# Patient Record
Sex: FEMALE | Race: WHITE | NOT HISPANIC OR LATINO | Employment: OTHER | URBAN - METROPOLITAN AREA
[De-identification: names, ages, dates, MRNs, and addresses within clinical notes are randomized per-mention and may not be internally consistent; named-entity substitution may affect disease eponyms.]

---

## 2017-01-25 ENCOUNTER — ALLSCRIPTS OFFICE VISIT (OUTPATIENT)
Dept: OTHER | Facility: OTHER | Age: 77
End: 2017-01-25

## 2017-01-30 DIAGNOSIS — N32.81 OVERACTIVE BLADDER: ICD-10-CM

## 2017-02-21 ENCOUNTER — ALLSCRIPTS OFFICE VISIT (OUTPATIENT)
Dept: OTHER | Facility: OTHER | Age: 77
End: 2017-02-21

## 2017-02-21 ENCOUNTER — LAB (OUTPATIENT)
Dept: LAB | Facility: CLINIC | Age: 77
End: 2017-02-21
Payer: COMMERCIAL

## 2017-02-21 DIAGNOSIS — N32.81 OVERACTIVE BLADDER: ICD-10-CM

## 2017-02-21 LAB
ANION GAP SERPL CALCULATED.3IONS-SCNC: 8 MMOL/L (ref 4–13)
APTT PPP: 30 SECONDS (ref 24–36)
BASOPHILS # BLD AUTO: 0.02 THOUSANDS/ΜL (ref 0–0.1)
BASOPHILS NFR BLD AUTO: 0 % (ref 0–1)
BUN SERPL-MCNC: 13 MG/DL (ref 5–25)
CALCIUM SERPL-MCNC: 9.3 MG/DL (ref 8.3–10.1)
CHLORIDE SERPL-SCNC: 104 MMOL/L (ref 100–108)
CO2 SERPL-SCNC: 27 MMOL/L (ref 21–32)
CREAT SERPL-MCNC: 0.76 MG/DL (ref 0.6–1.3)
EOSINOPHIL # BLD AUTO: 0.14 THOUSAND/ΜL (ref 0–0.61)
EOSINOPHIL NFR BLD AUTO: 2 % (ref 0–6)
ERYTHROCYTE [DISTWIDTH] IN BLOOD BY AUTOMATED COUNT: 13.5 % (ref 11.6–15.1)
GFR SERPL CREATININE-BSD FRML MDRD: >60 ML/MIN/1.73SQ M
GLUCOSE SERPL-MCNC: 123 MG/DL (ref 65–140)
HCT VFR BLD AUTO: 42.7 % (ref 34.8–46.1)
HGB BLD-MCNC: 14.5 G/DL (ref 11.5–15.4)
INR PPP: 0.97 (ref 0.86–1.16)
LYMPHOCYTES # BLD AUTO: 1.47 THOUSANDS/ΜL (ref 0.6–4.47)
LYMPHOCYTES NFR BLD AUTO: 25 % (ref 14–44)
MCH RBC QN AUTO: 29.5 PG (ref 26.8–34.3)
MCHC RBC AUTO-ENTMCNC: 34 G/DL (ref 31.4–37.4)
MCV RBC AUTO: 87 FL (ref 82–98)
MONOCYTES # BLD AUTO: 0.53 THOUSAND/ΜL (ref 0.17–1.22)
MONOCYTES NFR BLD AUTO: 9 % (ref 4–12)
NEUTROPHILS # BLD AUTO: 3.67 THOUSANDS/ΜL (ref 1.85–7.62)
NEUTS SEG NFR BLD AUTO: 64 % (ref 43–75)
NRBC BLD AUTO-RTO: 0 /100 WBCS
PLATELET # BLD AUTO: 214 THOUSANDS/UL (ref 149–390)
PMV BLD AUTO: 9.9 FL (ref 8.9–12.7)
POTASSIUM SERPL-SCNC: 3.9 MMOL/L (ref 3.5–5.3)
PROTHROMBIN TIME: 13 SECONDS (ref 12–14.3)
RBC # BLD AUTO: 4.92 MILLION/UL (ref 3.81–5.12)
SODIUM SERPL-SCNC: 139 MMOL/L (ref 136–145)
WBC # BLD AUTO: 5.85 THOUSAND/UL (ref 4.31–10.16)

## 2017-02-21 PROCEDURE — 36415 COLL VENOUS BLD VENIPUNCTURE: CPT

## 2017-02-21 PROCEDURE — 85025 COMPLETE CBC W/AUTO DIFF WBC: CPT

## 2017-02-21 PROCEDURE — 85730 THROMBOPLASTIN TIME PARTIAL: CPT

## 2017-02-21 PROCEDURE — 85610 PROTHROMBIN TIME: CPT

## 2017-02-21 PROCEDURE — 80048 BASIC METABOLIC PNL TOTAL CA: CPT

## 2017-03-06 ENCOUNTER — HOSPITAL ENCOUNTER (OUTPATIENT)
Facility: HOSPITAL | Age: 77
Setting detail: OUTPATIENT SURGERY
Discharge: HOME | End: 2017-03-06
Attending: UROLOGY | Admitting: UROLOGY
Payer: COMMERCIAL

## 2017-03-06 ENCOUNTER — ANESTHESIA (OUTPATIENT)
Dept: PERIOP | Facility: HOSPITAL | Age: 77
End: 2017-03-06
Payer: COMMERCIAL

## 2017-03-06 ENCOUNTER — ANESTHESIA EVENT (OUTPATIENT)
Dept: PERIOP | Facility: HOSPITAL | Age: 77
End: 2017-03-06
Payer: COMMERCIAL

## 2017-03-06 VITALS
HEIGHT: 62 IN | BODY MASS INDEX: 33.7 KG/M2 | WEIGHT: 183.13 LBS | DIASTOLIC BLOOD PRESSURE: 70 MMHG | TEMPERATURE: 96.7 F | HEART RATE: 78 BPM | OXYGEN SATURATION: 98 % | SYSTOLIC BLOOD PRESSURE: 157 MMHG | RESPIRATION RATE: 18 BRPM

## 2017-03-06 RX ORDER — ONDANSETRON 2 MG/ML
INJECTION INTRAMUSCULAR; INTRAVENOUS AS NEEDED
Status: DISCONTINUED | OUTPATIENT
Start: 2017-03-06 | End: 2017-03-06 | Stop reason: SURG

## 2017-03-06 RX ORDER — ALBUTEROL SULFATE 2.5 MG/3ML
2.5 SOLUTION RESPIRATORY (INHALATION) ONCE AS NEEDED
Status: DISCONTINUED | OUTPATIENT
Start: 2017-03-06 | End: 2017-03-06 | Stop reason: HOSPADM

## 2017-03-06 RX ORDER — ONDANSETRON 2 MG/ML
4 INJECTION INTRAMUSCULAR; INTRAVENOUS ONCE AS NEEDED
Status: DISCONTINUED | OUTPATIENT
Start: 2017-03-06 | End: 2017-03-06 | Stop reason: HOSPADM

## 2017-03-06 RX ORDER — SODIUM CHLORIDE, SODIUM LACTATE, POTASSIUM CHLORIDE, CALCIUM CHLORIDE 600; 310; 30; 20 MG/100ML; MG/100ML; MG/100ML; MG/100ML
INJECTION, SOLUTION INTRAVENOUS CONTINUOUS PRN
Status: DISCONTINUED | OUTPATIENT
Start: 2017-03-06 | End: 2017-03-06 | Stop reason: SURG

## 2017-03-06 RX ORDER — PHENAZOPYRIDINE HYDROCHLORIDE 100 MG/1
100 TABLET, FILM COATED ORAL 3 TIMES DAILY PRN
Qty: 10 TABLET | Refills: 0 | Status: SHIPPED | OUTPATIENT
Start: 2017-03-06 | End: 2017-03-09

## 2017-03-06 RX ORDER — LIDOCAINE HYDROCHLORIDE 10 MG/ML
INJECTION, SOLUTION INFILTRATION; PERINEURAL AS NEEDED
Status: DISCONTINUED | OUTPATIENT
Start: 2017-03-06 | End: 2017-03-06 | Stop reason: SURG

## 2017-03-06 RX ORDER — SODIUM CHLORIDE, SODIUM LACTATE, POTASSIUM CHLORIDE, CALCIUM CHLORIDE 600; 310; 30; 20 MG/100ML; MG/100ML; MG/100ML; MG/100ML
125 INJECTION, SOLUTION INTRAVENOUS CONTINUOUS
Status: CANCELLED | OUTPATIENT
Start: 2017-03-06

## 2017-03-06 RX ORDER — LABETALOL HYDROCHLORIDE 5 MG/ML
5 INJECTION, SOLUTION INTRAVENOUS
Status: DISCONTINUED | OUTPATIENT
Start: 2017-03-06 | End: 2017-03-06 | Stop reason: HOSPADM

## 2017-03-06 RX ORDER — MEPERIDINE HYDROCHLORIDE 25 MG/ML
12.5 INJECTION INTRAMUSCULAR; INTRAVENOUS; SUBCUTANEOUS AS NEEDED
Status: DISCONTINUED | OUTPATIENT
Start: 2017-03-06 | End: 2017-03-06 | Stop reason: HOSPADM

## 2017-03-06 RX ORDER — PROPOFOL 10 MG/ML
INJECTION, EMULSION INTRAVENOUS AS NEEDED
Status: DISCONTINUED | OUTPATIENT
Start: 2017-03-06 | End: 2017-03-06 | Stop reason: SURG

## 2017-03-06 RX ORDER — FENTANYL CITRATE/PF 50 MCG/ML
25 SYRINGE (ML) INJECTION
Status: DISCONTINUED | OUTPATIENT
Start: 2017-03-06 | End: 2017-03-06 | Stop reason: HOSPADM

## 2017-03-06 RX ADMIN — ONDANSETRON 4 MG: 2 INJECTION INTRAMUSCULAR; INTRAVENOUS at 12:19

## 2017-03-06 RX ADMIN — PROPOFOL 150 MG: 10 INJECTION, EMULSION INTRAVENOUS at 12:04

## 2017-03-06 RX ADMIN — CEFAZOLIN SODIUM 2000 MG: 2 SOLUTION INTRAVENOUS at 12:00

## 2017-03-06 RX ADMIN — LIDOCAINE HYDROCHLORIDE 50 MG: 10 INJECTION, SOLUTION INFILTRATION; PERINEURAL at 12:04

## 2017-03-06 RX ADMIN — SODIUM CHLORIDE, SODIUM LACTATE, POTASSIUM CHLORIDE, AND CALCIUM CHLORIDE: .6; .31; .03; .02 INJECTION, SOLUTION INTRAVENOUS at 12:00

## 2017-03-21 ENCOUNTER — ALLSCRIPTS OFFICE VISIT (OUTPATIENT)
Dept: OTHER | Facility: OTHER | Age: 77
End: 2017-03-21

## 2017-03-21 DIAGNOSIS — L98.9 DISORDER OF SKIN OR SUBCUTANEOUS TISSUE: ICD-10-CM

## 2017-03-24 ENCOUNTER — ALLSCRIPTS OFFICE VISIT (OUTPATIENT)
Dept: OTHER | Facility: OTHER | Age: 77
End: 2017-03-24

## 2017-04-03 ENCOUNTER — ALLSCRIPTS OFFICE VISIT (OUTPATIENT)
Dept: OTHER | Facility: OTHER | Age: 77
End: 2017-04-03

## 2017-04-26 ENCOUNTER — LAB (OUTPATIENT)
Dept: LAB | Facility: OTHER | Age: 77
End: 2017-04-26
Payer: COMMERCIAL

## 2017-04-26 ENCOUNTER — TRANSCRIBE ORDERS (OUTPATIENT)
Dept: LAB | Facility: OTHER | Age: 77
End: 2017-04-26

## 2017-04-26 DIAGNOSIS — E78.2 MIXED HYPERLIPIDEMIA: ICD-10-CM

## 2017-04-26 DIAGNOSIS — R53.83 FATIGUE, UNSPECIFIED TYPE: ICD-10-CM

## 2017-04-26 DIAGNOSIS — E78.2 MIXED HYPERLIPIDEMIA: Primary | ICD-10-CM

## 2017-04-26 LAB
ALBUMIN SERPL BCP-MCNC: 3.7 G/DL (ref 3.5–5)
ALP SERPL-CCNC: 102 U/L (ref 46–116)
ALT SERPL W P-5'-P-CCNC: 48 U/L (ref 12–78)
ANION GAP SERPL CALCULATED.3IONS-SCNC: 9 MMOL/L (ref 4–13)
AST SERPL W P-5'-P-CCNC: 23 U/L (ref 5–45)
BASOPHILS # BLD AUTO: 0.01 THOUSANDS/ΜL (ref 0–0.1)
BASOPHILS NFR BLD AUTO: 0 % (ref 0–1)
BILIRUB SERPL-MCNC: 0.57 MG/DL (ref 0.2–1)
BUN SERPL-MCNC: 17 MG/DL (ref 5–25)
CALCIUM SERPL-MCNC: 9.1 MG/DL (ref 8.3–10.1)
CHLORIDE SERPL-SCNC: 108 MMOL/L (ref 100–108)
CHOLEST SERPL-MCNC: 202 MG/DL (ref 50–200)
CO2 SERPL-SCNC: 26 MMOL/L (ref 21–32)
CREAT SERPL-MCNC: 0.73 MG/DL (ref 0.6–1.3)
EOSINOPHIL # BLD AUTO: 0.19 THOUSAND/ΜL (ref 0–0.61)
EOSINOPHIL NFR BLD AUTO: 2 % (ref 0–6)
ERYTHROCYTE [DISTWIDTH] IN BLOOD BY AUTOMATED COUNT: 13.8 % (ref 11.6–15.1)
GFR SERPL CREATININE-BSD FRML MDRD: >60 ML/MIN/1.73SQ M
GLUCOSE P FAST SERPL-MCNC: 103 MG/DL (ref 65–99)
HCT VFR BLD AUTO: 42.4 % (ref 34.8–46.1)
HDLC SERPL-MCNC: 73 MG/DL (ref 40–60)
HGB BLD-MCNC: 14 G/DL (ref 11.5–15.4)
LDLC SERPL CALC-MCNC: 116 MG/DL (ref 0–100)
LYMPHOCYTES # BLD AUTO: 1.74 THOUSANDS/ΜL (ref 0.6–4.47)
LYMPHOCYTES NFR BLD AUTO: 20 % (ref 14–44)
MCH RBC QN AUTO: 28.5 PG (ref 26.8–34.3)
MCHC RBC AUTO-ENTMCNC: 33 G/DL (ref 31.4–37.4)
MCV RBC AUTO: 86 FL (ref 82–98)
MONOCYTES # BLD AUTO: 0.47 THOUSAND/ΜL (ref 0.17–1.22)
MONOCYTES NFR BLD AUTO: 5 % (ref 4–12)
NEUTROPHILS # BLD AUTO: 6.41 THOUSANDS/ΜL (ref 1.85–7.62)
NEUTS SEG NFR BLD AUTO: 73 % (ref 43–75)
NRBC BLD AUTO-RTO: 0 /100 WBCS
PLATELET # BLD AUTO: 208 THOUSANDS/UL (ref 149–390)
PMV BLD AUTO: 9.7 FL (ref 8.9–12.7)
POTASSIUM SERPL-SCNC: 4.1 MMOL/L (ref 3.5–5.3)
PROT SERPL-MCNC: 7 G/DL (ref 6.4–8.2)
RBC # BLD AUTO: 4.91 MILLION/UL (ref 3.81–5.12)
SODIUM SERPL-SCNC: 143 MMOL/L (ref 136–145)
T4 SERPL-MCNC: 10.8 UG/DL (ref 4.7–13.3)
TRIGL SERPL-MCNC: 66 MG/DL
TSH SERPL DL<=0.05 MIU/L-ACNC: 1.85 UIU/ML (ref 0.36–3.74)
WBC # BLD AUTO: 8.84 THOUSAND/UL (ref 4.31–10.16)

## 2017-04-26 PROCEDURE — 85025 COMPLETE CBC W/AUTO DIFF WBC: CPT

## 2017-04-26 PROCEDURE — 80053 COMPREHEN METABOLIC PANEL: CPT

## 2017-04-26 PROCEDURE — 80061 LIPID PANEL: CPT

## 2017-04-26 PROCEDURE — 84436 ASSAY OF TOTAL THYROXINE: CPT

## 2017-04-26 PROCEDURE — 36415 COLL VENOUS BLD VENIPUNCTURE: CPT

## 2017-04-26 PROCEDURE — 84443 ASSAY THYROID STIM HORMONE: CPT

## 2017-06-14 ENCOUNTER — LAB REQUISITION (OUTPATIENT)
Dept: LAB | Facility: HOSPITAL | Age: 77
End: 2017-06-14
Payer: COMMERCIAL

## 2017-06-14 DIAGNOSIS — Z80.0 FAMILY HISTORY OF MALIGNANT NEOPLASM OF DIGESTIVE ORGAN: ICD-10-CM

## 2017-06-14 DIAGNOSIS — D12.2 BENIGN NEOPLASM OF ASCENDING COLON: ICD-10-CM

## 2017-06-14 DIAGNOSIS — D12.5 BENIGN NEOPLASM OF SIGMOID COLON: ICD-10-CM

## 2017-06-14 PROCEDURE — 88305 TISSUE EXAM BY PATHOLOGIST: CPT | Performed by: COLON & RECTAL SURGERY

## 2017-10-16 ENCOUNTER — TRANSCRIBE ORDERS (OUTPATIENT)
Dept: LAB | Facility: OTHER | Age: 77
End: 2017-10-16

## 2017-10-16 ENCOUNTER — LAB (OUTPATIENT)
Dept: LAB | Facility: OTHER | Age: 77
End: 2017-10-16
Payer: COMMERCIAL

## 2017-10-16 DIAGNOSIS — E03.9 MYXEDEMA HEART DISEASE: Primary | ICD-10-CM

## 2017-10-16 DIAGNOSIS — I51.9 MYXEDEMA HEART DISEASE: Primary | ICD-10-CM

## 2017-10-16 DIAGNOSIS — E78.2 MIXED HYPERLIPIDEMIA: ICD-10-CM

## 2017-10-16 LAB
CHOLEST SERPL-MCNC: 172 MG/DL (ref 50–200)
EST. AVERAGE GLUCOSE BLD GHB EST-MCNC: 128 MG/DL
GLUCOSE P FAST SERPL-MCNC: 96 MG/DL (ref 65–99)
HBA1C MFR BLD: 6.1 % (ref 4.2–6.3)
HDLC SERPL-MCNC: 73 MG/DL (ref 40–60)
LDLC SERPL CALC-MCNC: 88 MG/DL (ref 0–100)
TRIGL SERPL-MCNC: 54 MG/DL
TSH SERPL DL<=0.05 MIU/L-ACNC: 2.16 UIU/ML (ref 0.36–3.74)

## 2017-10-16 PROCEDURE — 83036 HEMOGLOBIN GLYCOSYLATED A1C: CPT

## 2017-10-16 PROCEDURE — 80061 LIPID PANEL: CPT

## 2017-10-16 PROCEDURE — 82947 ASSAY GLUCOSE BLOOD QUANT: CPT

## 2017-10-16 PROCEDURE — 36415 COLL VENOUS BLD VENIPUNCTURE: CPT

## 2017-10-16 PROCEDURE — 84443 ASSAY THYROID STIM HORMONE: CPT

## 2017-11-15 ENCOUNTER — ALLSCRIPTS OFFICE VISIT (OUTPATIENT)
Dept: OTHER | Facility: OTHER | Age: 77
End: 2017-11-15

## 2017-11-16 NOTE — PROGRESS NOTES
Assessment  1  Cherry angioma (448 1) (I78 1)   2  Screening for skin condition (V82 0) (Z13 89)    Plan     · Follow-up as previously scheduled Evaluation and Treatment  Follow-up  Status:Complete  Done: [id number]    Discussion/Summary  Discussion Summary:   Ange Tyrone angioma patient reassured that this these are normal growths we acquire with age no treatment indicated nothing else of concern noted on cursory exam follow-up as previously scheduled  Chief Complaint  Chief Complaint Free Text Note Form: Patient has what appears to her primary care physician as a black mole/lesion of right upper thigh which he indicated raised concerns and needed her to follow up with dermatology  History of Present Illness  HPI: 16-year-old female presents because of concerns of a lesion noted by her chiropractor recently patient comes in earlier than her regular appointment      Active Problems    1  Actinic keratosis (702 0) (L57 0)   2  Changing skin lesion (709 9) (L98 9)   3  Dyspnea on exertion (786 09) (R06 09)   4  Encounter for routine gynecological examination (V72 31) (Z01 419)   5  Encounter for screening mammogram for malignant neoplasm of breast (V76 12) (Z12 31)   6  Fatigue (780 79) (R53 83)   7  Hypothyroidism (244 9) (E03 9)   8  Incontinence (788 30) (R32)   9  Insomnia (780 52) (G47 00)   10  Mitral valve disease (394 9) (I05 9)   11  OAB (overactive bladder) (596 51) (N32 81)   12  Obesity (278 00) (E66 9)   13  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)   14  Preop cardiovascular exam (V72 81) (Z01 810)   15  Screening for skin condition (V82 0) (Z13 89)   16  Seborrheic keratosis (702 19) (L82 1)   17  Sleep apnea (780 57) (G47 30)   18  Squamous cell carcinoma of forehead (173 32) (C44 329)   19  Symptomatic menopausal or female climacteric states (627 2) (N95 1)    Past Medical History    1  History of Age At First Period 5 Years Old (Menarche)   2   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 (293 84)   3  H/O nonmelanoma skin cancer (V10 83) (P39 820)   4  History of benign neoplasm of skin (V13 3) (Z87 2)   5  History of chest pain (V13 89) (Z87 898)   6  History of seasonal allergies (V15 09) (Z88 9)   7  History of shortness of breath (V13 89) (Z87 898)   8  History of syncope (V15 89) (Z87 898)   9  History of Palpitations (785 1) (R00 2)   10  History of Spontaneous , complete, without mention of complication (222 50)  (W52 1)  Past Medical History Reviewed- Derm:   The past medical history was reviewed  Surgical History    1  History of Hysterectomy   2  History of Laparo Part Colect W/ Anastomosis Coloproctostomy Colostomy   3  History of Nose Surgery   4  History of Tonsillectomy   5  History of Wrist Surgery  Surgical History Reviewed [facility]- Derm:   Surgical History reviewed      Family History  Mother    1  Family history of Alcohol abuse   2  Family history of Colon cancer   3  Family history of Heart disease  Father    4  Family history of Alcohol abuse  Brother    5  Family history of Alcohol abuse   6  Family history of Colon cancer  Family History Reviewed- Derm:   Family History was reviewed      Social History     ·    · Living Independently Alone (V60 3)   · Never A Smoker   · Never Drank Alcohol   · Never Used Drugs   · Occupation: Retired  Social History Reviewed [facility]- Derm: The social history was reviewed      Current Meds   1  Echinacea CAPS; Therapy: (Recorded:2016) to Recorded   2  Omega 3 CAPS; Therapy: (Recorded:38Wif0038) to Recorded   3  Probiotic Oral Capsule; USE AS DIRECTED; Therapy: 84BCW1878 to Recorded   4  Vitamin C TABS; Therapy: (Recorded:2016) to Recorded   5  Vitamin D3 CAPS; Therapy: (Recorded:2016) to Recorded  Medication List Reviewed: The medication list was reviewed and updated today  Allergies  1  Caffeine   2  Iodine SOLN    3  Adhesive Tape   4   Latex    Physical Exam Constitutional  General appearance: Appears healthy and well developed  Lymphatic  No visible disturbance  Musculoskeletal  Digits and nails: No clubbing, cyanosis or edema  Cutaneous and nail exam normal    Neuro/Psych  Alert and oriented x 3  Displays comfort and cooperation during encounterl  Affect is normal    Finding 3 mm purplish papule noted on the right posterior thigh nothing else remarkable noted on cursory exam       Health Management  Encounter for screening mammogram for malignant neoplasm of breast   Digital Bilateral Screening Mammogram With CAD; every 1 year; Last [id number]; Next EIM:[id number]; Overdue    Future Appointments    Date/Time Provider Specialty Site   04/04/2018 09:05 AM RUDOLPH Fuentes   Dermatology [facility]       Signatures   Electronically signed by : RUDOLPH Lala ; Nov 15 2017  9:06AM EST                       (Author)

## 2018-01-10 NOTE — MISCELLANEOUS
Message   Recorded as Task   Date: 11/22/2016 01:36 PM, Created By: Marvin Patton   Task Name: Go to Result   Assigned To: GAMALIEL GYN,Team   Regarding Patient: Wai Jones, Status: In Progress   Comment:    SunnyDee - 22 Nov 2016 1:36 PM     TASK CREATED  please call Kenny Mandujano-   her dexa shows osteopenia in her spine but her hip has normal bone density  I would recommend continued calcium 1200mg daily and at least 800 units of vitamin D per day  also exercise- strength training and balance  repeat dexa in 2-3 years   Markie Vivar - 22 Nov 2016 1:44 PM     TASK IN PROGRESS   Markie Ghazala - 22 Nov 2016 1:45 PM     TASK EDITED  David Castanon - 22 Nov 2016 2:46 PM     TASK EDITED  made pt aware  Active Problems    1  Actinic keratosis (702 0) (L57 0)   2  Changing skin lesion (709 9) (L98 9)   3  Dyspnea on exertion (786 09) (R06 09)   4  Encounter for routine gynecological examination (V72 31) (Z01 419)   5  Encounter for screening mammogram for malignant neoplasm of breast (V76 12)   (Z12 31)   6  Fatigue (780 79) (R53 83)   7  Hypothyroidism (244 9) (E03 9)   8  Incontinence (788 30) (R32)   9  Insomnia (780 52) (G47 00)   10  Mitral valve disorder (424 0) (I05 9)   11  OAB (overactive bladder) (596 51) (N32 81)   12  Obesity (278 00) (E66 9)   13  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)   14  Screening for skin condition (V82 0) (Z13 89)   15  Seborrheic keratosis (702 19) (L82 1)   16  Sleep apnea (780 57) (G47 30)   17  Squamous cell carcinoma of forehead (173 32) (C44 329)   18  Symptomatic menopausal or female climacteric states (627 2) (N95 1)    Current Meds   1  Echinacea CAPS; Therapy: (Recorded:11Mar2016) to Recorded   2  Dara 500 MG Oral Capsule; TAKE AS DIRECTED; Therapy: 37ROQ8527 to Recorded   3  Levothyroxine Sodium 50 MCG Oral Tablet; TAKE 1 TABLET DAILY; Therapy: 45HPU3716 to Recorded   4  Omega 3 CAPS;    Therapy: (Recorded:60Gnl5715) to Recorded   5  Potassium & Magnesium Aspartat CAPS; Therapy: (Recorded:11Mar2016) to Recorded   6  Probiotic Oral Capsule; USE AS DIRECTED; Therapy: 22LUO7810 to Recorded   7  Vitamin B Complex TABS; Therapy: (Recorded:11Mar2016) to Recorded   8  Vitamin C TABS; Therapy: (Recorded:11Mar2016) to Recorded   9  Vitamin D3 CAPS; Therapy: (Recorded:11Mar2016) to Recorded    Allergies    1  Caffeine   2  Iodine SOLN    3  Adhesive Tape   4   Latex    Signatures   Electronically signed by : Star Parisi LPN; Nov 22 4461  9:82TL EST                       (Author)

## 2018-01-13 VITALS
HEIGHT: 62 IN | DIASTOLIC BLOOD PRESSURE: 80 MMHG | BODY MASS INDEX: 34.37 KG/M2 | SYSTOLIC BLOOD PRESSURE: 128 MMHG | HEART RATE: 72 BPM | WEIGHT: 186.8 LBS

## 2018-01-14 VITALS
HEART RATE: 74 BPM | WEIGHT: 190 LBS | HEIGHT: 62 IN | BODY MASS INDEX: 34.96 KG/M2 | OXYGEN SATURATION: 97 % | SYSTOLIC BLOOD PRESSURE: 142 MMHG | DIASTOLIC BLOOD PRESSURE: 88 MMHG

## 2018-01-14 VITALS
DIASTOLIC BLOOD PRESSURE: 80 MMHG | BODY MASS INDEX: 34.96 KG/M2 | SYSTOLIC BLOOD PRESSURE: 130 MMHG | HEART RATE: 66 BPM | WEIGHT: 190 LBS | HEIGHT: 62 IN

## 2018-02-14 ENCOUNTER — LAB (OUTPATIENT)
Dept: LAB | Facility: CLINIC | Age: 78
End: 2018-02-14
Payer: COMMERCIAL

## 2018-02-14 ENCOUNTER — TRANSCRIBE ORDERS (OUTPATIENT)
Dept: ADMINISTRATIVE | Facility: HOSPITAL | Age: 78
End: 2018-02-14

## 2018-02-14 DIAGNOSIS — E78.2 MIXED HYPERLIPIDEMIA: ICD-10-CM

## 2018-02-14 DIAGNOSIS — E78.2 MIXED HYPERLIPIDEMIA: Primary | ICD-10-CM

## 2018-02-14 LAB
ALBUMIN SERPL BCP-MCNC: 3.7 G/DL (ref 3.5–5)
ALP SERPL-CCNC: 93 U/L (ref 46–116)
ALT SERPL W P-5'-P-CCNC: 32 U/L (ref 12–78)
ANION GAP SERPL CALCULATED.3IONS-SCNC: 6 MMOL/L (ref 4–13)
AST SERPL W P-5'-P-CCNC: 16 U/L (ref 5–45)
BASOPHILS # BLD AUTO: 0.01 THOUSANDS/ΜL (ref 0–0.1)
BASOPHILS NFR BLD AUTO: 0 % (ref 0–1)
BILIRUB SERPL-MCNC: 0.55 MG/DL (ref 0.2–1)
BUN SERPL-MCNC: 18 MG/DL (ref 5–25)
CALCIUM SERPL-MCNC: 9 MG/DL (ref 8.3–10.1)
CHLORIDE SERPL-SCNC: 107 MMOL/L (ref 100–108)
CHOLEST SERPL-MCNC: 207 MG/DL (ref 50–200)
CO2 SERPL-SCNC: 28 MMOL/L (ref 21–32)
CREAT SERPL-MCNC: 0.67 MG/DL (ref 0.6–1.3)
EOSINOPHIL # BLD AUTO: 0.08 THOUSAND/ΜL (ref 0–0.61)
EOSINOPHIL NFR BLD AUTO: 1 % (ref 0–6)
ERYTHROCYTE [DISTWIDTH] IN BLOOD BY AUTOMATED COUNT: 13.8 % (ref 11.6–15.1)
EST. AVERAGE GLUCOSE BLD GHB EST-MCNC: 128 MG/DL
FOLATE SERPL-MCNC: >20 NG/ML (ref 3.1–17.5)
GFR SERPL CREATININE-BSD FRML MDRD: 84 ML/MIN/1.73SQ M
GLUCOSE P FAST SERPL-MCNC: 93 MG/DL (ref 65–99)
HBA1C MFR BLD: 6.1 % (ref 4.2–6.3)
HCT VFR BLD AUTO: 42.3 % (ref 34.8–46.1)
HDLC SERPL-MCNC: 87 MG/DL (ref 40–60)
HGB BLD-MCNC: 14.7 G/DL (ref 11.5–15.4)
IRON SERPL-MCNC: 93 UG/DL (ref 50–170)
LDLC SERPL CALC-MCNC: 109 MG/DL (ref 0–100)
LYMPHOCYTES # BLD AUTO: 1.38 THOUSANDS/ΜL (ref 0.6–4.47)
LYMPHOCYTES NFR BLD AUTO: 23 % (ref 14–44)
MCH RBC QN AUTO: 30.7 PG (ref 26.8–34.3)
MCHC RBC AUTO-ENTMCNC: 34.8 G/DL (ref 31.4–37.4)
MCV RBC AUTO: 88 FL (ref 82–98)
MONOCYTES # BLD AUTO: 0.51 THOUSAND/ΜL (ref 0.17–1.22)
MONOCYTES NFR BLD AUTO: 9 % (ref 4–12)
NEUTROPHILS # BLD AUTO: 3.92 THOUSANDS/ΜL (ref 1.85–7.62)
NEUTS SEG NFR BLD AUTO: 67 % (ref 43–75)
NRBC BLD AUTO-RTO: 0 /100 WBCS
PLATELET # BLD AUTO: 215 THOUSANDS/UL (ref 149–390)
PMV BLD AUTO: 8.9 FL (ref 8.9–12.7)
POTASSIUM SERPL-SCNC: 4.1 MMOL/L (ref 3.5–5.3)
PROT SERPL-MCNC: 7.2 G/DL (ref 6.4–8.2)
RBC # BLD AUTO: 4.79 MILLION/UL (ref 3.81–5.12)
SODIUM SERPL-SCNC: 141 MMOL/L (ref 136–145)
TRIGL SERPL-MCNC: 56 MG/DL
TSH SERPL DL<=0.05 MIU/L-ACNC: 1.62 UIU/ML (ref 0.36–3.74)
VIT B12 SERPL-MCNC: 1031 PG/ML (ref 100–900)
WBC # BLD AUTO: 5.94 THOUSAND/UL (ref 4.31–10.16)

## 2018-02-14 PROCEDURE — 80061 LIPID PANEL: CPT

## 2018-02-14 PROCEDURE — 85025 COMPLETE CBC W/AUTO DIFF WBC: CPT

## 2018-02-14 PROCEDURE — 80053 COMPREHEN METABOLIC PANEL: CPT

## 2018-02-14 PROCEDURE — 83036 HEMOGLOBIN GLYCOSYLATED A1C: CPT

## 2018-02-14 PROCEDURE — 82607 VITAMIN B-12: CPT

## 2018-02-14 PROCEDURE — 84443 ASSAY THYROID STIM HORMONE: CPT

## 2018-02-14 PROCEDURE — 82746 ASSAY OF FOLIC ACID SERUM: CPT

## 2018-02-14 PROCEDURE — 83540 ASSAY OF IRON: CPT

## 2018-02-14 PROCEDURE — 36415 COLL VENOUS BLD VENIPUNCTURE: CPT

## 2018-02-15 ENCOUNTER — TELEPHONE (OUTPATIENT)
Dept: OBGYN CLINIC | Facility: CLINIC | Age: 78
End: 2018-02-15

## 2018-02-15 NOTE — TELEPHONE ENCOUNTER
Pt called - has issues with incontinence for the past year & half - her PCP informed her to contact us for help  Please advise

## 2018-02-15 NOTE — TELEPHONE ENCOUNTER
Spoke with Pt today via phone call  Pt states she recently had a surgical procedure performed by her urologist   Pt further states she has been having complications since said procedure, her PCP suggested that she give GAMALIEL a call  Advised Pt that if she is having complications from said procedure, she should follow-up with physician that performed procedure  Pt states that she will contact her urologist's office to see what is the next step she should take  Reiterated to Pt that if she has any questions/concerns, to contact office

## 2018-02-20 RX ORDER — METHYLDOPA/HYDROCHLOROTHIAZIDE 250MG-25MG
450 TABLET ORAL
COMMUNITY
End: 2018-06-22 | Stop reason: ALTCHOICE

## 2018-02-20 RX ORDER — RIBOFLAVIN (VITAMIN B2) 100 MG
TABLET ORAL
COMMUNITY
End: 2018-06-22 | Stop reason: ALTCHOICE

## 2018-02-20 RX ORDER — BIOTIN 1 MG
TABLET ORAL
COMMUNITY
End: 2018-05-25 | Stop reason: ALTCHOICE

## 2018-02-22 ENCOUNTER — OFFICE VISIT (OUTPATIENT)
Dept: UROLOGY | Facility: CLINIC | Age: 78
End: 2018-02-22
Payer: COMMERCIAL

## 2018-02-22 VITALS
DIASTOLIC BLOOD PRESSURE: 90 MMHG | WEIGHT: 180 LBS | HEIGHT: 62 IN | BODY MASS INDEX: 33.13 KG/M2 | SYSTOLIC BLOOD PRESSURE: 150 MMHG | HEART RATE: 80 BPM

## 2018-02-22 DIAGNOSIS — N32.81 OAB (OVERACTIVE BLADDER): Primary | ICD-10-CM

## 2018-02-22 DIAGNOSIS — N39.41 URGE INCONTINENCE OF URINE: ICD-10-CM

## 2018-02-22 PROCEDURE — 51798 US URINE CAPACITY MEASURE: CPT | Performed by: PHYSICIAN ASSISTANT

## 2018-02-22 PROCEDURE — 99213 OFFICE O/P EST LOW 20 MIN: CPT | Performed by: PHYSICIAN ASSISTANT

## 2018-02-22 RX ORDER — TEMAZEPAM 30 MG/1
CAPSULE ORAL
COMMUNITY
Start: 2018-01-25 | End: 2018-04-04 | Stop reason: ALTCHOICE

## 2018-02-22 NOTE — PROGRESS NOTES
1  OAB (overactive bladder)     2  Urge incontinence of urine  Mirabegron ER 50 MG TB24           Assessment and plan:       1  Overactive bladder status post bladder Botox (3/6/17)  -- managed by Dr Mauricio Scales  - I reviewed with the patient her options of repeating Botox, adding additional anticholinergic versus beta 3 agonist in the meantime as the Botox tapers off, tibial nerve stimulation, or sacral neuromodulation    - Patient elects to restart Myrbetriq 50mg PO daily over the next few months  Side effect profile reviewed and Rx sent electronically to her pharmacy  - She will follow up in 4-6 months with [facility] for review and determine if she wishes to pursue further botox at that time  - all questions answered  Bernardo Guy PA-C      Chief Complaint     F/u OAB    History of Present Illness     Barbara Beltrán is a 77 y o  female patient of Dr Mauricio Scales with a history of overactive bladder status post bladder Botox (3/6/17) presenting for follow-up  Patient had medically refractory overactive bladder to multiple beta 3 agonists and anticholinergics  She underwent 100 units of Botox injection in 3/6/2017  Patient had remarkable symptomatic improvement  Following her Botox injection  Patient has been very happy with the results of Botox over the past few months  She does feel like the results are tapering off  She states she had 4 episodes of urinary incontinence in December, approximately 10 episodes in January, and so far 4 episodes in February  She has noticed an slight increase in her urgency is well  She has noted intermittent "urethral pain" which lasts 2-3 seconds and happens approximately once monthly  Denies any dysuria, gross hematuria, suprapubic pressure, flank pain, fever, or chills  Postvoid residual in the office today reveals 10 mL        Laboratory     Lab Results   Component Value Date    CREATININE 0 67 02/14/2018       Review of Systems     Review of Systems Constitutional: Negative  HENT: Negative  Eyes: Negative  Respiratory: Negative  Cardiovascular: Negative  Gastrointestinal: Negative  Endocrine: Negative  Genitourinary: Negative  Musculoskeletal: Negative  Allergic/Immunologic: Negative  Neurological: Negative  Hematological: Negative  Psychiatric/Behavioral: Negative  Allergies     Allergies   Allergen Reactions    Caffeine     Iodine Rash    Latex Rash    Other Rash     Adhesive tape         Physical Exam     Physical Exam   Constitutional: She is oriented to person, place, and time  She appears well-developed and well-nourished  No distress  HENT:   Head: Normocephalic and atraumatic  Eyes: Conjunctivae are normal    Neck: Normal range of motion  Pulmonary/Chest: Effort normal    Musculoskeletal: Normal range of motion  She exhibits no edema, tenderness or deformity  Neurological: She is alert and oriented to person, place, and time  Skin: Skin is warm and dry  No rash noted  She is not diaphoretic  No erythema  No pallor  Psychiatric: She has a normal mood and affect   Her behavior is normal          Vital Signs     Vitals:    02/22/18 1148   BP: 150/90   Pulse: 80   Weight: 81 6 kg (180 lb)   Height: 5' 2" (1 575 m)         Current Medications       Current Outpatient Prescriptions:     Ascorbic Acid (VITAMIN C) 100 MG tablet, Take by mouth, Disp: , Rfl:     B Complex Vitamins (B-COMPLEX/B-12 PO), Take 450 mg by mouth daily, Disp: , Rfl:     Bioflavonoid Products (BREANA C PO), Take 1 tablet by mouth daily  , Disp: , Rfl:     Cholecalciferol (VITAMIN D3) 1000 units CAPS, Take by mouth, Disp: , Rfl:     Echinacea 125 MG CAPS, Take by mouth, Disp: , Rfl:     Lactobacillus (PROBIOTIC ACIDOPHILUS PO), Take 1 tablet by mouth daily  , Disp: , Rfl:     Mirabegron ER 50 MG TB24, Take 1 tablet (50 mg total) by mouth daily, Disp: 90 tablet, Rfl: 1    Omega-3 Fatty Acids (OMEGA 3 PO), Take 1 tablet by mouth daily  , Disp: , Rfl:     Probiotic Product (PROBIOTIC-10 PO), Take by mouth, Disp: , Rfl:     Specialty Vitamins Products (ECHINACEA C COMPLETE PO), Take 1 tablet by mouth daily  , Disp: , Rfl:     temazepam (RESTORIL) 30 mg capsule, , Disp: , Rfl:     TEMAZEPAM PO, Take 15 mg by mouth daily at bedtime  , Disp: , Rfl:       Active Problems     Patient Active Problem List   Diagnosis    Incontinence    OAB (overactive bladder)         Past Medical History     Past Medical History:   Diagnosis Date    Diverticulitis     Sleep apnea          Surgical History     Past Surgical History:   Procedure Laterality Date    BOTOX INJECTION N/A 3/6/2017    Procedure: CYSTOSCOPY; BLADDER BOTOX 100 UNITS ;  Surgeon: Briseyda Talavera MD;  [facility]: [facility];  Service:     CARDIAC CATHETERIZATION      CARPAL TUNNEL RELEASE Right     COLOSTOMY      COLOSTOMY CLOSURE      FOOT SURGERY Left     neuroma    HYSTERECTOMY      NASAL SINUS SURGERY      TONSILLECTOMY      WRIST SURGERY Left          Family History     History reviewed  No pertinent family history        Social History     Social History       Radiology

## 2018-03-23 ENCOUNTER — TELEPHONE (OUTPATIENT)
Dept: UROLOGY | Facility: AMBULATORY SURGERY CENTER | Age: 78
End: 2018-03-23

## 2018-03-23 NOTE — TELEPHONE ENCOUNTER
Received a message from patient requesting to be scheduled for botox procedure  She is normally seen in [facility]  She said that you can leave her a message because she will be out today between 9:30-12:30

## 2018-03-26 ENCOUNTER — OFFICE VISIT (OUTPATIENT)
Dept: FAMILY MEDICINE CLINIC | Facility: CLINIC | Age: 78
End: 2018-03-26
Payer: COMMERCIAL

## 2018-03-26 VITALS
RESPIRATION RATE: 16 BRPM | SYSTOLIC BLOOD PRESSURE: 124 MMHG | WEIGHT: 183.4 LBS | DIASTOLIC BLOOD PRESSURE: 80 MMHG | BODY MASS INDEX: 33.75 KG/M2 | OXYGEN SATURATION: 95 % | TEMPERATURE: 97.9 F | HEART RATE: 78 BPM | HEIGHT: 62 IN

## 2018-03-26 DIAGNOSIS — G47.00 INSOMNIA, UNSPECIFIED TYPE: ICD-10-CM

## 2018-03-26 DIAGNOSIS — R25.1 TREMORS OF NERVOUS SYSTEM: Primary | ICD-10-CM

## 2018-03-26 DIAGNOSIS — R53.83 FATIGUE, UNSPECIFIED TYPE: ICD-10-CM

## 2018-03-26 PROCEDURE — 99204 OFFICE O/P NEW MOD 45 MIN: CPT | Performed by: FAMILY MEDICINE

## 2018-03-26 PROCEDURE — 3725F SCREEN DEPRESSION PERFORMED: CPT | Performed by: FAMILY MEDICINE

## 2018-03-26 PROCEDURE — 1101F PT FALLS ASSESS-DOCD LE1/YR: CPT | Performed by: FAMILY MEDICINE

## 2018-03-26 RX ORDER — FOLIC ACID 1 MG/1
TABLET ORAL DAILY
COMMUNITY
End: 2018-05-25 | Stop reason: ALTCHOICE

## 2018-03-26 RX ORDER — TRAZODONE HYDROCHLORIDE 100 MG/1
100 TABLET ORAL
Qty: 30 TABLET | Refills: 1 | Status: SHIPPED | OUTPATIENT
Start: 2018-03-26 | End: 2018-07-06 | Stop reason: ALTCHOICE

## 2018-03-26 RX ORDER — LORATADINE 10 MG/1
10 TABLET ORAL DAILY
COMMUNITY
End: 2018-05-25 | Stop reason: ALTCHOICE

## 2018-03-26 NOTE — PROGRESS NOTES
Assessment/Plan:    No problem-specific Assessment & Plan notes found for this encounter  Diagnoses and all orders for this visit:    Tremors of nervous system  -     MRI brain w wo contrast; Future  -     Ambulatory referral to Neurology; Future    Insomnia, unspecified type  After discussing risks and benefits of medication along with abuse, addiction potential will start trazodone at this time  -     traZODone (DESYREL) 100 mg tablet; Take 1 tablet (100 mg total) by mouth daily at bedtime for 30 days    Fatigue, unspecified type  Unclear etiology possibly related to insomnia  Other orders  -     folic acid (FOLVITE) 1 mg tablet; Take by mouth daily  -     loratadine (CLARITIN) 10 mg tablet; Take 10 mg by mouth daily      Follow up in 1 month    Subjective:      Patient ID: Becca Briones is a 77 y o  female  Becca Briones is a 77 y o  female who complains of insomnia  Onset was several years ago  Patient describes symptoms as frequent night time awakening, difficulty falling asleep and non-restful sleep  Patient has found moderate relief with prescription sleep aid, temazepam  Associated symptoms include: fatigue  Patient denies anxiety and depression  Symptoms have gradually worsened  Tremor  She complains of tremor  Tremor primarily involves the bilateral hand  Onset of symptoms was gradual, starting about several months ago  Symptoms are currently of moderate severity  Tremor exacerbated by none  Tremor is alleviated by sleep  Symptoms occur intermittently and last seconds  She also describes symptoms of unilateral hand tremor and difficulty with walking  She denies voice change and postural changes  Fatigue  Patient complains of fatigue  Symptoms began several years ago  The patient feels the fatigue began with: a significant change in weight  Symptoms of her fatigue have been change in appetite and fatigue with paradoxical insomnia   Patient describes the following psychological symptoms: none  Patient denies change in hair texture, cold intolerance and constipation  Symptoms have gradually worsened  Symptom severity: symptoms bothersome, but easily able to carry out all usual work/school/family activities  Previous visits for this problem: none  The following portions of the patient's history were reviewed and updated as appropriate:   She  has a past medical history of 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; Benign neoplasm of skin; Chest pain; Diverticulitis; Nonmelanoma skin cancer; Palpitations; Seasonal allergies; Sleep apnea; Spontaneous ; and Syncope  She   Patient Active Problem List    Diagnosis Date Noted    Tremors of nervous system 2018    Insomnia 2018    Fatigue 2018    Incontinence 2015    OAB (overactive bladder) 2015     She  has a past surgical history that includes Hysterectomy; Tonsillectomy; Nasal sinus surgery; Colostomy; Carpal tunnel release (Right); Colostomy closure; Foot surgery (Left); Cardiac catheterization; Wrist surgery (Left); and BOTOX INJECTION (N/A, 3/6/2017)  Her family history includes Alcohol abuse in her brother, father, and mother; Colon cancer in her brother and mother; Heart disease in her mother  She  reports that she has never smoked  She has never used smokeless tobacco  She reports that she does not drink alcohol or use drugs    Current Outpatient Prescriptions   Medication Sig Dispense Refill    Ascorbic Acid (VITAMIN C) 100 MG tablet Take by mouth      B Complex Vitamins (B-COMPLEX/B-12 PO) Take 450 mg by mouth daily      Bioflavonoid Products (BREANA C PO) Take 1 tablet by mouth daily        Cholecalciferol (VITAMIN D3) 1000 units CAPS Take by mouth      Echinacea 125 MG CAPS Take 450 mg by mouth        folic acid (FOLVITE) 1 mg tablet Take by mouth daily      Lactobacillus (PROBIOTIC ACIDOPHILUS PO) Take 1 tablet by mouth daily        loratadine (CLARITIN) 10 mg tablet Take 10 mg by mouth daily      Mirabegron ER 50 MG TB24 Take 1 tablet (50 mg total) by mouth daily 90 tablet 1    Omega-3 Fatty Acids (OMEGA 3 PO) Take 1 tablet by mouth daily        Probiotic Product (PROBIOTIC-10 PO) Take by mouth      Specialty Vitamins Products (ECHINACEA C COMPLETE PO) Take 1 tablet by mouth daily        temazepam (RESTORIL) 30 mg capsule       TEMAZEPAM PO Take 15 mg by mouth daily at bedtime        traZODone (DESYREL) 100 mg tablet Take 1 tablet (100 mg total) by mouth daily at bedtime for 30 days 30 tablet 1     No current facility-administered medications for this visit  Current Outpatient Prescriptions on File Prior to Visit   Medication Sig    Ascorbic Acid (VITAMIN C) 100 MG tablet Take by mouth    B Complex Vitamins (B-COMPLEX/B-12 PO) Take 450 mg by mouth daily    Bioflavonoid Products (BREANA C PO) Take 1 tablet by mouth daily      Cholecalciferol (VITAMIN D3) 1000 units CAPS Take by mouth    Echinacea 125 MG CAPS Take 450 mg by mouth      Lactobacillus (PROBIOTIC ACIDOPHILUS PO) Take 1 tablet by mouth daily      Mirabegron ER 50 MG TB24 Take 1 tablet (50 mg total) by mouth daily    Omega-3 Fatty Acids (OMEGA 3 PO) Take 1 tablet by mouth daily      Probiotic Product (PROBIOTIC-10 PO) Take by mouth    Specialty Vitamins Products (ECHINACEA C COMPLETE PO) Take 1 tablet by mouth daily      temazepam (RESTORIL) 30 mg capsule     TEMAZEPAM PO Take 15 mg by mouth daily at bedtime       No current facility-administered medications on file prior to visit  She is allergic to caffeine; iodine; latex; and other       Review of Systems   Constitutional: Positive for activity change and fatigue  Negative for appetite change and fever  HENT: Negative for congestion and ear discharge  Respiratory: Negative for cough and shortness of breath  Cardiovascular: Negative for chest pain and palpitations     Gastrointestinal: Negative for diarrhea and nausea  Musculoskeletal: Negative for arthralgias and back pain  Skin: Negative for color change and rash  Neurological: Positive for tremors  Negative for dizziness and headaches  Psychiatric/Behavioral: Negative for agitation and behavioral problems  Objective:      /80 (BP Location: Left arm, Patient Position: Sitting, Cuff Size: Standard)   Pulse 78   Temp 97 9 °F (36 6 °C) (Tympanic)   Resp 16   Ht 5' 2" (1 575 m)   Wt 83 2 kg (183 lb 6 4 oz)   SpO2 95%   BMI 33 54 kg/m²          Physical Exam   Constitutional: She is oriented to person, place, and time  She appears well-developed and well-nourished  No distress  HENT:   Head: Normocephalic and atraumatic  Nose: Nose normal    Mouth/Throat: Oropharynx is clear and moist    Eyes: Conjunctivae are normal  Pupils are equal, round, and reactive to light  Cardiovascular: Normal rate, regular rhythm and normal heart sounds  No murmur heard  Pulmonary/Chest: Effort normal and breath sounds normal  No respiratory distress  She has no wheezes  Abdominal: Soft  Bowel sounds are normal  She exhibits no distension  There is no tenderness  Neurological: She is alert and oriented to person, place, and time  Skin: Skin is warm and dry  No rash noted  She is not diaphoretic  No erythema  Psychiatric: She has a normal mood and affect

## 2018-03-29 ENCOUNTER — OFFICE VISIT (OUTPATIENT)
Dept: UROLOGY | Facility: CLINIC | Age: 78
End: 2018-03-29
Payer: COMMERCIAL

## 2018-03-29 VITALS
BODY MASS INDEX: 33.42 KG/M2 | SYSTOLIC BLOOD PRESSURE: 118 MMHG | WEIGHT: 181.6 LBS | DIASTOLIC BLOOD PRESSURE: 72 MMHG | HEIGHT: 62 IN

## 2018-03-29 DIAGNOSIS — N32.81 OAB (OVERACTIVE BLADDER): Primary | ICD-10-CM

## 2018-03-29 PROCEDURE — 87086 URINE CULTURE/COLONY COUNT: CPT | Performed by: UROLOGY

## 2018-03-29 PROCEDURE — 99214 OFFICE O/P EST MOD 30 MIN: CPT | Performed by: UROLOGY

## 2018-03-29 NOTE — PROGRESS NOTES
1  OAB (overactive bladder)  Diet NPO; Sips with meds    Place sequential compression device    Case request operating room: INJECTION BOTULINUM TOXIN (BOTOX), CYSTOSCOPY    ceFAZolin (ANCEF) 1,000 mg in dextrose 5 % 100 mL IVPB    Botulinum Toxin Type A SOLR 2,000 Units    Case request operating room: INJECTION BOTULINUM TOXIN (BOTOX), CYSTOSCOPY           Assessment and plan:       Hermelinda Arredondo is a very pleasant 69-year-old female with medically refractory overactive bladder  She has failed anticholinergics as well as most recently a beta 3 agonist despite titration to full strength dosing  One year ago she had excellent symptomatic response to 100 units of intravesical Botox  Discussed options at this point including repeating Botox injection  We also discussed sacral neuromodulation  Patient is planning to get an MRI in the near future for evaluation of a tremor and as such InterStim was discussed today but we discussed that this is not a good option for her as it is non MR compatible  Patient has elected for repeat bladder Botox  I discussed cystoscopy with bladder Botox injection  Risks and benefits were discussed with risks including but not limited to infection, need for ongoing catheterization, damage to bladder, hematuria, to the medication, and need for additional procedures  We discussed that in general Botox can be expected to work for up to 1 year, but sometimes less  After this discussion the patient elected to schedule cystoscopy with bladder Botox  Surgery will be scheduled at the earliest convenience  A preoperative urine culture was collected today  We will plan for 200 unit injection and surgery will be scheduled in the near future at the [facility]  Preoperative urine culture was collected today  Tanvir Keane MD      Chief Complaint     F/u OAB    History of Present Illness     Raman Boggs is a 77 y o  female with medically refractory overactive bladder  She is 1 year status post 100 units of bladder Botox installation  He did very well for the procedure for approximately 10 months which he started to notice recurrent symptoms  He was initiated on mirabegron and presents today in follow-up  He remains highly symptomatic with bothersome frequency and urgency and urge urinary incontinence  Laboratory     Lab Results   Component Value Date    CREATININE 0 67 02/14/2018       Review of Systems     Review of Systems   Constitutional: Negative  HENT: Negative  Eyes: Negative  Respiratory: Negative  Cardiovascular: Negative  Gastrointestinal: Negative  Endocrine: Negative  Genitourinary: Negative  Musculoskeletal: Negative  Allergic/Immunologic: Negative  Neurological: Negative  Hematological: Negative  Psychiatric/Behavioral: Negative  Allergies     Allergies   Allergen Reactions    Caffeine     Iodine Rash    Latex Rash    Other Rash     Adhesive tape         Physical Exam     Physical Exam   Constitutional: She is oriented to person, place, and time  She appears well-developed and well-nourished  No distress  HENT:   Head: Normocephalic and atraumatic  Eyes: Conjunctivae are normal    Neck: Normal range of motion  Pulmonary/Chest: Effort normal    Musculoskeletal: Normal range of motion  She exhibits no edema, tenderness or deformity  Neurological: She is alert and oriented to person, place, and time  Skin: Skin is warm and dry  No rash noted  She is not diaphoretic  No erythema  No pallor  Psychiatric: She has a normal mood and affect   Her behavior is normal          Vital Signs     Vitals:    03/29/18 1131   BP: 118/72   BP Location: Left arm   Patient Position: Sitting   Weight: 82 4 kg (181 lb 9 6 oz)   Height: 5' 2" (1 575 m)         Current Medications       Current Outpatient Prescriptions:     Ascorbic Acid (VITAMIN C) 100 MG tablet, Take by mouth, Disp: , Rfl:     B Complex Vitamins (B-COMPLEX/B-12 PO), Take 450 mg by mouth daily, Disp: , Rfl:     Bioflavonoid Products (BREANA C PO), Take 1 tablet by mouth daily  , Disp: , Rfl:     Cholecalciferol (VITAMIN D3) 1000 units CAPS, Take by mouth, Disp: , Rfl:     Echinacea 125 MG CAPS, Take 450 mg by mouth  , Disp: , Rfl:     folic acid (FOLVITE) 1 mg tablet, Take by mouth daily, Disp: , Rfl:     Lactobacillus (PROBIOTIC ACIDOPHILUS PO), Take 1 tablet by mouth daily  , Disp: , Rfl:     loratadine (CLARITIN) 10 mg tablet, Take 10 mg by mouth daily, Disp: , Rfl:     Mirabegron ER 50 MG TB24, Take 1 tablet (50 mg total) by mouth daily, Disp: 90 tablet, Rfl: 1    Omega-3 Fatty Acids (OMEGA 3 PO), Take 1 tablet by mouth daily  , Disp: , Rfl:     Probiotic Product (PROBIOTIC-10 PO), Take by mouth, Disp: , Rfl:     Specialty Vitamins Products (ECHINACEA C COMPLETE PO), Take 1 tablet by mouth daily  , Disp: , Rfl:     traZODone (DESYREL) 100 mg tablet, Take 1 tablet (100 mg total) by mouth daily at bedtime for 30 days, Disp: 30 tablet, Rfl: 1    temazepam (RESTORIL) 30 mg capsule, , Disp: , Rfl:     TEMAZEPAM PO, Take 15 mg by mouth daily at bedtime  , Disp: , Rfl:     Current Facility-Administered Medications:     Botulinum Toxin Type A SOLR 2,000 Units, 2,000 Units, Injection, On Call To OR, Stephanie Thornton MD      Active Problems     Patient Active Problem List   Diagnosis    Incontinence    OAB (overactive bladder)    Tremors of nervous system    Insomnia    Fatigue         Past Medical History     Past Medical History:   Diagnosis Date    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     Benign neoplasm of skin     Chest pain     Diverticulitis     Nonmelanoma skin cancer     last assessed 2017    Palpitations     Seasonal allergies     Sleep apnea     Spontaneous      without mention of complications     Syncope          Surgical History     Past Surgical History:   Procedure Laterality Date    BOTOX INJECTION N/A 3/6/2017    Procedure: CYSTOSCOPY; BLADDER BOTOX 100 UNITS ;  Surgeon: Joselito Arechiga MD;  [facility];  Service:     CARDIAC CATHETERIZATION      CARPAL TUNNEL RELEASE Right     COLOSTOMY      COLOSTOMY CLOSURE      FOOT SURGERY Left     neuroma    HYSTERECTOMY      NASAL SINUS SURGERY      TONSILLECTOMY      WRIST SURGERY Left          Family History     Family History   Problem Relation Age of Onset    Alcohol abuse Mother     Colon cancer Mother     Heart disease Mother     Alcohol abuse Father     Alcohol abuse Brother     Colon cancer Brother          Social History     Social History       Radiology

## 2018-04-03 ENCOUNTER — ANESTHESIA EVENT (OUTPATIENT)
Dept: PERIOP | Facility: AMBULARY SURGERY CENTER | Age: 78
End: 2018-04-03
Payer: COMMERCIAL

## 2018-04-03 LAB — BACTERIA UR CULT: NORMAL

## 2018-04-04 ENCOUNTER — OFFICE VISIT (OUTPATIENT)
Dept: DERMATOLOGY | Facility: CLINIC | Age: 78
End: 2018-04-04
Payer: COMMERCIAL

## 2018-04-04 ENCOUNTER — TELEPHONE (OUTPATIENT)
Dept: FAMILY MEDICINE CLINIC | Facility: CLINIC | Age: 78
End: 2018-04-04

## 2018-04-04 ENCOUNTER — OFFICE VISIT (OUTPATIENT)
Dept: LAB | Facility: HOSPITAL | Age: 78
End: 2018-04-04
Attending: UROLOGY
Payer: COMMERCIAL

## 2018-04-04 DIAGNOSIS — L82.1 SEBORRHEIC KERATOSIS: Primary | ICD-10-CM

## 2018-04-04 DIAGNOSIS — Z13.89 SCREENING FOR SKIN CONDITION: ICD-10-CM

## 2018-04-04 DIAGNOSIS — Z85.828 HISTORY OF SKIN CANCER: ICD-10-CM

## 2018-04-04 DIAGNOSIS — N32.81 OAB (OVERACTIVE BLADDER): ICD-10-CM

## 2018-04-04 LAB
ATRIAL RATE: 71 BPM
P AXIS: 52 DEGREES
PR INTERVAL: 162 MS
QRS AXIS: 19 DEGREES
QRSD INTERVAL: 80 MS
QT INTERVAL: 396 MS
QTC INTERVAL: 430 MS
T WAVE AXIS: 78 DEGREES
VENTRICULAR RATE: 71 BPM

## 2018-04-04 PROCEDURE — 93010 ELECTROCARDIOGRAM REPORT: CPT | Performed by: INTERNAL MEDICINE

## 2018-04-04 PROCEDURE — 93005 ELECTROCARDIOGRAM TRACING: CPT

## 2018-04-04 PROCEDURE — 99213 OFFICE O/P EST LOW 20 MIN: CPT | Performed by: DERMATOLOGY

## 2018-04-04 NOTE — TELEPHONE ENCOUNTER
[facility] called and stated that patient is scheduled for tomorrow MRI of Brain w/wo contrast and needs to be authorized through her insurance  SHAY:     Tax Id: [id number]  NPI: [id number]  Phone #: [phone] [id number] [facility]

## 2018-04-04 NOTE — PROGRESS NOTES
[address] [facility]  [address] [place] [address]     MRN: [id number] : 1940  Encounter: [id number]  Patient Information: Jamison Bang  Chief complaint:Yearly skin check    History of present illness:  59-year-old female with previous history of actinic keratosis and skin cancer presents for overall checkup no specific complaints noted  Past Medical History:   Diagnosis Date    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     Benign neoplasm of skin     Chest pain     Diverticulitis     Nonmelanoma skin cancer     last assessed 2017    Palpitations     Seasonal allergies     Sleep apnea     Spontaneous      without mention of complications     Syncope      Past Surgical History:   Procedure Laterality Date    BOTOX INJECTION N/A 3/6/2017    Procedure: CYSTOSCOPY; BLADDER BOTOX 100 UNITS ;  Surgeon: Toro Francisco MD;  [facility];  Service:     CARDIAC CATHETERIZATION      CARPAL TUNNEL RELEASE Right     COLOSTOMY      COLOSTOMY CLOSURE      FOOT SURGERY Left     neuroma    HYSTERECTOMY      NASAL SINUS SURGERY      TONSILLECTOMY      WRIST SURGERY Left      Social History   History   Alcohol Use No     History   Drug Use No     History   Smoking Status    Never Smoker   Smokeless Tobacco    Never Used     Family History   Problem Relation Age of Onset    Alcohol abuse Mother     Colon cancer Mother     Heart disease Mother     Alcohol abuse Father     Alcohol abuse Brother     Colon cancer Brother      Meds/Allergies   Allergies   Allergen Reactions    Caffeine     Iodine Rash    Latex Rash    Other Rash     Adhesive tape         Meds:  Prior to Admission medications    Medication Sig Start Date End Date Taking?  Authorizing Provider   Ascorbic Acid (VITAMIN C) 100 MG tablet Take by mouth   Yes Historical Provider, MD   B Complex Vitamins (B-COMPLEX/B-12 PO) Take 450 mg by mouth daily   Yes Historical Provider, MD   Bioflavonoid Products (BREANA C PO) Take 1 tablet by mouth daily     Yes Historical Provider, MD   Cholecalciferol (VITAMIN D3) 1000 units CAPS Take by mouth   Yes Historical Provider, MD   Echinacea 125 MG CAPS Take 450 mg by mouth     Yes Historical Provider, MD   folic acid (FOLVITE) 1 mg tablet Take by mouth daily   Yes Historical Provider, MD   Lactobacillus (PROBIOTIC ACIDOPHILUS PO) Take 1 tablet by mouth daily     Yes Historical Provider, MD   loratadine (CLARITIN) 10 mg tablet Take 10 mg by mouth daily   Yes Historical Provider, MD   Mirabegron ER 50 MG TB24 Take 1 tablet (50 mg total) by mouth daily 2/22/18  Yes Amirah Fernández PA-C   Omega-3 Fatty Acids (OMEGA 3 PO) Take 1 tablet by mouth daily     Yes Historical Provider, MD   Probiotic Product (PROBIOTIC-10 PO) Take by mouth 9/8/16  Yes Historical Provider, MD   traZODone (DESYREL) 100 mg tablet Take 1 tablet (100 mg total) by mouth daily at bedtime for 30 days 3/26/18 4/25/18 Yes Samantha Garner MD   temazepam (RESTORIL) 30 mg capsule  1/25/18 4/4/18 Yes Historical Provider, MD   Specialty Vitamins Products (ECHINACEA C COMPLETE PO) Take 1 tablet by mouth daily    4/4/18  Historical Provider, MD   TEMAZEPAM PO Take 15 mg by mouth daily at bedtime    4/4/18  Historical Provider, MD       Subjective:     Review of Systems:    General: negative for - chills, fatigue, fever,  weight gain or weight loss  Psychological: negative for - anxiety, behavioral disorder, concentration difficulties, decreased libido, depression, irritability, memory difficulties, mood swings, sleep disturbances or suicidal ideation  ENT: negative for - hearing difficulties , nasal congestion, nasal discharge, oral lesions, sinus pain, sneezing, sore throat  Allergy and Immunology: negative for - hives, insect bite sensitivity,  Hematological and Lymphatic: negative for - bleeding problems, blood clots,bruising, swollen lymph nodes  Endocrine: negative for - hair pattern changes, hot flashes, malaise/lethargy, mood swings, palpitations, polydipsia/polyuria, skin changes, temperature intolerance or unexpected weight change  Respiratory: negative for - cough, hemoptysis, orthopnea, shortness of breath, or wheezing  Cardiovascular: negative for - chest pain, dyspnea on exertion, edema,  Gastrointestinal: negative for - abdominal pain, nausea/vomiting  Genito-Urinary: negative for - dysuria, incontinence, irregular/heavy menses or urinary frequency/urgency  Musculoskeletal: negative for - gait disturbance, joint pain, joint stiffness, joint swelling, muscle pain, muscular weakness  Dermatological:  As in HPI  Neurological: negative for confusion, dizziness, headaches, impaired coordination/balance, memory loss, numbness/tingling, seizures, speech problems, tremors or weakness       Objective: There were no vitals taken for this visit  Physical Exam:    General Appearance:    Alert, cooperative, no distress   Head:    Normocephalic, without obvious abnormality, atraumatic           Skin:   A full skin exam was performed including scalp, head scalp, eyes, ears, nose, lips, neck, chest, axilla, abdomen, back, buttocks, bilateral upper extremities, bilateral lower extremities, hands, feet, fingers, toes, fingernails, and toenails  Normal keratotic papules with greasy stuck on appearance nothing else atypical noted on exam previous sites of skin cancer well healed without recurrence     Assessment:     1  Seborrheic keratosis     2  Screening for skin condition     3   History of skin cancer           Plan:   Seborrheic keratosis patient reassured these are normal growths we acquire with age no treatment needed  History of skin cancer in no recurrence nothing else atypical sunblock recommended follow-up in 1 year  Screening for dermatologic disorders nothing else of concern noted on complete exam follow-up in 1 year      Jaime Hendrix MD Sandra  4/4/2018,9:22 AM    Portions of the record may have been created with voice recognition software   Occasional wrong word or "sound a like" substitutions may have occurred due to the inherent limitations of voice recognition software   Read the chart carefully and recognize, using context, where substitutions have occurred

## 2018-04-05 NOTE — PRE-PROCEDURE INSTRUCTIONS
Pre-Surgery Instructions:   Medication Instructions    Ascorbic Acid (VITAMIN C) 100 MG tablet Patient was instructed by Physician and understands   B Complex Vitamins (B-COMPLEX/B-12 PO) Patient was instructed by Physician and understands   Bioflavonoid Products (BREANA C PO) Patient was instructed by Physician and understands   Cholecalciferol (VITAMIN D3) 1000 units CAPS Patient was instructed by Physician and understands   Echinacea 125 MG CAPS Patient was instructed by Physician and understands   folic acid (FOLVITE) 1 mg tablet Patient was instructed by Physician and understands   Lactobacillus (PROBIOTIC ACIDOPHILUS PO) Patient was instructed by Physician and understands   loratadine (CLARITIN) 10 mg tablet Patient was instructed by Physician and understands   Mirabegron ER 50 MG TB24 Instructed patient per Anesthesia Guidelines   Omega-3 Fatty Acids (OMEGA 3 PO) Instructed patient per Anesthesia Guidelines   traZODone (DESYREL) 100 mg tablet Patient was instructed by Physician and understands  Pre op and bathing instructions reviewed

## 2018-04-10 ENCOUNTER — TELEPHONE (OUTPATIENT)
Dept: FAMILY MEDICINE CLINIC | Facility: CLINIC | Age: 78
End: 2018-04-10

## 2018-04-11 NOTE — TELEPHONE ENCOUNTER
Her MRI does not show any acute changes such as masses or acute stroke  It shows only chronic vascular changes  I recommend a follow up with neurologist as we discussed at her appt  Does she have an appt set up?

## 2018-04-13 ENCOUNTER — HOSPITAL ENCOUNTER (OUTPATIENT)
Facility: AMBULARY SURGERY CENTER | Age: 78
Setting detail: OUTPATIENT SURGERY
Discharge: HOME | End: 2018-04-13
Attending: UROLOGY | Admitting: UROLOGY
Payer: COMMERCIAL

## 2018-04-13 ENCOUNTER — TELEPHONE (OUTPATIENT)
Dept: UROLOGY | Facility: CLINIC | Age: 78
End: 2018-04-13

## 2018-04-13 ENCOUNTER — ANESTHESIA (OUTPATIENT)
Dept: PERIOP | Facility: AMBULARY SURGERY CENTER | Age: 78
End: 2018-04-13
Payer: COMMERCIAL

## 2018-04-13 VITALS
HEIGHT: 62 IN | BODY MASS INDEX: 31.89 KG/M2 | HEART RATE: 62 BPM | WEIGHT: 173.28 LBS | RESPIRATION RATE: 16 BRPM | TEMPERATURE: 96.2 F | SYSTOLIC BLOOD PRESSURE: 155 MMHG | OXYGEN SATURATION: 94 % | DIASTOLIC BLOOD PRESSURE: 70 MMHG

## 2018-04-13 DIAGNOSIS — N32.81 OAB (OVERACTIVE BLADDER): ICD-10-CM

## 2018-04-13 DIAGNOSIS — N39.41 URGE INCONTINENCE OF URINE: Primary | ICD-10-CM

## 2018-04-13 PROCEDURE — 52287 CYSTOSCOPY CHEMODENERVATION: CPT | Performed by: UROLOGY

## 2018-04-13 RX ORDER — SODIUM CHLORIDE, SODIUM LACTATE, POTASSIUM CHLORIDE, CALCIUM CHLORIDE 600; 310; 30; 20 MG/100ML; MG/100ML; MG/100ML; MG/100ML
125 INJECTION, SOLUTION INTRAVENOUS CONTINUOUS
Status: DISCONTINUED | OUTPATIENT
Start: 2018-04-13 | End: 2018-04-13 | Stop reason: HOSPADM

## 2018-04-13 RX ORDER — MAGNESIUM HYDROXIDE 1200 MG/15ML
LIQUID ORAL AS NEEDED
Status: DISCONTINUED | OUTPATIENT
Start: 2018-04-13 | End: 2018-04-13 | Stop reason: HOSPADM

## 2018-04-13 RX ORDER — MEPERIDINE HYDROCHLORIDE 25 MG/ML
25 INJECTION INTRAMUSCULAR; INTRAVENOUS; SUBCUTANEOUS AS NEEDED
Status: DISCONTINUED | OUTPATIENT
Start: 2018-04-13 | End: 2018-04-13 | Stop reason: HOSPADM

## 2018-04-13 RX ORDER — CEPHALEXIN 500 MG/1
500 CAPSULE ORAL EVERY 6 HOURS SCHEDULED
Qty: 3 CAPSULE | Refills: 0 | Status: SHIPPED | OUTPATIENT
Start: 2018-04-13 | End: 2018-04-14

## 2018-04-13 RX ORDER — PROPOFOL 10 MG/ML
INJECTION, EMULSION INTRAVENOUS AS NEEDED
Status: DISCONTINUED | OUTPATIENT
Start: 2018-04-13 | End: 2018-04-13 | Stop reason: SURG

## 2018-04-13 RX ORDER — FENTANYL CITRATE 50 UG/ML
INJECTION, SOLUTION INTRAMUSCULAR; INTRAVENOUS AS NEEDED
Status: DISCONTINUED | OUTPATIENT
Start: 2018-04-13 | End: 2018-04-13 | Stop reason: SURG

## 2018-04-13 RX ORDER — MIDAZOLAM HYDROCHLORIDE 1 MG/ML
INJECTION INTRAMUSCULAR; INTRAVENOUS AS NEEDED
Status: DISCONTINUED | OUTPATIENT
Start: 2018-04-13 | End: 2018-04-13 | Stop reason: SURG

## 2018-04-13 RX ORDER — ONDANSETRON 2 MG/ML
4 INJECTION INTRAMUSCULAR; INTRAVENOUS ONCE AS NEEDED
Status: DISCONTINUED | OUTPATIENT
Start: 2018-04-13 | End: 2018-04-13 | Stop reason: HOSPADM

## 2018-04-13 RX ORDER — PHENAZOPYRIDINE HYDROCHLORIDE 200 MG/1
200 TABLET, FILM COATED ORAL 3 TIMES DAILY PRN
Qty: 10 TABLET | Refills: 0 | Status: SHIPPED | OUTPATIENT
Start: 2018-04-13 | End: 2018-04-16

## 2018-04-13 RX ORDER — LIDOCAINE HYDROCHLORIDE 10 MG/ML
INJECTION, SOLUTION INFILTRATION; PERINEURAL AS NEEDED
Status: DISCONTINUED | OUTPATIENT
Start: 2018-04-13 | End: 2018-04-13 | Stop reason: SURG

## 2018-04-13 RX ORDER — IBUPROFEN 200 MG
600 TABLET ORAL EVERY 6 HOURS PRN
Refills: 0
Start: 2018-04-13 | End: 2018-05-25 | Stop reason: ALTCHOICE

## 2018-04-13 RX ORDER — PROPOFOL 10 MG/ML
INJECTION, EMULSION INTRAVENOUS CONTINUOUS PRN
Status: DISCONTINUED | OUTPATIENT
Start: 2018-04-13 | End: 2018-04-13 | Stop reason: SURG

## 2018-04-13 RX ORDER — FENTANYL CITRATE/PF 50 MCG/ML
25 SYRINGE (ML) INJECTION
Status: DISCONTINUED | OUTPATIENT
Start: 2018-04-13 | End: 2018-04-13 | Stop reason: HOSPADM

## 2018-04-13 RX ORDER — SODIUM CHLORIDE, SODIUM LACTATE, POTASSIUM CHLORIDE, CALCIUM CHLORIDE 600; 310; 30; 20 MG/100ML; MG/100ML; MG/100ML; MG/100ML
100 INJECTION, SOLUTION INTRAVENOUS CONTINUOUS
Status: DISCONTINUED | OUTPATIENT
Start: 2018-04-13 | End: 2018-04-13 | Stop reason: HOSPADM

## 2018-04-13 RX ORDER — ONDANSETRON 2 MG/ML
INJECTION INTRAMUSCULAR; INTRAVENOUS AS NEEDED
Status: DISCONTINUED | OUTPATIENT
Start: 2018-04-13 | End: 2018-04-13 | Stop reason: SURG

## 2018-04-13 RX ADMIN — LIDOCAINE HYDROCHLORIDE 20 MG: 10 INJECTION, SOLUTION INFILTRATION; PERINEURAL at 11:14

## 2018-04-13 RX ADMIN — MIDAZOLAM HYDROCHLORIDE 1 MG: 1 INJECTION, SOLUTION INTRAMUSCULAR; INTRAVENOUS at 11:14

## 2018-04-13 RX ADMIN — PROPOFOL 80 MG: 10 INJECTION, EMULSION INTRAVENOUS at 11:14

## 2018-04-13 RX ADMIN — ONDANSETRON 4 MG: 2 INJECTION INTRAMUSCULAR; INTRAVENOUS at 11:22

## 2018-04-13 RX ADMIN — SODIUM CHLORIDE, SODIUM LACTATE, POTASSIUM CHLORIDE, AND CALCIUM CHLORIDE: .6; .31; .03; .02 INJECTION, SOLUTION INTRAVENOUS at 11:14

## 2018-04-13 RX ADMIN — FENTANYL CITRATE 50 MCG: 50 INJECTION, SOLUTION INTRAMUSCULAR; INTRAVENOUS at 11:14

## 2018-04-13 RX ADMIN — CEFAZOLIN SODIUM 1000 MG: 1 SOLUTION INTRAVENOUS at 11:14

## 2018-04-13 RX ADMIN — PROPOFOL 70 MCG/KG/MIN: 10 INJECTION, EMULSION INTRAVENOUS at 11:14

## 2018-04-13 NOTE — ANESTHESIA PREPROCEDURE EVALUATION
Review of Systems/Medical History          Cardiovascular  EKG reviewed,   Comment: EF-60%,  Pulmonary  Not a smoker , Sleep apnea ,        GI/Hepatic    No GERD ,             Endo/Other  No diabetes ,      GYN       Hematology   Musculoskeletal       Neurology      Comment: Claustrophobia Psychology   Anxiety,              Physical Exam    Airway    Mallampati score: I  TM Distance: >3 FB  Neck ROM: full     Dental   lower dentures and upper dentures,     Cardiovascular      Pulmonary      Other Findings        Anesthesia Plan  ASA Score- 2     Anesthesia Type- IV sedation with anesthesia with ASA Monitors  Additional Monitors:   Airway Plan:         Plan Factors-Patient not instructed to abstain from smoking on day of procedure  Patient did not smoke on day of surgery  Induction- intravenous  Postoperative Plan-     Informed Consent- Anesthetic plan and risks discussed with patient  I personally reviewed this patient with the CRNA  Discussed and agreed on the Anesthesia Plan with the CRNA  Wagner Knox

## 2018-04-13 NOTE — ANESTHESIA POSTPROCEDURE EVALUATION
Post-Op Assessment Note      CV Status:  Stable    Mental Status:  Alert and awake    Hydration Status:  Euvolemic    PONV Controlled:  Controlled    Airway Patency:  Patent    Post Op Vitals Reviewed: Yes          Staff: CRNA           BP  128/62   Temp  97 4   Pulse  64   Resp   18   SpO2   99

## 2018-04-13 NOTE — TELEPHONE ENCOUNTER
Patient is s/p cysto/botox on 4/13/18,  Per Dr Joseph Barreto patient to be scheduled for follow up in 4 weeks, MD or Pa  Please contact patient to schedule

## 2018-04-13 NOTE — DISCHARGE INSTRUCTIONS

## 2018-04-13 NOTE — OP NOTE
Operative Note     PATIENT:  Mary Giraldo (MRN [id number])    DATE OF PROCEDURE:   4/13/2018    PRE-OP DIAGNOSES:   1) medically refractory overactive bladder     POST-OP DIAGNOSES AND OPERATIVE FINDINGS:   1) medically refractory overactive bladder      PROCEDURES:  1) cystoscopy  2) injection of onabotulumtoxin A (botox), 200 units      SURGEON:   Savanah Mathis MD    ANESTHESIA TYPE:  IV sedation    ESTIMATED BLOOD LOSS:   Minimal    COMPLICATIONS:   None    ANTIBIOTICS:  Cefazolin    INTRAOPERATIVE THROMBOEMBOLISM PROPHYLAXIS:  Pneumatic compression stockings      INDICATIONS:  Mary Giraldo is a patient with medically refractory overactive bladder  Urinary symptoms have included urinary frequency and urgency despite appropriate medical therapy including both anticholinergic as well as beta 3 agonists  One year ago she had a wonderful therapeutic response to 100 unit Botox injection  Her symptoms have since recurred  After discussing the options they have elected to proceed to the operating room for cystoscopy with bladder Botox  We discussed the procedure in detail, the alternatives, the risks, and the expected postoperative course, and he provided informed consent and elected to proceed  PROCEDURE SUMMARY:    The patient was brought to the operating room and anesthesia obtained  The patient was then placed in the lithotomy position and prepped and draped using standard sterile technique  All pressure points were carefully padded  A surgical time-out occurred, antibiotics were administered, and thromboembolism prophylaxis was given  A 21 Occitan rigid cystoscope was introduced per urethra  Pan cystoscopy was performed  There were no abnormal lesions  Next the agent was diluted into a total of 20 mL of normal saline according to standard procedures  These were injected according to a standard template and a supratrigonal location   The bladder was then inspected over multiple cycles of filling and emptying and hemostasis was excellent  The bladder was emptied and the scope removed  DISPOSITION:   PACU - hemodynamically stable  PLAN:  Patient will undergo a trial of void in the recovery room  Will continue current overactive bladder medications at the current time follow-up for postoperative visit in approximate 4 weeks time

## 2018-04-13 NOTE — H&P (VIEW-ONLY)
1  OAB (overactive bladder)  Diet NPO; Sips with meds    Place sequential compression device    Case request operating room: INJECTION BOTULINUM TOXIN (BOTOX), CYSTOSCOPY    ceFAZolin (ANCEF) 1,000 mg in dextrose 5 % 100 mL IVPB    Botulinum Toxin Type A SOLR 2,000 Units    Case request operating room: INJECTION BOTULINUM TOXIN (BOTOX), CYSTOSCOPY           Assessment and plan:       Darby Duval is a very pleasant 69-year-old female with medically refractory overactive bladder  She has failed anticholinergics as well as most recently a beta 3 agonist despite titration to full strength dosing  One year ago she had excellent symptomatic response to 100 units of intravesical Botox  Discussed options at this point including repeating Botox injection  We also discussed sacral neuromodulation  Patient is planning to get an MRI in the near future for evaluation of a tremor and as such InterStim was discussed today but we discussed that this is not a good option for her as it is non MR compatible  Patient has elected for repeat bladder Botox  I discussed cystoscopy with bladder Botox injection  Risks and benefits were discussed with risks including but not limited to infection, need for ongoing catheterization, damage to bladder, hematuria, to the medication, and need for additional procedures  We discussed that in general Botox can be expected to work for up to 1 year, but sometimes less  After this discussion the patient elected to schedule cystoscopy with bladder Botox  Surgery will be scheduled at the earliest convenience  A preoperative urine culture was collected today  We will plan for 200 unit injection and surgery will be scheduled in the near future at the [facility]  Preoperative urine culture was collected today  Charlyne Lefort, MD      Chief Complaint     F/u OAB    History of Present Illness     Sherren Homans is a 77 y o  female with medically refractory overactive bladder  She is 1 year status post 100 units of bladder Botox installation  He did very well for the procedure for approximately 10 months which he started to notice recurrent symptoms  He was initiated on mirabegron and presents today in follow-up  He remains highly symptomatic with bothersome frequency and urgency and urge urinary incontinence  Laboratory     Lab Results   Component Value Date    CREATININE 0 67 02/14/2018       Review of Systems     Review of Systems   Constitutional: Negative  HENT: Negative  Eyes: Negative  Respiratory: Negative  Cardiovascular: Negative  Gastrointestinal: Negative  Endocrine: Negative  Genitourinary: Negative  Musculoskeletal: Negative  Allergic/Immunologic: Negative  Neurological: Negative  Hematological: Negative  Psychiatric/Behavioral: Negative  Allergies     Allergies   Allergen Reactions    Caffeine     Iodine Rash    Latex Rash    Other Rash     Adhesive tape         Physical Exam     Physical Exam   Constitutional: She is oriented to person, place, and time  She appears well-developed and well-nourished  No distress  HENT:   Head: Normocephalic and atraumatic  Eyes: Conjunctivae are normal    Neck: Normal range of motion  Pulmonary/Chest: Effort normal    Musculoskeletal: Normal range of motion  She exhibits no edema, tenderness or deformity  Neurological: She is alert and oriented to person, place, and time  Skin: Skin is warm and dry  No rash noted  She is not diaphoretic  No erythema  No pallor  Psychiatric: She has a normal mood and affect   Her behavior is normal          Vital Signs     Vitals:    03/29/18 1131   BP: 118/72   BP Location: Left arm   Patient Position: Sitting   Weight: 82 4 kg (181 lb 9 6 oz)   Height: 5' 2" (1 575 m)         Current Medications       Current Outpatient Prescriptions:     Ascorbic Acid (VITAMIN C) 100 MG tablet, Take by mouth, Disp: , Rfl:     B Complex Vitamins (B-COMPLEX/B-12 PO), Take 450 mg by mouth daily, Disp: , Rfl:     Bioflavonoid Products (BREANA C PO), Take 1 tablet by mouth daily  , Disp: , Rfl:     Cholecalciferol (VITAMIN D3) 1000 units CAPS, Take by mouth, Disp: , Rfl:     Echinacea 125 MG CAPS, Take 450 mg by mouth  , Disp: , Rfl:     folic acid (FOLVITE) 1 mg tablet, Take by mouth daily, Disp: , Rfl:     Lactobacillus (PROBIOTIC ACIDOPHILUS PO), Take 1 tablet by mouth daily  , Disp: , Rfl:     loratadine (CLARITIN) 10 mg tablet, Take 10 mg by mouth daily, Disp: , Rfl:     Mirabegron ER 50 MG TB24, Take 1 tablet (50 mg total) by mouth daily, Disp: 90 tablet, Rfl: 1    Omega-3 Fatty Acids (OMEGA 3 PO), Take 1 tablet by mouth daily  , Disp: , Rfl:     Probiotic Product (PROBIOTIC-10 PO), Take by mouth, Disp: , Rfl:     Specialty Vitamins Products (ECHINACEA C COMPLETE PO), Take 1 tablet by mouth daily  , Disp: , Rfl:     traZODone (DESYREL) 100 mg tablet, Take 1 tablet (100 mg total) by mouth daily at bedtime for 30 days, Disp: 30 tablet, Rfl: 1    temazepam (RESTORIL) 30 mg capsule, , Disp: , Rfl:     TEMAZEPAM PO, Take 15 mg by mouth daily at bedtime  , Disp: , Rfl:     Current Facility-Administered Medications:     Botulinum Toxin Type A SOLR 2,000 Units, 2,000 Units, Injection, On Call To OR, Charlyne Lefort, MD      Active Problems     Patient Active Problem List   Diagnosis    Incontinence    OAB (overactive bladder)    Tremors of nervous system    Insomnia    Fatigue         Past Medical History     Past Medical History:   Diagnosis Date    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     Benign neoplasm of skin     Chest pain     Diverticulitis     Nonmelanoma skin cancer     last assessed 2017    Palpitations     Seasonal allergies     Sleep apnea     Spontaneous      without mention of complications     Syncope          Surgical History     Past Surgical History:   Procedure Laterality Date    BOTOX INJECTION N/A 3/6/2017    Procedure: CYSTOSCOPY; BLADDER BOTOX 100 UNITS ;  Surgeon: Andrea Rico MD;  [facility];  Service:     CARDIAC CATHETERIZATION      CARPAL TUNNEL RELEASE Right     COLOSTOMY      COLOSTOMY CLOSURE      FOOT SURGERY Left     neuroma    HYSTERECTOMY      NASAL SINUS SURGERY      TONSILLECTOMY      WRIST SURGERY Left          Family History     Family History   Problem Relation Age of Onset    Alcohol abuse Mother     Colon cancer Mother     Heart disease Mother     Alcohol abuse Father     Alcohol abuse Brother     Colon cancer Brother          Social History     Social History       Radiology

## 2018-04-16 NOTE — TELEPHONE ENCOUNTER
Spoke to patient, she was previously scheduled for follow up on 7/26/18,  Advised we can keep this appointment for her follow up 3 months post Botox  Appt confirmed

## 2018-04-16 NOTE — TELEPHONE ENCOUNTER
Reviewed post op recommendations this AM with Dr Naty Gillette, recommend follow up in 3 months with PA

## 2018-04-23 ENCOUNTER — TELEPHONE (OUTPATIENT)
Dept: UROLOGY | Facility: CLINIC | Age: 78
End: 2018-04-23

## 2018-04-23 DIAGNOSIS — N39.46 MIXED STRESS AND URGE URINARY INCONTINENCE: Primary | ICD-10-CM

## 2018-04-23 DIAGNOSIS — R35.0 INCREASED URINARY FREQUENCY: ICD-10-CM

## 2018-04-23 NOTE — TELEPHONE ENCOUNTER
[facility], patient, managed by Dr German Jimenez,  S/p bladder botox on 4/13/18  Returned call from patient, she states a few days after her Botox she started to notice an increase in urinary leakage /accidents/ and urinary frequency  She has no fever or chills or flank pain  She states she did not experience these symptoms after her previous Botox  Advised patient to go for urine testing, urinalysis and culture, orders entered  Patient will go tomorrow for testing  Patient states she does not think she has an infection  I advised her to go for urine testing to be thorough and that I will send message to advanced practitioner to review her symptoms and call her back once reviewed with any further recommendations

## 2018-04-24 ENCOUNTER — APPOINTMENT (OUTPATIENT)
Dept: LAB | Facility: CLINIC | Age: 78
End: 2018-04-24
Payer: COMMERCIAL

## 2018-04-24 DIAGNOSIS — N39.46 MIXED STRESS AND URGE URINARY INCONTINENCE: ICD-10-CM

## 2018-04-24 DIAGNOSIS — R35.0 INCREASED URINARY FREQUENCY: ICD-10-CM

## 2018-04-24 LAB
BACTERIA UR QL AUTO: ABNORMAL /HPF
BILIRUB UR QL STRIP: NEGATIVE
CLARITY UR: CLEAR
COLOR UR: YELLOW
GLUCOSE UR STRIP-MCNC: NEGATIVE MG/DL
HGB UR QL STRIP.AUTO: NEGATIVE
HYALINE CASTS #/AREA URNS LPF: ABNORMAL /LPF
KETONES UR STRIP-MCNC: NEGATIVE MG/DL
LEUKOCYTE ESTERASE UR QL STRIP: ABNORMAL
NITRITE UR QL STRIP: NEGATIVE
NON-SQ EPI CELLS URNS QL MICRO: ABNORMAL /HPF
PH UR STRIP.AUTO: 6 [PH] (ref 4.5–8)
PROT UR STRIP-MCNC: NEGATIVE MG/DL
RBC #/AREA URNS AUTO: ABNORMAL /HPF
SP GR UR STRIP.AUTO: 1.02 (ref 1–1.03)
UROBILINOGEN UR QL STRIP.AUTO: 0.2 E.U./DL
WBC #/AREA URNS AUTO: ABNORMAL /HPF

## 2018-04-24 PROCEDURE — 87086 URINE CULTURE/COLONY COUNT: CPT

## 2018-04-24 PROCEDURE — 81001 URINALYSIS AUTO W/SCOPE: CPT

## 2018-04-26 LAB — BACTERIA UR CULT: NORMAL

## 2018-05-08 ENCOUNTER — OFFICE VISIT (OUTPATIENT)
Dept: FAMILY MEDICINE CLINIC | Facility: CLINIC | Age: 78
End: 2018-05-08
Payer: COMMERCIAL

## 2018-05-08 VITALS
OXYGEN SATURATION: 96 % | TEMPERATURE: 97.9 F | BODY MASS INDEX: 33.9 KG/M2 | HEART RATE: 81 BPM | SYSTOLIC BLOOD PRESSURE: 128 MMHG | HEIGHT: 62 IN | WEIGHT: 184.2 LBS | RESPIRATION RATE: 16 BRPM | DIASTOLIC BLOOD PRESSURE: 80 MMHG

## 2018-05-08 DIAGNOSIS — J30.2 SEASONAL ALLERGIC RHINITIS, UNSPECIFIED TRIGGER: Primary | ICD-10-CM

## 2018-05-08 PROCEDURE — 99214 OFFICE O/P EST MOD 30 MIN: CPT | Performed by: FAMILY MEDICINE

## 2018-05-08 RX ORDER — DEXTROMETHORPHAN HYDROBROMIDE AND PROMETHAZINE HYDROCHLORIDE 15; 6.25 MG/5ML; MG/5ML
5 SYRUP ORAL 4 TIMES DAILY PRN
Qty: 118 ML | Refills: 1 | Status: SHIPPED | OUTPATIENT
Start: 2018-05-08 | End: 2018-05-25 | Stop reason: ALTCHOICE

## 2018-05-08 RX ORDER — FLUTICASONE PROPIONATE 50 MCG
1 SPRAY, SUSPENSION (ML) NASAL DAILY
Qty: 16 G | Refills: 0 | Status: SHIPPED | OUTPATIENT
Start: 2018-05-08 | End: 2018-06-22 | Stop reason: ALTCHOICE

## 2018-05-08 RX ORDER — LORATADINE 10 MG/1
10 TABLET ORAL DAILY
Qty: 30 TABLET | Refills: 1 | Status: SHIPPED | OUTPATIENT
Start: 2018-05-08 | End: 2018-05-25 | Stop reason: ALTCHOICE

## 2018-05-08 NOTE — PROGRESS NOTES
Assessment/Plan:    No problem-specific Assessment & Plan notes found for this encounter  Diagnoses and all orders for this visit:    Seasonal allergic rhinitis, unspecified trigger  after discussing risks and benefits along with side effects will start allergy treatment and symptoms relief  -     loratadine (CLARITIN) 10 mg tablet; Take 1 tablet (10 mg total) by mouth daily for 30 days  -     fluticasone (FLONASE) 50 mcg/act nasal spray; 1 spray into each nostril daily  -     promethazine-dextromethorphan (PHENERGAN-DM) 6 25-15 mg/5 mL oral syrup; Take 5 mL by mouth 4 (four) times a day as needed for cough      Follow up in 1 week if symptoms continue    Subjective:      Patient ID: Nneka Bill is a 77 y o  female  Allergic Rhinitis  Nneka Bill is here for evaluation of possible allergic rhinitis  Patient's symptoms include clear rhinorrhea, cough, nasal congestion, sinus pressure and watery eyes  These symptoms are seasonal  Current triggers include exposure to no known precipitant  The patient has been suffering from these symptoms for approximately 2 weeks  The patient has tried nothing with unsatisfactory relief of symptoms  Immunotherapy has never been tried  The patient has never had nasal polyps  The patient has no history of asthma  The patient has no history of eczema  The patient does not suffer from frequent sinopulmonary infections  The patient has not had sinus surgery in the past            The following portions of the patient's history were reviewed and updated as appropriate:   She  has a past medical history of 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; Benign neoplasm of skin; Chest pain; Claustrophobia; Diverticulitis; Muscle weakness; Nonmelanoma skin cancer; Palpitations; Seasonal allergies; Sleep apnea; Spontaneous ; and Syncope    She   Patient Active Problem List    Diagnosis Date Noted    Seasonal allergic rhinitis 2018    Screening for skin condition 04/04/2018    History of skin cancer 04/04/2018    Tremors of nervous system 03/26/2018    Insomnia 03/26/2018    Fatigue 03/26/2018    Incontinence 09/11/2015    OAB (overactive bladder) 09/11/2015    Seborrheic keratosis 05/29/2014     She  has a past surgical history that includes Hysterectomy; Tonsillectomy; Nasal sinus surgery; Colostomy; Carpal tunnel release (Right); Colostomy closure; Foot surgery (Left); Cardiac catheterization; Wrist surgery (Left); BOTOX INJECTION (N/A, 3/6/2017); and pr cystourethroscopy (N/A, 4/13/2018)  Her family history includes Alcohol abuse in her brother, father, and mother; Colon cancer in her brother and mother; Heart disease in her mother  She  reports that she has never smoked  She has never used smokeless tobacco  She reports that she drinks alcohol  She reports that she does not use drugs    Current Outpatient Prescriptions   Medication Sig Dispense Refill    Ascorbic Acid (VITAMIN C) 100 MG tablet Take by mouth      B Complex Vitamins (B-COMPLEX/B-12 PO) Take 450 mg by mouth daily      Bioflavonoid Products (BREANA C PO) Take 1 tablet by mouth daily        Cholecalciferol (VITAMIN D3) 1000 units CAPS Take by mouth      Echinacea 125 MG CAPS Take 450 mg by mouth        folic acid (FOLVITE) 1 mg tablet Take by mouth daily      ibuprofen (MOTRIN) 200 mg tablet Take 3 tablets (600 mg total) by mouth every 6 (six) hours as needed for mild pain  0    Lactobacillus (PROBIOTIC ACIDOPHILUS PO) Take 1 tablet by mouth daily        Mirabegron ER 50 MG TB24 Take 1 tablet (50 mg total) by mouth daily 90 tablet 1    Omega-3 Fatty Acids (OMEGA 3 PO) Take 1 tablet by mouth daily        fluticasone (FLONASE) 50 mcg/act nasal spray 1 spray into each nostril daily 16 g 0    loratadine (CLARITIN) 10 mg tablet Take 10 mg by mouth daily      loratadine (CLARITIN) 10 mg tablet Take 1 tablet (10 mg total) by mouth daily for 30 days 30 tablet 1    promethazine-dextromethorphan (PHENERGAN-DM) 6 25-15 mg/5 mL oral syrup Take 5 mL by mouth 4 (four) times a day as needed for cough 118 mL 1    traZODone (DESYREL) 100 mg tablet Take 1 tablet (100 mg total) by mouth daily at bedtime for 30 days 30 tablet 1     No current facility-administered medications for this visit  Current Outpatient Prescriptions on File Prior to Visit   Medication Sig    Ascorbic Acid (VITAMIN C) 100 MG tablet Take by mouth    B Complex Vitamins (B-COMPLEX/B-12 PO) Take 450 mg by mouth daily    Bioflavonoid Products (BREANA C PO) Take 1 tablet by mouth daily      Cholecalciferol (VITAMIN D3) 1000 units CAPS Take by mouth    Echinacea 125 MG CAPS Take 450 mg by mouth      folic acid (FOLVITE) 1 mg tablet Take by mouth daily    ibuprofen (MOTRIN) 200 mg tablet Take 3 tablets (600 mg total) by mouth every 6 (six) hours as needed for mild pain    Lactobacillus (PROBIOTIC ACIDOPHILUS PO) Take 1 tablet by mouth daily      Mirabegron ER 50 MG TB24 Take 1 tablet (50 mg total) by mouth daily    Omega-3 Fatty Acids (OMEGA 3 PO) Take 1 tablet by mouth daily      loratadine (CLARITIN) 10 mg tablet Take 10 mg by mouth daily    traZODone (DESYREL) 100 mg tablet Take 1 tablet (100 mg total) by mouth daily at bedtime for 30 days     No current facility-administered medications on file prior to visit  She is allergic to caffeine; iodine; latex; and other       Review of Systems   Constitutional: Negative for activity change, appetite change, fatigue and fever  HENT: Positive for congestion, postnasal drip, rhinorrhea and sinus pressure  Negative for ear discharge  Eyes: Positive for discharge and itching  Respiratory: Negative for cough and shortness of breath  Cardiovascular: Negative for chest pain and palpitations  Gastrointestinal: Negative for diarrhea and nausea  Musculoskeletal: Negative for arthralgias and back pain  Skin: Negative for color change and rash  Neurological: Negative for dizziness and headaches  Psychiatric/Behavioral: Negative for agitation and behavioral problems  Objective:      /80 (BP Location: Left arm, Patient Position: Sitting, Cuff Size: Adult)   Pulse 81   Temp 97 9 °F (36 6 °C) (Tympanic)   Resp 16   Ht 5' 2" (1 575 m)   Wt 83 6 kg (184 lb 3 2 oz)   SpO2 96%   BMI 33 69 kg/m²          Physical Exam   Constitutional: She is oriented to person, place, and time  She appears well-developed and well-nourished  No distress  HENT:   Head: Normocephalic and atraumatic  Nose: Nose normal    Mouth/Throat: Oropharynx is clear and moist    Left TM effusion   Eyes: Conjunctivae are normal  Pupils are equal, round, and reactive to light  Cardiovascular: Normal rate, regular rhythm and normal heart sounds  No murmur heard  Pulmonary/Chest: Effort normal and breath sounds normal  No respiratory distress  She has no wheezes  Abdominal: Soft  Bowel sounds are normal  She exhibits no distension  There is no tenderness  Neurological: She is alert and oriented to person, place, and time  Skin: Skin is warm and dry  No rash noted  She is not diaphoretic  No erythema  Psychiatric: She has a normal mood and affect

## 2018-05-09 ENCOUNTER — TELEPHONE (OUTPATIENT)
Dept: UROLOGY | Facility: CLINIC | Age: 78
End: 2018-05-09

## 2018-05-09 NOTE — TELEPHONE ENCOUNTER
Memo patient, managed by Dr Laina Pete, s/p bladder botox on 4/13/18 , patient called on 4/23/18 to report worsening urinary leakage/accidents since her procedure  Urine testing was done per recommendation urine culture on 4/26/18 - mixed contaminants x 5  She has no fever or chills,  She states she is incontinent at night and has started to have stress incontinence with coughing,  She has worsening urgency and frequency as well  Advised will send to advanced practitioner for review and our office will call her back with recommendation

## 2018-05-09 NOTE — TELEPHONE ENCOUNTER
Patient is not symptomatic for UTI,  Scheduled for follow up tomorrow in [place] at 10:15 am with Shirley Lung, PVR at visit

## 2018-05-09 NOTE — TELEPHONE ENCOUNTER
Her UA was pretty clean a few weeks ago  If symptomatic can repeat urine culture  She should have a follow up appointment with PVR to ensure she is not retaining urine post botox

## 2018-05-10 ENCOUNTER — OFFICE VISIT (OUTPATIENT)
Dept: UROLOGY | Facility: CLINIC | Age: 78
End: 2018-05-10
Payer: COMMERCIAL

## 2018-05-10 ENCOUNTER — TELEPHONE (OUTPATIENT)
Dept: UROLOGY | Facility: CLINIC | Age: 78
End: 2018-05-10

## 2018-05-10 VITALS
HEIGHT: 62 IN | DIASTOLIC BLOOD PRESSURE: 76 MMHG | BODY MASS INDEX: 33.86 KG/M2 | WEIGHT: 184 LBS | SYSTOLIC BLOOD PRESSURE: 124 MMHG | HEART RATE: 80 BPM

## 2018-05-10 DIAGNOSIS — N32.81 OAB (OVERACTIVE BLADDER): ICD-10-CM

## 2018-05-10 DIAGNOSIS — N39.41 URGE INCONTINENCE OF URINE: Primary | ICD-10-CM

## 2018-05-10 DIAGNOSIS — R39.15 URINARY URGENCY: ICD-10-CM

## 2018-05-10 DIAGNOSIS — N39.41 URGE INCONTINENCE: Primary | ICD-10-CM

## 2018-05-10 PROCEDURE — 51798 US URINE CAPACITY MEASURE: CPT | Performed by: PHYSICIAN ASSISTANT

## 2018-05-10 PROCEDURE — 99213 OFFICE O/P EST LOW 20 MIN: CPT | Performed by: PHYSICIAN ASSISTANT

## 2018-05-10 NOTE — TELEPHONE ENCOUNTER
PTNS no auth needed she can be scheduled, spoke with Mey Almazan at [facility] ref# [id number], it is diagnosis specific these are the diagnosis that can used     [id number]  [id number]  [id number]  R35 0  R39 15    Thanks  Rosalva Wood

## 2018-05-10 NOTE — PROGRESS NOTES
1  Urge incontinence of urine     2  OAB (overactive bladder)           Assessment and plan:       1  Medically refractory overactive bladder s/p bladder botox -- managed by Dr Erica Strickland  - unfortunately her most recent botox did not benefit her, despite previously working in the past  - we discussed continuing myrbetriq at this time  - wer reviewed other tertiary options including PTNS and sacral neuromodulation  - she will be scheduled for PTNS in the near future  - all questions answered  Radha Lee PA-C      Chief Complaint     f/u OAB / bladder botox    History of Present Illness     Angela King is a 77 y o  female patient of Dr Erica Strickladn with a history of medically refractory overactive bladder s/p bladder botox presenting for follow up  Patient had medically refractory OAB  She underwent bladder botox 100 U with good results  After 10 months her symptoms worse off and underwent repeat botox 200U on 4/13/18  Patient had contact our office yesterday reporting worsening urinary leakage and accidents since her procedure  She states she is incontinent at night and has started to have stress incontinence with coughing,  She has worsening urgency and frequency as well  She denies any dysuria, gross hematuria, fevers, or chills  She states that the Botox 100U worked excellently  Unfortunately the 200 U only lasted approximately 1 5 weeks  Urine sample from the other week negative for any evidence of infection  She has restarted Myrbetriq in the meantime  PVR 12mL    Laboratory     Lab Results   Component Value Date    CREATININE 0 67 02/14/2018       Review of Systems     Review of Systems   Constitutional: Negative for activity change, appetite change, chills, diaphoresis, fatigue, fever and unexpected weight change  Respiratory: Negative for chest tightness and shortness of breath  Cardiovascular: Negative for chest pain, palpitations and leg swelling  Gastrointestinal: Negative for abdominal distention, abdominal pain, constipation, diarrhea, nausea and vomiting  Genitourinary: Negative for decreased urine volume, difficulty urinating, dysuria, enuresis, flank pain, frequency, genital sores, hematuria and urgency  Musculoskeletal: Negative for back pain, gait problem and myalgias  Skin: Negative for color change, pallor, rash and wound  Psychiatric/Behavioral: Negative for behavioral problems  The patient is not nervous/anxious  Allergies     Allergies   Allergen Reactions    Caffeine     Iodine Rash    Latex Rash    Other Rash     Adhesive tape         Physical Exam     Physical Exam   Constitutional: She is oriented to person, place, and time  She appears well-developed and well-nourished  No distress  HENT:   Head: Normocephalic and atraumatic  Eyes: Conjunctivae are normal    Neck: Normal range of motion  No tracheal deviation present  Pulmonary/Chest: Effort normal    Musculoskeletal: Normal range of motion  She exhibits no edema or deformity  Neurological: She is alert and oriented to person, place, and time  Skin: Skin is warm and dry  She is not diaphoretic  No erythema  No pallor  Psychiatric: She has a normal mood and affect   Her behavior is normal          Vital Signs     Vitals:    05/10/18 1020   BP: 124/76   BP Location: Left arm   Patient Position: Sitting   Cuff Size: Adult   Pulse: 80   Weight: 83 5 kg (184 lb)   Height: 5' 2" (1 575 m)         Current Medications       Current Outpatient Prescriptions:     Ascorbic Acid (VITAMIN C) 100 MG tablet, Take by mouth, Disp: , Rfl:     B Complex Vitamins (B-COMPLEX/B-12 PO), Take 450 mg by mouth daily, Disp: , Rfl:     Bioflavonoid Products (BREANA C PO), Take 1 tablet by mouth daily  , Disp: , Rfl:     Cholecalciferol (VITAMIN D3) 1000 units CAPS, Take by mouth, Disp: , Rfl:     Echinacea 125 MG CAPS, Take 450 mg by mouth  , Disp: , Rfl:     fluticasone (FLONASE) 50 mcg/act nasal spray, 1 spray into each nostril daily, Disp: 16 g, Rfl: 0    folic acid (FOLVITE) 1 mg tablet, Take by mouth daily, Disp: , Rfl:     ibuprofen (MOTRIN) 200 mg tablet, Take 3 tablets (600 mg total) by mouth every 6 (six) hours as needed for mild pain, Disp: , Rfl: 0    Lactobacillus (PROBIOTIC ACIDOPHILUS PO), Take 1 tablet by mouth daily  , Disp: , Rfl:     loratadine (CLARITIN) 10 mg tablet, Take 10 mg by mouth daily, Disp: , Rfl:     loratadine (CLARITIN) 10 mg tablet, Take 1 tablet (10 mg total) by mouth daily for 30 days, Disp: 30 tablet, Rfl: 1    Mirabegron ER 50 MG TB24, Take 1 tablet (50 mg total) by mouth daily, Disp: 90 tablet, Rfl: 1    Omega-3 Fatty Acids (OMEGA 3 PO), Take 1 tablet by mouth daily  , Disp: , Rfl:     promethazine-dextromethorphan (PHENERGAN-DM) 6 25-15 mg/5 mL oral syrup, Take 5 mL by mouth 4 (four) times a day as needed for cough, Disp: 118 mL, Rfl: 1    traZODone (DESYREL) 100 mg tablet, Take 1 tablet (100 mg total) by mouth daily at bedtime for 30 days, Disp: 30 tablet, Rfl: 1      Active Problems     Patient Active Problem List   Diagnosis    Incontinence    OAB (overactive bladder)    Tremors of nervous system    Insomnia    Fatigue    Seborrheic keratosis    Screening for skin condition    History of skin cancer    Seasonal allergic rhinitis         Past Medical History     Past Medical History:   Diagnosis Date    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     Benign neoplasm of skin     Chest pain     Claustrophobia     Diverticulitis     Muscle weakness     Nonmelanoma skin cancer     last assessed 2017    Palpitations     Seasonal allergies     Sleep apnea     no cpap    Spontaneous      without mention of complications     Syncope          Surgical History     Past Surgical History:   Procedure Laterality Date    BOTOX INJECTION N/A 3/6/2017    Procedure: CYSTOSCOPY; BLADDER BOTOX 100 UNITS ;  Surgeon: Gibson Quiñonez MD;  [facility];  Service:     CARDIAC CATHETERIZATION      CARPAL TUNNEL RELEASE Right     COLOSTOMY      COLOSTOMY CLOSURE      FOOT SURGERY Left     neuroma    HYSTERECTOMY      NASAL SINUS SURGERY      NV CYSTOURETHROSCOPY N/A 4/13/2018    Procedure: CYSTOSCOPY WITH BOTOX;  Surgeon: Gibson Quiñonez MD;  [facility];  Service: Urology    TONSILLECTOMY      WRIST SURGERY Left          Family History     Family History   Problem Relation Age of Onset    Alcohol abuse Mother     Colon cancer Mother     Heart disease Mother     Alcohol abuse Father     Alcohol abuse Brother     Colon cancer Brother          Social History     Social History       Radiology

## 2018-05-14 DIAGNOSIS — N39.41 URGE INCONTINENCE OF URINE: ICD-10-CM

## 2018-05-14 NOTE — TELEPHONE ENCOUNTER
Called and spoke with patient  Reviewed bladder diary requirements, patient does not have the form but took notes and will create her own chart  Reviewed PTNS and scheduled starting 5/25/18 in the [facility] office  Pt agreeable to plan

## 2018-05-14 NOTE — TELEPHONE ENCOUNTER
Memo patient, managed by Dr Kimmy Viera,  Returned call from patient, she would like prescription for Mirabegron to be sent to different pharmacy  Patient requests script to be sent to [facility] due to cheaper pricing  Advised will send message to provider to send refill

## 2018-05-25 ENCOUNTER — PROCEDURE VISIT (OUTPATIENT)
Dept: UROLOGY | Facility: CLINIC | Age: 78
End: 2018-05-25
Payer: COMMERCIAL

## 2018-05-25 VITALS
WEIGHT: 180.8 LBS | SYSTOLIC BLOOD PRESSURE: 102 MMHG | HEART RATE: 88 BPM | BODY MASS INDEX: 33.27 KG/M2 | DIASTOLIC BLOOD PRESSURE: 70 MMHG | HEIGHT: 62 IN

## 2018-05-25 DIAGNOSIS — R39.15 URINARY URGENCY: ICD-10-CM

## 2018-05-25 DIAGNOSIS — N39.41 URGE INCONTINENCE: ICD-10-CM

## 2018-05-25 PROCEDURE — 64566 NEUROELTRD STIM POST TIBIAL: CPT | Performed by: UROLOGY

## 2018-05-25 NOTE — PROGRESS NOTES
2018    Angie Montes  1940  [id number]    Diagnosis  Chief Complaint     Urinary Incontinence; Urinary Urgency         Patient presents for PTNS  of 12 managed by Dr Shane Santamaria  Patient will follow up in one week for next treatment    Urinary Incontinence Screening      Most Recent Value   Urinary Incontinence   Urinary Incontinence? Yes [sporadic mixed incontinence, improved with myrbetriq]   Incomplete emptying? No   Urinary frequency? Yes   Urinary urgency? Yes   Urinary hesitancy? No   Dysuria (painful difficult urination)? No   Nocturia (waking up to use the bathroom)? Yes [rare]   Straining (having to push to go)? Yes [feels she empties better if she pushes]   Weak stream?  No   Intermittent stream?  No   Post void dribbling? No              Procedure PTNS  Session 1 of 12    Ankle used: right  Treatment settin  Duration of treatment: 30 minutes  Lead lot #:[id number]  Expiration Date:10/28/2020  Feeling/Response:toe flex/ toe, foot and ankle sensation        Bladder Diary Summary: On myrbetriq  Caffeine cups per day: rare tea  Alcohol- number of drinks per day: none  Daytime voids:7  Nighttime voids:rare  Urgency:varies, one day urgency severe and another mild  Incontinence- episodes per day: occasional, myrbetriq has improved to only a few times per week    Patient has noticed improvement to symptoms with myrbetriq, however her goal would be to stop the medication if possible      RON Womack BSN

## 2018-06-01 ENCOUNTER — PROCEDURE VISIT (OUTPATIENT)
Dept: UROLOGY | Facility: CLINIC | Age: 78
End: 2018-06-01
Payer: COMMERCIAL

## 2018-06-01 VITALS
WEIGHT: 182.4 LBS | HEIGHT: 62 IN | DIASTOLIC BLOOD PRESSURE: 76 MMHG | SYSTOLIC BLOOD PRESSURE: 114 MMHG | BODY MASS INDEX: 33.57 KG/M2 | HEART RATE: 100 BPM

## 2018-06-01 DIAGNOSIS — R39.15 URINARY URGENCY: ICD-10-CM

## 2018-06-01 DIAGNOSIS — N39.41 URGE INCONTINENCE: ICD-10-CM

## 2018-06-01 PROCEDURE — 64566 NEUROELTRD STIM POST TIBIAL: CPT | Performed by: UROLOGY

## 2018-06-01 NOTE — PROGRESS NOTES
2018    Jasper Morrison  1940  [id number]    Diagnosis  Chief Complaint     Urinary Incontinence; Urinary Urgency         Patient presents for PTNS 2 of 12 managed by Dr Evens Delgado  Patient will follow up in one week for next treatment    Urinary Incontinence Screening      Most Recent Value   Urinary Incontinence   Urinary Incontinence? Yes [1 pad per day, no accidents]   Incomplete emptying? No   Urinary frequency? Yes [improved]   Urinary urgency? Yes [improved]   Urinary hesitancy? No   Dysuria (painful difficult urination)? No   Nocturia (waking up to use the bathroom)? No   Straining (having to push to go)? No   Weak stream?  No   Intermittent stream?  No   Post void dribbling? No            Procedure PTNS  Session 2 of 12    Ankle used: right  Treatment settin  Duration of treatment: 30 minutes  Lead lot #:[id number]  Expiration Date:10/28/2020  Feeling/Response:big toe sensation    Patient notes significant improvement to symptoms with myrbetriq but would eventually like to trial stopping the medication for long term      RON Merlos BSN

## 2018-06-08 ENCOUNTER — PROCEDURE VISIT (OUTPATIENT)
Dept: UROLOGY | Facility: CLINIC | Age: 78
End: 2018-06-08
Payer: COMMERCIAL

## 2018-06-08 VITALS
DIASTOLIC BLOOD PRESSURE: 70 MMHG | WEIGHT: 183.2 LBS | BODY MASS INDEX: 33.71 KG/M2 | HEIGHT: 62 IN | SYSTOLIC BLOOD PRESSURE: 120 MMHG | HEART RATE: 80 BPM

## 2018-06-08 DIAGNOSIS — N39.41 URGE INCONTINENCE: ICD-10-CM

## 2018-06-08 DIAGNOSIS — R39.15 URINARY URGENCY: ICD-10-CM

## 2018-06-08 PROCEDURE — 64566 NEUROELTRD STIM POST TIBIAL: CPT | Performed by: UROLOGY

## 2018-06-08 NOTE — PROGRESS NOTES
2018    Netteril Settler  1940  [id number]    Diagnosis  Chief Complaint     Urinary Incontinence; Urinary Urgency         Patient presents for PTNS 3 of 12 managed by Dr Robi Albright  Patient will follow up in one week for the next treatment  Urinary Incontinence Screening      Most Recent Value   Urinary Incontinence   Urinary Incontinence? Yes [1 episode all week, 1 pad just in case]   Incomplete emptying? No   Urinary frequency? Yes [in the morning]   Urinary urgency? Yes   Urinary hesitancy? No   Dysuria (painful difficult urination)? No   Nocturia (waking up to use the bathroom)? No   Straining (having to push to go)? Yes   Weak stream?  No   Intermittent stream?  No   Post void dribbling?   Yes            Procedure PTNS  Session 3 of 12    Ankle used: right  Treatment settin  Duration of treatment: 30 minutes  Lead lot #:[id number]  Expiration Date:10/28/2020  Feeling/Response:ankle and toe sensation, slight toe flex    Procedures      RON LaoN, BSN

## 2018-06-15 ENCOUNTER — PROCEDURE VISIT (OUTPATIENT)
Dept: UROLOGY | Facility: CLINIC | Age: 78
End: 2018-06-15
Payer: COMMERCIAL

## 2018-06-15 VITALS
WEIGHT: 185.6 LBS | HEART RATE: 86 BPM | DIASTOLIC BLOOD PRESSURE: 76 MMHG | HEIGHT: 62 IN | BODY MASS INDEX: 34.16 KG/M2 | SYSTOLIC BLOOD PRESSURE: 118 MMHG

## 2018-06-15 DIAGNOSIS — N32.81 OVERACTIVE BLADDER: Primary | ICD-10-CM

## 2018-06-15 PROCEDURE — 64566 NEUROELTRD STIM POST TIBIAL: CPT

## 2018-06-15 PROCEDURE — 64566 NEUROELTRD STIM POST TIBIAL: CPT | Performed by: UROLOGY

## 2018-06-15 NOTE — PROGRESS NOTES
6/15/2018    Lina Reaganjanine  1940  [id number]    Diagnosis  Chief Complaint     Overactive bladder         Patient presents for PTNS 5 of 12 managed by Kyle Lerma  Follow up in 1 week for PTNS 5 of 12          Procedure PTNS  Session 5of 12    Ankle used: right  Treatment settin   Duration of treatment: 30 minutes  Lead lot #: [id number]  Expiration Date: 2020-10-28  Feeling/Response: tingling at needle and foot      Owen Mariscal RN

## 2018-06-22 ENCOUNTER — PROCEDURE VISIT (OUTPATIENT)
Dept: UROLOGY | Facility: CLINIC | Age: 78
End: 2018-06-22
Payer: COMMERCIAL

## 2018-06-22 VITALS
WEIGHT: 183.8 LBS | HEIGHT: 62 IN | HEART RATE: 100 BPM | BODY MASS INDEX: 33.82 KG/M2 | DIASTOLIC BLOOD PRESSURE: 72 MMHG | SYSTOLIC BLOOD PRESSURE: 124 MMHG

## 2018-06-22 DIAGNOSIS — N39.41 URGE INCONTINENCE: ICD-10-CM

## 2018-06-22 DIAGNOSIS — R39.15 URINARY URGENCY: ICD-10-CM

## 2018-06-22 PROCEDURE — 64566 NEUROELTRD STIM POST TIBIAL: CPT | Performed by: UROLOGY

## 2018-06-22 NOTE — PROGRESS NOTES
2018    Jamison Bang  1940  [id number]    Diagnosis  Chief Complaint     Urinary Incontinence; Urinary Urgency         Patient presents for PTNS 5 of 12 managed by Dr Nya Soliz  Patient will follow up in one week for next treatment  Urinary Incontinence Screening      Most Recent Value   Urinary Incontinence   Urinary Incontinence? Yes [1 minor episode, no pads at night, just a liner during the day in case]   Incomplete emptying? No   Urinary frequency? No [better]   Urinary urgency? No [better]   Urinary hesitancy? No   Dysuria (painful difficult urination)? No   Nocturia (waking up to use the bathroom)? Yes [rare]   Straining (having to push to go)? Yes [to empty completely]   Weak stream?  No   Intermittent stream?  No   Post void dribbling?   No            Procedure PTNS  Session 5 of 12    Ankle used: right  Treatment settin  Duration of treatment: 30 minutes  Lead lot #:[id number]  Expiration Date:10/28/2020  Feeling/Response:toe flex, toe sensation and foot sensation    Patient Goals and Progress  Decreased Urgency Yes, improved  Decreased Frequency Yes, improved  Episodes of incontinence Yes, rare  Sleep Through Night Yes, rare  Related Health and Social Factors No      Carlin Quick RN  CURN, BSN

## 2018-06-29 ENCOUNTER — PROCEDURE VISIT (OUTPATIENT)
Dept: UROLOGY | Facility: CLINIC | Age: 78
End: 2018-06-29
Payer: COMMERCIAL

## 2018-06-29 VITALS
DIASTOLIC BLOOD PRESSURE: 66 MMHG | HEIGHT: 62 IN | WEIGHT: 183.2 LBS | BODY MASS INDEX: 33.71 KG/M2 | SYSTOLIC BLOOD PRESSURE: 118 MMHG | HEART RATE: 98 BPM

## 2018-06-29 DIAGNOSIS — R39.15 URINARY URGENCY: ICD-10-CM

## 2018-06-29 DIAGNOSIS — N39.41 URGE INCONTINENCE: ICD-10-CM

## 2018-06-29 PROCEDURE — 64566 NEUROELTRD STIM POST TIBIAL: CPT | Performed by: UROLOGY

## 2018-06-29 NOTE — PROGRESS NOTES
2018    Harish Cole  1940  [id number]    Diagnosis  Chief Complaint     Urinary Incontinence; Urinary Urgency         Patient presents for PTNS 6 of 12 managed by Dr Myrna Yuan  Patient will follow up in one week for next treatment  Urinary Incontinence Screening      Most Recent Value   Urinary Incontinence   Urinary Incontinence? Yes [better just a liner in case nothing at night]   Incomplete emptying? No   Urinary frequency? Yes [in the morning more than afternoon but better]   Urinary urgency? No   Urinary hesitancy? No [rare]   Dysuria (painful difficult urination)? No   Nocturia (waking up to use the bathroom)? No   Straining (having to push to go)? Yes [sometimes]   Weak stream?  No   Intermittent stream?  No   Post void dribbling?   No            Procedure PTNS  Session 6 of 12    Ankle used: right  Treatment settin  Duration of treatment: 30 minutes  Lead lot #:[id number]  Expiration Date:10/28/2020  Feeling/Response:toe sensation    Patient Goals and Progress  Decreased Urgency Yes  Decreased Frequency No  Episodes of incontinence Yes      Matthias Juárez RN  CURN, BSN

## 2018-07-05 DIAGNOSIS — J30.2 SEASONAL ALLERGIC RHINITIS, UNSPECIFIED TRIGGER: ICD-10-CM

## 2018-07-06 ENCOUNTER — PROCEDURE VISIT (OUTPATIENT)
Dept: UROLOGY | Facility: CLINIC | Age: 78
End: 2018-07-06
Payer: COMMERCIAL

## 2018-07-06 VITALS
WEIGHT: 184.6 LBS | HEART RATE: 68 BPM | BODY MASS INDEX: 33.97 KG/M2 | SYSTOLIC BLOOD PRESSURE: 124 MMHG | HEIGHT: 62 IN | DIASTOLIC BLOOD PRESSURE: 78 MMHG

## 2018-07-06 DIAGNOSIS — R39.15 URINARY URGENCY: ICD-10-CM

## 2018-07-06 DIAGNOSIS — N39.41 URGE INCONTINENCE: ICD-10-CM

## 2018-07-06 PROCEDURE — 64566 NEUROELTRD STIM POST TIBIAL: CPT | Performed by: UROLOGY

## 2018-07-06 RX ORDER — FLUTICASONE PROPIONATE 50 MCG
SPRAY, SUSPENSION (ML) NASAL
Qty: 1 BOTTLE | Refills: 1 | Status: SHIPPED | OUTPATIENT
Start: 2018-07-06 | End: 2018-07-06 | Stop reason: ALTCHOICE

## 2018-07-06 RX ORDER — LORATADINE 10 MG/1
10 TABLET ORAL DAILY
COMMUNITY
End: 2018-07-11 | Stop reason: SDUPTHER

## 2018-07-06 NOTE — PROGRESS NOTES
2018    Tim Rivera  1940  [id number]    Diagnosis  Chief Complaint     Urinary Incontinence; Urinary Urgency         Patient presents for PTNS  12 managed by Dr De León Duty  Patient will follow up in 1 week for next treatment and evaluation of effectiveness with bladder diary  She will be interested in discussing whether or not she can try stopping mybetriq as she would prefer not to take medication  She was provided with a bladder diary form in the office today and instructed to record three days  Urinary Incontinence Screening      Most Recent Value   Urinary Incontinence   Urinary Incontinence? Yes [bad week two accidents, 1 leakage episode]   Incomplete emptying? No   Urinary frequency? No   Urinary urgency? No   Urinary hesitancy? No   Dysuria (painful difficult urination)? No   Nocturia (waking up to use the bathroom)? No [rare]   Straining (having to push to go)? Yes [occ]   Weak stream?  No   Intermittent stream?  Yes   Post void dribbling? No            Procedure PTNS  Session 7 of     Ankle used: right  Treatment settin  Duration of treatment: 30 minutes  Lead lot #:[id number]  Expiration Date:10/28/2020  Feeling/Response:ankle, toe and foot sensation and toe flex    Patient Goals and Progress  Decreased Urgency Yes  Decreased Frequency Yes  Episodes of incontinence No, bad week this past week with a few accidents    She denies major change from normal routine  Sleep Through Night Yes  Related Health and Social Factors No        RON Wheat, BSN

## 2018-07-11 DIAGNOSIS — J30.9 ALLERGIC RHINITIS, UNSPECIFIED SEASONALITY, UNSPECIFIED TRIGGER: Primary | ICD-10-CM

## 2018-07-12 ENCOUNTER — PROCEDURE VISIT (OUTPATIENT)
Dept: UROLOGY | Facility: CLINIC | Age: 78
End: 2018-07-12
Payer: COMMERCIAL

## 2018-07-12 VITALS
DIASTOLIC BLOOD PRESSURE: 80 MMHG | HEART RATE: 80 BPM | SYSTOLIC BLOOD PRESSURE: 126 MMHG | BODY MASS INDEX: 33.86 KG/M2 | HEIGHT: 62 IN | WEIGHT: 184 LBS

## 2018-07-12 DIAGNOSIS — N39.41 URGE INCONTINENCE OF URINE: Primary | ICD-10-CM

## 2018-07-12 DIAGNOSIS — N32.81 OAB (OVERACTIVE BLADDER): ICD-10-CM

## 2018-07-12 LAB — POST-VOID RESIDUAL VOLUME, ML POC: 23 ML

## 2018-07-12 PROCEDURE — 51798 US URINE CAPACITY MEASURE: CPT | Performed by: PHYSICIAN ASSISTANT

## 2018-07-12 PROCEDURE — 99213 OFFICE O/P EST LOW 20 MIN: CPT | Performed by: PHYSICIAN ASSISTANT

## 2018-07-12 RX ORDER — LORATADINE 10 MG/1
10 TABLET ORAL DAILY
Qty: 90 TABLET | Refills: 1 | Status: ON HOLD | OUTPATIENT
Start: 2018-07-12 | End: 2019-04-23 | Stop reason: ALTCHOICE

## 2018-07-12 NOTE — PROGRESS NOTES
2018    Deric Banegas  1940  [id number]    Diagnosis  Chief Complaint     Urge incontinence of urine; OAB (overactive bladder)         Patient presents for PTNS number 8 of 12 managed by Dr Kimmy Viera  Plan  Follow up in one week for next PTNS            Procedure PTNS  Session 8 of 12    Ankle used: right  Treatment settin  Duration of treatment: 30 minutes  Lead lot #: [id number]  Expiration Date: 10/28/2020  Feeling/Response: sensation ankle, toe and foot            Elton Ansari RN

## 2018-07-12 NOTE — PROGRESS NOTES
1  Urge incontinence of urine  Percutaneous Implantation of Neurostimulator    Percutaneous Implantation of Neurostimulator   2  OAB (overactive bladder)  POCT Measure PVR         Assessment and plan:     1   overactive bladder s/p bladder botox (3/2017, 4/2018) and tibial nerve stimulation - managed by Dr Mary Villaseñor  - patient is doing well on tibial nerve stimulation and Myrbetriq daily  Patient will complete her 12 weeks of his PT NS  She will then transition to once monthly treatments  She will follow up with myself in 6 months with a PVR for symptom reassessment  She is aware to contact us sooner with any concerns  All questions answered  Loanne Mcburney, PA-C      Chief Complaint     F/u OAB and PTNS    History of Present Illness     Dickson Ramirez is a 77 y o  female patient of Dr Mary Villaseñor with a history of medically refractory overactive bladder s/p bladder botox (3/2017, 4/2018) and tibial nerve stimulation presenting for follow up  Patient has a longstanding history of medically refractory overactive bladder  Patient initially on underwent the bladder Botox 100 units back in March 2017  She had excellent response  She underwent repeat bladder Botox in March 2018 with 200 units and unfortunately result only lasted 1 and half weeks  Patient has since been started on Myrbetriq 50 mg and is completing a course of tibial nerve stimulation  She does feel that the Myrbetriq in tibial nerve stimulation are significantly helping her overactive bladder  She does wish to taper off the medication at some point in the future however  Patient's nocturia has decreased down to 1 time nightly  She has rare stress incontinence, and has occasional urge incontinence  Denies any dysuria, gross hematuria, suprapubic pressure, flank pain, fevers, or chills  Postvoid residual in the office today reveals 23 mL      Laboratory     Lab Results   Component Value Date    CREATININE 0 67 02/14/2018 No results found for: PSA    @RESULTRCNT(1H])@      Review of Systems     Review of Systems   Constitutional: Negative for activity change, appetite change, chills, diaphoresis, fatigue, fever and unexpected weight change  Respiratory: Negative for chest tightness and shortness of breath  Cardiovascular: Negative for chest pain, palpitations and leg swelling  Gastrointestinal: Negative for abdominal distention, abdominal pain, constipation, diarrhea, nausea and vomiting  Genitourinary: Negative for decreased urine volume, difficulty urinating, dysuria, enuresis, flank pain, frequency, genital sores, hematuria and urgency  Musculoskeletal: Negative for back pain, gait problem and myalgias  Skin: Negative for color change, pallor, rash and wound  Psychiatric/Behavioral: Negative for behavioral problems  The patient is not nervous/anxious  Allergies     Allergies   Allergen Reactions    Caffeine     Iodine Rash    Latex Rash    Other Rash     Adhesive tape         Physical Exam     Physical Exam   Constitutional: She is oriented to person, place, and time  She appears well-developed and well-nourished  No distress  HENT:   Head: Normocephalic and atraumatic  Eyes: Conjunctivae are normal    Neck: Normal range of motion  No tracheal deviation present  Pulmonary/Chest: Effort normal    Musculoskeletal: Normal range of motion  She exhibits no edema or deformity  Neurological: She is alert and oriented to person, place, and time  Skin: Skin is warm and dry  She is not diaphoretic  No erythema  No pallor  Psychiatric: She has a normal mood and affect   Her behavior is normal          Vital Signs     Vitals:    07/12/18 1133   BP: 126/80   BP Location: Left arm   Patient Position: Sitting   Cuff Size: Adult   Pulse: 80   Weight: 83 5 kg (184 lb)   Height: 5' 2" (1 575 m)         Current Medications       Current Outpatient Prescriptions:     loratadine (CLARITIN) 10 mg tablet, Take 1 tablet (10 mg total) by mouth daily As needed, Disp: 90 tablet, Rfl: 1    Mirabegron ER 50 MG TB24, Take 1 tablet (50 mg total) by mouth daily, Disp: 30 tablet, Rfl: 6      Active Problems     Patient Active Problem List   Diagnosis    Incontinence    OAB (overactive bladder)    Tremors of nervous system    Insomnia    Fatigue    Seborrheic keratosis    Screening for skin condition    History of skin cancer    Seasonal allergic rhinitis         Past Medical History     Past Medical History:   Diagnosis Date    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     Benign neoplasm of skin     Chest pain     Claustrophobia     Diverticulitis     Muscle weakness     Nonmelanoma skin cancer     last assessed 2017    Palpitations     Seasonal allergies     Sleep apnea     no cpap    Spontaneous      without mention of complications     Syncope        Surgical History     Past Surgical History:   Procedure Laterality Date    BOTOX INJECTION N/A 3/6/2017    Procedure: CYSTOSCOPY; BLADDER BOTOX 100 UNITS ;  Surgeon: Dangelo Ugarte MD;  Location: [facility];  Service:     CARDIAC CATHETERIZATION      CARPAL TUNNEL RELEASE Right     COLOSTOMY      COLOSTOMY CLOSURE      FOOT SURGERY Left     neuroma    HYSTERECTOMY      NASAL SINUS SURGERY      OK CYSTOURETHROSCOPY N/A 2018    Procedure: CYSTOSCOPY WITH BOTOX;  Surgeon: Dangelo Ugarte MD;  Location: [facility];  Service: Urology    TONSILLECTOMY      WRIST SURGERY Left        Family History     Family History   Problem Relation Age of Onset    Alcohol abuse Mother     Colon cancer Mother     Heart disease Mother     Alcohol abuse Father     Alcohol abuse Brother     Colon cancer Brother        Social History     Social History     Radiology

## 2018-07-17 ENCOUNTER — OFFICE VISIT (OUTPATIENT)
Dept: FAMILY MEDICINE CLINIC | Facility: CLINIC | Age: 78
End: 2018-07-17
Payer: COMMERCIAL

## 2018-07-17 VITALS
HEIGHT: 62 IN | OXYGEN SATURATION: 97 % | HEART RATE: 87 BPM | BODY MASS INDEX: 33.72 KG/M2 | TEMPERATURE: 97.4 F | SYSTOLIC BLOOD PRESSURE: 124 MMHG | DIASTOLIC BLOOD PRESSURE: 78 MMHG | RESPIRATION RATE: 16 BRPM | WEIGHT: 183.25 LBS
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
## 2018-07-17 DIAGNOSIS — G47.9 SLEEP DISTURBANCE: Primary | ICD-10-CM

## 2018-07-17 DIAGNOSIS — F51.01 PRIMARY INSOMNIA: ICD-10-CM

## 2018-07-17 DIAGNOSIS — S81.802A LEG WOUND, LEFT, INITIAL ENCOUNTER: ICD-10-CM
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
## 2018-07-17 PROCEDURE — 99214 OFFICE O/P EST MOD 30 MIN: CPT | Performed by: NURSE PRACTITIONER

## 2018-07-17 RX ORDER — CEPHALEXIN 500 MG/1
500 CAPSULE ORAL EVERY 8 HOURS SCHEDULED
Qty: 15 CAPSULE | Refills: 0 | Status: SHIPPED | OUTPATIENT
Start: 2018-07-17 | End: 2018-07-22

## 2018-07-17 RX ORDER — FLUTICASONE PROPIONATE 50 MCG
1 SPRAY, SUSPENSION (ML) NASAL DAILY
COMMUNITY
End: 2019-01-04 | Stop reason: ALTCHOICE

## 2018-07-17 NOTE — PROGRESS NOTES
Assessment/Plan:    No problem-specific Assessment & Plan notes found for this encounter  Diagnoses and all orders for this visit:    Sleep disturbance  -     Ambulatory referral to Sleep Medicine; Future    Fall as cause of accidental injury in home as place of occurrence, initial encounter    Leg wound, left, initial encounter  -     cephalexin (KEFLEX) 500 mg capsule; Take 1 capsule (500 mg total) by mouth every 8 (eight) hours for 5 days    Primary insomnia  -     Ambulatory referral to Sleep Medicine; Future    Other orders  -     fluticasone (FLONASE) 50 mcg/act nasal spray; 1 spray into each nostril daily          Subjective:      Patient ID: Damion Reza is a 77 y o  female  Pt is here today to discuss a few things  Three days ago, she fell while trying to hold back her sons dog  She fell onto a stoned driveway  She cleaned wounds on her leg and left arm with peroxide  She then applied triple abx  x1 application  She also has a bug bite in the right calf for 3 weeks   No itching  + redness  Sleep disorder- she has had trouble sleeping for many years  She has tried "all kinds of sleeping pills" They only help for a short time, or she becomes too groggy  Currently she is not taking any medications  Years ago, she was dx with BRANDON, but she is claustrophobic and cannot use the CPAP machine  She has trouble with BOTH falling asleep and staying asleep  She does not get up to urinate overnight  Pt has a list of failed meds for sleep including Trazadone, temazepam, xanax, elavil, ambien, gabapentin, clonazepam, restoril, melatonin        The following portions of the patient's history were reviewed and updated as appropriate:   She  has a past medical history of 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; Benign neoplasm of skin; Chest pain; Claustrophobia;  Diverticulitis; Muscle weakness; Nonmelanoma skin cancer; Palpitations; Seasonal allergies; Sleep apnea; Spontaneous ; and Syncope  She   Patient Active Problem List    Diagnosis Date Noted    Sleep disturbance 07/17/2018    Fall as cause of accidental injury at home as place of occurrence 07/17/2018    Leg wound, left, initial encounter 07/17/2018    Seasonal allergic rhinitis 05/08/2018    Screening for skin condition 04/04/2018    History of skin cancer 04/04/2018    Tremors of nervous system 03/26/2018    Primary insomnia 03/26/2018    Fatigue 03/26/2018    Incontinence 09/11/2015    OAB (overactive bladder) 09/11/2015    Seborrheic keratosis 05/29/2014     She  has a past surgical history that includes Hysterectomy; Tonsillectomy; Nasal sinus surgery; Colostomy; Carpal tunnel release (Right); Colostomy closure; Foot surgery (Left); Cardiac catheterization; Wrist surgery (Left); BOTOX INJECTION (N/A, 3/6/2017); and pr cystourethroscopy (N/A, 4/13/2018)  Her family history includes Alcohol abuse in her brother, father, and mother; Colon cancer in her brother and mother; Heart disease in her mother  She  reports that she has never smoked  She has never used smokeless tobacco  She reports that she drinks alcohol  She reports that she does not use drugs  Current Outpatient Prescriptions   Medication Sig Dispense Refill    fluticasone (FLONASE) 50 mcg/act nasal spray 1 spray into each nostril daily      cephalexin (KEFLEX) 500 mg capsule Take 1 capsule (500 mg total) by mouth every 8 (eight) hours for 5 days 15 capsule 0    loratadine (CLARITIN) 10 mg tablet Take 1 tablet (10 mg total) by mouth daily As needed 90 tablet 1    Mirabegron ER 50 MG TB24 Take 1 tablet (50 mg total) by mouth daily 30 tablet 6     No current facility-administered medications for this visit        Current Outpatient Prescriptions on File Prior to Visit   Medication Sig    loratadine (CLARITIN) 10 mg tablet Take 1 tablet (10 mg total) by mouth daily As needed    Mirabegron ER 50 MG TB24 Take 1 tablet (50 mg total) by mouth daily     No current facility-administered medications on file prior to visit  She is allergic to caffeine; iodine; latex; and other       Review of Systems   Constitutional: Negative  Negative for fever  Respiratory: Negative  Cardiovascular: Negative  Genitourinary: Positive for frequency  Chronic incontinence   Skin: Positive for wound  Allergic/Immunologic: Negative for immunocompromised state  Neurological: Negative for dizziness, syncope and weakness  Hematological: Negative for adenopathy  Psychiatric/Behavioral: Positive for sleep disturbance  All other systems reviewed and are negative  Objective:      /78 (BP Location: Left arm, Patient Position: Sitting)   Pulse 87   Temp (!) 97 4 °F (36 3 °C)   Resp 16   Ht 5' 2" (1 575 m)   Wt 83 1 kg (183 lb 4 oz)   SpO2 97%   BMI 33 52 kg/m²          Physical Exam   Constitutional: She is oriented to person, place, and time  She appears well-developed and well-nourished  No distress  HENT:   Head: Normocephalic and atraumatic  Mouth/Throat: Oropharynx is clear and moist  No oropharyngeal exudate  Eyes: Conjunctivae and EOM are normal  Pupils are equal, round, and reactive to light  Neck: Normal range of motion  Neck supple  Cardiovascular: Normal rate, regular rhythm and normal heart sounds  Exam reveals no gallop and no friction rub  No murmur heard  Pulmonary/Chest: Effort normal and breath sounds normal  No respiratory distress  She has no wheezes  She has no rales  Abdominal: Soft  Musculoskeletal: Normal range of motion  Lymphadenopathy:     She has no cervical adenopathy  Neurological: She is alert and oriented to person, place, and time  Skin: Skin is warm and dry  No rash noted  She is not diaphoretic  There is erythema  Left lower leg area of abrasions and scabbing  Tender to touch, erythmatous  Mutiple scabs covering a surface area of 5inches by 3 inches  Skin tear to left forearm--healing  Right calf healing insect bite  Right lower leg varicosity rupture   Psychiatric: She has a normal mood and affect  Her behavior is normal  Judgment and thought content normal    Nursing note and vitals reviewed

## 2018-07-20 ENCOUNTER — PROCEDURE VISIT (OUTPATIENT)
Dept: UROLOGY | Facility: CLINIC | Age: 78
End: 2018-07-20
Payer: COMMERCIAL

## 2018-07-20 VITALS
DIASTOLIC BLOOD PRESSURE: 60 MMHG | HEIGHT: 62 IN | BODY MASS INDEX: 34.37 KG/M2 | SYSTOLIC BLOOD PRESSURE: 100 MMHG | HEART RATE: 80 BPM | WEIGHT: 186.8 LBS

## 2018-07-20 DIAGNOSIS — N39.41 URGE INCONTINENCE: ICD-10-CM

## 2018-07-20 DIAGNOSIS — R39.15 URINARY URGENCY: ICD-10-CM

## 2018-07-20 PROCEDURE — 64566 NEUROELTRD STIM POST TIBIAL: CPT | Performed by: UROLOGY

## 2018-07-20 NOTE — PROGRESS NOTES
7/20/2018    Amandadebra Montes  1940  [id number]    Diagnosis  Chief Complaint     Urinary Incontinence; Urinary Urgency         Patient presents for PTNS 9 of 12 managed by Dr Shane Santamaria  Patient will follow up in one week for next treatment  Urinary Incontinence Screening      Most Recent Value   Urinary Incontinence   Urinary Incontinence? Yes [1 bad day last week, in general 1 pad per day]   Incomplete emptying? No   Urinary frequency? No   Urinary urgency? No   Urinary hesitancy? No   Dysuria (painful difficult urination)? No   Nocturia (waking up to use the bathroom)? No [rare]   Straining (having to push to go)? No [better]   Weak stream?  No   Intermittent stream?  No   Post void dribbling?   No            Procedure PTNS  Session 9 of 12    Ankle used: right  Treatment setting:10  Duration of treatment: 30 minutes  Lead lot #:[id number]  Expiration Date:10/28/2020  Feeling/Response:toe flex and toe sensation    Patient Goals and Progress  Decreased Urgency Yes  Decreased Frequency Yes  Episodes of incontinence Yes, with the exception of an occasional bad day  Sleep Through Night Yes  Related Health and Social Factors No    RON Womack, BSN

## 2018-07-27 ENCOUNTER — PROCEDURE VISIT (OUTPATIENT)
Dept: UROLOGY | Facility: CLINIC | Age: 78
End: 2018-07-27
Payer: COMMERCIAL

## 2018-07-27 VITALS
WEIGHT: 183.8 LBS | BODY MASS INDEX: 33.82 KG/M2 | HEART RATE: 98 BPM | DIASTOLIC BLOOD PRESSURE: 70 MMHG | SYSTOLIC BLOOD PRESSURE: 110 MMHG | HEIGHT: 62 IN

## 2018-07-27 DIAGNOSIS — N39.41 URGE INCONTINENCE: ICD-10-CM

## 2018-07-27 DIAGNOSIS — R39.15 URINARY URGENCY: ICD-10-CM

## 2018-07-27 PROCEDURE — 64566 NEUROELTRD STIM POST TIBIAL: CPT | Performed by: UROLOGY

## 2018-07-27 NOTE — PROGRESS NOTES
7/27/2018    Joselito Gaytan  1940  [id number]    Diagnosis  Chief Complaint     Urinary Urgency; Urinary Incontinence         Patient presents for PTNS 10 of 12 managed by Dr Freddy Ahmadi  Patient will follow up in one week for next treatment  Urinary Incontinence Screening      Most Recent Value   Urinary Incontinence   Urinary Incontinence? No [1 liner per day, pretty good]   Incomplete emptying? No   Urinary frequency? No   Urinary urgency? No   Urinary hesitancy? Yes [occ]   Dysuria (painful difficult urination)? No   Nocturia (waking up to use the bathroom)? No   Straining (having to push to go)? Yes [sometimes]   Weak stream?  No   Intermittent stream?  No   Post void dribbling? No            Procedure PTNS  Session 10 of 12    Ankle used: right  Treatment setting:10  Duration of treatment: 30 minutes  Lead lot #:[id number]  Expiration Date:10/28/2020  Feeling/Response:toe flex and foot and toe sensation    Patient Goals and Progress  Decreased Urgency Yes  Decreased Frequency Yes  Episodes of incontinence Yes  Sleep Through Night Yes  Related Health and Social Factors No    Patient reports a better week last week       RON Swann BSN

## 2018-07-31 ENCOUNTER — APPOINTMENT (OUTPATIENT)
Dept: LAB | Facility: CLINIC | Age: 78
End: 2018-07-31
Payer: COMMERCIAL

## 2018-07-31 ENCOUNTER — OFFICE VISIT (OUTPATIENT)
Dept: FAMILY MEDICINE CLINIC | Facility: CLINIC | Age: 78
End: 2018-07-31
Payer: COMMERCIAL

## 2018-07-31 VITALS
RESPIRATION RATE: 16 BRPM | HEIGHT: 62 IN | HEART RATE: 74 BPM | WEIGHT: 184.2 LBS | OXYGEN SATURATION: 94 % | TEMPERATURE: 97.9 F | DIASTOLIC BLOOD PRESSURE: 76 MMHG | SYSTOLIC BLOOD PRESSURE: 126 MMHG | BODY MASS INDEX: 33.9 KG/M2

## 2018-07-31 DIAGNOSIS — R25.1 OCCASIONAL TREMORS: Primary | ICD-10-CM

## 2018-07-31 DIAGNOSIS — R25.1 OCCASIONAL TREMORS: ICD-10-CM

## 2018-07-31 DIAGNOSIS — R73.01 IMPAIRED FASTING GLUCOSE: ICD-10-CM

## 2018-07-31 DIAGNOSIS — G47.00 INSOMNIA, UNSPECIFIED TYPE: ICD-10-CM

## 2018-07-31 LAB
ALBUMIN SERPL BCP-MCNC: 3.6 G/DL (ref 3.5–5)
ALP SERPL-CCNC: 85 U/L (ref 46–116)
ALT SERPL W P-5'-P-CCNC: 25 U/L (ref 12–78)
ANION GAP SERPL CALCULATED.3IONS-SCNC: 6 MMOL/L (ref 4–13)
AST SERPL W P-5'-P-CCNC: 21 U/L (ref 5–45)
BASOPHILS # BLD AUTO: 0.01 THOUSANDS/ΜL (ref 0–0.1)
BASOPHILS NFR BLD AUTO: 0 % (ref 0–1)
BILIRUB SERPL-MCNC: 0.43 MG/DL (ref 0.2–1)
BUN SERPL-MCNC: 14 MG/DL (ref 5–25)
CALCIUM SERPL-MCNC: 8.5 MG/DL (ref 8.3–10.1)
CHLORIDE SERPL-SCNC: 108 MMOL/L (ref 100–108)
CO2 SERPL-SCNC: 27 MMOL/L (ref 21–32)
CREAT SERPL-MCNC: 0.68 MG/DL (ref 0.6–1.3)
EOSINOPHIL # BLD AUTO: 0.12 THOUSAND/ΜL (ref 0–0.61)
EOSINOPHIL NFR BLD AUTO: 3 % (ref 0–6)
ERYTHROCYTE [DISTWIDTH] IN BLOOD BY AUTOMATED COUNT: 13.4 % (ref 11.6–15.1)
EST. AVERAGE GLUCOSE BLD GHB EST-MCNC: 123 MG/DL
GFR SERPL CREATININE-BSD FRML MDRD: 83 ML/MIN/1.73SQ M
GLUCOSE P FAST SERPL-MCNC: 95 MG/DL (ref 65–99)
HBA1C MFR BLD: 5.9 % (ref 4.2–6.3)
HCT VFR BLD AUTO: 41.5 % (ref 34.8–46.1)
HGB BLD-MCNC: 13.6 G/DL (ref 11.5–15.4)
IMM GRANULOCYTES # BLD AUTO: 0.02 THOUSAND/UL (ref 0–0.2)
IMM GRANULOCYTES NFR BLD AUTO: 0 % (ref 0–2)
LYMPHOCYTES # BLD AUTO: 1.13 THOUSANDS/ΜL (ref 0.6–4.47)
LYMPHOCYTES NFR BLD AUTO: 25 % (ref 14–44)
MCH RBC QN AUTO: 29.1 PG (ref 26.8–34.3)
MCHC RBC AUTO-ENTMCNC: 32.8 G/DL (ref 31.4–37.4)
MCV RBC AUTO: 89 FL (ref 82–98)
MONOCYTES # BLD AUTO: 0.43 THOUSAND/ΜL (ref 0.17–1.22)
MONOCYTES NFR BLD AUTO: 10 % (ref 4–12)
NEUTROPHILS # BLD AUTO: 2.82 THOUSANDS/ΜL (ref 1.85–7.62)
NEUTS SEG NFR BLD AUTO: 62 % (ref 43–75)
NRBC BLD AUTO-RTO: 0 /100 WBCS
PLATELET # BLD AUTO: 226 THOUSANDS/UL (ref 149–390)
PMV BLD AUTO: 9.3 FL (ref 8.9–12.7)
POTASSIUM SERPL-SCNC: 4 MMOL/L (ref 3.5–5.3)
PROT SERPL-MCNC: 6.9 G/DL (ref 6.4–8.2)
RBC # BLD AUTO: 4.67 MILLION/UL (ref 3.81–5.12)
SODIUM SERPL-SCNC: 141 MMOL/L (ref 136–145)
TSH SERPL DL<=0.05 MIU/L-ACNC: 1.96 UIU/ML
WBC # BLD AUTO: 4.53 THOUSAND/UL (ref 4.31–10.16)

## 2018-07-31 PROCEDURE — 80053 COMPREHEN METABOLIC PANEL: CPT

## 2018-07-31 PROCEDURE — 85025 COMPLETE CBC W/AUTO DIFF WBC: CPT

## 2018-07-31 PROCEDURE — 36415 COLL VENOUS BLD VENIPUNCTURE: CPT

## 2018-07-31 PROCEDURE — 84443 ASSAY THYROID STIM HORMONE: CPT

## 2018-07-31 PROCEDURE — 99214 OFFICE O/P EST MOD 30 MIN: CPT | Performed by: FAMILY MEDICINE

## 2018-07-31 PROCEDURE — 83036 HEMOGLOBIN GLYCOSYLATED A1C: CPT

## 2018-07-31 RX ORDER — LORAZEPAM 0.5 MG/1
0.5 TABLET ORAL
Qty: 40 TABLET | Refills: 0 | Status: SHIPPED | OUTPATIENT
Start: 2018-07-31 | End: 2018-08-29 | Stop reason: ALTCHOICE

## 2018-07-31 NOTE — PROGRESS NOTES
Assessment/Plan:    No problem-specific Assessment & Plan notes found for this encounter  Diagnoses and all orders for this visit:    Occasional tremors  Unclear etiology recommend blood work and EMG although she would like to wait before doing an EMG  Had an MRI of the head done this year normal   -     TSH baseline; Future  -     CBC and differential; Future    Impaired fasting glucose  She was advised to follow a low carb diet  -     Comprehensive metabolic panel; Future  -     Hemoglobin A1C; Future    Insomnia, unspecified type  after discussing risks and benefits along with side effects will start lorazepam at this time   -     LORazepam (ATIVAN) 0 5 mg tablet; Take 1 tablet (0 5 mg total) by mouth daily at bedtime    Other orders  -     VB - Glaucoma Screen      Follow up in 1 month    Subjective:      Patient ID: Juan C Powell is a 77 y o  female  Patient is here due to tremors on both hands and also on her torso  She says that this is associated with insomnia  She is unable to fall asleep given that she is always thinking and cannot relax at night  She wakes in the middle of the night with tremors of her torso  She denies any leg tremors however  Also she was fund to have pre-diabetes in 2018 per HgbA1c, 6 1  She denies any symptoms such as polyuria or polydipsia at this time  The following portions of the patient's history were reviewed and updated as appropriate:   She  has a past medical history of 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; Benign neoplasm of skin; Chest pain; Claustrophobia; Diverticulitis; Muscle weakness; Nonmelanoma skin cancer; Palpitations; Seasonal allergies; Sleep apnea; Spontaneous ; and Syncope    She   Patient Active Problem List    Diagnosis Date Noted    Occasional tremors 2018    Impaired fasting glucose 2018    Insomnia 2018    Sleep disturbance 2018    Fall as cause of accidental injury at home as place of occurrence 07/17/2018    Leg wound, left, initial encounter 07/17/2018    Seasonal allergic rhinitis 05/08/2018    Screening for skin condition 04/04/2018    History of skin cancer 04/04/2018    Tremors of nervous system 03/26/2018    Primary insomnia 03/26/2018    Fatigue 03/26/2018    Incontinence 09/11/2015    OAB (overactive bladder) 09/11/2015    Seborrheic keratosis 05/29/2014     She  has a past surgical history that includes Hysterectomy; Tonsillectomy; Nasal sinus surgery; Colostomy; Carpal tunnel release (Right); Colostomy closure; Foot surgery (Left); Cardiac catheterization; Wrist surgery (Left); BOTOX INJECTION (N/A, 3/6/2017); and pr cystourethroscopy (N/A, 4/13/2018)  Her family history includes Alcohol abuse in her brother, father, and mother; Colon cancer in her brother and mother; Heart disease in her mother  She  reports that she has never smoked  She has never used smokeless tobacco  She reports that she drinks alcohol  She reports that she does not use drugs  Current Outpatient Prescriptions   Medication Sig Dispense Refill    fluticasone (FLONASE) 50 mcg/act nasal spray 1 spray into each nostril daily      loratadine (CLARITIN) 10 mg tablet Take 1 tablet (10 mg total) by mouth daily As needed 90 tablet 1    Mirabegron ER 50 MG TB24 Take 1 tablet (50 mg total) by mouth daily 30 tablet 6    LORazepam (ATIVAN) 0 5 mg tablet Take 1 tablet (0 5 mg total) by mouth daily at bedtime 40 tablet 0     No current facility-administered medications for this visit  Current Outpatient Prescriptions on File Prior to Visit   Medication Sig    fluticasone (FLONASE) 50 mcg/act nasal spray 1 spray into each nostril daily    loratadine (CLARITIN) 10 mg tablet Take 1 tablet (10 mg total) by mouth daily As needed    Mirabegron ER 50 MG TB24 Take 1 tablet (50 mg total) by mouth daily     No current facility-administered medications on file prior to visit        She is allergic to caffeine; iodine; latex; and other       Review of Systems   Constitutional: Negative for activity change, appetite change, fatigue and fever  HENT: Negative for congestion and ear discharge  Respiratory: Negative for cough and shortness of breath  Cardiovascular: Negative for chest pain and palpitations  Gastrointestinal: Negative for diarrhea and nausea  Musculoskeletal: Negative for arthralgias and back pain  Skin: Negative for color change and rash  Neurological: Positive for tremors  Negative for dizziness and headaches  Psychiatric/Behavioral: Positive for sleep disturbance  Negative for agitation and behavioral problems  Objective:      /76 (BP Location: Left arm, Patient Position: Sitting, Cuff Size: Adult)   Pulse 74   Temp 97 9 °F (36 6 °C) (Tympanic)   Resp 16   Ht 5' 2" (1 575 m)   Wt 83 6 kg (184 lb 3 2 oz)   SpO2 94%   BMI 33 69 kg/m²          Physical Exam   Constitutional: She is oriented to person, place, and time  She appears well-developed and well-nourished  No distress  HENT:   Head: Normocephalic and atraumatic  Nose: Nose normal    Mouth/Throat: Oropharynx is clear and moist    Eyes: Conjunctivae are normal  Pupils are equal, round, and reactive to light  Cardiovascular: Normal rate, regular rhythm and normal heart sounds  No murmur heard  Pulmonary/Chest: Effort normal and breath sounds normal  No respiratory distress  She has no wheezes  Abdominal: Soft  Bowel sounds are normal  She exhibits no distension  There is no tenderness  Neurological: She is alert and oriented to person, place, and time  Skin: Skin is warm and dry  No rash noted  She is not diaphoretic  No erythema  Psychiatric: She has a normal mood and affect

## 2018-08-03 ENCOUNTER — PROCEDURE VISIT (OUTPATIENT)
Dept: UROLOGY | Facility: CLINIC | Age: 78
End: 2018-08-03
Payer: COMMERCIAL

## 2018-08-03 VITALS
WEIGHT: 184.4 LBS | HEART RATE: 88 BPM | DIASTOLIC BLOOD PRESSURE: 78 MMHG | BODY MASS INDEX: 33.93 KG/M2 | SYSTOLIC BLOOD PRESSURE: 129 MMHG | HEIGHT: 62 IN

## 2018-08-03 DIAGNOSIS — N39.41 URGE INCONTINENCE: ICD-10-CM

## 2018-08-03 DIAGNOSIS — N32.81 OAB (OVERACTIVE BLADDER): ICD-10-CM

## 2018-08-03 DIAGNOSIS — R39.15 URINARY URGENCY: ICD-10-CM

## 2018-08-03 DIAGNOSIS — N39.41 URGE INCONTINENCE OF URINE: ICD-10-CM

## 2018-08-03 PROCEDURE — 64566 NEUROELTRD STIM POST TIBIAL: CPT | Performed by: UROLOGY

## 2018-08-03 NOTE — PROGRESS NOTES
8/3/2018    Harish Cole  1940  [id number]    Diagnosis  Chief Complaint     Urinary Urgency; Urinary Incontinence         Patient presents for PTNS 11 of 12 managed by Dr Myrna Yuan  Patient will follow up next week for her last weekly treatment  She will then follow up monthly and will complete bladder diary prior to first monthly session  Urinary Incontinence Screening      Most Recent Value   Urinary Incontinence   Urinary Incontinence? Yes [rare episode the past week, 1 liner per day]   Incomplete emptying? No   Urinary frequency? No   Urinary urgency? No   Urinary hesitancy? Yes [occ]   Dysuria (painful difficult urination)? No   Nocturia (waking up to use the bathroom)? No   Straining (having to push to go)? Yes [occ]   Weak stream?  No   Intermittent stream?  No   Post void dribbling? No            Procedure PTNS  Session 11     Ankle used: right  Treatment settin  Duration of treatment: 30 minutes  Lead lot #:[id number]  Expiration Date:10/28/2020  Feeling/Response:toe flex and sensation from ankle to toe    Patient Goals and Progress  Decreased Urgency Yes  Decreased Frequency Yes  Episodes of incontinence Yes  Sleep Through Night Yes  Related Health and Social Factors No    Patient overall please with the results thus far  She feels like her symptoms are better controlled with ptns and mybetriq then just with the myrbetriq alone    She notes increased frequency and small amt leaking one day but feel this was related to dietary increase in fluid intake above her normal     Matthias Juárez RN  [facility], BSN

## 2018-08-08 ENCOUNTER — TELEPHONE (OUTPATIENT)
Dept: FAMILY MEDICINE CLINIC | Facility: CLINIC | Age: 78
End: 2018-08-08

## 2018-08-09 ENCOUNTER — PROCEDURE VISIT (OUTPATIENT)
Dept: UROLOGY | Facility: CLINIC | Age: 78
End: 2018-08-09

## 2018-08-09 VITALS
WEIGHT: 184.6 LBS | BODY MASS INDEX: 33.97 KG/M2 | HEIGHT: 62 IN | SYSTOLIC BLOOD PRESSURE: 132 MMHG | HEART RATE: 78 BPM | DIASTOLIC BLOOD PRESSURE: 80 MMHG

## 2018-08-09 DIAGNOSIS — R39.15 URINARY URGENCY: Primary | ICD-10-CM

## 2018-08-09 NOTE — PROGRESS NOTES
2018    Jasper Cue  1940  [id number]    Diagnosis  Chief Complaint     Urinary Urgency        Vitals:    18 0957   BP: 132/80   Pulse: 78      Patient presents for PTNS  managed by DR Fausto Richmond  Plan  Follow up next week for PTNS   Urinary Incontinence Screening      Most Recent Value   Urinary Incontinence   Urinary Incontinence? Yes [leakage x 2, one pantiliner per day, overall symptoms have improved]   Incomplete emptying? No   Urinary frequency? No   Urinary urgency? No   Urinary hesitancy? No   Dysuria (painful difficult urination)? No   Nocturia (waking up to use the bathroom)? Yes [rare , normally no nocturia]   Straining (having to push to go)? Yes [occasional]   Weak stream?  No            Procedure PTNS     Ankle used: right  Treatment settin  Duration of treatment: {0  Lead lot #:[id number]  Expiration Date:10/28/20  Feeling/Response:  Patient with discomfort noted with insertion of PTNS needle / attempt times 2 after location of insertion site  Patient preferred to reschedule her PTNS for next week versus waiting to try again        Jayme Osuna RN

## 2018-08-10 ENCOUNTER — TELEPHONE (OUTPATIENT)
Dept: UROLOGY | Facility: MEDICAL CENTER | Age: 78
End: 2018-08-10

## 2018-08-17 ENCOUNTER — PROCEDURE VISIT (OUTPATIENT)
Dept: UROLOGY | Facility: CLINIC | Age: 78
End: 2018-08-17
Payer: COMMERCIAL

## 2018-08-17 VITALS
WEIGHT: 184 LBS | DIASTOLIC BLOOD PRESSURE: 78 MMHG | HEART RATE: 88 BPM | SYSTOLIC BLOOD PRESSURE: 138 MMHG | HEIGHT: 62 IN | BODY MASS INDEX: 33.86 KG/M2

## 2018-08-17 DIAGNOSIS — R39.15 URINARY URGENCY: ICD-10-CM

## 2018-08-17 DIAGNOSIS — N39.41 URGE INCONTINENCE: ICD-10-CM

## 2018-08-17 PROCEDURE — 64566 NEUROELTRD STIM POST TIBIAL: CPT | Performed by: UROLOGY

## 2018-08-17 NOTE — PROGRESS NOTES
2018    Earleen Cockayne  1940  [id number]    Diagnosis  Chief Complaint     Urinary Urgency; Urinary Incontinence         Patient presents for PTNS 12 of 12 managed by Dr Tory Farah  Patient will follow up in one month for first maintenance treatment with bladder diary prior to visit  Urinary Incontinence Screening      Most Recent Value   Urinary Incontinence   Urinary Incontinence? Yes [1 episode stress incontinence, and three mornings urge incont, 1 pad per day]   Incomplete emptying? No   Urinary frequency? No   Urinary urgency? No [still a little better]   Urinary hesitancy? No   Dysuria (painful difficult urination)? No   Nocturia (waking up to use the bathroom)? No [rare]   Straining (having to push to go)? Yes   Weak stream?  No   Intermittent stream?  Yes [at times]   Post void dribbling? No            Procedure PTNS  Session 12 of 12    Ankle used: right  Treatment settin  Duration of treatment: 30 minutes  Lead lot #:[id number]  Expiration Date:10/28/2020  Feeling/Response:toe flex, toe, ankle and foot sensation    Patient Goals and Progress  Decreased Urgency Yes  Decreased Frequency Yes  Episodes of incontinence Yes  Sleep Through Night Yes  Related Health and Social Factors No    Overall patient reports satisfaction with treatment       Yady Peterson, RN  [facility]

## 2018-08-28 ENCOUNTER — OFFICE VISIT (OUTPATIENT)
Dept: SLEEP CENTER | Facility: CLINIC | Age: 78
End: 2018-08-28
Payer: COMMERCIAL

## 2018-08-28 VITALS
WEIGHT: 185 LBS | HEIGHT: 62 IN | BODY MASS INDEX: 34.04 KG/M2 | SYSTOLIC BLOOD PRESSURE: 118 MMHG | DIASTOLIC BLOOD PRESSURE: 76 MMHG | HEART RATE: 92 BPM

## 2018-08-28 DIAGNOSIS — G47.33 OSA (OBSTRUCTIVE SLEEP APNEA): Primary | ICD-10-CM

## 2018-08-28 DIAGNOSIS — R53.83 OTHER FATIGUE: ICD-10-CM

## 2018-08-28 DIAGNOSIS — F40.240 CLAUSTROPHOBIA: ICD-10-CM

## 2018-08-28 DIAGNOSIS — R45.86 MOOD DISTURBANCE: ICD-10-CM

## 2018-08-28 DIAGNOSIS — J31.0 CHRONIC RHINITIS: ICD-10-CM

## 2018-08-28 DIAGNOSIS — E66.9 OBESITY (BMI 30-39.9): ICD-10-CM

## 2018-08-28 DIAGNOSIS — F51.01 PRIMARY INSOMNIA: ICD-10-CM

## 2018-08-28 DIAGNOSIS — G47.9 SLEEP DISTURBANCE: ICD-10-CM

## 2018-08-28 PROCEDURE — 99204 OFFICE O/P NEW MOD 45 MIN: CPT | Performed by: INTERNAL MEDICINE

## 2018-08-28 NOTE — PROGRESS NOTES
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; Benign neoplasm of skin; Chest pain; Claustrophobia; Diverticulitis; Muscle weakness; Nonmelanoma skin cancer; Palpitations; Seasonal allergies; Sleep apnea; Spontaneous ; and Syncope  She has a current medication list which includes the following prescription(s): fluticasone, loratadine, lorazepam, and mirabegron er  HPI:  She has difficulty both initiating and maintaining sleep  She has been snoring for many years  She sleeps alone but notes she awakens herself with snoring at times  She is not aware of breathing difficulties during sleep or modifying factors  Restless Leg Syndrome: reports no suggestive symptoms    Parasomnia activity: no features reported   Sleep Routine: Typical Bedtime:  10:00 p m  Gets OOB:   7 - 8 a m         [TIB:   Greater than 9 hrs]   Sleep latency: <  30 minutes most nights but 2 to 3 times a week up to 3 hr  She attributes this to racing thoughts and is unable to shut my mind off, but denied clock watching  Symptoms persist in spite of lorazepam 1 mg as a sleep aid and she feels dose needs to be increased  Sleep Interruptions: 3-4 x/night and is usually able to fall back asleep   [Patient's Estimated TST:5-6 hrs]  Awakens: spontaneously feeling never rested She admitted Excessive daytime drowsiness:  She yawns excessively and tries to nap but feels she is unable to [place] Sleepiness Scale rated at Total score: 1 /24  Habits: reports that she has never smoked  She has never used smokeless tobacco , reports that she does not drink alcohol , [ reports that she does not use drugs  ],Caffeine use: none, Exercise routine: none  Family History:   Son has obstructive sleep apnea  ROS: reviewed & as attached  [Significant for weight gain of around 5 lb in the past 1 year  She had sinusitis and is currently on Claritin and Flonase  She denies specific psychosocial stresses reports some symptoms of depression  She is claustrophobic  She has urinary incontinence ]      EXAM:  key  [x] system is Normal  [] see notes  VITALS     []  /76   Pulse 92   Ht 5' 2" (1 575 m)   Wt 83 9 kg (185 lb)   BMI 33 84 kg/m²       GENERAL[x]  [Well] groomed female, well appearing, [at] her stated age, in [no apparent] distress  PSYCH     [x]  Alert and cooperative  Speech is clear & coherant  [Mental state appears normal ] Judgement & Insight [good]   EXTREM/  SKIN         [x]  [No] pedal edema  Skin is warm and dry  Color and hydration are good  HEAD       [x]     Craniofacial anatomy: normal  EOMI  TMJ & Sinuses are normal    Neck         []  Neck Circumference: 37 cm, appears thick and there's extra fatty tissue; [no] abnormal masses [or] Lymhadenopathy  Thyroid is [normal]  Trachea is [central]  Nasal        []  Airway is patent Septum is [central], Mucous membranes appear [normal]  Turbinates are [normal ] There is [no] rhinorrhea  Oral          []    Airway       crowded and there's AP narrowing  Modified Mallampati class IV (only hard palate visible)  Palate:  redundant soft palate and s/p [id number] is [no] tonsillar hypertrophy  Teeth normal  and dentures - upper & lower  CVS         [x]  Heart sounds S1 and 2 are normal   There ectopic beats, [No] murmur[s]  RESP       [x]  Effort is [normal]  Air entry [good] [bilaterally]  [No]wheezes  [No]rales      ABD         [x] obese, soft & non-tender  CNS         [x]  Grossly intact  Normal gait  Rombergs [Negative]  MSK         [x]  Muscle bulk, tone and power  [normal]        IMPRESSION:   1  BRANDON (obstructive sleep apnea)  Diagnostic Sleep Study   2  Sleep disturbance  Ambulatory referral to Sleep Medicine   3  Primary insomnia  Ambulatory referral to Sleep Medicine   4  Other fatigue     5  Mood disturbance (HCC)     6  Claustrophobia     7  Chronic rhinitis     8  Obesity (BMI 30-39  9)          PLAN:   1  Comprehensive counseling was provided on pathophysiology, diagnostic strategies & treatment options; effects on symptoms and comorbidities; risks of inadequate therapy; costs and insurance aspects  2  I advised on weight reduction, avoiding sleeping supine, using alcohol or sedating medications close to bed time and on safe driving practices  3  Cognitive behavioral therapy was initiated with advise on Sleep Hygiene and behavioral techniques to manage Insomnia  Specifically limiting her time in bed to 7 hr, avoiding daytime naps, starting an exercise routine and on relaxation techniques  I also advised avoiding use of Ativan days a sleep aid  4  Nocturnal polysomnography is indicated and a diagnostic study will be scheduled  5  Patient is unwilling to try Positive airway pressure therapy because of claustrophobia but is not a candidate for mandibular advancement device or surgery  If she needs CPAP, she will need formal desensitization  6  She may also warrant an SSRI  7  Follow-up will be scheduled after the studies to review results, further details of treatment options  Thank you for allowing me to participate in the care of this patient  I will keep you apprised of developments       Sincerely,     Authenticated electronically by Olivier Wilburn MD   on 99/37/27   Board Certified Specialist

## 2018-08-28 NOTE — PATIENT INSTRUCTIONS
What you can do to improve your sleep: (Sleep Hygiene) Basic rules for a good night's sleep    Create a regular sleep schedule  This will help you form a sleep routine  Keep a record of your sleep patterns, and any sleeping problems you have  Bring the record to follow-up visits with healthcare providers  Avoid prolonged use of light-emitting screens before bedtime or watching TV in bed  Avoid forcing sleep  Do not take naps  Naps could make it hard for you to fall asleep at bedtime  Deal with your worries before bedtime  Keep your bedroom cool, quiet, and dark  Turn on white noise, such as a fan, to help you relax  Do not use your bed for any activity that will keep you awake  Do not read, exercise, eat, or watch TV in your bedroom  Get up if you do not fall asleep within 20 minutes  Move to another room and do something relaxing until you become sleepy  Limit caffeine, alcohol, nicotine and food to earlier in the day  Only drink caffeine in the morning  Do not drink alcohol within 6 hours of bedtime  Do not eat a heavy meal right before you go to bed  Avoid smoking, especially in the evening  Exercise regularly  Daily exercise will help you sleep better  Do not exercise within 4 hours of bedtime  Stimulus control therapy rules  1  Go to bed only when sleepy  2  Do not watch television, read, eat, or worry while in bed  Use bed only for sleep and sex  3  Get out of bed if unable to fall asleep within 20 minutes and go to another room  Return to bed only when sleepy  Repeat this step as many times as necessary throughout the night  4  Set an alarm clock to wake up at a fixed time each morning, including weekends  5  Do not take a nap during the day  Data from: [address] [place] [address], [address] [employer] Nonpharmacologic treatments of insomnia  J Clin Psychiatry 4154; 53:37  Go to [employer]ASM website for more information: Sleepeducation  org     Recommended Reading:  Book by authors Rodney Sood No More sleepless nights    What is BRANDON? Obstructive sleep apnea is a common and serious sleep disorder that causes you to stop breathing during sleep  The airway repeatedly becomes blocked, limiting the amount of air that reaches your lungs  When this happens, you may snore loudly or making choking noises as you try to breathe  Your brain and body becomes oxygen deprived and you may wake up  This may happen a few times a night, or in more severe cases, several hundred times a night  Sleep apnea can make you wake up in the morning feeling tired or unrefreshed even though you have had a full night of sleep  During the day, you may feel fatigued, have difficulty concentrating or you may even unintentionally fall asleep  This is because your body is waking up numerous times throughout the night, even though you might not be conscious of each awakening  The lack of oxygen your body receives can have negative long-term consequences for your health  This includes:  High blood pressure  Heart disease  Irregular heart rhythms  Stroke  Pre-diabetes and diabetes  Depression    Testing  An objective evaluation of your sleep may be needed before your board certified sleep physician can make a diagnosis  Options include:   In-lab overnight sleep study  This type of sleep study requires you to stay overnight at a sleep center, in a bed that may resemble a hotel room  You will sleep with sensors hooked up to various parts of your body  These sensors record your brain waves, heartbeat, breathing and movement  An overnight sleep study also provides your doctor with the most complete information about your sleep   Learn more about an overnight sleep study      Home sleep apnea test  Some patients with high risk factors for obstructive sleep apnea and no other medical disorders may be candidates for a home sleep apnea test  The testing equipment differs in that it is less complicated than what is used in an overnight sleep study  As such, does not give all the data an in-lab will and if negative, may not mean you do not have the problem  Treatment for sleep apnea  includes using a continuous positive airway pressure (CPAP) machine to keep your airway open during sleep  A mask is placed over your nose and mouth, or just your nose  The mask is hooked to the CPAP machine that blows a gentle stream of air into the mask when you breathe  This helps keep your airway open so you can breathe more regularly  Extra oxygen may be given to you through the machine  You may be given a mouth device  It looks like a mouth guard or dental retainer and stops your tongue and mouth tissues from blocking your throat while you sleep  Surgery may be needed to remove extra tissues that block your mouth, throat, or nose  Manage sleep apnea:   Do not smoke  Nicotine and other chemicals in cigarettes and cigars can cause lung damage  Ask your healthcare provider for information if you currently smoke and need help to quit  E-cigarettes or smokeless tobacco still contain nicotine  Talk to your healthcare provider before you use these products  Do not drink alcohol or take sedative medicine before you go to sleep  Alcohol and sedatives can relax the muscles and tissues around your throat  This can block the airflow to your lungs  Maintain a healthy weight  Excess tissue around your throat may restrict your breathing  Ask your healthcare provider for information if you need to lose weight  Sleep on your side or use pillows designed to prevent sleep apnea  This prevents your tongue or other tissues from blocking your throat  You can also raise the head of your bed  Driving Safety  Refrain from driving when drowsy  Follow up with your healthcare provider as directed:  Write down your questions so you remember to ask them during your visits  Go to AASM website for more information: Sleepeducation  org     What is BRANDON?    Obstructive sleep apnea is a common and serious sleep disorder that causes you to stop breathing during sleep  The airway repeatedly becomes blocked, limiting the amount of air that reaches your lungs  When this happens, you may snore loudly or making choking noises as you try to breathe  Your brain and body becomes oxygen deprived and you may wake up  This may happen a few times a night, or in more severe cases, several hundred times a night  Sleep apnea can make you wake up in the morning feeling tired or unrefreshed even though you have had a full night of sleep  During the day, you may feel fatigued, have difficulty concentrating or you may even unintentionally fall asleep  This is because your body is waking up numerous times throughout the night, even though you might not be conscious of each awakening  The lack of oxygen your body receives can have negative long-term consequences for your health  This includes:  High blood pressure  Heart disease  Irregular heart rhythms  Stroke  Pre-diabetes and diabetes  Depression    Testing  An objective evaluation of your sleep may be needed before your board certified sleep physician can make a diagnosis  Options include:   In-lab overnight sleep study  This type of sleep study requires you to stay overnight at a sleep center, in a bed that may resemble a hotel room  You will sleep with sensors hooked up to various parts of your body  These sensors record your brain waves, heartbeat, breathing and movement  An overnight sleep study also provides your doctor with the most complete information about your sleep  Learn more about an overnight sleep study      Home sleep apnea test  Some patients with high risk factors for obstructive sleep apnea and no other medical disorders may be candidates for a home sleep apnea test  The testing equipment differs in that it is less complicated than what is used in an overnight sleep study   As such, does not give all the data an in-lab will and if negative, may not mean you do not have the problem  Treatment for sleep apnea  includes using a continuous positive airway pressure (CPAP) machine to keep your airway open during sleep  A mask is placed over your nose and mouth, or just your nose  The mask is hooked to the CPAP machine that blows a gentle stream of air into the mask when you breathe  This helps keep your airway open so you can breathe more regularly  Extra oxygen may be given to you through the machine  You may be given a mouth device  It looks like a mouth guard or dental retainer and stops your tongue and mouth tissues from blocking your throat while you sleep  Surgery may be needed to remove extra tissues that block your mouth, throat, or nose  Manage sleep apnea:   Do not smoke  Nicotine and other chemicals in cigarettes and cigars can cause lung damage  Ask your healthcare provider for information if you currently smoke and need help to quit  E-cigarettes or smokeless tobacco still contain nicotine  Talk to your healthcare provider before you use these products  Do not drink alcohol or take sedative medicine before you go to sleep  Alcohol and sedatives can relax the muscles and tissues around your throat  This can block the airflow to your lungs  Maintain a healthy weight  Excess tissue around your throat may restrict your breathing  Ask your healthcare provider for information if you need to lose weight  Sleep on your side or use pillows designed to prevent sleep apnea  This prevents your tongue or other tissues from blocking your throat  You can also raise the head of your bed  Driving Safety  Refrain from driving when drowsy  Follow up with your healthcare provider as directed:  Write down your questions so you remember to ask them during your visits  Go to AASM website for more information: Sleepeducation  org

## 2018-08-28 NOTE — PROGRESS NOTES
Review of Systems      Genitourinary hot flashes at night and post menopausal (no peroids)   Cardiology none   Gastrointestinal none   Neurology muscle weakness and balance problems   Constitutional claustrophobia, fatigue, excessive sweating at night and weight change   Integumentary rash or dry skin   Psychiatry anxiety   Musculoskeletal back pain   Pulmonary shortness of breath with activity, frequent cough and snoring   ENT none   Endocrine excessive thirst   Hematological none

## 2018-08-29 ENCOUNTER — OFFICE VISIT (OUTPATIENT)
Dept: FAMILY MEDICINE CLINIC | Facility: CLINIC | Age: 78
End: 2018-08-29
Payer: COMMERCIAL

## 2018-08-29 VITALS
DIASTOLIC BLOOD PRESSURE: 80 MMHG | TEMPERATURE: 97.1 F | HEART RATE: 104 BPM | WEIGHT: 184.6 LBS | OXYGEN SATURATION: 98 % | SYSTOLIC BLOOD PRESSURE: 126 MMHG | HEIGHT: 62 IN | BODY MASS INDEX: 33.97 KG/M2 | RESPIRATION RATE: 16 BRPM

## 2018-08-29 DIAGNOSIS — G47.00 INSOMNIA, UNSPECIFIED TYPE: Primary | ICD-10-CM

## 2018-08-29 DIAGNOSIS — R73.01 IMPAIRED FASTING GLUCOSE: ICD-10-CM

## 2018-08-29 PROCEDURE — 99214 OFFICE O/P EST MOD 30 MIN: CPT | Performed by: FAMILY MEDICINE

## 2018-08-29 PROCEDURE — 3008F BODY MASS INDEX DOCD: CPT | Performed by: FAMILY MEDICINE

## 2018-08-29 RX ORDER — ZOLPIDEM TARTRATE 6.25 MG/1
6.25 TABLET, FILM COATED, EXTENDED RELEASE ORAL
Qty: 30 TABLET | Refills: 0 | Status: SHIPPED | OUTPATIENT
Start: 2018-08-29 | End: 2018-08-29 | Stop reason: SDUPTHER

## 2018-08-29 RX ORDER — ZOLPIDEM TARTRATE 6.25 MG/1
6.25 TABLET, FILM COATED, EXTENDED RELEASE ORAL
Qty: 30 TABLET | Refills: 0 | Status: SHIPPED | OUTPATIENT
Start: 2018-08-29 | End: 2018-10-12 | Stop reason: ALTCHOICE

## 2018-08-29 NOTE — PROGRESS NOTES
Assessment/Plan:    No problem-specific Assessment & Plan notes found for this encounter  Diagnoses and all orders for this visit:    Insomnia, unspecified type  after discussing risks and benefits of medication including side effects of medications such as confusion and falls  She would like to try it as she really needs to sleep  She was advised that this medication is not indicated for patients above 65 years however she would like to try it   -     zolpidem (AMBIEN CR) 6 25 MG CR tablet; Take 1 tablet (6 25 mg total) by mouth daily at bedtime as needed for sleep    Impaired fasting glucose  I discussed lifestyle modifications with her and gave her a diabetes diet handout  Other orders  -     Probiotic Product (PROBIOTIC-10) CAPS; Take by mouth daily  -     PREBIOTIC PRODUCT PO; Take by mouth daily      Follow up in 1 month    Subjective:      Patient ID: Birdie Motley is a 77 y o  female  Patient is here to follow up for insomnia, she says that lorazepam 1 mg has not been working  She says that she has trouble going to sleep and maintaining sleep  She has tried several medications in the past including Ambien  She would like to try something else  She goes to bed sleep for about 4 hours then is unable to fall asleep again  Also here to follow up on blood work, she denies any symptoms such as polyuria or polydipsia at this time  The following portions of the patient's history were reviewed and updated as appropriate:   She  has a past medical history of 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; Benign neoplasm of skin; Chest pain; Claustrophobia; Diverticulitis; Muscle weakness; Nonmelanoma skin cancer; Palpitations; Seasonal allergies; Sleep apnea; Spontaneous ; and Syncope    She   Patient Active Problem List    Diagnosis Date Noted    BRANDON (obstructive sleep apnea) 2018    Mood disturbance ([address] ) 2018    Chronic rhinitis 2018    Obesity (BMI 30-39 9) 08/28/2018    Claustrophobia 08/28/2018    Occasional tremors 07/31/2018    Impaired fasting glucose 07/31/2018    Insomnia 07/31/2018    Sleep disturbance 07/17/2018    Fall as cause of accidental injury at home as place of occurrence 07/17/2018    Leg wound, left, initial encounter 07/17/2018    Seasonal allergic rhinitis 05/08/2018    Screening for skin condition 04/04/2018    History of skin cancer 04/04/2018    Tremors of nervous system 03/26/2018    Primary insomnia 03/26/2018    Fatigue 03/26/2018    Incontinence 09/11/2015    OAB (overactive bladder) 09/11/2015    Seborrheic keratosis 05/29/2014     She  has a past surgical history that includes Hysterectomy; Tonsillectomy; Nasal sinus surgery; Colostomy; Carpal tunnel release (Right); Colostomy closure; Foot surgery (Left); Cardiac catheterization; Wrist surgery (Left); BOTOX INJECTION (N/A, 3/6/2017); and pr cystourethroscopy (N/A, 4/13/2018)  Her family history includes Alcohol abuse in her brother, father, and mother; Colon cancer in her brother and mother; Heart disease in her mother  She  reports that she has never smoked  She has never used smokeless tobacco  She reports that she does not drink alcohol or use drugs    Current Outpatient Prescriptions   Medication Sig Dispense Refill    fluticasone (FLONASE) 50 mcg/act nasal spray 1 spray into each nostril daily      loratadine (CLARITIN) 10 mg tablet Take 1 tablet (10 mg total) by mouth daily As needed 90 tablet 1    LORazepam (ATIVAN) 0 5 mg tablet Take 1 tablet (0 5 mg total) by mouth daily at bedtime 40 tablet 0    Mirabegron ER 50 MG TB24 Take 1 tablet (50 mg total) by mouth daily 30 tablet 6    PREBIOTIC PRODUCT PO Take by mouth daily      Probiotic Product (PROBIOTIC-10) CAPS Take by mouth daily      zolpidem (AMBIEN CR) 6 25 MG CR tablet Take 1 tablet (6 25 mg total) by mouth daily at bedtime as needed for sleep 30 tablet 0     No current facility-administered medications for this visit  Current Outpatient Prescriptions on File Prior to Visit   Medication Sig    fluticasone (FLONASE) 50 mcg/act nasal spray 1 spray into each nostril daily    loratadine (CLARITIN) 10 mg tablet Take 1 tablet (10 mg total) by mouth daily As needed    LORazepam (ATIVAN) 0 5 mg tablet Take 1 tablet (0 5 mg total) by mouth daily at bedtime    Mirabegron ER 50 MG TB24 Take 1 tablet (50 mg total) by mouth daily     No current facility-administered medications on file prior to visit  She is allergic to caffeine; iodine; latex; and other       Review of Systems   Constitutional: Negative for activity change, appetite change, fatigue and fever  HENT: Negative for congestion and ear discharge  Respiratory: Negative for cough and shortness of breath  Cardiovascular: Negative for chest pain and palpitations  Gastrointestinal: Negative for diarrhea and nausea  Musculoskeletal: Negative for arthralgias and back pain  Skin: Negative for color change and rash  Neurological: Negative for dizziness and headaches  Psychiatric/Behavioral: Positive for sleep disturbance  Negative for behavioral problems  Objective:      /80 (BP Location: Left arm, Patient Position: Sitting, Cuff Size: Adult)   Pulse 104   Temp (!) 97 1 °F (36 2 °C) (Tympanic)   Resp 16   Ht 5' 2" (1 575 m)   Wt 83 7 kg (184 lb 9 6 oz)   SpO2 98%   BMI 33 76 kg/m²          Physical Exam   Constitutional: She is oriented to person, place, and time  She appears well-developed and well-nourished  No distress  HENT:   Head: Normocephalic and atraumatic  Nose: Nose normal    Mouth/Throat: Oropharynx is clear and moist    Eyes: Conjunctivae are normal  Pupils are equal, round, and reactive to light  Cardiovascular: Normal rate, regular rhythm and normal heart sounds  No murmur heard  Pulmonary/Chest: Effort normal and breath sounds normal  No respiratory distress   She has no wheezes  Abdominal: Soft  Bowel sounds are normal  She exhibits no distension  There is no tenderness  Neurological: She is alert and oriented to person, place, and time  Skin: Skin is warm and dry  No rash noted  She is not diaphoretic  No erythema  Psychiatric: She has a normal mood and affect

## 2018-09-13 ENCOUNTER — PROCEDURE VISIT (OUTPATIENT)
Dept: UROLOGY | Facility: CLINIC | Age: 78
End: 2018-09-13
Payer: COMMERCIAL

## 2018-09-13 VITALS
WEIGHT: 186.8 LBS | BODY MASS INDEX: 34.37 KG/M2 | SYSTOLIC BLOOD PRESSURE: 126 MMHG | DIASTOLIC BLOOD PRESSURE: 78 MMHG | HEIGHT: 62 IN | HEART RATE: 78 BPM

## 2018-09-13 DIAGNOSIS — R39.15 URINARY URGENCY: ICD-10-CM

## 2018-09-13 DIAGNOSIS — N39.41 URGE INCONTINENCE: ICD-10-CM

## 2018-09-13 PROCEDURE — 99213 OFFICE O/P EST LOW 20 MIN: CPT | Performed by: PHYSICIAN ASSISTANT

## 2018-09-13 NOTE — PROGRESS NOTES
2018    Eris Barriga  1940  [id number]    Diagnosis  Chief Complaint     Urinary Incontinence         Patient presents for PTNS  managed by Dr Edward      Urinary Incontinence Screening      Most Recent Value   Urinary Incontinence   Urinary Incontinence? Yes   Incomplete emptying? No   Urinary frequency? No   Urinary urgency? Yes   Urinary hesitancy? No   Dysuria (painful difficult urination)? No   Nocturia (waking up to use the bathroom)? No   Straining (having to push to go)? No   Weak stream?  No   Intermittent stream?  No   Post void dribbling?   No            Procedure PTNS  Session 1     Ankle used: right  Treatment settin  Duration of treatment: 30 minutes  Lead lot #: [id number]  Expiration Date:2020/10/28  Feeling/Response: toe flex, foot sensation    Carol Stauffer RN

## 2018-09-13 NOTE — PROGRESS NOTES
1  Urge incontinence  Posterior Tibial Nerve Stimulation   2  Urinary urgency  Posterior Tibial Nerve Stimulation         Assessment and plan:       1  overactive bladder s/p bladder botox (3/2017, 4/2018) and tibial nerve stimulation - managed by Dr Gabriel Yoon  - continue Myrbetriq 50mg daily  - reviewed proper hydration, management of bowels, and avoidance of bladder irritants  - continue with monthly maintenance PTNS treatments (today 1/24)  - follow-up provider in 1 year for PVR in symptom reassessment  - patient is aware should her symptoms worsen, we can try Botox again in the future  All questions answered  Dora Gan PA-C      Chief Complaint     F/u LUTS and PTNS    History of Present Illness     Yovani Blackburn is a 77 y o   female patient of Dr Gabriel Yoon with a history of medically refractory overactive bladder s/p bladder botox (3/2017, 4/2018) and tibial nerve stimulation presenting for follow up  Patient has a longstanding history of medically refractory overactive bladder  Patient initially on underwent the bladder Botox 100 units back in March 2017  She had excellent response  She underwent repeat bladder Botox in March 2018 with 200 units and unfortunately result only lasted 1 5  weeks  Patient has since been started on Myrbetriq 50 mg and recent completed 12/12 PTNS  Patient has been having good control of her urinary urgency and frequency with combination PTNS and Myrbetriq 50 mg daily  Patient states she did have 1 accident over the past month  She denies any exacerbating foods  She denies any dysuria, gross hematuria, suprapubic pressure, flank pain, fevers, or chills  Laboratory     Lab Results   Component Value Date    CREATININE 0 68 07/31/2018         Review of Systems     Review of Systems   Constitutional: Negative for activity change, appetite change, chills, diaphoresis, fatigue, fever and unexpected weight change     Respiratory: Negative for chest tightness and shortness of breath  Cardiovascular: Negative for chest pain, palpitations and leg swelling  Gastrointestinal: Negative for abdominal distention, abdominal pain, constipation, diarrhea, nausea and vomiting  Genitourinary: Positive for frequency and urgency  Negative for decreased urine volume, difficulty urinating, dysuria, enuresis, flank pain, genital sores and hematuria  Musculoskeletal: Negative for back pain, gait problem and myalgias  Skin: Negative for color change, pallor, rash and wound  Psychiatric/Behavioral: Negative for behavioral problems  The patient is not nervous/anxious  Allergies     Allergies   Allergen Reactions    Caffeine     Iodine Rash    Latex Rash    Other Rash     Adhesive tape         Physical Exam     Physical Exam   Constitutional: She is oriented to person, place, and time  She appears well-developed and well-nourished  No distress  HENT:   Head: Normocephalic and atraumatic  Eyes: Conjunctivae are normal    Neck: Normal range of motion  No tracheal deviation present  Pulmonary/Chest: Effort normal    Musculoskeletal: Normal range of motion  She exhibits no edema or deformity  Neurological: She is alert and oriented to person, place, and time  Skin: Skin is warm and dry  She is not diaphoretic  No erythema  No pallor  Psychiatric: She has a normal mood and affect   Her behavior is normal          Vital Signs     Vitals:    09/13/18 1455   BP: 126/78   BP Location: Left arm   Patient Position: Sitting   Cuff Size: Standard   Pulse: 78   Weight: 84 7 kg (186 lb 12 8 oz)   Height: 5' 2" (1 575 m)         Current Medications       Current Outpatient Prescriptions:     fluticasone (FLONASE) 50 mcg/act nasal spray, 1 spray into each nostril daily, Disp: , Rfl:     loratadine (CLARITIN) 10 mg tablet, Take 1 tablet (10 mg total) by mouth daily As needed, Disp: 90 tablet, Rfl: 1    Mirabegron ER 50 MG TB24, Take 1 tablet (50 mg total) by mouth daily, Disp: 30 tablet, Rfl: 6    PREBIOTIC PRODUCT PO, Take by mouth daily, Disp: , Rfl:     Probiotic Product (PROBIOTIC-10) CAPS, Take by mouth daily, Disp: , Rfl:     zolpidem (AMBIEN CR) 6 25 MG CR tablet, Take 1 tablet (6 25 mg total) by mouth daily at bedtime as needed for sleep, Disp: 30 tablet, Rfl: 0      Active Problems     Patient Active Problem List   Diagnosis    Incontinence    OAB (overactive bladder)    Tremors of nervous system    Primary insomnia    Fatigue    Seborrheic keratosis    Screening for skin condition    History of skin cancer    Seasonal allergic rhinitis    Sleep disturbance    Fall as cause of accidental injury at home as place of occurrence    Leg wound, left, initial encounter    Occasional tremors    Impaired fasting glucose    Insomnia    BRANDON (obstructive sleep apnea)    Mood disturbance (HCC)    Chronic rhinitis    Obesity (BMI 30-39  9)    Claustrophobia         Past Medical History     Past Medical History:   Diagnosis Date    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     Benign neoplasm of skin     Chest pain     Claustrophobia     Diverticulitis     Muscle weakness     Nonmelanoma skin cancer     last assessed 2017    Palpitations     Seasonal allergies     Sleep apnea     no cpap    Spontaneous      without mention of complications     Syncope          Surgical History     Past Surgical History:   Procedure Laterality Date    BOTOX INJECTION N/A 3/6/2017    Procedure: CYSTOSCOPY; BLADDER BOTOX 100 UNITS ;  Surgeon: Kenroy Baca MD;  Location: [facility];  Service:    [address] [place] [address] Right     COLOSTOMY      COLOSTOMY CLOSURE      FOOT SURGERY Left     neuroma    HYSTERECTOMY      NASAL SINUS SURGERY      ND CYSTOURETHROSCOPY N/A 2018    Procedure: CYSTOSCOPY WITH BOTOX;  Surgeon: Kenroy Baca MD;  Location: [facility];  Service: Urology    TONSILLECTOMY      WRIST SURGERY Left          Family History     Family History   Problem Relation Age of Onset    Alcohol abuse Mother     Colon cancer Mother     Heart disease Mother     Alcohol abuse Father     Alcohol abuse Brother     Colon cancer Brother          Social History     Social History       Radiology

## 2018-09-20 ENCOUNTER — TELEPHONE (OUTPATIENT)
Dept: UROLOGY | Facility: CLINIC | Age: 78
End: 2018-09-20

## 2018-09-20 DIAGNOSIS — N32.81 OAB (OVERACTIVE BLADDER): Primary | ICD-10-CM

## 2018-09-20 RX ORDER — OXYBUTYNIN CHLORIDE 5 MG/1
5 TABLET ORAL 3 TIMES DAILY
Qty: 30 TABLET | Refills: 2 | Status: SHIPPED | OUTPATIENT
Start: 2018-09-20 | End: 2018-10-12 | Stop reason: SINTOL

## 2018-09-20 NOTE — TELEPHONE ENCOUNTER
Patient managed by Kitty Newton at the [place] [address] office  Patient called and states that she is having accidents  Patient reports that she has a trip coming up  Patient is taking Mirabegron ER 50mg  Patient wants to know if we can up her dose to see if it will help with having accidents on her upcoming trip

## 2018-09-20 NOTE — TELEPHONE ENCOUNTER
50mg is the maximum dose  We can trial an anticholinergic in addition to this if she wishes  Should be made aware of side effects: dry eye, dry mouth, constipation  She would also need a 6 weeks FU visit with PVR

## 2018-09-20 NOTE — TELEPHONE ENCOUNTER
Called and spoke to patient  Explained to patient that 50mg is the maximum dose of mirabegron  Offered patient anticholinergic and explained the side effects of dry eye, dry mouth, and constipation  Patient states that she would like to try anticholinergic  Confirmed that patient uses [address]  Can you please send medication to pharmacy    No available appointments with AP or MD in 6 weeks  Can you please help find an appointment within this time frame

## 2018-09-27 ENCOUNTER — TELEPHONE (OUTPATIENT)
Dept: UROLOGY | Facility: CLINIC | Age: 78
End: 2018-09-27

## 2018-09-27 NOTE — TELEPHONE ENCOUNTER
Patient managed by Dany Barrett at the [facility] office  Patient called and left message stating that she has been taking the oxybutynin and does not think it is working because she has three accidents in 1 week  Called and spoke to patient  Patient is aware that it does take a few weeks for medication to start working  Patient verbalized understanding and denies any other questions

## 2018-10-12 ENCOUNTER — PROCEDURE VISIT (OUTPATIENT)
Dept: UROLOGY | Facility: CLINIC | Age: 78
End: 2018-10-12
Payer: COMMERCIAL

## 2018-10-12 VITALS
SYSTOLIC BLOOD PRESSURE: 148 MMHG | HEART RATE: 88 BPM | HEIGHT: 62 IN | BODY MASS INDEX: 34.41 KG/M2 | WEIGHT: 187 LBS | DIASTOLIC BLOOD PRESSURE: 80 MMHG

## 2018-10-12 DIAGNOSIS — N39.41 URGE INCONTINENCE: ICD-10-CM

## 2018-10-12 DIAGNOSIS — R39.15 URINARY URGENCY: ICD-10-CM

## 2018-10-12 PROCEDURE — 64566 NEUROELTRD STIM POST TIBIAL: CPT | Performed by: UROLOGY

## 2018-10-12 RX ORDER — MULTIVIT WITH MINERALS/LUTEIN
1000 TABLET ORAL DAILY
COMMUNITY

## 2018-10-12 NOTE — PROGRESS NOTES
10/12/2018    Kaley Owusu  1940  [id number]    Diagnosis  Chief Complaint     Urinary Urgency; Urinary Incontinence         Patient presents for PTNS 2 of 24 managed by Dr Amanda Lea  Patient will follow up in one month for next PTNS treatment  Since she stopped the oxybutynin she does not need a PVR appt with the provider  Additionally we discussed kegel exercises today at length  Urinary Incontinence Screening      Most Recent Value   Urinary Incontinence   Urinary Incontinence? Yes [2 pads per day, mixed stress and urge, worse the past month]   Incomplete emptying? No   Urinary frequency? Yes [only occ]   Urinary urgency? Yes   Urinary hesitancy? Yes [sometimes]   Dysuria (painful difficult urination)? No   Nocturia (waking up to use the bathroom)? No   Straining (having to push to go)? Yes   Weak stream?  Yes   Intermittent stream?  Yes   Post void dribbling? No            Procedure PTNS  Session 2 of 24    Ankle used: right  Treatment settin  Duration of treatment: 30 minutes  Lead lot #:[id number]  Expiration Date:10/28/2020  Feeling/Response:toe flex, sole of the foot sensation and big toe sensation    Patient does report a worse month  She seemed to be having significantly more leaking episodes, mostly related to stairs/straining etc  Reviewed this incontinence would be classified as stress incontinence  She does still have some urge incontinence as well  She tried the oxybutynin for a few weeks and when she was travelling but has sinced stopped the medication due to adverse side effects of dry mouth and constipation  Reviewed Kegel exercises as a way to help with the stress incontinence component of her symptoms  We reviewed these at length today and she will try them this month and report back next month        RON Johnston BSN

## 2018-10-16 DIAGNOSIS — N32.81 OAB (OVERACTIVE BLADDER): ICD-10-CM

## 2018-10-16 RX ORDER — OXYBUTYNIN CHLORIDE 5 MG/1
5 TABLET ORAL 3 TIMES DAILY
Qty: 30 TABLET | OUTPATIENT
Start: 2018-10-16

## 2018-11-05 ENCOUNTER — TELEPHONE (OUTPATIENT)
Dept: UROLOGY | Facility: CLINIC | Age: 78
End: 2018-11-05

## 2018-11-05 NOTE — TELEPHONE ENCOUNTER
Patient returned call  She had wanted to cancel her appt 11/7 with the PA and keep appt 11/9 for PTNS  appt 11/9 rescheduled and reviewed with patient when I saw her in October I had already cancelled her appt 11/7/18 as it was not needed  She verbalized understanding

## 2018-11-05 NOTE — TELEPHONE ENCOUNTER
Patient wants to reschedule her PTNS visit for 11/9/2018 but after her visit Friday  Please call back to schedule

## 2018-11-09 ENCOUNTER — PROCEDURE VISIT (OUTPATIENT)
Dept: UROLOGY | Facility: CLINIC | Age: 78
End: 2018-11-09
Payer: COMMERCIAL

## 2018-11-09 VITALS
WEIGHT: 182 LBS | BODY MASS INDEX: 33.49 KG/M2 | HEIGHT: 62 IN | SYSTOLIC BLOOD PRESSURE: 132 MMHG | HEART RATE: 88 BPM | DIASTOLIC BLOOD PRESSURE: 84 MMHG

## 2018-11-09 DIAGNOSIS — R39.15 URINARY URGENCY: ICD-10-CM

## 2018-11-09 DIAGNOSIS — R35.0 FREQUENCY OF URINATION: ICD-10-CM

## 2018-11-09 DIAGNOSIS — N39.41 URGE INCONTINENCE: Primary | ICD-10-CM

## 2018-11-09 PROCEDURE — 64566 NEUROELTRD STIM POST TIBIAL: CPT | Performed by: UROLOGY

## 2018-11-09 RX ORDER — SACCHAROMYCES BOULARDII 250 MG
250 CAPSULE ORAL 2 TIMES DAILY
COMMUNITY
End: 2019-01-04 | Stop reason: ALTCHOICE

## 2018-11-09 NOTE — PROGRESS NOTES
2018    Debbie Burger  1940  [id number]    Diagnosis  Chief Complaint     Urinary Urgency; Urinary Incontinence         Patient presents for PTNS 3 of 24 managed by Dr Angela Díaz  Patient will follow up as scheduled in one month for next treatment  Urinary Incontinence Screening      Most Recent Value   Urinary Incontinence   Urinary Incontinence? Yes [1 episode of urge, stress improved, 1 liner a day just in case]   Incomplete emptying? No   Urinary frequency? No   Urinary urgency? No [better]   Urinary hesitancy? No   Dysuria (painful difficult urination)? No   Nocturia (waking up to use the bathroom)? No [rare]   Straining (having to push to go)? Yes [occ]   Weak stream?  No   Intermittent stream?  No   Post void dribbling? No            Procedure PTNS  Session 3 of 24    Ankle used: right  Treatment settin  Duration of treatment: 30 minutes  Lead lot #:[id number]  Expiration Date:2021  Feeling/Response: Foot and toe sensation    Patient Goals and Progress  Decreased Urgency Yes  Decreased Frequency Yes  Episodes of incontinence Yes  Sleep Through Night Yes  Related Health and Social Factors No    Patient notes much better symptom control this past month and is pleased with treatment goals thus far  She continues on myrbetriq 50 mg as well      Thea Ku, RN  ESTEPHANIE MayN

## 2018-12-05 ENCOUNTER — HOSPITAL ENCOUNTER (OUTPATIENT)
Dept: SLEEP CENTER | Facility: CLINIC | Age: 78
Discharge: HOME | End: 2018-12-05
Payer: COMMERCIAL

## 2018-12-05 DIAGNOSIS — G47.33 OSA (OBSTRUCTIVE SLEEP APNEA): ICD-10-CM

## 2018-12-05 PROCEDURE — 95810 POLYSOM 6/> YRS 4/> PARAM: CPT

## 2018-12-06 NOTE — PROGRESS NOTES
Sleep Study Documentation    Pre-Sleep Study       Sleep testing procedure explained to patient:YES    Patient napped prior to study:NO    Caffeine:Dayshift worker after 12PM   Caffeine use:NO    Alcohol:Dayshift workers after 5PM: Alcohol use:NO    Typical day for patient:YES       Study Documentation  Diagnostic   Snore:None  Supplemental O2: no    O2 flow rate (L/min) range n/a  O2 flow rate (L/min) final n/a  Minimum SaO2 89%  Baseline SaO2 95%        Mode of Therapy: n/a    EKG abnormalities: no     EEG abnormalities: no    Study Terminated:no    Patient classification: retired       Post-Sleep Study    Medication used at bedtime or during sleep study:NO    Patient reports time it took to fall asleep:30 to 60 minutes    Patient reports waking up during study:3 or more times  Patient reports returning to sleep in 10 to 30 minutes  Patient reports sleeping 4 to 6 hours with dreaming  Patient reports sleep during study:typical    Patient rated sleepiness: Somewhat sleepy or tired    PAP treatment:no

## 2018-12-07 ENCOUNTER — PROCEDURE VISIT (OUTPATIENT)
Dept: UROLOGY | Facility: CLINIC | Age: 78
End: 2018-12-07
Payer: COMMERCIAL

## 2018-12-07 VITALS
WEIGHT: 186 LBS | SYSTOLIC BLOOD PRESSURE: 118 MMHG | BODY MASS INDEX: 34.23 KG/M2 | HEIGHT: 62 IN | HEART RATE: 100 BPM | DIASTOLIC BLOOD PRESSURE: 60 MMHG

## 2018-12-07 DIAGNOSIS — N39.41 URGE INCONTINENCE: ICD-10-CM

## 2018-12-07 DIAGNOSIS — R35.0 FREQUENCY OF URINATION: ICD-10-CM

## 2018-12-07 DIAGNOSIS — R39.15 URINARY URGENCY: ICD-10-CM

## 2018-12-07 PROCEDURE — 64566 NEUROELTRD STIM POST TIBIAL: CPT | Performed by: UROLOGY

## 2018-12-07 RX ORDER — MELATONIN
5000 DAILY
COMMUNITY

## 2018-12-07 NOTE — PROGRESS NOTES
2018    Garrison Factor  1940  [id number]    Diagnosis  Chief Complaint     Urinary Urgency; Urinary Incontinence         Patient presents for PTNS 4 of 24 managed by Dr Samantha Henry  Patient will follow up in one month for next treatment  Urinary Incontinence Screening      Most Recent Value   Urinary Incontinence   Urinary Incontinence? Yes [1 5 accidents all month]   Incomplete emptying? No   Urinary frequency? No   Urinary urgency? No [rare]   Urinary hesitancy? No   Dysuria (painful difficult urination)? No   Nocturia (waking up to use the bathroom)? No [rare]   Straining (having to push to go)? No   Weak stream?  No   Intermittent stream?  No [rare]   Post void dribbling? No            Procedure PTNS  Session 4 of 24    Ankle used: right  Treatment settin  Duration of treatment: 30 minutes  Lead lot #:[id number]  Expiration Date:2021  Feeling/Response:toe flex, toe and foot sensation    Patient Goals and Progress  Decreased Urgency Yes  Decreased Frequency Yes  Episodes of incontinence Yes  Sleep Through Night Yes  Related Health and Social Factors No    Overall patient very pleased with results this far  Reports only rare issues with urinary symptoms at this time        ORN Garzon BSN

## 2018-12-11 ENCOUNTER — TELEPHONE (OUTPATIENT)
Dept: SLEEP CENTER | Facility: CLINIC | Age: 78
End: 2018-12-11

## 2018-12-12 ENCOUNTER — OFFICE VISIT (OUTPATIENT)
Dept: SLEEP CENTER | Facility: CLINIC | Age: 78
End: 2018-12-12
Payer: COMMERCIAL

## 2018-12-12 VITALS
BODY MASS INDEX: 34.41 KG/M2 | HEIGHT: 62 IN | DIASTOLIC BLOOD PRESSURE: 72 MMHG | WEIGHT: 187 LBS | HEART RATE: 72 BPM | SYSTOLIC BLOOD PRESSURE: 118 MMHG

## 2018-12-12 DIAGNOSIS — R53.83 OTHER FATIGUE: ICD-10-CM

## 2018-12-12 DIAGNOSIS — E66.9 OBESITY (BMI 30-39.9): ICD-10-CM

## 2018-12-12 DIAGNOSIS — F41.9 ANXIETY: ICD-10-CM

## 2018-12-12 DIAGNOSIS — G47.30 SLEEP-RELATED BREATHING DISORDER: Primary | ICD-10-CM

## 2018-12-12 DIAGNOSIS — J31.0 CHRONIC RHINITIS: ICD-10-CM

## 2018-12-12 DIAGNOSIS — G47.9 SLEEP DISTURBANCE: ICD-10-CM

## 2018-12-12 PROCEDURE — 99214 OFFICE O/P EST MOD 30 MIN: CPT | Performed by: INTERNAL MEDICINE

## 2018-12-12 RX ORDER — ESCITALOPRAM OXALATE 5 MG/1
5 TABLET ORAL DAILY
Qty: 30 TABLET | Refills: 1 | Status: SHIPPED | OUTPATIENT
Start: 2018-12-12 | End: 2019-01-18 | Stop reason: ALTCHOICE

## 2018-12-12 NOTE — PROGRESS NOTES
Follow-up Note - [address]  77 y o  female  TOMEKA:  [id number]    I saw Chrissy Escobar in sleep clinic today for her sleep complaints & medical conditions  Patient undertook a diagnostic sleep study is here to review results and treatment options  The study demonstrated mild sleep disordered breathing: AHI 3 2 /hour slightly higher while supine at 10 per hour  Minimum oxygen saturation 89 %  No significant snoring was noted  But there were some respiratory effort-related arousals  Sleep efficiency was 76 6%  Sleep latency was 29 5 minutes    PFSH, Problem List, Medications & Allergies were reviewed in EMR  Interval changes: none reported  She  has a past medical history of 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; Benign neoplasm of skin; Chest pain; Claustrophobia; Diverticulitis; Muscle weakness; Nonmelanoma skin cancer; Palpitations; Seasonal allergies; Sleep apnea; Spontaneous ; and Syncope  She has a current medication list which includes the following prescription(s): vitamin c, cholecalciferol, fluticasone, loratadine, mirabegron er, multiple vitamins-minerals, and saccharomyces boulardii  ROS: reviewed see attached  Significant for she feels mood is stable on current medication  Presently she is not experiencing any nasal symptoms  She sees ENT for episodic dysphagia  HPI:  She continues to report sleep difficulties  She has anxiety and racing thoughts but denied depression or specific stressors  However, she volunteered working as a seamstress and I create my aunt stress and get stressed out about things I have to do  She feels she would be able to accomplish a lot more if she were less stressed and tired  Sleep Routine:  No interval changes:  She continues to have difficulty maintaining sleep  She is usually able to fall asleep in around 15 minutes and around once a month may take up to 3 hours    However, she reports interruptions of sleep up to 5 times a night  She awakens spontaneously and is not refreshed  She estimates she is only getting around 5 hours sleep out of approximately 9 hours spent in bed She has excessive drowsiness but denied napping and rated herself at Total score: 1 /24 on the [place] sleepiness scale  EXAM: /72   Pulse 72   Ht 5' 2" (1 575 m)   Wt 84 8 kg (187 lb)   BMI 34 20 kg/m²     Patient is well groomed; well appearing  Skin/Extrem: warm & dry; col & hydration normal; no edema  Psych: cooperativeand in no distress  Mental state appears anxious  Affect is constricted  CNS: Alert, orientated, clear & coherent speech  H&N: EOMI; NC/AT:no facial pressure marks, no rashes  Neck Circumference: 37 cm  ENMT Mucus membranes appear normal Nasal airway:patent  Oral airway:  crowded  Resp:effort is normal CVS: RRR ABD:truncal obesity MSK:Gait normal     IMPRESSION: Primary Sleep/Secondary(to Medical or Psych conditions) & comorbidities   1  Sleep-related breathing disorder     2  Sleep disturbance     3  Anxiety     4  Other fatigue     5  Chronic rhinitis     6  Obesity (BMI 30-39  9)       PLAN:    1  I reviewed results of the Sleep studies with the patient  2  With respect to above conditions, I counseled on pathophysiology, diagnosis, treatment options, risks and benefits; inter-relationship and effects on symptoms and comorbidities; risks of no treatment; costs and insurance aspects  3  For mild sleep disordered breathing I advised regular nasal saline rinse followed by topical nasal steroid if necessary  4  Cognitive behavioral therapy was initiated, Sleep Hygiene and behavioral techniques to manage Insomnia were discussed  Specifically, limiting time in bed to 7 hours or less, starting an exercise routine and on relaxation techniques  5  She wanted to start a medication for her anxiety and I gave her a prescription for Lexapro 5 mg daily   6   Follow-up to be scheduled in 2 months to monitor progress and to adjust therapy  Thank you for allowing me to participate in the care of this patient  I will keep you apprised of developments      Sincerely,    Authenticated electronically by Wai Ortega MD on 42/05/09   Board Certified Specialist

## 2018-12-12 NOTE — PROGRESS NOTES
Review of Systems      Genitourinary hot flashes at night and post menopausal (no peroids)   Cardiology Frequent chest pain or angina,    Gastrointestinal none   Neurology need to move extremities, muscle weakness, numbness/tingling of an extremity and balance problems   Constitutional claustrophobia, fatigue, excessive sweating at night and weight change   Integumentary none   Psychiatry anxiety   Musculoskeletal back pain   Pulmonary shortness of breath with activity and chest tightness   ENT none   Endocrine none   Hematological none

## 2018-12-14 ENCOUNTER — OFFICE VISIT (OUTPATIENT)
Dept: CARDIOLOGY CLINIC | Facility: CLINIC | Age: 78
End: 2018-12-14
Payer: COMMERCIAL

## 2018-12-14 VITALS
DIASTOLIC BLOOD PRESSURE: 84 MMHG | HEART RATE: 105 BPM | BODY MASS INDEX: 34.78 KG/M2 | HEIGHT: 62 IN | SYSTOLIC BLOOD PRESSURE: 130 MMHG | OXYGEN SATURATION: 97 % | WEIGHT: 189 LBS

## 2018-12-14 DIAGNOSIS — I05.9 MITRAL VALVE DISORDER: Primary | ICD-10-CM

## 2018-12-14 DIAGNOSIS — R07.89 CHEST DISCOMFORT: ICD-10-CM

## 2018-12-14 DIAGNOSIS — R06.02 SHORTNESS OF BREATH: ICD-10-CM

## 2018-12-14 PROCEDURE — 99214 OFFICE O/P EST MOD 30 MIN: CPT | Performed by: INTERNAL MEDICINE

## 2018-12-14 NOTE — PROGRESS NOTES
[facility]  [address]  [facility] [address]  Cardiology Follow Up    Andrés Savage  1940  [id number]      1  Mitral valve disorder     2  Chest discomfort  POCT ECG    NM myocardial perfusion spect (rx stress and/or rest)   3  Shortness of breath  Echo complete with contrast if indicated    NM myocardial perfusion spect (rx stress and/or rest)       Chief Complaint   Patient presents with    Follow-up     Had chest pain 2 weeks ago, feels fine now       Interval History:  Patient presents with symptoms of chest pain she had 2 weeks ago  Patient had the retrosternal chest discomfort which lasted for few minutes  Patient has had intermittent episodes of chest pain  But this episode was more severe than before  Patient also has history of shortness of breath  Patient does not have any history of coronary artery disease or MI in the past   She states that she has been compliant with her diet and medications  Her last cardiac workup was many years ago  Patient Active Problem List   Diagnosis    Incontinence    OAB (overactive bladder)    Tremors of nervous system    Primary insomnia    Fatigue    Seborrheic keratosis    Screening for skin condition    History of skin cancer    Seasonal allergic rhinitis    Sleep disturbance    Fall as cause of accidental injury at home as place of occurrence    Leg wound, left, initial encounter    Occasional tremors    Impaired fasting glucose    Insomnia    BRANDON (obstructive sleep apnea)    Mood disturbance    Chronic rhinitis    Obesity (BMI 30-39  9)    Claustrophobia    Anxiety    Mitral valve disorder    Chest discomfort     Past Medical History:   Diagnosis Date    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     Benign neoplasm of skin     Chest pain     Claustrophobia     Diverticulitis     Muscle weakness     Nonmelanoma skin cancer     last assessed 21mar2017    Palpitations     Seasonal allergies     Sleep apnea     no cpap    Spontaneous      without mention of complications     Syncope      Social History     Social History    Marital status:      Spouse name: N/A    Number of children: N/A    Years of education: N/A     Occupational History    RETIRED       Social History Main Topics    Smoking status: Never Smoker    Smokeless tobacco: Never Used    Alcohol use No      Comment: rarely    Drug use: No    Sexual activity: Not on file     Other Topics Concern    Not on file     Social History Narrative    LIVING INDEPENDENTLY ALONE           Family History   Problem Relation Age of Onset    Alcohol abuse Mother     Colon cancer Mother     Heart disease Mother     Alcohol abuse Father     Alcohol abuse Brother     Colon cancer Brother      Past Surgical History:   Procedure Laterality Date    BOTOX INJECTION N/A 3/6/2017    Procedure: CYSTOSCOPY; BLADDER BOTOX 100 UNITS ;  Surgeon: Kareen Purcell MD;  [facility];  Service:     CARDIAC CATHETERIZATION      CARPAL TUNNEL RELEASE Right     COLOSTOMY      COLOSTOMY CLOSURE      FOOT SURGERY Left     neuroma    HYSTERECTOMY      NASAL SINUS SURGERY      MO CYSTOURETHROSCOPY N/A 2018    Procedure: CYSTOSCOPY WITH BOTOX;  Surgeon: Kareen Purcell MD;  Location: [facility];  Service: Urology    TONSILLECTOMY      WRIST SURGERY Left        Current Outpatient Prescriptions:     Ascorbic Acid (VITAMIN C) 1000 MG tablet, Take 1,000 mg by mouth daily, Disp: , Rfl:     cholecalciferol (VITAMIN D3) 1,000 units tablet, Take 1,000 Units by mouth daily, Disp: , Rfl:     escitalopram (LEXAPRO) 5 mg tablet, Take 1 tablet (5 mg total) by mouth daily, Disp: 30 tablet, Rfl: 1    fluticasone (FLONASE) 50 mcg/act nasal spray, 1 spray into each nostril daily, Disp: , Rfl:     loratadine (CLARITIN) 10 mg tablet, Take 1 tablet (10 mg total) by mouth daily As needed, Disp: 90 tablet, Rfl: 1    Mirabegron ER 50 MG TB24, Take 1 tablet (50 mg total) by mouth daily, Disp: 30 tablet, Rfl: 6    Multiple Vitamins-Minerals (ECHINACEA ACZ PO), Take by mouth, Disp: , Rfl:     saccharomyces boulardii (FLORASTOR) 250 mg capsule, Take 250 mg by mouth 2 (two) times a day, Disp: , Rfl:   Allergies   Allergen Reactions    Caffeine     Iodine Rash    Latex Rash    Other Rash     Adhesive tape         Labs:  No visits with results within 2 Month(s) from this visit  Latest known visit with results is:   Appointment on 07/31/2018   Component Date Value    Sodium 07/31/2018 141     Potassium 07/31/2018 4 0     Chloride 07/31/2018 108     CO2 07/31/2018 27     ANION GAP 07/31/2018 6     BUN 07/31/2018 14     Creatinine 07/31/2018 0 68     Glucose, Fasting 07/31/2018 95     Calcium 07/31/2018 8 5     AST 07/31/2018 21     ALT 07/31/2018 25     Alkaline Phosphatase 07/31/2018 85     Total Protein 07/31/2018 6 9     Albumin 07/31/2018 3 6     Total Bilirubin 07/31/2018 0 43     eGFR 07/31/2018 83     Hemoglobin A1C 07/31/2018 5 9     EAG 07/31/2018 123     TSH Baseline 07/31/2018 1 960     WBC 07/31/2018 4 53     RBC 07/31/2018 4 67     Hemoglobin 07/31/2018 13 6     Hematocrit 07/31/2018 41 5     MCV 07/31/2018 89     MCH 07/31/2018 29 1     MCHC 07/31/2018 32 8     RDW 07/31/2018 13 4     MPV 07/31/2018 9 3     Platelets 39/33/7860 226     nRBC 07/31/2018 0     Neutrophils Relative 07/31/2018 62     Immat GRANS % 07/31/2018 0     Lymphocytes Relative 07/31/2018 25     Monocytes Relative 07/31/2018 10     Eosinophils Relative 07/31/2018 3     Basophils Relative 07/31/2018 0     Neutrophils Absolute 07/31/2018 2 82     Immature Grans Absolute 07/31/2018 0 02     Lymphocytes Absolute 07/31/2018 1 13     Monocytes Absolute 07/31/2018 0 43     Eosinophils Absolute 07/31/2018 0 12     Basophils Absolute 07/31/2018 0 01      Imaging: No results found      Review of Systems:  Review of Systems   REVIEW OF SYSTEMS:  Constitutional:  Denies fever or chills   Eyes:  Denies change in visual acuity   HENT:  Denies nasal congestion or sore throat   Respiratory:   shortness of breath   Cardiovascular:  Chest discomfort  GI:  Denies abdominal pain, nausea, vomiting, bloody stools or diarrhea   :  Denies dysuria, frequency, difficulty in micturition and nocturia  Musculoskeletal:  Denies back pain or joint pain   Neurologic:  Denies headache, focal weakness or sensory changes   Endocrine:  Denies polyuria or polydipsia   Lymphatic:  Denies swollen glands   Psychiatric:  Denies depression or anxiety     Physical Exam:    /84   Pulse 105   Ht 5' 2" (1 575 m)   Wt 85 7 kg (189 lb)   SpO2 97%   BMI 34 57 kg/m²     Physical Exam   PHYSICAL EXAM:  General:  Patient is not in acute distress   Head: Normocephalic, Atraumatic  HEENT:  Both pupils normal-size atraumatic, normocephalic, nonicteric  Neck:  JVP not raised  Trachea central  No carotid bruit  Respiratory:  normal breath sounds no crackles  no rhonchi  Cardiovascular:  Regular rate and rhythm no S3 no murmurs  GI:  Abdomen soft nontender  No organomegaly  Lymphatic:  No cervical or inguinal lymphadenopathy  Neurologic:  Patient is awake alert, oriented   Grossly nonfocal    EKG shows sinus rhythm with nonspecific ST-T abnormalities  Discussion/Summary:  Patient with intermittent episodes of chest pain as well as shortness of breath of unclear etiology  Possible etiologies were discussed at length with patient  Results of EKG reviewed  Patient will be scheduled for an echocardiogram to evaluate ejection fraction valves and look for evidence of pulmonary hypertension  Patient will also be scheduled for a pharmacological nuclear stress test to assess for ischemia  Patient is unable to exercise on the treadmill because of significant shortness of breath with minimal exertion as well as arthritis    Symptoms to watch out from cardiac standpoint which would indicate the need for further cardiac evaluation including cardiac catheterization discussed  Importance of dietary and risk factor modification reinforced  Follow-up in 4 to 6 weeks or earlier as needed  Patient is agreeable with the plan of care

## 2018-12-15 PROCEDURE — 93000 ELECTROCARDIOGRAM COMPLETE: CPT | Performed by: INTERNAL MEDICINE

## 2018-12-17 DIAGNOSIS — N39.41 URGE INCONTINENCE OF URINE: ICD-10-CM

## 2018-12-17 NOTE — TELEPHONE ENCOUNTER
Patient states she is in the donut whole and has reached her max for the year and wants to know if there is an alternative to Myrbetriq 50mg   [phone]

## 2018-12-18 RX ORDER — OXYBUTYNIN CHLORIDE 10 MG/1
10 TABLET, EXTENDED RELEASE ORAL
Qty: 30 TABLET | Refills: 0 | Status: SHIPPED | OUTPATIENT
Start: 2018-12-18 | End: 2019-01-04 | Stop reason: ALTCHOICE

## 2018-12-18 NOTE — TELEPHONE ENCOUNTER
Called and spoke to patient  Patient reports that she will try oxybutynin 10mg again until she can take Myrbetriq again  Patient would like oxybutynin to be sent to [facility] in [place]

## 2018-12-18 NOTE — TELEPHONE ENCOUNTER
Patient's other option of switching back to oxybutynin 10mg until insurance will resume coverage of Myrbetriq

## 2018-12-18 NOTE — TELEPHONE ENCOUNTER
Patient calling again about medication  Please see message below about patient reaching her max for year for Myrbetriq and requesting an alternative to be sent to pharmacy

## 2019-01-02 DIAGNOSIS — N39.41 URGE INCONTINENCE OF URINE: ICD-10-CM

## 2019-01-04 ENCOUNTER — PROCEDURE VISIT (OUTPATIENT)
Dept: UROLOGY | Facility: CLINIC | Age: 79
End: 2019-01-04
Payer: COMMERCIAL

## 2019-01-04 VITALS
HEART RATE: 80 BPM | DIASTOLIC BLOOD PRESSURE: 70 MMHG | WEIGHT: 184 LBS | HEIGHT: 62 IN | SYSTOLIC BLOOD PRESSURE: 117 MMHG | BODY MASS INDEX: 33.86 KG/M2

## 2019-01-04 DIAGNOSIS — R39.15 URINARY URGENCY: ICD-10-CM

## 2019-01-04 DIAGNOSIS — R35.0 FREQUENCY OF URINATION: ICD-10-CM

## 2019-01-04 DIAGNOSIS — N39.41 URGE INCONTINENCE: ICD-10-CM

## 2019-01-04 PROCEDURE — 64566 NEUROELTRD STIM POST TIBIAL: CPT | Performed by: UROLOGY

## 2019-01-04 RX ORDER — GLUCOSAMINE/METHYLSULFONYLMETH 500-400 MG
CAPSULE ORAL
COMMUNITY
End: 2019-04-01 | Stop reason: ALTCHOICE

## 2019-01-04 NOTE — PROGRESS NOTES
2019    Debbie Burger  1940  [id number]    Diagnosis  Chief Complaint     Urinary Urgency; Urinary Incontinence         Patient presents for PTNS 5 of 24 managed by Dr Angela Díaz  Patient will follow up in one month for next treatment  Urinary Incontinence Screening      Most Recent Value   Urinary Incontinence   Urinary Incontinence? Yes [6 accidents this past month, first thing in the morning]   Incomplete emptying? No   Urinary frequency? No   Urinary urgency? No   Urinary hesitancy? Yes [occ]   Dysuria (painful difficult urination)? No   Nocturia (waking up to use the bathroom)? No [rare]   Straining (having to push to go)? Yes [occ to empty]   Weak stream?  No   Intermittent stream?  Yes [occ]   Post void dribbling? No            Procedure PTNS  Session 5 of 24    Ankle used: right  Treatment settin  Duration of treatment: 30 minutes  Lead lot #:[id number]  Expiration Date:2021  Feeling/Response:Toe sensation, foot sensation, needle site    Patient Goals and Progress  Decreased Urgency Yes  Decreased Frequency Yes  Episodes of incontinence No  Sleep Through Night Yes  Related Health and Social Factors Yes    Patient had worse symptoms this past month  She ran into the "donut hole" and could not afford the last month of myrbetriq for last year so she was given oxybutynin as an alternative, she noted worsening incontinence to approximately 6 episodes last month when she was previously down to one  Since the new year she has since restarted myrbetriq and is hopeful symptoms will improve      RON Becerra BSN

## 2019-01-10 ENCOUNTER — HOSPITAL ENCOUNTER (OUTPATIENT)
Dept: NON INVASIVE DIAGNOSTICS | Facility: CLINIC | Age: 79
Discharge: HOME | End: 2019-01-10
Payer: COMMERCIAL

## 2019-01-10 ENCOUNTER — HOSPITAL ENCOUNTER (OUTPATIENT)
Dept: NON INVASIVE DIAGNOSTICS | Facility: CLINIC | Age: 79
End: 2019-01-10
Payer: COMMERCIAL

## 2019-01-10 DIAGNOSIS — R06.02 SHORTNESS OF BREATH: ICD-10-CM

## 2019-01-10 DIAGNOSIS — R07.89 CHEST DISCOMFORT: ICD-10-CM

## 2019-01-10 PROCEDURE — 78452 HT MUSCLE IMAGE SPECT MULT: CPT

## 2019-01-10 PROCEDURE — 93306 TTE W/DOPPLER COMPLETE: CPT

## 2019-01-10 PROCEDURE — 93017 CV STRESS TEST TRACING ONLY: CPT

## 2019-01-10 PROCEDURE — A9502 TC99M TETROFOSMIN: HCPCS

## 2019-01-10 PROCEDURE — 93016 CV STRESS TEST SUPVJ ONLY: CPT | Performed by: INTERNAL MEDICINE

## 2019-01-10 PROCEDURE — 93018 CV STRESS TEST I&R ONLY: CPT | Performed by: INTERNAL MEDICINE

## 2019-01-10 PROCEDURE — 78452 HT MUSCLE IMAGE SPECT MULT: CPT | Performed by: INTERNAL MEDICINE

## 2019-01-10 RX ADMIN — REGADENOSON 0.4 MG: 0.08 INJECTION, SOLUTION INTRAVENOUS at 13:09

## 2019-01-11 LAB
ARRHY DURING EX: NORMAL
CHEST PAIN STATEMENT: NORMAL
MAX DIASTOLIC BP: 98 MMHG
MAX HEART RATE: 95 BPM
MAX PREDICTED HEART RATE: 141 BPM
MAX. SYSTOLIC BP: 168 MMHG
PROTOCOL NAME: NORMAL
REASON FOR TERMINATION: NORMAL
TARGET HR FORMULA: NORMAL
TEST INDICATION: NORMAL
TIME IN EXERCISE PHASE: NORMAL

## 2019-01-11 PROCEDURE — 93306 TTE W/DOPPLER COMPLETE: CPT | Performed by: INTERNAL MEDICINE

## 2019-01-18 ENCOUNTER — OFFICE VISIT (OUTPATIENT)
Dept: CARDIOLOGY CLINIC | Facility: CLINIC | Age: 79
End: 2019-01-18
Payer: COMMERCIAL

## 2019-01-18 VITALS
HEIGHT: 62 IN | HEART RATE: 76 BPM | SYSTOLIC BLOOD PRESSURE: 122 MMHG | WEIGHT: 183.6 LBS | DIASTOLIC BLOOD PRESSURE: 84 MMHG | BODY MASS INDEX: 33.79 KG/M2 | OXYGEN SATURATION: 96 %

## 2019-01-18 DIAGNOSIS — I05.9 MITRAL VALVE DISORDER: ICD-10-CM

## 2019-01-18 DIAGNOSIS — R06.02 SHORTNESS OF BREATH: Primary | ICD-10-CM

## 2019-01-18 DIAGNOSIS — R07.89 CHEST DISCOMFORT: ICD-10-CM

## 2019-01-18 PROCEDURE — 99213 OFFICE O/P EST LOW 20 MIN: CPT | Performed by: INTERNAL MEDICINE

## 2019-02-01 ENCOUNTER — TELEPHONE (OUTPATIENT)
Dept: UROLOGY | Facility: MEDICAL CENTER | Age: 79
End: 2019-02-01

## 2019-02-04 ENCOUNTER — OFFICE VISIT (OUTPATIENT)
Dept: SLEEP CENTER | Facility: CLINIC | Age: 79
End: 2019-02-04
Payer: COMMERCIAL

## 2019-02-04 VITALS
HEART RATE: 72 BPM | DIASTOLIC BLOOD PRESSURE: 80 MMHG | HEIGHT: 62 IN | SYSTOLIC BLOOD PRESSURE: 124 MMHG | WEIGHT: 184 LBS | BODY MASS INDEX: 33.86 KG/M2

## 2019-02-04 DIAGNOSIS — F40.240 CLAUSTROPHOBIA: ICD-10-CM

## 2019-02-04 DIAGNOSIS — R25.1 TREMORS OF NERVOUS SYSTEM: ICD-10-CM

## 2019-02-04 DIAGNOSIS — F41.9 ANXIETY: ICD-10-CM

## 2019-02-04 DIAGNOSIS — G47.10 HYPERSOMNIA: ICD-10-CM

## 2019-02-04 DIAGNOSIS — R53.83 OTHER FATIGUE: ICD-10-CM

## 2019-02-04 DIAGNOSIS — G47.30 SLEEP-RELATED BREATHING DISORDER: Primary | ICD-10-CM

## 2019-02-04 DIAGNOSIS — E66.9 OBESITY (BMI 30-39.9): ICD-10-CM

## 2019-02-04 DIAGNOSIS — J31.0 CHRONIC RHINITIS: ICD-10-CM

## 2019-02-04 DIAGNOSIS — G47.9 SLEEP DISTURBANCE: ICD-10-CM

## 2019-02-04 PROCEDURE — 99214 OFFICE O/P EST MOD 30 MIN: CPT | Performed by: INTERNAL MEDICINE

## 2019-02-04 RX ORDER — ESCITALOPRAM OXALATE 10 MG/1
10 TABLET ORAL DAILY
Qty: 90 TABLET | Refills: 0 | Status: SHIPPED | OUTPATIENT
Start: 2019-02-04 | End: 2019-04-01 | Stop reason: ALTCHOICE

## 2019-02-04 NOTE — PROGRESS NOTES
Review of Systems      Genitourinary hot flashes at night and post menopausal (no peroids)   Cardiology none   Gastrointestinal none   Neurology muscle weakness and balance problems   Constitutional fatigue, excessive sweating at night and weight change   Integumentary none   Psychiatry none   Musculoskeletal none   Pulmonary shortness of breath with activity   ENT none   Endocrine none   Hematological none

## 2019-02-04 NOTE — PROGRESS NOTES
Follow-Up Note - [address]  [de-identified] y o  female  :1940  IEU:[id number]    CC: I saw this patient for follow-up in clinic today for mild sleep disordered breathing, Coexisting Sleep and Medical Problems  PFSH, Problem List, Medications & Allergies were reviewed in EMR  Interval changes: none reported  She  has a past medical history of 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; Benign neoplasm of skin; Chest pain; Claustrophobia; Diverticulitis; Muscle weakness; Nonmelanoma skin cancer; Palpitations; Seasonal allergies; Sleep apnea; Spontaneous ; and Syncope  She has a current medication list which includes the following prescription(s): vitamin c, cholecalciferol, echinacea, loratadine, mirabegron er, and escitalopram     ROS: reviewed (see attached)  Nasal symptoms are controlled with Claritin  She continues to report tremors  She still awakens feeling nervous  HPI:  She feels she is sleeping longer and anxiety has improved since initiating Lexapro  She typically sleeps alone and is not aware of snoring or breathing difficulties during sleep  However recently grandson reported some sleep talking  Sleep Routine: She reports getting 7 to 7-1/2 hours sleep  ; she has no difficulty initiating or maintaining sleep   She awakens spontaneously feeling never rested  She has excessive drowsiness and yawns excessively but is not dozing off  She rated herself at Total score:  on the [place] sleepiness scale  Habits: reports that she has never smoked  She has never used smokeless tobacco ,  reports that she does not drink alcohol ,  reports that she does not use drugs  , Caffeine use: none  , Exercise routine: sometimes   EXAM: /80   Pulse 72   Ht 5' 2" (1 575 m)   Wt 83 5 kg (184 lb)   BMI 33 65 kg/m²     Patient is well groomed; well appearing  Skin/Extrem: warm & dry; col & hydration normal; no edema  Psych: cooperativeand in no distress  She is somewhat anxious  Mental state otherwise appears normal   CNS: Alert, orientated, clear & coherent speech  H&N: EOMI; NC/AT:no facial pressure marks, no rashes  Neck Circumference: 37 cm  ENMT Mucus membranes appear normal Nasal airway:patent  Oral airway:  crowded  Resp:effort is normal CVS: RRR ABD:truncal obesity MSK:Gait normal     IMPRESSION: Primary Sleep/Secondary(to Medical or Psych conditions) & comorbidities   1  Sleep-related breathing disorder     2  Sleep disturbance     3  Anxiety  escitalopram (LEXAPRO) 10 mg tablet   4  Hypersomnia     5  Other fatigue     6  Chronic rhinitis     7  Obesity (BMI 30-39 9)     8  Tremors of nervous system     9  Claustrophobia         PLAN:  1  Cognitive behavioral therapy, Sleep Hygiene and behavioral techniques to manage Insomnia were discussed  Specifically, starting an exercise routine and on relaxation techniques  2  I advised adequate treatment of her allergies and weight reduction to address her mild sleep disordered breathing  3  Because of ongoing anxiety, I advised increasing Lexapro to 10 mg daily  4  She may need to see a neurologist for her tremors  5   I will see her for follow-up in 2 months to monitor progress  Thank you for allowing me to participate in the care of this patient  I will keep you apprised of developments      Sincerely,    Authenticated electronically by Prema Jacinto MD on 83/82/17   Board Certified Specialist

## 2019-02-05 ENCOUNTER — TRANSCRIBE ORDERS (OUTPATIENT)
Dept: ADMINISTRATIVE | Facility: HOSPITAL | Age: 79
End: 2019-02-05

## 2019-02-05 DIAGNOSIS — R13.10 PROBLEMS WITH SWALLOWING AND MASTICATION: Primary | ICD-10-CM

## 2019-02-06 ENCOUNTER — OFFICE VISIT (OUTPATIENT)
Dept: FAMILY MEDICINE CLINIC | Facility: CLINIC | Age: 79
End: 2019-02-06
Payer: COMMERCIAL

## 2019-02-06 ENCOUNTER — APPOINTMENT (OUTPATIENT)
Dept: LAB | Facility: CLINIC | Age: 79
End: 2019-02-06
Payer: COMMERCIAL

## 2019-02-06 VITALS
BODY MASS INDEX: 34.12 KG/M2 | HEART RATE: 90 BPM | WEIGHT: 185.4 LBS | HEIGHT: 62 IN | OXYGEN SATURATION: 96 % | RESPIRATION RATE: 16 BRPM | SYSTOLIC BLOOD PRESSURE: 130 MMHG | TEMPERATURE: 97.7 F | DIASTOLIC BLOOD PRESSURE: 76 MMHG

## 2019-02-06 DIAGNOSIS — F41.9 ANXIETY: ICD-10-CM

## 2019-02-06 DIAGNOSIS — E28.39 MENOPAUSE OVARIAN FAILURE: ICD-10-CM

## 2019-02-06 DIAGNOSIS — E55.9 VITAMIN D DEFICIENCY: ICD-10-CM

## 2019-02-06 DIAGNOSIS — R53.83 FATIGUE, UNSPECIFIED TYPE: Primary | ICD-10-CM

## 2019-02-06 DIAGNOSIS — R53.83 FATIGUE, UNSPECIFIED TYPE: ICD-10-CM

## 2019-02-06 DIAGNOSIS — R73.01 IMPAIRED FASTING GLUCOSE: ICD-10-CM

## 2019-02-06 DIAGNOSIS — G47.9 SLEEP DISTURBANCE: ICD-10-CM

## 2019-02-06 DIAGNOSIS — Z13.220 NEED FOR LIPID SCREENING: ICD-10-CM

## 2019-02-06 DIAGNOSIS — R25.1 TREMORS OF NERVOUS SYSTEM: ICD-10-CM

## 2019-02-06 DIAGNOSIS — Z00.00 MEDICARE ANNUAL WELLNESS VISIT, SUBSEQUENT: ICD-10-CM

## 2019-02-06 LAB
25(OH)D3 SERPL-MCNC: 25.9 NG/ML (ref 30–100)
ALBUMIN SERPL BCP-MCNC: 3.8 G/DL (ref 3.5–5)
ALP SERPL-CCNC: 92 U/L (ref 46–116)
ALT SERPL W P-5'-P-CCNC: 26 U/L (ref 12–78)
ANION GAP SERPL CALCULATED.3IONS-SCNC: 7 MMOL/L (ref 4–13)
AST SERPL W P-5'-P-CCNC: 13 U/L (ref 5–45)
BASOPHILS # BLD AUTO: 0.03 THOUSANDS/ΜL (ref 0–0.1)
BASOPHILS NFR BLD AUTO: 1 % (ref 0–1)
BILIRUB SERPL-MCNC: 0.48 MG/DL (ref 0.2–1)
BUN SERPL-MCNC: 15 MG/DL (ref 5–25)
CALCIUM SERPL-MCNC: 8.8 MG/DL (ref 8.3–10.1)
CHLORIDE SERPL-SCNC: 105 MMOL/L (ref 100–108)
CHOLEST SERPL-MCNC: 198 MG/DL (ref 50–200)
CO2 SERPL-SCNC: 27 MMOL/L (ref 21–32)
CREAT SERPL-MCNC: 0.7 MG/DL (ref 0.6–1.3)
EOSINOPHIL # BLD AUTO: 0.06 THOUSAND/ΜL (ref 0–0.61)
EOSINOPHIL NFR BLD AUTO: 1 % (ref 0–6)
ERYTHROCYTE [DISTWIDTH] IN BLOOD BY AUTOMATED COUNT: 13.4 % (ref 11.6–15.1)
EST. AVERAGE GLUCOSE BLD GHB EST-MCNC: 126 MG/DL
FOLATE SERPL-MCNC: 12.2 NG/ML (ref 3.1–17.5)
GFR SERPL CREATININE-BSD FRML MDRD: 82 ML/MIN/1.73SQ M
GLUCOSE P FAST SERPL-MCNC: 100 MG/DL (ref 65–99)
HBA1C MFR BLD: 6 % (ref 4.2–6.3)
HCT VFR BLD AUTO: 42.1 % (ref 34.8–46.1)
HDLC SERPL-MCNC: 76 MG/DL (ref 40–60)
HGB BLD-MCNC: 13.6 G/DL (ref 11.5–15.4)
IMM GRANULOCYTES # BLD AUTO: 0.03 THOUSAND/UL (ref 0–0.2)
IMM GRANULOCYTES NFR BLD AUTO: 1 % (ref 0–2)
IRON SERPL-MCNC: 94 UG/DL (ref 50–170)
LDLC SERPL CALC-MCNC: 110 MG/DL (ref 0–100)
LYMPHOCYTES # BLD AUTO: 1.03 THOUSANDS/ΜL (ref 0.6–4.47)
LYMPHOCYTES NFR BLD AUTO: 18 % (ref 14–44)
MCH RBC QN AUTO: 29.5 PG (ref 26.8–34.3)
MCHC RBC AUTO-ENTMCNC: 32.3 G/DL (ref 31.4–37.4)
MCV RBC AUTO: 91 FL (ref 82–98)
MONOCYTES # BLD AUTO: 0.49 THOUSAND/ΜL (ref 0.17–1.22)
MONOCYTES NFR BLD AUTO: 9 % (ref 4–12)
NEUTROPHILS # BLD AUTO: 4.15 THOUSANDS/ΜL (ref 1.85–7.62)
NEUTS SEG NFR BLD AUTO: 70 % (ref 43–75)
NONHDLC SERPL-MCNC: 122 MG/DL
NRBC BLD AUTO-RTO: 0 /100 WBCS
PLATELET # BLD AUTO: 257 THOUSANDS/UL (ref 149–390)
PMV BLD AUTO: 9.1 FL (ref 8.9–12.7)
POTASSIUM SERPL-SCNC: 4 MMOL/L (ref 3.5–5.3)
PROT SERPL-MCNC: 6.9 G/DL (ref 6.4–8.2)
RBC # BLD AUTO: 4.61 MILLION/UL (ref 3.81–5.12)
SODIUM SERPL-SCNC: 139 MMOL/L (ref 136–145)
T3FREE SERPL-MCNC: 2.1 PG/ML (ref 2.3–4.2)
T4 FREE SERPL-MCNC: 1.12 NG/DL (ref 0.76–1.46)
TRIGL SERPL-MCNC: 58 MG/DL
TSH SERPL DL<=0.05 MIU/L-ACNC: 1.78 UIU/ML (ref 0.36–3.74)
VIT B12 SERPL-MCNC: 336 PG/ML (ref 100–900)
WBC # BLD AUTO: 5.79 THOUSAND/UL (ref 4.31–10.16)

## 2019-02-06 PROCEDURE — 80061 LIPID PANEL: CPT

## 2019-02-06 PROCEDURE — 84481 FREE ASSAY (FT-3): CPT

## 2019-02-06 PROCEDURE — 84439 ASSAY OF FREE THYROXINE: CPT

## 2019-02-06 PROCEDURE — 84443 ASSAY THYROID STIM HORMONE: CPT

## 2019-02-06 PROCEDURE — 36415 COLL VENOUS BLD VENIPUNCTURE: CPT

## 2019-02-06 PROCEDURE — 85025 COMPLETE CBC W/AUTO DIFF WBC: CPT

## 2019-02-06 PROCEDURE — 82306 VITAMIN D 25 HYDROXY: CPT

## 2019-02-06 PROCEDURE — G0439 PPPS, SUBSEQ VISIT: HCPCS | Performed by: FAMILY MEDICINE

## 2019-02-06 PROCEDURE — 1170F FXNL STATUS ASSESSED: CPT | Performed by: FAMILY MEDICINE

## 2019-02-06 PROCEDURE — 80053 COMPREHEN METABOLIC PANEL: CPT

## 2019-02-06 PROCEDURE — 99214 OFFICE O/P EST MOD 30 MIN: CPT | Performed by: FAMILY MEDICINE

## 2019-02-06 PROCEDURE — 82607 VITAMIN B-12: CPT

## 2019-02-06 PROCEDURE — 82746 ASSAY OF FOLIC ACID SERUM: CPT

## 2019-02-06 PROCEDURE — 1125F AMNT PAIN NOTED PAIN PRSNT: CPT | Performed by: FAMILY MEDICINE

## 2019-02-06 PROCEDURE — 83036 HEMOGLOBIN GLYCOSYLATED A1C: CPT

## 2019-02-06 PROCEDURE — 83540 ASSAY OF IRON: CPT

## 2019-02-06 RX ORDER — ESCITALOPRAM OXALATE 5 MG/1
5 TABLET ORAL DAILY
Qty: 30 TABLET | OUTPATIENT
Start: 2019-02-06

## 2019-02-06 NOTE — PROGRESS NOTES
Assessment and Plan:    Problem List Items Addressed This Visit     None        Health Maintenance Due   Topic Date Due    Medicare Annual Wellness Visit (AWV)  1940    INFLUENZA VACCINE  2018         HPI:  Mable Child is a [de-identified] y o  female here for her Subsequent Wellness Visit  Patient Active Problem List   Diagnosis    Incontinence    OAB (overactive bladder)    Tremors of nervous system    Primary insomnia    Fatigue    Seborrheic keratosis    Screening for skin condition    History of skin cancer    Seasonal allergic rhinitis    Sleep disturbance    Fall as cause of accidental injury at home as place of occurrence    Leg wound, left, initial encounter    Occasional tremors    Impaired fasting glucose    Insomnia    Sleep-related breathing disorder    Mood disturbance    Chronic rhinitis    Obesity (BMI 30-39  9)    Claustrophobia    Anxiety    Mitral valve disorder    Chest discomfort     Past Medical History:   Diagnosis Date    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     Benign neoplasm of skin     Chest pain     Claustrophobia     Diverticulitis     Muscle weakness     Nonmelanoma skin cancer     last assessed 2017    Palpitations     Seasonal allergies     Sleep apnea     no cpap    Spontaneous      without mention of complications     Syncope      Past Surgical History:   Procedure Laterality Date    BOTOX INJECTION N/A 3/6/2017    Procedure: CYSTOSCOPY; BLADDER BOTOX 100 UNITS ;  Surgeon: Alejandro Warner MD;  [facility];  Service:    [address] [place] [address] Right     COLOSTOMY      COLOSTOMY CLOSURE      FOOT SURGERY Left     neuroma    HYSTERECTOMY      NASAL SINUS SURGERY      [place] CYSTOURETHROSCOPY N/A 2018    Procedure: CYSTOSCOPY WITH BOTOX;  Surgeon: Alejandro Warner MD;  [facility];  Service: Urology    TONSILLECTOMY      RUST SURGERY Left      Family History   Problem Relation Age of Onset    Alcohol abuse Mother     Colon cancer Mother     Heart disease Mother     Alcohol abuse Father     Alcohol abuse Brother     Colon cancer Brother      History   Smoking Status    Never Smoker   Smokeless Tobacco    Never Used     History   Alcohol Use No     Comment: rarely      History   Drug Use No       Current Outpatient Prescriptions   Medication Sig Dispense Refill    Ascorbic Acid (VITAMIN C) 1000 MG tablet Take 1,000 mg by mouth daily      cholecalciferol (VITAMIN D3) 1,000 units tablet Take 1,000 Units by mouth daily      Echinacea 450 MG CAPS Take by mouth      escitalopram (LEXAPRO) 10 mg tablet Take 1 tablet (10 mg total) by mouth daily for 90 days 90 tablet 0    loratadine (CLARITIN) 10 mg tablet Take 1 tablet (10 mg total) by mouth daily As needed 90 tablet 1    Mirabegron ER 50 MG TB24 Take 1 tablet (50 mg total) by mouth daily 30 tablet 11     No current facility-administered medications for this visit  Allergies   Allergen Reactions    Caffeine     Iodine Rash    Latex Rash    Other Rash     Adhesive tape         There is no immunization history on file for this patient  Patient Care Team:  Rohit Mane MD as PCP - General (Family Medicine)  DO Dora Narvaez MD Dairl Dresser, MD    Medicare Screening Tests and Risk Assessments:  Torin is here for her Welcome to Medicare visit  Health Risk Assessment:  Patient rates overall health as good  Patient feels that their physical health rating is Same  Eyesight was rated as Same  Hearing was rated as Same  Patient feels that their emotional and mental health rating is Same  Pain experienced by patient in the last 7 days has been Some  Patient's pain rating has been 4/10  Patient states that she has experienced no weight loss or gain in last 6 months  Emotional/Mental Health:  Patient has been feeling nervous/anxious      PHQ-9 Depression Screening:    Frequency of the following problems over the past two weeks:      1  Little interest or pleasure in doing things: 0 - not at all      2  Feeling down, depressed, or hopeless: 0 - not at all  PHQ-2 Score: 0          Broken Bones/Falls: Fall Risk Assessment:    In the past year, patient has experienced: No history of falling in past year          Bladder/Bowel:  Patient has leaked urine accidently in the last six months  Patient reports no loss of bowel control  Immunizations:  Patient has not had a flu vaccination within the last year  Patient has not received a pneumonia shot  Patient has not received a shingles shot  Patient has not received tetanus/diphtheria shot  Home Safety:  Patient has trouble with stairs inside or outside of their home  Patient currently reports that there are no safety hazards present in home, working smoke alarms, no working carbon monoxide detectors  Preventative Screenings:   No breast cancer screening performed, colon cancer screen completed, no cholesterol screen completed, glaucoma eye exam completed,     Nutrition:  Current diet: Regular with servings of the following:  (Additional Comments: Doesn't eat fries foods)    Medications:  Patient is currently taking over-the-counter supplements  List of OTC medications includes: vitamin D Vitamin c   Patient is able to manage medications  Lifestyle Choices:  Patient reports no tobacco use  Patient has not smoked or used tobacco in the past   Patient reports no alcohol use  Patient drives a vehicle  Patient wears seat belt  Current level of exercise of physical activity described by patient as: light  strething          Activities of Daily Living:  Can get out of bed by his or her self, able to dress self, able to make own meals, able to do own shopping, able to bathe self, can do own laundry/housekeeping, can manage own money, pay bills and track expenses    Previous Hospitalizations:  Hospitalization or ED visit in past 12 months  Number of hospitalizations within the last year: 1-2  Additional Comments: March last year    Advanced Directives:  Patient has decided on a power of   Patient has spoken to designated power of   Patient has completed advanced directive  Preventative Screening/Counseling:      Cardiovascular:     Due for Labs/Analytes/Optional EKG: Lipid Panel and Cholesterol          Diabetes:      Due for labs: Blood Glucose          Colorectal Cancer:      General: Screening Not Indicated          Breast Cancer:      General: Screening Not Indicated          Cervical Cancer:      General: Screening Not Indicated          Osteoporosis:      Due for studies: DXA Appendicular          AAA:      General: Screening Not Indicated          Glaucoma:      General: Screening Current          HIV:      General: Screening Not Indicated          Hepatitis C:      General: Screening Not Indicated        Advanced Directives:   Patient has living will for healthcare, No 5 wishes given       Immunizations:      Influenza: Patient Declines      Pneumococcal: Patient Declines      Shingrix: Patient Declines      Zostavax: Patient Declines      TD: Patient Declines      TDAP: Patient Declines            Assessment and Plan:    Problem List Items Addressed This Visit     Tremors of nervous system    Relevant Orders    Ambulatory referral to Neurology    MRI brain w wo contrast    Fatigue - Primary    Relevant Orders    CBC and differential    Vitamin B12    TSH, 3rd generation with Free T4 reflex    T3, free    T4, free    Folate    Iron    Impaired fasting glucose    Relevant Orders    Comprehensive metabolic panel    Hemoglobin A1C    Need for lipid screening    Relevant Orders    Lipid panel    Menopause ovarian failure    Relevant Orders    DXA bone density spine hip and pelvis    Vitamin D deficiency    Relevant Orders    Vitamin D 25 hydroxy      Other Visit Diagnoses     Medicare annual wellness visit, subsequent            Health Maintenance Due   Topic Date Due    Medicare Annual Wellness Visit (AWV)  1940    INFLUENZA VACCINE  2018         HPI:  Andrés Savage is a [de-identified] y o  female here for her Subsequent Wellness Visit  Patient Active Problem List   Diagnosis    Incontinence    OAB (overactive bladder)    Tremors of nervous system    Primary insomnia    Fatigue    Seborrheic keratosis    Screening for skin condition    History of skin cancer    Seasonal allergic rhinitis    Sleep disturbance    Fall as cause of accidental injury at home as place of occurrence    Leg wound, left, initial encounter    Occasional tremors    Impaired fasting glucose    Insomnia    Sleep-related breathing disorder    Mood disturbance    Chronic rhinitis    Obesity (BMI 30-39  9)    Claustrophobia    Anxiety    Mitral valve disorder    Chest discomfort    Need for lipid screening    Menopause ovarian failure    Vitamin D deficiency     Past Medical History:   Diagnosis Date    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     Benign neoplasm of skin     Chest pain     Claustrophobia     Diverticulitis     Muscle weakness     Nonmelanoma skin cancer     last assessed 2017    Palpitations     Seasonal allergies     Sleep apnea     no cpap    Spontaneous      without mention of complications     Syncope      Past Surgical History:   Procedure Laterality Date    BOTOX INJECTION N/A 3/6/2017    Procedure: CYSTOSCOPY; BLADDER BOTOX 100 UNITS ;  Surgeon: Misty Richardson MD;  [facility];  Service:     CARDIAC CATHETERIZATION      CARPAL TUNNEL RELEASE Right     COLOSTOMY      COLOSTOMY CLOSURE      FOOT SURGERY Left     neuroma    HYSTERECTOMY      NASAL SINUS SURGERY      CT CYSTOURETHROSCOPY N/A 2018    Procedure: CYSTOSCOPY WITH BOTOX;  Surgeon: Misty Richardson MD; [facility];  Service: Urology    TONSILLECTOMY      WRIST SURGERY Left      Family History   Problem Relation Age of Onset    Alcohol abuse Mother     Colon cancer Mother     Heart disease Mother     Alcohol abuse Father     Alcohol abuse Brother     Colon cancer Brother      History   Smoking Status    Never Smoker   Smokeless Tobacco    Never Used     History   Alcohol Use No     Comment: rarely      History   Drug Use No       Current Outpatient Prescriptions   Medication Sig Dispense Refill    Ascorbic Acid (VITAMIN C) 1000 MG tablet Take 1,000 mg by mouth daily      cholecalciferol (VITAMIN D3) 1,000 units tablet Take 1,000 Units by mouth daily      Echinacea 450 MG CAPS Take by mouth      escitalopram (LEXAPRO) 10 mg tablet Take 1 tablet (10 mg total) by mouth daily for 90 days 90 tablet 0    loratadine (CLARITIN) 10 mg tablet Take 1 tablet (10 mg total) by mouth daily As needed 90 tablet 1    Mirabegron ER 50 MG TB24 Take 1 tablet (50 mg total) by mouth daily 30 tablet 11     No current facility-administered medications for this visit  Allergies   Allergen Reactions    Caffeine     Iodine Rash    Latex Rash    Other Rash     Adhesive tape         There is no immunization history on file for this patient      Patient Care Team:  Cipriano Davis MD as PCP - General (Family Medicine)  Gorge George MD Kerrin Leeks, MD    Medicare Screening Tests and Risk Assessments:  Medicare Annual Wellness Visit

## 2019-02-06 NOTE — PROGRESS NOTES
Assessment/Plan:    No problem-specific Assessment & Plan notes found for this encounter  Diagnoses and all orders for this visit:    Fatigue, unspecified type  -     CBC and differential; Future  -     Vitamin B12; Future  -     TSH, 3rd generation with Free T4 reflex; Future  -     T3, free; Future  -     T4, free; Future  -     Folate; Future  -     Iron; Future    Impaired fasting glucose  -     Comprehensive metabolic panel; Future  -     Hemoglobin A1C; Future    Need for lipid screening  -     Lipid panel; Future    Tremors of nervous system  -     Ambulatory referral to Neurology; Future  -     MRI brain w wo contrast; Future    Menopause ovarian failure  -     DXA bone density spine hip and pelvis; Future    Vitamin D deficiency  -     Vitamin D 25 hydroxy; Future    Medicare annual wellness visit, subsequent  See Medicare wellness visit  Follow up in 3-6 months or as needed        Subjective:      Patient ID: Kaley Owusu is a [de-identified] y o  female  Patient is here to discuss what to complaints at this time  She said that she has been feeling fatigued for the past several months close to 1 year  She said that her symptoms have been increasing in severity per patient  She said that she does go to sleep around 10:00 p m  And wakes up in the morning still tired  She had a sleep study done recently which was normal   She had blood work done about a year ago which showed normal TSH well as a normal CBC  She is also here because she has been having a resting tremor which occurs at rest   She said that the both of her hands shake very violebntly at times and she has to hold both hands from shaking  She had MRI head done April 2018 which showed chronic vessel changes otherwise normal   She denies any other complaints at this time            The following portions of the patient's history were reviewed and updated as appropriate:   She  has a past medical history of 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; Benign neoplasm of skin; Chest pain; Claustrophobia; Diverticulitis; Muscle weakness; Nonmelanoma skin cancer; Palpitations; Seasonal allergies; Sleep apnea; Spontaneous ; and Syncope  She   Patient Active Problem List    Diagnosis Date Noted    Need for lipid screening 2019    Menopause ovarian failure 2019    Vitamin D deficiency 2019    Mitral valve disorder 2018    Chest discomfort 2018    Anxiety 2018    Sleep-related breathing disorder 2018    Mood disturbance 2018    Chronic rhinitis 2018    Obesity (BMI 30-39 9) 2018    Claustrophobia 2018    Occasional tremors 2018    Impaired fasting glucose 2018    Insomnia 2018    Sleep disturbance 2018    Fall as cause of accidental injury at home as place of occurrence 2018    Leg wound, left, initial encounter 2018    Seasonal allergic rhinitis 2018    Screening for skin condition 2018    History of skin cancer 2018    Tremors of nervous system 2018    Primary insomnia 2018    Fatigue 2018    Incontinence 2015    OAB (overactive bladder) 2015    Seborrheic keratosis 2014     She  has a past surgical history that includes Hysterectomy; Tonsillectomy; Nasal sinus surgery; Colostomy; Carpal tunnel release (Right); Colostomy closure; Foot surgery (Left); Cardiac catheterization; Wrist surgery (Left); BOTOX INJECTION (N/A, 3/6/2017); and pr cystourethroscopy (N/A, 2018)  Her family history includes Alcohol abuse in her brother, father, and mother; Colon cancer in her brother and mother; Heart disease in her mother  She  reports that she has never smoked  She has never used smokeless tobacco  She reports that she does not drink alcohol or use drugs    Current Outpatient Prescriptions   Medication Sig Dispense Refill    Ascorbic Acid (VITAMIN C) 1000 MG tablet Take 1,000 mg by mouth daily      cholecalciferol (VITAMIN D3) 1,000 units tablet Take 1,000 Units by mouth daily      Echinacea 450 MG CAPS Take by mouth      escitalopram (LEXAPRO) 10 mg tablet Take 1 tablet (10 mg total) by mouth daily for 90 days 90 tablet 0    loratadine (CLARITIN) 10 mg tablet Take 1 tablet (10 mg total) by mouth daily As needed 90 tablet 1    Mirabegron ER 50 MG TB24 Take 1 tablet (50 mg total) by mouth daily 30 tablet 11     No current facility-administered medications for this visit  Current Outpatient Prescriptions on File Prior to Visit   Medication Sig    Ascorbic Acid (VITAMIN C) 1000 MG tablet Take 1,000 mg by mouth daily    cholecalciferol (VITAMIN D3) 1,000 units tablet Take 1,000 Units by mouth daily    Echinacea 450 MG CAPS Take by mouth    escitalopram (LEXAPRO) 10 mg tablet Take 1 tablet (10 mg total) by mouth daily for 90 days    loratadine (CLARITIN) 10 mg tablet Take 1 tablet (10 mg total) by mouth daily As needed    Mirabegron ER 50 MG TB24 Take 1 tablet (50 mg total) by mouth daily     No current facility-administered medications on file prior to visit  She is allergic to caffeine; iodine; latex; and other       Review of Systems   Constitutional: Positive for fatigue  Negative for activity change, appetite change and fever  HENT: Negative for congestion and ear discharge  Respiratory: Negative for cough and shortness of breath  Cardiovascular: Negative for chest pain and palpitations  Gastrointestinal: Negative for diarrhea and nausea  Musculoskeletal: Negative for arthralgias and back pain  Skin: Negative for color change and rash  Neurological: Positive for tremors  Negative for dizziness and headaches  Psychiatric/Behavioral: Negative for agitation and behavioral problems           Objective:      /76 (BP Location: Left arm, Patient Position: Sitting, Cuff Size: Adult)   Pulse 90   Temp 97 7 °F (36 5 °C) (Tympanic) Resp 16   Ht 5' 2" (1 575 m)   Wt 84 1 kg (185 lb 6 4 oz)   SpO2 96%   BMI 33 91 kg/m²          Physical Exam   Constitutional: She is oriented to person, place, and time  She appears well-developed and well-nourished  No distress  HENT:   Head: Normocephalic and atraumatic  Nose: Nose normal    Mouth/Throat: Oropharynx is clear and moist    Eyes: Pupils are equal, round, and reactive to light  Conjunctivae are normal    Cardiovascular: Normal rate, regular rhythm and normal heart sounds  No murmur heard  Pulmonary/Chest: Effort normal and breath sounds normal  No respiratory distress  She has no wheezes  Abdominal: Soft  Bowel sounds are normal  She exhibits no distension  There is no tenderness  Neurological: She is alert and oriented to person, place, and time  A resting tremor noted bilaterally  Mini cog test normal   Able to draw a clock on the wall with appropriate time  Able to recall 2/3 objects on word recall  Skin: Skin is warm and dry  No rash noted  She is not diaphoretic  No erythema  Psychiatric: She has a normal mood and affect

## 2019-02-07 ENCOUNTER — DOCUMENTATION (OUTPATIENT)
Dept: FAMILY MEDICINE CLINIC | Facility: CLINIC | Age: 79
End: 2019-02-07

## 2019-02-07 DIAGNOSIS — R79.89 ABNORMAL THYROID BLOOD TEST: Primary | ICD-10-CM

## 2019-02-07 NOTE — PROGRESS NOTES
Tacos Hahn @ [phone] for prior approval for [facility] [place] [facility] - case ref # [id number]  Approved - [id number] good until 2/7/19-4/8/19  Faxed form back to [facility]

## 2019-02-08 ENCOUNTER — PROCEDURE VISIT (OUTPATIENT)
Dept: UROLOGY | Facility: CLINIC | Age: 79
End: 2019-02-08
Payer: COMMERCIAL

## 2019-02-08 VITALS
DIASTOLIC BLOOD PRESSURE: 80 MMHG | HEART RATE: 80 BPM | BODY MASS INDEX: 33.82 KG/M2 | WEIGHT: 183.8 LBS | SYSTOLIC BLOOD PRESSURE: 110 MMHG | HEIGHT: 62 IN

## 2019-02-08 DIAGNOSIS — N39.41 URGE INCONTINENCE: ICD-10-CM

## 2019-02-08 DIAGNOSIS — R39.15 URINARY URGENCY: ICD-10-CM

## 2019-02-08 DIAGNOSIS — R35.0 FREQUENCY OF URINATION: ICD-10-CM

## 2019-02-08 PROCEDURE — 64566 NEUROELTRD STIM POST TIBIAL: CPT

## 2019-02-08 NOTE — PROGRESS NOTES
2019    Jone Stubbs  1940  [id number]    Diagnosis  Chief Complaint     Urinary Urgency; Urinary Incontinence         Patient presents for PTNS  managed by Dr Dinora Moya  Patient will follow up as scheduled in one month for next treatment  This appt will be with a provider with a bladder diary to evaluate continued effectiveness  Urinary Incontinence Screening      Most Recent Value   Urinary Incontinence   Urinary Incontinence? Yes [only 3 accidents total all month]   Incomplete emptying? No   Urinary frequency? No [rare]   Urinary urgency? No   Urinary hesitancy? No   Dysuria (painful difficult urination)? No   Nocturia (waking up to use the bathroom)? No [rare]   Straining (having to push to go)? No   Weak stream?  No   Intermittent stream?  No   Post void dribbling? No            Procedure PTNS  Session 6     Ankle used: right  Treatment settin  Duration of treatment: 30 minutes  Lead lot #:[id number]  Expiration Date:2021  Feeling/Response:medial side of foot the whole way to big toe sensation    Overall patient reports symptoms improvement with PTNS, especially now that she is back on lisandra Holloway, RN  Aldo eBe, ESTEPHANIEN

## 2019-02-13 ENCOUNTER — HOSPITAL ENCOUNTER (OUTPATIENT)
Dept: MAMMOGRAPHY | Facility: CLINIC | Age: 79
Discharge: HOME | End: 2019-02-13
Payer: COMMERCIAL

## 2019-02-13 DIAGNOSIS — E28.39 MENOPAUSE OVARIAN FAILURE: ICD-10-CM

## 2019-02-13 PROCEDURE — 77080 DXA BONE DENSITY AXIAL: CPT

## 2019-02-19 ENCOUNTER — HOSPITAL ENCOUNTER (OUTPATIENT)
Dept: RADIOLOGY | Facility: HOSPITAL | Age: 79
Discharge: HOME | End: 2019-02-19
Attending: OTOLARYNGOLOGY
Payer: COMMERCIAL

## 2019-02-19 DIAGNOSIS — R13.10 PROBLEMS WITH SWALLOWING AND MASTICATION: ICD-10-CM

## 2019-02-19 PROCEDURE — 74230 X-RAY XM SWLNG FUNCJ C+: CPT

## 2019-02-19 PROCEDURE — 92611 MOTION FLUOROSCOPY/SWALLOW: CPT

## 2019-02-19 PROCEDURE — G8998 SWALLOW D/C STATUS: HCPCS

## 2019-02-19 PROCEDURE — G8996 SWALLOW CURRENT STATUS: HCPCS

## 2019-02-19 PROCEDURE — G8997 SWALLOW GOAL STATUS: HCPCS

## 2019-02-19 NOTE — PROCEDURES
Video Swallow Study      Patient Name: Andrés Savage  LBFRT'L Date: 2019        Past Medical History  Past Medical History:   Diagnosis Date    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     Benign neoplasm of skin     Chest pain     Claustrophobia     Diverticulitis     Muscle weakness     Nonmelanoma skin cancer     last assessed 2017    Palpitations     Seasonal allergies     Sleep apnea     no cpap    Spontaneous      without mention of complications     Syncope         Past Surgical History  Past Surgical History:   Procedure Laterality Date    BOTOX INJECTION N/A 3/6/2017    Procedure: CYSTOSCOPY; BLADDER BOTOX 100 UNITS ;  Surgeon: Misty Richardson MD;  [facility];  Service:     CARDIAC CATHETERIZATION      CARPAL TUNNEL RELEASE Right     COLOSTOMY      COLOSTOMY CLOSURE      FOOT SURGERY Left     neuroma    HYSTERECTOMY      NASAL SINUS SURGERY      OR CYSTOURETHROSCOPY N/A 2018    Procedure: CYSTOSCOPY WITH BOTOX;  Surgeon: Misty Richardson MD;  [facility];  Service: Urology    TONSILLECTOMY      WRIST SURGERY Left          General Information:    [de-identified] yo Female referred to [facility]  for a VBS by Dr Asad Rodrigez for dysphagia w/  c/o coughing/choking randomly with and without intake  Pt reports being seen by two ENT's due to these symptoms  Pt stating her "airway closes off randomly for unknown reasons"  SLP who completed this VBS was questioning possible PVFMD disorder, but no documentation was found in epic related to these complaints and no concerns for difficulty breathing were observed during the VBS with pt generally w/o complaints  Cognition:  WFL for task presented     Speech/Swallow Mech: Oral motor movements appeared  WNL; Dentition was adequate;  Respiratory Status: RA; Current diet: regular/thin  Prior VBS N/A    Pt was seen in radiology for a Video Barium Swallow Study, seated in the upright position and viewed laterally with the following consistencies:       Results are as follows:     **Images are available for review on PACS          Oral Stage:   Within normal limits  Mastication of solids was complete  Oral control and posterior transfer were functional  Tongue base retraction complete  Pharyngeal Stage:   Timely swallow initiation with adequate hyolaryngeal excursion and epiglottic inversion  Laryngeal vestibule closure complete  No penetration, aspiration, or pharyngeal residue  Esophageal Stage:   Prominent cricopharyngeal muscle with decreased opening resulting in small amounts of food/liquid boluses becoming stasis above the muscle concerning for formation of small zenker's diverticulum  Majority of bolus is able to pass without difficulty  Assessment Summary:   Pt's oropharyngeal swallow appears within normal limits  No penetration or aspiration was seen during the study  Prominent cricopharyngeal muscle seen with small amount of food/liquids becoming stasis above CP muscle concerning for formation of small zenker's diverticulum       Recommendations:   -Regular solids thin liquids  -Slow rate of intake/small frequent meals   -Follow up with ENT

## 2019-02-28 ENCOUNTER — OFFICE VISIT (OUTPATIENT)
Dept: ENDOCRINOLOGY | Facility: CLINIC | Age: 79
End: 2019-02-28
Payer: COMMERCIAL

## 2019-02-28 VITALS
SYSTOLIC BLOOD PRESSURE: 122 MMHG | HEIGHT: 62 IN | DIASTOLIC BLOOD PRESSURE: 78 MMHG | WEIGHT: 182.6 LBS | HEART RATE: 64 BPM | BODY MASS INDEX: 33.6 KG/M2

## 2019-02-28 DIAGNOSIS — R79.89 ABNORMAL THYROID BLOOD TEST: ICD-10-CM

## 2019-02-28 DIAGNOSIS — E55.9 VITAMIN D DEFICIENCY: ICD-10-CM

## 2019-02-28 DIAGNOSIS — E01.0 THYROMEGALY: Primary | ICD-10-CM

## 2019-02-28 DIAGNOSIS — R73.03 PRE-DIABETES: ICD-10-CM

## 2019-02-28 PROCEDURE — 99204 OFFICE O/P NEW MOD 45 MIN: CPT | Performed by: INTERNAL MEDICINE

## 2019-02-28 RX ORDER — IBUPROFEN 200 MG
1 CAPSULE ORAL DAILY
COMMUNITY

## 2019-02-28 NOTE — LETTER
March 1, 2019     Stefan Shepard, [address]  [facility]  [place] [address]    Patient: Mikal Wilkes   YOB: 1940   Date of Visit: 2/28/2019       Dear Dr Radha Tatum:    Thank you for referring Glenroy Chino to me for evaluation  Below are my notes for this consultation  If you have questions, please do not hesitate to call me  I look forward to following your patient along with you  Sincerely,        Jefferson Gutierrez MD        CC: No Recipients  Jefferson Gutierrez MD  3/1/2019  1:50 PM  Sign at close encounter    New Patient Progress Note      Referring Provider  Stefan Shepard Md  [address], [place] [address]     History of Present Illness:     [de-identified] yr old woman with PMH of pre-diabetes, vitamin d deficiency is here for evaluation of low T3,   She also complains of feeling tired and fatigue  Currently she is taking Lexapro 10 mg daily for anxiety  Her TSH was within normal range, see below, free T4 was also in normal range  She denies family history of thyroid problems    Denies H/o thyroid problems in the past   Denies taking Herbal medications   Complains of difficulty swallowing off and on, she had a swallowing study done recently and it was normal     No History of external radiation to head/neck/chest   No family history of thyroid caner  No recent Iodine loading in form of medication, erbs or kelp supplements or radiological diagnostic studies      She has history of prediabetes, currently following low-carbohydrate diet    For vitamin-D deficiency she is currently taking 5000 International Units daily    Component      Latest Ref Rng & Units 2/6/2019   Cholesterol      50 - 200 mg/dL 198   Triglycerides      <=150 mg/dL 58   HDL      40 - 60 mg/dL 76 (H)   LDL Direct      0 - 100 mg/dL 110 (H)   Non-HDL Cholesterol      mg/dl 122   Hemoglobin A1C      4 2 - 6 3 % 6 0   EAG      mg/dl 126   Vitamin B-12      100 - 900 pg/mL 336   T3, Free      2 30 - 4 20 pg/mL 2 10 (L)   Free T4      0 76 - 1 46 ng/dL 1 12   Folate      3 1 - 17 5 ng/mL 12 2   Iron      50 - 170 ug/dL 94   Vit D, 25-Hydroxy      30 0 - 100 0 ng/mL 25 9 (L)         Component      Latest Ref Rng & Units 2/6/2019   Sodium      136 - 145 mmol/L 139   Potassium      3 5 - 5 3 mmol/L 4 0   Chloride      100 - 108 mmol/L 105   CO2      21 - 32 mmol/L 27   Anion Gap      4 - 13 mmol/L 7   BUN      5 - 25 mg/dL 15   Creatinine      0 60 - 1 30 mg/dL 0 70   GLUCOSE FASTING      65 - 99 mg/dL 100 (H)   Calcium      8 3 - 10 1 mg/dL 8 8   AST      5 - 45 U/L 13   ALT      12 - 78 U/L 26   Alkaline Phosphatase      46 - 116 U/L 92   Total Protein      6 4 - 8 2 g/dL 6 9   Albumin      3 5 - 5 0 g/dL 3 8   TOTAL BILIRUBIN      0 20 - 1 00 mg/dL 0 48   eGFR      ml/min/1 73sq m 82   Cholesterol      50 - 200 mg/dL 198   Triglycerides      <=150 mg/dL 58   HDL      40 - 60 mg/dL 76 (H)   LDL Direct      0 - 100 mg/dL 110 (H)   Non-HDL Cholesterol      mg/dl 122   Hemoglobin A1C      4 2 - 6 3 % 6 0   EAG      mg/dl 126   Vitamin B-12      100 - 900 pg/mL 336   T3, Free      2 30 - 4 20 pg/mL 2 10 (L)   Free T4      0 76 - 1 46 ng/dL 1 12   Folate      3 1 - 17 5 ng/mL 12 2   Iron      50 - 170 ug/dL 94   Vit D, 25-Hydroxy      30 0 - 100 0 ng/mL 25 9 (L)   TSH 3RD GENERATON      0 358 - 3 740 uIU/mL 1 780     Patient Active Problem List   Diagnosis    Incontinence    OAB (overactive bladder)    Tremors of nervous system    Primary insomnia    Fatigue    Seborrheic keratosis    Screening for skin condition    History of skin cancer    Seasonal allergic rhinitis    Sleep disturbance    Fall as cause of accidental injury at home as place of occurrence    Leg wound, left, initial encounter    Occasional tremors    Impaired fasting glucose    Insomnia    Sleep-related breathing disorder    Mood disturbance    Chronic rhinitis    Obesity (BMI 30-39  9)    Claustrophobia    Anxiety    Mitral valve disorder  Chest discomfort    Need for lipid screening    Menopause ovarian failure    Vitamin D deficiency     Past Medical History:   Diagnosis Date    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     Benign neoplasm of skin     Chest pain     Claustrophobia     Diverticulitis     Muscle weakness     Nonmelanoma skin cancer     last assessed 2017    Palpitations     Seasonal allergies     Sleep apnea     no cpap    Spontaneous      without mention of complications     Syncope       Past Surgical History:   Procedure Laterality Date    BOTOX INJECTION N/A 3/6/2017    Procedure: CYSTOSCOPY; BLADDER BOTOX 100 UNITS ;  Surgeon: Abilio Danielle MD;  [facility];  Service:     CARDIAC CATHETERIZATION      CARPAL TUNNEL RELEASE Right     COLOSTOMY      COLOSTOMY CLOSURE      FOOT SURGERY Left     neuroma    HYSTERECTOMY      NASAL SINUS SURGERY      [place] CYSTOURETHROSCOPY N/A 2018    Procedure: CYSTOSCOPY WITH BOTOX;  Surgeon: Abilio Danielle MD;  [facility];  Service: Urology    TONSILLECTOMY      WRIST SURGERY Left       Family History   Problem Relation Age of Onset    Alcohol abuse Mother     Colon cancer Mother     Heart disease Mother     Alcohol abuse Father     Alcohol abuse Brother     Colon cancer Brother      Social History     Tobacco Use    Smoking status: Never Smoker    Smokeless tobacco: Never Used   Substance Use Topics    Alcohol use: No     Comment: rarely     Allergies   Allergen Reactions    Caffeine     Iodine Rash    Latex Rash    Other Rash     Adhesive tape       Current Outpatient Medications on File Prior to Visit   Medication Sig Dispense Refill    Ascorbic Acid (VITAMIN C) 1000 MG tablet Take 1,000 mg by mouth daily      calcium carbonate (OS-JOHN) 1250 (500 Ca) MG tablet Take 1 tablet by mouth daily      cholecalciferol (VITAMIN D3) 1,000 units tablet Take 5,000 Units by mouth daily  Echinacea 450 MG CAPS Take by mouth      escitalopram (LEXAPRO) 10 mg tablet Take 1 tablet (10 mg total) by mouth daily for 90 days 90 tablet 0    loratadine (CLARITIN) 10 mg tablet Take 1 tablet (10 mg total) by mouth daily As needed 90 tablet 1    Mirabegron ER 50 MG TB24 Take 1 tablet (50 mg total) by mouth daily 30 tablet 11     No current facility-administered medications on file prior to visit  Review of Systems   Constitutional: Negative for activity change, diaphoresis, fatigue, fever and unexpected weight change  HENT: Positive for trouble swallowing  Eyes: Negative for visual disturbance  Respiratory: Negative for cough, chest tightness and shortness of breath  Cardiovascular: Negative for chest pain, palpitations and leg swelling  Gastrointestinal: Negative for abdominal pain, blood in stool, constipation, diarrhea, nausea and vomiting  Endocrine: Negative for cold intolerance, heat intolerance, polydipsia, polyphagia and polyuria  Genitourinary: Negative for dysuria, enuresis, frequency and urgency  Musculoskeletal: Negative for arthralgias and myalgias  Skin: Negative for pallor, rash and wound  Allergic/Immunologic: Negative  Neurological: Negative for dizziness, tremors, weakness and numbness  Hematological: Negative  Psychiatric/Behavioral: Negative  Physical Exam:  Body mass index is 33 4 kg/m²  /78   Pulse 64   Ht 5' 2" (1 575 m)   Wt 82 8 kg (182 lb 9 6 oz)   BMI 33 40 kg/m²       Wt Readings from Last 3 Encounters:   02/28/19 82 8 kg (182 lb 9 6 oz)   02/08/19 83 4 kg (183 lb 12 8 oz)   02/06/19 84 1 kg (185 lb 6 4 oz)       Physical Exam   Constitutional: She is oriented to person, place, and time  She appears well-developed and well-nourished  No distress  HENT:   Head: Normocephalic and atraumatic  Eyes: Conjunctivae and EOM are normal    Neck: Normal range of motion  Neck supple  No thyromegaly present     Cardiovascular: Normal rate, regular rhythm and normal heart sounds  No murmur heard  Pulmonary/Chest: Effort normal  No respiratory distress  She has no wheezes  Abdominal: Soft  Bowel sounds are normal  She exhibits no distension  There is no tenderness  Musculoskeletal: Normal range of motion  She exhibits no edema, tenderness or deformity  Neurological: She is alert and oriented to person, place, and time  She has normal reflexes  She displays normal reflexes  No cranial nerve deficit  She exhibits normal muscle tone  Skin: Skin is warm and dry  No rash noted  She is not diaphoretic  No erythema  Psychiatric: She has a normal mood and affect  Her behavior is normal    Vitals reviewed  Diabetic Foot Exam    Labs:   No components found for: HA1C  No components found for: GLU    Lab Results   Component Value Date    CREATININE 0 70 02/06/2019    CREATININE 0 68 07/31/2018    CREATININE 0 67 02/14/2018    BUN 15 02/06/2019     (H) 10/03/2015    K 4 0 02/06/2019     02/06/2019    CO2 27 02/06/2019     eGFR   Date Value Ref Range Status   02/06/2019 82 ml/min/1 73sq m Final     No components found for: [facility]    Lab Results   Component Value Date    CHOL 202 10/03/2015    HDL 76 (H) 02/06/2019    TRIG 58 02/06/2019       Lab Results   Component Value Date    ALT 26 02/06/2019    AST 13 02/06/2019    ALKPHOS 92 02/06/2019    BILITOT 0 48 10/03/2015       Lab Results   Component Value Date    FREET4 1 12 02/06/2019       Impression:  1  Thyromegaly    2  Abnormal thyroid blood test    3  Vitamin D deficiency    4   Pre-diabetes         Plan:     Diagnoses and all orders for this visit:    Thyromegaly  She is clinically and biochemically euthyroid, except she has symptoms of fatigue, which could be multifactorial, I do not believe that she has any thyroid abnormality and we can start her on any medication based on recent blood work results  Free T3 has short half life and it could be low, based on when it is drawn, which usually rely on TSH and free T4  Will order thyroid antibodies to rule out Hashimoto thyroiditis  -     US thyroid; Future  -     Thyroid Antibodies Panel; Future    Abnormal thyroid blood test  Her TSH and free T4 is within normal limits, no need to start any thyroid medication  If her thyroid antibody panel comes back positive she will need thyroid blood work follow-up every 6 months     Vitamin-D deficiency  Dose of vitamin-D was recently increased to 5000 International Units daily, vitamin-D deficiency also could cause fatigue  Her DEXA scan showed osteopenia, currently on vitamin-D and calcium supplementation followed by PCP    Pre diabetes  Discussed to avoid free sugar and high carbohydrate containing food, educated about increasing activity  Discuss she should follow up with PCP for fatigue  -     Ambulatory referral to Endocrinology  -     US thyroid; Future  -     Thyroid Antibodies Panel; Future          Discussed with the patient and all questioned fully answered  She will call me if any problems arise      Counseled patient on diagnostic results, prognosis, risk and benefit of treatment options, instruction for management, importance of treatment compliance, Risk  factor reduction and impressions      Coni Kirk MD

## 2019-02-28 NOTE — PROGRESS NOTES
New Patient Progress Note      Referring Provider  Roger Esparza Md  [address]  [address] [facility], [place] [address]     History of Present Illness:     [de-identified] yr old woman with PMH of pre-diabetes, vitamin d deficiency is here for evaluation of low T3,   She also complains of feeling tired and fatigue  Currently she is taking Lexapro 10 mg daily for anxiety  Her TSH was within normal range, see below, free T4 was also in normal range  She denies family history of thyroid problems    Denies H/o thyroid problems in the past   Denies taking Herbal medications   Complains of difficulty swallowing off and on, she had a swallowing study done recently and it was normal     No History of external radiation to head/neck/chest   No family history of thyroid caner  No recent Iodine loading in form of medication, erbs or kelp supplements or radiological diagnostic studies      She has history of prediabetes, currently following low-carbohydrate diet    For vitamin-D deficiency she is currently taking 5000 International Units daily    Component      Latest Ref Rng & Units 2/6/2019   Cholesterol      50 - 200 mg/dL 198   Triglycerides      <=150 mg/dL 58   HDL      40 - 60 mg/dL 76 (H)   LDL Direct      0 - 100 mg/dL 110 (H)   Non-HDL Cholesterol      mg/dl 122   Hemoglobin A1C      4 2 - 6 3 % 6 0   EAG      mg/dl 126   Vitamin B-12      100 - 900 pg/mL 336   T3, Free      2 30 - 4 20 pg/mL 2 10 (L)   Free T4      0 76 - 1 46 ng/dL 1 12   Folate      3 1 - 17 5 ng/mL 12 2   Iron      50 - 170 ug/dL 94   Vit D, 25-Hydroxy      30 0 - 100 0 ng/mL 25 9 (L)         Component      Latest Ref Rng & Units 2/6/2019   Sodium      136 - 145 mmol/L 139   Potassium      3 5 - 5 3 mmol/L 4 0   Chloride      100 - 108 mmol/L 105   CO2      21 - 32 mmol/L 27   Anion Gap      4 - 13 mmol/L 7   BUN      5 - 25 mg/dL 15   Creatinine      0 60 - 1 30 mg/dL 0 70   GLUCOSE FASTING      65 - 99 mg/dL 100 (H)   Calcium      8 3 - 10 1 mg/dL 8 8   AST 5 - 45 U/L 13   ALT      12 - 78 U/L 26   Alkaline Phosphatase      46 - 116 U/L 92   Total Protein      6 4 - 8 2 g/dL 6 9   Albumin      3 5 - 5 0 g/dL 3 8   TOTAL BILIRUBIN      0 20 - 1 00 mg/dL 0 48   eGFR      ml/min/1 73sq m 82   Cholesterol      50 - 200 mg/dL 198   Triglycerides      <=150 mg/dL 58   HDL      40 - 60 mg/dL 76 (H)   LDL Direct      0 - 100 mg/dL 110 (H)   Non-HDL Cholesterol      mg/dl 122   Hemoglobin A1C      4 2 - 6 3 % 6 0   EAG      mg/dl 126   Vitamin B-12      100 - 900 pg/mL 336   T3, Free      2 30 - 4 20 pg/mL 2 10 (L)   Free T4      0 76 - 1 46 ng/dL 1 12   Folate      3 1 - 17 5 ng/mL 12 2   Iron      50 - 170 ug/dL 94   Vit D, 25-Hydroxy      30 0 - 100 0 ng/mL 25 9 (L)   TSH 3RD GENERATON      0 358 - 3 740 uIU/mL 1 780     Patient Active Problem List   Diagnosis    Incontinence    OAB (overactive bladder)    Tremors of nervous system    Primary insomnia    Fatigue    Seborrheic keratosis    Screening for skin condition    History of skin cancer    Seasonal allergic rhinitis    Sleep disturbance    Fall as cause of accidental injury at home as place of occurrence    Leg wound, left, initial encounter    Occasional tremors    Impaired fasting glucose    Insomnia    Sleep-related breathing disorder    Mood disturbance    Chronic rhinitis    Obesity (BMI 30-39  9)    Claustrophobia    Anxiety    Mitral valve disorder    Chest discomfort    Need for lipid screening    Menopause ovarian failure    Vitamin D deficiency     Past Medical History:   Diagnosis Date    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     Benign neoplasm of skin     Chest pain     Claustrophobia     Diverticulitis     Muscle weakness     Nonmelanoma skin cancer     last assessed 2017    Palpitations     Seasonal allergies     Sleep apnea     no cpap    Spontaneous      without mention of complications     Syncope       Past Surgical History:   Procedure Laterality Date    BOTOX INJECTION N/A 3/6/2017    Procedure: CYSTOSCOPY; BLADDER BOTOX 100 UNITS ;  Surgeon: Alejandro Warner MD;  [facility];  Service:     CARDIAC CATHETERIZATION      CARPAL TUNNEL RELEASE Right     COLOSTOMY      COLOSTOMY CLOSURE      FOOT SURGERY Left     neuroma    HYSTERECTOMY      NASAL SINUS SURGERY      [place] CYSTOURETHROSCOPY N/A 4/13/2018    Procedure: CYSTOSCOPY WITH BOTOX;  Surgeon: Alejandro Warner MD;  [facility];  Service: Urology    TONSILLECTOMY      WRIST SURGERY Left       Family History   Problem Relation Age of Onset    Alcohol abuse Mother     Colon cancer Mother     Heart disease Mother     Alcohol abuse Father     Alcohol abuse Brother     Colon cancer Brother      Social History     Tobacco Use    Smoking status: Never Smoker    Smokeless tobacco: Never Used   Substance Use Topics    Alcohol use: No     Comment: rarely     Allergies   Allergen Reactions    Caffeine     Iodine Rash    Latex Rash    Other Rash     Adhesive tape       Current Outpatient Medications on File Prior to Visit   Medication Sig Dispense Refill    Ascorbic Acid (VITAMIN C) 1000 MG tablet Take 1,000 mg by mouth daily      calcium carbonate (OS-JONH) 1250 (500 Ca) MG tablet Take 1 tablet by mouth daily      cholecalciferol (VITAMIN D3) 1,000 units tablet Take 5,000 Units by mouth daily       Echinacea 450 MG CAPS Take by mouth      escitalopram (LEXAPRO) 10 mg tablet Take 1 tablet (10 mg total) by mouth daily for 90 days 90 tablet 0    loratadine (CLARITIN) 10 mg tablet Take 1 tablet (10 mg total) by mouth daily As needed 90 tablet 1    Mirabegron ER 50 MG TB24 Take 1 tablet (50 mg total) by mouth daily 30 tablet 11     No current facility-administered medications on file prior to visit  Review of Systems   Constitutional: Negative for activity change, diaphoresis, fatigue, fever and unexpected weight change     HENT: Positive for trouble swallowing  Eyes: Negative for visual disturbance  Respiratory: Negative for cough, chest tightness and shortness of breath  Cardiovascular: Negative for chest pain, palpitations and leg swelling  Gastrointestinal: Negative for abdominal pain, blood in stool, constipation, diarrhea, nausea and vomiting  Endocrine: Negative for cold intolerance, heat intolerance, polydipsia, polyphagia and polyuria  Genitourinary: Negative for dysuria, enuresis, frequency and urgency  Musculoskeletal: Negative for arthralgias and myalgias  Skin: Negative for pallor, rash and wound  Allergic/Immunologic: Negative  Neurological: Negative for dizziness, tremors, weakness and numbness  Hematological: Negative  Psychiatric/Behavioral: Negative  Physical Exam:  Body mass index is 33 4 kg/m²  /78   Pulse 64   Ht 5' 2" (1 575 m)   Wt 82 8 kg (182 lb 9 6 oz)   BMI 33 40 kg/m²      Wt Readings from Last 3 Encounters:   02/28/19 82 8 kg (182 lb 9 6 oz)   02/08/19 83 4 kg (183 lb 12 8 oz)   02/06/19 84 1 kg (185 lb 6 4 oz)       Physical Exam   Constitutional: She is oriented to person, place, and time  She appears well-developed and well-nourished  No distress  HENT:   Head: Normocephalic and atraumatic  Eyes: Conjunctivae and EOM are normal    Neck: Normal range of motion  Neck supple  No thyromegaly present  Cardiovascular: Normal rate, regular rhythm and normal heart sounds  No murmur heard  Pulmonary/Chest: Effort normal  No respiratory distress  She has no wheezes  Abdominal: Soft  Bowel sounds are normal  She exhibits no distension  There is no tenderness  Musculoskeletal: Normal range of motion  She exhibits no edema, tenderness or deformity  Neurological: She is alert and oriented to person, place, and time  She has normal reflexes  She displays normal reflexes  No cranial nerve deficit  She exhibits normal muscle tone  Skin: Skin is warm and dry   No rash noted  She is not diaphoretic  No erythema  Psychiatric: She has a normal mood and affect  Her behavior is normal    Vitals reviewed  Diabetic Foot Exam    Labs:   No components found for: HA1C  No components found for: GLU    Lab Results   Component Value Date    CREATININE 0 70 02/06/2019    CREATININE 0 68 07/31/2018    CREATININE 0 67 02/14/2018    BUN 15 02/06/2019     (H) 10/03/2015    K 4 0 02/06/2019     02/06/2019    CO2 27 02/06/2019     eGFR   Date Value Ref Range Status   02/06/2019 82 ml/min/1 73sq m Final     No components found for: [facility]    Lab Results   Component Value Date    CHOL 202 10/03/2015    HDL 76 (H) 02/06/2019    TRIG 58 02/06/2019       Lab Results   Component Value Date    ALT 26 02/06/2019    AST 13 02/06/2019    ALKPHOS 92 02/06/2019    BILITOT 0 48 10/03/2015       Lab Results   Component Value Date    FREET4 1 12 02/06/2019       Impression:  1  Thyromegaly    2  Abnormal thyroid blood test    3  Vitamin D deficiency    4  Pre-diabetes         Plan:     Diagnoses and all orders for this visit:    Thyromegaly  She is clinically and biochemically euthyroid, except she has symptoms of fatigue, which could be multifactorial, I do not believe that she has any thyroid abnormality and we can start her on any medication based on recent blood work results  Free T3 has short half life and it could be low, based on when it is drawn, which usually rely on TSH and free T4  Will order thyroid antibodies to rule out Hashimoto thyroiditis  -     US thyroid; Future  -     Thyroid Antibodies Panel;  Future    Abnormal thyroid blood test  Her TSH and free T4 is within normal limits, no need to start any thyroid medication  If her thyroid antibody panel comes back positive she will need thyroid blood work follow-up every 6 months     Vitamin-D deficiency  Dose of vitamin-D was recently increased to 5000 International Units daily, vitamin-D deficiency also could cause fatigue  Her DEXA scan showed osteopenia, currently on vitamin-D and calcium supplementation followed by PCP    Pre diabetes  Discussed to avoid free sugar and high carbohydrate containing food, educated about increasing activity  Discuss she should follow up with PCP for fatigue  -     Ambulatory referral to Endocrinology  -     US thyroid; Future  -     Thyroid Antibodies Panel; Future          Discussed with the patient and all questioned fully answered  She will call me if any problems arise      Counseled patient on diagnostic results, prognosis, risk and benefit of treatment options, instruction for management, importance of treatment compliance, Risk  factor reduction and impressions      Ariana Guzman MD

## 2019-03-05 ENCOUNTER — LAB (OUTPATIENT)
Dept: LAB | Facility: CLINIC | Age: 79
End: 2019-03-05
Payer: COMMERCIAL

## 2019-03-05 DIAGNOSIS — R79.89 ABNORMAL THYROID BLOOD TEST: ICD-10-CM

## 2019-03-05 DIAGNOSIS — E01.0 THYROMEGALY: ICD-10-CM

## 2019-03-05 PROCEDURE — 86376 MICROSOMAL ANTIBODY EACH: CPT

## 2019-03-05 PROCEDURE — 86800 THYROGLOBULIN ANTIBODY: CPT

## 2019-03-05 PROCEDURE — 36415 COLL VENOUS BLD VENIPUNCTURE: CPT

## 2019-03-06 ENCOUNTER — TELEPHONE (OUTPATIENT)
Dept: ENDOCRINOLOGY | Facility: CLINIC | Age: 79
End: 2019-03-06

## 2019-03-06 LAB
THYROGLOB AB SERPL-ACNC: <1 IU/ML (ref 0–0.9)
THYROPEROXIDASE AB SERPL-ACNC: 9 IU/ML (ref 0–34)

## 2019-03-06 NOTE — TELEPHONE ENCOUNTER
----- Message from Mateusz Mitchell MD sent at 3/6/2019  8:47 AM EST -----  Please call the patient regarding her thyroid antibody results, they are negative    Her thyroid function test is within normal range, she should follow up with her PCP

## 2019-03-07 ENCOUNTER — PROCEDURE VISIT (OUTPATIENT)
Dept: UROLOGY | Facility: CLINIC | Age: 79
End: 2019-03-07
Payer: COMMERCIAL

## 2019-03-07 VITALS
HEIGHT: 62 IN | SYSTOLIC BLOOD PRESSURE: 132 MMHG | WEIGHT: 184 LBS | DIASTOLIC BLOOD PRESSURE: 80 MMHG | BODY MASS INDEX: 33.86 KG/M2 | HEART RATE: 84 BPM

## 2019-03-07 DIAGNOSIS — R35.0 FREQUENCY OF URINATION: ICD-10-CM

## 2019-03-07 DIAGNOSIS — N39.41 URGE INCONTINENCE: ICD-10-CM

## 2019-03-07 DIAGNOSIS — R39.15 URINARY URGENCY: ICD-10-CM

## 2019-03-07 PROCEDURE — 99213 OFFICE O/P EST LOW 20 MIN: CPT | Performed by: PHYSICIAN ASSISTANT

## 2019-03-07 NOTE — PROGRESS NOTES
1  Urge incontinence  Posterior Tibial Nerve Stimulation   2  Frequency of urination  Posterior Tibial Nerve Stimulation   3  Urinary urgency  Posterior Tibial Nerve Stimulation     Assessment and plan:       1  overactive bladder s/p bladder botox (3/2017, 4/2018) and tibial nerve stimulation - managed by Dr Cait Johnson  - patient is doing well at this time on combination Myrbetriq 50 mg daily and monthly maintenance PTNS therapy  She will continue with her monthly maintenance treatments  - patient is aware to contact us should she notice any symptoms change in the interim  All questions answered  Ronak Smith PA-C      Chief Complaint    f/u OAB    History of Present Illness     Jcarlos Enriquez is a [de-identified] y o  female patient of Dr Cait Johnson with a history of medically refractory overactive bladder s/p bladder botox (3/2017, 4/2018) and tibial nerve stimulation presenting for follow up      Patient has a longstanding history of medically refractory overactive bladder  Patient initially on underwent the bladder Botox 100 units back in March 2017   She had excellent response   She underwent repeat bladder Botox in March 2018 with 200 units and unfortunately result only lasted 1 5  weeks   Patient has since been started on Myrbetriq 50 mg and recent completed 12/12 PTNS  Currently on 7/24 maintenance  Patient had a brief disruption and Myrbetriq use due to insurance reasons  She was transitioned to oxybutynin however had worsening of urinary symptoms  Has since transition back to Myrbetriq as of the new year  Patient feels like things are well controlled at this time  Does have occasional as urgency, however has minimal incontinence for the past month  She feels like her symptoms are l controlled on the PTNS and Myrbetriq combination therapy      Laboratory     Lab Results   Component Value Date    CREATININE 0 70 02/06/2019       Review of Systems     Review of Systems   Constitutional: Negative for activity change, appetite change, chills, diaphoresis, fatigue, fever and unexpected weight change  Respiratory: Negative for chest tightness and shortness of breath  Cardiovascular: Negative for chest pain, palpitations and leg swelling  Gastrointestinal: Negative for abdominal distention, abdominal pain, constipation, diarrhea, nausea and vomiting  Genitourinary: Positive for frequency  Negative for decreased urine volume, difficulty urinating, dysuria, enuresis, flank pain, genital sores, hematuria and urgency  Musculoskeletal: Negative for back pain, gait problem and myalgias  Skin: Negative for color change, pallor, rash and wound  Psychiatric/Behavioral: Negative for behavioral problems  The patient is not nervous/anxious  Allergies     Allergies   Allergen Reactions    Caffeine     Iodine Rash    Latex Rash    Other Rash     Adhesive tape         Physical Exam     Physical Exam   Constitutional: She is oriented to person, place, and time  She appears well-developed and well-nourished  No distress  HENT:   Head: Normocephalic and atraumatic  Eyes: Conjunctivae are normal    Neck: Normal range of motion  No tracheal deviation present  Pulmonary/Chest: Effort normal    Musculoskeletal: Normal range of motion  She exhibits no edema or deformity  Neurological: She is alert and oriented to person, place, and time  Skin: Skin is warm and dry  She is not diaphoretic  No erythema  No pallor  Psychiatric: She has a normal mood and affect   Her behavior is normal          Vital Signs     Vitals:    03/07/19 1016   BP: 132/80   BP Location: Left arm   Patient Position: Sitting   Cuff Size: Standard   Pulse: 84   Weight: 83 5 kg (184 lb)   Height: 5' 2" (1 575 m)         Current Medications       Current Outpatient Medications:     Ascorbic Acid (VITAMIN C) 1000 MG tablet, Take 1,000 mg by mouth daily, Disp: , Rfl:     calcium carbonate (OS-JOHN) 1250 (500 Ca) MG tablet, Take 1 tablet by mouth daily, Disp: , Rfl:     cholecalciferol (VITAMIN D3) 1,000 units tablet, Take 5,000 Units by mouth daily , Disp: , Rfl:     Echinacea 450 MG CAPS, Take by mouth, Disp: , Rfl:     escitalopram (LEXAPRO) 10 mg tablet, Take 1 tablet (10 mg total) by mouth daily for 90 days, Disp: 90 tablet, Rfl: 0    loratadine (CLARITIN) 10 mg tablet, Take 1 tablet (10 mg total) by mouth daily As needed, Disp: 90 tablet, Rfl: 1    Mirabegron ER 50 MG TB24, Take 1 tablet (50 mg total) by mouth daily, Disp: 30 tablet, Rfl: 11      Active Problems     Patient Active Problem List   Diagnosis    Incontinence    OAB (overactive bladder)    Tremors of nervous system    Primary insomnia    Fatigue    Seborrheic keratosis    Screening for skin condition    History of skin cancer    Seasonal allergic rhinitis    Sleep disturbance    Fall as cause of accidental injury at home as place of occurrence    Leg wound, left, initial encounter    Occasional tremors    Impaired fasting glucose    Insomnia    Sleep-related breathing disorder    Mood disturbance    Chronic rhinitis    Obesity (BMI 30-39  9)    Claustrophobia    Anxiety    Mitral valve disorder    Chest discomfort    Need for lipid screening    Menopause ovarian failure    Vitamin D deficiency         Past Medical History     Past Medical History:   Diagnosis Date    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     Benign neoplasm of skin     Chest pain     Claustrophobia     Diverticulitis     Muscle weakness     Nonmelanoma skin cancer     last assessed 2017    Palpitations     Seasonal allergies     Sleep apnea     no cpap    Spontaneous      without mention of complications     Syncope          Surgical History     Past Surgical History:   Procedure Laterality Date    BOTOX INJECTION N/A 3/6/2017    Procedure: CYSTOSCOPY; BLADDER BOTOX 100 UNITS ;  Surgeon: Daniel Bernard MD;  [facility];  Service:     CARDIAC CATHETERIZATION      CARPAL TUNNEL RELEASE Right     COLOSTOMY      COLOSTOMY CLOSURE      FOOT SURGERY Left     neuroma    HYSTERECTOMY      NASAL SINUS SURGERY      [place] CYSTOURETHROSCOPY N/A 4/13/2018    Procedure: CYSTOSCOPY WITH BOTOX;  Surgeon: Curt Abdalla MD;  [facility];  Service: Urology    TONSILLECTOMY      WRIST SURGERY Left          Family History     Family History   Problem Relation Age of Onset    Alcohol abuse Mother     Colon cancer Mother     Heart disease Mother     Alcohol abuse Father     Alcohol abuse Brother     Colon cancer Brother          Social History     Social History       Radiology

## 2019-03-07 NOTE — PROGRESS NOTES
3/7/2019    Marga Lin  1940  [id number]    Diagnosis  Chief Complaint     Urinary Incontinence; Urinary Urgency        Patient presents for PTNS  managed by [facility]  Per Yarely Ponce PA-C note    Urinary Incontinence Screening      Most Recent Value   Urinary Incontinence   Urinary Incontinence? No   Incomplete emptying? No   Urinary frequency? Yes [in the AM]   Urinary urgency? Yes   Urinary hesitancy? No   Dysuria (painful difficult urination)? No   Nocturia (waking up to use the bathroom)? No   Straining (having to push to go)? Yes   Weak stream?  No   Intermittent stream?  No   Post void dribbling? No   Vaginal pressure? No   Vaginal dryness?   No            Procedure PTNS  Session 7 of 24    Ankle used: right  Treatment settin  Duration of treatment: 30 minutes  Lead lot #:[id number]   Expiration Date: 2021  Feeling/Response: foot and big toe sensation        George Ramos RN

## 2019-03-08 ENCOUNTER — HOSPITAL ENCOUNTER (OUTPATIENT)
Dept: ULTRASOUND IMAGING | Facility: HOSPITAL | Age: 79
Discharge: HOME | End: 2019-03-08
Attending: INTERNAL MEDICINE
Payer: COMMERCIAL

## 2019-03-08 DIAGNOSIS — R79.89 ABNORMAL THYROID BLOOD TEST: ICD-10-CM

## 2019-03-08 DIAGNOSIS — E01.0 THYROMEGALY: ICD-10-CM

## 2019-03-08 PROCEDURE — 76536 US EXAM OF HEAD AND NECK: CPT

## 2019-03-12 ENCOUNTER — TELEPHONE (OUTPATIENT)
Dept: ENDOCRINOLOGY | Facility: CLINIC | Age: 79
End: 2019-03-12

## 2019-03-12 NOTE — TELEPHONE ENCOUNTER
----- Message from Cornelius Wang MD sent at 3/12/2019  1:35 PM EDT -----  Please call the patient regarding her thyroid ultrasound result, ultrasound showed small thyroid nodules, which do not meet criteria for biopsy, she has follow-up appointment with endo, she should keep her appointment

## 2019-03-14 ENCOUNTER — OFFICE VISIT (OUTPATIENT)
Dept: ENDOCRINOLOGY | Facility: CLINIC | Age: 79
End: 2019-03-14
Payer: COMMERCIAL

## 2019-03-14 VITALS
BODY MASS INDEX: 34.04 KG/M2 | DIASTOLIC BLOOD PRESSURE: 84 MMHG | WEIGHT: 185 LBS | HEIGHT: 62 IN | HEART RATE: 86 BPM | SYSTOLIC BLOOD PRESSURE: 130 MMHG

## 2019-03-14 DIAGNOSIS — R79.89 ABNORMAL THYROID BLOOD TEST: ICD-10-CM

## 2019-03-14 DIAGNOSIS — R13.10 DYSPHAGIA, UNSPECIFIED TYPE: Primary | ICD-10-CM

## 2019-03-14 DIAGNOSIS — E04.2 MULTIPLE THYROID NODULES: ICD-10-CM

## 2019-03-14 DIAGNOSIS — E01.0 THYROMEGALY: ICD-10-CM

## 2019-03-14 PROCEDURE — 99214 OFFICE O/P EST MOD 30 MIN: CPT | Performed by: PHYSICIAN ASSISTANT

## 2019-03-14 NOTE — ASSESSMENT & PLAN NOTE
Swallowing Study abnormal-- Refer to GI For evaluation  Thyroid nodules small and do not meet criteria for FNA or Repeat Ultrasound

## 2019-03-14 NOTE — ASSESSMENT & PLAN NOTE
Nodules do not meet criteria for FNA or repeat ultrasound  If she continues with dysphagia after GI evaluation she will plan to schedule follow up appt and will repeat thyroid ultrasound if needed

## 2019-03-14 NOTE — PROGRESS NOTES
Established Patient Progress Note       Chief Complaint   Patient presents with    Abnormal Endocrine Labs    Thyroid Problem        History of Present Illness:     Jumana Rajput is a [de-identified] y o  female with a history of Abnormal thyroid lab tests and thyroid nodules  She was recent seen by Dr Mariah Maurer 2/28/2019- see her note for complete evaluation  She is here to get another opinion regarding thyroid nodules and abnormal thyroid function test     She recently had thyroid ultrasound which  Showed 2 small nodules  She reports that she has had  difficulty swallowing and that throat feels like it closes/spasms and unable to swallow  This can cause her to cough or choke  IT does not give her any difficulty with breathing  She denies FH of thyroid cancer but her daughter did have a Goiter removed  She had a recent swallowing study performed 2/19/2019 which did show prominent cricopharyngeal muscle with decreased opening resulting in small amounts of food/liquid boluses becoming stasis above muscle concerning for formation of small zenker's diverticulum  Also following with ENT- Dr Mercado Areas  Concerned about her symptoms may be related to hypothyroidism  TSH and Free T4 were normal  Thyroid antibodies were normal   T3 was slightly low  She reports fatigue, weakness, thin hair, brittle nails, cold intolerance, and inability to lose weight  Follows with PCP for Vitamin D Deficiency and Prediabetes   Follows with sleep specialist for BRANDON      Patient Active Problem List   Diagnosis    Incontinence    OAB (overactive bladder)    Tremors of nervous system    Primary insomnia    Fatigue    Seborrheic keratosis    Screening for skin condition    History of skin cancer    Seasonal allergic rhinitis    Sleep disturbance    Fall as cause of accidental injury at home as place of occurrence    Leg wound, left, initial encounter    Occasional tremors    Impaired fasting glucose    Insomnia    Sleep-related breathing disorder    Mood disturbance    Chronic rhinitis    Obesity (BMI 30-39  9)    Claustrophobia    Anxiety    Mitral valve disorder    Chest discomfort    Need for lipid screening    Menopause ovarian failure    Vitamin D deficiency    Dysphagia    Multiple thyroid nodules    Abnormal thyroid blood test      Past Medical History:   Diagnosis Date    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     Benign neoplasm of skin     Chest pain     Claustrophobia     Diverticulitis     Muscle weakness     Nonmelanoma skin cancer     last assessed 2017    Palpitations     Seasonal allergies     Sleep apnea     no cpap    Spontaneous      without mention of complications     Syncope       Past Surgical History:   Procedure Laterality Date    BOTOX INJECTION N/A 3/6/2017    Procedure: CYSTOSCOPY; BLADDER BOTOX 100 UNITS ;  Surgeon: Chucho Bush MD;  [facility];  Service:     CARDIAC CATHETERIZATION      CARPAL TUNNEL RELEASE Right     COLOSTOMY      COLOSTOMY CLOSURE      FOOT SURGERY Left     neuroma    HYSTERECTOMY      NASAL SINUS SURGERY      TX CYSTOURETHROSCOPY N/A 2018    Procedure: CYSTOSCOPY WITH BOTOX;  Surgeon: Chucho Bush MD;  [facility]: [facility];  Service: Urology    TONSILLECTOMY      WRIST SURGERY Left       Family History   Problem Relation Age of Onset    Alcohol abuse Mother     Colon cancer Mother     Heart disease Mother     Alcohol abuse Father     Alcohol abuse Brother     Colon cancer Brother      Social History     Tobacco Use    Smoking status: Never Smoker    Smokeless tobacco: Never Used   Substance Use Topics    Alcohol use: No     Comment: rarely     Allergies   Allergen Reactions    Caffeine     Iodine Rash    Latex Rash    Other Rash     Adhesive tape         Current Outpatient Medications:     Ascorbic Acid (VITAMIN C) 1000 MG tablet, Take 1,000 mg by mouth daily, Disp: , Rfl:     calcium carbonate (OS-JOHN) 1250 (500 Ca) MG tablet, Take 1 tablet by mouth daily, Disp: , Rfl:     cholecalciferol (VITAMIN D3) 1,000 units tablet, Take 5,000 Units by mouth daily , Disp: , Rfl:     Echinacea 450 MG CAPS, Take by mouth, Disp: , Rfl:     escitalopram (LEXAPRO) 10 mg tablet, Take 1 tablet (10 mg total) by mouth daily for 90 days, Disp: 90 tablet, Rfl: 0    loratadine (CLARITIN) 10 mg tablet, Take 1 tablet (10 mg total) by mouth daily As needed, Disp: 90 tablet, Rfl: 1    Mirabegron ER 50 MG TB24, Take 1 tablet (50 mg total) by mouth daily, Disp: 30 tablet, Rfl: 11    Review of Systems   Constitutional: Negative for activity change, appetite change, chills, diaphoresis, fatigue, fever and unexpected weight change  HENT: Negative for trouble swallowing and voice change  Eyes: Negative for visual disturbance  Respiratory: Negative for shortness of breath  Cardiovascular: Negative for chest pain and palpitations  Gastrointestinal: Negative for abdominal pain, constipation and diarrhea  Endocrine: Negative for cold intolerance, heat intolerance, polydipsia, polyphagia and polyuria  Genitourinary: Negative for frequency and menstrual problem  Musculoskeletal: Negative for arthralgias and myalgias  Skin: Negative for rash  Allergic/Immunologic: Negative for food allergies  Neurological: Negative for dizziness and tremors  Hematological: Negative for adenopathy  Psychiatric/Behavioral: Negative for sleep disturbance  All other systems reviewed and are negative  Physical Exam:  Body mass index is 33 84 kg/m²  /84   Pulse 86   Ht 5' 2" (1 575 m)   Wt 83 9 kg (185 lb)   BMI 33 84 kg/m²    Wt Readings from Last 3 Encounters:   03/14/19 83 9 kg (185 lb)   03/07/19 83 5 kg (184 lb)   02/28/19 82 8 kg (182 lb 9 6 oz)       Physical Exam   Constitutional: She is oriented to person, place, and time  She appears well-developed and well-nourished   No distress  HENT:   Head: Normocephalic and atraumatic  Eyes: Pupils are equal, round, and reactive to light  Conjunctivae are normal    Neck: Normal range of motion  Neck supple  No thyromegaly present  Cardiovascular: Normal rate, regular rhythm and normal heart sounds  Pulmonary/Chest: Effort normal and breath sounds normal  No respiratory distress  She has no wheezes  She has no rales  Abdominal: Soft  Bowel sounds are normal  She exhibits no distension  There is no tenderness  Musculoskeletal: Normal range of motion  She exhibits no edema  Neurological: She is alert and oriented to person, place, and time  Skin: Skin is warm and dry  Psychiatric: She has a normal mood and affect  Vitals reviewed  Labs:   Component      Latest Ref Rng & Units 4/26/2017 10/16/2017 2/14/2018   TSH 3RD GENERATON      0 358 - 3 740 uIU/mL 1 850 2 160 1 620   T4 TOTAL      4 7 - 13 3 ug/dL 10 8     TSH Baseline      uIU/mL      T3, Free      2 30 - 4 20 pg/mL      Free T4      0 76 - 1 46 ng/dL      THYROID MICROSOMAL ANTIBODY      0 - 34 IU/mL      THYROGLOBULIN AB      0 0 - 0 9 IU/mL        Component      Latest Ref Rng & Units 7/31/2018 2/6/2019 3/5/2019   TSH 3RD GENERATON      0 358 - 3 740 uIU/mL  1 780    T4 TOTAL      4 7 - 13 3 ug/dL      TSH Baseline      uIU/mL 1 960     T3, Free      2 30 - 4 20 pg/mL  2 10 (L)    Free T4      0 76 - 1 46 ng/dL  1 12    THYROID MICROSOMAL ANTIBODY      0 - 34 IU/mL   9   THYROGLOBULIN AB      0 0 - 0 9 IU/mL   <1 0           Impression & Plan:    Problem List Items Addressed This Visit        Digestive    Dysphagia - Primary     Swallowing Study abnormal-- Refer to GI For evaluation  Thyroid nodules small and do not meet criteria for FNA or Repeat Ultrasound  Relevant Orders    Ambulatory referral to Gastroenterology       Endocrine    Multiple thyroid nodules     Nodules do not meet criteria for FNA or repeat ultrasound    If she continues with dysphagia after GI evaluation she will plan to schedule follow up appt and will repeat thyroid ultrasound if needed  Other    Abnormal thyroid blood test     TSH/FreeT4/Thyroid Antibodies are normal   Would not diagnose or treat for hypothyroidism based on low Free T3 level  Will repeat testing in 6 months  Follow up with family physician for continued care and schedule endocrine follow up if needed  Relevant Orders    T4, free Lab Collect    T3 Lab Collect    TSH, 3rd generation Lab Collect      Other Visit Diagnoses     Thyromegaly        Relevant Orders    T4, free Lab Collect    T3 Lab Collect    TSH, 3rd generation Lab Collect          Orders Placed This Encounter   Procedures    T4, free Lab Collect     Standing Status:   Future     Standing Expiration Date:   3/14/2020    T3 Lab Collect     Standing Status:   Future     Standing Expiration Date:   3/14/2020    TSH, 3rd generation Lab Collect     This is a patient instruction: This test is non-fasting  Please drink two glasses of water morning of bloodwork  Standing Status:   Future     Standing Expiration Date:   3/14/2020    Ambulatory referral to Gastroenterology     Standing Status:   Future     Standing Expiration Date:   9/14/2019     Referral Priority:   Routine     Referral Type:   Consult - AMB     Referral Reason:   Specialty Services Required     Referred to Provider:   Danelle Bay DO     Requested Specialty:   Gastroenterology     Number of Visits Requested:   1     Expiration Date:   3/14/2020       There are no Patient Instructions on file for this visit  Discussed with the patient and all questioned fully answered  She will call me if any problems arise        Follow-up appointment if needed      Counseled patient on diagnostic results, prognosis, risk and benefit of treatment options, instruction for management, importance of treatment compliance, Risk  factor reduction and impressions      Aneta Closs, PA-C

## 2019-03-14 NOTE — ASSESSMENT & PLAN NOTE
TSH/FreeT4/Thyroid Antibodies are normal   Would not diagnose or treat for hypothyroidism based on low Free T3 level  Will repeat testing in 6 months  Follow up with family physician for continued care and schedule endocrine follow up if needed

## 2019-04-01 ENCOUNTER — CONSULT (OUTPATIENT)
Dept: NEUROLOGY | Facility: CLINIC | Age: 79
End: 2019-04-01
Payer: COMMERCIAL

## 2019-04-01 VITALS
WEIGHT: 180 LBS | DIASTOLIC BLOOD PRESSURE: 82 MMHG | HEIGHT: 62 IN | HEART RATE: 77 BPM | SYSTOLIC BLOOD PRESSURE: 128 MMHG | BODY MASS INDEX: 33.13 KG/M2

## 2019-04-01 DIAGNOSIS — G20 PARKINSON'S DISEASE (HCC): Primary | ICD-10-CM

## 2019-04-01 PROBLEM — G20.A1 PARKINSON'S DISEASE: Status: ACTIVE | Noted: 2018-03-26

## 2019-04-01 PROCEDURE — 99204 OFFICE O/P NEW MOD 45 MIN: CPT | Performed by: PSYCHIATRY & NEUROLOGY

## 2019-04-02 ENCOUNTER — OFFICE VISIT (OUTPATIENT)
Dept: GASTROENTEROLOGY | Facility: CLINIC | Age: 79
End: 2019-04-02
Payer: COMMERCIAL

## 2019-04-02 VITALS
BODY MASS INDEX: 33.51 KG/M2 | WEIGHT: 183.2 LBS | SYSTOLIC BLOOD PRESSURE: 118 MMHG | HEART RATE: 111 BPM | DIASTOLIC BLOOD PRESSURE: 80 MMHG

## 2019-04-02 DIAGNOSIS — R13.10 DYSPHAGIA, UNSPECIFIED TYPE: ICD-10-CM

## 2019-04-02 PROBLEM — K21.9 GASTRO-ESOPHAGEAL REFLUX DISEASE WITHOUT ESOPHAGITIS: Status: ACTIVE | Noted: 2019-04-02

## 2019-04-02 PROCEDURE — 99204 OFFICE O/P NEW MOD 45 MIN: CPT | Performed by: PHYSICIAN ASSISTANT

## 2019-04-11 ENCOUNTER — HOSPITAL ENCOUNTER (OUTPATIENT)
Dept: GASTROENTEROLOGY | Facility: HOSPITAL | Age: 79
Discharge: HOME | End: 2019-04-11
Payer: COMMERCIAL

## 2019-04-11 VITALS
OXYGEN SATURATION: 96 % | HEART RATE: 68 BPM | DIASTOLIC BLOOD PRESSURE: 84 MMHG | TEMPERATURE: 98 F | RESPIRATION RATE: 16 BRPM | SYSTOLIC BLOOD PRESSURE: 183 MMHG

## 2019-04-11 DIAGNOSIS — R13.10 DYSPHAGIA, UNSPECIFIED TYPE: ICD-10-CM

## 2019-04-11 PROCEDURE — 91020 GASTRIC MOTILITY STUDIES: CPT

## 2019-04-12 ENCOUNTER — PROCEDURE VISIT (OUTPATIENT)
Dept: UROLOGY | Facility: CLINIC | Age: 79
End: 2019-04-12
Payer: COMMERCIAL

## 2019-04-12 ENCOUNTER — OFFICE VISIT (OUTPATIENT)
Dept: DERMATOLOGY | Facility: CLINIC | Age: 79
End: 2019-04-12
Payer: COMMERCIAL

## 2019-04-12 VITALS
HEIGHT: 62 IN | WEIGHT: 182 LBS | BODY MASS INDEX: 33.49 KG/M2 | HEART RATE: 70 BPM | SYSTOLIC BLOOD PRESSURE: 130 MMHG | DIASTOLIC BLOOD PRESSURE: 80 MMHG

## 2019-04-12 DIAGNOSIS — R39.15 URINARY URGENCY: ICD-10-CM

## 2019-04-12 DIAGNOSIS — Z13.89 SCREENING FOR SKIN CONDITION: ICD-10-CM

## 2019-04-12 DIAGNOSIS — L82.1 SEBORRHEIC KERATOSIS: ICD-10-CM

## 2019-04-12 DIAGNOSIS — R35.0 FREQUENCY OF URINATION: ICD-10-CM

## 2019-04-12 DIAGNOSIS — N39.41 URGE INCONTINENCE: ICD-10-CM

## 2019-04-12 DIAGNOSIS — Z85.828 HISTORY OF SKIN CANCER: ICD-10-CM

## 2019-04-12 DIAGNOSIS — L72.9 FOLLICULAR CYST OF SKIN: Primary | ICD-10-CM

## 2019-04-12 PROCEDURE — 99213 OFFICE O/P EST LOW 20 MIN: CPT | Performed by: DERMATOLOGY

## 2019-04-12 PROCEDURE — 64566 NEUROELTRD STIM POST TIBIAL: CPT

## 2019-04-12 RX ORDER — METHYLDOPA/HYDROCHLOROTHIAZIDE 250MG-25MG
TABLET ORAL
COMMUNITY

## 2019-04-12 RX ORDER — FEXOFENADINE HCL 180 MG/1
180 TABLET ORAL AS NEEDED
COMMUNITY

## 2019-04-16 ENCOUNTER — OFFICE VISIT (OUTPATIENT)
Dept: DERMATOLOGY | Facility: CLINIC | Age: 79
End: 2019-04-16
Payer: COMMERCIAL

## 2019-04-16 DIAGNOSIS — L72.9 FOLLICULAR CYST OF SKIN: Primary | ICD-10-CM

## 2019-04-16 PROCEDURE — 88304 TISSUE EXAM BY PATHOLOGIST: CPT | Performed by: PATHOLOGY

## 2019-04-16 PROCEDURE — 11421 EXC H-F-NK-SP B9+MARG 0.6-1: CPT | Performed by: DERMATOLOGY

## 2019-04-22 ENCOUNTER — TELEPHONE (OUTPATIENT)
Dept: DERMATOLOGY | Facility: CLINIC | Age: 79
End: 2019-04-22

## 2019-04-22 ENCOUNTER — ANESTHESIA EVENT (OUTPATIENT)
Dept: PERIOP | Facility: HOSPITAL | Age: 79
End: 2019-04-22
Payer: COMMERCIAL

## 2019-04-23 ENCOUNTER — ANESTHESIA (OUTPATIENT)
Dept: PERIOP | Facility: HOSPITAL | Age: 79
End: 2019-04-23
Payer: COMMERCIAL

## 2019-04-23 ENCOUNTER — TELEPHONE (OUTPATIENT)
Dept: GASTROENTEROLOGY | Facility: CLINIC | Age: 79
End: 2019-04-23

## 2019-04-23 ENCOUNTER — HOSPITAL ENCOUNTER (OUTPATIENT)
Facility: HOSPITAL | Age: 79
Setting detail: OUTPATIENT SURGERY
Discharge: HOME | End: 2019-04-23
Attending: INTERNAL MEDICINE | Admitting: INTERNAL MEDICINE
Payer: COMMERCIAL

## 2019-04-23 VITALS
DIASTOLIC BLOOD PRESSURE: 63 MMHG | TEMPERATURE: 97.2 F | HEIGHT: 62 IN | SYSTOLIC BLOOD PRESSURE: 157 MMHG | RESPIRATION RATE: 16 BRPM | HEART RATE: 91 BPM | WEIGHT: 183.42 LBS | BODY MASS INDEX: 33.75 KG/M2 | OXYGEN SATURATION: 99 %

## 2019-04-23 DIAGNOSIS — K21.9 GASTRO-ESOPHAGEAL REFLUX DISEASE WITHOUT ESOPHAGITIS: ICD-10-CM

## 2019-04-23 PROCEDURE — 43248 EGD GUIDE WIRE INSERTION: CPT | Performed by: INTERNAL MEDICINE

## 2019-04-23 PROCEDURE — 43239 EGD BIOPSY SINGLE/MULTIPLE: CPT | Performed by: INTERNAL MEDICINE

## 2019-04-23 PROCEDURE — 88305 TISSUE EXAM BY PATHOLOGIST: CPT | Performed by: PATHOLOGY

## 2019-04-23 PROCEDURE — NC001 PR NO CHARGE: Performed by: INTERNAL MEDICINE

## 2019-04-23 RX ORDER — SODIUM CHLORIDE, SODIUM LACTATE, POTASSIUM CHLORIDE, CALCIUM CHLORIDE 600; 310; 30; 20 MG/100ML; MG/100ML; MG/100ML; MG/100ML
INJECTION, SOLUTION INTRAVENOUS CONTINUOUS PRN
Status: DISCONTINUED | OUTPATIENT
Start: 2019-04-23 | End: 2019-04-23 | Stop reason: SURG

## 2019-04-23 RX ORDER — SODIUM CHLORIDE, SODIUM LACTATE, POTASSIUM CHLORIDE, CALCIUM CHLORIDE 600; 310; 30; 20 MG/100ML; MG/100ML; MG/100ML; MG/100ML
125 INJECTION, SOLUTION INTRAVENOUS CONTINUOUS
Status: DISCONTINUED | OUTPATIENT
Start: 2019-04-23 | End: 2019-04-23 | Stop reason: HOSPADM

## 2019-04-23 RX ORDER — PROPOFOL 10 MG/ML
INJECTION, EMULSION INTRAVENOUS AS NEEDED
Status: DISCONTINUED | OUTPATIENT
Start: 2019-04-23 | End: 2019-04-23 | Stop reason: SURG

## 2019-04-23 RX ADMIN — SODIUM CHLORIDE, SODIUM LACTATE, POTASSIUM CHLORIDE, AND CALCIUM CHLORIDE 125 ML/HR: .6; .31; .03; .02 INJECTION, SOLUTION INTRAVENOUS at 12:18

## 2019-04-23 RX ADMIN — PROPOFOL 100 MG: 10 INJECTION, EMULSION INTRAVENOUS at 12:52

## 2019-04-23 RX ADMIN — PROPOFOL 100 MG: 10 INJECTION, EMULSION INTRAVENOUS at 12:49

## 2019-04-23 RX ADMIN — SODIUM CHLORIDE, SODIUM LACTATE, POTASSIUM CHLORIDE, AND CALCIUM CHLORIDE: .6; .31; .03; .02 INJECTION, SOLUTION INTRAVENOUS at 12:44

## 2019-04-26 ENCOUNTER — CLINICAL SUPPORT (OUTPATIENT)
Dept: DERMATOLOGY | Facility: CLINIC | Age: 79
End: 2019-04-26

## 2019-04-26 DIAGNOSIS — L72.9 FOLLICULAR CYST OF SKIN: Primary | ICD-10-CM

## 2019-04-26 PROCEDURE — 99024 POSTOP FOLLOW-UP VISIT: CPT | Performed by: DERMATOLOGY

## 2019-04-30 ENCOUNTER — OFFICE VISIT (OUTPATIENT)
Dept: GASTROENTEROLOGY | Facility: CLINIC | Age: 79
End: 2019-04-30
Payer: COMMERCIAL

## 2019-04-30 VITALS
HEART RATE: 107 BPM | WEIGHT: 186.2 LBS | BODY MASS INDEX: 34.27 KG/M2 | HEIGHT: 62 IN | SYSTOLIC BLOOD PRESSURE: 138 MMHG | DIASTOLIC BLOOD PRESSURE: 82 MMHG

## 2019-04-30 DIAGNOSIS — R13.10 DYSPHAGIA, UNSPECIFIED TYPE: Primary | ICD-10-CM

## 2019-04-30 DIAGNOSIS — F41.9 ANXIETY: ICD-10-CM

## 2019-04-30 DIAGNOSIS — K22.9 ESOPHAGEAL ABNORMALITY: ICD-10-CM

## 2019-04-30 PROCEDURE — 99213 OFFICE O/P EST LOW 20 MIN: CPT | Performed by: PHYSICIAN ASSISTANT

## 2019-04-30 RX ORDER — RANITIDINE 150 MG/1
150 CAPSULE ORAL EVERY EVENING
Qty: 30 CAPSULE | Refills: 2 | Status: SHIPPED | OUTPATIENT
Start: 2019-04-30 | End: 2019-07-27 | Stop reason: SDUPTHER

## 2019-04-30 RX ORDER — PANTOPRAZOLE SODIUM 40 MG/1
40 TABLET, DELAYED RELEASE ORAL 2 TIMES DAILY
Qty: 60 TABLET | Refills: 3 | Status: SHIPPED | OUTPATIENT
Start: 2019-04-30 | End: 2019-07-17 | Stop reason: SDUPTHER

## 2019-05-01 RX ORDER — ESCITALOPRAM OXALATE 10 MG/1
10 TABLET ORAL DAILY
Qty: 90 TABLET | Refills: 0 | Status: SHIPPED | OUTPATIENT
Start: 2019-05-01 | End: 2019-07-12 | Stop reason: ALTCHOICE

## 2019-05-10 ENCOUNTER — PROCEDURE VISIT (OUTPATIENT)
Dept: UROLOGY | Facility: CLINIC | Age: 79
End: 2019-05-10
Payer: COMMERCIAL

## 2019-05-10 VITALS
WEIGHT: 186 LBS | HEART RATE: 100 BPM | SYSTOLIC BLOOD PRESSURE: 130 MMHG | DIASTOLIC BLOOD PRESSURE: 88 MMHG | BODY MASS INDEX: 34.23 KG/M2 | HEIGHT: 62 IN

## 2019-05-10 DIAGNOSIS — R13.10 DYSPHAGIA, UNSPECIFIED TYPE: Primary | ICD-10-CM

## 2019-05-10 DIAGNOSIS — R39.15 URINARY URGENCY: ICD-10-CM

## 2019-05-10 DIAGNOSIS — R35.0 FREQUENCY OF URINATION: ICD-10-CM

## 2019-05-10 DIAGNOSIS — N39.41 URGE INCONTINENCE: ICD-10-CM

## 2019-05-10 PROCEDURE — 64566 NEUROELTRD STIM POST TIBIAL: CPT

## 2019-05-10 RX ORDER — DICYCLOMINE HYDROCHLORIDE 10 MG/1
10 CAPSULE ORAL
Qty: 20 CAPSULE | Refills: 2 | Status: SHIPPED | OUTPATIENT
Start: 2019-05-10 | End: 2019-07-12 | Stop reason: ALTCHOICE

## 2019-06-13 ENCOUNTER — OFFICE VISIT (OUTPATIENT)
Dept: FAMILY MEDICINE CLINIC | Facility: CLINIC | Age: 79
End: 2019-06-13
Payer: COMMERCIAL

## 2019-06-13 ENCOUNTER — LAB (OUTPATIENT)
Dept: LAB | Facility: CLINIC | Age: 79
End: 2019-06-13
Payer: COMMERCIAL

## 2019-06-13 VITALS
HEART RATE: 104 BPM | TEMPERATURE: 98.2 F | HEIGHT: 62 IN | OXYGEN SATURATION: 98 % | DIASTOLIC BLOOD PRESSURE: 72 MMHG | BODY MASS INDEX: 34.16 KG/M2 | WEIGHT: 185.6 LBS | SYSTOLIC BLOOD PRESSURE: 130 MMHG

## 2019-06-13 DIAGNOSIS — M85.80 OSTEOPENIA, UNSPECIFIED LOCATION: ICD-10-CM

## 2019-06-13 DIAGNOSIS — R73.01 IMPAIRED FASTING GLUCOSE: ICD-10-CM

## 2019-06-13 DIAGNOSIS — K59.00 CONSTIPATION, UNSPECIFIED CONSTIPATION TYPE: Primary | ICD-10-CM

## 2019-06-13 LAB
ALBUMIN SERPL BCP-MCNC: 4.1 G/DL (ref 3.5–5)
ALP SERPL-CCNC: 92 U/L (ref 46–116)
ALT SERPL W P-5'-P-CCNC: 33 U/L (ref 12–78)
ANION GAP SERPL CALCULATED.3IONS-SCNC: 6 MMOL/L (ref 4–13)
AST SERPL W P-5'-P-CCNC: 24 U/L (ref 5–45)
BILIRUB SERPL-MCNC: 0.54 MG/DL (ref 0.2–1)
BUN SERPL-MCNC: 15 MG/DL (ref 5–25)
CALCIUM SERPL-MCNC: 9.5 MG/DL (ref 8.3–10.1)
CHLORIDE SERPL-SCNC: 107 MMOL/L (ref 100–108)
CO2 SERPL-SCNC: 29 MMOL/L (ref 21–32)
CREAT SERPL-MCNC: 0.76 MG/DL (ref 0.6–1.3)
EST. AVERAGE GLUCOSE BLD GHB EST-MCNC: 117 MG/DL
GFR SERPL CREATININE-BSD FRML MDRD: 74 ML/MIN/1.73SQ M
GLUCOSE P FAST SERPL-MCNC: 105 MG/DL (ref 65–99)
HBA1C MFR BLD: 5.7 % (ref 4.2–6.3)
POTASSIUM SERPL-SCNC: 4.1 MMOL/L (ref 3.5–5.3)
PROT SERPL-MCNC: 6.9 G/DL (ref 6.4–8.2)
SODIUM SERPL-SCNC: 142 MMOL/L (ref 136–145)

## 2019-06-13 PROCEDURE — 83036 HEMOGLOBIN GLYCOSYLATED A1C: CPT

## 2019-06-13 PROCEDURE — 80053 COMPREHEN METABOLIC PANEL: CPT

## 2019-06-13 PROCEDURE — 1170F FXNL STATUS ASSESSED: CPT | Performed by: FAMILY MEDICINE

## 2019-06-13 PROCEDURE — 36415 COLL VENOUS BLD VENIPUNCTURE: CPT

## 2019-06-13 PROCEDURE — 1125F AMNT PAIN NOTED PAIN PRSNT: CPT | Performed by: FAMILY MEDICINE

## 2019-06-13 PROCEDURE — 99214 OFFICE O/P EST MOD 30 MIN: CPT | Performed by: FAMILY MEDICINE

## 2019-06-13 PROCEDURE — 1160F RVW MEDS BY RX/DR IN RCRD: CPT | Performed by: FAMILY MEDICINE

## 2019-06-13 RX ORDER — POLYETHYLENE GLYCOL 3350 17 G/17G
17 POWDER, FOR SOLUTION ORAL DAILY
Qty: 14 EACH | Refills: 2 | Status: SHIPPED | OUTPATIENT
Start: 2019-06-13 | End: 2021-05-12 | Stop reason: ALTCHOICE

## 2019-06-14 ENCOUNTER — PROCEDURE VISIT (OUTPATIENT)
Dept: UROLOGY | Facility: CLINIC | Age: 79
End: 2019-06-14
Payer: COMMERCIAL

## 2019-06-14 VITALS
DIASTOLIC BLOOD PRESSURE: 66 MMHG | BODY MASS INDEX: 34.08 KG/M2 | WEIGHT: 185.2 LBS | HEIGHT: 62 IN | SYSTOLIC BLOOD PRESSURE: 104 MMHG | HEART RATE: 100 BPM

## 2019-06-14 DIAGNOSIS — R39.15 URINARY URGENCY: ICD-10-CM

## 2019-06-14 DIAGNOSIS — R35.0 FREQUENCY OF URINATION: ICD-10-CM

## 2019-06-14 DIAGNOSIS — N39.41 URGE INCONTINENCE: ICD-10-CM

## 2019-06-14 PROCEDURE — 64566 NEUROELTRD STIM POST TIBIAL: CPT

## 2019-06-25 ENCOUNTER — OFFICE VISIT (OUTPATIENT)
Dept: GASTROENTEROLOGY | Facility: CLINIC | Age: 79
End: 2019-06-25
Payer: COMMERCIAL

## 2019-06-25 VITALS
WEIGHT: 187.2 LBS | BODY MASS INDEX: 34.45 KG/M2 | SYSTOLIC BLOOD PRESSURE: 146 MMHG | HEIGHT: 62 IN | DIASTOLIC BLOOD PRESSURE: 92 MMHG | HEART RATE: 102 BPM

## 2019-06-25 DIAGNOSIS — K21.9 GASTRO-ESOPHAGEAL REFLUX DISEASE WITHOUT ESOPHAGITIS: ICD-10-CM

## 2019-06-25 DIAGNOSIS — R13.10 DYSPHAGIA, UNSPECIFIED TYPE: Primary | ICD-10-CM

## 2019-06-25 PROCEDURE — 99213 OFFICE O/P EST LOW 20 MIN: CPT | Performed by: PHYSICIAN ASSISTANT

## 2019-06-25 RX ORDER — RANITIDINE 150 MG/1
TABLET ORAL
Refills: 2 | COMMUNITY
Start: 2019-06-19 | End: 2019-07-12 | Stop reason: SDUPTHER

## 2019-06-27 ENCOUNTER — TELEPHONE (OUTPATIENT)
Dept: GASTROENTEROLOGY | Facility: CLINIC | Age: 79
End: 2019-06-27

## 2019-07-01 DIAGNOSIS — R35.0 FREQUENCY OF URINATION: Primary | ICD-10-CM

## 2019-07-01 RX ORDER — SOLIFENACIN SUCCINATE 5 MG/1
5 TABLET, FILM COATED ORAL DAILY
Qty: 30 TABLET | Refills: 3 | Status: SHIPPED | OUTPATIENT
Start: 2019-07-01 | End: 2019-08-19 | Stop reason: ALTCHOICE

## 2019-07-11 ENCOUNTER — TELEPHONE (OUTPATIENT)
Dept: UROLOGY | Facility: CLINIC | Age: 79
End: 2019-07-11

## 2019-07-11 NOTE — TELEPHONE ENCOUNTER
Per vm left w call center  Please call pt to confirm 7/12 appt time  Her conf call stated an earlier time than what she thought appt was for  Would like a call to confirm

## 2019-07-12 ENCOUNTER — TELEPHONE (OUTPATIENT)
Dept: GASTROENTEROLOGY | Facility: CLINIC | Age: 79
End: 2019-07-12

## 2019-07-12 ENCOUNTER — PROCEDURE VISIT (OUTPATIENT)
Dept: UROLOGY | Facility: CLINIC | Age: 79
End: 2019-07-12
Payer: COMMERCIAL

## 2019-07-12 VITALS
DIASTOLIC BLOOD PRESSURE: 70 MMHG | HEIGHT: 62 IN | BODY MASS INDEX: 34.08 KG/M2 | HEART RATE: 80 BPM | SYSTOLIC BLOOD PRESSURE: 104 MMHG | WEIGHT: 185.2 LBS

## 2019-07-12 DIAGNOSIS — N39.41 URGE INCONTINENCE: ICD-10-CM

## 2019-07-12 DIAGNOSIS — R39.15 URINARY URGENCY: ICD-10-CM

## 2019-07-12 DIAGNOSIS — R35.0 FREQUENCY OF URINATION: ICD-10-CM

## 2019-07-12 PROCEDURE — 64566 NEUROELTRD STIM POST TIBIAL: CPT

## 2019-07-12 RX ORDER — MV-MIN/FOLIC/VIT K/LYCOP/COQ10 200-100MCG
CAPSULE ORAL
COMMUNITY

## 2019-07-12 RX ORDER — BIOTIN 2500 MCG
CAPSULE ORAL
COMMUNITY

## 2019-07-12 RX ORDER — MAGNESIUM GLUCONATE 30 MG(550)
TABLET ORAL
COMMUNITY

## 2019-07-12 NOTE — TELEPHONE ENCOUNTER
Pt  Of norbert        She is have constipation and spasms of the stomach, she will be home by 1000am , she stopped in the office to see if she can speak to someone

## 2019-07-12 NOTE — PROGRESS NOTES
2019    Hong Medina  1940  [id number]    Diagnosis  Chief Complaint     Urinary Urgency; Urinary Incontinence         Patient presents for PTNS  managed by Dr Rhonda Maldonado  Patient will follow up in one month with a bladder diary to continue to evaluate effectiveness of treatment  She will also be trying vesicare shortly secondary to cost as it is now generic  The Myrbetriq works well but is costly  Urinary Incontinence Screening      Most Recent Value   Urinary Incontinence   Urinary Incontinence? Yes [2 pads per day at the most, not every day, 2-3 major accidents the past month]   Incomplete emptying? No   Urinary frequency? Yes [occ]   Urinary urgency? Yes   Urinary hesitancy? No   Dysuria (painful difficult urination)? No   Nocturia (waking up to use the bathroom)? No   Straining (having to push to go)? Yes   Weak stream?  Yes [occ]   Intermittent stream?  Yes   Post void dribbling? No        Vitals:    19 0830   BP: 104/70   BP Location: Left arm   Patient Position: Sitting   Cuff Size: Standard   Pulse: 80   Weight: 84 kg (185 lb 3 2 oz)   Height: 5' 2" (1 575 m)         Procedure PTNS        Session 11     Ankle used: right  Treatment settin  Duration of treatment: 30 minutes  Lead lot #:[id number]  Expiration Date:2021  Feeling/Response:foot sensation and big toe  Overall patient had a much better month after last treatment       RON Mishra BSN

## 2019-07-12 NOTE — TELEPHONE ENCOUNTER
KAVYA: Spoke with patient  H/O dysphagia, GERD, esophageal dysmotility    Patient c/o hard  Pebble like BM daily, lower abdominal cramping, and baring down  She is taking metamucil once daily  She is eating and drinking well  Advised patient to increase her metamucil to 2-3 times daily  She will try this and let us know how she is doing next week

## 2019-07-17 DIAGNOSIS — R13.10 DYSPHAGIA, UNSPECIFIED TYPE: ICD-10-CM

## 2019-07-17 RX ORDER — PANTOPRAZOLE SODIUM 40 MG/1
40 TABLET, DELAYED RELEASE ORAL 2 TIMES DAILY
Qty: 60 TABLET | Refills: 3 | Status: SHIPPED | OUTPATIENT
Start: 2019-07-17 | End: 2020-02-25 | Stop reason: SDUPTHER

## 2019-07-22 PROBLEM — K59.04 CHRONIC IDIOPATHIC CONSTIPATION: Status: ACTIVE | Noted: 2019-07-22

## 2019-07-22 PROBLEM — Z86.0101 HISTORY OF ADENOMATOUS POLYP OF COLON: Status: ACTIVE | Noted: 2019-07-22

## 2019-07-22 PROBLEM — R10.30 LOWER ABDOMINAL PAIN: Status: ACTIVE | Noted: 2019-07-22

## 2019-07-22 PROBLEM — Z86.010 HISTORY OF ADENOMATOUS POLYP OF COLON: Status: ACTIVE | Noted: 2019-07-22

## 2019-07-27 DIAGNOSIS — R13.10 DYSPHAGIA, UNSPECIFIED TYPE: ICD-10-CM

## 2019-07-29 RX ORDER — RANITIDINE 150 MG/1
TABLET ORAL
Qty: 30 TABLET | Refills: 3 | Status: SHIPPED | OUTPATIENT
Start: 2019-07-29 | End: 2020-02-21 | Stop reason: ALTCHOICE

## 2019-07-31 ENCOUNTER — LAB REQUISITION (OUTPATIENT)
Dept: LAB | Facility: HOSPITAL | Age: 79
End: 2019-07-31
Payer: COMMERCIAL

## 2019-07-31 DIAGNOSIS — Z80.0 FAMILY HISTORY OF MALIGNANT NEOPLASM OF DIGESTIVE ORGAN: ICD-10-CM

## 2019-07-31 DIAGNOSIS — D12.3 BENIGN NEOPLASM OF TRANSVERSE COLON: ICD-10-CM

## 2019-07-31 DIAGNOSIS — R19.4 CHANGE IN BOWEL HABITS: ICD-10-CM

## 2019-07-31 DIAGNOSIS — D12.2 BENIGN NEOPLASM OF ASCENDING COLON: ICD-10-CM

## 2019-07-31 PROCEDURE — 88305 TISSUE EXAM BY PATHOLOGIST: CPT | Performed by: PATHOLOGY

## 2019-08-05 ENCOUNTER — OFFICE VISIT (OUTPATIENT)
Dept: NEUROLOGY | Facility: CLINIC | Age: 79
End: 2019-08-05
Payer: COMMERCIAL

## 2019-08-05 VITALS
BODY MASS INDEX: 34.23 KG/M2 | HEIGHT: 62 IN | DIASTOLIC BLOOD PRESSURE: 80 MMHG | SYSTOLIC BLOOD PRESSURE: 118 MMHG | HEART RATE: 107 BPM | WEIGHT: 186 LBS

## 2019-08-05 DIAGNOSIS — G20 PARKINSON'S DISEASE (HCC): Primary | ICD-10-CM

## 2019-08-05 PROCEDURE — 99213 OFFICE O/P EST LOW 20 MIN: CPT | Performed by: PSYCHIATRY & NEUROLOGY

## 2019-08-05 NOTE — ASSESSMENT & PLAN NOTE
Doing well with relatively stable symptoms  Her tremor seems better since starting B vitamin supplementation  Feels like to continue holding off on initiating and the Parkinson's specific treatments  Encouraged her to remain as physically active possible  She will continue following up with podiatry regarding foot and GI regarding her GERD and swallowing troubles  Some depression though this does not appear to be limiting in any way  We will continue to monitor      Follow up in 4 months

## 2019-08-05 NOTE — PROGRESS NOTES
Assessment/Plan:    Parkinson's disease ([facility] [address] )  Doing well with relatively stable symptoms  Her tremor seems better since starting B vitamin supplementation  Feels like to continue holding off on initiating and the Parkinson's specific treatments  Encouraged her to remain as physically active possible  She will continue following up with podiatry regarding foot and GI regarding her GERD and swallowing troubles  Some depression though this does not appear to be limiting in any way  We will continue to monitor  Follow up in 4 months       Diagnoses and all orders for this visit:    [facility]        Subjective:     Patient ID: Dickson Ramirez is a [de-identified] y o  female  Here alone     I had the pleasure of seeing your patient, Dickson Ramirez in the [facility] at the [facility]  Lara Larson is an 71-year-old right-handed woman who presents in follow up for Parkinsons disease, symptom onset with left hand rest tremor and mild balance dysfunction in 2018  On initial examination the patient had reduction in spontaneous movements, hypomimia, focal bradykinesia on the left and rigidity L>R  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     Interval history:  She started on B complex and her tremor seems to have improved some  Getting shots for "neuromas in the feet"  Regarding motor symptoms:   Tremor: left sided  Slowness: about the same   Stiffness: not an issue   Changes in gait: no change        Falls: none since last here  Trouble with swallowing: Saw GI and was Dx with GERD, had a dilation  Regarding non-motor symptoms:   Lightheadedness: no   Mood: "I get down some times" doesn't get in the way  Sleep: same  Terrible  Driving issues: no issues   Physical activity: stretches every morning  POA: daughter, filed         The following portions of the patient's history were reviewed and updated as appropriate: allergies, current medications, past family history, past medical history, past social history, past surgical history and problem list       Objective:  /80 (BP Location: Left arm, Patient Position: Standing, Cuff Size: Standard)   Pulse (!) 107   Ht 5' 2" (1 575 m)   Wt 84 4 kg (186 lb)   BMI 34 02 kg/m²     Physical Exam    Neurological Exam    UPDRS motor:                              Time since last dose:       Speech  0     Facial Expression  2     Rigidity - Neck  0     Rigidity - Upper Extremity (Right)  2     Rigidity - Upper Extremity (Left)   2     Rigidity - Lower Extremity (Right)  0     Rigidity - Lower Extremity (Left)   0     Finger Taps (Right)   2     Finger Taps (Left)   2     Hand Movement (Right)  1     Hand Movement (Left)   1     Pronation/Supination (Right)  1     Pronation/Supination (Left)   1     Toe Tapping (Right) 1     Toe Tapping (Left) 1 softer    Leg Agility (Right)  2     Leg Agility (Left)   2     Arising from Chair   1     Gait   1     Freezing of Gait 0     Postural Stability        Posture 1     Global spontaneity of movement 2     Postural Tremor (Right) 0     Postural Tremor (Left) 1     Kinetic Tremor (Right)  1     Kinetic Tremor (Left)  1     Rest tremor amplitude RUE 0     Rest tremor amplitude LUE 1     Rest tremor amplitude RLE 0     Reset tremor amplitude LLE 0     Lip/Jaw Tremor  0     Consistency of tremor 1     Motor Exam Total:            Review of Systems   Constitutional: Positive for fatigue and unexpected weight change  Negative for appetite change and fever  HENT: Negative  Negative for hearing loss, tinnitus, trouble swallowing and voice change  Eyes: Negative  Negative for photophobia and pain  Respiratory: Negative  Negative for shortness of breath  Cardiovascular: Negative  Negative for palpitations  Gastrointestinal: Negative  Negative for nausea and vomiting  Endocrine: Negative  Negative for cold intolerance and heat intolerance  Genitourinary: Negative  Negative for dysuria, frequency and urgency  Musculoskeletal: Positive for gait problem (pain while walking)  Negative for myalgias and neck pain  Skin: Negative  Negative for rash  Neurological: Positive for tremors  Negative for dizziness, seizures, syncope, facial asymmetry, speech difficulty, weakness, light-headedness, numbness and headaches  Hematological: Negative  Does not bruise/bleed easily  Psychiatric/Behavioral: Positive for sleep disturbance (patient states poor sleep)  Negative for confusion and hallucinations  The above ROS was reviewed and updated       Nova Cortez MD  Medical Director   [facility]  Movement and Memory Specialist       Current Outpatient Medications on File Prior to Visit   Medication Sig Dispense Refill    Ascorbic Acid (VITAMIN C) 1000 MG tablet Take 1,000 mg by mouth daily      B Complex Vitamins (B COMPLEX 100 PO) Take by mouth      Biotin 2500 MCG CAPS Take by mouth      calcium carbonate (OS-JOHN) 1250 (500 Ca) MG tablet Take 1 tablet by mouth daily      cholecalciferol (VITAMIN D3) 1,000 units tablet Take 5,000 Units by mouth daily       fexofenadine (ALLEGRA) 180 MG tablet Take 180 mg by mouth as needed       multivitamin (THERAGRAN) TABS Take 1 tablet by mouth daily      Omega-3 350 MG CPDR Take by mouth      pantoprazole (PROTONIX) 40 mg tablet Take 1 tablet (40 mg total) by mouth 2 (two) times a day 60 tablet 3    Potassium Gluconate 595 (99 K) MG TABS Take by mouth      Probiotic Product (PROBIOTIC-10) CAPS Take by mouth      ranitidine (ZANTAC) 150 mg tablet TAKE 1 TABLET (150 MG TOTAL) BY MOUTH EVERY EVENING (Patient taking differently: as needed ) 30 tablet 3    solifenacin (VESICARE) 5 mg tablet Take 1 tablet (5 mg total) by mouth daily 30 tablet 3    Echinacea 125 MG CAPS Take by mouth      Mirabegron ER 50 MG TB24 Take 1 tablet (50 mg total) by mouth daily (Patient not taking: Reported on 8/5/2019) 30 tablet 11    polyethylene glycol (MIRALAX) 17 g packet Take 17 g by mouth daily (Patient not taking: Reported on 6/25/2019) 14 each 2    psyllium (METAMUCIL) 58 6 % powder Take 1 packet by mouth 3 (three) times a day       No current facility-administered medications on file prior to visit

## 2019-08-14 NOTE — PROGRESS NOTES
There are no diagnoses linked to this encounter  Assessment and plan:       1  Overactive bladder receiving PTNS 12 of 24 today - managed by Dr Porfirio Angel  - Shari Connell to have improvement  She does have good and bad days  She did keep a bladder diary but unfortunate left at home  She does report that the day she kept the bladder diary were worse days for her than typical   - She continues to take VESIcare daily  She was previously on Myrbetriq but the VESIcare is much more for double and she would like to continue with this medication in its place   - She will continue her PTNS treatments as scheduled  - She will follow-up with a provider in 6 months for symptom reassessment  Elina Durbin PA-C      Chief Complaint     Chief Complaint   Patient presents with    over active bladder         History of Present Illness     Hong Medina is a [de-identified] y o  female patient of Dr Porfirio Angel with a history of medically refractory overactive bladder status post bladder Botox currently undergoing PTNS therapy presenting for follow-up  Today she is undergoing 12 of 24  Patient had medically refractory overactive bladder for which he underwent bladder Botox 100 units with good results  After 10 months her symptoms were off and repeat Botox with 200 units was performed on 04/03/2018  She did not have symptom improvement with the 2nd dose of Botox  Throughout her treatments with PTNS patient has been on multiple medications for overactive bladder including oxybutynin, Myrbetriq, and most recently VESIcare  She would like to continue on VESIcare  She does report some occasional urgency within continence in the morning but does well throughout the day  She is otherwise feeling healthy and is happy to continue with these treatments that she does feel that there successful for her          Laboratory     Lab Results   Component Value Date    CREATININE 0 76 06/13/2019       No results found for: PSA    No results found for this or any previous visit (from the past 1 hour(s))  Review of Systems     Review of Systems   Constitutional: Negative for chills and fever  HENT: Negative  Eyes: Negative  Respiratory: Negative for shortness of breath  Cardiovascular: Negative for chest pain  Gastrointestinal: Negative for constipation, diarrhea, nausea and vomiting  Genitourinary: Positive for urgency (occasional)  Negative for difficulty urinating, dysuria, enuresis, flank pain, frequency and hematuria  Allergies     Allergies   Allergen Reactions    Caffeine GI Intolerance    Iodine Rash    Latex Rash    Other Rash     Adhesive tape         Physical Exam     Physical Exam   Constitutional: She is oriented to person, place, and time  She appears well-developed and well-nourished  No distress  HENT:   Head: Normocephalic and atraumatic  Right Ear: External ear normal    Left Ear: External ear normal    Nose: Nose normal    Eyes: Right eye exhibits no discharge  Left eye exhibits no discharge  No scleral icterus  Cardiovascular: Normal rate  Pulmonary/Chest: Effort normal    Neurological: She is alert and oriented to person, place, and time  Skin: Skin is warm and dry  She is not diaphoretic  Psychiatric: She has a normal mood and affect  Her behavior is normal  Judgment and thought content normal    Nursing note and vitals reviewed          Vital Signs     Vitals:    08/16/19 0853   BP: 138/80   BP Location: Left arm   Patient Position: Sitting   Cuff Size: Standard   Pulse: 84   Weight: 84 4 kg (186 lb)   Height: 5' 2" (1 575 m)         Current Medications       Current Outpatient Medications:     Ascorbic Acid (VITAMIN C) 1000 MG tablet, Take 1,000 mg by mouth daily, Disp: , Rfl:     B Complex Vitamins (B COMPLEX 100 PO), Take by mouth, Disp: , Rfl:     Biotin 2500 MCG CAPS, Take by mouth, Disp: , Rfl:     calcium carbonate (OS-JOHN) 1250 (500 Ca) MG tablet, Take 1 tablet by mouth daily, Disp: , Rfl:     cholecalciferol (VITAMIN D3) 1,000 units tablet, Take 5,000 Units by mouth daily , Disp: , Rfl:     Echinacea 125 MG CAPS, Take by mouth, Disp: , Rfl:     fexofenadine (ALLEGRA) 180 MG tablet, Take 180 mg by mouth as needed , Disp: , Rfl:     Mirabegron ER 50 MG TB24, Take 1 tablet (50 mg total) by mouth daily (Patient not taking: Reported on 8/5/2019), Disp: 30 tablet, Rfl: 11    multivitamin (THERAGRAN) TABS, Take 1 tablet by mouth daily, Disp: , Rfl:     Omega-3 350 MG CPDR, Take by mouth, Disp: , Rfl:     pantoprazole (PROTONIX) 40 mg tablet, Take 1 tablet (40 mg total) by mouth 2 (two) times a day, Disp: 60 tablet, Rfl: 3    polyethylene glycol (MIRALAX) 17 g packet, Take 17 g by mouth daily (Patient not taking: Reported on 6/25/2019), Disp: 14 each, Rfl: 2    Potassium Gluconate 595 (99 K) MG TABS, Take by mouth, Disp: , Rfl:     Probiotic Product (PROBIOTIC-10) CAPS, Take by mouth, Disp: , Rfl:     psyllium (METAMUCIL) 58 6 % powder, Take 1 packet by mouth 3 (three) times a day, Disp: , Rfl:     ranitidine (ZANTAC) 150 mg tablet, TAKE 1 TABLET (150 MG TOTAL) BY MOUTH EVERY EVENING (Patient taking differently: as needed ), Disp: 30 tablet, Rfl: 3    solifenacin (VESICARE) 5 mg tablet, Take 1 tablet (5 mg total) by mouth daily, Disp: 30 tablet, Rfl: 3      Active Problems     Patient Active Problem List   Diagnosis    Incontinence    OAB (overactive bladder)    Parkinson's disease ([facility] [address] )    Primary insomnia    Fatigue    Seborrheic keratosis    Screening for skin condition    History of skin cancer    Seasonal allergic rhinitis    Sleep disturbance    Fall as cause of accidental injury at home as place of occurrence    Leg wound, left, initial encounter    Impaired fasting glucose    Insomnia    Sleep-related breathing disorder    Mood disturbance    Chronic rhinitis    Obesity (BMI 30-39  9)    Claustrophobia    Anxiety    Mitral valve disorder    Chest discomfort    Need for lipid screening    Menopause ovarian failure    Vitamin D deficiency    Dysphagia    Multiple thyroid nodules    Abnormal thyroid blood test    Gastro-esophageal reflux disease without esophagitis    Constipation    Osteopenia    Lower abdominal pain    Chronic idiopathic constipation    History of adenomatous polyp of colon         Past Medical History     Past Medical History:   Diagnosis Date    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     Benign neoplasm of skin     Chest pain     Claustrophobia     Diverticulitis     GERD (gastroesophageal reflux disease)     Muscle weakness     Nonmelanoma skin cancer     last assessed 2017    Palpitations     Seasonal allergies     Sleep apnea     no cpap    Spontaneous      without mention of complications     Syncope          Surgical History     Past Surgical History:   Procedure Laterality Date    BOTOX INJECTION N/A 3/6/2017    Procedure: CYSTOSCOPY; BLADDER BOTOX 100 UNITS ;  Surgeon: Melly Espinal MD;  [facility];  Service:     BOWEL RESECTION      related ot diverticulitis    CARDIAC CATHETERIZATION      CARPAL TUNNEL RELEASE Right     COLOSTOMY      COLOSTOMY CLOSURE      FOOT SURGERY Left     neuroma    HYSTERECTOMY      NASAL SINUS SURGERY      MI CYSTOURETHROSCOPY N/A 2018    Procedure: CYSTOSCOPY WITH BOTOX;  Surgeon: Melly Espinal MD;  [facility];  Service: Urology    MI ESOPHAGOGASTRODUODENOSCOPY TRANSORAL DIAGNOSTIC N/A 2019    Procedure: ESOPHAGOGASTRODUODENOSCOPY (EGD); Surgeon: Sendy Keys DO;  Location: [facility];   Service: Gastroenterology    TONSILLECTOMY           Family History     Family History   Problem Relation Age of Onset    Alcohol abuse Mother     Colon cancer Mother     Heart disease Mother     Alcohol abuse Father     Alcohol abuse Brother     Colon cancer Brother     Tremor Neg Hx     Parkinsonism Neg Hx          Social History     Social History       Radiology

## 2019-08-16 ENCOUNTER — PROCEDURE VISIT (OUTPATIENT)
Dept: UROLOGY | Facility: CLINIC | Age: 79
End: 2019-08-16
Payer: COMMERCIAL

## 2019-08-16 VITALS
BODY MASS INDEX: 34.23 KG/M2 | WEIGHT: 186 LBS | DIASTOLIC BLOOD PRESSURE: 80 MMHG | SYSTOLIC BLOOD PRESSURE: 138 MMHG | HEIGHT: 62 IN | HEART RATE: 84 BPM

## 2019-08-16 DIAGNOSIS — N39.41 URGE INCONTINENCE: ICD-10-CM

## 2019-08-16 DIAGNOSIS — R35.0 FREQUENCY OF URINATION: ICD-10-CM

## 2019-08-16 DIAGNOSIS — R39.15 URINARY URGENCY: ICD-10-CM

## 2019-08-16 DIAGNOSIS — N32.81 OAB (OVERACTIVE BLADDER): Primary | ICD-10-CM

## 2019-08-16 PROCEDURE — 64566 NEUROELTRD STIM POST TIBIAL: CPT | Performed by: PHYSICIAN ASSISTANT

## 2019-08-16 PROCEDURE — 99213 OFFICE O/P EST LOW 20 MIN: CPT | Performed by: PHYSICIAN ASSISTANT

## 2019-08-16 NOTE — PROGRESS NOTES
2019    Earleen Cockayne  1940  [id number]    Diagnosis  Chief Complaint     over active bladder; Urinary Incontinence; Urinary Urgency         Patient presents for PTNS  managed by Dr Tory Farah  Per advanced practitioner    Urinary Incontinence Screening      Most Recent Value   Urinary Incontinence   Urinary Incontinence? No   Incomplete emptying? No   Urinary frequency? No   Urinary urgency? Yes [has improved]   Urinary hesitancy? No   Dysuria (painful difficult urination)? No   Nocturia (waking up to use the bathroom)? No   Straining (having to push to go)? Yes [sometimes]   Weak stream?  No   Intermittent stream?  No   Post void dribbling? No   Vaginal pressure? No   Vaginal dryness? No        Vitals:    19 0853   BP: 138/80   BP Location: Left arm   Patient Position: Sitting   Cuff Size: Standard   Pulse: 84   Weight: 84 4 kg (186 lb)   Height: 5' 2" (1 575 m)         Procedure PTNS    Session  of     Ankle used: right  Treatment settin  Duration of treatment: 30 minutes  Lead lot #:[id number]  Expiration Date:2021  Feeling/Response:through foot to big toe    Patient forgot her bladder diary but reports she is still happy with overall symptom improvement  She feels the vesicare is working fine  Incontinence almost completely resolved        RON Carrasco BSN

## 2019-08-19 ENCOUNTER — OFFICE VISIT (OUTPATIENT)
Dept: FAMILY MEDICINE CLINIC | Facility: CLINIC | Age: 79
End: 2019-08-19
Payer: COMMERCIAL

## 2019-08-19 ENCOUNTER — LAB (OUTPATIENT)
Dept: LAB | Facility: CLINIC | Age: 79
End: 2019-08-19
Payer: COMMERCIAL

## 2019-08-19 VITALS
OXYGEN SATURATION: 97 % | SYSTOLIC BLOOD PRESSURE: 132 MMHG | HEART RATE: 64 BPM | HEIGHT: 62 IN | DIASTOLIC BLOOD PRESSURE: 70 MMHG | WEIGHT: 185.2 LBS | TEMPERATURE: 97.3 F | BODY MASS INDEX: 34.08 KG/M2

## 2019-08-19 DIAGNOSIS — F51.01 PRIMARY INSOMNIA: ICD-10-CM

## 2019-08-19 DIAGNOSIS — K21.9 GASTRO-ESOPHAGEAL REFLUX DISEASE WITHOUT ESOPHAGITIS: ICD-10-CM

## 2019-08-19 DIAGNOSIS — R53.83 OTHER FATIGUE: ICD-10-CM

## 2019-08-19 DIAGNOSIS — R53.83 OTHER FATIGUE: Primary | ICD-10-CM

## 2019-08-19 DIAGNOSIS — E55.9 VITAMIN D DEFICIENCY: ICD-10-CM

## 2019-08-19 DIAGNOSIS — G47.30 SLEEP-RELATED BREATHING DISORDER: ICD-10-CM

## 2019-08-19 DIAGNOSIS — G47.9 SLEEP DISTURBANCE: ICD-10-CM

## 2019-08-19 PROBLEM — S81.802A LEG WOUND, LEFT, INITIAL ENCOUNTER: Status: RESOLVED | Noted: 2018-07-17 | Resolved: 2019-08-19

## 2019-08-19 PROBLEM — R10.30 LOWER ABDOMINAL PAIN: Status: RESOLVED | Noted: 2019-07-22 | Resolved: 2019-08-19

## 2019-08-19 PROBLEM — R07.89 CHEST DISCOMFORT: Status: RESOLVED | Noted: 2018-12-14 | Resolved: 2019-08-19
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
## 2019-08-19 PROBLEM — K59.00 CONSTIPATION: Status: RESOLVED | Noted: 2019-06-13 | Resolved: 2019-08-19

## 2019-08-19 PROBLEM — G47.00 INSOMNIA: Status: RESOLVED | Noted: 2018-07-31 | Resolved: 2019-08-19

## 2019-08-19 LAB
25(OH)D3 SERPL-MCNC: 59.8 NG/ML (ref 30–100)
BASOPHILS # BLD AUTO: 0.02 THOUSANDS/ΜL (ref 0–0.1)
BASOPHILS NFR BLD AUTO: 0 % (ref 0–1)
EOSINOPHIL # BLD AUTO: 0.1 THOUSAND/ΜL (ref 0–0.61)
EOSINOPHIL NFR BLD AUTO: 2 % (ref 0–6)
ERYTHROCYTE [DISTWIDTH] IN BLOOD BY AUTOMATED COUNT: 13.6 % (ref 11.6–15.1)
HCT VFR BLD AUTO: 44.5 % (ref 34.8–46.1)
HGB BLD-MCNC: 14.4 G/DL (ref 11.5–15.4)
IMM GRANULOCYTES # BLD AUTO: 0.03 THOUSAND/UL (ref 0–0.2)
IMM GRANULOCYTES NFR BLD AUTO: 1 % (ref 0–2)
IRON SERPL-MCNC: 90 UG/DL (ref 50–170)
LYMPHOCYTES # BLD AUTO: 1.5 THOUSANDS/ΜL (ref 0.6–4.47)
LYMPHOCYTES NFR BLD AUTO: 26 % (ref 14–44)
MCH RBC QN AUTO: 29 PG (ref 26.8–34.3)
MCHC RBC AUTO-ENTMCNC: 32.4 G/DL (ref 31.4–37.4)
MCV RBC AUTO: 90 FL (ref 82–98)
MONOCYTES # BLD AUTO: 0.56 THOUSAND/ΜL (ref 0.17–1.22)
MONOCYTES NFR BLD AUTO: 10 % (ref 4–12)
NEUTROPHILS # BLD AUTO: 3.64 THOUSANDS/ΜL (ref 1.85–7.62)
NEUTS SEG NFR BLD AUTO: 61 % (ref 43–75)
NRBC BLD AUTO-RTO: 0 /100 WBCS
PLATELET # BLD AUTO: 245 THOUSANDS/UL (ref 149–390)
PMV BLD AUTO: 9.8 FL (ref 8.9–12.7)
RBC # BLD AUTO: 4.96 MILLION/UL (ref 3.81–5.12)
TSH SERPL DL<=0.05 MIU/L-ACNC: 1.87 UIU/ML (ref 0.36–3.74)
WBC # BLD AUTO: 5.85 THOUSAND/UL (ref 4.31–10.16)

## 2019-08-19 PROCEDURE — 84443 ASSAY THYROID STIM HORMONE: CPT

## 2019-08-19 PROCEDURE — 83540 ASSAY OF IRON: CPT

## 2019-08-19 PROCEDURE — 85025 COMPLETE CBC W/AUTO DIFF WBC: CPT

## 2019-08-19 PROCEDURE — 99214 OFFICE O/P EST MOD 30 MIN: CPT | Performed by: FAMILY MEDICINE

## 2019-08-19 PROCEDURE — 82306 VITAMIN D 25 HYDROXY: CPT

## 2019-08-19 PROCEDURE — 36415 COLL VENOUS BLD VENIPUNCTURE: CPT

## 2019-08-19 RX ORDER — TRAZODONE HYDROCHLORIDE 50 MG/1
50 TABLET ORAL
Qty: 30 TABLET | Refills: 5 | Status: SHIPPED | OUTPATIENT
Start: 2019-08-19 | End: 2019-08-26 | Stop reason: SDUPTHER

## 2019-08-19 NOTE — ASSESSMENT & PLAN NOTE
Patient with chronic insomnia  I think Trazodone would be a good option for her, as she has depression and anxiety as well  Discussed common side effects  F/U 4 wks, sooner if needed

## 2019-08-19 NOTE — PATIENT INSTRUCTIONS
Insomnia   AMBULATORY CARE:   Insomnia  is a condition that makes it hard to fall or stay asleep  Lack of sleep can lead to attention or memory problems during the day  You may also be pena, depressed, clumsy, or have headaches  Contact your healthcare provider if:   · Your symptoms do not get better, or they get worse  · You begin to use drugs or alcohol to fall asleep  · You have questions or concerns about your condition or care  Medicines:   · Medicines  may help you sleep more regularly or help you feel less anxious  · Take your medicine as directed  Contact your healthcare provider if you think your medicine is not helping or if you have side effects  Tell him or her if you are allergic to any medicine  Keep a list of the medicines, vitamins, and herbs you take  Include the amounts, and when and why you take them  Bring the list or the pill bottles to follow-up visits  Carry your medicine list with you in case of an emergency  What you can do to improve your sleep:   · Create a sleep schedule  This will help you form a sleep routine  Keep a record of your sleep patterns, and any sleeping problems you have  Bring the record to follow-up visits with healthcare providers  · Do not take naps  Naps could make it hard for you to fall asleep at bedtime  · Keep your bedroom cool, quiet, and dark  Turn on white noise, such as a fan, to help you relax  Do not use your bed for any activity that will keep you awake  Do not read, exercise, eat, or watch TV in your bedroom  · Get up if you do not fall asleep within 20 minutes  Move to another room and do something relaxing until you become sleepy  · Limit caffeine, alcohol, and food to earlier in the day  Only drink caffeine in the morning  Do not drink alcohol within 6 hours of bedtime  Do not eat a heavy meal right before you go to bed  · Exercise regularly  Daily exercise may help you sleep better   Do not exercise within 4 hours of bedtime  Follow up with your healthcare provider as directed: Your healthcare provider may refer you for cognitive behavioral therapy  A behavioral therapist may help you find ways to relax, decrease stress, and improve sleep  Write down your questions so you remember to ask them during your visits  © 2017 [employer] Information is for End User's use only and may not be sold, redistributed or otherwise used for commercial purposes  All illustrations and images included in CareNotes® are the copyrighted property of [employer] original products  or [employer] Hall[employer]  The above information is an  only  It is not intended as medical advice for individual conditions or treatments  Talk to your doctor, nurse or pharmacist before following any medical regimen to see if it is safe and effective for you

## 2019-08-19 NOTE — ASSESSMENT & PLAN NOTE
Check CBC, iron, Vit D, Thyroid  Fatigue most likely secondary to chronic insomnia  Will try Trazodone

## 2019-08-19 NOTE — PROGRESS NOTES
Assessment/Plan:         Problem List Items Addressed This Visit        Digestive    Gastro-esophageal reflux disease without esophagitis     On PPI therapy  Managed by GI            Other    Primary insomnia     Patient with chronic insomnia  I think Trazodone would be a good option for her, as she has depression and anxiety as well  Discussed common side effects  F/U 4 wks, sooner if needed  Relevant Medications    traZODone (DESYREL) 50 mg tablet    Fatigue - Primary     Check CBC, iron, Vit D, Thyroid  Fatigue most likely secondary to chronic insomnia  Will try Trazodone  Relevant Orders    CBC and differential    Iron    TSH, 3rd generation    Sleep disturbance    Relevant Medications    traZODone (DESYREL) 50 mg tablet    Sleep-related breathing disorder    Vitamin D deficiency     Check Vitamin D level         Relevant Orders    Vitamin D 25 hydroxy          Follow up 1 month to recheck  Will need repeat GAD7, PHQ 9    Subjective:      Patient ID: Damion Reza is a [de-identified] y o  female  She says she has fatigue, which is very severe  She says yesterday "I hit the bottom"  She did not even feel like going to [employer]  She napped during the afternoon  She does not feel refreshed after a nap  At night, she says her sleep is "terrible" - goes to bed at 10 pm and does not fall asleep until midnight or 1 am, has short intervals of sleep, "cat naps" throughout the night  This has had this problem for years  She says she has been to a sleep specialist, she tried sleeping pills but felt groggy the next day  She says they attributed her sleep issues to other things like GERD, neuromas in her feet, and tremors  Patient reports good sleep hygiene - quiet dark room  She does admit to depression and not feeling like herself  She reports loss of appetite x 1 month  No nausea, vomiting  She has chronic constipation  No loss of weight     PHQ 9 score is 17  PETE 7 score is 10  She says she previously took Wellbutrin for about 6 months which did help  I reviewed her records and in  she saw a sleep specialist Dr Brneda Gaines and was tried on Clonazepam and the notes state that she previously tried Ambien but the meds made her too groggy  She had seen another sleep specialist Dr Sindi Johnson in  - I was able to review a sleep study in her chart from December which showed mild disordered sleep breathing  She was advised to lose weight and consider mandibular device  I do not see a consult from Dr Sindi Johnson after the study was done so it is unclear what he recommended  She wonders if she is anemic  She states she had anemia in the past  She denies any s/sx of blood loss  Does not take any iron  Last CBC and iron level reviewed were normal   Lab Results       Component                Value               Date                       WBC                      5 79                2019                 HGB                      13 6                2019                 HCT                      42 1                2019                 MCV                      91                  2019                 PLT                      257                 2019                GERD- she is on Pantoprazole 40 mg every other day, she says she could not tolerate it every day due to constipation  She had low Vit D - she is on 5000 IU per day  Last level reviewed in Feb was low 25 9  The following portions of the patient's history were reviewed and updated as appropriate:   She  has a past medical history of 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, Benign neoplasm of skin, Chest pain, Claustrophobia, Diverticulitis, GERD (gastroesophageal reflux disease), Muscle weakness, Nonmelanoma skin cancer, Palpitations, Seasonal allergies, Sleep apnea, Spontaneous , and Syncope    She   Patient Active Problem List    Diagnosis Date Noted    Chronic idiopathic constipation 07/22/2019    History of adenomatous polyp of colon 07/22/2019    Osteopenia 06/13/2019    Gastro-esophageal reflux disease without esophagitis 04/02/2019    Dysphagia 03/14/2019    Multiple thyroid nodules 03/14/2019    Abnormal thyroid blood test 03/14/2019    Need for lipid screening 02/06/2019    Menopause ovarian failure 02/06/2019    Vitamin D deficiency 02/06/2019    Mitral valve disorder 12/14/2018    Anxiety 12/12/2018    Sleep-related breathing disorder 08/28/2018    Mood disturbance 08/28/2018    Chronic rhinitis 08/28/2018    Obesity (BMI 30-39 9) 08/28/2018    Claustrophobia 08/28/2018    Impaired fasting glucose 07/31/2018    Sleep disturbance 07/17/2018    Seasonal allergic rhinitis 05/08/2018    Screening for skin condition 04/04/2018    History of skin cancer 04/04/2018    Parkinson's disease ([facility] [address] ) 03/26/2018    Primary insomnia 03/26/2018    Fatigue 03/26/2018    Incontinence 09/11/2015    OAB (overactive bladder) 09/11/2015    Seborrheic keratosis 05/29/2014     She  has a past surgical history that includes Hysterectomy; Tonsillectomy; Nasal sinus surgery; Colostomy; Carpal tunnel release (Right); Colostomy closure; Foot surgery (Left); Cardiac catheterization; BOTOX INJECTION (N/A, 3/6/2017); pr cystourethroscopy (N/A, 4/13/2018); Bowel resection; and pr esophagogastroduodenoscopy transoral diagnostic (N/A, 4/23/2019)  Her family history includes Alcohol abuse in her brother, father, and mother; Colon cancer in her brother and mother; Heart disease in her mother  She  reports that she has never smoked  She has never used smokeless tobacco  She reports that she does not drink alcohol or use drugs    Current Outpatient Medications   Medication Sig Dispense Refill    Echinacea 125 MG CAPS Take by mouth      fexofenadine (ALLEGRA) 180 MG tablet Take 180 mg by mouth as needed       multivitamin (THERAGRAN) TABS Take 1 tablet by mouth daily      Omega-3 350 MG CPDR Take by mouth      pantoprazole (PROTONIX) 40 mg tablet Take 1 tablet (40 mg total) by mouth 2 (two) times a day 60 tablet 3    Potassium Gluconate 595 (99 K) MG TABS Take by mouth      Probiotic Product (PROBIOTIC-10) CAPS Take by mouth      psyllium (METAMUCIL) 58 6 % powder Take 1 packet by mouth 3 (three) times a day      ranitidine (ZANTAC) 150 mg tablet TAKE 1 TABLET (150 MG TOTAL) BY MOUTH EVERY EVENING (Patient taking differently: as needed ) 30 tablet 3    Ascorbic Acid (VITAMIN C) 1000 MG tablet Take 1,000 mg by mouth daily      B Complex Vitamins (B COMPLEX 100 PO) Take by mouth      Biotin 2500 MCG CAPS Take by mouth      calcium carbonate (OS-JOHN) 1250 (500 Ca) MG tablet Take 1 tablet by mouth daily      cholecalciferol (VITAMIN D3) 1,000 units tablet Take 5,000 Units by mouth daily       polyethylene glycol (MIRALAX) 17 g packet Take 17 g by mouth daily (Patient not taking: Reported on 6/25/2019) 14 each 2    traZODone (DESYREL) 50 mg tablet Take 1 tablet (50 mg total) by mouth daily at bedtime 30 tablet 5     No current facility-administered medications for this visit        Current Outpatient Medications on File Prior to Visit   Medication Sig    Echinacea 125 MG CAPS Take by mouth    fexofenadine (ALLEGRA) 180 MG tablet Take 180 mg by mouth as needed     multivitamin (THERAGRAN) TABS Take 1 tablet by mouth daily    Omega-3 350 MG CPDR Take by mouth    pantoprazole (PROTONIX) 40 mg tablet Take 1 tablet (40 mg total) by mouth 2 (two) times a day    Potassium Gluconate 595 (99 K) MG TABS Take by mouth    Probiotic Product (PROBIOTIC-10) CAPS Take by mouth    psyllium (METAMUCIL) 58 6 % powder Take 1 packet by mouth 3 (three) times a day    ranitidine (ZANTAC) 150 mg tablet TAKE 1 TABLET (150 MG TOTAL) BY MOUTH EVERY EVENING (Patient taking differently: as needed )    [DISCONTINUED] solifenacin (VESICARE) 5 mg tablet Take 1 tablet (5 mg total) by mouth daily    Ascorbic Acid (VITAMIN C) 1000 MG tablet Take 1,000 mg by mouth daily    B Complex Vitamins (B COMPLEX 100 PO) Take by mouth    Biotin 2500 MCG CAPS Take by mouth    calcium carbonate (OS-JOHN) 1250 (500 Ca) MG tablet Take 1 tablet by mouth daily    cholecalciferol (VITAMIN D3) 1,000 units tablet Take 5,000 Units by mouth daily     polyethylene glycol (MIRALAX) 17 g packet Take 17 g by mouth daily (Patient not taking: Reported on 6/25/2019)    [DISCONTINUED] Mirabegron ER 50 MG TB24 Take 1 tablet (50 mg total) by mouth daily (Patient not taking: Reported on 8/5/2019)     No current facility-administered medications on file prior to visit  She is allergic to caffeine; iodine; latex; and other       Review of Systems   Constitutional: Negative for activity change  Respiratory: Negative for chest tightness and shortness of breath  Cardiovascular: Negative for chest pain and leg swelling  Neurological: Negative for headaches  Psychiatric/Behavioral: Positive for dysphoric mood and sleep disturbance  Negative for suicidal ideas  The patient is nervous/anxious  Objective:      /70   Pulse 64   Temp (!) 97 3 °F (36 3 °C)   Ht 5' 2" (1 575 m)   Wt 84 kg (185 lb 3 2 oz)   SpO2 97%   BMI 33 87 kg/m²          Physical Exam   Constitutional: She is oriented to person, place, and time  She appears well-developed and well-nourished  No distress  HENT:   Head: Normocephalic and atraumatic  Eyes: Pupils are equal, round, and reactive to light  Conjunctivae are normal    Neck: Neck supple  Cardiovascular: Normal rate, regular rhythm and normal heart sounds  Exam reveals no gallop and no friction rub  No murmur heard  Pulmonary/Chest: Effort normal and breath sounds normal  No respiratory distress  She has no wheezes  She has no rales  She exhibits no tenderness  Musculoskeletal: Normal range of motion  She exhibits no edema  Neurological: She is alert and oriented to person, place, and time   She displays tremor  Skin: Skin is warm and dry  No rash noted  She is not diaphoretic  Psychiatric: Her behavior is normal  Judgment and thought content normal  Her mood appears anxious  Nursing note and vitals reviewed

## 2019-08-26 ENCOUNTER — OFFICE VISIT (OUTPATIENT)
Dept: FAMILY MEDICINE CLINIC | Facility: CLINIC | Age: 79
End: 2019-08-26
Payer: COMMERCIAL

## 2019-08-26 VITALS
RESPIRATION RATE: 16 BRPM | OXYGEN SATURATION: 98 % | TEMPERATURE: 96.3 F | WEIGHT: 187 LBS | HEIGHT: 62 IN | BODY MASS INDEX: 34.41 KG/M2 | HEART RATE: 99 BPM | DIASTOLIC BLOOD PRESSURE: 68 MMHG | SYSTOLIC BLOOD PRESSURE: 112 MMHG

## 2019-08-26 DIAGNOSIS — F51.01 PRIMARY INSOMNIA: Primary | ICD-10-CM

## 2019-08-26 DIAGNOSIS — G47.9 SLEEP DISTURBANCE: ICD-10-CM

## 2019-08-26 DIAGNOSIS — F41.9 ANXIETY: ICD-10-CM

## 2019-08-26 DIAGNOSIS — F51.01 PRIMARY INSOMNIA: ICD-10-CM

## 2019-08-26 PROCEDURE — 99213 OFFICE O/P EST LOW 20 MIN: CPT | Performed by: FAMILY MEDICINE

## 2019-08-26 PROCEDURE — 1036F TOBACCO NON-USER: CPT | Performed by: FAMILY MEDICINE

## 2019-08-26 PROCEDURE — 3725F SCREEN DEPRESSION PERFORMED: CPT | Performed by: FAMILY MEDICINE

## 2019-08-26 PROCEDURE — 1160F RVW MEDS BY RX/DR IN RCRD: CPT | Performed by: FAMILY MEDICINE

## 2019-08-26 RX ORDER — TRAZODONE HYDROCHLORIDE 50 MG/1
75 TABLET ORAL
Qty: 30 TABLET | Refills: 5
Start: 2019-08-26 | End: 2019-08-27 | Stop reason: SDUPTHER

## 2019-08-26 RX ORDER — TRAZODONE HYDROCHLORIDE 50 MG/1
50 TABLET ORAL
Qty: 30 TABLET | Refills: 5 | Status: SHIPPED | OUTPATIENT
Start: 2019-08-26 | End: 2019-08-26 | Stop reason: SDUPTHER

## 2019-08-26 NOTE — ASSESSMENT & PLAN NOTE
Trazadone helps with the sleep and also showed a decrease in her PHQ9  Still feeling tired   Due to better sleep and mood, will increase trazdone to 75mg

## 2019-08-26 NOTE — PROGRESS NOTES
Cam Silverio 1940 female MRN: [id number]      ASSESSMENT/PLAN  Problem List Items Addressed This Visit        Other    Primary insomnia - Primary     Trazadone helps with the sleep and also showed a decrease in her PHQ9  Still feeling tired  Due to better sleep and mood, will increase trazdone to 75mg         Relevant Medications    traZODone (DESYREL) 50 mg tablet    Sleep disturbance    Relevant Medications    traZODone (DESYREL) 50 mg tablet    Anxiety     GAD7 improved  Score is now 5                   Future Appointments   Date Time Provider Michelle Luque   9/13/2019  9:00 AM UROLOGY DIAGNOSTIC NURSE [place] [facility]   9/19/2019  8:40 AM Conrad Delarosa MD [facility]   10/11/2019  9:00 AM UROLOGY DIAGNOSTIC NURSE [place] [facility]   3/2/2020 10:00 AM Jonetta Gaucher, MD [facility]   4/28/2020  9:15 AM Zuleima James MD [facility]          SUBJECTIVE  CC: Follow-up (1 wk f/u -Patient states trazadone does help her sleep but she does not wake up feeling well rested )      HPI:  Cam Silverio is a [de-identified] y o  female who presents for follow up after starting Trazodone  She feels like the trazadone is helping slightly, but she still has trouble with falling asleep and staying asleep  She feels she wakes up tired and not well rested  The trazadone is helping her with sleep, but not in terms of feeling refreshed when she wakes up  She also has chronic pain, but this is not keeping her from falling asleep or waking her  She often ruminates at night  She is often concerned about appointments the next day  She is often worried she will be late to things and her mind races  She stresses about work  Her PHQ9 score is 11  Review of Systems   Constitutional: Positive for fatigue  Negative for activity change, appetite change, chills, fever and unexpected weight change     HENT: Negative for congestion, ear discharge, ear pain, postnasal drip, sinus pressure and sore throat  Eyes: Negative for discharge and visual disturbance  Respiratory: Negative for cough, shortness of breath and wheezing  Cardiovascular: Negative for chest pain, palpitations and leg swelling  Gastrointestinal: Negative for abdominal pain, constipation, diarrhea, nausea and vomiting  Endocrine: Negative for cold intolerance, heat intolerance, polydipsia and polyuria  Genitourinary: Negative for difficulty urinating and frequency  Musculoskeletal: Positive for arthralgias and back pain  Negative for joint swelling and myalgias  Skin: Negative for rash  Neurological: Negative for dizziness, weakness, light-headedness, numbness and headaches  Hematological: Negative for adenopathy  Psychiatric/Behavioral: Negative for behavioral problems, confusion, dysphoric mood, sleep disturbance and suicidal ideas  The patient is not nervous/anxious          Historical Information   The patient history was reviewed and updated as follows:    Past Medical History:   Diagnosis Date    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     Benign neoplasm of skin     Chest pain     Claustrophobia     Diverticulitis     GERD (gastroesophageal reflux disease)     Muscle weakness     Nonmelanoma skin cancer     last assessed 2017    Palpitations     Seasonal allergies     Sleep apnea     no cpap    Spontaneous      without mention of complications     Syncope      Past Surgical History:   Procedure Laterality Date    BOTOX INJECTION N/A 3/6/2017    Procedure: CYSTOSCOPY; BLADDER BOTOX 100 UNITS ;  Surgeon: Andrea Rico MD;  [facility];  Service:     BOWEL RESECTION      related ot diverticulitis    CARDIAC CATHETERIZATION      CARPAL TUNNEL RELEASE Right     COLOSTOMY      COLOSTOMY CLOSURE      FOOT SURGERY Left     neuroma    HYSTERECTOMY      NASAL SINUS SURGERY      RI CYSTOURETHROSCOPY N/A 2018 Procedure: CYSTOSCOPY WITH BOTOX;  Surgeon: Melly Espinal MD;  [facility];  Service: Urology    MT ESOPHAGOGASTRODUODENOSCOPY TRANSORAL DIAGNOSTIC N/A 4/23/2019    Procedure: ESOPHAGOGASTRODUODENOSCOPY (EGD); Surgeon: Sendy Keys DO;  [facility];   Service: Gastroenterology    TONSILLECTOMY       Family History   Problem Relation Age of Onset    Alcohol abuse Mother     Colon cancer Mother     Heart disease Mother     Alcohol abuse Father     Alcohol abuse Brother     Colon cancer Brother     Tremor Neg Hx     Parkinsonism Neg Hx       Social History   Social History     Substance and Sexual Activity   Alcohol Use No     Social History     Substance and Sexual Activity   Drug Use No     Social History     Tobacco Use   Smoking Status Never Smoker   Smokeless Tobacco Never Used       Medications:     Current Outpatient Medications:     Ascorbic Acid (VITAMIN C) 1000 MG tablet, Take 1,000 mg by mouth daily, Disp: , Rfl:     B Complex Vitamins (B COMPLEX 100 PO), Take by mouth, Disp: , Rfl:     Biotin 2500 MCG CAPS, Take by mouth, Disp: , Rfl:     calcium carbonate (OS-JOHN) 1250 (500 Ca) MG tablet, Take 1 tablet by mouth daily, Disp: , Rfl:     cholecalciferol (VITAMIN D3) 1,000 units tablet, Take 5,000 Units by mouth daily , Disp: , Rfl:     Echinacea 125 MG CAPS, Take by mouth, Disp: , Rfl:     fexofenadine (ALLEGRA) 180 MG tablet, Take 180 mg by mouth as needed , Disp: , Rfl:     multivitamin (THERAGRAN) TABS, Take 1 tablet by mouth daily, Disp: , Rfl:     Omega-3 350 MG CPDR, Take by mouth, Disp: , Rfl:     pantoprazole (PROTONIX) 40 mg tablet, Take 1 tablet (40 mg total) by mouth 2 (two) times a day, Disp: 60 tablet, Rfl: 3    polyethylene glycol (MIRALAX) 17 g packet, Take 17 g by mouth daily, Disp: 14 each, Rfl: 2    Potassium Gluconate 595 (99 K) MG TABS, Take by mouth, Disp: , Rfl:     Probiotic Product (PROBIOTIC-10) CAPS, Take by mouth, Disp: , Rfl:    psyllium (METAMUCIL) 58 6 % powder, Take 1 packet by mouth 3 (three) times a day, Disp: , Rfl:     ranitidine (ZANTAC) 150 mg tablet, TAKE 1 TABLET (150 MG TOTAL) BY MOUTH EVERY EVENING (Patient taking differently: as needed ), Disp: 30 tablet, Rfl: 3    traZODone (DESYREL) 50 mg tablet, Take 1 5 tablets (75 mg total) by mouth daily at bedtime, Disp: 30 tablet, Rfl: 5  Allergies   Allergen Reactions    Caffeine GI Intolerance    Iodine Rash    Latex Rash    Other Rash     Adhesive tape         OBJECTIVE    Vitals:   Vitals:    08/26/19 0957   BP: 112/68   Pulse: 99   Resp: 16   Temp: (!) 96 3 °F (35 7 °C)   SpO2: 98%   Weight: 84 8 kg (187 lb)   Height: 5' 2" (1 575 m)           Physical Exam   Constitutional: She is oriented to person, place, and time  She appears well-developed and well-nourished  No distress  HENT:   Head: Normocephalic and atraumatic  Eyes: Pupils are equal, round, and reactive to light  Conjunctivae are normal    Neck: Neck supple  Cardiovascular: Normal rate, regular rhythm and normal heart sounds  Exam reveals no gallop and no friction rub  No murmur heard  Pulmonary/Chest: Effort normal and breath sounds normal  No respiratory distress  She has no wheezes  She has no rales  She exhibits no tenderness  Musculoskeletal: Normal range of motion  She exhibits no edema  Neurological: She is alert and oriented to person, place, and time  Skin: Skin is warm and dry  No rash noted  She is not diaphoretic  Psychiatric: She has a normal mood and affect  Her behavior is normal  Judgment and thought content normal    Nursing note and vitals reviewed                   Everett Cespedes MD  [facility]   8/26/2019  10:20 AM

## 2019-08-27 DIAGNOSIS — F51.01 PRIMARY INSOMNIA: ICD-10-CM

## 2019-08-27 DIAGNOSIS — G47.9 SLEEP DISTURBANCE: ICD-10-CM

## 2019-08-27 RX ORDER — TRAZODONE HYDROCHLORIDE 50 MG/1
75 TABLET ORAL
Qty: 30 TABLET | Refills: 5 | Status: SHIPPED | OUTPATIENT
Start: 2019-08-27 | End: 2019-09-03 | Stop reason: SDUPTHER

## 2019-09-03 DIAGNOSIS — F51.01 PRIMARY INSOMNIA: ICD-10-CM

## 2019-09-03 DIAGNOSIS — G47.9 SLEEP DISTURBANCE: ICD-10-CM

## 2019-09-03 RX ORDER — TRAZODONE HYDROCHLORIDE 50 MG/1
75 TABLET ORAL
Qty: 90 TABLET | Refills: 5 | Status: SHIPPED | OUTPATIENT
Start: 2019-09-03 | End: 2020-02-21 | Stop reason: ALTCHOICE

## 2019-09-13 ENCOUNTER — PROCEDURE VISIT (OUTPATIENT)
Dept: UROLOGY | Facility: CLINIC | Age: 79
End: 2019-09-13
Payer: COMMERCIAL

## 2019-09-13 VITALS
BODY MASS INDEX: 33.97 KG/M2 | HEIGHT: 62 IN | SYSTOLIC BLOOD PRESSURE: 100 MMHG | DIASTOLIC BLOOD PRESSURE: 62 MMHG | WEIGHT: 184.6 LBS | HEART RATE: 100 BPM

## 2019-09-13 DIAGNOSIS — R35.0 FREQUENCY OF URINATION: ICD-10-CM

## 2019-09-13 DIAGNOSIS — N39.41 URGE INCONTINENCE: ICD-10-CM

## 2019-09-13 DIAGNOSIS — R39.15 URINARY URGENCY: ICD-10-CM

## 2019-09-13 PROCEDURE — 64566 NEUROELTRD STIM POST TIBIAL: CPT | Performed by: UROLOGY

## 2019-09-13 RX ORDER — SOLIFENACIN SUCCINATE 5 MG/1
5 TABLET, FILM COATED ORAL DAILY
Refills: 3 | COMMUNITY
Start: 2019-09-09 | End: 2019-11-13 | Stop reason: SDUPTHER

## 2019-09-13 NOTE — PROGRESS NOTES
2019    Juan C Powell  1940  [id number]    Diagnosis  Chief Complaint     Urinary Incontinence; Urinary Frequency         Patient presents for PTNS  of 24 managed by Dr Kezia Harrell  Patient will follow up in one month for next treatment  Urinary Incontinence Screening      Most Recent Value   Urinary Incontinence   Urinary Incontinence? Yes [mostly morning upon waking, worse this month]   Incomplete emptying? No   Urinary frequency? Yes [worse this month]   Urinary urgency? Yes   Urinary hesitancy? No   Dysuria (painful difficult urination)? No   Nocturia (waking up to use the bathroom)? No [rare]   Straining (having to push to go)? No   Weak stream?  No   Intermittent stream?  No   Post void dribbling? No        Vitals:    19 0908   BP: 100/62   BP Location: Left arm   Patient Position: Sitting   Cuff Size: Standard   Pulse: 100   Weight: 83 7 kg (184 lb 9 6 oz)   Height: 5' 2" (1 575 m)         Procedure PTNS      Session 13 of 24    Ankle used: right  Treatment settin (level 2 much too strong to tolerate)  Duration of treatment: 30 minutes  Lead lot #:[id number]  Expiration Date:2021  Feeling/Response:whole foot into big toe sensation noted  Overall patient had worse symptoms this month  She reports increased morning urge incontinence and two days where it seemed like every time she moved she leaked  However near the end of the month symptoms have improved  She had recently started the vesicare and her body may have been adjusting to new medication         RON Shirley BSN

## 2019-10-01 ENCOUNTER — OFFICE VISIT (OUTPATIENT)
Dept: GASTROENTEROLOGY | Facility: CLINIC | Age: 79
End: 2019-10-01
Payer: COMMERCIAL

## 2019-10-01 VITALS
BODY MASS INDEX: 34.04 KG/M2 | SYSTOLIC BLOOD PRESSURE: 112 MMHG | HEIGHT: 62 IN | HEART RATE: 86 BPM | WEIGHT: 185 LBS | DIASTOLIC BLOOD PRESSURE: 82 MMHG

## 2019-10-01 DIAGNOSIS — K59.00 CONSTIPATION, UNSPECIFIED CONSTIPATION TYPE: Primary | ICD-10-CM

## 2019-10-01 DIAGNOSIS — K21.9 GASTROESOPHAGEAL REFLUX DISEASE WITHOUT ESOPHAGITIS: ICD-10-CM

## 2019-10-01 PROCEDURE — 99214 OFFICE O/P EST MOD 30 MIN: CPT | Performed by: PHYSICIAN ASSISTANT

## 2019-10-01 NOTE — LETTER
October 1, 2019     Sawyer Parada, [email or web address]  [address] [facility]  [address]    Patient: Subha Awan   YOB: 1940   Date of Visit: 10/1/2019       Dear Dr Griggs Proud:    Thank you for referring Jose Rafael Spann to me for evaluation  Below are my notes for this consultation  If you have questions, please do not hesitate to call me  I look forward to following your patient along with you  Sincerely,        Rubi Benavidez PA-C        CC: No Recipients  Rubi Benavidez PA-C  10/1/2019 10:07 AM  Sign at close encounter  [facility] - Outpatient Follow-up Note  Subha Awan [de-identified] y o  female MRN: [id number]  Encounter: [id number]          ASSESSMENT AND PLAN:      1  Constipation, unspecified constipation type  2  Gastroesophageal reflux disease without esophagitis  3  Esophageal dysmotility  Will start Linzess 72 micro g daily  Will continue fiber and probiotics  I am concerned the patient is off her PPI therapy as well as Zantac  If she has a return of dysphagia or worsening reflux disease we will need to consider restarting this regimen   ______________________________________________________________________    SUBJECTIVE:    68-year-old female who is here with constipation and esophageal dysmotility  Patient reports that over the last several months she has been suffering with worsening constipation  She reports she saw her colorectal surgeon underwent a colonoscopy on July 31st which was a normal examination  He recommended that she increase her fiber in her diet  Patient is still struggling to have a bowel movement  She reports that she is having a bowel movement every other day to up to every 4 days  She denies any rectal bleeding or melena  She reports that her pantoprazole as well as Zantac was stopped due to the side effect of constipation  She reports abdominal fullness and bloating  She is currently taking probiotics    She is not on anything prescription for her constipation  REVIEW OF SYSTEMS IS OTHERWISE NEGATIVE  Historical Information   Past Medical History:   Diagnosis Date    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     Benign neoplasm of skin     Chest pain     Claustrophobia     Diverticulitis     GERD (gastroesophageal reflux disease)     Muscle weakness     Nonmelanoma skin cancer     last assessed 2017    Palpitations     Seasonal allergies     Sleep apnea     no cpap    Spontaneous      without mention of complications     Syncope      Past Surgical History:   Procedure Laterality Date    BOTOX INJECTION N/A 3/6/2017    Procedure: CYSTOSCOPY; BLADDER BOTOX 100 UNITS ;  Surgeon: Pepito Lama MD;  [facility];  Service:     BOWEL RESECTION      related ot diverticulitis    CARDIAC CATHETERIZATION      CARPAL TUNNEL RELEASE Right     COLOSTOMY      COLOSTOMY CLOSURE      FOOT SURGERY Left     neuroma    HYSTERECTOMY      NASAL SINUS SURGERY      HI CYSTOURETHROSCOPY N/A 2018    Procedure: CYSTOSCOPY WITH BOTOX;  Surgeon: Pepito Lama MD;  [facility];  Service: Urology    HI ESOPHAGOGASTRODUODENOSCOPY TRANSORAL DIAGNOSTIC N/A 2019    Procedure: ESOPHAGOGASTRODUODENOSCOPY (EGD); Surgeon: Judge Octavia DO;  [facility];   Service: Gastroenterology    TONSILLECTOMY       Social History   Social History     Substance and Sexual Activity   Alcohol Use No     Social History     Substance and Sexual Activity   Drug Use No     Social History     Tobacco Use   Smoking Status Never Smoker   Smokeless Tobacco Never Used     Family History   Problem Relation Age of Onset    Alcohol abuse Mother     Colon cancer Mother     Heart disease Mother     Alcohol abuse Father     Alcohol abuse Brother     Colon cancer Brother     Tremor Neg Hx     Parkinsonism Neg Hx        Meds/Allergies       Current Outpatient Medications:   Ascorbic Acid (VITAMIN C) 1000 MG tablet    B Complex Vitamins (B COMPLEX 100 PO)    Biotin 2500 MCG CAPS    Echinacea 125 MG CAPS    fexofenadine (ALLEGRA) 180 MG tablet    multivitamin (THERAGRAN) TABS    Omega-3 350 MG CPDR    calcium carbonate (OS-JOHN) 1250 (500 Ca) MG tablet    cholecalciferol (VITAMIN D3) 1,000 units tablet    pantoprazole (PROTONIX) 40 mg tablet    polyethylene glycol (MIRALAX) 17 g packet    Potassium Gluconate 595 (99 K) MG TABS    Probiotic Product (PROBIOTIC-10) CAPS    psyllium (METAMUCIL) 58 6 % powder    ranitidine (ZANTAC) 150 mg tablet    solifenacin (VESICARE) 5 mg tablet    traZODone (DESYREL) 50 mg tablet    Allergies   Allergen Reactions    Caffeine GI Intolerance    Iodine Rash    Latex Rash    Other Rash     Adhesive tape             Objective     Blood pressure 112/82, pulse 86, height 5' 2" (1 575 m), weight 83 9 kg (185 lb), not currently breastfeeding  Body mass index is 33 84 kg/m²  PHYSICAL EXAM:      General Appearance:   Alert, cooperative, no distress   HEENT:   Normocephalic, atraumatic, anicteric      Neck:  Supple, symmetrical, trachea midline   Lungs:   Clear to auscultation bilaterally; no rales, rhonchi or wheezing; respirations unlabored    Heart[de-identified]   Regular rate and rhythm; no murmur, rub, or gallop  Abdomen:   Soft, non-tender, non-distended; normal bowel sounds; no masses, no organomegaly    Genitalia:   Deferred    Rectal:   Deferred    Extremities:  No cyanosis, clubbing or edema    Pulses:  2+ and symmetric    Skin:  No jaundice, rashes, or lesions    Lymph nodes:  No palpable cervical lymphadenopathy        Lab Results:   No visits with results within 1 Day(s) from this visit     Latest known visit with results is:   Lab on 08/19/2019   Component Date Value    WBC 08/19/2019 5 85     RBC 08/19/2019 4 96     Hemoglobin 08/19/2019 14 4     Hematocrit 08/19/2019 44 5     MCV 08/19/2019 90     MCH 08/19/2019 29 0     MCHC 08/19/2019 32 4     RDW 08/19/2019 13 6     MPV 08/19/2019 9 8     Platelets 89/28/1718 245     nRBC 08/19/2019 0     Neutrophils Relative 08/19/2019 61     Immat GRANS % 08/19/2019 1     Lymphocytes Relative 08/19/2019 26     Monocytes Relative 08/19/2019 10     Eosinophils Relative 08/19/2019 2     Basophils Relative 08/19/2019 0     Neutrophils Absolute 08/19/2019 3 64     Immature Grans Absolute 08/19/2019 0 03     Lymphocytes Absolute 08/19/2019 1 50     Monocytes Absolute 08/19/2019 0 56     Eosinophils Absolute 08/19/2019 0 10     Basophils Absolute 08/19/2019 0 02     Vit D, 25-Hydroxy 08/19/2019 59 8     Iron 08/19/2019 90     TSH 3RD GENERATON 08/19/2019 1 870          Radiology Results:   No results found

## 2019-10-01 NOTE — PROGRESS NOTES
[facility]  Raul Holland [de-identified] y o  female MRN: [id number]  Encounter: [id number]          ASSESSMENT AND PLAN:      1  Constipation, unspecified constipation type  2  Gastroesophageal reflux disease without esophagitis  3  Esophageal dysmotility  Will start Linzess 72 micro g daily  Will continue fiber and probiotics  I am concerned the patient is off her PPI therapy as well as Zantac  If she has a return of dysphagia or worsening reflux disease we will need to consider restarting this regimen   ______________________________________________________________________    SUBJECTIVE:    79-year-old female who is here with constipation and esophageal dysmotility  Patient reports that over the last several months she has been suffering with worsening constipation  She reports she saw her colorectal surgeon underwent a colonoscopy on  which was a normal examination  He recommended that she increase her fiber in her diet  Patient is still struggling to have a bowel movement  She reports that she is having a bowel movement every other day to up to every 4 days  She denies any rectal bleeding or melena  She reports that her pantoprazole as well as Zantac was stopped due to the side effect of constipation  She reports abdominal fullness and bloating  She is currently taking probiotics  She is not on anything prescription for her constipation  REVIEW OF SYSTEMS IS OTHERWISE NEGATIVE        Historical Information   Past Medical History:   Diagnosis Date    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     Benign neoplasm of skin     Chest pain     Claustrophobia     Diverticulitis     GERD (gastroesophageal reflux disease)     Muscle weakness     Nonmelanoma skin cancer     last assessed 2017    Palpitations     Seasonal allergies     Sleep apnea     no cpap    Spontaneous      without mention of complications     Syncope      Past Surgical History:   Procedure Laterality Date    BOTOX INJECTION N/A 3/6/2017    Procedure: CYSTOSCOPY; BLADDER BOTOX 100 UNITS ;  Surgeon: Brenda Vernon MD;  [facility];  Service:     BOWEL RESECTION      related ot diverticulitis    CARDIAC CATHETERIZATION      CARPAL TUNNEL RELEASE Right     COLOSTOMY      COLOSTOMY CLOSURE      FOOT SURGERY Left     neuroma    HYSTERECTOMY      NASAL SINUS SURGERY      CA CYSTOURETHROSCOPY N/A 4/13/2018    Procedure: CYSTOSCOPY WITH BOTOX;  Surgeon: Brenda Vernon MD;  [facility];  Service: Urology    CA ESOPHAGOGASTRODUODENOSCOPY TRANSORAL DIAGNOSTIC N/A 4/23/2019    Procedure: ESOPHAGOGASTRODUODENOSCOPY (EGD); Surgeon: Rom Whitten DO;  [facility];   Service: Gastroenterology    TONSILLECTOMY       Social History   Social History     Substance and Sexual Activity   Alcohol Use No     Social History     Substance and Sexual Activity   Drug Use No     Social History     Tobacco Use   Smoking Status Never Smoker   Smokeless Tobacco Never Used     Family History   Problem Relation Age of Onset    Alcohol abuse Mother     Colon cancer Mother     Heart disease Mother     Alcohol abuse Father     Alcohol abuse Brother     Colon cancer Brother     Tremor Neg Hx     Parkinsonism Neg Hx        Meds/Allergies       Current Outpatient Medications:     Ascorbic Acid (VITAMIN C) 1000 MG tablet    B Complex Vitamins (B COMPLEX 100 PO)    Biotin 2500 MCG CAPS    Echinacea 125 MG CAPS    fexofenadine (ALLEGRA) 180 MG tablet    multivitamin (THERAGRAN) TABS    Omega-3 350 MG CPDR    calcium carbonate (OS-JOHN) 1250 (500 Ca) MG tablet    cholecalciferol (VITAMIN D3) 1,000 units tablet    pantoprazole (PROTONIX) 40 mg tablet    polyethylene glycol (MIRALAX) 17 g packet    Potassium Gluconate 595 (99 K) MG TABS    Probiotic Product (PROBIOTIC-10) CAPS    psyllium (METAMUCIL) 58 6 % powder    ranitidine (ZANTAC) 150 mg tablet    solifenacin (VESICARE) 5 mg tablet    traZODone (DESYREL) 50 mg tablet    Allergies   Allergen Reactions    Caffeine GI Intolerance    Iodine Rash    Latex Rash    Other Rash     Adhesive tape             Objective     Blood pressure 112/82, pulse 86, height 5' 2" (1 575 m), weight 83 9 kg (185 lb), not currently breastfeeding  Body mass index is 33 84 kg/m²  PHYSICAL EXAM:      General Appearance:   Alert, cooperative, no distress   HEENT:   Normocephalic, atraumatic, anicteric      Neck:  Supple, symmetrical, trachea midline   Lungs:   Clear to auscultation bilaterally; no rales, rhonchi or wheezing; respirations unlabored    Heart[de-identified]   Regular rate and rhythm; no murmur, rub, or gallop  Abdomen:   Soft, non-tender, non-distended; normal bowel sounds; no masses, no organomegaly    Genitalia:   Deferred    Rectal:   Deferred    Extremities:  No cyanosis, clubbing or edema    Pulses:  2+ and symmetric    Skin:  No jaundice, rashes, or lesions    Lymph nodes:  No palpable cervical lymphadenopathy        Lab Results:   No visits with results within 1 Day(s) from this visit     Latest known visit with results is:   Lab on 08/19/2019   Component Date Value    WBC 08/19/2019 5 85     RBC 08/19/2019 4 96     Hemoglobin 08/19/2019 14 4     Hematocrit 08/19/2019 44 5     MCV 08/19/2019 90     MCH 08/19/2019 29 0     MCHC 08/19/2019 32 4     RDW 08/19/2019 13 6     MPV 08/19/2019 9 8     Platelets 06/87/2045 245     nRBC 08/19/2019 0     Neutrophils Relative 08/19/2019 61     Immat GRANS % 08/19/2019 1     Lymphocytes Relative 08/19/2019 26     Monocytes Relative 08/19/2019 10     Eosinophils Relative 08/19/2019 2     Basophils Relative 08/19/2019 0     Neutrophils Absolute 08/19/2019 3 64     Immature Grans Absolute 08/19/2019 0 03     Lymphocytes Absolute 08/19/2019 1 50     Monocytes Absolute 08/19/2019 0 56     Eosinophils Absolute 08/19/2019 0 10     Basophils Absolute 08/19/2019 0 02     Vit D, 25-Hydroxy 08/19/2019 59 8     Iron 08/19/2019 90     TSH 3RD GENERATON 08/19/2019 1 870          Radiology Results:   No results found

## 2019-10-01 NOTE — PATIENT INSTRUCTIONS
Constipation   WHAT YOU NEED TO KNOW:   Constipation is when you have hard, dry bowel movements, or you go longer than usual between bowel movements  DISCHARGE INSTRUCTIONS:   Return to the emergency department if:   · You have blood in your bowel movements  · You have a fever and abdominal pain with the constipation  Contact your healthcare provider if:   · Your constipation gets worse  · You start to vomit  · You have questions or concerns about your condition or care  Medicines:   · Medicine or a fiber supplement  may help make your bowel movement softer  A laxative may help relax and loosen your intestines to help you have a bowel movement  You may also be given medicine to increase fluid in your intestines  The fluid may help move bowel movements through your intestines  · Take your medicine as directed  Contact your healthcare provider if you think your medicine is not helping or if you have side effects  Tell him of her if you are allergic to any medicine  Keep a list of the medicines, vitamins, and herbs you take  Include the amounts, and when and why you take them  Bring the list or the pill bottles to follow-up visits  Carry your medicine list with you in case of an emergency  Manage your constipation:   · Drink liquids as directed  You may need to drink extra liquids to help soften and move your bowels  Ask how much liquid to drink each day and which liquids are best for you  · Eat high-fiber foods  This may help decrease constipation by adding bulk to your bowel movements  High-fiber foods include fruit, vegetables, whole-grain breads and cereals, and beans  Your healthcare provider or dietitian can help you create a high-fiber meal plan  · Exercise regularly  Regular physical activity can help stimulate your intestines  Ask which exercises are best for you  · Schedule a time each day to have a bowel movement    This may help train your body to have regular bowel movements  Bend forward while you are on the toilet to help move the bowel movement out  Sit on the toilet for at least 10 minutes, even if you do not have a bowel movement  Follow up with your healthcare provider as directed:  Write down your questions so you remember to ask them during your visits  © 2017 [employer] Information is for End User's use only and may not be sold, redistributed or otherwise used for commercial purposes  All illustrations and images included in CareNotes® are the copyrighted property of [employer]  or Bennett Hallman  The above information is an  only  It is not intended as medical advice for individual conditions or treatments  Talk to your doctor, nurse or pharmacist before following any medical regimen to see if it is safe and effective for you

## 2019-10-10 DIAGNOSIS — R35.0 FREQUENCY OF URINATION: ICD-10-CM

## 2019-10-10 RX ORDER — SOLIFENACIN SUCCINATE 5 MG/1
TABLET, FILM COATED ORAL
Qty: 30 TABLET | Refills: 3 | OUTPATIENT
Start: 2019-10-10

## 2019-10-11 ENCOUNTER — PROCEDURE VISIT (OUTPATIENT)
Dept: UROLOGY | Facility: CLINIC | Age: 79
End: 2019-10-11
Payer: COMMERCIAL

## 2019-10-11 VITALS
DIASTOLIC BLOOD PRESSURE: 76 MMHG | HEIGHT: 62 IN | HEART RATE: 72 BPM | SYSTOLIC BLOOD PRESSURE: 136 MMHG | WEIGHT: 185 LBS | BODY MASS INDEX: 34.04 KG/M2

## 2019-10-11 DIAGNOSIS — N39.41 URGE INCONTINENCE: ICD-10-CM

## 2019-10-11 DIAGNOSIS — R39.15 URINARY URGENCY: ICD-10-CM

## 2019-10-11 DIAGNOSIS — R35.0 FREQUENCY OF URINATION: ICD-10-CM

## 2019-10-11 PROCEDURE — 64566 NEUROELTRD STIM POST TIBIAL: CPT

## 2019-10-24 RX ORDER — TEMAZEPAM 30 MG/1
1 CAPSULE ORAL DAILY
COMMUNITY
Start: 2018-02-13 | End: 2020-02-21 | Stop reason: ALTCHOICE

## 2019-10-24 RX ORDER — ALPRAZOLAM 0.5 MG/1
TABLET ORAL EVERY 12 HOURS
COMMUNITY
Start: 2018-02-13 | End: 2020-02-21 | Stop reason: ALTCHOICE

## 2019-10-29 ENCOUNTER — OFFICE VISIT (OUTPATIENT)
Dept: GASTROENTEROLOGY | Facility: CLINIC | Age: 79
End: 2019-10-29
Payer: COMMERCIAL

## 2019-10-29 VITALS
SYSTOLIC BLOOD PRESSURE: 122 MMHG | HEIGHT: 62 IN | BODY MASS INDEX: 34.12 KG/M2 | HEART RATE: 104 BPM | WEIGHT: 185.4 LBS | DIASTOLIC BLOOD PRESSURE: 72 MMHG

## 2019-10-29 DIAGNOSIS — K59.00 CONSTIPATION, UNSPECIFIED CONSTIPATION TYPE: Primary | ICD-10-CM

## 2019-10-29 DIAGNOSIS — K21.9 GASTROESOPHAGEAL REFLUX DISEASE WITHOUT ESOPHAGITIS: ICD-10-CM

## 2019-10-29 PROCEDURE — 99214 OFFICE O/P EST MOD 30 MIN: CPT | Performed by: PHYSICIAN ASSISTANT

## 2019-10-29 NOTE — PROGRESS NOTES
[facility] - Outpatient Follow-up Note  Fred Arthur [de-identified] y o  female MRN: [id number]  Encounter: [id number]          ASSESSMENT AND PLAN:      1  Constipation, unspecified constipation type  2  Gastroesophageal reflux disease without esophagitis  3  Esophageal dysmotility  High-fiber diet given and reviewed  Patient will start fiber supplementation  Will increase Linzess to 145 mcg  Follow-up in 6 weeks  ______________________________________________________________________    SUBJECTIVE:    [de-identified] female who is here for follow-up of constipation as well as esophageal dysmotility  Patient is currently taking taking Linzess 72 micro g  And she reports she is still strict to have a bowel movement  She is reporting a bowel movement every other day  She denies any melena or rectal bleeding  She denies any abdominal pain or severe bloating  She denies any nausea or vomiting  She actually reports that her acid reflux has been under control despite the fact that she is no longer on Zantac or PPI therapy  She reports 2 episodes of mild dysphagia  REVIEW OF SYSTEMS IS OTHERWISE NEGATIVE        Historical Information   Past Medical History:   Diagnosis Date    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     Benign neoplasm of skin     Chest pain     Claustrophobia     Diverticulitis     GERD (gastroesophageal reflux disease)     Muscle weakness     Nonmelanoma skin cancer     last assessed 2017    Palpitations     Seasonal allergies     Sleep apnea     no cpap    Spontaneous      without mention of complications     Syncope      Past Surgical History:   Procedure Laterality Date    BOTOX INJECTION N/A 3/6/2017    Procedure: CYSTOSCOPY; BLADDER BOTOX 100 UNITS ;  Surgeon: Brianna Wilson MD;  [facility];  Service:     BOWEL RESECTION      related ot diverticulitis    CARDIAC CATHETERIZATION      [facility] TUNNEL RELEASE Right     COLOSTOMY      COLOSTOMY CLOSURE      FOOT SURGERY Left     neuroma    HYSTERECTOMY      NASAL SINUS SURGERY      [place] CYSTOURETHROSCOPY N/A 4/13/2018    Procedure: CYSTOSCOPY WITH BOTOX;  Surgeon: Isela Arce MD;  [facility];  Service: Urology    [place] ESOPHAGOGASTRODUODENOSCOPY TRANSORAL DIAGNOSTIC N/A 4/23/2019    Procedure: ESOPHAGOGASTRODUODENOSCOPY (EGD); Surgeon: Babatunde Burton DO;  [facility];   Service: Gastroenterology    TONSILLECTOMY       Social History   Social History     Substance and Sexual Activity   Alcohol Use No     Social History     Substance and Sexual Activity   Drug Use No     Social History     Tobacco Use   Smoking Status Never Smoker   Smokeless Tobacco Never Used     Family History   Problem Relation Age of Onset    Alcohol abuse Mother     Colon cancer Mother     Heart disease Mother     Alcohol abuse Father     Alcohol abuse Brother     Colon cancer Brother     Tremor Neg Hx     Parkinsonism Neg Hx        Meds/Allergies       Current Outpatient Medications:     ALPRAZolam (XANAX) 0 5 mg tablet    Ascorbic Acid (VITAMIN C) 1000 MG tablet    B Complex Vitamins (B COMPLEX 100 PO)    Biotin 2500 MCG CAPS    calcium carbonate (OS-JOHN) 1250 (500 Ca) MG tablet    cholecalciferol (VITAMIN D3) 1,000 units tablet    Echinacea 125 MG CAPS    fexofenadine (ALLEGRA) 180 MG tablet    multivitamin (THERAGRAN) TABS    Omega-3 350 MG CPDR    pantoprazole (PROTONIX) 40 mg tablet    polyethylene glycol (MIRALAX) 17 g packet    Potassium Gluconate 595 (99 K) MG TABS    Probiotic Product (PROBIOTIC-10) CAPS    psyllium (METAMUCIL) 58 6 % powder    ranitidine (ZANTAC) 150 mg tablet    solifenacin (VESICARE) 5 mg tablet    temazepam (RESTORIL) 30 mg capsule    traZODone (DESYREL) 50 mg tablet    Allergies   Allergen Reactions    Caffeine GI Intolerance    Iodine Rash    Latex Rash    Other Rash     Adhesive tape Objective     Blood pressure 122/72, pulse 104, height 5' 2" (1 575 m), weight 84 1 kg (185 lb 6 4 oz), not currently breastfeeding  Body mass index is 33 91 kg/m²  PHYSICAL EXAM:      General Appearance:   Alert, cooperative, no distress   HEENT:   Normocephalic, atraumatic, anicteric      Neck:  Supple, symmetrical, trachea midline   Lungs:   Clear to auscultation bilaterally; no rales, rhonchi or wheezing; respirations unlabored    Heart[de-identified]   Regular rate and rhythm; no murmur, rub, or gallop  Abdomen:   Soft, non-tender, non-distended; normal bowel sounds; no masses, no organomegaly    Genitalia:   Deferred    Rectal:   Deferred    Extremities:  No cyanosis, clubbing or edema    Pulses:  2+ and symmetric    Skin:  No jaundice, rashes, or lesions    Lymph nodes:  No palpable cervical lymphadenopathy        Lab Results:   No visits with results within 1 Day(s) from this visit  Latest known visit with results is:   Lab on 08/19/2019   Component Date Value    WBC 08/19/2019 5 85     RBC 08/19/2019 4 96     Hemoglobin 08/19/2019 14 4     Hematocrit 08/19/2019 44 5     MCV 08/19/2019 90     MCH 08/19/2019 29 0     MCHC 08/19/2019 32 4     RDW 08/19/2019 13 6     MPV 08/19/2019 9 8     Platelets 07/50/8494 245     nRBC 08/19/2019 0     Neutrophils Relative 08/19/2019 61     Immat GRANS % 08/19/2019 1     Lymphocytes Relative 08/19/2019 26     Monocytes Relative 08/19/2019 10     Eosinophils Relative 08/19/2019 2     Basophils Relative 08/19/2019 0     Neutrophils Absolute 08/19/2019 3 64     Immature Grans Absolute 08/19/2019 0 03     Lymphocytes Absolute 08/19/2019 1 50     Monocytes Absolute 08/19/2019 0 56     Eosinophils Absolute 08/19/2019 0 10     Basophils Absolute 08/19/2019 0 02     Vit D, 25-Hydroxy 08/19/2019 59 8     Iron 08/19/2019 90     TSH 3RD GENERATON 08/19/2019 1 870          Radiology Results:   No results found

## 2019-10-29 NOTE — LETTER
October 29, 2019     Fely Villasenor, [address]  [address] [facility]  [address]    Patient: Bradley Singh   YOB: 1940   Date of Visit: 10/29/2019       Dear Dr David Cervantes:    Thank you for referring Zoya Conner to me for evaluation  Below are my notes for this consultation  If you have questions, please do not hesitate to call me  I look forward to following your patient along with you  Sincerely,        Blanca Chun PA-C        CC: No Recipients  Blanca Chun [place]  10/29/2019  9:16 AM  Incomplete  [facility] - Outpatient Follow-up Note  Bradley Singh [de-identified] y o  female MRN: [id number]  Encounter: [id number]          ASSESSMENT AND PLAN:      1  Constipation, unspecified constipation type  2  Gastroesophageal reflux disease without esophagitis  3  Esophageal dysmotility  High-fiber diet given and reviewed  Patient will start fiber supplementation  Will increase Linzess to 145 mcg  Follow-up in 6 weeks  ______________________________________________________________________    SUBJECTIVE:    79-year-old female who is here for follow-up of constipation as well as esophageal dysmotility  Patient is currently taking taking Linzess 72 micro g  And she reports she is still strict to have a bowel movement  She is reporting a bowel movement every other day  She denies any melena or rectal bleeding  She denies any abdominal pain or severe bloating  She denies any nausea or vomiting  She actually reports that her acid reflux has been under control despite the fact that she is no longer on Zantac or PPI therapy  She reports 2 episodes of mild dysphagia  REVIEW OF SYSTEMS IS OTHERWISE NEGATIVE        Historical Information   Past Medical History:   Diagnosis Date    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     Benign neoplasm of skin     Chest pain     Claustrophobia     Diverticulitis     GERD (gastroesophageal reflux disease)     Muscle weakness     Nonmelanoma skin cancer     last assessed 2017    Palpitations     Seasonal allergies     Sleep apnea     no cpap    Spontaneous      without mention of complications     Syncope      Past Surgical History:   Procedure Laterality Date    BOTOX INJECTION N/A 3/6/2017    Procedure: CYSTOSCOPY; BLADDER BOTOX 100 UNITS ;  Surgeon: Brianna Wilson MD;  [facility];  Service:     BOWEL RESECTION      related ot diverticulitis    CARDIAC CATHETERIZATION      CARPAL TUNNEL RELEASE Right     COLOSTOMY      COLOSTOMY CLOSURE      FOOT SURGERY Left     neuroma    HYSTERECTOMY      NASAL SINUS SURGERY      [place] CYSTOURETHROSCOPY N/A 2018    Procedure: CYSTOSCOPY WITH BOTOX;  Surgeon: Brianna Wilson MD;  [facility];  Service: Urology    [place] ESOPHAGOGASTRODUODENOSCOPY TRANSORAL DIAGNOSTIC N/A 2019    Procedure: ESOPHAGOGASTRODUODENOSCOPY (EGD); Surgeon: Mendez Whitt DO;  [facility];   Service: Gastroenterology    TONSILLECTOMY       Social History   Social History     Substance and Sexual Activity   Alcohol Use No     Social History     Substance and Sexual Activity   Drug Use No     Social History     Tobacco Use   Smoking Status Never Smoker   Smokeless Tobacco Never Used     Family History   Problem Relation Age of Onset    Alcohol abuse Mother     Colon cancer Mother     Heart disease Mother     Alcohol abuse Father     Alcohol abuse Brother     Colon cancer Brother     Tremor Neg Hx     Parkinsonism Neg Hx        Meds/Allergies       Current Outpatient Medications:     ALPRAZolam (XANAX) 0 5 mg tablet    Ascorbic Acid (VITAMIN C) 1000 MG tablet    B Complex Vitamins (B COMPLEX 100 PO)    Biotin 2500 MCG CAPS    calcium carbonate (OS-JOHN) 1250 (500 Ca) MG tablet    cholecalciferol (VITAMIN D3) 1,000 units tablet    Echinacea 125 MG CAPS    fexofenadine (ALLEGRA) 180 MG tablet   multivitamin (THERAGRAN) TABS    Omega-3 350 MG CPDR    pantoprazole (PROTONIX) 40 mg tablet    polyethylene glycol (MIRALAX) 17 g packet    Potassium Gluconate 595 (99 K) MG TABS    Probiotic Product (PROBIOTIC-10) CAPS    psyllium (METAMUCIL) 58 6 % powder    ranitidine (ZANTAC) 150 mg tablet    solifenacin (VESICARE) 5 mg tablet    temazepam (RESTORIL) 30 mg capsule    traZODone (DESYREL) 50 mg tablet    Allergies   Allergen Reactions    Caffeine GI Intolerance    Iodine Rash    Latex Rash    Other Rash     Adhesive tape             Objective     Blood pressure 122/72, pulse 104, height 5' 2" (1 575 m), weight 84 1 kg (185 lb 6 4 oz), not currently breastfeeding  Body mass index is 33 91 kg/m²  PHYSICAL EXAM:      General Appearance:   Alert, cooperative, no distress   HEENT:   Normocephalic, atraumatic, anicteric      Neck:  Supple, symmetrical, trachea midline   Lungs:   Clear to auscultation bilaterally; no rales, rhonchi or wheezing; respirations unlabored    Heart[de-identified]   Regular rate and rhythm; no murmur, rub, or gallop  Abdomen:   Soft, non-tender, non-distended; normal bowel sounds; no masses, no organomegaly    Genitalia:   Deferred    Rectal:   Deferred    Extremities:  No cyanosis, clubbing or edema    Pulses:  2+ and symmetric    Skin:  No jaundice, rashes, or lesions    Lymph nodes:  No palpable cervical lymphadenopathy        Lab Results:   No visits with results within 1 Day(s) from this visit     Latest known visit with results is:   Lab on 08/19/2019   Component Date Value    WBC 08/19/2019 5 85     RBC 08/19/2019 4 96     Hemoglobin 08/19/2019 14 4     Hematocrit 08/19/2019 44 5     MCV 08/19/2019 90     MCH 08/19/2019 29 0     MCHC 08/19/2019 32 4     RDW 08/19/2019 13 6     MPV 08/19/2019 9 8     Platelets 66/39/9268 245     nRBC 08/19/2019 0     Neutrophils Relative 08/19/2019 61     Immat GRANS % 08/19/2019 1     Lymphocytes Relative 08/19/2019 26     Monocytes Relative 08/19/2019 10     Eosinophils Relative 08/19/2019 2     Basophils Relative 08/19/2019 0     Neutrophils Absolute 08/19/2019 3 64     Immature Grans Absolute 08/19/2019 0 03     Lymphocytes Absolute 08/19/2019 1 50     Monocytes Absolute 08/19/2019 0 56     Eosinophils Absolute 08/19/2019 0 10     Basophils Absolute 08/19/2019 0 02     Vit D, 25-Hydroxy 08/19/2019 59 8     Iron 08/19/2019 90     TSH 3RD GENERATON 08/19/2019 1 870          Radiology Results:   No results found

## 2019-10-29 NOTE — PATIENT INSTRUCTIONS
Constipation   WHAT YOU NEED TO KNOW:   Constipation is when you have hard, dry bowel movements, or you go longer than usual between bowel movements  DISCHARGE INSTRUCTIONS:   Return to the emergency department if:   · You have blood in your bowel movements  · You have a fever and abdominal pain with the constipation  Contact your healthcare provider if:   · Your constipation gets worse  · You start to vomit  · You have questions or concerns about your condition or care  Medicines:   · Medicine or a fiber supplement  may help make your bowel movement softer  A laxative may help relax and loosen your intestines to help you have a bowel movement  You may also be given medicine to increase fluid in your intestines  The fluid may help move bowel movements through your intestines  · Take your medicine as directed  Contact your healthcare provider if you think your medicine is not helping or if you have side effects  Tell him of her if you are allergic to any medicine  Keep a list of the medicines, vitamins, and herbs you take  Include the amounts, and when and why you take them  Bring the list or the pill bottles to follow-up visits  Carry your medicine list with you in case of an emergency  Manage your constipation:   · Drink liquids as directed  You may need to drink extra liquids to help soften and move your bowels  Ask how much liquid to drink each day and which liquids are best for you  · Eat high-fiber foods  This may help decrease constipation by adding bulk to your bowel movements  High-fiber foods include fruit, vegetables, whole-grain breads and cereals, and beans  Your healthcare provider or dietitian can help you create a high-fiber meal plan  · Exercise regularly  Regular physical activity can help stimulate your intestines  Ask which exercises are best for you  · Schedule a time each day to have a bowel movement    This may help train your body to have regular bowel movements  Bend forward while you are on the toilet to help move the bowel movement out  Sit on the toilet for at least 10 minutes, even if you do not have a bowel movement  Follow up with your healthcare provider as directed:  Write down your questions so you remember to ask them during your visits  © 2017 [employer] Information is for End User's use only and may not be sold, redistributed or otherwise used for commercial purposes  All illustrations and images included in CareNotes® are the copyrighted property of [employer]  or [employer] Hallman  The above information is an  only  It is not intended as medical advice for individual conditions or treatments  Talk to your doctor, nurse or pharmacist before following any medical regimen to see if it is safe and effective for you

## 2019-11-08 ENCOUNTER — PROCEDURE VISIT (OUTPATIENT)
Dept: UROLOGY | Facility: CLINIC | Age: 79
End: 2019-11-08
Payer: COMMERCIAL

## 2019-11-08 VITALS
SYSTOLIC BLOOD PRESSURE: 114 MMHG | HEIGHT: 62 IN | WEIGHT: 183 LBS | BODY MASS INDEX: 33.68 KG/M2 | DIASTOLIC BLOOD PRESSURE: 72 MMHG

## 2019-11-08 DIAGNOSIS — N39.41 URGE INCONTINENCE OF URINE: ICD-10-CM

## 2019-11-08 PROCEDURE — 64566 NEUROELTRD STIM POST TIBIAL: CPT

## 2019-11-08 NOTE — PROGRESS NOTES
2019    Raul Holland  1940  [id number]    Diagnosis  Chief Complaint     urge incontinence; overactive bladder         Patient presents for PTNS 15 of 24 managed by Dr Clyde Berry  Patient will follow up as scheduled in one month for next treatment    Urinary Incontinence Screening      Most Recent Value   Urinary Incontinence   Urinary Incontinence? Yes [a few times/ stress incontinence]   Incomplete emptying? No   Urinary frequency? No   Urinary urgency? No   Urinary hesitancy? Yes [sometimes]   Dysuria (painful difficult urination)? No   Nocturia (waking up to use the bathroom)? No [1 times]   Straining (having to push to go)? Yes [sometimes]   Weak stream?  No [fair]   Intermittent stream?  Yes [sometimes]   Post void dribbling?   No        Vitals:    19 0842   BP: 114/72   BP Location: Right arm   Patient Position: Sitting   Cuff Size: Standard   Weight: 83 kg (183 lb)   Height: 5' 2" (1 575 m)         Procedure PTNS      Session 15     Ankle used: left  Treatment settin  Duration of treatment: 30 minutes  Lead lot #:[id number]  Expiration Date:  Feeling/Response:whole foot and big toe sensation        Gal Mack RN Modified bridge plan as below (procedure date change from 9/12/19 to 9/11/19).       Procedure Name and Date: Intralaminar Lumbar L4/L5 Epidural Steroid Injection on 9/11/19   Surgeon: Dr. Brent Owusu MD  PCP: Dr. Jose Alfredo Guardado MD  Reason for Warfarin: A-Fib CHADS 5  INR Range: 2.0-3.0  Last INR: 2.6 on 8/9/19  Instructions: Pt currently taking 4 mg of warfarin every day      Platelets= 280 (8/9/19)  Serum Creatinine= 0.87 (8/9/19)  Creatinine Clearance= 43.2mg/min.  Height= 60.5 inches  Weight= 62.9kg  Lovenox Dose= 60mg BID       9/5/19   Last dose of Warfarin       9/6/19             No Warfarin       9/7/19   No Warfarin      9/8/19   No Warfarin      Begin Lovenox 60mg sub-q at 8PM      9/9/19   Lovenox 60mg sub-q at 8AM      No Warfarin     Lovenox 60mg sub-q at 8PM      9/10/19  Lovenox 60mg sub-q at 8AM ONLY     Recheck INR, Creatinine, & Platelets at LAB     No Warfarin      9/11/19  Procedure with Dr. Owusu     No Warfarin      No Lovenox      9/12/19  Restart Warfarin 8mg in the evening if OK with MD     No Lovenox today            9/13/19  Restart Lovenox 60mg sub-q at 8AM if OK with MD      Warfarin 6mg      Lovenox 60mg sub-q at 8PM      9/14/19  Lovenox 60mg sub-q at 8AM      Warfarin 6mg       Lovenox 60mg sub-q at 8PM      9/15/19  Lovenox 60mg sub-q at 8AM      Warfarin 4mg     Lovenox 60mg sub-q at 8PM      9/16/19      Lovenox 60mg sub-q at 8AM     Recheck INR at LAB only     Warfarin TBD            NOTE: [facility] will call you with INR result & instruct you on warfarin &    Lovenox directions

## 2019-11-09 DIAGNOSIS — R35.0 FREQUENCY OF URINATION: ICD-10-CM

## 2019-11-11 ENCOUNTER — TELEPHONE (OUTPATIENT)
Dept: UROLOGY | Facility: MEDICAL CENTER | Age: 79
End: 2019-11-11

## 2019-11-11 RX ORDER — SOLIFENACIN SUCCINATE 5 MG/1
TABLET, FILM COATED ORAL
Qty: 30 TABLET | Refills: 0 | OUTPATIENT
Start: 2019-11-11

## 2019-11-11 NOTE — TELEPHONE ENCOUNTER
Patient left message on the medication refill line to get Vesicare refilled  On 11/9/2019 it shoes that Rachel Archer denied the refill  Please call patient and let her know why this happened

## 2019-11-11 NOTE — TELEPHONE ENCOUNTER
Patient is managed by Fede Zuñiga at the [facility] office  Patient is seen for urge incontinence and overactive bladder  Patient is calling in for a refill of vesicare  Message below reports that this declined  Please advise if patient should still be taking this and send in a refill if she is  Thank you

## 2019-11-13 DIAGNOSIS — R35.0 FREQUENCY OF URINATION: Primary | ICD-10-CM

## 2019-11-13 RX ORDER — SOLIFENACIN SUCCINATE 5 MG/1
5 TABLET, FILM COATED ORAL DAILY
Qty: 90 TABLET | Refills: 3 | Status: SHIPPED | OUTPATIENT
Start: 2019-11-13 | End: 2020-06-19 | Stop reason: SDUPTHER

## 2019-11-13 NOTE — TELEPHONE ENCOUNTER
Called and spoke to patient  Made her aware that the VESIcare refill was sent in to her pharmacy  Patient verbalized understanding and denies any other questions or concerns

## 2019-11-29 ENCOUNTER — TELEPHONE (OUTPATIENT)
Dept: UROLOGY | Facility: MEDICAL CENTER | Age: 79
End: 2019-11-29

## 2019-12-13 ENCOUNTER — TELEPHONE (OUTPATIENT)
Dept: UROLOGY | Facility: CLINIC | Age: 79
End: 2019-12-13

## 2019-12-13 NOTE — TELEPHONE ENCOUNTER
Pt came to [facility] appt today  Advised RN out sick and apologized  Saunders Hashimoto left message for her and tried to call her son  Pt ok - did not want to drive to [facility] today for appt - had other things planned  Could not make appt on Monday either  Would like Cesia to call Monday 12/16/19 to reschedule PTNS appt

## 2019-12-16 NOTE — TELEPHONE ENCOUNTER
Called and spoke to patient  Patient was scheduled for PTNS on Thursday at 9:00 at the [facility] office

## 2019-12-19 ENCOUNTER — PROCEDURE VISIT (OUTPATIENT)
Dept: UROLOGY | Facility: CLINIC | Age: 79
End: 2019-12-19
Payer: COMMERCIAL

## 2019-12-19 VITALS
BODY MASS INDEX: 33.49 KG/M2 | HEART RATE: 74 BPM | DIASTOLIC BLOOD PRESSURE: 72 MMHG | SYSTOLIC BLOOD PRESSURE: 112 MMHG | WEIGHT: 182 LBS | HEIGHT: 62 IN

## 2019-12-19 DIAGNOSIS — N39.41 URGE INCONTINENCE OF URINE: ICD-10-CM

## 2019-12-19 DIAGNOSIS — R39.15 URINARY URGENCY: ICD-10-CM

## 2019-12-19 DIAGNOSIS — R35.0 FREQUENCY OF URINATION: ICD-10-CM

## 2019-12-19 DIAGNOSIS — N39.41 URGE INCONTINENCE: ICD-10-CM

## 2019-12-19 PROCEDURE — 64566 NEUROELTRD STIM POST TIBIAL: CPT | Performed by: UROLOGY

## 2019-12-19 NOTE — PROGRESS NOTES
2019    Paris Berg  1940  [id number]    Diagnosis  Chief Complaint     urge incontinence of urine; overactive bladder         Patient presents for PTNS  managed by Dr Christine Purvis  Patient will follow up as scheduled in one month for next treatment    Urinary Incontinence Screening      Most Recent Value   Urinary Incontinence   Urinary Incontinence? Yes [stress]   Incomplete emptying? No   Urinary frequency? Yes [sometimes]   Urinary urgency? Yes [in the AM]   Urinary hesitancy? No   Dysuria (painful difficult urination)? No   Nocturia (waking up to use the bathroom)? No [0-1 times]   Straining (having to push to go)? Yes [sometimes]   Weak stream?  No   Intermittent stream?  Yes [sometimes]   Post void dribbling?   No        Vitals:    19 0907   BP: 112/72   BP Location: Right arm   Patient Position: Sitting   Cuff Size: Standard   Pulse: 74   Weight: 82 6 kg (182 lb)   Height: 5' 2" (1 575 m)         Procedure PTNS      Session 16     Ankle used: left  Treatment settin  Duration of treatment: 30 minutes  Lead lot #:[id number]  Expiration Date:  Feeling/Response:whole foot and big toe sensation          Mekhi Williamson RN

## 2020-01-24 ENCOUNTER — PROCEDURE VISIT (OUTPATIENT)
Dept: UROLOGY | Facility: CLINIC | Age: 80
End: 2020-01-24
Payer: COMMERCIAL

## 2020-01-24 VITALS
SYSTOLIC BLOOD PRESSURE: 108 MMHG | HEART RATE: 74 BPM | DIASTOLIC BLOOD PRESSURE: 76 MMHG | HEIGHT: 62 IN | BODY MASS INDEX: 33.29 KG/M2

## 2020-01-24 DIAGNOSIS — R35.0 FREQUENCY OF URINATION: ICD-10-CM

## 2020-01-24 DIAGNOSIS — R39.15 URINARY URGENCY: ICD-10-CM

## 2020-01-24 DIAGNOSIS — N39.41 URGE INCONTINENCE: ICD-10-CM

## 2020-01-24 DIAGNOSIS — N32.81 OAB (OVERACTIVE BLADDER): ICD-10-CM

## 2020-01-24 PROCEDURE — 64566 NEUROELTRD STIM POST TIBIAL: CPT | Performed by: UROLOGY

## 2020-01-24 NOTE — PROGRESS NOTES
1/24/2020    Eileen Betty  1940  [id number]    Diagnosis  Chief Complaint     urge incontinence of urine; overactive bladder         Patient presents for PTNS 17 of 24 managed by Dr Teresa Jacob  Follow up as scheduled for PTNS 18 of 24    Urinary Incontinence Screening      Most Recent Value   Urinary Incontinence   Urinary Incontinence? Yes [2 pads a day once]   Incomplete emptying? No   Urinary frequency? Yes [last night]   Urinary urgency? No   Urinary hesitancy? No   Dysuria (painful difficult urination)? No   Nocturia (waking up to use the bathroom)? No   Straining (having to push to go)? Yes [sometimes at end of stream]   Weak stream?  No [normal-strong]   Intermittent stream?  No   Post void dribbling? No   Vaginal pressure? No   Vaginal dryness?   No        Vitals:    01/24/20 0824   BP: 108/76   BP Location: Right arm   Patient Position: Sitting   Cuff Size: Standard   Pulse: 74   Height: 5' 2" (1 575 m)         Procedure PTNS    Session 17 of 24    Ankle used: left  Treatment setting:3  Duration of treatment: 30 minutes  Lead lot #:[id number]  Expiration Date:2021/09/30  Feeling/Response:whole food and big toe sensation        Nieves Scales RN

## 2020-02-21 ENCOUNTER — PROCEDURE VISIT (OUTPATIENT)
Dept: UROLOGY | Facility: CLINIC | Age: 80
End: 2020-02-21
Payer: COMMERCIAL

## 2020-02-21 VITALS
HEIGHT: 62 IN | HEART RATE: 88 BPM | DIASTOLIC BLOOD PRESSURE: 70 MMHG | WEIGHT: 178.2 LBS | BODY MASS INDEX: 32.79 KG/M2 | SYSTOLIC BLOOD PRESSURE: 110 MMHG

## 2020-02-21 DIAGNOSIS — R35.0 FREQUENCY OF URINATION: ICD-10-CM

## 2020-02-21 DIAGNOSIS — R39.15 URINARY URGENCY: ICD-10-CM

## 2020-02-21 DIAGNOSIS — N39.41 URGE INCONTINENCE: ICD-10-CM

## 2020-02-21 PROCEDURE — 64566 NEUROELTRD STIM POST TIBIAL: CPT

## 2020-02-21 NOTE — PROGRESS NOTES
2020    Fred Arthur  1940  [id number]    Diagnosis  Chief Complaint     Urinary Urgency; Urinary Incontinence         Patient presents for PTNS  managed by Dr Soledad Monahan  Patient will FU in one month with bladder diary to check symptom progress and for next treatment  Vitals:    20 0959   BP: 110/70   BP Location: Left arm   Patient Position: Sitting   Cuff Size: Standard   Pulse: 88   Weight: 80 8 kg (178 lb 3 2 oz)   Height: 5' 2" (1 575 m)         Procedure PTNS        Session     Ankle used: right  Treatment settin  Duration of treatment: 30 minutes  Lead lot #:[id number]  Expiration Date:2021  Feeling/Response:foot to big toe sensation    Overall symptoms appear to be well managed  She does still note some urge incontinence at times          Carmelita Cueto, RN  ESTEPHANIE HenriquezN

## 2020-02-25 ENCOUNTER — OFFICE VISIT (OUTPATIENT)
Dept: GASTROENTEROLOGY | Facility: CLINIC | Age: 80
End: 2020-02-25
Payer: COMMERCIAL

## 2020-02-25 VITALS
WEIGHT: 180.4 LBS | DIASTOLIC BLOOD PRESSURE: 78 MMHG | HEIGHT: 62 IN | HEART RATE: 111 BPM | BODY MASS INDEX: 33.2 KG/M2 | SYSTOLIC BLOOD PRESSURE: 130 MMHG

## 2020-02-25 DIAGNOSIS — R13.10 DYSPHAGIA, UNSPECIFIED TYPE: ICD-10-CM

## 2020-02-25 DIAGNOSIS — K21.9 GASTROESOPHAGEAL REFLUX DISEASE WITHOUT ESOPHAGITIS: Primary | ICD-10-CM

## 2020-02-25 PROCEDURE — 1036F TOBACCO NON-USER: CPT | Performed by: PHYSICIAN ASSISTANT

## 2020-02-25 PROCEDURE — 3008F BODY MASS INDEX DOCD: CPT | Performed by: PHYSICIAN ASSISTANT

## 2020-02-25 PROCEDURE — 1160F RVW MEDS BY RX/DR IN RCRD: CPT | Performed by: PHYSICIAN ASSISTANT

## 2020-02-25 PROCEDURE — 99213 OFFICE O/P EST LOW 20 MIN: CPT | Performed by: PHYSICIAN ASSISTANT

## 2020-02-25 RX ORDER — PANTOPRAZOLE SODIUM 40 MG/1
40 TABLET, DELAYED RELEASE ORAL DAILY
Qty: 30 TABLET | Refills: 3 | Status: SHIPPED | OUTPATIENT
Start: 2020-02-25 | End: 2020-05-11

## 2020-02-25 NOTE — LETTER
February 25, 2020     Luan Darnell, [address]  [address] [facility]  [address]    Patient: Eneida Worley   YOB: 1940   Date of Visit: 2/25/2020       Dear Dr Mckenzie Orf:    Thank you for referring Jeannette Barba to me for evaluation  Below are my notes for this consultation  If you have questions, please do not hesitate to call me  I look forward to following your patient along with you  Sincerely,        Mayra Badillo PA-C        CC: No Recipients  Nancy Lemus  2/25/2020  9:51 AM  Sign at close encounter  [facility] - Outpatient Follow-up Note  Eneida Worley 79 y o  female MRN: [id number]  Encounter: [id number]          ASSESSMENT AND PLAN:      1  Dysphagia, unspecified type  2  Gastroesophageal reflux disease without esophagitis  Will restart pantoprazole 40 mg daily  No plans for any further gastrointestinal procedures at this time  Will have patient follow up in 8 weeks  As for patient's constipation she is reporting 2 normal stools a day   ______________________________________________________________________    SUBJECTIVE:   43-year-old female who is here for follow-up of acid reflux disease  Patient reports that she has been off her pantoprazole now for the past several months and was doing great up until several weeks ago  She reports that she has had a flare-up of her acid reflux disease  She reports regurgitation and bringing up mucus  She denies any significant alarm symptoms  She reports that her swallowing has not been an issue as of yet  She also reports that her constipation is significantly improved as well  Her weight is stable within 3 lb  She denies any melena or rectal bleeding    She denies any dietary triggers  She denies any NSAID use  REVIEW OF SYSTEMS IS OTHERWISE NEGATIVE        Historical Information   Past Medical History:   Diagnosis Date    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     Benign neoplasm of skin     Chest pain     Claustrophobia     Diverticulitis     GERD (gastroesophageal reflux disease)     Muscle weakness     Nonmelanoma skin cancer     last assessed 2017    Palpitations     Seasonal allergies     Sleep apnea     no cpap    Spontaneous      without mention of complications     Syncope      Past Surgical History:   Procedure Laterality Date    BOTOX INJECTION N/A 3/6/2017    Procedure: CYSTOSCOPY; BLADDER BOTOX 100 UNITS ;  Surgeon: Ruperto Andujar MD;  [facility];  Service:     BOWEL RESECTION      related ot diverticulitis    CARDIAC CATHETERIZATION      CARPAL TUNNEL RELEASE Right     COLOSTOMY      COLOSTOMY CLOSURE      FOOT SURGERY Left     neuroma    HYSTERECTOMY      NASAL SINUS SURGERY      [place] CYSTOURETHROSCOPY N/A 2018    Procedure: CYSTOSCOPY WITH BOTOX;  Surgeon: Ruperto Andujar MD;  [facility];  Service: Urology    [place] ESOPHAGOGASTRODUODENOSCOPY TRANSORAL DIAGNOSTIC N/A 2019    Procedure: ESOPHAGOGASTRODUODENOSCOPY (EGD); Surgeon: Leeann Julio DO;  [facility];   Service: Gastroenterology    TONSILLECTOMY       Social History   Social History     Substance and Sexual Activity   Alcohol Use No     Social History     Substance and Sexual Activity   Drug Use No     Social History     Tobacco Use   Smoking Status Never Smoker   Smokeless Tobacco Never Used     Family History   Problem Relation Age of Onset    Alcohol abuse Mother     Colon cancer Mother     Heart disease Mother     Alcohol abuse Father     Alcohol abuse Brother     Colon cancer Brother     Tremor Neg Hx     Parkinsonism Neg Hx        Meds/Allergies       Current Outpatient Medications:     solifenacin (VESICARE) 5 mg tablet    Ascorbic Acid (VITAMIN C) 1000 MG tablet    B Complex Vitamins (B COMPLEX 100 PO)    Biotin 2500 MCG CAPS    calcium carbonate (OS-JOHN) 1250 (500 Ca) MG tablet   cholecalciferol (VITAMIN D3) 1,000 units tablet    Echinacea 125 MG CAPS    fexofenadine (ALLEGRA) 180 MG tablet    multivitamin (THERAGRAN) TABS    Omega-3 350 MG CPDR    pantoprazole (PROTONIX) 40 mg tablet    polyethylene glycol (MIRALAX) 17 g packet    Potassium Gluconate 595 (99 K) MG TABS    Probiotic Product (PROBIOTIC-10) CAPS    psyllium (METAMUCIL) 58 6 % powder    Allergies   Allergen Reactions    Caffeine GI Intolerance    Iodine Rash    Latex Rash    Other Rash     Adhesive tape             Objective     Blood pressure 130/78, pulse (!) 111, height 5' 2" (1 575 m), weight 81 8 kg (180 lb 6 4 oz), not currently breastfeeding  Body mass index is 33 kg/m²  PHYSICAL EXAM:      General Appearance:   Alert, cooperative, no distress   HEENT:   Normocephalic, atraumatic, anicteric      Neck:  Supple, symmetrical, trachea midline   Lungs:   Clear to auscultation bilaterally; no rales, rhonchi or wheezing; respirations unlabored    Heart[de-identified]   Regular rate and rhythm; no murmur, rub, or gallop  Abdomen:   Soft, non-tender, non-distended; normal bowel sounds; no masses, no organomegaly    Genitalia:   Deferred    Rectal:   Deferred    Extremities:  No cyanosis, clubbing or edema    Pulses:  2+ and symmetric    Skin:  No jaundice, rashes, or lesions    Lymph nodes:  No palpable cervical lymphadenopathy        Lab Results:   No visits with results within 1 Day(s) from this visit     Latest known visit with results is:   Lab on 08/19/2019   Component Date Value    WBC 08/19/2019 5 85     RBC 08/19/2019 4 96     Hemoglobin 08/19/2019 14 4     Hematocrit 08/19/2019 44 5     MCV 08/19/2019 90     MCH 08/19/2019 29 0     MCHC 08/19/2019 32 4     RDW 08/19/2019 13 6     MPV 08/19/2019 9 8     Platelets 14/14/5998 245     nRBC 08/19/2019 0     Neutrophils Relative 08/19/2019 61     Immat GRANS % 08/19/2019 1     Lymphocytes Relative 08/19/2019 26     Monocytes Relative 08/19/2019 10     Eosinophils Relative 08/19/2019 2     Basophils Relative 08/19/2019 0     Neutrophils Absolute 08/19/2019 3 64     Immature Grans Absolute 08/19/2019 0 03     Lymphocytes Absolute 08/19/2019 1 50     Monocytes Absolute 08/19/2019 0 56     Eosinophils Absolute 08/19/2019 0 10     Basophils Absolute 08/19/2019 0 02     Vit D, 25-Hydroxy 08/19/2019 59 8     Iron 08/19/2019 90     TSH 3RD GENERATON 08/19/2019 1 870          Radiology Results:   No results found

## 2020-02-25 NOTE — PATIENT INSTRUCTIONS
Constipation   WHAT YOU NEED TO KNOW:   Constipation is when you have hard, dry bowel movements, or you go longer than usual between bowel movements  DISCHARGE INSTRUCTIONS:   Return to the emergency department if:   · You have blood in your bowel movements  · You have a fever and abdominal pain with the constipation  Contact your healthcare provider if:   · Your constipation gets worse  · You start to vomit  · You have questions or concerns about your condition or care  Medicines:   · Medicine or a fiber supplement  may help make your bowel movement softer  A laxative may help relax and loosen your intestines to help you have a bowel movement  You may also be given medicine to increase fluid in your intestines  The fluid may help move bowel movements through your intestines  · Take your medicine as directed  Contact your healthcare provider if you think your medicine is not helping or if you have side effects  Tell him of her if you are allergic to any medicine  Keep a list of the medicines, vitamins, and herbs you take  Include the amounts, and when and why you take them  Bring the list or the pill bottles to follow-up visits  Carry your medicine list with you in case of an emergency  Manage your constipation:   · Drink liquids as directed  You may need to drink extra liquids to help soften and move your bowels  Ask how much liquid to drink each day and which liquids are best for you  · Eat high-fiber foods  This may help decrease constipation by adding bulk to your bowel movements  High-fiber foods include fruit, vegetables, whole-grain breads and cereals, and beans  Your healthcare provider or dietitian can help you create a high-fiber meal plan  · Exercise regularly  Regular physical activity can help stimulate your intestines  Ask which exercises are best for you  · Schedule a time each day to have a bowel movement    This may help train your body to have regular bowel movements  Bend forward while you are on the toilet to help move the bowel movement out  Sit on the toilet for at least 10 minutes, even if you do not have a bowel movement  Follow up with your healthcare provider as directed:  Write down your questions so you remember to ask them during your visits  © 2017 [employer] Information is for End User's use only and may not be sold, redistributed or otherwise used for commercial purposes  All illustrations and images included in CareNotes® are the copyrighted property of [employer]  or [employer]  The above information is an  only  It is not intended as medical advice for individual conditions or treatments  Talk to your doctor, nurse or pharmacist before following any medical regimen to see if it is safe and effective for you

## 2020-02-25 NOTE — PROGRESS NOTES
[facility] - Outpatient Follow-up Note  Vladislav Singh 79 y o  female MRN: [id number]  Encounter: [id number]          ASSESSMENT AND PLAN:      1  Dysphagia, unspecified type  2  Gastroesophageal reflux disease without esophagitis  Will restart pantoprazole 40 mg daily  No plans for any further gastrointestinal procedures at this time  Will have patient follow up in 8 weeks  As for patient's constipation she is reporting 2 normal stools a day   ______________________________________________________________________    SUBJECTIVE:   58-year-old female who is here for follow-up of acid reflux disease  Patient reports that she has been off her pantoprazole now for the past several months and was doing great up until several weeks ago  She reports that she has had a flare-up of her acid reflux disease  She reports regurgitation and bringing up mucus  She denies any significant alarm symptoms  She reports that her swallowing has not been an issue as of yet  She also reports that her constipation is significantly improved as well  Her weight is stable within 3 lb  She denies any melena or rectal bleeding    She denies any dietary triggers  She denies any NSAID use  REVIEW OF SYSTEMS IS OTHERWISE NEGATIVE        Historical Information   Past Medical History:   Diagnosis Date    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     Benign neoplasm of skin     Chest pain     Claustrophobia     Diverticulitis     GERD (gastroesophageal reflux disease)     Muscle weakness     Nonmelanoma skin cancer     last assessed 2017    Palpitations     Seasonal allergies     Sleep apnea     no cpap    Spontaneous      without mention of complications     Syncope      Past Surgical History:   Procedure Laterality Date    BOTOX INJECTION N/A 3/6/2017    Procedure: CYSTOSCOPY; BLADDER BOTOX 100 UNITS ;  Surgeon: Merline Braswell MD;  [facility];  Service:     BOWEL RESECTION      related ot diverticulitis    CARDIAC CATHETERIZATION      CARPAL TUNNEL RELEASE Right     COLOSTOMY      COLOSTOMY CLOSURE      FOOT SURGERY Left     neuroma    HYSTERECTOMY      NASAL SINUS SURGERY      [place] CYSTOURETHROSCOPY N/A 4/13/2018    Procedure: CYSTOSCOPY WITH BOTOX;  Surgeon: Bonifacio Martinez MD;  [facility];  Service: Urology    [place] ESOPHAGOGASTRODUODENOSCOPY TRANSORAL DIAGNOSTIC N/A 4/23/2019    Procedure: ESOPHAGOGASTRODUODENOSCOPY (EGD); Surgeon: Brenda Garcia DO;  [facility];   Service: Gastroenterology    TONSILLECTOMY       Social History   Social History     Substance and Sexual Activity   Alcohol Use No     Social History     Substance and Sexual Activity   Drug Use No     Social History     Tobacco Use   Smoking Status Never Smoker   Smokeless Tobacco Never Used     Family History   Problem Relation Age of Onset    Alcohol abuse Mother     Colon cancer Mother     Heart disease Mother     Alcohol abuse Father     Alcohol abuse Brother     Colon cancer Brother     Tremor Neg Hx     Parkinsonism Neg Hx        Meds/Allergies       Current Outpatient Medications:     solifenacin (VESICARE) 5 mg tablet    Ascorbic Acid (VITAMIN C) 1000 MG tablet    B Complex Vitamins (B COMPLEX 100 PO)    Biotin 2500 MCG CAPS    calcium carbonate (OS-JOHN) 1250 (500 Ca) MG tablet    cholecalciferol (VITAMIN D3) 1,000 units tablet    Echinacea 125 MG CAPS    fexofenadine (ALLEGRA) 180 MG tablet    multivitamin (THERAGRAN) TABS    Omega-3 350 MG CPDR    pantoprazole (PROTONIX) 40 mg tablet    polyethylene glycol (MIRALAX) 17 g packet    Potassium Gluconate 595 (99 K) MG TABS    Probiotic Product (PROBIOTIC-10) CAPS    psyllium (METAMUCIL) 58 6 % powder    Allergies   Allergen Reactions    Caffeine GI Intolerance    Iodine Rash    Latex Rash    Other Rash     Adhesive tape             Objective     Blood pressure 130/78, pulse (!) 111, height 5' 2" (1 575 m), weight 81 8 kg (180 lb 6 4 oz), not currently breastfeeding  Body mass index is 33 kg/m²  PHYSICAL EXAM:      General Appearance:   Alert, cooperative, no distress   HEENT:   Normocephalic, atraumatic, anicteric      Neck:  Supple, symmetrical, trachea midline   Lungs:   Clear to auscultation bilaterally; no rales, rhonchi or wheezing; respirations unlabored    Heart[de-identified]   Regular rate and rhythm; no murmur, rub, or gallop  Abdomen:   Soft, non-tender, non-distended; normal bowel sounds; no masses, no organomegaly    Genitalia:   Deferred    Rectal:   Deferred    Extremities:  No cyanosis, clubbing or edema    Pulses:  2+ and symmetric    Skin:  No jaundice, rashes, or lesions    Lymph nodes:  No palpable cervical lymphadenopathy        Lab Results:   No visits with results within 1 Day(s) from this visit  Latest known visit with results is:   Lab on 08/19/2019   Component Date Value    WBC 08/19/2019 5 85     RBC 08/19/2019 4 96     Hemoglobin 08/19/2019 14 4     Hematocrit 08/19/2019 44 5     MCV 08/19/2019 90     MCH 08/19/2019 29 0     MCHC 08/19/2019 32 4     RDW 08/19/2019 13 6     MPV 08/19/2019 9 8     Platelets 01/88/8359 245     nRBC 08/19/2019 0     Neutrophils Relative 08/19/2019 61     Immat GRANS % 08/19/2019 1     Lymphocytes Relative 08/19/2019 26     Monocytes Relative 08/19/2019 10     Eosinophils Relative 08/19/2019 2     Basophils Relative 08/19/2019 0     Neutrophils Absolute 08/19/2019 3 64     Immature Grans Absolute 08/19/2019 0 03     Lymphocytes Absolute 08/19/2019 1 50     Monocytes Absolute 08/19/2019 0 56     Eosinophils Absolute 08/19/2019 0 10     Basophils Absolute 08/19/2019 0 02     Vit D, 25-Hydroxy 08/19/2019 59 8     Iron 08/19/2019 90     TSH 3RD GENERATON 08/19/2019 1 870          Radiology Results:   No results found

## 2020-03-02 ENCOUNTER — OFFICE VISIT (OUTPATIENT)
Dept: NEUROLOGY | Facility: CLINIC | Age: 80
End: 2020-03-02
Payer: COMMERCIAL

## 2020-03-02 VITALS
HEIGHT: 62 IN | SYSTOLIC BLOOD PRESSURE: 130 MMHG | HEART RATE: 91 BPM | BODY MASS INDEX: 33.49 KG/M2 | WEIGHT: 182 LBS | DIASTOLIC BLOOD PRESSURE: 82 MMHG

## 2020-03-02 DIAGNOSIS — G20 PARKINSON'S DISEASE (HCC): Primary | ICD-10-CM

## 2020-03-02 PROCEDURE — 1036F TOBACCO NON-USER: CPT | Performed by: PSYCHIATRY & NEUROLOGY

## 2020-03-02 PROCEDURE — 3008F BODY MASS INDEX DOCD: CPT | Performed by: PSYCHIATRY & NEUROLOGY

## 2020-03-02 PROCEDURE — 99213 OFFICE O/P EST LOW 20 MIN: CPT | Performed by: PSYCHIATRY & NEUROLOGY

## 2020-03-02 PROCEDURE — 1160F RVW MEDS BY RX/DR IN RCRD: CPT | Performed by: PSYCHIATRY & NEUROLOGY

## 2020-03-02 NOTE — PROGRESS NOTES
Assessment/Plan:    Parkinson's disease [facility])  26-year-old woman presents in follow-up for Parkinson's disease  Her exam is not changed in an appreciable way over the past 6 months  Overall the patient feels better and that her tremors less bothersome than it was in past   Unclear how to explain this  Will continue to trend clinically over time  Follow-up in 6 months  Diagnoses and all orders for this visit:    Parkinson's disease ([facility] [address] )        Subjective:     Patient ID: Fred Arthur is a 79 y o  female  I had the pleasure of seeing your patient, Fred Arthur in the Movement Disorders Clinic at the [facility]  Macarthur Blizzard is an 26-year-old right-handed woman who presents in follow up for Parkinsons disease, symptom onset with left hand rest tremor and mild balance dysfunction in 2018  On initial examination the patient had reduction in spontaneous movements, hypomimia, focal bradykinesia on the left and rigidity L>R  She has never been on medications for Parkinson's disease      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  When she here last she was feeling a bit better and no changes where made  Interval history:  Tremors are "less severe" and "dont last as long"   Just complaining of some "sciatica"  Some gait trouble with her leg pains  Uses an electric scooter in the grocery store  Has been using a walker when the pain was worse, now doesn't have to  Regarding motor symptoms:   Changes in gait:        Falls: no        Freezing: no   Trouble with swallowing: GERD - follows with GI     Regarding non-motor symptoms:   Lightheadedness: no   Mood: frustrated with limitations from leg pain  Hoping to get more active again now  Sleep: improved  Memory trouble: no issues   Hallucinations: no     Driving issues: no   Physical activity: not much right now   POA: Daughter, filed         The following portions of the patient's history were reviewed and updated as appropriate: allergies, current medications, past family history, past medical history, past social history, past surgical history and problem list       Objective:  /82 (BP Location: Left arm, Patient Position: Standing, Cuff Size: Standard)   Pulse 91   Ht 5' 2" (1 575 m)   Wt 82 6 kg (182 lb)   BMI 33 29 kg/m²     Physical Exam    Neurological Exam    GENERAL MEDICAL EXAMINATION:  General appearance: alert, in no apparent distress  Appropriately dressed and groomed  Conversing and interacting appropriately  Eyes: Sclera are non-injected  Ears, nose, Mouth, Throat: Mucous membranes are moist    Resp: Breathing comfortably on RA   Musculoskeletal: No evidence of deformities  No contractures  No Edema  Skin: No visible rashes  Warm and well perfused  Psych: normal and appropriate affect     Mental Status:  Alert and oriented to person place and time  Able to relate history without difficulty  Attentive to conversation  Language is fluent and appropriate with normal prosody  There were no paraphasic errors  Speech was not dysarthric  Able to follow both midline and appendicular commands       General Neurology examination:   UPDRS motor:                              Time since last dose:       Speech  0     Facial Expression  2     Rigidity - Neck  0     Rigidity - Upper Extremity (Right)  2     Rigidity - Upper Extremity (Left)   2 L>R    Rigidity - Lower Extremity (Right)  0     Rigidity - Lower Extremity (Left)   1     Finger Taps (Right)   1     Finger Taps (Left)   2     Hand Movement (Right)  1     Hand Movement (Left)   1     Pronation/Supination (Right)  1     Pronation/Supination (Left)   1     Toe Tapping (Right) 1     Toe Tapping (Left) 2     Leg Agility (Right)  1     Leg Agility (Left)   1     Arising from Chair   1     Gait   1     Freezing of Gait      Postural Stability   0     Posture 1     Global spontaneity of movement 2     Postural Tremor (Right) 1     Postural Tremor (Left) 1 Kinetic Tremor (Right)  1     Kinetic Tremor (Left)  1     Rest tremor amplitude RUE 0     Rest tremor amplitude LUE 1     Rest tremor amplitude RLE 0     Reset tremor amplitude LLE 0     Lip/Jaw Tremor  0     Consistency of tremor 1     Motor Exam Total:          Very reduced arm swing bl  No tremor emerges  Turns in 3 steps  Review of Systems   Constitutional: Positive for fatigue  Negative for appetite change and fever  HENT: Negative  Negative for hearing loss, tinnitus, trouble swallowing and voice change  Eyes: Negative  Negative for photophobia and pain  Respiratory: Negative  Negative for shortness of breath  Cardiovascular: Negative  Negative for palpitations  Gastrointestinal: Negative  Negative for nausea and vomiting  Endocrine: Negative  Negative for cold intolerance and heat intolerance  Genitourinary: Negative  Negative for dysuria, frequency and urgency  Musculoskeletal: Positive for gait problem  Negative for myalgias and neck pain  Skin: Negative  Negative for rash  Neurological: Positive for tremors  Negative for dizziness, seizures, syncope, facial asymmetry, speech difficulty, weakness, light-headedness, numbness and headaches  Hematological: Negative  Does not bruise/bleed easily  Psychiatric/Behavioral: Negative  Negative for confusion, hallucinations and sleep disturbance  The above ROS was reviewed and updated       Zhane Sung MD  Medical Director   [facility]  Movement and Memory Specialist       Current Outpatient Medications on File Prior to Visit   Medication Sig Dispense Refill    B Complex Vitamins (B COMPLEX 100 PO) Take by mouth      calcium carbonate (OS-JOHN) 1250 (500 Ca) MG tablet Take 1 tablet by mouth daily      cholecalciferol (VITAMIN D3) 1,000 units tablet Take 5,000 Units by mouth daily       fexofenadine (ALLEGRA) 180 MG tablet Take 180 mg by mouth as needed       multivitamin (THERAGRAN) TABS Take 1 tablet by mouth daily      Omega-3 350 MG CPDR Take by mouth      pantoprazole (PROTONIX) 40 mg tablet Take 1 tablet (40 mg total) by mouth daily 30 tablet 3    Probiotic Product (PROBIOTIC-10) CAPS Take by mouth      solifenacin (VESICARE) 5 mg tablet Take 1 tablet (5 mg total) by mouth daily 90 tablet 3    Ascorbic Acid (VITAMIN C) 1000 MG tablet Take 1,000 mg by mouth daily      Biotin 2500 MCG CAPS Take by mouth      Echinacea 125 MG CAPS Take by mouth      polyethylene glycol (MIRALAX) 17 g packet Take 17 g by mouth daily (Patient not taking: Reported on 2/25/2020) 14 each 2    Potassium Gluconate 595 (99 K) MG TABS Take by mouth      psyllium (METAMUCIL) 58 6 % powder Take 1 packet by mouth 3 (three) times a day       No current facility-administered medications on file prior to visit

## 2020-03-02 NOTE — ASSESSMENT & PLAN NOTE
79-year-old woman presents in follow-up for Parkinson's disease  Her exam is not changed in an appreciable way over the past 6 months  Overall the patient feels better and that her tremors less bothersome than it was in past   Unclear how to explain this  Will continue to trend clinically over time  Follow-up in 6 months

## 2020-05-08 DIAGNOSIS — R13.10 DYSPHAGIA, UNSPECIFIED TYPE: ICD-10-CM

## 2020-05-11 RX ORDER — PANTOPRAZOLE SODIUM 40 MG/1
40 TABLET, DELAYED RELEASE ORAL DAILY
Qty: 30 TABLET | Refills: 3 | Status: SHIPPED | OUTPATIENT
Start: 2020-05-11 | End: 2021-01-04 | Stop reason: SDUPTHER

## 2020-05-29 ENCOUNTER — TELEPHONE (OUTPATIENT)
Dept: UROLOGY | Facility: AMBULATORY SURGERY CENTER | Age: 80
End: 2020-05-29

## 2020-06-19 ENCOUNTER — OFFICE VISIT (OUTPATIENT)
Dept: UROLOGY | Facility: CLINIC | Age: 80
End: 2020-06-19
Payer: COMMERCIAL

## 2020-06-19 VITALS
HEIGHT: 62 IN | WEIGHT: 178 LBS | TEMPERATURE: 98.3 F | SYSTOLIC BLOOD PRESSURE: 118 MMHG | BODY MASS INDEX: 32.76 KG/M2 | DIASTOLIC BLOOD PRESSURE: 78 MMHG | HEART RATE: 84 BPM

## 2020-06-19 DIAGNOSIS — R35.0 FREQUENCY OF URINATION: ICD-10-CM

## 2020-06-19 PROCEDURE — 3008F BODY MASS INDEX DOCD: CPT | Performed by: PHYSICIAN ASSISTANT

## 2020-06-19 PROCEDURE — 99213 OFFICE O/P EST LOW 20 MIN: CPT | Performed by: PHYSICIAN ASSISTANT

## 2020-06-19 PROCEDURE — 1036F TOBACCO NON-USER: CPT | Performed by: PHYSICIAN ASSISTANT

## 2020-06-19 PROCEDURE — 1160F RVW MEDS BY RX/DR IN RCRD: CPT | Performed by: PHYSICIAN ASSISTANT

## 2020-06-19 RX ORDER — SOLIFENACIN SUCCINATE 5 MG/1
5 TABLET, FILM COATED ORAL DAILY
Qty: 90 TABLET | Refills: 3 | Status: SHIPPED | OUTPATIENT
Start: 2020-06-19 | End: 2020-08-25 | Stop reason: ALTCHOICE

## 2020-07-22 ENCOUNTER — OFFICE VISIT (OUTPATIENT)
Dept: FAMILY MEDICINE CLINIC | Facility: CLINIC | Age: 80
End: 2020-07-22
Payer: COMMERCIAL

## 2020-07-22 ENCOUNTER — LAB (OUTPATIENT)
Dept: LAB | Facility: CLINIC | Age: 80
End: 2020-07-22
Payer: COMMERCIAL

## 2020-07-22 VITALS
SYSTOLIC BLOOD PRESSURE: 124 MMHG | WEIGHT: 179 LBS | TEMPERATURE: 97.1 F | BODY MASS INDEX: 32.94 KG/M2 | HEART RATE: 80 BPM | DIASTOLIC BLOOD PRESSURE: 76 MMHG | HEIGHT: 62 IN | OXYGEN SATURATION: 97 %

## 2020-07-22 DIAGNOSIS — R53.83 FATIGUE, UNSPECIFIED TYPE: Primary | ICD-10-CM

## 2020-07-22 DIAGNOSIS — E55.9 VITAMIN D DEFICIENCY: ICD-10-CM

## 2020-07-22 DIAGNOSIS — R53.83 FATIGUE, UNSPECIFIED TYPE: ICD-10-CM

## 2020-07-22 DIAGNOSIS — Z00.00 MEDICARE ANNUAL WELLNESS VISIT, SUBSEQUENT: ICD-10-CM

## 2020-07-22 DIAGNOSIS — Z13.220 NEED FOR LIPID SCREENING: ICD-10-CM

## 2020-07-22 DIAGNOSIS — G47.9 SLEEP DISTURBANCE: ICD-10-CM

## 2020-07-22 DIAGNOSIS — R25.1 TREMORS OF NERVOUS SYSTEM: ICD-10-CM

## 2020-07-22 LAB
25(OH)D3 SERPL-MCNC: 39.7 NG/ML (ref 30–100)
ALBUMIN SERPL BCP-MCNC: 3.9 G/DL (ref 3.5–5)
ALP SERPL-CCNC: 88 U/L (ref 46–116)
ALT SERPL W P-5'-P-CCNC: 42 U/L (ref 12–78)
ANION GAP SERPL CALCULATED.3IONS-SCNC: 6 MMOL/L (ref 4–13)
AST SERPL W P-5'-P-CCNC: 28 U/L (ref 5–45)
BASOPHILS # BLD AUTO: 0.02 THOUSANDS/ΜL (ref 0–0.1)
BASOPHILS NFR BLD AUTO: 0 % (ref 0–1)
BILIRUB SERPL-MCNC: 0.66 MG/DL (ref 0.2–1)
BUN SERPL-MCNC: 13 MG/DL (ref 5–25)
CALCIUM SERPL-MCNC: 9.9 MG/DL (ref 8.3–10.1)
CHLORIDE SERPL-SCNC: 108 MMOL/L (ref 100–108)
CHOLEST SERPL-MCNC: 211 MG/DL (ref 50–200)
CO2 SERPL-SCNC: 26 MMOL/L (ref 21–32)
CREAT SERPL-MCNC: 0.83 MG/DL (ref 0.6–1.3)
EOSINOPHIL # BLD AUTO: 0.07 THOUSAND/ΜL (ref 0–0.61)
EOSINOPHIL NFR BLD AUTO: 1 % (ref 0–6)
ERYTHROCYTE [DISTWIDTH] IN BLOOD BY AUTOMATED COUNT: 14 % (ref 11.6–15.1)
FOLATE SERPL-MCNC: >20 NG/ML (ref 3.1–17.5)
GFR SERPL CREATININE-BSD FRML MDRD: 66 ML/MIN/1.73SQ M
GLUCOSE SERPL-MCNC: 103 MG/DL (ref 65–140)
HCT VFR BLD AUTO: 43.8 % (ref 34.8–46.1)
HDLC SERPL-MCNC: 82 MG/DL
HGB BLD-MCNC: 14.5 G/DL (ref 11.5–15.4)
IMM GRANULOCYTES # BLD AUTO: 0.02 THOUSAND/UL (ref 0–0.2)
IMM GRANULOCYTES NFR BLD AUTO: 0 % (ref 0–2)
IRON SERPL-MCNC: 93 UG/DL (ref 50–170)
LDLC SERPL CALC-MCNC: 119 MG/DL (ref 0–100)
LYMPHOCYTES # BLD AUTO: 1.57 THOUSANDS/ΜL (ref 0.6–4.47)
LYMPHOCYTES NFR BLD AUTO: 23 % (ref 14–44)
MCH RBC QN AUTO: 29.6 PG (ref 26.8–34.3)
MCHC RBC AUTO-ENTMCNC: 33.1 G/DL (ref 31.4–37.4)
MCV RBC AUTO: 89 FL (ref 82–98)
MONOCYTES # BLD AUTO: 0.57 THOUSAND/ΜL (ref 0.17–1.22)
MONOCYTES NFR BLD AUTO: 8 % (ref 4–12)
NEUTROPHILS # BLD AUTO: 4.67 THOUSANDS/ΜL (ref 1.85–7.62)
NEUTS SEG NFR BLD AUTO: 68 % (ref 43–75)
NONHDLC SERPL-MCNC: 129 MG/DL
NRBC BLD AUTO-RTO: 0 /100 WBCS
PLATELET # BLD AUTO: 231 THOUSANDS/UL (ref 149–390)
PMV BLD AUTO: 9.6 FL (ref 8.9–12.7)
POTASSIUM SERPL-SCNC: 4 MMOL/L (ref 3.5–5.3)
PROT SERPL-MCNC: 7.2 G/DL (ref 6.4–8.2)
RBC # BLD AUTO: 4.9 MILLION/UL (ref 3.81–5.12)
SODIUM SERPL-SCNC: 140 MMOL/L (ref 136–145)
TRIGL SERPL-MCNC: 52 MG/DL
TSH SERPL DL<=0.05 MIU/L-ACNC: 1.61 UIU/ML (ref 0.36–3.74)
VIT B12 SERPL-MCNC: 1038 PG/ML (ref 100–900)
WBC # BLD AUTO: 6.92 THOUSAND/UL (ref 4.31–10.16)

## 2020-07-22 PROCEDURE — 3008F BODY MASS INDEX DOCD: CPT | Performed by: FAMILY MEDICINE

## 2020-07-22 PROCEDURE — 82746 ASSAY OF FOLIC ACID SERUM: CPT

## 2020-07-22 PROCEDURE — 85025 COMPLETE CBC W/AUTO DIFF WBC: CPT

## 2020-07-22 PROCEDURE — 36415 COLL VENOUS BLD VENIPUNCTURE: CPT

## 2020-07-22 PROCEDURE — 80061 LIPID PANEL: CPT

## 2020-07-22 PROCEDURE — 84443 ASSAY THYROID STIM HORMONE: CPT

## 2020-07-22 PROCEDURE — 83540 ASSAY OF IRON: CPT

## 2020-07-22 PROCEDURE — 82607 VITAMIN B-12: CPT

## 2020-07-22 PROCEDURE — 1170F FXNL STATUS ASSESSED: CPT | Performed by: FAMILY MEDICINE

## 2020-07-22 PROCEDURE — G0439 PPPS, SUBSEQ VISIT: HCPCS | Performed by: FAMILY MEDICINE

## 2020-07-22 PROCEDURE — 1036F TOBACCO NON-USER: CPT | Performed by: FAMILY MEDICINE

## 2020-07-22 PROCEDURE — 1125F AMNT PAIN NOTED PAIN PRSNT: CPT | Performed by: FAMILY MEDICINE

## 2020-07-22 PROCEDURE — 80053 COMPREHEN METABOLIC PANEL: CPT

## 2020-07-22 PROCEDURE — 1160F RVW MEDS BY RX/DR IN RCRD: CPT | Performed by: FAMILY MEDICINE

## 2020-07-22 PROCEDURE — 82306 VITAMIN D 25 HYDROXY: CPT

## 2020-07-22 PROCEDURE — 99214 OFFICE O/P EST MOD 30 MIN: CPT | Performed by: FAMILY MEDICINE

## 2020-07-22 RX ORDER — TRAZODONE HYDROCHLORIDE 50 MG/1
50 TABLET ORAL
Qty: 30 TABLET | Refills: 1 | Status: SHIPPED | OUTPATIENT
Start: 2020-07-22 | End: 2020-08-17

## 2020-07-22 RX ORDER — MAGNESIUM 200 MG
1 TABLET ORAL DAILY
Qty: 30 TABLET | Refills: 3
Start: 2020-07-22

## 2020-07-22 NOTE — PROGRESS NOTES
BMI Counseling: Body mass index is 32 74 kg/m²  The BMI is above normal  Nutrition recommendations include reducing portion sizes, 3-5 servings of fruits/vegetables daily and consuming healthier snacks  Assessment and Plan:     Problem List Items Addressed This Visit     None           Preventive health issues were discussed with patient, and age appropriate screening tests were ordered as noted in patient's After Visit Summary  Personalized health advice and appropriate referrals for health education or preventive services given if needed, as noted in patient's After Visit Summary  History of Present Illness:     Patient presents for Medicare Annual Wellness visit    Patient Care Team:  Ruddy Murphy MD as PCP - General (Family Medicine)  Roxy Skiff, MD as PCP - Endocrinology (Endocrinology)  DO Dwayne Malloy MD Donnis Gone, MD Jackqulyn Bourbon, PA-C as Physician Assistant (Physician Assistant)     Problem List:     Patient Active Problem List   Diagnosis    Incontinence    OAB (overactive bladder)    Parkinson's disease ([address] )    Primary insomnia    Fatigue    Seborrheic keratosis    Screening for skin condition    History of skin cancer    Seasonal allergic rhinitis    Sleep disturbance    Impaired fasting glucose    Sleep-related breathing disorder    Mood disturbance    Chronic rhinitis    Obesity (BMI 30-39  9)    Claustrophobia    Anxiety    Mitral valve disorder    Need for lipid screening    Menopause ovarian failure    Vitamin D deficiency    Dysphagia    Multiple thyroid nodules    Abnormal thyroid blood test    Gastro-esophageal reflux disease without esophagitis    Osteopenia    Chronic idiopathic constipation    History of adenomatous polyp of colon      Past Medical and Surgical History:     Past Medical History:   Diagnosis Date    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     Benign neoplasm of skin     Chest pain     Claustrophobia     Diverticulitis     GERD (gastroesophageal reflux disease)     Muscle weakness     Nonmelanoma skin cancer     last assessed 2017    Palpitations     Seasonal allergies     Sleep apnea     no cpap    Spontaneous      without mention of complications     Syncope      Past Surgical History:   Procedure Laterality Date    BOTOX INJECTION N/A 3/6/2017    Procedure: CYSTOSCOPY; BLADDER BOTOX 100 UNITS ;  Surgeon: Abilio Danielle MD;  [facility];  Service:     BOWEL RESECTION      related ot diverticulitis    CARDIAC CATHETERIZATION      CARPAL TUNNEL RELEASE Right     COLOSTOMY      COLOSTOMY CLOSURE      FOOT SURGERY Left     neuroma    HYSTERECTOMY      NASAL SINUS SURGERY      KS CYSTOURETHROSCOPY N/A 2018    Procedure: CYSTOSCOPY WITH BOTOX;  Surgeon: Abilio Danielle MD;  [facility];  Service: Urology    [place] ESOPHAGOGASTRODUODENOSCOPY TRANSORAL DIAGNOSTIC N/A 2019    Procedure: ESOPHAGOGASTRODUODENOSCOPY (EGD); Surgeon: Marcos Quezada DO;  Location: [facility]; Service: Gastroenterology    TONSILLECTOMY        Family History:     Family History   Problem Relation Age of Onset    Alcohol abuse Mother     Colon cancer Mother     Heart disease Mother     Alcohol abuse Father     Alcohol abuse Brother     Colon cancer Brother     Tremor Neg Hx     Parkinsonism Neg Hx       Social History:     E-Cigarette/Vaping    E-Cigarette Use Never User      E-Cigarette/Vaping Substances    Nicotine No     THC No     CBD No     Flavoring No     Other No     Unknown No      Social History     Socioeconomic History    Marital status:       Spouse name: Not on file    Number of children: Not on file    Years of education: Not on file    Highest education level: Not on file   Occupational History    Occupation: RETIRED    Social Needs    Financial resource strain: Not on file    Food insecurity: Worry: Not on file     Inability: Not on file    Transportation needs:     Medical: Not on file     Non-medical: Not on file   Tobacco Use    Smoking status: Never Smoker    Smokeless tobacco: Never Used   Substance and Sexual Activity    Alcohol use: No    Drug use: No    Sexual activity: Not Currently   Lifestyle    Physical activity:     Days per week: Not on file     Minutes per session: Not on file    Stress: Not on file   Relationships    Social connections:     Talks on phone: Not on file     Gets together: Not on file     Attends [employer] service: Not on file     Active member of club or organization: Not on file     Attends meetings of clubs or organizations: Not on file     Relationship status: Not on file    Intimate partner violence:     Fear of current or ex partner: Not on file     Emotionally abused: Not on file     Physically abused: Not on file     Forced sexual activity: Not on file   Other Topics Concern    Not on file   Social History Narrative    LIVING INDEPENDENTLY ALONE       Medications and Allergies:     Current Outpatient Medications   Medication Sig Dispense Refill    Ascorbic Acid (VITAMIN C) 1000 MG tablet Take 1,000 mg by mouth daily      B Complex Vitamins (B COMPLEX 100 PO) Take by mouth      Biotin 2500 MCG CAPS Take by mouth      calcium carbonate (OS-JOHN) 1250 (500 Ca) MG tablet Take 1 tablet by mouth daily      cholecalciferol (VITAMIN D3) 1,000 units tablet Take 5,000 Units by mouth daily       Echinacea 125 MG CAPS Take by mouth      fexofenadine (ALLEGRA) 180 MG tablet Take 180 mg by mouth as needed       multivitamin (THERAGRAN) TABS Take 1 tablet by mouth daily      Omega-3 350 MG CPDR Take by mouth      pantoprazole (PROTONIX) 40 mg tablet TAKE 1 TABLET (40 MG TOTAL) BY MOUTH DAILY 30 tablet 3    polyethylene glycol (MIRALAX) 17 g packet Take 17 g by mouth daily 14 each 2    Potassium Gluconate 595 (99 K) MG TABS Take by mouth      Probiotic Product (PROBIOTIC-10) CAPS Take by mouth      psyllium (METAMUCIL) 58 6 % powder Take 1 packet by mouth 3 (three) times a day      solifenacin (VESICARE) 5 mg tablet Take 1 tablet (5 mg total) by mouth daily 90 tablet 3     No current facility-administered medications for this visit  Allergies   Allergen Reactions    Caffeine GI Intolerance    Iodine Rash    Latex Rash    Other Rash     Adhesive tape        Immunizations: There is no immunization history on file for this patient  Health Maintenance:         Topic Date Due    CRC Screening: Colonoscopy  07/31/2022     There are no preventive care reminders to display for this patient  Medicare Health Risk Assessment:     Temp (!) 97 1 °F (36 2 °C)   Wt 81 2 kg (179 lb)   BMI 32 74 kg/m²      Nithin Nichols is here for her Subsequent Wellness visit  Health Risk Assessment:   Patient rates overall health as good  Patient feels that their physical health rating is slightly worse  Eyesight was rated as same  Hearing was rated as same  Patient feels that their emotional and mental health rating is same  Pain experienced in the last 7 days has been none  Patient states that she has experienced no weight loss or gain in last 6 months  Depression Screening:   PHQ-2 Score: 2      Fall Risk Screening: In the past year, patient has experienced: no history of falling in past year      Urinary Incontinence Screening:   Patient has leaked urine accidently in the last six months  Home Safety:  Patient does not have trouble with stairs inside or outside of their home  Patient has working smoke alarms and has working carbon monoxide detector  Home safety hazards include: none  Nutrition:   Current diet is Regular  Medications:   Patient is not currently taking any over-the-counter supplements  Patient is able to manage medications       Activities of Daily Living (ADLs)/Instrumental Activities of Daily Living (IADLs):   Walk and transfer into and out of bed and chair?: Yes  Dress and groom yourself?: Yes    Bathe or shower yourself?: Yes    Feed yourself?  Yes  Do your laundry/housekeeping?: Yes  Manage your money, pay your bills and track your expenses?: Yes  Make your own meals?: Yes    Do your own shopping?: Yes    Previous Hospitalizations:   Any hospitalizations or ED visits within the last 12 months?: No      Advance Care Planning:     Five wishes given: No      PREVENTIVE SCREENINGS      Cardiovascular Screening:      Due for: Lipid Panel      Diabetes Screening:       Due for: Blood Glucose      Colorectal Cancer Screening:     General: Screening Current      Breast Cancer Screening:     General: Screening Not Indicated      Cervical Cancer Screening:    General: Screening Not Indicated      Osteoporosis Screening:    General: Screening Current      Abdominal Aortic Aneurysm (AAA) Screening:        General: Screening Not Indicated      Lung Cancer Screening:     General: Screening Not Indicated      Hepatitis C Screening:    General: Screening Not Indicated    Hep C Screening Accepted: No       Deliah Dancer, MD

## 2020-07-22 NOTE — PATIENT INSTRUCTIONS
Medicare Preventive Visit Patient Instructions  Thank you for completing your Welcome to Medicare Visit or Medicare Annual Wellness Visit today  Your next wellness visit will be due in one year (7/22/2021)  The screening/preventive services that you may require over the next 5-10 years are detailed below  Some tests may not apply to you based off risk factors and/or age  Screening tests ordered at today's visit but not completed yet may show as past due  Also, please note that scanned in results may not display below  Preventive Screenings:  Service Recommendations Previous Testing/Comments   Colorectal Cancer Screening  * Colonoscopy    * Fecal Occult Blood Test (FOBT)/Fecal Immunochemical Test (FIT)  * Fecal DNA/Cologuard Test  * Flexible Sigmoidoscopy Age: 54-65 years old   Colonoscopy: every 10 years (may be performed more frequently if at higher risk)  OR  FOBT/FIT: every 1 year  OR  Cologuard: every 3 years  OR  Sigmoidoscopy: every 5 years  Screening may be recommended earlier than age 48 if at higher risk for colorectal cancer  Also, an individualized decision between you and your healthcare provider will decide whether screening between the ages of 74-80 would be appropriate  Colonoscopy: 07/31/2019  FOBT/FIT: Not on file  Cologuard: Not on file  Sigmoidoscopy: Not on file    Screening Current     Breast Cancer Screening Age: 36 years old  Frequency: every 1-2 years  Not required if history of left and right mastectomy Mammogram: 11/21/2016       Cervical Cancer Screening Between the ages of 21-29, pap smear recommended once every 3 years  Between the ages of 33-67, can perform pap smear with HPV co-testing every 5 years     Recommendations may differ for women with a history of total hysterectomy, cervical cancer, or abnormal pap smears in past  Pap Smear: Not on file    Screening Not Indicated   Hepatitis C Screening Once for adults born between 1945 and 1965  More frequently in patients at high risk for Hepatitis C Hep C Antibody: Not on file       Diabetes Screening 1-2 times per year if you're at risk for diabetes or have pre-diabetes Fasting glucose: 105 mg/dL   A1C: 5 7 %       Cholesterol Screening Once every 5 years if you don't have a lipid disorder  May order more often based on risk factors  Lipid panel: 02/06/2019    Screening Current     Other Preventive Screenings Covered by Medicare:  1  Abdominal Aortic Aneurysm (AAA) Screening: covered once if your at risk  You're considered to be at risk if you have a family history of AAA  2  Lung Cancer Screening: covers low dose CT scan once per year if you meet all of the following conditions: (1) Age 50-69; (2) No signs or symptoms of lung cancer; (3) Current smoker or have quit smoking within the last 15 years; (4) You have a tobacco smoking history of at least 30 pack years (packs per day multiplied by number of years you smoked); (5) You get a written order from a healthcare provider  3  Glaucoma Screening: covered annually if you're considered high risk: (1) You have diabetes OR (2) Family history of glaucoma OR (3)  aged 48 and older OR (3)  American aged 72 and older  3  Osteoporosis Screening: covered every 2 years if you meet one of the following conditions: (1) You're estrogen deficient and at risk for osteoporosis based off medical history and other findings; (2) Have a vertebral abnormality; (3) On glucocorticoid therapy for more than 3 months; (4) Have primary hyperparathyroidism; (5) On osteoporosis medications and need to assess response to drug therapy  · Last bone density test (DXA Scan): 02/13/2019   5  HIV Screening: covered annually if you're between the age of 15-65  Also covered annually if you are younger than 13 and older than 72 with risk factors for HIV infection  For pregnant patients, it is covered up to 3 times per pregnancy      Immunizations:  Immunization Recommendations   Influenza Vaccine Annual influenza vaccination during flu season is recommended for all persons aged >= 6 months who do not have contraindications   Pneumococcal Vaccine (Prevnar and Pneumovax)  * Prevnar = PCV13  * Pneumovax = PPSV23   Adults 25-60 years old: 1-3 doses may be recommended based on certain risk factors  Adults 72 years old: Prevnar (PCV13) vaccine recommended followed by Pneumovax (PPSV23) vaccine  If already received PPSV23 since turning 65, then PCV13 recommended at least one year after PPSV23 dose  Hepatitis B Vaccine 3 dose series if at intermediate or high risk (ex: diabetes, end stage renal disease, liver disease)   Tetanus (Td) Vaccine - COST NOT COVERED BY MEDICARE PART B Following completion of primary series, a booster dose should be given every 10 years to maintain immunity against tetanus  Td may also be given as tetanus wound prophylaxis  Tdap Vaccine - COST NOT COVERED BY MEDICARE PART B Recommended at least once for all adults  For pregnant patients, recommended with each pregnancy  Shingles Vaccine (Shingrix) - COST NOT COVERED BY MEDICARE PART B  2 shot series recommended in those aged 48 and above     Health Maintenance Due:      Topic Date Due    CRC Screening: Colonoscopy  07/31/2022     Immunizations Due:  There are no preventive care reminders to display for this patient  Advance Directives   What are advance directives? Advance directives are legal documents that state your wishes and plans for medical care  These plans are made ahead of time in case you lose your ability to make decisions for yourself  Advance directives can apply to any medical decision, such as the treatments you want, and if you want to donate organs  What are the types of advance directives? There are many types of advance directives, and each state has rules about how to use them  You may choose a combination of any of the following:  · Living will: This is a written record of the treatment you want   You can also choose which treatments you do not want, which to limit, and which to stop at a certain time  This includes surgery, medicine, IV fluid, and tube feedings  · Durable power of  for healthcare [facility]): This is a written record that states who you want to make healthcare choices for you when you are unable to make them for yourself  This person, called a proxy, is usually a family member or a friend  You may choose more than 1 proxy  · Do not resuscitate (DNR) order:  A DNR order is used in case your heart stops beating or you stop breathing  It is a request not to have certain forms of treatment, such as CPR  A DNR order may be included in other types of advance directives  · Medical directive: This covers the care that you want if you are in a coma, near death, or unable to make decisions for yourself  You can list the treatments you want for each condition  Treatment may include pain medicine, surgery, blood transfusions, dialysis, IV or tube feedings, and a ventilator (breathing machine)  · Values history: This document has questions about your views, beliefs, and how you feel and think about life  This information can help others choose the care that you would choose  Why are advance directives important? An advance directive helps you control your care  Although spoken wishes may be used, it is better to have your wishes written down  Spoken wishes can be misunderstood, or not followed  Treatments may be given even if you do not want them  An advance directive may make it easier for your family to make difficult choices about your care  Urinary Incontinence   Urinary incontinence (UI)  is when you lose control of your bladder  UI develops because your bladder cannot store or empty urine properly  The 3 most common types of UI are stress incontinence, urge incontinence, or both  Medicines:   · May be given to help strengthen your bladder control   Report any side effects of medication to your healthcare provider  Do pelvic muscle exercises often:  Your pelvic muscles help you stop urinating  Squeeze these muscles tight for 5 seconds, then relax for 5 seconds  Gradually work up to squeezing for 10 seconds  Do 3 sets of 15 repetitions a day, or as directed  This will help strengthen your pelvic muscles and improve bladder control  Train your bladder:  Go to the bathroom at set times, such as every 2 hours, even if you do not feel the urge to go  You can also try to hold your urine when you feel the urge to go  For example, hold your urine for 5 minutes when you feel the urge to go  As that becomes easier, hold your urine for 10 minutes  Self-care:   · Keep a UI record  Write down how often you leak urine and how much you leak  Make a note of what you were doing when you leaked urine  · Drink liquids as directed  You may need to limit the amount of liquid you drink to help control your urine leakage  Do not drink any liquid right before you go to bed  Limit or do not have drinks that contain caffeine or alcohol  · Prevent constipation  Eat a variety of high-fiber foods  Good examples are high-fiber cereals, beans, vegetables, and whole-grain breads  Walking is the best way to trigger your intestines to have a bowel movement  · Exercise regularly and maintain a healthy weight  Weight loss and exercise will decrease pressure on your bladder and help you control your leakage  · Use a catheter as directed  to help empty your bladder  A catheter is a tiny, plastic tube that is put into your bladder to drain your urine  · Go to behavior therapy as directed  Behavior therapy may be used to help you learn to control your urge to urinate  Weight Management   Why it is important to manage your weight:  Being overweight increases your risk of health conditions such as heart disease, high blood pressure, type 2 diabetes, and certain types of cancer   It can also increase your risk for osteoarthritis, sleep apnea, and other respiratory problems  Aim for a slow, steady weight loss  Even a small amount of weight loss can lower your risk of health problems  How to lose weight safely:  A safe and healthy way to lose weight is to eat fewer calories and get regular exercise  You can lose up about 1 pound a week by decreasing the number of calories you eat by 500 calories each day  Healthy meal plan for weight management:  A healthy meal plan includes a variety of foods, contains fewer calories, and helps you stay healthy  A healthy meal plan includes the following:  · Eat whole-grain foods more often  A healthy meal plan should contain fiber  Fiber is the part of grains, fruits, and vegetables that is not broken down by your body  Whole-grain foods are healthy and provide extra fiber in your diet  Some examples of whole-grain foods are whole-wheat breads and pastas, oatmeal, brown rice, and bulgur  · Eat a variety of vegetables every day  Include dark, leafy greens such as spinach, kale, yoanna greens, and mustard greens  Eat yellow and orange vegetables such as carrots, sweet potatoes, and winter squash  · Eat a variety of fruits every day  Choose fresh or canned fruit (canned in its own juice or light syrup) instead of juice  Fruit juice has very little or no fiber  · Eat low-fat dairy foods  Drink fat-free (skim) milk or 1% milk  Eat fat-free yogurt and low-fat cottage cheese  Try low-fat cheeses such as mozzarella and other reduced-fat cheeses  · Choose meat and other protein foods that are low in fat  Choose beans or other legumes such as split peas or lentils  Choose fish, skinless poultry (chicken or turkey), or lean cuts of red meat (beef or pork)  Before you cook meat or poultry, cut off any visible fat  · Use less fat and oil  Try baking foods instead of frying them  Add less fat, such as margarine, sour cream, regular salad dressing and mayonnaise to foods   Eat fewer high-fat foods  Some examples of high-fat foods include french fries, doughnuts, ice cream, and cakes  · Eat fewer sweets  Limit foods and drinks that are high in sugar  This includes candy, cookies, regular soda, and sweetened drinks  Exercise:  Exercise at least 30 minutes per day on most days of the week  Some examples of exercise include walking, biking, dancing, and swimming  You can also fit in more physical activity by taking the stairs instead of the elevator or parking farther away from stores  Ask your healthcare provider about the best exercise plan for you  © Copyright [employer] 2018 Information is for End User's use only and may not be sold, redistributed or otherwise used for commercial purposes   All illustrations and images included in CareNotes® are the copyrighted property of [employer]  or [address] [place] [address]

## 2020-07-22 NOTE — PROGRESS NOTES
Assessment/Plan:    No problem-specific Assessment & Plan notes found for this encounter  Diagnoses and all orders for this visit:    Fatigue, unspecified type  -     Comprehensive metabolic panel; Future  -     CBC and differential; Future  -     TSH, 3rd generation with Free T4 reflex; Future  -     Vitamin B12; Future  -     Folate; Future  -     Iron; Future    Tremors of nervous system  -     Magnesium 200 MG TABS; Take 1 tablet (200 mg total) by mouth daily    Sleep disturbance  After discussing risks and benefits of medication along with side effects will start the following:  -     traZODone (DESYREL) 50 mg tablet; Take 1 tablet (50 mg total) by mouth daily at bedtime    Medicare annual wellness visit, subsequent  See Medicare wellness  Need for lipid screening  -     Lipid panel; Future    Vitamin D deficiency  -     Vitamin D 25 hydroxy; Future      Follow up in 1 month    Subjective:      Patient ID: Donny Ott is a 79 y o  female  Patient is here because she has been feeling more tired recently  She says that she gets up and feels like taking a nap short after  She pedro shave a hisotry of parkinson disease  Also she says that when she wakes up she pedro snot feel refreshed  She was tested for sleep apnea  and was found not to have it  The following portions of the patient's history were reviewed and updated as appropriate:   She  has a past medical history of 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, Benign neoplasm of skin, Chest pain, Claustrophobia, Diverticulitis, GERD (gastroesophageal reflux disease), Muscle weakness, Nonmelanoma skin cancer, Palpitations, Seasonal allergies, Sleep apnea, Spontaneous , and Syncope    She   Patient Active Problem List    Diagnosis Date Noted    Tremors of nervous system 2020    Medicare annual wellness visit, subsequent 2020    Chronic idiopathic constipation 2019    History of adenomatous polyp of colon 07/22/2019    Osteopenia 06/13/2019    Gastro-esophageal reflux disease without esophagitis 04/02/2019    Dysphagia 03/14/2019    Multiple thyroid nodules 03/14/2019    Abnormal thyroid blood test 03/14/2019    Need for lipid screening 02/06/2019    Menopause ovarian failure 02/06/2019    Vitamin D deficiency 02/06/2019    Mitral valve disorder 12/14/2018    Anxiety 12/12/2018    Sleep-related breathing disorder 08/28/2018    Mood disturbance 08/28/2018    Chronic rhinitis 08/28/2018    Obesity (BMI 30-39 9) 08/28/2018    Claustrophobia 08/28/2018    Impaired fasting glucose 07/31/2018    Sleep disturbance 07/17/2018    Seasonal allergic rhinitis 05/08/2018    Screening for skin condition 04/04/2018    History of skin cancer 04/04/2018    [facility]'s disease ([facility] [address] ) 03/26/2018    Primary insomnia 03/26/2018    Fatigue 03/26/2018    Incontinence 09/11/2015    OAB (overactive bladder) 09/11/2015    Seborrheic keratosis 05/29/2014     She  has a past surgical history that includes Hysterectomy; Tonsillectomy; Nasal sinus surgery; Colostomy; Carpal tunnel release (Right); Colostomy closure; Foot surgery (Left); Cardiac catheterization; BOTOX INJECTION (N/A, 3/6/2017); pr cystourethroscopy (N/A, 4/13/2018); Bowel resection; and pr esophagogastroduodenoscopy transoral diagnostic (N/A, 4/23/2019)  Her family history includes Alcohol abuse in her brother, father, and mother; Colon cancer in her brother and mother; Heart disease in her mother  She  reports that she has never smoked  She has never used smokeless tobacco  She reports that she does not drink alcohol or use drugs    Current Outpatient Medications   Medication Sig Dispense Refill    Ascorbic Acid (VITAMIN C) 1000 MG tablet Take 1,000 mg by mouth daily      B Complex Vitamins (B COMPLEX 100 PO) Take by mouth      Biotin 2500 MCG CAPS Take by mouth      calcium carbonate (OS-JOHN) 1250 (500 Ca) MG tablet Take 1 tablet by mouth daily      cholecalciferol (VITAMIN D3) 1,000 units tablet Take 5,000 Units by mouth daily       Echinacea 125 MG CAPS Take by mouth      fexofenadine (ALLEGRA) 180 MG tablet Take 180 mg by mouth as needed       Magnesium 200 MG TABS Take 1 tablet (200 mg total) by mouth daily 30 tablet 3    multivitamin (THERAGRAN) TABS Take 1 tablet by mouth daily      Omega-3 350 MG CPDR Take by mouth      pantoprazole (PROTONIX) 40 mg tablet TAKE 1 TABLET (40 MG TOTAL) BY MOUTH DAILY 30 tablet 3    polyethylene glycol (MIRALAX) 17 g packet Take 17 g by mouth daily 14 each 2    Potassium Gluconate 595 (99 K) MG TABS Take by mouth      Probiotic Product (PROBIOTIC-10) CAPS Take by mouth      psyllium (METAMUCIL) 58 6 % powder Take 1 packet by mouth 3 (three) times a day      solifenacin (VESICARE) 5 mg tablet Take 1 tablet (5 mg total) by mouth daily 90 tablet 3    traZODone (DESYREL) 50 mg tablet Take 1 tablet (50 mg total) by mouth daily at bedtime 30 tablet 1     No current facility-administered medications for this visit        Current Outpatient Medications on File Prior to Visit   Medication Sig    Ascorbic Acid (VITAMIN C) 1000 MG tablet Take 1,000 mg by mouth daily    B Complex Vitamins (B COMPLEX 100 PO) Take by mouth    Biotin 2500 MCG CAPS Take by mouth    calcium carbonate (OS-JOHN) 1250 (500 Ca) MG tablet Take 1 tablet by mouth daily    cholecalciferol (VITAMIN D3) 1,000 units tablet Take 5,000 Units by mouth daily     Echinacea 125 MG CAPS Take by mouth    fexofenadine (ALLEGRA) 180 MG tablet Take 180 mg by mouth as needed     multivitamin (THERAGRAN) TABS Take 1 tablet by mouth daily    Omega-3 350 MG CPDR Take by mouth    pantoprazole (PROTONIX) 40 mg tablet TAKE 1 TABLET (40 MG TOTAL) BY MOUTH DAILY    polyethylene glycol (MIRALAX) 17 g packet Take 17 g by mouth daily    Potassium Gluconate 595 (99 K) MG TABS Take by mouth    Probiotic Product (PROBIOTIC-10) CAPS Take by mouth    psyllium (METAMUCIL) 58 6 % powder Take 1 packet by mouth 3 (three) times a day    solifenacin (VESICARE) 5 mg tablet Take 1 tablet (5 mg total) by mouth daily     No current facility-administered medications on file prior to visit  She is allergic to caffeine; iodine; latex; and other       Review of Systems   Constitutional: Positive for fatigue  Negative for activity change, appetite change and fever  HENT: Negative for congestion and ear discharge  Respiratory: Negative for cough and shortness of breath  Cardiovascular: Negative for chest pain and palpitations  Gastrointestinal: Negative for diarrhea and nausea  Musculoskeletal: Negative for arthralgias and back pain  Skin: Negative for color change and rash  Neurological: Negative for dizziness and headaches  Psychiatric/Behavioral: Positive for sleep disturbance  Negative for agitation and behavioral problems  Objective:      /76   Pulse 80   Temp (!) 97 1 °F (36 2 °C)   Ht 5' 2" (1 575 m)   Wt 81 2 kg (179 lb)   SpO2 97%   BMI 32 74 kg/m²          Physical Exam   Constitutional: She is oriented to person, place, and time  She appears well-developed and well-nourished  No distress  HENT:   Head: Normocephalic and atraumatic  Nose: Nose normal    Mouth/Throat: Oropharynx is clear and moist    Eyes: Pupils are equal, round, and reactive to light  Conjunctivae are normal    Cardiovascular: Normal rate, regular rhythm and normal heart sounds  No murmur heard  Pulmonary/Chest: Effort normal and breath sounds normal  No respiratory distress  She has no wheezes  Abdominal: Soft  Bowel sounds are normal  She exhibits no distension  There is no tenderness  Neurological: She is alert and oriented to person, place, and time  Coordination abnormal    Resting tremor   Skin: Skin is warm and dry  No rash noted  She is not diaphoretic  No erythema  Psychiatric: She has a normal mood and affect

## 2020-07-28 PROBLEM — R10.32 LLQ PAIN: Status: ACTIVE | Noted: 2019-07-22

## 2020-08-05 DIAGNOSIS — R35.0 FREQUENCY OF URINATION: Primary | ICD-10-CM

## 2020-08-11 ENCOUNTER — TELEPHONE (OUTPATIENT)
Dept: INTERVENTIONAL RADIOLOGY/VASCULAR | Facility: HOSPITAL | Age: 80
End: 2020-08-11

## 2020-08-15 DIAGNOSIS — G47.9 SLEEP DISTURBANCE: ICD-10-CM

## 2020-08-17 RX ORDER — TRAZODONE HYDROCHLORIDE 50 MG/1
50 TABLET ORAL
Qty: 30 TABLET | Refills: 1 | Status: SHIPPED | OUTPATIENT
Start: 2020-08-17 | End: 2020-08-24

## 2020-08-20 NOTE — PROGRESS NOTES
Assessment and plan:       1  Urinary incontinence  - I discussed with the patient that her occasional incontinence in the morning when trying to get to the restroom is likely stress incontinence rather than urge urinary incontinence and would not likely be improved with a higher dose of Myrbetriq  - I did offer pelvic floor physical therapy which he is potentially interested in  She is just getting ready to start physical therapy for orthopedic/neurologic reasons and would like to hold off at this time  - We did discuss the pelvic floor physical therapy would likely be once weekly, 1 hour appointments  - We will rediscuss this at her follow-up in 6 months  Should she want to continue with this therapy prior, she will let our office know and we would be happy to place the referral     2  Overactive bladder  - currently doing well on Myrbetriq 25 mg  She is currently taking this at night and does not have any episodes of nocturia  She reports that even prior to this medication she did not have any nocturia  - due to her continued frequent voiding during the day, I have recommended switching to take the Myrbetriq in the mornings to better control her symptoms throughout the day  Lugene Dance, PA-C      Chief Complaint     No chief complaint on file  History of Present Illness     Andrés Savage is a 79 y o  female patient managed by Dr Rhiannon Guzman for history of urge incontinence and overactive bladder  She was treated with percutaneous tibial nerve stimulation and completed 18 of her 24 months treatments prior to the onset of the COVID-19 pandemic, when treatments were suspended  When called to re-initiate treatment, she reported that she was doing very well and had no interested in resuming these treatments  She continue to wear a panty liner out of precaution but was doing very well      At that time patient's symptoms were well controlled on VESIcare and she was given a renewed prescription for this  She contacted our office on 08/05/2020 with reports of 3 episodes of incontinence, once daily, in a row and reported this was previously only once monthly  At that time she was transitioned to [facility]  She has since been transition to Myrbetriq 25 mg  She has previously taken 50 mg  She reports that she continues to wear a panty liner throughout the day and is urinating approximately every hour and a half  She takes Myrbetriq at night  She has no episodes of nocturia and that has never really been a problem  She has been having 3-4 episodes a month of incontinence when waking in the morning trying to get to the restroom  She is emptying well today with a PVR of 42 mL  Laboratory     Lab Results   Component Value Date    CREATININE 0 83 07/22/2020       No results found for: PSA    No results found for this or any previous visit (from the past 1 hour(s))  Review of Systems     Review of Systems   Constitutional: Negative for chills and fever  HENT: Negative  Eyes: Negative  Respiratory: Negative for cough and shortness of breath  Cardiovascular: Negative for chest pain  Gastrointestinal: Negative for constipation, diarrhea, nausea and vomiting  Endocrine: Negative  Genitourinary: Negative for difficulty urinating, dyspareunia, dysuria, enuresis, flank pain, frequency, hematuria and urgency  Musculoskeletal: Negative  Skin: Negative  Allergies     Allergies   Allergen Reactions    Caffeine GI Intolerance    Iodine Rash    Latex Rash    Other Rash     Adhesive tape         Physical Exam     Physical Exam  Vitals signs and nursing note reviewed  Constitutional:       General: She is not in acute distress  Appearance: Normal appearance  She is well-developed  She is not ill-appearing, toxic-appearing or diaphoretic  HENT:      Head: Normocephalic and atraumatic        Right Ear: External ear normal       Left Ear: External ear normal    Eyes:      General: No scleral icterus  Right eye: No discharge  Left eye: No discharge  Cardiovascular:      Rate and Rhythm: Normal rate  Pulmonary:      Effort: Pulmonary effort is normal    Musculoskeletal:      Comments: Ambulates independently   Skin:     General: Skin is warm and dry  Neurological:      Mental Status: She is alert and oriented to person, place, and time  Psychiatric:         Mood and Affect: Mood normal          Behavior: Behavior normal          Thought Content: Thought content normal          Judgment: Judgment normal            Vital Signs     There were no vitals filed for this visit        Current Medications       Current Outpatient Medications:     Ascorbic Acid (VITAMIN C) 1000 MG tablet, Take 1,000 mg by mouth daily, Disp: , Rfl:     B Complex Vitamins (B COMPLEX 100 PO), Take by mouth, Disp: , Rfl:     Biotin 2500 MCG CAPS, Take by mouth, Disp: , Rfl:     calcium carbonate (OS-JOHN) 1250 (500 Ca) MG tablet, Take 1 tablet by mouth daily, Disp: , Rfl:     cholecalciferol (VITAMIN D3) 1,000 units tablet, Take 5,000 Units by mouth daily , Disp: , Rfl:     Echinacea 125 MG CAPS, Take by mouth, Disp: , Rfl:     fexofenadine (ALLEGRA) 180 MG tablet, Take 180 mg by mouth as needed , Disp: , Rfl:     Magnesium 200 MG TABS, Take 1 tablet (200 mg total) by mouth daily, Disp: 30 tablet, Rfl: 3    Mirabegron ER 25 MG TB24, Take 25 mg by mouth daily at bedtime, Disp: 30 tablet, Rfl: 6    multivitamin (THERAGRAN) TABS, Take 1 tablet by mouth daily, Disp: , Rfl:     Omega-3 350 MG CPDR, Take by mouth, Disp: , Rfl:     pantoprazole (PROTONIX) 40 mg tablet, TAKE 1 TABLET (40 MG TOTAL) BY MOUTH DAILY, Disp: 30 tablet, Rfl: 3    polyethylene glycol (MIRALAX) 17 g packet, Take 17 g by mouth daily, Disp: 14 each, Rfl: 2    Potassium Gluconate 595 (99 K) MG TABS, Take by mouth, Disp: , Rfl:     Probiotic Product (PROBIOTIC-10) CAPS, Take by mouth, Disp: , Rfl:     psyllium (METAMUCIL) 58 6 % powder, Take 1 packet by mouth 3 (three) times a day, Disp: , Rfl:     solifenacin (VESICARE) 5 mg tablet, Take 1 tablet (5 mg total) by mouth daily, Disp: 90 tablet, Rfl: 3    traZODone (DESYREL) 50 mg tablet, TAKE 1 TABLET (50 MG TOTAL) BY MOUTH DAILY AT BEDTIME, Disp: 30 tablet, Rfl: 1      Active Problems     Patient Active Problem List   Diagnosis    Incontinence    OAB (overactive bladder)    Parkinson's disease ([facility] [address] )    Primary insomnia    Fatigue    Seborrheic keratosis    Screening for skin condition    History of skin cancer    Seasonal allergic rhinitis    Sleep disturbance    Impaired fasting glucose    Sleep-related breathing disorder    Mood disturbance    Chronic rhinitis    Obesity (BMI 30-39  9)    Claustrophobia    Anxiety    Mitral valve disorder    Need for lipid screening    Menopause ovarian failure    Vitamin D deficiency    Dysphagia    Multiple thyroid nodules    Abnormal thyroid blood test    Gastro-esophageal reflux disease without esophagitis    Osteopenia    LLQ pain    Chronic idiopathic constipation    History of adenomatous polyp of colon    Tremors of nervous system    Medicare annual wellness visit, subsequent         Past Medical History     Past Medical History:   Diagnosis Date    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     Benign neoplasm of skin     Chest pain     Claustrophobia     Diverticulitis     GERD (gastroesophageal reflux disease)     Muscle weakness     Nonmelanoma skin cancer     last assessed 2017    Palpitations     Seasonal allergies     Sleep apnea     no cpap    Spontaneous      without mention of complications     Syncope          Surgical History     Past Surgical History:   Procedure Laterality Date    BOTOX INJECTION N/A 3/6/2017    Procedure: CYSTOSCOPY; BLADDER BOTOX 100 UNITS ;  Surgeon: Hanane Paredes MD;  Location: [facility] OR;  Service:     BOWEL RESECTION      related ot diverticulitis    CARDIAC CATHETERIZATION      CARPAL TUNNEL RELEASE Right     COLOSTOMY      COLOSTOMY CLOSURE      FOOT SURGERY Left     neuroma    HYSTERECTOMY      NASAL SINUS SURGERY      RI CYSTOURETHROSCOPY N/A 4/13/2018    Procedure: CYSTOSCOPY WITH BOTOX;  Surgeon: Rah Lynch MD;  Location: [facility];  Service: Urology    RI ESOPHAGOGASTRODUODENOSCOPY TRANSORAL DIAGNOSTIC N/A 4/23/2019    Procedure: ESOPHAGOGASTRODUODENOSCOPY (EGD); Surgeon: Rufus Valencia DO;  Location: [facility];   Service: Gastroenterology    TONSILLECTOMY           Family History     Family History   Problem Relation Age of Onset    Alcohol abuse Mother     Heart disease Mother     Alcohol abuse Father     Alcohol abuse Brother     Cancer Brother     Tremor Neg Hx     Parkinsonism Neg Hx     Colon cancer Neg Hx          Social History     Social History       Radiology

## 2020-08-24 ENCOUNTER — OFFICE VISIT (OUTPATIENT)
Dept: FAMILY MEDICINE CLINIC | Facility: CLINIC | Age: 80
End: 2020-08-24
Payer: COMMERCIAL

## 2020-08-24 ENCOUNTER — HOSPITAL ENCOUNTER (OUTPATIENT)
Dept: CT IMAGING | Facility: HOSPITAL | Age: 80
Discharge: HOME | End: 2020-08-24
Payer: COMMERCIAL

## 2020-08-24 VITALS
HEIGHT: 62 IN | DIASTOLIC BLOOD PRESSURE: 74 MMHG | HEART RATE: 108 BPM | TEMPERATURE: 96.1 F | WEIGHT: 179 LBS | OXYGEN SATURATION: 97 % | SYSTOLIC BLOOD PRESSURE: 116 MMHG | BODY MASS INDEX: 32.94 KG/M2

## 2020-08-24 DIAGNOSIS — M70.61 TROCHANTERIC BURSITIS, RIGHT HIP: ICD-10-CM

## 2020-08-24 DIAGNOSIS — R53.83 FATIGUE, UNSPECIFIED TYPE: ICD-10-CM

## 2020-08-24 DIAGNOSIS — F51.01 PRIMARY INSOMNIA: ICD-10-CM

## 2020-08-24 DIAGNOSIS — M54.50 ACUTE RIGHT-SIDED LOW BACK PAIN WITHOUT SCIATICA: Primary | ICD-10-CM

## 2020-08-24 DIAGNOSIS — R10.32 LLQ PAIN: ICD-10-CM

## 2020-08-24 PROCEDURE — 99214 OFFICE O/P EST MOD 30 MIN: CPT | Performed by: PHYSICIAN ASSISTANT

## 2020-08-24 PROCEDURE — 1036F TOBACCO NON-USER: CPT | Performed by: PHYSICIAN ASSISTANT

## 2020-08-24 PROCEDURE — 74176 CT ABD & PELVIS W/O CONTRAST: CPT

## 2020-08-24 PROCEDURE — G1004 CDSM NDSC: HCPCS

## 2020-08-24 PROCEDURE — 1160F RVW MEDS BY RX/DR IN RCRD: CPT | Performed by: PHYSICIAN ASSISTANT

## 2020-08-24 PROCEDURE — 3008F BODY MASS INDEX DOCD: CPT | Performed by: PHYSICIAN ASSISTANT

## 2020-08-24 RX ORDER — METHOCARBAMOL 500 MG/1
500 TABLET, FILM COATED ORAL 3 TIMES DAILY PRN
Qty: 60 TABLET | Refills: 0 | Status: SHIPPED | OUTPATIENT
Start: 2020-08-24 | End: 2020-11-23

## 2020-08-24 NOTE — PROGRESS NOTES
Assessment/Plan:       Problem List Items Addressed This Visit        Musculoskeletal and Integument    Trochanteric bursitis, right hip    Relevant Orders    Ambulatory referral to Physical Therapy       Other    Primary insomnia    Fatigue    Acute right-sided low back pain without sciatica - Primary    Relevant Medications    methocarbamol (ROBAXIN) 500 mg tablet    Other Relevant Orders    Ambulatory referral to Physical Therapy        Discussed tylenol/aleve for back pain and likely trochanteric bursitis given sx  Discussed start taking robaxin at night, can be sedating, which can help with sleep  PT ordered  Follow up in 2 months with PCP  Subjective:      Patient ID: Catalina Mariscal is a 79 y o  female  Pt presents for one month follow up of fatigue  Labs related to fatigue were normal  Sleep study she had was normal  She was started on trazodone 50mg and she shares it did not work  She also shares she is having some R sided low back pain and hip pain  Hip is very stiff and tender especially upon first few steps  She also shares she has some pain when laying on that area at night  When she wakes it is harder to fall back asleep  She shares the pain can be sharp  She denies numbness, weakness, tingling in the legs  No sciatic sx  She also shares she has been feeling down during the pandemic, she shares the lack of social interaction can be depressing  She misses playing cards with her friends and being in social groups  She denies chest pain, lightheadedness, dizziness, SOB, syncope, N/V         The following portions of the patient's history were reviewed and updated as appropriate:   She  has a past medical history of 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, Benign neoplasm of skin, Chest pain, Claustrophobia, Diverticulitis, GERD (gastroesophageal reflux disease), Muscle weakness, Nonmelanoma skin cancer, Palpitations, Seasonal allergies, Sleep apnea, Spontaneous , and Syncope  She   Patient Active Problem List    Diagnosis Date Noted    Acute right-sided low back pain without sciatica 2020    Trochanteric bursitis, right hip 2020    Tremors of nervous system 2020    Medicare annual wellness visit, subsequent 2020    LLQ pain 2019    Chronic idiopathic constipation 2019    History of adenomatous polyp of colon 2019    Osteopenia 2019    Gastro-esophageal reflux disease without esophagitis 2019    Dysphagia 2019    Multiple thyroid nodules 2019    Abnormal thyroid blood test 2019    Need for lipid screening 2019    Menopause ovarian failure 2019    Vitamin D deficiency 2019    Mitral valve disorder 2018    Anxiety 2018    Sleep-related breathing disorder 2018    Mood disturbance 2018    Chronic rhinitis 2018    Obesity (BMI 30-39 9) 2018    Claustrophobia 2018    Impaired fasting glucose 2018    Sleep disturbance 2018    Seasonal allergic rhinitis 2018    Screening for skin condition 2018    History of skin cancer 2018    Parkinson's disease ([facility] [address] ) 2018    Primary insomnia 2018    Fatigue 2018    Incontinence 2015    OAB (overactive bladder) 2015    Seborrheic keratosis 2014     She  has a past surgical history that includes Hysterectomy; Tonsillectomy; Nasal sinus surgery; Colostomy; Carpal tunnel release (Right); Colostomy closure; Foot surgery (Left); Cardiac catheterization; BOTOX INJECTION (N/A, 3/6/2017); pr cystourethroscopy (N/A, 2018); Bowel resection; and pr esophagogastroduodenoscopy transoral diagnostic (N/A, 2019)  Her family history includes Alcohol abuse in her brother, father, and mother; Cancer in her brother; Heart disease in her mother  She  reports that she has never smoked   She has never used smokeless tobacco  She reports that she does not drink alcohol or use drugs  Current Outpatient Medications   Medication Sig Dispense Refill    Ascorbic Acid (VITAMIN C) 1000 MG tablet Take 1,000 mg by mouth daily      B Complex Vitamins (B COMPLEX 100 PO) Take by mouth      Biotin 2500 MCG CAPS Take by mouth      calcium carbonate (OS-JOHN) 1250 (500 Ca) MG tablet Take 1 tablet by mouth daily      cholecalciferol (VITAMIN D3) 1,000 units tablet Take 5,000 Units by mouth daily       Echinacea 125 MG CAPS Take by mouth      fexofenadine (ALLEGRA) 180 MG tablet Take 180 mg by mouth as needed       Magnesium 200 MG TABS Take 1 tablet (200 mg total) by mouth daily 30 tablet 3    methocarbamol (ROBAXIN) 500 mg tablet Take 1 tablet (500 mg total) by mouth 3 (three) times a day as needed for muscle spasms 60 tablet 0    Mirabegron ER 25 MG TB24 Take 25 mg by mouth daily at bedtime 30 tablet 6    multivitamin (THERAGRAN) TABS Take 1 tablet by mouth daily      Omega-3 350 MG CPDR Take by mouth      pantoprazole (PROTONIX) 40 mg tablet TAKE 1 TABLET (40 MG TOTAL) BY MOUTH DAILY (Patient not taking: Reported on 8/24/2020) 30 tablet 3    polyethylene glycol (MIRALAX) 17 g packet Take 17 g by mouth daily 14 each 2    Potassium Gluconate 595 (99 K) MG TABS Take by mouth      Probiotic Product (PROBIOTIC-10) CAPS Take by mouth      psyllium (METAMUCIL) 58 6 % powder Take 1 packet by mouth 3 (three) times a day      solifenacin (VESICARE) 5 mg tablet Take 1 tablet (5 mg total) by mouth daily 90 tablet 3     No current facility-administered medications for this visit        Current Outpatient Medications on File Prior to Visit   Medication Sig    Ascorbic Acid (VITAMIN C) 1000 MG tablet Take 1,000 mg by mouth daily    B Complex Vitamins (B COMPLEX 100 PO) Take by mouth    Biotin 2500 MCG CAPS Take by mouth    calcium carbonate (OS-JOHN) 1250 (500 Ca) MG tablet Take 1 tablet by mouth daily    cholecalciferol (VITAMIN D3) 1,000 units tablet Take 5,000 Units by mouth daily     Echinacea 125 MG CAPS Take by mouth    fexofenadine (ALLEGRA) 180 MG tablet Take 180 mg by mouth as needed     Magnesium 200 MG TABS Take 1 tablet (200 mg total) by mouth daily    Mirabegron ER 25 MG TB24 Take 25 mg by mouth daily at bedtime    multivitamin (THERAGRAN) TABS Take 1 tablet by mouth daily    Omega-3 350 MG CPDR Take by mouth    pantoprazole (PROTONIX) 40 mg tablet TAKE 1 TABLET (40 MG TOTAL) BY MOUTH DAILY (Patient not taking: Reported on 8/24/2020)    polyethylene glycol (MIRALAX) 17 g packet Take 17 g by mouth daily    Potassium Gluconate 595 (99 K) MG TABS Take by mouth    Probiotic Product (PROBIOTIC-10) CAPS Take by mouth    psyllium (METAMUCIL) 58 6 % powder Take 1 packet by mouth 3 (three) times a day    solifenacin (VESICARE) 5 mg tablet Take 1 tablet (5 mg total) by mouth daily    [DISCONTINUED] traZODone (DESYREL) 50 mg tablet TAKE 1 TABLET (50 MG TOTAL) BY MOUTH DAILY AT BEDTIME     No current facility-administered medications on file prior to visit  She is allergic to caffeine; iodine; latex; and other       Review of Systems   Constitutional: Negative for chills, fatigue and fever  HENT: Negative for congestion, ear pain, hearing loss, nosebleeds, postnasal drip, rhinorrhea, sinus pressure, sinus pain, sneezing and sore throat  Eyes: Negative for pain, discharge, itching and visual disturbance  Respiratory: Negative for cough, chest tightness, shortness of breath and wheezing  Cardiovascular: Negative for chest pain, palpitations and leg swelling  Gastrointestinal: Negative for abdominal pain, blood in stool, constipation, diarrhea, nausea and vomiting  Genitourinary: Negative for frequency and urgency  Musculoskeletal: Positive for arthralgias and back pain  Skin: Negative  Neurological: Negative for dizziness, light-headedness and numbness     Psychiatric/Behavioral: Positive for dysphoric mood and sleep disturbance  Negative for agitation and confusion  The patient is not nervous/anxious  Objective:      /74   Pulse (!) 108   Temp (!) 96 1 °F (35 6 °C)   Ht 5' 2" (1 575 m)   Wt 81 2 kg (179 lb)   SpO2 97%   BMI 32 74 kg/m²          Physical Exam  Vitals signs and nursing note reviewed  Constitutional:       General: She is not in acute distress  Appearance: Normal appearance  HENT:      Head: Normocephalic and atraumatic  Nose: Nose normal       Mouth/Throat:      Mouth: Mucous membranes are moist       Pharynx: Oropharynx is clear  No oropharyngeal exudate or posterior oropharyngeal erythema  Eyes:      Pupils: Pupils are equal, round, and reactive to light  Neck:      Musculoskeletal: Normal range of motion and neck supple  Cardiovascular:      Rate and Rhythm: Normal rate and regular rhythm  Pulses: Normal pulses  Heart sounds: Normal heart sounds  No murmur  Pulmonary:      Effort: Pulmonary effort is normal  No respiratory distress  Breath sounds: Normal breath sounds  No wheezing, rhonchi or rales  Abdominal:      General: Bowel sounds are normal       Palpations: Abdomen is soft  Musculoskeletal: Normal range of motion  Right hip: She exhibits tenderness  She exhibits normal range of motion and normal strength  Lumbar back: She exhibits tenderness and pain  She exhibits normal range of motion  Legs:    Skin:     General: Skin is warm and dry  Neurological:      Mental Status: She is alert and oriented to person, place, and time     Psychiatric:         Mood and Affect: Mood and affect normal

## 2020-08-25 ENCOUNTER — OFFICE VISIT (OUTPATIENT)
Dept: UROLOGY | Facility: CLINIC | Age: 80
End: 2020-08-25
Payer: COMMERCIAL

## 2020-08-25 VITALS
DIASTOLIC BLOOD PRESSURE: 86 MMHG | SYSTOLIC BLOOD PRESSURE: 132 MMHG | BODY MASS INDEX: 32.85 KG/M2 | HEART RATE: 94 BPM | WEIGHT: 179.6 LBS | TEMPERATURE: 97.5 F

## 2020-08-25 DIAGNOSIS — N39.41 URGE INCONTINENCE: Primary | ICD-10-CM

## 2020-08-25 LAB — POST-VOID RESIDUAL VOLUME, ML POC: 41 ML

## 2020-08-25 PROCEDURE — 51798 US URINE CAPACITY MEASURE: CPT | Performed by: PHYSICIAN ASSISTANT

## 2020-08-25 PROCEDURE — 1036F TOBACCO NON-USER: CPT | Performed by: PHYSICIAN ASSISTANT

## 2020-08-25 PROCEDURE — 99213 OFFICE O/P EST LOW 20 MIN: CPT | Performed by: PHYSICIAN ASSISTANT

## 2020-08-25 PROCEDURE — 1160F RVW MEDS BY RX/DR IN RCRD: CPT | Performed by: PHYSICIAN ASSISTANT

## 2020-09-01 ENCOUNTER — EVALUATION (OUTPATIENT)
Dept: PHYSICAL THERAPY | Facility: CLINIC | Age: 80
End: 2020-09-01
Payer: COMMERCIAL

## 2020-09-01 DIAGNOSIS — M70.61 TROCHANTERIC BURSITIS, RIGHT HIP: ICD-10-CM

## 2020-09-01 DIAGNOSIS — M54.50 ACUTE RIGHT-SIDED LOW BACK PAIN WITHOUT SCIATICA: Primary | ICD-10-CM

## 2020-09-01 PROCEDURE — 97161 PT EVAL LOW COMPLEX 20 MIN: CPT | Performed by: PHYSICAL THERAPIST

## 2020-09-01 PROCEDURE — 97110 THERAPEUTIC EXERCISES: CPT | Performed by: PHYSICAL THERAPIST

## 2020-09-01 PROCEDURE — 97140 MANUAL THERAPY 1/> REGIONS: CPT | Performed by: PHYSICAL THERAPIST

## 2020-09-01 NOTE — PROGRESS NOTES
PT Evaluation     Today's date: 2020  Patient name: Jone Stubbs  : 1940  MRN: [id number]  Referring provider: Zayra Wu PA-C  Dx:   Encounter Diagnosis     ICD-10-CM    1  Acute right-sided low back pain without sciatica  M54 5    2  Trochanteric bursitis, right hip  M70 61                   Assessment  Assessment details: Jone Stubbs is a 79 y o  female referred with primary diagnosis of Acute right-sided low back pain without sciatica  (primary encounter diagnosis)  Trochanteric bursitis, right hip   Patient presents with the following functional limitations: Pain with sit to stand transfers and prolonged walking  No pain with lumbar testing or hip testing today  Symptoms most consistent with piriformis syndrome right and right greater trochanteric bursisits  Treatment to include: Manual therapy techniques, extremity/core strengthening, neuromuscular control exercises, instruction in a comprehensive HEP, and modalities as needed  They will benefit from skilled PT services to address the above functional deficits and to decrease pain to promote a return to their premorbid level of function  Functional limitations: Pain with sit to stand transfers and prolonged walking  Understanding of Dx/Px/POC: good   Prognosis: good    Goals  STG (4 weeks)  1  Patient will report pain as a 2-3/10 at worst with sit to stand transfers  2  Patient will stand and walk without increased pain  LTG (8 weeks)  1  Patient will report pain as a 0-1/10 with normal activities  2  Patient will be independent and compliant with a HEP in order to maintain gains made with skilled PT services      Plan  Plan details: 2x/wk decreasing to 1x/wk due to copay  Patient would benefit from: skilled physical therapy  Planned modality interventions: cryotherapy  Planned therapy interventions: manual therapy, patient education, stretching, strengthening, therapeutic exercise, therapeutic activities and home exercise program  Frequency: 2x week  Duration in weeks: 8  Plan of Care beginning date: 2020  Plan of Care expiration date: 10/27/2020  Treatment plan discussed with: patient        Subjective Evaluation    History of Present Illness  Mechanism of injury: Patient states she has been dealing with chronic right buttock pain since December  She has been going to [facility] and receiving accupuncture since 2019  She gets relief for no more than 2-3 days  Symptoms are worst with sit to stand transfers and lessen after taking a few steps  She is referred now to outpatient PT services  Quality of life: fair    Pain  Current pain ratin  At best pain ratin  At worst pain ratin  Location: Right lower back and to a lesser extent into right GT  Quality: tight and dull ache  Relieving factors: ice  Aggravating factors: walking and standing  Progression: improved    Social Support  Steps to enter house: yes (bilateral HR )  5  Lives in: [place]  Lives with: alone    Employment status: not working    Diagnostic Tests  No diagnostic tests performed    FCE comments: (-) cough/sneeze, bowel/bladder changes, saddle anaesthesia  Does cooking and laundry  Has someone clean normally  Still performs all normal ADL's  Treatments  Previous treatment: medication and chiropractic (accupuncture)  Current treatment: chiropractic  Current treatment comments: accupuncture  Patient Goals  Patient goals for therapy: decreased pain and independence with ADLs/IADLs          Objective     Concurrent Complaints  Positive for history of cancer  Negative for night pain, disturbed sleep, bladder dysfunction, bowel dysfunction, saddle (S4) numbness and history of trauma    Static Posture     Lumbar Spine   Increased lordosis       Postural Observations  Seated posture: fair  Standing posture: fair  Correction of posture: has no consistent effect        Palpation   Left   No palpable tenderness to the lumbar paraspinals and quadratus lumborum  Tenderness of the erector spinae  Right   No palpable tenderness to the lumbar paraspinals and quadratus lumborum  Tenderness of the erector spinae and piriformis  Tenderness     Lumbar Spine  No tenderness in the spinous process, facet joint, pedicle and right transverse process  Left Hip   No tenderness in the ASIS, PSIS and sacroiliac joint  Right Hip   No tenderness in the ASIS, PSIS and sacroiliac joint  Neurological Testing     Sensation     Lumbar   Left   Intact: light touch    Right   Intact: light touch    Reflexes   Left   Patellar (L4): normal (2+)  Achilles (S1): normal (2+)    Right   Patellar (L4): normal (2+)  Achilles (S1): normal (2+)    Active Range of Motion     Lumbar   Normal active range of motion  Left Hip   Normal active range of motion    Right Hip   Normal active range of motion    Joint Play   Joints within functional limits: L1, L2, L3, L4 and L5   Mechanical Assessment    Cervical      Thoracic      Lumbar    Standing flexion: repeated movements   Pain location:no change  Standing extension: repeated movements  Pain location: no change    Strength/Myotome Testing     Left Hip   Planes of Motion   Flexion: 4  Extension: 4  Abduction: 4    Right Hip   Planes of Motion   Flexion: 4  Extension: 4  Abduction: 4    Tests     Lumbar   Negative SIJ compression, sacroiliac distraction, sacral thrust  and sacral spring   Left   Negative passive SLR, quadrant and slump test      Right   Negative passive SLR, quadrant and slump test      Left Hip   Negative NORA, FADIR, long sit, SI compression and SI distraction  Right Hip   Positive piriformis  Negative NORA, FADIR and long sit                Precautions: syncope, CA, Anxiety Disorder, claustrophobia, chest pain, palpitations      Manuals 9/1            Massage roller right piriformis JF            Right piriformis pin and stretch JF            Massage roller right ITB NV Neuro Re-Ed                                                                                                        Ther Ex             Nustep             Standing hip 3 ways             Standing right ITB stretch             Supine ITB stretch with strap             Supine piriformis stretch 3x30"            Bridges                                       Ther Activity             Step-ups F/L             Squats             Gait Training                                       Modalities             CP prn right piriformis/GT

## 2020-09-02 ENCOUNTER — OFFICE VISIT (OUTPATIENT)
Dept: GASTROENTEROLOGY | Facility: MEDICAL CENTER | Age: 80
End: 2020-09-02
Payer: COMMERCIAL

## 2020-09-02 VITALS
DIASTOLIC BLOOD PRESSURE: 86 MMHG | TEMPERATURE: 98.2 F | SYSTOLIC BLOOD PRESSURE: 140 MMHG | WEIGHT: 179 LBS | BODY MASS INDEX: 32.74 KG/M2 | HEART RATE: 86 BPM

## 2020-09-02 DIAGNOSIS — K21.9 GASTRO-ESOPHAGEAL REFLUX DISEASE WITHOUT ESOPHAGITIS: Primary | ICD-10-CM

## 2020-09-02 DIAGNOSIS — K86.2 PANCREATIC CYST: ICD-10-CM

## 2020-09-02 DIAGNOSIS — K59.04 CHRONIC IDIOPATHIC CONSTIPATION: ICD-10-CM

## 2020-09-02 PROCEDURE — 99214 OFFICE O/P EST MOD 30 MIN: CPT | Performed by: INTERNAL MEDICINE

## 2020-09-02 NOTE — PROGRESS NOTES
Outpatient Consultation  Eligio Lopes  [address] [place] [address]  Daron MOLINA  Ph : [phone]  Fax : [phone]  Mobile : [phone]  Email : [email or web address]  Also available on [address] Karlie Godfrey 79 y o  female MRN: [id number]    PCP: Alec Oseguera MD  Referring: Alec Oseguera MD  [address] [facility]    Jumana Rajput was seen in consultation  My recommendations are included  Please do not hesitate to contact me with any questions you may have  ASSESSMENT AND PLAN:      No problem-specific Assessment & Plan notes found for this encounter  Diagnoses and all orders for this visit:    Gastro-esophageal reflux disease without esophagitis    Pancreatic cyst  -     Endoscopic ultrasonography, GI (Upper); Future    Chronic idiopathic constipation    Other orders  -     Diet NPO; Sips with meds; Standing  -     Void on call to OR; Standing  -     Insert peripheral IV; Standing    1  Incidental finding of a pancreatic tail cystic lesion measuring 4 8cm  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 as patient has no other concerns for pancreatic pseudocyst  This could also be a MCN or solid pseudopapillary tumor  She can not have contrast due to allergy  We will plan to do an EUS for further evaluation  I discussed the pathophysiology of pancreatic cystic lesion and the precancerous nature of some  If diagnosed as a precancerous cyst she does fit criteria for surgical resection if needed  After the EUS I will refer her to a pancreatobiliary surgeon and will discuss her case at our multidisciplinary meeting  2  Constipation : follow up with Dr Ora Frank  3  GERD : follow up with Dr Ora Frank  Controlled on protonix  ______________________________________________________________________    HPI:  78 y/o lady who presents with a concern for pancreatic cyst (sees Dr Ora Frank)   She was having pain in the left side and radiated across the abdomen  This was in the past month  No diarrhea/ bleeding  She does have constipation - off/ on  She has to strain  She goes once a day or twice  She has pellets  She had a colonoscopy done in the past year and was unremarkable  She has had a history of GERD in the past and no medications at this point  No pancreatitis and no family h/o pancreatitis or cancer  No trauma  No change in medications  Some gas and bloating  Imaging was done which showed (CT without contrast) : There is a lobulated cystic lesion of the pancreatic tail measuring 4 4 x 4 4 x 4 8 cm  (Series 2 images 15 through 23 ) Recommended characterization with abdomen MRI and MRCP with and without IV contrast    PRIOR ENDOSCOPIC EVALUATION :     Endoscopy : yes    Colonoscopy : yes      REVIEW OF SYSTEMS:    CONSTITUTIONAL: Denies any fever, chills, rigors, and weight loss  HEENT: No earache or tinnitus  Denies hearing loss or visual disturbances  CARDIOVASCULAR: No chest pain or palpitations  RESPIRATORY: Denies any cough, hemoptysis, shortness of breath or dyspnea on exertion  GASTROINTESTINAL: As noted in the History of Present Illness  GENITOURINARY: No problems with urination  Denies any hematuria or dysuria  NEUROLOGIC: No dizziness or vertigo, denies headaches  MUSCULOSKELETAL: Denies any muscle or joint pain  SKIN: Denies skin rashes or itching  ENDOCRINE: Denies excessive thirst  Denies intolerance to heat or cold  PSYCHOSOCIAL: Denies depression or anxiety  Denies any recent memory loss         Historical Information   Past Medical History:   Diagnosis Date    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     Benign neoplasm of skin     Chest pain     Claustrophobia     Diverticulitis     GERD (gastroesophageal reflux disease)     Muscle weakness     Nonmelanoma skin cancer     last assessed 21mar2017    Palpitations     Seasonal allergies     Sleep apnea     no cpap    Spontaneous      without mention of complications     Syncope      Past Surgical History:   Procedure Laterality Date    BOTOX INJECTION N/A 3/6/2017    Procedure: CYSTOSCOPY; BLADDER BOTOX 100 UNITS ;  Surgeon: Misty Richardson MD;  [facility];  Service:     BOWEL RESECTION      related ot diverticulitis    CARDIAC CATHETERIZATION      CARPAL TUNNEL RELEASE Right     COLONOSCOPY  2019    COLOSTOMY      COLOSTOMY CLOSURE      FOOT SURGERY Left     neuroma    HYSTERECTOMY      NASAL SINUS SURGERY      MT CYSTOURETHROSCOPY N/A 2018    Procedure: CYSTOSCOPY WITH BOTOX;  Surgeon: Misty Richardson MD;  [facility];  Service: Urology    MT ESOPHAGOGASTRODUODENOSCOPY TRANSORAL DIAGNOSTIC N/A 2019    Procedure: ESOPHAGOGASTRODUODENOSCOPY (EGD); Surgeon: Mahsa Maharaj DO;  Location: [facility];   Service: Gastroenterology    TONSILLECTOMY      UPPER GASTROINTESTINAL ENDOSCOPY  2019     Social History   Social History     Substance and Sexual Activity   Alcohol Use No     Social History     Substance and Sexual Activity   Drug Use No     Social History     Tobacco Use   Smoking Status Never Smoker   Smokeless Tobacco Never Used     Family History   Problem Relation Age of Onset    Alcohol abuse Mother     Heart disease Mother     Alcohol abuse Father     Alcohol abuse Brother     Cancer Brother     Tremor Neg Hx     Parkinsonism Neg Hx     Colon cancer Neg Hx        Meds/Allergies       Current Outpatient Medications:     Ascorbic Acid (VITAMIN C) 1000 MG tablet    B Complex Vitamins (B COMPLEX 100 PO)    Biotin 2500 MCG CAPS    calcium carbonate (OS-JOHN) 1250 (500 Ca) MG tablet    cholecalciferol (VITAMIN D3) 1,000 units tablet    Echinacea 125 MG CAPS    fexofenadine (ALLEGRA) 180 MG tablet    Magnesium 200 MG TABS    Mirabegron ER 25 MG TB24    multivitamin (THERAGRAN) TABS    Omega-3 350 MG CPDR   polyethylene glycol (MIRALAX) 17 g packet    Potassium Gluconate 595 (99 K) MG TABS    Probiotic Product (PROBIOTIC-10) CAPS    psyllium (METAMUCIL) 58 6 % powder    methocarbamol (ROBAXIN) 500 mg tablet    pantoprazole (PROTONIX) 40 mg tablet    Allergies   Allergen Reactions    Caffeine GI Intolerance    Iodine Rash    Latex Rash    Other Rash     Adhesive tape             Objective     Blood pressure 140/86, pulse 86, temperature 98 2 °F (36 8 °C), temperature source Tympanic, weight 81 2 kg (179 lb), not currently breastfeeding  Body mass index is 32 74 kg/m²  PHYSICAL EXAM:      Physical Exam  Constitutional:       Appearance: Normal appearance  She is well-developed  HENT:      Head: Normocephalic and atraumatic  Eyes:      General: No scleral icterus  Conjunctiva/sclera: Conjunctivae normal       Pupils: Pupils are equal, round, and reactive to light  Neck:      Musculoskeletal: Normal range of motion  Cardiovascular:      Rate and Rhythm: Normal rate and regular rhythm  Heart sounds: Normal heart sounds  Pulmonary:      Effort: Pulmonary effort is normal  No respiratory distress  Breath sounds: Normal breath sounds  Abdominal:      General: Bowel sounds are normal  There is no distension  Palpations: Abdomen is soft  There is no mass  Tenderness: There is no abdominal tenderness  Hernia: No hernia is present  Musculoskeletal: Normal range of motion  Lymphadenopathy:      Cervical: No cervical adenopathy  Skin:     General: Skin is warm  Neurological:      Mental Status: She is alert and oriented to person, place, and time  Psychiatric:         Behavior: Behavior normal          Thought Content:  Thought content normal              Lab Results:     Lab Results   Component Value Date    WBC 6 92 07/22/2020    HGB 14 5 07/22/2020    HCT 43 8 07/22/2020    MCV 89 07/22/2020     07/22/2020       Lab Results   Component Value Date     (H) 10/03/2015    K 4 0 07/22/2020     07/22/2020    CO2 26 07/22/2020    ANIONGAP 10 10/03/2015    BUN 13 07/22/2020    CREATININE 0 83 07/22/2020    GLUCOSE 108 10/03/2015    GLUF 105 (H) 06/13/2019    CALCIUM 9 9 07/22/2020    AST 28 07/22/2020    ALT 42 07/22/2020    ALKPHOS 88 07/22/2020    PROT 6 9 10/03/2015    BILITOT 0 48 10/03/2015    EGFR 66 07/22/2020       Lab Results   Component Value Date    INR 0 97 02/21/2017    PROTIME 13 0 02/21/2017         Radiology Results:   Ct Abdomen Pelvis Wo Contrast    Result Date: 8/27/2020  Narrative: CT ABDOMEN AND PELVIS WITHOUT IV CONTRAST INDICATION:   R10 32: Left lower quadrant pain  Dr Manuelito Russell note from 7/28/2020 was reviewed  Patient is currently being evaluated for left lower quadrant abdominal pain  Patient has history of colon resection for diverticular disease more than 30 years ago, colonoscopy one year ago with normal anastomosis  COMPARISON:  None  TECHNIQUE:  CT examination of the abdomen and pelvis was performed without intravenous contrast   Axial, sagittal, and coronal 2D reformatted images were created from the source data and submitted for interpretation  Radiation dose length product (DLP) for this visit:  507 mGy-cm   This examination, like all CT scans performed in the [facility], was performed utilizing techniques to minimize radiation dose exposure, including the use of iterative reconstruction and automated exposure control  Enteric contrast was administered  FINDINGS: ABDOMEN LOWER CHEST:  No clinically significant abnormality identified in the visualized lower chest  LIVER/BILIARY TREE:  Moderate fatty liver change  GALLBLADDER:  No calcified gallstones  No pericholecystic inflammatory change  SPLEEN:  Unremarkable  PANCREAS:  There is a lobulated cystic lesion of the pancreatic tail measuring 4 4 x 4 4 x 4 8 cm  It probably contains internal septations    (Series 2 images 15 through 23 ) ADRENAL GLANDS: Unremarkable  KIDNEYS/URETERS:  Unremarkable  No hydronephrosis  STOMACH AND BOWEL:  There is sigmoid: bowel anastomotic sutures seen on series 2 images 55 through 59  The anastomosis is intact and widely patent  Rest of the bowel loops are unremarkable  APPENDIX:  No findings to suggest appendicitis  ABDOMINOPELVIC CAVITY:  No ascites  No pneumoperitoneum  No lymphadenopathy  VESSELS:  Unremarkable for patient's age  PELVIS REPRODUCTIVE ORGANS:  Unremarkable for patient's age  URINARY BLADDER:  Unremarkable  ABDOMINAL WALL/INGUINAL REGIONS:  Unremarkable  OSSEOUS STRUCTURES:  No acute fracture or destructive osseous lesion  Impression: There is a lobulated cystic lesion of the pancreatic tail measuring 4 4 x 4 4 x 4 8 cm  (Series 2 images 15 through 23 ) Recommended characterization with abdomen MRI and MRCP with and without IV contrast  No bowel pathology identified   Workstation performed: [id number]

## 2020-09-04 ENCOUNTER — OFFICE VISIT (OUTPATIENT)
Dept: PHYSICAL THERAPY | Facility: CLINIC | Age: 80
End: 2020-09-04
Payer: COMMERCIAL

## 2020-09-04 ENCOUNTER — TELEPHONE (OUTPATIENT)
Dept: GASTROENTEROLOGY | Facility: CLINIC | Age: 80
End: 2020-09-04

## 2020-09-04 DIAGNOSIS — M70.61 TROCHANTERIC BURSITIS, RIGHT HIP: Primary | ICD-10-CM

## 2020-09-04 PROCEDURE — 97110 THERAPEUTIC EXERCISES: CPT | Performed by: PHYSICAL THERAPIST

## 2020-09-04 PROCEDURE — 97530 THERAPEUTIC ACTIVITIES: CPT | Performed by: PHYSICAL THERAPIST

## 2020-09-04 NOTE — PROGRESS NOTES
Daily Note     Today's date: 2020  Patient name: Marga Lin  : 1940  MRN: [id number]  Referring provider: Lesvia Potts PA-C  Dx:   Encounter Diagnosis     ICD-10-CM    1  Trochanteric bursitis, right hip  M70 61                   Subjective: Patient reports feeling much better since her evaluation  Has very little discomfort in her leg  Objective: See treatment diary below      Assessment: Tolerated treatment well  Patient would benefit from continued PT  Pain feeling better  Fatigued after session  Provided WHEP  Plan: Continue per plan of care        Precautions: syncope, CA, Anxiety Disorder, claustrophobia, chest pain, palpitations      Manuals            Massage roller right piriformis JF JF           Right piriformis pin and stretch JF JF           Massage roller right ITB NV                         Neuro Re-Ed                                                                                                        Ther Ex             Nustep             Standing hip 3 ways  2x10 ea           Standing right ITB stretch  3x30"           Supine ITB stretch with strap             Supine right piriformis stretch 3x30" 3x30"           Bridges  3" 2x10                                     Ther Activity             Step-ups F/L  F today x20           Squats  2x10           Gait Training                                       Modalities             CP prn right piriformis/GT

## 2020-09-04 NOTE — TELEPHONE ENCOUNTER
Onelia Kim - patient called wanted to know if  Dr Linda White does surgery  Patient has a cyst on her  Pancreas   Please call Jade Carcamo at [phone] ty

## 2020-09-08 ENCOUNTER — TELEPHONE (OUTPATIENT)
Dept: GASTROENTEROLOGY | Facility: CLINIC | Age: 80
End: 2020-09-08

## 2020-09-08 ENCOUNTER — OFFICE VISIT (OUTPATIENT)
Dept: PHYSICAL THERAPY | Facility: CLINIC | Age: 80
End: 2020-09-08
Payer: COMMERCIAL

## 2020-09-08 DIAGNOSIS — M54.50 ACUTE RIGHT-SIDED LOW BACK PAIN WITHOUT SCIATICA: Primary | ICD-10-CM

## 2020-09-08 DIAGNOSIS — M70.61 TROCHANTERIC BURSITIS, RIGHT HIP: ICD-10-CM

## 2020-09-08 PROCEDURE — 97140 MANUAL THERAPY 1/> REGIONS: CPT

## 2020-09-08 PROCEDURE — 97530 THERAPEUTIC ACTIVITIES: CPT

## 2020-09-08 PROCEDURE — 97110 THERAPEUTIC EXERCISES: CPT

## 2020-09-08 NOTE — TELEPHONE ENCOUNTER
EUS scheduled on 10/13/20 with Dr Burks at [facility] gave Gio Bynum verbal instructions/mailed      OV scheduled on 12/11/20 with Dr Burks at [facility]

## 2020-09-08 NOTE — PROGRESS NOTES
Daily Note     Today's date: 2020  Patient name: Gagandeep Hernández  : 1940  MRN: [id number]  Referring provider: Johana Acosta PA-C  Dx:   Encounter Diagnosis     ICD-10-CM    1  Acute right-sided low back pain without sciatica  M54 5    2  Trochanteric bursitis, right hip  M70 61        Start Time: 1030  Stop Time: 1118  Total time in clinic (min): 48 minutes    Subjective: pt reported a little pain today but not much  Pt reported no soreness after last treatment session but did have some soreness on Saturday from walking a lot at home  Objective: See treatment diary below      Assessment:  Continued with treatment session  Minimal progressions noted  No p! With progressions, educated on DOMS  Tolerated treatment fair  Patient demonstrated fatigue post treatment, exhibited good technique with therapeutic exercises and would benefit from continued PT Pt does present with some soreness in IT band on the R        Plan: Continue per plan of care  progress as able  Precautions: syncope, CA, Anxiety Disorder, claustrophobia, chest pain, palpitations      Manuals           Massage roller right piriformis JF JF SC          Right piriformis pin and stretch JF JF SC          Massage roller right ITB NV  SC                       Neuro Re-Ed                                                                                                        Ther Ex             Nustep   10min L4           Standing hip 3 ways  2x10 ea 2x 10 ea             Standing right ITB stretch  3x30"           Supine ITB stretch with strap             Supine right piriformis stretch 3x30" 3x30" 4x 30"           Bridges  3" 2x10 3" 2x 10                                     Ther Activity             Step-ups F/L  F today x20 6" 2x 10 F and L           Squats  2x10 2x 10           Gait Training                                       Modalities             CP prn right piriformis/GT

## 2020-09-08 NOTE — TELEPHONE ENCOUNTER
----- Message from Aidee Mathews sent at 9/2/2020 10:15 AM EDT -----  Regarding: EUS  Per check out sheet patient is to have an EUS with Dr Rodolfo Perez  Please call Cait Thompson at [phone] to schedule  I tried to schedule the 3 month FU with Dr Rodolfo Perez but the patient insisted on waiting until the procedure is scheduled  Thank you!

## 2020-09-09 NOTE — TELEPHONE ENCOUNTER
Called advised pt as per Payton's recommendation    She informed that she is following up with Dr Gisell Gomez

## 2020-09-10 ENCOUNTER — OFFICE VISIT (OUTPATIENT)
Dept: PHYSICAL THERAPY | Facility: CLINIC | Age: 80
End: 2020-09-10
Payer: COMMERCIAL

## 2020-09-10 DIAGNOSIS — M54.50 ACUTE RIGHT-SIDED LOW BACK PAIN WITHOUT SCIATICA: Primary | ICD-10-CM

## 2020-09-10 DIAGNOSIS — M70.61 TROCHANTERIC BURSITIS, RIGHT HIP: ICD-10-CM

## 2020-09-10 PROCEDURE — 97140 MANUAL THERAPY 1/> REGIONS: CPT | Performed by: PHYSICAL THERAPIST

## 2020-09-10 PROCEDURE — 97110 THERAPEUTIC EXERCISES: CPT | Performed by: PHYSICAL THERAPIST

## 2020-09-10 NOTE — PROGRESS NOTES
Daily Note     Today's date: 9/10/2020  Patient name: Luisana Rater  : 1940  MRN: [id number]  Referring provider: Haider Gonzalez PA-C  Dx:   Encounter Diagnosis     ICD-10-CM    1  Acute right-sided low back pain without sciatica  M54 5    2  Trochanteric bursitis, right hip  M70 61                   Subjective: Patient states she has very minimal soreness when first OOB in the AM   Uses a CP for a little and is pain-free the rest of the day  Reports daily compliance with HEP  Objective: See treatment diary below      Assessment: Tolerated treatment well  Patient would benefit from continued PT  Good technique with TE today  Provided updated HEP  Plan: Continue per plan of care  Precautions: syncope, CA, Anxiety Disorder, claustrophobia, chest pain, palpitations      Manuals 9/1 9/4 9/8 9/10         Massage roller right piriformis JF JF SC JF         Right piriformis pin and stretch JF JF SC JF         Massage roller right ITB NV  SC JF                      Neuro Re-Ed                                                                                                        Ther Ex             Nustep   10min L4  L4x10'         Standing hip 3 ways  2x10 ea 2x 10 ea    2x15 ea         Standing right ITB stretch  3x30"           Supine ITB stretch with strap             Supine right piriformis stretch 3x30" 3x30" 4x 30"  4x 30"          Bridges  3" 2x10 3" 2x 10  3"x30                                   Ther Activity             Step-ups F/L  F today x20 6" 2x 10 F and L  6" 2x 10 F and L          Squats  2x10 2x 10  2x 10          Gait Training                                       Modalities             CP prn right piriformis/GT

## 2020-09-17 ENCOUNTER — OFFICE VISIT (OUTPATIENT)
Dept: PHYSICAL THERAPY | Facility: CLINIC | Age: 80
End: 2020-09-17
Payer: COMMERCIAL

## 2020-09-17 DIAGNOSIS — M70.61 TROCHANTERIC BURSITIS, RIGHT HIP: ICD-10-CM

## 2020-09-17 DIAGNOSIS — M54.50 ACUTE RIGHT-SIDED LOW BACK PAIN WITHOUT SCIATICA: Primary | ICD-10-CM

## 2020-09-17 PROCEDURE — 97110 THERAPEUTIC EXERCISES: CPT | Performed by: PHYSICAL THERAPIST

## 2020-09-17 PROCEDURE — 97140 MANUAL THERAPY 1/> REGIONS: CPT | Performed by: PHYSICAL THERAPIST

## 2020-09-17 NOTE — PROGRESS NOTES
Daily Note     Today's date: 2020  Patient name: Cecily Morris  : 1940  MRN: [id number]  Referring provider: Leticia Alfaro PA-C  Dx:   Encounter Diagnosis     ICD-10-CM    1  Acute right-sided low back pain without sciatica  M54 5    2  Trochanteric bursitis, right hip  M70 61                   Subjective: Feeling very good and not having any pain today  Objective: See treatment diary below      Assessment: Tolerated treatment well  Patient would benefit from continued PTGood technique with all TE today  No pain  Patient feels she is ready to DC next sessoin  Plan: Progress note during next visit  Potential discharge next visit  Precautions: syncope, CA, Anxiety Disorder, claustrophobia, chest pain, palpitations      Manuals 9/1 9/4 9/8 9/10 9/17        Massage roller right piriformis JF JF SC JF JF        Right piriformis pin and stretch JF JF SC JF JF        Massage roller right ITB NV  SC JF JF                     Neuro Re-Ed                                                                                                        Ther Ex             Nustep   10min L4  L4x10' L4x10'        Standing hip 3 ways  2x10 ea 2x 10 ea    2x15 ea         Standing right ITB stretch  3x30"           Supine ITB stretch with strap             Supine right piriformis stretch 3x30" 3x30" 4x 30"  4x 30"  4x 30"         Bridges  3" 2x10 3" 2x 10  3"x30 3"x30        Supine t-band hip ER     Red 2x10                     Ther Activity             Step-ups F/L  F today x20 6" 2x 10 F and L  6" 2x 10 F and L          Squats  2x10 2x 10  2x 10          Gait Training                                       Modalities             CP prn right piriformis/GT

## 2020-09-22 ENCOUNTER — OFFICE VISIT (OUTPATIENT)
Dept: PHYSICAL THERAPY | Facility: CLINIC | Age: 80
End: 2020-09-22
Payer: COMMERCIAL

## 2020-09-22 DIAGNOSIS — M54.50 ACUTE RIGHT-SIDED LOW BACK PAIN WITHOUT SCIATICA: Primary | ICD-10-CM

## 2020-09-22 DIAGNOSIS — M70.61 TROCHANTERIC BURSITIS, RIGHT HIP: ICD-10-CM

## 2020-09-22 PROCEDURE — 97140 MANUAL THERAPY 1/> REGIONS: CPT | Performed by: PHYSICAL THERAPIST

## 2020-09-22 PROCEDURE — 97110 THERAPEUTIC EXERCISES: CPT | Performed by: PHYSICAL THERAPIST

## 2020-09-22 NOTE — PROGRESS NOTES
PT Discharge    Today's date: 2020  Patient name: Jumana Rajput  : 1940  MRN: [id number]  Referring provider: Adrianne Reid PA-C  Dx:   Encounter Diagnosis     ICD-10-CM    1  Acute right-sided low back pain without sciatica  M54 5    2  Trochanteric bursitis, right hip  M70 61        Start Time: 6198  Stop Time: 1533  Total time in clinic (min): 38 minutes    Assessment  Assessment details: Patient reports feeling 90% improved since starting PT services  She no longer has pain and has resumed all of her previous activities without limitations  She is also able to ambulate community distances without pain  She can negotiate stairs reciprocally and denies sleep disturbances related to pain  She has normal ROM and strength and is independent and compliant with a HEP  She has met all goals and requires no further skilled PT services at this time  Functional limitations: Pain with sit to stand transfers and prolonged walking  Understanding of Dx/Px/POC: good   Prognosis: good    Goals  STG (4 weeks)  1  Patient will report pain as a 2-3/10 at worst with sit to stand transfers - met  2  Patient will stand and walk without increased pain- met  LTG (8 weeks)  1  Patient will report pain as a 0-1/10 with normal activities - met  2  Patient will be independent and compliant with a HEP in order to maintain gains made with skilled PT services  - met    Plan  Planned therapy interventions: patient education  Plan of Care beginning date: 2020  Plan of Care expiration date: 10/27/2020  Treatment plan discussed with: patient        Subjective Evaluation    History of Present Illness  Mechanism of injury: Patient feels 90% improved since starting PT services  No longer has pain  Has resumed all of her normal activities  Is independent and compliant with her HEP and feels she is ready for DC to a HEP at this time    Has also noticed improved strength in her right UE    Quality of life: fair    Pain  Current pain ratin  At best pain ratin  At worst pain ratin  Relieving factors: ice  Progression: improved    Social Support  Steps to enter house: yes (bilateral HR )  5  Lives in: [place] house  Lives with: alone    Employment status: not working    Diagnostic Tests  No diagnostic tests performed    FCE comments: Able to negotiate stairs reciprocally  No functional limitations  Does cooking and laundry  Has someone clean normally  Still performs all normal ADL's  Treatments  Previous treatment: medication and chiropractic (accupuncture)  Current treatment: chiropractic  Current treatment comments: accupuncture  Patient Goals  Patient goals for therapy: decreased pain and independence with ADLs/IADLs          Objective     Concurrent Complaints  Positive for history of cancer  Negative for night pain, disturbed sleep, bladder dysfunction, bowel dysfunction, saddle (S4) numbness and history of trauma    Postural Observations  Seated posture: fair  Standing posture: fair  Correction of posture: has no consistent effect        Palpation   Left   No palpable tenderness to the lumbar paraspinals and quadratus lumborum  Right   No palpable tenderness to the lumbar paraspinals and quadratus lumborum  Tenderness of the piriformis  Tenderness     Lumbar Spine  No tenderness in the spinous process, facet joint, pedicle and right transverse process  Left Hip   No tenderness in the ASIS, PSIS and sacroiliac joint  Right Hip   No tenderness in the ASIS, PSIS and sacroiliac joint       Neurological Testing     Sensation     Lumbar   Left   Intact: light touch    Right   Intact: light touch    Reflexes   Left   Patellar (L4): normal (2+)  Achilles (S1): normal (2+)    Right   Patellar (L4): normal (2+)  Achilles (S1): normal (2+)    Active Range of Motion     Lumbar   Normal active range of motion  Left Hip   Normal active range of motion    Right Hip   Normal active range of motion    Joint Play   Joints within functional limits: L1, L2, L3, L4 and L5     Strength/Myotome Testing     Left Hip   Planes of Motion   Flexion: 4+  Extension: 4+  Abduction: 4+    Right Hip   Planes of Motion   Flexion: 4+  Extension: 4+  Abduction: 4+    Tests     Lumbar   Negative SIJ compression, sacroiliac distraction, sacral thrust  and sacral spring   Left   Negative passive SLR, quadrant and slump test      Right   Negative passive SLR, quadrant and slump test      Left Hip   Negative NORA, FADIR, long sit, SI compression and SI distraction  Right Hip   Positive piriformis  Negative NORA, FADIR and long sit  Flowsheet Rows      Most Recent Value   PT/OT G-Codes   Current Score  67   Projected Score  60        /     Precautions: syncope, CA, Anxiety Disorder, claustrophobia, chest pain, palpitations      Manuals 9/1 9/4 9/8 9/10 9/17 9/22       Massage roller right piriformis JF JF SC JF JF JF       Right piriformis pin and stretch JF JF SC JF JF        Massage roller right ITB NV  SC JF JF                     Neuro Re-Ed                                                                                                        Ther Ex             Nustep   10min L4   L4x10' L4x10'       Standing hip 3 ways  2x10 ea 2x 10 ea    2x15 ea         Standing right ITB stretch  3x30"           Supine ITB stretch with strap             Supine right piriformis stretch 3x30" 3x30" 4x 30"  4x 30"  4x 30"  Review       Bridges  3" 2x10 3" 2x 10  3"x30 3"x30 Review       Supine t-band hip ER     Red 2x10 Review                    Ther Activity             Step-ups F/L  F today x20 6" 2x 10 F and L  6" 2x 10 F and L          Squats  2x10 2x 10  2x 10          Gait Training                                       Modalities             CP prn right piriformis/GT

## 2020-09-28 ENCOUNTER — TELEPHONE (OUTPATIENT)
Dept: UROLOGY | Facility: MEDICAL CENTER | Age: 80
End: 2020-09-28

## 2020-09-28 DIAGNOSIS — N39.41 URGE INCONTINENCE: Primary | ICD-10-CM

## 2020-09-28 NOTE — TELEPHONE ENCOUNTER
Called and spoke to patient, who stated the 25 mg of Myrbetriq was not managing her symptoms   She states she is having "twice the number of accidents," and is requesting to go back to the 50 mg of Myrbetriq

## 2020-09-28 NOTE — TELEPHONE ENCOUNTER
Pt had seen both Ramiro Pickett [address] [place] [address] TIFFANIE Moreira, pt is calling in regards to Myrbetriq she states she was given 50 mg by Louise Gregg which worked well and most recent Kimberly Devine prescribed 25 mg which is not helping please contact pt

## 2020-09-28 NOTE — TELEPHONE ENCOUNTER
Called and spoke to patient, and informed her a prescription for 50 mg f Myrbetriq has been sent to her pharmacy, and that she can take her 25 mg of Myrbetriq BID till it runs out  Patient verbalized understanding, and denies further questions

## 2020-10-06 ENCOUNTER — OFFICE VISIT (OUTPATIENT)
Dept: FAMILY MEDICINE CLINIC | Facility: CLINIC | Age: 80
End: 2020-10-06
Payer: COMMERCIAL

## 2020-10-06 VITALS
OXYGEN SATURATION: 97 % | WEIGHT: 182.2 LBS | HEART RATE: 104 BPM | SYSTOLIC BLOOD PRESSURE: 130 MMHG | BODY MASS INDEX: 33.53 KG/M2 | HEIGHT: 62 IN | DIASTOLIC BLOOD PRESSURE: 84 MMHG | TEMPERATURE: 97.5 F

## 2020-10-06 DIAGNOSIS — G20 PARKINSON'S DISEASE (HCC): ICD-10-CM

## 2020-10-06 DIAGNOSIS — K86.2 PANCREATIC CYST: Primary | ICD-10-CM

## 2020-10-06 PROBLEM — R79.89 ABNORMAL THYROID BLOOD TEST: Status: RESOLVED | Noted: 2019-03-14 | Resolved: 2020-10-06

## 2020-10-06 PROBLEM — J31.0 CHRONIC RHINITIS: Status: RESOLVED | Noted: 2018-08-28 | Resolved: 2020-10-06

## 2020-10-06 PROBLEM — R53.83 FATIGUE: Status: RESOLVED | Noted: 2018-03-26 | Resolved: 2020-10-06

## 2020-10-06 PROBLEM — E28.39 MENOPAUSE OVARIAN FAILURE: Status: RESOLVED | Noted: 2019-02-06 | Resolved: 2020-10-06

## 2020-10-06 PROBLEM — M54.50 ACUTE RIGHT-SIDED LOW BACK PAIN WITHOUT SCIATICA: Status: RESOLVED | Noted: 2020-08-24 | Resolved: 2020-10-06

## 2020-10-06 PROBLEM — F41.9 ANXIETY: Status: RESOLVED | Noted: 2018-12-12 | Resolved: 2020-10-06

## 2020-10-06 PROBLEM — R25.1 TREMORS OF NERVOUS SYSTEM: Status: RESOLVED | Noted: 2020-07-22 | Resolved: 2020-10-06

## 2020-10-06 PROCEDURE — 1036F TOBACCO NON-USER: CPT | Performed by: FAMILY MEDICINE

## 2020-10-06 PROCEDURE — 1160F RVW MEDS BY RX/DR IN RCRD: CPT | Performed by: FAMILY MEDICINE

## 2020-10-06 PROCEDURE — 99213 OFFICE O/P EST LOW 20 MIN: CPT | Performed by: FAMILY MEDICINE

## 2020-10-12 ENCOUNTER — ANESTHESIA EVENT (OUTPATIENT)
Dept: GASTROENTEROLOGY | Facility: HOSPITAL | Age: 80
End: 2020-10-12

## 2020-10-12 RX ORDER — SODIUM CHLORIDE 9 MG/ML
125 INJECTION, SOLUTION INTRAVENOUS CONTINUOUS
Status: CANCELLED | OUTPATIENT
Start: 2020-10-12

## 2020-10-13 ENCOUNTER — ANESTHESIA (OUTPATIENT)
Dept: GASTROENTEROLOGY | Facility: HOSPITAL | Age: 80
End: 2020-10-13

## 2020-10-13 ENCOUNTER — HOSPITAL ENCOUNTER (OUTPATIENT)
Dept: GASTROENTEROLOGY | Facility: HOSPITAL | Age: 80
Setting detail: OUTPATIENT SURGERY
Discharge: HOME | End: 2020-10-13
Attending: INTERNAL MEDICINE | Admitting: INTERNAL MEDICINE
Payer: COMMERCIAL

## 2020-10-13 VITALS — HEART RATE: 64 BPM

## 2020-10-13 VITALS
WEIGHT: 178 LBS | TEMPERATURE: 96.8 F | DIASTOLIC BLOOD PRESSURE: 58 MMHG | OXYGEN SATURATION: 93 % | HEART RATE: 76 BPM | RESPIRATION RATE: 18 BRPM | HEIGHT: 62 IN | BODY MASS INDEX: 32.76 KG/M2 | SYSTOLIC BLOOD PRESSURE: 107 MMHG

## 2020-10-13 DIAGNOSIS — K86.2 PANCREATIC CYST: ICD-10-CM

## 2020-10-13 PROCEDURE — 88112 CYTOPATH CELL ENHANCE TECH: CPT | Performed by: PATHOLOGY

## 2020-10-13 PROCEDURE — 88342 IMHCHEM/IMCYTCHM 1ST ANTB: CPT | Performed by: PATHOLOGY

## 2020-10-13 PROCEDURE — 88341 IMHCHEM/IMCYTCHM EA ADD ANTB: CPT | Performed by: PATHOLOGY

## 2020-10-13 PROCEDURE — 88305 TISSUE EXAM BY PATHOLOGIST: CPT | Performed by: PATHOLOGY

## 2020-10-13 PROCEDURE — 43239 EGD BIOPSY SINGLE/MULTIPLE: CPT | Performed by: INTERNAL MEDICINE

## 2020-10-13 PROCEDURE — 43238 EGD US FINE NEEDLE BX/ASPIR: CPT | Performed by: INTERNAL MEDICINE

## 2020-10-13 RX ORDER — LIDOCAINE HYDROCHLORIDE 10 MG/ML
INJECTION, SOLUTION EPIDURAL; INFILTRATION; INTRACAUDAL; PERINEURAL AS NEEDED
Status: DISCONTINUED | OUTPATIENT
Start: 2020-10-13 | End: 2020-10-13

## 2020-10-13 RX ORDER — PROPOFOL 10 MG/ML
INJECTION, EMULSION INTRAVENOUS CONTINUOUS PRN
Status: DISCONTINUED | OUTPATIENT
Start: 2020-10-13 | End: 2020-10-13

## 2020-10-13 RX ORDER — PROPOFOL 10 MG/ML
INJECTION, EMULSION INTRAVENOUS AS NEEDED
Status: DISCONTINUED | OUTPATIENT
Start: 2020-10-13 | End: 2020-10-13

## 2020-10-13 RX ORDER — SODIUM CHLORIDE 9 MG/ML
INJECTION, SOLUTION INTRAVENOUS CONTINUOUS PRN
Status: DISCONTINUED | OUTPATIENT
Start: 2020-10-13 | End: 2020-10-13

## 2020-10-13 RX ORDER — CIPROFLOXACIN 2 MG/ML
INJECTION, SOLUTION INTRAVENOUS CONTINUOUS PRN
Status: DISCONTINUED | OUTPATIENT
Start: 2020-10-13 | End: 2020-10-13

## 2020-10-13 RX ORDER — ONDANSETRON 2 MG/ML
INJECTION INTRAMUSCULAR; INTRAVENOUS AS NEEDED
Status: DISCONTINUED | OUTPATIENT
Start: 2020-10-13 | End: 2020-10-13

## 2020-10-13 RX ORDER — FENTANYL CITRATE 50 UG/ML
INJECTION, SOLUTION INTRAMUSCULAR; INTRAVENOUS AS NEEDED
Status: DISCONTINUED | OUTPATIENT
Start: 2020-10-13 | End: 2020-10-13

## 2020-10-13 RX ORDER — CIPROFLOXACIN 500 MG/1
500 TABLET, FILM COATED ORAL EVERY 12 HOURS SCHEDULED
Qty: 6 TABLET | Refills: 0 | Status: SHIPPED | OUTPATIENT
Start: 2020-10-13 | End: 2020-10-16

## 2020-10-13 RX ADMIN — FENTANYL CITRATE 25 MCG: 50 INJECTION, SOLUTION INTRAMUSCULAR; INTRAVENOUS at 09:48

## 2020-10-13 RX ADMIN — PROPOFOL 100 MCG/KG/MIN: 10 INJECTION, EMULSION INTRAVENOUS at 09:11

## 2020-10-13 RX ADMIN — CIPROFLOXACIN: 2 INJECTION, SOLUTION INTRAVENOUS at 09:15

## 2020-10-13 RX ADMIN — PROPOFOL 40 MG: 10 INJECTION, EMULSION INTRAVENOUS at 09:45

## 2020-10-13 RX ADMIN — FENTANYL CITRATE 25 MCG: 50 INJECTION, SOLUTION INTRAMUSCULAR; INTRAVENOUS at 09:45

## 2020-10-13 RX ADMIN — PROPOFOL 70 MG: 10 INJECTION, EMULSION INTRAVENOUS at 09:11

## 2020-10-13 RX ADMIN — FENTANYL CITRATE 25 MCG: 50 INJECTION, SOLUTION INTRAMUSCULAR; INTRAVENOUS at 09:18

## 2020-10-13 RX ADMIN — SODIUM CHLORIDE: 0.9 INJECTION, SOLUTION INTRAVENOUS at 09:03

## 2020-10-13 RX ADMIN — FENTANYL CITRATE 25 MCG: 50 INJECTION, SOLUTION INTRAMUSCULAR; INTRAVENOUS at 09:11

## 2020-10-13 RX ADMIN — LIDOCAINE HYDROCHLORIDE 50 MG: 10 INJECTION, SOLUTION EPIDURAL; INFILTRATION; INTRACAUDAL; PERINEURAL at 09:11

## 2020-10-13 RX ADMIN — ONDANSETRON 4 MG: 2 INJECTION INTRAMUSCULAR; INTRAVENOUS at 09:19

## 2020-10-21 ENCOUNTER — TELEPHONE (OUTPATIENT)
Dept: GASTROENTEROLOGY | Facility: AMBULARY SURGERY CENTER | Age: 80
End: 2020-10-21

## 2020-10-24 DIAGNOSIS — K86.2 PANCREATIC CYST: Primary | ICD-10-CM

## 2020-10-24 DIAGNOSIS — K29.00 OTHER ACUTE GASTRITIS WITHOUT HEMORRHAGE: ICD-10-CM

## 2020-10-26 ENCOUNTER — TELEPHONE (OUTPATIENT)
Dept: GASTROENTEROLOGY | Facility: MEDICAL CENTER | Age: 80
End: 2020-10-26

## 2020-11-03 ENCOUNTER — LAB (OUTPATIENT)
Dept: LAB | Facility: CLINIC | Age: 80
End: 2020-11-03
Payer: COMMERCIAL

## 2020-11-03 DIAGNOSIS — K29.00 OTHER ACUTE GASTRITIS WITHOUT HEMORRHAGE: ICD-10-CM

## 2020-11-03 PROCEDURE — 87338 HPYLORI STOOL AG IA: CPT

## 2020-11-05 LAB — H PYLORI AG STL QL IA: POSITIVE

## 2020-11-06 ENCOUNTER — TELEPHONE (OUTPATIENT)
Dept: GASTROENTEROLOGY | Facility: AMBULARY SURGERY CENTER | Age: 80
End: 2020-11-06

## 2020-11-12 ENCOUNTER — TELEPHONE (OUTPATIENT)
Dept: GASTROENTEROLOGY | Facility: AMBULARY SURGERY CENTER | Age: 80
End: 2020-11-12

## 2020-11-12 DIAGNOSIS — K29.70 HELICOBACTER POSITIVE GASTRITIS: Primary | ICD-10-CM

## 2020-11-12 DIAGNOSIS — A04.8 H. PYLORI INFECTION: Primary | ICD-10-CM

## 2020-11-12 DIAGNOSIS — B96.81 HELICOBACTER POSITIVE GASTRITIS: Primary | ICD-10-CM

## 2020-11-12 DIAGNOSIS — R13.10 DYSPHAGIA, UNSPECIFIED TYPE: ICD-10-CM

## 2020-11-12 DIAGNOSIS — K86.2 PANCREATIC CYST: ICD-10-CM

## 2020-11-12 RX ORDER — PANTOPRAZOLE SODIUM 40 MG/1
40 TABLET, DELAYED RELEASE ORAL 2 TIMES DAILY
Qty: 28 TABLET | Refills: 0 | Status: SHIPPED | OUTPATIENT
Start: 2020-11-12 | End: 2020-11-29 | Stop reason: HOSPADM

## 2020-11-12 RX ORDER — AMOXICILLIN 500 MG/1
1000 TABLET, FILM COATED ORAL 2 TIMES DAILY
Qty: 56 TABLET | Refills: 0 | Status: SHIPPED | OUTPATIENT
Start: 2020-11-12 | End: 2020-11-29 | Stop reason: HOSPADM

## 2020-11-12 RX ORDER — CLARITHROMYCIN 500 MG/1
500 TABLET, COATED ORAL EVERY 12 HOURS SCHEDULED
Qty: 28 TABLET | Refills: 0 | Status: SHIPPED | OUTPATIENT
Start: 2020-11-12 | End: 2020-11-23

## 2020-11-18 ENCOUNTER — TELEPHONE (OUTPATIENT)
Dept: NEUROLOGY | Facility: CLINIC | Age: 80
End: 2020-11-18

## 2020-11-19 ENCOUNTER — OFFICE VISIT (OUTPATIENT)
Dept: FAMILY MEDICINE CLINIC | Facility: CLINIC | Age: 80
End: 2020-11-19
Payer: COMMERCIAL

## 2020-11-19 ENCOUNTER — VBI (OUTPATIENT)
Dept: ADMINISTRATIVE | Facility: OTHER | Age: 80
End: 2020-11-19

## 2020-11-19 VITALS
HEART RATE: 74 BPM | BODY MASS INDEX: 33.57 KG/M2 | SYSTOLIC BLOOD PRESSURE: 136 MMHG | OXYGEN SATURATION: 94 % | TEMPERATURE: 97.2 F | HEIGHT: 62 IN | WEIGHT: 182.4 LBS | DIASTOLIC BLOOD PRESSURE: 78 MMHG | RESPIRATION RATE: 18 BRPM

## 2020-11-19 DIAGNOSIS — M54.50 ACUTE MIDLINE LOW BACK PAIN WITHOUT SCIATICA: ICD-10-CM

## 2020-11-19 PROCEDURE — 1036F TOBACCO NON-USER: CPT | Performed by: FAMILY MEDICINE

## 2020-11-19 PROCEDURE — 1160F RVW MEDS BY RX/DR IN RCRD: CPT | Performed by: FAMILY MEDICINE

## 2020-11-19 PROCEDURE — 99213 OFFICE O/P EST LOW 20 MIN: CPT | Performed by: FAMILY MEDICINE

## 2020-11-19 RX ORDER — IBUPROFEN 600 MG/1
600 TABLET ORAL EVERY 8 HOURS PRN
Qty: 30 TABLET | Refills: 2 | Status: SHIPPED | OUTPATIENT
Start: 2020-11-19 | End: 2021-05-12 | Stop reason: ALTCHOICE

## 2020-11-20 ENCOUNTER — TELEPHONE (OUTPATIENT)
Dept: FAMILY MEDICINE CLINIC | Facility: CLINIC | Age: 80
End: 2020-11-20

## 2020-11-20 DIAGNOSIS — M54.50 ACUTE MIDLINE LOW BACK PAIN WITHOUT SCIATICA: Primary | ICD-10-CM

## 2020-11-20 RX ORDER — TRAMADOL HYDROCHLORIDE 50 MG/1
50 TABLET ORAL EVERY 8 HOURS PRN
Qty: 30 TABLET | Refills: 0 | Status: SHIPPED | OUTPATIENT
Start: 2020-11-20 | End: 2020-12-08 | Stop reason: ALTCHOICE

## 2020-11-23 ENCOUNTER — TELEPHONE (OUTPATIENT)
Dept: FAMILY MEDICINE CLINIC | Facility: CLINIC | Age: 80
End: 2020-11-23

## 2020-11-23 DIAGNOSIS — R11.0 NAUSEA: Primary | ICD-10-CM

## 2020-11-23 PROBLEM — D18.01 CHERRY ANGIOMA: Status: ACTIVE | Noted: 2017-11-15

## 2020-11-23 PROBLEM — Z13.220 NEED FOR LIPID SCREENING: Status: RESOLVED | Noted: 2019-02-06 | Resolved: 2020-11-23

## 2020-11-23 PROBLEM — M54.50 ACUTE MIDLINE LOW BACK PAIN WITHOUT SCIATICA: Status: RESOLVED | Noted: 2020-11-19 | Resolved: 2020-11-23

## 2020-11-23 PROBLEM — Z13.89 SCREENING FOR SKIN CONDITION: Status: RESOLVED | Noted: 2018-04-04 | Resolved: 2020-11-23

## 2020-11-23 PROBLEM — G47.9 SLEEP DISTURBANCE: Status: RESOLVED | Noted: 2018-07-17 | Resolved: 2020-11-23

## 2020-11-23 PROBLEM — Z00.00 MEDICARE ANNUAL WELLNESS VISIT, SUBSEQUENT: Status: RESOLVED | Noted: 2020-07-22 | Resolved: 2020-11-23

## 2020-11-23 PROBLEM — R10.32 LLQ PAIN: Status: RESOLVED | Noted: 2019-07-22 | Resolved: 2020-11-23

## 2020-11-23 PROBLEM — G47.30 SLEEP-RELATED BREATHING DISORDER: Status: RESOLVED | Noted: 2018-08-28 | Resolved: 2020-11-23

## 2020-11-23 RX ORDER — MECLIZINE HYDROCHLORIDE 25 MG/1
25 TABLET ORAL EVERY 8 HOURS PRN
Qty: 30 TABLET | Refills: 1 | Status: SHIPPED | OUTPATIENT
Start: 2020-11-23 | End: 2021-05-12 | Stop reason: ALTCHOICE

## 2020-11-23 RX ORDER — ONDANSETRON 4 MG/1
4 TABLET, FILM COATED ORAL EVERY 8 HOURS PRN
Qty: 20 TABLET | Refills: 2 | Status: SHIPPED | OUTPATIENT
Start: 2020-11-23 | End: 2021-05-12 | Stop reason: ALTCHOICE

## 2020-11-24 ENCOUNTER — APPOINTMENT (EMERGENCY)
Dept: CT IMAGING | Facility: HOSPITAL | Age: 80
DRG: 057 | End: 2020-11-24
Payer: COMMERCIAL

## 2020-11-24 ENCOUNTER — HOSPITAL ENCOUNTER (INPATIENT)
Facility: HOSPITAL | Age: 80
LOS: 5 days | Discharge: HOME HEALTH | DRG: 057 | End: 2020-11-29
Attending: EMERGENCY MEDICINE | Admitting: INTERNAL MEDICINE
Payer: COMMERCIAL

## 2020-11-24 DIAGNOSIS — M54.9 BACK PAIN: ICD-10-CM

## 2020-11-24 DIAGNOSIS — S32.010A CLOSED COMPRESSION FRACTURE OF BODY OF L1 VERTEBRA (HCC): ICD-10-CM

## 2020-11-24 DIAGNOSIS — G20 PARKINSON'S DISEASE (HCC): ICD-10-CM

## 2020-11-24 DIAGNOSIS — I67.9 CEREBROVASCULAR DISEASE: ICD-10-CM

## 2020-11-24 DIAGNOSIS — R25.1 TREMOR: ICD-10-CM

## 2020-11-24 DIAGNOSIS — R42 DIZZINESS: Primary | ICD-10-CM

## 2020-11-24 PROBLEM — A04.8 H. PYLORI INFECTION: Status: ACTIVE | Noted: 2020-11-24

## 2020-11-24 PROBLEM — R51.9 HEADACHE: Status: ACTIVE | Noted: 2020-11-24

## 2020-11-24 LAB
ALBUMIN SERPL BCP-MCNC: 4.2 G/DL (ref 3.5–5)
ALP SERPL-CCNC: 109 U/L (ref 46–116)
ALT SERPL W P-5'-P-CCNC: 41 U/L (ref 12–78)
ANION GAP SERPL CALCULATED.3IONS-SCNC: 11 MMOL/L (ref 4–13)
AST SERPL W P-5'-P-CCNC: 27 U/L (ref 5–45)
ATRIAL RATE: 86 BPM
BACTERIA UR QL AUTO: NORMAL /HPF
BASOPHILS # BLD AUTO: 0.02 THOUSANDS/ΜL (ref 0–0.1)
BASOPHILS NFR BLD AUTO: 0 % (ref 0–1)
BILIRUB DIRECT SERPL-MCNC: 0.14 MG/DL (ref 0–0.2)
BILIRUB SERPL-MCNC: 0.6 MG/DL (ref 0.2–1)
BILIRUB UR QL STRIP: NEGATIVE
BUN SERPL-MCNC: 18 MG/DL (ref 5–25)
CALCIUM SERPL-MCNC: 9.1 MG/DL (ref 8.3–10.1)
CHLORIDE SERPL-SCNC: 104 MMOL/L (ref 100–108)
CLARITY UR: CLEAR
CO2 SERPL-SCNC: 27 MMOL/L (ref 21–32)
COLOR UR: YELLOW
CREAT SERPL-MCNC: 0.92 MG/DL (ref 0.6–1.3)
CRP SERPL QL: 32.6 MG/L
EOSINOPHIL # BLD AUTO: 0.08 THOUSAND/ΜL (ref 0–0.61)
EOSINOPHIL NFR BLD AUTO: 1 % (ref 0–6)
ERYTHROCYTE [DISTWIDTH] IN BLOOD BY AUTOMATED COUNT: 13 % (ref 11.6–15.1)
ERYTHROCYTE [SEDIMENTATION RATE] IN BLOOD: 19 MM/HOUR (ref 0–29)
FLUAV RNA RESP QL NAA+PROBE: NEGATIVE
FLUBV RNA RESP QL NAA+PROBE: NEGATIVE
GFR SERPL CREATININE-BSD FRML MDRD: 59 ML/MIN/1.73SQ M
GLUCOSE SERPL-MCNC: 200 MG/DL (ref 65–140)
GLUCOSE UR STRIP-MCNC: NEGATIVE MG/DL
HCT VFR BLD AUTO: 42.7 % (ref 34.8–46.1)
HGB BLD-MCNC: 14 G/DL (ref 11.5–15.4)
HGB UR QL STRIP.AUTO: NEGATIVE
IMM GRANULOCYTES # BLD AUTO: 0.03 THOUSAND/UL (ref 0–0.2)
IMM GRANULOCYTES NFR BLD AUTO: 0 % (ref 0–2)
INR PPP: 1.04 (ref 0.84–1.19)
KETONES UR STRIP-MCNC: ABNORMAL MG/DL
LEUKOCYTE ESTERASE UR QL STRIP: ABNORMAL
LYMPHOCYTES # BLD AUTO: 0.79 THOUSANDS/ΜL (ref 0.6–4.47)
LYMPHOCYTES NFR BLD AUTO: 10 % (ref 14–44)
MCH RBC QN AUTO: 29.2 PG (ref 26.8–34.3)
MCHC RBC AUTO-ENTMCNC: 32.8 G/DL (ref 31.4–37.4)
MCV RBC AUTO: 89 FL (ref 82–98)
MONOCYTES # BLD AUTO: 0.5 THOUSAND/ΜL (ref 0.17–1.22)
MONOCYTES NFR BLD AUTO: 6 % (ref 4–12)
NEUTROPHILS # BLD AUTO: 6.54 THOUSANDS/ΜL (ref 1.85–7.62)
NEUTS SEG NFR BLD AUTO: 83 % (ref 43–75)
NITRITE UR QL STRIP: POSITIVE
NON-SQ EPI CELLS URNS QL MICRO: NORMAL /HPF
NRBC BLD AUTO-RTO: 0 /100 WBCS
P AXIS: 39 DEGREES
PH UR STRIP.AUTO: 7 [PH]
PLATELET # BLD AUTO: 217 THOUSANDS/UL (ref 149–390)
PMV BLD AUTO: 8.9 FL (ref 8.9–12.7)
POTASSIUM SERPL-SCNC: 3.4 MMOL/L (ref 3.5–5.3)
PR INTERVAL: 168 MS
PROT SERPL-MCNC: 7.5 G/DL (ref 6.4–8.2)
PROT UR STRIP-MCNC: NEGATIVE MG/DL
PROTHROMBIN TIME: 13.8 SECONDS (ref 11.6–14.5)
QRS AXIS: 13 DEGREES
QRSD INTERVAL: 82 MS
QT INTERVAL: 374 MS
QTC INTERVAL: 447 MS
RBC # BLD AUTO: 4.8 MILLION/UL (ref 3.81–5.12)
RBC #/AREA URNS AUTO: NORMAL /HPF
RSV RNA RESP QL NAA+PROBE: NEGATIVE
SARS-COV-2 RNA RESP QL NAA+PROBE: NEGATIVE
SODIUM SERPL-SCNC: 142 MMOL/L (ref 136–145)
SP GR UR STRIP.AUTO: 1.02 (ref 1–1.03)
T WAVE AXIS: 55 DEGREES
TROPONIN I SERPL-MCNC: <0.02 NG/ML
UROBILINOGEN UR QL STRIP.AUTO: 0.2 E.U./DL
VENTRICULAR RATE: 86 BPM
WBC # BLD AUTO: 7.96 THOUSAND/UL (ref 4.31–10.16)
WBC #/AREA URNS AUTO: NORMAL /HPF

## 2020-11-24 PROCEDURE — 0241U HB NFCT DS VIR RESP RNA 4 TRGT: CPT | Performed by: EMERGENCY MEDICINE

## 2020-11-24 PROCEDURE — 84484 ASSAY OF TROPONIN QUANT: CPT | Performed by: EMERGENCY MEDICINE

## 2020-11-24 PROCEDURE — 36415 COLL VENOUS BLD VENIPUNCTURE: CPT | Performed by: EMERGENCY MEDICINE

## 2020-11-24 PROCEDURE — 99223 1ST HOSP IP/OBS HIGH 75: CPT | Performed by: INTERNAL MEDICINE

## 2020-11-24 PROCEDURE — 93005 ELECTROCARDIOGRAM TRACING: CPT

## 2020-11-24 PROCEDURE — 85025 COMPLETE CBC W/AUTO DIFF WBC: CPT | Performed by: EMERGENCY MEDICINE

## 2020-11-24 PROCEDURE — 72131 CT LUMBAR SPINE W/O DYE: CPT

## 2020-11-24 PROCEDURE — 86140 C-REACTIVE PROTEIN: CPT | Performed by: EMERGENCY MEDICINE

## 2020-11-24 PROCEDURE — 80048 BASIC METABOLIC PNL TOTAL CA: CPT | Performed by: EMERGENCY MEDICINE

## 2020-11-24 PROCEDURE — 85610 PROTHROMBIN TIME: CPT | Performed by: EMERGENCY MEDICINE

## 2020-11-24 PROCEDURE — 81001 URINALYSIS AUTO W/SCOPE: CPT | Performed by: INTERNAL MEDICINE

## 2020-11-24 PROCEDURE — 99285 EMERGENCY DEPT VISIT HI MDM: CPT

## 2020-11-24 PROCEDURE — 85652 RBC SED RATE AUTOMATED: CPT | Performed by: EMERGENCY MEDICINE

## 2020-11-24 PROCEDURE — 80076 HEPATIC FUNCTION PANEL: CPT | Performed by: EMERGENCY MEDICINE

## 2020-11-24 PROCEDURE — 99285 EMERGENCY DEPT VISIT HI MDM: CPT | Performed by: EMERGENCY MEDICINE

## 2020-11-24 PROCEDURE — 70450 CT HEAD/BRAIN W/O DYE: CPT

## 2020-11-24 PROCEDURE — G1004 CDSM NDSC: HCPCS

## 2020-11-24 PROCEDURE — 93010 ELECTROCARDIOGRAM REPORT: CPT | Performed by: INTERNAL MEDICINE

## 2020-11-24 RX ORDER — LORAZEPAM 2 MG/ML
1 INJECTION INTRAMUSCULAR ONCE
Status: DISCONTINUED | OUTPATIENT
Start: 2020-11-24 | End: 2020-11-25

## 2020-11-24 RX ORDER — POLYETHYLENE GLYCOL 3350 17 G/17G
17 POWDER, FOR SOLUTION ORAL DAILY
Status: DISCONTINUED | OUTPATIENT
Start: 2020-11-25 | End: 2020-11-29 | Stop reason: HOSPADM

## 2020-11-24 RX ORDER — ONDANSETRON 4 MG/1
4 TABLET, ORALLY DISINTEGRATING ORAL EVERY 8 HOURS PRN
Status: DISCONTINUED | OUTPATIENT
Start: 2020-11-24 | End: 2020-11-29 | Stop reason: HOSPADM

## 2020-11-24 RX ORDER — ASPIRIN 81 MG/1
81 TABLET, CHEWABLE ORAL DAILY
Status: DISCONTINUED | OUTPATIENT
Start: 2020-11-25 | End: 2020-11-29 | Stop reason: HOSPADM

## 2020-11-24 RX ORDER — ACETAMINOPHEN 325 MG/1
975 TABLET ORAL EVERY 8 HOURS SCHEDULED
Status: DISCONTINUED | OUTPATIENT
Start: 2020-11-24 | End: 2020-11-29 | Stop reason: HOSPADM

## 2020-11-24 RX ORDER — ATORVASTATIN CALCIUM 40 MG/1
40 TABLET, FILM COATED ORAL EVERY EVENING
Status: DISCONTINUED | OUTPATIENT
Start: 2020-11-24 | End: 2020-11-29 | Stop reason: HOSPADM

## 2020-11-24 RX ORDER — MELATONIN
5000 DAILY
Status: DISCONTINUED | OUTPATIENT
Start: 2020-11-25 | End: 2020-11-29 | Stop reason: HOSPADM

## 2020-11-24 RX ORDER — MECLIZINE HYDROCHLORIDE 25 MG/1
25 TABLET ORAL EVERY 8 HOURS PRN
Status: DISCONTINUED | OUTPATIENT
Start: 2020-11-24 | End: 2020-11-26

## 2020-11-24 RX ORDER — MAGNESIUM SULFATE HEPTAHYDRATE 40 MG/ML
2 INJECTION, SOLUTION INTRAVENOUS ONCE
Status: COMPLETED | OUTPATIENT
Start: 2020-11-24 | End: 2020-11-25

## 2020-11-24 RX ORDER — CLARITHROMYCIN 500 MG/1
500 TABLET, COATED ORAL EVERY 12 HOURS SCHEDULED
COMMUNITY
End: 2020-11-29 | Stop reason: HOSPADM

## 2020-11-24 RX ORDER — PANTOPRAZOLE SODIUM 40 MG/1
40 TABLET, DELAYED RELEASE ORAL 2 TIMES DAILY
Status: DISCONTINUED | OUTPATIENT
Start: 2020-11-24 | End: 2020-11-29 | Stop reason: HOSPADM

## 2020-11-24 RX ORDER — AMOXICILLIN 250 MG/1
1000 CAPSULE ORAL 2 TIMES DAILY
Status: COMPLETED | OUTPATIENT
Start: 2020-11-24 | End: 2020-11-26

## 2020-11-24 RX ORDER — B-COMPLEX WITH VITAMIN C
2 TABLET ORAL
Status: DISCONTINUED | OUTPATIENT
Start: 2020-11-25 | End: 2020-11-29 | Stop reason: HOSPADM

## 2020-11-24 RX ORDER — OXYCODONE HYDROCHLORIDE 5 MG/1
2.5 TABLET ORAL EVERY 6 HOURS PRN
Status: DISCONTINUED | OUTPATIENT
Start: 2020-11-24 | End: 2020-11-29 | Stop reason: HOSPADM

## 2020-11-24 RX ORDER — ASCORBIC ACID 500 MG
1000 TABLET ORAL DAILY
Status: DISCONTINUED | OUTPATIENT
Start: 2020-11-25 | End: 2020-11-29 | Stop reason: HOSPADM

## 2020-11-24 RX ORDER — CLARITHROMYCIN 250 MG/1
500 TABLET, FILM COATED ORAL EVERY 12 HOURS SCHEDULED
Status: DISCONTINUED | OUTPATIENT
Start: 2020-11-24 | End: 2020-11-27

## 2020-11-24 RX ORDER — OXYCODONE HYDROCHLORIDE 5 MG/1
5 TABLET ORAL EVERY 6 HOURS PRN
Status: DISCONTINUED | OUTPATIENT
Start: 2020-11-24 | End: 2020-11-29 | Stop reason: HOSPADM

## 2020-11-24 RX ADMIN — ATORVASTATIN CALCIUM 40 MG: 40 TABLET, FILM COATED ORAL at 18:55

## 2020-11-24 RX ADMIN — CLARITHROMYCIN 500 MG: 250 TABLET, FILM COATED ORAL at 20:25

## 2020-11-24 RX ADMIN — ACETAMINOPHEN 975 MG: 325 TABLET, FILM COATED ORAL at 21:32

## 2020-11-24 RX ADMIN — PANTOPRAZOLE SODIUM 40 MG: 40 TABLET, DELAYED RELEASE ORAL at 18:55

## 2020-11-24 RX ADMIN — MAGNESIUM SULFATE HEPTAHYDRATE 2 G: 40 INJECTION, SOLUTION INTRAVENOUS at 18:55

## 2020-11-24 RX ADMIN — AMOXICILLIN 1000 MG: 250 CAPSULE ORAL at 18:55

## 2020-11-25 ENCOUNTER — APPOINTMENT (INPATIENT)
Dept: MRI IMAGING | Facility: HOSPITAL | Age: 80
DRG: 057 | End: 2020-11-25
Payer: COMMERCIAL

## 2020-11-25 ENCOUNTER — APPOINTMENT (INPATIENT)
Dept: NON INVASIVE DIAGNOSTICS | Facility: HOSPITAL | Age: 80
DRG: 057 | End: 2020-11-25
Payer: COMMERCIAL

## 2020-11-25 PROBLEM — S32.010A COMPRESSION FRACTURE OF L1 LUMBAR VERTEBRA, CLOSED, INITIAL ENCOUNTER (HCC): Status: ACTIVE | Noted: 2020-11-25

## 2020-11-25 PROBLEM — I67.9 CEREBROVASCULAR DISEASE: Status: ACTIVE | Noted: 2020-11-25

## 2020-11-25 LAB
CHOLEST SERPL-MCNC: 172 MG/DL (ref 50–200)
EST. AVERAGE GLUCOSE BLD GHB EST-MCNC: 117 MG/DL
HBA1C MFR BLD: 5.7 %
HDLC SERPL-MCNC: 85 MG/DL
LDLC SERPL CALC-MCNC: 79 MG/DL (ref 0–100)
TRIGL SERPL-MCNC: 42 MG/DL

## 2020-11-25 PROCEDURE — 99223 1ST HOSP IP/OBS HIGH 75: CPT | Performed by: PHYSICAL MEDICINE & REHABILITATION

## 2020-11-25 PROCEDURE — 92610 EVALUATE SWALLOWING FUNCTION: CPT

## 2020-11-25 PROCEDURE — 83036 HEMOGLOBIN GLYCOSYLATED A1C: CPT | Performed by: INTERNAL MEDICINE

## 2020-11-25 PROCEDURE — 99223 1ST HOSP IP/OBS HIGH 75: CPT | Performed by: PSYCHIATRY & NEUROLOGY

## 2020-11-25 PROCEDURE — 93306 TTE W/DOPPLER COMPLETE: CPT | Performed by: INTERNAL MEDICINE

## 2020-11-25 PROCEDURE — 99232 SBSQ HOSP IP/OBS MODERATE 35: CPT | Performed by: INTERNAL MEDICINE

## 2020-11-25 PROCEDURE — 99222 1ST HOSP IP/OBS MODERATE 55: CPT | Performed by: PHYSICIAN ASSISTANT

## 2020-11-25 PROCEDURE — G1004 CDSM NDSC: HCPCS

## 2020-11-25 PROCEDURE — 70551 MRI BRAIN STEM W/O DYE: CPT

## 2020-11-25 PROCEDURE — 2W35X3Z IMMOBILIZATION OF BACK USING BRACE: ICD-10-PCS | Performed by: PHYSICIAN ASSISTANT

## 2020-11-25 PROCEDURE — 80061 LIPID PANEL: CPT | Performed by: INTERNAL MEDICINE

## 2020-11-25 PROCEDURE — 93306 TTE W/DOPPLER COMPLETE: CPT

## 2020-11-25 RX ORDER — LORAZEPAM 2 MG/ML
1 INJECTION INTRAMUSCULAR ONCE
Status: COMPLETED | OUTPATIENT
Start: 2020-11-25 | End: 2020-11-25

## 2020-11-25 RX ADMIN — CARBIDOPA AND LEVODOPA 0.5 TABLET: 25; 100 TABLET ORAL at 16:20

## 2020-11-25 RX ADMIN — CLARITHROMYCIN 500 MG: 250 TABLET, FILM COATED ORAL at 20:15

## 2020-11-25 RX ADMIN — LORAZEPAM 1 MG: 2 INJECTION INTRAMUSCULAR; INTRAVENOUS at 08:28

## 2020-11-25 RX ADMIN — OXYCODONE HYDROCHLORIDE AND ACETAMINOPHEN 1000 MG: 500 TABLET ORAL at 08:11

## 2020-11-25 RX ADMIN — AMOXICILLIN 1000 MG: 250 CAPSULE ORAL at 17:19

## 2020-11-25 RX ADMIN — AMOXICILLIN 1000 MG: 250 CAPSULE ORAL at 08:11

## 2020-11-25 RX ADMIN — ATORVASTATIN CALCIUM 40 MG: 40 TABLET, FILM COATED ORAL at 17:19

## 2020-11-25 RX ADMIN — PANTOPRAZOLE SODIUM 40 MG: 40 TABLET, DELAYED RELEASE ORAL at 08:11

## 2020-11-25 RX ADMIN — OYSTER SHELL CALCIUM WITH VITAMIN D 2 TABLET: 500; 200 TABLET, FILM COATED ORAL at 08:11

## 2020-11-25 RX ADMIN — PANTOPRAZOLE SODIUM 40 MG: 40 TABLET, DELAYED RELEASE ORAL at 17:19

## 2020-11-25 RX ADMIN — ASPIRIN 81 MG CHEWABLE TABLET 81 MG: 81 TABLET CHEWABLE at 08:10

## 2020-11-25 RX ADMIN — Medication 5000 UNITS: at 08:11

## 2020-11-25 RX ADMIN — ENOXAPARIN SODIUM 40 MG: 40 INJECTION SUBCUTANEOUS at 08:10

## 2020-11-25 RX ADMIN — CLARITHROMYCIN 500 MG: 250 TABLET, FILM COATED ORAL at 08:12

## 2020-11-25 RX ADMIN — MAGNESIUM GLUCONATE 500 MG ORAL TABLET 400 MG: 500 TABLET ORAL at 08:11

## 2020-11-25 RX ADMIN — ACETAMINOPHEN 975 MG: 325 TABLET, FILM COATED ORAL at 05:41

## 2020-11-25 RX ADMIN — ACETAMINOPHEN 975 MG: 325 TABLET, FILM COATED ORAL at 14:04

## 2020-11-25 RX ADMIN — CARBIDOPA AND LEVODOPA 0.5 TABLET: 25; 100 TABLET ORAL at 12:23

## 2020-11-26 LAB
ANION GAP SERPL CALCULATED.3IONS-SCNC: 8 MMOL/L (ref 4–13)
BASOPHILS # BLD AUTO: 0.03 THOUSANDS/ΜL (ref 0–0.1)
BASOPHILS NFR BLD AUTO: 1 % (ref 0–1)
BUN SERPL-MCNC: 16 MG/DL (ref 5–25)
CALCIUM SERPL-MCNC: 8.9 MG/DL (ref 8.3–10.1)
CHLORIDE SERPL-SCNC: 108 MMOL/L (ref 100–108)
CO2 SERPL-SCNC: 28 MMOL/L (ref 21–32)
CREAT SERPL-MCNC: 0.8 MG/DL (ref 0.6–1.3)
EOSINOPHIL # BLD AUTO: 0.28 THOUSAND/ΜL (ref 0–0.61)
EOSINOPHIL NFR BLD AUTO: 5 % (ref 0–6)
ERYTHROCYTE [DISTWIDTH] IN BLOOD BY AUTOMATED COUNT: 12.8 % (ref 11.6–15.1)
GFR SERPL CREATININE-BSD FRML MDRD: 69 ML/MIN/1.73SQ M
GLUCOSE SERPL-MCNC: 100 MG/DL (ref 65–140)
HCT VFR BLD AUTO: 43.2 % (ref 34.8–46.1)
HGB BLD-MCNC: 14.1 G/DL (ref 11.5–15.4)
IMM GRANULOCYTES # BLD AUTO: 0.02 THOUSAND/UL (ref 0–0.2)
IMM GRANULOCYTES NFR BLD AUTO: 0 % (ref 0–2)
LYMPHOCYTES # BLD AUTO: 1.48 THOUSANDS/ΜL (ref 0.6–4.47)
LYMPHOCYTES NFR BLD AUTO: 29 % (ref 14–44)
MCH RBC QN AUTO: 29.6 PG (ref 26.8–34.3)
MCHC RBC AUTO-ENTMCNC: 32.6 G/DL (ref 31.4–37.4)
MCV RBC AUTO: 91 FL (ref 82–98)
MONOCYTES # BLD AUTO: 0.58 THOUSAND/ΜL (ref 0.17–1.22)
MONOCYTES NFR BLD AUTO: 11 % (ref 4–12)
NEUTROPHILS # BLD AUTO: 2.8 THOUSANDS/ΜL (ref 1.85–7.62)
NEUTS SEG NFR BLD AUTO: 54 % (ref 43–75)
NRBC BLD AUTO-RTO: 0 /100 WBCS
PLATELET # BLD AUTO: 208 THOUSANDS/UL (ref 149–390)
PMV BLD AUTO: 8.8 FL (ref 8.9–12.7)
POTASSIUM SERPL-SCNC: 3.9 MMOL/L (ref 3.5–5.3)
RBC # BLD AUTO: 4.77 MILLION/UL (ref 3.81–5.12)
SODIUM SERPL-SCNC: 144 MMOL/L (ref 136–145)
WBC # BLD AUTO: 5.19 THOUSAND/UL (ref 4.31–10.16)

## 2020-11-26 PROCEDURE — 80048 BASIC METABOLIC PNL TOTAL CA: CPT | Performed by: INTERNAL MEDICINE

## 2020-11-26 PROCEDURE — 97167 OT EVAL HIGH COMPLEX 60 MIN: CPT

## 2020-11-26 PROCEDURE — 97760 ORTHOTIC MGMT&TRAING 1ST ENC: CPT

## 2020-11-26 PROCEDURE — 97163 PT EVAL HIGH COMPLEX 45 MIN: CPT

## 2020-11-26 PROCEDURE — 85025 COMPLETE CBC W/AUTO DIFF WBC: CPT | Performed by: INTERNAL MEDICINE

## 2020-11-26 PROCEDURE — 99232 SBSQ HOSP IP/OBS MODERATE 35: CPT | Performed by: INTERNAL MEDICINE

## 2020-11-26 RX ORDER — MECLIZINE HYDROCHLORIDE 25 MG/1
25 TABLET ORAL 2 TIMES DAILY
Status: DISCONTINUED | OUTPATIENT
Start: 2020-11-26 | End: 2020-11-27

## 2020-11-26 RX ADMIN — ASPIRIN 81 MG CHEWABLE TABLET 81 MG: 81 TABLET CHEWABLE at 08:44

## 2020-11-26 RX ADMIN — CARBIDOPA AND LEVODOPA 1 TABLET: 25; 100 TABLET ORAL at 15:17

## 2020-11-26 RX ADMIN — PANTOPRAZOLE SODIUM 40 MG: 40 TABLET, DELAYED RELEASE ORAL at 18:38

## 2020-11-26 RX ADMIN — Medication 5000 UNITS: at 08:43

## 2020-11-26 RX ADMIN — OYSTER SHELL CALCIUM WITH VITAMIN D 2 TABLET: 500; 200 TABLET, FILM COATED ORAL at 08:44

## 2020-11-26 RX ADMIN — CARBIDOPA AND LEVODOPA 1 TABLET: 25; 100 TABLET ORAL at 11:38

## 2020-11-26 RX ADMIN — CLARITHROMYCIN 500 MG: 250 TABLET, FILM COATED ORAL at 21:36

## 2020-11-26 RX ADMIN — ACETAMINOPHEN 975 MG: 325 TABLET, FILM COATED ORAL at 21:36

## 2020-11-26 RX ADMIN — CARBIDOPA AND LEVODOPA 0.5 TABLET: 25; 100 TABLET ORAL at 07:14

## 2020-11-26 RX ADMIN — AMOXICILLIN 1000 MG: 250 CAPSULE ORAL at 08:44

## 2020-11-26 RX ADMIN — ENOXAPARIN SODIUM 40 MG: 40 INJECTION SUBCUTANEOUS at 08:44

## 2020-11-26 RX ADMIN — PANTOPRAZOLE SODIUM 40 MG: 40 TABLET, DELAYED RELEASE ORAL at 08:44

## 2020-11-26 RX ADMIN — ACETAMINOPHEN 975 MG: 325 TABLET, FILM COATED ORAL at 15:18

## 2020-11-26 RX ADMIN — OXYCODONE HYDROCHLORIDE 5 MG: 5 TABLET ORAL at 18:38

## 2020-11-26 RX ADMIN — OXYCODONE HYDROCHLORIDE 2.5 MG: 5 TABLET ORAL at 05:13

## 2020-11-26 RX ADMIN — CLARITHROMYCIN 500 MG: 250 TABLET, FILM COATED ORAL at 08:50

## 2020-11-26 RX ADMIN — OXYCODONE HYDROCHLORIDE AND ACETAMINOPHEN 1000 MG: 500 TABLET ORAL at 08:44

## 2020-11-26 RX ADMIN — MAGNESIUM GLUCONATE 500 MG ORAL TABLET 400 MG: 500 TABLET ORAL at 08:44

## 2020-11-26 RX ADMIN — ACETAMINOPHEN 975 MG: 325 TABLET, FILM COATED ORAL at 05:13

## 2020-11-26 RX ADMIN — MECLIZINE HYDROCHLORIDE 25 MG: 25 TABLET ORAL at 21:36

## 2020-11-26 RX ADMIN — ATORVASTATIN CALCIUM 40 MG: 40 TABLET, FILM COATED ORAL at 18:39

## 2020-11-27 PROBLEM — A04.8 H. PYLORI INFECTION: Status: RESOLVED | Noted: 2020-11-24 | Resolved: 2020-11-27

## 2020-11-27 LAB
ANION GAP SERPL CALCULATED.3IONS-SCNC: 6 MMOL/L (ref 4–13)
BASOPHILS # BLD AUTO: 0.02 THOUSANDS/ΜL (ref 0–0.1)
BASOPHILS NFR BLD AUTO: 0 % (ref 0–1)
BUN SERPL-MCNC: 21 MG/DL (ref 5–25)
CALCIUM SERPL-MCNC: 9.1 MG/DL (ref 8.3–10.1)
CHLORIDE SERPL-SCNC: 107 MMOL/L (ref 100–108)
CO2 SERPL-SCNC: 29 MMOL/L (ref 21–32)
CREAT SERPL-MCNC: 0.85 MG/DL (ref 0.6–1.3)
EOSINOPHIL # BLD AUTO: 0.32 THOUSAND/ΜL (ref 0–0.61)
EOSINOPHIL NFR BLD AUTO: 6 % (ref 0–6)
ERYTHROCYTE [DISTWIDTH] IN BLOOD BY AUTOMATED COUNT: 12.7 % (ref 11.6–15.1)
GFR SERPL CREATININE-BSD FRML MDRD: 64 ML/MIN/1.73SQ M
GLUCOSE SERPL-MCNC: 102 MG/DL (ref 65–140)
HCT VFR BLD AUTO: 41.8 % (ref 34.8–46.1)
HGB BLD-MCNC: 13.7 G/DL (ref 11.5–15.4)
IMM GRANULOCYTES # BLD AUTO: 0.03 THOUSAND/UL (ref 0–0.2)
IMM GRANULOCYTES NFR BLD AUTO: 1 % (ref 0–2)
LYMPHOCYTES # BLD AUTO: 1.88 THOUSANDS/ΜL (ref 0.6–4.47)
LYMPHOCYTES NFR BLD AUTO: 34 % (ref 14–44)
MAGNESIUM SERPL-MCNC: 2.1 MG/DL (ref 1.6–2.6)
MCH RBC QN AUTO: 29 PG (ref 26.8–34.3)
MCHC RBC AUTO-ENTMCNC: 32.8 G/DL (ref 31.4–37.4)
MCV RBC AUTO: 89 FL (ref 82–98)
MONOCYTES # BLD AUTO: 0.57 THOUSAND/ΜL (ref 0.17–1.22)
MONOCYTES NFR BLD AUTO: 10 % (ref 4–12)
NEUTROPHILS # BLD AUTO: 2.74 THOUSANDS/ΜL (ref 1.85–7.62)
NEUTS SEG NFR BLD AUTO: 49 % (ref 43–75)
NRBC BLD AUTO-RTO: 0 /100 WBCS
PHOSPHATE SERPL-MCNC: 4.1 MG/DL (ref 2.3–4.1)
PLATELET # BLD AUTO: 202 THOUSANDS/UL (ref 149–390)
PMV BLD AUTO: 8.8 FL (ref 8.9–12.7)
POTASSIUM SERPL-SCNC: 4.2 MMOL/L (ref 3.5–5.3)
RBC # BLD AUTO: 4.72 MILLION/UL (ref 3.81–5.12)
SODIUM SERPL-SCNC: 142 MMOL/L (ref 136–145)
TSH SERPL DL<=0.05 MIU/L-ACNC: 2.44 UIU/ML (ref 0.36–3.74)
WBC # BLD AUTO: 5.56 THOUSAND/UL (ref 4.31–10.16)

## 2020-11-27 PROCEDURE — 83735 ASSAY OF MAGNESIUM: CPT | Performed by: INTERNAL MEDICINE

## 2020-11-27 PROCEDURE — 97116 GAIT TRAINING THERAPY: CPT

## 2020-11-27 PROCEDURE — 99232 SBSQ HOSP IP/OBS MODERATE 35: CPT | Performed by: PSYCHIATRY & NEUROLOGY

## 2020-11-27 PROCEDURE — 97110 THERAPEUTIC EXERCISES: CPT

## 2020-11-27 PROCEDURE — 97763 ORTHC/PROSTC MGMT SBSQ ENC: CPT

## 2020-11-27 PROCEDURE — 84443 ASSAY THYROID STIM HORMONE: CPT | Performed by: INTERNAL MEDICINE

## 2020-11-27 PROCEDURE — 85025 COMPLETE CBC W/AUTO DIFF WBC: CPT | Performed by: INTERNAL MEDICINE

## 2020-11-27 PROCEDURE — 99232 SBSQ HOSP IP/OBS MODERATE 35: CPT | Performed by: NURSE PRACTITIONER

## 2020-11-27 PROCEDURE — 80048 BASIC METABOLIC PNL TOTAL CA: CPT | Performed by: INTERNAL MEDICINE

## 2020-11-27 PROCEDURE — 84100 ASSAY OF PHOSPHORUS: CPT | Performed by: INTERNAL MEDICINE

## 2020-11-27 PROCEDURE — 97530 THERAPEUTIC ACTIVITIES: CPT

## 2020-11-27 RX ORDER — MECLIZINE HYDROCHLORIDE 25 MG/1
25 TABLET ORAL 3 TIMES DAILY
Status: DISCONTINUED | OUTPATIENT
Start: 2020-11-27 | End: 2020-11-29 | Stop reason: HOSPADM

## 2020-11-27 RX ADMIN — PANTOPRAZOLE SODIUM 40 MG: 40 TABLET, DELAYED RELEASE ORAL at 17:09

## 2020-11-27 RX ADMIN — MAGNESIUM GLUCONATE 500 MG ORAL TABLET 400 MG: 500 TABLET ORAL at 09:38

## 2020-11-27 RX ADMIN — ATORVASTATIN CALCIUM 40 MG: 40 TABLET, FILM COATED ORAL at 17:09

## 2020-11-27 RX ADMIN — PANTOPRAZOLE SODIUM 40 MG: 40 TABLET, DELAYED RELEASE ORAL at 09:39

## 2020-11-27 RX ADMIN — MECLIZINE HYDROCHLORIDE 25 MG: 25 TABLET ORAL at 09:38

## 2020-11-27 RX ADMIN — ACETAMINOPHEN 975 MG: 325 TABLET, FILM COATED ORAL at 05:53

## 2020-11-27 RX ADMIN — ACETAMINOPHEN 975 MG: 325 TABLET, FILM COATED ORAL at 20:18

## 2020-11-27 RX ADMIN — OYSTER SHELL CALCIUM WITH VITAMIN D 2 TABLET: 500; 200 TABLET, FILM COATED ORAL at 09:38

## 2020-11-27 RX ADMIN — MECLIZINE HYDROCHLORIDE 25 MG: 25 TABLET ORAL at 16:17

## 2020-11-27 RX ADMIN — ACETAMINOPHEN 975 MG: 325 TABLET, FILM COATED ORAL at 16:17

## 2020-11-27 RX ADMIN — CARBIDOPA AND LEVODOPA 1 TABLET: 25; 100 TABLET ORAL at 16:17

## 2020-11-27 RX ADMIN — CARBIDOPA AND LEVODOPA 1 TABLET: 25; 100 TABLET ORAL at 05:53

## 2020-11-27 RX ADMIN — OXYCODONE HYDROCHLORIDE AND ACETAMINOPHEN 1000 MG: 500 TABLET ORAL at 09:39

## 2020-11-27 RX ADMIN — CARBIDOPA AND LEVODOPA 1 TABLET: 25; 100 TABLET ORAL at 12:50

## 2020-11-27 RX ADMIN — ASPIRIN 81 MG CHEWABLE TABLET 81 MG: 81 TABLET CHEWABLE at 09:39

## 2020-11-27 RX ADMIN — ENOXAPARIN SODIUM 40 MG: 40 INJECTION SUBCUTANEOUS at 09:40

## 2020-11-27 RX ADMIN — CLARITHROMYCIN 500 MG: 250 TABLET, FILM COATED ORAL at 09:40

## 2020-11-27 RX ADMIN — Medication 5000 UNITS: at 09:39

## 2020-11-27 RX ADMIN — MECLIZINE HYDROCHLORIDE 25 MG: 25 TABLET ORAL at 20:18

## 2020-11-27 RX ADMIN — OXYCODONE HYDROCHLORIDE 5 MG: 5 TABLET ORAL at 09:39

## 2020-11-28 LAB
ANION GAP SERPL CALCULATED.3IONS-SCNC: 11 MMOL/L (ref 4–13)
BUN SERPL-MCNC: 18 MG/DL (ref 5–25)
CALCIUM SERPL-MCNC: 9.3 MG/DL (ref 8.3–10.1)
CHLORIDE SERPL-SCNC: 106 MMOL/L (ref 100–108)
CO2 SERPL-SCNC: 24 MMOL/L (ref 21–32)
CREAT SERPL-MCNC: 0.89 MG/DL (ref 0.6–1.3)
ERYTHROCYTE [DISTWIDTH] IN BLOOD BY AUTOMATED COUNT: 12.8 % (ref 11.6–15.1)
GFR SERPL CREATININE-BSD FRML MDRD: 61 ML/MIN/1.73SQ M
GLUCOSE SERPL-MCNC: 95 MG/DL (ref 65–140)
HCT VFR BLD AUTO: 46.4 % (ref 34.8–46.1)
HGB BLD-MCNC: 14.4 G/DL (ref 11.5–15.4)
MCH RBC QN AUTO: 29.1 PG (ref 26.8–34.3)
MCHC RBC AUTO-ENTMCNC: 31 G/DL (ref 31.4–37.4)
MCV RBC AUTO: 94 FL (ref 82–98)
PLATELET # BLD AUTO: 193 THOUSANDS/UL (ref 149–390)
PMV BLD AUTO: 8.8 FL (ref 8.9–12.7)
POTASSIUM SERPL-SCNC: 4.3 MMOL/L (ref 3.5–5.3)
RBC # BLD AUTO: 4.94 MILLION/UL (ref 3.81–5.12)
SODIUM SERPL-SCNC: 141 MMOL/L (ref 136–145)
WBC # BLD AUTO: 5.59 THOUSAND/UL (ref 4.31–10.16)

## 2020-11-28 PROCEDURE — 85027 COMPLETE CBC AUTOMATED: CPT | Performed by: NURSE PRACTITIONER

## 2020-11-28 PROCEDURE — 99232 SBSQ HOSP IP/OBS MODERATE 35: CPT | Performed by: NURSE PRACTITIONER

## 2020-11-28 PROCEDURE — 80048 BASIC METABOLIC PNL TOTAL CA: CPT | Performed by: NURSE PRACTITIONER

## 2020-11-28 RX ADMIN — MECLIZINE HYDROCHLORIDE 25 MG: 25 TABLET ORAL at 17:06

## 2020-11-28 RX ADMIN — MAGNESIUM GLUCONATE 500 MG ORAL TABLET 400 MG: 500 TABLET ORAL at 09:34

## 2020-11-28 RX ADMIN — PANTOPRAZOLE SODIUM 40 MG: 40 TABLET, DELAYED RELEASE ORAL at 09:34

## 2020-11-28 RX ADMIN — MECLIZINE HYDROCHLORIDE 25 MG: 25 TABLET ORAL at 22:50

## 2020-11-28 RX ADMIN — PANTOPRAZOLE SODIUM 40 MG: 40 TABLET, DELAYED RELEASE ORAL at 17:05

## 2020-11-28 RX ADMIN — OXYCODONE HYDROCHLORIDE 5 MG: 5 TABLET ORAL at 22:54

## 2020-11-28 RX ADMIN — ATORVASTATIN CALCIUM 40 MG: 40 TABLET, FILM COATED ORAL at 17:05

## 2020-11-28 RX ADMIN — ENOXAPARIN SODIUM 40 MG: 40 INJECTION SUBCUTANEOUS at 09:34

## 2020-11-28 RX ADMIN — CARBIDOPA AND LEVODOPA 1 TABLET: 25; 100 TABLET ORAL at 09:34

## 2020-11-28 RX ADMIN — Medication 5000 UNITS: at 09:34

## 2020-11-28 RX ADMIN — MECLIZINE HYDROCHLORIDE 25 MG: 25 TABLET ORAL at 09:34

## 2020-11-28 RX ADMIN — OXYCODONE HYDROCHLORIDE AND ACETAMINOPHEN 1000 MG: 500 TABLET ORAL at 09:34

## 2020-11-28 RX ADMIN — ASPIRIN 81 MG CHEWABLE TABLET 81 MG: 81 TABLET CHEWABLE at 09:34

## 2020-11-28 RX ADMIN — CARBIDOPA AND LEVODOPA 1 TABLET: 25; 100 TABLET ORAL at 12:03

## 2020-11-28 RX ADMIN — ACETAMINOPHEN 975 MG: 325 TABLET, FILM COATED ORAL at 06:00

## 2020-11-28 RX ADMIN — ACETAMINOPHEN 975 MG: 325 TABLET, FILM COATED ORAL at 14:33

## 2020-11-28 RX ADMIN — OYSTER SHELL CALCIUM WITH VITAMIN D 2 TABLET: 500; 200 TABLET, FILM COATED ORAL at 09:34

## 2020-11-28 RX ADMIN — CARBIDOPA AND LEVODOPA 1 TABLET: 25; 100 TABLET ORAL at 17:05

## 2020-11-29 VITALS
HEIGHT: 62 IN | WEIGHT: 188.93 LBS | SYSTOLIC BLOOD PRESSURE: 117 MMHG | DIASTOLIC BLOOD PRESSURE: 59 MMHG | RESPIRATION RATE: 18 BRPM | TEMPERATURE: 97.5 F | HEART RATE: 67 BPM | OXYGEN SATURATION: 94 % | BODY MASS INDEX: 34.77 KG/M2

## 2020-11-29 PROCEDURE — 99239 HOSP IP/OBS DSCHRG MGMT >30: CPT | Performed by: NURSE PRACTITIONER

## 2020-11-29 RX ORDER — OXYCODONE HYDROCHLORIDE 5 MG/1
5 TABLET ORAL EVERY 6 HOURS PRN
Qty: 15 TABLET | Refills: 0
Start: 2020-11-29 | End: 2020-12-08 | Stop reason: ALTCHOICE

## 2020-11-29 RX ORDER — ASPIRIN 81 MG/1
81 TABLET, CHEWABLE ORAL DAILY
Qty: 30 TABLET | Refills: 0 | Status: SHIPPED | OUTPATIENT
Start: 2020-11-30

## 2020-11-29 RX ORDER — OXYCODONE HYDROCHLORIDE 5 MG/1
5 TABLET ORAL EVERY 6 HOURS PRN
Qty: 15 TABLET | Refills: 0 | Status: SHIPPED | OUTPATIENT
Start: 2020-11-29 | End: 2020-11-29

## 2020-11-29 RX ORDER — ATORVASTATIN CALCIUM 40 MG/1
40 TABLET, FILM COATED ORAL EVERY EVENING
Qty: 30 TABLET | Refills: 0 | Status: SHIPPED | OUTPATIENT
Start: 2020-11-29 | End: 2021-05-12 | Stop reason: ALTCHOICE

## 2020-11-29 RX ADMIN — PANTOPRAZOLE SODIUM 40 MG: 40 TABLET, DELAYED RELEASE ORAL at 08:35

## 2020-11-29 RX ADMIN — Medication 5000 UNITS: at 08:35

## 2020-11-29 RX ADMIN — ASPIRIN 81 MG CHEWABLE TABLET 81 MG: 81 TABLET CHEWABLE at 08:34

## 2020-11-29 RX ADMIN — CARBIDOPA AND LEVODOPA 1 TABLET: 25; 100 TABLET ORAL at 11:32

## 2020-11-29 RX ADMIN — OXYCODONE HYDROCHLORIDE AND ACETAMINOPHEN 1000 MG: 500 TABLET ORAL at 08:34

## 2020-11-29 RX ADMIN — MECLIZINE HYDROCHLORIDE 25 MG: 25 TABLET ORAL at 08:34

## 2020-11-29 RX ADMIN — OXYCODONE HYDROCHLORIDE 2.5 MG: 5 TABLET ORAL at 10:44

## 2020-11-29 RX ADMIN — ENOXAPARIN SODIUM 40 MG: 40 INJECTION SUBCUTANEOUS at 08:35

## 2020-11-29 RX ADMIN — CARBIDOPA AND LEVODOPA 1 TABLET: 25; 100 TABLET ORAL at 06:14

## 2020-11-29 RX ADMIN — OYSTER SHELL CALCIUM WITH VITAMIN D 2 TABLET: 500; 200 TABLET, FILM COATED ORAL at 08:34

## 2020-11-29 RX ADMIN — MAGNESIUM GLUCONATE 500 MG ORAL TABLET 400 MG: 500 TABLET ORAL at 08:35

## 2020-11-30 ENCOUNTER — TRANSITIONAL CARE MANAGEMENT (OUTPATIENT)
Dept: FAMILY MEDICINE CLINIC | Facility: CLINIC | Age: 80
End: 2020-11-30

## 2020-12-01 ENCOUNTER — TELEPHONE (OUTPATIENT)
Dept: NEUROLOGY | Facility: CLINIC | Age: 80
End: 2020-12-01

## 2020-12-03 ENCOUNTER — DOCUMENTATION (OUTPATIENT)
Dept: HEMATOLOGY ONCOLOGY | Facility: CLINIC | Age: 80
End: 2020-12-03

## 2020-12-06 DIAGNOSIS — R13.10 DYSPHAGIA, UNSPECIFIED TYPE: ICD-10-CM

## 2020-12-08 ENCOUNTER — OFFICE VISIT (OUTPATIENT)
Dept: FAMILY MEDICINE CLINIC | Facility: CLINIC | Age: 80
End: 2020-12-08
Payer: COMMERCIAL

## 2020-12-08 VITALS
HEART RATE: 100 BPM | BODY MASS INDEX: 32.87 KG/M2 | DIASTOLIC BLOOD PRESSURE: 78 MMHG | SYSTOLIC BLOOD PRESSURE: 124 MMHG | HEIGHT: 62 IN | OXYGEN SATURATION: 95 % | WEIGHT: 178.6 LBS | TEMPERATURE: 97.8 F

## 2020-12-08 DIAGNOSIS — S32.010A CLOSED COMPRESSION FRACTURE OF BODY OF L1 VERTEBRA (HCC): ICD-10-CM

## 2020-12-08 DIAGNOSIS — G20 PARKINSON'S DISEASE (HCC): ICD-10-CM

## 2020-12-08 DIAGNOSIS — R42 DIZZINESS: Primary | ICD-10-CM

## 2020-12-08 DIAGNOSIS — K86.2 PANCREATIC CYST: ICD-10-CM

## 2020-12-08 PROCEDURE — 1111F DSCHRG MED/CURRENT MED MERGE: CPT | Performed by: FAMILY MEDICINE

## 2020-12-08 PROCEDURE — 99496 TRANSJ CARE MGMT HIGH F2F 7D: CPT | Performed by: FAMILY MEDICINE

## 2020-12-08 RX ORDER — NAPROXEN SODIUM 220 MG
220 TABLET ORAL AS NEEDED
COMMUNITY

## 2020-12-08 RX ORDER — PANTOPRAZOLE SODIUM 40 MG/1
TABLET, DELAYED RELEASE ORAL
Qty: 28 TABLET | OUTPATIENT
Start: 2020-12-08

## 2020-12-18 DIAGNOSIS — R13.10 DYSPHAGIA, UNSPECIFIED TYPE: ICD-10-CM

## 2020-12-18 RX ORDER — PANTOPRAZOLE SODIUM 40 MG/1
TABLET, DELAYED RELEASE ORAL
Qty: 28 TABLET | OUTPATIENT
Start: 2020-12-18

## 2020-12-20 DIAGNOSIS — R13.10 DYSPHAGIA, UNSPECIFIED TYPE: ICD-10-CM

## 2020-12-21 RX ORDER — PANTOPRAZOLE SODIUM 40 MG/1
TABLET, DELAYED RELEASE ORAL
Qty: 28 TABLET | OUTPATIENT
Start: 2020-12-21

## 2020-12-24 DIAGNOSIS — R25.1 TREMOR: ICD-10-CM

## 2020-12-24 DIAGNOSIS — G20 PARKINSON'S DISEASE (HCC): ICD-10-CM

## 2021-01-04 DIAGNOSIS — R13.10 DYSPHAGIA, UNSPECIFIED TYPE: ICD-10-CM

## 2021-01-04 RX ORDER — PANTOPRAZOLE SODIUM 40 MG/1
40 TABLET, DELAYED RELEASE ORAL DAILY
Qty: 30 TABLET | Refills: 3 | Status: SHIPPED | OUTPATIENT
Start: 2021-01-04 | End: 2021-02-24 | Stop reason: SDUPTHER

## 2021-01-04 NOTE — TELEPHONE ENCOUNTER
[place] - Patient called refill - pantoprazole 40 mg tablet  1 tablet daily   Please call [facility]

## 2021-01-18 ENCOUNTER — OFFICE VISIT (OUTPATIENT)
Dept: GASTROENTEROLOGY | Facility: CLINIC | Age: 81
End: 2021-01-18
Payer: COMMERCIAL

## 2021-01-18 VITALS
SYSTOLIC BLOOD PRESSURE: 108 MMHG | HEART RATE: 68 BPM | DIASTOLIC BLOOD PRESSURE: 58 MMHG | HEIGHT: 62 IN | WEIGHT: 180.2 LBS | BODY MASS INDEX: 33.16 KG/M2

## 2021-01-18 DIAGNOSIS — K86.2 PANCREATIC CYST: Primary | ICD-10-CM

## 2021-01-18 DIAGNOSIS — A04.8 H. PYLORI INFECTION: ICD-10-CM

## 2021-01-18 DIAGNOSIS — K21.9 GASTROESOPHAGEAL REFLUX DISEASE WITHOUT ESOPHAGITIS: ICD-10-CM

## 2021-01-18 PROCEDURE — 99213 OFFICE O/P EST LOW 20 MIN: CPT | Performed by: PHYSICIAN ASSISTANT

## 2021-01-18 RX ORDER — DIAZEPAM 2 MG/1
2 TABLET ORAL ONCE
Qty: 2 TABLET | Refills: 0 | Status: SHIPPED | OUTPATIENT
Start: 2021-01-18 | End: 2021-01-27 | Stop reason: ALTCHOICE

## 2021-01-18 NOTE — LETTER
January 18, 2021     Fely Villasenor, [address]  [facility]  [address]    Patient: Bradley Singh   YOB: 1940   Date of Visit: 1/18/2021       Dear Dr David Cervantes:    Thank you for referring Zoya Conner to me for evaluation  Below are my notes for this consultation  If you have questions, please do not hesitate to call me  I look forward to following your patient along with you  Sincerely,        Blanca Chun PA-C        CC: No Recipients  Nancy Schaefer  1/18/2021  2:07 PM  Sign when Signing Visit  [facility] - Outpatient Follow-up Note  Bradley Singh 79 y o  female MRN: [id number]  Encounter: [id number]          ASSESSMENT AND PLAN:      1  Pancreatic cyst  -Patient's EUS for October 13, 2020 showed a 4 4 cm pancreatic tail cyst the fluid was aspirated and appeared very thick suggestive of an IPMN or a mucinous cystic neoplasm  There was also another smaller cyst seen in the neck of the pancreas  There was mild gastritis  Patient's pathology was benign   -Patient will be going for repeat imaging with an MRI/MRCP  Order printed for patient today  2  H  pylori infection  3  Gastroesophageal reflux disease without esophagitis  -Will continue pantoprazole daily   -Will add Gas-X as needed  ______________________________________________________________________    SUBJECTIVE:    79-year-old female who is here for GI follow-up  Over the last couple months patient has been through multitude of different things including a hospital stay where she was found to have a fracture in her back  Prior to her hospital stay she did undergo endoscopic ultrasound down at [facility] due to an abnormal CT scan suggesting a pancreatic cyst   Patient reports that she was diagnosed with Helicobacter pylori during the EUS and treated successfully  Patient reports that she was told at that time as well that her cyst was benign    Patient's endoscopic ultrasound as well as pathology was reviewed with patient  Patient reports that she does not wish to have any unnecessary surgery  The present time patient actually reports she is doing quite well  She reports she is currently living with her daughter and son-in-law due to the fact that she needed physical therapy as well as occupational therapy after her last hospital stay  She reports she is doing very well with this  As for her stomach she reports that her acid reflux is under control  She denies any severe abdominal pain nausea or vomiting  She denies any issues with her stools at the present time  REVIEW OF SYSTEMS IS OTHERWISE NEGATIVE        Historical Information   Past Medical History:   Diagnosis Date    Actinic keratosis 3/11/2016    Acute midline low back pain without sciatica 2020    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     Benign neoplasm of skin     Chest pain     Claustrophobia     Diverticulitis     GERD (gastroesophageal reflux disease)     H  pylori infection 2020    Muscle weakness     Nonmelanoma skin cancer     last assessed 2017    Palpitations     Seasonal allergies     Seborrheic keratosis 2014    Sleep apnea     no cpap    Spontaneous      without mention of complications     Squamous cell carcinoma of forehead 2014    Syncope      Past Surgical History:   Procedure Laterality Date    BOTOX INJECTION N/A 3/6/2017    Procedure: CYSTOSCOPY; BLADDER BOTOX 100 UNITS ;  Surgeon: Tanja Canchola MD;  [facility];  Service:     BOWEL RESECTION      related ot diverticulitis    CARDIAC CATHETERIZATION      CARPAL TUNNEL RELEASE Right     COLONOSCOPY  2019    COLOSTOMY      COLOSTOMY CLOSURE      FOOT SURGERY Left     neuroma    HYSTERECTOMY      NASAL SINUS SURGERY      NV CYSTOURETHROSCOPY N/A 2018    Procedure: CYSTOSCOPY WITH BOTOX;  Surgeon: Tanja Canchola MD; [facility];  Service: Urology    HI ESOPHAGOGASTRODUODENOSCOPY TRANSORAL DIAGNOSTIC N/A 4/23/2019    Procedure: ESOPHAGOGASTRODUODENOSCOPY (EGD); Surgeon: Leeann Julio DO;  Location: [facility];   Service: Gastroenterology    TONSILLECTOMY      UPPER GASTROINTESTINAL ENDOSCOPY  04/23/2019     Social History   Social History     Substance and Sexual Activity   Alcohol Use Yes    Frequency: Monthly or less    Drinks per session: 1 or 2    Binge frequency: Never    Comment: patient states rare use on holidays      Social History     Substance and Sexual Activity   Drug Use No     Social History     Tobacco Use   Smoking Status Never Smoker   Smokeless Tobacco Never Used     Family History   Problem Relation Age of Onset    Alcohol abuse Mother     Heart disease Mother     Alcohol abuse Father     Alcohol abuse Brother     Cancer Brother     Tremor Neg Hx     Parkinsonism Neg Hx     Colon cancer Neg Hx        Meds/Allergies       Current Outpatient Medications:     Ascorbic Acid (VITAMIN C) 1000 MG tablet    aspirin 81 mg chewable tablet    atorvastatin (LIPITOR) 40 mg tablet    B Complex Vitamins (B COMPLEX 100 PO)    Biotin 2500 MCG CAPS    calcium carbonate (OS-JOHN) 1250 (500 Ca) MG tablet    carbidopa-levodopa (SINEMET)  mg per tablet    cholecalciferol (VITAMIN D3) 1,000 units tablet    Echinacea 125 MG CAPS    fexofenadine (ALLEGRA) 180 MG tablet    ibuprofen (MOTRIN) 600 mg tablet    Magnesium 200 MG TABS    meclizine (ANTIVERT) 25 mg tablet    Mirabegron ER 50 MG TB24    multivitamin (THERAGRAN) TABS    naproxen sodium (Aleve) 220 MG tablet    Omega-3 350 MG CPDR    ondansetron (ZOFRAN) 4 mg tablet    pantoprazole (PROTONIX) 40 mg tablet    polyethylene glycol (MIRALAX) 17 g packet    Potassium Gluconate 595 (99 K) MG TABS    Probiotic Product (PROBIOTIC-10) CAPS    psyllium (METAMUCIL) 58 6 % powder    diazepam (VALIUM) 2 mg tablet    Allergies Allergen Reactions    Caffeine GI Intolerance    Iodine Rash    Latex Rash    Other Rash     Adhesive tape             Objective     Blood pressure 108/58, pulse 68, height 5' 2" (1 575 m), weight 81 7 kg (180 lb 3 2 oz), not currently breastfeeding  Body mass index is 32 96 kg/m²  PHYSICAL EXAM:      General Appearance:   Alert, cooperative, no distress   HEENT:   Normocephalic, atraumatic, anicteric      Neck:  Supple, symmetrical, trachea midline   Lungs:   Clear to auscultation bilaterally; no rales, rhonchi or wheezing; respirations unlabored    Heart[de-identified]   Regular rate and rhythm; no murmur, rub, or gallop  Abdomen:   Soft, non-tender, non-distended; normal bowel sounds; no masses, no organomegaly    Genitalia:   Deferred    Rectal:   Deferred    Extremities:  No cyanosis, clubbing or edema    Pulses:  2+ and symmetric    Skin:  No jaundice, rashes, or lesions    Lymph nodes:  No palpable cervical lymphadenopathy        Lab Results:   No visits with results within 1 Day(s) from this visit     Latest known visit with results is:   Admission on 11/24/2020, Discharged on 11/29/2020   Component Date Value    WBC 11/24/2020 7 96     RBC 11/24/2020 4 80     Hemoglobin 11/24/2020 14 0     Hematocrit 11/24/2020 42 7     MCV 11/24/2020 89     MCH 11/24/2020 29 2     MCHC 11/24/2020 32 8     RDW 11/24/2020 13 0     MPV 11/24/2020 8 9     Platelets 21/01/7206 217     nRBC 11/24/2020 0     Neutrophils Relative 11/24/2020 83*    Immat GRANS % 11/24/2020 0     Lymphocytes Relative 11/24/2020 10*    Monocytes Relative 11/24/2020 6     Eosinophils Relative 11/24/2020 1     Basophils Relative 11/24/2020 0     Neutrophils Absolute 11/24/2020 6 54     Immature Grans Absolute 11/24/2020 0 03     Lymphocytes Absolute 11/24/2020 0 79     Monocytes Absolute 11/24/2020 0 50     Eosinophils Absolute 11/24/2020 0 08     Basophils Absolute 11/24/2020 0 02     Sodium 11/24/2020 142     Potassium 11/24/2020 3 4*    Chloride 11/24/2020 104     CO2 11/24/2020 27     ANION GAP 11/24/2020 11     BUN 11/24/2020 18     Creatinine 11/24/2020 0 92     Glucose 11/24/2020 200*    Calcium 11/24/2020 9 1     eGFR 11/24/2020 59     Total Bilirubin 11/24/2020 0 60     Bilirubin, Direct 11/24/2020 0 14     Alkaline Phosphatase 11/24/2020 109     AST 11/24/2020 27     ALT 11/24/2020 41     Total Protein 11/24/2020 7 5     Albumin 11/24/2020 4 2     Protime 11/24/2020 13 8     INR 11/24/2020 1 04     Troponin I 11/24/2020 <0 02     Sed Rate 11/24/2020 19     CRP 11/24/2020 32 6*    Ventricular Rate 11/24/2020 86     Atrial Rate 11/24/2020 86     VT Interval 11/24/2020 168     QRSD Interval 11/24/2020 82     QT Interval 11/24/2020 374     QTC Interval 11/24/2020 447     P Axis 11/24/2020 39     QRS Axis 11/24/2020 13     T Wave Axis 11/24/2020 55     SARS-CoV-2 11/24/2020 Negative     INFLUENZA A PCR 11/24/2020 Negative     INFLUENZA B PCR 11/24/2020 Negative     RSV PCR 11/24/2020 Negative     Color, UA 11/24/2020 Yellow     Clarity, UA 11/24/2020 Clear     Specific Gravity, UA 11/24/2020 1 020     pH, UA 11/24/2020 7 0     Leukocytes, UA 11/24/2020 Trace*    Nitrite, UA 11/24/2020 Positive*    Protein, UA 11/24/2020 Negative     Glucose, UA 11/24/2020 Negative     Ketones, UA 11/24/2020 15 (1+)*    Urobilinogen, UA 11/24/2020 0 2     Bilirubin, UA 11/24/2020 Negative     Blood, UA 11/24/2020 Negative     RBC, UA 11/24/2020 None Seen     WBC, UA 11/24/2020 1-2     Epithelial Cells 11/24/2020 Occasional     Bacteria, UA 11/24/2020 Occasional     Cholesterol 11/25/2020 172     Triglycerides 11/25/2020 42     HDL, Direct 11/25/2020 85     LDL Calculated 11/25/2020 79     Hemoglobin A1C 11/25/2020 5 7*    EAG 11/25/2020 117     WBC 11/26/2020 5 19     RBC 11/26/2020 4 77     Hemoglobin 11/26/2020 14 1     Hematocrit 11/26/2020 43 2     MCV 11/26/2020 91     MCH 11/26/2020 29 6     MCHC 11/26/2020 32 6     RDW 11/26/2020 12 8     MPV 11/26/2020 8 8*    Platelets 46/29/8500 208     nRBC 11/26/2020 0     Neutrophils Relative 11/26/2020 54     Immat GRANS % 11/26/2020 0     Lymphocytes Relative 11/26/2020 29     Monocytes Relative 11/26/2020 11     Eosinophils Relative 11/26/2020 5     Basophils Relative 11/26/2020 1     Neutrophils Absolute 11/26/2020 2 80     Immature Grans Absolute 11/26/2020 0 02     Lymphocytes Absolute 11/26/2020 1 48     Monocytes Absolute 11/26/2020 0 58     Eosinophils Absolute 11/26/2020 0 28     Basophils Absolute 11/26/2020 0 03     Sodium 11/26/2020 144     Potassium 11/26/2020 3 9     Chloride 11/26/2020 108     CO2 11/26/2020 28     ANION GAP 11/26/2020 8     BUN 11/26/2020 16     Creatinine 11/26/2020 0 80     Glucose 11/26/2020 100     Calcium 11/26/2020 8 9     eGFR 11/26/2020 69     WBC 11/27/2020 5 56     RBC 11/27/2020 4 72     Hemoglobin 11/27/2020 13 7     Hematocrit 11/27/2020 41 8     MCV 11/27/2020 89     MCH 11/27/2020 29 0     MCHC 11/27/2020 32 8     RDW 11/27/2020 12 7     MPV 11/27/2020 8 8*    Platelets 87/11/3263 202     nRBC 11/27/2020 0     Neutrophils Relative 11/27/2020 49     Immat GRANS % 11/27/2020 1     Lymphocytes Relative 11/27/2020 34     Monocytes Relative 11/27/2020 10     Eosinophils Relative 11/27/2020 6     Basophils Relative 11/27/2020 0     Neutrophils Absolute 11/27/2020 2 74     Immature Grans Absolute 11/27/2020 0 03     Lymphocytes Absolute 11/27/2020 1 88     Monocytes Absolute 11/27/2020 0 57     Eosinophils Absolute 11/27/2020 0 32     Basophils Absolute 11/27/2020 0 02     Sodium 11/27/2020 142     Potassium 11/27/2020 4 2     Chloride 11/27/2020 107     CO2 11/27/2020 29     ANION GAP 11/27/2020 6     BUN 11/27/2020 21     Creatinine 11/27/2020 0 85     Glucose 11/27/2020 102     Calcium 11/27/2020 9 1     eGFR 11/27/2020 64     Magnesium 11/27/2020 2 1     TSH 3RD GENERATON 11/27/2020 2 444     Phosphorus 11/27/2020 4 1     Sodium 11/28/2020 141     Potassium 11/28/2020 4 3     Chloride 11/28/2020 106     CO2 11/28/2020 24     ANION GAP 11/28/2020 11     BUN 11/28/2020 18     Creatinine 11/28/2020 0 89     Glucose 11/28/2020 95     Calcium 11/28/2020 9 3     eGFR 11/28/2020 61     WBC 11/28/2020 5 59     RBC 11/28/2020 4 94     Hemoglobin 11/28/2020 14 4     Hematocrit 11/28/2020 46 4*    MCV 11/28/2020 94     MCH 11/28/2020 29 1     MCHC 11/28/2020 31 0*    RDW 11/28/2020 12 8     Platelets 41/68/4371 193     MPV 11/28/2020 8 8*         Radiology Results:   No results found

## 2021-01-18 NOTE — PATIENT INSTRUCTIONS
Gastroesophageal Reflux Disease   WHAT YOU NEED TO KNOW:   Gastroesophageal reflux disease (GERD) is reflux that occurs more than twice a week for a few weeks  Reflux means acid and food in the stomach back up into the esophagus  It usually causes heartburn and other symptoms  GERD can cause other health problems over time if it is not treated  DISCHARGE INSTRUCTIONS:   Call your local emergency number (911 in the [address] [facility]) if:   · You have severe chest pain and sudden trouble breathing  Return to the emergency department if:   · You have trouble breathing after you vomit  · You have trouble swallowing, or pain with swallowing  · Your bowel movements are black, bloody, or tarry-looking  · Your vomit looks like coffee grounds or has blood in it  Call your doctor or gastroenterologist if:   · You feel full and cannot burp or vomit  · You vomit large amounts, or you vomit often  · You are losing weight without trying  · Your symptoms get worse or do not improve with treatment  · You have questions or concerns about your condition or care  Medicines:   · Medicines  are used to decrease stomach acid  Medicine may also be used to help your lower esophageal sphincter and stomach contract (tighten) more  · Take your medicine as directed  Contact your healthcare provider if you think your medicine is not helping or if you have side effects  Tell him of her if you are allergic to any medicine  Keep a list of the medicines, vitamins, and herbs you take  Include the amounts, and when and why you take them  Bring the list or the pill bottles to follow-up visits  Carry your medicine list with you in case of an emergency  Manage GERD:   · Do not have foods or drinks that may increase heartburn  These include chocolate, peppermint, fried or fatty foods, drinks that contain caffeine, or carbonated drinks (soda)  Other foods include spicy foods, onions, tomatoes, and tomato-based foods   Do not have foods or drinks that can irritate your esophagus, such as citrus fruits, juices, and alcohol  · Do not eat large meals  When you eat a lot of food at one time, your stomach needs more acid to digest it  Eat 6 small meals each day instead of 3 large ones, and eat slowly  Do not eat meals 2 to 3 hours before bedtime  · Elevate the head of your bed  Place 6-inch blocks under the head of your bed frame  You may also use more than one pillow under your head and shoulders while you sleep  · Maintain a healthy weight  If you are overweight, weight loss may help relieve symptoms of GERD  · Do not smoke  Smoking weakens the lower esophageal sphincter and increases the risk of GERD  Ask your healthcare provider for information if you currently smoke and need help to quit  E-cigarettes or smokeless tobacco still contain nicotine  Talk to your healthcare provider before you use these products  · Do not wear clothing that is tight around your waist   Tight clothing can put pressure on your stomach and cause or worsen GERD symptoms  Follow up with your doctor or gastroenterologist as directed:  Write down your questions so you remember to ask them during your visits  © Copyright [address] [place] [address] Drive Information is for End User's use only and may not be sold, redistributed or otherwise used for commercial purposes  All illustrations and images included in CareNotes® are the copyrighted property of A D [employer] M [employer]  or [facility] Jeanmarie Ortiz   The above information is an  only  It is not intended as medical advice for individual conditions or treatments  Talk to your doctor, nurse or pharmacist before following any medical regimen to see if it is safe and effective for you

## 2021-01-18 NOTE — PROGRESS NOTES
[facility] - Outpatient Follow-up Note  Emerson Marie 79 y o  female MRN: [id number]  Encounter: [id number]          ASSESSMENT AND PLAN:      1  Pancreatic cyst  -Patient's EUS for October 13, 2020 showed a 4 4 cm pancreatic tail cyst the fluid was aspirated and appeared very thick suggestive of an IPMN or a mucinous cystic neoplasm  There was also another smaller cyst seen in the neck of the pancreas  There was mild gastritis  Patient's pathology was benign   -Patient will be going for repeat imaging with an MRI/MRCP  Order printed for patient today  2  H  pylori infection  3  Gastroesophageal reflux disease without esophagitis  -Will continue pantoprazole daily   -Will add Gas-X as needed  ______________________________________________________________________    SUBJECTIVE:    21-year-old female who is here for GI follow-up  Over the last couple months patient has been through multitude of different things including a hospital stay where she was found to have a fracture in her back  Prior to her hospital stay she did undergo endoscopic ultrasound down at [facility] due to an abnormal CT scan suggesting a pancreatic cyst   Patient reports that she was diagnosed with Helicobacter pylori during the EUS and treated successfully  Patient reports that she was told at that time as well that her cyst was benign  Patient's endoscopic ultrasound as well as pathology was reviewed with patient  Patient reports that she does not wish to have any unnecessary surgery  The present time patient actually reports she is doing quite well  She reports she is currently living with her daughter and son-in-law due to the fact that she needed physical therapy as well as occupational therapy after her last hospital stay  She reports she is doing very well with this  As for her stomach she reports that her acid reflux is under control    She denies any severe abdominal pain nausea or vomiting  She denies any issues with her stools at the present time  REVIEW OF SYSTEMS IS OTHERWISE NEGATIVE  Historical Information   Past Medical History:   Diagnosis Date    Actinic keratosis 3/11/2016    Acute midline low back pain without sciatica 2020    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     Benign neoplasm of skin     Chest pain     Claustrophobia     Diverticulitis     GERD (gastroesophageal reflux disease)     H  pylori infection 2020    Muscle weakness     Nonmelanoma skin cancer     last assessed 2017    Palpitations     Seasonal allergies     Seborrheic keratosis 2014    Sleep apnea     no cpap    Spontaneous      without mention of complications     Squamous cell carcinoma of forehead 2014    Syncope      Past Surgical History:   Procedure Laterality Date    BOTOX INJECTION N/A 3/6/2017    Procedure: CYSTOSCOPY; BLADDER BOTOX 100 UNITS ;  Surgeon: Angel Lamar MD;  [facility];  Service:     BOWEL RESECTION      related ot diverticulitis    CARDIAC CATHETERIZATION      CARPAL TUNNEL RELEASE Right     COLONOSCOPY  2019    COLOSTOMY      COLOSTOMY CLOSURE      FOOT SURGERY Left     neuroma    HYSTERECTOMY      NASAL SINUS SURGERY      CO CYSTOURETHROSCOPY N/A 2018    Procedure: CYSTOSCOPY WITH BOTOX;  Surgeon: Angel Lamar MD;  [facility];  Service: Urology    CO ESOPHAGOGASTRODUODENOSCOPY TRANSORAL DIAGNOSTIC N/A 2019    Procedure: ESOPHAGOGASTRODUODENOSCOPY (EGD); Surgeon: Jenelle Barnhart DO;  [facility];   Service: Gastroenterology    TONSILLECTOMY      UPPER GASTROINTESTINAL ENDOSCOPY  2019     Social History   Social History     Substance and Sexual Activity   Alcohol Use Yes    Frequency: Monthly or less    Drinks per session: 1 or 2    Binge frequency: Never    Comment: patient states rare use on holidays      Social History     Substance and Sexual Activity   Drug Use No     Social History     Tobacco Use   Smoking Status Never Smoker   Smokeless Tobacco Never Used     Family History   Problem Relation Age of Onset    Alcohol abuse Mother     Heart disease Mother     Alcohol abuse Father     Alcohol abuse Brother     Cancer Brother     Tremor Neg Hx     Parkinsonism Neg Hx     Colon cancer Neg Hx        Meds/Allergies       Current Outpatient Medications:     Ascorbic Acid (VITAMIN C) 1000 MG tablet    aspirin 81 mg chewable tablet    atorvastatin (LIPITOR) 40 mg tablet    B Complex Vitamins (B COMPLEX 100 PO)    Biotin 2500 MCG CAPS    calcium carbonate (OS-JOHN) 1250 (500 Ca) MG tablet    carbidopa-levodopa (SINEMET)  mg per tablet    cholecalciferol (VITAMIN D3) 1,000 units tablet    Echinacea 125 MG CAPS    fexofenadine (ALLEGRA) 180 MG tablet    ibuprofen (MOTRIN) 600 mg tablet    Magnesium 200 MG TABS    meclizine (ANTIVERT) 25 mg tablet    Mirabegron ER 50 MG TB24    multivitamin (THERAGRAN) TABS    naproxen sodium (Aleve) 220 MG tablet    Omega-3 350 MG CPDR    ondansetron (ZOFRAN) 4 mg tablet    pantoprazole (PROTONIX) 40 mg tablet    polyethylene glycol (MIRALAX) 17 g packet    Potassium Gluconate 595 (99 K) MG TABS    Probiotic Product (PROBIOTIC-10) CAPS    psyllium (METAMUCIL) 58 6 % powder    diazepam (VALIUM) 2 mg tablet    Allergies   Allergen Reactions    Caffeine GI Intolerance    Iodine Rash    Latex Rash    Other Rash     Adhesive tape             Objective     Blood pressure 108/58, pulse 68, height 5' 2" (1 575 m), weight 81 7 kg (180 lb 3 2 oz), not currently breastfeeding  Body mass index is 32 96 kg/m²        PHYSICAL EXAM:      General Appearance:   Alert, cooperative, no distress   HEENT:   Normocephalic, atraumatic, anicteric      Neck:  Supple, symmetrical, trachea midline   Lungs:   Clear to auscultation bilaterally; no rales, rhonchi or wheezing; respirations unlabored    Heart[de-identified]   Regular rate and rhythm; no murmur, rub, or gallop  Abdomen:   Soft, non-tender, non-distended; normal bowel sounds; no masses, no organomegaly    Genitalia:   Deferred    Rectal:   Deferred    Extremities:  No cyanosis, clubbing or edema    Pulses:  2+ and symmetric    Skin:  No jaundice, rashes, or lesions    Lymph nodes:  No palpable cervical lymphadenopathy        Lab Results:   No visits with results within 1 Day(s) from this visit     Latest known visit with results is:   Admission on 11/24/2020, Discharged on 11/29/2020   Component Date Value    WBC 11/24/2020 7 96     RBC 11/24/2020 4 80     Hemoglobin 11/24/2020 14 0     Hematocrit 11/24/2020 42 7     MCV 11/24/2020 89     MCH 11/24/2020 29 2     MCHC 11/24/2020 32 8     RDW 11/24/2020 13 0     MPV 11/24/2020 8 9     Platelets 04/98/7015 217     nRBC 11/24/2020 0     Neutrophils Relative 11/24/2020 83*    Immat GRANS % 11/24/2020 0     Lymphocytes Relative 11/24/2020 10*    Monocytes Relative 11/24/2020 6     Eosinophils Relative 11/24/2020 1     Basophils Relative 11/24/2020 0     Neutrophils Absolute 11/24/2020 6 54     Immature Grans Absolute 11/24/2020 0 03     Lymphocytes Absolute 11/24/2020 0 79     Monocytes Absolute 11/24/2020 0 50     Eosinophils Absolute 11/24/2020 0 08     Basophils Absolute 11/24/2020 0 02     Sodium 11/24/2020 142     Potassium 11/24/2020 3 4*    Chloride 11/24/2020 104     CO2 11/24/2020 27     ANION GAP 11/24/2020 11     BUN 11/24/2020 18     Creatinine 11/24/2020 0 92     Glucose 11/24/2020 200*    Calcium 11/24/2020 9 1     eGFR 11/24/2020 59     Total Bilirubin 11/24/2020 0 60     Bilirubin, Direct 11/24/2020 0 14     Alkaline Phosphatase 11/24/2020 109     AST 11/24/2020 27     ALT 11/24/2020 41     Total Protein 11/24/2020 7 5     Albumin 11/24/2020 4 2     Protime 11/24/2020 13 8     INR 11/24/2020 1 04     Troponin I 11/24/2020 <0 02     Sed Rate 11/24/2020 19     CRP 11/24/2020 32 6*    Ventricular Rate 11/24/2020 86     Atrial Rate 11/24/2020 86     GA Interval 11/24/2020 168     QRSD Interval 11/24/2020 82     QT Interval 11/24/2020 374     QTC Interval 11/24/2020 447     P Axis 11/24/2020 39     QRS Axis 11/24/2020 13     T Wave Axis 11/24/2020 55     SARS-CoV-2 11/24/2020 Negative     INFLUENZA A PCR 11/24/2020 Negative     INFLUENZA B PCR 11/24/2020 Negative     RSV PCR 11/24/2020 Negative     Color, UA 11/24/2020 Yellow     Clarity, UA 11/24/2020 Clear     Specific Gravity, UA 11/24/2020 1 020     pH, UA 11/24/2020 7 0     Leukocytes, UA 11/24/2020 Trace*    Nitrite, UA 11/24/2020 Positive*    Protein, UA 11/24/2020 Negative     Glucose, UA 11/24/2020 Negative     Ketones, UA 11/24/2020 15 (1+)*    Urobilinogen, UA 11/24/2020 0 2     Bilirubin, UA 11/24/2020 Negative     Blood, UA 11/24/2020 Negative     RBC, UA 11/24/2020 None Seen     WBC, UA 11/24/2020 1-2     Epithelial Cells 11/24/2020 Occasional     Bacteria, UA 11/24/2020 Occasional     Cholesterol 11/25/2020 172     Triglycerides 11/25/2020 42     HDL, Direct 11/25/2020 85     LDL Calculated 11/25/2020 79     Hemoglobin A1C 11/25/2020 5 7*    EAG 11/25/2020 117     WBC 11/26/2020 5 19     RBC 11/26/2020 4 77     Hemoglobin 11/26/2020 14 1     Hematocrit 11/26/2020 43 2     MCV 11/26/2020 91     MCH 11/26/2020 29 6     MCHC 11/26/2020 32 6     RDW 11/26/2020 12 8     MPV 11/26/2020 8 8*    Platelets 25/52/2002 208     nRBC 11/26/2020 0     Neutrophils Relative 11/26/2020 54     Immat GRANS % 11/26/2020 0     Lymphocytes Relative 11/26/2020 29     Monocytes Relative 11/26/2020 11     Eosinophils Relative 11/26/2020 5     Basophils Relative 11/26/2020 1     Neutrophils Absolute 11/26/2020 2 80     Immature Grans Absolute 11/26/2020 0 02     Lymphocytes Absolute 11/26/2020 1 48     Monocytes Absolute 11/26/2020 0 58     Eosinophils Absolute 11/26/2020 0 28     Basophils Absolute 11/26/2020 0 03     Sodium 11/26/2020 144     Potassium 11/26/2020 3 9     Chloride 11/26/2020 108     CO2 11/26/2020 28     ANION GAP 11/26/2020 8     BUN 11/26/2020 16     Creatinine 11/26/2020 0 80     Glucose 11/26/2020 100     Calcium 11/26/2020 8 9     eGFR 11/26/2020 69     WBC 11/27/2020 5 56     RBC 11/27/2020 4 72     Hemoglobin 11/27/2020 13 7     Hematocrit 11/27/2020 41 8     MCV 11/27/2020 89     MCH 11/27/2020 29 0     MCHC 11/27/2020 32 8     RDW 11/27/2020 12 7     MPV 11/27/2020 8 8*    Platelets 31/85/0237 202     nRBC 11/27/2020 0     Neutrophils Relative 11/27/2020 49     Immat GRANS % 11/27/2020 1     Lymphocytes Relative 11/27/2020 34     Monocytes Relative 11/27/2020 10     Eosinophils Relative 11/27/2020 6     Basophils Relative 11/27/2020 0     Neutrophils Absolute 11/27/2020 2 74     Immature Grans Absolute 11/27/2020 0 03     Lymphocytes Absolute 11/27/2020 1 88     Monocytes Absolute 11/27/2020 0 57     Eosinophils Absolute 11/27/2020 0 32     Basophils Absolute 11/27/2020 0 02     Sodium 11/27/2020 142     Potassium 11/27/2020 4 2     Chloride 11/27/2020 107     CO2 11/27/2020 29     ANION GAP 11/27/2020 6     BUN 11/27/2020 21     Creatinine 11/27/2020 0 85     Glucose 11/27/2020 102     Calcium 11/27/2020 9 1     eGFR 11/27/2020 64     Magnesium 11/27/2020 2 1     TSH 3RD GENERATON 11/27/2020 2 444     Phosphorus 11/27/2020 4 1     Sodium 11/28/2020 141     Potassium 11/28/2020 4 3     Chloride 11/28/2020 106     CO2 11/28/2020 24     ANION GAP 11/28/2020 11     BUN 11/28/2020 18     Creatinine 11/28/2020 0 89     Glucose 11/28/2020 95     Calcium 11/28/2020 9 3     eGFR 11/28/2020 61     WBC 11/28/2020 5 59     RBC 11/28/2020 4 94     Hemoglobin 11/28/2020 14 4     Hematocrit 11/28/2020 46 4*    MCV 11/28/2020 94     MCH 11/28/2020 29 1     MCHC 11/28/2020 31 0*    RDW 11/28/2020 12 8     Platelets 24/27/9249 193     MPV 11/28/2020 8 8*         Radiology Results:   No results found

## 2021-01-20 ENCOUNTER — OFFICE VISIT (OUTPATIENT)
Dept: DERMATOLOGY | Facility: CLINIC | Age: 81
End: 2021-01-20
Payer: COMMERCIAL

## 2021-01-20 VITALS — TEMPERATURE: 97.6 F

## 2021-01-20 DIAGNOSIS — L98.9 UNKNOWN SKIN LESION: Primary | ICD-10-CM

## 2021-01-20 DIAGNOSIS — Z13.89 SCREENING FOR SKIN CONDITION: ICD-10-CM

## 2021-01-20 DIAGNOSIS — Z85.828 HISTORY OF SKIN CANCER: ICD-10-CM

## 2021-01-20 DIAGNOSIS — L82.1 SEBORRHEIC KERATOSIS: ICD-10-CM

## 2021-01-20 PROCEDURE — 88305 TISSUE EXAM BY PATHOLOGIST: CPT | Performed by: STUDENT IN AN ORGANIZED HEALTH CARE EDUCATION/TRAINING PROGRAM

## 2021-01-20 PROCEDURE — 99213 OFFICE O/P EST LOW 20 MIN: CPT | Performed by: DERMATOLOGY

## 2021-01-20 PROCEDURE — 11102 TANGNTL BX SKIN SINGLE LES: CPT | Performed by: DERMATOLOGY

## 2021-01-20 NOTE — PROGRESS NOTES
[facility]  [address] [place] [id number]  [phone]  [phone]     MRN: [id number] : 1940  Encounter: [id number]  Patient Information: Ava Mckenzie  Chief complaint: yearly skin check spot on cheek    History of present illness:  79-year-old female with previous history of skin cancer who had not seen for couple years secondary to [place] presents secondary to a irritated area on the left cheek patient notes that the area has been irritated to some degree but really seem to expand over the last week  Patient notably was in the hospital secondary to her fracture vertebra also was diagnosed with Parkinson's started on Sinemet    On no other concerns noted  Past Medical History:   Diagnosis Date    Actinic keratosis 3/11/2016    Acute midline low back pain without sciatica 2020    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     Benign neoplasm of skin     Chest pain     Claustrophobia     Diverticulitis     GERD (gastroesophageal reflux disease)     H  pylori infection 2020    Muscle weakness     Nonmelanoma skin cancer     last assessed 2017    Palpitations     Seasonal allergies     Seborrheic keratosis 2014    Sleep apnea     no cpap    Spontaneous      without mention of complications     Squamous cell carcinoma of forehead 2014    Syncope      Past Surgical History:   Procedure Laterality Date    BOTOX INJECTION N/A 3/6/2017    Procedure: CYSTOSCOPY; BLADDER BOTOX 100 UNITS ;  Surgeon: Cliff Jhaveri MD;  [facility];  Service:     BOWEL RESECTION      related ot diverticulitis    CARDIAC CATHETERIZATION      CARPAL TUNNEL RELEASE Right     COLONOSCOPY  2019    COLOSTOMY      COLOSTOMY CLOSURE      FOOT SURGERY Left     neuroma    HYSTERECTOMY      NASAL SINUS SURGERY      TX CYSTOURETHROSCOPY N/A 2018    Procedure: Chelo Mccormick WITH BOTOX;  Surgeon: Thor Live MD;  [facility];  Service: Urology    AL ESOPHAGOGASTRODUODENOSCOPY TRANSORAL DIAGNOSTIC N/A 4/23/2019    Procedure: ESOPHAGOGASTRODUODENOSCOPY (EGD); Surgeon: Michele Moerland DO;  [facility]; Service: Gastroenterology    TONSILLECTOMY      UPPER GASTROINTESTINAL ENDOSCOPY  04/23/2019     Social History   Social History     Substance and Sexual Activity   Alcohol Use Yes    Frequency: Monthly or less    Drinks per session: 1 or 2    Binge frequency: Never    Comment: patient states rare use on holidays      Social History     Substance and Sexual Activity   Drug Use No     Social History     Tobacco Use   Smoking Status Never Smoker   Smokeless Tobacco Never Used     Family History   Problem Relation Age of Onset    Alcohol abuse Mother     Heart disease Mother     Alcohol abuse Father     Alcohol abuse Brother     Cancer Brother     Tremor Neg Hx     Parkinsonism Neg Hx     Colon cancer Neg Hx      Meds/Allergies   Allergies   Allergen Reactions    Caffeine GI Intolerance    Iodine Rash    Latex Rash    Other Rash     Adhesive tape         Meds:  Prior to Admission medications    Medication Sig Start Date End Date Taking?  Authorizing Provider   Ascorbic Acid (VITAMIN C) 1000 MG tablet Take 1,000 mg by mouth daily   Yes Historical Provider, MD   aspirin 81 mg chewable tablet Chew 1 tablet (81 mg total) daily 11/30/20  Yes RISHI Joaquin   B Complex Vitamins (B COMPLEX 100 PO) Take by mouth   Yes Historical Provider, MD   Biotin 2500 MCG CAPS Take by mouth   Yes Historical Provider, MD   calcium carbonate (OS-JOHN) 1250 (500 Ca) MG tablet Take 1 tablet by mouth daily   Yes Historical Provider, MD   carbidopa-levodopa (SINEMET)  mg per tablet TAKE 1 TABLET BY MOUTH 3 (THREE) TIMES A DAY BEFORE MEALS 12/24/20  Yes Tramaine Gerard MD   cholecalciferol (VITAMIN D3) 1,000 units tablet Take 5,000 Units by mouth daily    Yes Historical Provider, MD   Echinacea 125 MG CAPS Take by mouth   Yes Historical Provider, MD   Magnesium 200 MG TABS Take 1 tablet (200 mg total) by mouth daily 7/22/20  Yes Maite Alvarez MD   Mirabegron ER 50 MG TB24 Take 1 tablet (50 mg total) by mouth daily 9/28/20  Yes Corby Deleon PA-C   multivitamin (THERAGRAN) TABS Take 1 tablet by mouth daily   Yes Historical Provider, MD   Omega-3 350 MG CPDR Take by mouth   Yes Historical Provider, MD   pantoprazole (PROTONIX) 40 mg tablet Take 1 tablet (40 mg total) by mouth daily 1/4/21  Yes Claudia Ham PA-C   Potassium Gluconate 595 (99 K) MG TABS Take by mouth   Yes Historical Provider, MD   Probiotic Product (PROBIOTIC-10) CAPS Take by mouth   Yes Historical Provider, MD   atorvastatin (LIPITOR) 40 mg tablet Take 1 tablet (40 mg total) by mouth every evening  Patient not taking: Reported on 1/20/2021 11/29/20   RISHI Gallego   diazepam (VALIUM) 2 mg tablet Take 1 tablet (2 mg total) by mouth once for 1 dose 1/18/21 1/18/21  Claudia Ham PA-C   fexofenadine (ALLEGRA) 180 MG tablet Take 180 mg by mouth as needed     Historical Provider, MD   ibuprofen (MOTRIN) 600 mg tablet Take 1 tablet (600 mg total) by mouth every 8 (eight) hours as needed for moderate pain  Patient not taking: Reported on 1/20/2021 11/19/20   Maite Alvarez MD   meclizine (ANTIVERT) 25 mg tablet Take 1 tablet (25 mg total) by mouth every 8 (eight) hours as needed for dizziness or nausea  Patient not taking: Reported on 1/20/2021 11/23/20   Maite Alvarez MD   naproxen sodium (Aleve) 220 MG tablet Take 220 mg by mouth 2 (two) times a day with meals    Historical Provider, MD   ondansetron (ZOFRAN) 4 mg tablet Take 1 tablet (4 mg total) by mouth every 8 (eight) hours as needed for nausea or vomiting  Patient not taking: Reported on 1/20/2021 11/23/20   Maite Alvarez MD   polyethylene glycol (MIRALAX) 17 g packet Take 17 g by mouth daily  Patient not taking: Reported on 1/20/2021 6/13/19   Evie Mckenzie MD   psyllium (METAMUCIL) 58 6 % powder Take 1 packet by mouth 3 (three) times a day    Historical Provider, MD       Subjective:     Review of Systems:    General: negative for - chills, fatigue, fever,  weight gain or weight loss  Psychological: negative for - anxiety, behavioral disorder, concentration difficulties, decreased libido, depression, irritability, memory difficulties, mood swings, sleep disturbances or suicidal ideation  ENT: negative for - hearing difficulties , nasal congestion, nasal discharge, oral lesions, sinus pain, sneezing, sore throat  Allergy and Immunology: negative for - hives, insect bite sensitivity,  Hematological and Lymphatic: negative for - bleeding problems, blood clots,bruising, swollen lymph nodes  Endocrine: negative for - hair pattern changes, hot flashes, malaise/lethargy, mood swings, palpitations, polydipsia/polyuria, skin changes, temperature intolerance or unexpected weight change  Respiratory: negative for - cough, hemoptysis, orthopnea, shortness of breath, or wheezing  Cardiovascular: negative for - chest pain, dyspnea on exertion, edema,  Gastrointestinal: negative for - abdominal pain, nausea/vomiting  Genito-Urinary: negative for - dysuria, incontinence, irregular/heavy menses or urinary frequency/urgency  Musculoskeletal: negative for - gait disturbance, joint pain, joint stiffness, joint swelling, muscle pain, muscular weakness  Dermatological:  As in HPI  Neurological: negative for confusion, dizziness, headaches, impaired coordination/balance, memory loss, numbness/tingling, seizures, speech problems, tremors or weakness       Objective:   Temp 97 6 °F (36 4 °C)     Physical Exam:    General Appearance:    Alert, cooperative, no distress   Head:    Normocephalic, without obvious abnormality, atraumatic           Skin:   A full skin exam was performed including scalp, head scalp, eyes, ears, nose, lips, neck, chest, axilla, abdomen, back, buttocks, bilateral upper extremities, bilateral lower extremities, hands, feet, fingers, toes, fingernails, and toenails scaling erythematous patches noted on left cheek as well as on the eyebrow area as well largest 1 measures about 35 mm in size with also keratotic center  Normal keratotic papules greasy stuck on appearance previous sites skin cancer well healed without recurrence nothing else remarkable noted exam      Shave Biopsy Procedure Note    Pre-operative Diagnosis:  Question irritated keratosis question of a fixed drug reaction or some type of contact reaction    Plan:  1  Instructed to keep the wound dry and covered for 24 and clean thereafter  2  Warning signs of infection were reviewed  3  Recommended that the patient use OTC acetaminophen as needed for pain  4  Return  Pending results of biopsy(ies)    Locations:  Left cheek    Indications:  Changing area    Anesthesia: Lidocaine 1% with epinephrine without added sodium bicarbonate    Procedure Details     Patient informed of the risks (including bleeding and infection) and benefits of the   procedure and Verbal informed consent obtained  The lesion and surrounding area were given a sterile prep using alcohol and draped in the usual sterile fashion  A Blue blade razor was used to obtain a specimen  Hemostasis achieved with aluminum chloride  Petrolatum and a sterile dressing applied  The specimen was sent for pathologic examination  The patient tolerated the procedure(s) well  Complications:  none  Assessment:     1  Unknown skin lesion  hydrocortisone 2 5 % ointment   2  Seborrheic keratosis     3  Screening for skin condition     4   History of skin cancer           Plan:   Skin lesion difficult for me to assess if there is an underlying neoplasm that is present there and there is a secondary process that is occurring which is created this erythema question of a fixed drug reaction was concerning also possibility of a contact reaction at present will see what the biopsy shows and go from there will tentatively treat the area with desonide cream   Seborrheic keratosis patient reassured these are normal growths we acquire with age no treatment needed  History of skin cancer in no recurrence nothing else atypical sunblock recommended follow-up in 1 year  Screening for dermatologic disorders nothing else of concern noted on complete exam follow-up in 1 year  needed  Ángela Rogers MD  1/20/2021,3:40 PM    Portions of the record may have been created with voice recognition software   Occasional wrong word or "sound a like" substitutions may have occurred due to the inherent limitations of voice recognition software   Read the chart carefully and recognize, using context, where substitutions have occurred

## 2021-01-20 NOTE — PATIENT INSTRUCTIONS
Skin lesion difficult for me to assess if there is an underlying neoplasm that is present there and there is a secondary process that is occurring which is created this erythema question of a fixed drug reaction was concerning also possibility of a contact reaction at present will see what the biopsy shows and go from there will tentatively treat the area with desonide cream   Seborrheic keratosis patient reassured these are normal growths we acquire with age no treatment needed  History of skin cancer in no recurrence nothing else atypical sunblock recommended follow-up in 1 year  Screening for dermatologic disorders nothing else of concern noted on complete exam follow-up in 1 year  needed

## 2021-01-27 ENCOUNTER — OFFICE VISIT (OUTPATIENT)
Dept: NEUROLOGY | Facility: CLINIC | Age: 81
End: 2021-01-27
Payer: COMMERCIAL

## 2021-01-27 VITALS
SYSTOLIC BLOOD PRESSURE: 112 MMHG | HEART RATE: 77 BPM | HEIGHT: 62 IN | BODY MASS INDEX: 32.76 KG/M2 | DIASTOLIC BLOOD PRESSURE: 76 MMHG | WEIGHT: 178 LBS

## 2021-01-27 DIAGNOSIS — G47.52 REM SLEEP BEHAVIOR DISORDER: Primary | ICD-10-CM

## 2021-01-27 DIAGNOSIS — R25.1 TREMOR: ICD-10-CM

## 2021-01-27 DIAGNOSIS — G20 PARKINSON'S DISEASE (HCC): ICD-10-CM

## 2021-01-27 PROCEDURE — 1160F RVW MEDS BY RX/DR IN RCRD: CPT | Performed by: PSYCHIATRY & NEUROLOGY

## 2021-01-27 PROCEDURE — 1036F TOBACCO NON-USER: CPT | Performed by: PSYCHIATRY & NEUROLOGY

## 2021-01-27 PROCEDURE — 99215 OFFICE O/P EST HI 40 MIN: CPT | Performed by: PSYCHIATRY & NEUROLOGY

## 2021-01-27 RX ORDER — CLONAZEPAM 0.5 MG/1
0.25 TABLET ORAL
Qty: 45 EACH | Refills: 3 | Status: SHIPPED | OUTPATIENT
Start: 2021-01-27 | End: 2021-05-12 | Stop reason: ALTCHOICE

## 2021-01-27 NOTE — PROGRESS NOTES
Progress Note - Neurology   Raul Holland 79 y o  female MRN: [id number]  Unit/Bed#:  Encounter: [id number]      Subjective:   Patient is a 26-year-old right-handed very pleasant lady who comes in accompanied with her daughter for hospital follow-up during which time was admitted with dizziness, gait dysfunction, felt to be peripheral vertigo, had a workup done in the form of an MRI of the brain which was unremarkable with age-related atrophy and chronic ischemic changes, CT scan of the head showed no evidence of any acute abnormalities, echocardiogram was unremarkable, patient was discharged on meclizine to follow up with outpatient Neurology  During this hospitalization she was also started on Sinemet 25/100, 1 tablets 3 times a day at was evaluated by our team almost a year ago for resting tremor felt to be secondary to Parkinson's disease, medications but not considered at that time since her symptoms were mild and since then the patient has had worsening tremors now affecting both hands, at rest, with changes in her handwriting, worsening gait difficulty and in the year also suffered 1 fall, mostly as a result of postural imbalance and since she has been started on Sinemet patient has been feeling much better  Currently also receiving occupational therapy and physical therapy at home  Unfortunately she also had a lumbar compression fracture as a result of a fall recently at L1 and has moderate spinal stenosis at L4-L5  Patient denies any back pain at this time  Overall she has been feeling better the dizziness has resolved, her gait is improved, and also has noted improvement with her tremor  Patient also has a history of vivid dreams, acting out in her sleep mostly verbal outbursts which have been occurring for several years recently also witnessed by her daughter who she now lives with  Patient denies any new neurological symptoms at this time  ROS:   Review of Systems   Constitutional: Negative  Negative for appetite change and fever  HENT: Negative  Negative for hearing loss, tinnitus, trouble swallowing and voice change  Eyes: Negative  Negative for photophobia and pain  Respiratory: Negative  Negative for shortness of breath  Cardiovascular: Negative  Negative for palpitations  Gastrointestinal: Negative  Negative for nausea and vomiting  Endocrine: Negative  Negative for cold intolerance  Genitourinary: Negative  Negative for dysuria, frequency and urgency  Musculoskeletal: Negative  Negative for myalgias and neck pain  Skin: Negative  Negative for rash  Allergic/Immunologic: Negative  Neurological: Positive for tremors, weakness and headaches  Negative for dizziness, seizures, syncope, facial asymmetry, speech difficulty, light-headedness and numbness  Hematological: Negative  Does not bruise/bleed easily  Psychiatric/Behavioral: Positive for sleep disturbance  Negative for confusion and hallucinations  MA review of systems was reviewed by myself  Vitals:   Vitals:    01/27/21 1054 01/27/21 1105 01/27/21 1108   BP: 132/84 128/78 112/76   BP Location: Right arm Left arm Left arm   Patient Position: Supine Sitting Standing   Cuff Size: Large Large Large   Pulse: 77     Weight: 80 7 kg (178 lb)     Height: 5' 2" (1 575 m)     ,Body mass index is 32 56 kg/m²        MEDS:      Current Outpatient Medications:     Ascorbic Acid (VITAMIN C) 1000 MG tablet, Take 1,000 mg by mouth daily, Disp: , Rfl:     aspirin 81 mg chewable tablet, Chew 1 tablet (81 mg total) daily, Disp: 30 tablet, Rfl: 0    B Complex Vitamins (B COMPLEX 100 PO), Take by mouth, Disp: , Rfl:     Biotin 2500 MCG CAPS, Take by mouth, Disp: , Rfl:     calcium carbonate (OS-JOHN) 1250 (500 Ca) MG tablet, Take 1 tablet by mouth daily, Disp: , Rfl:     carbidopa-levodopa (SINEMET)  mg per tablet, TAKE 1 TABLET BY MOUTH 3 (THREE) TIMES A DAY BEFORE MEALS, Disp: 90 tablet, Rfl: 0   cholecalciferol (VITAMIN D3) 1,000 units tablet, Take 5,000 Units by mouth daily , Disp: , Rfl:     Echinacea 125 MG CAPS, Take by mouth, Disp: , Rfl:     hydrocortisone 2 5 % ointment, Apply topically 2 (two) times a day, Disp: 30 g, Rfl: 0    Magnesium 200 MG TABS, Take 1 tablet (200 mg total) by mouth daily, Disp: 30 tablet, Rfl: 3    Mirabegron ER 50 MG TB24, Take 1 tablet (50 mg total) by mouth daily, Disp: 30 tablet, Rfl: 3    multivitamin (THERAGRAN) TABS, Take 1 tablet by mouth daily, Disp: , Rfl:     Omega-3 350 MG CPDR, Take by mouth, Disp: , Rfl:     pantoprazole (PROTONIX) 40 mg tablet, Take 1 tablet (40 mg total) by mouth daily, Disp: 30 tablet, Rfl: 3    Potassium Gluconate 595 (99 K) MG TABS, Take by mouth, Disp: , Rfl:     Probiotic Product (PROBIOTIC-10) CAPS, Take by mouth, Disp: , Rfl:     atorvastatin (LIPITOR) 40 mg tablet, Take 1 tablet (40 mg total) by mouth every evening (Patient not taking: Reported on 1/20/2021), Disp: 30 tablet, Rfl: 0    diazepam (VALIUM) 2 mg tablet, Take 1 tablet (2 mg total) by mouth once for 1 dose, Disp: 2 tablet, Rfl: 0    fexofenadine (ALLEGRA) 180 MG tablet, Take 180 mg by mouth as needed , Disp: , Rfl:     ibuprofen (MOTRIN) 600 mg tablet, Take 1 tablet (600 mg total) by mouth every 8 (eight) hours as needed for moderate pain (Patient not taking: Reported on 1/20/2021), Disp: 30 tablet, Rfl: 2    meclizine (ANTIVERT) 25 mg tablet, Take 1 tablet (25 mg total) by mouth every 8 (eight) hours as needed for dizziness or nausea (Patient not taking: Reported on 1/20/2021), Disp: 30 tablet, Rfl: 1    naproxen sodium (Aleve) 220 MG tablet, Take 220 mg by mouth 2 (two) times a day with meals, Disp: , Rfl:     ondansetron (ZOFRAN) 4 mg tablet, Take 1 tablet (4 mg total) by mouth every 8 (eight) hours as needed for nausea or vomiting (Patient not taking: Reported on 1/20/2021), Disp: 20 tablet, Rfl: 2    polyethylene glycol (MIRALAX) 17 g packet, Take 17 g by mouth daily (Patient not taking: Reported on 1/20/2021), Disp: 14 each, Rfl: 2    psyllium (METAMUCIL) 58 6 % powder, Take 1 packet by mouth 3 (three) times a day, Disp: , Rfl:   :    Physical Exam:  General appearance: alert, appears stated age and cooperative  Head: Normocephalic, without obvious abnormality, atraumatic    On neurological examination patient is alert awake oriented, patient's blood pressure measurements sitting and standing did not reveal evidence of orthostasis, noted to be 128/78 sitting, 112/76 standing  speech is fluent, cranial nerves 2-12 intact with mild bradykinesia noted of facial muscles, and on motor and sensory exam she has no evidence of any focal drift, weakness in the upper lower extremities, no cogwheeling rigidity was noted, she does have a resting tremor noted intermittently in the right hand, no sensory loss was noted to pinprick and light touch, does not extinguish double simultaneous stimuli, no evidence of any dysmetria was noted and her gait was normal based with poor arm swing, patient to turn in 2 steps, and stand with minimal assistance  No bruits were appreciable in the neck  Lab Results: I have personally reviewed pertinent reports  Imaging Studies: I have personally reviewed pertinent reports  Assessment:  1  Parkinson's disease  2  Gait dysfunction  3  L1 compression fracture with lumbar spinal stenosis  4  REM sleep disorder  Plan: At this time the patient and her daughter had several questions regarding the illness, the progression of the illness, treatment options, and they were both educated about all the different aspects, symptoms and signs, as well as the course of the illness, and today I have been recommended to continue with carbidopa/levodopa 25/100 3 times a day, and will be started on clonazepam 0 25 mg at bedtime to help with the REM sleep disorder  Patient has apparently tried melatonin in the past with no symptom relief    Home exercise program is encouraged currently receiving physical therapy, and patient denies any significant non motor symptoms at this time  She will return back to see me in 3-4 months  Counseling / Coordination of Care  Total time spent today 60 minutes  Greater than 50% of total time was spent with the patient and / or family counseling and / or coordination of care  1/27/2021,11:11 AM    Dictation voice to text software has been used in the creation of this document  Please consider this in light of any contextual or grammatical errors

## 2021-01-28 DIAGNOSIS — N39.41 URGE INCONTINENCE: ICD-10-CM

## 2021-01-28 DIAGNOSIS — R25.1 TREMOR: ICD-10-CM

## 2021-01-28 DIAGNOSIS — G20 PARKINSON'S DISEASE (HCC): ICD-10-CM

## 2021-01-28 DIAGNOSIS — D04.39 SQUAMOUS CELL CARCINOMA IN SITU OF SKIN OF LEFT CHEEK: Primary | ICD-10-CM

## 2021-01-28 RX ORDER — FLUOROURACIL 50 MG/G
CREAM TOPICAL 2 TIMES DAILY
Qty: 40 G | Refills: 0 | Status: SHIPPED | OUTPATIENT
Start: 2021-01-28 | End: 2021-05-12 | Stop reason: ALTCHOICE

## 2021-01-28 NOTE — TELEPHONE ENCOUNTER
pt called requesting a bridge supply of carb/levo be sent to her local pharm until she receives mail order  She is asking for 15 day supply  Script entered    Please sign off

## 2021-01-28 NOTE — TELEPHONE ENCOUNTER
Message on med refill VM from pt requesting refill of Myrbetriq  Was last seen 8/25/21 by Merlin Graham PA-C in the [facility] location, and medication was to be continued at that time  Pt's next follow up is 2/25/21 with Dr Oh Vidal  She is requesting a short supply to her local retail pharmacy, and longer supply to [address]    Refill queued and routed to AP covering for approval

## 2021-01-28 NOTE — PROGRESS NOTES
Biopsy consistent with squamous cell carcinoma in situ will go ahead and place patient on Efudex cream to the area twice daily and recheck in 3 weeks

## 2021-01-30 DIAGNOSIS — Z23 ENCOUNTER FOR IMMUNIZATION: ICD-10-CM

## 2021-02-05 NOTE — TELEPHONE ENCOUNTER
Patient called and I read off the notation below and she said no that is not right and she  said that she would need her 90 days supply of Myrabetriq to go to the mail order of [facility]  She only has a week left of her medicine  Please call her back at [phone]

## 2021-02-05 NOTE — TELEPHONE ENCOUNTER
Script for the requested medication (90 day supply to [facility]) was queued and forwarded to the Advanced Practitioner covering the [facility] location for approval

## 2021-02-10 ENCOUNTER — IMMUNIZATIONS (OUTPATIENT)
Dept: FAMILY MEDICINE CLINIC | Facility: HOSPITAL | Age: 81
End: 2021-02-10

## 2021-02-10 DIAGNOSIS — Z23 ENCOUNTER FOR IMMUNIZATION: Primary | ICD-10-CM

## 2021-02-10 PROCEDURE — 0011A SARS-COV-2 / COVID-19 MRNA VACCINE (MODERNA) 100 MCG: CPT

## 2021-02-10 PROCEDURE — 91301 SARS-COV-2 / COVID-19 MRNA VACCINE (MODERNA) 100 MCG: CPT

## 2021-02-18 ENCOUNTER — TELEMEDICINE (OUTPATIENT)
Dept: DERMATOLOGY | Facility: CLINIC | Age: 81
End: 2021-02-18
Payer: COMMERCIAL

## 2021-02-18 DIAGNOSIS — D04.39 SQUAMOUS CELL CARCINOMA IN SITU OF SKIN OF LEFT CHEEK: Primary | ICD-10-CM

## 2021-02-18 PROCEDURE — 99213 OFFICE O/P EST LOW 20 MIN: CPT | Performed by: DERMATOLOGY

## 2021-02-18 NOTE — PATIENT INSTRUCTIONS
Patient advised that this is exactly what we expect will have her continue with Efudex treatment for 1 more week  After that patient could apply petrolatum to the area     We will plan follow-up here in 5 weeks

## 2021-02-18 NOTE — PROGRESS NOTES
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 D[facility]ology   Showing today's visits and meeting all other requirements     Future Appointments  No visits were found meeting these conditions  Showing future appointments within next 150 days and meeting all other requirements        The patient was identified by name and date of birth  Lemuel Willoughby was informed that this is a telemedicine visit and that the visit is being conducted through [address] [place] [address] and patient was informed that this is not a secure, HIPAA-compliant platform  She agrees to proceed     My office door was closed  No one else was in the room  She acknowledged consent and understanding of privacy and security of the video platform  The patient has agreed to participate and understands they can discontinue the visit at any time      Patient is aware this is a billable service  Subjective  Juan C Pickard is a 79 y o  female  With recently biopsied diagnosed squamous cell carcinoma in situ left cheek lesion was quite large we elected to go ahead and see if we can treat this with Efudex patient has been using this for 3 weeks no problems noted   HPI     Past Medical History:   Diagnosis Date    Actinic keratosis 3/11/2016    Acute midline low back pain without sciatica 2020    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     Benign neoplasm of skin     Chest pain     Claustrophobia     Diverticulitis     Fracture     L1-L2    GERD (gastroesophageal reflux disease)     H  pylori infection 2020    Muscle weakness     Nonmelanoma skin cancer     last assessed 2017    Palpitations     Seasonal allergies     Seborrheic keratosis 2014    Sleep apnea     no cpap    Spontaneous      without mention of complications     Squamous cell carcinoma of forehead 2014    Syncope        Past Surgical History:   Procedure Laterality Date    BOTOX INJECTION N/A 3/6/2017    Procedure: CYSTOSCOPY; BLADDER BOTOX 100 UNITS ;  Surgeon: Chuck Martins MD;  Location: [facility];  Service:     BOWEL RESECTION      related ot diverticulitis    CARDIAC CATHETERIZATION      CARPAL TUNNEL RELEASE Right     COLONOSCOPY  2019    COLOSTOMY      COLOSTOMY CLOSURE      FOOT SURGERY Left     neuroma    HYSTERECTOMY      NASAL SINUS SURGERY      WI CYSTOURETHROSCOPY N/A 2018    Procedure: CYSTOSCOPY WITH BOTOX;  Surgeon: Chuck Martins MD;  [facility];  Service: Urology    WI ESOPHAGOGASTRODUODENOSCOPY TRANSORAL DIAGNOSTIC N/A 2019    Procedure: ESOPHAGOGASTRODUODENOSCOPY (EGD); Surgeon: Alva Hernandez DO;  Location: [facility];   Service: Gastroenterology    TONSILLECTOMY      UPPER GASTROINTESTINAL ENDOSCOPY  2019       Current Outpatient Medications   Medication Sig Dispense Refill    Ascorbic Acid (VITAMIN C) 1000 MG tablet Take 1,000 mg by mouth daily      aspirin 81 mg chewable tablet Chew 1 tablet (81 mg total) daily 30 tablet 0    B Complex Vitamins (B COMPLEX 100 PO) Take by mouth      Biotin 2500 MCG CAPS Take by mouth      calcium carbonate (OS-JOHN) 1250 (500 Ca) MG tablet Take 1 tablet by mouth daily      carbidopa-levodopa (SINEMET)  mg per tablet Take 1 tablet by mouth 3 (three) times a day before meals 45 tablet 0    cholecalciferol (VITAMIN D3) 1,000 units tablet Take 5,000 Units by mouth daily       clonazePAM (KlonoPIN) 0 5 mg tablet Take 0 5 tablets (0 25 mg total) by mouth daily at bedtime 45 each 3    Echinacea 125 MG CAPS Take by mouth      fluorouracil (EFUDEX) 5 % cream Apply topically 2 (two) times a day 40 g 0    Magnesium 200 MG TABS Take 1 tablet (200 mg total) by mouth daily 30 tablet 3    Mirabegron ER 50 MG TB24 Take 1 tablet (50 mg total) by mouth daily 14 tablet 0    multivitamin (THERAGRAN) TABS Take 1 tablet by mouth daily      Omega-3 350 MG CPDR Take by mouth      pantoprazole (PROTONIX) 40 mg tablet Take 1 tablet (40 mg total) by mouth daily 30 tablet 3    Potassium Gluconate 595 (99 K) MG TABS Take by mouth      Probiotic Product (PROBIOTIC-10) CAPS Take by mouth      atorvastatin (LIPITOR) 40 mg tablet Take 1 tablet (40 mg total) by mouth every evening (Patient not taking: Reported on 1/20/2021) 30 tablet 0    fexofenadine (ALLEGRA) 180 MG tablet Take 180 mg by mouth as needed       hydrocortisone 2 5 % ointment Apply topically 2 (two) times a day (Patient not taking: Reported on 2/18/2021) 30 g 0    ibuprofen (MOTRIN) 600 mg tablet Take 1 tablet (600 mg total) by mouth every 8 (eight) hours as needed for moderate pain (Patient not taking: Reported on 1/20/2021) 30 tablet 2    meclizine (ANTIVERT) 25 mg tablet Take 1 tablet (25 mg total) by mouth every 8 (eight) hours as needed for dizziness or nausea (Patient not taking: Reported on 1/20/2021) 30 tablet 1    Mirabegron ER 50 MG TB24 Take 1 tablet (50 mg total) by mouth daily (Patient not taking: Reported on 2/18/2021) 90 tablet 0    naproxen sodium (Aleve) 220 MG tablet Take 220 mg by mouth 2 (two) times a day with meals      ondansetron (ZOFRAN) 4 mg tablet Take 1 tablet (4 mg total) by mouth every 8 (eight) hours as needed for nausea or vomiting (Patient not taking: Reported on 1/20/2021) 20 tablet 2    polyethylene glycol (MIRALAX) 17 g packet Take 17 g by mouth daily (Patient not taking: Reported on 1/20/2021) 14 each 2    psyllium (METAMUCIL) 58 6 % powder Take 1 packet by mouth 3 (three) times a day       No current facility-administered medications for this visit  Allergies   Allergen Reactions    Caffeine GI Intolerance    Iodine Rash    Latex Rash    Other Rash     Adhesive tape         Review of Systems    Video Exam    There were no vitals filed for this visit  Physical Exam  Erythema crusting inflammation noted on the left cheek with the large squamous cell carcinoma in situ was biopsied    I spent 10 minutes directly with the patient during this visit      VIRTUAL VISIT DISCLAIMER    Ria Rhoades acknowledges that she has consented to an online visit or consultation  She understands that the online visit is based solely on information provided by her, and that, in the absence of a face-to-face physical evaluation by the physician, the diagnosis she receives is both limited and provisional in terms of accuracy and completeness  This is not intended to replace a full medical face-to-face evaluation by the physician  Ria Rhoades understands and accepts these terms

## 2021-02-24 DIAGNOSIS — R13.10 DYSPHAGIA, UNSPECIFIED TYPE: ICD-10-CM

## 2021-02-24 RX ORDER — PANTOPRAZOLE SODIUM 40 MG/1
40 TABLET, DELAYED RELEASE ORAL DAILY
Qty: 90 TABLET | Refills: 3 | Status: SHIPPED | OUTPATIENT
Start: 2021-02-24 | End: 2022-01-25

## 2021-03-10 ENCOUNTER — IMMUNIZATIONS (OUTPATIENT)
Dept: FAMILY MEDICINE CLINIC | Facility: HOSPITAL | Age: 81
End: 2021-03-10

## 2021-03-10 ENCOUNTER — OFFICE VISIT (OUTPATIENT)
Dept: FAMILY MEDICINE CLINIC | Facility: CLINIC | Age: 81
End: 2021-03-10
Payer: COMMERCIAL

## 2021-03-10 VITALS
HEART RATE: 88 BPM | TEMPERATURE: 97.8 F | DIASTOLIC BLOOD PRESSURE: 76 MMHG | OXYGEN SATURATION: 97 % | HEIGHT: 62 IN | SYSTOLIC BLOOD PRESSURE: 128 MMHG | BODY MASS INDEX: 33.13 KG/M2 | WEIGHT: 180 LBS

## 2021-03-10 DIAGNOSIS — H61.21 EXCESSIVE EAR WAX, RIGHT: Primary | ICD-10-CM

## 2021-03-10 DIAGNOSIS — Z23 ENCOUNTER FOR IMMUNIZATION: Primary | ICD-10-CM

## 2021-03-10 DIAGNOSIS — E28.39 MENOPAUSE OVARIAN FAILURE: ICD-10-CM

## 2021-03-10 PROBLEM — R42 DIZZINESS: Status: RESOLVED | Noted: 2020-11-24 | Resolved: 2021-03-10

## 2021-03-10 PROBLEM — M54.9 BACK PAIN: Status: RESOLVED | Noted: 2020-11-24 | Resolved: 2021-03-10

## 2021-03-10 PROCEDURE — 99213 OFFICE O/P EST LOW 20 MIN: CPT | Performed by: FAMILY MEDICINE

## 2021-03-10 PROCEDURE — 69210 REMOVE IMPACTED EAR WAX UNI: CPT | Performed by: FAMILY MEDICINE

## 2021-03-10 PROCEDURE — 91301 SARS-COV-2 / COVID-19 MRNA VACCINE (MODERNA) 100 MCG: CPT

## 2021-03-10 PROCEDURE — 0012A SARS-COV-2 / COVID-19 MRNA VACCINE (MODERNA) 100 MCG: CPT

## 2021-03-10 NOTE — PROGRESS NOTES
Assessment/Plan:    No problem-specific Assessment & Plan notes found for this encounter  Diagnoses and all orders for this visit:    Excessive ear wax, right  -     carbamide peroxide (DEBROX) 6 5 % otic solution; Administer 5 drops to the right ear 2 (two) times a day    Menopause ovarian failure  -     DXA bone density spine hip and pelvis; Future      Follow up as needed    Subjective:      Patient ID: Ally Kaplan is a 79 y o  female  Patient is here because she has been having rigjt ear discomfort  Thinks there is wax there  The following portions of the patient's history were reviewed and updated as appropriate: She  has a past medical history of Actinic keratosis (3/11/2016), Acute midline low back pain without sciatica (2020), 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, Benign neoplasm of skin, Chest pain, Claustrophobia, Diverticulitis, Fracture, GERD (gastroesophageal reflux disease), H  pylori infection (2020), Muscle weakness, Nonmelanoma skin cancer, Palpitations, Seasonal allergies, Seborrheic keratosis (2014), Sleep apnea, Spontaneous , Squamous cell carcinoma of forehead (2014), and Syncope    She   Patient Active Problem List    Diagnosis Date Noted    Excessive ear wax, right 03/10/2021    Closed compression fracture of body of L1 vertebra (HCC) 2020    Cerebrovascular disease 2020    Headache 2020    Pancreatic cyst 2020    Trochanteric bursitis, right hip 2020    Tremor 2020    Chronic idiopathic constipation 2019    History of adenomatous polyp of colon 2019    Osteopenia 2019    Gastro-esophageal reflux disease without esophagitis 2019    Dysphagia 2019    Multiple thyroid nodules 2019    Menopause ovarian failure 2019    Vitamin D deficiency 2019    Mitral valve disorder 2018    Mood disturbance 2018    Obesity (BMI 30-39 9) 08/28/2018    Claustrophobia 08/28/2018    Impaired fasting glucose 07/31/2018    Seasonal allergic rhinitis 05/08/2018    History of skin cancer 04/04/2018    Parkinson's disease ([facility] [address] ) 03/26/2018    Primary insomnia 03/26/2018    Cherry angioma 11/15/2017    Postablative hypothyroidism 09/08/2016    Dyspnea on exertion 10/02/2015    OAB (overactive bladder) 09/11/2015    Sleep apnea 04/15/2014     She  has a past surgical history that includes Hysterectomy; Tonsillectomy; Nasal sinus surgery; Colostomy; Carpal tunnel release (Right); Colostomy closure; Foot surgery (Left); Cardiac catheterization; BOTOX INJECTION (N/A, 3/6/2017); pr cystourethroscopy (N/A, 4/13/2018); Bowel resection; pr esophagogastroduodenoscopy transoral diagnostic (N/A, 4/23/2019); Colonoscopy (07/31/2019); and Upper gastrointestinal endoscopy (04/23/2019)  Her family history includes Alcohol abuse in her brother, father, and mother; Cancer in her brother; Heart disease in her mother  She  reports that she has never smoked  She has never used smokeless tobacco  She reports current alcohol use  She reports that she does not use drugs    Current Outpatient Medications   Medication Sig Dispense Refill    Ascorbic Acid (VITAMIN C) 1000 MG tablet Take 1,000 mg by mouth daily      aspirin 81 mg chewable tablet Chew 1 tablet (81 mg total) daily 30 tablet 0    B Complex Vitamins (B COMPLEX 100 PO) Take by mouth      Biotin 2500 MCG CAPS Take by mouth      calcium carbonate (OS-JOHN) 1250 (500 Ca) MG tablet Take 1 tablet by mouth daily      carbidopa-levodopa (SINEMET)  mg per tablet Take 1 tablet by mouth 3 (three) times a day before meals 45 tablet 0    cholecalciferol (VITAMIN D3) 1,000 units tablet Take 5,000 Units by mouth daily       clonazePAM (KlonoPIN) 0 5 mg tablet Take 0 5 tablets (0 25 mg total) by mouth daily at bedtime 45 each 3    Echinacea 125 MG CAPS Take by mouth      Magnesium 200 MG TABS Take 1 tablet (200 mg total) by mouth daily 30 tablet 3    multivitamin (THERAGRAN) TABS Take 1 tablet by mouth daily      Omega-3 350 MG CPDR Take by mouth      pantoprazole (PROTONIX) 40 mg tablet Take 1 tablet (40 mg total) by mouth daily 90 tablet 3    Potassium Gluconate 595 (99 K) MG TABS Take by mouth      Probiotic Product (PROBIOTIC-10) CAPS Take by mouth      atorvastatin (LIPITOR) 40 mg tablet Take 1 tablet (40 mg total) by mouth every evening (Patient not taking: Reported on 1/20/2021) 30 tablet 0    carbamide peroxide (DEBROX) 6 5 % otic solution Administer 5 drops to the right ear 2 (two) times a day 15 mL 0    fexofenadine (ALLEGRA) 180 MG tablet Take 180 mg by mouth as needed       fluorouracil (EFUDEX) 5 % cream Apply topically 2 (two) times a day (Patient not taking: Reported on 3/10/2021) 40 g 0    hydrocortisone 2 5 % ointment Apply topically 2 (two) times a day (Patient not taking: Reported on 2/18/2021) 30 g 0    ibuprofen (MOTRIN) 600 mg tablet Take 1 tablet (600 mg total) by mouth every 8 (eight) hours as needed for moderate pain (Patient not taking: Reported on 1/20/2021) 30 tablet 2    meclizine (ANTIVERT) 25 mg tablet Take 1 tablet (25 mg total) by mouth every 8 (eight) hours as needed for dizziness or nausea (Patient not taking: Reported on 1/20/2021) 30 tablet 1    Mirabegron ER 50 MG TB24 Take 1 tablet (50 mg total) by mouth daily 14 tablet 0    Mirabegron ER 50 MG TB24 Take 1 tablet (50 mg total) by mouth daily (Patient not taking: Reported on 2/18/2021) 90 tablet 0    naproxen sodium (Aleve) 220 MG tablet Take 220 mg by mouth 2 (two) times a day with meals      ondansetron (ZOFRAN) 4 mg tablet Take 1 tablet (4 mg total) by mouth every 8 (eight) hours as needed for nausea or vomiting (Patient not taking: Reported on 1/20/2021) 20 tablet 2    polyethylene glycol (MIRALAX) 17 g packet Take 17 g by mouth daily (Patient not taking: Reported on 1/20/2021) 14 each 2    psyllium (METAMUCIL) 58 6 % powder Take 1 packet by mouth 3 (three) times a day       No current facility-administered medications for this visit        Current Outpatient Medications on File Prior to Visit   Medication Sig    Ascorbic Acid (VITAMIN C) 1000 MG tablet Take 1,000 mg by mouth daily    aspirin 81 mg chewable tablet Chew 1 tablet (81 mg total) daily    B Complex Vitamins (B COMPLEX 100 PO) Take by mouth    Biotin 2500 MCG CAPS Take by mouth    calcium carbonate (OS-JOHN) 1250 (500 Ca) MG tablet Take 1 tablet by mouth daily    carbidopa-levodopa (SINEMET)  mg per tablet Take 1 tablet by mouth 3 (three) times a day before meals    cholecalciferol (VITAMIN D3) 1,000 units tablet Take 5,000 Units by mouth daily     clonazePAM (KlonoPIN) 0 5 mg tablet Take 0 5 tablets (0 25 mg total) by mouth daily at bedtime    Echinacea 125 MG CAPS Take by mouth    Magnesium 200 MG TABS Take 1 tablet (200 mg total) by mouth daily    multivitamin (THERAGRAN) TABS Take 1 tablet by mouth daily    Omega-3 350 MG CPDR Take by mouth    pantoprazole (PROTONIX) 40 mg tablet Take 1 tablet (40 mg total) by mouth daily    Potassium Gluconate 595 (99 K) MG TABS Take by mouth    Probiotic Product (PROBIOTIC-10) CAPS Take by mouth    atorvastatin (LIPITOR) 40 mg tablet Take 1 tablet (40 mg total) by mouth every evening (Patient not taking: Reported on 1/20/2021)    fexofenadine (ALLEGRA) 180 MG tablet Take 180 mg by mouth as needed     fluorouracil (EFUDEX) 5 % cream Apply topically 2 (two) times a day (Patient not taking: Reported on 3/10/2021)    hydrocortisone 2 5 % ointment Apply topically 2 (two) times a day (Patient not taking: Reported on 2/18/2021)    ibuprofen (MOTRIN) 600 mg tablet Take 1 tablet (600 mg total) by mouth every 8 (eight) hours as needed for moderate pain (Patient not taking: Reported on 1/20/2021)    meclizine (ANTIVERT) 25 mg tablet Take 1 tablet (25 mg total) by mouth every 8 (eight) hours as needed for dizziness or nausea (Patient not taking: Reported on 1/20/2021)    Mirabegron ER 50 MG TB24 Take 1 tablet (50 mg total) by mouth daily    Mirabegron ER 50 MG TB24 Take 1 tablet (50 mg total) by mouth daily (Patient not taking: Reported on 2/18/2021)    naproxen sodium (Aleve) 220 MG tablet Take 220 mg by mouth 2 (two) times a day with meals    ondansetron (ZOFRAN) 4 mg tablet Take 1 tablet (4 mg total) by mouth every 8 (eight) hours as needed for nausea or vomiting (Patient not taking: Reported on 1/20/2021)    polyethylene glycol (MIRALAX) 17 g packet Take 17 g by mouth daily (Patient not taking: Reported on 1/20/2021)    psyllium (METAMUCIL) 58 6 % powder Take 1 packet by mouth 3 (three) times a day     No current facility-administered medications on file prior to visit  She is allergic to caffeine; iodine; latex; and other       Review of Systems   Constitutional: Negative for activity change, appetite change, fatigue and fever  HENT: Positive for ear pain  Negative for congestion and ear discharge  Respiratory: Negative for cough and shortness of breath  Cardiovascular: Negative for chest pain and palpitations  Gastrointestinal: Negative for diarrhea and nausea  Musculoskeletal: Negative for arthralgias and back pain  Skin: Negative for color change and rash  Neurological: Negative for dizziness and headaches  Psychiatric/Behavioral: Negative for agitation and behavioral problems  Objective:      /76   Pulse 88   Temp 97 8 °F (36 6 °C)   Ht 5' 2" (1 575 m)   Wt 81 6 kg (180 lb)   SpO2 97%   BMI 32 92 kg/m²          Physical Exam  Constitutional:       General: She is not in acute distress  Appearance: She is well-developed  She is not diaphoretic  HENT:      Head: Normocephalic and atraumatic  Right Ear: There is impacted cerumen        Nose: Nose normal    Eyes:      Conjunctiva/sclera: Conjunctivae normal       Pupils: Pupils are equal, round, and reactive to light  Cardiovascular:      Rate and Rhythm: Normal rate and regular rhythm  Heart sounds: Normal heart sounds  No murmur  Pulmonary:      Effort: Pulmonary effort is normal  No respiratory distress  Breath sounds: Normal breath sounds  No wheezing  Abdominal:      General: Bowel sounds are normal  There is no distension  Palpations: Abdomen is soft  Tenderness: There is no abdominal tenderness  Skin:     General: Skin is warm and dry  Findings: No erythema or rash  Neurological:      Mental Status: She is alert and oriented to person, place, and time  Ear cerumen removal    Date/Time: 3/10/2021 12:02 PM  Performed by: Shin Hernandez MD  Authorized by: Shin Hernandez MD   Universal Protocol:  Consent: Verbal consent obtained  Written consent obtained  Risks and benefits: risks, benefits and alternatives were discussed  Consent given by: patient  Timeout called at: 3/10/2021 12:02 PM   Patient understanding: patient states understanding of the procedure being performed  Patient identity confirmed: verbally with patient      Patient location:  Bedside  Procedure details:     Local anesthetic:  None    Location:  R ear    Procedure type: irrigation with instrumentation      Instrumentation: curette      Approach:  External  Sedation:     Sedation type: Anxiolysis  Post-procedure details:     Complication:  None    Hearing quality:  Normal    Patient tolerance of procedure:   Tolerated well, no immediate complications

## 2021-03-13 ENCOUNTER — OFFICE VISIT (OUTPATIENT)
Dept: URGENT CARE | Facility: CLINIC | Age: 81
End: 2021-03-13
Payer: COMMERCIAL

## 2021-03-13 VITALS
OXYGEN SATURATION: 96 % | SYSTOLIC BLOOD PRESSURE: 141 MMHG | BODY MASS INDEX: 32.56 KG/M2 | DIASTOLIC BLOOD PRESSURE: 70 MMHG | TEMPERATURE: 98 F | HEART RATE: 100 BPM | RESPIRATION RATE: 18 BRPM | WEIGHT: 178 LBS

## 2021-03-13 DIAGNOSIS — H60.501 ACUTE OTITIS EXTERNA OF RIGHT EAR, UNSPECIFIED TYPE: Primary | ICD-10-CM

## 2021-03-13 PROCEDURE — 99213 OFFICE O/P EST LOW 20 MIN: CPT | Performed by: PHYSICIAN ASSISTANT

## 2021-03-13 RX ORDER — CIPROFLOXACIN AND DEXAMETHASONE 3; 1 MG/ML; MG/ML
4 SUSPENSION/ DROPS AURICULAR (OTIC) 2 TIMES DAILY
Qty: 7.5 ML | Refills: 0 | Status: SHIPPED | OUTPATIENT
Start: 2021-03-13 | End: 2021-03-13

## 2021-03-13 NOTE — PATIENT INSTRUCTIONS
Start cortisporin  Nothing in ears  Use the ear drops until symptoms resolve, then for an additional 24 hours  Do not use drops longer than 1 week  Follow up with PCP or return to clinic if symptoms do not improve  Otitis Externa   WHAT YOU NEED TO KNOW:   Otitis externa, or swimmer's ear, is an infection in the outer ear canal  This canal goes from the outside of the ear to the eardrum  DISCHARGE INSTRUCTIONS:   Return to the emergency department if:   · You have severe ear pain  · You are suddenly unable to hear at all  · You have new swelling in your face, behind your ears, or in your neck  · You suddenly cannot move part of your face  · Your face suddenly feels numb  Contact your healthcare provider if:   · You have a fever  · Your signs and symptoms do not get better after 2 days of treatment  · Your signs and symptoms go away for a time, but then come back  · You have questions or concerns about your condition or care  Medicines:   · NSAIDs , such as ibuprofen, help decrease swelling, pain, and fever  This medicine is available with or without a doctor's order  NSAIDs can cause stomach bleeding or kidney problems in certain people  If you take blood thinner medicine, always ask if NSAIDs are safe for you  Always read the medicine label and follow directions  Do not give these medicines to children under 10months of age without direction from your child's healthcare provider  · Acetaminophen  decreases pain and fever  It is available without a doctor's order  Ask how much to take and how often to take it  Follow directions  Acetaminophen can cause liver damage if not taken correctly  · Ear drops  that contain an antibiotic may be given  The antibiotic helps treat a bacterial infection  You may also be given steroid medicine  The steroid helps decrease redness, swelling, and pain  · Take your medicine as directed    Contact your healthcare provider if you think your medicine is not helping or if you have side effects  Tell him or her if you are allergic to any medicine  Keep a list of the medicines, vitamins, and herbs you take  Include the amounts, and when and why you take them  Bring the list or the pill bottles to follow-up visits  Carry your medicine list with you in case of an emergency  Follow up with your healthcare provider as directed:  Write down your questions so you remember to ask them during your visits  How to use eardrops:   · Lie down on your side with your infected ear facing up  · Carefully drip the correct number of eardrops into your ear  Have another person help you if possible  · Gently move the outside part of your ear back and forth to help the medicine reach your ear canal      · Stay lying down in the same position (with your ear facing up) for 3 to 5 minutes  Prevent otitis externa:   · Do not put cotton swabs or foreign objects in your ears  · Wrap a clean moist washcloth around your finger, and use it to clean your outer ear and remove extra ear wax  · Use ear plugs when you swim  Dry your outer ears completely after you swim or bathe  © Copyright [address] [place] [address] Drive Information is for End User's use only and may not be sold, redistributed or otherwise used for commercial purposes  All illustrations and images included in CareNotes® are the copyrighted property of [employer]  or Agatha Crespo  The above information is an  only  It is not intended as medical advice for individual conditions or treatments  Talk to your doctor, nurse or pharmacist before following any medical regimen to see if it is safe and effective for you

## 2021-03-13 NOTE — PROGRESS NOTES
[facility]        NAME: Thi Rothman is a 79 y o  female  : 1940    MRN: [id number]  DATE: 2021  TIME: 8:55 AM    Assessment and Plan   Acute otitis externa of right ear, unspecified type [H60 501]  1  Acute otitis externa of right ear, unspecified type  neomycin-polymyxin-hydrocortisone (CORTISPORIN) otic solution    DISCONTINUED: ciprofloxacin-dexamethasone (CIPRODEX) otic suspension         Patient Instructions     Patient Instructions     Start cortisporin  Nothing in ears  Use the ear drops until symptoms resolve, then for an additional 24 hours  Do not use drops longer than 1 week  Follow up with PCP or return to clinic if symptoms do not improve  Otitis Externa   WHAT YOU NEED TO KNOW:   Otitis externa, or swimmer's ear, is an infection in the outer ear canal  This canal goes from the outside of the ear to the eardrum  DISCHARGE INSTRUCTIONS:   Return to the emergency department if:   · You have severe ear pain  · You are suddenly unable to hear at all  · You have new swelling in your face, behind your ears, or in your neck  · You suddenly cannot move part of your face  · Your face suddenly feels numb  Contact your healthcare provider if:   · You have a fever  · Your signs and symptoms do not get better after 2 days of treatment  · Your signs and symptoms go away for a time, but then come back  · You have questions or concerns about your condition or care  Medicines:   · NSAIDs , such as ibuprofen, help decrease swelling, pain, and fever  This medicine is available with or without a doctor's order  NSAIDs can cause stomach bleeding or kidney problems in certain people  If you take blood thinner medicine, always ask if NSAIDs are safe for you  Always read the medicine label and follow directions  Do not give these medicines to children under 10months of age without direction from your child's healthcare provider       · Acetaminophen decreases pain and fever  It is available without a doctor's order  Ask how much to take and how often to take it  Follow directions  Acetaminophen can cause liver damage if not taken correctly  · Ear drops  that contain an antibiotic may be given  The antibiotic helps treat a bacterial infection  You may also be given steroid medicine  The steroid helps decrease redness, swelling, and pain  · Take your medicine as directed  Contact your healthcare provider if you think your medicine is not helping or if you have side effects  Tell him or her if you are allergic to any medicine  Keep a list of the medicines, vitamins, and herbs you take  Include the amounts, and when and why you take them  Bring the list or the pill bottles to follow-up visits  Carry your medicine list with you in case of an emergency  Follow up with your healthcare provider as directed:  Write down your questions so you remember to ask them during your visits  How to use eardrops:   · Lie down on your side with your infected ear facing up  · Carefully drip the correct number of eardrops into your ear  Have another person help you if possible  · Gently move the outside part of your ear back and forth to help the medicine reach your ear canal      · Stay lying down in the same position (with your ear facing up) for 3 to 5 minutes  Prevent otitis externa:   · Do not put cotton swabs or foreign objects in your ears  · Wrap a clean moist washcloth around your finger, and use it to clean your outer ear and remove extra ear wax  · Use ear plugs when you swim  Dry your outer ears completely after you swim or bathe  © Copyright [address] Information is for End User's use only and may not be sold, redistributed or otherwise used for commercial purposes   All illustrations and images included in CareNotes® are the copyrighted property of [employer]  or [facility] Jeanmarie Ortiz   The above information is an  only  It is not intended as medical advice for individual conditions or treatments  Talk to your doctor, nurse or pharmacist before following any medical regimen to see if it is safe and effective for you  **Portions of the record may have been created with voice recognition software  Occasional wrong word or "sound a like" substitutions may have occurred due to the inherent limitations of voice recognition software  Read the chart carefully and recognize, using context, where substitutions have occurred  **     Chief Complaint     Chief Complaint   Patient presents with   Volodymyr Akbar     pt states she has a right ear blockage for 6 mos  pain increasingly worse this past week  History of Present Illness      79-year-old female presents to clinic with complaints of right ear pain x2 days  Patient was seen at her primary care's office  3 days ago and had ear wax flushed out of her right ear  She was prescribed Debrox to use at home for the wax that was and able to be removed with the flush  She states she has been using the medication but she continues to have worsening ear pain  She denies any fever, discharge, dizziness, ringing in her years  Review of Systems     Review of Systems   Constitutional: Negative for chills, fatigue and fever  HENT: Positive for ear pain  Negative for congestion, ear discharge, facial swelling, hearing loss, sinus pressure and tinnitus  Respiratory: Negative for shortness of breath  Cardiovascular: Negative for chest pain  Gastrointestinal: Negative for abdominal pain  Neurological: Negative for dizziness and headaches           Current Medications     Current Outpatient Medications:     Ascorbic Acid (VITAMIN C) 1000 MG tablet, Take 1,000 mg by mouth daily, Disp: , Rfl:     aspirin 81 mg chewable tablet, Chew 1 tablet (81 mg total) daily, Disp: 30 tablet, Rfl: 0    atorvastatin (LIPITOR) 40 mg tablet, Take 1 tablet (40 mg total) by mouth every evening, Disp: 30 tablet, Rfl: 0    B Complex Vitamins (B COMPLEX 100 PO), Take by mouth, Disp: , Rfl:     Biotin 2500 MCG CAPS, Take by mouth, Disp: , Rfl:     calcium carbonate (OS-JOHN) 1250 (500 Ca) MG tablet, Take 1 tablet by mouth daily, Disp: , Rfl:     carbamide peroxide (DEBROX) 6 5 % otic solution, Administer 5 drops to the right ear 2 (two) times a day, Disp: 15 mL, Rfl: 0    carbidopa-levodopa (SINEMET)  mg per tablet, Take 1 tablet by mouth 3 (three) times a day before meals, Disp: 45 tablet, Rfl: 0    cholecalciferol (VITAMIN D3) 1,000 units tablet, Take 5,000 Units by mouth daily , Disp: , Rfl:     clonazePAM (KlonoPIN) 0 5 mg tablet, Take 0 5 tablets (0 25 mg total) by mouth daily at bedtime, Disp: 45 each, Rfl: 3    Echinacea 125 MG CAPS, Take by mouth, Disp: , Rfl:     fexofenadine (ALLEGRA) 180 MG tablet, Take 180 mg by mouth as needed , Disp: , Rfl:     fluorouracil (EFUDEX) 5 % cream, Apply topically 2 (two) times a day, Disp: 40 g, Rfl: 0    hydrocortisone 2 5 % ointment, Apply topically 2 (two) times a day, Disp: 30 g, Rfl: 0    ibuprofen (MOTRIN) 600 mg tablet, Take 1 tablet (600 mg total) by mouth every 8 (eight) hours as needed for moderate pain, Disp: 30 tablet, Rfl: 2    Magnesium 200 MG TABS, Take 1 tablet (200 mg total) by mouth daily, Disp: 30 tablet, Rfl: 3    meclizine (ANTIVERT) 25 mg tablet, Take 1 tablet (25 mg total) by mouth every 8 (eight) hours as needed for dizziness or nausea, Disp: 30 tablet, Rfl: 1    Mirabegron ER 50 MG TB24, Take 1 tablet (50 mg total) by mouth daily, Disp: 14 tablet, Rfl: 0    Mirabegron ER 50 MG TB24, Take 1 tablet (50 mg total) by mouth daily, Disp: 90 tablet, Rfl: 0    multivitamin (THERAGRAN) TABS, Take 1 tablet by mouth daily, Disp: , Rfl:     naproxen sodium (Aleve) 220 MG tablet, Take 220 mg by mouth 2 (two) times a day with meals, Disp: , Rfl:     Omega-3 350 MG CPDR, Take by mouth, Disp: , Rfl:     ondansetron (ZOFRAN) 4 mg tablet, Take 1 tablet (4 mg total) by mouth every 8 (eight) hours as needed for nausea or vomiting, Disp: 20 tablet, Rfl: 2    pantoprazole (PROTONIX) 40 mg tablet, Take 1 tablet (40 mg total) by mouth daily, Disp: 90 tablet, Rfl: 3    polyethylene glycol (MIRALAX) 17 g packet, Take 17 g by mouth daily, Disp: 14 each, Rfl: 2    Potassium Gluconate 595 (99 K) MG TABS, Take by mouth, Disp: , Rfl:     Probiotic Product (PROBIOTIC-10) CAPS, Take by mouth, Disp: , Rfl:     psyllium (METAMUCIL) 58 6 % powder, Take 1 packet by mouth 3 (three) times a day, Disp: , Rfl:     neomycin-polymyxin-hydrocortisone (CORTISPORIN) otic solution, Administer 4 drops to the right ear every 6 (six) hours, Disp: 10 mL, Rfl: 0    Current Allergies     Allergies as of 2021 - Reviewed 2021   Allergen Reaction Noted    Caffeine GI Intolerance 10/02/2015    Iodine Rash 2013    Latex Rash 2013    Other Rash 2017            The following portions of the patient's history were reviewed and updated as appropriate: allergies, current medications, past family history, past medical history, past social history, past surgical history and problem list      Past Medical History:   Diagnosis Date    Actinic keratosis 3/11/2016    Acute midline low back pain without sciatica 2020    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     Benign neoplasm of skin     Chest pain     Claustrophobia     Diverticulitis     Fracture     L1-L2    GERD (gastroesophageal reflux disease)     H  pylori infection 2020    Muscle weakness     Nonmelanoma skin cancer     last assessed 2017    Palpitations     Seasonal allergies     Seborrheic keratosis 2014    Sleep apnea     no cpap    Spontaneous      without mention of complications     Squamous cell carcinoma of forehead 2014    Syncope        Past Surgical History:   Procedure Laterality Date    BOTOX INJECTION N/A 3/6/2017    Procedure: CYSTOSCOPY; BLADDER BOTOX 100 UNITS ;  Surgeon: Angel Lamar MD;  [facility];  Service:     BOWEL RESECTION      related ot diverticulitis    CARDIAC CATHETERIZATION      CARPAL TUNNEL RELEASE Right     COLONOSCOPY  07/31/2019    COLOSTOMY      COLOSTOMY CLOSURE      FOOT SURGERY Left     neuroma    HYSTERECTOMY      NASAL SINUS SURGERY      GA CYSTOURETHROSCOPY N/A 4/13/2018    Procedure: CYSTOSCOPY WITH BOTOX;  Surgeon: Angel Lamar MD;  [facility];  Service: Urology    GA ESOPHAGOGASTRODUODENOSCOPY TRANSORAL DIAGNOSTIC N/A 4/23/2019    Procedure: ESOPHAGOGASTRODUODENOSCOPY (EGD); Surgeon: Jenelle Barnhart DO;  Location: [facility]; Service: Gastroenterology    TONSILLECTOMY      UPPER GASTROINTESTINAL ENDOSCOPY  04/23/2019       Family History   Problem Relation Age of Onset    Alcohol abuse Mother     Heart disease Mother     Alcohol abuse Father     Alcohol abuse Brother     Cancer Brother     Tremor Neg Hx     Parkinsonism Neg Hx     Colon cancer Neg Hx          Medications have been verified  Objective     /70   Pulse 100   Temp 98 °F (36 7 °C)   Resp 18   Wt 80 7 kg (178 lb)   SpO2 96%   BMI 32 56 kg/m²        Physical Exam     Physical Exam  Vitals signs and nursing note reviewed  Constitutional:       General: She is not in acute distress  Appearance: Normal appearance  She is not diaphoretic  HENT:      Head: Normocephalic and atraumatic  Right Ear: No decreased hearing noted  Swelling and tenderness present  No drainage  There is impacted cerumen (1/2 TM)  Tympanic membrane is not perforated, erythematous or bulging  Ears:      Comments: Abrasion of mid posterior R ear canal  No pre or postauricular adenopathy  No mastoid erythema or tenderness  Cardiovascular:      Rate and Rhythm: Normal rate     Pulmonary:      Effort: Pulmonary effort is normal    Skin:     General: Skin is warm and dry  Findings: No rash  Neurological:      Mental Status: She is alert and oriented to person, place, and time

## 2021-03-17 ENCOUNTER — HOSPITAL ENCOUNTER (OUTPATIENT)
Dept: MRI IMAGING | Facility: HOSPITAL | Age: 81
Discharge: HOME | End: 2021-03-17
Attending: INTERNAL MEDICINE
Payer: COMMERCIAL

## 2021-03-17 DIAGNOSIS — K86.2 PANCREATIC CYST: ICD-10-CM

## 2021-03-17 PROCEDURE — G1004 CDSM NDSC: HCPCS

## 2021-03-17 PROCEDURE — 74183 MRI ABD W/O CNTR FLWD CNTR: CPT

## 2021-03-23 ENCOUNTER — TELEPHONE (OUTPATIENT)
Dept: GASTROENTEROLOGY | Facility: MEDICAL CENTER | Age: 81
End: 2021-03-23

## 2021-03-23 NOTE — TELEPHONE ENCOUNTER
----- Message from Kd Lobo MD sent at 3/23/2021  1:57 PM EDT -----  Please call patient :  The pancreatic cyst is again seen  This measures approximately 5 4 cm and previously measured 4 8 cm  There is a slight increase in the size however this could also be related to the difference and imaging between a MRI and CT scan  Continue close follow-up with repeat imaging in 6 months  Follow up in the office

## 2021-03-25 ENCOUNTER — OFFICE VISIT (OUTPATIENT)
Dept: DERMATOLOGY | Facility: CLINIC | Age: 81
End: 2021-03-25
Payer: COMMERCIAL

## 2021-03-25 VITALS — TEMPERATURE: 95.4 F

## 2021-03-25 DIAGNOSIS — D04.39 SQUAMOUS CELL CARCINOMA IN SITU OF SKIN OF LEFT CHEEK: Primary | ICD-10-CM

## 2021-03-25 PROCEDURE — 99213 OFFICE O/P EST LOW 20 MIN: CPT | Performed by: DERMATOLOGY

## 2021-03-25 PROCEDURE — 1160F RVW MEDS BY RX/DR IN RCRD: CPT | Performed by: DERMATOLOGY

## 2021-03-25 PROCEDURE — 1036F TOBACCO NON-USER: CPT | Performed by: DERMATOLOGY

## 2021-03-25 NOTE — PATIENT INSTRUCTIONS
Hopefully the Efudex has eradicating this lesion will watch closely for any recurrence will see her back in 6 months

## 2021-03-30 ENCOUNTER — RA CDI HCC (OUTPATIENT)
Dept: OTHER | Facility: HOSPITAL | Age: 81
End: 2021-03-30

## 2021-03-30 NOTE — PROGRESS NOTES
[facility]  coding oppertunities          Chart reviewed, no opportunity found: CHART REVIEWED, NO OPPORTUNITY FOUND                    HCCs found used in 2021

## 2021-04-06 NOTE — TELEPHONE ENCOUNTER
Patient with [facility] appointment today at 8:30 in the [facility] office  Nurse out of the office emergently, unable to complete appointment as scheduled  Attempted to contact patient to reschedule, no answer  Left message regarding need to cancel appointment today  When patient returns call or if she presents to office, can offer diagnostic nurse schedule today in [address] [place] [address] or reschedule to nurse schedule on Monday  Wife

## 2021-04-08 ENCOUNTER — HOSPITAL ENCOUNTER (OUTPATIENT)
Dept: MAMMOGRAPHY | Facility: CLINIC | Age: 81
Discharge: HOME | End: 2021-04-08
Payer: COMMERCIAL

## 2021-04-08 DIAGNOSIS — E28.39 MENOPAUSE OVARIAN FAILURE: ICD-10-CM

## 2021-04-08 PROCEDURE — 77080 DXA BONE DENSITY AXIAL: CPT

## 2021-04-12 ENCOUNTER — TELEPHONE (OUTPATIENT)
Dept: GASTROENTEROLOGY | Facility: CLINIC | Age: 81
End: 2021-04-12

## 2021-04-12 NOTE — TELEPHONE ENCOUNTER
[facility] patient - Please call patient ([phone]) to discuss results of MRI of the pancreas as there are no available appts until 1st week in May and patient does not want to wait that long to discuss    Thx

## 2021-04-13 ENCOUNTER — OFFICE VISIT (OUTPATIENT)
Dept: FAMILY MEDICINE CLINIC | Facility: CLINIC | Age: 81
End: 2021-04-13
Payer: COMMERCIAL

## 2021-04-13 VITALS
OXYGEN SATURATION: 94 % | WEIGHT: 183.6 LBS | HEIGHT: 62 IN | HEART RATE: 78 BPM | SYSTOLIC BLOOD PRESSURE: 132 MMHG | TEMPERATURE: 97.8 F | BODY MASS INDEX: 33.79 KG/M2 | DIASTOLIC BLOOD PRESSURE: 74 MMHG

## 2021-04-13 DIAGNOSIS — M81.0 AGE-RELATED OSTEOPOROSIS WITHOUT CURRENT PATHOLOGICAL FRACTURE: Primary | ICD-10-CM

## 2021-04-13 PROCEDURE — 99213 OFFICE O/P EST LOW 20 MIN: CPT | Performed by: FAMILY MEDICINE

## 2021-04-13 NOTE — PROGRESS NOTES
Assessment/Plan:    No problem-specific Assessment & Plan notes found for this encounter  Diagnoses and all orders for this visit:    Age-related osteoporosis without current pathological fracture  We discussed risks and benefits of Fosamax and Prolia  She would like to research these medications and see if they are right for her  Follow up in 6 months or as needed        Subjective:      Patient ID: Cate Ramirez is a 79 y o  female  Patient was found to have osteoporosis per recent DEXA scan  She denies any recent fractures or falls  She is taking Vitamin D and Calcium daily  The following portions of the patient's history were reviewed and updated as appropriate:   She  has a past medical history of Actinic keratosis (3/11/2016), Acute midline low back pain without sciatica (2020), 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, Benign neoplasm of skin, Chest pain, Claustrophobia, Diverticulitis, Fracture, GERD (gastroesophageal reflux disease), H  pylori infection (2020), Muscle weakness, Nonmelanoma skin cancer, Palpitations, Seasonal allergies, Seborrheic keratosis (2014), Sleep apnea, Spontaneous , Squamous cell carcinoma of forehead (2014), and Syncope    She   Patient Active Problem List    Diagnosis Date Noted    Age-related osteoporosis without current pathological fracture 2021    Excessive ear wax, right 03/10/2021    Closed compression fracture of body of L1 vertebra ([address] ) 2020    Cerebrovascular disease 2020    Headache 2020    Pancreatic cyst 2020    Trochanteric bursitis, right hip 2020    Tremor 2020    Chronic idiopathic constipation 2019    History of adenomatous polyp of colon 2019    Osteopenia 2019    Gastro-esophageal reflux disease without esophagitis 2019    Dysphagia 2019    Multiple thyroid nodules 2019    Menopause ovarian failure 02/06/2019    Vitamin D deficiency 02/06/2019    Mitral valve disorder 12/14/2018    Mood disturbance 08/28/2018    Obesity (BMI 30-39 9) 08/28/2018    Claustrophobia 08/28/2018    Impaired fasting glucose 07/31/2018    Seasonal allergic rhinitis 05/08/2018    History of skin cancer 04/04/2018    Parkinson's disease ([facility] [address] ) 03/26/2018    Primary insomnia 03/26/2018    Cherry angioma 11/15/2017    Postablative hypothyroidism 09/08/2016    Dyspnea on exertion 10/02/2015    OAB (overactive bladder) 09/11/2015    Sleep apnea 04/15/2014     She  has a past surgical history that includes Hysterectomy; Colostomy; Carpal tunnel release (Right); Colostomy closure; Foot surgery (Left); Cardiac catheterization; BOTOX INJECTION (N/A, 3/6/2017); pr cystourethroscopy (N/A, 4/13/2018); Bowel resection; pr esophagogastroduodenoscopy transoral diagnostic (N/A, 4/23/2019); Colonoscopy (07/31/2019); Upper gastrointestinal endoscopy (04/23/2019); Tonsillectomy (age 47); and Nasal sinus surgery (age 35)  Her family history includes Alcohol abuse in her brother, father, and mother; Cancer in her brother; Heart disease in her mother  She  reports that she has never smoked  She has never used smokeless tobacco  She reports current alcohol use  She reports that she does not use drugs    Current Outpatient Medications   Medication Sig Dispense Refill    Ascorbic Acid (VITAMIN C) 1000 MG tablet Take 1,000 mg by mouth daily      aspirin 81 mg chewable tablet Chew 1 tablet (81 mg total) daily 30 tablet 0    B Complex Vitamins (B COMPLEX 100 PO) Take by mouth      Biotin 2500 MCG CAPS Take by mouth      calcium carbonate (OS-JOHN) 1250 (500 Ca) MG tablet Take 1 tablet by mouth daily      carbidopa-levodopa (SINEMET)  mg per tablet Take 1 tablet by mouth 3 (three) times a day before meals 45 tablet 0    cholecalciferol (VITAMIN D3) 1,000 units tablet Take 5,000 Units by mouth daily       clonazePAM (KlonoPIN) 0 5 mg tablet Take 0 5 tablets (0 25 mg total) by mouth daily at bedtime 45 each 3    Echinacea 125 MG CAPS Take by mouth      fexofenadine (ALLEGRA) 180 MG tablet Take 180 mg by mouth as needed (prn)       Magnesium 200 MG TABS Take 1 tablet (200 mg total) by mouth daily 30 tablet 3    Mirabegron ER 50 MG TB24 Take 1 tablet (50 mg total) by mouth daily 14 tablet 0    multivitamin (THERAGRAN) TABS Take 1 tablet by mouth daily      naproxen sodium (Aleve) 220 MG tablet Take 220 mg by mouth 2 (two) times a day with meals      Omega-3 350 MG CPDR Take by mouth      pantoprazole (PROTONIX) 40 mg tablet Take 1 tablet (40 mg total) by mouth daily 90 tablet 3    Potassium Gluconate 595 (99 K) MG TABS Take by mouth      Probiotic Product (PROBIOTIC-10) CAPS Take by mouth      atorvastatin (LIPITOR) 40 mg tablet Take 1 tablet (40 mg total) by mouth every evening (Patient not taking: Reported on 4/13/2021) 30 tablet 0    carbamide peroxide (DEBROX) 6 5 % otic solution Administer 5 drops to the right ear 2 (two) times a day (Patient not taking: Reported on 4/13/2021) 15 mL 0    fluorouracil (EFUDEX) 5 % cream Apply topically 2 (two) times a day (Patient not taking: Reported on 3/19/2021) 40 g 0    hydrocortisone 2 5 % ointment Apply topically 2 (two) times a day (Patient not taking: Reported on 3/19/2021) 30 g 0    ibuprofen (MOTRIN) 600 mg tablet Take 1 tablet (600 mg total) by mouth every 8 (eight) hours as needed for moderate pain (Patient not taking: Reported on 3/19/2021) 30 tablet 2    meclizine (ANTIVERT) 25 mg tablet Take 1 tablet (25 mg total) by mouth every 8 (eight) hours as needed for dizziness or nausea (Patient not taking: Reported on 3/19/2021) 30 tablet 1    Mirabegron ER 50 MG TB24 Take 1 tablet (50 mg total) by mouth daily (Patient not taking: Reported on 3/19/2021) 90 tablet 0    neomycin-polymyxin-hydrocortisone (CORTISPORIN) otic solution Administer 4 drops to the right ear every 6 (six) hours (Patient not taking: Reported on 3/19/2021) 10 mL 0    ondansetron (ZOFRAN) 4 mg tablet Take 1 tablet (4 mg total) by mouth every 8 (eight) hours as needed for nausea or vomiting (Patient not taking: Reported on 3/19/2021) 20 tablet 2    polyethylene glycol (MIRALAX) 17 g packet Take 17 g by mouth daily (Patient not taking: Reported on 3/19/2021) 14 each 2    psyllium (METAMUCIL) 58 6 % powder Take 1 packet by mouth 3 (three) times a day       No current facility-administered medications for this visit        Current Outpatient Medications on File Prior to Visit   Medication Sig    Ascorbic Acid (VITAMIN C) 1000 MG tablet Take 1,000 mg by mouth daily    aspirin 81 mg chewable tablet Chew 1 tablet (81 mg total) daily    B Complex Vitamins (B COMPLEX 100 PO) Take by mouth    Biotin 2500 MCG CAPS Take by mouth    calcium carbonate (OS-JOHN) 1250 (500 Ca) MG tablet Take 1 tablet by mouth daily    carbidopa-levodopa (SINEMET)  mg per tablet Take 1 tablet by mouth 3 (three) times a day before meals    cholecalciferol (VITAMIN D3) 1,000 units tablet Take 5,000 Units by mouth daily     clonazePAM (KlonoPIN) 0 5 mg tablet Take 0 5 tablets (0 25 mg total) by mouth daily at bedtime    Echinacea 125 MG CAPS Take by mouth    fexofenadine (ALLEGRA) 180 MG tablet Take 180 mg by mouth as needed (prn)     Magnesium 200 MG TABS Take 1 tablet (200 mg total) by mouth daily    Mirabegron ER 50 MG TB24 Take 1 tablet (50 mg total) by mouth daily    multivitamin (THERAGRAN) TABS Take 1 tablet by mouth daily    naproxen sodium (Aleve) 220 MG tablet Take 220 mg by mouth 2 (two) times a day with meals    Omega-3 350 MG CPDR Take by mouth    pantoprazole (PROTONIX) 40 mg tablet Take 1 tablet (40 mg total) by mouth daily    Potassium Gluconate 595 (99 K) MG TABS Take by mouth    Probiotic Product (PROBIOTIC-10) CAPS Take by mouth    atorvastatin (LIPITOR) 40 mg tablet Take 1 tablet (40 mg total) by mouth every evening (Patient not taking: Reported on 4/13/2021)    carbamide peroxide (DEBROX) 6 5 % otic solution Administer 5 drops to the right ear 2 (two) times a day (Patient not taking: Reported on 4/13/2021)    fluorouracil (EFUDEX) 5 % cream Apply topically 2 (two) times a day (Patient not taking: Reported on 3/19/2021)    hydrocortisone 2 5 % ointment Apply topically 2 (two) times a day (Patient not taking: Reported on 3/19/2021)    ibuprofen (MOTRIN) 600 mg tablet Take 1 tablet (600 mg total) by mouth every 8 (eight) hours as needed for moderate pain (Patient not taking: Reported on 3/19/2021)    meclizine (ANTIVERT) 25 mg tablet Take 1 tablet (25 mg total) by mouth every 8 (eight) hours as needed for dizziness or nausea (Patient not taking: Reported on 3/19/2021)    Mirabegron ER 50 MG TB24 Take 1 tablet (50 mg total) by mouth daily (Patient not taking: Reported on 3/19/2021)    neomycin-polymyxin-hydrocortisone (CORTISPORIN) otic solution Administer 4 drops to the right ear every 6 (six) hours (Patient not taking: Reported on 3/19/2021)    ondansetron (ZOFRAN) 4 mg tablet Take 1 tablet (4 mg total) by mouth every 8 (eight) hours as needed for nausea or vomiting (Patient not taking: Reported on 3/19/2021)    polyethylene glycol (MIRALAX) 17 g packet Take 17 g by mouth daily (Patient not taking: Reported on 3/19/2021)    psyllium (METAMUCIL) 58 6 % powder Take 1 packet by mouth 3 (three) times a day     No current facility-administered medications on file prior to visit  She is allergic to caffeine - food allergy; iodine - food allergy; latex; and other       Review of Systems   Constitutional: Negative for activity change, appetite change, fatigue and fever  HENT: Negative for congestion and ear discharge  Respiratory: Negative for cough and shortness of breath  Cardiovascular: Negative for chest pain and palpitations  Gastrointestinal: Negative for diarrhea and nausea     Musculoskeletal: Negative for arthralgias and back pain  Skin: Negative for color change and rash  Neurological: Negative for dizziness and headaches  Psychiatric/Behavioral: Negative for agitation and behavioral problems  Objective:      /74   Pulse 78   Temp 97 8 °F (36 6 °C) (Temporal)   Ht 5' 2" (1 575 m)   Wt 83 3 kg (183 lb 9 6 oz)   SpO2 94%   BMI 33 58 kg/m²          Physical Exam  Constitutional:       General: She is not in acute distress  Appearance: She is well-developed  She is not diaphoretic  HENT:      Head: Normocephalic and atraumatic  Nose: Nose normal    Eyes:      Conjunctiva/sclera: Conjunctivae normal       Pupils: Pupils are equal, round, and reactive to light  Cardiovascular:      Rate and Rhythm: Normal rate and regular rhythm  Heart sounds: Normal heart sounds  No murmur  Pulmonary:      Effort: Pulmonary effort is normal  No respiratory distress  Breath sounds: Normal breath sounds  No wheezing  Abdominal:      General: Bowel sounds are normal  There is no distension  Palpations: Abdomen is soft  Tenderness: There is no abdominal tenderness  Skin:     General: Skin is warm and dry  Findings: No erythema or rash  Neurological:      Mental Status: She is alert and oriented to person, place, and time

## 2021-04-16 ENCOUNTER — OFFICE VISIT (OUTPATIENT)
Dept: UROLOGY | Facility: CLINIC | Age: 81
End: 2021-04-16
Payer: COMMERCIAL

## 2021-04-16 VITALS
DIASTOLIC BLOOD PRESSURE: 70 MMHG | HEIGHT: 62 IN | BODY MASS INDEX: 33.68 KG/M2 | WEIGHT: 183 LBS | SYSTOLIC BLOOD PRESSURE: 138 MMHG

## 2021-04-16 DIAGNOSIS — N39.41 URGE INCONTINENCE: Primary | ICD-10-CM

## 2021-04-16 DIAGNOSIS — N32.81 OAB (OVERACTIVE BLADDER): ICD-10-CM

## 2021-04-16 LAB — POST-VOID RESIDUAL VOLUME, ML POC: 18 ML

## 2021-04-16 PROCEDURE — 1160F RVW MEDS BY RX/DR IN RCRD: CPT | Performed by: PHYSICIAN ASSISTANT

## 2021-04-16 PROCEDURE — 99213 OFFICE O/P EST LOW 20 MIN: CPT | Performed by: PHYSICIAN ASSISTANT

## 2021-04-16 PROCEDURE — 51798 US URINE CAPACITY MEASURE: CPT | Performed by: PHYSICIAN ASSISTANT

## 2021-04-16 PROCEDURE — 1036F TOBACCO NON-USER: CPT | Performed by: PHYSICIAN ASSISTANT

## 2021-04-16 NOTE — ASSESSMENT & PLAN NOTE
-Patient has underwent  Several different modalities including tibial nerve stimulation, VESIcare, Myrbetriq, and Botox  In the past   Patient reports initially all of these modalities helped her  And have now ceased to help her      -  Patient currently reports an increase in urinary symptoms specifically urge and frequency as well as urge incontinence  Patient does have mixed incontinence  - discussed pelvic floor therapy for her stress incontinence  Patient would like to decline at this time due to several additional things going on in her life  -  Discussed with patient that since she has failed multiple modalities to consider InterStim  Discussed that this is not something that she will be on indefinitely there is a trial that can be performed 1st   Discussed that there are only a few surgeons in our practice that to interstim  I will discuss with MD who performs this treatment and for patient information and scheduled appointment based off of discussion     -  Patient is to continue Myrbetriq at this time as she has already refilled a prescription for for 90 days  Patient will complete this and we will follow-up in approximately 1 month  Our office will call her to schedule this once I have discussed proper follow-up      -  Patient is currently adequately emptying her bladder today with a PVR of 18

## 2021-04-16 NOTE — PROGRESS NOTES
Assessment/Plan:    OAB (overactive bladder)  -Patient has underwent  Several different modalities including tibial nerve stimulation, VESIcare, Myrbetriq, and Botox  In the past   Patient reports initially all of these modalities helped her  And have now ceased to help her      -  Patient currently reports an increase in urinary symptoms specifically urge and frequency as well as urge incontinence  Patient does have mixed incontinence  - discussed pelvic floor therapy for her stress incontinence  Patient would like to decline at this time due to several additional things going on in her life  -  Discussed with patient that since she has failed multiple modalities to consider InterStim  Discussed that this is not something that she will be on indefinitely there is a trial that can be performed 1st   Discussed that there are only a few surgeons in our practice that to interstim  I will discuss with MD who performs this treatment and for patient information and scheduled appointment based off of discussion     -  Patient is to continue Myrbetriq at this time as she has already refilled a prescription for for 90 days  Patient will complete this and we will follow-up in approximately 1 month  Our office will call her to schedule this once I have discussed proper follow-up  -  Patient is currently adequately emptying her bladder today with a PVR of 18         Problem List Items Addressed This Visit        Genitourinary    OAB (overactive bladder)     -Patient has underwent  Several different modalities including tibial nerve stimulation, VESIcare, Myrbetriq, and Botox  In the past   Patient reports initially all of these modalities helped her  And have now ceased to help her      -  Patient currently reports an increase in urinary symptoms specifically urge and frequency as well as urge incontinence  Patient does have mixed incontinence  - discussed pelvic floor therapy for her stress incontinence  Patient would like to decline at this time due to several additional things going on in her life  -  Discussed with patient that since she has failed multiple modalities to consider InterStim  Discussed that this is not something that she will be on indefinitely there is a trial that can be performed 1st   Discussed that there are only a few surgeons in our practice that to interstim  I will discuss with MD who performs this treatment and for patient information and scheduled appointment based off of discussion     -  Patient is to continue Myrbetriq at this time as she has already refilled a prescription for for 90 days  Patient will complete this and we will follow-up in approximately 1 month  Our office will call her to schedule this once I have discussed proper follow-up  -  Patient is currently adequately emptying her bladder today with a PVR of 18           Other Visit Diagnoses     Urge incontinence    -  Primary    Relevant Orders    POCT Measure PVR (Completed)            Subjective:      Patient ID: Luz Henson is a 79 y o  female  HPI   Patient is an 43-year-old female with a past urologic history of urinary incontinence and overactive bladder syndrome  Patient has undergone several overactive bladder modalities including tibial nerve stimulation, VESIcare,  And Myrbetriq and Botox 3 years ago Without relief  Patient reporting that her urinary symptoms have worsened on Myrbetriq as though she feels that she has more urgency to go  She reports that her incontinence has worsened  As well  She reports that she feels the urge to urinate when getting up in the morning and as she gets up out of bed to go to the bathroom she immediately wets herself  Patient feels as though his this affects her quality of life as she now has to plan were she goes based off of if there is bathroom available and if she can get to 1  She is wearing pads  For incontinence as well    Patient has tried multiple modalities without any success  She would like to continue with her Myrbetriq at this time and finish the prescription  Patient reports that she has a lot more urgency associated with her incontinence compared to coughing or sneezing  She reports that coughing and sneezing is where it is typically when she gets the urge to urinate or when she suddenly moves that she leaks  She denies any nausea, vomiting, fevers, chills, flank pain, abdominal pain,or  dysuria  The following portions of the patient's history were reviewed and updated as appropriate:   She  has a past medical history of Actinic keratosis (3/11/2016), Acute midline low back pain without sciatica (2020), 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, Benign neoplasm of skin, Chest pain, Claustrophobia, Diverticulitis, Fracture, GERD (gastroesophageal reflux disease), H  pylori infection (2020), Muscle weakness, Nonmelanoma skin cancer, Palpitations, Seasonal allergies, Seborrheic keratosis (2014), Sleep apnea, Spontaneous , Squamous cell carcinoma of forehead (2014), and Syncope    She   Patient Active Problem List    Diagnosis Date Noted    Age-related osteoporosis without current pathological fracture 2021    Excessive ear wax, right 03/10/2021    Closed compression fracture of body of L1 vertebra ([facility]) 2020    Cerebrovascular disease 2020    Headache 2020    Pancreatic cyst 2020    Trochanteric bursitis, right hip 2020    Tremor 2020    Chronic idiopathic constipation 2019    History of adenomatous polyp of colon 2019    Osteopenia 2019    Gastro-esophageal reflux disease without esophagitis 2019    Dysphagia 2019    Multiple thyroid nodules 2019    Menopause ovarian failure 2019    Vitamin D deficiency 2019    Mitral valve disorder 2018    Mood disturbance 2018    Obesity (BMI 30-39 9) 08/28/2018    Claustrophobia 08/28/2018    Impaired fasting glucose 07/31/2018    Seasonal allergic rhinitis 05/08/2018    History of skin cancer 04/04/2018    Parkinson's disease ([facility] [address] ) 03/26/2018    Primary insomnia 03/26/2018    Cherry angioma 11/15/2017    Postablative hypothyroidism 09/08/2016    Dyspnea on exertion 10/02/2015    OAB (overactive bladder) 09/11/2015    Sleep apnea 04/15/2014     She  has a past surgical history that includes Hysterectomy; Colostomy; Carpal tunnel release (Right); Colostomy closure; Foot surgery (Left); Cardiac catheterization; BOTOX INJECTION (N/A, 3/6/2017); pr cystourethroscopy (N/A, 4/13/2018); Bowel resection; pr esophagogastroduodenoscopy transoral diagnostic (N/A, 4/23/2019); Colonoscopy (07/31/2019); Upper gastrointestinal endoscopy (04/23/2019); Tonsillectomy (age 47); and Nasal sinus surgery (age 35)  Her family history includes Alcohol abuse in her brother, father, and mother; Cancer in her brother; Heart disease in her mother  She  reports that she has never smoked  She has never used smokeless tobacco  She reports current alcohol use  She reports that she does not use drugs    Current Outpatient Medications   Medication Sig Dispense Refill    Ascorbic Acid (VITAMIN C) 1000 MG tablet Take 1,000 mg by mouth daily      aspirin 81 mg chewable tablet Chew 1 tablet (81 mg total) daily 30 tablet 0    B Complex Vitamins (B COMPLEX 100 PO) Take by mouth      Biotin 2500 MCG CAPS Take by mouth      calcium carbonate (OS-JOHN) 1250 (500 Ca) MG tablet Take 1 tablet by mouth daily      carbamide peroxide (DEBROX) 6 5 % otic solution Administer 5 drops to the right ear 2 (two) times a day 15 mL 0    carbidopa-levodopa (SINEMET)  mg per tablet Take 1 tablet by mouth 3 (three) times a day before meals 45 tablet 0    cholecalciferol (VITAMIN D3) 1,000 units tablet Take 5,000 Units by mouth daily       clonazePAM (KlonoPIN) 0 5 mg tablet Take 0 5 tablets (0 25 mg total) by mouth daily at bedtime 45 each 3    Echinacea 125 MG CAPS Take by mouth      fexofenadine (ALLEGRA) 180 MG tablet Take 180 mg by mouth as needed (prn)       fluorouracil (EFUDEX) 5 % cream Apply topically 2 (two) times a day 40 g 0    hydrocortisone 2 5 % ointment Apply topically 2 (two) times a day 30 g 0    ibuprofen (MOTRIN) 600 mg tablet Take 1 tablet (600 mg total) by mouth every 8 (eight) hours as needed for moderate pain 30 tablet 2    Magnesium 200 MG TABS Take 1 tablet (200 mg total) by mouth daily 30 tablet 3    meclizine (ANTIVERT) 25 mg tablet Take 1 tablet (25 mg total) by mouth every 8 (eight) hours as needed for dizziness or nausea 30 tablet 1    Mirabegron ER 50 MG TB24 Take 1 tablet (50 mg total) by mouth daily 14 tablet 0    multivitamin (THERAGRAN) TABS Take 1 tablet by mouth daily      naproxen sodium (Aleve) 220 MG tablet Take 220 mg by mouth 2 (two) times a day with meals      neomycin-polymyxin-hydrocortisone (CORTISPORIN) otic solution Administer 4 drops to the right ear every 6 (six) hours 10 mL 0    Omega-3 350 MG CPDR Take by mouth      ondansetron (ZOFRAN) 4 mg tablet Take 1 tablet (4 mg total) by mouth every 8 (eight) hours as needed for nausea or vomiting 20 tablet 2    pantoprazole (PROTONIX) 40 mg tablet Take 1 tablet (40 mg total) by mouth daily 90 tablet 3    polyethylene glycol (MIRALAX) 17 g packet Take 17 g by mouth daily 14 each 2    Potassium Gluconate 595 (99 K) MG TABS Take by mouth      Probiotic Product (PROBIOTIC-10) CAPS Take by mouth      psyllium (METAMUCIL) 58 6 % powder Take 1 packet by mouth 3 (three) times a day      atorvastatin (LIPITOR) 40 mg tablet Take 1 tablet (40 mg total) by mouth every evening (Patient not taking: Reported on 4/16/2021) 30 tablet 0    Mirabegron ER 50 MG TB24 Take 1 tablet (50 mg total) by mouth daily (Patient not taking: Reported on 4/16/2021) 90 tablet 0     No current facility-administered medications for this visit  She is allergic to caffeine - food allergy; iodine - food allergy; latex; and other       Review of Systems   Constitutional: Negative  Negative for chills and fever  HENT: Negative  Eyes: Negative  Respiratory: Negative  Cardiovascular: Negative  Gastrointestinal: Negative for abdominal distention, abdominal pain, nausea and vomiting  Endocrine: Negative  Genitourinary: Positive for urgency  Negative for difficulty urinating, dysuria, flank pain, frequency and hematuria  Urinary incontinence/ mixed incontinence/stress incontinence/urge incontinence   Musculoskeletal: Negative  Skin: Negative  Allergic/Immunologic: Negative  Neurological: Negative  Hematological: Negative  Psychiatric/Behavioral: Negative  Objective:      /70 (BP Location: Left arm, Patient Position: Sitting, Cuff Size: Standard)   Ht 5' 2" (1 575 m)   Wt 83 kg (183 lb)   BMI 33 47 kg/m²          Physical Exam  Constitutional:       General: She is not in acute distress  Appearance: Normal appearance  She is normal weight  She is not ill-appearing or toxic-appearing  HENT:      Head: Normocephalic and atraumatic  Right Ear: External ear normal       Left Ear: External ear normal       Nose: Nose normal    Eyes:      General: No scleral icterus  Conjunctiva/sclera: Conjunctivae normal    Neck:      Musculoskeletal: Normal range of motion  Cardiovascular:      Rate and Rhythm: Normal rate  Pulses: Normal pulses  Pulmonary:      Effort: Pulmonary effort is normal  No respiratory distress  Abdominal:      General: Bowel sounds are normal  There is no distension  Tenderness: There is no abdominal tenderness  There is no right CVA tenderness or left CVA tenderness  Musculoskeletal: Normal range of motion  Neurological:      General: No focal deficit present  Mental Status: She is alert and oriented to person, place, and time  Psychiatric:         Mood and Affect: Mood normal          Behavior: Behavior normal          Thought Content:  Thought content normal          Judgment: Judgment normal          Rene Robbins PA-C

## 2021-05-06 ENCOUNTER — TELEPHONE (OUTPATIENT)
Dept: OTHER | Facility: HOSPITAL | Age: 81
End: 2021-05-06

## 2021-05-12 ENCOUNTER — OFFICE VISIT (OUTPATIENT)
Dept: NEUROLOGY | Facility: CLINIC | Age: 81
End: 2021-05-12
Payer: COMMERCIAL

## 2021-05-12 VITALS
RESPIRATION RATE: 16 BRPM | DIASTOLIC BLOOD PRESSURE: 78 MMHG | HEART RATE: 72 BPM | WEIGHT: 181.8 LBS | BODY MASS INDEX: 33.45 KG/M2 | SYSTOLIC BLOOD PRESSURE: 126 MMHG | HEIGHT: 62 IN

## 2021-05-12 DIAGNOSIS — G20 PARKINSON'S DISEASE (HCC): ICD-10-CM

## 2021-05-12 DIAGNOSIS — R25.1 TREMOR: ICD-10-CM

## 2021-05-12 PROCEDURE — 99214 OFFICE O/P EST MOD 30 MIN: CPT | Performed by: PSYCHIATRY & NEUROLOGY

## 2021-05-12 NOTE — PROGRESS NOTES
Progress Note - Neurology   Marga Lin 79 y o  female MRN: [id number]  Unit/Bed#:  Encounter: [id number]      Subjective:     Patient is here for a follow-up visit for resting tremor felt to be secondary to Parkinson's disease, as seen notable improvement with the help of Sinemet 25/100, 1 tablets 3 times a day  She has been sleeping well and has discontinued clonazepam at this time and was felt to have possible underlying REM sleep disorder  She denies any gait dysfunction or  Motor or sensory symptoms in the upper or lower extremities  Patient denies any new neurological symptoms  ROS:   Review of Systems   Constitutional: Negative  Negative for appetite change and fever  HENT: Negative  Negative for hearing loss, tinnitus, trouble swallowing and voice change  Eyes: Negative  Negative for photophobia and pain  Respiratory: Negative  Negative for shortness of breath  Cardiovascular: Negative  Negative for palpitations  Gastrointestinal: Negative  Negative for nausea and vomiting  Endocrine: Negative  Negative for cold intolerance  Genitourinary: Negative  Negative for dysuria, frequency and urgency  Musculoskeletal: Negative  Negative for myalgias and neck pain  Skin: Negative  Negative for rash  Neurological: Positive for tremors and numbness  Negative for dizziness, seizures, syncope, facial asymmetry, speech difficulty, weakness, light-headedness and headaches  Hematological: Negative  Does not bruise/bleed easily  Psychiatric/Behavioral: Positive for sleep disturbance  Negative for confusion and hallucinations  MA review of systems was reviewed by myself  Vitals:   Vitals:    05/12/21 1403   BP: 126/78   BP Location: Left arm   Patient Position: Sitting   Cuff Size: Standard   Pulse: 72   Resp: 16   Weight: 82 5 kg (181 lb 12 8 oz)   Height: 5' 2" (1 575 m)   ,Body mass index is 33 25 kg/m²      MEDS:      Current Outpatient Medications:     Ascorbic Acid (VITAMIN C) 1000 MG tablet, Take 1,000 mg by mouth daily, Disp: , Rfl:     aspirin 81 mg chewable tablet, Chew 1 tablet (81 mg total) daily, Disp: 30 tablet, Rfl: 0    B Complex Vitamins (B COMPLEX 100 PO), Take by mouth, Disp: , Rfl:     Biotin 2500 MCG CAPS, Take by mouth, Disp: , Rfl:     calcium carbonate (OS-JOHN) 1250 (500 Ca) MG tablet, Take 1 tablet by mouth daily, Disp: , Rfl:     carbidopa-levodopa (SINEMET)  mg per tablet, Take 1 tablet by mouth 3 (three) times a day before meals, Disp: 45 tablet, Rfl: 0    cholecalciferol (VITAMIN D3) 1,000 units tablet, Take 5,000 Units by mouth daily , Disp: , Rfl:     Echinacea 125 MG CAPS, Take by mouth, Disp: , Rfl:     fexofenadine (ALLEGRA) 180 MG tablet, Take 180 mg by mouth as needed (prn) , Disp: , Rfl:     Magnesium 200 MG TABS, Take 1 tablet (200 mg total) by mouth daily, Disp: 30 tablet, Rfl: 3    Mirabegron ER 50 MG TB24, Take 1 tablet (50 mg total) by mouth daily, Disp: 14 tablet, Rfl: 0    multivitamin (THERAGRAN) TABS, Take 1 tablet by mouth daily, Disp: , Rfl:     naproxen sodium (Aleve) 220 MG tablet, Take 220 mg by mouth as needed , Disp: , Rfl:     Omega-3 350 MG CPDR, Take by mouth, Disp: , Rfl:     pantoprazole (PROTONIX) 40 mg tablet, Take 1 tablet (40 mg total) by mouth daily, Disp: 90 tablet, Rfl: 3    Potassium Gluconate 595 (99 K) MG TABS, Take by mouth, Disp: , Rfl:     Probiotic Product (PROBIOTIC-10) CAPS, Take by mouth, Disp: , Rfl:   :    Physical Exam:  General appearance: alert, appears stated age and cooperative  Head: Normocephalic, without obvious abnormality, atraumatic      On examination patient is alert awake oriented, speech is fluent, cranial nerves 2-12 intact, and on motor and sensory exam there is no evidence of any focal weakness or sensory loss in the upper or lower extremities, no evidence of any dysmetria was noted no resting tremor was noted on today's exam and she has no evidence of any cogwheeling rigidity  Her gait is normal based  Lab Results: I have personally reviewed pertinent reports  Imaging Studies: I have personally reviewed pertinent reports  Assessment:  1  Parkinson's disease  Plan:    Patient is advised to continue Sinemet at the present dosage, she is on a home exercise program which she also feels has helped and will continue the same  Patient will return back to see me in 6 months       5/12/2021,2:20 PM    Dictation voice to text software has been used in the creation of this document  Please consider this in light of any contextual or grammatical errors

## 2021-05-28 ENCOUNTER — OFFICE VISIT (OUTPATIENT)
Dept: UROLOGY | Facility: CLINIC | Age: 81
End: 2021-05-28
Payer: COMMERCIAL

## 2021-05-28 VITALS
BODY MASS INDEX: 34.08 KG/M2 | DIASTOLIC BLOOD PRESSURE: 78 MMHG | SYSTOLIC BLOOD PRESSURE: 104 MMHG | HEART RATE: 114 BPM | WEIGHT: 185.2 LBS | HEIGHT: 62 IN

## 2021-05-28 DIAGNOSIS — N39.41 URGE INCONTINENCE: Primary | ICD-10-CM

## 2021-05-28 DIAGNOSIS — R35.0 FREQUENCY OF URINATION: ICD-10-CM

## 2021-05-28 LAB — POST-VOID RESIDUAL VOLUME, ML POC: 0 ML

## 2021-05-28 PROCEDURE — 99213 OFFICE O/P EST LOW 20 MIN: CPT | Performed by: PHYSICIAN ASSISTANT

## 2021-05-28 PROCEDURE — 1160F RVW MEDS BY RX/DR IN RCRD: CPT | Performed by: PHYSICIAN ASSISTANT

## 2021-05-28 PROCEDURE — 51798 US URINE CAPACITY MEASURE: CPT | Performed by: PHYSICIAN ASSISTANT

## 2021-05-28 PROCEDURE — 1036F TOBACCO NON-USER: CPT | Performed by: PHYSICIAN ASSISTANT

## 2021-05-28 NOTE — PROGRESS NOTES
Assessment/Plan:    Urge incontinence    Patient is an 79-year-old female with a history of urinary incontinence and overactive bladder  Has undergone several overactive bladder modalities including tibial nerve stimulation, VESIcare and Myrbetriq and Botox 3 years ago without any relief  -  PVR today 0    -  She reports mild relief with Myrbetriq and since being on this medication longer, patient seems to have some benefit from this  Medication     -   Discussed with Dr Tez Mak a referral for this patient for InterStim  He did not believe that patient would be a good candidate  Discussed this with patient  Discussed that we can continue with Myrbetriq at this time as she seems to be having some benefit from this  Also discussed referral to pelvic floor therapy for additional strengthening of pelvic muscles  Also discussed repeating a cystoscopy as patient had a cystoscopy 3 years ago,  For additional doses of Botox  She did report that the Botox helped her initially after the 1st dose but the 2nd dose  Was not helping with her Urge  -  Patient is currently agreeable to current plan  I refilled her the Myrbetriq provided a referral to pelvic floor therapy and have set patient up for cystoscopy for re-evaluation prior to additional Botox  -  She will follow-up with MD for cystoscopy  Problem List Items Addressed This Visit        Other    Urge incontinence - Primary       Patient is an 79-year-old female with a history of urinary incontinence and overactive bladder  Has undergone several overactive bladder modalities including tibial nerve stimulation, VESIcare and Myrbetriq and Botox 3 years ago without any relief  -  PVR today 0    -  She reports mild relief with Myrbetriq and since being on this medication longer, patient seems to have some benefit from this  Medication     -   Discussed with Dr Tez Mak a referral for this patient for InterStim    He did not believe that patient would be a good candidate  Discussed this with patient  Discussed that we can continue with Myrbetriq at this time as she seems to be having some benefit from this  Also discussed referral to pelvic floor therapy for additional strengthening of pelvic muscles  Also discussed repeating a cystoscopy as patient had a cystoscopy 3 years ago,  For additional doses of Botox  She did report that the Botox helped her initially after the 1st dose but the 2nd dose  Was not helping with her Urge  -  Patient is currently agreeable to current plan  I refilled her the Myrbetriq provided a referral to pelvic floor therapy and have set patient up for cystoscopy for re-evaluation prior to additional Botox  -  She will follow-up with MD for cystoscopy  Relevant Medications    Mirabegron ER 50 MG TB24    Other Relevant Orders    POCT Measure PVR (Completed)    Ambulatory referral to Physical Therapy      Other Visit Diagnoses     Frequency of urination        Relevant Orders    POCT Measure PVR (Completed)            Subjective:      Patient ID: Uriel Coker is a 79 y o  female  HPI   Patient is an 25-year-old female with a past urologic history of urinary incontinence and overactive bladder syndrome  Patient has undergone several overactive bladder modalities including tibial nerve stimulation, VESIcare,  And Myrbetriq and Botox 3 years ago Without relief  Patient is presenting for a one-month follow-up and symptom review  Patient is currently reporting that she feels as though the medication is helping her at this point in time  She reports that she does have good days and she has bad days  Within the span of months she only had 1 accident  She denies leaking in between  She does wear a liner for security purposes but not because she leaks  She also reports that she has been doing exercises which has been helping as well  She denies any stress incontinence and primarily reports the urge to urinate    She reports that typically begins whenever she changes position and goes from sitting to standing and she suddenly gets the urgency urinate  The following portions of the patient's history were reviewed and updated as appropriate:   She  has a past medical history of Actinic keratosis (3/11/2016), Acute midline low back pain without sciatica (2020), 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, Benign neoplasm of skin, Chest pain, Claustrophobia, Diverticulitis, Fracture, GERD (gastroesophageal reflux disease), H  pylori infection (2020), Muscle weakness, Nonmelanoma skin cancer, Palpitations, Seasonal allergies, Seborrheic keratosis (2014), Sleep apnea, Spontaneous , Squamous cell carcinoma of forehead (2014), and Syncope    She   Patient Active Problem List    Diagnosis Date Noted    Urge incontinence 2021    Age-related osteoporosis without current pathological fracture 2021    Excessive ear wax, right 03/10/2021    Closed compression fracture of body of L1 vertebra (HCC) 2020    Cerebrovascular disease 2020    Headache 2020    Pancreatic cyst 2020    Trochanteric bursitis, right hip 2020    Tremor 2020    Chronic idiopathic constipation 2019    History of adenomatous polyp of colon 2019    Osteopenia 2019    Gastro-esophageal reflux disease without esophagitis 2019    Dysphagia 2019    Multiple thyroid nodules 2019    Menopause ovarian failure 2019    Vitamin D deficiency 2019    Mitral valve disorder 2018    Mood disturbance 2018    Obesity (BMI 30-39 9) 2018    Claustrophobia 2018    Impaired fasting glucose 2018    Seasonal allergic rhinitis 2018    History of skin cancer 2018    Parkinson's disease ([facility] [address] ) 2018    Primary insomnia 2018    Cherry angioma 11/15/2017    Postablative hypothyroidism 09/08/2016    Dyspnea on exertion 10/02/2015    OAB (overactive bladder) 09/11/2015    Sleep apnea 04/15/2014     She  has a past surgical history that includes Hysterectomy; Colostomy; Carpal tunnel release (Right); Colostomy closure; Foot surgery (Left); Cardiac catheterization; BOTOX INJECTION (N/A, 3/6/2017); pr cystourethroscopy (N/A, 4/13/2018); Bowel resection; pr esophagogastroduodenoscopy transoral diagnostic (N/A, 4/23/2019); Colonoscopy (07/31/2019); Upper gastrointestinal endoscopy (04/23/2019); Tonsillectomy (age 47); and Nasal sinus surgery (age 35)  Her family history includes Alcohol abuse in her brother, father, and mother; Cancer in her brother; Heart disease in her mother  She  reports that she has never smoked  She has never used smokeless tobacco  She reports current alcohol use  She reports that she does not use drugs    Current Outpatient Medications   Medication Sig Dispense Refill    Ascorbic Acid (VITAMIN C) 1000 MG tablet Take 1,000 mg by mouth daily      aspirin 81 mg chewable tablet Chew 1 tablet (81 mg total) daily 30 tablet 0    B Complex Vitamins (B COMPLEX 100 PO) Take by mouth      Biotin 2500 MCG CAPS Take by mouth      calcium carbonate (OS-JOHN) 1250 (500 Ca) MG tablet Take 1 tablet by mouth daily      carbidopa-levodopa (SINEMET)  mg per tablet Take 1 tablet by mouth 3 (three) times a day before meals 270 tablet 2    cholecalciferol (VITAMIN D3) 1,000 units tablet Take 5,000 Units by mouth daily       Coenzyme Q10 (CO Q 10 PO) Take by mouth 600 mg BID      Cyanocobalamin (VITAMIN B 12 PO) Take by mouth daily      Echinacea 125 MG CAPS Take by mouth      fexofenadine (ALLEGRA) 180 MG tablet Take 180 mg by mouth as needed (prn)       Magnesium 200 MG TABS Take 1 tablet (200 mg total) by mouth daily 30 tablet 3    Mirabegron ER 50 MG TB24 Take 1 tablet (50 mg total) by mouth daily 90 tablet 3    multivitamin (THERAGRAN) TABS Take 1 tablet by mouth daily      naproxen sodium (Aleve) 220 MG tablet Take 220 mg by mouth as needed       Omega-3 350 MG CPDR Take by mouth      pantoprazole (PROTONIX) 40 mg tablet Take 1 tablet (40 mg total) by mouth daily 90 tablet 3    Potassium Gluconate 595 (99 K) MG TABS Take by mouth      Probiotic Product (PROBIOTIC-10) CAPS Take by mouth      carbidopa-levodopa (SINEMET)  mg per tablet Take 1 tablet by mouth 3 (three) times a day before meals (Patient not taking: Reported on 5/28/2021) 45 tablet 0     No current facility-administered medications for this visit  She is allergic to caffeine - food allergy; iodine - food allergy; latex; and other       Review of Systems   Constitutional: Negative  Negative for chills and fever  HENT: Negative  Eyes: Negative  Respiratory: Negative  Cardiovascular: Negative  Gastrointestinal: Negative  Negative for abdominal distention, abdominal pain, nausea and vomiting  Endocrine: Negative  Genitourinary: Positive for frequency and urgency  Negative for difficulty urinating, dysuria and flank pain  Urge incontinence   Musculoskeletal: Negative  Skin: Negative  Allergic/Immunologic: Negative  Neurological: Negative  Hematological: Negative  Psychiatric/Behavioral: Negative  Objective:      /78   Pulse (!) 114   Ht 5' 2" (1 575 m)   Wt 84 kg (185 lb 3 2 oz)   BMI 33 87 kg/m²          Physical Exam  Constitutional:       General: She is not in acute distress  Appearance: Normal appearance  She is obese  She is not ill-appearing, toxic-appearing or diaphoretic  HENT:      Head: Normocephalic and atraumatic  Right Ear: External ear normal       Left Ear: External ear normal       Nose: Nose normal       Mouth/Throat:      Pharynx: Oropharynx is clear  Eyes:      General: No scleral icterus  Conjunctiva/sclera: Conjunctivae normal    Neck:      Musculoskeletal: Normal range of motion  Cardiovascular:      Rate and Rhythm: Normal rate and regular rhythm  Pulses: Normal pulses  Heart sounds: Normal heart sounds  No murmur  No friction rub  No gallop  Pulmonary:      Effort: Pulmonary effort is normal  No respiratory distress  Breath sounds: No wheezing, rhonchi or rales  Abdominal:      General: Bowel sounds are normal  There is no distension  Tenderness: There is no abdominal tenderness  There is no right CVA tenderness or left CVA tenderness  Musculoskeletal: Normal range of motion  Skin:     General: Skin is warm and dry  Neurological:      General: No focal deficit present  Mental Status: She is alert and oriented to person, place, and time  Psychiatric:         Mood and Affect: Mood normal          Behavior: Behavior normal          Thought Content:  Thought content normal          Judgment: Judgment normal            Belkys Rodríguez PA-C

## 2021-05-28 NOTE — ASSESSMENT & PLAN NOTE
Patient is an 41-year-old female with a history of urinary incontinence and overactive bladder  Has undergone several overactive bladder modalities including tibial nerve stimulation, VESIcare and Myrbetriq and Botox 3 years ago without any relief  -  PVR today 0    -  She reports mild relief with Myrbetriq and since being on this medication longer, patient seems to have some benefit from this  Medication     -   Discussed with Dr Salome Black a referral for this patient for InterStim  He did not believe that patient would be a good candidate  Discussed this with patient  Discussed that we can continue with Myrbetriq at this time as she seems to be having some benefit from this  Also discussed referral to pelvic floor therapy for additional strengthening of pelvic muscles  Also discussed repeating a cystoscopy as patient had a cystoscopy 3 years ago,  For additional doses of Botox  She did report that the Botox helped her initially after the 1st dose but the 2nd dose  Was not helping with her Urge  -  Patient is currently agreeable to current plan  I refilled her the Myrbetriq provided a referral to pelvic floor therapy and have set patient up for cystoscopy for re-evaluation prior to additional Botox  -  She will follow-up with MD for cystoscopy

## 2021-05-31 PROCEDURE — 52000 CYSTOURETHROSCOPY: CPT | Performed by: STUDENT IN AN ORGANIZED HEALTH CARE EDUCATION/TRAINING PROGRAM

## 2021-06-01 ENCOUNTER — PROCEDURE VISIT (OUTPATIENT)
Dept: UROLOGY | Facility: CLINIC | Age: 81
End: 2021-06-01
Payer: COMMERCIAL

## 2021-06-01 VITALS
DIASTOLIC BLOOD PRESSURE: 88 MMHG | HEART RATE: 94 BPM | SYSTOLIC BLOOD PRESSURE: 142 MMHG | WEIGHT: 181 LBS | BODY MASS INDEX: 33.31 KG/M2 | HEIGHT: 62 IN

## 2021-06-01 DIAGNOSIS — N39.41 URGE INCONTINENCE OF URINE: Primary | ICD-10-CM

## 2021-06-01 NOTE — PROGRESS NOTES
Cystoscopy     Date/Time 5/31/2021 9:39 PM     Performed by  Hermelindo Sauer MD     Authorized by Hermelindo Sauer MD      Universal Protocol:  Consent: Verbal consent not obtained  Written consent obtained  Risks and benefits: risks, benefits and alternatives were discussed  Consent given by: patient  Time out: Immediately prior to procedure a "time out" was called to verify the correct patient, procedure, equipment, support staff and site/side marked as required  Patient understanding: patient states understanding of the procedure being performed  Patient consent: the patient's understanding of the procedure matches consent given  Procedure consent: procedure consent matches procedure scheduled  Relevant documents: relevant documents present and verified  Test results: test results available and properly labeled  Site marked: the operative site was marked  Patient identity confirmed: verbally with patient        Procedure Details:  Procedure type: cystoscopy    Patient tolerance: Patient tolerated the procedure well with no immediate complications    Additional Procedure Details: Office Cystoscopy Procedure Note    Indication:    Refractory OAB    Informed consent   The risks, benefits, complications, treatment options, and expected outcomes were discussed with the patient  The patient concurred with the proposed plan and provided informed consent  Anesthesia  Lidocaine jelly 2%    Procedure  In the presence of a female nurse, the patient was placed in the supine frog-legged position, was prepped and draped in the usual manner using sterile technique, and 2% lidocaine jelly instilled into the urethra  Prior to this pelvic examination showed   A normal labia majora and minora, a  normal vaginal introitus,  atrophic quality vaginal mucosa, and showed  no pelvic floor descensus and  no urethral hypermobility  Upon stress maneuvers there was  no stress incontinence    A 17 F flexible cystoscope was then inserted into the urethra and the urethra and bladder carefully examined  The following findings were noted:    Findings:  Urethra:  Normal  Bladder:  Normal  Ureteral orifices:  Normal  Other findings:  None     Specimens: None                 Complications:    None; patient tolerated the procedure well           Disposition: To home after 30 minute observation  Condition: Stable    Plan: Her cystoscopy today was reassuring and that there were no bladder lesions, stones, masses  She has been refractory to multiple treatments for overactive bladder including PT and as and medications  She has had some success with bladder Botox 100 units initially  She did undergo bladder Botox 200 units in the past as well  Will plan to get her scheduled for a bladder Botox 200 units

## 2021-06-09 ENCOUNTER — EVALUATION (OUTPATIENT)
Dept: PHYSICAL THERAPY | Age: 81
End: 2021-06-09
Payer: COMMERCIAL

## 2021-06-09 DIAGNOSIS — N39.41 URGE INCONTINENCE: Primary | ICD-10-CM

## 2021-06-09 PROCEDURE — 97162 PT EVAL MOD COMPLEX 30 MIN: CPT | Performed by: PHYSICAL THERAPIST

## 2021-06-09 PROCEDURE — 97110 THERAPEUTIC EXERCISES: CPT | Performed by: PHYSICAL THERAPIST

## 2021-06-22 NOTE — PROGRESS NOTES
Daily Note     Today's date: 2021  Patient name: Jcarlos Enriquez  : 1940  MRN: [id number]  Referring provider: Perla Cantu PA-C  Dx:   Encounter Diagnosis     ICD-10-CM    1  Urge incontinence  N39 41                   Subjective: Patient notes symptoms about the same  Notes exercises are more difficult in sitting  Notes doing the exercises consistently  Denies pain this date  Objective: See treatment diary below      Assessment: Tolerated treatment well  Patient demonstrated fatigue post treatment and would benefit from continued PT  Sitting Kegel to start today  Reviewed breathing technique and importance of full relaxation between activations  Increased HEP as outlined with handouts provided  Patient demonstrated good understanding  Verbal cues for breathing technique  Plan: Continue per plan of care        Precautions: LBP, L1 fracture history      Manuals                                                                 Neuro Re-Ed                                                                                                        THER  EX             LE stretch  10'           Endurance Kegel 5"/10"  10x  HEP 10x ea  Sitting/supine           Quick  10x  HEP 10x ea sitting/ supine           Ball with Kegel  3"  2x10  HEP           TB ER with Kegel  Blue  20x           Long leg IR/ER  20x                                                  Ther Activity                                       Gait Training                                       Modalities

## 2021-06-23 ENCOUNTER — OFFICE VISIT (OUTPATIENT)
Dept: PHYSICAL THERAPY | Age: 81
End: 2021-06-23
Payer: COMMERCIAL

## 2021-06-23 DIAGNOSIS — N39.41 URGE INCONTINENCE: Primary | ICD-10-CM

## 2021-06-23 PROCEDURE — 97110 THERAPEUTIC EXERCISES: CPT | Performed by: PHYSICAL THERAPIST

## 2021-06-30 DIAGNOSIS — R25.1 TREMOR: ICD-10-CM

## 2021-06-30 DIAGNOSIS — G20 PARKINSON'S DISEASE (HCC): ICD-10-CM

## 2021-07-06 NOTE — PROGRESS NOTES
Daily Note     Today's date: 2021  Patient name: Deric Banegas  : 1940  MRN: [id number]  Referring provider: Rosalee Dakins, PA-C  Dx:   Encounter Diagnosis     ICD-10-CM    1  Urge incontinence  N39 41        Start Time: 1001          Subjective: Patient notes leaking more over this past week unable to get to the bathroom on time  Notes not leaking everyday  Reports daily compliance with HEP  Notes most difficulty performing Kegel in sitting  Objective: See treatment diary below      Assessment: Tolerated treatment well  Patient exhibited good technique with therapeutic exercises Review of breath sequencing with exercises  UI increase this past week  Plan: Continue per plan of care        Precautions: LBP, L1 fracture history      Manuals                                                                Neuro Re-Ed                                                                                                        THER  EX             LE stretch  10' 10'          Endurance Kegel 5"/10"  10x  HEP 10x ea  Sitting/supine 10x  Sit  supine          Quick  10x  HEP 10x ea sitting/ supine 20x          Ball with Kegel  3"  2x10  HEP 2x10          TB ER with Kegel  Blue  20x BTB  20x            Long leg IR/ER  20x 20x          seg bridge   20x  HEP          clam   10x          Standing plie with Kegel   10x          Ther Activity                                       Gait Training                                       Modalities

## 2021-07-07 ENCOUNTER — OFFICE VISIT (OUTPATIENT)
Dept: PHYSICAL THERAPY | Age: 81
End: 2021-07-07
Payer: COMMERCIAL

## 2021-07-07 DIAGNOSIS — N39.41 URGE INCONTINENCE: Primary | ICD-10-CM

## 2021-07-07 PROCEDURE — 97110 THERAPEUTIC EXERCISES: CPT | Performed by: PHYSICAL THERAPIST

## 2021-07-19 NOTE — PROGRESS NOTES
Daily Note     Today's date: 2021  Patient name: Yovani Blackburn  : 1940  MRN: [id number]  Referring provider: Tino Mejia PA-C  Dx:   Encounter Diagnosis     ICD-10-CM    1  Urge incontinence  N39 41        Start Time: 830  Stop Time: 923  Total time in clinic (min): 53 minutes    Subjective:  Patient notes continued leakage especially with getting out of the bed in the morning especially worse over the past two weeks  Notes doing exercises  Notes some days she does well and other days not so  Objective: See treatment diary below      Assessment: Tolerated treatment well  Patient independent in HEP  Concerns regarding copayment each visit  Patient will continue with ongoing HEP  Patient to see MD for Botox treatment in two weeks  Patient will continue with HEP  Hold PT until after treatment with probable discharge to HEP      Plan: Patient to see MD for Botox treatment in two weeks  Hold PT with probable discharge unless otherwise indicated       Precautions: LBP, L1 fracture history      Manuals                                                               Neuro Re-Ed                                                                                                        THER  EX             LE stretch  10' 10' 10'         Endurance Kegel 5"/10"  10x  HEP 10x ea  Sitting/supine 10x  Sit  supine 10x  each         Quick  10x  HEP 10x ea sitting/ supine 20x 20x  supin  10sit         Ball with Kegel  3"  2x10  HEP 2x10 2x10         TB ER with Kegel  Blue  20x BTB  20x   20x         Long leg IR/ER  20x 20x 20x  HEP         seg bridge   20x  HEP 20x         clam   10x 10x         Standing plie with Kegel   10x 10x         Ther Activity             Urge suppression    10                      Gait Training                                       Modalities

## 2021-07-21 ENCOUNTER — OFFICE VISIT (OUTPATIENT)
Dept: PHYSICAL THERAPY | Age: 81
End: 2021-07-21
Payer: COMMERCIAL

## 2021-07-21 DIAGNOSIS — N39.41 URGE INCONTINENCE: Primary | ICD-10-CM

## 2021-07-21 PROCEDURE — 97110 THERAPEUTIC EXERCISES: CPT | Performed by: PHYSICAL THERAPIST

## 2021-08-02 ENCOUNTER — TELEPHONE (OUTPATIENT)
Dept: UROLOGY | Facility: CLINIC | Age: 81
End: 2021-08-02

## 2021-08-02 DIAGNOSIS — G20 PARKINSON'S DISEASE (HCC): ICD-10-CM

## 2021-08-02 DIAGNOSIS — R25.1 TREMOR: ICD-10-CM

## 2021-08-10 ENCOUNTER — OFFICE VISIT (OUTPATIENT)
Dept: FAMILY MEDICINE CLINIC | Facility: CLINIC | Age: 81
End: 2021-08-10
Payer: COMMERCIAL

## 2021-08-10 VITALS
TEMPERATURE: 97.4 F | HEIGHT: 62 IN | OXYGEN SATURATION: 98 % | HEART RATE: 80 BPM | DIASTOLIC BLOOD PRESSURE: 72 MMHG | SYSTOLIC BLOOD PRESSURE: 124 MMHG | WEIGHT: 182 LBS | BODY MASS INDEX: 33.49 KG/M2

## 2021-08-10 DIAGNOSIS — Z00.00 MEDICARE ANNUAL WELLNESS VISIT, SUBSEQUENT: ICD-10-CM

## 2021-08-10 DIAGNOSIS — G47.39 OTHER SLEEP APNEA: ICD-10-CM

## 2021-08-10 DIAGNOSIS — R53.83 FATIGUE, UNSPECIFIED TYPE: Primary | ICD-10-CM

## 2021-08-10 DIAGNOSIS — E89.0 POSTABLATIVE HYPOTHYROIDISM: ICD-10-CM

## 2021-08-10 DIAGNOSIS — R73.01 IMPAIRED FASTING GLUCOSE: ICD-10-CM

## 2021-08-10 PROCEDURE — G0439 PPPS, SUBSEQ VISIT: HCPCS | Performed by: FAMILY MEDICINE

## 2021-08-10 PROCEDURE — 3288F FALL RISK ASSESSMENT DOCD: CPT | Performed by: FAMILY MEDICINE

## 2021-08-10 PROCEDURE — 99213 OFFICE O/P EST LOW 20 MIN: CPT | Performed by: FAMILY MEDICINE

## 2021-08-10 NOTE — PROGRESS NOTES
Assessment and Plan:     Problem List Items Addressed This Visit     None           Preventive health issues were discussed with patient, and age appropriate screening tests were ordered as noted in patient's After Visit Summary  Personalized health advice and appropriate referrals for health education or preventive services given if needed, as noted in patient's After Visit Summary  History of Present Illness:     Patient presents for Welcome to Medicare visit  Patient Care Team:  Aubree Luna MD as PCP - General (Family Medicine)  Meliza Geronimo MD as PCP - Endocrinology (Endocrinology)  DO Tricia Lazaro MD Valentin Antonio, MD Imelda Kempf, PA-C as Physician Assistant (Physician Assistant)     Review of Systems:     Review of Systems   Problem List:     Patient Active Problem List   Diagnosis    OAB (overactive bladder)    Parkinson's disease ([address] )    Primary insomnia    History of skin cancer    Seasonal allergic rhinitis    Impaired fasting glucose    Mood disturbance    Obesity (BMI 30-39  9)    Claustrophobia    Mitral valve disorder    Menopause ovarian failure    Vitamin D deficiency    Dysphagia    Multiple thyroid nodules    Gastro-esophageal reflux disease without esophagitis    Osteopenia    Chronic idiopathic constipation    History of adenomatous polyp of colon    Tremor    Trochanteric bursitis, right hip    Pancreatic cyst    Cherry angioma    Dyspnea on exertion    Postablative hypothyroidism    Sleep apnea    Headache    Closed compression fracture of body of L1 vertebra (HCC)    Cerebrovascular disease    Excessive ear wax, right    Age-related osteoporosis without current pathological fracture    Urge incontinence      Past Medical and Surgical History:     Past Medical History:   Diagnosis Date    Actinic keratosis 3/11/2016    Acute midline low back pain without sciatica 11/19/2020    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     Benign neoplasm of skin     Chest pain     Claustrophobia     Diverticulitis     Fracture     L1-L2    GERD (gastroesophageal reflux disease)     H  pylori infection 2020    Muscle weakness     Nonmelanoma skin cancer     last assessed 2017    Palpitations     Seasonal allergies     Seborrheic keratosis 2014    Sleep apnea     no cpap    Spontaneous      without mention of complications     Squamous cell carcinoma of forehead 2014    Syncope      Past Surgical History:   Procedure Laterality Date    BOTOX INJECTION N/A 3/6/2017    Procedure: CYSTOSCOPY; BLADDER BOTOX 100 UNITS ;  Surgeon: Mary Mistry MD;  [facility];  Service:     BOWEL RESECTION      related ot diverticulitis    CARDIAC CATHETERIZATION      CARPAL TUNNEL RELEASE Right     COLONOSCOPY  2019    COLOSTOMY      COLOSTOMY CLOSURE      CYSTOSCOPY  2021    FOOT SURGERY Left     neuroma    HYSTERECTOMY      NASAL SINUS SURGERY  age 35    [place]    UT CYSTOURETHROSCOPY N/A 2018    Procedure: CYSTOSCOPY WITH BOTOX;  Surgeon: Mary Mistry MD;  [facility];  Service: Urology    UT ESOPHAGOGASTRODUODENOSCOPY TRANSORAL DIAGNOSTIC N/A 2019    Procedure: ESOPHAGOGASTRODUODENOSCOPY (EGD); Surgeon: Angeli Sharp DO;  [facility]; Service: Gastroenterology    TONSILLECTOMY  age 47   [employer] UPPER GASTROINTESTINAL ENDOSCOPY  2019      Family History:     Family History   Problem Relation Age of Onset    Alcohol abuse Mother     Heart disease Mother     Alcohol abuse Father     Alcohol abuse Brother     Cancer Brother     Tremor Neg Hx     Parkinsonism Neg Hx     Colon cancer Neg Hx       Social History:     Social History     Socioeconomic History    Marital status:       Spouse name: Not on file    Number of children: Not on file    Years of education: Not on file    Highest education level: Not on file   Occupational History    Occupation: RETIRED    Tobacco Use    Smoking status: Never Smoker    Smokeless tobacco: Never Used   Vaping Use    Vaping Use: Never used   Substance and Sexual Activity    Alcohol use: Yes     Comment: patient states rare use on holidays     Drug use: No    Sexual activity: Not Currently   Other Topics Concern    Not on file   Social History Narrative    LIVING INDEPENDENTLY ALONE         No caffeine use     Social Determinants of Health     Financial Resource Strain:     Difficulty of Paying Living Expenses:    Food Insecurity:     Worried About Running Out of Food in the Last Year:     Ran Out of Food in the Last Year:    Transportation Needs:     Lack of Transportation (Medical):      Lack of Transportation (Non-Medical):    Physical Activity:     Days of Exercise per Week:     Minutes of Exercise per Session:    Stress:     Feeling of Stress :    Social Connections:     Frequency of Communication with Friends and Family:     Frequency of Social Gatherings with Friends and Family:     Attends [employer] Services:     Active Member of Clubs or Organizations:     Attends Club or Organization Meetings:     Marital Status:    Intimate Partner Violence:     Fear of Current or Ex-Partner:     Emotionally Abused:     Physically Abused:     Sexually Abused:       Medications and Allergies:     Current Outpatient Medications   Medication Sig Dispense Refill    Ascorbic Acid (VITAMIN C) 1000 MG tablet Take 1,000 mg by mouth daily      aspirin 81 mg chewable tablet Chew 1 tablet (81 mg total) daily 30 tablet 0    B Complex Vitamins (B COMPLEX 100 PO) Take by mouth      Biotin 2500 MCG CAPS Take by mouth      calcium carbonate (OS-JOHN) 1250 (500 Ca) MG tablet Take 1 tablet by mouth daily      carbidopa-levodopa (SINEMET)  mg per tablet Take 1 tablet by mouth 3 (three) times a day before meals (Patient not taking: Reported on 6/1/2021) 270 tablet 2    carbidopa-levodopa (SINEMET)  mg per tablet TAKE 1 TABLET BY MOUTH 3 (THREE) TIMES A DAY BEFORE MEALS 45 tablet 0    cholecalciferol (VITAMIN D3) 1,000 units tablet Take 5,000 Units by mouth daily       Coenzyme Q10 (CO Q 10 PO) Take by mouth 600 mg BID      Cyanocobalamin (VITAMIN B 12 PO) Take by mouth daily      Echinacea 125 MG CAPS Take by mouth      fexofenadine (ALLEGRA) 180 MG tablet Take 180 mg by mouth as needed (prn)       Magnesium 200 MG TABS Take 1 tablet (200 mg total) by mouth daily 30 tablet 3    Mirabegron ER 50 MG TB24 Take 1 tablet (50 mg total) by mouth daily 90 tablet 3    multivitamin (THERAGRAN) TABS Take 1 tablet by mouth daily      naproxen sodium (Aleve) 220 MG tablet Take 220 mg by mouth as needed       Omega-3 350 MG CPDR Take by mouth      pantoprazole (PROTONIX) 40 mg tablet Take 1 tablet (40 mg total) by mouth daily 90 tablet 3    Potassium Gluconate 595 (99 K) MG TABS Take by mouth      Probiotic Product (PROBIOTIC-10) CAPS Take by mouth       No current facility-administered medications for this visit  Allergies   Allergen Reactions    Caffeine - Food Allergy GI Intolerance    Iodine - Food Allergy Rash    Latex Rash    Other Rash     Adhesive tape        Immunizations:     Immunization History   Administered Date(s) Administered    SARS-CoV-2 / COVID-19 mRNA IM (Naif Robbins) 02/10/2021, 03/10/2021      Health Maintenance:         Topic Date Due    Colorectal Cancer Screening  07/31/2022         Topic Date Due    DTaP,Tdap,and Td Vaccines (1 - Tdap) Never done    Influenza Vaccine (1) 09/01/2021      Medicare Screening Tests and Risk Assessments:     Milagros Solomon is here for her Subsequent Wellness visit  Health Risk Assessment:   Patient rates overall health as good  Patient feels that their physical health rating is same  Patient is very satisfied with their life  Eyesight was rated as same  Hearing was rated as same   Patient feels that their emotional and mental health rating is same  Patients states they are never, rarely angry  Patient states they are always unusually tired/fatigued  Pain experienced in the last 7 days has been some  Patient's pain rating has been 7/10  Patient states that she has experienced no weight loss or gain in last 6 months  Depression Screening:   PHQ-2 Score: 0      Fall Risk Screening: In the past year, patient has experienced: no history of falling in past year      Urinary Incontinence Screening:   Patient has leaked urine accidently in the last six months  Home Safety:  Patient does not have trouble with stairs inside or outside of their home  Patient has working smoke alarms and has working carbon monoxide detector  Home safety hazards include: none  Nutrition:   Current diet is Regular  Medications:   Patient is currently taking over-the-counter supplements  OTC medications include: see medication list  Patient is able to manage medications  Activities of Daily Living (ADLs)/Instrumental Activities of Daily Living (IADLs):   Walk and transfer into and out of bed and chair?: Yes  Dress and groom yourself?: Yes    Bathe or shower yourself?: Yes    Feed yourself?  Yes  Do your laundry/housekeeping?: Yes  Manage your money, pay your bills and track your expenses?: Yes  Make your own meals?: Yes    Do your own shopping?: Yes    Previous Hospitalizations:   Any hospitalizations or ED visits within the last 12 months?: Yes    How many hospitalizations have you had in the last year?: 1-2    Advance Care Planning:     Five wishes given: No      PREVENTIVE SCREENINGS      Cardiovascular Screening:    General: Screening Current      Diabetes Screening:     General: Screening Current and Risks and Benefits Discussed      Colorectal Cancer Screening:     General: Screening Current      Breast Cancer Screening:     General: Screening Not Indicated      Cervical Cancer Screening:    General: Screening Not Indicated      Osteoporosis Screening:    General: History Osteoporosis and Screening Current      Abdominal Aortic Aneurysm (AAA) Screening:        General: Screening Not Indicated      Lung Cancer Screening:     General: Screening Not Indicated      Hepatitis C Screening:    General: Screening Not Indicated    Hep C Screening Accepted: No     No exam data present     Physical Exam:     There were no vitals taken for this visit      Physical Exam     Russ Lopez MD

## 2021-08-10 NOTE — PATIENT INSTRUCTIONS
Medicare Preventive Visit Patient Instructions  Thank you for completing your Welcome to Medicare Visit or Medicare Annual Wellness Visit today  Your next wellness visit will be due in one year (8/11/2022)  The screening/preventive services that you may require over the next 5-10 years are detailed below  Some tests may not apply to you based off risk factors and/or age  Screening tests ordered at today's visit but not completed yet may show as past due  Also, please note that scanned in results may not display below  Preventive Screenings:  Service Recommendations Previous Testing/Comments   Colorectal Cancer Screening  * Colonoscopy    * Fecal Occult Blood Test (FOBT)/Fecal Immunochemical Test (FIT)  * Fecal DNA/Cologuard Test  * Flexible Sigmoidoscopy Age: 54-65 years old   Colonoscopy: every 10 years (may be performed more frequently if at higher risk)  OR  FOBT/FIT: every 1 year  OR  Cologuard: every 3 years  OR  Sigmoidoscopy: every 5 years  Screening may be recommended earlier than age 48 if at higher risk for colorectal cancer  Also, an individualized decision between you and your healthcare provider will decide whether screening between the ages of 74-80 would be appropriate  Colonoscopy: 07/31/2019  FOBT/FIT: Not on file  Cologuard: Not on file  Sigmoidoscopy: Not on file    Screening Current     Breast Cancer Screening Age: 36 years old  Frequency: every 1-2 years  Not required if history of left and right mastectomy Mammogram: 11/21/2016        Cervical Cancer Screening Between the ages of 21-29, pap smear recommended once every 3 years  Between the ages of 33-67, can perform pap smear with HPV co-testing every 5 years     Recommendations may differ for women with a history of total hysterectomy, cervical cancer, or abnormal pap smears in past  Pap Smear: Not on file    Screening Not Indicated   Hepatitis C Screening Once for adults born between 1945 and 1965  More frequently in patients at high risk for Hepatitis C Hep C Antibody: Not on file        Diabetes Screening 1-2 times per year if you're at risk for diabetes or have pre-diabetes Fasting glucose: 105 mg/dL   A1C: 5 7 %    Screening Current   Cholesterol Screening Once every 5 years if you don't have a lipid disorder  May order more often based on risk factors  Lipid panel: 11/25/2020    Screening Current     Other Preventive Screenings Covered by Medicare:  1  Abdominal Aortic Aneurysm (AAA) Screening: covered once if your at risk  You're considered to be at risk if you have a family history of AAA  2  Lung Cancer Screening: covers low dose CT scan once per year if you meet all of the following conditions: (1) Age 50-69; (2) No signs or symptoms of lung cancer; (3) Current smoker or have quit smoking within the last 15 years; (4) You have a tobacco smoking history of at least 30 pack years (packs per day multiplied by number of years you smoked); (5) You get a written order from a healthcare provider  3  Glaucoma Screening: covered annually if you're considered high risk: (1) You have diabetes OR (2) Family history of glaucoma OR (3)  aged 48 and older OR (3)  American aged 72 and older  3  Osteoporosis Screening: covered every 2 years if you meet one of the following conditions: (1) You're estrogen deficient and at risk for osteoporosis based off medical history and other findings; (2) Have a vertebral abnormality; (3) On glucocorticoid therapy for more than 3 months; (4) Have primary hyperparathyroidism; (5) On osteoporosis medications and need to assess response to drug therapy  · Last bone density test (DXA Scan): 04/08/2021   5  HIV Screening: covered annually if you're between the age of 15-65  Also covered annually if you are younger than 13 and older than 72 with risk factors for HIV infection  For pregnant patients, it is covered up to 3 times per pregnancy      Immunizations:  Immunization Recommendations Influenza Vaccine Annual influenza vaccination during flu season is recommended for all persons aged >= 6 months who do not have contraindications   Pneumococcal Vaccine (Prevnar and Pneumovax)  * Prevnar = PCV13  * Pneumovax = PPSV23   Adults 25-60 years old: 1-3 doses may be recommended based on certain risk factors  Adults 72 years old: Prevnar (PCV13) vaccine recommended followed by Pneumovax (PPSV23) vaccine  If already received PPSV23 since turning 65, then PCV13 recommended at least one year after PPSV23 dose  Hepatitis B Vaccine 3 dose series if at intermediate or high risk (ex: diabetes, end stage renal disease, liver disease)   Tetanus (Td) Vaccine - COST NOT COVERED BY MEDICARE PART B Following completion of primary series, a booster dose should be given every 10 years to maintain immunity against tetanus  Td may also be given as tetanus wound prophylaxis  Tdap Vaccine - COST NOT COVERED BY MEDICARE PART B Recommended at least once for all adults  For pregnant patients, recommended with each pregnancy  Shingles Vaccine (Shingrix) - COST NOT COVERED BY MEDICARE PART B  2 shot series recommended in those aged 48 and above     Health Maintenance Due:      Topic Date Due    Colorectal Cancer Screening  07/31/2022     Immunizations Due:      Topic Date Due    DTaP,Tdap,and Td Vaccines (1 - Tdap) Never done    Influenza Vaccine (1) 09/01/2021     Advance Directives   What are advance directives? Advance directives are legal documents that state your wishes and plans for medical care  These plans are made ahead of time in case you lose your ability to make decisions for yourself  Advance directives can apply to any medical decision, such as the treatments you want, and if you want to donate organs  What are the types of advance directives? There are many types of advance directives, and each state has rules about how to use them   You may choose a combination of any of the following:  · Living will:  This is a written record of the treatment you want  You can also choose which treatments you do not want, which to limit, and which to stop at a certain time  This includes surgery, medicine, IV fluid, and tube feedings  · Durable power of  for healthcare [facility]): This is a written record that states who you want to make healthcare choices for you when you are unable to make them for yourself  This person, called a proxy, is usually a family member or a friend  You may choose more than 1 proxy  · Do not resuscitate (DNR) order:  A DNR order is used in case your heart stops beating or you stop breathing  It is a request not to have certain forms of treatment, such as CPR  A DNR order may be included in other types of advance directives  · Medical directive: This covers the care that you want if you are in a coma, near death, or unable to make decisions for yourself  You can list the treatments you want for each condition  Treatment may include pain medicine, surgery, blood transfusions, dialysis, IV or tube feedings, and a ventilator (breathing machine)  · Values history: This document has questions about your views, beliefs, and how you feel and think about life  This information can help others choose the care that you would choose  Why are advance directives important? An advance directive helps you control your care  Although spoken wishes may be used, it is better to have your wishes written down  Spoken wishes can be misunderstood, or not followed  Treatments may be given even if you do not want them  An advance directive may make it easier for your family to make difficult choices about your care  Urinary Incontinence   Urinary incontinence (UI)  is when you lose control of your bladder  UI develops because your bladder cannot store or empty urine properly  The 3 most common types of UI are stress incontinence, urge incontinence, or both    Medicines:   · May be given to help strengthen your bladder control  Report any side effects of medication to your healthcare provider  Do pelvic muscle exercises often:  Your pelvic muscles help you stop urinating  Squeeze these muscles tight for 5 seconds, then relax for 5 seconds  Gradually work up to squeezing for 10 seconds  Do 3 sets of 15 repetitions a day, or as directed  This will help strengthen your pelvic muscles and improve bladder control  Train your bladder:  Go to the bathroom at set times, such as every 2 hours, even if you do not feel the urge to go  You can also try to hold your urine when you feel the urge to go  For example, hold your urine for 5 minutes when you feel the urge to go  As that becomes easier, hold your urine for 10 minutes  Self-care:   · Keep a UI record  Write down how often you leak urine and how much you leak  Make a note of what you were doing when you leaked urine  · Drink liquids as directed  You may need to limit the amount of liquid you drink to help control your urine leakage  Do not drink any liquid right before you go to bed  Limit or do not have drinks that contain caffeine or alcohol  · Prevent constipation  Eat a variety of high-fiber foods  Good examples are high-fiber cereals, beans, vegetables, and whole-grain breads  Walking is the best way to trigger your intestines to have a bowel movement  · Exercise regularly and maintain a healthy weight  Weight loss and exercise will decrease pressure on your bladder and help you control your leakage  · Use a catheter as directed  to help empty your bladder  A catheter is a tiny, plastic tube that is put into your bladder to drain your urine  · Go to behavior therapy as directed  Behavior therapy may be used to help you learn to control your urge to urinate      Weight Management   Why it is important to manage your weight:  Being overweight increases your risk of health conditions such as heart disease, high blood pressure, type 2 diabetes, and certain types of cancer  It can also increase your risk for osteoarthritis, sleep apnea, and other respiratory problems  Aim for a slow, steady weight loss  Even a small amount of weight loss can lower your risk of health problems  How to lose weight safely:  A safe and healthy way to lose weight is to eat fewer calories and get regular exercise  You can lose up about 1 pound a week by decreasing the number of calories you eat by 500 calories each day  Healthy meal plan for weight management:  A healthy meal plan includes a variety of foods, contains fewer calories, and helps you stay healthy  A healthy meal plan includes the following:  · Eat whole-grain foods more often  A healthy meal plan should contain fiber  Fiber is the part of grains, fruits, and vegetables that is not broken down by your body  Whole-grain foods are healthy and provide extra fiber in your diet  Some examples of whole-grain foods are whole-wheat breads and pastas, oatmeal, brown rice, and bulgur  · Eat a variety of vegetables every day  Include dark, leafy greens such as spinach, kale, yoanna greens, and mustard greens  Eat yellow and orange vegetables such as carrots, sweet potatoes, and winter squash  · Eat a variety of fruits every day  Choose fresh or canned fruit (canned in its own juice or light syrup) instead of juice  Fruit juice has very little or no fiber  · Eat low-fat dairy foods  Drink fat-free (skim) milk or 1% milk  Eat fat-free yogurt and low-fat cottage cheese  Try low-fat cheeses such as mozzarella and other reduced-fat cheeses  · Choose meat and other protein foods that are low in fat  Choose beans or other legumes such as split peas or lentils  Choose fish, skinless poultry (chicken or turkey), or lean cuts of red meat (beef or pork)  Before you cook meat or poultry, cut off any visible fat  · Use less fat and oil  Try baking foods instead of frying them   Add less fat, such as margarine, sour cream, regular salad dressing and mayonnaise to foods  Eat fewer high-fat foods  Some examples of high-fat foods include french fries, doughnuts, ice cream, and cakes  · Eat fewer sweets  Limit foods and drinks that are high in sugar  This includes candy, cookies, regular soda, and sweetened drinks  Exercise:  Exercise at least 30 minutes per day on most days of the week  Some examples of exercise include walking, biking, dancing, and swimming  You can also fit in more physical activity by taking the stairs instead of the elevator or parking farther away from stores  Ask your healthcare provider about the best exercise plan for you  © Copyright [employer] 2018 Information is for End User's use only and may not be sold, redistributed or otherwise used for commercial purposes   All illustrations and images included in CareNotes® are the copyrighted property of [employer]  or [address] [place] [address]

## 2021-08-10 NOTE — PROGRESS NOTES
Assessment/Plan:    No problem-specific Assessment & Plan notes found for this encounter  Diagnoses and all orders for this visit:    Fatigue, unspecified type  -     Comprehensive metabolic panel; Future  -     CBC and differential; Future  -     Vitamin B12; Future    Other sleep apnea    Postablative hypothyroidism  -     TSH, 3rd generation with Free T4 reflex; Future    Medicare annual wellness visit, subsequent    Impaired fasting glucose  -     Hemoglobin A1C; Future      Follow up in 1 year or as needed    Subjective:      Patient ID: Antoine Quintero is a 79 y o  female  Patient has also been feeling very tried recently  She says that she wakes up with no energy  She has a history of sleep apnea however is not able to tlerate sleep apnea machine  She does take frequent naps during the day  Not depressed  The following portions of the patient's history were reviewed and updated as appropriate:   She  has a past medical history of Actinic keratosis (3/11/2016), Acute midline low back pain without sciatica (2020), 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, Benign neoplasm of skin, Chest pain, Claustrophobia, Diverticulitis, Fracture, GERD (gastroesophageal reflux disease), H  pylori infection (2020), Muscle weakness, Nonmelanoma skin cancer, Palpitations, Seasonal allergies, Seborrheic keratosis (2014), Sleep apnea, Spontaneous , Squamous cell carcinoma of forehead (2014), and Syncope    She   Patient Active Problem List    Diagnosis Date Noted    Urge incontinence 2021    Age-related osteoporosis without current pathological fracture 2021    Excessive ear wax, right 03/10/2021    Closed compression fracture of body of L1 vertebra ([address] ) 2020    Cerebrovascular disease 2020    Headache 2020    Pancreatic cyst 2020    Trochanteric bursitis, right hip 2020    Tremor 2020    Medicare annual wellness visit, subsequent 07/22/2020    Chronic idiopathic constipation 07/22/2019    History of adenomatous polyp of colon 07/22/2019    Osteopenia 06/13/2019    Gastro-esophageal reflux disease without esophagitis 04/02/2019    Dysphagia 03/14/2019    Multiple thyroid nodules 03/14/2019    Menopause ovarian failure 02/06/2019    Vitamin D deficiency 02/06/2019    Mitral valve disorder 12/14/2018    Mood disturbance 08/28/2018    Obesity (BMI 30-39 9) 08/28/2018    Claustrophobia 08/28/2018    Impaired fasting glucose 07/31/2018    Seasonal allergic rhinitis 05/08/2018    History of skin cancer 04/04/2018    Parkinson's disease ([facility] [address] ) 03/26/2018    Primary insomnia 03/26/2018    Fatigue 03/26/2018    Cherry angioma 11/15/2017    Postablative hypothyroidism 09/08/2016    Dyspnea on exertion 10/02/2015    OAB (overactive bladder) 09/11/2015    Sleep apnea 04/15/2014     She  has a past surgical history that includes Hysterectomy; Colostomy; Carpal tunnel release (Right); Colostomy closure; Foot surgery (Left); Cardiac catheterization; BOTOX INJECTION (N/A, 3/6/2017); pr cystourethroscopy (N/A, 4/13/2018); Bowel resection; pr esophagogastroduodenoscopy transoral diagnostic (N/A, 4/23/2019); Colonoscopy (07/31/2019); Upper gastrointestinal endoscopy (04/23/2019); Tonsillectomy (age 47); Nasal sinus surgery (age 35); and Cystoscopy (06/01/2021)  Her family history includes Alcohol abuse in her brother, father, and mother; Cancer in her brother; Heart disease in her mother  She  reports that she has never smoked  She has never used smokeless tobacco  She reports current alcohol use  She reports that she does not use drugs    Current Outpatient Medications   Medication Sig Dispense Refill    Ascorbic Acid (VITAMIN C) 1000 MG tablet Take 1,000 mg by mouth daily      aspirin 81 mg chewable tablet Chew 1 tablet (81 mg total) daily 30 tablet 0    B Complex Vitamins (B COMPLEX 100 PO) Take by mouth  Biotin 2500 MCG CAPS Take by mouth      calcium carbonate (OS-JOHN) 1250 (500 Ca) MG tablet Take 1 tablet by mouth daily      carbidopa-levodopa (SINEMET)  mg per tablet Take 1 tablet by mouth 3 (three) times a day before meals 270 tablet 2    cholecalciferol (VITAMIN D3) 1,000 units tablet Take 5,000 Units by mouth daily       Coenzyme Q10 (CO Q 10 PO) Take by mouth 600 mg BID      Cyanocobalamin (VITAMIN B 12 PO) Take by mouth daily      Echinacea 125 MG CAPS Take by mouth      fexofenadine (ALLEGRA) 180 MG tablet Take 180 mg by mouth as needed (prn)       Magnesium 200 MG TABS Take 1 tablet (200 mg total) by mouth daily 30 tablet 3    Mirabegron ER 50 MG TB24 Take 1 tablet (50 mg total) by mouth daily 90 tablet 3    multivitamin (THERAGRAN) TABS Take 1 tablet by mouth daily      naproxen sodium (Aleve) 220 MG tablet Take 220 mg by mouth as needed       Omega-3 350 MG CPDR Take by mouth      pantoprazole (PROTONIX) 40 mg tablet Take 1 tablet (40 mg total) by mouth daily 90 tablet 3    Potassium Gluconate 595 (99 K) MG TABS Take by mouth      Probiotic Product (PROBIOTIC-10) CAPS Take by mouth      carbidopa-levodopa (SINEMET)  mg per tablet TAKE 1 TABLET BY MOUTH 3 (THREE) TIMES A DAY BEFORE MEALS (Patient not taking: Reported on 8/10/2021) 45 tablet 0     No current facility-administered medications for this visit       Current Outpatient Medications on File Prior to Visit   Medication Sig    Ascorbic Acid (VITAMIN C) 1000 MG tablet Take 1,000 mg by mouth daily    aspirin 81 mg chewable tablet Chew 1 tablet (81 mg total) daily    B Complex Vitamins (B COMPLEX 100 PO) Take by mouth    Biotin 2500 MCG CAPS Take by mouth    calcium carbonate (OS-JOHN) 1250 (500 Ca) MG tablet Take 1 tablet by mouth daily    carbidopa-levodopa (SINEMET)  mg per tablet Take 1 tablet by mouth 3 (three) times a day before meals    cholecalciferol (VITAMIN D3) 1,000 units tablet Take 5,000 Units by mouth daily     Coenzyme Q10 (CO Q 10 PO) Take by mouth 600 mg BID    Cyanocobalamin (VITAMIN B 12 PO) Take by mouth daily    Echinacea 125 MG CAPS Take by mouth    fexofenadine (ALLEGRA) 180 MG tablet Take 180 mg by mouth as needed (prn)     Magnesium 200 MG TABS Take 1 tablet (200 mg total) by mouth daily    Mirabegron ER 50 MG TB24 Take 1 tablet (50 mg total) by mouth daily    multivitamin (THERAGRAN) TABS Take 1 tablet by mouth daily    naproxen sodium (Aleve) 220 MG tablet Take 220 mg by mouth as needed     Omega-3 350 MG CPDR Take by mouth    pantoprazole (PROTONIX) 40 mg tablet Take 1 tablet (40 mg total) by mouth daily    Potassium Gluconate 595 (99 K) MG TABS Take by mouth    Probiotic Product (PROBIOTIC-10) CAPS Take by mouth    carbidopa-levodopa (SINEMET)  mg per tablet TAKE 1 TABLET BY MOUTH 3 (THREE) TIMES A DAY BEFORE MEALS (Patient not taking: Reported on 8/10/2021)     No current facility-administered medications on file prior to visit  She is allergic to caffeine - food allergy, iodine - food allergy, latex, and other       Review of Systems   Constitutional: Positive for fatigue  Negative for activity change, appetite change and fever  HENT: Negative for congestion and ear discharge  Respiratory: Negative for cough and shortness of breath  Cardiovascular: Negative for chest pain and palpitations  Gastrointestinal: Negative for diarrhea and nausea  Musculoskeletal: Negative for arthralgias and back pain  Skin: Negative for color change and rash  Neurological: Negative for dizziness and headaches  Psychiatric/Behavioral: Negative for agitation and behavioral problems  Objective:      /72   Pulse 80   Temp (!) 97 4 °F (36 3 °C)   Ht 5' 2" (1 575 m)   Wt 82 6 kg (182 lb)   SpO2 98%   BMI 33 29 kg/m²          Physical Exam  Constitutional:       General: She is not in acute distress  Appearance: She is well-developed   She is not diaphoretic  HENT:      Head: Normocephalic and atraumatic  Nose: Nose normal    Eyes:      Conjunctiva/sclera: Conjunctivae normal       Pupils: Pupils are equal, round, and reactive to light  Cardiovascular:      Rate and Rhythm: Normal rate and regular rhythm  Heart sounds: Normal heart sounds  No murmur heard  Pulmonary:      Effort: Pulmonary effort is normal  No respiratory distress  Breath sounds: Normal breath sounds  No wheezing  Abdominal:      General: Bowel sounds are normal  There is no distension  Palpations: Abdomen is soft  Tenderness: There is no abdominal tenderness  Skin:     General: Skin is warm and dry  Findings: No erythema or rash  Neurological:      Mental Status: She is alert and oriented to person, place, and time

## 2021-08-11 ENCOUNTER — APPOINTMENT (OUTPATIENT)
Dept: LAB | Facility: CLINIC | Age: 81
End: 2021-08-11
Payer: COMMERCIAL

## 2021-08-11 DIAGNOSIS — R73.01 IMPAIRED FASTING GLUCOSE: ICD-10-CM

## 2021-08-11 DIAGNOSIS — E89.0 POSTABLATIVE HYPOTHYROIDISM: ICD-10-CM

## 2021-08-11 DIAGNOSIS — R53.83 FATIGUE, UNSPECIFIED TYPE: ICD-10-CM

## 2021-08-11 LAB
ALBUMIN SERPL BCP-MCNC: 4.1 G/DL (ref 3.5–5)
ALP SERPL-CCNC: 95 U/L (ref 46–116)
ALT SERPL W P-5'-P-CCNC: 18 U/L (ref 12–78)
ANION GAP SERPL CALCULATED.3IONS-SCNC: 4 MMOL/L (ref 4–13)
AST SERPL W P-5'-P-CCNC: 26 U/L (ref 5–45)
BASOPHILS # BLD AUTO: 0.02 THOUSANDS/ΜL (ref 0–0.1)
BASOPHILS NFR BLD AUTO: 0 % (ref 0–1)
BILIRUB SERPL-MCNC: 0.66 MG/DL (ref 0.2–1)
BUN SERPL-MCNC: 22 MG/DL (ref 5–25)
CALCIUM SERPL-MCNC: 9.2 MG/DL (ref 8.3–10.1)
CHLORIDE SERPL-SCNC: 108 MMOL/L (ref 100–108)
CO2 SERPL-SCNC: 26 MMOL/L (ref 21–32)
CREAT SERPL-MCNC: 0.75 MG/DL (ref 0.6–1.3)
EOSINOPHIL # BLD AUTO: 0.25 THOUSAND/ΜL (ref 0–0.61)
EOSINOPHIL NFR BLD AUTO: 4 % (ref 0–6)
ERYTHROCYTE [DISTWIDTH] IN BLOOD BY AUTOMATED COUNT: 13.3 % (ref 11.6–15.1)
EST. AVERAGE GLUCOSE BLD GHB EST-MCNC: 137 MG/DL
GFR SERPL CREATININE-BSD FRML MDRD: 74 ML/MIN/1.73SQ M
GLUCOSE P FAST SERPL-MCNC: 122 MG/DL (ref 65–99)
HBA1C MFR BLD: 6.4 %
HCT VFR BLD AUTO: 42.8 % (ref 34.8–46.1)
HGB BLD-MCNC: 14.3 G/DL (ref 11.5–15.4)
IMM GRANULOCYTES # BLD AUTO: 0.03 THOUSAND/UL (ref 0–0.2)
IMM GRANULOCYTES NFR BLD AUTO: 1 % (ref 0–2)
LYMPHOCYTES # BLD AUTO: 1.51 THOUSANDS/ΜL (ref 0.6–4.47)
LYMPHOCYTES NFR BLD AUTO: 25 % (ref 14–44)
MCH RBC QN AUTO: 30.1 PG (ref 26.8–34.3)
MCHC RBC AUTO-ENTMCNC: 33.4 G/DL (ref 31.4–37.4)
MCV RBC AUTO: 90 FL (ref 82–98)
MONOCYTES # BLD AUTO: 0.47 THOUSAND/ΜL (ref 0.17–1.22)
MONOCYTES NFR BLD AUTO: 8 % (ref 4–12)
NEUTROPHILS # BLD AUTO: 3.73 THOUSANDS/ΜL (ref 1.85–7.62)
NEUTS SEG NFR BLD AUTO: 62 % (ref 43–75)
NRBC BLD AUTO-RTO: 0 /100 WBCS
PLATELET # BLD AUTO: 245 THOUSANDS/UL (ref 149–390)
PMV BLD AUTO: 9.4 FL (ref 8.9–12.7)
POTASSIUM SERPL-SCNC: 3.9 MMOL/L (ref 3.5–5.3)
PROT SERPL-MCNC: 7.1 G/DL (ref 6.4–8.2)
RBC # BLD AUTO: 4.75 MILLION/UL (ref 3.81–5.12)
SODIUM SERPL-SCNC: 138 MMOL/L (ref 136–145)
TSH SERPL DL<=0.05 MIU/L-ACNC: 1.35 UIU/ML (ref 0.36–3.74)
VIT B12 SERPL-MCNC: 2876 PG/ML (ref 100–900)
WBC # BLD AUTO: 6.01 THOUSAND/UL (ref 4.31–10.16)

## 2021-08-11 PROCEDURE — 84443 ASSAY THYROID STIM HORMONE: CPT

## 2021-08-11 PROCEDURE — 80053 COMPREHEN METABOLIC PANEL: CPT

## 2021-08-11 PROCEDURE — 82607 VITAMIN B-12: CPT

## 2021-08-11 PROCEDURE — 85025 COMPLETE CBC W/AUTO DIFF WBC: CPT

## 2021-08-11 PROCEDURE — 36415 COLL VENOUS BLD VENIPUNCTURE: CPT

## 2021-08-11 PROCEDURE — 83036 HEMOGLOBIN GLYCOSYLATED A1C: CPT

## 2021-10-13 DIAGNOSIS — G20 PARKINSON'S DISEASE (HCC): ICD-10-CM

## 2021-10-13 DIAGNOSIS — R25.1 TREMOR: ICD-10-CM

## 2021-10-21 ENCOUNTER — PROCEDURE VISIT (OUTPATIENT)
Dept: UROLOGY | Facility: CLINIC | Age: 81
End: 2021-10-21
Payer: COMMERCIAL

## 2021-10-21 VITALS
HEIGHT: 62 IN | WEIGHT: 182 LBS | BODY MASS INDEX: 33.49 KG/M2 | DIASTOLIC BLOOD PRESSURE: 86 MMHG | SYSTOLIC BLOOD PRESSURE: 130 MMHG

## 2021-10-21 DIAGNOSIS — N32.81 OAB (OVERACTIVE BLADDER): Primary | ICD-10-CM

## 2021-10-21 LAB — POST-VOID RESIDUAL VOLUME, ML POC: 61 ML

## 2021-10-21 PROCEDURE — 52287 CYSTOSCOPY CHEMODENERVATION: CPT | Performed by: UROLOGY

## 2021-10-21 PROCEDURE — 51798 US URINE CAPACITY MEASURE: CPT | Performed by: UROLOGY

## 2021-10-26 ENCOUNTER — OFFICE VISIT (OUTPATIENT)
Dept: DERMATOLOGY | Facility: CLINIC | Age: 81
End: 2021-10-26
Payer: COMMERCIAL

## 2021-10-26 DIAGNOSIS — Z13.89 SCREENING FOR SKIN CONDITION: ICD-10-CM

## 2021-10-26 DIAGNOSIS — L82.1 SEBORRHEIC KERATOSIS: Primary | ICD-10-CM

## 2021-10-26 DIAGNOSIS — Z85.828 HISTORY OF SKIN CANCER: ICD-10-CM

## 2021-10-26 PROCEDURE — 1036F TOBACCO NON-USER: CPT | Performed by: DERMATOLOGY

## 2021-10-26 PROCEDURE — 1160F RVW MEDS BY RX/DR IN RCRD: CPT | Performed by: DERMATOLOGY

## 2021-10-26 PROCEDURE — 99213 OFFICE O/P EST LOW 20 MIN: CPT | Performed by: DERMATOLOGY

## 2021-10-28 NOTE — PROGRESS NOTES
10/11/2019    Verneice Livers  1940  [id number]    Diagnosis  Chief Complaint     over active bladder         Patient presents for PTNS  managed by Dr Laura Hussein  Patient will follow up as scheduled in one month for next treatment    Urinary Incontinence Screening      Most Recent Value   Urinary Incontinence   Urinary Incontinence? No   Incomplete emptying? No   Urinary frequency? No   Urinary urgency? Yes   Urinary hesitancy? No   Dysuria (painful difficult urination)? No   Nocturia (waking up to use the bathroom)? No [0-1 time]   Straining (having to push to go)? Yes   Weak stream?  No [fair]   Intermittent stream?  Yes [sometimes]   Post void dribbling? No   Vaginal pressure? No   Vaginal dryness?   No        Vitals:    10/11/19 0906   BP: 136/76   BP Location: Left arm   Patient Position: Sitting   Cuff Size: Standard   Pulse: 72   Weight: 83 9 kg (185 lb)   Height: 5' 2" (1 575 m)         Procedure PTNS      Session 14 of     Ankle used: left  Treatment settin  Duration of treatment: 30 minutes  Lead lot #:[id number]  Expiration Date:  Feeling/Response:whole foot and big toe sensation            Bibi Spann RN Spoke with dad; chart reviewed. Pt is much better--no fever or rash anymore. Had neg Covid test but dad does not have a copy and I am unable to view it in chart. He went to an [facility] in [place]. Advised dad I will do a note stating he can go back and that he will provide a copy of the negative test result to the school . Advised dad in the future to get something in writing with Covid tests. He is requesting we fax the note to the school [phone].

## 2021-11-23 ENCOUNTER — OFFICE VISIT (OUTPATIENT)
Dept: NEUROLOGY | Facility: CLINIC | Age: 81
End: 2021-11-23
Payer: COMMERCIAL

## 2021-11-23 VITALS
DIASTOLIC BLOOD PRESSURE: 70 MMHG | WEIGHT: 174 LBS | TEMPERATURE: 96.2 F | HEART RATE: 81 BPM | HEIGHT: 62 IN | BODY MASS INDEX: 32.02 KG/M2 | SYSTOLIC BLOOD PRESSURE: 120 MMHG

## 2021-11-23 DIAGNOSIS — G20 PARKINSON'S DISEASE (HCC): ICD-10-CM

## 2021-11-23 DIAGNOSIS — R25.1 TREMOR: ICD-10-CM

## 2021-11-23 PROCEDURE — 1036F TOBACCO NON-USER: CPT | Performed by: PSYCHIATRY & NEUROLOGY

## 2021-11-23 PROCEDURE — 1160F RVW MEDS BY RX/DR IN RCRD: CPT | Performed by: PSYCHIATRY & NEUROLOGY

## 2021-11-23 PROCEDURE — 99213 OFFICE O/P EST LOW 20 MIN: CPT | Performed by: PSYCHIATRY & NEUROLOGY

## 2022-01-25 DIAGNOSIS — R13.10 DYSPHAGIA, UNSPECIFIED TYPE: ICD-10-CM

## 2022-01-25 RX ORDER — PANTOPRAZOLE SODIUM 40 MG/1
TABLET, DELAYED RELEASE ORAL
Qty: 90 TABLET | Refills: 3 | Status: SHIPPED | OUTPATIENT
Start: 2022-01-25

## 2022-02-21 DIAGNOSIS — G20 PARKINSON'S DISEASE (HCC): ICD-10-CM

## 2022-02-21 DIAGNOSIS — R25.1 TREMOR: ICD-10-CM

## 2022-02-21 NOTE — TELEPHONE ENCOUNTER
Pt made aware of all of the below  She verbalized understanding  She will [facility] to schedule delivery

## 2022-02-21 NOTE — TELEPHONE ENCOUNTER
Pt called requesting script for sinemet  Advised that [facility], 90 day supply w/ 3 refills  Pt states that she just talked [facility] and she was told that there are not refills avail left  Pt is requesting a CB once med is sent  [facility]ed [facility], spoke w/ Marvin Ratliff and states that sinemet script was cancelled out bec pt did not return their call to schedule delivery  Ref# [id number]  Requesting new script     Rx entered   Pls review and sign off                [phone] ok to leave detailed message

## 2022-03-01 ENCOUNTER — TELEPHONE (OUTPATIENT)
Dept: NEUROLOGY | Facility: CLINIC | Age: 82
End: 2022-03-01

## 2022-03-01 NOTE — TELEPHONE ENCOUNTER
Pt calling to report that she is experiencing s/e of medication Sinemet 25/100  Pt states she sometimes feels lightheaded, increased dizzinees, sleep problems, sleepy during the day, wakes up with bad dry mouth, and constipation  Pt says she's been on medication almost 1 year  Hasn't stopped taking medication, didn't know what to do  Would like some guidance  Best  [phone], ok detailed to leave detailed message but say she will be available  Dr James Sharpe - Please advise

## 2022-03-01 NOTE — TELEPHONE ENCOUNTER
Called patient, advised to discontinue Sinemet at this time and once her side effects resolved she will call us back next week may give her a trial of pramipexole

## 2022-03-07 ENCOUNTER — OFFICE VISIT (OUTPATIENT)
Dept: FAMILY MEDICINE CLINIC | Facility: CLINIC | Age: 82
End: 2022-03-07
Payer: COMMERCIAL

## 2022-03-07 ENCOUNTER — TELEPHONE (OUTPATIENT)
Dept: CARDIOLOGY CLINIC | Facility: CLINIC | Age: 82
End: 2022-03-07

## 2022-03-07 VITALS
OXYGEN SATURATION: 97 % | SYSTOLIC BLOOD PRESSURE: 134 MMHG | TEMPERATURE: 97.4 F | DIASTOLIC BLOOD PRESSURE: 102 MMHG | HEIGHT: 62 IN | BODY MASS INDEX: 32.39 KG/M2 | WEIGHT: 176 LBS | HEART RATE: 81 BPM

## 2022-03-07 DIAGNOSIS — I95.9 HYPOTENSION, UNSPECIFIED HYPOTENSION TYPE: Primary | ICD-10-CM

## 2022-03-07 DIAGNOSIS — R42 LIGHTHEADEDNESS: ICD-10-CM

## 2022-03-07 DIAGNOSIS — Z13.220 SCREENING FOR LIPID DISORDERS: ICD-10-CM

## 2022-03-07 DIAGNOSIS — G20 PARKINSON'S DISEASE (HCC): ICD-10-CM

## 2022-03-07 DIAGNOSIS — E89.0 POSTABLATIVE HYPOTHYROIDISM: ICD-10-CM

## 2022-03-07 PROCEDURE — 99214 OFFICE O/P EST MOD 30 MIN: CPT | Performed by: PHYSICIAN ASSISTANT

## 2022-03-07 NOTE — TELEPHONE ENCOUNTER
Pt calling to update Dr Kirit Holcomb  Pt stopped Sinemet on 3/1  Still having dizziness, lightheadedness and is still sleepy during the day  Dry mouth is mostly resolved and not bad at the moment  Best  [phone], ok to leave detailed message  Dr Kirit Holcomb - Please advise

## 2022-03-07 NOTE — TELEPHONE ENCOUNTER
Pt called & wanted to make a routine f/u with Dr. RICHMOND. Pt was last seen by Dr. RICHMOND on 1/18/2019 more than 3 years ago. Pt would like to re-establish with Dr. RICHMOND.. Please Advise

## 2022-03-08 ENCOUNTER — APPOINTMENT (OUTPATIENT)
Dept: LAB | Facility: CLINIC | Age: 82
End: 2022-03-08
Payer: COMMERCIAL

## 2022-03-08 DIAGNOSIS — E89.0 POSTABLATIVE HYPOTHYROIDISM: ICD-10-CM

## 2022-03-08 DIAGNOSIS — Z13.220 SCREENING FOR LIPID DISORDERS: ICD-10-CM

## 2022-03-08 DIAGNOSIS — R42 LIGHTHEADEDNESS: ICD-10-CM

## 2022-03-08 LAB
ALBUMIN SERPL BCP-MCNC: 4 G/DL (ref 3.5–5)
ALP SERPL-CCNC: 103 U/L (ref 46–116)
ALT SERPL W P-5'-P-CCNC: 36 U/L (ref 12–78)
ANION GAP SERPL CALCULATED.3IONS-SCNC: 4 MMOL/L (ref 4–13)
AST SERPL W P-5'-P-CCNC: 19 U/L (ref 5–45)
BASOPHILS # BLD AUTO: 0.02 THOUSANDS/ΜL (ref 0–0.1)
BASOPHILS NFR BLD AUTO: 0 % (ref 0–1)
BILIRUB SERPL-MCNC: 0.62 MG/DL (ref 0.2–1)
BUN SERPL-MCNC: 16 MG/DL (ref 5–25)
CALCIUM SERPL-MCNC: 9.2 MG/DL (ref 8.3–10.1)
CHLORIDE SERPL-SCNC: 106 MMOL/L (ref 100–108)
CHOLEST SERPL-MCNC: 202 MG/DL
CO2 SERPL-SCNC: 29 MMOL/L (ref 21–32)
CREAT SERPL-MCNC: 0.77 MG/DL (ref 0.6–1.3)
EOSINOPHIL # BLD AUTO: 0.19 THOUSAND/ΜL (ref 0–0.61)
EOSINOPHIL NFR BLD AUTO: 4 % (ref 0–6)
ERYTHROCYTE [DISTWIDTH] IN BLOOD BY AUTOMATED COUNT: 13.2 % (ref 11.6–15.1)
GFR SERPL CREATININE-BSD FRML MDRD: 71 ML/MIN/1.73SQ M
GLUCOSE P FAST SERPL-MCNC: 107 MG/DL (ref 65–99)
HCT VFR BLD AUTO: 41.3 % (ref 34.8–46.1)
HDLC SERPL-MCNC: 88 MG/DL
HGB BLD-MCNC: 14 G/DL (ref 11.5–15.4)
IMM GRANULOCYTES # BLD AUTO: 0.02 THOUSAND/UL (ref 0–0.2)
IMM GRANULOCYTES NFR BLD AUTO: 0 % (ref 0–2)
LDLC SERPL CALC-MCNC: 101 MG/DL (ref 0–100)
LYMPHOCYTES # BLD AUTO: 1.66 THOUSANDS/ΜL (ref 0.6–4.47)
LYMPHOCYTES NFR BLD AUTO: 35 % (ref 14–44)
MCH RBC QN AUTO: 30 PG (ref 26.8–34.3)
MCHC RBC AUTO-ENTMCNC: 33.9 G/DL (ref 31.4–37.4)
MCV RBC AUTO: 89 FL (ref 82–98)
MONOCYTES # BLD AUTO: 0.42 THOUSAND/ΜL (ref 0.17–1.22)
MONOCYTES NFR BLD AUTO: 9 % (ref 4–12)
NEUTROPHILS # BLD AUTO: 2.48 THOUSANDS/ΜL (ref 1.85–7.62)
NEUTS SEG NFR BLD AUTO: 52 % (ref 43–75)
NRBC BLD AUTO-RTO: 0 /100 WBCS
PLATELET # BLD AUTO: 220 THOUSANDS/UL (ref 149–390)
PMV BLD AUTO: 9.7 FL (ref 8.9–12.7)
POTASSIUM SERPL-SCNC: 4.2 MMOL/L (ref 3.5–5.3)
PROT SERPL-MCNC: 7.2 G/DL (ref 6.4–8.2)
RBC # BLD AUTO: 4.66 MILLION/UL (ref 3.81–5.12)
SODIUM SERPL-SCNC: 139 MMOL/L (ref 136–145)
TRIGL SERPL-MCNC: 63 MG/DL
TSH SERPL DL<=0.05 MIU/L-ACNC: 2.19 UIU/ML (ref 0.36–3.74)
WBC # BLD AUTO: 4.79 THOUSAND/UL (ref 4.31–10.16)

## 2022-03-08 PROCEDURE — 85025 COMPLETE CBC W/AUTO DIFF WBC: CPT

## 2022-03-08 PROCEDURE — 36415 COLL VENOUS BLD VENIPUNCTURE: CPT

## 2022-03-08 PROCEDURE — 80061 LIPID PANEL: CPT

## 2022-03-08 PROCEDURE — 80053 COMPREHEN METABOLIC PANEL: CPT

## 2022-03-08 PROCEDURE — 84443 ASSAY THYROID STIM HORMONE: CPT

## 2022-03-08 NOTE — TELEPHONE ENCOUNTER
Pt called w/ update and states that PCP thinks her symptoms below are not related to sinemet at all  PCP referred her to cardiology  Pt is asking if you would like her to restart sinemet?               [phone] ok to leave detailed message

## 2022-03-08 NOTE — TELEPHONE ENCOUNTER
SPOKE TO PT & SHE SAID THAT SHE WANTS TO BE SEEN SOONER B/C SHE IS HAVING DIZZY SPELLS  PT IS GOING TO [place] ON 3/16/22 TO SEE DR RICHMOND

## 2022-03-08 NOTE — TELEPHONE ENCOUNTER
Called patient, advised to hold off till cardiology evaluation since Sinemet can also cause postural hypotension and dizziness at times

## 2022-03-16 ENCOUNTER — CONSULT (OUTPATIENT)
Dept: CARDIOLOGY CLINIC | Facility: CLINIC | Age: 82
End: 2022-03-16
Payer: COMMERCIAL

## 2022-03-16 VITALS
HEIGHT: 62 IN | OXYGEN SATURATION: 97 % | DIASTOLIC BLOOD PRESSURE: 80 MMHG | BODY MASS INDEX: 32.2 KG/M2 | HEART RATE: 76 BPM | RESPIRATION RATE: 16 BRPM | WEIGHT: 175 LBS | SYSTOLIC BLOOD PRESSURE: 140 MMHG

## 2022-03-16 DIAGNOSIS — R42 LIGHTHEADEDNESS: ICD-10-CM

## 2022-03-16 DIAGNOSIS — I95.9 HYPOTENSION, UNSPECIFIED HYPOTENSION TYPE: ICD-10-CM

## 2022-03-16 DIAGNOSIS — R09.89 BRUIT OF RIGHT CAROTID ARTERY: Primary | ICD-10-CM

## 2022-03-16 PROCEDURE — 99203 OFFICE O/P NEW LOW 30 MIN: CPT | Performed by: INTERNAL MEDICINE

## 2022-03-16 PROCEDURE — 1160F RVW MEDS BY RX/DR IN RCRD: CPT | Performed by: INTERNAL MEDICINE

## 2022-03-16 PROCEDURE — 1036F TOBACCO NON-USER: CPT | Performed by: INTERNAL MEDICINE

## 2022-03-16 NOTE — PROGRESS NOTES
Cardiology Consultation     Marga Lin  [id number]  1940  [address] [place] [address]    Patient presents for cardiology consultation and evaluation  Patient was seen by me many years ago  Patient has diagnosis of parkinsonism and is on treatment through neurology  Patient has been on Sinemet which is presently on hold  Patient has dizziness and lightheadedness  Patient does not have any history of coronary artery disease or MI in the past   Patient has an element of white coat hypertension  Patient's blood pressures at home are low in fact  She denies any history of palpitations  No history of orthopnea PND  1  Hypotension, unspecified hypotension type  Ambulatory Referral to Cardiology   2  Lightheadedness  Ambulatory Referral to Cardiology     Patient Active Problem List   Diagnosis    OAB (overactive bladder)    Parkinson's disease ([address] )    Primary insomnia    Fatigue    History of skin cancer    Seasonal allergic rhinitis    Impaired fasting glucose    Mood disturbance    Obesity (BMI 30-39  9)    Claustrophobia    Mitral valve disorder    Menopause ovarian failure    Vitamin D deficiency    Dysphagia    Multiple thyroid nodules    Gastro-esophageal reflux disease without esophagitis    Osteopenia    Chronic idiopathic constipation    History of adenomatous polyp of colon    Tremor    Medicare annual wellness visit, subsequent    Trochanteric bursitis, right hip    Pancreatic cyst    Cherry angioma    Dyspnea on exertion    Postablative hypothyroidism    Sleep apnea    Headache    Closed compression fracture of body of L1 vertebra (HCC)    Cerebrovascular disease    Excessive ear wax, right    Age-related osteoporosis without current pathological fracture    Urge incontinence     Past Medical History:   Diagnosis Date    Actinic keratosis 3/11/2016    Acute midline low back pain without sciatica 2020    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     Benign neoplasm of skin     Chest pain     Claustrophobia     Diverticulitis     Fracture     L1-L2    GERD (gastroesophageal reflux disease)     H  pylori infection 2020    Muscle weakness     Nonmelanoma skin cancer     last assessed 2017    Palpitations     Seasonal allergies     Seborrheic keratosis 2014    Sleep apnea     no cpap    Spontaneous      without mention of complications     Squamous cell carcinoma of forehead 2014    Syncope      Social History     Socioeconomic History    Marital status:       Spouse name: Not on file    Number of children: Not on file    Years of education: Not on file    Highest education level: Not on file   Occupational History    Occupation: RETIRED    Tobacco Use    Smoking status: Never Smoker    Smokeless tobacco: Never Used   Vaping Use    Vaping Use: Never used   Substance and Sexual Activity    Alcohol use: Yes     Comment: patient states rare use on holidays     Drug use: No    Sexual activity: Not Currently   Other Topics Concern    Not on file   Social History Narrative    LIVING INDEPENDENTLY ALONE         No caffeine use     Social Determinants of Health     Financial Resource Strain: Not on file   Food Insecurity: Not on file   Transportation Needs: Not on file   Physical Activity: Not on file   Stress: Not on file   Social Connections: Not on file   Intimate Partner Violence: Not on file   Housing Stability: Not on file      Family History   Problem Relation Age of Onset    Alcohol abuse Mother     Heart disease Mother     Alcohol abuse Father     Alcohol abuse Brother     Cancer Brother     Tremor Neg Hx     Parkinsonism Neg Hx     Colon cancer Neg Hx      Past Surgical History:   Procedure Laterality Date    BOTOX INJECTION N/A 3/6/2017 Procedure: CYSTOSCOPY; BLADDER BOTOX 100 UNITS ;  Surgeon: Dayanna Navas MD;  [facility];  Service:     BOWEL RESECTION      related ot diverticulitis    CARDIAC CATHETERIZATION      CARPAL TUNNEL RELEASE Right     COLONOSCOPY  07/31/2019    COLOSTOMY      COLOSTOMY CLOSURE      CYSTOSCOPY  06/01/2021    FOOT SURGERY Left     neuroma    HYSTERECTOMY      NASAL SINUS SURGERY  age 35    [place]    CO CYSTOURETHROSCOPY N/A 4/13/2018    Procedure: CYSTOSCOPY WITH BOTOX;  Surgeon: Dayanna Navas MD;  [facility];  Service: Urology    CO ESOPHAGOGASTRODUODENOSCOPY TRANSORAL DIAGNOSTIC N/A 4/23/2019    Procedure: ESOPHAGOGASTRODUODENOSCOPY (EGD); Surgeon: Marjan Barrett DO;  Location: [facility];   Service: Gastroenterology    TONSILLECTOMY  age 47   [facility] UPPER GASTROINTESTINAL ENDOSCOPY  04/23/2019       Current Outpatient Medications:     Ascorbic Acid (VITAMIN C) 1000 MG tablet, Take 1,000 mg by mouth daily, Disp: , Rfl:     aspirin 81 mg chewable tablet, Chew 1 tablet (81 mg total) daily, Disp: 30 tablet, Rfl: 0    B Complex Vitamins (B COMPLEX 100 PO), Take by mouth, Disp: , Rfl:     Biotin 2500 MCG CAPS, Take by mouth, Disp: , Rfl:     calcium carbonate (OS-JOHN) 1250 (500 Ca) MG tablet, Take 1 tablet by mouth daily, Disp: , Rfl:     cholecalciferol (VITAMIN D3) 1,000 units tablet, Take 5,000 Units by mouth daily , Disp: , Rfl:     Coenzyme Q10 (CO Q 10 PO), Take by mouth 600 mg BID, Disp: , Rfl:     Cyanocobalamin (VITAMIN B 12 PO), Take by mouth daily, Disp: , Rfl:     Echinacea 125 MG CAPS, Take by mouth, Disp: , Rfl:     fexofenadine (ALLEGRA) 180 MG tablet, Take 180 mg by mouth as needed (prn) , Disp: , Rfl:     Mirabegron ER 50 MG TB24, Take 1 tablet (50 mg total) by mouth daily, Disp: 90 tablet, Rfl: 3    multivitamin (THERAGRAN) TABS, Take 1 tablet by mouth daily, Disp: , Rfl:     naproxen sodium (Aleve) 220 MG tablet, Take 220 mg by mouth as needed , Disp: , Rfl:     Omega-3 350 MG CPDR, Take by mouth, Disp: , Rfl:     pantoprazole (PROTONIX) 40 mg tablet, TAKE 1 TABLET DAILY, Disp: 90 tablet, Rfl: 3    Potassium Gluconate 595 (99 K) MG TABS, Take by mouth, Disp: , Rfl:     Probiotic Product (PROBIOTIC-10) CAPS, Take by mouth, Disp: , Rfl:     carbidopa-levodopa (SINEMET)  mg per tablet, Take 1 tablet by mouth 3 (three) times a day before meals (Patient not taking: Reported on 3/16/2022 ), Disp: 270 tablet, Rfl: 6    Magnesium 200 MG TABS, Take 1 tablet (200 mg total) by mouth daily (Patient not taking: Reported on 3/16/2022 ), Disp: 30 tablet, Rfl: 3  Allergies   Allergen Reactions    Caffeine - Food Allergy GI Intolerance    Iodine - Food Allergy Rash    Latex Rash    Other Rash     Adhesive tape       Vitals:    03/16/22 1330   BP: 140/80   BP Location: Left arm   Patient Position: Sitting   Cuff Size: Standard   Pulse: 76   Resp: 16   SpO2: 97%   Weight: 79 4 kg (175 lb)   Height: 5' 2" (1 575 m)       Labs:  Appointment on 03/08/2022   Component Date Value    Sodium 03/08/2022 139     Potassium 03/08/2022 4 2     Chloride 03/08/2022 106     CO2 03/08/2022 29     ANION GAP 03/08/2022 4     BUN 03/08/2022 16     Creatinine 03/08/2022 0 77     Glucose, Fasting 03/08/2022 107*    Calcium 03/08/2022 9 2     AST 03/08/2022 19     ALT 03/08/2022 36     Alkaline Phosphatase 03/08/2022 103     Total Protein 03/08/2022 7 2     Albumin 03/08/2022 4 0     Total Bilirubin 03/08/2022 0 62     eGFR 03/08/2022 71     Cholesterol 03/08/2022 202*    Triglycerides 03/08/2022 63     HDL, Direct 03/08/2022 88     LDL Calculated 03/08/2022 101*    WBC 03/08/2022 4 79     RBC 03/08/2022 4 66     Hemoglobin 03/08/2022 14 0     Hematocrit 03/08/2022 41 3     MCV 03/08/2022 89     MCH 03/08/2022 30 0     MCHC 03/08/2022 33 9     RDW 03/08/2022 13 2     MPV 03/08/2022 9 7     Platelets 61/93/9922 220     nRBC 03/08/2022 0     Neutrophils Relative 03/08/2022 52     Immat GRANS % 03/08/2022 0     Lymphocytes Relative 03/08/2022 35     Monocytes Relative 03/08/2022 9     Eosinophils Relative 03/08/2022 4     Basophils Relative 03/08/2022 0     Neutrophils Absolute 03/08/2022 2 48     Immature Grans Absolute 03/08/2022 0 02     Lymphocytes Absolute 03/08/2022 1 66     Monocytes Absolute 03/08/2022 0 42     Eosinophils Absolute 03/08/2022 0 19     Basophils Absolute 03/08/2022 0 02     TSH 3RD GENERATON 03/08/2022 2 190      Imaging: No results found  Review of Systems:  Review of Systems   REVIEW OF SYSTEMS:  Constitutional:  Denies fever or chills   Eyes:  Denies change in visual acuity   HENT:  Denies nasal congestion or sore throat   Respiratory:  Denies cough or shortness of breath   Cardiovascular:  Denies chest pain or edema   GI:  Denies abdominal pain, nausea, vomiting, bloody stools or diarrhea   :  Denies dysuria, frequency, difficulty in micturition and nocturia  Musculoskeletal:  Denies back pain or joint pain   Neurologic:  Parkinsonism  Endocrine:  Denies polyuria or polydipsia   Lymphatic:  Denies swollen glands   Psychiatric:  Denies depression or anxiety     Physical Exam:  Physical Exam   PHYSICAL EXAM:  General:  Patient is not in acute distress   Head: Normocephalic, Atraumatic  HEENT:  Both pupils normal-size atraumatic, normocephalic, nonicteric  Neck:  JVP not raised  Trachea central  Right  carotid bruit  Respiratory:  normal breath sounds no crackles  no rhonchi  Cardiovascular:  Regular rate and rhythm no S3 no murmurs  GI:  Abdomen soft nontender  No organomegaly  Lymphatic:  No cervical or inguinal lymphadenopathy  Neurologic:  Patient is awake alert, oriented   Grossly nonfocal  Tremors consistent with parkinsonism      Discussion/Summary:    Patient with symptoms of dizziness and lightheadedness in a patient with history of parkinsonism    There could be an element of autonomic neuropathy related to parkinsonism  Patient's blood pressures today are slightly on the higher side even though her blood pressures at home have been normal to being on lower side  Patient will be scheduled for carotid ultrasound to rule out carotid artery stenosis  Echocardiogram will be done to evaluate ejection fraction valves and rule out any evidence of structural heart disease  Patient will also be scheduled for 48 hour Holter monitor to assess for any significant arrhythmias  Follow-up in 6 to 8 weeks or earlier as needed  Importance of adequate hydration reinforced  Patient encouraged to continue to check her blood pressures at home  Follow-up with primary care physician and Neurology

## 2022-03-17 ENCOUNTER — TELEPHONE (OUTPATIENT)
Dept: NEUROLOGY | Facility: CLINIC | Age: 82
End: 2022-03-17

## 2022-03-17 NOTE — TELEPHONE ENCOUNTER
Pt called and states that she was taken off sinemet and to see cardiologist regarding her side effects  she states that she saw cardiology and they recommended that she start a different med in place of carb/levo  see encounter from 3/1 for more info    [phone] to leave detailed message    If send in new med, she [facility]  Please advise

## 2022-03-21 NOTE — TELEPHONE ENCOUNTER
Called patient, patient continues to experience symptoms of lightheadedness and dizziness in spite of discontinuing Sinemet for several days now and hence unlikely to be secondary to Sinemet  Her tremors have also worsened since discontinuing Sinemet, patient advised to restart Sinemet since it is unlikely that the Sinemet was responsible for her symptoms  Patient is in agreement

## 2022-04-12 ENCOUNTER — HOSPITAL ENCOUNTER (OUTPATIENT)
Dept: NON INVASIVE DIAGNOSTICS | Facility: CLINIC | Age: 82
Discharge: HOME | End: 2022-04-12
Payer: COMMERCIAL

## 2022-04-12 VITALS
SYSTOLIC BLOOD PRESSURE: 120 MMHG | WEIGHT: 175 LBS | BODY MASS INDEX: 32.2 KG/M2 | DIASTOLIC BLOOD PRESSURE: 70 MMHG | HEIGHT: 62 IN | HEART RATE: 83 BPM

## 2022-04-12 DIAGNOSIS — I95.9 HYPOTENSION, UNSPECIFIED HYPOTENSION TYPE: ICD-10-CM

## 2022-04-12 DIAGNOSIS — R42 LIGHTHEADEDNESS: ICD-10-CM

## 2022-04-12 DIAGNOSIS — R09.89 BRUIT OF RIGHT CAROTID ARTERY: ICD-10-CM

## 2022-04-12 LAB
AORTIC ROOT: 3.3 CM
APICAL FOUR CHAMBER EJECTION FRACTION: 70 %
AV LVOT PEAK GRADIENT: 8 MMHG
AV PEAK GRADIENT: 12 MMHG
E WAVE DECELERATION TIME: 260 MS
E/A RATIO: 0.79
FRACTIONAL SHORTENING: 28 (ref 28–44)
INTERVENTRICULAR SEPTUM IN DIASTOLE (PARASTERNAL SHORT AXIS VIEW): 1.2 CM
INTERVENTRICULAR SEPTUM: 1.2 CM (ref 0.52–0.98)
LAAS-AP2: 24.9 CM2
LAAS-AP4: 22.9 CM2
LEFT ATRIUM AREA SYSTOLE SINGLE PLANE A4C: 22.8 CM2
LEFT ATRIUM SIZE: 4.3 CM
LEFT ATRIUM VOLUME INDEX (MOD BIPLANE): 23.8
LEFT INTERNAL DIMENSION IN SYSTOLE: 3.3 CM (ref 2.67–4.04)
LEFT VENTRICULAR INTERNAL DIMENSION IN DIASTOLE: 4.6 CM (ref 4.37–6.51)
LEFT VENTRICULAR POSTERIOR WALL IN END DIASTOLE: 1.2 CM (ref 0.51–0.96)
LEFT VENTRICULAR STROKE VOLUME: 56 ML
LVSV (TEICH): 56 ML
MITRAL REGURGITATION PEAK VELOCITY: 3.64 M/S
MITRAL VALVE REGURGITANT PEAK GRADIENT: 53 MMHG
MV E'TISSUE VEL-SEP: 10 CM/S
MV PEAK A VEL: 1.02 M/S
MV PEAK E VEL: 81 CM/S
MV STENOSIS PRESSURE HALF TIME: 75 MS
MV VALVE AREA P 1/2 METHOD: 2.9
RIGHT ATRIUM AREA SYSTOLE A4C: 9.4 CM2
RIGHT VENTRICLE ID DIMENSION: 2.6 CM
SL CV LEFT ATRIUM LENGTH A2C: 6.6 CM
SL CV LV EF: 60
SL CV PED ECHO LEFT VENTRICLE DIASTOLIC VOLUME (MOD BIPLANE) 2D: 100 ML
SL CV PED ECHO LEFT VENTRICLE SYSTOLIC VOLUME (MOD BIPLANE) 2D: 43 ML
TR MAX PG: 26 MMHG
TR PEAK VELOCITY: 2.6 M/S
TRICUSPID VALVE PEAK REGURGITATION VELOCITY: 2.56 M/S
Z-SCORE OF INTERVENTRICULAR SEPTUM IN END DIASTOLE: 3.87
Z-SCORE OF LEFT VENTRICULAR DIMENSION IN END DIASTOLE: -1.48
Z-SCORE OF LEFT VENTRICULAR DIMENSION IN END SYSTOLE: 0.04
Z-SCORE OF LEFT VENTRICULAR POSTERIOR WALL IN END DIASTOLE: 3.98

## 2022-04-12 PROCEDURE — 93226 XTRNL ECG REC<48 HR SCAN A/R: CPT

## 2022-04-12 PROCEDURE — 93306 TTE W/DOPPLER COMPLETE: CPT

## 2022-04-12 PROCEDURE — 93306 TTE W/DOPPLER COMPLETE: CPT | Performed by: INTERNAL MEDICINE

## 2022-04-12 PROCEDURE — 93880 EXTRACRANIAL BILAT STUDY: CPT

## 2022-04-12 PROCEDURE — 93225 XTRNL ECG REC<48 HRS REC: CPT

## 2022-04-14 PROCEDURE — 93880 EXTRACRANIAL BILAT STUDY: CPT | Performed by: INTERNAL MEDICINE

## 2022-04-14 PROCEDURE — 93227 XTRNL ECG REC<48 HR R&I: CPT | Performed by: INTERNAL MEDICINE

## 2022-04-15 ENCOUNTER — OFFICE VISIT (OUTPATIENT)
Dept: GASTROENTEROLOGY | Facility: CLINIC | Age: 82
End: 2022-04-15
Payer: COMMERCIAL

## 2022-04-15 VITALS
OXYGEN SATURATION: 95 % | BODY MASS INDEX: 32.57 KG/M2 | SYSTOLIC BLOOD PRESSURE: 170 MMHG | DIASTOLIC BLOOD PRESSURE: 88 MMHG | WEIGHT: 177 LBS | HEIGHT: 62 IN | HEART RATE: 90 BPM

## 2022-04-15 DIAGNOSIS — R14.1 ABDOMINAL GAS PAIN: ICD-10-CM

## 2022-04-15 DIAGNOSIS — K86.2 PANCREATIC CYST: Primary | ICD-10-CM

## 2022-04-15 PROCEDURE — 99213 OFFICE O/P EST LOW 20 MIN: CPT | Performed by: PHYSICIAN ASSISTANT

## 2022-04-15 NOTE — LETTER
April 15, 2022     Alison Mistry, [address]  [address] [place] [address]  [address]    Patient: Kyra Espitia   YOB: 1940   Date of Visit: 4/15/2022       Dear Dr Thalia Torrez:    Thank you for referring Glenn Miranda to me for evaluation  Below are my notes for this consultation  If you have questions, please do not hesitate to call me  I look forward to following your patient along with you  Sincerely,        Alexander Flood PA-C        CC: No Recipients  Nancy Barr  4/15/2022  1:52 PM  Sign when Signing Visit  [facility] - Outpatient Follow-up Note  Kyra Espitia 79 y o  female MRN: [id number]  Encounter: [id number]          ASSESSMENT AND PLAN:      1  Pancreatic cyst  -Will repeat MRI MRCP to evaluate pancreatic cyst     2  Abdominal gas pain  -Will start patient on Colace daily   -Will start Gas-X 3 times a day as needed   -Will restart Culturelle   -Will continue probiotic daily  Follow-up after MRI is complete   ______________________________________________________________________    SUBJECTIVE:   23-year-old female with a past medical history significant for pancreatic cyst, GERD, Parkinson's disease, and history of skin cancer  Patient reports that she was supposed to have a follow-up MRI of her pancreatic cyst several months ago  She reports she never received a call from the office to set this up  She reports that overall she is doing fairly well except for the report of abdominal gas pain  She reports this is triggered by stress in her nerves  She is also reporting hard to evacuate stools  She is currently on a probiotic daily  She reports she was taking Gas-X which she has not taken this in quite some time  She denies any alarm symptoms  Her diet is good  Patient continues to work  REVIEW OF SYSTEMS IS OTHERWISE NEGATIVE        Historical Information   Past Medical History:   Diagnosis Date    Actinic keratosis 3/11/2016    Acute midline low back pain without sciatica 2020    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     Benign neoplasm of skin     Chest pain     Claustrophobia     Diverticulitis     Fracture     L1-L2    GERD (gastroesophageal reflux disease)     H  pylori infection 2020    Muscle weakness     Nonmelanoma skin cancer     last assessed 2017    Palpitations     Seasonal allergies     Seborrheic keratosis 2014    Sleep apnea     no cpap    Spontaneous      without mention of complications     Squamous cell carcinoma of forehead 2014    Syncope      Past Surgical History:   Procedure Laterality Date    BOTOX INJECTION N/A 3/6/2017    Procedure: CYSTOSCOPY; BLADDER BOTOX 100 UNITS ;  Surgeon: Jania Balderas MD;  [facility];  Service:     BOWEL RESECTION      related ot diverticulitis    CARDIAC CATHETERIZATION      CARPAL TUNNEL RELEASE Right     COLONOSCOPY  2019    COLOSTOMY      COLOSTOMY CLOSURE      CYSTOSCOPY  2021    FOOT SURGERY Left     neuroma    HYSTERECTOMY      NAS[place] S[place] SURGERY  age 35    [place]    ND CYSTOURETHROSCOPY N/A 2018    Procedure: CYSTOSCOPY WITH BOTOX;  Surgeon: Jania Balderas MD;  [facility];  Service: Urology    ND ESOPHAGOGASTRODUODENOSCOPY TRANSORAL DIAGNOSTIC N/A 2019    Procedure: ESOPHAGOGASTRODUODENOSCOPY (EGD); Surgeon: Diann Adams DO;  Location[facility];   Service: Gastroenterology    TONSILLECTOMY  age 47   [facility] ENDOSCOPY  2019     Social History   Social History     Substance and Sexual Activity   Alcohol Use Yes    Comment: patient states rare use on holidays      Social History     Substance and Sexual Activity   Drug Use No     Social History     Tobacco Use   Smoking Status Never Smoker   Smokeless Tobacco Never Used     Family History   Problem Relation Age of Onset    Alcohol abuse Mother     Heart disease Mother     Alcohol abuse Father     Alcohol abuse Brother     Cancer Brother     Tremor Neg Hx     Parkinsonism Neg Hx     Colon cancer Neg Hx        Meds/Allergies       Current Outpatient Medications:     Ascorbic Acid (VITAMIN C) 1000 MG tablet    aspirin 81 mg chewable tablet    B Complex Vitamins (B COMPLEX 100 PO)    Biotin 2500 MCG CAPS    calcium carbonate (OS-JOHN) 1250 (500 Ca) MG tablet    carbidopa-levodopa (SINEMET)  mg per tablet    cholecalciferol (VITAMIN D3) 1,000 units tablet    Coenzyme Q10 (CO Q 10 PO)    Cyanocobalamin (VITAMIN B 12 PO)    Echinacea 125 MG CAPS    fexofenadine (ALLEGRA) 180 MG tablet    Magnesium 200 MG TABS    Mirabegron ER 50 MG TB24    multivitamin (THERAGRAN) TABS    naproxen sodium (Aleve) 220 MG tablet    Omega-3 350 MG CPDR    pantoprazole (PROTONIX) 40 mg tablet    Potassium Gluconate 595 (99 K) MG TABS    Probiotic Product (PROBIOTIC-10) CAPS    Allergies   Allergen Reactions    Caffeine - Food Allergy GI Intolerance    Iodine - Food Allergy Rash    Latex Rash    Other Rash     Adhesive tape             Objective     Blood pressure 170/88, pulse 90, height 5' 2" (1 575 m), weight 80 3 kg (177 lb), SpO2 95 %, not currently breastfeeding  Body mass index is 32 37 kg/m²  PHYSICAL EXAM:      General Appearance:   Alert, cooperative, no distress   HEENT:   Normocephalic, atraumatic, anicteric      Neck:  Supple, symmetrical, trachea midline   Lungs:   Clear to auscultation bilaterally; no rales, rhonchi or wheezing; respirations unlabored    Heart[de-identified]   Regular rate and rhythm; no murmur, rub, or gallop     Abdomen:   Soft, non-tender, non-distended; normal bowel sounds; no masses, no organomegaly    Genitalia:   Deferred    Rectal:   Deferred    Extremities:  No cyanosis, clubbing or edema    Pulses:  2+ and symmetric    Skin:  No jaundice, rashes, or lesions    Lymph nodes:  No palpable cervical lymphadenopathy        Lab Results:   No visits with results within 1 Day(s) from this visit  Latest known visit with results is:   Hospital Outpatient Visit on 04/12/2022   Component Date Value    A4C EF 04/12/2022 70     LVIDd 04/12/2022 4 60     LVIDS 04/12/2022 3 30     IVSd 04/12/2022 1 20     LVPWd 04/12/2022 1 20     FS 04/12/2022 28     MV E' Tissue Velocity Se* 04/12/2022 10     E wave deceleration time 04/12/2022 260     MV Peak E New 04/12/2022 81     MV Peak A New 04/12/2022 1 02     AV LVOT peak gradient 04/12/2022 8     RVID d 04/12/2022 2 6     LA size 04/12/2022 4 3     LA length (A2C) 04/12/2022 6 60     IRIS A4C 04/12/2022 22 8     RAA A4C 04/12/2022 9 4     AV peak gradient 04/12/2022 12     MV stenosis pressure 1/2* 04/12/2022 75     MV valve area p 1/2 meth* 04/12/2022 2 90     MR PG 04/12/2022 53     TR Peak New 04/12/2022 2 6     Triscuspid Valve Regurgi* 04/12/2022 26 0     Ao root 04/12/2022 3 30     Mitral regurgitation pea* 04/12/2022 3 64     Tricuspid valve peak reg* 04/12/2022 2 56     Left ventricular stroke * 04/12/2022 56 00     IVS 04/12/2022 1 2     LEFT VENTRICLE SYSTOLIC * 33/04/4367 43     LV DIASTOLIC VOLUME (MOD* 49/31/7979 100     Left Atrium Area-systoli* 04/12/2022 22 9     Left Atrium Area-systoli* 04/12/2022 24 9     LVSV, 2D 04/12/2022 56     ZLVPWD 04/12/2022 3 98     ZLVIDS 04/12/2022 0 04     ZLVIDD 04/12/2022 -1 48     ZIVSD 04/12/2022 3 87     LA Volume Index (BP) 04/12/2022 23 8     E/A ratio 04/12/2022 0 79     LV EF 04/12/2022 60          Radiology Results:   VAS carotid complete study    Result Date: 4/14/2022  Narrative:  [facility] REPORT CLINICAL: Indications:  Patient presents with a cervical bruit and multiple cardiovascular risk factors  Patient is asymptomatic from a cerebral vascular standpoint  Operative History: Cardiac Catherization Clinical Right Pressure:  120/70 mm Hg, Left Pressure:  120/70 mm Hg    FINDINGS:  Right Impression  PSV  EDV (cm/s)  Direction of Flow  Ratio  Dist  ICA                 47          16                      0 52  Mid  ICA                  56          18                      0 62  Prox  ICA    1 - 49%      63          17                      0 70  Dist CCA                  78          20                            Mid CCA                   90          24                      1 20  Prox CCA                  75          17                            Ext Carotid               67          11                      0 75  Prox Vert                 54          14  Antegrade                 Subclavian                94          13                             Left         Impression  PSV  EDV (cm/s)  Direction of Flow  Ratio  Dist  ICA                 46          13                      0 53  Mid  ICA                  49          19                      0 56  Prox  ICA    1 - 49%      51          16                      0 59  Dist CCA                  61          17                            Mid CCA                   86          16                      1 35  Prox CCA                  64          12                            Ext Carotid               75          12                      0 87  Prox Vert                 35          10  Antegrade                 Subclavian                87           0                               CONCLUSION: Impression RIGHT: There is <50% stenosis noted in the internal carotid artery  Plaque is heterogenous and irregular  Vertebral artery flow is antegrade  There is no significant subclavian artery disease  LEFT: There is <50% stenosis noted in the internal carotid artery  Plaque is heterogenous and irregular  Vertebral artery flow is antegrade  There is no significant subclavian artery disease    Internal carotid artery stenosis determination by consensus criteria from: Jadyn Westbrook , et al  Carotid Artery Stenosis: Gray-Scale and Doppler US Diagnosis - Society of Radiologists in Ultrasound Consensus Conference, Radiology 2003; [id number]  SIGNATURE: Electronically Signed by: Stefan Montes MD [facility] Jenny Held on 2022-04-14 04:40:55 PM    Echo complete w/ contrast if indicated    Result Date: 4/12/2022  Narrative: [facility]  Left Ventricle: Left ventricular cavity size is normal  The left ventricular ejection fraction is 60%  Systolic function is normal  Diastolic function is mildly abnormal, consistent with grade I (abnormal) relaxation  There is mild concentric hypertrophy    Right Ventricle: Right ventricular cavity size is normal  Systolic function is normal    Left Atrium: The atrium is mildly dilated  Holter monitor    Result Date: 4/14/2022  Narrative: INDICATIONS: Lightheadedness     Impression: This Holter monitoring and analysis was done for a period of 47 hours and 59 minutes  The rhythm was sinus  Minimum heart rate was 55 bpm  Average heart rate was 83 bpm  Maximum heart rate was 122 bpm  Ventricular ectopic activity consisted of 2 single PVCs  Supraventricular ectopic activity consisted of 20723 beats (19 4%), of which 15458 were in 3262 runs, 5038 were in atrial couplets, 1944 were late beats, 28031 were single PACs, 121 were in atrial bigeminy, 289 were in atrial trigeminy  The longest supraventricular run consisted of 83 beats with a maximum heart rate of 120 beats per minute  The fastest supraventricular run consisted of 3 beats with a maximum heart rate of 171 beats per minute  No irregular rhythm episodes were detected  Patient's rhythm included 7 minutes 12 seconds of bradycardia  The slowest single episode of bradycardia lasted 7 seconds with a minimum heart rate of 55 bpm The longest R-R interval 1 6 seconds  Patient's rhythm included 4 hours 11 minutes 42 seconds of tachycardia   The fastest single episode of tachycardia lasted 2 minutes 32 seconds with a maximum heart rate of 122 bpm  Patient's 2 complaints of feeling "strained" had underlying sinus rhythm (99 bpm) and possible atrial tachycardia (108 bpm)  His complaint of "dizziness" had underlying atrial tachycardia (103-112 bpm)  His complaint of being "frightened" had underlying sinus rhythm (92 bpm)  CONCLUSIONS: Sinus rhythm with significant supraventricular ectopic activity as detailed above  Patient's 2 complaints of feeling "strained" had underlying sinus rhythm (99 bpm) and possible atrial tachycardia (108 bpm)  His complaint of "dizziness" had underlying atrial tachycardia (103-112 bpm)  His complaint of being "frightened" had underlying sinus rhythm (92 bpm)

## 2022-04-15 NOTE — PATIENT INSTRUCTIONS
Abdominal Pain   WHAT YOU NEED TO KNOW:   Abdominal pain can be dull, achy, or sharp  You may have pain in one area of your abdomen, or in your entire abdomen  Your pain may be caused by a condition such as constipation, food sensitivity or poisoning, infection, or a blockage  Abdominal pain can also be from a hernia, appendicitis, or an ulcer  Liver, gallbladder, or kidney conditions can also cause abdominal pain  The cause of your abdominal pain may not be known  DISCHARGE INSTRUCTIONS:   Call your local emergency number (911 in the [address] [facility]) if:   · You have new chest pain or shortness of breath  Return to the emergency department if:   · You have pulsing pain in your upper abdomen or lower back that suddenly becomes constant  · Your pain is in the right lower abdominal area and worsens with movement  · You have a fever over 100 4°F (38°C) or shaking chills  · You are vomiting and cannot keep food or liquids down  · Your pain does not improve or gets worse over the next 8 to 12 hours  · You see blood in your vomit or bowel movements, or they look black and tarry  · Your skin or the whites of your eyes turn yellow  · You are a woman and have a large amount of vaginal bleeding that is not your monthly period  Call your doctor if:   · You have pain in your lower back  · You are a man and have pain in your testicles  · You have pain when you urinate  · You have questions or concerns about your condition or care  Medicines:   · Prescription pain medicine  may be given  Ask your healthcare provider how to take this medicine safely  Some prescription pain medicines contain acetaminophen  Do not take other medicines that contain acetaminophen without talking to your healthcare provider  Too much acetaminophen may cause liver damage  Prescription pain medicine may cause constipation  Ask your healthcare provider how to prevent or treat constipation       · Medicines  may be given to calm your stomach or prevent vomiting  · Take your medicine as directed  Contact your healthcare provider if you think your medicine is not helping or if you have side effects  Tell him of her if you are allergic to any medicine  Keep a list of the medicines, vitamins, and herbs you take  Include the amounts, and when and why you take them  Bring the list or the pill bottles to follow-up visits  Carry your medicine list with you in case of an emergency  Manage your symptoms:   · Apply heat  on your abdomen for 20 to 30 minutes every 2 hours for as many days as directed  Heat helps decrease pain and muscle spasms  · Make changes to the food you eat, if needed  Do not eat foods that cause abdominal pain or other symptoms  Eat small meals more often  The following changes may also help:    ? Eat more high-fiber foods if you are constipated  High-fiber foods include fruits, vegetables, whole-grain foods, and legumes  ? Do not eat foods that cause gas if you have bloating  Examples include broccoli, cabbage, and cauliflower  Do not drink soda or carbonated drinks  These may also cause gas  ? Do not eat foods or drinks that contain sorbitol or fructose if you have diarrhea and bloating  Some examples are fruit juices, candy, jelly, and sugar-free gum  ? Do not eat high-fat foods  Examples include fried foods, cheeseburgers, hot dogs, and desserts  ? Limit or do not have caffeine  Caffeine may make symptoms such as heartburn or nausea worse  ? Drink more liquids to prevent dehydration from diarrhea or vomiting  Ask your healthcare provider how much liquid to drink each day and which liquids are best for you  · Keep a diary of your abdominal pain  A diary may help your healthcare provider learn what is causing your abdominal pain  Include when the pain happens, how long it lasts, and what the pain feels like  Write down any other symptoms you have with abdominal pain   Also write down what you eat, and what symptoms you have after you eat  · Manage your stress  Stress may cause abdominal pain  Your healthcare provider may recommend relaxation techniques and deep breathing exercises to help decrease your stress  Your healthcare provider may recommend you talk to someone about your stress or anxiety, such as a counselor or a trusted friend  Get plenty of sleep and exercise regularly  · Limit or do not drink alcohol  Alcohol can make your abdominal pain worse  Ask your healthcare provider if it is safe for you to drink alcohol  Also ask how much is safe for you to drink  · Do not smoke  Nicotine and other chemicals in cigarettes can damage your esophagus and stomach  Ask your healthcare provider for information if you currently smoke and need help to quit  E-cigarettes or smokeless tobacco still contain nicotine  Talk to your healthcare provider before you use these products  Follow up with your doctor within 24 hours or as directed:  Write down your questions so you remember to ask them during your visits  © [employer] 2022 Information is for End User's use only and may not be sold, redistributed or otherwise used for commercial purposes  All illustrations and images included in CareNotes® are the copyrighted property of [employer]  or [facility] Jeanmarie Ortiz   The above information is an  only  It is not intended as medical advice for individual conditions or treatments  Talk to your doctor, nurse or pharmacist before following any medical regimen to see if it is safe and effective for you

## 2022-04-15 NOTE — PROGRESS NOTES
[facility] - Outpatient Follow-up Note  Luz Maria Liz 79 y o  female MRN: [id number]  Encounter: [id number]          ASSESSMENT AND PLAN:      1  Pancreatic cyst  -Will repeat MRI MRCP to evaluate pancreatic cyst     2  Abdominal gas pain  -Will start patient on Colace daily   -Will start Gas-X 3 times a day as needed   -Will restart Culturelle   -Will continue probiotic daily  Follow-up after MRI is complete   ______________________________________________________________________    SUBJECTIVE:   48-year-old female with a past medical history significant for pancreatic cyst, GERD, Parkinson's disease, and history of skin cancer  Patient reports that she was supposed to have a follow-up MRI of her pancreatic cyst several months ago  She reports she never received a call from the office to set this up  She reports that overall she is doing fairly well except for the report of abdominal gas pain  She reports this is triggered by stress in her nerves  She is also reporting hard to evacuate stools  She is currently on a probiotic daily  She reports she was taking Gas-X which she has not taken this in quite some time  She denies any alarm symptoms  Her diet is good  Patient continues to work  REVIEW OF SYSTEMS IS OTHERWISE NEGATIVE        Historical Information   Past Medical History:   Diagnosis Date    Actinic keratosis 3/11/2016    Acute midline low back pain without sciatica 2020    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     Benign neoplasm of skin     Chest pain     Claustrophobia     Diverticulitis     Fracture     L1-L2    GERD (gastroesophageal reflux disease)     H  pylori infection 2020    Muscle weakness     Nonmelanoma skin cancer     last assessed 2017    Palpitations     Seasonal allergies     Seborrheic keratosis 2014    Sleep apnea     no cpap    Spontaneous      without mention of complications     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    Syncope      Past Surgical History:   Procedure Laterality Date    BOTOX INJECTION N/A 3/6/2017    Procedure: CYSTOSCOPY; BLADDER BOTOX 100 UNITS ;  Surgeon: Thor Live MD;  [facility];  Service:     BOWEL RESECTION      related ot diverticulitis    CARDIAC CATHETERIZATION      CARPAL TUNNEL RELEASE Right     COLONOSCOPY  07/31/2019    COLOSTOMY      COLOSTOMY CLOSURE      CYSTOSCOPY  06/01/2021    FOOT SURGERY Left     neuroma    HYSTERECTOMY      NASAL SINUS SURGERY  age 35    [facility]    [place] CYSTOURETHROSCOPY N/A 4/13/2018    Procedure: CYSTOSCOPY WITH BOTOX;  Surgeon: Thor Live MD;  [facility];  Service: Urology    [place] ESOPHAGOGASTRODUODENOSCOPY TRANSORAL DIAGNOSTIC N/A 4/23/2019    Procedure: ESOPHAGOGASTRODUODENOSCOPY (EGD); Surgeon: Michele Moreland DO;  Location: [facility];   Service: Gastroenterology    TONSILLECTOMY  age 47   [facility] ENDOSCOPY  04/23/2019     Social History   Social History     Substance and Sexual Activity   Alcohol Use Yes    Comment: patient states rare use on holidays      Social History     Substance and Sexual Activity   Drug Use No     Social History     Tobacco Use   Smoking Status Never Smoker   Smokeless Tobacco Never Used     Family History   Problem Relation Age of Onset    Alcohol abuse Mother     Heart disease Mother     Alcohol abuse Father     Alcohol abuse Brother     Cancer Brother     Tremor Neg Hx     Parkinsonism Neg Hx     Colon cancer Neg Hx        Meds/Allergies       Current Outpatient Medications:     Ascorbic Acid (VITAMIN C) 1000 MG tablet    aspirin 81 mg chewable tablet    B Complex Vitamins (B COMPLEX 100 PO)    Biotin 2500 MCG CAPS    calcium carbonate (OS-JOHN) 1250 (500 Ca) MG tablet    carbidopa-levodopa (SINEMET)  mg per tablet    cholecalciferol (VITAMIN D3) 1,000 units tablet    Coenzyme Q10 (CO Q 10 PO)    Cyanocobalamin (VITAMIN B 12 PO)    Echinacea 125 MG CAPS    fexofenadine (ALLEGRA) 180 MG tablet    Magnesium 200 MG TABS    Mirabegron ER 50 MG TB24    multivitamin (THERAGRAN) TABS    naproxen sodium (Aleve) 220 MG tablet    Omega-3 350 MG CPDR    pantoprazole (PROTONIX) 40 mg tablet    Potassium Gluconate 595 (99 K) MG TABS    Probiotic Product (PROBIOTIC-10) CAPS    Allergies   Allergen Reactions    Caffeine - Food Allergy GI Intolerance    Iodine - Food Allergy Rash    Latex Rash    Other Rash     Adhesive tape             Objective     Blood pressure 170/88, pulse 90, height 5' 2" (1 575 m), weight 80 3 kg (177 lb), SpO2 95 %, not currently breastfeeding  Body mass index is 32 37 kg/m²  PHYSICAL EXAM:      General Appearance:   Alert, cooperative, no distress   HEENT:   Normocephalic, atraumatic, anicteric      Neck:  Supple, symmetrical, trachea midline   Lungs:   Clear to auscultation bilaterally; no rales, rhonchi or wheezing; respirations unlabored    Heart[de-identified]   Regular rate and rhythm; no murmur, rub, or gallop  Abdomen:   Soft, non-tender, non-distended; normal bowel sounds; no masses, no organomegaly    Genitalia:   Deferred    Rectal:   Deferred    Extremities:  No cyanosis, clubbing or edema    Pulses:  2+ and symmetric    Skin:  No jaundice, rashes, or lesions    Lymph nodes:  No palpable cervical lymphadenopathy        Lab Results:   No visits with results within 1 Day(s) from this visit     Latest known visit with results is:   Hospital Outpatient Visit on 04/12/2022   Component Date Value    A4C EF 04/12/2022 70     LVIDd 04/12/2022 4 60     LVIDS 04/12/2022 3 30     IVSd 04/12/2022 1 20     LVPWd 04/12/2022 1 20     FS 04/12/2022 28     MV E' Tissue Velocity Se* 04/12/2022 10     E wave deceleration time 04/12/2022 260     MV Peak E New 04/12/2022 81     MV Peak A New 04/12/2022 1 02     AV LVOT peak gradient 04/12/2022 8     RVID d 04/12/2022 2 6     LA size 04/12/2022 4 3     LA length (A2C) 04/12/2022 6 60     IRIS A4C 04/12/2022 22 8     RAA A4C 04/12/2022 9 4     AV peak gradient 04/12/2022 12     MV stenosis pressure 1/2* 04/12/2022 75     MV valve area p 1/2 meth* 04/12/2022 2 90     MR PG 04/12/2022 53     TR Peak New 04/12/2022 2 6     Triscuspid Valve Regurgi* 04/12/2022 26 0     Ao root 04/12/2022 3 30     Mitral regurgitation pea* 04/12/2022 3 64     Tricuspid valve peak reg* 04/12/2022 2 56     Left ventricular stroke * 04/12/2022 56 00     IVS 04/12/2022 1 2     LEFT VENTRICLE SYSTOLIC * 74/80/2353 43     LV DIASTOLIC VOLUME (MOD* 43/00/6004 100     Left Atrium Area-systoli* 04/12/2022 22 9     Left Atrium Area-systoli* 04/12/2022 24 9     LVSV, 2D 04/12/2022 56     ZLVPWD 04/12/2022 3 98     ZLVIDS 04/12/2022 0 04     ZLVIDD 04/12/2022 -1 48     ZIVSD 04/12/2022 3 87     LA Volume Index (BP) 04/12/2022 23 8     E/A ratio 04/12/2022 0 79     LV EF 04/12/2022 60          Radiology Results:   VAS carotid complete study    Result Date: 4/14/2022  Narrative:  [facility] REPORT CLINICAL: Indications:  Patient presents with a cervical bruit and multiple cardiovascular risk factors  Patient is asymptomatic from a cerebral vascular standpoint  Operative History: Cardiac Catherization Clinical Right Pressure:  120/70 mm Hg, Left Pressure:  120/70 mm Hg  FINDINGS:  Right        Impression  PSV  EDV (cm/s)  Direction of Flow  Ratio  Dist  ICA                 47          16                      0 52  Mid  ICA                  56          18                      0 62  Prox   ICA    1 - 49%      63          17                      0 70  Dist CCA                  78          20                            Mid CCA                   90          24                      1 20  Prox CCA                  75          17                            Ext Carotid               67          11                      0 75  Prox Vert                 54 14  Antegrade                 Subclavian                94          13                             Left         Impression  PSV  EDV (cm/s)  Direction of Flow  Ratio  Dist  ICA                 46          13                      0 53  Mid  ICA                  49          19                      0 56  Prox  ICA    1 - 49%      51          16                      0 59  Dist CCA                  61          17                            Mid CCA                   86          16                      1 35  Prox CCA                  64          12                            Ext Carotid               75          12                      0 87  Prox Vert                 35          10  Antegrade                 Subclavian                87           0                               CONCLUSION: Impression RIGHT: There is <50% stenosis noted in the internal carotid artery  Plaque is heterogenous and irregular  Vertebral artery flow is antegrade  There is no significant subclavian artery disease  LEFT: There is <50% stenosis noted in the internal carotid artery  Plaque is heterogenous and irregular  Vertebral artery flow is antegrade  There is no significant subclavian artery disease  Internal carotid artery stenosis determination by consensus criteria from: Gerhard Boxer , et al  Carotid Artery Stenosis: Gray-Scale and Doppler US Diagnosis - Society of Radiologists in [address] [place] [address], Radiology 2003; [phone]  SIGNATURE: Electronically Signed by: MD BRUNO Archer on 2022-04-14 04:40:55 PM    Echo complete w/ contrast if indicated    Result Date: 4/12/2022  Narrative: [place]  Left Ventricle: Left ventricular cavity size is normal  The left ventricular ejection fraction is 60%  Systolic function is normal  Diastolic function is mildly abnormal, consistent with grade I (abnormal) relaxation  There is mild concentric hypertrophy     Right Ventricle: Right ventricular cavity size is normal  Systolic function is normal    Left Atrium: The atrium is mildly dilated  Holter monitor    Result Date: 4/14/2022  Narrative: INDICATIONS: Lightheadedness     Impression: This Holter monitoring and analysis was done for a period of 47 hours and 59 minutes  The rhythm was sinus  Minimum heart rate was 55 bpm  Average heart rate was 83 bpm  Maximum heart rate was 122 bpm  Ventricular ectopic activity consisted of 2 single PVCs  Supraventricular ectopic activity consisted of 07767 beats (19 4%), of which 24863 were in 3262 runs, 5038 were in atrial couplets, 1944 were late beats, 89970 were single PACs, 121 were in atrial bigeminy, 289 were in atrial trigeminy  The longest supraventricular run consisted of 83 beats with a maximum heart rate of 120 beats per minute  The fastest supraventricular run consisted of 3 beats with a maximum heart rate of 171 beats per minute  No irregular rhythm episodes were detected  Patient's rhythm included 7 minutes 12 seconds of bradycardia  The slowest single episode of bradycardia lasted 7 seconds with a minimum heart rate of 55 bpm The longest R-R interval 1 6 seconds  Patient's rhythm included 4 hours 11 minutes 42 seconds of tachycardia  The fastest single episode of tachycardia lasted 2 minutes 32 seconds with a maximum heart rate of 122 bpm  Patient's 2 complaints of feeling "strained" had underlying sinus rhythm (99 bpm) and possible atrial tachycardia (108 bpm)  His complaint of "dizziness" had underlying atrial tachycardia (103-112 bpm)  His complaint of being "frightened" had underlying sinus rhythm (92 bpm)  CONCLUSIONS: Sinus rhythm with significant supraventricular ectopic activity as detailed above  Patient's 2 complaints of feeling "strained" had underlying sinus rhythm (99 bpm) and possible atrial tachycardia (108 bpm)  His complaint of "dizziness" had underlying atrial tachycardia (103-112 bpm)    His complaint of being "frightened" had underlying sinus rhythm (92 bpm)

## 2022-04-20 ENCOUNTER — TELEPHONE (OUTPATIENT)
Dept: CARDIOLOGY CLINIC | Facility: CLINIC | Age: 82
End: 2022-04-20

## 2022-04-20 NOTE — PROGRESS NOTES
4/21/2022      Chief Complaint   Patient presents with    Follow-up         Assessment and Plan    79 y o  female -- Dr Corrine Carroll    1  Overactive bladder  - s/p failed PTNS, discussed with patient that Interstim would likely not be beneficial if PTNS was not  - UA today negative for nitrites, leukocytes, blood  - PVR today 0 mL  - Using Myrbetriq 50 mg daily  - Follow up in 6 months for reassessment or sooner should she wish to undergo repeat Botox  - Call with any questions or concerns in the meantime  - All questions answered; patient understands and agrees with plan        History of Present Illness  Jo Calderón is a 79 y o  female patient of Dr Corrine Carroll with history of OAB here for follow up  Patient underwent bladder Botox 10/21/21  Doing well overall  Denies difficulty urinating or UTI  States she does not believe she needs repeat Botox as of yet  Patient did have failed PTNS, no benefit whatsoever  Patient inquiring about interstim, she has a friend who recently underwent this procedure  Denies dysuria, gross hematuria, flank pain, suprapubic pain, fevers, chills  Continues to have urinary frequency and urgency which she is not overall bothered by  Using Myrbetriq 50 mg daily  Review of Systems   Constitutional: Negative for activity change, appetite change, chills and fever  HENT: Negative for congestion and trouble swallowing  Respiratory: Negative for cough and shortness of breath  Cardiovascular: Negative for chest pain, palpitations and leg swelling  Gastrointestinal: Negative for abdominal pain, constipation, diarrhea, nausea and vomiting  Genitourinary: Positive for frequency and urgency  Negative for difficulty urinating, dysuria, flank pain and hematuria  Musculoskeletal: Negative for back pain and gait problem  Skin: Negative for wound  Allergic/Immunologic: Negative for immunocompromised state  Neurological: Negative for dizziness and syncope     Hematological: Does not bruise/bleed easily  Psychiatric/Behavioral: Negative for confusion  All other systems reviewed and are negative  Vitals  Vitals:    22 0952   BP: 136/80   Pulse: 83   SpO2: 98%   Weight: 79 8 kg (176 lb)   Height: 5' 2" (1 575 m)       Physical Exam  Constitutional:       General: She is not in acute distress  Appearance: Normal appearance  She is not ill-appearing, toxic-appearing or diaphoretic  HENT:      Head: Normocephalic  Nose: No congestion  Eyes:      General: No scleral icterus  Right eye: No discharge  Left eye: No discharge  Conjunctiva/sclera: Conjunctivae normal       Pupils: Pupils are equal, round, and reactive to light  Pulmonary:      Effort: Pulmonary effort is normal    Musculoskeletal:      Cervical back: Normal range of motion  Skin:     General: Skin is warm and dry  Coloration: Skin is not jaundiced or pale  Findings: No bruising, erythema, lesion or rash  Neurological:      General: No focal deficit present  Mental Status: She is alert and oriented to person, place, and time  Mental status is at baseline  Gait: Gait normal    Psychiatric:         Mood and Affect: Mood normal          Behavior: Behavior normal          Thought Content:  Thought content normal          Judgment: Judgment normal            Past History  Past Medical History:   Diagnosis Date    Actinic keratosis 3/11/2016    Acute midline low back pain without sciatica 2020    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     Benign neoplasm of skin     Chest pain     Claustrophobia     Diverticulitis     Fracture     L1-L2    GERD (gastroesophageal reflux disease)     H  pylori infection 2020    Muscle weakness     Nonmelanoma skin cancer     last assessed 2017    Palpitations     Seasonal allergies     Seborrheic keratosis 2014    Sleep apnea     no cpap    Spontaneous  without mention of complications     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    Syncope      Social History     Socioeconomic History    Marital status:       Spouse name: None    Number of children: None    Years of education: None    Highest education level: None   Occupational History    Occupation: RETIRED    Tobacco Use    Smoking status: Never Smoker    Smokeless tobacco: Never Used   Vaping Use    Vaping Use: Never used   Substance and Sexual Activity    Alcohol use: Yes     Comment: patient states rare use on holidays     Drug use: No    Sexual activity: Not Currently   Other Topics Concern    None   Social History Narrative    LIVING INDEPENDENTLY ALONE         No caffeine use     Social Determinants of Health     Financial Resource Strain: Not on file   Food Insecurity: Not on file   Transportation Needs: Not on file   Physical Activity: Not on file   Stress: Not on file   Social Connections: Not on file   Intimate Partner Violence: Not on file   Housing Stability: Not on file     Social History     Tobacco Use   Smoking Status Never Smoker   Smokeless Tobacco Never Used     Family History   Problem Relation Age of Onset    Alcohol abuse Mother     Heart disease Mother     Alcohol abuse Father     Alcohol abuse Brother     Cancer Brother     Tremor Neg Hx     Parkinsonism Neg Hx     Colon cancer Neg Hx        The following portions of the patient's history were reviewed and updated as appropriate: allergies, current medications, past medical history, past social history, past surgical history and problem list     Results  Recent Results (from the past 1 hour(s))   POCT Measure PVR    Collection Time: 04/21/22 10:02 AM   Result Value Ref Range    POST-VOID RESIDUAL VOLUME, ML POC 0 mL   POCT urine dip    Collection Time: 04/21/22 10:05 AM   Result Value Ref Range    LEUKOCYTE ESTERASE,UA -     NITRITE,UA -     SL AMB POCT UROBILINOGEN 0 2     POCT URINE PROTEIN -      PH,UA 6  [address] 1 010     [address], UA -      COLOR,UA yellow     CLARITY,UA clear    ]  No results found for: PSA  Lab Results   Component Value Date    GLUCOSE 108 10/03/2015    CALCIUM 9 2 03/08/2022     (H) 10/03/2015    K 4 2 03/08/2022    CO2 29 03/08/2022     03/08/2022    BUN 16 03/08/2022    CREATININE 0 77 03/08/2022     Lab Results   Component Value Date    WBC 4 79 03/08/2022    HGB 14 0 03/08/2022    HCT 41 3 03/08/2022    MCV 89 03/08/2022     03/08/2022       Vasyl Jean Baptiste PA-C

## 2022-04-21 ENCOUNTER — OFFICE VISIT (OUTPATIENT)
Dept: UROLOGY | Facility: CLINIC | Age: 82
End: 2022-04-21
Payer: COMMERCIAL

## 2022-04-21 VITALS
SYSTOLIC BLOOD PRESSURE: 136 MMHG | DIASTOLIC BLOOD PRESSURE: 80 MMHG | OXYGEN SATURATION: 98 % | WEIGHT: 176 LBS | HEIGHT: 62 IN | HEART RATE: 83 BPM | BODY MASS INDEX: 32.39 KG/M2

## 2022-04-21 DIAGNOSIS — N39.41 URGE INCONTINENCE: ICD-10-CM

## 2022-04-21 DIAGNOSIS — R35.0 FREQUENCY OF URINATION: ICD-10-CM

## 2022-04-21 DIAGNOSIS — N39.41 URGE INCONTINENCE OF URINE: Primary | ICD-10-CM

## 2022-04-21 LAB
POST-VOID RESIDUAL VOLUME, ML POC: 0 ML
SL AMB  POCT GLUCOSE, UA: NORMAL
SL AMB LEUKOCYTE ESTERASE,UA: NORMAL
SL AMB POCT BILIRUBIN,UA: NORMAL
SL AMB POCT BLOOD,UA: NORMAL
SL AMB POCT CLARITY,UA: CLEAR
SL AMB POCT COLOR,UA: YELLOW
SL AMB POCT KETONES,UA: NORMAL
SL AMB POCT NITRITE,UA: NORMAL
SL AMB POCT PH,UA: 6.5
SL AMB POCT SPECIFIC GRAVITY,UA: 1.01
SL AMB POCT URINE PROTEIN: NORMAL
SL AMB POCT UROBILINOGEN: 0.2

## 2022-04-21 PROCEDURE — 51798 US URINE CAPACITY MEASURE: CPT | Performed by: PHYSICIAN ASSISTANT

## 2022-04-21 PROCEDURE — 99213 OFFICE O/P EST LOW 20 MIN: CPT | Performed by: PHYSICIAN ASSISTANT

## 2022-04-21 PROCEDURE — 1036F TOBACCO NON-USER: CPT | Performed by: PHYSICIAN ASSISTANT

## 2022-04-21 PROCEDURE — 1160F RVW MEDS BY RX/DR IN RCRD: CPT | Performed by: PHYSICIAN ASSISTANT

## 2022-04-21 PROCEDURE — 81002 URINALYSIS NONAUTO W/O SCOPE: CPT | Performed by: PHYSICIAN ASSISTANT

## 2022-04-22 RX ORDER — MIRABEGRON 50 MG/1
TABLET, FILM COATED, EXTENDED RELEASE ORAL
Qty: 90 TABLET | Refills: 3 | Status: SHIPPED | OUTPATIENT
Start: 2022-04-22

## 2022-04-22 NOTE — TELEPHONE ENCOUNTER
Appears Laniecristino Kal spoke to pt yestrday and she needs to have an appt with dr carli barrett schedule in next few weeks

## 2022-04-25 ENCOUNTER — TELEPHONE (OUTPATIENT)
Dept: GASTROENTEROLOGY | Facility: CLINIC | Age: 82
End: 2022-04-25

## 2022-04-25 NOTE — TELEPHONE ENCOUNTER
[facility] Patient is scheduled for MRI on 05/18/22  Called to remind Yuko Parrish for  Prescription  Patient is claustrophobic  Please call into [facility]

## 2022-05-09 ENCOUNTER — OFFICE VISIT (OUTPATIENT)
Dept: DERMATOLOGY | Facility: CLINIC | Age: 82
End: 2022-05-09
Payer: COMMERCIAL

## 2022-05-09 VITALS — HEIGHT: 62 IN | WEIGHT: 176 LBS | BODY MASS INDEX: 32.39 KG/M2

## 2022-05-09 DIAGNOSIS — Z13.89 SCREENING FOR SKIN CONDITION: ICD-10-CM

## 2022-05-09 DIAGNOSIS — Z85.828 HISTORY OF SKIN CANCER: ICD-10-CM

## 2022-05-09 DIAGNOSIS — L82.1 SEBORRHEIC KERATOSIS: Primary | ICD-10-CM

## 2022-05-09 PROCEDURE — 99213 OFFICE O/P EST LOW 20 MIN: CPT | Performed by: DERMATOLOGY

## 2022-05-09 NOTE — PROGRESS NOTES
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 presents for overall checkup no specific complaints noted  Past Medical History:   Diagnosis Date    Actinic keratosis 3/11/2016    Acute midline low back pain without sciatica 2020    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     Benign neoplasm of skin     Chest pain     Claustrophobia     Diverticulitis     Fracture     L1-L2    GERD (gastroesophageal reflux disease)     H  pylori infection 2020    Muscle weakness     Nonmelanoma skin cancer     last assessed 2017    Palpitations     Seasonal allergies     Seborrheic keratosis 2014    Sleep apnea     no cpap    Spontaneous      without mention of complications     Squamous cell carcinoma of forehead 2014    Syncope      Past Surgical History:   Procedure Laterality Date    BOTOX INJECTION N/A 3/6/2017    Procedure: CYSTOSCOPY; BLADDER BOTOX 100 UNITS ;  Surgeon: Paul Betancourt MD;  [facility];  Service:     BOWEL RESECTION      related ot diverticulitis    CARDIAC CATHETERIZATION      CARPAL TUNNEL RELEASE Right     COLONOSCOPY  2019    COLOSTOMY      COLOSTOMY CLOSURE      CYSTOSCOPY  2021    FOOT SURGERY Left     neuroma    HYSTERECTOMY      [place] S[place]GERY  age 35    [place]    HI CYSTOURETHROSCOPY N/A 2018    Procedure: CYSTOSCOPY WITH BOTOX;  Surgeon: Paul Betancourt MD;  [facility];  Service: Urology    HI ESOPHAGOGASTRODUODENOSCOPY TRANSORAL DIAGNOSTIC N/A 2019    Procedure: ESOPHAGOGASTRODUODENOSCOPY (EGD);   Surgeon: Bernadette Quintero DO Jayla;  Location: [facility]; Service: Gastroenterology    TONSILLECTOMY  age 47   [facility] ENDOSCOPY  04/23/2019     Social History   Social History     Substance and Sexual Activity   Alcohol Use Yes    Comment: patient states rare use on holidays      Social History     Substance and Sexual Activity   Drug Use No     Social History     Tobacco Use   Smoking Status Never Smoker   Smokeless Tobacco Never Used     Family History   Problem Relation Age of Onset    Alcohol abuse Mother     Heart disease Mother     Alcohol abuse Father     Alcohol abuse Brother     Cancer Brother     Tremor Neg Hx     Parkinsonism Neg Hx     Colon cancer Neg Hx      Meds/Allergies   Allergies   Allergen Reactions    Caffeine - Food Allergy GI Intolerance    Iodine - Food Allergy Rash    Latex Rash    Other Rash     Adhesive tape         Meds:  Prior to Admission medications    Medication Sig Start Date End Date Taking?  Authorizing Provider   ALPRAZolam Venda Distel) 0 5 mg tablet Take 1 tablet (0 5 mg total) by mouth 1 (one) time for 1 dose 4/27/22 5/9/22 Yes Payton Chowdary PA-C   Ascorbic Acid (VITAMIN C) 1000 MG tablet Take 1,000 mg by mouth daily   Yes Historical Provider, MD   aspirin 81 mg chewable tablet Chew 1 tablet (81 mg total) daily 11/30/20  Yes RISHI Joaquin   B Complex Vitamins (B COMPLEX 100 PO) Take by mouth   Yes Historical Provider, MD   Biotin 2500 MCG CAPS Take by mouth   Yes Historical Provider, MD   calcium carbonate (OS-JOHN) 1250 (500 Ca) MG tablet Take 1 tablet by mouth daily   Yes Historical Provider, MD   carbidopa-levodopa (SINEMET)  mg per tablet Take 1 tablet by mouth 3 (three) times a day before meals 2/21/22  Yes Gerardo Murillo MD   cholecalciferol (VITAMIN D3) 1,000 units tablet Take 5,000 Units by mouth daily    Yes Historical Provider, MD   Coenzyme Q10 (CO Q 10 PO) Take by mouth 600 mg BID   Yes Historical Provider, MD   Cyanocobalamin (VITAMIN B 12 PO) Take by mouth daily   Yes Historical Provider, MD   Echinacea 125 MG CAPS Take by mouth   Yes Historical Provider, MD   fexofenadine (ALLEGRA) 180 MG tablet Take 180 mg by mouth as needed (prn)    Yes Historical Provider, MD   Magnesium 200 MG TABS Take 1 tablet (200 mg total) by mouth daily 20  Yes Flora Almeida MD   multivitamin [facility]) TABS Take 1 tablet by mouth daily   Yes Historical Provider, MD   Myrbetriq 50 MG TB24 TAKE 1 TABLET DAILY 22  Yes Veena Loya PA-C   naproxen sodium (Aleve) 220 MG tablet Take 220 mg by mouth as needed    Yes Historical Provider, MD   Omega-3 350 MG CPDR Take by mouth   Yes Historical Provider, MD   pantoprazole (PROTONIX) 40 mg tablet TAKE 1 TABLET DAILY 22  Yes Lugene TIFFANIE Marinelli   Potassium Gluconate 595 (99 K) MG TABS Take by mouth   Yes Historical Provider, MD   Probiotic Product (PROBIOTIC-10) CAPS Take by mouth   Yes Historical Provider, MD       Subjective:     Review of Systems:    General: negative for - chills, fatigue, fever,  weight gain or weight loss  Psychological: negative for - anxiety, behavioral disorder, concentration difficulties, decreased libido, depression, irritability, memory difficulties, mood swings, sleep disturbances or suicidal ideation  ENT: negative for - hearing difficulties , nasal congestion, nasal discharge, oral lesions, sinus pain, sneezing, sore throat  Allergy and Immunology: negative for - hives, insect bite sensitivity,  Hematological and Lymphatic: negative for - bleeding problems, blood clots,bruising, swollen lymph nodes  Endocrine: negative for - hair pattern changes, hot flashes, malaise/lethargy, mood swings, palpitations, polydipsia/polyuria, skin changes, temperature intolerance or unexpected weight change  Respiratory: negative for - cough, hemoptysis, orthopnea, shortness of breath, or wheezing  Cardiovascular: negative for - chest pain, dyspnea on exertion, edema,  Gastrointestinal: negative for - abdominal pain, nausea/vomiting  Genito-Urinary: negative for - dysuria, incontinence, irregular/heavy menses or urinary frequency/urgency  Musculoskeletal: negative for - gait disturbance, joint pain, joint stiffness, joint swelling, muscle pain, muscular weakness  Dermatological:  As in HPI  Neurological: negative for confusion, dizziness, headaches, impaired coordination/balance, memory loss, numbness/tingling, seizures, speech problems, tremors or weakness       Objective:   Ht 5' 2" (1 575 m)   Wt 79 8 kg (176 lb)   BMI 32 19 kg/m²     Physical Exam:    General Appearance:    Alert, cooperative, no distress   Head:    Normocephalic, without obvious abnormality, atraumatic           Skin:   A full skin exam was performed including scalp, head scalp, eyes, ears, nose, lips, neck, chest, axilla, abdomen, back, buttocks, bilateral upper extremities, bilateral lower extremities, hands, feet, fingers, toes, fingernails, and toenails previous site of skin cancer well healed without recurrence normal keratotic papules with greasy stuck on appearance nothing else remarkable noted on complete exam     Assessment:     1  Seborrheic keratosis     2  Screening for skin condition     3  History of skin cancer           Plan:   Seborrheic keratosis patient reassured these are normal growths we acquire with age no treatment needed  History of skin cancer in no recurrence nothing else atypical sunblock recommended follow-up in 6 months  Screening for dermatologic disorders nothing else of concern noted on complete exam follow-up in 6 months    Lola Espinosa MD  5/9/2022,11:55 AM    Portions of the record may have been created with voice recognition software   Occasional wrong word or "sound a like" substitutions may have occurred due to the inherent limitations of voice recognition software   Read the chart carefully and recognize, using context, where substitutions have occurred

## 2022-05-18 ENCOUNTER — HOSPITAL ENCOUNTER (OUTPATIENT)
Dept: MRI IMAGING | Facility: HOSPITAL | Age: 82
Discharge: HOME | End: 2022-05-18
Payer: COMMERCIAL

## 2022-05-18 DIAGNOSIS — K86.2 PANCREATIC CYST: ICD-10-CM

## 2022-05-18 PROCEDURE — 74183 MRI ABD W/O CNTR FLWD CNTR: CPT

## 2022-05-18 PROCEDURE — A9585 GADOBUTROL INJECTION: HCPCS | Performed by: PHYSICIAN ASSISTANT

## 2022-05-18 PROCEDURE — G1004 CDSM NDSC: HCPCS

## 2022-05-18 RX ADMIN — GADOBUTROL 7 ML: 604.72 INJECTION INTRAVENOUS at 16:07

## 2022-05-23 ENCOUNTER — TELEPHONE (OUTPATIENT)
Dept: NEUROLOGY | Facility: CLINIC | Age: 82
End: 2022-05-23

## 2022-05-23 ENCOUNTER — OFFICE VISIT (OUTPATIENT)
Dept: NEUROLOGY | Facility: CLINIC | Age: 82
End: 2022-05-23
Payer: COMMERCIAL

## 2022-05-23 VITALS
DIASTOLIC BLOOD PRESSURE: 70 MMHG | HEART RATE: 77 BPM | HEIGHT: 62 IN | BODY MASS INDEX: 32.39 KG/M2 | WEIGHT: 176 LBS | OXYGEN SATURATION: 97 % | TEMPERATURE: 97.4 F | SYSTOLIC BLOOD PRESSURE: 128 MMHG

## 2022-05-23 DIAGNOSIS — G20 PARKINSON'S DISEASE (HCC): Primary | ICD-10-CM

## 2022-05-23 PROCEDURE — 99213 OFFICE O/P EST LOW 20 MIN: CPT

## 2022-05-23 NOTE — PATIENT INSTRUCTIONS
- Continue Sinemet 25/100 1 tab three times a day  - Stay as physically active as possible; continue with home exercise program  - Fall precautions  - Continue with your multivitamin  - Follow up in 6 months

## 2022-05-23 NOTE — PROGRESS NOTES
Patient ID: Juany Rodríguez is a 79 y o  female  Assessment/Plan:    Parkinson's disease ([facility] )  Juany Rodríguez is a 79year old female known to the practice for Parkinson's disease well controlled on Sinemet 25/100 1 tab tid  Plan discussed with the patient today:  - Continue Sinemet 25/100 1 tab three times a day  - Stay as physically active as possible; continue with home exercise program  - Fall precautions  - Continue with your multivitamin  - Follow up in 6 months        Diagnoses and all orders for this visit:    Parkinson's disease ([facility] )           Subjective:    HPI Juany Rodríguez is a 79year old female known to the practice for Parkinson's disease  She was last seen 11/23/21 by Dr Merry Salamanca and is maintained on Sinemet 25/100 1 tab 3 times daily  In March 2022 she was having some difficulty with lightheadedness and dizziness, so her sinemet was discontinued while she underwent cardiac work up which was unrevealing  Ultimately, it was not felt these symptoms were related to her sinemet and as such her medication was restarted  She returns today and states she continues to do well  She has had no other episodes of dizziness or lightheadedness  She continues with a home exercise program  She has great tremor control with Sinemet 25/100 1 tid and denies any gait imbalance or postural instability  She has chronic constipation which is managed well with metamucil  She states she has learned to change positions slowly, and as such has no postural dizziness  She reports she is sleeping better, now almost 5 hours straight  She is not getting up as frequently  She notices some occasionally shuffling of gait when she first gets up in the morning at times, but otherwise denies any other episodes of shuffling  She denies any on/off periods, falls, difficulty speaking or swallowing or dyskinesias  She denies any memory concerns, lives alone and is independent of all ADLs and drives without issue         The following portions of the patient's history were reviewed and updated as appropriate: allergies, current medications, past family history, past medical history, past social history and past surgical history  Objective:    Blood pressure 128/70, pulse 77, temperature (!) 97 4 °F (36 3 °C), temperature source Temporal, height 5' 2" (1 575 m), weight 79 8 kg (176 lb), SpO2 97 %, not currently breastfeeding  Neurological Exam    On neurological examination patient is alert, awake, oriented and in no distress  Speech is fluent without dysarthria or aphasia  Cranial nerves 2-12 were symmetrically intact bilaterally, with the exception of mild bradykinesia of the facial muscles noted  No evidence of any focal weakness or sensory loss in the upper or lower extremities  Motor testing reveals 5/5 strength of the bilateral upper and lower extremities  There was no pronator drift  No resting tremor seen today  Finger- to-nose reveals slight tremor L>R but no ataxia and intact proprioceptive function, no dysmetria was noted  Rapid alternating movement normal  No cogwheeling rigidity noted  Sensation was intact to vibration, light touch, and temperature in bilateral upper and lower extremities  Deep tendon reflexes were 1+ and symmetric in the bilateral upper and lower extremities  She is able to rise easily without assistance from a seated position  Casual gait is normal including stance and stride with mildly diminished arm swing  Romberg is absent  ROS:    Review of Systems   Constitutional: Negative  Negative for appetite change and fever  HENT: Negative  Negative for hearing loss, tinnitus, trouble swallowing and voice change  Eyes: Negative  Negative for photophobia and pain  Respiratory: Negative  Negative for shortness of breath  Cardiovascular: Negative  Negative for palpitations  Gastrointestinal: Negative  Negative for nausea and vomiting  Endocrine: Negative    Negative for cold intolerance  Genitourinary: Positive for dysuria, frequency and urgency  Musculoskeletal: Negative  Negative for myalgias and neck pain  Skin: Negative  Negative for rash  Neurological: Positive for tremors  Negative for dizziness, seizures, syncope, facial asymmetry, speech difficulty, weakness, light-headedness, numbness and headaches  Patient stated that she has tremors in both hand and more in the right hand  Hematological: Negative  Does not bruise/bleed easily  Psychiatric/Behavioral: Negative  Negative for confusion, hallucinations and sleep disturbance  Reviewed ROS as entered by medical assistant

## 2022-05-23 NOTE — ASSESSMENT & PLAN NOTE
Maren Obrien is a 79year old female known to the practice for Parkinson's disease well controlled on Sinemet 25/100 1 tab tid      Plan discussed with the patient today:  - Continue Sinemet 25/100 1 tab three times a day  - Stay as physically active as possible; continue with home exercise program  - Fall precautions  - Continue with your multivitamin  - Follow up in 6 months

## 2022-05-25 DIAGNOSIS — D49.0 IPMN (INTRADUCTAL PAPILLARY MUCINOUS NEOPLASM): Primary | ICD-10-CM

## 2022-05-26 ENCOUNTER — OFFICE VISIT (OUTPATIENT)
Dept: CARDIOLOGY CLINIC | Facility: CLINIC | Age: 82
End: 2022-05-26
Payer: COMMERCIAL

## 2022-05-26 VITALS
DIASTOLIC BLOOD PRESSURE: 84 MMHG | HEIGHT: 62 IN | OXYGEN SATURATION: 100 % | BODY MASS INDEX: 32.83 KG/M2 | WEIGHT: 178.4 LBS | SYSTOLIC BLOOD PRESSURE: 130 MMHG | HEART RATE: 102 BPM

## 2022-05-26 DIAGNOSIS — R42 LIGHTHEADEDNESS: Primary | ICD-10-CM

## 2022-05-26 PROCEDURE — 1160F RVW MEDS BY RX/DR IN RCRD: CPT | Performed by: INTERNAL MEDICINE

## 2022-05-26 PROCEDURE — 99213 OFFICE O/P EST LOW 20 MIN: CPT | Performed by: INTERNAL MEDICINE

## 2022-05-26 PROCEDURE — 1036F TOBACCO NON-USER: CPT | Performed by: INTERNAL MEDICINE

## 2022-05-26 NOTE — PROGRESS NOTES
[facility] Vivek Vergara  [id number] [place] [id number]  Cardiology Follow Up    Per Vernon  1940  [id number]      1  Lightheadedness         Chief Complaint   Patient presents with    Follow-up     Review testing       Interval History:  Patient presents for follow-up visit  Patient is being treated for parkinsonism with the Sinemet and followed by Neurology  Patient states that her dizziness is improved  She has been trying to keep herself hydrated  She also has a pancreatic cyst for which she needs evaluation by Surgical Oncology  She denies any history of exertional chest pain or shortness of breath  Patient Active Problem List   Diagnosis    OAB (overactive bladder)    Parkinson's disease ([facility] [address] )    Primary insomnia    Fatigue    History of skin cancer    Seasonal allergic rhinitis    Impaired fasting glucose    Mood disturbance    Obesity (BMI 30-39  9)    Claustrophobia    Mitral valve disorder    Menopause ovarian failure    Vitamin D deficiency    Dysphagia    Multiple thyroid nodules    Gastro-esophageal reflux disease without esophagitis    Osteopenia    Chronic idiopathic constipation    History of adenomatous polyp of colon    Tremor    Medicare annual wellness visit, subsequent    Trochanteric bursitis, right hip    Pancreatic cyst    Cherry angioma    Dyspnea on exertion    Postablative hypothyroidism    Sleep apnea    Headache    Closed compression fracture of body of L1 vertebra (HCC)    Cerebrovascular disease    Excessive ear wax, right    Age-related osteoporosis without current pathological fracture    Urge incontinence     Past Medical History:   Diagnosis Date    Actinic keratosis 3/11/2016    Acute midline low back pain without sciatica 11/19/2020    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     Benign neoplasm of skin     Chest pain     Claustrophobia     Diverticulitis     Fracture     L1-L2  GERD (gastroesophageal reflux disease)     H  pylori infection 2020    Muscle weakness     Nonmelanoma skin cancer     last assessed 2017    Palpitations     Seasonal allergies     Seborrheic keratosis 2014    Sleep apnea     no cpap    Spontaneous      without mention of complications     Squamous cell carcinoma of forehead 2014    Syncope      Social History     Socioeconomic History    Marital status:       Spouse name: Not on file    Number of children: Not on file    Years of education: Not on file    Highest education level: Not on file   Occupational History    Occupation: RETIRED    Tobacco Use    Smoking status: Never Smoker    Smokeless tobacco: Never Used   Vaping Use    Vaping Use: Never used   Substance and Sexual Activity    Alcohol use: Yes     Comment: patient states rare use on holidays     Drug use: No    Sexual activity: Not Currently   Other Topics Concern    Not on file   Social History Narrative    LIVING INDEPENDENTLY ALONE         No caffeine use     Social Determinants of Health     Financial Resource Strain: Not on file   Food Insecurity: Not on file   Transportation Needs: Not on file   Physical Activity: Not on file   Stress: Not on file   Social Connections: Not on file   Intimate Partner Violence: Not on file   Housing Stability: Not on file      Family History   Problem Relation Age of Onset    Alcohol abuse Mother     Heart disease Mother     Alcohol abuse Father     Alcohol abuse Brother     Cancer Brother     Tremor Neg Hx     Parkinsonism Neg Hx     Colon cancer Neg Hx      Past Surgical History:   Procedure Laterality Date    BOTOX INJECTION N/A 3/6/2017    Procedure: CYSTOSCOPY; BLADDER BOTOX 100 UNITS ;  Surgeon: Bonifacio Martinez MD;  [facility];  Service:     BOWEL RESECTION      related ot diverticulitis    CARDIAC CATHETERIZATION      CARPAL TUNNEL RELEASE Right     COLONOSCOPY  2019  COLOSTOMY      COLOSTOMY CLOSURE      CYSTOSCOPY  06/01/2021    FOOT SURGERY Left     neuroma    HYSTERECTOMY      NASAL SIN[place] SURGERY  age 35    [place]    AL CYSTOURETHROSCOPY N/A 4/13/2018    Procedure: CYSTOSCOPY WITH BOTOX;  Surgeon: Ruperto Andujar MD;  [facility];  Service: Urology    AL ESOPHAGOGASTRODUODENOSCOPY TRANSORAL DIAGNOSTIC N/A 4/23/2019    Procedure: ESOPHAGOGASTRODUODENOSCOPY (EGD); Surgeon: Leeann Julio DO;  Location: [facility];   Service: Gastroenterology    TONSILLECTOMY  age 47    UPPER GASTROINTESTINAL ENDOSCOPY  04/23/2019       Current Outpatient Medications:     ALPRAZolam (XANAX) 0 5 mg tablet, Take 1 tablet (0 5 mg total) by mouth 1 (one) time for 1 dose, Disp: 2 tablet, Rfl: 0    Ascorbic Acid (VITAMIN C) 1000 MG tablet, Take 1,000 mg by mouth daily, Disp: , Rfl:     aspirin 81 mg chewable tablet, Chew 1 tablet (81 mg total) daily, Disp: 30 tablet, Rfl: 0    B Complex Vitamins (B COMPLEX 100 PO), Take by mouth, Disp: , Rfl:     Biotin 2500 MCG CAPS, Take by mouth, Disp: , Rfl:     calcium carbonate (OS-JOHN) 1250 (500 Ca) MG tablet, Take 1 tablet by mouth daily, Disp: , Rfl:     carbidopa-levodopa (SINEMET)  mg per tablet, Take 1 tablet by mouth 3 (three) times a day before meals, Disp: 270 tablet, Rfl: 6    cholecalciferol (VITAMIN D3) 1,000 units tablet, Take 5,000 Units by mouth daily , Disp: , Rfl:     Coenzyme Q10 (CO Q 10 PO), Take by mouth 600 mg BID, Disp: , Rfl:     Cyanocobalamin (VITAMIN B 12 PO), Take by mouth daily, Disp: , Rfl:     Echinacea 125 MG CAPS, Take by mouth, Disp: , Rfl:     fexofenadine (ALLEGRA) 180 MG tablet, Take 180 mg by mouth as needed (prn) , Disp: , Rfl:     Magnesium 200 MG TABS, Take 1 tablet (200 mg total) by mouth daily, Disp: 30 tablet, Rfl: 3    multivitamin (THERAGRAN) TABS, Take 1 tablet by mouth daily, Disp: , Rfl:     Myrbetriq 50 MG TB24, TAKE 1 TABLET DAILY, Disp: 90 tablet, Rfl: 3   naproxen sodium (ALEVE) 220 MG tablet, Take 220 mg by mouth as needed, Disp: , Rfl:     Omega-3 350 MG CPDR, Take by mouth, Disp: , Rfl:     pantoprazole (PROTONIX) 40 mg tablet, TAKE 1 TABLET DAILY, Disp: 90 tablet, Rfl: 3    Potassium Gluconate 595 (99 K) MG TABS, Take by mouth, Disp: , Rfl:     Probiotic Product (PROBIOTIC-10) CAPS, Take by mouth, Disp: , Rfl:   Allergies   Allergen Reactions    Caffeine - Food Allergy GI Intolerance    Iodine - Food Allergy Rash    Latex Rash    Other Rash     Adhesive tape         Labs:  Office Visit on 04/21/2022   Component Date Value    LEUKOCYTE ESTERASE,UA 04/21/2022 -     NITRITE,UA 04/21/2022 -     SL AMB POCT UROBILINOGEN 04/21/2022 0 2     POCT URINE PROTEIN 04/21/2022 -      PH,UA 04/21/2022 6 5     BLOOD,UA 04/21/2022 -     SPECIFIC GRAVITY,UA 04/21/2022 1 010     KETONES,UA 04/21/2022 -     BILIRUBIN,UA 04/21/2022 -     GLUCOSE, UA 04/21/2022 -      COLOR,UA 04/21/2022 yellow     CLARITY,UA 04/21/2022 clear     POST-VOID RESIDUAL VOLUM* 04/21/2022 0    Hospital Outpatient Visit on 04/12/2022   Component Date Value    A4C EF 04/12/2022 70     LVIDd 04/12/2022 4 60     LVIDS 04/12/2022 3 30     IVSd 04/12/2022 1 20     LVPWd 04/12/2022 1 20     FS 04/12/2022 28     MV E' Tissue Velocity Se* 04/12/2022 10     E wave deceleration time 04/12/2022 260     MV Peak E New 04/12/2022 81     MV Peak A New 04/12/2022 1 02     AV LVOT peak gradient 04/12/2022 8     RVID d 04/12/2022 2 6     LA size 04/12/2022 4 3     LA length (A2C) 04/12/2022 6 60     IRIS A4C 04/12/2022 22 8     RAA A4C 04/12/2022 9 4     AV peak gradient 04/12/2022 12     MV stenosis pressure 1/2* 04/12/2022 75     MV valve area p 1/2 meth* 04/12/2022 2 90     MR PG 04/12/2022 53     TR Peak New 04/12/2022 2 6     Triscuspid Valve Regurgi* 04/12/2022 26 0     Ao root 04/12/2022 3 30     Mitral regurgitation pea* 04/12/2022 3 64     Tricuspid valve peak reg* 04/12/2022 2 56     Left ventricular stroke * 04/12/2022 56 00     IVS 04/12/2022 1 2     LEFT VENTRICLE SYSTOLIC * 68/88/2398 43     LV DIASTOLIC VOLUME (MOD* 21/93/8071 100     Left Atrium Area-systoli* 04/12/2022 22 9     Left Atrium Area-systoli* 04/12/2022 24 9     LVSV, 2D 04/12/2022 56     ZLVPWD 04/12/2022 3 98     ZLVIDS 04/12/2022 0 04     ZLVIDD 04/12/2022 -1 48     ZIVSD 04/12/2022 3 87     LA Volume Index (BP) 04/12/2022 23 8     E/A ratio 04/12/2022 0 79     LV EF 04/12/2022 60      Imaging: MRI abdomen w wo contrast and mrcp    Result Date: 5/23/2022  Narrative: MRI OF THE ABDOMEN WITH AND WITHOUT CONTRAST WITH MRCP INDICATION: 79 years / Female  K86 2: Cyst of pancreas  COMPARISON: 3/17/2021  CT abdomen pelvis 8/24/2020  TECHNIQUE:  The following pulse sequences were obtained:  Coronal and axial T2 with TE of 90 and 180 respectively, axial T2 with fat saturation, axial FIESTA fat-sat, axial T1-weighted in-and-out-of phase, axial DWI/ADC, pre-contrast axial T1 with fat saturation, post-contrast dynamic axial T1 with fat saturation at 20, 70, and 180 seconds, followed by coronal and 7 minute delayed axial T1 with fat saturation  3D MRCP images were obtained with radial thick slabs and projections  3D rendering was performed from the acquisition scanner  IV Contrast:  7 mL of Gadobutrol injection (SINGLE-DOSE) FINDINGS: LOWER CHEST:   Unremarkable  LIVER: There is moderate hepatic steatosis  No suspicious masses  The hepatic veins and portal veins are patent  BILE DUCTS:  No intrahepatic or extrahepatic bile duct dilation  Common bile duct is normal in caliber for the patient's age  No choledocholithiasis, biliary stricture or suspicious mass  GALLBLADDER:  Small gallstones are noted  T1 hyperintense biliary sludge is also noted within the gallbladder lumen    No pericholecystic inflammatory changes PANCREAS:  Pancreatic tail cyst measures 5 0 x 4 4 cm (4:13) minimally increased in size from prior exam of March 2021  There are several adjacent smaller cysts including an ovoid cyst measuring 1 9 x 0 8 cm (4:16) which is increased in size from prior exam (previously 1 2 x 0 5 cm)  Additional cysts are noted within the pancreatic body/tail  The largest measures 1 9 x 1 7 x 2 0 cm (4:16), slightly increased in size from prior MRI (previously 1 9 x 1 6 x 1 8 cm when using similar measuring technique)  No pancreatic ductal dilatation  No nodular or masslike enhancement  Pancreas divisum is noted  ADRENAL GLANDS:  Unremarkable  SPLEEN:  Unremarkable  KIDNEYS/PROXIMAL URETERS:  No hydroureteronephrosis  Simple appearing right renal cyst is stable  Tiny hemorrhagic/proteinaceous cyst is also stable within the upper pole of the left kidney  There is a fat-containing lesion along the lower pole the right kidney measuring 2 0 x 1 5 cm (14:30), this is retrospectively stable dating back to August 2020 and most compatible with a renal angiomyolipoma  BOWEL:   No dilated loops of bowel  PERITONEUM/RETROPERITONEUM:  No ascites  LYMPH NODES:  No abdominal lymphadenopathy  VASCULAR STRUCTURES:  No aneurysm  ABDOMINAL WALL:  Unremarkable  OSSEOUS STRUCTURES:  No suspicious osseous lesion  Impression: 1  Redemonstration of multiple pancreatic cysts  The dominant cyst within the pancreatic tail which previously underwent FNA is slightly enlarged from prior MRI of March 2021 now measuring 5 0 x 4 4 cm (previously 4 7 x 4 1 cm utilizing similar measuring technique)  Two additional cysts within the pancreatic tail are also also slightly enlarged from prior exam   No pancreatic ductal dilatation or other new worrisome features  Continued surveillance with MRI abdomen with/without contrast is recommended  2   Hepatic steatosis  3   Cholelithiasis  4   Fat-containing lesion within the right kidney measuring 2 0 cm    This is retrospectively stable dating back to 2020 and is most compatible with a renal angiomyolipoma  Workstation performed: [id number]       Review of Systems:  Review of Systems   REVIEW OF SYSTEMS:  Constitutional:  Denies fever or chills   Eyes:  Denies change in visual acuity   HENT:  Denies nasal congestion or sore throat   Respiratory:  Denies cough or shortness of breath   Cardiovascular:  Denies chest pain or edema   GI:  Needs evaluation for pancreatic cyst  :  Denies dysuria, frequency, difficulty in micturition and nocturia  Musculoskeletal:  Denies back pain or joint pain   Neurologic:  Denies headache, focal weakness or sensory changes   Endocrine:  Denies polyuria or polydipsia   Lymphatic:  Denies swollen glands   Psychiatric:  Denies depression or anxiety     Physical Exam:    /84 (BP Location: Left arm, Patient Position: Sitting, Cuff Size: Standard)   Pulse 102   Ht 5' 2" (1 575 m)   Wt 80 9 kg (178 lb 6 4 oz)   SpO2 100%   BMI 32 63 kg/m²     Physical Exam   PHYSICAL EXAM:  General:  Patient is not in acute distress   Head: Normocephalic, Atraumatic  HEENT:  Both pupils normal-size atraumatic, normocephalic, nonicteric  Neck:  JVP not raised  Trachea central  No carotid bruit  Respiratory:  normal breath sounds no crackles  no rhonchi  Cardiovascular:  Regular rate and rhythm no S3 no murmurs  GI:  Abdomen soft nontender  No organomegaly  Lymphatic:  No cervical or inguinal lymphadenopathy  Neurologic:  Patient is awake alert, oriented   Grossly nonfocal  Extremities no edema    Holter monitor results reviewed  Patient had PACs  Her symptoms did not correlate with any significant arrhythmias to note  Echocardiogram showed normal ejection fraction with no significant valvular abnormalities  Discussion/Summary:  Patient with multiple medical problems who seems to be doing reasonably well from cardiac standpoint  Previous studies reviewed with patient  Medications reviewed and possible side effects discussed   concepts of cardiovascular disease , signs and symptoms of heart disease  Dietary and risk factor modification reinforced  All questions answered  Safety measures reviewed  Patient advised to report any problems prompting medical attention  Importance of adequate hydration reinforced  Patient's blood pressure log at home reveal a few low blood pressure readings  Patient could have an element of autonomic dysfunction secondary to parkinsonism  Follow-up with neurology as well as surgical oncology and primary care physician  Followup in 6 months or earlier as needed  Symptoms to watch out from cardiac standpoint which would indicate the need for further cardiac evaluation also discussed

## 2022-05-27 ENCOUNTER — TELEPHONE (OUTPATIENT)
Dept: HEMATOLOGY ONCOLOGY | Facility: CLINIC | Age: 82
End: 2022-05-27

## 2022-05-27 NOTE — TELEPHONE ENCOUNTER
New Patient Intake Form   Patient Details:    Lemuel Willoughby  1940    Appointment Information   Who is calling to schedule? Patient   If not self, what is the caller's name? Please put name of [facility] nurse as well  self   DID YOU CONFIRM INSURANCE WITH PATIENT? yes   Referring provider Fely Sevilla    What is the diagnosis? 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 Yes    Is there a biopsy ordered or pending? Please specify dates   no     Is patient aware of diagnosis? yes   Have you had any imaging or labs done? If yes, where? (If imaging done outside of [facility], please remind patient to bring a disk ) Yes MRI 5/18/22 [facility]      If imaging done at outside facility, did you instruct patient to obtain discs and bring to visit? na   Have you been seen by another Oncologist/Hematologist?  If so, who and where? no   Are the records in [facility] or Care Everywhere? yes   Does the patient have records at another facility/hospital? na   If yes, Name of facility, city and state where facility is located  Did you instruct patient to have records faxed to [employer] and provide [facility] number? na   Preferred [place]   Is the patient willing to be seen by another provider?   (This is for breast patients only) na       Miscellaneous Information: 7/21/22 @ 10:45am

## 2022-05-31 ENCOUNTER — TELEPHONE (OUTPATIENT)
Dept: HEMATOLOGY ONCOLOGY | Facility: CLINIC | Age: 82
End: 2022-05-31

## 2022-05-31 NOTE — TELEPHONE ENCOUNTER
Reschedule Appointment     Who is calling in Patient    Doctor Appointment Scheduled with Rajeev Carrasco date and time 7-21-22 @ 10:45am   New date and time 6-3-2022 @ 2:30pm    Location [facility]    Patient verbalized understanding

## 2022-06-03 ENCOUNTER — CONSULT (OUTPATIENT)
Dept: SURGICAL ONCOLOGY | Facility: CLINIC | Age: 82
End: 2022-06-03
Payer: COMMERCIAL

## 2022-06-03 VITALS
HEART RATE: 86 BPM | RESPIRATION RATE: 16 BRPM | SYSTOLIC BLOOD PRESSURE: 136 MMHG | BODY MASS INDEX: 32.2 KG/M2 | WEIGHT: 175 LBS | HEIGHT: 62 IN | DIASTOLIC BLOOD PRESSURE: 80 MMHG | TEMPERATURE: 97.3 F | OXYGEN SATURATION: 95 %

## 2022-06-03 DIAGNOSIS — D49.0 IPMN (INTRADUCTAL PAPILLARY MUCINOUS NEOPLASM): ICD-10-CM

## 2022-06-03 DIAGNOSIS — K86.2 PANCREATIC CYST: Primary | ICD-10-CM

## 2022-06-03 PROCEDURE — 99205 OFFICE O/P NEW HI 60 MIN: CPT | Performed by: SURGERY

## 2022-06-03 NOTE — PROGRESS NOTES
Surgical Oncology Consult       [address] [facility] CANCER [facility] [place] [address] [address]  2 [facility] A Ruth Ann David  1940  [id number]  [address] [facility] CANCER [facility] [place]  [address]  [place] [address] [address]  [phone]    Diagnoses and all orders for this visit:    Pancreatic cyst    IPMN (intraductal papillary mucinous neoplasm)  -     Ambulatory Referral to Surgical Oncology  -     MRI abdomen w wo contrast and mrcp; Future  -     BUN; Future  -     Creatinine, serum; Future        Chief Complaint   Patient presents with   [facility] Patient Visit       Return in about 6 months (around 12/3/2022) for Office Visit, Imaging - See orders, with Heather  Oncology History    No history exists  History of Present Illness:  40-year-old female was found to have an incidental pancreas cyst on a CT in August of 2020  At that time, it measured 4 4 x 4 4 x 4 8 cm  She had an EUS in October of 2020  This revealed a 4 4 x 3 9 cm cystic lesion in the tail of the pancreas  There was another 1 3 x 1 1 cm cyst in the neck of the pancreas  Cytology on the pancreatic tail cyst had mucin and was negative  CEA was 656 ng/mL  Amylase was 31 U/L  There was acellular mucin present  There was a KRAS mutation and was statistically at higher risk by Pancragen  She had a follow-up MRI on March 17, 2021  By MRI, this measured up to 5 4 cm with several other smaller cystic foci  There was no main duct dilatation  Repeat MRI on May 18, 2022 revealed the pancreas cyst measures 5 x 4 4 cm from 4 7 x 4 1 cm  This is slightly increased in size  There was a 1 9 x 1 7 x 2 cm cyst in the tail the pancreas as well  This had slightly increased as well  There were no worrisome or suspicious features  Personally reviewed the films  She comes in now to discuss further therapy    No personal history of pancreatitis  No family history of pancreas cancer  No significant upper abdominal pain, nausea or vomiting  She does have some occasional early satiety  Review of Systems  Complete ROS Surg Onc:   Constitutional: The patient denies new or recent history of general fatigue, no recent weight loss, no change in appetite  Eyes: No complaints of visual problems, no scleral icterus  ENT: no complaints of ear pain, no hoarseness, no difficulty swallowing,  no tinnitus and no new masses in head, oral cavity, or neck  Cardiovascular: No complaints of chest pain, no palpitations, no ankle edema  Respiratory: No complaints of shortness of breath, no cough  Gastrointestinal: No complaints of jaundice, no bloody stools, no pale stools  Genitourinary: No complaints of dysuria, no hematuria, no nocturia, no frequent urination, no urethral discharge  Musculoskeletal: No complaints of weakness, paralysis, joint stiffness or arthralgias  Integumentary: No complaints of rash, no new lesions  Neurological: No complaints of convulsions, no seizures, no dizziness  Hematologic/Lymphatic: No complaints of easy bruising  Endocrine:  No hot or cold intolerance  No polydipsia, polyphagia, or polyuria  Allergy/immunology:  No environmental allergies  No food allergies  Not immunocompromised  Skin:  No pallor or rash  No wound  Patient Active Problem List   Diagnosis    OAB (overactive bladder)    Parkinson's disease ([facility] [address] )    Primary insomnia    Fatigue    History of skin cancer    Seasonal allergic rhinitis    Impaired fasting glucose    Mood disturbance    Obesity (BMI 30-39  9)    Claustrophobia    Mitral valve disorder    Menopause ovarian failure    Vitamin D deficiency    Dysphagia    Multiple thyroid nodules    Gastro-esophageal reflux disease without esophagitis    Osteopenia    Chronic idiopathic constipation    History of adenomatous polyp of colon    Tremor    Medicare annual wellness visit, subsequent    Trochanteric bursitis, right hip    Pancreatic cyst    Cherry angioma    Dyspnea on exertion    Postablative hypothyroidism    Sleep apnea    Headache    Closed compression fracture of body of L1 vertebra (HCC)    Cerebrovascular disease    Excessive ear wax, right    Age-related osteoporosis without current pathological fracture    Urge incontinence     Past Medical History:   Diagnosis Date    Actinic keratosis 3/11/2016    Acute midline low back pain without sciatica 2020    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     Benign neoplasm of skin     Chest pain     Claustrophobia     Diverticulitis     Fracture     L1-L2    GERD (gastroesophageal reflux disease)     H  pylori infection 2020    Muscle weakness     Nonmelanoma skin cancer     last assessed 2017    Palpitations     Seasonal allergies     Seborrheic keratosis 2014    Sleep apnea     no cpap    Spontaneous      without mention of complications     Squamous cell carcinoma of forehead 2014    Syncope      Past Surgical History:   Procedure Laterality Date    BOTOX INJECTION N/A 3/6/2017    Procedure: CYSTOSCOPY; BLADDER BOTOX 100 UNITS ;  Surgeon: Thor Live MD;  [facility];  Service:     BOWEL RESECTION      related ot diverticulitis    CARDIAC CATHETERIZATION      CARPAL TUNNEL RELEASE Right     COLONOSCOPY  2019    COLOSTOMY      COLOSTOMY CLOSURE      CYSTOSCOPY  2021    FOOT SURGERY Left     neuroma    HYSTERECTOMY      [place]GERY  age 35    [place]    NV CYSTOURETHROSCOPY N/A 2018    Procedure: CYSTOSCOPY WITH BOTOX;  Surgeon: Thor Live MD;  [facility];  Service: Urology    NV ESOPHAGOGASTRODUODENOSCOPY TRANSORAL DIAGNOSTIC N/A 2019    Procedure: ESOPHAGOGASTRODUODENOSCOPY (EGD); Surgeon: Michele Moreland DO;  Location: [facility];   Service: Gastroenterology    TONSILLECTOMY  age 47    UPPER GASTROINTESTINAL ENDOSCOPY  04/23/2019     Family History   Problem Relation Age of Onset    Alcohol abuse Mother     Heart disease Mother     Alcohol abuse Father     Alcohol abuse Brother     Cancer Brother     Tremor Neg Hx     Parkinsonism Neg Hx     Colon cancer Neg Hx      Social History     Socioeconomic History    Marital status:       Spouse name: Not on file    Number of children: Not on file    Years of education: Not on file    Highest education level: Not on file   Occupational History    Occupation: RETIRED    Tobacco Use    Smoking status: Never Smoker    Smokeless tobacco: Never Used   Vaping Use    Vaping Use: Never used   Substance and Sexual Activity    Alcohol use: Yes     Comment: patient states rare use on holidays     Drug use: No    Sexual activity: Not Currently   Other Topics Concern    Not on file   Social History Narrative    LIVING INDEPENDENTLY ALONE         No caffeine use     Social Determinants of Health     Financial Resource Strain: Not on file   Food Insecurity: Not on file   Transportation Needs: Not on file   Physical Activity: Not on file   Stress: Not on file   Social Connections: Not on file   Intimate Partner Violence: Not on file   Housing Stability: Not on file       Current Outpatient Medications:     Ascorbic Acid (VITAMIN C) 1000 MG tablet, Take 1,000 mg by mouth daily, Disp: , Rfl:     aspirin 81 mg chewable tablet, Chew 1 tablet (81 mg total) daily, Disp: 30 tablet, Rfl: 0    B Complex Vitamins (B COMPLEX 100 PO), Take by mouth, Disp: , Rfl:     Biotin 2500 MCG CAPS, Take by mouth, Disp: , Rfl:     calcium carbonate (OS-JOHN) 1250 (500 Ca) MG tablet, Take 1 tablet by mouth daily, Disp: , Rfl:     carbidopa-levodopa (SINEMET)  mg per tablet, Take 1 tablet by mouth 3 (three) times a day before meals, Disp: 270 tablet, Rfl: 6    cholecalciferol (VITAMIN D3) 1,000 units tablet, Take 5,000 Units by mouth daily , Disp: , Rfl:     Coenzyme Q10 (CO Q 10 PO), Take by mouth 600 mg BID, Disp: , Rfl:     Cyanocobalamin (VITAMIN B 12 PO), Take by mouth daily, Disp: , Rfl:     Echinacea 125 MG CAPS, Take by mouth, Disp: , Rfl:     fexofenadine (ALLEGRA) 180 MG tablet, Take 180 mg by mouth as needed (prn) , Disp: , Rfl:     Magnesium 200 MG TABS, Take 1 tablet (200 mg total) by mouth daily, Disp: 30 tablet, Rfl: 3    multivitamin (THERAGRAN) TABS, Take 1 tablet by mouth daily, Disp: , Rfl:     Myrbetriq 50 MG TB24, TAKE 1 TABLET DAILY, Disp: 90 tablet, Rfl: 3    naproxen sodium (ALEVE) 220 MG tablet, Take 220 mg by mouth as needed, Disp: , Rfl:     Omega-3 350 MG CPDR, Take by mouth, Disp: , Rfl:     pantoprazole (PROTONIX) 40 mg tablet, TAKE 1 TABLET DAILY, Disp: 90 tablet, Rfl: 3    Potassium Gluconate 595 (99 K) MG TABS, Take by mouth, Disp: , Rfl:     Probiotic Product (PROBIOTIC-10) CAPS, Take by mouth, Disp: , Rfl:     ALPRAZolam (XANAX) 0 5 mg tablet, Take 1 tablet (0 5 mg total) by mouth 1 (one) time for 1 dose, Disp: 2 tablet, Rfl: 0  Allergies   Allergen Reactions    Caffeine - Food Allergy GI Intolerance    Iodine - Food Allergy Rash    Latex Rash    Other Rash     Adhesive tape       Vitals:    06/03/22 1437   BP: 136/80   Pulse: 86   Resp: 16   Temp: (!) 97 3 °F (36 3 °C)   SpO2: 95%       Physical Exam   Constitutional: General appearance: The Patient is well-developed and well-nourished who appears the stated age in no acute distress  Patient is pleasant and talkative  HEENT:  Normocephalic  Sclerae are anicteric  Mucous membranes are moist  Neck is supple without adenopathy  No JVD  Chest: The lungs are clear to auscultation  Cardiac: Heart is regular rate  Abdomen: Abdomen is soft, non-tender, non-distended and without masses  Extremities: There is no clubbing or cyanosis  There is no edema  Symmetric    Neuro: Grossly nonfocal  Gait is normal  Lymphatic: No evidence of cervical adenopathy bilaterally  No evidence of axillary adenopathy bilaterally  No evidence of inguinal adenopathy bilaterally  Skin: Warm, anicteric  Psych:  Patient is pleasant and talkative  Breasts:      Pathology:  [unfilled]    Labs:      Imaging  MRI abdomen w wo contrast and mrcp    Result Date: 5/23/2022  Narrative: MRI OF THE ABDOMEN WITH AND WITHOUT CONTRAST WITH MRCP INDICATION: 79 years / Female  K86 2: Cyst of pancreas  COMPARISON: 3/17/2021  CT abdomen pelvis 8/24/2020  TECHNIQUE:  The following pulse sequences were obtained:  Coronal and axial T2 with TE of 90 and 180 respectively, axial T2 with fat saturation, axial FIESTA fat-sat, axial T1-weighted in-and-out-of phase, axial DWI/ADC, pre-contrast axial T1 with fat saturation, post-contrast dynamic axial T1 with fat saturation at 20, 70, and 180 seconds, followed by coronal and 7 minute delayed axial T1 with fat saturation  3D MRCP images were obtained with radial thick slabs and projections  3D rendering was performed from the acquisition scanner  IV Contrast:  7 mL of Gadobutrol injection (SINGLE-DOSE) FINDINGS: LOWER CHEST:   Unremarkable  LIVER: There is moderate hepatic steatosis  No suspicious masses  The hepatic veins and portal veins are patent  BILE DUCTS:  No intrahepatic or extrahepatic bile duct dilation  Common bile duct is normal in caliber for the patient's age  No choledocholithiasis, biliary stricture or suspicious mass  GALLBLADDER:  Small gallstones are noted  T1 hyperintense biliary sludge is also noted within the gallbladder lumen  No pericholecystic inflammatory changes PANCREAS:  Pancreatic tail cyst measures 5 0 x 4 4 cm (4:13) minimally increased in size from prior exam of March 2021  There are several adjacent smaller cysts including an ovoid cyst measuring 1 9 x 0 8 cm (4:16) which is increased in size from prior exam (previously 1 2 x 0 5 cm)    Additional cysts are noted within the pancreatic body/tail  The largest measures 1 9 x 1 7 x 2 0 cm (4:16), slightly increased in size from prior MRI (previously 1 9 x 1 6 x 1 8 cm when using similar measuring technique)  No pancreatic ductal dilatation  No nodular or masslike enhancement  Pancreas divisum is noted  ADRENAL GLANDS:  Unremarkable  SPLEEN:  Unremarkable  KIDNEYS/PROXIMAL URETERS:  No hydroureteronephrosis  Simple appearing right renal cyst is stable  Tiny hemorrhagic/proteinaceous cyst is also stable within the upper pole of the left kidney  There is a fat-containing lesion along the lower pole the right kidney measuring 2 0 x 1 5 cm (14:30), this is retrospectively stable dating back to August 2020 and most compatible with a renal angiomyolipoma  BOWEL:   No dilated loops of bowel  PERITONEUM/RETROPERITONEUM:  No ascites  LYMPH NODES:  No abdominal lymphadenopathy  VASCULAR STRUCTURES:  No aneurysm  ABDOMINAL WALL:  Unremarkable  OSSEOUS STRUCTURES:  No suspicious osseous lesion  Impression: 1  Redemonstration of multiple pancreatic cysts  The dominant cyst within the pancreatic tail which previously underwent FNA is slightly enlarged from prior MRI of March 2021 now measuring 5 0 x 4 4 cm (previously 4 7 x 4 1 cm utilizing similar measuring technique)  Two additional cysts within the pancreatic tail are also also slightly enlarged from prior exam   No pancreatic ductal dilatation or other new worrisome features  Continued surveillance with MRI abdomen with/without contrast is recommended  2   Hepatic steatosis  3   Cholelithiasis  4   Fat-containing lesion within the right kidney measuring 2 0 cm  This is retrospectively stable dating back to 2020 and is most compatible with a renal angiomyolipoma  Workstation performed: [id number]     I reviewed the above laboratory and imaging data  Discussion/Summary:  50-year-old female with a 5 cm pancreas cyst   This has slightly enlarged over the last year  There were no worrisome or suspicious features  By EUS this was a mucinous cystic neoplasm  I suspect based on her MRI with MRCP that these are side branch IPMN  I discussed with side branch IPMN, the risk of malignancy in her lifetime is 10-20%  With main duct IPMN this risk is 50-70%  There is also a less than 10% risk of malignancy elsewhere in the pancreas  Although this has slightly increased in size, the patient is 79years old and there are no worrisome or suspicious features  Despite the statistically higher risk by Pancreagen, I would recommend observation since there were no worrisome or suspicious features  Rather than repeating the MRI in 1 year, I would plan on repeating this in 6 months  If there would continue to be significant growth, I would consider robotic distal pancreatectomy  We will plan on repeating the MRI with MRCP in 6 months and I will see her again at that time for another clinical exam   She is agreeable to this  All her questions were answered

## 2022-06-13 ENCOUNTER — TELEPHONE (OUTPATIENT)
Dept: HEMATOLOGY ONCOLOGY | Facility: CLINIC | Age: 82
End: 2022-06-13

## 2022-06-13 NOTE — TELEPHONE ENCOUNTER
Scheduling Appointment     Who Is Calling to Schedule Patient    Doctor Dr Marquis Rincon   Location [facility]   Date and Time 07/26   Reason for scheduling appointment 2nd Opinion (Patient does not want to see Junious Frees)   Patient verbalized understanding    yes

## 2022-06-13 NOTE — TELEPHONE ENCOUNTER
APPOINTMENT REQUEST REVIEW   Patient Details    NAME        Reason For Appointment    Who is Calling to schedule? Patient    Who is the referring doctor? Which doctor would they like to schedule with? Anyone but Dr Nancy Smith    Which location are you requesting? Any    Reason they are requesting appointment? Second Opinion    I will send this information to  ______s team to have them review  Someone from his/her team will return a call to you within 24 hours to discuss next steps with you and if you are in need of an appointment, they will schedule this for you at that time  Did you relay this information to the patient?  Yes, patient can be reached back at [phone]

## 2022-06-21 ENCOUNTER — OFFICE VISIT (OUTPATIENT)
Dept: FAMILY MEDICINE CLINIC | Facility: CLINIC | Age: 82
End: 2022-06-21
Payer: COMMERCIAL

## 2022-06-21 VITALS
BODY MASS INDEX: 32.57 KG/M2 | SYSTOLIC BLOOD PRESSURE: 142 MMHG | HEIGHT: 62 IN | WEIGHT: 177 LBS | OXYGEN SATURATION: 96 % | TEMPERATURE: 97.8 F | DIASTOLIC BLOOD PRESSURE: 84 MMHG | HEART RATE: 72 BPM

## 2022-06-21 DIAGNOSIS — R10.32 LEFT LOWER QUADRANT PAIN: Primary | ICD-10-CM

## 2022-06-21 DIAGNOSIS — K59.00 CONSTIPATION, UNSPECIFIED CONSTIPATION TYPE: ICD-10-CM

## 2022-06-21 PROCEDURE — 1036F TOBACCO NON-USER: CPT | Performed by: FAMILY MEDICINE

## 2022-06-21 PROCEDURE — 1160F RVW MEDS BY RX/DR IN RCRD: CPT | Performed by: FAMILY MEDICINE

## 2022-06-21 PROCEDURE — 99214 OFFICE O/P EST MOD 30 MIN: CPT | Performed by: FAMILY MEDICINE

## 2022-06-21 RX ORDER — POLYETHYLENE GLYCOL 3350 17 G/17G
17 POWDER, FOR SOLUTION ORAL DAILY
Qty: 20 EACH | Refills: 2 | Status: SHIPPED | OUTPATIENT
Start: 2022-06-21 | End: 2022-07-21

## 2022-06-21 NOTE — PROGRESS NOTES
Assessment/Plan:    No problem-specific Assessment & Plan notes found for this encounter  Diagnoses and all orders for this visit:    Left lower quadrant pain  -     XR abdomen 1 view kub; Future  patient advised if she develops worsening pain, nausea/vomiting or unable to pass gas to go to the ER immediately    Constipation, unspecified constipation type  -     XR abdomen 1 view kub; Future  -     polyethylene glycol (MIRALAX) 17 g packet; Take 17 g by mouth daily      Follow up in 1-2 weeks or as needed    Subjective:      Patient ID: Bam Phillips is a 79 y o  female  Patient is here due to left lower quadrant pain that has been going on for the past several days associated with constipation  Had a BM yesterday able to pass gas  Had bowel resection secondary to diverticulitis many years ago  Taking metamucil which is not helping  The following portions of the patient's history were reviewed and updated as appropriate:   She  has a past medical history of Actinic keratosis (3/11/2016), Acute midline low back pain without sciatica (2020), 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, Benign neoplasm of skin, Chest pain, Claustrophobia, Diverticulitis, Fracture, GERD (gastroesophageal reflux disease), H  pylori infection (2020), Muscle weakness, Nonmelanoma skin cancer, Palpitations, Seasonal allergies, Seborrheic keratosis (2014), Sleep apnea, Spontaneous , Squamous cell carcinoma of forehead (2014), and Syncope    She   Patient Active Problem List    Diagnosis Date Noted    Left lower quadrant pain 2022    Constipation 2022    Urge incontinence 2021    Age-related osteoporosis without current pathological fracture 2021    Excessive ear wax, right 03/10/2021    Closed compression fracture of body of L1 vertebra ([facility] [address] ) 2020    Cerebrovascular disease 2020    Headache 2020    Pancreatic cyst 09/02/2020    Trochanteric bursitis, right hip 08/24/2020    Tremor 07/22/2020    Medicare annual wellness visit, subsequent 07/22/2020    Chronic idiopathic constipation 07/22/2019    History of adenomatous polyp of colon 07/22/2019    Osteopenia 06/13/2019    Gastro-esophageal reflux disease without esophagitis 04/02/2019    Dysphagia 03/14/2019    Multiple thyroid nodules 03/14/2019    Menopause ovarian failure 02/06/2019    Vitamin D deficiency 02/06/2019    Mitral valve disorder 12/14/2018    Mood disturbance 08/28/2018    Obesity (BMI 30-39 9) 08/28/2018    Claustrophobia 08/28/2018    Impaired fasting glucose 07/31/2018    Seasonal allergic rhinitis 05/08/2018    History of skin cancer 04/04/2018    Parkinson's disease ([facility] [address] ) 03/26/2018    Primary insomnia 03/26/2018    Fatigue 03/26/2018    Cherry angioma 11/15/2017    Postablative hypothyroidism 09/08/2016    Dyspnea on exertion 10/02/2015    OAB (overactive bladder) 09/11/2015    Sleep apnea 04/15/2014     She  has a past surgical history that includes Hysterectomy; Colostomy; Carpal tunnel release (Right); Colostomy closure; Foot surgery (Left); Cardiac catheterization; BOTOX INJECTION (N/A, 3/6/2017); pr cystourethroscopy (N/A, 4/13/2018); Bowel resection; pr esophagogastroduodenoscopy transoral diagnostic (N/A, 4/23/2019); Colonoscopy (07/31/2019); Upper gastrointestinal endoscopy (04/23/2019); Tonsillectomy (age 47); Nasal sinus surgery (age 35); and Cystoscopy (06/01/2021)  Her family history includes Alcohol abuse in her brother, father, and mother; Cancer in her brother; Heart disease in her mother  She  reports that she has never smoked  She has never used smokeless tobacco  She reports current alcohol use  She reports that she does not use drugs    Current Outpatient Medications   Medication Sig Dispense Refill    aspirin 81 mg chewable tablet Chew 1 tablet (81 mg total) daily 30 tablet 0    calcium carbonate (OS-JOHN) 1250 (500 Ca) MG tablet Take 1 tablet by mouth daily      carbidopa-levodopa (SINEMET)  mg per tablet Take 1 tablet by mouth 3 (three) times a day before meals 270 tablet 6    cholecalciferol (VITAMIN D3) 1,000 units tablet Take 5,000 Units by mouth daily       Coenzyme Q10 (CO Q 10 PO) Take by mouth 600 mg BID      Cyanocobalamin (VITAMIN B 12 PO) Take by mouth daily      Magnesium 200 MG TABS Take 1 tablet (200 mg total) by mouth daily 30 tablet 3    multivitamin (THERAGRAN) TABS Take 1 tablet by mouth daily      Myrbetriq 50 MG TB24 TAKE 1 TABLET DAILY 90 tablet 3    Omega-3 350 MG CPDR Take by mouth      pantoprazole (PROTONIX) 40 mg tablet TAKE 1 TABLET DAILY 90 tablet 3    polyethylene glycol (MIRALAX) 17 g packet Take 17 g by mouth daily 20 each 2    Potassium Gluconate 595 (99 K) MG TABS Take by mouth      Probiotic Product (PROBIOTIC-10) CAPS Take by mouth      ALPRAZolam (XANAX) 0 5 mg tablet Take 1 tablet (0 5 mg total) by mouth 1 (one) time for 1 dose 2 tablet 0    Ascorbic Acid (VITAMIN C) 1000 MG tablet Take 1,000 mg by mouth daily (Patient not taking: Reported on 6/21/2022)      B Complex Vitamins (B COMPLEX 100 PO) Take by mouth (Patient not taking: Reported on 6/21/2022)      Biotin 2500 MCG CAPS Take by mouth (Patient not taking: Reported on 6/21/2022)      Echinacea 125 MG CAPS Take by mouth (Patient not taking: Reported on 6/21/2022)      fexofenadine (ALLEGRA) 180 MG tablet Take 180 mg by mouth as needed (prn)  (Patient not taking: Reported on 6/21/2022)      naproxen sodium (ALEVE) 220 MG tablet Take 220 mg by mouth as needed (Patient not taking: Reported on 6/21/2022)       No current facility-administered medications for this visit       Current Outpatient Medications on File Prior to Visit   Medication Sig    aspirin 81 mg chewable tablet Chew 1 tablet (81 mg total) daily    calcium carbonate (OS-JOHN) 1250 (500 Ca) MG tablet Take 1 tablet by mouth daily    carbidopa-levodopa (SINEMET)  mg per tablet Take 1 tablet by mouth 3 (three) times a day before meals    cholecalciferol (VITAMIN D3) 1,000 units tablet Take 5,000 Units by mouth daily     Coenzyme Q10 (CO Q 10 PO) Take by mouth 600 mg BID    Cyanocobalamin (VITAMIN B 12 PO) Take by mouth daily    Magnesium 200 MG TABS Take 1 tablet (200 mg total) by mouth daily    multivitamin (THERAGRAN) TABS Take 1 tablet by mouth daily    Myrbetriq 50 MG TB24 TAKE 1 TABLET DAILY    Omega-3 350 MG CPDR Take by mouth    pantoprazole (PROTONIX) 40 mg tablet TAKE 1 TABLET DAILY    Potassium Gluconate 595 (99 K) MG TABS Take by mouth    Probiotic Product (PROBIOTIC-10) CAPS Take by mouth    ALPRAZolam (XANAX) 0 5 mg tablet Take 1 tablet (0 5 mg total) by mouth 1 (one) time for 1 dose    Ascorbic Acid (VITAMIN C) 1000 MG tablet Take 1,000 mg by mouth daily (Patient not taking: Reported on 6/21/2022)    B Complex Vitamins (B COMPLEX 100 PO) Take by mouth (Patient not taking: Reported on 6/21/2022)    Biotin 2500 MCG CAPS Take by mouth (Patient not taking: Reported on 6/21/2022)    Echinacea 125 MG CAPS Take by mouth (Patient not taking: Reported on 6/21/2022)    fexofenadine (ALLEGRA) 180 MG tablet Take 180 mg by mouth as needed (prn)  (Patient not taking: Reported on 6/21/2022)    naproxen sodium (ALEVE) 220 MG tablet Take 220 mg by mouth as needed (Patient not taking: Reported on 6/21/2022)     No current facility-administered medications on file prior to visit  She is allergic to caffeine - food allergy, iodine - food allergy, latex, and other       Review of Systems   Constitutional: Negative for activity change, appetite change, fatigue and fever  HENT: Negative for congestion and ear discharge  Respiratory: Negative for cough and shortness of breath  Cardiovascular: Negative for chest pain and palpitations  Gastrointestinal: Positive for abdominal pain and constipation   Negative for diarrhea and nausea  Musculoskeletal: Negative for arthralgias and back pain  Skin: Negative for color change and rash  Neurological: Negative for dizziness and headaches  Psychiatric/Behavioral: Negative for agitation and behavioral problems  Objective:      /84 (BP Location: Left arm, Patient Position: Sitting)   Pulse 72   Temp 97 8 °F (36 6 °C) (Temporal)   Ht 5' 2" (1 575 m)   Wt 80 3 kg (177 lb)   SpO2 96%   BMI 32 37 kg/m²          Physical Exam  Constitutional:       General: She is not in acute distress  Appearance: She is well-developed  She is not diaphoretic  HENT:      Head: Normocephalic and atraumatic  Nose: Nose normal    Eyes:      Conjunctiva/sclera: Conjunctivae normal       Pupils: Pupils are equal, round, and reactive to light  Cardiovascular:      Rate and Rhythm: Normal rate and regular rhythm  Heart sounds: Normal heart sounds  No murmur heard  Pulmonary:      Effort: Pulmonary effort is normal  No respiratory distress  Breath sounds: Normal breath sounds  No wheezing  Abdominal:      General: Bowel sounds are normal  There is no distension  Palpations: Abdomen is soft  Tenderness: There is abdominal tenderness  Comments: LLQ pain   Skin:     General: Skin is warm and dry  Findings: No erythema or rash  Neurological:      Mental Status: She is alert and oriented to person, place, and time

## 2022-06-22 ENCOUNTER — APPOINTMENT (OUTPATIENT)
Dept: RADIOLOGY | Facility: CLINIC | Age: 82
End: 2022-06-22
Payer: COMMERCIAL

## 2022-06-22 DIAGNOSIS — R10.32 LEFT LOWER QUADRANT PAIN: ICD-10-CM

## 2022-06-22 DIAGNOSIS — K59.00 CONSTIPATION, UNSPECIFIED CONSTIPATION TYPE: ICD-10-CM

## 2022-06-22 PROCEDURE — 74018 RADEX ABDOMEN 1 VIEW: CPT

## 2022-06-27 DIAGNOSIS — K59.00 CONSTIPATION, UNSPECIFIED CONSTIPATION TYPE: Primary | ICD-10-CM

## 2022-06-27 RX ORDER — DOCUSATE SODIUM 100 MG/1
100 CAPSULE, LIQUID FILLED ORAL 2 TIMES DAILY
Qty: 60 CAPSULE | Refills: 1 | Status: SHIPPED | OUTPATIENT
Start: 2022-06-27 | End: 2022-07-25

## 2022-07-23 DIAGNOSIS — K59.00 CONSTIPATION, UNSPECIFIED CONSTIPATION TYPE: ICD-10-CM

## 2022-07-25 RX ORDER — DOCUSATE SODIUM 100 MG/1
CAPSULE, LIQUID FILLED ORAL
Qty: 60 CAPSULE | Refills: 1 | Status: SHIPPED | OUTPATIENT
Start: 2022-07-25

## 2022-07-26 ENCOUNTER — TELEPHONE (OUTPATIENT)
Dept: HEMATOLOGY ONCOLOGY | Facility: CLINIC | Age: 82
End: 2022-07-26

## 2022-07-26 ENCOUNTER — OFFICE VISIT (OUTPATIENT)
Dept: SURGICAL ONCOLOGY | Facility: CLINIC | Age: 82
End: 2022-07-26
Payer: COMMERCIAL

## 2022-07-26 VITALS
HEIGHT: 62 IN | BODY MASS INDEX: 32.57 KG/M2 | OXYGEN SATURATION: 97 % | DIASTOLIC BLOOD PRESSURE: 84 MMHG | RESPIRATION RATE: 16 BRPM | WEIGHT: 177 LBS | HEART RATE: 76 BPM | SYSTOLIC BLOOD PRESSURE: 124 MMHG | TEMPERATURE: 97.8 F

## 2022-07-26 DIAGNOSIS — K86.2 PANCREATIC CYST: Primary | ICD-10-CM

## 2022-07-26 PROCEDURE — 99215 OFFICE O/P EST HI 40 MIN: CPT | Performed by: STUDENT IN AN ORGANIZED HEALTH CARE EDUCATION/TRAINING PROGRAM

## 2022-07-26 PROCEDURE — 1160F RVW MEDS BY RX/DR IN RCRD: CPT | Performed by: STUDENT IN AN ORGANIZED HEALTH CARE EDUCATION/TRAINING PROGRAM

## 2022-07-26 NOTE — TELEPHONE ENCOUNTER
Called patient to schedule f/u appt  W/ Dr Lan Lemons after the MRI  Patient did not want an appointment at this time and would call back if she decides to see Dr Lan Lemons

## 2022-07-26 NOTE — PROGRESS NOTES
Surgical Oncology Consultation    [address] [place]  [facility] [address] [place] [address]    Patient:  Makeda Alba  1940  [id number]    Primary Care provider:  Ruddy Murphy MD  [address] [address]    Referring provider:  No referring provider defined for this encounter  Diagnoses and all orders for this visit:    Pancreatic cyst        Chief Complaint   Patient presents with    Consult       No follow-ups on file  Oncology History    No history exists  History of Present Illness  : This is an 65-year-old female who comes regarding 2nd opinion in relation to a pancreatic cysts  She had an incidentally detected pancreas cyst on a CT in August of 2020  At that time, it measured 4 4 x 4 4 x 4 8 cm  This was followed by an EUS in October of 2020  This revealed a 4 4 x 3 9 cm cystic lesion in the tail of the pancreas with no concerning US features  There was another 1 3 x 1 1 cm cyst in the neck of the pancreas  Cytology on the pancreatic tail cyst had mucin and was negative for atypia with scan cells  CEA was 656 ng/mL  Amylase was 31 U/L  There was acellular mucin present  There was a KRAS mutation and was statistically at higher risk by Pancragen  She had a follow-up MRI on March 17, 2021  By MRI, this measured up to 5 4 cm with several other smaller cystic foci  There was no main duct dilatation  Repeat MRI on May 18, 2022 revealed the pancreas cyst measures 5 x 4 4 cm from 4 7 x 4 1 cm  This is slightly increased in size  There was a 1 9 x 1 7 x 2 cm cyst in the tail the pancreas as well  the patient denies any history of pancreas, gallbladder, or liver pathology  She denies any history of pancreatitis  She denies any history of heavy alcohol use  She denies any current abdominal pain, nausea vomiting, constipation diarrhea    She denies any weight loss, change in appetite, other systemic symptoms  She was presented at tumor Board initially, and recently saw my partner Dr Dolores Lam, and all agreed that repeat imaging in December was reasonable given the minimal increase in size, and lack of concerning features, as well as her age and comorbidities  She comes today for 2nd opinion  Review of Systems  Complete ROS Surg Onc:   Constitutional: The patient denies new or recent history of general fatigue, no recent weight loss, no change in appetite  Eyes: No complaints of visual problems, no scleral icterus  ENT: No complaints of ear pain, no hoarseness, no difficulty swallowing,  no tinnitus and no new masses in head, oral cavity, or neck  Cardiovascular: No complaints of chest pain, no palpitations, no ankle edema  Respiratory: No complaints of shortness of breath, no cough  Gastrointestinal: No complaints of jaundice, no bloody stools, no pale stools  Genitourinary: No complaints of dysuria, no hematuria, no nocturia, no frequent urination, no urethral discharge  Musculoskeletal: No complaints of weakness, paralysis, joint stiffness or arthralgias  Integumentary: No complaints of rash, no new lesions  Neurological: No complaints of convulsions, no seizures, no dizziness  Hematologic/Lymphatic: No complaints of easy bruising  Endocrine:  No hot or cold intolerance  No polydipsia, polyphagia, or polyuria  Allergy/immunology:  No environmental allergies  No food allergies  Not immunocompromised  Patient Active Problem List   Diagnosis    OAB (overactive bladder)    Parkinson's disease ([facility] [address] )    Primary insomnia    Fatigue    History of skin cancer    Seasonal allergic rhinitis    Impaired fasting glucose    Mood disturbance    Obesity (BMI 30-39  9)    Claustrophobia    Mitral valve disorder    Menopause ovarian failure    Vitamin D deficiency    Dysphagia    Multiple thyroid nodules    Gastro-esophageal reflux disease without esophagitis    Osteopenia    Chronic idiopathic constipation    History of adenomatous polyp of colon    Tremor    Medicare annual wellness visit, subsequent    Trochanteric bursitis, right hip    Pancreatic cyst    Cherry angioma    Dyspnea on exertion    Postablative hypothyroidism    Sleep apnea    Headache    Closed compression fracture of body of L1 vertebra (HCC)    Cerebrovascular disease    Excessive ear wax, right    Age-related osteoporosis without current pathological fracture    Urge incontinence    Left lower quadrant pain    Constipation     Past Medical History:   Diagnosis Date    Actinic keratosis 3/11/2016    Acute midline low back pain without sciatica 2020    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     Benign neoplasm of skin     Chest pain     Claustrophobia     Diverticulitis     Fracture     L1-L2    GERD (gastroesophageal reflux disease)     H  pylori infection 2020    Muscle weakness     Nonmelanoma skin cancer     last assessed 2017    Palpitations     Seasonal allergies     Seborrheic keratosis 2014    Sleep apnea     no cpap    Spontaneous      without mention of complications     Squamous cell carcinoma of forehead 2014    Syncope      Past Surgical History:   Procedure Laterality Date    BOTOX INJECTION N/A 3/6/2017    Procedure: CYSTOSCOPY; BLADDER BOTOX 100 UNITS ;  Surgeon: Curt Abdalla MD;  [facility];  Service:     BOWEL RESECTION      related ot diverticulitis    CARDIAC CATHETERIZATION      CARPAL TUNNEL RELEASE Right     COLONOSCOPY  2019    COLOSTOMY      COLOSTOMY CLOSURE      CYSTOSCOPY  2021    FOOT SURGERY Left     neuroma    HYSTERECTOMY      NASAL SINUS SURGERY  age 35    [facility]    OK CYSTOURETHROSCOPY N/A 2018    Procedure: CYSTOSCOPY WITH BOTOX;  Surgeon: Curt Abdalla MD;  [facility];  Service: Urology    OK ESOPHAGOGASTRODUODENOSCOPY TRANSORAL DIAGNOSTIC N/A 4/23/2019    Procedure: ESOPHAGOGASTRODUODENOSCOPY (EGD); Surgeon: Miranda Shaw DO;  Location: [facility]; Service: Gastroenterology    TONSILLECTOMY  age 47    UPPER GASTROINTESTINAL ENDOSCOPY  04/23/2019     Family History   Problem Relation Age of Onset    Alcohol abuse Mother     Heart disease Mother     Alcohol abuse Father     Alcohol abuse Brother     Cancer Brother     Tremor Neg Hx     Parkinsonism Neg Hx     Colon cancer Neg Hx      Social History     Socioeconomic History    Marital status:       Spouse name: Not on file    Number of children: Not on file    Years of education: Not on file    Highest education level: Not on file   Occupational History    Occupation: RETIRED    Tobacco Use    Smoking status: Never Smoker    Smokeless tobacco: Never Used   Vaping Use    Vaping Use: Never used   Substance and Sexual Activity    Alcohol use: Yes     Comment: patient states rare use on holidays     Drug use: No    Sexual activity: Not Currently   Other Topics Concern    Not on file   Social History Narrative    LIVING INDEPENDENTLY ALONE         No caffeine use     Social Determinants of Health     Financial Resource Strain: Not on file   Food Insecurity: Not on file   Transportation Needs: Not on file   Physical Activity: Not on file   Stress: Not on file   Social Connections: Not on file   Intimate Partner Violence: Not on file   Housing Stability: Not on file       Current Outpatient Medications:     Ascorbic Acid (VITAMIN C) 1000 MG tablet, Take 1,000 mg by mouth daily, Disp: , Rfl:     aspirin 81 mg chewable tablet, Chew 1 tablet (81 mg total) daily, Disp: 30 tablet, Rfl: 0    B Complex Vitamins (B COMPLEX 100 PO), Take by mouth, Disp: , Rfl:     Biotin 2500 MCG CAPS, Take by mouth, Disp: , Rfl:     calcium carbonate (OS-JOHN) 1250 (500 Ca) MG tablet, Take 1 tablet by mouth daily, Disp: , Rfl:     carbidopa-levodopa (SINEMET)  mg per tablet, Take 1 tablet by mouth 3 (three) times a day before meals, Disp: 270 tablet, Rfl: 6    cholecalciferol (VITAMIN D3) 1,000 units tablet, Take 5,000 Units by mouth daily , Disp: , Rfl:     Coenzyme Q10 (CO Q 10 PO), Take by mouth 600 mg BID, Disp: , Rfl:     Cyanocobalamin (VITAMIN B 12 PO), Take by mouth daily, Disp: , Rfl:     docusate sodium (COLACE) 100 mg capsule, TAKE 1 CAPSULE (100 MG TOTAL) BY MOUTH 2 TIMES A DAY, Disp: 60 capsule, Rfl: 1    Echinacea 125 MG CAPS, Take by mouth, Disp: , Rfl:     fexofenadine (ALLEGRA) 180 MG tablet, Take 180 mg by mouth as needed (prn), Disp: , Rfl:     Magnesium 200 MG TABS, Take 1 tablet (200 mg total) by mouth daily, Disp: 30 tablet, Rfl: 3    multivitamin (THERAGRAN) TABS, Take 1 tablet by mouth daily, Disp: , Rfl:     Myrbetriq 50 MG TB24, TAKE 1 TABLET DAILY, Disp: 90 tablet, Rfl: 3    naproxen sodium (ALEVE) 220 MG tablet, Take 220 mg by mouth as needed, Disp: , Rfl:     Omega-3 350 MG CPDR, Take by mouth, Disp: , Rfl:     pantoprazole (PROTONIX) 40 mg tablet, TAKE 1 TABLET DAILY, Disp: 90 tablet, Rfl: 3    Potassium Gluconate 595 (99 K) MG TABS, Take by mouth, Disp: , Rfl:     Probiotic Product (PROBIOTIC-10) CAPS, Take by mouth, Disp: , Rfl:     ALPRAZolam (XANAX) 0 5 mg tablet, Take 1 tablet (0 5 mg total) by mouth 1 (one) time for 1 dose, Disp: 2 tablet, Rfl: 0    polyethylene glycol (MIRALAX) 17 g packet, Take 17 g by mouth daily, Disp: 20 each, Rfl: 2  Allergies   Allergen Reactions    Caffeine - Food Allergy GI Intolerance    Iodine - Food Allergy Rash    Latex Rash    Other Rash     Adhesive tape         Vitals:    07/26/22 0958   BP: 124/84   Pulse: 76   Resp: 16   Temp: 97 8 °F (36 6 °C)   SpO2: 97%       Physical Exam   General: Appears well, appears stated age  Skin: Warm, anicteric  HEENT: Normocephalic, atraumatic; sclera aniceteric, mucous membranes moist; cervical nodes without adenopathy  Cardiopulmonary: RRR, Easy WOB, no BLE edema  Abd: Flat and soft, nontender, no masses appreciated, no hepatosplenomegaly  MSK: Symmetric, no cyanosis, no overt weakness  Lymphatic: No cervical, axillary or inguinal lymphadenopathy  Neuro: Affect appropriate, no gross motor abnormalities      Labs: Reviewed in EPIC    Imaging  No results found  I independently reviewed and interpreted the above laboratory and imaging data, including Dr Shiv Mobley consultation, Dr Nicolette Carballo consultation  Endoscopic ultrasound, cytology, fluid results  Discussion/Summary: This is an 48-year-old female with a 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

## 2022-09-15 DIAGNOSIS — N39.41 URGE INCONTINENCE: ICD-10-CM

## 2022-09-15 RX ORDER — MIRABEGRON 50 MG/1
1 TABLET, FILM COATED, EXTENDED RELEASE ORAL DAILY
Qty: 90 TABLET | Refills: 0 | Status: SHIPPED | OUTPATIENT
Start: 2022-09-15 | End: 2022-10-25 | Stop reason: SDUPTHER

## 2022-09-15 NOTE — TELEPHONE ENCOUNTER
Medication Refill Request     Name Myrbetriq  Dose/Frequency 50mg  Quantity 90 day   Verified pharmacy   [x]  Verified ordering Provider   [x]  Does patient have enough for the next 3 days?  Yes [x] No []

## 2022-10-12 PROBLEM — Z00.00 MEDICARE ANNUAL WELLNESS VISIT, SUBSEQUENT: Status: RESOLVED | Noted: 2020-07-22 | Resolved: 2022-10-12

## 2022-10-12 PROBLEM — H61.21 EXCESSIVE EAR WAX, RIGHT: Status: RESOLVED | Noted: 2021-03-10 | Resolved: 2022-10-12

## 2022-10-25 ENCOUNTER — TELEPHONE (OUTPATIENT)
Dept: NEUROLOGY | Facility: CLINIC | Age: 82
End: 2022-10-25

## 2022-10-25 ENCOUNTER — OFFICE VISIT (OUTPATIENT)
Dept: UROLOGY | Facility: CLINIC | Age: 82
End: 2022-10-25
Payer: COMMERCIAL

## 2022-10-25 VITALS
HEART RATE: 88 BPM | HEIGHT: 62 IN | WEIGHT: 170 LBS | BODY MASS INDEX: 31.28 KG/M2 | SYSTOLIC BLOOD PRESSURE: 116 MMHG | OXYGEN SATURATION: 99 % | DIASTOLIC BLOOD PRESSURE: 68 MMHG

## 2022-10-25 DIAGNOSIS — N39.41 URGE INCONTINENCE: ICD-10-CM

## 2022-10-25 DIAGNOSIS — N32.81 OAB (OVERACTIVE BLADDER): Primary | ICD-10-CM

## 2022-10-25 DIAGNOSIS — R35.0 FREQUENCY OF URINATION: ICD-10-CM

## 2022-10-25 LAB — POST-VOID RESIDUAL VOLUME, ML POC: 28 ML

## 2022-10-25 PROCEDURE — 99213 OFFICE O/P EST LOW 20 MIN: CPT | Performed by: PHYSICIAN ASSISTANT

## 2022-10-25 PROCEDURE — 51798 US URINE CAPACITY MEASURE: CPT | Performed by: PHYSICIAN ASSISTANT

## 2022-10-25 RX ORDER — MIRABEGRON 50 MG/1
1 TABLET, FILM COATED, EXTENDED RELEASE ORAL DAILY
Qty: 90 TABLET | Refills: 3 | Status: SHIPPED | OUTPATIENT
Start: 2022-10-25

## 2022-10-25 NOTE — PROGRESS NOTES
10/25/2022      Chief Complaint   Patient presents with   • Follow-up         Assessment and Plan    79 y o  female -- Dr Rusty Ochoa    1  Urge urinary incontinence  2  Overactive bladder  - Continues to use Myrbetriq 50 mg daily, refills sent  - PVR today demonstrating adequate bladder emptying  - Denies infections  - Follow up in 1 year for symptom reassessment or sooner should she wish to undergo repeat Botox  - Call with any questions or concerns in the meantime  - All questions answered; patient understands and agrees with plan        History of Present Illness  Mozella Gosselin is a 79 y o  female patient of Dr Rusty Ochoa with history of urge urinary incontinence and overactive bladder here for follow up  Patient doing well at this time  Underwent bladder botox 10/21/21 and has been doing well since  Continues to take Myrbetriq 50 mg daily with benefit, denies side effects  Previously failed PTNS  Denies new or worsening symptoms today  Review of Systems   Constitutional: Negative for activity change, appetite change, chills and fever  HENT: Negative for congestion and trouble swallowing  Respiratory: Negative for cough and shortness of breath  Cardiovascular: Negative for chest pain, palpitations and leg swelling  Gastrointestinal: Negative for abdominal pain, constipation, diarrhea, nausea and vomiting  Genitourinary: Negative for difficulty urinating, dysuria, flank pain, frequency, hematuria and urgency  Musculoskeletal: Negative for back pain and gait problem  Skin: Negative for wound  Allergic/Immunologic: Negative for immunocompromised state  Neurological: Negative for dizziness and syncope  Hematological: Does not bruise/bleed easily  Psychiatric/Behavioral: Negative for confusion  All other systems reviewed and are negative        Vitals  Vitals:    10/25/22 1433   BP: 116/68   Pulse: 88   SpO2: 99%   Weight: 77 1 kg (170 lb)   Height: 5' 2" (1 575 m)       Physical Exam  Constitutional:       General: She is not in acute distress  Appearance: Normal appearance  She is not ill-appearing, toxic-appearing or diaphoretic  HENT:      Head: Normocephalic  Nose: No congestion  Eyes:      General: No scleral icterus  Right eye: No discharge  Left eye: No discharge  Conjunctiva/sclera: Conjunctivae normal       Pupils: Pupils are equal, round, and reactive to light  Pulmonary:      Effort: Pulmonary effort is normal    Musculoskeletal:      Cervical back: Normal range of motion  Skin:     General: Skin is warm and dry  Coloration: Skin is not jaundiced or pale  Findings: No bruising, erythema, lesion or rash  Neurological:      General: No focal deficit present  Mental Status: She is alert and oriented to person, place, and time  Mental status is at baseline  Gait: Gait normal    Psychiatric:         Mood and Affect: Mood normal          Behavior: Behavior normal          Thought Content: Thought content normal          Judgment: Judgment normal            Past History  Past Medical History:   Diagnosis Date   • Actinic keratosis 3/11/2016   • Acute midline low back pain without sciatica 2020   • 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    • Benign neoplasm of skin    • Chest pain    • Claustrophobia    • Diverticulitis    • Fracture     L1-L2   • GERD (gastroesophageal reflux disease)    • H  pylori infection 2020   • Muscle weakness    • Nonmelanoma skin cancer     last assessed 2017   • Palpitations    • Seasonal allergies    • Seborrheic keratosis 2014   • Sleep apnea     no cpap   • Spontaneous      without mention of complications    • Squamous cell carcinoma of forehead 2014   • Syncope      Social History     Socioeconomic History   • Marital status:       Spouse name: None   • Number of children: None   • Years of education: None   • Highest education level: None   Occupational History   • Occupation: RETIRED    Tobacco Use   • Smoking status: Never Smoker   • Smokeless tobacco: Never Used   Vaping Use   • Vaping Use: Never used   Substance and Sexual Activity   • Alcohol use: Yes     Comment: patient states rare use on holidays    • Drug use: No   • Sexual activity: Not Currently   Other Topics Concern   • None   Social History Narrative    LIVING INDEPENDENTLY ALONE         No caffeine use     Social Determinants of Health     Financial Resource Strain: Not on file   Food Insecurity: Not on file   Transportation Needs: Not on file   Physical Activity: Not on file   Stress: Not on file   Social Connections: Not on file   Intimate Partner Violence: Not on file   Housing Stability: Not on file     Social History     Tobacco Use   Smoking Status Never Smoker   Smokeless Tobacco Never Used     Family History   Problem Relation Age of Onset   • Alcohol abuse Mother    • Heart disease Mother    • Alcohol abuse Father    • Alcohol abuse Brother    • Cancer Brother    • Tremor Neg Hx    • Parkinsonism Neg Hx    • Colon cancer Neg Hx        The following portions of the patient's history were reviewed and updated as appropriate: allergies, current medications, past medical history, past social history, past surgical history and problem list     Results  Recent Results (from the past 1 hour(s))   POCT Measure PVR    Collection Time: 10/25/22  2:36 PM   Result Value Ref Range    POST-VOID RESIDUAL VOLUME, ML POC 28 mL   ]  No results found for: PSA  Lab Results   Component Value Date    GLUCOSE 108 10/03/2015    CALCIUM 9 2 03/08/2022     (H) 10/03/2015    K 4 2 03/08/2022    CO2 29 03/08/2022     03/08/2022    BUN 16 03/08/2022    CREATININE 0 77 03/08/2022     Lab Results   Component Value Date    WBC 4 79 03/08/2022    HGB 14 0 03/08/2022    HCT 41 3 03/08/2022    MCV 89 03/08/2022     03/08/2022       Magda Ricci

## 2022-10-25 NOTE — TELEPHONE ENCOUNTER
Patient called to confirm her next  appointment with us I advised her that it is with Amaya and that she practices out of [facility] now or I can switch her appointment to virtual  She said no virtual she would like to be seen in [place] only  I offered her 3-8-23 at 230 with Dr Edward Brown and added her to the wait list and she accepted

## 2022-11-28 ENCOUNTER — OFFICE VISIT (OUTPATIENT)
Dept: DERMATOLOGY | Facility: CLINIC | Age: 82
End: 2022-11-28

## 2022-11-28 ENCOUNTER — TELEPHONE (OUTPATIENT)
Dept: HEMATOLOGY ONCOLOGY | Facility: CLINIC | Age: 82
End: 2022-11-28

## 2022-11-28 VITALS — BODY MASS INDEX: 31.28 KG/M2 | HEIGHT: 62 IN | WEIGHT: 170 LBS

## 2022-11-28 DIAGNOSIS — Z13.89 SCREENING FOR SKIN CONDITION: ICD-10-CM

## 2022-11-28 DIAGNOSIS — L82.1 SEBORRHEIC KERATOSIS: Primary | ICD-10-CM

## 2022-11-28 DIAGNOSIS — Z85.828 HISTORY OF SKIN CANCER: ICD-10-CM

## 2022-11-28 DIAGNOSIS — F40.240 CLAUSTROPHOBIA: Primary | ICD-10-CM

## 2022-11-28 DIAGNOSIS — K86.2 PANCREATIC CYST: ICD-10-CM

## 2022-11-28 RX ORDER — LORAZEPAM 1 MG/1
1 TABLET ORAL SEE ADMIN INSTRUCTIONS
Qty: 2 TABLET | Refills: 0 | Status: SHIPPED | OUTPATIENT
Start: 2022-11-28

## 2022-11-28 NOTE — PROGRESS NOTES
[facility] DERMATOLOGY  [address] [place] [id number]  [phone]  [phone]     MRN: [id number] : 1940  Encounter: [id number]  Patient Information: Ashu Ash  Chief complaint:  Six-month check    History of present illness:  42-year-old female presents for overall skin check previous history of nonmelanoma skin cancer no specific concerns except some growths on her skin  Past Medical History:   Diagnosis Date   • Actinic keratosis 3/11/2016   • Acute midline low back pain without sciatica 2020   • 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    • Benign neoplasm of skin    • Chest pain    • Claustrophobia    • Diverticulitis    • Fracture     L1-L2   • GERD (gastroesophageal reflux disease)    • H  pylori infection 2020   • Muscle weakness    • Nonmelanoma skin cancer     last assessed 2017   • Palpitations    • Seasonal allergies    • Seborrheic keratosis 2014   • Sleep apnea     no cpap   • Spontaneous      without mention of complications    • Squamous cell carcinoma of forehead 2014   • Syncope      Past Surgical History:   Procedure Laterality Date   • BOTOX INJECTION N/A 3/6/2017    Procedure: CYSTOSCOPY; BLADDER BOTOX 100 UNITS ;  Surgeon: Twila Rachel MD;  Location[facility];  Service:    • BOWEL RESECTION      related ot diverticulitis   • CARDIAC CATHETERIZATION     • CARPAL TUNNEL RELEASE Right    • COLONOSCOPY  2019   • COLOSTOMY     • COLOSTOMY CLOSURE     • CYSTOSCOPY  2021   • FOOT SURGERY Left     neuroma   • HYSTERECTOMY     • NASAL SINUS SURGERY  age 35    [place]   • FL CYSTOURETHROSCOPY N/A 2018    Procedure: CYSTOSCOPY WITH BOTOX;  Surgeon: Twila Rachel MD;  Location: [facility];  Service: Urology   • FL ESOPHAGOGASTRODUODENOSCOPY TRANSORAL DIAGNOSTIC N/A 2019    Procedure: ESOPHAGOGASTRODUODENOSCOPY (EGD);   Surgeon: Gisella Main DO;  Location: [facility]; Service: Gastroenterology   • TONSILLECTOMY  age 47   • UPPER GASTROINTESTINAL ENDOSCOPY  04/23/2019     Social History   Social History     Substance and Sexual Activity   Alcohol Use Yes    Comment: patient states rare use on holidays      Social History     Substance and Sexual Activity   Drug Use No     Social History     Tobacco Use   Smoking Status Never   Smokeless Tobacco Never     Family History   Problem Relation Age of Onset   • Alcohol abuse Mother    • Heart disease Mother    • Alcohol abuse Father    • Alcohol abuse Brother    • Cancer Brother    • Tremor Neg Hx    • Parkinsonism Neg Hx    • Colon cancer Neg Hx      Meds/Allergies   Allergies   Allergen Reactions   • Caffeine - Food Allergy GI Intolerance   • Iodine - Food Allergy Rash   • Latex Rash   • Other Rash     Adhesive tape         Meds:  Prior to Admission medications    Medication Sig Start Date End Date Taking?  Authorizing Provider   ALPRAZolam Birtha Love) 0 5 mg tablet Take 1 tablet (0 5 mg total) by mouth 1 (one) time for 1 dose 4/27/22 11/28/22 Yes Alline Lemmings, PA-C   Ascorbic Acid (VITAMIN C) 1000 MG tablet Take 1,000 mg by mouth daily   Yes Historical Provider, MD   B Complex Vitamins (B COMPLEX 100 PO) Take by mouth   Yes Historical Provider, MD   calcium carbonate (OS-JOHN) 1250 (500 Ca) MG tablet Take 1 tablet by mouth daily   Yes Historical Provider, MD   carbidopa-levodopa (SINEMET)  mg per tablet Take 1 tablet by mouth 3 (three) times a day before meals 2/21/22  Yes Dalia Amanda MD   cholecalciferol (VITAMIN D3) 1,000 units tablet Take 5,000 Units by mouth daily    Yes Historical Provider, MD   Coenzyme Q10 (CO Q 10 PO) Take by mouth 600 mg BID   Yes Historical Provider, MD   Cyanocobalamin (VITAMIN B 12 PO) Take by mouth daily   Yes Historical Provider, MD   Echinacea 125 MG CAPS Take by mouth   Yes Historical Provider, MD   fexofenadine (ALLEGRA) 180 MG tablet Take 180 mg by mouth as needed (prn)   Yes Historical Provider, MD   LORazepam (ATIVAN) 1 mg tablet Take 1 tablet (1 mg total) by mouth see administration instructions Take 1 tablet 30 minutes prior to MRI, take 2nd tablet right before MRI if needed   11/28/22  Yes Denver Romance, MD   Magnesium 200 MG TABS Take 1 tablet (200 mg total) by mouth daily 7/22/20  Yes Abdiel Martin MD   Mirabegron ER (Myrbetriq) 50 MG TB24 Take 1 tablet (50 mg total) by mouth daily 10/25/22  Yes Isela Sotomayor PA-C   multivitamin [facility]) TABS Take 1 tablet by mouth daily   Yes Historical Provider, MD   naproxen sodium (ALEVE) 220 MG tablet Take 220 mg by mouth as needed   Yes Historical Provider, MD   Omega-3 350 MG CPDR Take by mouth   Yes Historical Provider, MD   pantoprazole (PROTONIX) 40 mg tablet TAKE 1 TABLET DAILY 1/25/22  Yes Surekha Crowley PA-C   Potassium Gluconate 595 (99 K) MG TABS Take by mouth   Yes Historical Provider, MD   Probiotic Product (PROBIOTIC-10) CAPS Take by mouth   Yes Historical Provider, MD   aspirin 81 mg chewable tablet Chew 1 tablet (81 mg total) daily  Patient not taking: Reported on 10/25/2022 11/30/20   RISHI Hicks   Biotin 2500 MCG CAPS Take by mouth  Patient not taking: Reported on 10/25/2022    Historical Provider, MD   docusate sodium (COLACE) 100 mg capsule TAKE 1 CAPSULE (100 MG TOTAL) BY MOUTH 2 TIMES A DAY  Patient not taking: Reported on 10/25/2022 7/25/22   Abdiel Martin MD   polyethylene glycol (MIRALAX) 17 g packet Take 17 g by mouth daily  Patient not taking: Reported on 10/25/2022 6/21/22 7/21/22  Abdiel Martin MD       Subjective:     Review of Systems:    General: negative for - chills, fatigue, fever,  weight gain or weight loss  Psychological: negative for - anxiety, behavioral disorder, concentration difficulties, decreased libido, depression, irritability, memory difficulties, mood swings, sleep disturbances or suicidal ideation  ENT: negative for - hearing difficulties , nasal congestion, nasal discharge, oral lesions, sinus pain, sneezing, sore throat  Allergy and Immunology: negative for - hives, insect bite sensitivity,  Hematological and Lymphatic: negative for - bleeding problems, blood clots,bruising, swollen lymph nodes  Endocrine: negative for - hair pattern changes, hot flashes, malaise/lethargy, mood swings, palpitations, polydipsia/polyuria, skin changes, temperature intolerance or unexpected weight change  Respiratory: negative for - cough, hemoptysis, orthopnea, shortness of breath, or wheezing  Cardiovascular: negative for - chest pain, dyspnea on exertion, edema,  Gastrointestinal: negative for - abdominal pain, nausea/vomiting  Genito-Urinary: negative for - dysuria, incontinence, irregular/heavy menses or urinary frequency/urgency  Musculoskeletal: negative for - gait disturbance, joint pain, joint stiffness, joint swelling, muscle pain, muscular weakness  Dermatological:  As in HPI  Neurological: negative for confusion, dizziness, headaches, impaired coordination/balance, memory loss, numbness/tingling, seizures, speech problems, tremors or weakness       Objective:   Ht 5' 2" (1 575 m)   Wt 77 1 kg (170 lb)   BMI 31 09 kg/m²     Physical Exam:    General Appearance:    Alert, cooperative, no distress   Head:    Normocephalic, without obvious abnormality, atraumatic           Skin:   A full skin exam was performed including scalp, head scalp, eyes, ears, nose, lips, neck, chest, axilla, abdomen, back, buttocks, bilateral upper extremities, bilateral lower extremities, hands, feet, fingers, toes, fingernails, and toenails normal keratotic papules greasy stuck appearance nothing else remarkable noted on complete exam     Assessment:     1  Seborrheic keratosis        2  History of skin cancer        3   Screening for skin condition              Plan:   Seborrheic keratosis patient reassured these are normal growths we acquire with age no treatment needed  History of skin cancer in no recurrence nothing else atypical sunblock recommended follow-up in 6 months  Screening for dermatologic disorders nothing else of concern noted on complete exam follow-up in 6 months    Saad Vick MD  11/28/2022,12:20 PM    Portions of the record may have been created with voice recognition software   Occasional wrong word or "sound a like" substitutions may have occurred due to the inherent limitations of voice recognition software   Read the chart carefully and recognize, using context, where substitutions have occurred

## 2022-11-28 NOTE — TELEPHONE ENCOUNTER
CALL TRANSFER   Reason for patient call? Patient requesting anti-anxiety medication to take prior to her MRI scheduled 12/05/22  She is claustrophobic and needs medication prior to scan in order to go into machine    Patient's primary physician? Dr Lew Bolivar    RN call was transferred to and time it was transferred? Juan Hammond @ 9:07AM    Informed patient that the message will be forwarded to the team and someone will get back to them as soon as possible    Did you relay this information to the patient?  Yes

## 2022-11-28 NOTE — PROGRESS NOTES
[facility] Note     Patient Name: Subha Awan  Encounter Date: November 28, 2022     Have you been cared for by a [facility] in the last 3 years and, if so, which description applies to you? Yes  I have been here within the last 3 years, and my medical history has NOT changed since that time  I am FEMALE not of child-bearing potential     REVIEW OF SYSTEMS:  Have you recently had or currently have any of the following? · No changes in my recent health  PAST MEDICAL HISTORY:  Have you personally ever had or currently have any of the following? If "YES," then please provide more detail  · No changes in my medical history  FAMILY HISTORY:  Any "first degree relatives" (parent, brother, sister, or child) with the following? • No changes in my family's known health  PATIENT EXPERIENCE:    • Do you want the Dermatologist to perform a COMPLETE skin exam today including a clinical examination under the "bra and underwear" areas? Yes  • If necessary, do we have your permission to call and leave a detailed message on your Preferred Phone number that includes your specific medical information?   Yes      Allergies   Allergen Reactions   • Caffeine - Food Allergy GI Intolerance   • Iodine - Food Allergy Rash   • Latex Rash   • Other Rash     Adhesive tape        Current Outpatient Medications:   •  ALPRAZolam (XANAX) 0 5 mg tablet, Take 1 tablet (0 5 mg total) by mouth 1 (one) time for 1 dose, Disp: 2 tablet, Rfl: 0  •  Ascorbic Acid (VITAMIN C) 1000 MG tablet, Take 1,000 mg by mouth daily, Disp: , Rfl:   •  aspirin 81 mg chewable tablet, Chew 1 tablet (81 mg total) daily (Patient not taking: Reported on 10/25/2022), Disp: 30 tablet, Rfl: 0  •  B Complex Vitamins (B COMPLEX 100 PO), Take by mouth, Disp: , Rfl:   •  Biotin 2500 MCG CAPS, Take by mouth (Patient not taking: Reported on 10/25/2022), Disp: , Rfl:   •  calcium carbonate (OS-JOHN) 1250 (500 Ca) MG tablet, Take 1 tablet by mouth daily, Disp: , Rfl:   •  carbidopa-levodopa (SINEMET)  mg per tablet, Take 1 tablet by mouth 3 (three) times a day before meals, Disp: 270 tablet, Rfl: 6  •  cholecalciferol (VITAMIN D3) 1,000 units tablet, Take 5,000 Units by mouth daily , Disp: , Rfl:   •  Coenzyme Q10 (CO Q 10 PO), Take by mouth 600 mg BID, Disp: , Rfl:   •  Cyanocobalamin (VITAMIN B 12 PO), Take by mouth daily, Disp: , Rfl:   •  docusate sodium (COLACE) 100 mg capsule, TAKE 1 CAPSULE (100 MG TOTAL) BY MOUTH 2 TIMES A DAY (Patient not taking: Reported on 10/25/2022), Disp: 60 capsule, Rfl: 1  •  Echinacea 125 MG CAPS, Take by mouth, Disp: , Rfl:   •  fexofenadine (ALLEGRA) 180 MG tablet, Take 180 mg by mouth as needed (prn), Disp: , Rfl:   •  LORazepam (ATIVAN) 1 mg tablet, Take 1 tablet (1 mg total) by mouth see administration instructions Take 1 tablet 30 minutes prior to MRI, take 2nd tablet right before MRI if needed  , Disp: 2 tablet, Rfl: 0  •  Magnesium 200 MG TABS, Take 1 tablet (200 mg total) by mouth daily, Disp: 30 tablet, Rfl: 3  •  Mirabegron ER (Myrbetriq) 50 MG TB24, Take 1 tablet (50 mg total) by mouth daily, Disp: 90 tablet, Rfl: 3  •  multivitamin (THERAGRAN) TABS, Take 1 tablet by mouth daily, Disp: , Rfl:   •  naproxen sodium (ALEVE) 220 MG tablet, Take 220 mg by mouth as needed, Disp: , Rfl:   •  Omega-3 350 MG CPDR, Take by mouth, Disp: , Rfl:   •  pantoprazole (PROTONIX) 40 mg tablet, TAKE 1 TABLET DAILY, Disp: 90 tablet, Rfl: 3  •  polyethylene glycol (MIRALAX) 17 g packet, Take 17 g by mouth daily (Patient not taking: Reported on 10/25/2022), Disp: 20 each, Rfl: 2  •  Potassium Gluconate 595 (99 K) MG TABS, Take by mouth, Disp: , Rfl:   •  Probiotic Product (PROBIOTIC-10) CAPS, Take by mouth, Disp: , Rfl:           • Whom besides the patient is providing clinical information about today's encounter?   o NO ADDITIONAL HISTORIAN (patient alone provided history)    Physical Exam and Assessment/Plan by Diagnosis:

## 2022-11-29 ENCOUNTER — APPOINTMENT (OUTPATIENT)
Dept: LAB | Facility: CLINIC | Age: 82
End: 2022-11-29

## 2022-11-29 DIAGNOSIS — D49.0 IPMN (INTRADUCTAL PAPILLARY MUCINOUS NEOPLASM): ICD-10-CM

## 2022-11-29 LAB
BUN SERPL-MCNC: 14 MG/DL (ref 5–25)
CREAT SERPL-MCNC: 0.84 MG/DL (ref 0.6–1.3)
GFR SERPL CREATININE-BSD FRML MDRD: 64 ML/MIN/1.73SQ M

## 2022-12-05 ENCOUNTER — HOSPITAL ENCOUNTER (OUTPATIENT)
Dept: MRI IMAGING | Facility: HOSPITAL | Age: 82
Discharge: HOME | End: 2022-12-05
Attending: SURGERY

## 2022-12-05 DIAGNOSIS — D49.0 IPMN (INTRADUCTAL PAPILLARY MUCINOUS NEOPLASM): ICD-10-CM

## 2022-12-05 RX ADMIN — GADOBUTROL 7 ML: 604.72 INJECTION INTRAVENOUS at 13:17

## 2022-12-12 ENCOUNTER — OFFICE VISIT (OUTPATIENT)
Dept: SURGICAL ONCOLOGY | Facility: CLINIC | Age: 82
End: 2022-12-12

## 2022-12-12 VITALS
WEIGHT: 173 LBS | SYSTOLIC BLOOD PRESSURE: 102 MMHG | RESPIRATION RATE: 16 BRPM | HEIGHT: 62 IN | DIASTOLIC BLOOD PRESSURE: 72 MMHG | BODY MASS INDEX: 31.83 KG/M2 | HEART RATE: 94 BPM | OXYGEN SATURATION: 97 %

## 2022-12-12 DIAGNOSIS — K86.2 PANCREATIC CYST: Primary | ICD-10-CM

## 2022-12-12 NOTE — PROGRESS NOTES
Surgical Oncology Follow Up       [facility]  [facility] [place]  [address] [facility] [address]  [address] [place] [id number] [phone]    Jone Stubbs  1940  [id number]  [facility] ONC [facility] [address] [facility]  [address] [facility]  [place] [address]  [place] [address] [place] [id number] [phone]    Diagnoses and all orders for this visit:    Pancreatic cyst      Staging: Pancreatic tail cyst  Treatment history: EUS October 2020  Cyst fluid  ng/mL, Amylase 31 U/L  Statistically higher risk on Pancreagen testing  Current treatment: Observation      History of Present Illness: The patient returns to the office today in follow-up for a pancreatic tail cyst   This was initially found on CT in August 2020, measuring 4 8cm in greatest dimension, and measuring greater than 5cm on all subsequent MRIs  Additional smaller cysts are present in the tail as well  There is no associated ductal dilatation, and there are no additional worrisome features present  The patient reports that she feels well, and denies any weight loss, abdominal pain, nausea, diarrhea or bloating  She does not have any family history of pancreatic cancer  MRI with MRCP was performed on December 5, 2022, and I have discussed these results with the patient and her son today  Review of Systems   Constitutional: Negative  Negative for activity change, appetite change, fatigue and unexpected weight change  Respiratory: Negative  Negative for cough and shortness of breath  Cardiovascular: Negative  Negative for chest pain  Gastrointestinal: Negative  Negative for abdominal distention, abdominal pain, diarrhea, nausea and vomiting  Musculoskeletal: Negative  Skin: Negative  Negative for color change  Neurological: Negative  Hematological: Negative  Negative for adenopathy  Psychiatric/Behavioral: Negative  Patient Active Problem List   Diagnosis   • OAB (overactive bladder)   • Parkinson's disease ([facility] [address] )   • Primary insomnia   • Fatigue   • History of skin cancer   • Seasonal allergic rhinitis   • Impaired fasting glucose   • Mood disturbance   • Obesity (BMI 30-39  9)   • Claustrophobia   • Mitral valve disorder   • Menopause ovarian failure   • Vitamin D deficiency   • Dysphagia   • Multiple thyroid nodules   • Gastro-esophageal reflux disease without esophagitis   • Osteopenia   • Chronic idiopathic constipation   • History of adenomatous polyp of colon   • Tremor   • Trochanteric bursitis, right hip   • Pancreatic cyst   • Cherry angioma   • Dyspnea on exertion   • Postablative hypothyroidism   • Sleep apnea   • Headache   • Closed compression fracture of body of L1 vertebra (HCC)   • Cerebrovascular disease   • Age-related osteoporosis without current pathological fracture   • Urge incontinence   • Left lower quadrant pain   • Constipation     Past Medical History:   Diagnosis Date   • Actinic keratosis 3/11/2016   • Acute midline low back pain without sciatica 2020   • 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    • Benign neoplasm of skin    • Chest pain    • Claustrophobia    • Diverticulitis    • Fracture     L1-L2   • GERD (gastroesophageal reflux disease)    • H  pylori infection 2020   • Muscle weakness    • Nonmelanoma skin cancer     last assessed 2017   • Palpitations    • Seasonal allergies    • Seborrheic keratosis 2014   • Sleep apnea     no cpap   • Spontaneous      without mention of complications    • Squamous cell carcinoma of forehead 2014   • Syncope      Past Surgical History:   Procedure Laterality Date   • BOTOX INJECTION N/A 3/6/2017    Procedure: CYSTOSCOPY; BLADDER BOTOX 100 UNITS ;  Surgeon: Annika Garcia MD;  Location: [facility];  Service:    • BOWEL RESECTION      related ot diverticulitis   • CARDIAC CATHETERIZATION     • CARPAL TUNNEL RELEASE Right    • COLONOSCOPY  07/31/2019   • COLOSTOMY     • COLOSTOMY CLOSURE     • CYSTOSCOPY  06/01/2021   • FOOT SURGERY Left     neuroma   • HYSTERECTOMY     • NASAL SINUS SURGERY  age 35    [place]   • [place] CYSTOURETHROSCOPY N/A 4/13/2018    Procedure: CYSTOSCOPY WITH BOTOX;  Surgeon: Morteza Carpenter MD;  Location: [facility];  Service: Urology   • FL ESOPHAGOGASTRODUODENOSCOPY TRANSORAL DIAGNOSTIC N/A 4/23/2019    Procedure: ESOPHAGOGASTRODUODENOSCOPY (EGD); Surgeon: Tiffanie Awad DO;  Location: [facility]; Service: Gastroenterology   • TONSILLECTOMY  age 47   • UPPER GASTROINTESTINAL ENDOSCOPY  04/23/2019     Family History   Problem Relation Age of Onset   • Alcohol abuse Mother    • Heart disease Mother    • Alcohol abuse Father    • Alcohol abuse Brother    • Cancer Brother    • Tremor Neg Hx    • Parkinsonism Neg Hx    • Colon cancer Neg Hx      Social History     Socioeconomic History   • Marital status:       Spouse name: Not on file   • Number of children: Not on file   • Years of education: Not on file   • Highest education level: Not on file   Occupational History   • Occupation: RETIRED    Tobacco Use   • Smoking status: Never   • Smokeless tobacco: Never   Vaping Use   • Vaping Use: Never used   Substance and Sexual Activity   • Alcohol use: Yes     Comment: patient states rare use on holidays    • Drug use: No   • Sexual activity: Not Currently   Other Topics Concern   • Not on file   Social History Narrative    LIVING INDEPENDENTLY ALONE         No caffeine use     Social Determinants of Health     Financial Resource Strain: Not on file   Food Insecurity: Not on file   Transportation Needs: Not on file   Physical Activity: Not on file   Stress: Not on file   Social Connections: Not on file   Intimate Partner Violence: Not on file   Housing Stability: Not on file       Current Outpatient Medications:   •  Ascorbic Acid (VITAMIN C) 1000 MG tablet, Take 1,000 mg by mouth daily, Disp: , Rfl:   •  B Complex Vitamins (B COMPLEX 100 PO), Take by mouth, Disp: , Rfl:   •  Biotin 2500 MCG CAPS, Take by mouth, Disp: , Rfl:   •  carbidopa-levodopa (SINEMET)  mg per tablet, Take 1 tablet by mouth 3 (three) times a day before meals, Disp: 270 tablet, Rfl: 6  •  cholecalciferol (VITAMIN D3) 1,000 units tablet, Take 5,000 Units by mouth daily , Disp: , Rfl:   •  Coenzyme Q10 (CO Q 10 PO), Take by mouth 600 mg BID, Disp: , Rfl:   •  Cyanocobalamin (VITAMIN B 12 PO), Take by mouth daily, Disp: , Rfl:   •  Echinacea 125 MG CAPS, Take by mouth, Disp: , Rfl:   •  fexofenadine (ALLEGRA) 180 MG tablet, Take 180 mg by mouth as needed (prn), Disp: , Rfl:   •  LORazepam (ATIVAN) 1 mg tablet, Take 1 tablet (1 mg total) by mouth see administration instructions Take 1 tablet 30 minutes prior to MRI, take 2nd tablet right before MRI if needed  , Disp: 2 tablet, Rfl: 0  •  Magnesium 200 MG TABS, Take 1 tablet (200 mg total) by mouth daily, Disp: 30 tablet, Rfl: 3  •  Mirabegron ER (Myrbetriq) 50 MG TB24, Take 1 tablet (50 mg total) by mouth daily, Disp: 90 tablet, Rfl: 3  •  multivitamin (THERAGRAN) TABS, Take 1 tablet by mouth daily, Disp: , Rfl:   •  naproxen sodium (ALEVE) 220 MG tablet, Take 220 mg by mouth as needed, Disp: , Rfl:   •  Omega-3 350 MG CPDR, Take by mouth, Disp: , Rfl:   •  pantoprazole (PROTONIX) 40 mg tablet, TAKE 1 TABLET DAILY, Disp: 90 tablet, Rfl: 3  •  Potassium Gluconate 595 (99 K) MG TABS, Take by mouth, Disp: , Rfl:   •  Probiotic Product (PROBIOTIC-10) CAPS, Take by mouth, Disp: , Rfl:   •  ALPRAZolam (XANAX) 0 5 mg tablet, Take 1 tablet (0 5 mg total) by mouth 1 (one) time for 1 dose, Disp: 2 tablet, Rfl: 0  •  aspirin 81 mg chewable tablet, Chew 1 tablet (81 mg total) daily (Patient not taking: Reported on 10/25/2022), Disp: 30 tablet, Rfl: 0  •  calcium carbonate (OS-JOHN) 1250 (500 Ca) MG tablet, Take 1 tablet by mouth daily (Patient not taking: Reported on 12/12/2022), Disp: , Rfl:   •  docusate sodium (COLACE) 100 mg capsule, TAKE 1 CAPSULE (100 MG TOTAL) BY MOUTH 2 TIMES A DAY (Patient not taking: Reported on 10/25/2022), Disp: 60 capsule, Rfl: 1  •  polyethylene glycol (MIRALAX) 17 g packet, Take 17 g by mouth daily (Patient not taking: Reported on 10/25/2022), Disp: 20 each, Rfl: 2  Allergies   Allergen Reactions   • Caffeine - Food Allergy GI Intolerance   • Iodine - Food Allergy Rash   • Latex Rash   • Other Rash     Adhesive tape       Vitals:    12/12/22 1050   BP: 102/72   Pulse: 94   Resp: 16   SpO2: 97%       Physical Exam  Vitals reviewed  Constitutional:       General: She is not in acute distress  Appearance: Normal appearance  She is normal weight  She is not ill-appearing or toxic-appearing  HENT:      Head: Normocephalic and atraumatic  Eyes:      General: No scleral icterus  Extraocular Movements: Extraocular movements intact  Conjunctiva/sclera: Conjunctivae normal       Pupils: Pupils are equal, round, and reactive to light  Cardiovascular:      Rate and Rhythm: Normal rate  Pulmonary:      Effort: Pulmonary effort is normal    Musculoskeletal:         General: Normal range of motion  Cervical back: Normal range of motion and neck supple  Skin:     General: Skin is warm and dry  Coloration: Skin is not jaundiced  Neurological:      General: No focal deficit present  Mental Status: She is alert and oriented to person, place, and time  Psychiatric:         Mood and Affect: Mood normal          Behavior: Behavior normal          Thought Content: Thought content normal          Judgment: Judgment normal            Imaging  MRI abdomen w wo contrast and mrcp    Result Date: 12/9/2022  Narrative: MRI OF THE ABDOMEN WITH AND WITHOUT CONTRAST WITH MRCP INDICATION: 79 years / Female  D49 0: Neoplasm of unspecified behavior of digestive system    Endoscopic ultrasound in October 2020 with results negative for atypia  COMPARISON: Most recent abdominal MRI performed May 18, 2022  Prior abdominopelvic CT performed August 24, 2020  TECHNIQUE:  Multiplanar/multisequence MRI of the abdomen with 3D MRCP was performed before and after administration of contrast  IV Contrast:  7 mL of Gadobutrol injection (SINGLE-DOSE) FINDINGS: LOWER CHEST:   Unremarkable  LIVER: Again noted is moderate hepatic steatosis  No suspicious masses  The hepatic veins and portal veins are patent  BILE DUCTS:  No intrahepatic or extrahepatic bile duct dilation  Common bile duct is normal in caliber for the patient's age  No choledocholithiasis, biliary stricture or suspicious mass  GALLBLADDER:  T1 hyperintense biliary sludge with few gallstones are identified  No gallbladder wall thickening or pericholecystic inflammatory edema  PANCREAS:  Pancreatic cysts are reidentified  Largest cyst in the pancreatic tail currently measures 5 3 x 4 4 cm on image 45 of series 13 compared with 5 0 x 4 2 cm when measured using similar technique on May 1822 and 4 6 x 3 7 cm when using similar technique on noncontrast CT of August 24, 2020  As on the previous examination this demonstrates no associated solid soft tissue enhancement but there are multiple small satellite cysts which are quite similar in size from previous examinations  Additional smaller cysts are not significantly changed from May 1822 but are slightly increased when compared to more remote prior examinations as far back as August 2020  A cyst in the proximal pancreatic tail measuring 1 8 x 1 6 cm on image 51 of series 13 measured 1 8 x 1 7 cm on May 18, 2022 but only 1 1 x 1 0 cm on August 24, 2020  Among the largest multiloculated cysts in the uncinate process of the pancreas measures 8 mm on image 53 of series 9, unchanged from May 18, 2022 again demonstrating association to side branch pancreatic duct No solid pancreatic mass  No pancreatic ductal enlargement    No peripancreatic inflammatory edema  Pancreas divisum morphology reidentified  ADRENAL GLANDS:  Unremarkable  SPLEEN:  Unremarkable  KIDNEYS/PROXIMAL URETERS:  No hydroureteronephrosis  Simple appearing right renal cyst is stable  Tiny hemorrhagic/proteinaceous cyst is also stable within the upper pole of the left kidney  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 of the interpolar right kidney, unchanged  BOWEL:   No dilated loops of bowel  PERITONEUM/RETROPERITONEUM:  No ascites  LYMPH NODES:  No abdominal lymphadenopathy  VASCULAR STRUCTURES:  No aneurysm  ABDOMINAL WALL:  Unremarkable  OSSEOUS STRUCTURES:  No suspicious osseous lesion  Impression: Continued very slow interval growth of multiple pancreatic cysts with 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  The largest cystic lesion in the pancreatic tail now measures up to 5 3 cm  No main pancreatic ductal margin or solid enhancing pancreatic tumor mass identified  No solid enhancement or main pancreatic ductal enlargement  Pancreas divisum  Workstation performed: [id number]     I reviewed the above imaging data  Discussion/Summary: This is an 78 y/o female who presents today for continued pancreatic cyst surveillance  Recent imaging shows continued growth of the dominant pancreatic tail cyst, as well as several adjacent cysts  While she is currently asymptomatic, the patient has concerns about the potential for pancreatic cancer in the future  I explained that the risk of malignancy is 10-20% in her lifetime with side branch IPMN, and 50-70% for main branch IPMN, and that there is also up to 10% chance of cancer elsewhere in the pancreas  Given the continued growth and statistically higher risk of cancer by Pancreagen testing, I have suggested that she consider surgical intervention    I explained that Dr Azeb Alatorre would likely offer robotic distal pancreatectomy, and have discussed possible risks associated with surgery and expected recovery  I have also explained that since prior bloodwork has shown her to be pre-diabetic, there is a high likelihood she will become diabetic as a result of surgery  I have also offered her the option of repeat MRI in 6 months if she is not ready with surgery at this time  She would like to take some time to discuss this with her family  I will have her return to the office in 1 month to meet with Dr Gisella Ferreira for a formal surgery discussion  She is agreeable to the plan, all questions have been answered today

## 2022-12-16 DIAGNOSIS — R25.1 TREMOR: ICD-10-CM

## 2022-12-16 DIAGNOSIS — G20 PARKINSON'S DISEASE (HCC): ICD-10-CM

## 2022-12-16 NOTE — TELEPHONE ENCOUNTER
Received VM transcription:    Yes, my name is Jessica Dias  I need to get a refill on my prescription carbidopa-levodopa 25/100  I have a 2 week supply at this point  Thank you  Bye   --------------------------------------------------------  Called [facility] and ordered refill for pt  Standard free shipping 3-5 days $20 94, will be charged to Vickey elliott on file  Will be sent to pt's home address  Enrolled in automatic refills program    Confirmation number: [id number]    Spoke with pt and informed her of same  Pt was appreciative  Nothing further at this time

## 2022-12-20 ENCOUNTER — TELEPHONE (OUTPATIENT)
Dept: HEMATOLOGY ONCOLOGY | Facility: CLINIC | Age: 82
End: 2022-12-20

## 2022-12-20 NOTE — TELEPHONE ENCOUNTER
CALL RETURN FORM   Reason for patient call? Patient is calling to schedule surgery with Dr Jayy De La O    Patient's primary oncologist? Dr Jayy De La O   Name of person the patient was calling for? Dr Jayy De La O   Any additional information to add, if applicable? [phone]   Informed patient that the message will be forwarded to the team and someone will get back to them as soon as possible    Did you relay this information to the patient?  yes

## 2022-12-20 NOTE — TELEPHONE ENCOUNTER
Patient already had an appointment scheduled for 1/10 with Dr Miranda Connelly  Called patient and informed her we can discuss surgery at that appointment

## 2023-01-10 ENCOUNTER — OFFICE VISIT (OUTPATIENT)
Dept: SURGICAL ONCOLOGY | Facility: CLINIC | Age: 83
End: 2023-01-10

## 2023-01-10 VITALS
DIASTOLIC BLOOD PRESSURE: 76 MMHG | WEIGHT: 171 LBS | SYSTOLIC BLOOD PRESSURE: 122 MMHG | HEIGHT: 62 IN | OXYGEN SATURATION: 96 % | RESPIRATION RATE: 18 BRPM | HEART RATE: 100 BPM | BODY MASS INDEX: 31.47 KG/M2 | TEMPERATURE: 96.3 F

## 2023-01-10 DIAGNOSIS — K86.2 PANCREATIC CYST: Primary | ICD-10-CM

## 2023-01-10 RX ORDER — UBIDECARENONE 75 MG
50 CAPSULE ORAL DAILY
COMMUNITY

## 2023-01-10 NOTE — PROGRESS NOTES
Surgical Oncology Follow Up       [address] [place] [address]  [facility] [place]  [address]  [facility] [address]    Makeda Landryneris  1940  [id number]  [address] [place] [address],[address] [place] [address]  [facility] [place]  [address] [facility] [address]    Diagnoses and all orders for this visit:    Pancreatic cyst  -     MRI abdomen w wo contrast and mrcp; Future  -     BUN; Future  -     Creatinine, serum; Future    Other orders  -     cyanocobalamin (VITAMIN B-12) 100 mcg tablet; Take 50 mcg by mouth daily        Chief Complaint   Patient presents with   • Follow-up       Return in about 5 months (around 6/15/2023) for Office Visit, Imaging - See orders  Oncology History    No history exists  Staging: Pancreatic tail cyst  Treatment history: EUS October 2020  Cyst fluid  ng/mL, Amylase 31 U/L  Statistically higher risk on Pancreagen testing  Current treatment: Observation    History of Present Illness: Patient returns in follow-up  MRI from December 5, 2022 reveals that her large pancreatic cyst has slightly increased in size  This now measures 5 3 x 4 4 cm  This has grown by less than a centimeter in 2 years  There were no worrisome or suspicious features  There is also a subcentimeter cyst in the uncinate process  I personally reviewed the films  She comes in now to discuss further therapy she is very concerned about the growth of the cyst   She does have some occasional early satiety  Her weight is essentially stable  She also has some left lower quadrant abdominal pain that she feels is related to scar tissue from her previous colostomy  Review of Systems  Complete ROS Surg Onc:   Complete ROS Surg Onc:   Constitutional: The patient denies new or recent history of general fatigue, no recent weight loss, no change in appetite  Eyes: No complaints of visual problems, no scleral icterus     ENT: no complaints of ear pain, no hoarseness, no difficulty swallowing,  no tinnitus and no new masses in head, oral cavity, or neck  Cardiovascular: No complaints of chest pain, no palpitations, no ankle edema  Respiratory: No complaints of shortness of breath, no cough  Gastrointestinal: No complaints of jaundice, no bloody stools, no pale stools  Genitourinary: No complaints of dysuria, no hematuria, no nocturia, no frequent urination, no urethral discharge  Musculoskeletal: No complaints of weakness, paralysis, joint stiffness or arthralgias  Integumentary: No complaints of rash, no new lesions  Neurological: No complaints of convulsions, no seizures, no dizziness  Hematologic/Lymphatic: No complaints of easy bruising  Endocrine:  No hot or cold intolerance  No polydipsia, polyphagia, or polyuria  Allergy/immunology:  No environmental allergies  No food allergies  Not immunocompromised  Skin:  No pallor or rash  No wound  Patient Active Problem List   Diagnosis   • OAB (overactive bladder)   • Parkinson's disease ([facility] [address] )   • Primary insomnia   • Fatigue   • History of skin cancer   • Seasonal allergic rhinitis   • Impaired fasting glucose   • Mood disturbance   • Obesity (BMI 30-39  9)   • Claustrophobia   • Mitral valve disorder   • Menopause ovarian failure   • Vitamin D deficiency   • Dysphagia   • Multiple thyroid nodules   • Gastro-esophageal reflux disease without esophagitis   • Osteopenia   • Chronic idiopathic constipation   • History of adenomatous polyp of colon   • Tremor   • Trochanteric bursitis, right hip   • Pancreatic cyst   • Cherry angioma   • Dyspnea on exertion   • Postablative hypothyroidism   • Sleep apnea   • Headache   • Closed compression fracture of body of L1 vertebra (HCC)   • Cerebrovascular disease   • Age-related osteoporosis without current pathological fracture   • Urge incontinence   • Left lower quadrant pain   • Constipation     Past Medical History:   Diagnosis Date • Actinic keratosis 3/11/2016   • Acute midline low back pain without sciatica 2020   • 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    • Benign neoplasm of skin    • Chest pain    • Claustrophobia    • Diverticulitis    • Fracture     L1-L2   • GERD (gastroesophageal reflux disease)    • H  pylori infection 2020   • Muscle weakness    • Nonmelanoma skin cancer     last assessed 2017   • Palpitations    • Seasonal allergies    • Seborrheic keratosis 2014   • Sleep apnea     no cpap   • Spontaneous      without mention of complications    • Squamous cell carcinoma of forehead 2014   • Syncope      Past Surgical History:   Procedure Laterality Date   • BOTOX INJECTION N/A 3/6/2017    Procedure: CYSTOSCOPY; BLADDER BOTOX 100 UNITS ;  Surgeon: Ovidio Viera MD;  [facility];  Service:    • BOWEL RESECTION      related ot diverticulitis   • CARDIAC CATHETERIZATION     • CARPAL TUNNEL RELEASE Right    • COLONOSCOPY  2019   • COLOSTOMY     • COLOSTOMY CLOSURE     • CYSTOSCOPY  2021   • FOOT SURGERY Left     neuroma   • HYSTERECTOMY     • NASAL SINUS SURGERY  age 35    [place]   • NE CYSTOURETHROSCOPY N/A 2018    Procedure: CYSTOSCOPY WITH BOTOX;  Surgeon: Ovidio Viera MD;  [facility];  Service: Urology   • NE ESOPHAGOGASTRODUODENOSCOPY TRANSORAL DIAGNOSTIC N/A 2019    Procedure: ESOPHAGOGASTRODUODENOSCOPY (EGD); Surgeon: Diana Harrell DO;  [facility]; Service: Gastroenterology   • TONSILLECTOMY  age 47   • UPPER GASTROINTESTINAL ENDOSCOPY  2019     Family History   Problem Relation Age of Onset   • Alcohol abuse Mother    • Heart disease Mother    • Alcohol abuse Father    • Alcohol abuse Brother    • Cancer Brother    • Tremor Neg Hx    • Parkinsonism Neg Hx    • Colon cancer Neg Hx      Social History     Socioeconomic History   • Marital status:       Spouse name: Not on file   • Number of children: Not on file   • Years of education: Not on file   • Highest education level: Not on file   Occupational History   • Occupation: RETIRED    Tobacco Use   • Smoking status: Never   • Smokeless tobacco: Never   Vaping Use   • Vaping Use: Never used   Substance and Sexual Activity   • Alcohol use: Yes     Comment: patient states rare use on holidays    • Drug use: No   • Sexual activity: Not Currently   Other Topics Concern   • Not on file   Social History Narrative    LIVING INDEPENDENTLY ALONE         No caffeine use     Social Determinants of Health     Financial Resource Strain: Not on file   Food Insecurity: Not on file   Transportation Needs: Not on file   Physical Activity: Not on file   Stress: Not on file   Social Connections: Not on file   Intimate Partner Violence: Not on file   Housing Stability: Not on file       Current Outpatient Medications:   •  ALPRAZolam (XANAX) 0 5 mg tablet, Take 1 tablet (0 5 mg total) by mouth 1 (one) time for 1 dose, Disp: 2 tablet, Rfl: 0  •  Ascorbic Acid (VITAMIN C) 1000 MG tablet, Take 1,000 mg by mouth daily, Disp: , Rfl:   •  B Complex Vitamins (B COMPLEX 100 PO), Take by mouth, Disp: , Rfl:   •  calcium carbonate (OS-JOHN) 1250 (500 Ca) MG tablet, Take 1 tablet by mouth daily, Disp: , Rfl:   •  carbidopa-levodopa (SINEMET)  mg per tablet, Take 1 tablet by mouth 3 (three) times a day before meals, Disp: 270 tablet, Rfl: 6  •  cholecalciferol (VITAMIN D3) 1,000 units tablet, Take 5,000 Units by mouth daily , Disp: , Rfl:   •  Coenzyme Q10 (CO Q 10 PO), Take by mouth 600 mg BID, Disp: , Rfl:   •  Cyanocobalamin (VITAMIN B 12 PO), Take by mouth daily, Disp: , Rfl:   •  cyanocobalamin (VITAMIN B-12) 100 mcg tablet, Take 50 mcg by mouth daily, Disp: , Rfl:   •  Echinacea 125 MG CAPS, Take by mouth, Disp: , Rfl:   •  LORazepam (ATIVAN) 1 mg tablet, Take 1 tablet (1 mg total) by mouth see administration instructions Take 1 tablet 30 minutes prior to MRI, take 2nd tablet right before MRI if needed  , Disp: 2 tablet, Rfl: 0  •  Magnesium 200 MG TABS, Take 1 tablet (200 mg total) by mouth daily, Disp: 30 tablet, Rfl: 3  •  Mirabegron ER (Myrbetriq) 50 MG TB24, Take 1 tablet (50 mg total) by mouth daily, Disp: 90 tablet, Rfl: 3  •  multivitamin (THERAGRAN) TABS, Take 1 tablet by mouth daily, Disp: , Rfl:   •  Omega-3 350 MG CPDR, Take by mouth, Disp: , Rfl:   •  pantoprazole (PROTONIX) 40 mg tablet, TAKE 1 TABLET DAILY, Disp: 90 tablet, Rfl: 3  •  Potassium Gluconate 595 (99 K) MG TABS, Take by mouth, Disp: , Rfl:   •  Probiotic Product (PROBIOTIC-10) CAPS, Take by mouth, Disp: , Rfl:   •  aspirin 81 mg chewable tablet, Chew 1 tablet (81 mg total) daily (Patient not taking: Reported on 1/10/2023), Disp: 30 tablet, Rfl: 0  •  Biotin 2500 MCG CAPS, Take by mouth (Patient not taking: Reported on 1/10/2023), Disp: , Rfl:   •  docusate sodium (COLACE) 100 mg capsule, TAKE 1 CAPSULE (100 MG TOTAL) BY MOUTH 2 TIMES A DAY (Patient not taking: Reported on 1/10/2023), Disp: 60 capsule, Rfl: 1  •  fexofenadine (ALLEGRA) 180 MG tablet, Take 180 mg by mouth as needed (prn) (Patient not taking: Reported on 1/10/2023), Disp: , Rfl:   •  naproxen sodium (ALEVE) 220 MG tablet, Take 220 mg by mouth as needed (Patient not taking: Reported on 1/10/2023), Disp: , Rfl:   •  polyethylene glycol (MIRALAX) 17 g packet, Take 17 g by mouth daily (Patient not taking: Reported on 10/25/2022), Disp: 20 each, Rfl: 2  Allergies   Allergen Reactions   • Caffeine - Food Allergy GI Intolerance   • Iodine - Food Allergy Rash   • Latex Rash   • Other Rash     Adhesive tape       Vitals:    01/10/23 1417   BP: 122/76   Pulse: 100   Resp: 18   Temp: (!) 96 3 °F (35 7 °C)   SpO2: 96%       Physical Exam  Constitutional: General appearance: The Patient is well-developed and well-nourished who appears the stated age in no acute distress  Patient is pleasant and talkative  HEENT:  Normocephalic    Sclerae are anicteric  Mucous membranes are moist  Neck is supple without adenopathy  No JVD  Chest: The lungs are clear to auscultation  Cardiac: Heart is regular rate  Abdomen: Abdomen is soft, non-tender, non-distended and without masses  Extremities: There is no clubbing or cyanosis  There is no edema  Symmetric  Neuro: Grossly nonfocal  Gait is normal      Lymphatic: No evidence of cervical adenopathy bilaterally  Skin: Warm, anicteric  Psych:  Patient is pleasant and talkative  Breasts:        Pathology:  [unfilled]    Labs:      Imaging  No results found  I reviewed the above laboratory and imaging data  Discussion/Summary: 79-year-old female with a slowly enlarging pancreas cyst   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  Despite the slight increase in the cyst, there are no worrisome or suspicious features seen on her most recent MRI  My recommendation, once again was that she continue with observation  Her pathology did show that this was at a higher statistical risk to become malignant based on K-kyala and her cyst fluid  While this is not definitive, we did discuss that surgical resection would need to be balanced with her age  Surgery may need to be performed in an open fashion given her previous midline incision and colostomy  The risks of distal pancreatectomy and possible splenectomy were explained and included bleeding, infection, recurrence, need for further surgery, wound complications, adjacent organ injury and pancreatic fistula  She continues to be somewhat nervous about this, but is agreeable to a 6-month follow-up  I will plan on repeating the MRI in June  I will see her back at that time for another clinical exam   She is agreeable to this plan  All her questions were answered

## 2023-01-26 ENCOUNTER — OFFICE VISIT (OUTPATIENT)
Dept: CARDIOLOGY CLINIC | Facility: CLINIC | Age: 83
End: 2023-01-26

## 2023-01-26 VITALS
BODY MASS INDEX: 31.47 KG/M2 | DIASTOLIC BLOOD PRESSURE: 72 MMHG | OXYGEN SATURATION: 98 % | HEART RATE: 88 BPM | WEIGHT: 171 LBS | SYSTOLIC BLOOD PRESSURE: 114 MMHG | HEIGHT: 62 IN | RESPIRATION RATE: 16 BRPM

## 2023-01-26 DIAGNOSIS — K86.2 PANCREATIC CYST: ICD-10-CM

## 2023-01-26 DIAGNOSIS — R42 LIGHTHEADEDNESS: Primary | ICD-10-CM

## 2023-01-26 DIAGNOSIS — G20 PARKINSON'S DISEASE (HCC): ICD-10-CM

## 2023-01-26 NOTE — PROGRESS NOTES
[address] [facility]   Office Visit - Follow Up    Vish Holt 79 y o  female MRN: [id number]    01/26/23          Assessment/Plan:  1  Lightheadedness  • Significantly improved  • Echo showed EF 60% without significant valvular abnormalities  • Holter monitor revealed significant supraventricular ectopic activity, however patient denies palpitations  Given asymptomatic ectopy with history of soft blood pressures will avoid rate control at this time  Patient is agreeable with plan  • Continue adequate hydration    2  Pancreatic cyst  • Continue follow-up with Surgical Oncology    3  Parkinson's disease   • Continue follow-up with Neurology        HPI: Vish Holt is a 79y o  year old female with history of lightheadedness, pancreatic cyst, and parkinson's disease who presents for routine follow-up  Patient states she is doing well overall from a cardiac standpoint  Lightheadedness has significantly improved and is now only occurs approximately once per month and for shorter duration of time  She has been maintaining adequate hydration  Patient follows closely with neurology for management of Parkinson's disease  Patient does also have pancreatic cyst for which she follows with surgical oncology  Reports strict compliance to all medications  Denies cardiac symptoms such as chest pain, shortness of breath, palpitations, LE edema, or syncope  Review of Systems:  Review of Systems   Constitutional: Negative for chills, diaphoresis and fever  HENT: Negative for ear pain and sore throat  Eyes: Negative for pain and visual disturbance  Respiratory: Negative for cough, chest tightness and shortness of breath  Cardiovascular: Negative for chest pain, palpitations and leg swelling  Gastrointestinal: Negative for abdominal pain and vomiting  Genitourinary: Negative for dysuria and hematuria  Musculoskeletal: Negative for arthralgias and back pain     Skin: Negative for color change and rash    Neurological: Positive for light-headedness (Rare)  Negative for seizures and syncope  All other systems reviewed and are negative  PHYSICAL EXAM:  Vitals:   Vitals:    01/26/23 0842   BP: 114/72   BP Location: Left arm   Patient Position: Sitting   Cuff Size: Standard   Pulse: 88   Resp: 16   SpO2: 98%   Weight: 77 6 kg (171 lb)   Height: 5' 2" (1 575 m)        Physical Exam:  Physical Exam  Constitutional:       General: She is not in acute distress  Appearance: She is not diaphoretic  HENT:      Head: Normocephalic and atraumatic  Nose: Nose normal    Eyes:      Conjunctiva/sclera: Conjunctivae normal    Cardiovascular:      Rate and Rhythm: Normal rate and regular rhythm  Pulses: Normal pulses  Heart sounds: Normal heart sounds  No murmur heard  No friction rub  No gallop  Pulmonary:      Effort: Pulmonary effort is normal  No respiratory distress  Breath sounds: Normal breath sounds  No wheezing, rhonchi or rales  Chest:      Chest wall: No tenderness  Abdominal:      Tenderness: There is no guarding  Musculoskeletal:      Right lower leg: No edema  Left lower leg: No edema  Skin:     General: Skin is warm and dry  Capillary Refill: Capillary refill takes less than 2 seconds  Neurological:      Mental Status: She is alert and oriented to person, place, and time     Psychiatric:         Mood and Affect: Mood normal          Behavior: Behavior normal          Judgment: Judgment normal          Follow up: 6 months or sooner as needed    Allergies   Allergen Reactions   • Caffeine - Food Allergy GI Intolerance   • Iodine - Food Allergy Rash   • Latex Rash   • Other Rash     Adhesive tape           Current Outpatient Medications:   •  Ascorbic Acid (VITAMIN C) 1000 MG tablet, Take 1,000 mg by mouth daily, Disp: , Rfl:   •  B Complex Vitamins (B COMPLEX 100 PO), Take by mouth, Disp: , Rfl:   •  calcium carbonate (OS-JOHN) 1250 (500 Ca) MG tablet, Take 1 tablet by mouth daily, Disp: , Rfl:   •  carbidopa-levodopa (SINEMET)  mg per tablet, Take 1 tablet by mouth 3 (three) times a day before meals, Disp: 270 tablet, Rfl: 6  •  cholecalciferol (VITAMIN D3) 1,000 units tablet, Take 5,000 Units by mouth daily , Disp: , Rfl:   •  Coenzyme Q10 (CO Q 10 PO), Take by mouth 600 mg BID, Disp: , Rfl:   •  Cyanocobalamin (VITAMIN B 12 PO), Take by mouth daily, Disp: , Rfl:   •  Echinacea 125 MG CAPS, Take by mouth, Disp: , Rfl:   •  Magnesium 200 MG TABS, Take 1 tablet (200 mg total) by mouth daily, Disp: 30 tablet, Rfl: 3  •  Mirabegron ER (Myrbetriq) 50 MG TB24, Take 1 tablet (50 mg total) by mouth daily, Disp: 90 tablet, Rfl: 3  •  multivitamin (THERAGRAN) TABS, Take 1 tablet by mouth daily, Disp: , Rfl:   •  Omega-3 350 MG CPDR, Take by mouth, Disp: , Rfl:   •  pantoprazole (PROTONIX) 40 mg tablet, TAKE 1 TABLET DAILY, Disp: 90 tablet, Rfl: 3  •  Potassium Gluconate 595 (99 K) MG TABS, Take by mouth, Disp: , Rfl:   •  Probiotic Product (PROBIOTIC-10) CAPS, Take by mouth, Disp: , Rfl:   •  cyanocobalamin (VITAMIN B-12) 100 mcg tablet, Take 50 mcg by mouth daily, Disp: , Rfl:     Past Medical History:   Diagnosis Date   • Actinic keratosis 3/11/2016   • Acute midline low back pain without sciatica 2020   • 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    • Benign neoplasm of skin    • Chest pain    • Claustrophobia    • Diverticulitis    • Fracture     L1-L2   • GERD (gastroesophageal reflux disease)    • H  pylori infection 2020   • Muscle weakness    • Nonmelanoma skin cancer     last assessed 2017   • Palpitations    • Seasonal allergies    • Seborrheic keratosis 2014   • Sleep apnea     no cpap   • Spontaneous      without mention of complications    • Squamous cell carcinoma of forehead 2014   • Syncope        Family History   Problem Relation Age of Onset   • Alcohol abuse Mother    • Heart disease Mother    • Alcohol abuse Father    • Alcohol abuse Brother    • Cancer Brother    • Tremor Neg Hx    • Parkinsonism Neg Hx    • Colon cancer Neg Hx        Past Medical History:   Diagnosis Date   • Actinic keratosis 3/11/2016   • Acute midline low back pain without sciatica 2020   • 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    • Benign neoplasm of skin    • Chest pain    • Claustrophobia    • Diverticulitis    • Fracture     L1-L2   • GERD (gastroesophageal reflux disease)    • H  pylori infection 2020   • Muscle weakness    • Nonmelanoma skin cancer     last assessed 2017   • Palpitations    • Seasonal allergies    • Seborrheic keratosis 2014   • Sleep apnea     no cpap   • Spontaneous      without mention of complications    • Squamous cell carcinoma of forehead 2014   • Syncope        Past Surgical History:   Procedure Laterality Date   • BOTOX INJECTION N/A 3/6/2017    Procedure: CYSTOSCOPY; BLADDER BOTOX 100 UNITS ;  Surgeon: Lani Augustin MD;  [facility];  Service:    • BOWEL RESECTION      related ot diverticulitis   • CARDIAC CATHETERIZATION     • CARPAL TUNNEL RELEASE Right    • COLONOSCOPY  2019   • COLOSTOMY     • COLOSTOMY CLOSURE     • CYSTOSCOPY  2021   • FOOT SURGERY Left     neuroma   • HYSTERECTOMY     • NASAL SINUS SURGERY  age 35    [place]   • MA CYSTOURETHROSCOPY N/A 2018    Procedure: CYSTOSCOPY WITH BOTOX;  Surgeon: Lani Augustin MD;  [facility];  Service: Urology   • MA ESOPHAGOGASTRODUODENOSCOPY TRANSORAL DIAGNOSTIC N/A 2019    Procedure: ESOPHAGOGASTRODUODENOSCOPY (EGD); Surgeon: Jake Marcial DO;  [facility]; Service: Gastroenterology   • TONSILLECTOMY  age 47   • UPPER GASTROINTESTINAL ENDOSCOPY  2019       Social History     Socioeconomic History   • Marital status:       Spouse name: Not on file   • Number of children: Not on file   • Years of education: Not on file   • Highest education level: Not on file   Occupational History   • Occupation: RETIRED    Tobacco Use   • Smoking status: Never   • Smokeless tobacco: Never   Vaping Use   • Vaping Use: Never used   Substance and Sexual Activity   • Alcohol use: Yes     Comment: patient states rare use on holidays    • Drug use: No   • Sexual activity: Not Currently   Other Topics Concern   • Not on file   Social History Narrative    LIVING INDEPENDENTLY ALONE         No caffeine use     Social Determinants of Health     Financial Resource Strain: Not on file   Food Insecurity: Not on file   Transportation Needs: Not on file   Physical Activity: Not on file   Stress: Not on file   Social Connections: Not on file   Intimate Partner Violence: Not on file   Housing Stability: Not on file             LABORATORY RESULTS:    Lab Results   Component Value Date    WBC 4 79 03/08/2022    HGB 14 0 03/08/2022    HCT 41 3 03/08/2022    MCV 89 03/08/2022     03/08/2022     Lab Results   Component Value Date    GLUCOSE 108 10/03/2015    CALCIUM 9 2 03/08/2022     (H) 10/03/2015    K 4 2 03/08/2022    CO2 29 03/08/2022     03/08/2022    BUN 14 11/29/2022    CREATININE 0 84 11/29/2022     Lab Results   Component Value Date    HGBA1C 6 4 (H) 08/11/2021       Lipid Profile:   Lab Results   Component Value Date    CHOL 202 10/03/2015     Lab Results   Component Value Date    HDL 88 03/08/2022    HDL 85 11/25/2020    HDL 82 07/22/2020     Lab Results   Component Value Date    LDLCALC 101 (H) 03/08/2022    LDLCALC 79 11/25/2020    LDLCALC 119 (H) 07/22/2020     Lab Results   Component Value Date    TRIG 63 03/08/2022    TRIG 42 11/25/2020    TRIG 52 07/22/2020       The ASCVD Risk score (Kat YE, et al , 2019) failed to calculate for the following reasons: The 2019 ASCVD risk score is only valid for ages 36 to 78    1  Lightheadedness        2  Pancreatic cyst        3   Parkinson's disease [facility])            Imaging: I have personally reviewed pertinent reports          EKG reviewed personally:  11/24/2020 - Normal sinus rhythm with sinus arrhythmia; nonspecific ST and T wave abnormality      Devon Saldana PA-C  1/26/2023,10:42 AM

## 2023-03-08 ENCOUNTER — OFFICE VISIT (OUTPATIENT)
Dept: NEUROLOGY | Facility: CLINIC | Age: 83
End: 2023-03-08

## 2023-03-08 VITALS
WEIGHT: 173.2 LBS | OXYGEN SATURATION: 96 % | HEART RATE: 83 BPM | BODY MASS INDEX: 31.87 KG/M2 | SYSTOLIC BLOOD PRESSURE: 110 MMHG | DIASTOLIC BLOOD PRESSURE: 64 MMHG | HEIGHT: 62 IN

## 2023-03-08 DIAGNOSIS — G20 PARKINSON'S DISEASE (HCC): Primary | ICD-10-CM

## 2023-03-08 DIAGNOSIS — R25.1 TREMOR: ICD-10-CM

## 2023-03-08 NOTE — PROGRESS NOTES
Axel Mcgregor is a 79 y o  female  Chief Complaint   Patient presents with   • Parkinson's Disease       Assessment:  1  Parkinson's disease ([address] )    2  Tremor        Plan:  Dr Santi Graves and Amaya's prior notes and imaging study results were reviewed  Continue with Sinemet 25/100 mg 1 tablet tid,  Check routine blood work  Roverto scan of the brain  Stay physically active as possible continue with home exercise program   Continue with multivitamin  Continue to follow-up with Dr Wanda Goldsmith regarding her pancreatic lesion  Patient was encouraged to follow-up with family physician for a routine medical checkup and regarding her fatigability  She will call in follow-up after the MRI scan and the blood work to discuss the results  Sleep hygiene was discussed with the patient  Fall and safety precautions, to go to the hospital if has any worsening symptoms and call me otherwise to see me back in 6 months and follow-up with her other physicians          Subjective:    HPI   60-year-old female with history of Parkinson's disease she was seeing Dr Erica Atkinson and then subsequently saw Dayna Webber and has been maintained on Sinemet 25/100 mg 1 tablet 3 times daily, she was diagnosed with this in 2019 she has been doing relatively well since then, she says her tremor is well controlled, she is not having any dizziness or any other side effects on Sinemet her sleep is slightly disturbed no hallucinations memory seems to be stable she lives by herself she is able to drive manage her finances no falls no early morning stiffness no freezing episodes no difficulty in swallowing she does have some history of reflux disease, she was recently found to have a pancreatic lesion and is in follow-up with Dr Wanda Goldsmith surgical oncology and has a repeat MRI scan of the abdomen scheduled for June, she denies having any headaches her appetite has been good her weight has been stable no seizures she is able to do her ADLs without any issues, she has been complaining of some fatigability, no other complaints      Vitals:    03/08/23 1425   BP: 110/64   BP Location: Left arm   Patient Position: Sitting   Cuff Size: Large   Pulse: 83   SpO2: 96%   Height: 5' 2" (1 575 m)       Current Medications    Current Outpatient Medications:   •  Ascorbic Acid (VITAMIN C) 1000 MG tablet, Take 1,000 mg by mouth daily, Disp: , Rfl:   •  B Complex Vitamins (B COMPLEX 100 PO), Take by mouth, Disp: , Rfl:   •  calcium carbonate (OS-JOHN) 1250 (500 Ca) MG tablet, Take 1 tablet by mouth daily, Disp: , Rfl:   •  carbidopa-levodopa (SINEMET)  mg per tablet, Take 1 tablet by mouth 3 (three) times a day before meals, Disp: 270 tablet, Rfl: 6  •  cholecalciferol (VITAMIN D3) 1,000 units tablet, Take 5,000 Units by mouth daily , Disp: , Rfl:   •  Coenzyme Q10 (CO Q 10 PO), Take by mouth 600 mg BID, Disp: , Rfl:   •  Cyanocobalamin (VITAMIN B 12 PO), Take by mouth daily, Disp: , Rfl:   •  cyanocobalamin (VITAMIN B-12) 100 mcg tablet, Take 50 mcg by mouth daily, Disp: , Rfl:   •  Echinacea 125 MG CAPS, Take by mouth, Disp: , Rfl:   •  Magnesium 200 MG TABS, Take 1 tablet (200 mg total) by mouth daily, Disp: 30 tablet, Rfl: 3  •  Mirabegron ER (Myrbetriq) 50 MG TB24, Take 1 tablet (50 mg total) by mouth daily, Disp: 90 tablet, Rfl: 3  •  multivitamin (THERAGRAN) TABS, Take 1 tablet by mouth daily, Disp: , Rfl:   •  Omega-3 350 MG CPDR, Take by mouth, Disp: , Rfl:   •  pantoprazole (PROTONIX) 40 mg tablet, TAKE 1 TABLET DAILY, Disp: 90 tablet, Rfl: 3  •  Potassium Gluconate 595 (99 K) MG TABS, Take by mouth, Disp: , Rfl:   •  Probiotic Product (PROBIOTIC-10) CAPS, Take by mouth, Disp: , Rfl:       Allergies  Caffeine - food allergy, Iodine - food allergy, Latex, and Other    Past Medical History  Past Medical History:   Diagnosis Date   • Actinic keratosis 3/11/2016   • Acute midline low back pain without sciatica 11/19/2020   • 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    • Benign neoplasm of skin    • Chest pain    • Claustrophobia    • Diverticulitis    • Fracture     L1-L2   • GERD (gastroesophageal reflux disease)    • H  pylori infection 2020   • Muscle weakness    • Nonmelanoma skin cancer     last assessed 2017   • Palpitations    • Seasonal allergies    • Seborrheic keratosis 2014   • Sleep apnea     no cpap   • Spontaneous      without mention of complications    • Squamous cell carcinoma of forehead 2014   • Syncope          Past Surgical History:  Past Surgical History:   Procedure Laterality Date   • BOTOX INJECTION N/A 3/6/2017    Procedure: CYSTOSCOPY; BLADDER BOTOX 100 UNITS ;  Surgeon: Kamila York MD;  Location: [facility];  Service:    • BOWEL RESECTION      related ot diverticulitis   • CARDIAC CATHETERIZATION     • CARPAL TUNNEL RELEASE Right    • COLONOSCOPY  2019   • COLOSTOMY     • COLOSTOMY CLOSURE     • CYSTOSCOPY  2021   • FOOT SURGERY Left     neuroma   • HYSTERECTOMY     • NASAL SINUS SURGERY  age 35    [place]   • DC CYSTOURETHROSCOPY N/A 2018    Procedure: CYSTOSCOPY WITH BOTOX;  Surgeon: Kamila York MD;  Location: [facility];  Service: Urology   • DC ESOPHAGOGASTRODUODENOSCOPY TRANSORAL DIAGNOSTIC N/A 2019    Procedure: ESOPHAGOGASTRODUODENOSCOPY (EGD); Surgeon: Alyse Rose DO;  Location: [facility]; Service: Gastroenterology   • TONSILLECTOMY  age 47   • UPPER GASTROINTESTINAL ENDOSCOPY  2019         Family History:  Family History   Problem Relation Age of Onset   • Alcohol abuse Mother    • Heart disease Mother    • Alcohol abuse Father    • Alcohol abuse Brother    • Cancer Brother    • Tremor Neg Hx    • Parkinsonism Neg Hx    • Colon cancer Neg Hx        Social History:   reports that she has never smoked  She has never used smokeless tobacco  She reports current alcohol use  She reports that she does not use drugs      I have reviewed the past medical history, surgical history, social and family history, current medications, allergies vitals, review of systems, and updated this information as appropriate today  Objective:    Physical Exam    Neurological Exam     GENERAL:  Cooperative in no acute distress  Well-developed and well-nourished     HEAD and NECK   Head is atraumatic normocephalic with no lesions or masses  Neck is supple with full range of motion     CARDIOVASCULAR  Carotid Arteries-no carotid bruits  NEUROLOGIC:  Mental Status-the patient is awake alert and oriented without aphasia or apraxia  Cranial Nerves: Visual fields are full to confrontation  Visual acuity is 20/20 with hand-held chart extraocular movements are full without nystagmus  Pupils are 2-1/2 mm and reactive  Face is symmetrical to light touch  Movements of facial expression move symmetrically  Hearing is normal to finger rub bilaterally  Soft palate lifts symmetrically  Shoulder shrug is symmetrical  Tongue is midline without atrophy  Motor: No drift is noted on arm extension  Strength is full in the upper and lower extremities with normal bulk and tone  Sensory: Intact to temperature and vibratory sensation in the upper and lower extremities bilaterally  Cortical function is intact  Coordination: Finger to nose testing is performed accurately  Romberg is negative  Gait reveals a normal base with slightly stooped posture and decreased arm swing tandem walk was not attempted  Reflexes: 1+ and symmetrical   He is able to get up from a chair without any support    ROS:  Review of Systems   Constitutional: Negative  Negative for appetite change and fever  HENT: Negative  Negative for hearing loss, tinnitus, trouble swallowing and voice change  Eyes: Negative  Negative for photophobia, pain and visual disturbance  Respiratory: Negative  Negative for shortness of breath  Cardiovascular: Negative  Negative for palpitations  Gastrointestinal: Negative    Negative for nausea and vomiting  Endocrine: Negative  Negative for cold intolerance  Genitourinary: Negative  Negative for dysuria, frequency and urgency  Musculoskeletal: Negative  Negative for gait problem, myalgias and neck pain  Skin: Negative  Negative for rash  Allergic/Immunologic: Negative  Neurological: Negative  Negative for dizziness, tremors, seizures, syncope, facial asymmetry, speech difficulty, weakness, light-headedness, numbness and headaches  Hematological: Negative  Does not bruise/bleed easily  Psychiatric/Behavioral: Negative  Negative for confusion, hallucinations and sleep disturbance

## 2023-03-09 ENCOUNTER — APPOINTMENT (OUTPATIENT)
Dept: LAB | Facility: CLINIC | Age: 83
End: 2023-03-09

## 2023-03-09 DIAGNOSIS — G20 PARKINSON'S DISEASE (HCC): Primary | ICD-10-CM

## 2023-03-09 DIAGNOSIS — G20 PARKINSON'S DISEASE (HCC): ICD-10-CM

## 2023-03-09 LAB
ALBUMIN SERPL BCP-MCNC: 4.1 G/DL (ref 3.5–5)
ALP SERPL-CCNC: 87 U/L (ref 46–116)
ALT SERPL W P-5'-P-CCNC: 33 U/L (ref 12–78)
ANION GAP SERPL CALCULATED.3IONS-SCNC: 6 MMOL/L (ref 4–13)
AST SERPL W P-5'-P-CCNC: 21 U/L (ref 5–45)
BASOPHILS # BLD AUTO: 0.03 THOUSANDS/ÂΜL (ref 0–0.1)
BASOPHILS NFR BLD AUTO: 1 % (ref 0–1)
BILIRUB SERPL-MCNC: 0.62 MG/DL (ref 0.2–1)
BUN SERPL-MCNC: 14 MG/DL (ref 5–25)
CALCIUM SERPL-MCNC: 9.6 MG/DL (ref 8.3–10.1)
CHLORIDE SERPL-SCNC: 107 MMOL/L (ref 96–108)
CO2 SERPL-SCNC: 27 MMOL/L (ref 21–32)
CREAT SERPL-MCNC: 0.77 MG/DL (ref 0.6–1.3)
EOSINOPHIL # BLD AUTO: 0.14 THOUSAND/ÂΜL (ref 0–0.61)
EOSINOPHIL NFR BLD AUTO: 2 % (ref 0–6)
ERYTHROCYTE [DISTWIDTH] IN BLOOD BY AUTOMATED COUNT: 13 % (ref 11.6–15.1)
GFR SERPL CREATININE-BSD FRML MDRD: 71 ML/MIN/1.73SQ M
GLUCOSE P FAST SERPL-MCNC: 115 MG/DL (ref 65–99)
HCT VFR BLD AUTO: 43.1 % (ref 34.8–46.1)
HGB BLD-MCNC: 14.2 G/DL (ref 11.5–15.4)
IMM GRANULOCYTES # BLD AUTO: 0.03 THOUSAND/UL (ref 0–0.2)
IMM GRANULOCYTES NFR BLD AUTO: 1 % (ref 0–2)
LYMPHOCYTES # BLD AUTO: 1.97 THOUSANDS/ÂΜL (ref 0.6–4.47)
LYMPHOCYTES NFR BLD AUTO: 31 % (ref 14–44)
MCH RBC QN AUTO: 29.6 PG (ref 26.8–34.3)
MCHC RBC AUTO-ENTMCNC: 32.9 G/DL (ref 31.4–37.4)
MCV RBC AUTO: 90 FL (ref 82–98)
MONOCYTES # BLD AUTO: 0.5 THOUSAND/ÂΜL (ref 0.17–1.22)
MONOCYTES NFR BLD AUTO: 8 % (ref 4–12)
NEUTROPHILS # BLD AUTO: 3.7 THOUSANDS/ÂΜL (ref 1.85–7.62)
NEUTS SEG NFR BLD AUTO: 57 % (ref 43–75)
NRBC BLD AUTO-RTO: 0 /100 WBCS
PLATELET # BLD AUTO: 268 THOUSANDS/UL (ref 149–390)
PMV BLD AUTO: 9.6 FL (ref 8.9–12.7)
POTASSIUM SERPL-SCNC: 3.9 MMOL/L (ref 3.5–5.3)
PROT SERPL-MCNC: 7 G/DL (ref 6.4–8.4)
RBC # BLD AUTO: 4.8 MILLION/UL (ref 3.81–5.12)
SODIUM SERPL-SCNC: 140 MMOL/L (ref 135–147)
TSH SERPL DL<=0.05 MIU/L-ACNC: 2.55 UIU/ML (ref 0.45–4.5)
WBC # BLD AUTO: 6.37 THOUSAND/UL (ref 4.31–10.16)

## 2023-03-09 RX ORDER — LORAZEPAM 1 MG/1
TABLET ORAL
Qty: 2 TABLET | Refills: 0 | Status: SHIPPED | OUTPATIENT
Start: 2023-03-09 | End: 2023-03-10 | Stop reason: SDUPTHER

## 2023-03-09 NOTE — TELEPHONE ENCOUNTER
Yes, my name is Veronika Loud  My birth date is [birth year]  I saw Dr Marques Antunez yesterday he ordered a  MRI brain  I get claustrophobic and I forgot to tell him know that  I  Usually get a prescription for xanax to take before I go for an MRI and I'll need a prescription for that  Thank you  You have my phone number by  Ronnell [phone]    Called pt to let her know that I receive her message  Pt states that she normally gets 2 tabs of xanax  This med has helped her in the past  Brain MRI appt scheduled   4/2/23  Requesting xanax script be sent to [facility]     Cb  [phone], ok to leave detailed message

## 2023-03-10 RX ORDER — LORAZEPAM 1 MG/1
TABLET ORAL
Qty: 2 TABLET | Refills: 0 | Status: SHIPPED | OUTPATIENT
Start: 2023-03-10

## 2023-03-20 ENCOUNTER — TELEPHONE (OUTPATIENT)
Dept: NEUROLOGY | Facility: CLINIC | Age: 83
End: 2023-03-20

## 2023-03-20 NOTE — TELEPHONE ENCOUNTER
Angus helms:    Good morning  My name is Charito Abernathy  My YOB: 1940 and I'm calling about blood work  And you have my phone number [phone]  Wait to hear from you  Thank you  Have a blessed day  Bye    Dr Grace Nicole,    bw is now resulted dated 3/9; please review and provide recommendation, thank you  The patient is a 38y Female complaining of headache.

## 2023-03-21 NOTE — TELEPHONE ENCOUNTER
22 hours ago (12:42 PM)     MD Susan Quick, RON 23 hours ago (12:06 PM)     Please advise the patient blood work was unremarkable except for sugars that were high to follow-up with family physician

## 2023-03-22 NOTE — LETTER
Bria 3, 2022     [address]  [facility]    Patient: Ashu Ash   YOB: 1940   Date of Visit: 6/3/2022       Dear Dr Snow Mcrae: Thank you for referring Elda Stanley to me for evaluation  Below are my notes for this consultation  If you have questions, please do not hesitate to call me  I look forward to following your patient along with you  Sincerely,        Maribel Randall MD        CC: MD Ayde Watts MD Treva Skene, MD  6/3/2022  3:10 PM  Sign when Signing Visit               Surgical Oncology Consult       [address] [facility] [address] [id number]  [address] [place] [address]  1940  [id number]  [address] [place] [address] [facility] [address] [id number] [phone]    Diagnoses and all orders for this visit:    Pancreatic cyst    IPMN (intraductal papillary mucinous neoplasm)  -     Ambulatory Referral to Surgical Oncology  -     MRI abdomen w wo contrast and mrcp; Future  -     BUN; Future  -     Creatinine, serum; Future        Chief Complaint   Patient presents with   [facility] Patient Visit       Return in about 6 months (around 12/3/2022) for Office Visit, Imaging - See orders, with Heather  Oncology History    No history exists  History of Present Illness:  58-year-old female was found to have an incidental pancreas cyst on a CT in August of 2020  At that time, it measured 4 4 x 4 4 x 4 8 cm  She had an EUS in October of 2020  This revealed a 4 4 x 3 9 cm cystic lesion in the tail of the pancreas  There was another 1 3 x 1 1 cm cyst in the neck of the pancreas  Cytology on the pancreatic tail cyst had mucin and was negative  CEA was 656 ng/mL  Amylase was 31 U/L  There was acellular mucin present    There was a KRAS mutation and was statistically at higher risk by Pancragen  She had a follow-up MRI on March 17, 2021  By MRI, this measured up to 5 4 cm with several other smaller cystic foci  There was no main duct dilatation  Repeat MRI on May 18, 2022 revealed the pancreas cyst measures 5 x 4 4 cm from 4 7 x 4 1 cm  This is slightly increased in size  There was a 1 9 x 1 7 x 2 cm cyst in the tail the pancreas as well  This had slightly increased as well  There were no worrisome or suspicious features  Personally reviewed the films  She comes in now to discuss further therapy  No personal history of pancreatitis  No family history of pancreas cancer  No significant upper abdominal pain, nausea or vomiting  She does have some occasional early satiety  Review of Systems  Complete ROS Surg Onc:   Constitutional: The patient denies new or recent history of general fatigue, no recent weight loss, no change in appetite  Eyes: No complaints of visual problems, no scleral icterus  ENT: no complaints of ear pain, no hoarseness, no difficulty swallowing,  no tinnitus and no new masses in head, oral cavity, or neck  Cardiovascular: No complaints of chest pain, no palpitations, no ankle edema  Respiratory: No complaints of shortness of breath, no cough  Gastrointestinal: No complaints of jaundice, no bloody stools, no pale stools  Genitourinary: No complaints of dysuria, no hematuria, no nocturia, no frequent urination, no urethral discharge  Musculoskeletal: No complaints of weakness, paralysis, joint stiffness or arthralgias  Integumentary: No complaints of rash, no new lesions  Neurological: No complaints of convulsions, no seizures, no dizziness  Hematologic/Lymphatic: No complaints of easy bruising  Endocrine:  No hot or cold intolerance  No polydipsia, polyphagia, or polyuria  Allergy/immunology:  No environmental allergies  No food allergies  Not immunocompromised  Skin:  No pallor or rash  No wound            Patient Active Problem List   Diagnosis    OAB (overactive bladder)    Parkinson's disease ([facility] [address] )    Primary insomnia    Fatigue    History of skin cancer    Seasonal allergic rhinitis    Impaired fasting glucose    Mood disturbance    Obesity (BMI 30-39  9)    Claustrophobia    Mitral valve disorder    Menopause ovarian failure    Vitamin D deficiency    Dysphagia    Multiple thyroid nodules    Gastro-esophageal reflux disease without esophagitis    Osteopenia    Chronic idiopathic constipation    History of adenomatous polyp of colon    Tremor    Medicare annual wellness visit, subsequent    Trochanteric bursitis, right hip    Pancreatic cyst    Cherry angioma    Dyspnea on exertion    Postablative hypothyroidism    Sleep apnea    Headache    Closed compression fracture of body of L1 vertebra (HCC)    Cerebrovascular disease    Excessive ear wax, right    Age-related osteoporosis without current pathological fracture    Urge incontinence     Past Medical History:   Diagnosis Date    Actinic keratosis 3/11/2016    Acute midline low back pain without sciatica 2020    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     Benign neoplasm of skin     Chest pain     Claustrophobia     Diverticulitis     Fracture     L1-L2    GERD (gastroesophageal reflux disease)     H  pylori infection 2020    Muscle weakness     Nonmelanoma skin cancer     last assessed 2017    Palpitations     Seasonal allergies     Seborrheic keratosis 2014    Sleep apnea     no cpap    Spontaneous      without mention of complications     Squamous cell carcinoma of forehead 2014    Syncope      Past Surgical History:   Procedure Laterality Date    BOTOX INJECTION N/A 3/6/2017    Procedure: CYSTOSCOPY; BLADDER BOTOX 100 UNITS ;  Surgeon: Merline Braswell MD;  [facility];  Service:     BOWEL RESECTION      related ot diverticulitis    CARDIAC CATHETERIZATION      CARPAL TUNNEL RELEASE Right     COLONOSCOPY  07/31/2019    COLOSTOMY      COLOSTOMY CLOSURE      CYSTOSCOPY  06/01/2021    FOOT SURGERY Left     neuroma    HYSTERECTOMY      NASAL S[facility]GERY  age 35    [facility]    MI CYSTOURETHROSCOPY N/A 4/13/2018    Procedure: CYSTOSCOPY WITH BOTOX;  Surgeon: Tanja Canchola MD;  [facility];  Service: Urology    MI ESOPHAGOGASTRODUODENOSCOPY TRANSORAL DIAGNOSTIC N/A 4/23/2019    Procedure: ESOPHAGOGASTRODUODENOSCOPY (EGD); Surgeon: Thelma Shields DO;  [facility]; Service: Gastroenterology    TONSILLECTOMY  age 47    UPPER GASTROINTESTINAL ENDOSCOPY  04/23/2019     Family History   Problem Relation Age of Onset    Alcohol abuse Mother     Heart disease Mother     Alcohol abuse Father     Alcohol abuse Brother     Cancer Brother     Tremor Neg Hx     Parkinsonism Neg Hx     Colon cancer Neg Hx      Social History     Socioeconomic History    Marital status:       Spouse name: Not on file    Number of children: Not on file    Years of education: Not on file    Highest education level: Not on file   Occupational History    Occupation: RETIRED    Tobacco Use    Smoking status: Never Smoker    Smokeless tobacco: Never Used   Vaping Use    Vaping Use: Never used   Substance and Sexual Activity    Alcohol use: Yes     Comment: patient states rare use on holidays     Drug use: No    Sexual activity: Not Currently   Other Topics Concern    Not on file   Social History Narrative    LIVING INDEPENDENTLY ALONE         No caffeine use     Social Determinants of Health     Financial Resource Strain: Not on file   Food Insecurity: Not on file   Transportation Needs: Not on file   Physical Activity: Not on file   Stress: Not on file   Social Connections: Not on file   Intimate Partner Violence: Not on file   Housing Stability: Not on file       Current Outpatient Medications:     Ascorbic Acid (VITAMIN C) 1000 MG tablet, Take 1,000 mg by mouth daily, Disp: , Rfl:     aspirin 81 mg chewable tablet, Chew 1 tablet (81 mg total) daily, Disp: 30 tablet, Rfl: 0    B Complex Vitamins (B COMPLEX 100 PO), Take by mouth, Disp: , Rfl:     Biotin 2500 MCG CAPS, Take by mouth, Disp: , Rfl:     calcium carbonate (OS-JOHN) 1250 (500 Ca) MG tablet, Take 1 tablet by mouth daily, Disp: , Rfl:     carbidopa-levodopa (SINEMET)  mg per tablet, Take 1 tablet by mouth 3 (three) times a day before meals, Disp: 270 tablet, Rfl: 6    cholecalciferol (VITAMIN D3) 1,000 units tablet, Take 5,000 Units by mouth daily , Disp: , Rfl:     Coenzyme Q10 (CO Q 10 PO), Take by mouth 600 mg BID, Disp: , Rfl:     Cyanocobalamin (VITAMIN B 12 PO), Take by mouth daily, Disp: , Rfl:     Echinacea 125 MG CAPS, Take by mouth, Disp: , Rfl:     fexofenadine (ALLEGRA) 180 MG tablet, Take 180 mg by mouth as needed (prn) , Disp: , Rfl:     Magnesium 200 MG TABS, Take 1 tablet (200 mg total) by mouth daily, Disp: 30 tablet, Rfl: 3    multivitamin (THERAGRAN) TABS, Take 1 tablet by mouth daily, Disp: , Rfl:     Myrbetriq 50 MG TB24, TAKE 1 TABLET DAILY, Disp: 90 tablet, Rfl: 3    naproxen sodium (ALEVE) 220 MG tablet, Take 220 mg by mouth as needed, Disp: , Rfl:     Omega-3 350 MG CPDR, Take by mouth, Disp: , Rfl:     pantoprazole (PROTONIX) 40 mg tablet, TAKE 1 TABLET DAILY, Disp: 90 tablet, Rfl: 3    Potassium Gluconate 595 (99 K) MG TABS, Take by mouth, Disp: , Rfl:     Probiotic Product (PROBIOTIC-10) CAPS, Take by mouth, Disp: , Rfl:     ALPRAZolam (XANAX) 0 5 mg tablet, Take 1 tablet (0 5 mg total) by mouth 1 (one) time for 1 dose, Disp: 2 tablet, Rfl: 0  Allergies   Allergen Reactions    Caffeine - Food Allergy GI Intolerance    Iodine - Food Allergy Rash    Latex Rash    Other Rash     Adhesive tape       Vitals:    06/03/22 1437   BP: 136/80   Pulse: 86   Resp: 16   Temp: (!) 97 3 °F (36 3 °C)   SpO2: 95%       Physical Exam Constitutional: General appearance: The Patient is well-developed and well-nourished who appears the stated age in no acute distress  Patient is pleasant and talkative  HEENT:  Normocephalic  Sclerae are anicteric  Mucous membranes are moist  Neck is supple without adenopathy  No JVD  Chest: The lungs are clear to auscultation  Cardiac: Heart is regular rate  Abdomen: Abdomen is soft, non-tender, non-distended and without masses  Extremities: There is no clubbing or cyanosis  There is no edema  Symmetric  Neuro: Grossly nonfocal  Gait is normal      Lymphatic: No evidence of cervical adenopathy bilaterally  No evidence of axillary adenopathy bilaterally  No evidence of inguinal adenopathy bilaterally  Skin: Warm, anicteric  Psych:  Patient is pleasant and talkative  Breasts:      Pathology:  [unfilled]    Labs:      Imaging  MRI abdomen w wo contrast and mrcp    Result Date: 5/23/2022  Narrative: MRI OF THE ABDOMEN WITH AND WITHOUT CONTRAST WITH MRCP INDICATION: 79 years / Female  K86 2: Cyst of pancreas  COMPARISON: 3/17/2021  CT abdomen pelvis 8/24/2020  TECHNIQUE:  The following pulse sequences were obtained:  Coronal and axial T2 with TE of 90 and 180 respectively, axial T2 with fat saturation, axial FIESTA fat-sat, axial T1-weighted in-and-out-of phase, axial DWI/ADC, pre-contrast axial T1 with fat saturation, post-contrast dynamic axial T1 with fat saturation at 20, 70, and 180 seconds, followed by coronal and 7 minute delayed axial T1 with fat saturation  3D MRCP images were obtained with radial thick slabs and projections  3D rendering was performed from the acquisition scanner  IV Contrast:  7 mL of Gadobutrol injection (SINGLE-DOSE) FINDINGS: LOWER CHEST:   Unremarkable  LIVER: There is moderate hepatic steatosis  No suspicious masses  The hepatic veins and portal veins are patent  BILE DUCTS:  No intrahepatic or extrahepatic bile duct dilation   Common bile duct is normal in caliber for the patient's age  No choledocholithiasis, biliary stricture or suspicious mass  GALLBLADDER:  Small gallstones are noted  T1 hyperintense biliary sludge is also noted within the gallbladder lumen  No pericholecystic inflammatory changes PANCREAS:  Pancreatic tail cyst measures 5 0 x 4 4 cm (4:13) minimally increased in size from prior exam of March 2021  There are several adjacent smaller cysts including an ovoid cyst measuring 1 9 x 0 8 cm (4:16) which is increased in size from prior exam (previously 1 2 x 0 5 cm)  Additional cysts are noted within the pancreatic body/tail  The largest measures 1 9 x 1 7 x 2 0 cm (4:16), slightly increased in size from prior MRI (previously 1 9 x 1 6 x 1 8 cm when using similar measuring technique)  No pancreatic ductal dilatation  No nodular or masslike enhancement  Pancreas divisum is noted  ADRENAL GLANDS:  Unremarkable  SPLEEN:  Unremarkable  KIDNEYS/PROXIMAL URETERS:  No hydroureteronephrosis  Simple appearing right renal cyst is stable  Tiny hemorrhagic/proteinaceous cyst is also stable within the upper pole of the left kidney  There is a fat-containing lesion along the lower pole the right kidney measuring 2 0 x 1 5 cm (14:30), this is retrospectively stable dating back to August 2020 and most compatible with a renal angiomyolipoma  BOWEL:   No dilated loops of bowel  PERITONEUM/RETROPERITONEUM:  No ascites  LYMPH NODES:  No abdominal lymphadenopathy  VASCULAR STRUCTURES:  No aneurysm  ABDOMINAL WALL:  Unremarkable  OSSEOUS STRUCTURES:  No suspicious osseous lesion  Impression: 1  Redemonstration of multiple pancreatic cysts  The dominant cyst within the pancreatic tail which previously underwent FNA is slightly enlarged from prior MRI of March 2021 now measuring 5 0 x 4 4 cm (previously 4 7 x 4 1 cm utilizing similar measuring technique)    Two additional cysts within the pancreatic tail are also also slightly enlarged from prior exam   No pancreatic ductal dilatation or other new worrisome features  Continued surveillance with MRI abdomen with/without contrast is recommended  2   Hepatic steatosis  3   Cholelithiasis  4   Fat-containing lesion within the right kidney measuring 2 0 cm  This is retrospectively stable dating back to 2020 and is most compatible with a renal angiomyolipoma  Workstation performed: [id number]     I reviewed the above laboratory and imaging data  Discussion/Summary:  58-year-old female with a 5 cm pancreas cyst   This has slightly enlarged over the last year  There were no worrisome or suspicious features  By EUS this was a mucinous cystic neoplasm  I suspect based on her MRI with MRCP that these are side branch IPMN  I discussed with side branch IPMN, the risk of malignancy in her lifetime is 10-20%  With main duct IPMN this risk is 50-70%  There is also a less than 10% risk of malignancy elsewhere in the pancreas  Although this has slightly increased in size, the patient is 79years old and there are no worrisome or suspicious features  Despite the statistically higher risk by Pancreagen, I would recommend observation since there were no worrisome or suspicious features  Rather than repeating the MRI in 1 year, I would plan on repeating this in 6 months  If there would continue to be significant growth, I would consider robotic distal pancreatectomy  We will plan on repeating the MRI with MRCP in 6 months and I will see her again at that time for another clinical exam   She is agreeable to this  All her questions were answered  Xolair Counseling:  Patient informed of potential adverse effects including but not limited to fever, muscle aches, rash and allergic reactions.  The patient verbalized understanding of the proper use and possible adverse effects of Xolair.  All of the patient's questions and concerns were addressed.

## 2023-04-02 ENCOUNTER — HOSPITAL ENCOUNTER (OUTPATIENT)
Dept: MRI IMAGING | Facility: HOSPITAL | Age: 83
Discharge: HOME | End: 2023-04-02
Attending: PSYCHIATRY & NEUROLOGY

## 2023-04-02 DIAGNOSIS — G20 PARKINSON'S DISEASE (HCC): ICD-10-CM

## 2023-04-06 ENCOUNTER — TELEPHONE (OUTPATIENT)
Dept: NEUROLOGY | Facility: CLINIC | Age: 83
End: 2023-04-06

## 2023-04-06 NOTE — TELEPHONE ENCOUNTER
Patient left voicemail with follow up questions regarding MRI  Call returned to patient  All questions answered  Patient has appt with ENT on Tuesday

## 2023-04-06 NOTE — TELEPHONE ENCOUNTER
----- Message from Deana Nuñez MD sent at 4/5/2023 12:52 PM EDT -----  Please have the patient follow-up with family physician regarding mastoid cell effusion, mayneed to see ENT

## 2023-04-19 PROBLEM — R53.83 FATIGUE: Status: RESOLVED | Noted: 2018-03-26 | Resolved: 2023-04-19

## 2023-04-19 PROBLEM — R51.9 HEADACHE: Status: RESOLVED | Noted: 2020-11-24 | Resolved: 2023-04-19

## 2023-04-19 PROBLEM — R10.32 LEFT LOWER QUADRANT PAIN: Status: RESOLVED | Noted: 2022-06-21 | Resolved: 2023-04-19

## 2023-04-19 PROBLEM — R42 LIGHTHEADEDNESS: Status: RESOLVED | Noted: 2020-11-24 | Resolved: 2023-04-19

## 2023-04-19 PROBLEM — K59.00 CONSTIPATION: Status: RESOLVED | Noted: 2022-06-21 | Resolved: 2023-04-19

## 2023-04-24 DIAGNOSIS — R13.10 DYSPHAGIA, UNSPECIFIED TYPE: ICD-10-CM

## 2023-04-24 RX ORDER — PANTOPRAZOLE SODIUM 40 MG/1
40 TABLET, DELAYED RELEASE ORAL DAILY
Qty: 30 TABLET | Refills: 0 | Status: SHIPPED | OUTPATIENT
Start: 2023-04-24

## 2023-04-24 NOTE — TELEPHONE ENCOUNTER
Patients GI provider:  KENDRA Fuller    Number to return call: [phone]     Reason for call: Pt called in regards to RX pantoprazole  She would like a new script sent out  Infrm Pt that she has not had OV for a year and must schedule apt before refill can be sent  Pt is scheduled for 5/17/2023      Scheduled procedure/appointment date if applicable: 9/17/4383

## 2023-05-09 DIAGNOSIS — R13.10 DYSPHAGIA, UNSPECIFIED TYPE: ICD-10-CM

## 2023-05-09 RX ORDER — PANTOPRAZOLE SODIUM 40 MG/1
TABLET, DELAYED RELEASE ORAL
Qty: 30 TABLET | Refills: 11 | Status: SHIPPED | OUTPATIENT
Start: 2023-05-09 | End: 2023-05-17 | Stop reason: SDUPTHER

## 2023-05-15 DIAGNOSIS — G20 PARKINSON'S DISEASE (HCC): ICD-10-CM

## 2023-05-15 DIAGNOSIS — F40.240 CLAUSTROPHOBIA: Primary | ICD-10-CM

## 2023-05-16 RX ORDER — LORAZEPAM 1 MG/1
TABLET ORAL
Qty: 2 TABLET | Refills: 0 | Status: SHIPPED | OUTPATIENT
Start: 2023-05-16 | End: 2023-05-16

## 2023-05-16 RX ORDER — LORAZEPAM 1 MG/1
TABLET ORAL
Qty: 2 TABLET | Refills: 0 | Status: SHIPPED | OUTPATIENT
Start: 2023-05-16 | End: 2023-05-25

## 2023-05-17 ENCOUNTER — OFFICE VISIT (OUTPATIENT)
Dept: GASTROENTEROLOGY | Facility: CLINIC | Age: 83
End: 2023-05-17

## 2023-05-17 VITALS
SYSTOLIC BLOOD PRESSURE: 108 MMHG | HEART RATE: 84 BPM | DIASTOLIC BLOOD PRESSURE: 74 MMHG | WEIGHT: 169 LBS | BODY MASS INDEX: 31.1 KG/M2 | OXYGEN SATURATION: 96 % | HEIGHT: 62 IN

## 2023-05-17 DIAGNOSIS — K21.9 GASTRO-ESOPHAGEAL REFLUX DISEASE WITHOUT ESOPHAGITIS: Primary | ICD-10-CM

## 2023-05-17 DIAGNOSIS — R13.10 DYSPHAGIA, UNSPECIFIED TYPE: ICD-10-CM

## 2023-05-17 RX ORDER — PANTOPRAZOLE SODIUM 40 MG/1
40 TABLET, DELAYED RELEASE ORAL DAILY
Qty: 90 TABLET | Refills: 3 | Status: SHIPPED | OUTPATIENT
Start: 2023-05-17

## 2023-05-17 NOTE — PROGRESS NOTES
[facility] - Outpatient Follow-up Note  Cristy Pinedo 79 y o  female MRN: [id number]  Encounter: [id number]          ASSESSMENT AND PLAN:      1  Dysphagia, unspecified type  2  Gastro-esophageal reflux disease without esophagitis  -Patient's pantoprazole 40 mg daily has been refilled   -Patient will follow-up with MRI/MRCP on June 5 and she already has an appointment scheduled with Dr Barrie Byrne on June 16   -No plans for any repeat EGD at this time   -Continue GERD dietary modifications  ______________________________________________________________________    SUBJECTIVE:    60-year-old female with a past medical history significant for gastroesophageal reflux disease, pancreatic cyst, and seasonal allergies who presents to the GI clinic today for follow-up  Patient reports that she is here for medication refill  Patient does suffer with acid reflux disease and is maintained on pantoprazole 40 mg daily  She reports that this controls her symptoms quite well  She denies any breakthrough heartburn, dysphagia, nausea, vomiting  She denies any abdominal pain  Patient reports that her biggest concern right now is that she is following up on her pancreatic cysts with an MRI/MRCP on June 5  She reports that she is just anxious and nervous about the results  Patient's MRI/MRCP from 12/5/2022 showed continued very slow interval growth of multiple pancreatic cysts with 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  The largest cystic lesion in the pancreatic tail now measures up to 5 3 cm  No main   pancreatic ductal margin or solid enhancing pancreatic tumor mass identified      No solid enhancement or main pancreatic ductal enlargement      Pancreas divisum  REVIEW OF SYSTEMS IS OTHERWISE NEGATIVE        Historical Information   Past Medical History:   Diagnosis Date   • Actinic keratosis 03/11/2016   • Acute midline low back pain without sciatica 2020   • 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    • Benign neoplasm of skin    • Chest pain    • Claustrophobia    • Diverticulitis    • Fracture     L1-L2   • GERD (gastroesophageal reflux disease)    • H  pylori infection 2020   • Muscle weakness    • Nonmelanoma skin cancer     last assessed 2017   • Palpitations    • Pancreatic cyst    • Seasonal allergies    • Seborrheic keratosis 2014   • Sleep apnea     no cpap   • Spontaneous      without mention of complications    • Squamous cell carcinoma of forehead 2014   • Syncope      Past Surgical History:   Procedure Laterality Date   • BOTOX INJECTION N/A 3/6/2017    Procedure: CYSTOSCOPY; BLADDER BOTOX 100 UNITS ;  Surgeon: Diann Murillo MD;  [facility];  Service:    • BOWEL RESECTION      related ot diverticulitis   • CARDIAC CATHETERIZATION     • CARPAL TUNNEL RELEASE Right    • COLONOSCOPY  2019   • COLOSTOMY     • COLOSTOMY CLOSURE     • CYSTOSCOPY  2021   • FOOT SURGERY Left     neuroma   • HYSTERECTOMY     • NASAL SINUS SURGERY  age 35    [place]   • VA CYSTOURETHROSCOPY N/A 2018    Procedure: CYSTOSCOPY WITH BOTOX;  Surgeon: Diann Murillo MD;  [facility];  Service: Urology   • VA ESOPHAGOGASTRODUODENOSCOPY TRANSORAL DIAGNOSTIC N/A 2019    Procedure: ESOPHAGOGASTRODUODENOSCOPY (EGD); Surgeon: Ed Feldman DO;  [facility];   Service: Gastroenterology   • TONSILLECTOMY  age 47   • UPPER GASTROINTESTINAL ENDOSCOPY  2019     Social History   Social History     Substance and Sexual Activity   Alcohol Use Yes    Comment: patient states rare use on holidays      Social History     Substance and Sexual Activity   Drug Use No     Social History     Tobacco Use   Smoking Status Never   Smokeless Tobacco Never     Family History   Problem Relation Age of Onset   • Alcohol abuse Mother    • Heart disease Mother    • Alcohol "abuse Father    • Alcohol abuse Brother    • Cancer Brother    • Tremor Neg Hx    • Parkinsonism Neg Hx    • Colon cancer Neg Hx        Meds/Allergies       Current Outpatient Medications:   •  Ascorbic Acid (VITAMIN C) 1000 MG tablet  •  B Complex Vitamins (B COMPLEX 100 PO)  •  calcium carbonate (OS-JOHN) 1250 (500 Ca) MG tablet  •  carbidopa-levodopa (SINEMET)  mg per tablet  •  cholecalciferol (VITAMIN D3) 1,000 units tablet  •  Coenzyme Q10 (CO Q 10 PO)  •  Cyanocobalamin (VITAMIN B 12 PO)  •  Echinacea 125 MG CAPS  •  LORazepam (ATIVAN) 1 mg tablet  •  Magnesium 200 MG TABS  •  Mirabegron ER (Myrbetriq) 50 MG TB24  •  multivitamin (THERAGRAN) TABS  •  Omega-3 350 MG CPDR  •  pantoprazole (PROTONIX) 40 mg tablet  •  Potassium Gluconate 595 (99 K) MG TABS  •  Probiotic Product (PROBIOTIC-10) CAPS    Allergies   Allergen Reactions   • Caffeine - Food Allergy GI Intolerance   • Iodine - Food Allergy Rash   • Latex Rash   • Other Rash     Adhesive tape             Objective     Blood pressure 108/74, pulse 84, height 5' 2\" (1 575 m), weight 76 7 kg (169 lb), SpO2 96 %, not currently breastfeeding  Body mass index is 30 91 kg/m²  PHYSICAL EXAM:      General Appearance:   Alert, cooperative, no distress   HEENT:   Normocephalic, atraumatic, anicteric      Neck:  Supple, symmetrical, trachea midline   Lungs:   Clear to auscultation bilaterally; no rales, rhonchi or wheezing; respirations unlabored    Heart[de-identified]   Regular rate and rhythm; no murmur, rub, or gallop  Abdomen:   Soft, non-tender, non-distended; normal bowel sounds; no masses, no organomegaly    Genitalia:   Deferred    Rectal:   Deferred    Extremities:  No cyanosis, clubbing or edema    Pulses:  2+ and symmetric    Skin:  No jaundice, rashes, or lesions    Lymph nodes:  No palpable cervical lymphadenopathy        Lab Results:   No visits with results within 1 Day(s) from this visit     Latest known visit with results is:   Appointment on " 03/09/2023   Component Date Value   • WBC 03/09/2023 6 37    • RBC 03/09/2023 4 80    • Hemoglobin 03/09/2023 14 2    • Hematocrit 03/09/2023 43 1    • MCV 03/09/2023 90    • MCH 03/09/2023 29 6    • MCHC 03/09/2023 32 9    • RDW 03/09/2023 13 0    • MPV 03/09/2023 9 6    • Platelets 36/85/3854 268    • nRBC 03/09/2023 0    • Neutrophils Relative 03/09/2023 57    • Immat GRANS % 03/09/2023 1    • Lymphocytes Relative 03/09/2023 31    • Monocytes Relative 03/09/2023 8    • Eosinophils Relative 03/09/2023 2    • Basophils Relative 03/09/2023 1    • Neutrophils Absolute 03/09/2023 3 70    • Immature Grans Absolute 03/09/2023 0 03    • Lymphocytes Absolute 03/09/2023 1 97    • Monocytes Absolute 03/09/2023 0 50    • Eosinophils Absolute 03/09/2023 0 14    • Basophils Absolute 03/09/2023 0 03    • Sodium 03/09/2023 140    • Potassium 03/09/2023 3 9    • Chloride 03/09/2023 107    • CO2 03/09/2023 27    • ANION GAP 03/09/2023 6    • BUN 03/09/2023 14    • Creatinine 03/09/2023 0 77    • Glucose, Fasting 03/09/2023 115 (H)    • Calcium 03/09/2023 9 6    • AST 03/09/2023 21    • ALT 03/09/2023 33    • Alkaline Phosphatase 03/09/2023 87    • Total Protein 03/09/2023 7 0    • Albumin 03/09/2023 4 1    • Total Bilirubin 03/09/2023 0 62    • eGFR 03/09/2023 71    • TSH 3RD GENERATON 03/09/2023 2 550          Radiology Results:   No results found

## 2023-05-17 NOTE — LETTER
May 17, 2023     Antonia Ocasio, [address]  [facility]  [address]    Patient: Nadya Glaser   YOB: 1940   Date of Visit: 5/17/2023       Dear Dr Dee Dee Harding:    Thank you for referring Jose Martin Bony to me for evaluation  Below are my notes for this consultation  If you have questions, please do not hesitate to call me  I look forward to following your patient along with you  Sincerely,        Bijan Shankar PA-C        CC: No Recipients  Bijan Shankar PA-C  5/17/2023  9:36 AM  Sign when Signing Visit  [facility] - Outpatient Follow-up Note  Nadya Glaser 79 y o  female MRN: [id number]  Encounter: [id number]          ASSESSMENT AND PLAN:      1  Dysphagia, unspecified type  2  Gastro-esophageal reflux disease without esophagitis  -Patient's pantoprazole 40 mg daily has been refilled   -Patient will follow-up with MRI/MRCP on June 5 and she already has an appointment scheduled with Dr Yancy Stokes on June 16   -No plans for any repeat EGD at this time   -Continue GERD dietary modifications  ______________________________________________________________________    SUBJECTIVE:    79-year-old female with a past medical history significant for gastroesophageal reflux disease, pancreatic cyst, and seasonal allergies who presents to the GI clinic today for follow-up  Patient reports that she is here for medication refill  Patient does suffer with acid reflux disease and is maintained on pantoprazole 40 mg daily  She reports that this controls her symptoms quite well  She denies any breakthrough heartburn, dysphagia, nausea, vomiting  She denies any abdominal pain  Patient reports that her biggest concern right now is that she is following up on her pancreatic cysts with an MRI/MRCP on June 5  She reports that she is just anxious and nervous about the results      Patient's MRI/MRCP from 12/5/2022 showed continued very slow interval growth of multiple pancreatic cysts with 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  The largest cystic lesion in the pancreatic tail now measures up to 5 3 cm  No main   pancreatic ductal margin or solid enhancing pancreatic tumor mass identified      No solid enhancement or main pancreatic ductal enlargement      Pancreas divisum  REVIEW OF SYSTEMS IS OTHERWISE NEGATIVE        Historical Information   Past Medical History:   Diagnosis Date   • Actinic keratosis 2016   • Acute midline low back pain without sciatica 2020   • 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    • Benign neoplasm of skin    • Chest pain    • Claustrophobia    • Diverticulitis    • Fracture     L1-L2   • GERD (gastroesophageal reflux disease)    • H  pylori infection 2020   • Muscle weakness    • Nonmelanoma skin cancer     last assessed 2017   • Palpitations    • Pancreatic cyst    • Seasonal allergies    • Seborrheic keratosis 2014   • Sleep apnea     no cpap   • Spontaneous      without mention of complications    • Squamous cell carcinoma of forehead 2014   • Syncope      Past Surgical History:   Procedure Laterality Date   • BOTOX INJECTION N/A 3/6/2017    Procedure: CYSTOSCOPY; BLADDER BOTOX 100 UNITS ;  Surgeon: Brenda Montano MD;  [facility];  Service:    • BOWEL RESECTION      related ot diverticulitis   • CARDIAC CATHETERIZATION     • CARPAL TUNNEL RELEASE Right    • COLONOSCOPY  2019   • COLOSTOMY     • COLOSTOMY CLOSURE     • CYSTOSCOPY  2021   • FOOT SURGERY Left     neuroma   • HYSTERECTOMY     • NASAL SINUS SURGERY  age 35    Dora Bevels   • FL CYSTOURETHROSCOPY N/A 2018    Procedure: CYSTOSCOPY WITH BOTOX;  Surgeon: Brenda Montano MD;  [facility];  Service: Urology   • FL ESOPHAGOGASTRODUODENOSCOPY TRANSORAL DIAGNOSTIC N/A 2019    Procedure: ESOPHAGOGASTRODUODENOSCOPY (EGD); "Surgeon: Brynn Clancy DO;  Location: [facility]; Service: Gastroenterology   • TONSILLECTOMY  age 47   • UPPER GASTROINTESTINAL ENDOSCOPY  04/23/2019     Social History   Social History     Substance and Sexual Activity   Alcohol Use Yes    Comment: patient states rare use on holidays      Social History     Substance and Sexual Activity   Drug Use No     Social History     Tobacco Use   Smoking Status Never   Smokeless Tobacco Never     Family History   Problem Relation Age of Onset   • Alcohol abuse Mother    • Heart disease Mother    • Alcohol abuse Father    • Alcohol abuse Brother    • Cancer Brother    • Tremor Neg Hx    • Parkinsonism Neg Hx    • Colon cancer Neg Hx        Meds/Allergies        Current Outpatient Medications:   •  Ascorbic Acid (VITAMIN C) 1000 MG tablet  •  B Complex Vitamins (B COMPLEX 100 PO)  •  calcium carbonate (OS-JOHN) 1250 (500 Ca) MG tablet  •  carbidopa-levodopa (SINEMET)  mg per tablet  •  cholecalciferol (VITAMIN D3) 1,000 units tablet  •  Coenzyme Q10 (CO Q 10 PO)  •  Cyanocobalamin (VITAMIN B 12 PO)  •  Echinacea 125 MG CAPS  •  LORazepam (ATIVAN) 1 mg tablet  •  Magnesium 200 MG TABS  •  Mirabegron ER (Myrbetriq) 50 MG TB24  •  multivitamin (THERAGRAN) TABS  •  Omega-3 350 MG CPDR  •  pantoprazole (PROTONIX) 40 mg tablet  •  Potassium Gluconate 595 (99 K) MG TABS  •  Probiotic Product (PROBIOTIC-10) CAPS    Allergies   Allergen Reactions   • Caffeine - Food Allergy GI Intolerance   • Iodine - Food Allergy Rash   • Latex Rash   • Other Rash     Adhesive tape             Objective      Blood pressure 108/74, pulse 84, height 5' 2\" (1 575 m), weight 76 7 kg (169 lb), SpO2 96 %, not currently breastfeeding  Body mass index is 30 91 kg/m²        PHYSICAL EXAM:      General Appearance:   Alert, cooperative, no distress   HEENT:   Normocephalic, atraumatic, anicteric      Neck:  Supple, symmetrical, trachea midline   Lungs:   Clear to auscultation bilaterally; no rales, rhonchi " or wheezing; respirations unlabored    Heart[de-identified]   Regular rate and rhythm; no murmur, rub, or gallop  Abdomen:   Soft, non-tender, non-distended; normal bowel sounds; no masses, no organomegaly    Genitalia:   Deferred    Rectal:   Deferred    Extremities:  No cyanosis, clubbing or edema    Pulses:  2+ and symmetric    Skin:  No jaundice, rashes, or lesions    Lymph nodes:  No palpable cervical lymphadenopathy        Lab Results:   No visits with results within 1 Day(s) from this visit  Latest known visit with results is:   Appointment on 03/09/2023   Component Date Value   • WBC 03/09/2023 6 37    • RBC 03/09/2023 4 80    • Hemoglobin 03/09/2023 14 2    • Hematocrit 03/09/2023 43 1    • MCV 03/09/2023 90    • MCH 03/09/2023 29 6    • MCHC 03/09/2023 32 9    • RDW 03/09/2023 13 0    • MPV 03/09/2023 9 6    • Platelets 89/94/1493 268    • nRBC 03/09/2023 0    • Neutrophils Relative 03/09/2023 57    • Immat GRANS % 03/09/2023 1    • Lymphocytes Relative 03/09/2023 31    • Monocytes Relative 03/09/2023 8    • Eosinophils Relative 03/09/2023 2    • Basophils Relative 03/09/2023 1    • Neutrophils Absolute 03/09/2023 3 70    • Immature Grans Absolute 03/09/2023 0 03    • Lymphocytes Absolute 03/09/2023 1 97    • Monocytes Absolute 03/09/2023 0 50    • Eosinophils Absolute 03/09/2023 0 14    • Basophils Absolute 03/09/2023 0 03    • Sodium 03/09/2023 140    • Potassium 03/09/2023 3 9    • Chloride 03/09/2023 107    • CO2 03/09/2023 27    • ANION GAP 03/09/2023 6    • BUN 03/09/2023 14    • Creatinine 03/09/2023 0 77    • Glucose, Fasting 03/09/2023 115 (H)    • Calcium 03/09/2023 9 6    • AST 03/09/2023 21    • ALT 03/09/2023 33    • Alkaline Phosphatase 03/09/2023 87    • Total Protein 03/09/2023 7 0    • Albumin 03/09/2023 4 1    • Total Bilirubin 03/09/2023 0 62    • eGFR 03/09/2023 71    • TSH 3RD GENERATON 03/09/2023 2 550          Radiology Results:   No results found

## 2023-05-25 DIAGNOSIS — F40.240 CLAUSTROPHOBIA: ICD-10-CM

## 2023-05-25 RX ORDER — LORAZEPAM 1 MG/1
TABLET ORAL
Qty: 2 TABLET | Refills: 0 | Status: SHIPPED | OUTPATIENT
Start: 2023-05-25

## 2023-05-30 ENCOUNTER — APPOINTMENT (OUTPATIENT)
Dept: LAB | Facility: CLINIC | Age: 83
End: 2023-05-30

## 2023-05-30 DIAGNOSIS — K86.2 PANCREATIC CYST: ICD-10-CM

## 2023-05-30 LAB
BUN SERPL-MCNC: 14 MG/DL (ref 5–25)
CREAT SERPL-MCNC: 0.68 MG/DL (ref 0.6–1.3)
GFR SERPL CREATININE-BSD FRML MDRD: 80 ML/MIN/1.73SQ M

## 2023-06-05 ENCOUNTER — HOSPITAL ENCOUNTER (OUTPATIENT)
Dept: MRI IMAGING | Facility: HOSPITAL | Age: 83
Discharge: HOME | End: 2023-06-05
Attending: SURGERY
Payer: COMMERCIAL

## 2023-06-05 DIAGNOSIS — K86.2 PANCREATIC CYST: ICD-10-CM

## 2023-06-05 PROCEDURE — A9585 GADOBUTROL INJECTION: HCPCS | Performed by: SURGERY

## 2023-06-05 PROCEDURE — G1004 CDSM NDSC: HCPCS

## 2023-06-05 PROCEDURE — 74183 MRI ABD W/O CNTR FLWD CNTR: CPT

## 2023-06-05 RX ADMIN — GADOBUTROL 7 ML: 604.72 INJECTION INTRAVENOUS at 10:32

## 2023-06-08 ENCOUNTER — OFFICE VISIT (OUTPATIENT)
Age: 83
End: 2023-06-08
Payer: COMMERCIAL

## 2023-06-08 ENCOUNTER — TELEPHONE (OUTPATIENT)
Dept: HEMATOLOGY ONCOLOGY | Facility: CLINIC | Age: 83
End: 2023-06-08

## 2023-06-08 VITALS — BODY MASS INDEX: 30.62 KG/M2 | WEIGHT: 166.4 LBS | HEIGHT: 62 IN

## 2023-06-08 DIAGNOSIS — Z13.89 SCREENING FOR SKIN CONDITION: Primary | ICD-10-CM

## 2023-06-08 DIAGNOSIS — L82.1 SEBORRHEIC KERATOSIS: ICD-10-CM

## 2023-06-08 DIAGNOSIS — D18.01 CHERRY ANGIOMA: ICD-10-CM

## 2023-06-08 DIAGNOSIS — Z85.828 HISTORY OF SKIN CANCER: ICD-10-CM

## 2023-06-08 PROCEDURE — 99213 OFFICE O/P EST LOW 20 MIN: CPT | Performed by: DERMATOLOGY

## 2023-06-08 NOTE — PATIENT INSTRUCTIONS
SQUAMOUS CELL CARCINOMA    What is cutaneous squamous cell carcinoma? Cutaneous squamous cell carcinoma (SCC) is a common type of keratinocyte or non-melanoma skin cancer  It is derived from cells within the epidermis that make keratin -- the horny protein that makes up skin, hair and nails  Cutaneous SCC is an invasive disease, referring to cancer cells that have grown beyond the epidermis  SCC can sometimes metastasise and may prove fatal   Intraepidermal carcinoma (cutaneous SCC in situ) and mucosal SCC are considered elsewhere  Who gets cutaneous squamous cell carcinoma? Risk factors for cutaneous SCC include:  Age and sex: SCCs are particularly prevalent in elderly males  However, they also affect females and younger adults  Previous SCC or another form of skin cancer (basal cell carcinoma, melanoma) are a strong predictor for further skin cancers  Actinic keratoses   Outdoor occupation or recreation   Smoking   Fair skin, blue eyes and blond or red hair   Previous cutaneous injury, thermal burn, disease (eg cutaneous lupus, epidermolysis bullosa, leg ulcer)   Inherited syndromes: SCC is a particular problem for families with xeroderma pigmentosum and albinism   Other risk factors include ionising radiation, exposure to arsenic, and immune suppression due to disease (eg chronic lymphocytic leukaemia) or medicines  Organ transplant recipients have a massively increased risk of developing SCC  What causes cutaneous squamous cell carcinoma? More than 90% of cases of SCC are associated with numerous DNA mutations in multiple somatic genes  Mutations in the p53 tumour suppressor gene are caused by exposure to ultraviolet radiation (UV), especially UVB (known as signature 7)  Other signature mutations relate to cigarette smoking, ageing and immune suppression (eg, to drugs such as azathioprine)   Mutations in signalling pathways affect the epidermal growth factor receptor, ASAF, Fyn, and [id number] signalling  Beta-genus human papillomaviruses (wart virus) are thought to play a role in SCC arising in immune-suppressed populations  ?-HPV and HPV subtypes 5, 8, 17, 20, 24, and 38 have also been associated with an increased risk of cutaneous SCC in immunocompetent individuals  What are the clinical features of cutaneous squamous cell carcinoma? Cutaneous SCCs present as enlarging scaly or crusted lumps  They usually arise within pre-existing actinic keratosis or intraepidermal carcinoma  They grow over weeks to months   They may ulcerate   They are often tender or painful   Located on sun-exposed sites, particularly the face, lips, ears, hands, forearms and lower legs   Size varies from a few millimetres to several centimetres in diameter  Types of cutaneous squamous cell carcinoma  Distinct clinical types of invasive cutaneous SCC include:  Cutaneous horn -- the horn is due to excessive production of keratin   Keratoacanthoma (KA) -- a rapidly growing keratinising nodule that may resolve without treatment   Carcinoma cuniculatum (‘verrucous carcinoma’), a slow-growing, warty tumour on the sole of the foot  Multiple eruptive SCC/KA-like lesions arising in syndromes, such as multiple self-healing squamous epitheliomas of Gorman-Smith and Grzybowski syndrome  The pathologist may classify a tumour as well differentiated, moderately well differentiated, poorly differentiated or anaplastic cutaneous SCC  There are other variants  Classification of squamous cell carcinoma by risk  Cutaneous SCC is classified as low-risk or high-risk, depending on the chance of tumour recurrence and metastasis   Characteristics of high-risk SCC include:  High-risk cutaneous squamous cell carcinoma has the following characteristics:  Diameter greater than or equal to 2 cm   Location on the ear, vermilion of the lip, central face, hands, feet, genitalia   Arising in elderly or immune suppressed patient   Histological thickness greater than 2 mm, poorly differentiated histology, or with the invasion of the subcutaneous tissue, nerves and blood vessels  Metastatic SCC is found in regional lymph nodes (80%), lungs, liver, brain, bones and skin  Staging cutaneous squamous cell carcinoma  In 2011, the [address] [place] [address] Ave on Cancer (CC) published a new staging systemic for cutaneous SCC for the 7th Edition of the AJCC manual  This evaluates the dimensions of the original primary tumour (T) and its metastases to lymph nodes (N)  Tumour staging for cutaneous SCC  TX: Th Primary tumour cannot be assessed  T0: No evidence of a primary tumour  Tis: Carcinoma in situ  T1: Tumour ? 2cm without high-risk features  T2: Tumour ? 2cm; or; Tumour ? 2 cm with high-risk features  T3: Tumour with the invasion of maxilla, mandible, orbit or temporal bone  T4: Tumour with the invasion of axial or appendicular skeleton or perineural invasion of skull base    Nicole staging for cutaneous SCC  NX: Regional lymph nodes cannot be assessed  N0: No regional lymph node metastasis  N1: Metastasis in one local lymph node ? 3cm  N2: Metastasis in one local lymph node ? 3cm; or; Metastasis in >1 local lymph node ? 6cm  N3: Metastasis in lymph node ? 6cm    How is squamous cell carcinoma diagnosed? Diagnosis of cutaneous SCC is based on clinical features  The diagnosis and histological subtype are confirmed pathologically by diagnostic biopsy or following excision  See squamous cell carcinoma - pathology  Patients with high-risk SCC may also undergo staging investigations to determine whether it has spread to lymph nodes or elsewhere  These may include:  Imaging using ultrasound scan, X-rays, CT scans, MRI scans   Lymph node or other tissue biopsies    What is the treatment for cutaneous squamous cell carcinoma? Cutaneous SCC is nearly always treated surgically  Most cases are excised with a 3-10 mm margin of normal tissue around a visible tumour   A flap or skin graft may be needed to repair the defect  Other methods of removal include:  Shave, curettage, and electrocautery for low-risk tumours on trunk and limbs   Aggressive cryotherapy for very small, thin, low-risk tumours   Mohs micrographic surgery for large facial lesions with indistinct margins or recurrent tumours   Radiotherapy for an inoperable tumour, patients unsuitable for surgery, or as adjuvant    What is the treatment for advanced or metastatic squamous cell carcinoma? Locally advanced primary, recurrent or metastatic SCC requires multidisciplinary consultation  Often a combination of treatments is used  Surgery   Radiotherapy   Cemiplimab   Experimental targeted therapy using epidermal growth factor receptor inhibitors    How can cutaneous squamous cell carcinoma be prevented? There is a great deal of evidence to show that very careful sun protection at any time of life reduces the number of SCCs  This is particularly important in ageing, sun-damaged, fair skin; in patients that are immune suppressed; and in those who already have actinic keratoses or previous SCC  Stay indoors or under the shade in the middle of the day   Wear covering clothing   Apply high protection factor SPF50+ broad-spectrum sunscreens generously to exposed skin if outdoors   Avoid indoor tanning (sun beds, solaria)    Oral nicotinamide (vitamin B3) in a dose of 500 mg twice daily may reduce the number and severity of SCCs in people at high risk  Patients with multiple squamous cell carcinomas may be prescribed an oral retinoid (acitretin or isotretinoin)  These reduce the number of tumours but have some nuisance side effects  What is the outlook for cutaneous squamous cell carcinoma? Most SCCs are cured by treatment  A cure is most likely if treatment is undertaken when the lesion is small   The risk of recurrence or disease-associated death is greater for tumours that are > 20 mm in diameter and/or > 2 mm in thickness at the time of surgical excision  About 50% of people at high risk of SCC develop a second one within 5 years of the first  They are also at increased risk of other skin cancers, especially melanoma  Regular self-skin examinations and long-term annual skin checks by an experienced health professional are recommended

## 2023-06-08 NOTE — TELEPHONE ENCOUNTER
Call Transfer   Who are you speaking with? [address] [facility] radiology, [facility]    If it is not the patient, are they listed on an active communication consent form? N/A   Who is the patients HemOnc/SurgOnc provider? Dr Gray Signs   What is the reason for this call? Significant findings of the patients MRI of the abdomen  Person/Department that the call was transferred to? Time that call was transferred? Jeanie Johnson @2:16PM    Your call will be transferred now  If you receive a voicemail, please leave a detailed message and a member of the team will return your call as soon as possible  Did you relay this information to the caller?   Yes

## 2023-06-08 NOTE — PROGRESS NOTES
"[facility]     Patient Name: Dinora Savage  Encounter Date: June 8, 2023     Have you been cared for by a [facility] Dermatologist in the last 3 years and, if so, which description applies to you? Yes  I have been here within the last 3 years, and my medical history has NOT changed since that time  I am FEMALE/of child-bearing potential     REVIEW OF SYSTEMS:  Have you recently had or currently have any of the following? · No changes in my recent health  PAST MEDICAL HISTORY:  Have you personally ever had or currently have any of the following? If \"YES,\" then please provide more detail  · No changes in my medical history  FAMILY HISTORY:  Any \"first degree relatives\" (parent, brother, sister, or child) with the following? • No changes in my family's known health  PATIENT EXPERIENCE:    • Do you want the Dermatologist to perform a COMPLETE skin exam today including a clinical examination under the \"bra and underwear\" areas? Yes  • If necessary, do we have your permission to call and leave a detailed message on your Preferred Phone number that includes your specific medical information?   Yes      Allergies   Allergen Reactions   • Caffeine - Food Allergy GI Intolerance   • Iodine - Food Allergy Rash   • Latex Rash   • Other Rash     Adhesive tape        Current Outpatient Medications:   •  Ascorbic Acid (VITAMIN C) 1000 MG tablet, Take 1,000 mg by mouth daily, Disp: , Rfl:   •  B Complex Vitamins (B COMPLEX 100 PO), Take by mouth, Disp: , Rfl:   •  calcium carbonate (OS-JOHN) 1250 (500 Ca) MG tablet, Take 1 tablet by mouth daily, Disp: , Rfl:   •  carbidopa-levodopa (SINEMET)  mg per tablet, Take 1 tablet by mouth 3 (three) times a day before meals, Disp: 270 tablet, Rfl: 3  •  cholecalciferol (VITAMIN D3) 1,000 units tablet, Take 5,000 Units by mouth daily , Disp: , Rfl:   •  Coenzyme Q10 (CO Q 10 PO), Take by mouth 600 mg BID, Disp: , Rfl:   •  Cyanocobalamin (VITAMIN B " "12 PO), Take by mouth daily, Disp: , Rfl:   •  Echinacea 125 MG CAPS, Take by mouth, Disp: , Rfl:   •  LORazepam (ATIVAN) 1 mg tablet, TAKE 1 TABLET 30 MINUTES PRIOR TO MRI, TAKE 2ND TABLET RIGHT BEFORE MRI IF NEEDED , Disp: 2 tablet, Rfl: 0  •  Magnesium 200 MG TABS, Take 1 tablet (200 mg total) by mouth daily, Disp: 30 tablet, Rfl: 3  •  Mirabegron ER (Myrbetriq) 50 MG TB24, Take 1 tablet (50 mg total) by mouth daily, Disp: 90 tablet, Rfl: 3  •  multivitamin (THERAGRAN) TABS, Take 1 tablet by mouth daily, Disp: , Rfl:   •  Omega-3 350 MG CPDR, Take by mouth, Disp: , Rfl:   •  pantoprazole (PROTONIX) 40 mg tablet, Take 1 tablet (40 mg total) by mouth daily, Disp: 90 tablet, Rfl: 3  •  Potassium Gluconate 595 (99 K) MG TABS, Take by mouth, Disp: , Rfl:   •  Probiotic Product (PROBIOTIC-10) CAPS, Take by mouth, Disp: , Rfl:           • Whom besides the patient is providing clinical information about today's encounter?   o NO ADDITIONAL HISTORIAN (patient alone provided history)    Physical Exam and Assessment/Plan by Diagnosis:      HISTORY OF SQUAMOUS CELL CARCINOMA     Physical Exam:  • Anatomic Location Affected:  Left Cheek  • Morphological Description of Scar:  Well healed  • Suspected Recurrence: no  • Regional adenopathy: no    Additional History of Present Condition:  Treated with Efudex (2021)    Assessment and Plan:  Based on a thorough discussion of this condition and the management approach to it (including a comprehensive discussion of the known risks, side effects and potential benefits of treatment), the patient (family) agrees to implement the following specific plan:  • Monitor for change    SEBORRHEIC KERATOSIS; NON-INFLAMED    Physical Exam:  • Anatomic Location Affected:  scattered across trunk, extremities  • Morphological Description:  Flat and raised, waxy, smooth to warty textured, yellow to brownish-grey to dark brown to blackish, discrete, \"stuck-on\" appearing papules      Additional History " "of Present Condition:  Patient reports new bumps on the skin  Denies itch, burn, pain, bleeding or ulceration  Present constantly; nothing seems to make it worse or better  No prior treatment  Assessment and Plan:  Based on a thorough discussion of this condition and the management approach to it (including a comprehensive discussion of the known risks, side effects and potential benefits of treatment), the patient (family) agrees to implement the following specific plan:  • Reassured benign      ANGIOMA (\"CHERRY ANGIOMA\")    Physical Exam:  • Anatomic Location: scattered across sun exposed areas of the trunk and extremities   • Morphologic Description: Firm red to reddish-blue discrete papules  • Pertinent Positives:  • Pertinent Negatives: Additional History of Present Condition:  Present on exam      Assessment and Plan:  • Reassured benign        Scribe Attestation    I,:  Siria Tanner am acting as a scribe while in the presence of the attending physician :       I,:  Fabiola Sumner MD personally performed the services described in this documentation    as scribed in my presence  :           "

## 2023-06-13 ENCOUNTER — OFFICE VISIT (OUTPATIENT)
Dept: SURGICAL ONCOLOGY | Facility: CLINIC | Age: 83
End: 2023-06-13
Payer: COMMERCIAL

## 2023-06-13 VITALS
BODY MASS INDEX: 30.36 KG/M2 | SYSTOLIC BLOOD PRESSURE: 126 MMHG | HEART RATE: 84 BPM | WEIGHT: 165 LBS | DIASTOLIC BLOOD PRESSURE: 72 MMHG | HEIGHT: 62 IN | OXYGEN SATURATION: 98 % | TEMPERATURE: 97.3 F | RESPIRATION RATE: 22 BRPM

## 2023-06-13 DIAGNOSIS — K86.2 PANCREATIC CYST: Primary | ICD-10-CM

## 2023-06-13 PROCEDURE — 99215 OFFICE O/P EST HI 40 MIN: CPT | Performed by: SURGERY

## 2023-06-13 NOTE — LETTER
June 13, 2023     Saeid Ansari, [address]  [address] [facility]  [address]    Patient: Megan Decker   YOB: 1940   Date of Visit: 6/13/2023       Dear Dr Uri Craig:    Thank you for referring Amrit Bonilla to me for evaluation  Below are my notes for this consultation  If you have questions, please do not hesitate to call me  I look forward to following your patient along with you  Sincerely,        Ninfa Drew MD        CC: MD Ninfa Riggs MD  6/13/2023  9:21 AM  Sign when Signing Visit               Surgical Oncology Follow Up       [address] [facility]  [address] [facility] [place] [address] Alem Odor  1940  [id number]  [address]  [facility] [place] [address] [address] [facility] [id number]    Diagnoses and all orders for this visit:    Pancreatic cyst  -     MRI abdomen w wo contrast and mrcp; Future  -     BUN; Future  -     Creatinine, serum; Future        Chief Complaint   Patient presents with   • Follow-up       Return in about 6 months (around 12/13/2023) for Office Visit, Imaging - See orders  Oncology History    No history exists  Staging: Pancreatic tail cyst  Treatment history: EUS October 2020  Cyst fluid  ng/mL, Amylase 31 U/L  Statistically higher risk on Pancreagen testing  Current treatment: Observation    History of Present Illness: Patient returns in follow-up of her pancreas cyst   She is doing well  No abdominal pain, nausea or vomiting  No change in appetite  No early satiety  MRI from June 5, 2023 shows that the largest lesion in the tail of the pancreas measures 5 5 cm  This was 4 5 cm in August 2020  There are several other smaller cystic lesions throughout the pancreas  No other worrisome or suspicious features  The pancreatic duct is dilated to 3 mm  I personally reviewed the films    She has had a previous colectomy for diverticulitis with an upper midline incision  Review of Systems  Complete ROS Surg Onc:   Complete ROS Surg Onc:   Constitutional: The patient denies new or recent history of general fatigue, no recent weight loss, no change in appetite  Eyes: No complaints of visual problems, no scleral icterus  ENT: no complaints of ear pain, no hoarseness, no difficulty swallowing,  no tinnitus and no new masses in head, oral cavity, or neck  Cardiovascular: No complaints of chest pain, no palpitations, no ankle edema  Respiratory: No complaints of shortness of breath, no cough  Gastrointestinal: No complaints of jaundice, no bloody stools, no pale stools  Genitourinary: No complaints of dysuria, no hematuria, no nocturia, no frequent urination, no urethral discharge  Musculoskeletal: No complaints of weakness, paralysis, joint stiffness or arthralgias  Integumentary: No complaints of rash, no new lesions  Neurological: No complaints of convulsions, no seizures, no dizziness  Hematologic/Lymphatic: No complaints of easy bruising  Endocrine:  No hot or cold intolerance  No polydipsia, polyphagia, or polyuria  Allergy/immunology:  No environmental allergies  No food allergies  Not immunocompromised  Skin:  No pallor or rash  No wound  Patient Active Problem List   Diagnosis   • OAB (overactive bladder)   • Parkinson's disease ([facility] [address] )   • Primary insomnia   • History of skin cancer   • Seasonal allergic rhinitis   • Impaired fasting glucose   • Mood disturbance   • Obesity (BMI 30-39  9)   • Claustrophobia   • Mitral valve disorder   • Menopause ovarian failure   • Vitamin D deficiency   • Dysphagia   • Multiple thyroid nodules   • Gastro-esophageal reflux disease without esophagitis   • Osteopenia   • Chronic idiopathic constipation   • History of adenomatous polyp of colon   • Tremor   • Medicare annual wellness visit, subsequent   • Trochanteric bursitis, right hip   • Pancreatic cyst   • Cherry angioma   • Dyspnea on exertion   • Postablative hypothyroidism   • Sleep apnea   • Closed compression fracture of body of L1 vertebra (HCC)   • Cerebrovascular disease   • Age-related osteoporosis without current pathological fracture   • Urge incontinence     Past Medical History:   Diagnosis Date   • Actinic keratosis 2016   • Acute midline low back pain without sciatica 2020   • 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    • Benign neoplasm of skin    • Chest pain    • Claustrophobia    • Diverticulitis    • Fracture     L1-L2   • GERD (gastroesophageal reflux disease)    • H  pylori infection 2020   • Muscle weakness    • Nonmelanoma skin cancer     last assessed 2017   • Palpitations    • Pancreatic cyst    • Seasonal allergies    • Seborrheic keratosis 2014   • Sleep apnea     no cpap   • Spontaneous      without mention of complications    • Squamous cell carcinoma of forehead 2014   • Syncope      Past Surgical History:   Procedure Laterality Date   • BOTOX INJECTION N/A 3/6/2017    Procedure: CYSTOSCOPY; BLADDER BOTOX 100 UNITS ;  Surgeon: Caitlyn Wilkes MD;  Location: [facility];  Service:    • BOWEL RESECTION      related ot diverticulitis   • CARDIAC CATHETERIZATION     • CARPAL TUNNEL RELEASE Right    • COLONOSCOPY  2019   • COLOSTOMY     • COLOSTOMY CLOSURE     • CYSTOSCOPY  2021   • FOOT SURGERY Left     neuroma   • HYSTERECTOMY     • NASAL SINUS SURGERY  age 35    [facility]   • AR CYSTOURETHROSCOPY N/A 2018    Procedure: CYSTOSCOPY WITH BOTOX;  Surgeon: Caitlyn Wilkes MD;  Location: [facility];  Service: Urology   • AR ESOPHAGOGASTRODUODENOSCOPY TRANSORAL DIAGNOSTIC N/A 2019    Procedure: ESOPHAGOGASTRODUODENOSCOPY (EGD); Surgeon: Beulah Hill DO;  Location: [facility];   Service: Gastroenterology   • TONSILLECTOMY  age 47   • UPPER GASTROINTESTINAL ENDOSCOPY  2019 Family History   Problem Relation Age of Onset   • Alcohol abuse Mother    • Heart disease Mother    • Alcohol abuse Father    • Alcohol abuse Brother    • Cancer Brother    • Tremor Neg Hx    • Parkinsonism Neg Hx    • Colon cancer Neg Hx      Social History     Socioeconomic History   • Marital status:      Spouse name: Not on file   • Number of children: Not on file   • Years of education: Not on file   • Highest education level: Not on file   Occupational History   • Occupation: RETIRED    Tobacco Use   • Smoking status: Never   • Smokeless tobacco: Never   Vaping Use   • Vaping Use: Never used   Substance and Sexual Activity   • Alcohol use: Yes     Comment: patient states rare use on holidays    • Drug use: No   • Sexual activity: Not Currently   Other Topics Concern   • Not on file   Social History Narrative    LIVING INDEPENDENTLY ALONE         No caffeine use     Social Determinants of Health     Financial Resource Strain: Low Risk  (4/19/2023)    Overall Financial Resource Strain (CARDIA)    • Difficulty of Paying Living Expenses: Not very hard   Food Insecurity: Not on file   Transportation Needs: No Transportation Needs (4/19/2023)    PRAPARE - Transportation    • Lack of Transportation (Medical): No    • Lack of Transportation (Non-Medical):  No   Physical Activity: Not on file   Stress: Not on file   Social Connections: Not on file   Intimate Partner Violence: Not on file   Housing Stability: Not on file       Current Outpatient Medications:   •  Ascorbic Acid (VITAMIN C) 1000 MG tablet, Take 1,000 mg by mouth daily, Disp: , Rfl:   •  B Complex Vitamins (B COMPLEX 100 PO), Take by mouth, Disp: , Rfl:   •  calcium carbonate (OS-JOHN) 1250 (500 Ca) MG tablet, Take 1 tablet by mouth daily, Disp: , Rfl:   •  carbidopa-levodopa (SINEMET)  mg per tablet, Take 1 tablet by mouth 3 (three) times a day before meals, Disp: 270 tablet, Rfl: 3  •  cholecalciferol (VITAMIN D3) 1,000 units tablet, Take 5,000 Units by mouth daily , Disp: , Rfl:   •  Coenzyme Q10 (CO Q 10 PO), Take by mouth 600 mg BID, Disp: , Rfl:   •  Cyanocobalamin (VITAMIN B 12 PO), Take by mouth daily, Disp: , Rfl:   •  Echinacea 125 MG CAPS, Take by mouth, Disp: , Rfl:   •  LORazepam (ATIVAN) 1 mg tablet, TAKE 1 TABLET 30 MINUTES PRIOR TO MRI, TAKE 2ND TABLET RIGHT BEFORE MRI IF NEEDED , Disp: 2 tablet, Rfl: 0  •  Magnesium 200 MG TABS, Take 1 tablet (200 mg total) by mouth daily, Disp: 30 tablet, Rfl: 3  •  Mirabegron ER (Myrbetriq) 50 MG TB24, Take 1 tablet (50 mg total) by mouth daily, Disp: 90 tablet, Rfl: 3  •  multivitamin (THERAGRAN) TABS, Take 1 tablet by mouth daily, Disp: , Rfl:   •  Omega-3 350 MG CPDR, Take by mouth, Disp: , Rfl:   •  pantoprazole (PROTONIX) 40 mg tablet, Take 1 tablet (40 mg total) by mouth daily, Disp: 90 tablet, Rfl: 3  •  Potassium Gluconate 595 (99 K) MG TABS, Take by mouth, Disp: , Rfl:   •  Probiotic Product (PROBIOTIC-10) CAPS, Take by mouth, Disp: , Rfl:   Allergies   Allergen Reactions   • Caffeine - Food Allergy GI Intolerance   • Iodine - Food Allergy Rash   • Latex Rash   • Other Rash     Adhesive tape       Vitals:    06/13/23 0843   BP: 126/72   Pulse: 84   Resp: 22   Temp: (!) 97 3 °F (36 3 °C)   SpO2: 98%       Physical Exam  Constitutional: General appearance: The Patient is well-developed and well-nourished who appears the stated age in no acute distress  Patient is pleasant and talkative  HEENT:  Normocephalic  Sclerae are anicteric  Mucous membranes are moist  Neck is supple without adenopathy  No JVD  Chest: The lungs are clear to auscultation  Cardiac: Heart is regular rate  Abdomen: Abdomen is soft, non-tender, non-distended and without masses  Extremities: There is no clubbing or cyanosis  There is no edema  Symmetric  Neuro: Grossly nonfocal  Gait is normal      Lymphatic: No evidence of cervical adenopathy bilaterally     No evidence of axillary adenopathy bilaterally  No evidence of inguinal adenopathy bilaterally  Skin: Warm, anicteric  Psych:  Patient is pleasant and talkative  Breasts:        Pathology:  [unfilled]    Labs:      Imaging  MRI abdomen w wo contrast and mrcp    Result Date: 6/8/2023  Narrative: MRI OF THE ABDOMEN WITH AND WITHOUT CONTRAST WITH MRCP INDICATION: 79 years / Female  K86 2: Cyst of pancreas  COMPARISON: MRI abdomen 12/5/2022, CT abdomen/pelvis 8/24/2020 TECHNIQUE:  Multiplanar/multisequence MRI of the abdomen with 3D MRCP was performed before and after administration of contrast  IV Contrast:  7 mL of Gadobutrol injection (SINGLE-DOSE) FINDINGS: LOWER CHEST:   Focal linear enhancing consolidation in the lingula (series 17 image 14), which can represent atelectasis or pneumonitis  LIVER: Normal in size and configuration  No suspicious mass  The hepatic veins and portal veins are patent  BILE DUCTS:  No intrahepatic or extrahepatic bile duct dilation  Common bile duct is normal in caliber  No choledocholithiasis, biliary stricture or suspicious mass  GALLBLADDER: Multiple small calculi in the gallbladder  PANCREAS: Pancreas divisum  Stable mild dilation of the main pancreatic duct measuring up to 0 3 cm in the pancreatic body  Again seen are multiple cystic lesions throughout the pancreas with the largest lesion measuring up to 5 5 cm in the pancreatic tail (series 8 image 17), previously measuring 5 3 cm on 12/5/2022, 5 0 cm on 5/18/2022 and 4 5 cm on 8/24/2020  Again seen are multiple adjacent smaller simple and septated cystic lesions  Partial fatty replacement of the pancreatic parenchyma  ADRENAL GLANDS:  Normal  SPLEEN:  Normal  KIDNEYS/PROXIMAL URETERS:  No hydroureteronephrosis  No suspicious renal mass  Right renal upper pole simple cyst  Stable size of the exophytic right renal interpole fat-containing lesion, measuring 1 7 cm and compatible with an angiomyolipoma   A small septated proteinaceous left renal upper pole cyst measuring 0 7 cm (series 13 image 51)  BOWEL:   No dilated loops of bowel  PERITONEUM/RETROPERITONEUM:  No ascites  LYMPH NODES:  No abdominal lymphadenopathy  VASCULAR STRUCTURES:  No aneurysm  ABDOMINAL WALL:  Unremarkable  OSSEOUS STRUCTURES:  No suspicious osseous lesion  Impression: Again seen are innumerable pancreatic cysts, the largest cyst in the pancreatic tail measuring up to 5 5 cm, slowly growing in size since 8/24/2020 when it measured 4 5 cm  There are no internal worrisome features  Findings are favored to represent multiple IPMN  Stable mild main pancreatic ductal dilation measuring up to 3 mm  No suspicious focal pancreatic lesion  Continued surveillance is recommended as clinically warranted  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  Lingular atelectasis versus focal consolidation  Clinical correlation is recommended  The study was marked in EPIC for significant notification  Workstation performed: [id number]     I reviewed the above laboratory and imaging data  Discussion/Summary: 21-year-old female with an enlarging pancreas cyst   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  There continued to be no worrisome or suspicious features  Her main duct is not significantly dilated  This has grown more than 2 mm/year over the last 2 years  Her previous pathology showed that this was at statistically we higher risk to become malignant  We discussed the procedure would likely include distal pancreatectomy and possible splenectomy  This may not be able to be performed minimally invasively given her previous midline incision and colostomy  I discussed based on her age, continued observation would also be reasonable, but the growth rate of the cyst is concerning  I will have her case presented and working group  I have tentatively scheduled a follow-up MRI in 6 months  I will call her with the results of the working group meeting  She is agreeable to this    All her questions were answered

## 2023-06-13 NOTE — LETTER
June 13, 2023     [place], [address]  [address] [facility]  [address]    Patient: Saloni Reaves   YOB: 1940   Date of Visit: 6/13/2023       Dear Dr Nupur Garza:    Thank you for referring Viviana Mcnair to me for evaluation  Below are my notes for this consultation  If you have questions, please do not hesitate to call me  I look forward to following your patient along with you  Sincerely,        Kaiden Hicks MD        CC: MD Kaiden Dejesus MD  6/13/2023  9:20 AM  Incomplete               Surgical Oncology Follow Up       [address] [facility],[address] [facility]  [facility] [address] [facility]  1940  [id number]  [address] [facility],[address] [place] [address]  [facility] [place]  [address] [facility] [address]    Diagnoses and all orders for this visit:    Pancreatic cyst  -     MRI abdomen w wo contrast and mrcp; Future  -     BUN; Future  -     Creatinine, serum; Future        Chief Complaint   Patient presents with   • Follow-up       Return in about 6 months (around 12/13/2023) for Office Visit, Imaging - See orders  Oncology History    No history exists  Staging: Pancreatic tail cyst  Treatment history: EUS October 2020  Cyst fluid  ng/mL, Amylase 31 U/L  Statistically higher risk on Pancreagen testing  Current treatment: Observation    History of Present Illness: Patient returns in follow-up of her pancreas cyst   She is doing well  No abdominal pain, nausea or vomiting  No change in appetite  No early satiety  MRI from June 5, 2023 shows that the largest lesion in the tail of the pancreas measures 5 5 cm  This was 4 5 cm in August 2020  There are several other smaller cystic lesions throughout the pancreas  No other worrisome or suspicious features  The pancreatic duct is dilated to 3 mm  I personally reviewed the films    She has had a previous colectomy for diverticulitis with an upper midline incision  Review of Systems  Complete ROS Surg Onc:   Complete ROS Surg Onc:   Constitutional: The patient denies new or recent history of general fatigue, no recent weight loss, no change in appetite  Eyes: No complaints of visual problems, no scleral icterus  ENT: no complaints of ear pain, no hoarseness, no difficulty swallowing,  no tinnitus and no new masses in head, oral cavity, or neck  Cardiovascular: No complaints of chest pain, no palpitations, no ankle edema  Respiratory: No complaints of shortness of breath, no cough  Gastrointestinal: No complaints of jaundice, no bloody stools, no pale stools  Genitourinary: No complaints of dysuria, no hematuria, no nocturia, no frequent urination, no urethral discharge  Musculoskeletal: No complaints of weakness, paralysis, joint stiffness or arthralgias  Integumentary: No complaints of rash, no new lesions  Neurological: No complaints of convulsions, no seizures, no dizziness  Hematologic/Lymphatic: No complaints of easy bruising  Endocrine:  No hot or cold intolerance  No polydipsia, polyphagia, or polyuria  Allergy/immunology:  No environmental allergies  No food allergies  Not immunocompromised  Skin:  No pallor or rash  No wound  Patient Active Problem List   Diagnosis   • OAB (overactive bladder)   • Parkinson's disease ([facility] [address] )   • Primary insomnia   • History of skin cancer   • Seasonal allergic rhinitis   • Impaired fasting glucose   • Mood disturbance   • Obesity (BMI 30-39  9)   • Claustrophobia   • Mitral valve disorder   • Menopause ovarian failure   • Vitamin D deficiency   • Dysphagia   • Multiple thyroid nodules   • Gastro-esophageal reflux disease without esophagitis   • Osteopenia   • Chronic idiopathic constipation   • History of adenomatous polyp of colon   • Tremor   • Medicare annual wellness visit, subsequent   • Trochanteric bursitis, right hip   • Pancreatic cyst   • Cherry angioma   • Dyspnea on exertion   • Postablative hypothyroidism   • Sleep apnea   • Closed compression fracture of body of L1 vertebra (HCC)   • Cerebrovascular disease   • Age-related osteoporosis without current pathological fracture   • Urge incontinence     Past Medical History:   Diagnosis Date   • Actinic keratosis 2016   • Acute midline low back pain without sciatica 2020   • 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    • Benign neoplasm of skin    • Chest pain    • Claustrophobia    • Diverticulitis    • Fracture     L1-L2   • GERD (gastroesophageal reflux disease)    • H  pylori infection 2020   • Muscle weakness    • Nonmelanoma skin cancer     last assessed 2017   • Palpitations    • Pancreatic cyst    • Seasonal allergies    • Seborrheic keratosis 2014   • Sleep apnea     no cpap   • Spontaneous      without mention of complications    • Squamous cell carcinoma of forehead 2014   • Syncope      Past Surgical History:   Procedure Laterality Date   • BOTOX INJECTION N/A 3/6/2017    Procedure: CYSTOSCOPY; BLADDER BOTOX 100 UNITS ;  Surgeon: Cindy Murphy MD;  [facility];  Service:    • BOWEL RESECTION      related ot diverticulitis   • CARDIAC CATHETERIZATION     • CARPAL TUNNEL RELEASE Right    • COLONOSCOPY  2019   • COLOSTOMY     • COLOSTOMY CLOSURE     • CYSTOSCOPY  2021   • FOOT SURGERY Left     neuroma   • HYSTERECTOMY     • NASAL SINUS SURGERY  age 35    [place]   • CA CYSTOURETHROSCOPY N/A 2018    Procedure: CYSTOSCOPY WITH BOTOX;  Surgeon: Cindy Murphy MD;  Location: [facility];  Service: Urology   • CA ESOPHAGOGASTRODUODENOSCOPY TRANSORAL DIAGNOSTIC N/A 2019    Procedure: ESOPHAGOGASTRODUODENOSCOPY (EGD); Surgeon: Pushpa Brady DO;  [facility];   Service: Gastroenterology   • TONSILLECTOMY  age 47   • UPPER GASTROINTESTINAL ENDOSCOPY  2019     Family History   Problem Relation Age of Onset   • Alcohol abuse Mother    • Heart disease Mother    • Alcohol abuse Father    • Alcohol abuse Brother    • Cancer Brother    • Tremor Neg Hx    • Parkinsonism Neg Hx    • Colon cancer Neg Hx      Social History     Socioeconomic History   • Marital status:      Spouse name: Not on file   • Number of children: Not on file   • Years of education: Not on file   • Highest education level: Not on file   Occupational History   • Occupation: RETIRED    Tobacco Use   • Smoking status: Never   • Smokeless tobacco: Never   Vaping Use   • Vaping Use: Never used   Substance and Sexual Activity   • Alcohol use: Yes     Comment: patient states rare use on holidays    • Drug use: No   • Sexual activity: Not Currently   Other Topics Concern   • Not on file   Social History Narrative    LIVING INDEPENDENTLY ALONE         No caffeine use     Social Determinants of Health     Financial Resource Strain: Low Risk  (4/19/2023)    Overall Financial Resource Strain (CARDIA)    • Difficulty of Paying Living Expenses: Not very hard   Food Insecurity: Not on file   Transportation Needs: No Transportation Needs (4/19/2023)    PRAPARE - Transportation    • Lack of Transportation (Medical): No    • Lack of Transportation (Non-Medical):  No   Physical Activity: Not on file   Stress: Not on file   Social Connections: Not on file   Intimate Partner Violence: Not on file   Housing Stability: Not on file       Current Outpatient Medications:   •  Ascorbic Acid (VITAMIN C) 1000 MG tablet, Take 1,000 mg by mouth daily, Disp: , Rfl:   •  B Complex Vitamins (B COMPLEX 100 PO), Take by mouth, Disp: , Rfl:   •  calcium carbonate (OS-JOHN) 1250 (500 Ca) MG tablet, Take 1 tablet by mouth daily, Disp: , Rfl:   •  carbidopa-levodopa (SINEMET)  mg per tablet, Take 1 tablet by mouth 3 (three) times a day before meals, Disp: 270 tablet, Rfl: 3  •  cholecalciferol (VITAMIN D3) 1,000 units tablet, Take 5,000 Units by mouth daily , Disp: , Rfl:   •  Coenzyme Q10 (CO Q 10 PO), Take by mouth 600 mg BID, Disp: , Rfl:   •  Cyanocobalamin (VITAMIN B 12 PO), Take by mouth daily, Disp: , Rfl:   •  Echinacea 125 MG CAPS, Take by mouth, Disp: , Rfl:   •  LORazepam (ATIVAN) 1 mg tablet, TAKE 1 TABLET 30 MINUTES PRIOR TO MRI, TAKE 2ND TABLET RIGHT BEFORE MRI IF NEEDED , Disp: 2 tablet, Rfl: 0  •  Magnesium 200 MG TABS, Take 1 tablet (200 mg total) by mouth daily, Disp: 30 tablet, Rfl: 3  •  Mirabegron ER (Myrbetriq) 50 MG TB24, Take 1 tablet (50 mg total) by mouth daily, Disp: 90 tablet, Rfl: 3  •  multivitamin (THERAGRAN) TABS, Take 1 tablet by mouth daily, Disp: , Rfl:   •  Omega-3 350 MG CPDR, Take by mouth, Disp: , Rfl:   •  pantoprazole (PROTONIX) 40 mg tablet, Take 1 tablet (40 mg total) by mouth daily, Disp: 90 tablet, Rfl: 3  •  Potassium Gluconate 595 (99 K) MG TABS, Take by mouth, Disp: , Rfl:   •  Probiotic Product (PROBIOTIC-10) CAPS, Take by mouth, Disp: , Rfl:   Allergies   Allergen Reactions   • Caffeine - Food Allergy GI Intolerance   • Iodine - Food Allergy Rash   • Latex Rash   • Other Rash     Adhesive tape       Vitals:    06/13/23 0843   BP: 126/72   Pulse: 84   Resp: 22   Temp: (!) 97 3 °F (36 3 °C)   SpO2: 98%       Physical Exam  Constitutional: General appearance: The Patient is well-developed and well-nourished who appears the stated age in no acute distress  Patient is pleasant and talkative  HEENT:  Normocephalic  Sclerae are anicteric  Mucous membranes are moist  Neck is supple without adenopathy  No JVD  Chest: The lungs are clear to auscultation  Cardiac: Heart is regular rate  Abdomen: Abdomen is soft, non-tender, non-distended and without masses  Extremities: There is no clubbing or cyanosis  There is no edema  Symmetric  Neuro: Grossly nonfocal  Gait is normal      Lymphatic: No evidence of cervical adenopathy bilaterally  No evidence of axillary adenopathy bilaterally   No evidence of inguinal adenopathy bilaterally  Skin: Warm, anicteric  Psych:  Patient is pleasant and talkative  Breasts:        Pathology:  [unfilled]    Labs:      Imaging  MRI abdomen w wo contrast and mrcp    Result Date: 6/8/2023  Narrative: MRI OF THE ABDOMEN WITH AND WITHOUT CONTRAST WITH MRCP INDICATION: 79 years / Female  K86 2: Cyst of pancreas  COMPARISON: MRI abdomen 12/5/2022, CT abdomen/pelvis 8/24/2020 TECHNIQUE:  Multiplanar/multisequence MRI of the abdomen with 3D MRCP was performed before and after administration of contrast  IV Contrast:  7 mL of Gadobutrol injection (SINGLE-DOSE) FINDINGS: LOWER CHEST:   Focal linear enhancing consolidation in the lingula (series 17 image 14), which can represent atelectasis or pneumonitis  LIVER: Normal in size and configuration  No suspicious mass  The hepatic veins and portal veins are patent  BILE DUCTS:  No intrahepatic or extrahepatic bile duct dilation  Common bile duct is normal in caliber  No choledocholithiasis, biliary stricture or suspicious mass  GALLBLADDER: Multiple small calculi in the gallbladder  PANCREAS: Pancreas divisum  Stable mild dilation of the main pancreatic duct measuring up to 0 3 cm in the pancreatic body  Again seen are multiple cystic lesions throughout the pancreas with the largest lesion measuring up to 5 5 cm in the pancreatic tail (series 8 image 17), previously measuring 5 3 cm on 12/5/2022, 5 0 cm on 5/18/2022 and 4 5 cm on 8/24/2020  Again seen are multiple adjacent smaller simple and septated cystic lesions  Partial fatty replacement of the pancreatic parenchyma  ADRENAL GLANDS:  Normal  SPLEEN:  Normal  KIDNEYS/PROXIMAL URETERS:  No hydroureteronephrosis  No suspicious renal mass  Right renal upper pole simple cyst  Stable size of the exophytic right renal interpole fat-containing lesion, measuring 1 7 cm and compatible with an angiomyolipoma   A small septated proteinaceous left renal upper pole cyst measuring 0 7 cm (series 13 image 51)  BOWEL:   No dilated loops of bowel  PERITONEUM/RETROPERITONEUM:  No ascites  LYMPH NODES:  No abdominal lymphadenopathy  VASCULAR STRUCTURES:  No aneurysm  ABDOMINAL WALL:  Unremarkable  OSSEOUS STRUCTURES:  No suspicious osseous lesion  Impression: Again seen are innumerable pancreatic cysts, the largest cyst in the pancreatic tail measuring up to 5 5 cm, slowly growing in size since 8/24/2020 when it measured 4 5 cm  There are no internal worrisome features  Findings are favored to represent multiple IPMN  Stable mild main pancreatic ductal dilation measuring up to 3 mm  No suspicious focal pancreatic lesion  Continued surveillance is recommended as clinically warranted  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  Lingular atelectasis versus focal consolidation  Clinical correlation is recommended  The study was marked in EPIC for significant notification  Workstation performed: [id number]     I reviewed the above laboratory and imaging data  Discussion/Summary: 79-year-old female with an enlarging pancreas cyst   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  There continued to be no worrisome or suspicious features  Her main duct is not significantly dilated  This has grown more than 2 mm/year over the last 2 years  Her previous pathology showed that this was at statistically we higher risk to become malignant  We discussed the procedure would likely include distal pancreatectomy and possible splenectomy  This may not be able to be performed minimally invasively given her previous midline incision and colostomy  I discussed based on her age, continued observation would also be reasonable, but the growth rate of the cyst is concerning  I will have her case presented and working group  I have tentatively scheduled a follow-up MRI in 6 months  I will call her with the results of the working group meeting  She is agreeable to this    All her questions were answered  Sona Martinez MD  6/13/2023  8:50 AM  Sign when Signing Visit               Surgical Oncology Follow Up       [address] [facility]  [address] [facility] [address]  1940  [id number]  [address]  [facility] [place]  [address] [facility] [place] [address]    There are no diagnoses linked to this encounter  No chief complaint on file  No follow-ups on file  Oncology History    No history exists  Staging: Pancreatic tail cyst  Treatment history: EUS October 2020  Cyst fluid  ng/mL, Amylase 31 U/L  Statistically higher risk on Pancreagen testing  Current treatment: Observation    History of Present Illness: ***MRI from June 5, 2023 shows that the largest lesion in the tail of the pancreas measures 5 5 cm  This was 4 5 cm in August 2020  There are several other smaller cystic lesions throughout the pancreas  No other worrisome or suspicious features  The pancreatic duct is dilated to 3 mm  I personally reviewed the films  Review of Systems  Complete ROS Surg Onc:   Complete ROS Surg Onc:   Constitutional: The patient denies new or recent history of general fatigue, no recent weight loss, no change in appetite  Eyes: No complaints of visual problems, no scleral icterus  ENT: no complaints of ear pain, no hoarseness, no difficulty swallowing,  no tinnitus and no new masses in head, oral cavity, or neck  Cardiovascular: No complaints of chest pain, no palpitations, no ankle edema  Respiratory: No complaints of shortness of breath, no cough  Gastrointestinal: No complaints of jaundice, no bloody stools, no pale stools  Genitourinary: No complaints of dysuria, no hematuria, no nocturia, no frequent urination, no urethral discharge  Musculoskeletal: No complaints of weakness, paralysis, joint stiffness or arthralgias    Integumentary: No complaints of rash, no new lesions  Neurological: No complaints of convulsions, no seizures, no dizziness  Hematologic/Lymphatic: No complaints of easy bruising  Endocrine:  No hot or cold intolerance  No polydipsia, polyphagia, or polyuria  Allergy/immunology:  No environmental allergies  No food allergies  Not immunocompromised  Skin:  No pallor or rash  No wound  Patient Active Problem List   Diagnosis   • OAB (overactive bladder)   • Parkinson's disease ([facility] [address] )   • Primary insomnia   • History of skin cancer   • Seasonal allergic rhinitis   • Impaired fasting glucose   • Mood disturbance   • Obesity (BMI 30-39  9)   • Claustrophobia   • Mitral valve disorder   • Menopause ovarian failure   • Vitamin D deficiency   • Dysphagia   • Multiple thyroid nodules   • Gastro-esophageal reflux disease without esophagitis   • Osteopenia   • Chronic idiopathic constipation   • History of adenomatous polyp of colon   • Tremor   • Medicare annual wellness visit, subsequent   • Trochanteric bursitis, right hip   • Pancreatic cyst   • Cherry angioma   • Dyspnea on exertion   • Postablative hypothyroidism   • Sleep apnea   • Closed compression fracture of body of L1 vertebra (HCC)   • Cerebrovascular disease   • Age-related osteoporosis without current pathological fracture   • Urge incontinence     Past Medical History:   Diagnosis Date   • Actinic keratosis 2016   • Acute midline low back pain without sciatica 2020   • 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    • Benign neoplasm of skin    • Chest pain    • Claustrophobia    • Diverticulitis    • Fracture     L1-L2   • GERD (gastroesophageal reflux disease)    • H  pylori infection 2020   • Muscle weakness    • Nonmelanoma skin cancer     last assessed 2017   • Palpitations    • Pancreatic cyst    • Seasonal allergies    • Seborrheic keratosis 2014   • Sleep apnea     no cpap   • Spontaneous  without mention of complications    • Squamous cell carcinoma of forehead 07/09/2014   • Syncope      Past Surgical History:   Procedure Laterality Date   • BOTOX INJECTION N/A 3/6/2017    Procedure: CYSTOSCOPY; BLADDER BOTOX 100 UNITS ;  Surgeon: Tanya Garcia MD;  [facility];  Service:    • BOWEL RESECTION      related ot diverticulitis   • CARDIAC CATHETERIZATION     • CARPAL TUNNEL RELEASE Right    • COLONOSCOPY  07/31/2019   • COLOSTOMY     • COLOSTOMY CLOSURE     • CYSTOSCOPY  06/01/2021   • FOOT SURGERY Left     neuroma   • HYSTERECTOMY     • NASAL SINUS SURGERY  age 35    [place]   • OR CYSTOURETHROSCOPY N/A 4/13/2018    Procedure: CYSTOSCOPY WITH BOTOX;  Surgeon: Tanya Garcia MD;  [facility];  Service: Urology   • OR ESOPHAGOGASTRODUODENOSCOPY TRANSORAL DIAGNOSTIC N/A 4/23/2019    Procedure: ESOPHAGOGASTRODUODENOSCOPY (EGD); Surgeon: Tanesha Johnson DO;  [facility]; Service: Gastroenterology   • TONSILLECTOMY  age 47   • UPPER GASTROINTESTINAL ENDOSCOPY  04/23/2019     Family History   Problem Relation Age of Onset   • Alcohol abuse Mother    • Heart disease Mother    • Alcohol abuse Father    • Alcohol abuse Brother    • Cancer Brother    • Tremor Neg Hx    • Parkinsonism Neg Hx    • Colon cancer Neg Hx      Social History     Socioeconomic History   • Marital status:       Spouse name: Not on file   • Number of children: Not on file   • Years of education: Not on file   • Highest education level: Not on file   Occupational History   • Occupation: RETIRED    Tobacco Use   • Smoking status: Never   • Smokeless tobacco: Never   Vaping Use   • Vaping Use: Never used   Substance and Sexual Activity   • Alcohol use: Yes     Comment: patient states rare use on holidays    • Drug use: No   • Sexual activity: Not Currently   Other Topics Concern   • Not on file   Social History Narrative    LIVING INDEPENDENTLY ALONE         No caffeine use     Social Determinants of Health     Financial Resource Strain: Low Risk  (4/19/2023)    Overall Financial Resource Strain (CARDIA)    • Difficulty of Paying Living Expenses: Not very hard   Food Insecurity: Not on file   Transportation Needs: No Transportation Needs (4/19/2023)    PRAPARE - Transportation    • Lack of Transportation (Medical): No    • Lack of Transportation (Non-Medical): No   Physical Activity: Not on file   Stress: Not on file   Social Connections: Not on file   Intimate Partner Violence: Not on file   Housing Stability: Not on file       Current Outpatient Medications:   •  Ascorbic Acid (VITAMIN C) 1000 MG tablet, Take 1,000 mg by mouth daily, Disp: , Rfl:   •  B Complex Vitamins (B COMPLEX 100 PO), Take by mouth, Disp: , Rfl:   •  calcium carbonate (OS-JOHN) 1250 (500 Ca) MG tablet, Take 1 tablet by mouth daily, Disp: , Rfl:   •  carbidopa-levodopa (SINEMET)  mg per tablet, Take 1 tablet by mouth 3 (three) times a day before meals, Disp: 270 tablet, Rfl: 3  •  cholecalciferol (VITAMIN D3) 1,000 units tablet, Take 5,000 Units by mouth daily , Disp: , Rfl:   •  Coenzyme Q10 (CO Q 10 PO), Take by mouth 600 mg BID, Disp: , Rfl:   •  Cyanocobalamin (VITAMIN B 12 PO), Take by mouth daily, Disp: , Rfl:   •  Echinacea 125 MG CAPS, Take by mouth, Disp: , Rfl:   •  LORazepam (ATIVAN) 1 mg tablet, TAKE 1 TABLET 30 MINUTES PRIOR TO MRI, TAKE 2ND TABLET RIGHT BEFORE MRI IF NEEDED   (Patient not taking: Reported on 6/8/2023), Disp: 2 tablet, Rfl: 0  •  Magnesium 200 MG TABS, Take 1 tablet (200 mg total) by mouth daily, Disp: 30 tablet, Rfl: 3  •  Mirabegron ER (Myrbetriq) 50 MG TB24, Take 1 tablet (50 mg total) by mouth daily, Disp: 90 tablet, Rfl: 3  •  multivitamin (THERAGRAN) TABS, Take 1 tablet by mouth daily, Disp: , Rfl:   •  Omega-3 350 MG CPDR, Take by mouth, Disp: , Rfl:   •  pantoprazole (PROTONIX) 40 mg tablet, Take 1 tablet (40 mg total) by mouth daily, Disp: 90 tablet, Rfl: 3  •  Potassium Gluconate 595 (99 K) MG TABS, Take by mouth, Disp: , Rfl:   •  Probiotic Product (PROBIOTIC-10) CAPS, Take by mouth, Disp: , Rfl:   Allergies   Allergen Reactions   • Caffeine - Food Allergy GI Intolerance   • Iodine - Food Allergy Rash   • Latex Rash   • Other Rash     Adhesive tape       There were no vitals filed for this visit  Physical Exam  Constitutional: General appearance: The Patient is well-developed and well-nourished who appears the stated age in no acute distress  Patient is pleasant and talkative  HEENT:  Normocephalic  Sclerae are anicteric  Mucous membranes are moist  Neck is supple without adenopathy  No JVD  Chest: The lungs are clear to auscultation  Cardiac: Heart is regular rate  Abdomen: Abdomen is soft, non-tender, non-distended and without masses  Extremities: There is no clubbing or cyanosis  There is no edema  Symmetric  Neuro: Grossly nonfocal  Gait is normal      Lymphatic: No evidence of cervical adenopathy bilaterally  No evidence of axillary adenopathy bilaterally  No evidence of inguinal adenopathy bilaterally  Skin: Warm, anicteric  Psych:  Patient is pleasant and talkative  Breasts:        Pathology:  [unfilled]    Labs:      Imaging  MRI abdomen w wo contrast and mrcp    Result Date: 6/8/2023  Narrative: MRI OF THE ABDOMEN WITH AND WITHOUT CONTRAST WITH MRCP INDICATION: 79 years / Female  K86 2: Cyst of pancreas  COMPARISON: MRI abdomen 12/5/2022, CT abdomen/pelvis 8/24/2020 TECHNIQUE:  Multiplanar/multisequence MRI of the abdomen with 3D MRCP was performed before and after administration of contrast  IV Contrast:  7 mL of Gadobutrol injection (SINGLE-DOSE) FINDINGS: LOWER CHEST:   Focal linear enhancing consolidation in the lingula (series 17 image 14), which can represent atelectasis or pneumonitis  LIVER: Normal in size and configuration  No suspicious mass  The hepatic veins and portal veins are patent   BILE DUCTS:  No intrahepatic or extrahepatic bile duct dilation  Common bile duct is normal in caliber  No choledocholithiasis, biliary stricture or suspicious mass  GALLBLADDER: Multiple small calculi in the gallbladder  PANCREAS: Pancreas divisum  Stable mild dilation of the main pancreatic duct measuring up to 0 3 cm in the pancreatic body  Again seen are multiple cystic lesions throughout the pancreas with the largest lesion measuring up to 5 5 cm in the pancreatic tail (series 8 image 17), previously measuring 5 3 cm on 12/5/2022, 5 0 cm on 5/18/2022 and 4 5 cm on 8/24/2020  Again seen are multiple adjacent smaller simple and septated cystic lesions  Partial fatty replacement of the pancreatic parenchyma  ADRENAL GLANDS:  Normal  SPLEEN:  Normal  KIDNEYS/PROXIMAL URETERS:  No hydroureteronephrosis  No suspicious renal mass  Right renal upper pole simple cyst  Stable size of the exophytic right renal interpole fat-containing lesion, measuring 1 7 cm and compatible with an angiomyolipoma  A small septated proteinaceous left renal upper pole cyst measuring 0 7 cm (series 13 image 51)  BOWEL:   No dilated loops of bowel  PERITONEUM/RETROPERITONEUM:  No ascites  LYMPH NODES:  No abdominal lymphadenopathy  VASCULAR STRUCTURES:  No aneurysm  ABDOMINAL WALL:  Unremarkable  OSSEOUS STRUCTURES:  No suspicious osseous lesion  Impression: Again seen are innumerable pancreatic cysts, the largest cyst in the pancreatic tail measuring up to 5 5 cm, slowly growing in size since 8/24/2020 when it measured 4 5 cm  There are no internal worrisome features  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

## 2023-06-18 PROBLEM — Z00.00 MEDICARE ANNUAL WELLNESS VISIT, SUBSEQUENT: Status: RESOLVED | Noted: 2020-07-22 | Resolved: 2023-06-18

## 2023-06-19 ENCOUNTER — DOCUMENTATION (OUTPATIENT)
Dept: HEMATOLOGY ONCOLOGY | Facility: CLINIC | Age: 83
End: 2023-06-19

## 2023-06-19 NOTE — PROGRESS NOTES
In-basket message received from Dr Sendy Chavez to add patient to next available [facility] on 7/6/2023  Chart reviewed and prep completed

## 2023-06-20 ENCOUNTER — TELEPHONE (OUTPATIENT)
Dept: HEMATOLOGY ONCOLOGY | Facility: CLINIC | Age: 83
End: 2023-06-20

## 2023-06-20 NOTE — TELEPHONE ENCOUNTER
Call Transfer   Who are you speaking with? Patient   If it is not the patient, are they listed on an active communication consent form? N/A   Who is the patients HemOnc/SurgOnc provider? Dr Bernadene Canavan   What is the reason for this call? Patient calling in stating that she has decided to go forward with surgery  Person/Department that the call was transferred to? Time that call was transferred? Tao Ayoub @ 9:20AM   Your call will be transferred now  If you receive a voicemail, please leave a detailed message and a member of the team will return your call as soon as possible  Did you relay this information to the caller?   Yes

## 2023-07-03 ENCOUNTER — DOCUMENTATION (OUTPATIENT)
Dept: HEMATOLOGY ONCOLOGY | Facility: CLINIC | Age: 83
End: 2023-07-03

## 2023-07-03 NOTE — PROGRESS NOTES
Chart reviewed in preparation of Upper GI Tumor Conference presentation by Dr. Addy Gaines.   Patient established care with surgical oncology for a pancreatic cyst.

## 2023-07-06 ENCOUNTER — OFFICE VISIT (OUTPATIENT)
Dept: SURGICAL ONCOLOGY | Facility: CLINIC | Age: 83
End: 2023-07-06
Payer: COMMERCIAL

## 2023-07-06 ENCOUNTER — DOCUMENTATION (OUTPATIENT)
Dept: HEMATOLOGY ONCOLOGY | Facility: CLINIC | Age: 83
End: 2023-07-06

## 2023-07-06 VITALS
RESPIRATION RATE: 16 BRPM | TEMPERATURE: 97.8 F | SYSTOLIC BLOOD PRESSURE: 128 MMHG | OXYGEN SATURATION: 98 % | DIASTOLIC BLOOD PRESSURE: 80 MMHG | BODY MASS INDEX: 30.18 KG/M2 | HEART RATE: 82 BPM | HEIGHT: 62 IN

## 2023-07-06 DIAGNOSIS — K86.2 PANCREATIC CYST: Primary | ICD-10-CM

## 2023-07-06 PROCEDURE — 1160F RVW MEDS BY RX/DR IN RCRD: CPT | Performed by: SURGERY

## 2023-07-06 PROCEDURE — 1159F MED LIST DOCD IN RCRD: CPT | Performed by: SURGERY

## 2023-07-06 PROCEDURE — 99215 OFFICE O/P EST HI 40 MIN: CPT | Performed by: SURGERY

## 2023-07-06 NOTE — PROGRESS NOTES
RECTAL/GI MULTIDISCIPLINARY CASE REVIEW    DATE: 7/6/2023      PRESENTING DOCTOR: Dr. Alesha Alfred      DIAGNOSIS: Pancreatic Cyst      Jyoti Mchugh was presented at the Rectal/GI Multidisciplinary Conference today. PHYSICIAN RECOMMENDED PLAN:    - The recommended plan is to proceed with distal pancreatectomy and possible splenectomy. Team agreed to plan. The final treatment plan will be left to the discretion of the patient and the treating physician. DISCLAIMERS:  TO THE TREATING PHYSICIAN:  This conference is a meeting of clinicians from various specialty areas who evaluate and discuss patients for whom a multidisciplinary treatment approach is being considered. Please note that the above opinion was a consensus of the conference attendees and is intended only to assist in quality care of the discussed patient. The responsibility for follow up on the input given during the conference, along with any final decisions regarding plan of care, is that of the patient and the patient's provider. Accordingly, appointments have only been recommended based on this information and have NOT been scheduled unless otherwise noted. TO THE PATIENT:  This summary is a brief record of major aspects of your cancer treatment. You may choose to share a copy with any of your doctors or nurses. However, this is not a detailed or comprehensive record of your care.       NCCN guidelines were readily available for review at this discussion

## 2023-07-06 NOTE — PROGRESS NOTES
Surgical Oncology Follow Up       [facility] [address] [facility] [address]  [facility]  [address]  [facility] [address]    Linsey Harrington  1940  [phone]  [facility] [place]  [facility] [place]  [facility] [address]  [address]  [phone]    Diagnoses and all orders for this visit:    Pancreatic cyst  -     Case request operating room: DISTAL PANCREATECTOMY, POSSIBLE SPLENECTOMY; Standing  -     Type and screen; Future  -     Prepare Leukoreduced RBC: 2 Units; Future  -     Comprehensive metabolic panel; Future  -     CBC and differential; Future  -     APTT; Future  -     Protime-INR; Future  -     HEMOGLOBIN A1C W/ EAG ESTIMATION; Future  -     EKG 12 lead; Future  -     XR chest pa & lateral; Future  -     Ambulatory referral to surgical optimization; Future  -     Case request operating room: DISTAL PANCREATECTOMY, POSSIBLE SPLENECTOMY    Other orders  -     Incentive spirometry; Standing  -     Insert and maintain IV line; Standing  -     Void On-Call to O.R.; Standing  -     Place sequential compression device; Standing  -     ceFAZolin (ANCEF) 1,000 mg in dextrose 5 % 100 mL IVPB        Chief Complaint   Patient presents with   • Follow-up       No follow-ups on file. Oncology History    No history exists. Staging: Pancreatic tail cyst  Treatment history: EUS October 2020  Cyst fluid  ng/mL, Amylase 31 U/L  Statistically higher risk on Pancreagen testing  Current treatment: Observation    History of Present Illness: Returns in follow-up. She is nervous about following the cyst.  We also discussed her case at upper GI working group and the recommendation was to consider surgery. She is feeling well except for some intermittent abdominal discomfort that comes and goes.     Review of Systems  Complete ROS Surg Onc:   Complete ROS Surg Onc:   Constitutional: The patient denies new or recent history of general fatigue, no recent weight loss, no change in appetite. Eyes: No complaints of visual problems, no scleral icterus. ENT: no complaints of ear pain, no hoarseness, no difficulty swallowing,  no tinnitus and no new masses in head, oral cavity, or neck. Cardiovascular: No complaints of chest pain, no palpitations, no ankle edema. Respiratory: No complaints of shortness of breath, no cough. Gastrointestinal: No complaints of jaundice, no bloody stools, no pale stools. Genitourinary: No complaints of dysuria, no hematuria, no nocturia, no frequent urination, no urethral discharge. Musculoskeletal: No complaints of weakness, paralysis, joint stiffness or arthralgias. Integumentary: No complaints of rash, no new lesions. Neurological: No complaints of convulsions, no seizures, no dizziness. Hematologic/Lymphatic: No complaints of easy bruising. Endocrine:  No hot or cold intolerance. No polydipsia, polyphagia, or polyuria. Allergy/immunology:  No environmental allergies. No food allergies. Not immunocompromised. Skin:  No pallor or rash. No wound. Patient Active Problem List   Diagnosis   • OAB (overactive bladder)   • Parkinson's disease ([address])   • Primary insomnia   • History of skin cancer   • Seasonal allergic rhinitis   • Impaired fasting glucose   • Mood disturbance   • Obesity (BMI 30-39. 9)   • Claustrophobia   • Mitral valve disorder   • Menopause ovarian failure   • Vitamin D deficiency   • Dysphagia   • Multiple thyroid nodules   • Gastro-esophageal reflux disease without esophagitis   • Osteopenia   • Chronic idiopathic constipation   • History of adenomatous polyp of colon   • Tremor   • Trochanteric bursitis, right hip   • Pancreatic cyst   • Cherry angioma   • Dyspnea on exertion   • Postablative hypothyroidism   • Sleep apnea   • Closed compression fracture of body of L1 vertebra (HCC)   • Cerebrovascular disease   • Age-related osteoporosis without current pathological fracture   • Urge incontinence     Past Medical History:   Diagnosis Date   • Actinic keratosis 2016   • Acute midline low back pain without sciatica 2020   • 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    • Benign neoplasm of skin    • Chest pain    • Claustrophobia    • Diverticulitis    • Fracture     L1-L2   • GERD (gastroesophageal reflux disease)    • H. pylori infection 2020   • Muscle weakness    • Nonmelanoma skin cancer     last assessed 2017   • Palpitations    • Pancreatic cyst    • Seasonal allergies    • Seborrheic keratosis 2014   • Sleep apnea     no cpap   • Spontaneous      without mention of complications    • Squamous cell carcinoma of forehead 2014   • Syncope      Past Surgical History:   Procedure Laterality Date   • BOTOX INJECTION N/A 3/6/2017    Procedure: CYSTOSCOPY; BLADDER BOTOX 100 UNITS ;  Surgeon: Newton Duke MD;  Location: [facility];  Service:    • BOWEL RESECTION      related ot diverticulitis   • CARDIAC CATHETERIZATION     • CARPAL TUNNEL RELEASE Right    • COLONOSCOPY  2019   • COLOSTOMY     • COLOSTOMY CLOSURE     • CYSTOSCOPY  2021   • FOOT SURGERY Left     neuroma   • HYSTERECTOMY     • NASAL SINUS SURGERY  age 35    [place]   • DE CYSTOURETHROSCOPY N/A 2018    Procedure: CYSTOSCOPY WITH BOTOX;  Surgeon: Newton Duke MD;  Location: [facility];  Service: Urology   • DE ESOPHAGOGASTRODUODENOSCOPY TRANSORAL DIAGNOSTIC N/A 2019    Procedure: ESOPHAGOGASTRODUODENOSCOPY (EGD); Surgeon: Rachel Estrada DO;  Location: [facility];   Service: Gastroenterology   • TONSILLECTOMY  age 47   • UPPER GASTROINTESTINAL ENDOSCOPY  2019     Family History   Problem Relation Age of Onset   • Alcohol abuse Mother    • Heart disease Mother    • Alcohol abuse Father    • Alcohol abuse Brother    • Cancer Brother • Tremor Neg Hx    • Parkinsonism Neg Hx    • Colon cancer Neg Hx      Social History     Socioeconomic History   • Marital status:      Spouse name: Not on file   • Number of children: Not on file   • Years of education: Not on file   • Highest education level: Not on file   Occupational History   • Occupation: RETIRED    Tobacco Use   • Smoking status: Never   • Smokeless tobacco: Never   Vaping Use   • Vaping Use: Never used   Substance and Sexual Activity   • Alcohol use: Yes     Comment: patient states rare use on holidays    • Drug use: No   • Sexual activity: Not Currently   Other Topics Concern   • Not on file   Social History Narrative    LIVING INDEPENDENTLY ALONE         No caffeine use     Social Determinants of Health     Financial Resource Strain: Low Risk  (4/19/2023)    Overall Financial Resource Strain (CARDIA)    • Difficulty of Paying Living Expenses: Not very hard   Food Insecurity: Not on file   Transportation Needs: No Transportation Needs (4/19/2023)    PRAPARE - Transportation    • Lack of Transportation (Medical): No    • Lack of Transportation (Non-Medical):  No   Physical Activity: Not on file   Stress: Not on file   Social Connections: Not on file   Intimate Partner Violence: Not on file   Housing Stability: Not on file       Current Outpatient Medications:   •  Ascorbic Acid (VITAMIN C) 1000 MG tablet, Take 1,000 mg by mouth daily, Disp: , Rfl:   •  B Complex Vitamins (B COMPLEX 100 PO), Take by mouth, Disp: , Rfl:   •  calcium carbonate (OS-JOHN) 1250 (500 Ca) MG tablet, Take 1 tablet by mouth daily, Disp: , Rfl:   •  carbidopa-levodopa (SINEMET)  mg per tablet, Take 1 tablet by mouth 3 (three) times a day before meals, Disp: 270 tablet, Rfl: 3  •  cholecalciferol (VITAMIN D3) 1,000 units tablet, Take 5,000 Units by mouth daily , Disp: , Rfl:   •  Coenzyme Q10 (CO Q 10 PO), Take by mouth 600 mg BID, Disp: , Rfl:   •  Cyanocobalamin (VITAMIN B 12 PO), Take by mouth daily, Disp: , Rfl:   •  Echinacea 125 MG CAPS, Take by mouth, Disp: , Rfl:   •  LORazepam (ATIVAN) 1 mg tablet, TAKE 1 TABLET 30 MINUTES PRIOR TO MRI, TAKE 2ND TABLET RIGHT BEFORE MRI IF NEEDED., Disp: 2 tablet, Rfl: 0  •  Magnesium 200 MG TABS, Take 1 tablet (200 mg total) by mouth daily, Disp: 30 tablet, Rfl: 3  •  Mirabegron ER (Myrbetriq) 50 MG TB24, Take 1 tablet (50 mg total) by mouth daily, Disp: 90 tablet, Rfl: 3  •  multivitamin (THERAGRAN) TABS, Take 1 tablet by mouth daily, Disp: , Rfl:   •  Omega-3 350 MG CPDR, Take by mouth, Disp: , Rfl:   •  pantoprazole (PROTONIX) 40 mg tablet, Take 1 tablet (40 mg total) by mouth daily, Disp: 90 tablet, Rfl: 3  •  Potassium Gluconate 595 (99 K) MG TABS, Take by mouth, Disp: , Rfl:   •  Probiotic Product (PROBIOTIC-10) CAPS, Take by mouth, Disp: , Rfl:   Allergies   Allergen Reactions   • Caffeine - Food Allergy GI Intolerance   • Iodine - Food Allergy Rash   • Latex Rash   • Other Rash     Adhesive tape       Vitals:    07/06/23 1218   BP: 128/80   Pulse: 82   Resp: 16   Temp: 97.8 °F (36.6 °C)   SpO2: 98%       Physical Exam  Constitutional: General appearance: The Patient is well-developed and well-nourished who appears the stated age in no acute distress. Patient is pleasant and talkative. HEENT:  Normocephalic. Sclerae are anicteric. Mucous membranes are moist. Neck is supple without adenopathy. No JVD. Chest: The lungs are clear to auscultation. Cardiac: Heart is regular rate. Abdomen: Abdomen is soft, non-tender, non-distended and without masses. Extremities: There is no clubbing or cyanosis. There is no edema. Symmetric. Neuro: Grossly nonfocal. Gait is normal.     Lymphatic: No evidence of cervical adenopathy bilaterally. Skin: Warm, anicteric. Psych:  Patient is pleasant and talkative. Breasts:        Pathology:  [unfilled]    Labs:      Imaging  No results found.   I reviewed the above laboratory and imaging data.    Discussion/Summary: 79-year-old female with enlarging pancreatic cyst.  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 The main duct appears normal.  This is growing greater than 2 mm/year. Her case was discussed at upper GI working group today and the recommendation was to consider surgery. The lesion was also at statistically higher risk to become malignant. I would recommend performing this in an open fashion given her age, as well as the fact that she has had an upper midline incision from her previous surgery for diverticulitis and colostomy takedown. I explained the risks of distal pancreatectomy and possible splenectomy to include bleeding, infection, recurrence, need for further surgery, wound complications, adjacent organ injury, pancreatic fistula, sepsis, MI, DVT, stroke, pulmonary embolism, and death. Informed consent was obtained. We will schedule this at our earliest mutual convenience. We will have her evaluated at the surgical optimization clinic. She is agreeable to this plan. All her questions were answered.

## 2023-07-06 NOTE — LETTER
July 6, 2023     [address]    Patient: Neo Gonzalez   YOB: 1940   Date of Visit: 7/6/2023       Dear Dr. Rachel Granados:    Thank you for referring Nate Feliciano to me for evaluation. Below are my notes for this consultation. If you have questions, please do not hesitate to call me. I look forward to following your patient along with you. Sincerely,        Gar Kocher, MD        CC: Wyatt Crawford, MD Gar Kocher, MD  7/6/2023 12:40 PM  Sign when Signing Visit               Surgical Oncology Follow Up       [facility] [address] [place] [address] SURGICAL ONCOLOGY [address] [address]  [address] [facility] [address]  [address] [place] [address]  1940  [id number]  [facility] SURGICAL ONCOLOGY [address] [address]  2001 [facility]  [phone]    Diagnoses and all orders for this visit:    Pancreatic cyst  -     Case request operating room: DISTAL PANCREATECTOMY, POSSIBLE SPLENECTOMY; Standing  -     Type and screen; Future  -     Prepare Leukoreduced RBC: 2 Units; Future  -     Comprehensive metabolic panel; Future  -     CBC and differential; Future  -     APTT; Future  -     Protime-INR; Future  -     HEMOGLOBIN A1C W/ EAG ESTIMATION; Future  -     EKG 12 lead; Future  -     XR chest pa & lateral; Future  -     Ambulatory referral to surgical optimization; Future  -     Case request operating room: DISTAL PANCREATECTOMY, POSSIBLE SPLENECTOMY    Other orders  -     Incentive spirometry; Standing  -     Insert and maintain IV line; Standing  -     Void On-Call to O.R.; Standing  -     Place sequential compression device; Standing  -     ceFAZolin (ANCEF) 1,000 mg in dextrose 5 % 100 mL IVPB        Chief Complaint   Patient presents with   • Follow-up       No follow-ups on file. Oncology History    No history exists. Staging: Pancreatic tail cyst  Treatment history: EUS October 2020  Cyst fluid  ng/mL, Amylase 31 U/L  Statistically higher risk on Pancreagen testing  Current treatment: Observation    History of Present Illness: Returns in follow-up. She is nervous about following the cyst.  We also discussed her case at upper GI working group and the recommendation was to consider surgery. She is feeling well except for some intermittent abdominal discomfort that comes and goes. Review of Systems  Complete ROS Surg Onc:   Complete ROS Surg Onc:   Constitutional: The patient denies new or recent history of general fatigue, no recent weight loss, no change in appetite. Eyes: No complaints of visual problems, no scleral icterus. ENT: no complaints of ear pain, no hoarseness, no difficulty swallowing,  no tinnitus and no new masses in head, oral cavity, or neck. Cardiovascular: No complaints of chest pain, no palpitations, no ankle edema. Respiratory: No complaints of shortness of breath, no cough. Gastrointestinal: No complaints of jaundice, no bloody stools, no pale stools. Genitourinary: No complaints of dysuria, no hematuria, no nocturia, no frequent urination, no urethral discharge. Musculoskeletal: No complaints of weakness, paralysis, joint stiffness or arthralgias. Integumentary: No complaints of rash, no new lesions. Neurological: No complaints of convulsions, no seizures, no dizziness. Hematologic/Lymphatic: No complaints of easy bruising. Endocrine:  No hot or cold intolerance. No polydipsia, polyphagia, or polyuria. Allergy/immunology:  No environmental allergies. No food allergies. Not immunocompromised. Skin:  No pallor or rash. No wound.         Patient Active Problem List   Diagnosis   • OAB (overactive bladder)   • Parkinson's disease ([address])   • Primary insomnia   • History of skin cancer   • Seasonal allergic rhinitis   • Impaired fasting glucose   • Mood disturbance   • Obesity (BMI 30-39. 9)   • Claustrophobia   • Mitral valve disorder   • Menopause ovarian failure   • Vitamin D deficiency   • Dysphagia   • Multiple thyroid nodules   • Gastro-esophageal reflux disease without esophagitis   • Osteopenia   • Chronic idiopathic constipation   • History of adenomatous polyp of colon   • Tremor   • Trochanteric bursitis, right hip   • Pancreatic cyst   • Cherry angioma   • Dyspnea on exertion   • Postablative hypothyroidism   • Sleep apnea   • Closed compression fracture of body of L1 vertebra (HCC)   • Cerebrovascular disease   • Age-related osteoporosis without current pathological fracture   • Urge incontinence     Past Medical History:   Diagnosis Date   • Actinic keratosis 2016   • Acute midline low back pain without sciatica 2020   • 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    • Benign neoplasm of skin    • Chest pain    • Claustrophobia    • Diverticulitis    • Fracture     L1-L2   • GERD (gastroesophageal reflux disease)    • H. pylori infection 2020   • Muscle weakness    • Nonmelanoma skin cancer     last assessed 2017   • Palpitations    • Pancreatic cyst    • Seasonal allergies    • Seborrheic keratosis 2014   • Sleep apnea     no cpap   • Spontaneous      without mention of complications    • Squamous cell carcinoma of forehead 2014   • Syncope      Past Surgical History:   Procedure Laterality Date   • BOTOX INJECTION N/A 3/6/2017    Procedure: CYSTOSCOPY; BLADDER BOTOX 100 UNITS ;  Surgeon: Nuha Camargo MD;  [facility];  Service:    • BOWEL RESECTION      related ot diverticulitis   • CARDIAC CATHETERIZATION     • CARPAL TUNNEL RELEASE Right    • COLONOSCOPY  2019   • COLOSTOMY     • COLOSTOMY CLOSURE     • CYSTOSCOPY  2021   • FOOT SURGERY Left     neuroma   • HYSTERECTOMY     • NASAL SINUS SURGERY  age 35    [place]   • [place] CYSTOURETHROSCOPY N/A 2018    Procedure: CYSTOSCOPY WITH BOTOX;  Surgeon: Ildefonso Mcrae MD;  [facility];  Service: Urology   • [place] ESOPHAGOGASTRODUODENOSCOPY TRANSORAL DIAGNOSTIC N/A 4/23/2019    Procedure: ESOPHAGOGASTRODUODENOSCOPY (EGD); Surgeon: Charli Thomas DO;  [facility]; Service: Gastroenterology   • TONSILLECTOMY  age 47   • UPPER GASTROINTESTINAL ENDOSCOPY  04/23/2019     Family History   Problem Relation Age of Onset   • Alcohol abuse Mother    • Heart disease Mother    • Alcohol abuse Father    • Alcohol abuse Brother    • Cancer Brother    • Tremor Neg Hx    • Parkinsonism Neg Hx    • Colon cancer Neg Hx      Social History     Socioeconomic History   • Marital status:      Spouse name: Not on file   • Number of children: Not on file   • Years of education: Not on file   • Highest education level: Not on file   Occupational History   • Occupation: RETIRED    Tobacco Use   • Smoking status: Never   • Smokeless tobacco: Never   Vaping Use   • Vaping Use: Never used   Substance and Sexual Activity   • Alcohol use: Yes     Comment: patient states rare use on holidays    • Drug use: No   • Sexual activity: Not Currently   Other Topics Concern   • Not on file   Social History Narrative    LIVING INDEPENDENTLY ALONE         No caffeine use     Social Determinants of Health     Financial Resource Strain: Low Risk  (4/19/2023)    Overall Financial Resource Strain (CARDIA)    • Difficulty of Paying Living Expenses: Not very hard   Food Insecurity: Not on file   Transportation Needs: No Transportation Needs (4/19/2023)    PRAPARE - Transportation    • Lack of Transportation (Medical): No    • Lack of Transportation (Non-Medical):  No   Physical Activity: Not on file   Stress: Not on file   Social Connections: Not on file   Intimate Partner Violence: Not on file   Housing Stability: Not on file       Current Outpatient Medications:   •  Ascorbic Acid (VITAMIN C) 1000 MG tablet, Take 1,000 mg by mouth daily, Disp: , Rfl:   •  B Complex Vitamins (B COMPLEX 100 PO), Take by mouth, Disp: , Rfl:   •  calcium carbonate (OS-JOHN) 1250 (500 Ca) MG tablet, Take 1 tablet by mouth daily, Disp: , Rfl:   •  carbidopa-levodopa (SINEMET)  mg per tablet, Take 1 tablet by mouth 3 (three) times a day before meals, Disp: 270 tablet, Rfl: 3  •  cholecalciferol (VITAMIN D3) 1,000 units tablet, Take 5,000 Units by mouth daily , Disp: , Rfl:   •  Coenzyme Q10 (CO Q 10 PO), Take by mouth 600 mg BID, Disp: , Rfl:   •  Cyanocobalamin (VITAMIN B 12 PO), Take by mouth daily, Disp: , Rfl:   •  Echinacea 125 MG CAPS, Take by mouth, Disp: , Rfl:   •  LORazepam (ATIVAN) 1 mg tablet, TAKE 1 TABLET 30 MINUTES PRIOR TO MRI, TAKE 2ND TABLET RIGHT BEFORE MRI IF NEEDED., Disp: 2 tablet, Rfl: 0  •  Magnesium 200 MG TABS, Take 1 tablet (200 mg total) by mouth daily, Disp: 30 tablet, Rfl: 3  •  Mirabegron ER (Myrbetriq) 50 MG TB24, Take 1 tablet (50 mg total) by mouth daily, Disp: 90 tablet, Rfl: 3  •  multivitamin (THERAGRAN) TABS, Take 1 tablet by mouth daily, Disp: , Rfl:   •  Omega-3 350 MG CPDR, Take by mouth, Disp: , Rfl:   •  pantoprazole (PROTONIX) 40 mg tablet, Take 1 tablet (40 mg total) by mouth daily, Disp: 90 tablet, Rfl: 3  •  Potassium Gluconate 595 (99 K) MG TABS, Take by mouth, Disp: , Rfl:   •  Probiotic Product (PROBIOTIC-10) CAPS, Take by mouth, Disp: , Rfl:   Allergies   Allergen Reactions   • Caffeine - Food Allergy GI Intolerance   • Iodine - Food Allergy Rash   • Latex Rash   • Other Rash     Adhesive tape       Vitals:    07/06/23 1218   BP: 128/80   Pulse: 82   Resp: 16   Temp: 97.8 °F (36.6 °C)   SpO2: 98%       Physical Exam  Constitutional: General appearance: The Patient is well-developed and well-nourished who appears the stated age in no acute distress. Patient is pleasant and talkative. HEENT:  Normocephalic. Sclerae are anicteric. Mucous membranes are moist. Neck is supple without adenopathy. No JVD.      Chest: The lungs are clear to auscultation. Cardiac: Heart is regular rate. Abdomen: Abdomen is soft, non-tender, non-distended and without masses. Extremities: There is no clubbing or cyanosis. There is no edema. Symmetric. Neuro: Grossly nonfocal. Gait is normal.     Lymphatic: No evidence of cervical adenopathy bilaterally. Skin: Warm, anicteric. Psych:  Patient is pleasant and talkative. Breasts:        Pathology:  [unfilled]    Labs:      Imaging  No results found. I reviewed the above laboratory and imaging data. Discussion/Summary: 78-year-old female with enlarging pancreatic cyst.  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 The main duct appears normal.  This is growing greater than 2 mm/year. Her case was discussed at upper GI working group today and the recommendation was to consider surgery. The lesion was also at statistically higher risk to become malignant. I would recommend performing this in an open fashion given her age, as well as the fact that she has had an upper midline incision from her previous surgery for diverticulitis and colostomy takedown. I explained the risks of distal pancreatectomy and possible splenectomy to include bleeding, infection, recurrence, need for further surgery, wound complications, adjacent organ injury, pancreatic fistula, sepsis, MI, DVT, stroke, pulmonary embolism, and death. Informed consent was obtained. We will schedule this at our earliest mutual convenience. We will have her evaluated at the surgical optimization clinic. She is agreeable to this plan. All her questions were answered.

## 2023-07-06 NOTE — H&P (VIEW-ONLY)
Surgical Oncology Follow Up       [facility] [address] [facility] [address]  [facility]  [address]  [facility] [address]    Zack Marquez  1940  [id number]  [facility] ONC [facility] [place]  [facility] 203  [address]  [phone]    Diagnoses and all orders for this visit:    Pancreatic cyst  -     Case request operating room: DISTAL PANCREATECTOMY, POSSIBLE SPLENECTOMY; Standing  -     Type and screen; Future  -     Prepare Leukoreduced RBC: 2 Units; Future  -     Comprehensive metabolic panel; Future  -     CBC and differential; Future  -     APTT; Future  -     Protime-INR; Future  -     HEMOGLOBIN A1C W/ EAG ESTIMATION; Future  -     EKG 12 lead; Future  -     XR chest pa & lateral; Future  -     Ambulatory referral to surgical optimization; Future  -     Case request operating room: DISTAL PANCREATECTOMY, POSSIBLE SPLENECTOMY    Other orders  -     Incentive spirometry; Standing  -     Insert and maintain IV line; Standing  -     Void On-Call to O.R.; Standing  -     Place sequential compression device; Standing  -     ceFAZolin (ANCEF) 1,000 mg in dextrose 5 % 100 mL IVPB        Chief Complaint   Patient presents with   • Follow-up       No follow-ups on file. Oncology History    No history exists. Staging: Pancreatic tail cyst  Treatment history: EUS October 2020  Cyst fluid  ng/mL, Amylase 31 U/L  Statistically higher risk on Pancreagen testing  Current treatment: Observation    History of Present Illness: Returns in follow-up. She is nervous about following the cyst.  We also discussed her case at upper GI working group and the recommendation was to consider surgery. She is feeling well except for some intermittent abdominal discomfort that comes and goes.     Review of Systems  Complete ROS Surg Onc:   Complete ROS Surg Onc:   Constitutional: The patient denies new or recent history of general fatigue, no recent weight loss, no change in appetite. Eyes: No complaints of visual problems, no scleral icterus. ENT: no complaints of ear pain, no hoarseness, no difficulty swallowing,  no tinnitus and no new masses in head, oral cavity, or neck. Cardiovascular: No complaints of chest pain, no palpitations, no ankle edema. Respiratory: No complaints of shortness of breath, no cough. Gastrointestinal: No complaints of jaundice, no bloody stools, no pale stools. Genitourinary: No complaints of dysuria, no hematuria, no nocturia, no frequent urination, no urethral discharge. Musculoskeletal: No complaints of weakness, paralysis, joint stiffness or arthralgias. Integumentary: No complaints of rash, no new lesions. Neurological: No complaints of convulsions, no seizures, no dizziness. Hematologic/Lymphatic: No complaints of easy bruising. Endocrine:  No hot or cold intolerance. No polydipsia, polyphagia, or polyuria. Allergy/immunology:  No environmental allergies. No food allergies. Not immunocompromised. Skin:  No pallor or rash. No wound. Patient Active Problem List   Diagnosis   • OAB (overactive bladder)   • Parkinson's disease ([address])   • Primary insomnia   • History of skin cancer   • Seasonal allergic rhinitis   • Impaired fasting glucose   • Mood disturbance   • Obesity (BMI 30-39. 9)   • Claustrophobia   • Mitral valve disorder   • Menopause ovarian failure   • Vitamin D deficiency   • Dysphagia   • Multiple thyroid nodules   • Gastro-esophageal reflux disease without esophagitis   • Osteopenia   • Chronic idiopathic constipation   • History of adenomatous polyp of colon   • Tremor   • Trochanteric bursitis, right hip   • Pancreatic cyst   • Cherry angioma   • Dyspnea on exertion   • Postablative hypothyroidism   • Sleep apnea   • Closed compression fracture of body of L1 vertebra (HCC)   • Cerebrovascular disease   • Age-related osteoporosis without current pathological fracture   • Urge incontinence     Past Medical History:   Diagnosis Date   • Actinic keratosis 2016   • Acute midline low back pain without sciatica 2020   • 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    • Benign neoplasm of skin    • Chest pain    • Claustrophobia    • Diverticulitis    • Fracture     L1-L2   • GERD (gastroesophageal reflux disease)    • H. pylori infection 2020   • Muscle weakness    • Nonmelanoma skin cancer     last assessed 2017   • Palpitations    • Pancreatic cyst    • Seasonal allergies    • Seborrheic keratosis 2014   • Sleep apnea     no cpap   • Spontaneous      without mention of complications    • Squamous cell carcinoma of forehead 2014   • Syncope      Past Surgical History:   Procedure Laterality Date   • BOTOX INJECTION N/A 3/6/2017    Procedure: CYSTOSCOPY; BLADDER BOTOX 100 UNITS ;  Surgeon: Gabriele Babin MD;  [facility];  Service:    • BOWEL RESECTION      related ot diverticulitis   • CARDIAC CATHETERIZATION     • CARPAL TUNNEL RELEASE Right    • COLONOSCOPY  2019   • COLOSTOMY     • COLOSTOMY CLOSURE     • CYSTOSCOPY  2021   • FOOT SURGERY Left     neuroma   • HYSTERECTOMY     • NASAL SINUS SURGERY  age 35    [place]   • [place] CYSTOURETHROSCOPY N/A 2018    Procedure: CYSTOSCOPY WITH BOTOX;  Surgeon: Gabriele Babin MD;  [facility];  Service: Urology   • [place] ESOPHAGOGASTRODUODENOSCOPY TRANSORAL DIAGNOSTIC N/A 2019    Procedure: ESOPHAGOGASTRODUODENOSCOPY (EGD); Surgeon: Kelli Velazco DO;  [facility];   Service: Gastroenterology   • TONSILLECTOMY  age 47   • UPPER GASTROINTESTINAL ENDOSCOPY  2019     Family History   Problem Relation Age of Onset   • Alcohol abuse Mother    • Heart disease Mother    • Alcohol abuse Father    • Alcohol abuse Brother    • Cancer Brother • Tremor Neg Hx    • Parkinsonism Neg Hx    • Colon cancer Neg Hx      Social History     Socioeconomic History   • Marital status:      Spouse name: Not on file   • Number of children: Not on file   • Years of education: Not on file   • Highest education level: Not on file   Occupational History   • Occupation: RETIRED    Tobacco Use   • Smoking status: Never   • Smokeless tobacco: Never   Vaping Use   • Vaping Use: Never used   Substance and Sexual Activity   • Alcohol use: Yes     Comment: patient states rare use on holidays    • Drug use: No   • Sexual activity: Not Currently   Other Topics Concern   • Not on file   Social History Narrative    LIVING INDEPENDENTLY ALONE         No caffeine use     Social Determinants of Health     Financial Resource Strain: Low Risk  (4/19/2023)    Overall Financial Resource Strain (CARDIA)    • Difficulty of Paying Living Expenses: Not very hard   Food Insecurity: Not on file   Transportation Needs: No Transportation Needs (4/19/2023)    PRAPARE - Transportation    • Lack of Transportation (Medical): No    • Lack of Transportation (Non-Medical):  No   Physical Activity: Not on file   Stress: Not on file   Social Connections: Not on file   Intimate Partner Violence: Not on file   Housing Stability: Not on file       Current Outpatient Medications:   •  Ascorbic Acid (VITAMIN C) 1000 MG tablet, Take 1,000 mg by mouth daily, Disp: , Rfl:   •  B Complex Vitamins (B COMPLEX 100 PO), Take by mouth, Disp: , Rfl:   •  calcium carbonate (OS-JOHN) 1250 (500 Ca) MG tablet, Take 1 tablet by mouth daily, Disp: , Rfl:   •  carbidopa-levodopa (SINEMET)  mg per tablet, Take 1 tablet by mouth 3 (three) times a day before meals, Disp: 270 tablet, Rfl: 3  •  cholecalciferol (VITAMIN D3) 1,000 units tablet, Take 5,000 Units by mouth daily , Disp: , Rfl:   •  Coenzyme Q10 (CO Q 10 PO), Take by mouth 600 mg BID, Disp: , Rfl:   •  Cyanocobalamin (VITAMIN B 12 PO), Take by mouth daily, Disp: , Rfl:   •  Echinacea 125 MG CAPS, Take by mouth, Disp: , Rfl:   •  LORazepam (ATIVAN) 1 mg tablet, TAKE 1 TABLET 30 MINUTES PRIOR TO MRI, TAKE 2ND TABLET RIGHT BEFORE MRI IF NEEDED., Disp: 2 tablet, Rfl: 0  •  Magnesium 200 MG TABS, Take 1 tablet (200 mg total) by mouth daily, Disp: 30 tablet, Rfl: 3  •  Mirabegron ER (Myrbetriq) 50 MG TB24, Take 1 tablet (50 mg total) by mouth daily, Disp: 90 tablet, Rfl: 3  •  multivitamin (THERAGRAN) TABS, Take 1 tablet by mouth daily, Disp: , Rfl:   •  Omega-3 350 MG CPDR, Take by mouth, Disp: , Rfl:   •  pantoprazole (PROTONIX) 40 mg tablet, Take 1 tablet (40 mg total) by mouth daily, Disp: 90 tablet, Rfl: 3  •  Potassium Gluconate 595 (99 K) MG TABS, Take by mouth, Disp: , Rfl:   •  Probiotic Product (PROBIOTIC-10) CAPS, Take by mouth, Disp: , Rfl:   Allergies   Allergen Reactions   • Caffeine - Food Allergy GI Intolerance   • Iodine - Food Allergy Rash   • Latex Rash   • Other Rash     Adhesive tape       Vitals:    07/06/23 1218   BP: 128/80   Pulse: 82   Resp: 16   Temp: 97.8 °F (36.6 °C)   SpO2: 98%       Physical Exam  Constitutional: General appearance: The Patient is well-developed and well-nourished who appears the stated age in no acute distress. Patient is pleasant and talkative. HEENT:  Normocephalic. Sclerae are anicteric. Mucous membranes are moist. Neck is supple without adenopathy. No JVD. Chest: The lungs are clear to auscultation. Cardiac: Heart is regular rate. Abdomen: Abdomen is soft, non-tender, non-distended and without masses. Extremities: There is no clubbing or cyanosis. There is no edema. Symmetric. Neuro: Grossly nonfocal. Gait is normal.     Lymphatic: No evidence of cervical adenopathy bilaterally. Skin: Warm, anicteric. Psych:  Patient is pleasant and talkative. Breasts:        Pathology:  [unfilled]    Labs:      Imaging  No results found.   I reviewed the above laboratory and imaging data.    Discussion/Summary: 30-year-old female with enlarging pancreatic cyst.  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 The main duct appears normal.  This is growing greater than 2 mm/year. Her case was discussed at upper GI working group today and the recommendation was to consider surgery. The lesion was also at statistically higher risk to become malignant. I would recommend performing this in an open fashion given her age, as well as the fact that she has had an upper midline incision from her previous surgery for diverticulitis and colostomy takedown. I explained the risks of distal pancreatectomy and possible splenectomy to include bleeding, infection, recurrence, need for further surgery, wound complications, adjacent organ injury, pancreatic fistula, sepsis, MI, DVT, stroke, pulmonary embolism, and death. Informed consent was obtained. We will schedule this at our earliest mutual convenience. We will have her evaluated at the surgical optimization clinic. She is agreeable to this plan. All her questions were answered.

## 2023-07-17 ENCOUNTER — APPOINTMENT (OUTPATIENT)
Dept: LAB | Facility: HOSPITAL | Age: 83
End: 2023-07-17
Payer: COMMERCIAL

## 2023-07-17 ENCOUNTER — OFFICE VISIT (OUTPATIENT)
Dept: LAB | Facility: HOSPITAL | Age: 83
End: 2023-07-17
Payer: COMMERCIAL

## 2023-07-17 ENCOUNTER — HOSPITAL ENCOUNTER (OUTPATIENT)
Dept: RADIOLOGY | Facility: HOSPITAL | Age: 83
Discharge: HOME | End: 2023-07-17
Payer: COMMERCIAL

## 2023-07-17 ENCOUNTER — LAB REQUISITION (OUTPATIENT)
Dept: LAB | Facility: HOSPITAL | Age: 83
End: 2023-07-17
Payer: COMMERCIAL

## 2023-07-17 DIAGNOSIS — Z01.818 ENCOUNTER FOR OTHER PREPROCEDURAL EXAMINATION: ICD-10-CM

## 2023-07-17 DIAGNOSIS — K86.2 PANCREATIC CYST: ICD-10-CM

## 2023-07-17 DIAGNOSIS — Z01.818 PRE-OP EVALUATION: ICD-10-CM

## 2023-07-17 PROBLEM — Z01.89 ENCOUNTER FOR GERIATRIC ASSESSMENT: Status: ACTIVE | Noted: 2023-07-17

## 2023-07-17 LAB
ABO GROUP BLD: NORMAL
ALBUMIN SERPL BCP-MCNC: 4.6 G/DL (ref 3.5–5)
ALP SERPL-CCNC: 77 U/L (ref 34–104)
ALT SERPL W P-5'-P-CCNC: 3 U/L (ref 7–52)
ANION GAP SERPL CALCULATED.3IONS-SCNC: 6 MMOL/L
APTT PPP: 30 SECONDS (ref 23–37)
AST SERPL W P-5'-P-CCNC: 18 U/L (ref 13–39)
ATRIAL RATE: 84 BPM
BASOPHILS # BLD AUTO: 0.02 THOUSANDS/ÂΜL (ref 0–0.1)
BASOPHILS NFR BLD AUTO: 0 % (ref 0–1)
BILIRUB SERPL-MCNC: 0.62 MG/DL (ref 0.2–1)
BLD GP AB SCN SERPL QL: NEGATIVE
BUN SERPL-MCNC: 17 MG/DL (ref 5–25)
CALCIUM SERPL-MCNC: 9.7 MG/DL (ref 8.4–10.2)
CHLORIDE SERPL-SCNC: 105 MMOL/L (ref 96–108)
CO2 SERPL-SCNC: 29 MMOL/L (ref 21–32)
CREAT SERPL-MCNC: 0.83 MG/DL (ref 0.6–1.3)
EOSINOPHIL # BLD AUTO: 0.06 THOUSAND/ÂΜL (ref 0–0.61)
EOSINOPHIL NFR BLD AUTO: 1 % (ref 0–6)
ERYTHROCYTE [DISTWIDTH] IN BLOOD BY AUTOMATED COUNT: 13.1 % (ref 11.6–15.1)
GFR SERPL CREATININE-BSD FRML MDRD: 64 ML/MIN/1.73SQ M
GLUCOSE P FAST SERPL-MCNC: 106 MG/DL (ref 65–99)
HCT VFR BLD AUTO: 42.4 % (ref 34.8–46.1)
HGB BLD-MCNC: 14 G/DL (ref 11.5–15.4)
IMM GRANULOCYTES # BLD AUTO: 0.02 THOUSAND/UL (ref 0–0.2)
IMM GRANULOCYTES NFR BLD AUTO: 0 % (ref 0–2)
INR PPP: 1 (ref 0.84–1.19)
LYMPHOCYTES # BLD AUTO: 2.04 THOUSANDS/ÂΜL (ref 0.6–4.47)
LYMPHOCYTES NFR BLD AUTO: 34 % (ref 14–44)
MCH RBC QN AUTO: 29.4 PG (ref 26.8–34.3)
MCHC RBC AUTO-ENTMCNC: 33 G/DL (ref 31.4–37.4)
MCV RBC AUTO: 89 FL (ref 82–98)
MONOCYTES # BLD AUTO: 0.42 THOUSAND/ÂΜL (ref 0.17–1.22)
MONOCYTES NFR BLD AUTO: 7 % (ref 4–12)
NEUTROPHILS # BLD AUTO: 3.5 THOUSANDS/ÂΜL (ref 1.85–7.62)
NEUTS SEG NFR BLD AUTO: 58 % (ref 43–75)
NRBC BLD AUTO-RTO: 0 /100 WBCS
P AXIS: 58 DEGREES
PLATELET # BLD AUTO: 228 THOUSANDS/UL (ref 149–390)
PMV BLD AUTO: 9.2 FL (ref 8.9–12.7)
POTASSIUM SERPL-SCNC: 4 MMOL/L (ref 3.5–5.3)
PR INTERVAL: 124 MS
PROT SERPL-MCNC: 7.3 G/DL (ref 6.4–8.4)
PROTHROMBIN TIME: 13 SECONDS (ref 11.6–14.5)
QRS AXIS: 7 DEGREES
QRSD INTERVAL: 86 MS
QT INTERVAL: 408 MS
QTC INTERVAL: 482 MS
RBC # BLD AUTO: 4.76 MILLION/UL (ref 3.81–5.12)
RH BLD: POSITIVE
SODIUM SERPL-SCNC: 140 MMOL/L (ref 135–147)
SPECIMEN EXPIRATION DATE: NORMAL
T WAVE AXIS: 57 DEGREES
VENTRICULAR RATE: 84 BPM
WBC # BLD AUTO: 6.06 THOUSAND/UL (ref 4.31–10.16)

## 2023-07-17 PROCEDURE — 83036 HEMOGLOBIN GLYCOSYLATED A1C: CPT

## 2023-07-17 PROCEDURE — 86901 BLOOD TYPING SEROLOGIC RH(D): CPT | Performed by: SURGERY

## 2023-07-17 PROCEDURE — 86900 BLOOD TYPING SEROLOGIC ABO: CPT | Performed by: SURGERY

## 2023-07-17 PROCEDURE — 85730 THROMBOPLASTIN TIME PARTIAL: CPT

## 2023-07-17 PROCEDURE — 93005 ELECTROCARDIOGRAM TRACING: CPT

## 2023-07-17 PROCEDURE — 80053 COMPREHEN METABOLIC PANEL: CPT

## 2023-07-17 PROCEDURE — 85610 PROTHROMBIN TIME: CPT

## 2023-07-17 PROCEDURE — 93010 ELECTROCARDIOGRAM REPORT: CPT | Performed by: INTERNAL MEDICINE

## 2023-07-17 PROCEDURE — 85025 COMPLETE CBC W/AUTO DIFF WBC: CPT

## 2023-07-17 PROCEDURE — 36415 COLL VENOUS BLD VENIPUNCTURE: CPT

## 2023-07-17 PROCEDURE — 71046 X-RAY EXAM CHEST 2 VIEWS: CPT

## 2023-07-17 PROCEDURE — 86850 RBC ANTIBODY SCREEN: CPT | Performed by: SURGERY

## 2023-07-18 LAB
EST. AVERAGE GLUCOSE BLD GHB EST-MCNC: 120 MG/DL
HBA1C MFR BLD: 5.8 %

## 2023-07-25 ENCOUNTER — ANESTHESIA EVENT (OUTPATIENT)
Dept: PERIOP | Facility: HOSPITAL | Age: 83
DRG: 407 | End: 2023-07-25
Payer: COMMERCIAL

## 2023-07-25 ENCOUNTER — TELEPHONE (OUTPATIENT)
Dept: CARDIOLOGY CLINIC | Facility: CLINIC | Age: 83
End: 2023-07-25

## 2023-07-25 DIAGNOSIS — R06.02 SHORTNESS OF BREATH: Primary | ICD-10-CM

## 2023-07-25 NOTE — PRE-PROCEDURE INSTRUCTIONS
Pre-Surgery Instructions:   Medication Instructions   • B Complex Vitamins (B COMPLEX 100 PO) Stop taking 7 days prior to surgery. • calcium carbonate (OS-JOHN) 1250 (500 Ca) MG tablet Stop taking 7 days prior to surgery. • carbidopa-levodopa (SINEMET)  mg per tablet Take day of surgery. • cholecalciferol (VITAMIN D3) 1,000 units tablet Stop taking 7 days prior to surgery. • Coenzyme Q10 (CO Q 10 PO) Stop taking 7 days prior to surgery. • Cyanocobalamin (VITAMIN B 12 PO) Stop taking 7 days prior to surgery. • Magnesium 200 MG TABS Stop taking 7 days prior to surgery. • Mirabegron ER (Myrbetriq) 50 MG TB24 Hold day of surgery. • multivitamin (THERAGRAN) TABS Stop taking 7 days prior to surgery. • Omega-3 350 MG CPDR Stop taking 7 days prior to surgery. • pantoprazole (PROTONIX) 40 mg tablet Take day of surgery. • Potassium Gluconate 595 (99 K) MG TABS Stop taking 7 days prior to surgery. • Probiotic Product (PROBIOTIC-10) CAPS Hold day of surgery. Medication instructions for day surgery reviewed. Please use only a sip of water to take your instructed medications. Avoid all over the counter vitamins, supplements and NSAIDS for one week prior to surgery per anesthesia guidelines. Tylenol is ok to take as needed. You will receive a call one business day prior to surgery with an arrival time and hospital directions. If your surgery is scheduled on a Monday, the hospital will be calling you on the Friday prior to your surgery. If you have not heard from anyone by 8pm, please call the hospital supervisor through the hospital  at [phone]. Do not eat or drink anything after midnight the night before your surgery, including candy, mints, lifesavers, or chewing gum. Do not drink alcohol 24hrs before your surgery. Try not to smoke at least 24hrs before your surgery.        Follow the pre surgery showering instructions as listed in the [facility] Surgical Experience Booklet” or otherwise provided by your surgeon's office. Do not shave the surgical area 24 hours before surgery. Do not apply any lotions, creams, including makeup, cologne, deodorant, or perfumes after showering on the day of your surgery. No contact lenses, eye make-up, or artificial eyelashes. Remove nail polish, including gel polish, and any artificial, gel, or acrylic nails if possible. Remove all jewelry including rings and body piercing jewelry. Wear causal clothing that is easy to take on and off. Consider your type of surgery. Keep any valuables, jewelry, piercings at home. Please bring any specially ordered equipment (sling, braces) if indicated. Arrange for a responsible person to drive you to and from the hospital on the day of your surgery. Visitor Guidelines discussed. Call the surgeon's office with any new illnesses, exposures, or additional questions prior to surgery. Please reference your [facility] Surgical Experience Booklet” for additional information to prepare for your upcoming surgery. Reviewed Carb Drink Instructions per Surgeon/Anes. Guidelines

## 2023-07-25 NOTE — TELEPHONE ENCOUNTER
----- Message from Maria Elena Barnard RN sent at 7/25/2023  4:01 PM EDT -----  Good afternoon,  Anesthesia just informed us TODAY that they want the patient to have cardiac clearance prior to her surgery on 7/31 because she had some changes in her EKG. She is established with Dr Shailesh Duran and last seen 1/26/23. Will he require to see her in the office or will he sign off by looking at her chart?

## 2023-07-25 NOTE — TELEPHONE ENCOUNTER
This patient will require pharmacological nuclear stress test based on the review of the chart. Order for the same in the system. Please schedule    If this can be done this week by Thursday, depending on the results we can do the preoperative risk assessment letter. Otherwise, surgery may need to be postponed until we can see her.

## 2023-07-26 NOTE — TELEPHONE ENCOUNTER
Testing will call patient to schedule patient for 7/28    Spoke to patient, let her know she would need the stress test in order to continue with surgery clearance    Patient confirmed and understood

## 2023-07-27 ENCOUNTER — HOSPITAL ENCOUNTER (OUTPATIENT)
Dept: NON INVASIVE DIAGNOSTICS | Facility: CLINIC | Age: 83
Discharge: HOME | End: 2023-07-27
Payer: COMMERCIAL

## 2023-07-27 VITALS
HEIGHT: 61 IN | BODY MASS INDEX: 31.15 KG/M2 | WEIGHT: 165 LBS | DIASTOLIC BLOOD PRESSURE: 86 MMHG | HEART RATE: 67 BPM | SYSTOLIC BLOOD PRESSURE: 164 MMHG | OXYGEN SATURATION: 94 %

## 2023-07-27 DIAGNOSIS — R06.02 SHORTNESS OF BREATH: ICD-10-CM

## 2023-07-27 LAB
CHEST PAIN STATEMENT: NORMAL
MAX DIASTOLIC BP: 86 MMHG
MAX HEART RATE: 113 BPM
MAX PREDICTED HEART RATE: 136 BPM
MAX. SYSTOLIC BP: 164 MMHG
NUC STRESS EJECTION FRACTION: 86 %
PROTOCOL NAME: NORMAL
RATE PRESSURE PRODUCT: NORMAL
REASON FOR TERMINATION: NORMAL
SL CV REST NUCLEAR ISOTOPE DOSE: 10.7 MCI
SL CV STRESS NUCLEAR ISOTOPE DOSE: 32.4 MCI
SL CV STRESS RECOVERY BP: NORMAL MMHG
SL CV STRESS RECOVERY HR: 103 BPM
SL CV STRESS RECOVERY O2 SAT: 100 %
STRESS ANGINA INDEX: 0
STRESS BASELINE BP: NORMAL MMHG
STRESS BASELINE HR: 67 BPM
STRESS O2 SAT REST: 94 %
STRESS PEAK HR: 113 BPM
STRESS POST O2 SAT PEAK: 96 %
STRESS POST PEAK BP: 134 MMHG
TARGET HR FORMULA: NORMAL
TEST INDICATION: NORMAL
TIME IN EXERCISE PHASE: NORMAL

## 2023-07-27 PROCEDURE — A9502 TC99M TETROFOSMIN: HCPCS

## 2023-07-27 PROCEDURE — 93016 CV STRESS TEST SUPVJ ONLY: CPT | Performed by: INTERNAL MEDICINE

## 2023-07-27 PROCEDURE — G1004 CDSM NDSC: HCPCS

## 2023-07-27 PROCEDURE — 78452 HT MUSCLE IMAGE SPECT MULT: CPT | Performed by: INTERNAL MEDICINE

## 2023-07-27 PROCEDURE — 93017 CV STRESS TEST TRACING ONLY: CPT

## 2023-07-27 PROCEDURE — 78452 HT MUSCLE IMAGE SPECT MULT: CPT

## 2023-07-27 PROCEDURE — 93018 CV STRESS TEST I&R ONLY: CPT | Performed by: INTERNAL MEDICINE

## 2023-07-27 RX ORDER — REGADENOSON 0.08 MG/ML
0.4 INJECTION, SOLUTION INTRAVENOUS ONCE
Status: COMPLETED | OUTPATIENT
Start: 2023-07-27 | End: 2023-07-27

## 2023-07-27 RX ADMIN — REGADENOSON 0.4 MG: 0.08 INJECTION, SOLUTION INTRAVENOUS at 08:47

## 2023-07-28 ENCOUNTER — TELEPHONE (OUTPATIENT)
Dept: CARDIOLOGY CLINIC | Facility: CLINIC | Age: 83
End: 2023-07-28

## 2023-07-28 NOTE — TELEPHONE ENCOUNTER
Good morning     Just wanted to make you aware Dr Mary Lou Lin letter for patient is in the chart for surgery.

## 2023-07-28 NOTE — TELEPHONE ENCOUNTER
----- Message from Lewis Couch MD sent at 7/27/2023  3:18 PM EDT -----  Please call the patient. Stress test shows no evidence of ischemia with normal ejection fraction. Patient has no specific contraindication from cardiac standpoint to proceed with the planned surgery as moderate cardiac risk. Letter in the chart dated today sent to Mario Crowley, the surgeon. Thank you.

## 2023-07-31 ENCOUNTER — ANESTHESIA (OUTPATIENT)
Dept: PERIOP | Facility: HOSPITAL | Age: 83
DRG: 407 | End: 2023-07-31
Payer: COMMERCIAL

## 2023-07-31 ENCOUNTER — HOSPITAL ENCOUNTER (INPATIENT)
Facility: HOSPITAL | Age: 83
LOS: 4 days | Discharge: HOME HEALTH | DRG: 407 | End: 2023-08-04
Attending: SURGERY | Admitting: SURGERY
Payer: COMMERCIAL

## 2023-07-31 DIAGNOSIS — K86.2 PANCREATIC CYST: ICD-10-CM

## 2023-07-31 DIAGNOSIS — Z01.89 ENCOUNTER FOR GERIATRIC ASSESSMENT: ICD-10-CM

## 2023-07-31 DIAGNOSIS — Z90.81 S/P SPLENECTOMY: ICD-10-CM

## 2023-07-31 DIAGNOSIS — Z90.81 S/P SPLENECTOMY: Primary | ICD-10-CM

## 2023-07-31 LAB
ABO GROUP BLD: NORMAL
BASE EXCESS BLDA CALC-SCNC: -1 MMOL/L (ref -2–3)
CA-I BLD-SCNC: 1.17 MMOL/L (ref 1.12–1.32)
GLUCOSE SERPL-MCNC: 139 MG/DL (ref 65–140)
HCO3 BLDA-SCNC: 23.5 MMOL/L (ref 22–28)
HCT VFR BLD CALC: 35 % (ref 34.8–46.1)
HGB BLDA-MCNC: 11.9 G/DL (ref 11.5–15.4)
PCO2 BLD: 25 MMOL/L (ref 21–32)
PCO2 BLD: 36.6 MM HG (ref 36–44)
PH BLD: 7.41 [PH] (ref 7.35–7.45)
PO2 BLD: 258 MM HG (ref 75–129)
POTASSIUM BLD-SCNC: 3.4 MMOL/L (ref 3.5–5.3)
RH BLD: POSITIVE
SAO2 % BLD FROM PO2: 100 % (ref 60–85)
SODIUM BLD-SCNC: 141 MMOL/L (ref 136–145)
SPECIMEN SOURCE: ABNORMAL

## 2023-07-31 PROCEDURE — 82803 BLOOD GASES ANY COMBINATION: CPT

## 2023-07-31 PROCEDURE — 82947 ASSAY GLUCOSE BLOOD QUANT: CPT

## 2023-07-31 PROCEDURE — 86920 COMPATIBILITY TEST SPIN: CPT

## 2023-07-31 PROCEDURE — 07TP0ZZ RESECTION OF SPLEEN, OPEN APPROACH: ICD-10-PCS | Performed by: SURGERY

## 2023-07-31 PROCEDURE — 48140 PARTIAL REMOVAL OF PANCREAS: CPT | Performed by: SURGERY

## 2023-07-31 PROCEDURE — 88342 IMHCHEM/IMCYTCHM 1ST ANTB: CPT | Performed by: PATHOLOGY

## 2023-07-31 PROCEDURE — 82330 ASSAY OF CALCIUM: CPT

## 2023-07-31 PROCEDURE — 84132 ASSAY OF SERUM POTASSIUM: CPT

## 2023-07-31 PROCEDURE — 85014 HEMATOCRIT: CPT

## 2023-07-31 PROCEDURE — C1781 MESH (IMPLANTABLE): HCPCS | Performed by: SURGERY

## 2023-07-31 PROCEDURE — 07BD0ZZ EXCISION OF AORTIC LYMPHATIC, OPEN APPROACH: ICD-10-PCS | Performed by: SURGERY

## 2023-07-31 PROCEDURE — 88341 IMHCHEM/IMCYTCHM EA ADD ANTB: CPT | Performed by: PATHOLOGY

## 2023-07-31 PROCEDURE — 88309 TISSUE EXAM BY PATHOLOGIST: CPT | Performed by: PATHOLOGY

## 2023-07-31 PROCEDURE — 0FBG0ZZ EXCISION OF PANCREAS, OPEN APPROACH: ICD-10-PCS | Performed by: SURGERY

## 2023-07-31 PROCEDURE — 84295 ASSAY OF SERUM SODIUM: CPT

## 2023-07-31 DEVICE — SEAMGUARD STPL REINF ECHELON FLEX ENDOPATH 45: Type: IMPLANTABLE DEVICE | Site: ABDOMEN | Status: FUNCTIONAL

## 2023-07-31 RX ORDER — FENTANYL CITRATE 50 UG/ML
INJECTION, SOLUTION INTRAMUSCULAR; INTRAVENOUS AS NEEDED
Status: DISCONTINUED | OUTPATIENT
Start: 2023-07-31 | End: 2023-07-31

## 2023-07-31 RX ORDER — GLYCOPYRROLATE 0.2 MG/ML
INJECTION INTRAMUSCULAR; INTRAVENOUS AS NEEDED
Status: DISCONTINUED | OUTPATIENT
Start: 2023-07-31 | End: 2023-07-31

## 2023-07-31 RX ORDER — HYDROMORPHONE HCL/PF 1 MG/ML
SYRINGE (ML) INJECTION AS NEEDED
Status: DISCONTINUED | OUTPATIENT
Start: 2023-07-31 | End: 2023-07-31

## 2023-07-31 RX ORDER — ONDANSETRON 2 MG/ML
4 INJECTION INTRAMUSCULAR; INTRAVENOUS EVERY 6 HOURS PRN
Status: DISCONTINUED | OUTPATIENT
Start: 2023-07-31 | End: 2023-08-04 | Stop reason: HOSPADM

## 2023-07-31 RX ORDER — ALBUTEROL SULFATE 2.5 MG/3ML
2.5 SOLUTION RESPIRATORY (INHALATION) ONCE AS NEEDED
Status: DISCONTINUED | OUTPATIENT
Start: 2023-07-31 | End: 2023-07-31 | Stop reason: HOSPADM

## 2023-07-31 RX ORDER — ACETAMINOPHEN 325 MG/1
975 TABLET ORAL ONCE
Status: COMPLETED | OUTPATIENT
Start: 2023-07-31 | End: 2023-07-31

## 2023-07-31 RX ORDER — HYDRALAZINE HYDROCHLORIDE 20 MG/ML
INJECTION INTRAMUSCULAR; INTRAVENOUS AS NEEDED
Status: DISCONTINUED | OUTPATIENT
Start: 2023-07-31 | End: 2023-07-31

## 2023-07-31 RX ORDER — PANTOPRAZOLE SODIUM 40 MG/1
40 TABLET, DELAYED RELEASE ORAL
Status: DISCONTINUED | OUTPATIENT
Start: 2023-08-01 | End: 2023-08-04 | Stop reason: HOSPADM

## 2023-07-31 RX ORDER — SODIUM CHLORIDE, SODIUM LACTATE, POTASSIUM CHLORIDE, CALCIUM CHLORIDE 600; 310; 30; 20 MG/100ML; MG/100ML; MG/100ML; MG/100ML
INJECTION, SOLUTION INTRAVENOUS CONTINUOUS PRN
Status: DISCONTINUED | OUTPATIENT
Start: 2023-07-31 | End: 2023-07-31

## 2023-07-31 RX ORDER — ROPIVACAINE HYDROCHLORIDE 2 MG/ML
INJECTION, SOLUTION EPIDURAL; INFILTRATION; PERINEURAL AS NEEDED
Status: DISCONTINUED | OUTPATIENT
Start: 2023-07-31 | End: 2023-07-31

## 2023-07-31 RX ORDER — ROPIVACAINE HYDROCHLORIDE 2 MG/ML
INJECTION, SOLUTION EPIDURAL; INFILTRATION; PERINEURAL CONTINUOUS PRN
Status: DISCONTINUED | OUTPATIENT
Start: 2023-07-31 | End: 2023-07-31

## 2023-07-31 RX ORDER — FENTANYL CITRATE 50 UG/ML
INJECTION, SOLUTION INTRAMUSCULAR; INTRAVENOUS
Status: COMPLETED | OUTPATIENT
Start: 2023-07-31 | End: 2023-07-31

## 2023-07-31 RX ORDER — MAGNESIUM HYDROXIDE 1200 MG/15ML
LIQUID ORAL AS NEEDED
Status: DISCONTINUED | OUTPATIENT
Start: 2023-07-31 | End: 2023-07-31 | Stop reason: HOSPADM

## 2023-07-31 RX ORDER — SODIUM CHLORIDE, SODIUM LACTATE, POTASSIUM CHLORIDE, CALCIUM CHLORIDE 600; 310; 30; 20 MG/100ML; MG/100ML; MG/100ML; MG/100ML
75 INJECTION, SOLUTION INTRAVENOUS CONTINUOUS
Status: DISCONTINUED | OUTPATIENT
Start: 2023-07-31 | End: 2023-08-01

## 2023-07-31 RX ORDER — SODIUM CHLORIDE 9 MG/ML
INJECTION, SOLUTION INTRAVENOUS CONTINUOUS PRN
Status: DISCONTINUED | OUTPATIENT
Start: 2023-07-31 | End: 2023-07-31

## 2023-07-31 RX ORDER — ACETAMINOPHEN 325 MG/1
650 TABLET ORAL EVERY 6 HOURS SCHEDULED
Status: DISCONTINUED | OUTPATIENT
Start: 2023-07-31 | End: 2023-08-03

## 2023-07-31 RX ORDER — LIDOCAINE HYDROCHLORIDE 20 MG/ML
INJECTION, SOLUTION EPIDURAL; INFILTRATION; INTRACAUDAL; PERINEURAL
Status: COMPLETED | OUTPATIENT
Start: 2023-07-31 | End: 2023-07-31

## 2023-07-31 RX ORDER — AMOXICILLIN 250 MG
2 CAPSULE ORAL 2 TIMES DAILY
Status: DISCONTINUED | OUTPATIENT
Start: 2023-08-01 | End: 2023-08-04 | Stop reason: HOSPADM

## 2023-07-31 RX ORDER — ROCURONIUM BROMIDE 10 MG/ML
INJECTION, SOLUTION INTRAVENOUS AS NEEDED
Status: DISCONTINUED | OUTPATIENT
Start: 2023-07-31 | End: 2023-07-31

## 2023-07-31 RX ORDER — ONDANSETRON 2 MG/ML
INJECTION INTRAMUSCULAR; INTRAVENOUS AS NEEDED
Status: DISCONTINUED | OUTPATIENT
Start: 2023-07-31 | End: 2023-07-31

## 2023-07-31 RX ORDER — HEPARIN SODIUM 5000 [USP'U]/ML
5000 INJECTION, SOLUTION INTRAVENOUS; SUBCUTANEOUS EVERY 8 HOURS SCHEDULED
Status: DISCONTINUED | OUTPATIENT
Start: 2023-07-31 | End: 2023-08-04

## 2023-07-31 RX ORDER — HYDROMORPHONE HCL IN WATER/PF 6 MG/30 ML
0.2 PATIENT CONTROLLED ANALGESIA SYRINGE INTRAVENOUS
Status: DISCONTINUED | OUTPATIENT
Start: 2023-07-31 | End: 2023-07-31 | Stop reason: HOSPADM

## 2023-07-31 RX ORDER — FENTANYL CITRATE/PF 50 MCG/ML
25 SYRINGE (ML) INJECTION
Status: DISCONTINUED | OUTPATIENT
Start: 2023-07-31 | End: 2023-07-31 | Stop reason: HOSPADM

## 2023-07-31 RX ORDER — SODIUM CHLORIDE, SODIUM LACTATE, POTASSIUM CHLORIDE, CALCIUM CHLORIDE 600; 310; 30; 20 MG/100ML; MG/100ML; MG/100ML; MG/100ML
100 INJECTION, SOLUTION INTRAVENOUS CONTINUOUS
Status: DISCONTINUED | OUTPATIENT
Start: 2023-07-31 | End: 2023-07-31

## 2023-07-31 RX ORDER — ONDANSETRON 2 MG/ML
4 INJECTION INTRAMUSCULAR; INTRAVENOUS ONCE AS NEEDED
Status: DISCONTINUED | OUTPATIENT
Start: 2023-07-31 | End: 2023-07-31 | Stop reason: HOSPADM

## 2023-07-31 RX ORDER — ALBUMIN, HUMAN INJ 5% 5 %
12.5 SOLUTION INTRAVENOUS ONCE AS NEEDED
Status: DISCONTINUED | OUTPATIENT
Start: 2023-07-31 | End: 2023-07-31 | Stop reason: HOSPADM

## 2023-07-31 RX ORDER — CEFAZOLIN SODIUM 1 G/50ML
1000 SOLUTION INTRAVENOUS ONCE
Status: COMPLETED | OUTPATIENT
Start: 2023-07-31 | End: 2023-07-31

## 2023-07-31 RX ORDER — ALBUMIN, HUMAN INJ 5% 5 %
SOLUTION INTRAVENOUS CONTINUOUS PRN
Status: DISCONTINUED | OUTPATIENT
Start: 2023-07-31 | End: 2023-07-31

## 2023-07-31 RX ORDER — DEXAMETHASONE SODIUM PHOSPHATE 10 MG/ML
INJECTION, SOLUTION INTRAMUSCULAR; INTRAVENOUS AS NEEDED
Status: DISCONTINUED | OUTPATIENT
Start: 2023-07-31 | End: 2023-07-31

## 2023-07-31 RX ORDER — EPHEDRINE SULFATE 50 MG/ML
INJECTION INTRAVENOUS AS NEEDED
Status: DISCONTINUED | OUTPATIENT
Start: 2023-07-31 | End: 2023-07-31

## 2023-07-31 RX ORDER — ALBUMIN, HUMAN INJ 5% 5 %
12.5 SOLUTION INTRAVENOUS ONCE
Status: COMPLETED | OUTPATIENT
Start: 2023-07-31 | End: 2023-07-31

## 2023-07-31 RX ORDER — SODIUM CHLORIDE, SODIUM LACTATE, POTASSIUM CHLORIDE, CALCIUM CHLORIDE 600; 310; 30; 20 MG/100ML; MG/100ML; MG/100ML; MG/100ML
125 INJECTION, SOLUTION INTRAVENOUS CONTINUOUS
Status: DISCONTINUED | OUTPATIENT
Start: 2023-07-31 | End: 2023-07-31

## 2023-07-31 RX ORDER — PROPOFOL 10 MG/ML
INJECTION, EMULSION INTRAVENOUS AS NEEDED
Status: DISCONTINUED | OUTPATIENT
Start: 2023-07-31 | End: 2023-07-31

## 2023-07-31 RX ADMIN — SODIUM CHLORIDE, SODIUM LACTATE, POTASSIUM CHLORIDE, AND CALCIUM CHLORIDE: .6; .31; .03; .02 INJECTION, SOLUTION INTRAVENOUS at 10:57

## 2023-07-31 RX ADMIN — EPHEDRINE SULFATE 5 MG: 50 INJECTION INTRAVENOUS at 12:05

## 2023-07-31 RX ADMIN — SODIUM CHLORIDE: 0.9 INJECTION, SOLUTION INTRAVENOUS at 11:15

## 2023-07-31 RX ADMIN — FENTANYL CITRATE: 50 INJECTION INTRAMUSCULAR; INTRAVENOUS at 15:20

## 2023-07-31 RX ADMIN — PROPOFOL 140 MG: 10 INJECTION, EMULSION INTRAVENOUS at 11:03

## 2023-07-31 RX ADMIN — ROPIVACAINE HYDROCHLORIDE 2 MG: 2 INJECTION, SOLUTION EPIDURAL; INFILTRATION; PERINEURAL at 13:41

## 2023-07-31 RX ADMIN — SUGAMMADEX 50 MG: 100 INJECTION, SOLUTION INTRAVENOUS at 14:12

## 2023-07-31 RX ADMIN — ROPIVACAINE HYDROCHLORIDE 8 MG: 2 INJECTION, SOLUTION EPIDURAL; INFILTRATION; PERINEURAL at 13:58

## 2023-07-31 RX ADMIN — ACETAMINOPHEN 650 MG: 325 TABLET, FILM COATED ORAL at 18:45

## 2023-07-31 RX ADMIN — FENTANYL CITRATE 50 MCG: 50 INJECTION, SOLUTION INTRAMUSCULAR; INTRAVENOUS at 11:23

## 2023-07-31 RX ADMIN — FENTANYL CITRATE 25 MCG: 50 INJECTION, SOLUTION INTRAMUSCULAR; INTRAVENOUS at 11:03

## 2023-07-31 RX ADMIN — HYDROMORPHONE HYDROCHLORIDE 0.25 MG: 1 INJECTION, SOLUTION INTRAMUSCULAR; INTRAVENOUS; SUBCUTANEOUS at 12:48

## 2023-07-31 RX ADMIN — ALBUMIN (HUMAN) 12.5 G: 12.5 INJECTION, SOLUTION INTRAVENOUS at 14:30

## 2023-07-31 RX ADMIN — ROPIVACAINE HYDROCHLORIDE 2 MG: 2 INJECTION, SOLUTION EPIDURAL; INFILTRATION; PERINEURAL at 13:52

## 2023-07-31 RX ADMIN — FENTANYL CITRATE 25 MCG: 50 INJECTION, SOLUTION INTRAMUSCULAR; INTRAVENOUS at 10:26

## 2023-07-31 RX ADMIN — ROCURONIUM BROMIDE 20 MG: 10 INJECTION, SOLUTION INTRAVENOUS at 12:08

## 2023-07-31 RX ADMIN — HEPARIN SODIUM 5000 UNITS: 5000 INJECTION INTRAVENOUS; SUBCUTANEOUS at 21:54

## 2023-07-31 RX ADMIN — ROCURONIUM BROMIDE 50 MG: 10 INJECTION, SOLUTION INTRAVENOUS at 11:03

## 2023-07-31 RX ADMIN — HYDRALAZINE HYDROCHLORIDE 5 MG: 20 INJECTION, SOLUTION INTRAMUSCULAR; INTRAVENOUS at 11:50

## 2023-07-31 RX ADMIN — ROPIVACAINE HYDROCHLORIDE 6 ML/HR: 2 INJECTION, SOLUTION EPIDURAL; INFILTRATION at 11:44

## 2023-07-31 RX ADMIN — ONDANSETRON 4 MG: 2 INJECTION INTRAMUSCULAR; INTRAVENOUS at 13:43

## 2023-07-31 RX ADMIN — CEFAZOLIN SODIUM 1000 MG: 1 SOLUTION INTRAVENOUS at 11:30

## 2023-07-31 RX ADMIN — LIDOCAINE HYDROCHLORIDE 100 MG: 20 INJECTION, SOLUTION EPIDURAL; INFILTRATION; INTRACAUDAL; PERINEURAL at 11:03

## 2023-07-31 RX ADMIN — GLYCOPYRROLATE 0.1 MCG: 0.2 INJECTION, SOLUTION INTRAMUSCULAR; INTRAVENOUS at 12:01

## 2023-07-31 RX ADMIN — ROCURONIUM BROMIDE 30 MG: 10 INJECTION, SOLUTION INTRAVENOUS at 13:19

## 2023-07-31 RX ADMIN — SUGAMMADEX 50 MG: 100 INJECTION, SOLUTION INTRAVENOUS at 14:11

## 2023-07-31 RX ADMIN — PHENYLEPHRINE HYDROCHLORIDE 2 DROP: 1 SPRAY NASAL at 11:10

## 2023-07-31 RX ADMIN — CARBIDOPA AND LEVODOPA 1 TABLET: 25; 100 TABLET ORAL at 18:45

## 2023-07-31 RX ADMIN — ACETAMINOPHEN 975 MG: 325 TABLET, FILM COATED ORAL at 09:57

## 2023-07-31 RX ADMIN — ROPIVACAINE HYDROCHLORIDE 2 MG: 2 INJECTION, SOLUTION EPIDURAL; INFILTRATION; PERINEURAL at 13:32

## 2023-07-31 RX ADMIN — SODIUM CHLORIDE, SODIUM LACTATE, POTASSIUM CHLORIDE, AND CALCIUM CHLORIDE 125 ML/HR: .6; .31; .03; .02 INJECTION, SOLUTION INTRAVENOUS at 09:56

## 2023-07-31 RX ADMIN — SODIUM CHLORIDE, SODIUM LACTATE, POTASSIUM CHLORIDE, AND CALCIUM CHLORIDE: .6; .31; .03; .02 INJECTION, SOLUTION INTRAVENOUS at 13:50

## 2023-07-31 RX ADMIN — GLYCOPYRROLATE 0.1 MCG: 0.2 INJECTION, SOLUTION INTRAMUSCULAR; INTRAVENOUS at 12:05

## 2023-07-31 RX ADMIN — SUGAMMADEX 50 MG: 100 INJECTION, SOLUTION INTRAVENOUS at 14:10

## 2023-07-31 RX ADMIN — PROPOFOL 30 MG: 10 INJECTION, EMULSION INTRAVENOUS at 11:45

## 2023-07-31 RX ADMIN — ALBUMIN (HUMAN): 12.5 INJECTION, SOLUTION INTRAVENOUS at 12:53

## 2023-07-31 RX ADMIN — SUGAMMADEX 50 MG: 100 INJECTION, SOLUTION INTRAVENOUS at 14:13

## 2023-07-31 RX ADMIN — LIDOCAINE HYDROCHLORIDE 3 ML: 20 INJECTION, SOLUTION EPIDURAL; INFILTRATION; INTRACAUDAL; PERINEURAL at 10:26

## 2023-07-31 RX ADMIN — DEXAMETHASONE SODIUM PHOSPHATE 10 MG: 10 INJECTION, SOLUTION INTRAMUSCULAR; INTRAVENOUS at 11:03

## 2023-07-31 RX ADMIN — ALBUMIN (HUMAN): 12.5 INJECTION, SOLUTION INTRAVENOUS at 11:29

## 2023-07-31 RX ADMIN — ALBUMIN (HUMAN) 12.5 G: 12.5 INJECTION, SOLUTION INTRAVENOUS at 14:53

## 2023-07-31 NOTE — ANESTHESIA PROCEDURE NOTES
Arterial Line Insertion    Performed by: Nj Hill CRNA  Authorized by: Hayden Corbett MD  Consent: Verbal consent obtained. Written consent obtained. Risks and benefits: risks, benefits and alternatives were discussed  Consent given by: patient  Patient understanding: patient states understanding of the procedure being performed  Patient consent: the patient's understanding of the procedure matches consent given  Procedure consent: procedure consent matches procedure scheduled  Relevant documents: relevant documents present and verified  Patient identity confirmed: arm band and hospital-assigned identification number  Time out: Immediately prior to procedure a "time out" was called to verify the correct patient, procedure, equipment, support staff and site/side marked as required. Preparation: Patient was prepped and draped in the usual sterile fashion. Indications: hemodynamic monitoring  Orientation:  Right  Location: radial artery  Sedation:  Patient sedated: GETA.     Procedure Details:  Delio's test normal: yes  Needle gauge: 18  Seldinger technique: Seldinger technique used  Number of attempts: 3    Post-procedure:  Post-procedure: dressing applied  Waveform: good waveform  Post-procedure CNS: unchanged  Patient tolerance: patient tolerated the procedure well with no immediate complications  Comments: Placed via micropuncture with US guidance

## 2023-07-31 NOTE — ANESTHESIA POSTPROCEDURE EVALUATION
Post-Op Assessment Note    CV Status:  Stable  Pain Score: 0    Pain management: adequate     Mental Status:  Awake and somnolent   Hydration Status:  Stable   PONV Controlled:  None   Airway Patency:  Patent      Post Op Vitals Reviewed: Yes      Staff: CRNA   Comments: Patient in PACU, airway patent, no c/o pain or nausea. BP low, receiving albumin for hypovolemia. Will reasess hemoynamics before initiating epidual infusion. Post-op block assessment: no complications      No notable events documented.     BP   91/45 (61)   Temp 97.7 °F (36.5 °C) (07/31/23 1425)    Pulse   76   Resp   14   SpO2   97% on [facility]

## 2023-07-31 NOTE — ANESTHESIA PREPROCEDURE EVALUATION
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  She is doing well. No abdominal pain, nausea or vomiting. No change in appetite. No early satiety. MRI from June 5, 2023 shows that the largest lesion in the tail of the pancreas measures 5.5 cm. This was 4.5 cm in August 2020. There are several other smaller cystic lesions throughout the pancreas. No other worrisome or suspicious features. The pancreatic duct is dilated to 3 mm. I personally reviewed the films. She has had a previous colectomy for diverticulitis with an upper midline incision.         Relevant Problems   CARDIO   (+) Dyspnea on exertion      ENDO   (+) Postablative hypothyroidism      GI/HEPATIC   (+) Dysphagia   (+) Gastro-esophageal reflux disease without esophagitis   (+) Pancreatic cyst      NEURO/PSYCH   (+) Claustrophobia      PULMONARY   (+) Dyspnea on exertion   (+) Sleep apnea      • OAB (overactive bladder)   • Parkinson's disease (HCC)   • Primary insomnia   • History of skin cancer   • Seasonal allergic rhinitis   • Impaired fasting glucose   • Mood disturbance   • Obesity (BMI 30-39. 9)   • Claustrophobia   • Mitral valve disorder   • Menopause ovarian failure   • Vitamin D deficiency   • Dysphagia   • Multiple thyroid nodules   • Gastro-esophageal reflux disease without esophagitis   • Osteopenia   • Chronic idiopathic constipation   • History of adenomatous polyp of colon   • Tremor   • Trochanteric bursitis, right hip   • Pancreatic cyst   • Cherry angioma   • Dyspnea on exertion   • Postablative hypothyroidism   • Sleep apnea   • Closed compression fracture of body of L1 vertebra (HCC)   • Cerebrovascular disease   • Age-related osteoporosis without current pathological fracture   • Urge incontinence      Surgical History     Current as of 07/31/23 0800  HYSTERECTOMY COLOSTOMY   CARPAL TUNNEL RELEASE COLOSTOMY CLOSURE   FOOT SURGERY CARDIAC CATHETERIZATION   BOTOX INJECTION TN CYSTOURETHROSCOPY   BOWEL RESECTION TN ESOPHAGOGASTRODUODENOSCOPY TRANSORAL DIAGNOSTIC   COLONOSCOPY UPPER GASTROINTESTINAL ENDOSCOPY   TONSILLECTOMY NASAL SINUS SURGERY   CYSTOSCOPY      Substance History     Current as of 07/31/23 0800  Smoking Status: Never   Smokeless Tobacco Status: Never   Alcohol use: Yes, unspecified volume   Drug use: No       Physical Exam    Airway    Mallampati score: III  TM Distance: >3 FB  Neck ROM: full     Dental       Cardiovascular  Cardiovascular exam normal    Pulmonary  Pulmonary exam normal     Other Findings      Stress test Interpretation Summary 7/2023       •  Stress Function: Left ventricular function post-stress is normal. Stress ejection fraction is 86 %. •  Stress ECG: Arrhythmias during stress: frequent PACs, short run of SVT. The ECG was not diagnostic due to pharmacological (vasodilator) stress. •  Perfusion: There are no perfusion defects. •  Stress Combined Conclusion: The ECG and SPECT imaging portions of the stress study are concordant with no evidence of stress induced myocardial ischemia.       Anesthesia Plan  ASA Score- 3     Anesthesia Type- general with ASA Monitors. Additional Monitors: arterial line. Airway Plan: ETT. Comment: Epidural insertion pre surgery was discussed with patient . Plan Factors-    Chart reviewed. EKG reviewed. Imaging results reviewed. Existing labs reviewed. Patient summary reviewed. Patient is a current smoker. Induction- intravenous. Postoperative Plan- . Planned trial extubation    Informed Consent- Anesthetic plan and risks discussed with patient. I personally reviewed this patient with the CRNA. Discussed and agreed on the Anesthesia Plan with the CRNA. Kandace Velarde

## 2023-07-31 NOTE — QUICK NOTE
Post-op check s/p distal panc/splenectomy. No events post-op. At bedside pt reported 3/10 pain well-controlled w/ her current analgesic regimen. No N/V, dyspnea, or chest pain. Not yet OOB nor begun using IS. Tolerated small sips w/ meds. Vitals WNL, on 2L via NC. Abdomen soft, non-distended, and appropriately TTP around upper midline incision healing well w/ mild surrounding ecchymosis closed, skin glue intact. LUQ ARNIE w/ ~30cc of darker serosanguineous drainage in the bulb. Yanez w/ ~300cc of light frazier urine in collection.      Plan:  Instructed on IS use (acheving ~1000cc currently), wean O2 as able  NPO (+sips w/ meds) / IVFs  Speech eval tomorrow AM (dysphagia listed as PMH on EMR) then progress diet as tolerated  D/C yanez tomorrow AM  PTA medications restarted  Geriatrics consulted, will follow-up recs  Epidural for 48-72hrs per APS + tylenol  PT/OT evals tomorrow  ARNIE drain to bulb suction  Repeat labs in AM      Marissa Pugh MD  PGY5, Surgery  07/31/23

## 2023-07-31 NOTE — PERIOPERATIVE NURSING NOTE
3 hives noticed on pt's L cheek. Pt denies itchiness/dyspnea. Dr. India Oakes at bedside. No new orders.

## 2023-07-31 NOTE — ANESTHESIA PROCEDURE NOTES
Epidural Block    Patient location during procedure: holding area  Start time: 7/31/2023 10:25 AM  Reason for block: at surgeon's request and post-op pain management  Staffing  Performed by: Tim Parks MD  Authorized by: Tim Parks MD    Preanesthetic Checklist  Completed: patient identified, IV checked, site marked, risks and benefits discussed, surgical consent, monitors and equipment checked, pre-op evaluation and timeout performed  Epidural  Patient position: sitting  Prep: ChloraPrep  Patient monitoring: heart rate, cardiac monitor, continuous pulse ox and frequent blood pressure checks  Approach: midline  Location: thoracic  Injection technique: MICHEAL saline  Needle  Needle type: Tuohy   Needle gauge: 18 G  Catheter type: end hole  Catheter size: 20 G  Catheter at skin depth: 10 cm  Catheter securement method: clear occlusive dressing  Test dose: negativelidocaine (XYLOCAINE) 2 % - Infiltration   3 mL - 7/31/2023 10:26:00 AM  fentanyl 50 mcg/mL - Intravenous   25 mcg - 7/31/2023 10:26:00 AM  Assessment  Number of attempts: 1negative aspiration for CSF, negative aspiration for heme and no paresthesia on injection  patient tolerated the procedure well with no immediate complications

## 2023-07-31 NOTE — CONSULTS
Consultation - [address] 79 y.o. female MRN: [id number]  Unit/Bed#: [facility] [place] Encounter: [id number]      Assessment/Plan     History of Pancreatic cyst: POD #1 for distal pancreatectomy and splenectomy for pancreatic cyst.     - Continue appropriate post-surgical pain management with current regimen. - Serial abdominal examinations. - Continue to monitor for systemic signs of sepsis, acute abdomen and other potential post-surgical complications. - Offer appropriate vaccination series for coverage of encapsulated organisms, in the setting pancreatectomy (I.e., pneumococcal, meningococcal vaccination). - Dietary advancements per surgical team (patient to undergo SLP evaluation, due to reported history of dysphagia). GERD: continue protonix. Parkinson's Disease: Stable. Continue carbidopa-levodopa. Overactive Bladder: Continue mirabegron. Cognitive and Depression Screenings: patient is AO x 3 at the time of the encounter. Does not report any depressive symptoms with PHQ-9 reported as zero. Will defer from further cognitive or mood dysfunction at this time. Continue to monitor for cognitive changes, given patient's post-operative status, advance age, pain regimen and history of Parkinson's Disease for which she is receiving carbidopa-levodopa. Visual impairment: patient is with spectacles. Dentition: has dentures. No complications with mastication reported. Functional Status: patient appears to have excellent baseline functional status; denies any ambulatory dysfunction or reliance on devices for mobility. No gross sensory deficits aside from some visual loss for which she has spectacles. Patient is independent of ADLs/iADLs. Has her own business working as a seamstress. She does live alone. Denies any recent falls.            History of Present Illness   Physician Requesting Consult: Alia Faustin MD  Reason for Consult / Principal Problem: Geriatric Assessment in post-surgical patient. Hx and PE limited by: None. Additional history obtained from: Chart review and patient evaluation      HPI: Kathryn Saenz is a 79y.o. year old female who presents status post distal pancreatectomy and splenectomy for pancreatic cyst.  No mike-surgical complications incurred. Patient has undergoing surveillance by gastroenterology for enlarging pancreatic cyst, with features suggestive of sidebranch IPMN with no worrisome or suspicious characteristics. At baseline, patient is independent, lives alone. She does not require any assistive devices for ambulation. Has dentures at baseline, and no auditory deficits. Patient states that she is independent of ADLs. Still works, owning a business as a seamstress. On today's assessment, patient reports no acute issues; states that abdominal pain is minimal, and well controlled. Denies any nausea or vomiting. No systemic signs of infection or sepsis. Inpatient consult to Gerontology  Consult performed by: Philip Colindres MD  Consult ordered by: RISHI Guillory          Review of Systems   Constitutional: Negative for chills and fever. HENT: Negative for ear pain and sore throat. Eyes: Negative for pain and visual disturbance. Respiratory: Negative for cough, shortness of breath and wheezing. Cardiovascular: Negative for chest pain and palpitations. Gastrointestinal: Negative for abdominal pain and vomiting. Genitourinary: Negative for dysuria and hematuria. Musculoskeletal: Negative for arthralgias and back pain. Skin: Negative for color change and rash. Neurological: Negative for seizures, syncope and headaches. Psychiatric/Behavioral: Negative for agitation and behavioral problems. All other systems reviewed and are negative.       Historical Information   Past Medical History:   Diagnosis Date   • Actinic keratosis 03/11/2016   • Acute midline low back pain without sciatica 11/19/2020   • 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    • Benign neoplasm of skin    • Cancer [facility])     skin   • Chest pain    • Claustrophobia    • Diverticulitis    • Diverticulitis    • Fracture     L1-L2   • GERD (gastroesophageal reflux disease)    • H. pylori infection 2020   • Muscle weakness    • Nonmelanoma skin cancer     last assessed 2017   • Palpitations    • Pancreatic cyst    • Seasonal allergies    • Seborrheic keratosis 2014   • Sleep apnea     no cpap   • Spontaneous      without mention of complications    • Squamous cell carcinoma of forehead 2014   • Syncope      Past Surgical History:   Procedure Laterality Date   • BOTOX INJECTION N/A 3/6/2017    Procedure: CYSTOSCOPY; BLADDER BOTOX 100 UNITS ;  Surgeon: Oscar Almeida MD;  Location: [facility];  Service:    • BOWEL RESECTION      related ot diverticulitis   • CARDIAC CATHETERIZATION     • CARPAL TUNNEL RELEASE Right    • COLONOSCOPY  2019   • COLOSTOMY     • COLOSTOMY CLOSURE     • CYSTOSCOPY  2021   • FOOT SURGERY Left     neuroma   • HYSTERECTOMY     • NASAL SINUS SURGERY  age 35    [place]   • CO CYSTOURETHROSCOPY N/A 2018    Procedure: CYSTOSCOPY WITH BOTOX;  Surgeon: Oscar Almeida MD;  [facility]: [facility];  Service: Urology   • CO ESOPHAGOGASTRODUODENOSCOPY TRANSORAL DIAGNOSTIC N/A 2019    Procedure: ESOPHAGOGASTRODUODENOSCOPY (EGD); Surgeon: Iva Ozuna DO;  Location: [facility];   Service: Gastroenterology   • TONSILLECTOMY  age 47   • UPPER GASTROINTESTINAL ENDOSCOPY  2019     Social History   Social History     Substance and Sexual Activity   Alcohol Use Yes    Comment: patient states rare use on holidays      Social History     Substance and Sexual Activity   Drug Use No     Social History     Tobacco Use   Smoking Status Never   Smokeless Tobacco Never     Family History:   Family History   Problem Relation Age of Onset   • Alcohol abuse Mother    • Heart disease Mother    • Alcohol abuse Father    • Alcohol abuse Brother    • Cancer Brother    • Tremor Neg Hx    • Parkinsonism Neg Hx    • Colon cancer Neg Hx        Meds/Allergies   all current active meds have been reviewed    Allergies   Allergen Reactions   • Caffeine - Food Allergy GI Intolerance   • Iodine - Food Allergy Rash   • Latex Rash   • Other Rash     Adhesive tape         Objective     Intake/Output Summary (Last 24 hours) at 7/31/2023 1429  Last data filed at 7/31/2023 1413  Gross per 24 hour   Intake 2450 ml   Output 875 ml   Net 1575 ml     Invasive Devices     Peripheral Intravenous Line  Duration           Peripheral IV 07/31/23 Dorsal (posterior); Left Wrist <1 day    Peripheral IV 07/31/23 Left Forearm <1 day          Epidural Line  Duration           Epidural Catheter 07/31/23 <1 day          Arterial Line  Duration           Arterial Line 07/31/23 Right Radial <1 day          Drain  Duration           Closed/Suction Drain Left LUQ Bulb 19 Fr. <1 day    Urethral Catheter Non-latex;Straight-tip; Temperature probe 16 Fr. <1 day                Physical Exam  Vitals and nursing note reviewed. Constitutional:       General: She is not in acute distress. Appearance: She is well-developed. HENT:      Head: Normocephalic and atraumatic. Eyes:      Conjunctiva/sclera: Conjunctivae normal.   Cardiovascular:      Rate and Rhythm: Normal rate and regular rhythm. Heart sounds: No murmur heard. Pulmonary:      Effort: Pulmonary effort is normal. No respiratory distress. Breath sounds: Normal breath sounds. Abdominal:      Palpations: Abdomen is soft. Tenderness: There is no abdominal tenderness. Comments: J tube in situ draining serosanguinous fluid. Surgical incision site appears appropriately dressed, clean, dry and without exudate. Musculoskeletal:         General: No swelling. Cervical back: Neck supple. Right lower leg: No edema.       Left lower leg: No edema.   Skin:     General: Skin is warm and dry. Capillary Refill: Capillary refill takes less than 2 seconds. Neurological:      Mental Status: She is alert and oriented to person, place, and time. Psychiatric:         Mood and Affect: Mood normal.         Lab Results:   I have personally reviewed pertinent lab results including the following:  - CBC. CMP. I have personally reviewed the following imaging study reports in PACS:  - None. Personal interpretation of imaging studies mentioned above:    - None. Therapies:   PT: underwent assessment, with recommendations made for return to home with outpatient rehabilitation. OT: As above.     VTE Prophylaxis: Enoxaparin (Lovenox)    Code Status: Prior

## 2023-07-31 NOTE — INTERVAL H&P NOTE
H&P reviewed. After examining the patient I find no changes in the patients condition since the H&P had been written.     Vitals:    07/31/23 0939   BP: 162/81   Pulse: 95   Resp: 18   Temp: (!) 97.4 °F (36.3 °C)   SpO2: 98%

## 2023-07-31 NOTE — OP NOTE
OPERATIVE REPORT  PATIENT NAME: Salome Wheeler    :  1940  MRN: [id number]  Pt Location: [facility] OR ROOM 07    SURGERY DATE: 2023    Surgeon(s) and Role:     * Luz Ballard MD - Primary     * Gissell Kaminski MD - Assisting    Preop Diagnosis:  Pancreatic cyst [K86.2]    Post-Op Diagnosis Codes:     * Pancreatic cyst [K86.2]    Procedure(s):  DISTAL PANCREATECTOMY  SPLENECTOMY  LYSIS ADHESIONS    Specimen(s):  ID Type Source Tests Collected by Time Destination   1 : DISTAL PANCREAS Tissue Spleen TISSUE EXAM Luz Ballard MD 2023  1:28 PM        Estimated Blood Loss:   250 mL    Drains:  Closed/Suction Drain Left LUQ Bulb 19 Fr. (Active)   Number of days: 0       Urethral Catheter Non-latex;Straight-tip; Temperature probe 16 Fr. (Active)   Number of days: 0       [REMOVED] NG/OG/Enteral Tube Nasogastric 18 Fr Left nare (Removed)   Number of days: 0       Anesthesia Type:   General w/ Epidural    Operative Indications:  Pancreatic cyst [K802]  71-year-old female with an enlarging pancreas cyst.  She desired resection. Risks and benefits were explained. Informed consent was obtained. Patient was brought to the operating room. Operative Findings:  Multiple pancreas cysts    Complications:   None    Procedure and Technique:  After identifying the patient, general anesthesia was achieved. A Shelley catheter was placed. Lines were placed by anesthesia. Patient was prepped and draped in the usual sterile fashion. A timeout was performed. Upper midline incision was made. Using sharp dissection this was taken through the skin, subcutaneous tissue and then through the fascia. Several fascial sutures were removed. We then dissected in the preperitoneal fat and into the abdomen. At this point it was clear that we had actually divided through the gastrocolic omentum and we were into the lesser sac. This area was very distorted from her previous surgery.   There was some bleeding from the gastroepiploic which was clamped, divided and ligated with 2-0 silk suture. We now lysed adhesions from her previous surgery so we had enough exposure to place the Bookwalter retractor. We now mobilized the entire greater curve of the stomach using the harmonic scalpel. The short gastrics were divided with the harmonic scalpel. The stomach was now packed out of harm's way. The left gastric vessels were identified and preserved. We now dissected on the superior border of the pancreas and isolated the splenic artery. We now dissected on the inferior aspect of the pancreas and ultimately created a tunnel. This was essentially at the neck of the pancreas over the portal vein. The splenic vein was kept away from the pancreas. We now isolated the splenic artery more proximally. This was then divided with a 45 white load stapler. The neck of the pancreas was now divided where it was thin and free of cysts with a 45 green load stapler with seam guard. A second fire with a 45 green load stapler was required superiorly just to free the pancreas up completely. The splenic vein was now isolated and divided with a 45 white load stapler. We now continued to dissect along the superior had inferior aspect of the pancreas using the harmonic scalpel. We then dissected the pancreas out of the retroperitoneum. The adrenal gland was identified and we dissected the pancreas and cyst off of the left adrenal.  Once this was essentially taken to the hilum of the spleen, we now divided the retroperitoneal attachments to the spleen and rotated this medially. We then got posterior to the pancreas and then elevated this and then dissected the entire tail of the pancreas out of the retroperitoneum. Specimen was now oriented and handed off the table. Because we divided this at the neck of the pancreas I did not send this for frozen section as this was a very far away from the enlarging cyst.  The wound was now copiously irrigated.   There was excellent hemostasis. Floseal was placed in the resection bed. A 19 [place] Mendez drain was placed through a separate stab incision and secured to the skin using 3-0 nylon suture. The Bookwalter retractor was removed. Sponge and needle counts were correct. The stomach appeared viable. The fascia was approximated with #1 9 looped PDS suture starting at either end of the incision and secured centrally. Subcutaneous tissue was irrigated. Skin was approximated with interrupted 3-0 Vicryl suture subcutaneously and a running 4-0 Monocryl suture in a subcuticular fashion. Skin glue was used on the skin. Patient was extubated and taken to the recovery room in stable condition having tolerated the procedure well. I was present and participated in all aspects of this procedure. I was present for the entire procedure.     Patient Disposition:  PACU     This procedure was not performed to treat colon cancer through resection      SIGNATURE: Donna Meng MD  DATE: July 31, 2023  TIME: 2:33 PM

## 2023-08-01 LAB
ABO GROUP BLD BPU: NORMAL
ABO GROUP BLD BPU: NORMAL
ANION GAP SERPL CALCULATED.3IONS-SCNC: 2 MMOL/L
BASOPHILS # BLD AUTO: 0.01 THOUSANDS/ÂΜL (ref 0–0.1)
BASOPHILS NFR BLD AUTO: 0 % (ref 0–1)
BPU ID: NORMAL
BPU ID: NORMAL
BUN SERPL-MCNC: 13 MG/DL (ref 5–25)
CALCIUM SERPL-MCNC: 8.9 MG/DL (ref 8.3–10.1)
CHLORIDE SERPL-SCNC: 114 MMOL/L (ref 96–108)
CO2 SERPL-SCNC: 25 MMOL/L (ref 21–32)
CREAT SERPL-MCNC: 0.77 MG/DL (ref 0.6–1.3)
CROSSMATCH: NORMAL
CROSSMATCH: NORMAL
EOSINOPHIL # BLD AUTO: 0 THOUSAND/ÂΜL (ref 0–0.61)
EOSINOPHIL NFR BLD AUTO: 0 % (ref 0–6)
ERYTHROCYTE [DISTWIDTH] IN BLOOD BY AUTOMATED COUNT: 13.4 % (ref 11.6–15.1)
GFR SERPL CREATININE-BSD FRML MDRD: 71 ML/MIN/1.73SQ M
GLUCOSE SERPL-MCNC: 179 MG/DL (ref 65–140)
HCT VFR BLD AUTO: 33.9 % (ref 34.8–46.1)
HGB BLD-MCNC: 11.6 G/DL (ref 11.5–15.4)
IMM GRANULOCYTES # BLD AUTO: 0.04 THOUSAND/UL (ref 0–0.2)
IMM GRANULOCYTES NFR BLD AUTO: 0 % (ref 0–2)
LYMPHOCYTES # BLD AUTO: 0.85 THOUSANDS/ÂΜL (ref 0.6–4.47)
LYMPHOCYTES NFR BLD AUTO: 7 % (ref 14–44)
MAGNESIUM SERPL-MCNC: 1.9 MG/DL (ref 1.6–2.6)
MCH RBC QN AUTO: 30.4 PG (ref 26.8–34.3)
MCHC RBC AUTO-ENTMCNC: 34.2 G/DL (ref 31.4–37.4)
MCV RBC AUTO: 89 FL (ref 82–98)
MONOCYTES # BLD AUTO: 1.39 THOUSAND/ÂΜL (ref 0.17–1.22)
MONOCYTES NFR BLD AUTO: 11 % (ref 4–12)
NEUTROPHILS # BLD AUTO: 10.16 THOUSANDS/ÂΜL (ref 1.85–7.62)
NEUTS SEG NFR BLD AUTO: 82 % (ref 43–75)
NRBC BLD AUTO-RTO: 0 /100 WBCS
PHOSPHATE SERPL-MCNC: 3 MG/DL (ref 2.3–4.1)
PLATELET # BLD AUTO: 190 THOUSANDS/UL (ref 149–390)
PMV BLD AUTO: 9.8 FL (ref 8.9–12.7)
POTASSIUM SERPL-SCNC: 4.2 MMOL/L (ref 3.5–5.3)
RBC # BLD AUTO: 3.81 MILLION/UL (ref 3.81–5.12)
SODIUM SERPL-SCNC: 141 MMOL/L (ref 135–147)
UNIT DISPENSE STATUS: NORMAL
UNIT DISPENSE STATUS: NORMAL
UNIT PRODUCT CODE: NORMAL
UNIT PRODUCT CODE: NORMAL
UNIT PRODUCT VOLUME: 350 ML
UNIT PRODUCT VOLUME: 350 ML
UNIT RH: NORMAL
UNIT RH: NORMAL
WBC # BLD AUTO: 12.45 THOUSAND/UL (ref 4.31–10.16)

## 2023-08-01 PROCEDURE — 92610 EVALUATE SWALLOWING FUNCTION: CPT

## 2023-08-01 PROCEDURE — 85025 COMPLETE CBC W/AUTO DIFF WBC: CPT | Performed by: SURGERY

## 2023-08-01 PROCEDURE — 99024 POSTOP FOLLOW-UP VISIT: CPT | Performed by: SURGERY

## 2023-08-01 PROCEDURE — 80048 BASIC METABOLIC PNL TOTAL CA: CPT | Performed by: SURGERY

## 2023-08-01 PROCEDURE — 82150 ASSAY OF AMYLASE: CPT | Performed by: PHYSICIAN ASSISTANT

## 2023-08-01 PROCEDURE — 83735 ASSAY OF MAGNESIUM: CPT | Performed by: SURGERY

## 2023-08-01 PROCEDURE — 97163 PT EVAL HIGH COMPLEX 45 MIN: CPT

## 2023-08-01 PROCEDURE — 99222 1ST HOSP IP/OBS MODERATE 55: CPT | Performed by: INTERNAL MEDICINE

## 2023-08-01 PROCEDURE — 99233 SBSQ HOSP IP/OBS HIGH 50: CPT | Performed by: ANESTHESIOLOGY

## 2023-08-01 PROCEDURE — NC001 PR NO CHARGE: Performed by: INTERNAL MEDICINE

## 2023-08-01 PROCEDURE — 97166 OT EVAL MOD COMPLEX 45 MIN: CPT

## 2023-08-01 PROCEDURE — 84100 ASSAY OF PHOSPHORUS: CPT | Performed by: SURGERY

## 2023-08-01 RX ORDER — DEXTROSE MONOHYDRATE, SODIUM CHLORIDE, AND POTASSIUM CHLORIDE 50; 1.49; 4.5 G/1000ML; G/1000ML; G/1000ML
75 INJECTION, SOLUTION INTRAVENOUS CONTINUOUS
Status: DISCONTINUED | OUTPATIENT
Start: 2023-08-01 | End: 2023-08-01

## 2023-08-01 RX ORDER — MAGNESIUM SULFATE 1 G/100ML
1 INJECTION INTRAVENOUS ONCE
Status: COMPLETED | OUTPATIENT
Start: 2023-08-01 | End: 2023-08-01

## 2023-08-01 RX ORDER — OXYCODONE HYDROCHLORIDE 5 MG/1
5 TABLET ORAL EVERY 6 HOURS PRN
Status: DISCONTINUED | OUTPATIENT
Start: 2023-08-01 | End: 2023-08-04 | Stop reason: HOSPADM

## 2023-08-01 RX ORDER — DEXTROSE MONOHYDRATE, SODIUM CHLORIDE, AND POTASSIUM CHLORIDE 50; 1.49; 4.5 G/1000ML; G/1000ML; G/1000ML
50 INJECTION, SOLUTION INTRAVENOUS CONTINUOUS
Status: DISPENSED | OUTPATIENT
Start: 2023-08-01 | End: 2023-08-02

## 2023-08-01 RX ORDER — OXYBUTYNIN CHLORIDE 5 MG/1
5 TABLET, EXTENDED RELEASE ORAL DAILY
Status: DISCONTINUED | OUTPATIENT
Start: 2023-08-01 | End: 2023-08-04 | Stop reason: HOSPADM

## 2023-08-01 RX ADMIN — SENNOSIDES AND DOCUSATE SODIUM 2 TABLET: 8.6; 5 TABLET ORAL at 09:14

## 2023-08-01 RX ADMIN — FENTANYL CITRATE: 50 INJECTION INTRAMUSCULAR; INTRAVENOUS at 20:56

## 2023-08-01 RX ADMIN — CARBIDOPA AND LEVODOPA 1 TABLET: 25; 100 TABLET ORAL at 17:23

## 2023-08-01 RX ADMIN — DEXTROSE, SODIUM CHLORIDE, AND POTASSIUM CHLORIDE 75 ML/HR: 5; .45; .15 INJECTION INTRAVENOUS at 06:20

## 2023-08-01 RX ADMIN — HEPARIN SODIUM 5000 UNITS: 5000 INJECTION INTRAVENOUS; SUBCUTANEOUS at 06:20

## 2023-08-01 RX ADMIN — ACETAMINOPHEN 650 MG: 325 TABLET, FILM COATED ORAL at 11:24

## 2023-08-01 RX ADMIN — CARBIDOPA AND LEVODOPA 1 TABLET: 25; 100 TABLET ORAL at 11:24

## 2023-08-01 RX ADMIN — CARBIDOPA AND LEVODOPA 1 TABLET: 25; 100 TABLET ORAL at 06:20

## 2023-08-01 RX ADMIN — PANTOPRAZOLE SODIUM 40 MG: 40 TABLET, DELAYED RELEASE ORAL at 06:19

## 2023-08-01 RX ADMIN — SENNOSIDES AND DOCUSATE SODIUM 2 TABLET: 8.6; 5 TABLET ORAL at 17:23

## 2023-08-01 RX ADMIN — ACETAMINOPHEN 650 MG: 325 TABLET, FILM COATED ORAL at 17:23

## 2023-08-01 RX ADMIN — DEXTROSE, SODIUM CHLORIDE, AND POTASSIUM CHLORIDE 50 ML/HR: 5; .45; .15 INJECTION INTRAVENOUS at 09:31

## 2023-08-01 RX ADMIN — ACETAMINOPHEN 650 MG: 325 TABLET, FILM COATED ORAL at 00:05

## 2023-08-01 RX ADMIN — ACETAMINOPHEN 650 MG: 325 TABLET, FILM COATED ORAL at 06:20

## 2023-08-01 RX ADMIN — HEPARIN SODIUM 5000 UNITS: 5000 INJECTION INTRAVENOUS; SUBCUTANEOUS at 13:11

## 2023-08-01 RX ADMIN — HEPARIN SODIUM 5000 UNITS: 5000 INJECTION INTRAVENOUS; SUBCUTANEOUS at 20:56

## 2023-08-01 RX ADMIN — MAGNESIUM SULFATE HEPTAHYDRATE 1 G: 1 INJECTION, SOLUTION INTRAVENOUS at 09:28

## 2023-08-01 NOTE — RESTORATIVE TECHNICIAN NOTE
Restorative Technician Note      Patient Name: Carli Becerril     Restorative Tech Visit Date: 08/01/23  Note Type: Mobility  Patient Position Upon Consult: Standing (pt in BR)  Activity Performed: Ambulated  Assistive Device: Roller walker  Patient Position at End of Consult: Bedside chair;  All needs within reach

## 2023-08-01 NOTE — CONSULTS
RD consulted re: "nutrition assessment"; evaluation documented in flow sheet  Summary:  Records suggest no significant weight changes over past year; admit nutrition screen noted: patient reported "eating poorly due to decreased appetite";  RD attempted visit with patient to discuss further, however, patient declined visit and closed her eyes. RD will plan a follow-up visit when patient more agreeable for same.

## 2023-08-01 NOTE — CONSULTS
Consultation - Acute Pain Service   Moisés Elizabeth 79 y.o. female MRN: [id number]  Unit/Bed#: [facility] 924-01 Encounter: [id number]               Assessment/Plan     Assessment:   Patient Active Problem List   Diagnosis   • OAB (overactive bladder)   • Parkinson's disease ([address])   • Primary insomnia   • History of skin cancer   • Seasonal allergic rhinitis   • Impaired fasting glucose   • Mood disturbance   • Obesity (BMI 30-39. 9)   • Claustrophobia   • Mitral valve disorder   • Menopause ovarian failure   • Vitamin D deficiency   • Dysphagia   • Multiple thyroid nodules   • Gastro-esophageal reflux disease without esophagitis   • Osteopenia   • Chronic idiopathic constipation   • History of adenomatous polyp of colon   • Tremor   • Trochanteric bursitis, right hip   • Pancreatic cyst   • Cherry angioma   • Dyspnea on exertion   • Postablative hypothyroidism   • Sleep apnea   • Closed compression fracture of body of L1 vertebra (HCC)   • Cerebrovascular disease   • Age-related osteoporosis without current pathological fracture   • Urge incontinence   • Encounter for geriatric assessment   • s/p distal pancreatectomy/splenectomy      79 y.o. female w/ PMHx of Parkinson's, dysphagia, pancreatic cyst s/p distal pancreatectomy, splenectomy and lysis of adhesions on 7/31. Epidural site was assessed and is fine. Plan:   1. Continue epidural at current rate 0.1% ropivacaine with 2 mcg/mL of fentanyl at 6/5/10/3.  2. Will order breakthrough opioid medications as she currently doesn't have that ordered. Expect no use until epidural removal as her current pain score is zero. 3. Acetaminophen 650 mg q6h PO scheduled.   4. Bowel regimen senokot S.        APS will continue to follow; please contact [facility]PS ( [phone]) with any further questions    History of Present Illness    Admit Date:  7/31/2023  Hospital Day:  1 day  Primary Service:  Oncology-Surgical  Attending Provider:  Kizzy Dickens MD  Reason for Consult / Principal Problem: s/p above surgery with epidural placement  HPI: Moisés Elizabeth is a 79y.o. year old female who presents with an enlarging pancreatic cyst. A multidisciplinary meeting recommended surgery. She was taken to the OR for the above surgery and an epidural was placed for post operative pain management. Current pain location(s): abdomen  Pain Scale:   1/10  Quality: aching  Current Analgesic regimen:  See MAR    Pain History: none   Pain Management Provider:  n/a    I have reviewed the patient's controlled substance dispensing history in the Prescription Drug Monitoring Program in compliance with the [facility] regulations before prescribing any controlled substances. Inpatient consult to Acute Pain Service  Consult performed by: Lawrence Win MD  Consult ordered by: Jackie Hughes MD          Review of Systems   Constitutional: Negative. HENT: Negative. Eyes: Negative. Respiratory: Negative. Cardiovascular: Negative. Gastrointestinal: Negative. Endocrine: Negative. Genitourinary: Negative. Musculoskeletal: Negative. Skin: Negative. Allergic/Immunologic: Negative. Neurological: Negative. Hematological: Negative. Psychiatric/Behavioral: Negative.         Historical Information   Past Medical History:   Diagnosis Date   • Actinic keratosis 2016   • Acute midline low back pain without sciatica 2020   • 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    • Benign neoplasm of skin    • Cancer [facility])     skin   • Chest pain    • Claustrophobia    • Diverticulitis    • Diverticulitis    • Fracture     L1-L2   • GERD (gastroesophageal reflux disease)    • H. pylori infection 2020   • Muscle weakness    • Nonmelanoma skin cancer     last assessed 2017   • Palpitations    • Pancreatic cyst    • Seasonal allergies    • Seborrheic keratosis 2014   • Sleep apnea     no cpap   • Spontaneous      without mention of complications    • Squamous cell carcinoma of forehead 07/09/2014   • Syncope      Past Surgical History:   Procedure Laterality Date   • BOTOX INJECTION N/A 3/6/2017    Procedure: CYSTOSCOPY; BLADDER BOTOX 100 UNITS ;  Surgeon: Vazquez Ruelas MD;  Location: [facility];  Service:    • BOWEL RESECTION      related ot diverticulitis   • CARDIAC CATHETERIZATION     • CARPAL TUNNEL RELEASE Right    • COLONOSCOPY  07/31/2019   • COLOSTOMY     • COLOSTOMY CLOSURE     • CYSTOSCOPY  06/01/2021   • FOOT SURGERY Left     neuroma   • HYSTERECTOMY     • NASAL SINUS SURGERY  age 35    [place]   • ME CYSTOURETHROSCOPY N/A 4/13/2018    Procedure: CYSTOSCOPY WITH BOTOX;  Surgeon: Vazquez Ruelas MD;  Location: [facility];  Service: Urology   • ME ESOPHAGOGASTRODUODENOSCOPY TRANSORAL DIAGNOSTIC N/A 4/23/2019    Procedure: ESOPHAGOGASTRODUODENOSCOPY (EGD); Surgeon: Birgit Mckeon DO;  Location: [facility];   Service: Gastroenterology   • TONSILLECTOMY  age 47   • UPPER GASTROINTESTINAL ENDOSCOPY  04/23/2019     Social History   Social History     Substance and Sexual Activity   Alcohol Use Yes    Comment: patient states rare use on holidays      Social History     Substance and Sexual Activity   Drug Use No     Social History     Tobacco Use   Smoking Status Never   Smokeless Tobacco Never     Family History: non-contributory    Meds/Allergies   all current active meds have been reviewed    Allergies   Allergen Reactions   • Caffeine - Food Allergy GI Intolerance   • Iodine - Food Allergy Rash   • Latex Rash   • Other Rash     Adhesive tape         Objective   Temp:  [97.4 °F (36.3 °C)-98.9 °F (37.2 °C)] 98.2 °F (36.8 °C)  HR:  [67-95] 73  Resp:  [12-18] 14  BP: ()/(43-81) 112/53  Arterial Line BP: ()/(38-50) 118/50    Intake/Output Summary (Last 24 hours) at 8/1/2023 0934  Last data filed at 8/1/2023 8597  Gross per 24 hour   Intake 2862.72 ml   Output 2098 ml   Net 764.72 ml       Physical Exam  Constitutional: Appearance: Normal appearance. She is obese. HENT:      Head: Normocephalic and atraumatic. Nose: Nose normal.      Mouth/Throat:      Mouth: Mucous membranes are moist.   Pulmonary:      Effort: Pulmonary effort is normal. No respiratory distress. Abdominal:      General: There is no distension. Tenderness: There is abdominal tenderness. Musculoskeletal:      Cervical back: Normal range of motion. Skin:     General: Skin is warm. Neurological:      General: No focal deficit present. Mental Status: She is alert and oriented to person, place, and time. Mental status is at baseline. Psychiatric:         Mood and Affect: Mood normal.         Behavior: Behavior normal.         Thought Content: Thought content normal.         Judgment: Judgment normal.         Lab Results: I have personally reviewed pertinent labs. Imaging Studies: I have personally reviewed pertinent reports. EKG, Pathology, and Other Studies: I have personally reviewed pertinent reports.           Miriam Mathews MD

## 2023-08-01 NOTE — UTILIZATION REVIEW
Initial Clinical Review    Elective Inpatient surgical procedure  Age/Sex: 79 y.o. female  Surgery Date: 7/31/23  Procedure: DISTAL PANCREATECTOMY  SPLENECTOMY  LYSIS ADHESIONS  Anesthesia: general w/ wpidural  Operative Findings: Multiple pancreas cysts    POD#1 Progress Note:  No acute events overnight patient states that her pain is well controlled on the PCEA, no nausea no vomiting, denies flatus or bowel movement. Pulling 1500cc on incentive spirometer. Abdomen soft, mildly distended, post surgical tenderness, incision CDI. DC Rosalva today, continue to monitor LUQ ARNIE drain, maintain epidural, speech eval today dt hx of dysphagia, encourage OOB and ambulation and inc spirometry. Admission Orders: Date/Time/Statement:   Admission Orders (From admission, onward)     Ordered        07/31/23 1430  Inpatient Admission  Once                      Orders Placed This Encounter   Procedures   • Inpatient Admission     Standing Status:   Standing     Number of Occurrences:   1     Order Specific Question:   Level of Care     Answer:   Level 2 Stepdown / HOT [14]     Order Specific Question:   Estimated length of stay     Answer:   More than 2 Midnights     Order Specific Question:   Certification     Answer:   I certify that inpatient services are medically necessary for this patient for a duration of greater than two midnights. See H&P and MD Progress Notes for additional information about the patient's course of treatment.      Vital Signs: /53   Pulse 73   Temp 98.2 °F (36.8 °C)   Resp 14   Ht 5' 1" (1.549 m)   Wt 74.8 kg (165 lb)   SpO2 94%   BMI 31.18 kg/m²     Pertinent Labs/Diagnostic Test Results:   No orders to display         Results from last 7 days   Lab Units 08/01/23  0501 07/31/23  1249   WBC Thousand/uL 12.45*  --    HEMOGLOBIN g/dL 11.6  --    I STAT HEMOGLOBIN g/dl  --  11.9   HEMATOCRIT % 33.9*  --    HEMATOCRIT, ISTAT %  --  35   PLATELETS Thousands/uL 190  --    NEUTROS ABS Thousands/µL 10.16*  --          Results from last 7 days   Lab Units 08/01/23  0501 07/31/23  1249   SODIUM mmol/L 141  --    POTASSIUM mmol/L 4.2  --    CHLORIDE mmol/L 114*  --    CO2 mmol/L 25  --    CO2, I-STAT mmol/L  --  25   ANION GAP mmol/L 2  --    BUN mg/dL 13  --    CREATININE mg/dL 0.77  --    EGFR ml/min/1.73sq m 71  --    CALCIUM mg/dL 8.9  --    CALCIUM, IONIZED, ISTAT mmol/L  --  1.17   MAGNESIUM mg/dL 1.9  --    PHOSPHORUS mg/dL 3.0  --      Results from last 7 days   Lab Units 08/01/23  0501   GLUCOSE RANDOM mg/dL 179*     Results from last 7 days   Lab Units 07/31/23  1249   I STAT BASE EXC mmol/L -1   I STAT O2 SAT % 100*   ISTAT PH ART  7.414   I STAT ART PCO2 mm HG 36.6   I STAT ART PO2 mm .0*   I STAT ART HCO3 mmol/L 23.5     Results from last 7 days   Lab Units 07/31/23  1229   UNIT PRODUCT CODE  [id number]  [id number]   UNIT NUMBER  [id number]  [id number]   UNITABO  O  O   UNITRH  POS  POS   CROSSMATCH  Compatible  Compatible   UNIT DISPENSE STATUS  Crossmatched  Crossmatched   UNIT PRODUCT VOL mL 350  350     Diet: NPO until speech eval  Mobility: OOB and ambulation  DVT Prophylaxis: heparin, scd, ambulatory    Medications/Pain Control:   Scheduled Medications:  acetaminophen, 650 mg, Oral, Q6H DARI  carbidopa-levodopa, 1 tablet, Oral, TID AC  heparin (porcine), 5,000 Units, Subcutaneous, Q8H DARI  magnesium sulfate, 1 g, Intravenous, Once  Mirabegron ER, 1 tablet, Oral, QAM  pantoprazole, 40 mg, Oral, Early Morning  senna-docusate sodium, 2 tablet, Oral, BID      Continuous IV Infusions:  dextrose 5 % and sodium chloride 0.45 % with KCl 20 mEq/L, 75 mL/hr, Intravenous, Continuous  ropivacaine 0.1% and fentaNYL 2 mcg/mL, , Epidural, Continuous      PRN Meds:  lactated ringers, 1,000 mL, Intravenous, Once PRN   And  lactated ringers, 1,000 mL, Intravenous, Once PRN  ondansetron, 4 mg, Intravenous, Q6H PRN  sodium chloride, 1,000 mL, Intravenous, Once PRN   And  sodium chloride, 1,000 mL, Intravenous, Once PRN        Network Utilization Review Department  ATTENTION: Please call with any questions or concerns to [phone] and carefully listen to the prompts so that you are directed to the right person. All voicemails are confidential.  Jamar Holden all requests for admission clinical reviews, approved or denied determinations and any other requests to dedicated fax number below belonging to the campus where the patient is receiving treatment.  List of dedicated fax numbers for the Facilities:  [place] DENIALS (Administrative/Medical Necessity) [phone] [address] [facility] (Maternity/NICU/Pediatrics) [phone]   [address] [place] [address] [phone]   [facility] [address] [facility] [phone]   [address] [place] [address] [phone]   [address] [address] [phone]

## 2023-08-01 NOTE — PHYSICAL THERAPY NOTE
Physical Therapy Eval    Patient Name: Callum Bill    EUBME'O Date: 2023     Problem List  Principal Problem:    s/p distal pancreatectomy/splenectomy  Active Problems:    OAB (overactive bladder)    Parkinson's disease ([address])    Primary insomnia    History of skin cancer    Seasonal allergic rhinitis    Impaired fasting glucose    Obesity (BMI 30-39. 9)    Claustrophobia    Mitral valve disorder    Menopause ovarian failure    Dysphagia    Multiple thyroid nodules    Gastro-esophageal reflux disease without esophagitis    Chronic idiopathic constipation    Tremor    Pancreatic cyst    Postablative hypothyroidism    Sleep apnea    Closed compression fracture of body of L1 vertebra (HCC)    Cerebrovascular disease    Age-related osteoporosis without current pathological fracture    Urge incontinence       Past Medical History  Past Medical History:   Diagnosis Date    Actinic keratosis 2016    Acute midline low back pain without sciatica 2020    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     Benign neoplasm of skin     Cancer (HCC)     skin    Chest pain     Claustrophobia     Diverticulitis     Diverticulitis     Fracture     L1-L2    GERD (gastroesophageal reflux disease)     H. pylori infection 2020    Muscle weakness     Nonmelanoma skin cancer     last assessed 2017    Palpitations     Pancreatic cyst     Seasonal allergies     Seborrheic keratosis 2014    Sleep apnea     no cpap    Spontaneous      without mention of complications     Squamous cell carcinoma of forehead 2014    Syncope         Past Surgical History  Past Surgical History:   Procedure Laterality Date    BOTOX INJECTION N/A 3/6/2017    Procedure: CYSTOSCOPY; BLADDER BOTOX 100 UNITS ;  Surgeon: Gabriele Babin MD;  [facility];  Service:     BOWEL RESECTION      related ot diverticulitis    CARDIAC CATHETERIZATION      CARPAL TUNNEL RELEASE Right     COLONOSCOPY  07/31/2019    COLOSTOMY      COLOSTOMY CLOSURE      CYSTOSCOPY  06/01/2021    FOOT SURGERY Left     neuroma    HYSTERECTOMY      NASAL SINUS SURGERY  age 35    [place]    [place] CYSTOURETHROSCOPY N/A 4/13/2018    Procedure: CYSTOSCOPY WITH BOTOX;  Surgeon: Glenn Recio MD;  [facility]: [facility];  Service: Urology    MN ESOPHAGOGASTRODUODENOSCOPY TRANSORAL DIAGNOSTIC N/A 4/23/2019    Procedure: ESOPHAGOGASTRODUODENOSCOPY (EGD); Surgeon: Clover Mcburney, DO;  [facility]: [facility]; Service: Gastroenterology    TONSILLECTOMY  age 47    UPPER GASTROINTESTINAL ENDOSCOPY  04/23/2019 08/01/23 1030   PT Last Visit   PT Visit Date 08/01/23   Note Type   Note type Evaluation   Pain Assessment   Pain Assessment Tool 0-10   Pain Score No Pain   Restrictions/Precautions   Weight Bearing Precautions Per Order No   Other Precautions Multiple lines; Fall Risk  (ARNIE, PCA pump, IV)   Home Living   Type of Home Mobile home   Home Layout One level;Performs ADLs on one level; Able to live on main level with bedroom/bathroom;Stairs to enter with rails  (5STE)   Bathroom Shower/Tub Walk-in shower   Bathroom Toilet Standard   Bathroom Equipment Shower chair   [address] Walker;Cane  (does not use but has access to)   Prior Function   Level of [place] Independent with ADLs; Independent with functional mobility; Independent with IADLS   Lives With Alone   Receives Help From Family  (family lives nearby)   IADLs Independent with driving; Independent with meal prep; Independent with medication management   Falls in the last 6 months 0   Vocational Part time employment   General   Family/Caregiver Present No   Cognition   Overall Cognitive Status WFL   Arousal/Participation Alert   Orientation Level Oriented X4   Memory Within functional limits   Following Commands Follows all commands and directions without difficulty   Subjective Subjective pt pleasant and cooperative throughout therapy. pt received supine in bed   RLE Assessment   RLE Assessment WFL   LLE Assessment   LLE Assessment WFL   Bed Mobility   Supine to Sit 5  Supervision   Additional items Increased time required;Verbal cues   Sit to Supine Unable to assess   Transfers   Sit to Stand 5  Supervision   Additional items Increased time required;Verbal cues   Stand to Sit 5  Supervision   Additional items Increased time required;Verbal cues   Additional Comments transfers w RW   Ambulation/Elevation   Gait pattern Improper Weight shift; Wide MINDY; Forward Flexion;Decreased foot clearance; Excessively slow; Short stride   Gait Assistance 5  Supervision   Additional items Verbal cues   Assistive Device Rolling walker   Distance 120'   Stair Management Assistance Not tested   Balance   Static Sitting Good   Dynamic Sitting Fair +   Static Standing Fair +   Dynamic Standing Fair   Ambulatory Fair   Endurance Deficit   Endurance Deficit Yes   Endurance Deficit Description generalized weakness, decreased exercise tolerance   Activity Tolerance   Activity Tolerance Patient tolerated treatment well   Medical Staff Made Aware OT ruma Echols due to medical complexity and multiple comorbidities   Nurse Made Aware RN cleared and updated   Assessment   Prognosis Good   Problem List Decreased strength;Decreased endurance; Impaired balance;Decreased mobility   Assessment PT orders received and acknowledged. Patient was seen today for high complexity PT evaluation. High complexity evaluation due to Ongoing medical management for primary dx, Increased reliance on more restrictive AD compared to baseline, Decreased activity tolerance compared to baseline, Fall risk, Continuous pulse oximetry monitoring , ARNIE drain in place at current time, s/p surgical intervention Patient is a 79 y.o. female who was admitted to [address] [place] [address] on 7/31/23 for planned distal pancreatectomy and splenectomy .  Pt is currently POD#1. Patients current diagnosis/problem list include osteoporosis, cerebrovascular disease, and urge incontinence . Patient performed functional mobility as described above. At baseline, pt resides alone in mobile home and was independent prior to hospital admission. Currently, upon initial examination, pt  is requiring supervision A for bed mobility skills; supervision for functional transfers and supervision for ambulation with RW. Patient currently presents below baseline with limitations in gait, balance, and transfers. Patient will benefit from continued PT services while in hospital in order to address remaining limitations. The patients AM-PAC Basic Mobility Inpatient Short From Raw Score is 23 . A Raw score of 23  suggests that the patient may benefit from discharge to home . PT recommendation at this time is for home with OPPT services to address residual balance deficits . Please also refer to the recommendation of the Physical Therapist for safe discharge planning. Barriers to Discharge Inaccessible home environment  (5STE)   Goals   Patient Goals to go home   STG Expiration Date 08/15/23   Short Term Goal #1 Patient will be able to perform bed mobility tasks with modified independence in order to improve overall functional mobility and assist in safe d/c. STG 2 Patient will sit EOB for at least 25 minutes at modified independence level in order to strengthen abdominal musculature and assist in future transfers and ambulation. STG 3 Patient will be able to perform functional transfer with modified independence in order to improve overall functional mobility and assist in safe discharge. STG 4 Patient will be able to ambulate at least 300 feet with least restrictive device with modified independence assist in order to improve overall functional mobility and assist in safe discharge.  STG 5 Patient will improve sitting/standing static/dynamic balance 1/2 grade in order to improve functional mobility and assist in safe discharge. STG 6 Patient will improve LE strength by 1/2 grade in order to improve functional mobility and assist in safe discharge. STG 7 Patient will be able to negotiate at least 5 stairs with least restrictive device with modified independence A in order to improve overall functional mobility and assist in safe discharge   PT Treatment Day 0   Plan   Treatment/Interventions ADL retraining;Functional transfer training;LE strengthening/ROM; Elevations; Therapeutic exercise; Endurance training;Bed mobility;Gait training;Spoke to nursing;OT   PT Frequency 2-3x/wk   Recommendation   PT Discharge Recommendation Home with outpatient rehabilitation  (to address residual balance deficits)   AM-PAC Basic Mobility Inpatient   Turning in Flat Bed Without Bedrails 4   Lying on Back to Sitting on Edge of Flat Bed Without Bedrails 4   Moving Bed to Chair 4   Standing Up From Chair Using Arms 4   Walk in Room 4   Climb 3-5 Stairs With Railing 3   Basic Mobility Inpatient Raw Score 23   Basic Mobility Standardized Score 50.88   Highest Level Of Mobility   JH-HLM Goal 7: Walk 25 feet or more   JH-HLM Achieved 7: Walk 25 feet or more   End of Consult   Patient Position at End of Consult Bedside chair; All needs within reach       Bautista Collado PT, DPT

## 2023-08-01 NOTE — OCCUPATIONAL THERAPY NOTE
Occupational Therapy Evaluation     Patient Name: Zack Marquez  XCLTA'G Date: 2023  Problem List  Principal Problem:    s/p distal pancreatectomy/splenectomy  Active Problems:    OAB (overactive bladder)    Parkinson's disease ([address])    Primary insomnia    History of skin cancer    Seasonal allergic rhinitis    Impaired fasting glucose    Obesity (BMI 30-39. 9)    Claustrophobia    Mitral valve disorder    Menopause ovarian failure    Dysphagia    Multiple thyroid nodules    Gastro-esophageal reflux disease without esophagitis    Chronic idiopathic constipation    Tremor    Pancreatic cyst    Postablative hypothyroidism    Sleep apnea    Closed compression fracture of body of L1 vertebra (HCC)    Cerebrovascular disease    Age-related osteoporosis without current pathological fracture    Urge incontinence    Past Medical History  Past Medical History:   Diagnosis Date    Actinic keratosis 2016    Acute midline low back pain without sciatica 2020    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     Benign neoplasm of skin     Cancer (HCC)     skin    Chest pain     Claustrophobia     Diverticulitis     Diverticulitis     Fracture     L1-L2    GERD (gastroesophageal reflux disease)     H. pylori infection 2020    Muscle weakness     Nonmelanoma skin cancer     last assessed 2017    Palpitations     Pancreatic cyst     Seasonal allergies     Seborrheic keratosis 2014    Sleep apnea     no cpap    Spontaneous      without mention of complications     Squamous cell carcinoma of forehead 2014    Syncope      Past Surgical History  Past Surgical History:   Procedure Laterality Date    ABDOMINAL ADHESION SURGERY N/A 2023    Procedure: LYSIS ADHESIONS;  Surgeon: Hetal Jeong MD;  [facility];  Service: Surgical Oncology    BOTOX INJECTION N/A 3/6/2017    Procedure: CYSTOSCOPY; BLADDER BOTOX 100 UNITS ;  Surgeon: Gamal Triplett MD; [facility];  Service:     BOWEL RESECTION      related ot diverticulitis    CARDIAC CATHETERIZATION      CARPAL TUNNEL RELEASE Right     COLONOSCOPY  07/31/2019    COLOSTOMY      COLOSTOMY CLOSURE      CYSTOSCOPY  06/01/2021    DISTAL PANCREATECTOMY N/A 7/31/2023    Procedure: DISTAL PANCREATECTOMY;  Surgeon: Luz Ballard MD;  [facility];  Service: Surgical Oncology    FOOT SURGERY Left     neuroma    HYSTERECTOMY      NASAL SINUS SURGERY  age 35    [place]    DC CYSTOURETHROSCOPY N/A 4/13/2018    Procedure: CYSTOSCOPY WITH BOTOX;  Surgeon: Paul Rivera MD;  [facility];  Service: Urology    DC ESOPHAGOGASTRODUODENOSCOPY TRANSORAL DIAGNOSTIC N/A 4/23/2019    Procedure: ESOPHAGOGASTRODUODENOSCOPY (EGD); Surgeon: Dyana Delaney DO;  [facility]; Service: Gastroenterology    SPLENECTOMY, TOTAL N/A 7/31/2023    Procedure: SPLENECTOMY;  Surgeon: Luz Ballard MD;  [facility];  Service: Surgical Oncology    TONSILLECTOMY  age 47    UPPER GASTROINTESTINAL ENDOSCOPY  04/23/2019 08/01/23 1029   OT Last Visit   OT Visit Date 08/01/23   Note Type   Note type Evaluation   Pain Assessment   Pain Assessment Tool 0-10   Pain Score No Pain   Restrictions/Precautions   Weight Bearing Precautions Per Order No   Other Precautions Fall Risk;Multiple lines  (PCA pump)   Home Living   Type of Home Mobile home   Home Layout One level  (5STE)   Bathroom Shower/Tub Walk-in shower   Bathroom Toilet Standard   [address] bed  (pt reports she does not own AD but dtr has RW and SPC she could borrow)   Prior Function   Level of [place] Independent with ADLs; Independent with IADLS   Lives With Alone   Receives Help From Family   IADLs Independent with driving; Independent with meal prep; Independent with medication management   Falls in the last 6 months 0   Vocational Self employed   Lifestyle   Autonomy PTA, pt reports being I with ADLs, IADLs, fnxl mobility, (+) . 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 5  [address]  [address] 5  Supervision/Setup   LB Dressing Deficit Thread RLE into underwear; Thread LLE into underwear;Pull up over hips   Toileting Assistance  5  Supervision/Setup   Bed Mobility   Supine to Sit 5  Supervision   Additional items HOB elevated; Bedrails; Increased time required   Sit to Supine Unable to assess   Transfers   Sit to Stand 5  Supervision   Additional items Increased time required   Stand to Sit 5  Supervision   Additional items Increased time required   Additional Comments initially STS w/o AD, additional performed with RW   Functional Mobility   Functional Mobility 5  Supervision   Additional items Rolling walker   Balance   Static Sitting Good   Dynamic Sitting Fair +   Static Standing Fair +   Dynamic Standing Fair   Ambulatory Fair   Activity Tolerance   Activity Tolerance Patient tolerated treatment well   Medical Staff Made Aware PT Geovanny Low   Nurse Made Aware RN clearance for session   RUE Assessment   RUE Assessment WFL   LUE Assessment   LUE Assessment WFL   Vision-Basic Assessment   Current Vision Wears glasses all the time   Cognition   Overall Cognitive Status [facility]   Arousal/Participation Alert; Responsive; Cooperative   Attention Within functional limits   Orientation Level Oriented X4   Memory Within functional limits   Following Commands Follows all commands and directions without difficulty   Comments Pt pleasant and cooperative t/o session   Assessment   Assessment Pt is a 79 y.o. female admitted to \[facility]\"" on 7/31/2023 w/ pancreatic cyst. Pt now s/p distal pancreatectomy and splenectomy with lysis of adhesions. has a past medical history of Actinic keratosis, Acute midline low back pain, 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, Benign neoplasm of skin, Cancer, Chest pain, Claustrophobia, Diverticulitis, Fracture, GERD, H. pylori infection, Muscle weakness, Nonmelanoma skin cancer, Palpitations, Pancreatic cyst, Seasonal allergies, Seborrheic keratosis, Sleep apnea, Squamous cell carcinoma of forehead, and Syncope. Pt with active OT orders and ambulate  orders. Pt seen as a co-evaluation with PT due to the patient's co-morbidities, clinically unstable presentation/clinical complexity, and present impairments. As per pt report, pta, resides alone in a 1STH, 5STE. Pt was I w/  ADLS and IADLS, (+) drove. Upon evaluation, pt currently requires S with RW for transfers and mobility. Pt currently requires I eating, I grooming, S UB ADLs, S LB ADLs, and S toileting. Pt reports local son/dtr who can assist as needed upon d/c. Educated pt on maintaining activity level with continued hospitalization, verbalized understanding. Pt with no questions or concerns at this time. From OT standpoint, recommendation would be home with increased social support. No further acute OT needs. D/C OT. Please re-consult if needed. Thank you. Pt was left after session with all current needs met. The patient's raw score on the AM-PAC Daily Activity Inpatient Short Form is 20. A raw score of greater than or equal to 19 suggests the patient may benefit from discharge to home. Please refer to the recommendation of the Occupational Therapist for safe discharge planning.    Plan   OT Frequency Eval only   Recommendation   OT Discharge Recommendation No rehabilitation needs   AM-PAC Daily Activity Inpatient   Lower Body Dressing 3   Bathing 3   Toileting 3   Upper Body Dressing 3   Grooming 4 Eating 4   Daily Activity Raw Score 20   Daily Activity Standardized Score (Calc for Raw Score >=11) 42.03   AM-PAC Applied Cognition Inpatient   Following a Speech/Presentation 4   Understanding Ordinary Conversation 4   Taking Medications 4   Remembering Where Things Are Placed or Put Away 4   Remembering List of 4-5 Errands 4   Taking Care of Complicated Tasks 4   Applied Cognition Raw Score 24   Applied Cognition Standardized Score 62.21     Kath Verdugo MS, OTR/L

## 2023-08-01 NOTE — PLAN OF CARE
Problem: Prexisting or High Potential for Compromised Skin Integrity  Goal: Skin integrity is maintained or improved  Description: INTERVENTIONS:  - Identify patients at risk for skin breakdown  - Assess and monitor skin integrity  - Assess and monitor nutrition and hydration status  - Monitor labs   - Assess for incontinence   - Turn and reposition patient  - Assist with mobility/ambulation  - Relieve pressure over bony prominences  - Avoid friction and shearing  - Provide appropriate hygiene as needed including keeping skin clean and dry  - Evaluate need for skin moisturizer/barrier cream  - Collaborate with interdisciplinary team   - Patient/family teaching  - Consider wound care consult   Outcome: Progressing     Problem: MOBILITY - ADULT  Goal: Maintains/Returns to pre admission functional level  Description: INTERVENTIONS:  - Perform BMAT or MOVE assessment daily.   - Set and communicate daily mobility goal to care team and patient/family/caregiver. - Reposition patient every 2 hours.   - Dangle patient 3 times a day  - Stand patient 3 times a day  - Ambulate patient 3 times a day  - Out of bed to chair 3 times a day   - Out of bed for meals 3 times a day  - Out of bed for toileting  - Record patient progress and toleration of activity level   Outcome: Progressing     Problem: PAIN - ADULT  Goal: Verbalizes/displays adequate comfort level or baseline comfort level  Description: Interventions:  - Encourage patient to monitor pain and request assistance  - Assess pain using appropriate pain scale  - Administer analgesics based on type and severity of pain and evaluate response  - Implement non-pharmacological measures as appropriate and evaluate response  - Consider cultural and social influences on pain and pain management  - Notify physician/advanced practitioner if interventions unsuccessful or patient reports new pain  Outcome: Progressing     Problem: INFECTION - ADULT  Goal: Absence or prevention of progression during hospitalization  Description: INTERVENTIONS:  - Assess and monitor for signs and symptoms of infection  - Monitor lab/diagnostic results  - Monitor all insertion sites, i.e. indwelling lines, tubes, and drains  - Monitor endotracheal if appropriate and nasal secretions for changes in amount and color  - [place] appropriate cooling/warming therapies per order  - Administer medications as ordered  - Instruct and encourage patient and family to use good hand hygiene technique  - Identify and instruct in appropriate isolation precautions for identified infection/condition  Outcome: Progressing     Problem: SAFETY ADULT  Goal: Maintains/Returns to pre admission functional level  Description: INTERVENTIONS:  - Perform BMAT or MOVE assessment daily.   - Set and communicate daily mobility goal to care team and patient/family/caregiver. - Collaborate with rehabilitation services on mobility goals if consulted  - Reposition patient every 2 hours.   - Dangle patient 3 times a day  - Stand patient 3 times a day  - Ambulate patient 3 times a day  - Out of bed to chair 3 times a day   - Out of bed for meals 3 times a day  - Out of bed for toileting  - Record patient progress and toleration of activity level   Outcome: Progressing  Goal: Patient will remain free of falls  Description: INTERVENTIONS:  - Educate patient/family on patient safety including physical limitations  - Instruct patient to call for assistance with activity   - Consult OT/PT to assist with strengthening/mobility   - Keep Call bell within reach  - Keep bed low and locked with side rails adjusted as appropriate  - Keep care items and personal belongings within reach  - Initiate and maintain comfort rounds  - Make Fall Risk Sign visible to staff  - Offer Toileting every 2 Hours, in advance of need  - Initiate/Maintain alarm  - Obtain necessary fall risk management equipment:   - Apply yellow socks and bracelet for high fall risk patients  - Consider moving patient to room near nurses station  Outcome: Progressing     Problem: DISCHARGE PLANNING  Goal: Discharge to home or other facility with appropriate resources  Description: INTERVENTIONS:  - Identify barriers to discharge w/patient and caregiver  - Arrange for needed discharge resources and transportation as appropriate  - Identify discharge learning needs (meds, wound care, etc.)  - Arrange for interpretive services to assist at discharge as needed  - Refer to Case Management Department for coordinating discharge planning if the patient needs post-hospital services based on physician/advanced practitioner order or complex needs related to functional status, cognitive ability, or social support system  Outcome: Progressing     Problem: Knowledge Deficit  Goal: Patient/family/caregiver demonstrates understanding of disease process, treatment plan, medications, and discharge instructions  Description: Complete learning assessment and assess knowledge base. Interventions:  - Provide teaching at level of understanding  - Provide teaching via preferred learning methods  Outcome: Progressing     Problem: Nutrition/Hydration-ADULT  Goal: Nutrient/Hydration intake appropriate for improving, restoring or maintaining nutritional needs  Description: Monitor and assess patient's nutrition/hydration status for malnutrition. Collaborate with interdisciplinary team and initiate plan and interventions as ordered. Monitor patient's weight and dietary intake as ordered or per policy. Utilize nutrition screening tool and intervene as necessary. Determine patient's food preferences and provide high-protein, high-caloric foods as appropriate.      INTERVENTIONS:  - Monitor oral intake, urinary output, labs, and treatment plans  - Assess nutrition and hydration status and recommend course of action  - Evaluate amount of meals eaten  - Assist patient with eating if necessary   - Allow adequate time for meals  - Recommend/ encourage appropriate diets, oral nutritional supplements, and vitamin/mineral supplements  - Order, calculate, and assess calorie counts as needed  - Recommend, monitor, and adjust tube feedings and TPN/PPN based on assessed needs  - Assess need for intravenous fluids  - Provide specific nutrition/hydration education as appropriate  - Include patient/family/caregiver in decisions related to nutrition  Outcome: Progressing

## 2023-08-01 NOTE — SPEECH THERAPY NOTE
Speech-Language Pathology Bedside Swallow Evaluation      Patient Name: Toshia Castillo    CKRXH'G Date: 2023     Problem List  Principal Problem:    s/p distal pancreatectomy/splenectomy  Active Problems:    OAB (overactive bladder)    Parkinson's disease ([address])    Primary insomnia    History of skin cancer    Seasonal allergic rhinitis    Impaired fasting glucose    Obesity (BMI 30-39. 9)    Claustrophobia    Mitral valve disorder    Menopause ovarian failure    Dysphagia    Multiple thyroid nodules    Gastro-esophageal reflux disease without esophagitis    Chronic idiopathic constipation    Tremor    Pancreatic cyst    Postablative hypothyroidism    Sleep apnea    Closed compression fracture of body of L1 vertebra (HCC)    Cerebrovascular disease    Age-related osteoporosis without current pathological fracture    Urge incontinence      Past Medical History  Past Medical History:   Diagnosis Date   • Actinic keratosis 2016   • Acute midline low back pain without sciatica 2020   • 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    • Benign neoplasm of skin    • Cancer [facility])     skin   • Chest pain    • Claustrophobia    • Diverticulitis    • Diverticulitis    • Fracture     L1-L2   • GERD (gastroesophageal reflux disease)    • H. pylori infection 2020   • Muscle weakness    • Nonmelanoma skin cancer     last assessed 2017   • Palpitations    • Pancreatic cyst    • Seasonal allergies    • Seborrheic keratosis 2014   • Sleep apnea     no cpap   • Spontaneous      without mention of complications    • Squamous cell carcinoma of forehead 2014   • Syncope        Past Surgical History  Past Surgical History:   Procedure Laterality Date   • BOTOX INJECTION N/A 3/6/2017    Procedure: CYSTOSCOPY; BLADDER BOTOX 100 UNITS ;  Surgeon: Shirley Clarke MD;  Location: [facility];  Service:    • BOWEL RESECTION      related ot diverticulitis   • CARDIAC CATHETERIZATION     • CARPAL TUNNEL RELEASE Right    • COLONOSCOPY  07/31/2019   • COLOSTOMY     • COLOSTOMY CLOSURE     • CYSTOSCOPY  06/01/2021   • FOOT SURGERY Left     neuroma   • HYSTERECTOMY     • NASAL SINUS SURGERY  age 35    [place]   • OR CYSTOURETHROSCOPY N/A 4/13/2018    Procedure: CYSTOSCOPY WITH BOTOX;  Surgeon: Gamal Triplett MD;  Location: [facility];  Service: Urology   • OR ESOPHAGOGASTRODUODENOSCOPY TRANSORAL DIAGNOSTIC N/A 4/23/2019    Procedure: ESOPHAGOGASTRODUODENOSCOPY (EGD); Surgeon: Antony Palmer DO;  Location: [facility]; Service: Gastroenterology   • TONSILLECTOMY  age 47   • UPPER GASTROINTESTINAL ENDOSCOPY  04/23/2019       Summary   Pt presented with functional appearing oral and pharyngeal stage swallowing skills with materials administered today. The patient is assessed with puree and regular solids with thin liquids. Bite strength is adequate. Mastication is timely and efficient. The patient tolerates thin liquids and medications whole with thin liquids without overt s/s aspiration. Risk/s for Aspiration: low      Recommended Diet: regular diet and thin liquids   Recommended Form of Meds: whole with liquid   Aspiration precautions and swallowing strategies: upright posture  Other Recommendations: Continue frequent oral care    Current Medical Status  POD#1 Progress Note:  No acute events overnight patient states that her pain is well controlled on the PCEA, no nausea no vomiting, denies flatus or bowel movement. Pulling 1500cc on incentive spirometer. Abdomen soft, mildly distended, post surgical tenderness, incision CDI. DC [place] and [place] today, continue to monitor LUQ ARNIE drain, maintain epidural, speech eval today dt hx of dysphagia, encourage OOB and ambulation and inc spirometry.     Current Precautions:  Fall    Allergies:  Caffeine   Past medical history:  Please see H&P for details    Special Studies:  VBS 2/19/2019:   Assessment Summary:   Pt's oropharyngeal swallow appears within normal limits. No penetration or aspiration was seen during the study. Prominent cricopharyngeal muscle seen with small amount of food/liquids becoming stasis above CP muscle concerning for formation of small zenker's diverticulum.    Recommendations:   -Regular solids thin liquids  -Slow rate of intake/small frequent meals   -Follow up with ENT    Chest xray 7/17/2023:   No acute cardiopulmonary disease. Social/Education/Vocational Hx:  Pt lives alone    Swallow Information   Current Risks for Dysphagia & Aspiration: known history of dysphagia and recent surgery  Current Symptoms/Concerns: none  Current Diet: NPO   Baseline Diet: regular diet and thin liquids    Baseline Assessment   Behavior/Cognition: alert  Speech/Language Status: able to participate in conversation and able to follow commands  Patient Positioning: upright in bed  Pain Status/Interventions/Response to Interventions: No report of or nonverbal indications of pain. Swallow Mechanism Exam  Facial: symmetrical  Labial: WFL  Lingual: WFL  Velum: symmetrical  Mandible: adequate ROM  Dentition: adequate  Vocal quality:clear/adequate   Respiratory Status: on RA       Consistencies Assessed and Performance   Consistencies Administered: thin liquids, puree and hard solids  Materials administered included applesauce and toast with thin liquids and medications with thin liquids     Oral Stage: WFL  Mastication was adequate with the materials administered today. Bolus formation and transfer were functional with no significant oral residue noted. No overt s/s reduced oral control. Pharyngeal Stage: WFL  Swallow Mechanics:  Swallowing initiation appeared prompt. Laryngeal rise was palpated and judged to be within functional limits. No coughing, throat clearing, change in vocal quality or respiratory status noted today.      Esophageal Concerns: none reported    Summary and Recommendations (see above)    Results Reviewed with: patient, RN and MD     Treatment Recommended: evaluation only.

## 2023-08-01 NOTE — PLAN OF CARE
Dulce Maria Edwards 89 [address] [facility] [address], [address] [facility]  Authorization for Surgical Operation and Procedure   Date:___________                                                                                            Time:__________  1.  I hereby aut Problem: PHYSICAL THERAPY ADULT  Goal: Performs mobility at highest level of function for planned discharge setting. See evaluation for individualized goals. Description: Treatment/Interventions: ADL retraining, Functional transfer training, LE strengthening/ROM, Elevations, Therapeutic exercise, Endurance training, Bed mobility, Gait training, Spoke to nursing, OT          See flowsheet documentation for full assessment, interventions and recommendations. Note: Prognosis: Good  Problem List: Decreased strength, Decreased endurance, Impaired balance, Decreased mobility  Assessment: PT orders received and acknowledged. Patient was seen today for high complexity PT evaluation. High complexity evaluation due to Ongoing medical management for primary dx, Increased reliance on more restrictive AD compared to baseline, Decreased activity tolerance compared to baseline, Fall risk, Continuous pulse oximetry monitoring , ARNIE drain in place at current time, s/p surgical intervention Patient is a 79 y.o. female who was admitted to [address] [place] [address] on 7/31/23 for planned distal pancreatectomy and splenectomy . Pt is currently POD#1. Patients current diagnosis/problem list include osteoporosis, cerebrovascular disease, and urge incontinence . Patient performed functional mobility as described above. At baseline, pt resides alone in mobile home and was independent prior to hospital admission. Currently, upon initial examination, pt  is requiring supervision A for bed mobility skills; supervision for functional transfers and supervision for ambulation with RW. Patient currently presents below baseline with limitations in gait, balance, and transfers. Patient will benefit from continued PT services while in hospital in order to address remaining limitations. The patients AM-PAC Basic Mobility Inpatient Short From Raw Score is 23 . A Raw score of 23  suggests that the patient may benefit from discharge to home .  PT 4.   Should the need arise during my operation or immediate post-operative period, I also consent to the administration of blood and/or blood products.   Further, I understand that despite careful testing and screening of blood or blood products by yoana recommendation at this time is for home with OPPT services to address residual balance deficits . Please also refer to the recommendation of the Physical Therapist for safe discharge planning. Barriers to Discharge: Inaccessible home environment (5STE)     PT Discharge Recommendation: Home with outpatient rehabilitation (to address residual balance deficits)    See flowsheet documentation for full assessment. 8.   I recognize that in the event my procedure results in extended X-Ray/fluoroscopy time, I may develop a skin reaction. 9.  If I have a Do Not Attempt Resuscitation (DNAR) order in place, that status will be suspended while in the operating room, proc

## 2023-08-01 NOTE — RESTORATIVE TECHNICIAN NOTE
Restorative Technician Note      Patient Name: Linsey Harrington     Restorative Tech Visit Date: 08/01/23  Note Type: Mobility  Patient Position Upon Consult: Bedside chair  Activity Performed: Ambulated  Assistive Device: Roller walker  Patient Position at End of Consult: Supine;  All needs within reach

## 2023-08-02 LAB
AMYLASE FLD QL: 418 U/L
AMYLASE SERPL-CCNC: 95 IU/L (ref 25–115)
ANION GAP SERPL CALCULATED.3IONS-SCNC: 4 MMOL/L
ANION GAP SERPL CALCULATED.3IONS-SCNC: 4 MMOL/L
BUN SERPL-MCNC: 11 MG/DL (ref 5–25)
BUN SERPL-MCNC: 12 MG/DL (ref 5–25)
CALCIUM SERPL-MCNC: 8.5 MG/DL (ref 8.3–10.1)
CALCIUM SERPL-MCNC: 8.7 MG/DL (ref 8.3–10.1)
CHLORIDE SERPL-SCNC: 110 MMOL/L (ref 96–108)
CHLORIDE SERPL-SCNC: 112 MMOL/L (ref 96–108)
CO2 SERPL-SCNC: 26 MMOL/L (ref 21–32)
CO2 SERPL-SCNC: 27 MMOL/L (ref 21–32)
CREAT SERPL-MCNC: 0.75 MG/DL (ref 0.6–1.3)
CREAT SERPL-MCNC: 0.81 MG/DL (ref 0.6–1.3)
ERYTHROCYTE [DISTWIDTH] IN BLOOD BY AUTOMATED COUNT: 13.8 % (ref 11.6–15.1)
GFR SERPL CREATININE-BSD FRML MDRD: 66 ML/MIN/1.73SQ M
GFR SERPL CREATININE-BSD FRML MDRD: 73 ML/MIN/1.73SQ M
GLUCOSE SERPL-MCNC: 140 MG/DL (ref 65–140)
GLUCOSE SERPL-MCNC: 157 MG/DL (ref 65–140)
HCT VFR BLD AUTO: 33.4 % (ref 34.8–46.1)
HGB BLD-MCNC: 11.2 G/DL (ref 11.5–15.4)
MAGNESIUM SERPL-MCNC: 2.1 MG/DL (ref 1.6–2.6)
MCH RBC QN AUTO: 30.2 PG (ref 26.8–34.3)
MCHC RBC AUTO-ENTMCNC: 33.5 G/DL (ref 31.4–37.4)
MCV RBC AUTO: 90 FL (ref 82–98)
PLATELET # BLD AUTO: 187 THOUSANDS/UL (ref 149–390)
PMV BLD AUTO: 10 FL (ref 8.9–12.7)
POTASSIUM SERPL-SCNC: 3.7 MMOL/L (ref 3.5–5.3)
POTASSIUM SERPL-SCNC: 3.9 MMOL/L (ref 3.5–5.3)
RBC # BLD AUTO: 3.71 MILLION/UL (ref 3.81–5.12)
SODIUM SERPL-SCNC: 140 MMOL/L (ref 135–147)
SODIUM SERPL-SCNC: 143 MMOL/L (ref 135–147)
WBC # BLD AUTO: 22.48 THOUSAND/UL (ref 4.31–10.16)

## 2023-08-02 PROCEDURE — 82150 ASSAY OF AMYLASE: CPT | Performed by: PHYSICIAN ASSISTANT

## 2023-08-02 PROCEDURE — 83735 ASSAY OF MAGNESIUM: CPT

## 2023-08-02 PROCEDURE — 85027 COMPLETE CBC AUTOMATED: CPT | Performed by: PHYSICIAN ASSISTANT

## 2023-08-02 PROCEDURE — 80048 BASIC METABOLIC PNL TOTAL CA: CPT

## 2023-08-02 PROCEDURE — 80048 BASIC METABOLIC PNL TOTAL CA: CPT | Performed by: PHYSICIAN ASSISTANT

## 2023-08-02 PROCEDURE — 93005 ELECTROCARDIOGRAM TRACING: CPT

## 2023-08-02 PROCEDURE — 99024 POSTOP FOLLOW-UP VISIT: CPT | Performed by: SURGERY

## 2023-08-02 PROCEDURE — 99232 SBSQ HOSP IP/OBS MODERATE 35: CPT | Performed by: STUDENT IN AN ORGANIZED HEALTH CARE EDUCATION/TRAINING PROGRAM

## 2023-08-02 RX ORDER — POTASSIUM CHLORIDE 20 MEQ/1
40 TABLET, EXTENDED RELEASE ORAL ONCE
Status: COMPLETED | OUTPATIENT
Start: 2023-08-02 | End: 2023-08-02

## 2023-08-02 RX ADMIN — CARBIDOPA AND LEVODOPA 1 TABLET: 25; 100 TABLET ORAL at 12:29

## 2023-08-02 RX ADMIN — ACETAMINOPHEN 650 MG: 325 TABLET, FILM COATED ORAL at 12:29

## 2023-08-02 RX ADMIN — CARBIDOPA AND LEVODOPA 1 TABLET: 25; 100 TABLET ORAL at 05:45

## 2023-08-02 RX ADMIN — ACETAMINOPHEN 650 MG: 325 TABLET, FILM COATED ORAL at 17:26

## 2023-08-02 RX ADMIN — SENNOSIDES AND DOCUSATE SODIUM 2 TABLET: 8.6; 5 TABLET ORAL at 09:37

## 2023-08-02 RX ADMIN — HEPARIN SODIUM 5000 UNITS: 5000 INJECTION INTRAVENOUS; SUBCUTANEOUS at 14:13

## 2023-08-02 RX ADMIN — HEPARIN SODIUM 5000 UNITS: 5000 INJECTION INTRAVENOUS; SUBCUTANEOUS at 05:45

## 2023-08-02 RX ADMIN — ACETAMINOPHEN 650 MG: 325 TABLET, FILM COATED ORAL at 05:45

## 2023-08-02 RX ADMIN — OXYBUTYNIN 5 MG: 5 TABLET, FILM COATED, EXTENDED RELEASE ORAL at 09:37

## 2023-08-02 RX ADMIN — PANTOPRAZOLE SODIUM 40 MG: 40 TABLET, DELAYED RELEASE ORAL at 05:45

## 2023-08-02 RX ADMIN — POTASSIUM CHLORIDE 40 MEQ: 1500 TABLET, EXTENDED RELEASE ORAL at 12:29

## 2023-08-02 RX ADMIN — SENNOSIDES AND DOCUSATE SODIUM 2 TABLET: 8.6; 5 TABLET ORAL at 17:26

## 2023-08-02 RX ADMIN — HEPARIN SODIUM 5000 UNITS: 5000 INJECTION INTRAVENOUS; SUBCUTANEOUS at 22:13

## 2023-08-02 RX ADMIN — CARBIDOPA AND LEVODOPA 1 TABLET: 25; 100 TABLET ORAL at 17:26

## 2023-08-02 RX ADMIN — DEXTROSE, SODIUM CHLORIDE, AND POTASSIUM CHLORIDE 50 ML/HR: 5; .45; .15 INJECTION INTRAVENOUS at 00:31

## 2023-08-02 NOTE — RESTORATIVE TECHNICIAN NOTE
Restorative Technician Note      Patient Name: Scot Pop     Restorative Tech Visit Date: 08/02/23  Note Type: Mobility  Patient Position Upon Consult: Bedside chair  Activity Performed: Ambulated  Assistive Device: Roller walker  Patient Position at End of Consult: Supine;  All needs within reach    Gene Khan Restoritative Technician

## 2023-08-02 NOTE — RESTORATIVE TECHNICIAN NOTE
Restorative Technician Note      Patient Name: Nhan Krishna     Restorative Tech Visit Date: 08/02/23  Note Type: Mobility  Patient Position Upon Consult: Supine  Activity Performed: Ambulated  Assistive Device: Roller walker  Patient Position at End of Consult: Supine;  All needs within reach      Kevin Nassar Technician Pneumonia

## 2023-08-02 NOTE — PROGRESS NOTES
Progress Note -Surgical Oncology  : [facility] surgery Resident on [facility] 79 y.o. female MRN: [id number]  Unit/Bed#: [facility] 924-01 Encounter: [id number]    Assessment:  79 y.o. female past medical history of Parkinson's dysphagia, pancreatic cyst status post distal pancreatectomy, splenectomy and lysis of adhesions on 7/31    AVSS, patient hemodynamically stable satting 94% on room air. UOP: 800cc +1 unmeasured  BM: none, passing flatus  LYNDSAY: 105cc serosanguinous    Lab Results   Component Value Date/Time    WBC 17.20 (H) 08/03/2023 04:13 AM    WBC 22.48 (H) 08/02/2023 05:37 AM    WBC 5.17 10/03/2015 12:05 PM    HGB 10.5 (L) 08/03/2023 04:13 AM    HGB 11.2 (L) 08/02/2023 05:37 AM    HGB 14.2 10/03/2015 12:05 PM    CREATININE 0.81 08/02/2023 11:13 PM    CREATININE 0.75 08/02/2023 06:01 AM    CREATININE 0.75 10/03/2015 12:05 PM       Plan:  -SQH heparin held this AM  -Epidural out this morning- plan to begin oral PO analgesia  -regular diet  -monitor drain ouput  -splenic vax prior to d/c  -OOB  -DVT ppx    Subjective/Objective     Subjective: overnight nursing noted pt HR was 70s then jumped to 140s. Observed at the bedside. Pt was hemodynamically stable,  asymptomatic, offered no complaints of chest pain, chest pressure, sob, light headedness. Pt was placed on 24hr telemetry, EKG, BMP and Mg were ordered. Lytes were wnl. Through chart review, pt has previously been placed on 48 hr Holter monitor w/ cardiology for lightheadedness whereby it was observed pt had supraventricular ectopic. Recent 7/17 EKG demonstrated sinus rhythm w/ marked sinus arrythmia w/ PVCs and ST at T wave abnormalities. Patient reassessed and early morning, patient denies any chest pain palpitations shortness of breath wheezing. Patient denies pain, or discomfort.   Patient denies nausea vomiting tolerating diet well without issues, she does mention though that she does not have much of an appetite and only eats a few bites of her meals. Has been using incentive spirometer up to 1500 cc. Overnight urine output noted to be low at 0.2cc/kg/hr cc am shift, a 500 cc bolus of LR infused over a 2-hour period was started and urine output increased. Objective:     Physical Exam:  GEN: NAD  HEENT: MMM  CV: RRR  Lung: Normal effort  Ab: Soft, nondistended postsurgical tenderness incision sites draining dry clean and intact ARNIE at the skin secured by suture, ARNIE draining serosanguineous  Extrem: No CCE   Neuro: A+Ox3     Vitals: Temp:  [97.9 °F (36.6 °C)-99.2 °F (37.3 °C)] 97.9 °F (36.6 °C)  HR:  [] 85  Resp:  [16-18] 16  BP: ()/(56-77) 113/70  Body mass index is 31.18 kg/m². I/O       07/31 0701  08/01 0700 08/01 0701  08/02 0700 08/02 0701  08/03 0700    P. O.   240    I.V. (mL/kg) 3259 (43.6) 1475.5 (19.7)     IV Piggyback 500      Total Intake(mL/kg) 3759 (50.3) 1475.5 (19.7) 240 (3.2)    Urine (mL/kg/hr) 1600 1350 (0.8) 100 (0.1)    Drains 248 171 55    Blood 250      Total Output 2098 1521 155    Net +1661 -45.5 +85                   Lab, Imaging and other studies: I have personally reviewed pertinent reports.   , CBC with diff:   Lab Results   Component Value Date    WBC 17.20 (H) 08/03/2023    HGB 10.5 (L) 08/03/2023    HCT 31.9 (L) 08/03/2023    MCV 92 08/03/2023     08/03/2023    RBC 3.48 (L) 08/03/2023    MCH 30.2 08/03/2023    MCHC 32.9 08/03/2023    RDW 13.8 08/03/2023    MPV 9.8 08/03/2023    NRBC 0 08/03/2023   , BMP/CMP:   Lab Results   Component Value Date    SODIUM 140 08/02/2023    K 3.9 08/02/2023     (H) 08/02/2023    CO2 26 08/02/2023    BUN 11 08/02/2023    CREATININE 0.81 08/02/2023    CALCIUM 8.7 08/02/2023    EGFR 66 08/02/2023     VTE Pharmacologic Prophylaxis: Heparin  VTE Mechanical Prophylaxis: sequential compression device      Elan Gr MD  8/3/2023 4:59 AM

## 2023-08-02 NOTE — RESTORATIVE TECHNICIAN NOTE
Restorative Technician Note      Patient Name: Mikael Dickson     Restorative Tech Visit Date: 08/02/23  Note Type: Mobility  Patient Position Upon Consult: Supine  Activity Performed: Ambulated; Other (Comment) (To BR)  Assistive Device: Roller walker  Patient Position at End of Consult: Bedside chair;  All needs within reach      Kranthi Ochoa Restoritative Technician

## 2023-08-02 NOTE — CASE MANAGEMENT
Case Management Assessment & Discharge Planning Note    Patient name Josiah Landis  Location [address] [address]/[facility] 810-88 MRN [id number]  : 1940 Date 2023       Current Admission Date: 2023  Current Admission Diagnosis:s/p distal pancreatectomy/splenectomy   Patient Active Problem List    Diagnosis Date Noted   • s/p distal pancreatectomy/splenectomy 2023   • Encounter for geriatric assessment 2023   • Urge incontinence 2021   • Age-related osteoporosis without current pathological fracture 2021   • Closed compression fracture of body of L1 vertebra ([address]) 2020   • Cerebrovascular disease 2020   • Pancreatic cyst 2020   • Trochanteric bursitis, right hip 2020   • Tremor 2020   • Chronic idiopathic constipation 2019   • History of adenomatous polyp of colon 2019   • Osteopenia 2019   • Gastro-esophageal reflux disease without esophagitis 2019   • Dysphagia 2019   • Multiple thyroid nodules 2019   • Menopause ovarian failure 2019   • Vitamin D deficiency 2019   • Mitral valve disorder 2018   • Mood disturbance 2018   • Obesity (BMI 30-39.9) 2018   • Claustrophobia 2018   • Impaired fasting glucose 2018   • Seasonal allergic rhinitis 2018   • History of skin cancer 2018   • Parkinson's disease ([address]) 2018   • Primary insomnia 2018   • Cherry angioma 11/15/2017   • Postablative hypothyroidism 2016   • Dyspnea on exertion 10/02/2015   • OAB (overactive bladder) 2015   • Sleep apnea 04/15/2014      LOS (days): 2  Geometric Mean LOS (GMLOS) (days): 3.60  Days to GMLOS:1.7     OBJECTIVE:    Risk of Unplanned Readmission Score: 10.05         Current admission status: Inpatient       Preferred Pharmacy:   [address] [facility], [address] [place] [address],[address]  [address] [address]  Phone: [phone] Fax: [address] [address] [address] [facility]  [facility] [address]  Phone: [phone] Fax: [phone]    [address], [address] [place] [address] [facility] [address] [facility] [address]  Phone: [phone] Fax: [phone]    Primary Care Provider: Paris Crowe MD    Primary Insurance: [facility] REP  Secondary Insurance:     ASSESSMENT:  [address], [address] - Daughter   Primary Phone: [phone] (Mobile)               Advance Directives  Does patient have a [address]?: Yes  Does patient have Advance Directives?: Yes  Advance Directives: Living will, Power of  for health care  Primary Contact: daughter/POA/Melba Camargo [phone]         Readmission Root Cause  30 Day Readmission: No    Patient Information  Admitted from[de-identified] Home  Mental Status: Alert  During Assessment patient was accompanied by: Not accompanied during assessment  Assessment information provided by[de-identified] Patient  Primary Caregiver: Self  Support Systems: Children, Friend, Family members, [employer]/jess[employer] community, Self  What city do you live in?: [place]  Home entry access options.  Select all that apply.: Stairs  Number of steps to enter home.: 5  Do the steps have railings?: Yes  Type of Current Residence: [place]  In the last 12 months, was there a time when you were not able to pay the mortgage or rent on time?: No  In the last 12 months, how many places have you lived?: 1  In the last 12 months, was there a time when you did not have a steady place to sleep or slept in a shelter (including now)?: No  Living Arrangements: Lives Alone    Activities of Daily Living Prior to Admission  Functional Status: Independent  Completes ADLs independently?: Yes  Ambulates independently?: Yes  Does patient use assisted devices?: No  Does patient currently own DME?: No  Does patient have a history of Outpatient Therapy (PT/OT)?: Yes ([place])  Does the patient have a history of Short-Term Rehab?: No  Does patient have a history of HHC?: Yes ([facility])  Does patient currently have [facility]?: No         Patient Information Continued  Income Source: Pension/residential  Does patient have prescription coverage?: Yes ([facility]  Within the past 12 months, you worried that your food would run out before you got the money to buy more.: Never true  Within the past 12 months, the food you bought just didn't last and you didn't have money to get more.: Never true  Food insecurity resource given?: N/A  Does patient receive dialysis treatments?: No  Does patient have a history of substance abuse?: No  Does patient have a history of Mental Health Diagnosis?: Yes  Is patient receiving treatment for mental health?: No. Patient declined treatment information. (pt reports she had a "nervous breakdown" 30 years ago. no present treatment needed)  Has patient received inpatient treatment related to mental health in the last 2 years?: No         Means of Transportation  Means of Transport to Appts[de-identified] Drives Self  In the past 12 months, has lack of transportation kept you from medical appointments or from getting medications?: No  In the past 12 months, has lack of transportation kept you from meetings, work, or from getting things needed for daily living?: No  Was application for public transport provided?: N/A        DISCHARGE DETAILS:    Discharge planning discussed with[de-identified] patient at bedside  Freedom of Choice: Yes  Comments - Freedom of Choice: Tent d/c Friday 8/5. Prob d/c w/ ARNIE in place. therapy rec outpt PT. Discussed VN and home PT f/u. Pt is agreeable to blanket referrals with preference for [facility]  as she is known to them.  Referral sent via Aidin  CM contacted family/caregiver?: No- see comments (declines)  Were Treatment Team discharge recommendations reviewed with patient/caregiver?: Yes  Did patient/caregiver verbalize understanding of patient care needs?: Yes  Were patient/caregiver advised of the risks associated with not following Treatment Team discharge recommendations?: Yes    Contacts  Patient Contacts: Cameron Cortez [phone]  Relationship to Patient[de-identified] Family  Contact Method: Phone  Reason/Outcome: Emergency Contact, Continuity of [address]         Is the patient interested in [address] at discharge?: Yes  [facility] requested[de-identified] Nursing, Physical [facility] [place] Provider[de-identified] PCP  Home Health Services Needed[de-identified] Strengthening/Theraputic Exercises to Improve Function, Evaluate Functional Status and Safety, Post-Op Care and Assessment, Wound/Ostomy Care (ARNIE drain care)  Homebound Criteria Met[de-identified] Requires the Assistance of Another Person for Safe Ambulation or to Leave the Home  Supporting Clincal Findings[de-identified] Limited Endurance         Other Referral/Resources/Interventions Provided:  Interventions: [facility]    Would you like to participate in our [facility] service program?  : Yes       Discharge Destination Plan[de-identified] [facility] : Family                 Additional Comments: CM met with pt to introduce CM role, obtain baseline assessment information and discuss DCP. Pt lives alone in ranch home w/ 5STE. IPTA. No DME.  Drives

## 2023-08-02 NOTE — PLAN OF CARE
Problem: Prexisting or High Potential for Compromised Skin Integrity  Goal: Skin integrity is maintained or improved  Description: INTERVENTIONS:  - Identify patients at risk for skin breakdown  - Assess and monitor skin integrity  - Assess and monitor nutrition and hydration status  - Monitor labs   - Assess for incontinence   - Turn and reposition patient  - Assist with mobility/ambulation  - Relieve pressure over bony prominences  - Avoid friction and shearing  - Provide appropriate hygiene as needed including keeping skin clean and dry  - Evaluate need for skin moisturizer/barrier cream  - Collaborate with interdisciplinary team   - Patient/family teaching  - Consider wound care consult   Outcome: Progressing     Problem: MOBILITY - ADULT  Goal: Maintains/Returns to pre admission functional level  Description: INTERVENTIONS:  - Perform BMAT or MOVE assessment daily.   - Set and communicate daily mobility goal to care team and patient/family/caregiver. - Collaborate with rehabilitation services on mobility goals if consulted  - Reposition patient every 2 hours.   - Dangle patient 3 times a day  - Stand patient 3 times a day  - Ambulate patient 3 times a day  - Out of bed to chair 3 times a day   - Out of bed for meals 3 times a day  - Out of bed for toileting  - Record patient progress and toleration of activity level   Outcome: Progressing     Problem: PAIN - ADULT  Goal: Verbalizes/displays adequate comfort level or baseline comfort level  Description: Interventions:  - Encourage patient to monitor pain and request assistance  - Assess pain using appropriate pain scale  - Administer analgesics based on type and severity of pain and evaluate response  - Implement non-pharmacological measures as appropriate and evaluate response  - Consider cultural and social influences on pain and pain management  - Notify physician/advanced practitioner if interventions unsuccessful or patient reports new pain  Outcome: Progressing     Problem: INFECTION - ADULT  Goal: Absence or prevention of progression during hospitalization  Description: INTERVENTIONS:  - Assess and monitor for signs and symptoms of infection  - Monitor lab/diagnostic results  - Monitor all insertion sites, i.e. indwelling lines, tubes, and drains  - Monitor endotracheal if appropriate and nasal secretions for changes in amount and color  - [place] appropriate cooling/warming therapies per order  - Administer medications as ordered  - Instruct and encourage patient and family to use good hand hygiene technique  - Identify and instruct in appropriate isolation precautions for identified infection/condition  Outcome: Progressing     Problem: SAFETY ADULT  Goal: Maintains/Returns to pre admission functional level  Description: INTERVENTIONS:  - Perform BMAT or MOVE assessment daily.   - Set and communicate daily mobility goal to care team and patient/family/caregiver. - Collaborate with rehabilitation services on mobility goals if consulted  - Reposition patient every 2 hours.   - Dangle patient 3 times a day  - Stand patient 3 times a day  - Ambulate patient 3 times a day  - Out of bed to chair 3 times a day   - Out of bed for meals 3 times a day  - Out of bed for toileting  - Record patient progress and toleration of activity level   Outcome: Progressing  Goal: Patient will remain free of falls  Description: INTERVENTIONS:  - Educate patient/family on patient safety including physical limitations  - Instruct patient to call for assistance with activity   - Consult OT/PT to assist with strengthening/mobility   - Keep Call bell within reach  - Keep bed low and locked with side rails adjusted as appropriate  - Keep care items and personal belongings within reach  - Initiate and maintain comfort rounds  - Make Fall Risk Sign visible to staff  - Offer Toileting every 2 Hours, in advance of need  - Initiate/Maintain alarm  - Obtain necessary fall risk management equipment:   - Apply yellow socks and bracelet for high fall risk patients  - Consider moving patient to room near nurses station  Outcome: Progressing     Problem: DISCHARGE PLANNING  Goal: Discharge to home or other facility with appropriate resources  Description: INTERVENTIONS:  - Identify barriers to discharge w/patient and caregiver  - Arrange for needed discharge resources and transportation as appropriate  - Identify discharge learning needs (meds, wound care, etc.)  - Arrange for interpretive services to assist at discharge as needed  - Refer to Case Management Department for coordinating discharge planning if the patient needs post-hospital services based on physician/advanced practitioner order or complex needs related to functional status, cognitive ability, or social support system  Outcome: Progressing     Problem: Knowledge Deficit  Goal: Patient/family/caregiver demonstrates understanding of disease process, treatment plan, medications, and discharge instructions  Description: Complete learning assessment and assess knowledge base. Interventions:  - Provide teaching at level of understanding  - Provide teaching via preferred learning methods  Outcome: Progressing     Problem: Nutrition/Hydration-ADULT  Goal: Nutrient/Hydration intake appropriate for improving, restoring or maintaining nutritional needs  Description: Monitor and assess patient's nutrition/hydration status for malnutrition. Collaborate with interdisciplinary team and initiate plan and interventions as ordered. Monitor patient's weight and dietary intake as ordered or per policy. Utilize nutrition screening tool and intervene as necessary. Determine patient's food preferences and provide high-protein, high-caloric foods as appropriate.      INTERVENTIONS:  - Monitor oral intake, urinary output, labs, and treatment plans  - Assess nutrition and hydration status and recommend course of action  - Evaluate amount of meals eaten  - Assist patient with eating if necessary   - Allow adequate time for meals  - Recommend/ encourage appropriate diets, oral nutritional supplements, and vitamin/mineral supplements  - Order, calculate, and assess calorie counts as needed  - Recommend, monitor, and adjust tube feedings and TPN/PPN based on assessed needs  - Assess need for intravenous fluids  - Provide specific nutrition/hydration education as appropriate  - Include patient/family/caregiver in decisions related to nutrition  Outcome: Progressing

## 2023-08-02 NOTE — PROGRESS NOTES
Pastoral Care Progress Note    2023  Patient: Nhan Krishna : 1940  Admission Date & Time: 2023 0915  MRN: [id number] CSN: [id number]       responded to pt's request that someone pray with her. She was in good spirits and reported being very pleased that her surgery went well. We found common Oriental orthodox experiences and sang a couple hymns together before praying. She is aware of further  availability. 23 2300   Clinical Encounter Type   Visited With Patient   Routine Visit Introduction   Referral From [facility]   Patient Spiritual Encounters   Spiritual Encounter Notes  visited patient who requested someone to pray with her. Provided support, sang a couple hymns with her and prayed.

## 2023-08-02 NOTE — PLAN OF CARE
Problem: Prexisting or High Potential for Compromised Skin Integrity  Goal: Skin integrity is maintained or improved  Description: INTERVENTIONS:  - Identify patients at risk for skin breakdown  - Assess and monitor skin integrity  - Assess and monitor nutrition and hydration status  - Monitor labs   - Assess for incontinence   - Turn and reposition patient  - Assist with mobility/ambulation  - Relieve pressure over bony prominences  - Avoid friction and shearing  - Provide appropriate hygiene as needed including keeping skin clean and dry  - Evaluate need for skin moisturizer/barrier cream  - Collaborate with interdisciplinary team   - Patient/family teaching  - Consider wound care consult   Outcome: Progressing    Problem: SAFETY ADULT  Goal: Maintains/Returns to pre admission functional level  Description: INTERVENTIONS:  - Perform BMAT or MOVE assessment daily.   - Set and communicate daily mobility goal to care team and patient/family/caregiver.    - Collaborate with rehabilitation services on mobility goals if consulted  - Perform Range of Motion  - Record patient progress and toleration of activity level   Outcome: Progressing

## 2023-08-02 NOTE — PROGRESS NOTES
Epidural Follow-up Note - Acute Pain Service    Callum Bill 79 y.o. female MRN: [id number]  Unit/Bed#: [facility] 924-01 Encounter: [id number]      Assessment:   Principal Problem:    s/p distal pancreatectomy/splenectomy  Active Problems:    OAB (overactive bladder)    Parkinson's disease (HCC)    Primary insomnia    History of skin cancer    Seasonal allergic rhinitis    Impaired fasting glucose    Obesity (BMI 30-39. 9)    Claustrophobia    Mitral valve disorder    Menopause ovarian failure    Dysphagia    Multiple thyroid nodules    Gastro-esophageal reflux disease without esophagitis    Chronic idiopathic constipation    Tremor    Pancreatic cyst    Postablative hypothyroidism    Sleep apnea    Closed compression fracture of body of L1 vertebra (HCC)    Cerebrovascular disease    Age-related osteoporosis without current pathological fracture    Urge incontinence      Callum Bill is a 79y.o. year old female w/ PMHx of Parkinson's, dysphagia, pancreatic cyst s/p distal pancreatectomy, splenectomy and lysis of adhesions on 7/31. Pt seen and examined today, doing well. Pain minimal at worst.  Denies n/v/pruritus/LE weakness. On regular diet today, plan for removal of epidural tomorrow. Plan:  - continue epidural at currect settings of 6/5/10/3   - plan for removal tomorrow   - hold AM SQH  - Tylenol 650mg QID    Bowel Regimen:  - Senna-docusate sodium (Senokot S) 8.6-50 mg 1 tab PO qhs    APS will continue to follow. Please contact Acute Pain Service - [facility] via [employer] from [phone] with additional questions or concerns. See [employer] or [employer] for additional contacts and after hours information.     Pain History  Current pain location(s): abdomen  Pain Scale:   0-1  Quality: sore  24 hour history: as above    PCEA use: minimal  Opioid requirement previous 24 hours: 0    Meds/Allergies   all current active meds have been reviewed    Allergies   Allergen Reactions   • Caffeine - Food Allergy GI Intolerance • Iodine - Food Allergy Rash   • Latex Rash   • Other Rash     Adhesive tape         Objective     Temp:  [97.4 °F (36.3 °C)-98.8 °F (37.1 °C)] 98.8 °F (37.1 °C)  HR:  [63-90] 90  Resp:  [14-18] 16  BP: ()/(45-75) 140/75    Physical Exam  Vitals and nursing note reviewed. Constitutional:       General: She is not in acute distress. Appearance: Normal appearance. HENT:      Head: Normocephalic and atraumatic. Mouth/Throat:      Mouth: Mucous membranes are moist.   Eyes:      Extraocular Movements: Extraocular movements intact. Cardiovascular:      Rate and Rhythm: Normal rate. Pulmonary:      Effort: Pulmonary effort is normal. No respiratory distress. Chest:      Chest wall: No tenderness. Abdominal:      General: There is no distension. Palpations: Abdomen is soft. Tenderness: There is no abdominal tenderness. Musculoskeletal:         General: No swelling. Skin:     General: Skin is warm and dry. Neurological:      Mental Status: She is alert and oriented to person, place, and time. Psychiatric:         Mood and Affect: Mood normal.         Behavior: Behavior normal.       Epidural: Site clean/dry/intact, no surrounding erythema/edema/induration, infusion functioning appropriately    Lab Results:   Results from last 7 days   Lab Units 08/02/23  0537   WBC Thousand/uL 22.48*   HEMOGLOBIN g/dL 11.2*   HEMATOCRIT % 33.4*   PLATELETS Thousands/uL 187      Results from last 7 days   Lab Units 08/02/23  0601 08/01/23  0501 07/31/23  1249   POTASSIUM mmol/L 3.7   < >  --    CHLORIDE mmol/L 112*   < >  --    CO2 mmol/L 27   < >  --    CO2, I-STAT mmol/L  --   --  25   BUN mg/dL 12   < >  --    CREATININE mg/dL 0.75   < >  --    CALCIUM mg/dL 8.5   < >  --    GLUCOSE, ISTAT mg/dl  --   --  139    < > = values in this interval not displayed. Imaging Studies: I have personally reviewed pertinent reports.     EKG, Pathology, and Other Studies: I have personally reviewed pertinent reports. Counseling / Coordination of Care  Total floor / unit time spent today 20 minutes. Greater than 50% of total time was spent with the patient and / or family counseling and / or coordination of care. A description of the counseling / coordination of care: chart review, post op pain and regional/neuraxial pain management, discussion/planning with nursing/medical/surgical teams    Please note that the APS provides consultative services regarding pain management only. With the exception of ketamine, peripheral nerve catheters, and epidural infusions (and except when indicated), final decisions regarding starting or changing doses of analgesic medications are at the discretion of the consulting service. Off hours consultation and/or medication management is generally not available.     Carie Pearce MD  Acute Pain Service

## 2023-08-02 NOTE — QUICK NOTE
Patient's pain adequately controlled today. Plan to remove epidural tomorrow (8/3/2023). SQH to be held at midnight.      ---  Luz Olvera MD  General Surgery PGY-I

## 2023-08-02 NOTE — PROGRESS NOTES
Progress Note - Surgical Oncology  : [facility] Resident on [facility] 79 y.o. female MRN: [id number]  Unit/Bed#: [facility] 924-01 Encounter: [id number]    Assessment:  79 y.o. female w/ PMHx of Parkinson's, dysphagia, pancreatic cyst s/p distal pancreatectomy, splenectomy and lysis of adhesions on 7/31. AVSS on RA, afebrile, hemodynamically stable    UOP: 1100 cc (0.6 cc/kg/hr)   BM: 0. Passing flatus. Last bowel movement 7/30 per patient. AM labs: WBC 22, increased from 12 yesterday. Hgb and PLT count stable. ARNIE amylase 418. Plan:  Monitor WBC; recheck AM CBC tomorrow   Initiate bowel regimen   Continue to monitor left upper quadrant ARNIE drain  D/c IVF this afternoon   Maintain epidural   Splenic vaccines  Out of bed, encourage ambulation, I-S use  DVT prophylaxis    Subjective/Objective     Subjective: No acute events overnight. Patient states that her pain is well controlled on the PCEA. She tolerated small amount of PO intake yesterday with no nausea or vomiting but reports low appetite. Denies bowel movement but is passing flatus. Pulling 1250cc on incentive spirometer. She has been ambulating with assistance of a walker. Objective:   Physical Exam:  GEN: NAD  HEENT: MMM  CV: RRR  Lung: Normal effort  Ab: Soft, mildly distended, appropriate post surgical tenderness, incision clean dry and intact. Extrem: No CCE. No erythema of calves. Non-tender. Neuro: A+Ox3     Vitals: Temp:  [97.4 °F (36.3 °C)-98.6 °F (37 °C)] 98.4 °F (36.9 °C)  HR:  [63-88] 88  Resp:  [14-18] 16  BP: ()/(45-73) 137/72  Body mass index is 31.18 kg/m². I/O       07/30 0701  07/31 0700 07/31 0701  08/01 0700    I.V. (mL/kg)  2362.7 (31.6)    IV Piggyback  500    Total Intake(mL/kg)  2862.7 (38.3)    Urine (mL/kg/hr)  1500    Drains  188    Blood  250    Total Output  1938    Net  +924.7                Lab, Imaging and other studies: I have personally reviewed pertinent reports.   , CBC with diff:   Lab Results   Component Value Date    WBC 22.48 (H) 08/02/2023    HGB 11.2 (L) 08/02/2023    HCT 33.4 (L) 08/02/2023    MCV 90 08/02/2023     08/02/2023    RBC 3.71 (L) 08/02/2023    MCH 30.2 08/02/2023    MCHC 33.5 08/02/2023    RDW 13.8 08/02/2023    MPV 10.0 08/02/2023   , BMP/CMP:   Lab Results   Component Value Date    SODIUM 143 08/02/2023    K 3.7 08/02/2023     (H) 08/02/2023    CO2 27 08/02/2023    BUN 12 08/02/2023    CREATININE 0.75 08/02/2023    CALCIUM 8.5 08/02/2023    EGFR 73 08/02/2023     VTE Pharmacologic Prophylaxis: Heparin  VTE Mechanical Prophylaxis: sequential compression device    Marciano Ochoa  8/2/2023 7:49 AM

## 2023-08-03 ENCOUNTER — HOME HEALTH ADMISSION (OUTPATIENT)
Dept: HOME HEALTH SERVICES | Facility: HOME HEALTHCARE | Age: 83
End: 2023-08-03

## 2023-08-03 LAB
ANION GAP SERPL CALCULATED.3IONS-SCNC: 2 MMOL/L
ATRIAL RATE: 109 BPM
BASOPHILS # BLD AUTO: 0.04 THOUSANDS/ÂΜL (ref 0–0.1)
BASOPHILS NFR BLD AUTO: 0 % (ref 0–1)
BUN SERPL-MCNC: 12 MG/DL (ref 5–25)
CALCIUM SERPL-MCNC: 8.8 MG/DL (ref 8.3–10.1)
CHLORIDE SERPL-SCNC: 111 MMOL/L (ref 96–108)
CO2 SERPL-SCNC: 25 MMOL/L (ref 21–32)
CREAT SERPL-MCNC: 0.75 MG/DL (ref 0.6–1.3)
EOSINOPHIL # BLD AUTO: 0.07 THOUSAND/ÂΜL (ref 0–0.61)
EOSINOPHIL NFR BLD AUTO: 0 % (ref 0–6)
ERYTHROCYTE [DISTWIDTH] IN BLOOD BY AUTOMATED COUNT: 13.8 % (ref 11.6–15.1)
GFR SERPL CREATININE-BSD FRML MDRD: 73 ML/MIN/1.73SQ M
GLUCOSE SERPL-MCNC: 113 MG/DL (ref 65–140)
HCT VFR BLD AUTO: 31.9 % (ref 34.8–46.1)
HGB BLD-MCNC: 10.5 G/DL (ref 11.5–15.4)
IMM GRANULOCYTES # BLD AUTO: 0.07 THOUSAND/UL (ref 0–0.2)
IMM GRANULOCYTES NFR BLD AUTO: 0 % (ref 0–2)
LYMPHOCYTES # BLD AUTO: 2.58 THOUSANDS/ÂΜL (ref 0.6–4.47)
LYMPHOCYTES NFR BLD AUTO: 15 % (ref 14–44)
MCH RBC QN AUTO: 30.2 PG (ref 26.8–34.3)
MCHC RBC AUTO-ENTMCNC: 32.9 G/DL (ref 31.4–37.4)
MCV RBC AUTO: 92 FL (ref 82–98)
MONOCYTES # BLD AUTO: 1.55 THOUSAND/ÂΜL (ref 0.17–1.22)
MONOCYTES NFR BLD AUTO: 9 % (ref 4–12)
NEUTROPHILS # BLD AUTO: 12.89 THOUSANDS/ÂΜL (ref 1.85–7.62)
NEUTS SEG NFR BLD AUTO: 76 % (ref 43–75)
NRBC BLD AUTO-RTO: 0 /100 WBCS
P AXIS: 43 DEGREES
PLATELET # BLD AUTO: 192 THOUSANDS/UL (ref 149–390)
PMV BLD AUTO: 9.8 FL (ref 8.9–12.7)
POTASSIUM SERPL-SCNC: 3.9 MMOL/L (ref 3.5–5.3)
PR INTERVAL: 132 MS
QRS AXIS: 7 DEGREES
QRSD INTERVAL: 74 MS
QT INTERVAL: 332 MS
QTC INTERVAL: 447 MS
RBC # BLD AUTO: 3.48 MILLION/UL (ref 3.81–5.12)
SODIUM SERPL-SCNC: 138 MMOL/L (ref 135–147)
T WAVE AXIS: 78 DEGREES
VENTRICULAR RATE: 109 BPM
WBC # BLD AUTO: 17.2 THOUSAND/UL (ref 4.31–10.16)

## 2023-08-03 PROCEDURE — 85025 COMPLETE CBC W/AUTO DIFF WBC: CPT

## 2023-08-03 PROCEDURE — 99232 SBSQ HOSP IP/OBS MODERATE 35: CPT | Performed by: STUDENT IN AN ORGANIZED HEALTH CARE EDUCATION/TRAINING PROGRAM

## 2023-08-03 PROCEDURE — 80048 BASIC METABOLIC PNL TOTAL CA: CPT

## 2023-08-03 PROCEDURE — 93010 ELECTROCARDIOGRAM REPORT: CPT | Performed by: INTERNAL MEDICINE

## 2023-08-03 PROCEDURE — 99024 POSTOP FOLLOW-UP VISIT: CPT | Performed by: SURGERY

## 2023-08-03 RX ORDER — METHOCARBAMOL 500 MG/1
500 TABLET, FILM COATED ORAL EVERY 8 HOURS SCHEDULED
Status: DISCONTINUED | OUTPATIENT
Start: 2023-08-03 | End: 2023-08-04 | Stop reason: HOSPADM

## 2023-08-03 RX ORDER — ACETAMINOPHEN 325 MG/1
975 TABLET ORAL EVERY 8 HOURS SCHEDULED
Status: DISCONTINUED | OUTPATIENT
Start: 2023-08-03 | End: 2023-08-04 | Stop reason: HOSPADM

## 2023-08-03 RX ADMIN — FENTANYL CITRATE: 50 INJECTION INTRAMUSCULAR; INTRAVENOUS at 03:09

## 2023-08-03 RX ADMIN — OXYCODONE HYDROCHLORIDE 5 MG: 5 TABLET ORAL at 16:38

## 2023-08-03 RX ADMIN — OXYBUTYNIN 5 MG: 5 TABLET, FILM COATED, EXTENDED RELEASE ORAL at 07:49

## 2023-08-03 RX ADMIN — HEPARIN SODIUM 5000 UNITS: 5000 INJECTION INTRAVENOUS; SUBCUTANEOUS at 22:12

## 2023-08-03 RX ADMIN — ACETAMINOPHEN 650 MG: 325 TABLET, FILM COATED ORAL at 10:35

## 2023-08-03 RX ADMIN — SENNOSIDES AND DOCUSATE SODIUM 2 TABLET: 8.6; 5 TABLET ORAL at 16:39

## 2023-08-03 RX ADMIN — SODIUM CHLORIDE, SODIUM LACTATE, POTASSIUM CHLORIDE, AND CALCIUM CHLORIDE 500 ML: .6; .31; .03; .02 INJECTION, SOLUTION INTRAVENOUS at 04:05

## 2023-08-03 RX ADMIN — CARBIDOPA AND LEVODOPA 1 TABLET: 25; 100 TABLET ORAL at 06:02

## 2023-08-03 RX ADMIN — HEPARIN SODIUM 5000 UNITS: 5000 INJECTION INTRAVENOUS; SUBCUTANEOUS at 13:22

## 2023-08-03 RX ADMIN — METHOCARBAMOL 500 MG: 500 TABLET ORAL at 13:22

## 2023-08-03 RX ADMIN — METHOCARBAMOL 500 MG: 500 TABLET ORAL at 22:13

## 2023-08-03 RX ADMIN — PANTOPRAZOLE SODIUM 40 MG: 40 TABLET, DELAYED RELEASE ORAL at 06:02

## 2023-08-03 RX ADMIN — SENNOSIDES AND DOCUSATE SODIUM 2 TABLET: 8.6; 5 TABLET ORAL at 07:49

## 2023-08-03 RX ADMIN — CARBIDOPA AND LEVODOPA 1 TABLET: 25; 100 TABLET ORAL at 10:33

## 2023-08-03 RX ADMIN — CARBIDOPA AND LEVODOPA 1 TABLET: 25; 100 TABLET ORAL at 16:38

## 2023-08-03 NOTE — PROGRESS NOTES
-- Patient: Jacky Mccoy  -- MRN: [id number]  -- Aidin Request ID: [id number]  -- Level of care reserved: Marco Godfrey  -- Partner Reserved: [facility], [facility], [address] [phone]  -- Clinical needs requested:  -- Geography searched: [phone]  -- Start of Service:  -- Request sent: 12:35pm EDT on 8/2/2023 by Ria Meza  -- Partner reserved: 3:00pm EDT on 8/3/2023 by Ria Meza  -- Choice list shared: 2:28pm EDT on 8/3/2023 by Ria Meza

## 2023-08-03 NOTE — PLAN OF CARE
Problem: MOBILITY - ADULT  Goal: Maintains/Returns to pre admission functional level  Description: INTERVENTIONS:  - Perform BMAT or MOVE assessment daily.   - Set and communicate daily mobility goal to care team and patient/family/caregiver.    - Collaborate with rehabilitation services on mobility goals if consulted  - Out of bed for toileting  - Record patient progress and toleration of activity level   Outcome: Progressing     Problem: PAIN - ADULT  Goal: Verbalizes/displays adequate comfort level or baseline comfort level  Description: Interventions:  - Encourage patient to monitor pain and request assistance  - Assess pain using appropriate pain scale  - Administer analgesics based on type and severity of pain and evaluate response  - Implement non-pharmacological measures as appropriate and evaluate response  - Consider cultural and social influences on pain and pain management  - Notify physician/advanced practitioner if interventions unsuccessful or patient reports new pain  Outcome: Progressing

## 2023-08-03 NOTE — RESTORATIVE TECHNICIAN NOTE
Restorative Technician Note      Patient Name: Cassie Thomas     Restorative Tech Visit Date: 08/03/23  Note Type: Mobility  Patient Position Upon Consult: Supine  Activity Performed: Ambulated  Assistive Device: Roller walker  Patient Position at End of Consult: Bedside chair;  All needs within reach    Fernando Plants, Restoritative Technician

## 2023-08-03 NOTE — PROGRESS NOTES
Progress Note - Acute Pain Service    Cassie Thomas 79 y.o. female MRN: [id number]  Unit/Bed#: [facility] 924-01 Encounter: [id number]      Assessment:   Principal Problem:    s/p distal pancreatectomy/splenectomy  Active Problems:    OAB (overactive bladder)    Parkinson's disease (HCC)    Primary insomnia    History of skin cancer    Seasonal allergic rhinitis    Impaired fasting glucose    Obesity (BMI 30-39. 9)    Claustrophobia    Mitral valve disorder    Menopause ovarian failure    Dysphagia    Multiple thyroid nodules    Gastro-esophageal reflux disease without esophagitis    Chronic idiopathic constipation    Tremor    Pancreatic cyst    Postablative hypothyroidism    Sleep apnea    Closed compression fracture of body of L1 vertebra (HCC)    Cerebrovascular disease    Age-related osteoporosis without current pathological fracture    Urge incontinence    Cassie Thomas is a 79 y.o. female  w/ PMHx of Parkinson's, dysphagia, pancreatic cyst s/p distal pancreatectomy, splenectomy and lysis of adhesions on 7/31. Pt seen and examined today, doing very well, sitting in chair and eating and drinking without issue. Denies n/v/pruritus/LE weakness. Epidural pulled today, tip intact. Pain well controlled, will transition to PO's    Plan:   - Pull epidural today  - Oxycodone 5mg q6hrs PRN mod/severe pain  - Tylenol 650mg QID  - Bowel regimen to prevent OIC  - IS OOB PT    APS will continue to follow. Please contact Acute Pain Service - [facility] via [employer] from [phone] with additional questions or concerns. See [employer] or [employer] for additional contacts and after hours information.     Pain History  Current pain location(s): denies  Pain Scale:   0  Quality: n/a  24 hour history: as above    Opioid requirement previous 24 hours: 0    Meds/Allergies   all current active meds have been reviewed    Allergies   Allergen Reactions   • Caffeine - Food Allergy GI Intolerance   • Iodine - Food Allergy Rash   • Latex Rash   • Other Rash     Adhesive tape         Objective     Temp:  [97.8 °F (36.6 °C)-99.2 °F (37.3 °C)] 98.3 °F (36.8 °C)  HR:  [] 82  Resp:  [16-18] 16  BP: ()/(56-93) 114/70    Physical Exam  Vitals and nursing note reviewed. Constitutional:       General: She is not in acute distress. Appearance: Normal appearance. HENT:      Head: Normocephalic and atraumatic. Mouth/Throat:      Mouth: Mucous membranes are moist.   Eyes:      Extraocular Movements: Extraocular movements intact. Cardiovascular:      Rate and Rhythm: Normal rate. Pulmonary:      Effort: Pulmonary effort is normal.   Abdominal:      General: There is no distension. Palpations: Abdomen is soft. Tenderness: There is no abdominal tenderness. Musculoskeletal:         General: No swelling. Skin:     General: Skin is warm and dry. Neurological:      Mental Status: She is alert and oriented to person, place, and time. Psychiatric:         Mood and Affect: Mood normal.         Behavior: Behavior normal.         Lab Results:   Results from last 7 days   Lab Units 08/03/23  0413   WBC Thousand/uL 17.20*   HEMOGLOBIN g/dL 10.5*   HEMATOCRIT % 31.9*   PLATELETS Thousands/uL 192      Results from last 7 days   Lab Units 08/03/23  0413 08/01/23  0501 07/31/23  1249   POTASSIUM mmol/L 3.9   < >  --    CHLORIDE mmol/L 111*   < >  --    CO2 mmol/L 25   < >  --    CO2, I-STAT mmol/L  --   --  25   BUN mg/dL 12   < >  --    CREATININE mg/dL 0.75   < >  --    CALCIUM mg/dL 8.8   < >  --    GLUCOSE, ISTAT mg/dl  --   --  139    < > = values in this interval not displayed. Imaging Studies: I have personally reviewed pertinent reports. EKG, Pathology, and Other Studies: I have personally reviewed pertinent reports. Counseling / Coordination of Care  Total floor / unit time spent today 20 minutes. Greater than 50% of total time was spent with the patient and / or family counseling and / or coordination of care.  A description of the counseling / coordination of care: chart review, post op pain and regional/neuraxial pain management, discussion/planning with nursing/medical/surgical teams      Please note that the APS provides consultative services regarding pain management only. With the exception of ketamine and epidural infusions and except when indicated, final decisions regarding starting or changing doses of analgesic medications are at the discretion of the consulting service. Off hours consultation and/or medication management is generally not available.     Delfina Borja MD  Acute Pain Service

## 2023-08-03 NOTE — PLAN OF CARE
Problem: Prexisting or High Potential for Compromised Skin Integrity  Goal: Skin integrity is maintained or improved  Description: INTERVENTIONS:  - Identify patients at risk for skin breakdown  - Assess and monitor skin integrity  - Assess and monitor nutrition and hydration status  - Monitor labs   - Assess for incontinence   - Turn and reposition patient  - Assist with mobility/ambulation  - Relieve pressure over bony prominences  - Avoid friction and shearing  - Provide appropriate hygiene as needed including keeping skin clean and dry  - Evaluate need for skin moisturizer/barrier cream  - Collaborate with interdisciplinary team   - Patient/family teaching  - Consider wound care consult   Outcome: Progressing     Problem: MOBILITY - ADULT  Goal: Maintains/Returns to pre admission functional level  Description: INTERVENTIONS:  - Perform BMAT or MOVE assessment daily.   - Set and communicate daily mobility goal to care team and patient/family/caregiver. - Collaborate with rehabilitation services on mobility goals if consulted  - Perform Range of Motion 3 times a day. - Reposition patient every 3 hours.   - Dangle patient 3 times a day  - Stand patient 3 times a day  - Ambulate patient 3 times a day  - Out of bed to chair 3 times a day   - Out of bed for meals 3 times a day  - Out of bed for toileting  - Record patient progress and toleration of activity level   Outcome: Progressing     Problem: PAIN - ADULT  Goal: Verbalizes/displays adequate comfort level or baseline comfort level  Description: Interventions:  - Encourage patient to monitor pain and request assistance  - Assess pain using appropriate pain scale  - Administer analgesics based on type and severity of pain and evaluate response  - Implement non-pharmacological measures as appropriate and evaluate response  - Consider cultural and social influences on pain and pain management  - Notify physician/advanced practitioner if interventions unsuccessful or patient reports new pain  Outcome: Progressing     Problem: INFECTION - ADULT  Goal: Absence or prevention of progression during hospitalization  Description: INTERVENTIONS:  - Assess and monitor for signs and symptoms of infection  - Monitor lab/diagnostic results  - Monitor all insertion sites, i.e. indwelling lines, tubes, and drains  - Monitor endotracheal if appropriate and nasal secretions for changes in amount and color  - [place] appropriate cooling/warming therapies per order  - Administer medications as ordered  - Instruct and encourage patient and family to use good hand hygiene technique  - Identify and instruct in appropriate isolation precautions for identified infection/condition  Outcome: Progressing     Problem: SAFETY ADULT  Goal: Maintains/Returns to pre admission functional level  Description: INTERVENTIONS:  - Perform BMAT or MOVE assessment daily.   - Set and communicate daily mobility goal to care team and patient/family/caregiver. - Collaborate with rehabilitation services on mobility goals if consulted  - Perform Range of Motion 3 times a day. - Reposition patient every 3 hours.   - Dangle patient 3 times a day  - Stand patient 3 times a day  - Ambulate patient 3 times a day  - Out of bed to chair 3 times a day   - Out of bed for meals 3 times a day  - Out of bed for toileting  - Record patient progress and toleration of activity level   Outcome: Progressing  Goal: Patient will remain free of falls  Description: INTERVENTIONS:  - Educate patient/family on patient safety including physical limitations  - Instruct patient to call for assistance with activity   - Consult OT/PT to assist with strengthening/mobility   - Keep Call bell within reach  - Keep bed low and locked with side rails adjusted as appropriate  - Keep care items and personal belongings within reach  - Initiate and maintain comfort rounds  - Make Fall Risk Sign visible to staff  - Offer Toileting every 3 Hours, in advance of need  - Initiate/Maintain 3alarm  - Obtain necessary fall risk management equipment: 3  - Apply yellow socks and bracelet for high fall risk patients  - Consider moving patient to room near nurses station  Outcome: Progressing     Problem: DISCHARGE PLANNING  Goal: Discharge to home or other facility with appropriate resources  Description: INTERVENTIONS:  - Identify barriers to discharge w/patient and caregiver  - Arrange for needed discharge resources and transportation as appropriate  - Identify discharge learning needs (meds, wound care, etc.)  - Arrange for interpretive services to assist at discharge as needed  - Refer to Case Management Department for coordinating discharge planning if the patient needs post-hospital services based on physician/advanced practitioner order or complex needs related to functional status, cognitive ability, or social support system  Outcome: Progressing     Problem: Knowledge Deficit  Goal: Patient/family/caregiver demonstrates understanding of disease process, treatment plan, medications, and discharge instructions  Description: Complete learning assessment and assess knowledge base. Interventions:  - Provide teaching at level of understanding  - Provide teaching via preferred learning methods  Outcome: Progressing     Problem: Nutrition/Hydration-ADULT  Goal: Nutrient/Hydration intake appropriate for improving, restoring or maintaining nutritional needs  Description: Monitor and assess patient's nutrition/hydration status for malnutrition. Collaborate with interdisciplinary team and initiate plan and interventions as ordered. Monitor patient's weight and dietary intake as ordered or per policy. Utilize nutrition screening tool and intervene as necessary. Determine patient's food preferences and provide high-protein, high-caloric foods as appropriate.      INTERVENTIONS:  - Monitor oral intake, urinary output, labs, and treatment plans  - Assess nutrition and hydration status and recommend course of action  - Evaluate amount of meals eaten  - Assist patient with eating if necessary   - Allow adequate time for meals  - Recommend/ encourage appropriate diets, oral nutritional supplements, and vitamin/mineral supplements  - Order, calculate, and assess calorie counts as needed  - Recommend, monitor, and adjust tube feedings and TPN/PPN based on assessed needs  - Assess need for intravenous fluids  - Provide specific nutrition/hydration education as appropriate  - Include patient/family/caregiver in decisions related to nutrition  Outcome: Progressing

## 2023-08-03 NOTE — PROGRESS NOTES
-- Patient:  -- MRN: [id number]  -- Aidin Request ID: [id number]  -- Level of care reserved: Marco Godfrey  -- Partner Reserved[facility] KEILY,  [facility] [phone]  -- Clinical needs requested:  -- Geography searched: [id number]  -- Start of Service:  -- Request sent: 12:35pm EDT on 8/2/2023 by Eduardo Goode  -- Partner reserved: 2:29pm EDT on 8/3/2023 by Eduardo Goode  -- Choice list shared: 2:28pm EDT on 8/3/2023 by Eduardo Goode

## 2023-08-04 ENCOUNTER — TELEPHONE (OUTPATIENT)
Dept: SURGICAL ONCOLOGY | Facility: CLINIC | Age: 83
End: 2023-08-04

## 2023-08-04 VITALS
TEMPERATURE: 98.4 F | WEIGHT: 165 LBS | HEIGHT: 61 IN | HEART RATE: 90 BPM | RESPIRATION RATE: 18 BRPM | OXYGEN SATURATION: 91 % | DIASTOLIC BLOOD PRESSURE: 90 MMHG | BODY MASS INDEX: 31.15 KG/M2 | SYSTOLIC BLOOD PRESSURE: 173 MMHG

## 2023-08-04 LAB
AMYLASE FLD QL: 59 U/L
AMYLASE SERPL-CCNC: 18 IU/L (ref 25–115)

## 2023-08-04 PROCEDURE — 88342 IMHCHEM/IMCYTCHM 1ST ANTB: CPT | Performed by: PATHOLOGY

## 2023-08-04 PROCEDURE — NC001 PR NO CHARGE: Performed by: PHYSICIAN ASSISTANT

## 2023-08-04 PROCEDURE — 90677 PCV20 VACCINE IM: CPT

## 2023-08-04 PROCEDURE — 99024 POSTOP FOLLOW-UP VISIT: CPT | Performed by: SURGERY

## 2023-08-04 PROCEDURE — 99232 SBSQ HOSP IP/OBS MODERATE 35: CPT | Performed by: NURSE PRACTITIONER

## 2023-08-04 PROCEDURE — 90648 HIB PRP-T VACCINE 4 DOSE IM: CPT

## 2023-08-04 PROCEDURE — 88341 IMHCHEM/IMCYTCHM EA ADD ANTB: CPT | Performed by: PATHOLOGY

## 2023-08-04 PROCEDURE — 82150 ASSAY OF AMYLASE: CPT

## 2023-08-04 PROCEDURE — 88309 TISSUE EXAM BY PATHOLOGIST: CPT | Performed by: PATHOLOGY

## 2023-08-04 PROCEDURE — 90619 MENACWY-TT VACCINE IM: CPT

## 2023-08-04 RX ORDER — OXYCODONE HYDROCHLORIDE 5 MG/1
5 TABLET ORAL EVERY 6 HOURS PRN
Qty: 20 TABLET | Refills: 0 | Status: SHIPPED | OUTPATIENT
Start: 2023-08-04 | End: 2023-08-09

## 2023-08-04 RX ORDER — ACETAMINOPHEN 325 MG/1
975 TABLET ORAL EVERY 8 HOURS SCHEDULED
Refills: 0 | COMMUNITY
Start: 2023-08-04

## 2023-08-04 RX ORDER — TIZANIDINE 2 MG/1
2 TABLET ORAL 3 TIMES DAILY
Qty: 21 TABLET | Refills: 0 | Status: SHIPPED | OUTPATIENT
Start: 2023-08-04 | End: 2023-08-11

## 2023-08-04 RX ORDER — ENOXAPARIN SODIUM 100 MG/ML
40 INJECTION SUBCUTANEOUS
Qty: 9.6 ML | Refills: 0 | Status: SHIPPED | OUTPATIENT
Start: 2023-08-05 | End: 2023-08-29

## 2023-08-04 RX ORDER — AMOXICILLIN 250 MG
2 CAPSULE ORAL 2 TIMES DAILY
Refills: 0 | COMMUNITY
Start: 2023-08-04

## 2023-08-04 RX ORDER — ENOXAPARIN SODIUM 100 MG/ML
40 INJECTION SUBCUTANEOUS
Status: DISCONTINUED | OUTPATIENT
Start: 2023-08-04 | End: 2023-08-04 | Stop reason: HOSPADM

## 2023-08-04 RX ORDER — DOCUSATE SODIUM 100 MG/1
100 CAPSULE, LIQUID FILLED ORAL 2 TIMES DAILY
Qty: 20 CAPSULE | Refills: 0 | Status: SHIPPED | OUTPATIENT
Start: 2023-08-04 | End: 2023-08-14

## 2023-08-04 RX ADMIN — PANTOPRAZOLE SODIUM 40 MG: 40 TABLET, DELAYED RELEASE ORAL at 05:51

## 2023-08-04 RX ADMIN — PNEUMOCOCCAL 20-VALENT CONJUGATE VACCINE 0.5 ML
2.2; 2.2; 2.2; 2.2; 2.2; 2.2; 2.2; 2.2; 2.2; 2.2; 2.2; 2.2; 2.2; 2.2; 2.2; 2.2; 4.4; 2.2; 2.2; 2.2 INJECTION, SUSPENSION INTRAMUSCULAR at 10:11

## 2023-08-04 RX ADMIN — SENNOSIDES AND DOCUSATE SODIUM 2 TABLET: 8.6; 5 TABLET ORAL at 08:34

## 2023-08-04 RX ADMIN — METHOCARBAMOL 500 MG: 500 TABLET ORAL at 05:51

## 2023-08-04 RX ADMIN — HAEMOPHILUS B POLYSACCHARIDE CONJUGATE VACCINE FOR INJ 0.5 ML: RECON SOLN at 10:13

## 2023-08-04 RX ADMIN — CARBIDOPA AND LEVODOPA 1 TABLET: 25; 100 TABLET ORAL at 06:15

## 2023-08-04 RX ADMIN — OXYCODONE HYDROCHLORIDE 5 MG: 5 TABLET ORAL at 08:34

## 2023-08-04 RX ADMIN — NEISSERIA MENINGITIDIS GROUP A CAPSULAR POLYSACCHARIDE TETANUS TOXOID CONJUGATE ANTIGEN, NEISSERIA MENINGITIDIS GROUP C CAPSULAR POLYSACCHARIDE TETANUS TOXOID CONJUGATE ANTIGEN, NEISSERIA MENINGITIDIS GROUP Y CAPSULAR POLYSACCHARIDE TETANUS TOXOID CONJUGATE ANTIGEN, AND NEISSERIA MENINGITIDIS GROUP W-135 CAPSULAR POLYSACCHARIDE TETANUS TOXOID CONJUGATE ANTIGEN 0.5 ML: 10; 10; 10; 10 INJECTION, SOLUTION INTRAMUSCULAR at 10:15

## 2023-08-04 RX ADMIN — OXYBUTYNIN 5 MG: 5 TABLET, FILM COATED, EXTENDED RELEASE ORAL at 08:34

## 2023-08-04 RX ADMIN — ENOXAPARIN SODIUM 40 MG: 40 INJECTION SUBCUTANEOUS at 08:34

## 2023-08-04 NOTE — CASE MANAGEMENT
Case Management Discharge Planning Note    Patient name Cassie Thomas  Location [address] [facility] [address]/[facility] 680-55 MRN [id number]  : 1940 Date 2023       Current Admission Date: 2023  Current Admission Diagnosis:s/p distal pancreatectomy/splenectomy   Patient Active Problem List    Diagnosis Date Noted   • s/p distal pancreatectomy/splenectomy 2023   • Encounter for geriatric assessment 2023   • Urge incontinence 2021   • Age-related osteoporosis without current pathological fracture 2021   • Closed compression fracture of body of L1 vertebra ([address]) 2020   • Cerebrovascular disease 2020   • Pancreatic cyst 2020   • Trochanteric bursitis, right hip 2020   • Tremor 2020   • Chronic idiopathic constipation 2019   • History of adenomatous polyp of colon 2019   • Osteopenia 2019   • Gastro-esophageal reflux disease without esophagitis 2019   • Dysphagia 2019   • Multiple thyroid nodules 2019   • Menopause ovarian failure 2019   • Vitamin D deficiency 2019   • Mitral valve disorder 2018   • Mood disturbance 2018   • Obesity (BMI 30-39.9) 2018   • Claustrophobia 2018   • Impaired fasting glucose 2018   • Seasonal allergic rhinitis 2018   • History of skin cancer 2018   • Parkinson's disease ([address]) 2018   • Primary insomnia 2018   • Cherry angioma 11/15/2017   • Postablative hypothyroidism 2016   • Dyspnea on exertion 10/02/2015   • OAB (overactive bladder) 2015   • Sleep apnea 04/15/2014      LOS (days): 4  Geometric Mean LOS (GMLOS) (days): 3.60  Days to GMLOS:-0.3     OBJECTIVE:  Risk of Unplanned Readmission Score: 9.73         Current admission status: Inpatient   Preferred Pharmacy:   [address] [facility], [address] [place] [address],[address]  [address] [address]  Phone: [phone] Fax: [phone] [address] [place] [address] [address] [facility] [place]  [address] [facility] [place]  [address]  [facility] [place] [address]  Phone: [phone] Fax: [phone]    [address], [address] [facility] [address] [facility] [address] [facility] [address]  Phone: [phone] Fax: [phone]    Primary Care Provider: Ceferino Small MD    Primary Insurance: [address] [place] [address]  Secondary Insurance:     DISCHARGE DETAILS:                                          Other Referral/Resources/Interventions Provided:  Interventions: HHA  Referral Comments: Met with pt and son in law Clarence at bedside and made aware of accepted by [facility], [facility]. for SN/PT/Ot services. Notifed by Mitzi Galaviz of pt dc today to home. Son in law will provide transport to home. nurse Radha at bedside reviewing DCI and will educate on emptying and recording outpt from ARNIE drain. Pt/son in law verbalized understandiong of the dc plan and agreeable.          Treatment Team Recommendation: Home with [address] [facility] ([facility], [facility]  Discharge Destination Plan[de-identified] Home with [address] ([facility] [address] [facility])  Transport at Discharge : Family

## 2023-08-04 NOTE — DISCHARGE INSTR - AVS FIRST PAGE
Record all output from the ARNIE drain daily and bring record to the office    2. You are to have a CBC, Serum amylase level , ARNIE drain amylase level next week, week of 8/7/23. 3. Lovenox  40 mg injections daily for 24 more days    4. The day you go for the lab work and the ARNIE amylase level, please do not empty the ARNIE drain prior to going so there will be fluid in the bulb to send.

## 2023-08-04 NOTE — PLAN OF CARE
Problem: Prexisting or High Potential for Compromised Skin Integrity  Goal: Skin integrity is maintained or improved  Description: INTERVENTIONS:  - Identify patients at risk for skin breakdown  - Assess and monitor skin integrity  - Assess and monitor nutrition and hydration status  - Monitor labs   - Assess for incontinence   - Turn and reposition patient  - Assist with mobility/ambulation  - Relieve pressure over bony prominences  - Avoid friction and shearing  - Provide appropriate hygiene as needed including keeping skin clean and dry  - Evaluate need for skin moisturizer/barrier cream  - Collaborate with interdisciplinary team   - Patient/family teaching  - Consider wound care consult   Outcome: Progressing     Problem: MOBILITY - ADULT  Goal: Maintains/Returns to pre admission functional level  Description: INTERVENTIONS:  - Perform BMAT or MOVE assessment daily.   - Set and communicate daily mobility goal to care team and patient/family/caregiver.    - Collaborate with rehabilitation services on mobility goals if consulted  - Ambulate patient 3 times a day  - Out of bed to chair 3 times a day   - Out of bed for meals 3 times a day  - Out of bed for toileting  - Record patient progress and toleration of activity level   Outcome: Progressing     Problem: PAIN - ADULT  Goal: Verbalizes/displays adequate comfort level or baseline comfort level  Description: Interventions:  - Encourage patient to monitor pain and request assistance  - Assess pain using appropriate pain scale  - Administer analgesics based on type and severity of pain and evaluate response  - Implement non-pharmacological measures as appropriate and evaluate response  - Consider cultural and social influences on pain and pain management  - Notify physician/advanced practitioner if interventions unsuccessful or patient reports new pain  Outcome: Progressing     Problem: INFECTION - ADULT  Goal: Absence or prevention of progression during hospitalization  Description: INTERVENTIONS:  - Assess and monitor for signs and symptoms of infection  - Monitor lab/diagnostic results  - Monitor all insertion sites, i.e. indwelling lines, tubes, and drains  - Monitor endotracheal if appropriate and nasal secretions for changes in amount and color  - [place] appropriate cooling/warming therapies per order  - Administer medications as ordered  - Instruct and encourage patient and family to use good hand hygiene technique  - Identify and instruct in appropriate isolation precautions for identified infection/condition  Outcome: Progressing     Problem: SAFETY ADULT  Goal: Maintains/Returns to pre admission functional level  Description: INTERVENTIONS:  - Perform BMAT or MOVE assessment daily.   - Set and communicate daily mobility goal to care team and patient/family/caregiver.    - Collaborate with rehabilitation services on mobility goals if consulted  - Ambulate patient 3 times a day  - Out of bed to chair 3 times a day   - Out of bed for meals 3 times a day  - Out of bed for toileting  - Record patient progress and toleration of activity level   Outcome: Progressing  Goal: Patient will remain free of falls  Description: INTERVENTIONS:  - Educate patient/family on patient safety including physical limitations  - Instruct patient to call for assistance with activity   - Consult OT/PT to assist with strengthening/mobility   - Keep Call bell within reach  - Keep bed low and locked with side rails adjusted as appropriate  - Keep care items and personal belongings within reach  - Initiate and maintain comfort rounds  - Make Fall Risk Sign visible to staff  - Offer Toileting every 2 Hours, in advance of need  - Initiate/Maintain bed alarm  - Obtain necessary fall risk management equipment  - Apply yellow socks and bracelet for high fall risk patients  - Consider moving patient to room near nurses station  Outcome: Progressing     Problem: DISCHARGE PLANNING  Goal: Discharge to home or other facility with appropriate resources  Description: INTERVENTIONS:  - Identify barriers to discharge w/patient and caregiver  - Arrange for needed discharge resources and transportation as appropriate  - Identify discharge learning needs (meds, wound care, etc.)  - Arrange for interpretive services to assist at discharge as needed  - Refer to Case Management Department for coordinating discharge planning if the patient needs post-hospital services based on physician/advanced practitioner order or complex needs related to functional status, cognitive ability, or social support system  Outcome: Progressing     Problem: Knowledge Deficit  Goal: Patient/family/caregiver demonstrates understanding of disease process, treatment plan, medications, and discharge instructions  Description: Complete learning assessment and assess knowledge base. Interventions:  - Provide teaching at level of understanding  - Provide teaching via preferred learning methods  Outcome: Progressing     Problem: Nutrition/Hydration-ADULT  Goal: Nutrient/Hydration intake appropriate for improving, restoring or maintaining nutritional needs  Description: Monitor and assess patient's nutrition/hydration status for malnutrition. Collaborate with interdisciplinary team and initiate plan and interventions as ordered. Monitor patient's weight and dietary intake as ordered or per policy. Utilize nutrition screening tool and intervene as necessary. Determine patient's food preferences and provide high-protein, high-caloric foods as appropriate.      INTERVENTIONS:  - Monitor oral intake, urinary output, labs, and treatment plans  - Assess nutrition and hydration status and recommend course of action  - Evaluate amount of meals eaten  - Assist patient with eating if necessary   - Allow adequate time for meals  - Recommend/ encourage appropriate diets, oral nutritional supplements, and vitamin/mineral supplements  - Order, calculate, and assess calorie counts as needed  - Recommend, monitor, and adjust tube feedings and TPN/PPN based on assessed needs  - Assess need for intravenous fluids  - Provide specific nutrition/hydration education as appropriate  - Include patient/family/caregiver in decisions related to nutrition  Outcome: Progressing

## 2023-08-04 NOTE — TELEPHONE ENCOUNTER
LM for patient with pathology results from surgery with Dr Edmond. Also reminded her to get labs done on Monday.

## 2023-08-04 NOTE — DISCHARGE SUMMARY
Discharge Summary - Surgical Oncology   Josiah Landis 79 y.o. female MRN: [id number]  [facility] Encounter: [id number]    Admission Date: 7/31/2023     Admitting Diagnosis: Pancreatic cyst [K86.2]    HPI: Kamryn Gutierrez is an 60-year-old very pleasant woman who has an enlarging pancreatic cyst.  This is growing larger than 2 mm/year and patient is uncomfortable watching this. She is now being admitted for surgical intervention. Procedures Performed: 7/31/2023 distal pancreatectomy, splenectomy, lysis of adhesions    Hospital Course: Patient has done extremely well postoperatively. Her pain was managed for the first 3 days with an epidural catheter. Patient was able to be started on a clear liquid diet and slowly advance to toast and crackers and then to a house diet by postop day #3. Shelley was removed by postop day #1 and she is voiding well on her own. Patient's Jayson-Elmore drain was initially bloody on postop day #1 and then turned to serosanguineous and then almost serous in color. Patient's hemoglobin remained stable throughout the admission and her platelet counts have only minimally increased. We will follow her platelet counts since she had a splenectomy. Patient's abdominal incision has stayed clean and dry without erythema throughout the admission. She is tolerating a house diet now. Her ARNIE amylase after the first day of having clears was 418 with a serum amylase of 95. By postop day #4 her ARNIE amylase was 59 and a serum amylase was 18. This was taken after patient was tolerating a house diet. Patient was continued on subcu heparin throughout the admission for DVT prophylaxis and switch to Lovenox by postop day #4. Case management has been involved with the patient and visiting nurses has been set up for home care at the daughter's house. Patient will be discharged with her Jayson-Elmore drain. She will also be discharged on Lovenox 40 mg subcu daily for the next 24 days.   All discharge instructions as well as her postop blood work was discussed with her daughter Shirley Johnson. Her medications were sent to home*pharmacy here at [facility] for Lovenox 40 mg subcu daily, Robaxin 500 mg which needed to be changed to Zanaflex due to her insurance, she is to continue taking the Tylenol. Oxycodone was also sent for 5 days 20 pills and no refills. Patient is to go next week for a ARNIE amylase level, a serum amylase level, as well as a CBC with platelet count. Scripts were put into epic for these labs. Patient has an appointment on 8/17/2023 at 2 PM to see Dr. Jaylan Rodriguez at the [facility] office. Patient is not to hesitate to call the office for fevers of 101.5 or higher, increasing abdominal pain or vomiting. Pathology pending    Complications: None    Discharge Diagnosis: Enlarging pancreatic cyst, IPMN    Discharge Date: 8/4/2023    Condition at Discharge: Good    Discharge instructions/Information to patient and family:   See after visit summary for information provided to patient and family. Provisions for Follow-Up Care:  See after visit summary for information related to follow-up care and any pertinent home health orders. Disposition: Home with [facility]    Planned Readmission: No    Discharge Statement   I spent 30 minutes discharging the patient. This time was spent on the day of discharge. I had direct contact with the patient on the day of discharge. Additional documentation is required if more than 30 minutes were spent on discharge. Discharge Medications:  See after visit summary for reconciled discharge medications provided to patient and family.

## 2023-08-04 NOTE — PROGRESS NOTES
Progress Note - Surgical Oncology  : [facility] Resident on [facility] 79 y.o. female MRN: [id number]  Unit/Bed#: [facility] 924-01 Encounter: [id number]    Assessment:  79 y.o. female past medical history of Parkinson's dysphagia, pancreatic cyst status post distal pancreatectomy, splenectomy and lysis of adhesions on 7/31    AVSS on RA    UOP: 1.55 L  ARNIE: 55 cc serosanguinous  Passing flatus, BM day before yesterday    ARNIE amylase 59; Serum amylase pending     Lab Results   Component Value Date/Time    WBC 17.20 (H) 08/03/2023 04:13 AM    WBC 22.48 (H) 08/02/2023 05:37 AM    WBC 5.17 10/03/2015 12:05 PM    HGB 10.5 (L) 08/03/2023 04:13 AM    HGB 11.2 (L) 08/02/2023 05:37 AM    HGB 14.2 10/03/2015 12:05 PM    CREATININE 0.75 08/03/2023 04:13 AM    CREATININE 0.81 08/02/2023 11:13 PM    CREATININE 0.75 10/03/2015 12:05 PM     Plan:  - Regular diet  - F/u serum amylase  - Administer splenic vaccinations  - Pain and nausea control PRN  - Encourage IS and OOB  - DVT ppx  - Plan to discharge    Subjective/Objective     Subjective: Epidural removed yesterday without issue. No acute events overnight. Pain is controlled. Patient denies nausea, vomiting, fever, chills, shortness of breath, chest pain. Objective:     Physical Exam:  GEN: NAD  CV: Reg rate  Lung: Normal effort on RA  Ab: Soft, nondistended, appropriately tender. Incisions cdi. ARNIE in place with ss drainage  Extrem: No LE edema  Neuro: A+Ox3     Vitals: Temp:  [97.8 °F (36.6 °C)-99 °F (37.2 °C)] 98.8 °F (37.1 °C)  HR:  [71-89] 74  Resp:  [14-16] 14  BP: (114-165)/(70-93) 165/87  Body mass index is 31.18 kg/m². I/O       07/31 0701 08/01 0700 08/01 0701 08/02 0700 08/02 0701  08/03 0700    P. O.   240    I.V. (mL/kg) 3259 (43.6) 1475.5 (19.7)     IV Piggyback 500      Total Intake(mL/kg) 3759 (50.3) 1475.5 (19.7) 240 (3.2)    Urine (mL/kg/hr) 1600 1350 (0.8) 100 (0.1)    Drains 248 171 55    Blood 250      Total Output 2698 1521 155    Net +1661 -45.5 +85               VTE Pharmacologic Prophylaxis: Heparin  VTE Mechanical Prophylaxis: sequential compression device    ---  Vivian Cote MD  General Surgery PGY-I

## 2023-08-04 NOTE — QUICK NOTE
Spoke with daughter Ronnie Nova at [phone]. Explained all discharge instructions. - The drain output is to be recorded daily and the record is to be brought to the office.  -She is to have a ARNIE as well as a serum amylase and a CBC with platelet count next week.   Lab requests are been placed in epic  -If the ARNIE amylase reveals that the drain can be removed, Shannon Dudley or Heather from Dr. Shazia Fields office will call and have the patient come have the drain pulled.  -Patient is to have Lovenox injections daily which the daughter is comfortable giving.  -Home scripts will be sent to home*pharmacy here at [facility]

## 2023-08-04 NOTE — PROGRESS NOTES
Progress Note - Acute Pain Service    Lexis Rendon 79 y.o. female MRN: [id number]  [facility] Encounter: [id number]      Assessment:   Principal Problem:    s/p distal pancreatectomy/splenectomy  Active Problems:    OAB (overactive bladder)    Parkinson's disease (HCC)    Primary insomnia    History of skin cancer    Seasonal allergic rhinitis    Impaired fasting glucose    Obesity (BMI 30-39. 9)    Claustrophobia    Mitral valve disorder    Menopause ovarian failure    Dysphagia    Multiple thyroid nodules    Gastro-esophageal reflux disease without esophagitis    Chronic idiopathic constipation    Tremor    Pancreatic cyst    Postablative hypothyroidism    Sleep apnea    Closed compression fracture of body of L1 vertebra (HCC)    Cerebrovascular disease    Age-related osteoporosis without current pathological fracture    Urge incontinence    Lexis Rendon is a 79 y.o. female with a history of Parkinson's, dysphagia, pancreatic cyst status post distal pancreatectomy, splenectomy and lysis of adhesions on 7/31. An epidural was placed preop for postop pain control which was removed yesterday. Plan:   Multimodal analgesia:  · Tylenol 975 mg every 8 hours scheduled  · Robaxin 500 mg every 8 hours scheduled  · Hold for sedation  · Oxycodone 5 mg every 6 hours as needed for moderate or severe pain    Bowel Regimen:  · Senokot-S 2 tablets twice a day    Pain remains well controlled since epidural has been removed. Okay for discharge from a pain management standpoint. APS will sign off at this time. Thank you for the consult. All opioids and other analgesics to be written at discretion of primary team. Please contact Acute Pain Service - [facility] via [employer] from [phone] with additional questions or concerns. See [employer] or [employer] for additional contacts and after hours information.     Pain History  Current pain location(s): Incisional  Pain Scale:   0-7  24 hour history: Patient resting in bed, no acute distress. Reporting minimal incisional pain at present time. Mild increase in pain when she sits up in bed or is ambulatory. Tolerating current analgesic regimen. Patient reports that Robaxin is very helpful in pain control. Also reporting mild improvement in pain with oxycodone administration. Denied headache, dizziness, nausea or vomiting    Opioid requirement previous 24 hours: Oxycodone 10 mg    Meds/Allergies   all current active meds have been reviewed and current meds:   Current Facility-Administered Medications   Medication Dose Route Frequency   • acetaminophen (TYLENOL) tablet 975 mg  975 mg Oral Q8H [address]   • carbidopa-levodopa (SINEMET)  mg per tablet 1 tablet  1 tablet Oral TID AC   • enoxaparin (LOVENOX) subcutaneous injection 40 mg  40 mg Subcutaneous Q24H [address]   • methocarbamol (ROBAXIN) tablet 500 mg  500 mg Oral Q8H [address]   • ondansetron (ZOFRAN) injection 4 mg  4 mg Intravenous Q6H PRN   • oxybutynin (DITROPAN-XL) 24 hr tablet 5 mg  5 mg Oral Daily   • oxyCODONE (ROXICODONE) IR tablet 5 mg  5 mg Oral Q6H PRN   • pantoprazole (PROTONIX) EC tablet 40 mg  40 mg Oral Early Morning   • senna-docusate sodium (SENOKOT S) 8.6-50 mg per tablet 2 tablet  2 tablet Oral BID       Allergies   Allergen Reactions   • Caffeine - Food Allergy GI Intolerance   • Iodine - Food Allergy Rash   • Latex Rash   • Other Rash     Adhesive tape         Objective     Temp:  [98.3 °F (36.8 °C)-99 °F (37.2 °C)] 98.4 °F (36.9 °C)  HR:  [68-90] 90  Resp:  [14-18] 18  BP: (114-173)/(70-90) 173/90    Physical Exam  Vitals reviewed. Constitutional:       General: She is awake. She is not in acute distress. Appearance: Normal appearance. She is not ill-appearing, toxic-appearing or diaphoretic. HENT:      Head: Normocephalic and atraumatic. Nose: Nose normal. No congestion or rhinorrhea.       Mouth/Throat:      Mouth: Mucous membranes are moist.   Eyes:      Extraocular Movements: Extraocular movements intact. Cardiovascular:      Rate and Rhythm: Normal rate. Pulmonary:      Effort: Pulmonary effort is normal. No tachypnea, bradypnea or respiratory distress. Musculoskeletal:      Thoracic back: Normal. No swelling or tenderness. Neurological:      Mental Status: She is alert and oriented to person, place, and time. Mental status is at baseline. Psychiatric:         Attention and Perception: Attention normal.         Mood and Affect: Mood normal. Mood is not anxious. Speech: Speech normal.         Behavior: Behavior normal. Behavior is cooperative. Lab Results:   Results from last 7 days   Lab Units 08/03/23  0413   WBC Thousand/uL 17.20*   HEMOGLOBIN g/dL 10.5*   HEMATOCRIT % 31.9*   PLATELETS Thousands/uL 192      Results from last 7 days   Lab Units 08/03/23  0413 08/01/23  0501 07/31/23  1249   POTASSIUM mmol/L 3.9   < >  --    CHLORIDE mmol/L 111*   < >  --    CO2 mmol/L 25   < >  --    CO2, I-STAT mmol/L  --   --  25   BUN mg/dL 12   < >  --    CREATININE mg/dL 0.75   < >  --    CALCIUM mg/dL 8.8   < >  --    GLUCOSE, ISTAT mg/dl  --   --  139    < > = values in this interval not displayed. Counseling / Coordination of Care  Total floor / unit time spent today 15 minutes. Greater than 50% of total time was spent with the patient and / or family counseling and / or coordination of care. A description of the counseling / coordination of care: Chart review included vital signs, progress notes and medications. Reviewed postoperative pain management which was inclusive of opioid and nonopioid medication. Please note that the APS provides consultative services regarding pain management only. With the exception of ketamine and epidural infusions and except when indicated, final decisions regarding starting or changing doses of analgesic medications are at the discretion of the consulting service.   Off hours consultation and/or medication management is generally not available.     RISHI Lee  Acute Pain Service

## 2023-08-07 ENCOUNTER — APPOINTMENT (OUTPATIENT)
Dept: LAB | Facility: MEDICAL CENTER | Age: 83
End: 2023-08-07
Payer: COMMERCIAL

## 2023-08-07 DIAGNOSIS — Z90.81 S/P SPLENECTOMY: ICD-10-CM

## 2023-08-07 LAB
AMYLASE SERPL-CCNC: 26 IU/L (ref 25–115)
ERYTHROCYTE [DISTWIDTH] IN BLOOD BY AUTOMATED COUNT: 13.4 % (ref 11.6–15.1)
HCT VFR BLD AUTO: 38.7 % (ref 34.8–46.1)
HGB BLD-MCNC: 12.7 G/DL (ref 11.5–15.4)
MCH RBC QN AUTO: 29.7 PG (ref 26.8–34.3)
MCHC RBC AUTO-ENTMCNC: 32.8 G/DL (ref 31.4–37.4)
MCV RBC AUTO: 91 FL (ref 82–98)
PLATELET # BLD AUTO: 445 THOUSANDS/UL (ref 149–390)
PMV BLD AUTO: 11.1 FL (ref 8.9–12.7)
RBC # BLD AUTO: 4.27 MILLION/UL (ref 3.81–5.12)
WBC # BLD AUTO: 9.43 THOUSAND/UL (ref 4.31–10.16)

## 2023-08-07 PROCEDURE — 82150 ASSAY OF AMYLASE: CPT

## 2023-08-07 PROCEDURE — 85027 COMPLETE CBC AUTOMATED: CPT

## 2023-08-07 PROCEDURE — 36415 COLL VENOUS BLD VENIPUNCTURE: CPT

## 2023-08-07 NOTE — UTILIZATION REVIEW
NOTIFICATION OF ADMISSION DISCHARGE   This is a Notification of Discharge from [facility]. Please be advised that this patient has been discharge from our facility. Below you will find the admission and discharge date and time including the patient’s disposition. UTILIZATION REVIEW CONTACT:  Hung Parker  Utilization   Network Utilization Review Department  Phone: [phone] x carefully listen to the prompts. All voicemails are confidential.  Email: [email or web address]     ADMISSION INFORMATION  PRESENTATION DATE: 7/31/2023  9:15 AM  OBERVATION ADMISSION DATE:   INPATIENT ADMISSION DATE: 7/31/23  2:31 PM   DISCHARGE DATE: 8/4/2023 11:18 AM   DISPOSITION:Home with Home Health Care    IMPORTANT INFORMATION:  Send all requests for admission clinical reviews, approved or denied determinations and any other requests to dedicated fax number below belonging to the campus where the patient is receiving treatment.  List of dedicated fax numbers:  [place] DENIALS (Administrative/Medical Necessity) [phone] [address] [facility] (Maternity/NICU/Pediatrics) [phone]   [facility] [phone]   [facility] [phone] [address] [facility] [phone]   [address] [place] [address] [phone]   [facility] [phone]   [address] [place] [address] [address] [place] [address] [phone]   [address] [place] [address] [phone] [address] [facility] [phone]   [address] [facility] [address] [facility] [phone]

## 2023-08-08 ENCOUNTER — CLINICAL SUPPORT (OUTPATIENT)
Dept: SURGICAL ONCOLOGY | Facility: CLINIC | Age: 83
End: 2023-08-08

## 2023-08-08 DIAGNOSIS — Z90.81 S/P SPLENECTOMY: ICD-10-CM

## 2023-08-08 DIAGNOSIS — K86.2 PANCREATIC CYST: Primary | ICD-10-CM

## 2023-08-09 LAB — AMYLASE FLD QL: 16 U/L

## 2023-08-09 PROCEDURE — 82150 ASSAY OF AMYLASE: CPT | Performed by: SURGERY

## 2023-08-10 ENCOUNTER — TELEPHONE (OUTPATIENT)
Dept: SURGICAL ONCOLOGY | Facility: CLINIC | Age: 83
End: 2023-08-10

## 2023-08-10 ENCOUNTER — OFFICE VISIT (OUTPATIENT)
Dept: SURGICAL ONCOLOGY | Facility: CLINIC | Age: 83
End: 2023-08-10

## 2023-08-10 VITALS
DIASTOLIC BLOOD PRESSURE: 80 MMHG | HEIGHT: 61 IN | HEART RATE: 89 BPM | OXYGEN SATURATION: 97 % | TEMPERATURE: 97.2 F | BODY MASS INDEX: 31.25 KG/M2 | WEIGHT: 165.5 LBS | SYSTOLIC BLOOD PRESSURE: 126 MMHG | RESPIRATION RATE: 16 BRPM

## 2023-08-10 DIAGNOSIS — K86.2 PANCREATIC CYST: ICD-10-CM

## 2023-08-10 DIAGNOSIS — Z48.89 ENCOUNTER FOR POSTOPERATIVE WOUND CARE: ICD-10-CM

## 2023-08-10 DIAGNOSIS — Z90.81 S/P SPLENECTOMY: Primary | ICD-10-CM

## 2023-08-10 PROCEDURE — 99024 POSTOP FOLLOW-UP VISIT: CPT

## 2023-08-10 NOTE — PROGRESS NOTES
Surgical Oncology Follow Up       [address] [facility]  [facility] [place]  [address]  [place] [address]    [facility]  1940  [id number]  [address] [place] [address]  [facility] [place]  [address] [place] [address]    Diagnoses and all orders for this visit:    s/p distal pancreatectomy/splenectomy    Pancreatic cyst    Encounter for postoperative wound care        Chief Complaint   Patient presents with   • Post-op       Return if symptoms worsen or fail to improve. History of Present Illness: The patient presents to the office today for drain care following recent distal pancreatectomyfor the treatment of a pancreatic IPMN. She reports she is feeling well but is still weak. Incision is healing well. She continues to get drainage in the ARNIE but states that it is significantly less today than it was yesterday. Drainage is pink and clear. She denies any fever, chills. Review of Systems   Constitutional: Positive for fatigue. Negative for activity change, appetite change, chills and fever. HENT: Negative. Respiratory: Negative. Gastrointestinal: Positive for abdominal pain. Negative for vomiting. Musculoskeletal: Negative. Skin: Positive for wound. Negative for color change. Neurological: Negative. Hematological: Negative. Psychiatric/Behavioral: Negative. Patient Active Problem List   Diagnosis   • OAB (overactive bladder)   • Parkinson's disease ([address])   • Primary insomnia   • History of skin cancer   • Seasonal allergic rhinitis   • Impaired fasting glucose   • Mood disturbance   • Obesity (BMI 30-39. 9)   • Claustrophobia   • Mitral valve disorder   • Menopause ovarian failure   • Vitamin D deficiency   • Dysphagia   • Multiple thyroid nodules   • Gastro-esophageal reflux disease without esophagitis   • Osteopenia   • Chronic idiopathic constipation   • History of adenomatous polyp of colon   • Tremor   • Trochanteric bursitis, right hip   • Pancreatic cyst   • Cherry angioma   • Dyspnea on exertion   • Postablative hypothyroidism   • Sleep apnea   • Closed compression fracture of body of L1 vertebra (HCC)   • Cerebrovascular disease   • Age-related osteoporosis without current pathological fracture   • Urge incontinence   • Encounter for geriatric assessment   • s/p distal pancreatectomy/splenectomy     Past Medical History:   Diagnosis Date   • Actinic keratosis 2016   • Acute midline low back pain without sciatica 2020   • 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 pylori infection 2020   • Muscle weakness    • Nonmelanoma skin cancer     last assessed 2017   • Palpitations    • Pancreatic cyst    • Seasonal allergies    • Seborrheic keratosis 2014   • Sleep apnea     no cpap   • Spontaneous      without mention of complications    • Squamous cell carcinoma of forehead 2014   • Syncope      Past Surgical History:   Procedure Laterality Date   • ABDOMINAL ADHESION SURGERY N/A 2023    Procedure: LYSIS ADHESIONS;  Surgeon: Luz Ballard MD;  [facility];  Service: Surgical Oncology   • BOTOX INJECTION N/A 3/6/2017    Procedure: CYSTOSCOPY; BLADDER BOTOX 100 UNITS ;  Surgeon: Paul Rivera MD;  [facility];  Service:    • BOWEL RESECTION      related ot diverticulitis   • CARDIAC CATHETERIZATION     • CARPAL TUNNEL RELEASE Right    • COLONOSCOPY  2019   • COLOSTOMY     • COLOSTOMY CLOSURE     • CYSTOSCOPY  2021   • DISTAL PANCREATECTOMY N/A 2023    Procedure: DISTAL PANCREATECTOMY;  Surgeon: Luz Ballard MD;  [facility];  Service: Surgical Oncology • FOOT SURGERY Left     neuroma   • HYSTERECTOMY     • NASAL SINUS SURGERY  age 35    [place]   • NH CYSTOURETHROSCOPY N/A 4/13/2018    Procedure: CYSTOSCOPY WITH BOTOX;  Surgeon: Alex Mendoza MD;  Location: [facility];  Service: Urology   • NH ESOPHAGOGASTRODUODENOSCOPY TRANSORAL DIAGNOSTIC N/A 4/23/2019    Procedure: ESOPHAGOGASTRODUODENOSCOPY (EGD); Surgeon: Mirta Bridges DO;  Location: [facility]; Service: Gastroenterology   • SPLENECTOMY, TOTAL N/A 7/31/2023    Procedure: SPLENECTOMY;  Surgeon: You Chairez MD;  [facility];  Service: Surgical Oncology   • TONSILLECTOMY  age 47   • UPPER GASTROINTESTINAL ENDOSCOPY  04/23/2019     Family History   Problem Relation Age of Onset   • Alcohol abuse Mother    • Heart disease Mother    • Alcohol abuse Father    • Alcohol abuse Brother    • Cancer Brother    • Tremor Neg Hx    • Parkinsonism Neg Hx    • Colon cancer Neg Hx      Social History     Socioeconomic History   • Marital status:       Spouse name: Not on file   • Number of children: Not on file   • Years of education: Not on file   • Highest education level: Not on file   Occupational History   • Occupation: RETIRED    Tobacco Use   • Smoking status: Never   • Smokeless tobacco: Never   Vaping Use   • Vaping Use: Never used   Substance and Sexual Activity   • Alcohol use: Yes     Comment: patient states rare use on holidays    • Drug use: No   • Sexual activity: Not Currently   Other Topics Concern   • Not on file   Social History Narrative    LIVING INDEPENDENTLY ALONE         No caffeine use     Social Determinants of Health     Financial Resource Strain: Low Risk  (4/19/2023)    Overall Financial Resource Strain (CARDIA)    • Difficulty of Paying Living Expenses: Not very hard   Food Insecurity: No Food Insecurity (8/2/2023)    Hunger Vital Sign    • Worried About Running Out of Food in the Last Year: Never true    • Ran Out of Food in the Last Year: Never true   Transportation Needs: No Transportation Needs (8/2/2023)    PRAPARE - Transportation    • Lack of Transportation (Medical): No    • Lack of Transportation (Non-Medical):  No   Physical Activity: Not on file   Stress: Not on file   Social Connections: Not on file   Intimate Partner Violence: Not on file   Housing Stability: Low Risk  (8/2/2023)    Housing Stability Vital Sign    • Unable to Pay for Housing in the Last Year: No    • Number of Places Lived in the Last Year: 1    • Unstable Housing in the Last Year: No       Current Outpatient Medications:   •  acetaminophen (TYLENOL) 325 mg tablet, Take 3 tablets (975 mg total) by mouth every 8 (eight) hours, Disp: , Rfl: 0  •  Ascorbic Acid (VITAMIN C) 1000 MG tablet, Take 1,000 mg by mouth daily, Disp: , Rfl:   •  B Complex Vitamins (B COMPLEX 100 PO), Take 1 capsule by mouth daily after lunch, Disp: , Rfl:   •  calcium carbonate (OS-JOHN) 1250 (500 Ca) MG tablet, Take 1 tablet by mouth daily after lunch, Disp: , Rfl:   •  carbidopa-levodopa (SINEMET)  mg per tablet, Take 1 tablet by mouth 3 (three) times a day before meals, Disp: 270 tablet, Rfl: 3  •  cholecalciferol (VITAMIN D3) 1,000 units tablet, Take 5,000 Units by mouth daily after lunch, Disp: , Rfl:   •  Coenzyme Q10 (CO Q 10 PO), Take 1 capsule by mouth daily after lunch 600 mg BID, Disp: , Rfl:   •  Cyanocobalamin (VITAMIN B 12 PO), Take 1 capsule by mouth daily, Disp: , Rfl:   •  docusate sodium (COLACE) 100 mg capsule, Take 1 capsule (100 mg total) by mouth 2 (two) times a day for 10 days, Disp: 20 capsule, Rfl: 0  •  enoxaparin (LOVENOX) 40 mg/0.4 mL, Inject 0.4 mL (40 mg total) under the skin every 24 hours for 24 days Do not start before August 5, 2023., Disp: 9.6 mL, Rfl: 0  •  multivitamin (THERAGRAN) TABS, Take 1 tablet by mouth every morning, Disp: , Rfl:   •  Omega-3 350 MG CPDR, Take 1 capsule by mouth daily in the early morning, Disp: , Rfl:   •  pantoprazole (PROTONIX) 40 mg tablet, Take 1 tablet (40 mg total) by mouth daily, Disp: 90 tablet, Rfl: 3  •  Potassium Gluconate 595 (99 K) MG TABS, Take 1 each by mouth every other day, Disp: , Rfl:   •  Probiotic Product (PROBIOTIC-10) CAPS, Take 1 capsule by mouth daily after lunch, Disp: , Rfl:   •  senna-docusate sodium (SENOKOT S) 8.6-50 mg per tablet, Take 2 tablets by mouth 2 (two) times a day, Disp: , Rfl: 0  •  tiZANidine (ZANAFLEX) 2 mg tablet, Take 1 tablet (2 mg total) by mouth 3 (three) times a day for 7 days, Disp: 21 tablet, Rfl: 0  Allergies   Allergen Reactions   • Caffeine - Food Allergy GI Intolerance   • Iodine - Food Allergy Rash   • Latex Rash   • Other Rash     Adhesive tape       Vitals:    08/10/23 1112   BP: 126/80   Pulse: 89   Resp: 16   Temp: (!) 97.2 °F (36.2 °C)   SpO2: 97%       Physical Exam  Vitals reviewed. Constitutional:       General: She is not in acute distress. Appearance: Normal appearance. She is normal weight. She is not ill-appearing or toxic-appearing. HENT:      Head: Normocephalic and atraumatic. Nose: Nose normal.      Mouth/Throat:      Pharynx: Oropharynx is clear. Eyes:      General: No scleral icterus. Conjunctiva/sclera: Conjunctivae normal.   Cardiovascular:      Rate and Rhythm: Normal rate. Pulmonary:      Effort: Pulmonary effort is normal.   Abdominal:      General: A surgical scar is present. Comments: Midline incision is healing well   Musculoskeletal:         General: Normal range of motion. Cervical back: Normal range of motion and neck supple. Skin:     General: Skin is warm and dry. Coloration: Skin is not jaundiced. Findings: No erythema. Neurological:      General: No focal deficit present. Mental Status: She is alert and oriented to person, place, and time. Psychiatric:         Mood and Affect: Mood normal.         Behavior: Behavior normal.         Thought Content:  Thought content normal.         Judgment: Judgment normal. Labs:  Amylase, body fluid     Collected 8/9/2023  8:20 AM          Component Ref Range & Units 1 d ago   Amylase, Fluid U/L 16             Discussion/Summary: This is an 76-year-old female who presents to the office for ARNIE drain removal following distal pancreatectomy. Mary Huggins has collected approximately 12 cc of fluid in the last 24 hours, fluid is thin, serosanguineous, and amylase is low. Drain was removed in the office, patient tolerated this well. Patient has been advised on care of this area. She has been instructed to call the office should she develop any fevers, chills, nausea, vomiting or acute abdominal pain. She is agreeable to the plan and will follow-up with Dr. Torie Huynh on August 17 as scheduled.

## 2023-08-10 NOTE — TELEPHONE ENCOUNTER
Drain amylase resulted. Per Dr Vanita Martin, ok to fully remove LUQ abdominal drain. Appointment made with ANNITA Beth for today at 11:00. Patient's daughter made aware.

## 2023-08-10 NOTE — LETTER
August 10, 2023     Blanche Branch MD  [address]  2nd [address] [place] [address] 1200 [facility]    Patient: Estelle Haddad   YOB: 1940   Date of Visit: 8/10/2023       Dear Dr. Isidra Mandujano:    Thank you for referring Thierno Barnett to me for evaluation. Below are my notes for this consultation. If you have questions, please do not hesitate to call me. I look forward to following your patient along with you. Sincerely,        RISHI Domingo        CC: No Recipients    RISHI San  8/10/2023 11:37 AM  Sign when Signing Visit               Surgical Oncology Follow Up       [facility]  2950 [facility] 1404 [facility]  1940  [id number]  [address] [facility]  [facility] [place]  720 [facility] [address]    Diagnoses and all orders for this visit:    s/p distal pancreatectomy/splenectomy    Pancreatic cyst    Encounter for postoperative wound care        Chief Complaint   Patient presents with   • Post-op       Return if symptoms worsen or fail to improve. History of Present Illness: The patient presents to the office today for drain care following recent pancreaticoduodenectomy for the treatment of a pancreatic IPMN. She reports she is feeling well but is still weak. Incision is healing well. She continues to get drainage in the ARNIE but states that it is significantly less today than it was yesterday. Drainage is pink and clear. She denies any fever, chills. Review of Systems   Constitutional: Positive for fatigue. Negative for activity change, appetite change, chills and fever. HENT: Negative. Respiratory: Negative. Gastrointestinal: Positive for abdominal pain. Negative for vomiting. Musculoskeletal: Negative. Skin: Positive for wound. Negative for color change. Neurological: Negative. Hematological: Negative. Psychiatric/Behavioral: Negative. Patient Active Problem List   Diagnosis   • OAB (overactive bladder)   • Parkinson's disease ([address])   • Primary insomnia   • History of skin cancer   • Seasonal allergic rhinitis   • Impaired fasting glucose   • Mood disturbance   • Obesity (BMI 30-39. 9)   • Claustrophobia   • Mitral valve disorder   • Menopause ovarian failure   • Vitamin D deficiency   • Dysphagia   • Multiple thyroid nodules   • Gastro-esophageal reflux disease without esophagitis   • Osteopenia   • Chronic idiopathic constipation   • History of adenomatous polyp of colon   • Tremor   • Trochanteric bursitis, right hip   • Pancreatic cyst   • Cherry angioma   • Dyspnea on exertion   • Postablative hypothyroidism   • Sleep apnea   • Closed compression fracture of body of L1 vertebra (HCC)   • Cerebrovascular disease   • Age-related osteoporosis without current pathological fracture   • Urge incontinence   • Encounter for geriatric assessment   • s/p distal pancreatectomy/splenectomy     Past Medical History:   Diagnosis Date   • Actinic keratosis 2016   • Acute midline low back pain without sciatica 2020   • 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 pylori infection 2020   • Muscle weakness    • Nonmelanoma skin cancer     last assessed 2017   • Palpitations    • Pancreatic cyst    • Seasonal allergies    • Seborrheic keratosis 2014   • Sleep apnea     no cpap   • Spontaneous      without mention of complications    • Squamous cell carcinoma of forehead 2014   • Syncope      Past Surgical History:   Procedure Laterality Date   • ABDOMINAL ADHESION SURGERY N/A 2023    Procedure: LYSIS ADHESIONS;  Surgeon: Ant Carter MD; [facility]: [facility];  Service: Surgical Oncology   • BOTOX INJECTION N/A 3/6/2017    Procedure: CYSTOSCOPY; BLADDER BOTOX 100 UNITS ;  Surgeon: Nuha Camargo MD;  [facility];  Service:    • BOWEL RESECTION      related ot diverticulitis   • CARDIAC CATHETERIZATION     • CARPAL TUNNEL RELEASE Right    • COLONOSCOPY  07/31/2019   • COLOSTOMY     • COLOSTOMY CLOSURE     • CYSTOSCOPY  06/01/2021   • DISTAL PANCREATECTOMY N/A 7/31/2023    Procedure: DISTAL PANCREATECTOMY;  Surgeon: Rick Gan MD;  [facility];  Service: Surgical Oncology   • FOOT SURGERY Left     neuroma   • HYSTERECTOMY     • NASAL S[place]  age 35    [place]   • [place] CYSTOURETHROSCOPY N/A 4/13/2018    Procedure: CYSTOSCOPY WITH BOTOX;  Surgeon: Nuha Camargo MD;  [facility];  Service: Urology   • [place] ESOPHAGOGASTRODUODENOSCOPY TRANSORAL DIAGNOSTIC N/A 4/23/2019    Procedure: ESOPHAGOGASTRODUODENOSCOPY (EGD); Surgeon: Namita Cunningham DO;  [facility]: [facility]; Service: Gastroenterology   • SPLENECTOMY, TOTAL N/A 7/31/2023    Procedure: SPLENECTOMY;  Surgeon: Rick Gan MD;  [facility];  Service: Surgical Oncology   • TONSILLECTOMY  age 47   • UPPER GASTROINTESTINAL ENDOSCOPY  04/23/2019     Family History   Problem Relation Age of Onset   • Alcohol abuse Mother    • Heart disease Mother    • Alcohol abuse Father    • Alcohol abuse Brother    • Cancer Brother    • Tremor Neg Hx    • Parkinsonism Neg Hx    • Colon cancer Neg Hx      Social History     Socioeconomic History   • Marital status:       Spouse name: Not on file   • Number of children: Not on file   • Years of education: Not on file   • Highest education level: Not on file   Occupational History   • Occupation: RETIRED    Tobacco Use   • Smoking status: Never   • Smokeless tobacco: Never   Vaping Use   • Vaping Use: Never used   Substance and Sexual Activity   • Alcohol use: Yes     Comment: patient states rare use on holidays • Drug use: No   • Sexual activity: Not Currently   Other Topics Concern   • Not on file   Social History Narrative    LIVING INDEPENDENTLY ALONE         No caffeine use     Social Determinants of Health     Financial Resource Strain: Low Risk  (4/19/2023)    Overall Financial Resource Strain (CARDIA)    • Difficulty of Paying Living Expenses: Not very hard   Food Insecurity: No Food Insecurity (8/2/2023)    Hunger Vital Sign    • Worried About Running Out of Food in the Last Year: Never true    • Ran Out of Food in the Last Year: Never true   Transportation Needs: No Transportation Needs (8/2/2023)    PRAPARE - Transportation    • Lack of Transportation (Medical): No    • Lack of Transportation (Non-Medical):  No   Physical Activity: Not on file   Stress: Not on file   Social Connections: Not on file   Intimate Partner Violence: Not on file   Housing Stability: Low Risk  (8/2/2023)    Housing Stability Vital Sign    • Unable to Pay for Housing in the Last Year: No    • Number of Places Lived in the Last Year: 1    • Unstable Housing in the Last Year: No       Current Outpatient Medications:   •  acetaminophen (TYLENOL) 325 mg tablet, Take 3 tablets (975 mg total) by mouth every 8 (eight) hours, Disp: , Rfl: 0  •  Ascorbic Acid (VITAMIN C) 1000 MG tablet, Take 1,000 mg by mouth daily, Disp: , Rfl:   •  B Complex Vitamins (B COMPLEX 100 PO), Take 1 capsule by mouth daily after lunch, Disp: , Rfl:   •  calcium carbonate (OS-JOHN) 1250 (500 Ca) MG tablet, Take 1 tablet by mouth daily after lunch, Disp: , Rfl:   •  carbidopa-levodopa (SINEMET)  mg per tablet, Take 1 tablet by mouth 3 (three) times a day before meals, Disp: 270 tablet, Rfl: 3  •  cholecalciferol (VITAMIN D3) 1,000 units tablet, Take 5,000 Units by mouth daily after lunch, Disp: , Rfl:   •  Coenzyme Q10 (CO Q 10 PO), Take 1 capsule by mouth daily after lunch 600 mg BID, Disp: , Rfl:   •  Cyanocobalamin (VITAMIN B 12 PO), Take 1 capsule by mouth daily, Disp: , Rfl:   •  docusate sodium (COLACE) 100 mg capsule, Take 1 capsule (100 mg total) by mouth 2 (two) times a day for 10 days, Disp: 20 capsule, Rfl: 0  •  enoxaparin (LOVENOX) 40 mg/0.4 mL, Inject 0.4 mL (40 mg total) under the skin every 24 hours for 24 days Do not start before August 5, 2023., Disp: 9.6 mL, Rfl: 0  •  multivitamin (THERAGRAN) TABS, Take 1 tablet by mouth every morning, Disp: , Rfl:   •  Omega-3 350 MG CPDR, Take 1 capsule by mouth daily in the early morning, Disp: , Rfl:   •  pantoprazole (PROTONIX) 40 mg tablet, Take 1 tablet (40 mg total) by mouth daily, Disp: 90 tablet, Rfl: 3  •  Potassium Gluconate 595 (99 K) MG TABS, Take 1 each by mouth every other day, Disp: , Rfl:   •  Probiotic Product (PROBIOTIC-10) CAPS, Take 1 capsule by mouth daily after lunch, Disp: , Rfl:   •  senna-docusate sodium (SENOKOT S) 8.6-50 mg per tablet, Take 2 tablets by mouth 2 (two) times a day, Disp: , Rfl: 0  •  tiZANidine (ZANAFLEX) 2 mg tablet, Take 1 tablet (2 mg total) by mouth 3 (three) times a day for 7 days, Disp: 21 tablet, Rfl: 0  Allergies   Allergen Reactions   • Caffeine - Food Allergy GI Intolerance   • Iodine - Food Allergy Rash   • Latex Rash   • Other Rash     Adhesive tape       Vitals:    08/10/23 1112   BP: 126/80   Pulse: 89   Resp: 16   Temp: (!) 97.2 °F (36.2 °C)   SpO2: 97%       Physical Exam  Vitals reviewed. Constitutional:       General: She is not in acute distress. Appearance: Normal appearance. She is normal weight. She is not ill-appearing or toxic-appearing. HENT:      Head: Normocephalic and atraumatic. Nose: Nose normal.      Mouth/Throat:      Pharynx: Oropharynx is clear. Eyes:      General: No scleral icterus. Conjunctiva/sclera: Conjunctivae normal.   Cardiovascular:      Rate and Rhythm: Normal rate. Pulmonary:      Effort: Pulmonary effort is normal.   Abdominal:      General: A surgical scar is present.           Comments: Midline incision is healing well   Musculoskeletal:         General: Normal range of motion. Cervical back: Normal range of motion and neck supple. Skin:     General: Skin is warm and dry. Coloration: Skin is not jaundiced. Findings: No erythema. Neurological:      General: No focal deficit present. Mental Status: She is alert and oriented to person, place, and time. Psychiatric:         Mood and Affect: Mood normal.         Behavior: Behavior normal.         Thought Content: Thought content normal.         Judgment: Judgment normal.             Labs:  Amylase, body fluid     Collected 8/9/2023  8:20 AM          Component Ref Range & Units 1 d ago   Amylase, Fluid U/L 16             Discussion/Summary: This is an 58-year-old female who presents to the office for ARNIE drain removal following pancreaticoduodenectomy. Jc Carmen has collected approximately 12 cc of fluid in the last 24 hours, fluid is thin, serosanguineous, and amylase is low. Drain was removed in the office, patient tolerated this well. Patient has been advised on care of this area. She has been instructed to call the office should she develop any fevers, chills, nausea, vomiting or acute abdominal pain. She is agreeable to the plan and will follow-up with Dr. Tesha Whitten on August 17 as scheduled.

## 2023-08-16 PROBLEM — D49.0 IPMN (INTRADUCTAL PAPILLARY MUCINOUS NEOPLASM): Status: ACTIVE | Noted: 2020-09-02

## 2023-08-17 ENCOUNTER — OFFICE VISIT (OUTPATIENT)
Dept: SURGICAL ONCOLOGY | Facility: CLINIC | Age: 83
End: 2023-08-17

## 2023-08-17 VITALS
HEART RATE: 90 BPM | BODY MASS INDEX: 30.78 KG/M2 | WEIGHT: 163 LBS | TEMPERATURE: 97.1 F | DIASTOLIC BLOOD PRESSURE: 80 MMHG | OXYGEN SATURATION: 97 % | RESPIRATION RATE: 15 BRPM | SYSTOLIC BLOOD PRESSURE: 118 MMHG | HEIGHT: 61 IN

## 2023-08-17 DIAGNOSIS — D49.0 IPMN (INTRADUCTAL PAPILLARY MUCINOUS NEOPLASM): Primary | ICD-10-CM

## 2023-08-17 DIAGNOSIS — Z90.81 S/P SPLENECTOMY: ICD-10-CM

## 2023-08-17 PROCEDURE — 99024 POSTOP FOLLOW-UP VISIT: CPT | Performed by: SURGERY

## 2023-08-17 NOTE — LETTER
August 17, 2023     [facility]  [address]    Patient: Lexis Rendon   YOB: 1940   Date of Visit: 8/17/2023       Dear Dr. Magalie Yi:    Thank you for referring Koko Oneill to me for evaluation. Below are my notes for this consultation. If you have questions, please do not hesitate to call me. I look forward to following your patient along with you. Sincerely,        Jaime Montes MD        CC: MD Jaime Sheets MD  8/17/2023  2:14 PM  Incomplete               Surgical Oncology Follow Up       [facility] 5[address] [facility] 203  [facility] [address]  [phone]    Lexis Rendon  1940  [id number]  [facility] [address]  [address] [place] [address]  [phone]    Diagnoses and all orders for this visit:    IPMN (intraductal papillary mucinous neoplasm)    s/p distal pancreatectomy/splenectomy        Chief Complaint   Patient presents with   • Post-op       No follow-ups on file. Oncology History    No history exists. Staging: Multifocal IPMN, focal high-grade dysplasia mucinous neoplasm at resection margin, negative for high-grade dysplasia  Treatment history: Distal pancreatectomy with splenectomy, July 2023  Current treatment: Observation  Disease status:     History of Present Illness: Patient returns in follow-up. She is doing very well. Her appetite is improving. Minimal abdominal pain. No fevers or chills. Review of Systems  Complete ROS Surg Onc:   Complete ROS Surg Onc:   Constitutional: The patient denies new or recent history of general fatigue, no recent weight loss, no change in appetite. Eyes: No complaints of visual problems, no scleral icterus.    ENT: no complaints of ear pain, no hoarseness, no difficulty swallowing,  no tinnitus and no new masses in head, oral cavity, or neck. Cardiovascular: No complaints of chest pain, no palpitations, no ankle edema. Respiratory: No complaints of shortness of breath, no cough. Gastrointestinal: No complaints of jaundice, no bloody stools, no pale stools. Genitourinary: No complaints of dysuria, no hematuria, no nocturia, no frequent urination, no urethral discharge. Musculoskeletal: No complaints of weakness, paralysis, joint stiffness or arthralgias. Integumentary: No complaints of rash, no new lesions. Neurological: No complaints of convulsions, no seizures, no dizziness. Hematologic/Lymphatic: No complaints of easy bruising. Endocrine:  No hot or cold intolerance. No polydipsia, polyphagia, or polyuria. Allergy/immunology:  No environmental allergies. No food allergies. Not immunocompromised. Skin:  No pallor or rash. No wound. Patient Active Problem List   Diagnosis   • OAB (overactive bladder)   • Parkinson's disease ([address])   • Primary insomnia   • History of skin cancer   • Seasonal allergic rhinitis   • Impaired fasting glucose   • Mood disturbance   • Obesity (BMI 30-39. 9)   • Claustrophobia   • Mitral valve disorder   • Menopause ovarian failure   • Vitamin D deficiency   • Dysphagia   • Multiple thyroid nodules   • Gastro-esophageal reflux disease without esophagitis   • Osteopenia   • Chronic idiopathic constipation   • History of adenomatous polyp of colon   • Tremor   • Trochanteric bursitis, right hip   • IPMN (intraductal papillary mucinous neoplasm)   • Cherry angioma   • Dyspnea on exertion   • Postablative hypothyroidism   • Sleep apnea   • Closed compression fracture of body of L1 vertebra (HCC)   • Cerebrovascular disease   • Age-related osteoporosis without current pathological fracture   • Urge incontinence   • Encounter for geriatric assessment   • s/p distal pancreatectomy/splenectomy     Past Medical History:   Diagnosis Date • Actinic keratosis 2016   • Acute midline low back pain without sciatica 2020   • 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    • Benign neoplasm of skin    • Cancer [facility])     skin   • Chest pain    • Claustrophobia    • Diverticulitis    • Diverticulitis    • Fracture     L1-L2   • GERD (gastroesophageal reflux disease)    • H. pylori infection 2020   • Muscle weakness    • Nonmelanoma skin cancer     last assessed 2017   • Palpitations    • Pancreatic cyst    • Seasonal allergies    • Seborrheic keratosis 2014   • Sleep apnea     no cpap   • Spontaneous      without mention of complications    • Squamous cell carcinoma of forehead 2014   • Syncope      Past Surgical History:   Procedure Laterality Date   • ABDOMINAL ADHESION SURGERY N/A 2023    Procedure: LYSIS ADHESIONS;  Surgeon: Oralia Cole MD;  [facility];  Service: Surgical Oncology   • BOTOX INJECTION N/A 3/6/2017    Procedure: CYSTOSCOPY; BLADDER BOTOX 100 UNITS ;  Surgeon: Glenn Recio MD;  [facility];  Service:    • BOWEL RESECTION      related ot diverticulitis   • CARDIAC CATHETERIZATION     • CARPAL TUNNEL RELEASE Right    • COLONOSCOPY  2019   • COLOSTOMY     • COLOSTOMY CLOSURE     • CYSTOSCOPY  2021   • DISTAL PANCREATECTOMY N/A 2023    Procedure: DISTAL PANCREATECTOMY;  Surgeon: Oralia Cole MD;  [facility];  Service: Surgical Oncology   • FOOT SURGERY Left     neuroma   • HYSTERECTOMY     • [place]  age 35    [place]   • [place] CYSTOURETHROSCOPY N/A 2018    Procedure: CYSTOSCOPY WITH BOTOX;  Surgeon: Glenn Recio MD;  Location: [facility];  Service: Urology   • [place] ESOPHAGOGASTRODUODENOSCOPY TRANSORAL DIAGNOSTIC N/A 2019    Procedure: ESOPHAGOGASTRODUODENOSCOPY (EGD); Surgeon: Clover Mcburney, DO;  [facility];   Service: Gastroenterology   • SPLENECTOMY, TOTAL N/A 2023 Procedure: SPLENECTOMY;  Surgeon: Blanche Branch MD;  [facility];  Service: Surgical Oncology   • TONSILLECTOMY  age 47   • UPPER GASTROINTESTINAL ENDOSCOPY  04/23/2019     Family History   Problem Relation Age of Onset   • Alcohol abuse Mother    • Heart disease Mother    • Alcohol abuse Father    • Alcohol abuse Brother    • Cancer Brother    • Tremor Neg Hx    • Parkinsonism Neg Hx    • Colon cancer Neg Hx      Social History     Socioeconomic History   • Marital status:      Spouse name: Not on file   • Number of children: Not on file   • Years of education: Not on file   • Highest education level: Not on file   Occupational History   • Occupation: RETIRED    Tobacco Use   • Smoking status: Never   • Smokeless tobacco: Never   Vaping Use   • Vaping Use: Never used   Substance and Sexual Activity   • Alcohol use: Yes     Comment: patient states rare use on holidays    • Drug use: No   • Sexual activity: Not Currently   Other Topics Concern   • Not on file   Social History Narrative    LIVING INDEPENDENTLY ALONE         No caffeine use     Social Determinants of Health     Financial Resource Strain: Low Risk  (4/19/2023)    Overall Financial Resource Strain (CARDIA)    • Difficulty of Paying Living Expenses: Not very hard   Food Insecurity: No Food Insecurity (8/2/2023)    Hunger Vital Sign    • Worried About Running Out of Food in the Last Year: Never true    • Ran Out of Food in the Last Year: Never true   Transportation Needs: No Transportation Needs (8/2/2023)    PRAPARE - Transportation    • Lack of Transportation (Medical): No    • Lack of Transportation (Non-Medical):  No   Physical Activity: Not on file   Stress: Not on file   Social Connections: Not on file   Intimate Partner Violence: Not on file   Housing Stability: Low Risk  (8/2/2023)    Housing Stability Vital Sign    • Unable to Pay for Housing in the Last Year: No    • Number of Places Lived in the Last Year: 1    • Unstable Housing in the Last Year: No       Current Outpatient Medications:   •  acetaminophen (TYLENOL) 325 mg tablet, Take 3 tablets (975 mg total) by mouth every 8 (eight) hours (Patient taking differently: Take 975 mg by mouth every 8 (eight) hours AS NEEDED), Disp: , Rfl: 0  •  Ascorbic Acid (VITAMIN C) 1000 MG tablet, Take 1,000 mg by mouth daily, Disp: , Rfl:   •  B Complex Vitamins (B COMPLEX 100 PO), Take 1 capsule by mouth daily after lunch, Disp: , Rfl:   •  calcium carbonate (OS-JOHN) 1250 (500 Ca) MG tablet, Take 1 tablet by mouth daily after lunch, Disp: , Rfl:   •  carbidopa-levodopa (SINEMET)  mg per tablet, Take 1 tablet by mouth 3 (three) times a day before meals, Disp: 270 tablet, Rfl: 3  •  cholecalciferol (VITAMIN D3) 1,000 units tablet, Take 5,000 Units by mouth daily after lunch, Disp: , Rfl:   •  Coenzyme Q10 (CO Q 10 PO), Take 1 capsule by mouth daily after lunch 600 mg BID, Disp: , Rfl:   •  Cyanocobalamin (VITAMIN B 12 PO), Take 1 capsule by mouth daily, Disp: , Rfl:   •  enoxaparin (LOVENOX) 40 mg/0.4 mL, Inject 0.4 mL (40 mg total) under the skin every 24 hours for 24 days Do not start before August 5, 2023., Disp: 9.6 mL, Rfl: 0  •  multivitamin (THERAGRAN) TABS, Take 1 tablet by mouth every morning, Disp: , Rfl:   •  Omega-3 350 MG CPDR, Take 1 capsule by mouth daily in the early morning, Disp: , Rfl:   •  pantoprazole (PROTONIX) 40 mg tablet, Take 1 tablet (40 mg total) by mouth daily, Disp: 90 tablet, Rfl: 3  •  Potassium Gluconate 595 (99 K) MG TABS, Take 1 each by mouth every other day, Disp: , Rfl:   •  Probiotic Product (PROBIOTIC-10) CAPS, Take 1 capsule by mouth daily after lunch, Disp: , Rfl:   •  senna-docusate sodium (SENOKOT S) 8.6-50 mg per tablet, Take 2 tablets by mouth 2 (two) times a day, Disp: , Rfl: 0  •  docusate sodium (COLACE) 100 mg capsule, Take 1 capsule (100 mg total) by mouth 2 (two) times a day for 10 days, Disp: 20 capsule, Rfl: 0  •  tiZANidine (ZANAFLEX) 2 mg tablet, Take 1 tablet (2 mg total) by mouth 3 (three) times a day for 7 days (Patient not taking: Reported on 8/17/2023), Disp: 21 tablet, Rfl: 0  Allergies   Allergen Reactions   • Caffeine - Food Allergy GI Intolerance   • Iodine - Food Allergy Rash   • Latex Rash   • Other Rash     Adhesive tape       Vitals:    08/17/23 1345   BP: 118/80   Pulse: 90   Resp: 15   Temp: (!) 97.1 °F (36.2 °C)   SpO2: 97%       Physical Exam  Constitutional: General appearance: The Patient is well-developed and well-nourished who appears the stated age in no acute distress. Patient is pleasant and talkative. HEENT:  Normocephalic. Sclerae are anicteric. Mucous membranes are moist.     Abdomen: Abdomen is soft, non-tender, non-distended and without masses. Incision is C/D/I. Extremities: There is no clubbing or cyanosis. There is no edema. Symmetric. Neuro: Grossly nonfocal. Gait is normal.     Skin: Warm, anicteric. Psych:  Patient is pleasant and talkative. Breasts:        Pathology:  Final Diagnosis   A. Distal pancreas, spleen (distal pancreatectomy, splenectomy): - Synchronous intraductal papillary mucinous neoplasm, mixed gastric and pancreaticobiliary types (8mm, 4.5cm, 5.5cm) with focal high grade dysplasia   - Mucinous neoplasm present at resection margin, negative for high grade dysplasia   - Five (5) lymph nodes negative for carcinoma (0/5)  - No invasive carcinoma identified  - Spleen negative for carcinoma      Comment:  - The mucinous neoplasm at pancreas resection margin may represent IPMN vs low grade PanIN, favor IPMN. Pancreas margin is negative for high grade dysplasia. - DPC4 is intact with wild-type 53 and intact p16 expression in a focus of interest.  HI, ER on multiple section of the largest cystic mass (5.5cm) show no staining. No ovarian stroma is appreciated in this 5.5 cm multiloculated mass.   While this may represent a mucinous cystic neoplasm no ovarian stroma is noted such that is it classified as an IPMN. Case signed out at [address], [address]      Electronically signed by Stewart Griffin MD on 8/4/2023 at  8:40 AM         Labs:      Imaging  Stress strip    Result Date: 7/27/2023  Narrative: Confirmed by Dee Spencer ([id number]),  Andrey Rodriguez ([id number]) on 3/67/8288 3:21:48 PM    NM myocardial perfusion spect (rx stress and/or rest)    Result Date: 7/27/2023  Narrative: •  Stress Function: Left ventricular function post-stress is normal. Stress ejection fraction is 86 %. •  Stress ECG: Arrhythmias during stress: frequent PACs, short run of SVT. The ECG was not diagnostic due to pharmacological (vasodilator) stress. •  Perfusion: There are no perfusion defects. •  Stress Combined Conclusion: The ECG and SPECT imaging portions of the stress study are concordant with no evidence of stress induced myocardial ischemia. I personally reviewed and interpreted the above laboratory and imaging data. Discussion/Summary: 79-year-old female status post distal pancreatectomy and splenectomy for an enlarging pancreatic cyst.  Pathology revealed multifocal IPMN. There was mucinous cyst without high-grade dysplasia at the margin. I recommended observation. She is already scheduled for follow-up MRI in December. I will see her back with that imaging. We will plan on repeating her CBC in another week to make sure her platelet count stays under 1 million. She is agreeable to this. All her questions were answered.

## 2023-08-17 NOTE — PROGRESS NOTES
Surgical Oncology Follow Up       [facility] [address] [facility], [place], operated by [facility] [address]  [facility] [address] [phone]    Scot Pop  1940  [id number]  [facility] [address]  [facility] [id number] [phone]    Diagnoses and all orders for this visit:    IPMN (intraductal papillary mucinous neoplasm)    s/p distal pancreatectomy/splenectomy        Chief Complaint   Patient presents with   • Post-op       No follow-ups on file. Oncology History    No history exists. Staging: Multifocal IPMN, focal high-grade dysplasia mucinous neoplasm at resection margin, negative for high-grade dysplasia  Treatment history: Distal pancreatectomy with splenectomy, July 2023  Current treatment: Observation  Disease status:     History of Present Illness: Patient returns in follow-up. She is doing very well. Her appetite is improving. Minimal abdominal pain. No fevers or chills. Review of Systems  Complete ROS Surg Onc:   Complete ROS Surg Onc:   Constitutional: The patient denies new or recent history of general fatigue, no recent weight loss, no change in appetite. Eyes: No complaints of visual problems, no scleral icterus. ENT: no complaints of ear pain, no hoarseness, no difficulty swallowing,  no tinnitus and no new masses in head, oral cavity, or neck. Cardiovascular: No complaints of chest pain, no palpitations, no ankle edema. Respiratory: No complaints of shortness of breath, no cough. Gastrointestinal: No complaints of jaundice, no bloody stools, no pale stools. Genitourinary: No complaints of dysuria, no hematuria, no nocturia, no frequent urination, no urethral discharge. Musculoskeletal: No complaints of weakness, paralysis, joint stiffness or arthralgias.   Integumentary: No complaints of rash, no new lesions. Neurological: No complaints of convulsions, no seizures, no dizziness. Hematologic/Lymphatic: No complaints of easy bruising. Endocrine:  No hot or cold intolerance. No polydipsia, polyphagia, or polyuria. Allergy/immunology:  No environmental allergies. No food allergies. Not immunocompromised. Skin:  No pallor or rash. No wound. Patient Active Problem List   Diagnosis   • OAB (overactive bladder)   • Parkinson's disease ([address])   • Primary insomnia   • History of skin cancer   • Seasonal allergic rhinitis   • Impaired fasting glucose   • Mood disturbance   • Obesity (BMI 30-39. 9)   • Claustrophobia   • Mitral valve disorder   • Menopause ovarian failure   • Vitamin D deficiency   • Dysphagia   • Multiple thyroid nodules   • Gastro-esophageal reflux disease without esophagitis   • Osteopenia   • Chronic idiopathic constipation   • History of adenomatous polyp of colon   • Tremor   • Trochanteric bursitis, right hip   • IPMN (intraductal papillary mucinous neoplasm)   • Cherry angioma   • Dyspnea on exertion   • Postablative hypothyroidism   • Sleep apnea   • Closed compression fracture of body of L1 vertebra (HCC)   • Cerebrovascular disease   • Age-related osteoporosis without current pathological fracture   • Urge incontinence   • Encounter for geriatric assessment   • s/p distal pancreatectomy/splenectomy     Past Medical History:   Diagnosis Date   • Actinic keratosis 03/11/2016   • Acute midline low back pain without sciatica 11/19/2020   • 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 pylori infection 11/24/2020   • Muscle weakness    • Nonmelanoma skin cancer     last assessed 21mar2017   • Palpitations    • Pancreatic cyst    • Seasonal allergies    • Seborrheic keratosis 2014   • Sleep apnea     no cpap   • Spontaneous      without mention of complications    • Squamous cell carcinoma of forehead 2014   • Syncope      Past Surgical History:   Procedure Laterality Date   • ABDOMINAL ADHESION SURGERY N/A 2023    Procedure: LYSIS ADHESIONS;  Surgeon: Ramon Bustillos MD;  [facility];  Service: Surgical Oncology   • BOTOX INJECTION N/A 3/6/2017    Procedure: CYSTOSCOPY; BLADDER BOTOX 100 UNITS ;  Surgeon: Ann Marie Roa MD;  [facility];  Service:    • BOWEL RESECTION      related ot diverticulitis   • CARDIAC CATHETERIZATION     • CARPAL TUNNEL RELEASE Right    • COLONOSCOPY  2019   • COLOSTOMY     • COLOSTOMY CLOSURE     • CYSTOSCOPY  2021   • DISTAL PANCREATECTOMY N/A 2023    Procedure: DISTAL PANCREATECTOMY;  Surgeon: Ramon Bustillos MD;  [facility];  Service: Surgical Oncology   • FOOT SURGERY Left     neuroma   • HYSTERECTOMY     • NASAL SINUS SURGERY  age 35    [place]   • NM CYSTOURETHROSCOPY N/A 2018    Procedure: CYSTOSCOPY WITH BOTOX;  Surgeon: Ann Marie Roa MD;  [facility];  Service: Urology   • NM ESOPHAGOGASTRODUODENOSCOPY TRANSORAL DIAGNOSTIC N/A 2019    Procedure: ESOPHAGOGASTRODUODENOSCOPY (EGD); Surgeon: Fred Bo DO;  Location: [facility]; Service: Gastroenterology   • SPLENECTOMY, TOTAL N/A 2023    Procedure: SPLENECTOMY;  Surgeon: Ramon Bustillos MD;  [facility];  Service: Surgical Oncology   • TONSILLECTOMY  age 47   • UPPER GASTROINTESTINAL ENDOSCOPY  2019     Family History   Problem Relation Age of Onset   • Alcohol abuse Mother    • Heart disease Mother    • Alcohol abuse Father    • Alcohol abuse Brother    • Cancer Brother    • Tremor Neg Hx    • Parkinsonism Neg Hx    • Colon cancer Neg Hx      Social History     Socioeconomic History   • Marital status:       Spouse name: Not on file   • Number of children: Not on file   • Years of education: Not on file   • Highest education level: Not on file   Occupational History   • Occupation: RETIRED    Tobacco Use   • Smoking status: Never   • Smokeless tobacco: Never   Vaping Use   • Vaping Use: Never used   Substance and Sexual Activity   • Alcohol use: Yes     Comment: patient states rare use on holidays    • Drug use: No   • Sexual activity: Not Currently   Other Topics Concern   • Not on file   Social History Narrative    LIVING INDEPENDENTLY ALONE         No caffeine use     Social Determinants of Health     Financial Resource Strain: Low Risk  (4/19/2023)    Overall Financial Resource Strain (CARDIA)    • Difficulty of Paying Living Expenses: Not very hard   Food Insecurity: No Food Insecurity (8/2/2023)    Hunger Vital Sign    • Worried About Running Out of Food in the Last Year: Never true    • Ran Out of Food in the Last Year: Never true   Transportation Needs: No Transportation Needs (8/2/2023)    PRAPARE - Transportation    • Lack of Transportation (Medical): No    • Lack of Transportation (Non-Medical):  No   Physical Activity: Not on file   Stress: Not on file   Social Connections: Not on file   Intimate Partner Violence: Not on file   Housing Stability: Low Risk  (8/2/2023)    Housing Stability Vital Sign    • Unable to Pay for Housing in the Last Year: No    • Number of Places Lived in the Last Year: 1    • Unstable Housing in the Last Year: No       Current Outpatient Medications:   •  acetaminophen (TYLENOL) 325 mg tablet, Take 3 tablets (975 mg total) by mouth every 8 (eight) hours (Patient taking differently: Take 975 mg by mouth every 8 (eight) hours AS NEEDED), Disp: , Rfl: 0  •  Ascorbic Acid (VITAMIN C) 1000 MG tablet, Take 1,000 mg by mouth daily, Disp: , Rfl:   •  B Complex Vitamins (B COMPLEX 100 PO), Take 1 capsule by mouth daily after lunch, Disp: , Rfl:   •  calcium carbonate (OS-JOHN) 1250 (500 Ca) MG tablet, Take 1 tablet by mouth daily after lunch, Disp: , Rfl:   •  carbidopa-levodopa (SINEMET)  mg per tablet, Take 1 tablet by mouth 3 (three) times a day before meals, Disp: 270 tablet, Rfl: 3  •  cholecalciferol (VITAMIN D3) 1,000 units tablet, Take 5,000 Units by mouth daily after lunch, Disp: , Rfl:   •  Coenzyme Q10 (CO Q 10 PO), Take 1 capsule by mouth daily after lunch 600 mg BID, Disp: , Rfl:   •  Cyanocobalamin (VITAMIN B 12 PO), Take 1 capsule by mouth daily, Disp: , Rfl:   •  enoxaparin (LOVENOX) 40 mg/0.4 mL, Inject 0.4 mL (40 mg total) under the skin every 24 hours for 24 days Do not start before August 5, 2023., Disp: 9.6 mL, Rfl: 0  •  multivitamin (THERAGRAN) TABS, Take 1 tablet by mouth every morning, Disp: , Rfl:   •  Omega-3 350 MG CPDR, Take 1 capsule by mouth daily in the early morning, Disp: , Rfl:   •  pantoprazole (PROTONIX) 40 mg tablet, Take 1 tablet (40 mg total) by mouth daily, Disp: 90 tablet, Rfl: 3  •  Potassium Gluconate 595 (99 K) MG TABS, Take 1 each by mouth every other day, Disp: , Rfl:   •  Probiotic Product (PROBIOTIC-10) CAPS, Take 1 capsule by mouth daily after lunch, Disp: , Rfl:   •  senna-docusate sodium (SENOKOT S) 8.6-50 mg per tablet, Take 2 tablets by mouth 2 (two) times a day, Disp: , Rfl: 0  •  docusate sodium (COLACE) 100 mg capsule, Take 1 capsule (100 mg total) by mouth 2 (two) times a day for 10 days, Disp: 20 capsule, Rfl: 0  •  tiZANidine (ZANAFLEX) 2 mg tablet, Take 1 tablet (2 mg total) by mouth 3 (three) times a day for 7 days (Patient not taking: Reported on 8/17/2023), Disp: 21 tablet, Rfl: 0  Allergies   Allergen Reactions   • Caffeine - Food Allergy GI Intolerance   • Iodine - Food Allergy Rash   • Latex Rash   • Other Rash     Adhesive tape       Vitals:    08/17/23 1345   BP: 118/80   Pulse: 90   Resp: 15   Temp: (!) 97.1 °F (36.2 °C)   SpO2: 97%       Physical Exam  Constitutional: General appearance:  The Patient is well-developed and well-nourished who appears the stated age in no acute distress. Patient is pleasant and talkative. HEENT:  Normocephalic. Sclerae are anicteric. Mucous membranes are moist.     Abdomen: Abdomen is soft, non-tender, non-distended and without masses. Incision is C/D/I. Extremities: There is no clubbing or cyanosis. There is no edema. Symmetric. Neuro: Grossly nonfocal. Gait is normal.     Skin: Warm, anicteric. Psych:  Patient is pleasant and talkative. Breasts:        Pathology:  Final Diagnosis   A. Distal pancreas, spleen (distal pancreatectomy, splenectomy): - Synchronous intraductal papillary mucinous neoplasm, mixed gastric and pancreaticobiliary types (8mm, 4.5cm, 5.5cm) with focal high grade dysplasia   - Mucinous neoplasm present at resection margin, negative for high grade dysplasia   - Five (5) lymph nodes negative for carcinoma (0/5)  - No invasive carcinoma identified  - Spleen negative for carcinoma      Comment:  - The mucinous neoplasm at pancreas resection margin may represent IPMN vs low grade PanIN, favor IPMN. Pancreas margin is negative for high grade dysplasia. - DPC4 is intact with wild-type 53 and intact p16 expression in a focus of interest.  AZ, ER on multiple section of the largest cystic mass (5.5cm) show no staining. No ovarian stroma is appreciated in this 5.5 cm multiloculated mass. While this may represent a mucinous cystic neoplasm no ovarian stroma is noted such that is it classified as an IPMN.        Case signed out at [address] [facility], [address] [place] [address]      Electronically signed by Asha Aquino MD on 8/4/2023 at  8:40 AM         Labs:      Imaging  Stress strip    Result Date: 7/27/2023  Narrative: Confirmed by Zackary Tracy ([id number]),  Nuris Kidd ([id number]) on 3/45/5818 3:21:48 PM    NM myocardial perfusion spect (rx stress and/or rest)    Result Date: 7/27/2023  Narrative: •  Stress Function: Left ventricular function post-stress is normal. Stress ejection fraction is 86 %. •  Stress ECG: Arrhythmias during stress: frequent PACs, short run of SVT. The ECG was not diagnostic due to pharmacological (vasodilator) stress. •  Perfusion: There are no perfusion defects. •  Stress Combined Conclusion: The ECG and SPECT imaging portions of the stress study are concordant with no evidence of stress induced myocardial ischemia. I personally reviewed and interpreted the above laboratory and imaging data. Discussion/Summary: 77-year-old female status post distal pancreatectomy and splenectomy for an enlarging pancreatic cyst.  Pathology revealed multifocal IPMN. There was mucinous cyst without high-grade dysplasia at the margin. I recommended observation. She is already scheduled for follow-up MRI in December. I will see her back with that imaging. We will plan on repeating her CBC in another week to make sure her platelet count stays under 1 million. She is agreeable to this. All her questions were answered.

## 2023-08-23 ENCOUNTER — APPOINTMENT (OUTPATIENT)
Dept: LAB | Facility: CLINIC | Age: 83
End: 2023-08-23
Payer: COMMERCIAL

## 2023-08-23 DIAGNOSIS — D49.0 IPMN (INTRADUCTAL PAPILLARY MUCINOUS NEOPLASM): ICD-10-CM

## 2023-08-23 LAB
ERYTHROCYTE [DISTWIDTH] IN BLOOD BY AUTOMATED COUNT: 13.4 % (ref 11.6–15.1)
HCT VFR BLD AUTO: 40.9 % (ref 34.8–46.1)
HGB BLD-MCNC: 13.3 G/DL (ref 11.5–15.4)
MCH RBC QN AUTO: 29.9 PG (ref 26.8–34.3)
MCHC RBC AUTO-ENTMCNC: 32.5 G/DL (ref 31.4–37.4)
MCV RBC AUTO: 92 FL (ref 82–98)
PLATELET # BLD AUTO: 526 THOUSANDS/UL (ref 149–390)
PMV BLD AUTO: 10.3 FL (ref 8.9–12.7)
RBC # BLD AUTO: 4.45 MILLION/UL (ref 3.81–5.12)
WBC # BLD AUTO: 7.56 THOUSAND/UL (ref 4.31–10.16)

## 2023-08-23 PROCEDURE — 85027 COMPLETE CBC AUTOMATED: CPT

## 2023-08-23 PROCEDURE — 36415 COLL VENOUS BLD VENIPUNCTURE: CPT

## 2023-08-24 ENCOUNTER — OFFICE VISIT (OUTPATIENT)
Dept: CARDIOLOGY CLINIC | Facility: CLINIC | Age: 83
End: 2023-08-24
Payer: COMMERCIAL

## 2023-08-24 VITALS
OXYGEN SATURATION: 97 % | HEIGHT: 61 IN | RESPIRATION RATE: 16 BRPM | SYSTOLIC BLOOD PRESSURE: 124 MMHG | WEIGHT: 162 LBS | BODY MASS INDEX: 30.58 KG/M2 | DIASTOLIC BLOOD PRESSURE: 78 MMHG | HEART RATE: 92 BPM

## 2023-08-24 DIAGNOSIS — R06.02 SHORTNESS OF BREATH: Primary | ICD-10-CM

## 2023-08-24 DIAGNOSIS — I95.9 HYPOTENSION, UNSPECIFIED HYPOTENSION TYPE: ICD-10-CM

## 2023-08-24 PROCEDURE — 99213 OFFICE O/P EST LOW 20 MIN: CPT | Performed by: INTERNAL MEDICINE

## 2023-08-24 NOTE — PROGRESS NOTES
[facility] [place]  [id number] [facility]  [place] [address]  Cardiology Follow Up    Loree Valadez  1940  [id number]      1. Shortness of breath        2. Hypotension, unspecified hypotension type            Chief Complaint   Patient presents with   • Follow-up       Interval History: Patient presents for follow-up visit. Patient denies any history of chest pain shortness of breath. Patient denies any history of leg edema or orthopnea PND. No history of presyncope syncope. Patient states compliance with the present list of medications. Patient does have history of parkinsonism. Patient used to have tendency for low blood pressures but now much better. She keeps herself hydrated. Patient Active Problem List   Diagnosis   • OAB (overactive bladder)   • Parkinson's disease ([address])   • Primary insomnia   • History of skin cancer   • Seasonal allergic rhinitis   • Impaired fasting glucose   • Mood disturbance   • Obesity (BMI 30-39. 9)   • Claustrophobia   • Mitral valve disorder   • Menopause ovarian failure   • Vitamin D deficiency   • Dysphagia   • Multiple thyroid nodules   • Gastro-esophageal reflux disease without esophagitis   • Osteopenia   • Chronic idiopathic constipation   • History of adenomatous polyp of colon   • Tremor   • Trochanteric bursitis, right hip   • IPMN (intraductal papillary mucinous neoplasm)   • Cherry angioma   • Dyspnea on exertion   • Postablative hypothyroidism   • Sleep apnea   • Closed compression fracture of body of L1 vertebra (HCC)   • Cerebrovascular disease   • Age-related osteoporosis without current pathological fracture   • Urge incontinence   • Encounter for geriatric assessment   • s/p distal pancreatectomy/splenectomy     Past Medical History:   Diagnosis Date   • Actinic keratosis 03/11/2016   • Acute midline low back pain without sciatica 11/19/2020   • 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    • Benign neoplasm of skin    • Cancer [facility]     skin   • Chest pain    • Claustrophobia    • Diverticulitis    • Diverticulitis    • Fracture     L1-L2   • GERD (gastroesophageal reflux disease)    • H. pylori infection 2020   • Muscle weakness    • Nonmelanoma skin cancer     last assessed 2017   • Palpitations    • Pancreatic cyst    • Seasonal allergies    • Seborrheic keratosis 2014   • Sleep apnea     no cpap   • Spontaneous      without mention of complications    • Squamous cell carcinoma of forehead 2014   • Syncope      Social History     Socioeconomic History   • Marital status:      Spouse name: Not on file   • Number of children: Not on file   • Years of education: Not on file   • Highest education level: Not on file   Occupational History   • Occupation: RETIRED    Tobacco Use   • Smoking status: Never   • Smokeless tobacco: Never   Vaping Use   • Vaping Use: Never used   Substance and Sexual Activity   • Alcohol use: Yes     Comment: patient states rare use on holidays    • Drug use: No   • Sexual activity: Not Currently   Other Topics Concern   • Not on file   Social History Narrative    LIVING INDEPENDENTLY ALONE         No caffeine use     Social Determinants of Health     Financial Resource Strain: Low Risk  (2023)    Overall Financial Resource Strain (CARDIA)    • Difficulty of Paying Living Expenses: Not very hard   Food Insecurity: No Food Insecurity (2023)    Hunger Vital Sign    • Worried About Running Out of Food in the Last Year: Never true    • Ran Out of Food in the Last Year: Never true   Transportation Needs: No Transportation Needs (2023)    PRAPARE - Transportation    • Lack of Transportation (Medical): No    • Lack of Transportation (Non-Medical):  No   Physical Activity: Not on file   Stress: Not on file   Social Connections: Not on file   Intimate Partner Violence: Not on file   Housing Stability: Low Risk  (2023)    Housing Stability Vital Sign    • Unable to Pay for Housing in the Last Year: No    • Number of Places Lived in the Last Year: 1    • Unstable Housing in the Last Year: No      Family History   Problem Relation Age of Onset   • Alcohol abuse Mother    • Heart disease Mother    • Alcohol abuse Father    • Alcohol abuse Brother    • Cancer Brother    • Tremor Neg Hx    • Parkinsonism Neg Hx    • Colon cancer Neg Hx      Past Surgical History:   Procedure Laterality Date   • ABDOMINAL ADHESION SURGERY N/A 7/31/2023    Procedure: LYSIS ADHESIONS;  Surgeon: Ady Castro MD;  [facility];  Service: Surgical Oncology   • BOTOX INJECTION N/A 3/6/2017    Procedure: CYSTOSCOPY; BLADDER BOTOX 100 UNITS ;  Surgeon: Robin Calloway MD;  Location[facility];  Service:    • BOWEL RESECTION      related ot diverticulitis   • CARDIAC CATHETERIZATION     • CARPAL TUNNEL RELEASE Right    • COLONOSCOPY  07/31/2019   • COLOSTOMY     • COLOSTOMY CLOSURE     • CYSTOSCOPY  06/01/2021   • DISTAL PANCREATECTOMY N/A 7/31/2023    Procedure: DISTAL PANCREATECTOMY;  Surgeon: Ady Castro MD;  [facility];  Service: Surgical Oncology   • FOOT SURGERY Left     neuroma   • HYSTERECTOMY     • NASAL SINUS SURGERY  age 35    [place]   • RI CYSTOURETHROSCOPY N/A 4/13/2018    Procedure: CYSTOSCOPY WITH BOTOX;  Surgeon: Robin Calloway MD;  [facility];  Service: Urology   • RI ESOPHAGOGASTRODUODENOSCOPY TRANSORAL DIAGNOSTIC N/A 4/23/2019    Procedure: ESOPHAGOGASTRODUODENOSCOPY (EGD); Surgeon: Ruddy Clark DO;  Location: [facility];   Service: Gastroenterology   • SPLENECTOMY, TOTAL N/A 7/31/2023    Procedure: SPLENECTOMY;  Surgeon: Ady Castro MD;  [facility];  Service: Surgical Oncology   • TONSILLECTOMY  age 47   • UPPER GASTROINTESTINAL ENDOSCOPY  04/23/2019       Current Outpatient Medications:   •  Ascorbic Acid (VITAMIN C) 1000 MG tablet, Take 1,000 mg by mouth daily, Disp: , Rfl:   •  B Complex Vitamins (B COMPLEX 100 PO), Take 1 capsule by mouth daily after lunch, Disp: , Rfl:   •  calcium carbonate (OS-JOHN) 1250 (500 Ca) MG tablet, Take 1 tablet by mouth daily after lunch, Disp: , Rfl:   •  carbidopa-levodopa (SINEMET)  mg per tablet, Take 1 tablet by mouth 3 (three) times a day before meals, Disp: 270 tablet, Rfl: 3  •  cholecalciferol (VITAMIN D3) 1,000 units tablet, Take 5,000 Units by mouth daily after lunch, Disp: , Rfl:   •  Coenzyme Q10 (CO Q 10 PO), Take 1 capsule by mouth daily after lunch 600 mg BID, Disp: , Rfl:   •  Cyanocobalamin (VITAMIN B 12 PO), Take 1 capsule by mouth daily, Disp: , Rfl:   •  docusate sodium (COLACE) 100 mg capsule, Take 1 capsule (100 mg total) by mouth 2 (two) times a day for 10 days, Disp: 20 capsule, Rfl: 0  •  multivitamin (THERAGRAN) TABS, Take 1 tablet by mouth every morning, Disp: , Rfl:   •  Omega-3 350 MG CPDR, Take 1 capsule by mouth daily in the early morning, Disp: , Rfl:   •  pantoprazole (PROTONIX) 40 mg tablet, Take 1 tablet (40 mg total) by mouth daily, Disp: 90 tablet, Rfl: 3  •  Potassium Gluconate 595 (99 K) MG TABS, Take 1 each by mouth every other day, Disp: , Rfl:   •  Probiotic Product (PROBIOTIC-10) CAPS, Take 1 capsule by mouth daily after lunch, Disp: , Rfl:   •  senna-docusate sodium (SENOKOT S) 8.6-50 mg per tablet, Take 2 tablets by mouth 2 (two) times a day, Disp: , Rfl: 0  Allergies   Allergen Reactions   • Caffeine - Food Allergy GI Intolerance   • Iodine - Food Allergy Rash   • Latex Rash   • Other Rash     Adhesive tape         Labs:  Appointment on 08/23/2023   Component Date Value   • WBC 08/23/2023 7.56    • RBC 08/23/2023 4.45    • Hemoglobin 08/23/2023 13.3    • Hematocrit 08/23/2023 40.9    • MCV 08/23/2023 92    • MCH 08/23/2023 29.9    • MCHC 08/23/2023 32.5    • RDW 08/23/2023 13.4    • Platelets 23/41/2361 526 (H)    • MPV 08/23/2023 10.3    Clinical Support on 08/08/2023   Component Date Value   • Amylase, Fluid 08/09/2023 16    Appointment on 08/07/2023   Component Date Value   • WBC 08/07/2023 9.43    • RBC 08/07/2023 4.27    • Hemoglobin 08/07/2023 12.7    • Hematocrit 08/07/2023 38.7    • MCV 08/07/2023 91    • MCH 08/07/2023 29.7    • MCHC 08/07/2023 32.8    • RDW 08/07/2023 13.4    • Platelets 17/37/8219 445 (H)    • MPV 08/07/2023 11.1    • Amylase 08/07/2023 26    Admission on 07/31/2023, Discharged on 08/04/2023   Component Date Value   • Unit Product Code 08/01/2023 N8172X72    • Unit Number 08/01/2023 [id number]    • Unit ABO 08/01/2023 O    • Unit [facility] 08/01/2023 POS    • Crossmatch 08/01/2023 Compatible    • Unit Dispense Status 08/01/2023 Return to Inv    • Unit Product Volume 08/01/2023 350    • Unit Product Code 08/01/2023 [id number]    • Unit Number 08/01/2023 [id number]    • Unit ABO 08/01/2023 O    • Unit [facility] 08/01/2023 POS    • Crossmatch 08/01/2023 Compatible    • Unit Dispense Status 08/01/2023 Return to Inv    • Unit Product Volume 08/01/2023 350    • ABO Grouping 07/31/2023 O    • Rh Factor 07/31/2023 Positive    • Case Report 07/31/2023                      Value:Surgical Pathology Report                         Case: [id number]                                   Authorizing Provider:  Danyell Johnson MD           Collected:           07/31/2023 1328              Ordering Location:     [facility]      Received:            07/31/2023 [address] [place] [address] Operating Room                                                      Pathologist:           Fransisco Romo MD                                                         Specimen:    Pancreas, DISTAL PANCREAS                                                                 • Final Diagnosis 07/31/2023                      Value: This result contains rich text formatting which cannot be displayed here. • Additional Information 07/31/2023                      Value: This result contains rich text formatting which cannot be displayed here.    • Gross Description 07/31/2023                      Value: This result contains rich text formatting which cannot be displayed here. • Clinical Information 07/31/2023                      Value:MRI  LIVER:  Normal in size and configuration. No suspicious mass. The hepatic veins and portal veins are patent.        BILE DUCTS:  No intrahepatic or extrahepatic bile duct dilation. Common bile duct is normal in caliber. No choledocholithiasis, biliary stricture or suspicious mass.     GALLBLADDER: Multiple small calculi in the gallbladder.     PANCREAS: Pancreas divisum. Stable mild dilation of the main pancreatic duct measuring up to 0.3 cm in the pancreatic body. Again seen are multiple cystic lesions throughout the pancreas with the largest lesion measuring up to 5.5 cm in the pancreatic   tail (series 8 image 17), previously measuring 5.3 cm on 12/5/2022, 5.0 cm on 5/18/2022 and 4.5 cm on 8/24/2020. Again seen are multiple adjacent smaller simple and septated cystic lesions. Partial fatty replacement of the pancreatic parenchyma.      • pH, Art i-STAT 07/31/2023 7. 414    • pCO2, Art i-STAT 07/31/2023 36.6    • pO2, ART i-STAT 07/31/2023 258.0 (H)    • BE, i-STAT 07/31/2023 -1    • HCO3, Art i-STAT 07/31/2023 23.5    • CO2, i-STAT 07/31/2023 25    • O2 Sat, i-STAT 07/31/2023 100 (H)    • SODIUM, I-STAT 07/31/2023 141    • Potassium, i-STAT 07/31/2023 3.4 (L)    • Calcium, Ionized i-STAT 07/31/2023 1.17    • Hct, i-STAT 07/31/2023 35    • Hgb, i-STAT 07/31/2023 11.9    • Glucose, i-STAT 07/31/2023 139    • Specimen Type 07/31/2023 ARTERIAL    • Sodium 08/01/2023 141    • Potassium 08/01/2023 4.2    • Chloride 08/01/2023 114 (H)    • CO2 08/01/2023 25    • ANION GAP 08/01/2023 2    • BUN 08/01/2023 13    • Creatinine 08/01/2023 0.77    • Glucose 08/01/2023 179 (H)    • Calcium 08/01/2023 8.9    • eGFR 08/01/2023 71    • WBC 08/01/2023 12.45 (H)    • RBC 08/01/2023 3.81    • Hemoglobin 08/01/2023 11.6    • Hematocrit 08/01/2023 33.9 (L)    • MCV 08/01/2023 89    • MCH 08/01/2023 30.4    • MCHC 08/01/2023 34.2    • RDW 08/01/2023 13.4    • MPV 08/01/2023 9.8    • Platelets 83/67/9856 190    • nRBC 08/01/2023 0    • Neutrophils Relative 08/01/2023 82 (H)    • Immat GRANS % 08/01/2023 0    • Lymphocytes Relative 08/01/2023 7 (L)    • Monocytes Relative 08/01/2023 11    • Eosinophils Relative 08/01/2023 0    • Basophils Relative 08/01/2023 0    • Neutrophils Absolute 08/01/2023 10.16 (H)    • Immature Grans Absolute 08/01/2023 0.04    • Lymphocytes Absolute 08/01/2023 0.85    • Monocytes Absolute 08/01/2023 1.39 (H)    • Eosinophils Absolute 08/01/2023 0.00    • Basophils Absolute 08/01/2023 0.01    • Magnesium 08/01/2023 1.9    • Phosphorus 08/01/2023 3.0    • WBC 08/02/2023 22.48 (H)    • RBC 08/02/2023 3.71 (L)    • Hemoglobin 08/02/2023 11.2 (L)    • Hematocrit 08/02/2023 33.4 (L)    • MCV 08/02/2023 90    • MCH 08/02/2023 30.2    • MCHC 08/02/2023 33.5    • RDW 08/02/2023 13.8    • Platelets [birth year]/15/1980 187    • MPV 08/02/2023 10.0    • Sodium 08/02/2023 143    • Potassium 08/02/2023 3.7    • Chloride 08/02/2023 112 (H)    • CO2 08/02/2023 27    • ANION GAP 08/02/2023 4    • BUN 08/02/2023 12    • Creatinine 08/02/2023 0.75    • Glucose 08/02/2023 157 (H)    • Calcium 08/02/2023 8.5    • eGFR 08/02/2023 73    • Amylase, Fluid 08/02/2023 418    • Amylase 08/01/2023 95    • Sodium 08/02/2023 140    • Potassium 08/02/2023 3.9    • Chloride 08/02/2023 110 (H)    • CO2 08/02/2023 26    • ANION GAP 08/02/2023 4    • BUN 08/02/2023 11    • Creatinine 08/02/2023 0.81    • Glucose 08/02/2023 140    • Calcium 08/02/2023 8.7    • eGFR 08/02/2023 66    • Magnesium 08/02/2023 2.1    • WBC 08/03/2023 17.20 (H)    • RBC 08/03/2023 3.48 (L)    • Hemoglobin 08/03/2023 10.5 (L)    • Hematocrit 08/03/2023 31.9 (L)    • MCV 08/03/2023 92    • MCH 08/03/2023 30.2    • MCHC 08/03/2023 32.9    • RDW 08/03/2023 13.8    • MPV 08/03/2023 9.8    • Platelets 90/69/5919 192    • nRBC 08/03/2023 0    • Neutrophils Relative 08/03/2023 76 (H)    • Immat GRANS % 08/03/2023 0    • Lymphocytes Relative 08/03/2023 15    • Monocytes Relative 08/03/2023 9    • Eosinophils Relative 08/03/2023 0    • Basophils Relative 08/03/2023 0    • Neutrophils Absolute 08/03/2023 12.89 (H)    • Immature Grans Absolute 08/03/2023 0.07    • Lymphocytes Absolute 08/03/2023 2.58    • Monocytes Absolute 08/03/2023 1.55 (H)    • Eosinophils Absolute 08/03/2023 0.07    • Basophils Absolute 08/03/2023 0.04    • Sodium 08/03/2023 138    • Potassium 08/03/2023 3.9    • Chloride 08/03/2023 111 (H)    • CO2 08/03/2023 25    • ANION GAP 08/03/2023 2    • BUN 08/03/2023 12    • Creatinine 08/03/2023 0.75    • Glucose 08/03/2023 113    • Calcium 08/03/2023 8.8    • eGFR 08/03/2023 73    • Ventricular Rate 08/02/2023 109    • Atrial Rate 08/02/2023 109    • MI Interval 08/02/2023 132    • QRSD Interval 08/02/2023 74    • QT Interval 08/02/2023 332    • QTC Interval 08/02/2023 447    • P Axis 08/02/2023 43    • QRS Axis 08/02/2023 7    • T Wave Axis 08/02/2023 78    • Amylase, Fluid 08/04/2023 59    • Amylase 08/04/2023 18 (L)    Hospital Outpatient Visit on 07/27/2023   Component Date Value   • Rest Nuclear Isotope Dose 07/27/2023 10.70    • Stress Nuclear Isotope D* 07/27/2023 32.40    • Baseline HR 07/27/2023 67    • Baseline BP 07/27/2023 164/86    • O2 sat rest 07/27/2023 94    • Stress peak HR 07/27/2023 113    • Post peak BP 07/27/2023 134    • Rate Pressure Product 07/27/2023 15,142.0    • O2 sat peak 07/27/2023 96    • Recovery HR 07/27/2023 103    • Recovery BP 07/27/2023 122/66    • O2 sat recovery 07/27/2023 100    • Angina Index 07/27/2023 0    • EF (%) 07/27/2023 86    • Protocol Name 07/27/2023 KAREN- WALK    • Time In Exercise Phase 07/27/2023 00:03:00    • MAX.  SYSTOLIC BP 33/86/9580 635    • Max Diastolic Bp 91/43/5959 86    • Max Heart Rate 07/27/2023 113    • Max Predicted Heart Rate 07/27/2023 136    • Reason for Termination 07/27/2023 Protocol Complete    • Test Indication 07/27/2023 SOB    • Target Hr Formular 07/27/2023 (220 - Age)*85%    • Chest Pain Statement 07/27/2023 none    Lab Requisition on 07/17/2023   Component Date Value   • ABO Grouping 07/17/2023 O    • Rh Factor 07/17/2023 Positive    • Antibody Screen 07/17/2023 Negative    • Specimen Expiration Date 07/17/2023 [id number]    Office Visit on 07/17/2023   Component Date Value   • Ventricular Rate 07/17/2023 84    • Atrial Rate 07/17/2023 84    • NE Interval 07/17/2023 124    • QRSD Interval 07/17/2023 86    • QT Interval 07/17/2023 408    • QTC Interval 07/17/2023 482    • P Axis 07/17/2023 58    • QRS Axis 07/17/2023 7    • T Wave Axis 07/17/2023 57    Appointment on 07/17/2023   Component Date Value   • Sodium 07/17/2023 140    • Potassium 07/17/2023 4.0    • Chloride 07/17/2023 105    • CO2 07/17/2023 29    • ANION GAP 07/17/2023 6    • BUN 07/17/2023 17    • Creatinine 07/17/2023 0.83    • Glucose, Fasting 07/17/2023 106 (H)    • Calcium 07/17/2023 9.7    • AST 07/17/2023 18    • ALT 07/17/2023 3 (L)    • Alkaline Phosphatase 07/17/2023 77    • Total Protein 07/17/2023 7.3    • Albumin 07/17/2023 4.6    • Total Bilirubin 07/17/2023 0.62    • eGFR 07/17/2023 64    • WBC 07/17/2023 6.06    • RBC 07/17/2023 4.76    • Hemoglobin 07/17/2023 14.0    • Hematocrit 07/17/2023 42.4    • MCV 07/17/2023 89    • MCH 07/17/2023 29.4    • MCHC 07/17/2023 33.0    • RDW 07/17/2023 13.1    • MPV 07/17/2023 9.2    • Platelets 16/05/8672 228    • nRBC 07/17/2023 0    • Neutrophils Relative 07/17/2023 58    • Immat GRANS % 07/17/2023 0    • Lymphocytes Relative 07/17/2023 34    • Monocytes Relative 07/17/2023 7    • Eosinophils Relative 07/17/2023 1    • Basophils Relative 07/17/2023 0    • Neutrophils Absolute 07/17/2023 3.50    • Immature Grans Absolute 07/17/2023 0.02    • Lymphocytes Absolute 07/17/2023 2.04    • Monocytes Absolute 07/17/2023 0.42    • Eosinophils Absolute 07/17/2023 0.06    • Basophils Absolute 07/17/2023 0.02    • PTT 07/17/2023 30    • Protime 07/17/2023 13.0    • INR 07/17/2023 1.00    • Hemoglobin A1C 07/17/2023 5.8 (H)    • EAG 07/17/2023 120      Imaging: Stress strip    Result Date: 7/27/2023  Narrative: Confirmed by Luis Felix ([id number]),  Gertrudis Holland ([phone] on 3/54/7409 3:21:48 PM    NM myocardial perfusion spect (rx stress and/or rest)    Result Date: 7/27/2023  Narrative: •  Stress Function: Left ventricular function post-stress is normal. Stress ejection fraction is 86 %. •  Stress ECG: Arrhythmias during stress: frequent PACs, short run of SVT. The ECG was not diagnostic due to pharmacological (vasodilator) stress. •  Perfusion: There are no perfusion defects. •  Stress Combined Conclusion: The ECG and SPECT imaging portions of the stress study are concordant with no evidence of stress induced myocardial ischemia. Review of Systems:  Review of Systems   REVIEW OF SYSTEMS:  Constitutional:  Denies fever or chills   Eyes:  Denies change in visual acuity   HENT:  Denies nasal congestion or sore throat   Respiratory:  Denies cough or shortness of breath   Cardiovascular:  Denies chest pain or edema   GI:  Denies abdominal pain, nausea, vomiting, bloody stools or diarrhea   :  Denies dysuria, frequency, difficulty in micturition and nocturia  Musculoskeletal:  Denies back pain or joint pain   Neurologic: Parkinsonism  Endocrine:  Denies polyuria or polydipsia   Lymphatic:  Denies swollen glands   Psychiatric:  Denies depression or anxiety    Physical Exam:    /78 (BP Location: Left arm, Patient Position: Sitting, Cuff Size: Standard)   Pulse 92   Resp 16   Ht 5' 1" (1.549 m)   Wt 73.5 kg (162 lb)   SpO2 97%   BMI 30.61 kg/m²     Physical Exam   PHYSICAL EXAM:  General:  Patient is not in acute distress   Head: Normocephalic, Atraumatic.   HEENT:  Both pupils normal-size atraumatic, normocephalic, nonicteric  Neck:  JVP not raised. Trachea central. No carotid bruit  Respiratory:  normal breath sounds no crackles. no rhonchi  Cardiovascular:  Regular rate and rhythm no S3 no murmurs  GI:  Abdomen soft nontender. No organomegaly. Lymphatic:  No cervical or inguinal lymphadenopathy  Neurologic:  Patient is awake alert, oriented . Grossly nonfocal  Tremors consistent with parkinsonism  Extremities no edema    Previous cardiovascular studies reviewed. Discussion/Summary:  Patient with multiple medical problems who seems to be doing reasonably well from cardiac standpoint. Previous studies reviewed with patient. Medications reviewed and possible side effects discussed. concepts of cardiovascular disease , signs and symptoms of heart disease. Dietary and risk factor modification reinforced. All questions answered. Safety measures reviewed. Patient advised to report any problems prompting medical attention. Symptoms to watch her from cardiac standpoint which would indicate the need for further cardiac evaluation discussed. Importance of adequate hydration reinforced. I reviewed the results of echocardiogram as well as stress test with the patient. Follow-up in 6 to 8 months or earlier as needed.

## 2023-08-29 ENCOUNTER — TELEPHONE (OUTPATIENT)
Dept: SURGICAL ONCOLOGY | Facility: CLINIC | Age: 83
End: 2023-08-29

## 2023-08-29 NOTE — TELEPHONE ENCOUNTER
Called the patient at the request of Dr. Carlos Chun to discuss her elevated platelet levels. Explained that her platelets are just over 500 and that Dr. Carlos Chun recommended to follow up as previously scheduled without any additional interventions. The patient verbalized understanding of this and is agreeable with the plan. She denied any questions at this time.

## 2023-10-03 ENCOUNTER — OFFICE VISIT (OUTPATIENT)
Dept: NEUROLOGY | Facility: CLINIC | Age: 83
End: 2023-10-03
Payer: COMMERCIAL

## 2023-10-03 VITALS
HEART RATE: 76 BPM | DIASTOLIC BLOOD PRESSURE: 70 MMHG | BODY MASS INDEX: 31.68 KG/M2 | WEIGHT: 167.8 LBS | SYSTOLIC BLOOD PRESSURE: 120 MMHG | HEIGHT: 61 IN

## 2023-10-03 DIAGNOSIS — G20.A1 PARKINSON'S DISEASE: Primary | ICD-10-CM

## 2023-10-03 DIAGNOSIS — R73.01 IMPAIRED FASTING GLUCOSE: ICD-10-CM

## 2023-10-03 DIAGNOSIS — R25.1 TREMOR: ICD-10-CM

## 2023-10-03 PROCEDURE — 99214 OFFICE O/P EST MOD 30 MIN: CPT | Performed by: PSYCHIATRY & NEUROLOGY

## 2023-10-03 NOTE — PROGRESS NOTES
Johny Cleveland is a 79 y.o. female. Chief Complaint   Patient presents with   • Parkinson's Disease       Assessment:  1. Parkinson's disease    2. Tremor    3. Impaired fasting glucose        Plan:  Continue with Sinemet 25/100 mg 1 tablet 3 times daily. For her Parkinson's tremor as medication seems to be helping her and she is not experiencing any side effects. Continue with staying physically and mentally active. To take a multivitamin every day. She was advised to continue follow-up with her surgical oncologist status post splenectomy and pancreatic surgery. Sleep hygiene was discussed with the patient, continue monitoring her blood work with her family physician    To take fall and safety precautions. To go to the hospital if has any worsening symptoms or strokelike symptoms and call us otherwise to see me back in 6 months or sooner if needed and follow-up with her other physicians        Subjective:    HPI   Patient is here in follow-up for history of Parkinson's disease, she is on Sinemet 25/100 mg 1 tablet 3 times a day and tells me that overall she is doing good her tremor is well controlled she denies having any difficulty in swallowing no dizziness or side effects to the Sinemet she gets up slowly her sleep is slightly disturbed but overall she is doing good no hallucinations memory seems to be stable she lives by herself she is able to drive and manage her finances no falls no early morning stiffness no freezing episodes she recently had a pancreatic and splenectomy surgery with Dr. Rivka White and is in follow-up with him, denies having any headaches no vision or speech difficulty her weight has been stable appetite is good, does complain of some fatigability.  no other complaints    Vitals:    10/03/23 0900   BP: 120/70   BP Location: Right arm   Patient Position: Sitting   Cuff Size: Large   Pulse: 76   Weight: 76.1 kg (167 lb 12.8 oz)   Height: 5' 1" (1.549 m)       Current Medications    Current Outpatient Medications:   •  Ascorbic Acid (VITAMIN C) 1000 MG tablet, Take 1,000 mg by mouth daily, Disp: , Rfl:   •  B Complex Vitamins (B COMPLEX 100 PO), Take 1 capsule by mouth daily after lunch, Disp: , Rfl:   •  calcium carbonate (OS-JOHN) 1250 (500 Ca) MG tablet, Take 1 tablet by mouth daily after lunch, Disp: , Rfl:   •  carbidopa-levodopa (SINEMET)  mg per tablet, Take 1 tablet by mouth 3 (three) times a day before meals, Disp: 270 tablet, Rfl: 3  •  cholecalciferol (VITAMIN D3) 1,000 units tablet, Take 5,000 Units by mouth daily after lunch, Disp: , Rfl:   •  Coenzyme Q10 (CO Q 10 PO), Take 1 capsule by mouth daily after lunch 600 mg BID, Disp: , Rfl:   •  Cyanocobalamin (VITAMIN B 12 PO), Take 1 capsule by mouth daily, Disp: , Rfl:   •  docusate sodium (COLACE) 100 mg capsule, Take 1 capsule (100 mg total) by mouth 2 (two) times a day for 10 days, Disp: 20 capsule, Rfl: 0  •  multivitamin (THERAGRAN) TABS, Take 1 tablet by mouth every morning, Disp: , Rfl:   •  Omega-3 350 MG CPDR, Take 1 capsule by mouth daily in the early morning, Disp: , Rfl:   •  pantoprazole (PROTONIX) 40 mg tablet, Take 1 tablet (40 mg total) by mouth daily, Disp: 90 tablet, Rfl: 3  •  Potassium Gluconate 595 (99 K) MG TABS, Take 1 each by mouth every other day, Disp: , Rfl:   •  Probiotic Product (PROBIOTIC-10) CAPS, Take 1 capsule by mouth daily after lunch, Disp: , Rfl:   •  senna-docusate sodium (SENOKOT S) 8.6-50 mg per tablet, Take 2 tablets by mouth 2 (two) times a day, Disp: , Rfl: 0      Allergies  Caffeine - food allergy, Iodine - food allergy, Latex, and Other    Past Medical History  Past Medical History:   Diagnosis Date   • Actinic keratosis 03/11/2016   • Acute midline low back pain without sciatica 11/19/2020   • 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    • Benign neoplasm of skin    • Cancer (HCC)     skin   • Chest pain    • Claustrophobia    • Diverticulitis • Diverticulitis    • Fracture     L1-L2   • GERD (gastroesophageal reflux disease)    • H. pylori infection 2020   • Muscle weakness    • Nonmelanoma skin cancer     last assessed 2017   • Palpitations    • Pancreatic cyst    • Seasonal allergies    • Seborrheic keratosis 2014   • Sleep apnea     no cpap   • Spontaneous      without mention of complications    • Squamous cell carcinoma of forehead 2014   • Syncope          Past Surgical History:  Past Surgical History:   Procedure Laterality Date   • ABDOMINAL ADHESION SURGERY N/A 2023    Procedure: LYSIS ADHESIONS;  Surgeon: Jerome Reyes MD;  [facility];  Service: Surgical Oncology   • BOTOX INJECTION N/A 2017    Procedure: CYSTOSCOPY; BLADDER BOTOX 100 UNITS ;  Surgeon: David Limon MD;  [facility];  Service:    • BOWEL RESECTION      related ot diverticulitis   • CARDIAC CATHETERIZATION     • CARPAL TUNNEL RELEASE Right    • COLONOSCOPY  2019   • COLOSTOMY     • COLOSTOMY CLOSURE     • CYSTOSCOPY  2021   • DISTAL PANCREATECTOMY N/A 2023    Procedure: DISTAL PANCREATECTOMY;  Surgeon: Jerome Reyes MD;  [facility];  Service: Surgical Oncology   • FOOT SURGERY Left     neuroma   • HYSTERECTOMY     • NASAL SINUS SURGERY  age 35    [place]   • PANCREATIC CYST BIOPSY  2023   • ND CYSTOURETHROSCOPY N/A 2018    Procedure: CYSTOSCOPY WITH BOTOX;  Surgeon: David Limon MD;  [facility];  Service: Urology   • ND ESOPHAGOGASTRODUODENOSCOPY TRANSORAL DIAGNOSTIC N/A 2019    Procedure: ESOPHAGOGASTRODUODENOSCOPY (EGD); Surgeon: Fredy Young DO;  [facility];   Service: Gastroenterology   • SPLENECTOMY, TOTAL N/A 2023    Procedure: SPLENECTOMY;  Surgeon: Jerome Reyes MD;  [facility];  Service: Surgical Oncology   • TONSILLECTOMY  age 47   • UPPER GASTROINTESTINAL ENDOSCOPY  2019         Family History:  Family History   Problem Relation Age of Onset   • Alcohol abuse Mother    • Heart disease Mother    • Alcohol abuse Father    • Alcohol abuse Brother    • Cancer Brother    • Tremor Neg Hx    • Parkinsonism Neg Hx    • Colon cancer Neg Hx        Social History:   reports that she has never smoked. She has never used smokeless tobacco. She reports current alcohol use. She reports that she does not use drugs. I have reviewed the past medical history, surgical history, social and family history, current medications, allergies vitals, review of systems, and updated this information as appropriate today. Objective:    Physical Exam    Neurological Exam     GENERAL:  Cooperative in no acute distress. Well-developed and well-nourished     HEAD and NECK   Head is atraumatic normocephalic with no lesions or masses. Neck is supple with full range of motion     CARDIOVASCULAR  Carotid Arteries-no carotid bruits. NEUROLOGIC:  Mental Status-the patient is awake alert and oriented without aphasia or apraxia  Cranial Nerves: Visual fields are full to confrontation. Visual acuity is 20/20 with hand-held chart extraocular movements are full without nystagmus. Pupils are 2-1/2 mm and reactive. Face is symmetrical to light touch. Movements of facial expression move symmetrically. Hearing is normal to finger rub bilaterally. Soft palate lifts symmetrically. Shoulder shrug is symmetrical. Tongue is midline without atrophy. Motor: No drift is noted on arm extension. Strength is full in the upper and lower extremities with normal bulk and mild cogwheeling rigidity,  Gait reveals a slightly stooped posture and decreased arm swing. She is able to get up from a chair without any support, negative pull test.    ROS:  Review of Systems   Constitutional: Negative for appetite change, fatigue and fever. HENT: Negative. Negative for hearing loss, tinnitus, trouble swallowing and voice change. Eyes: Negative.   Negative for photophobia, pain and visual disturbance. Respiratory: Negative. Negative for shortness of breath. Cardiovascular: Negative. Negative for palpitations. Gastrointestinal: Negative. Negative for nausea and vomiting. Endocrine: Negative. Negative for cold intolerance. Genitourinary: Negative. Negative for dysuria, frequency and urgency. Musculoskeletal: Negative for back pain, gait problem, myalgias and neck pain. Skin: Negative. Negative for rash. Allergic/Immunologic: Negative. Neurological: Negative. Negative for dizziness, tremors, seizures, syncope, facial asymmetry, speech difficulty, weakness, light-headedness, numbness and headaches. Hematological: Negative. Does not bruise/bleed easily. Psychiatric/Behavioral: Negative. Negative for confusion, hallucinations and sleep disturbance.

## 2023-10-12 NOTE — PROGRESS NOTES
10/13/2023      Chief Complaint   Patient presents with    Follow-up     PT Urinated 20-30 min PTV         Assessment and Plan    79 y.o. female     1. Urge urinary incontinence  2. Overactive bladder  - Continues to use Myrbetriq 50 mg daily, refills sent  - PVR today demonstrating adequate bladder emptying  - Denies infections  - Follow up as needed. PCP to continue Myrbetriq refills  - Call with any questions or concerns in the meantime  - All questions answered; patient understands and agrees with plan       History of Present Illness  Evangelina Philippe is a 79 y.o. female patient with history of urge urinary incontinence and overactive bladder here for follow up. Patient doing well at this time. Underwent bladder botox 10/21/21 and has been doing well since. Continues to take Myrbetriq 50 mg daily with benefit, denies side effects. Previously failed PTNS. Denies new or worsening symptoms today. Review of Systems   Constitutional:  Negative for activity change, appetite change, chills and fever. HENT:  Negative for congestion and trouble swallowing. Respiratory:  Negative for cough and shortness of breath. Cardiovascular:  Negative for chest pain, palpitations and leg swelling. Gastrointestinal:  Negative for abdominal pain, constipation, diarrhea, nausea and vomiting. Genitourinary:  Negative for difficulty urinating, dysuria, flank pain, frequency, hematuria and urgency. Musculoskeletal:  Negative for back pain and gait problem. Skin:  Negative for wound. Allergic/Immunologic: Negative for immunocompromised state. Neurological:  Negative for dizziness and syncope. Hematological:  Does not bruise/bleed easily. Psychiatric/Behavioral:  Negative for confusion. All other systems reviewed and are negative.        Vitals  Vitals:    10/13/23 1025   BP: 146/88   Pulse: 95   SpO2: 97%   Weight: 76.2 kg (168 lb)   Height: 5' 1" (1.549 m)       Physical Exam  Constitutional: General: She is not in acute distress. Appearance: Normal appearance. She is not ill-appearing, toxic-appearing or diaphoretic. HENT:      Head: Normocephalic. Nose: No congestion. Eyes:      General: No scleral icterus. Right eye: No discharge. Left eye: No discharge. Conjunctiva/sclera: Conjunctivae normal.      Pupils: Pupils are equal, round, and reactive to light. Pulmonary:      Effort: Pulmonary effort is normal.   Musculoskeletal:      Cervical back: Normal range of motion. Skin:     General: Skin is warm and dry. Coloration: Skin is not jaundiced or pale. Findings: No bruising, erythema, lesion or rash. Neurological:      General: No focal deficit present. Mental Status: She is alert and oriented to person, place, and time. Mental status is at baseline. Gait: Gait normal.   Psychiatric:         Mood and Affect: Mood normal.         Behavior: Behavior normal.         Thought Content: Thought content normal.         Judgment: Judgment normal.           Past History  Past Medical History:   Diagnosis Date    Actinic keratosis 2016    Acute midline low back pain without sciatica 2020    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     Benign neoplasm of skin     Cancer (HCC)     skin    Chest pain     Claustrophobia     Diverticulitis     Diverticulitis     Fracture     L1-L2    GERD (gastroesophageal reflux disease)     H. pylori infection 2020    Muscle weakness     Nonmelanoma skin cancer     last assessed 2017    Palpitations     Pancreatic cyst     Seasonal allergies     Seborrheic keratosis 2014    Sleep apnea     no cpap    Spontaneous      without mention of complications     Squamous cell carcinoma of forehead 2014    Syncope      Social History     Socioeconomic History    Marital status:       Spouse name: None    Number of children: None    Years of education: None Highest education level: None   Occupational History    Occupation: RETIRED    Tobacco Use    Smoking status: Never     Passive exposure: Past    Smokeless tobacco: Never   Vaping Use    Vaping Use: Never used   Substance and Sexual Activity    Alcohol use: Yes     Comment: patient states rare use on holidays     Drug use: No    Sexual activity: Not Currently   Other Topics Concern    None   Social History Narrative    LIVING INDEPENDENTLY ALONE         No caffeine use     Social Determinants of Health     Financial Resource Strain: Low Risk  (4/19/2023)    Overall Financial Resource Strain (CARDIA)     Difficulty of Paying Living Expenses: Not very hard   Food Insecurity: No Food Insecurity (8/2/2023)    Hunger Vital Sign     Worried About Running Out of Food in the Last Year: Never true     Ran Out of Food in the Last Year: Never true   Transportation Needs: No Transportation Needs (8/2/2023)    PRAPARE - Transportation     Lack of Transportation (Medical): No     Lack of Transportation (Non-Medical):  No   Physical Activity: Not on file   Stress: Not on file   Social Connections: Not on file   Intimate Partner Violence: Not on file   Housing Stability: Low Risk  (8/2/2023)    Housing Stability Vital Sign     Unable to Pay for Housing in the Last Year: No     Number of Places Lived in the Last Year: 1     Unstable Housing in the Last Year: No     Social History     Tobacco Use   Smoking Status Never    Passive exposure: Past   Smokeless Tobacco Never     Family History   Problem Relation Age of Onset    Alcohol abuse Mother     Heart disease Mother     Alcohol abuse Father     Alcohol abuse Brother     Cancer Brother     Tremor Neg Hx     Parkinsonism Neg Hx     Colon cancer Neg Hx        The following portions of the patient's history were reviewed and updated as appropriate: allergies, current medications, past medical history, past social history, past surgical history and problem list.    Results  Recent Results (from the past 1 hour(s))   POCT Measure PVR    Collection Time: 10/13/23 10:26 AM   Result Value Ref Range    POST-VOID RESIDUAL VOLUME, ML POC 19 mL   ]  No results found for: "PSA"  Lab Results   Component Value Date    GLUCOSE 139 07/31/2023    CALCIUM 8.8 08/03/2023     (H) 10/03/2015    K 3.9 08/03/2023    CO2 25 08/03/2023     (H) 08/03/2023    BUN 12 08/03/2023    CREATININE 0.75 08/03/2023     Lab Results   Component Value Date    WBC 7.56 08/23/2023    HGB 13.3 08/23/2023    HCT 40.9 08/23/2023    MCV 92 08/23/2023     (H) 08/23/2023       Valorie Mitchell PA-C

## 2023-10-13 ENCOUNTER — OFFICE VISIT (OUTPATIENT)
Dept: UROLOGY | Facility: CLINIC | Age: 83
End: 2023-10-13
Payer: COMMERCIAL

## 2023-10-13 VITALS
WEIGHT: 168 LBS | BODY MASS INDEX: 31.72 KG/M2 | OXYGEN SATURATION: 97 % | SYSTOLIC BLOOD PRESSURE: 146 MMHG | HEART RATE: 95 BPM | DIASTOLIC BLOOD PRESSURE: 88 MMHG | HEIGHT: 61 IN

## 2023-10-13 DIAGNOSIS — N39.41 URGE URINARY INCONTINENCE: Primary | ICD-10-CM

## 2023-10-13 LAB — POST-VOID RESIDUAL VOLUME, ML POC: 19 ML

## 2023-10-13 PROCEDURE — 99213 OFFICE O/P EST LOW 20 MIN: CPT | Performed by: PHYSICIAN ASSISTANT

## 2023-10-13 PROCEDURE — 51798 US URINE CAPACITY MEASURE: CPT | Performed by: PHYSICIAN ASSISTANT

## 2023-10-13 RX ORDER — MIRABEGRON 50 MG/1
50 TABLET, FILM COATED, EXTENDED RELEASE ORAL
Qty: 90 TABLET | Refills: 3 | Status: SHIPPED | OUTPATIENT
Start: 2023-10-13

## 2023-10-13 RX ORDER — MIRABEGRON 50 MG/1
TABLET, FILM COATED, EXTENDED RELEASE ORAL
COMMUNITY
Start: 2023-10-09 | End: 2023-10-13 | Stop reason: SDUPTHER

## 2023-10-30 ENCOUNTER — OFFICE VISIT (OUTPATIENT)
Dept: GASTROENTEROLOGY | Facility: CLINIC | Age: 83
End: 2023-10-30
Payer: COMMERCIAL

## 2023-10-30 VITALS
HEIGHT: 61 IN | SYSTOLIC BLOOD PRESSURE: 131 MMHG | OXYGEN SATURATION: 96 % | HEART RATE: 75 BPM | DIASTOLIC BLOOD PRESSURE: 82 MMHG | BODY MASS INDEX: 32.32 KG/M2 | WEIGHT: 171.2 LBS

## 2023-10-30 DIAGNOSIS — R19.4 CHANGE IN BOWEL HABITS: ICD-10-CM

## 2023-10-30 DIAGNOSIS — K62.5 RECTAL BLEEDING: Primary | ICD-10-CM

## 2023-10-30 PROCEDURE — 99213 OFFICE O/P EST LOW 20 MIN: CPT | Performed by: PHYSICIAN ASSISTANT

## 2023-10-30 NOTE — PROGRESS NOTES
[facility] - Outpatient Follow-up Note  Vee Tsang 79 y.o. female MRN: [id number]  Encounter: [id number]          ASSESSMENT AND PLAN:      1. Rectal bleeding  2. Change in bowel habits  She reports a hard stool yesterday with blood in the toilet water  This was bright red  1 episode    Suspect this is hemorrhoidal     Her last colonoscopy in 2019 with a small (<5mm) polyp removed    Avoid colonoscopy right now due to age  If persists will proceed with further testing    She notes hard stools followed by 1 or 2 progressively loose stools throughout the day  Advised starting Metamucil    ______________________________________________________________________    SUBJECTIVE: 18-year-old female with a history of diverticulitis status post hemicolectomy and 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 She reports that over approximately 6 months she has been noticing that her stools have changed. She reports a hard stool typically in the morning followed by 1 or 2 looser stools throughout the day. She cannot account for anything that specifically caused this change notably medications or diet. She reports that last evening she had a particularly hard stool and when she stood up to clean she saw a red blood in the toilet. She reports that she had a bowel movement today without blood. She said no abdominal pain. She has had no unexpected weight loss. Her last colonoscopy with Dr. Milana bailey was in 2019. A small polyp was removed from the colon. Unfortunately, the pathology is not available to me. The patient has had pancreatic cysts monitored over the past few years. They have been consistently growing over time and so she was referred to Dr. Tony Zaidi last spring. Decision was made to proceed with surgery which she had done in July of this year.   She tolerated this well and has recovered quickly. She has no seeming effects of this. She is going to follow-up with an MRI and seeing Dr. Laisha Taylor in December. REVIEW OF SYSTEMS IS OTHERWISE NEGATIVE.       Historical Information   Past Medical History:   Diagnosis Date    Actinic keratosis 2016    Acute midline low back pain without sciatica 2020    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     Benign neoplasm of skin     Cancer ([address])     skin    Chest pain     Claustrophobia     Diverticulitis     Diverticulitis     Fracture     L1-L2    GERD (gastroesophageal reflux disease)     H. pylori infection 2020    Muscle weakness     Nonmelanoma skin cancer     last assessed 2017    Palpitations     Pancreatic cyst     Seasonal allergies     Seborrheic keratosis 2014    Sleep apnea     no cpap    Spontaneous      without mention of complications     Squamous cell carcinoma of forehead 2014    Syncope      Past Surgical History:   Procedure Laterality Date    ABDOMINAL ADHESION SURGERY N/A 2023    Procedure: LYSIS ADHESIONS;  Surgeon: Ai Abdalla MD;  [facility];  Service: Surgical Oncology    BOTOX INJECTION N/A 2017    Procedure: [place] [facility]; BLADDER BOTOX 100 UNITS ;  Surgeon: Anson Ellison MD;  [facility];  Service:     BOWEL RESECTION      related ot diverticulitis    CARDIAC CATHETERIZATION      CARPAL TUNNEL RELEASE Right     COLONOSCOPY  2019    COLOSTOMY      COLOSTOMY CLOSURE      CYSTOSCOPY  2021    DISTAL PANCREATECTOMY N/A 2023    Procedure: DISTAL PANCREATECTOMY;  Surgeon: Ai Abdalla MD;  [facility];  Service: Surgical Oncology    FOOT SURGERY Left     neuroma    HYSTERECTOMY      NASAL SINUS SURGERY  age 35    [place]    PANCREATIC CYST BIOPSY  2023    [place] CYSTOURETHROSCOPY N/A 2018    Procedure: CYSTOSCOPY WITH BOTOX;  Surgeon: Anson Ellison MD; [facility];  Service: Urology    MO ESOPHAGOGASTRODUODENOSCOPY TRANSORAL DIAGNOSTIC N/A 04/23/2019    Procedure: ESOPHAGOGASTRODUODENOSCOPY (EGD); Surgeon: Genet Anderson DO;  [facility];   Service: Gastroenterology    SPLENECTOMY, TOTAL N/A 07/31/2023    Procedure: SPLENECTOMY;  Surgeon: Elizabeth Carter MD;  [facility];  Service: Surgical Oncology    TONSILLECTOMY  age 47    UPPER GASTROINTESTINAL ENDOSCOPY  04/23/2019     Social History   Social History     Substance and Sexual Activity   Alcohol Use Yes    Comment: patient states rare use on holidays      Social History     Substance and Sexual Activity   Drug Use No     Social History     Tobacco Use   Smoking Status Never    Passive exposure: Past   Smokeless Tobacco Never     Family History   Problem Relation Age of Onset    Alcohol abuse Mother     Heart disease Mother     Alcohol abuse Father     Alcohol abuse Brother     Cancer Brother     Tremor Neg Hx     Parkinsonism Neg Hx     Colon cancer Neg Hx        Meds/Allergies       Current Outpatient Medications:     Ascorbic Acid (VITAMIN C) 1000 MG tablet    B Complex Vitamins (B COMPLEX 100 PO)    calcium carbonate (OS-JOHN) 1250 (500 Ca) MG tablet    carbidopa-levodopa (SINEMET)  mg per tablet    cholecalciferol (VITAMIN D3) 1,000 units tablet    Coenzyme Q10 (CO Q 10 PO)    Cyanocobalamin (VITAMIN B 12 PO)    docusate sodium (COLACE) 100 mg capsule    multivitamin (THERAGRAN) TABS    Myrbetriq 50 MG TB24    Omega-3 350 MG CPDR    pantoprazole (PROTONIX) 40 mg tablet    Potassium Gluconate 595 (99 K) MG TABS    Probiotic Product (PROBIOTIC-10) CAPS    senna-docusate sodium (SENOKOT S) 8.6-50 mg per tablet    Allergies   Allergen Reactions    Caffeine - Food Allergy GI Intolerance    Iodine - Food Allergy Rash    Latex Rash    Other Rash     Adhesive tape             Objective     Blood pressure 131/82, pulse 75, height 5' 1" (1.549 m), weight 77.7 kg (171 lb 3.2 oz), SpO2 96 %, not currently breastfeeding. Body mass index is 32.35 kg/m². PHYSICAL EXAM:      General Appearance:   Alert, cooperative, no distress   HEENT:   Normocephalic, atraumatic, anicteric. Neck:  Supple, symmetrical, trachea midline   Lungs:   Clear to auscultation bilaterally; no rales, rhonchi or wheezing; respirations unlabored    Heart[de-identified]   Regular rate and rhythm; no murmur, rub, or gallop. Abdomen:   Soft, non-tender, non-distended; normal bowel sounds; no masses, no organomegaly    Genitalia:   Deferred    Rectal:   Deferred    Extremities:  No cyanosis, clubbing or edema    Pulses:  2+ and symmetric    Skin:  No jaundice, rashes, or lesions    Lymph nodes:  No palpable cervical lymphadenopathy        Lab Results:   No visits with results within 1 Day(s) from this visit. Latest known visit with results is:   Office Visit on 10/13/2023   Component Date Value    POST-VOID RESIDUAL VOLUM* 10/13/2023 19          Radiology Results:   No results found.

## 2023-11-13 ENCOUNTER — TELEPHONE (OUTPATIENT)
Dept: HEMATOLOGY ONCOLOGY | Facility: CLINIC | Age: 83
End: 2023-11-13

## 2023-11-13 DIAGNOSIS — D49.0 IPMN (INTRADUCTAL PAPILLARY MUCINOUS NEOPLASM): Primary | ICD-10-CM

## 2023-11-13 RX ORDER — LORAZEPAM 1 MG/1
1 TABLET ORAL SEE ADMIN INSTRUCTIONS
Qty: 2 TABLET | Refills: 0 | Status: SHIPPED | OUTPATIENT
Start: 2023-11-13

## 2023-11-13 NOTE — TELEPHONE ENCOUNTER
Patient Call    Who are you speaking with? Patient    If it is not the patient, are they listed on an active communication consent form? N/A   What is the reason for this call? Pt asked for medication for her CT   Does this require a call back? Yes   If a call back is required, please list [facility] call back number [phone]    If a call back is required, advise that a message will be forwarded to their care team and someone will return their call as soon as possible. Did you relay this information to the patient?  Yes

## 2023-11-29 NOTE — TELEPHONE ENCOUNTER
PTNS needed in [place]  Patient arrived from OR via [facility]. Report received from Anesthesia and Nursing staff. Vitals stable, no distress noted, orders reviewed and released.

## 2023-12-13 ENCOUNTER — HOSPITAL ENCOUNTER (OUTPATIENT)
Dept: MRI IMAGING | Facility: HOSPITAL | Age: 83
Discharge: HOME | End: 2023-12-13
Attending: SURGERY
Payer: COMMERCIAL

## 2023-12-13 DIAGNOSIS — K86.2 PANCREATIC CYST: ICD-10-CM

## 2023-12-13 PROCEDURE — 74183 MRI ABD W/O CNTR FLWD CNTR: CPT

## 2023-12-13 PROCEDURE — A9585 GADOBUTROL INJECTION: HCPCS | Performed by: SURGERY

## 2023-12-13 PROCEDURE — G1004 CDSM NDSC: HCPCS

## 2023-12-13 RX ORDER — GADOBUTROL 604.72 MG/ML
7 INJECTION INTRAVENOUS
Status: COMPLETED | OUTPATIENT
Start: 2023-12-13 | End: 2023-12-13

## 2023-12-13 RX ADMIN — GADOBUTROL 7 ML: 604.72 INJECTION INTRAVENOUS at 13:11

## 2023-12-18 ENCOUNTER — TELEPHONE (OUTPATIENT)
Dept: HEMATOLOGY ONCOLOGY | Facility: CLINIC | Age: 83
End: 2023-12-18

## 2023-12-18 NOTE — TELEPHONE ENCOUNTER
Appointment Confirmation   Who are you speaking with? Patient   If it is not the patient, are they listed on an active communication consent form? N/A   Which provider is the appointment scheduled with?  Dr. Julien   When is the appointment scheduled?  Please list date and time 12/19/2023 @10:15AM    At which location is the appointment scheduled to take place? [place]   Did caller verbalize understanding of appointment details? Yes

## 2023-12-19 ENCOUNTER — OFFICE VISIT (OUTPATIENT)
Dept: SURGICAL ONCOLOGY | Facility: CLINIC | Age: 83
End: 2023-12-19
Payer: COMMERCIAL

## 2023-12-19 VITALS
HEIGHT: 61 IN | WEIGHT: 166.6 LBS | SYSTOLIC BLOOD PRESSURE: 152 MMHG | OXYGEN SATURATION: 96 % | DIASTOLIC BLOOD PRESSURE: 74 MMHG | TEMPERATURE: 98.3 F | HEART RATE: 88 BPM | BODY MASS INDEX: 31.45 KG/M2

## 2023-12-19 DIAGNOSIS — D49.0 IPMN (INTRADUCTAL PAPILLARY MUCINOUS NEOPLASM): Primary | ICD-10-CM

## 2023-12-19 PROCEDURE — 99214 OFFICE O/P EST MOD 30 MIN: CPT | Performed by: SURGERY

## 2023-12-19 NOTE — LETTER
December 19, 2023     Hoang Tovar MD  [address]  [facility] [address]    Patient: Radha Vidal   YOB: 1940   Date of Visit: 12/19/2023       Dear Dr. Tovar:    Thank you for referring Radha Vidal to me for evaluation. Below are my notes for this consultation.    If you have questions, please do not hesitate to call me. I look forward to following your patient along with you.         Sincerely,        Kyle Julien MD        CC: MD Kyle Vasquez MD  12/19/2023 10:21 AM  Sign when Signing Visit               Surgical Oncology Follow Up       [facility] [place]  [address] [address]  [phone]    Radha Vidal  1940  [id number]  [facility]  [address] [address]  [phone]    Diagnoses and all orders for this visit:    IPMN (intraductal papillary mucinous neoplasm)  -     MRI abdomen w wo contrast and mrcp; Future  -     BUN; Future  -     Creatinine, serum; Future        Chief Complaint   Patient presents with   • Follow-up       Return in about 6 months (around 6/19/2024) for Office Visit, Imaging - See orders, with Heather.      Oncology History    No history exists.       Staging: Multifocal IPMN, focal high-grade dysplasia   mucinous neoplasm at resection margin, negative for high-grade dysplasia  Treatment history: Distal pancreatectomy with splenectomy, July 2023  Current treatment: Observation  Disease status:     History of Present Illness: Patient returns in follow-up of her IPMN.  She is doing well at this time with no complaints.  No abdominal pain, nausea or vomiting.  Her appetite is fair.  She is maintaining her weight.  MRI from December 13, 2023 reveals changes from her distal pancreatectomy.  There were multiple cysts remaining in the pancreas.  The largest was 1.3 cm in the body.   There were no worrisome or suspicious features.  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  I personally reviewed the films.    Review of Systems  Complete ROS Surg Onc:   Complete ROS Surg Onc:   Constitutional: The patient denies new or recent history of general fatigue, no recent weight loss, no change in appetite.   Eyes: No complaints of visual problems, no scleral icterus.   ENT: no complaints of ear pain, no hoarseness, no difficulty swallowing,  no tinnitus and no new masses in head, oral cavity, or neck.   Cardiovascular: No complaints of chest pain, no palpitations, no ankle edema.   Respiratory: No complaints of shortness of breath, no cough.   Gastrointestinal: No complaints of jaundice, no bloody stools, no pale stools.   Genitourinary: No complaints of dysuria, no hematuria, no nocturia, no frequent urination, no urethral discharge.   Musculoskeletal: No complaints of weakness, paralysis, joint stiffness or arthralgias.  Integumentary: No complaints of rash, no new lesions.   Neurological: No complaints of convulsions, no seizures, no dizziness.   Hematologic/Lymphatic: No complaints of easy bruising.   Endocrine:  No hot or cold intolerance.  No polydipsia, polyphagia, or polyuria.  Allergy/immunology:  No environmental allergies.  No food allergies.  Not immunocompromised.  Skin:  No pallor or rash.  No wound.        Patient Active Problem List   Diagnosis   • OAB (overactive bladder)   • Parkinson's disease   • Primary insomnia   • History of skin cancer   • Seasonal allergic rhinitis   • Impaired fasting glucose   • Mood disturbance   • Obesity (BMI 30-39.9)   • Claustrophobia   • Mitral valve disorder   • Menopause ovarian failure   • Vitamin D deficiency   • Dysphagia   • Multiple thyroid nodules   • Gastro-esophageal reflux disease without esophagitis   • Osteopenia   • Chronic idiopathic constipation   • History of adenomatous polyp of colon   • Tremor   • Trochanteric bursitis,  right hip   • IPMN (intraductal papillary mucinous neoplasm)   • Cherry angioma   • Dyspnea on exertion   • Postablative hypothyroidism   • Sleep apnea   • Closed compression fracture of body of L1 vertebra (HCC)   • Cerebrovascular disease   • Age-related osteoporosis without current pathological fracture   • Urge incontinence   • Encounter for geriatric assessment   • s/p distal pancreatectomy/splenectomy     Past Medical History:   Diagnosis Date   • Actinic keratosis 2016   • Acute midline low back pain without sciatica 2020   • 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 pylori infection 2020   • Muscle weakness    • Nonmelanoma skin cancer     last assessed 2017   • Palpitations    • Pancreatic cyst    • Seasonal allergies    • Seborrheic keratosis 2014   • Sleep apnea     no cpap   • Spontaneous      without mention of complications    • Squamous cell carcinoma of forehead 2014   • Syncope      Past Surgical History:   Procedure Laterality Date   • ABDOMINAL ADHESION SURGERY N/A 2023    Procedure: LYSIS ADHESIONS;  Surgeon: Kyle Julien MD;  [facility]: [facility];  Service: Surgical Oncology   • BOTOX INJECTION N/A 2017    Procedure: CYSTOSCOPY; BLADDER BOTOX 100 UNITS ;  Surgeon: Juan Edward MD;  [facility];  Service:    • BOWEL RESECTION      related ot diverticulitis   • CARDIAC CATHETERIZATION     • CARPAL TUNNEL RELEASE Right    • COLONOSCOPY  2019   • COLOSTOMY     • COLOSTOMY CLOSURE     • CYSTOSCOPY  2021   • DISTAL PANCREATECTOMY N/A 2023    Procedure: DISTAL PANCREATECTOMY;  Surgeon: Kyle Julien MD;  [facility];  Service: Surgical Oncology   • FOOT SURGERY Left      neuroma   • HYSTERECTOMY     • MYRINGOTOMY W/ TUBES Right 12/06/2023    office procedure - Dr. Grace   • NASAL SINUS SURGERY  age 39    [place]   • PANCREATIC CYST BIOPSY  07/31/2023   • [place] CYSTOURETHROSCOPY N/A 04/13/2018    Procedure: CYSTOSCOPY WITH BOTOX;  Surgeon: Juan Edward MD;  Location: [facility];  Service: Urology   • NY ESOPHAGOGASTRODUODENOSCOPY TRANSORAL DIAGNOSTIC N/A 04/23/2019    Procedure: ESOPHAGOGASTRODUODENOSCOPY (EGD);  Surgeon: Edu Dickerson DO;  Location: [facility];  Service: Gastroenterology   • SPLENECTOMY, TOTAL N/A 07/31/2023    Procedure: SPLENECTOMY;  Surgeon: Kyle Julien MD;  [facility];  Service: Surgical Oncology   • TONSILLECTOMY  age 49   • UPPER GASTROINTESTINAL ENDOSCOPY  04/23/2019     Family History   Problem Relation Age of Onset   • Alcohol abuse Mother    • Heart disease Mother    • Alcohol abuse Father    • Alcohol abuse Brother    • Cancer Brother    • Tremor Neg Hx    • Parkinsonism Neg Hx    • Colon cancer Neg Hx      Social History     Socioeconomic History   • Marital status:      Spouse name: Not on file   • Number of children: Not on file   • Years of education: Not on file   • Highest education level: Not on file   Occupational History   • Occupation: RETIRED    Tobacco Use   • Smoking status: Never     Passive exposure: Past   • Smokeless tobacco: Never   Vaping Use   • Vaping status: Never Used   Substance and Sexual Activity   • Alcohol use: Yes     Comment: patient states rare use on holidays    • Drug use: No   • Sexual activity: Not Currently   Other Topics Concern   • Not on file   Social History Narrative    LIVING INDEPENDENTLY ALONE         No caffeine use     Social Determinants of Health     Financial Resource Strain: Low Risk  (4/19/2023)    Overall Financial Resource Strain (CARDIA)    • Difficulty of Paying Living Expenses: Not very hard   Food Insecurity: No Food Insecurity (8/2/2023)    Hunger Vital Sign    • Worried  About Running Out of Food in the Last Year: Never true    • Ran Out of Food in the Last Year: Never true   Transportation Needs: No Transportation Needs (8/2/2023)    PRAPARE - Transportation    • Lack of Transportation (Medical): No    • Lack of Transportation (Non-Medical): No   Physical Activity: Not on file   Stress: Not on file   Social Connections: Not on file   Intimate Partner Violence: Not on file   Housing Stability: Low Risk  (8/2/2023)    Housing Stability Vital Sign    • Unable to Pay for Housing in the Last Year: No    • Number of Places Lived in the Last Year: 1    • Unstable Housing in the Last Year: No       Current Outpatient Medications:   •  Ascorbic Acid (VITAMIN C) 1000 MG tablet, Take 1,000 mg by mouth daily, Disp: , Rfl:   •  B Complex Vitamins (B COMPLEX 100 PO), Take 1 capsule by mouth daily after lunch, Disp: , Rfl:   •  calcium carbonate (OS-JOHN) 1250 (500 Ca) MG tablet, Take 1 tablet by mouth daily after lunch, Disp: , Rfl:   •  carbidopa-levodopa (SINEMET)  mg per tablet, Take 1 tablet by mouth 3 (three) times a day before meals, Disp: 270 tablet, Rfl: 3  •  cholecalciferol (VITAMIN D3) 1,000 units tablet, Take 5,000 Units by mouth daily after lunch, Disp: , Rfl:   •  Coenzyme Q10 (CO Q 10 PO), Take 1 capsule by mouth daily after lunch 600 mg BID, Disp: , Rfl:   •  Cyanocobalamin (VITAMIN B 12 PO), Take 1 capsule by mouth daily, Disp: , Rfl:   •  LORazepam (ATIVAN) 1 mg tablet, Take 1 tablet (1 mg total) by mouth see administration instructions TAKE 1 TABLET 30 MINUTES PRIOR TO MRI, TAKE 2ND TABLET RIGHT BEFORE MRI IF NEEDED, Disp: 2 tablet, Rfl: 0  •  multivitamin (THERAGRAN) TABS, Take 1 tablet by mouth every morning, Disp: , Rfl:   •  Myrbetriq 50 MG TB24, Take 1 tablet (50 mg total) by mouth Daily at 2am, Disp: 90 tablet, Rfl: 3  •  ofloxacin (OCUFLOX) 0.3 % ophthalmic solution, Instill 5 drops in right ear two times a day for three days following tube placement, Disp: 5 mL,  Rfl: 0  •  Omega-3 350 MG CPDR, Take 1 capsule by mouth daily in the early morning, Disp: , Rfl:   •  pantoprazole (PROTONIX) 40 mg tablet, Take 1 tablet (40 mg total) by mouth daily, Disp: 90 tablet, Rfl: 3  •  Potassium Gluconate 595 (99 K) MG TABS, Take 1 each by mouth every other day, Disp: , Rfl:   •  Probiotic Product (PROBIOTIC-10) CAPS, Take 1 capsule by mouth daily after lunch, Disp: , Rfl:   •  senna-docusate sodium (SENOKOT S) 8.6-50 mg per tablet, Take 2 tablets by mouth 2 (two) times a day, Disp: , Rfl: 0  •  docusate sodium (COLACE) 100 mg capsule, Take 1 capsule (100 mg total) by mouth 2 (two) times a day for 10 days, Disp: 20 capsule, Rfl: 0  Allergies   Allergen Reactions   • Caffeine - Food Allergy GI Intolerance   • Iodine - Food Allergy Rash   • Latex Rash   • Other Rash     Adhesive tape       Vitals:    12/19/23 1004   BP: 152/74   Pulse: 88   Temp: 98.3 °F (36.8 °C)   SpO2: 96%       Physical Exam  Constitutional: General appearance: The Patient is well-developed and well-nourished who appears the stated age in no acute distress. Patient is pleasant and talkative.     HEENT:  Normocephalic.  Sclerae are anicteric. Mucous membranes are moist. Neck is supple without adenopathy. No JVD.     Chest: The lungs are clear to auscultation.     Cardiac: Heart is regular rate.     Abdomen: Abdomen is soft, non-tender, non-distended and without masses.     Extremities: There is no clubbing or cyanosis. There is no edema.  Symmetric.  Neuro: Grossly nonfocal. Gait is normal.     Lymphatic: No evidence of cervical adenopathy bilaterally.   No evidence of axillary adenopathy bilaterally. No evidence of inguinal adenopathy bilaterally.     Skin: Warm, anicteric.    Psych:  Patient is pleasant and talkative.  Breasts:        Pathology:  [unfilled]    Labs:      Imaging  MRI abdomen w wo contrast and mrcp    Result Date: 12/18/2023  Narrative: MRI OF THE ABDOMEN WITH AND WITHOUT CONTRAST WITH MRCP  INDICATION: 83 years / Female  K86.2: Cyst of pancreas. COMPARISON: MRI abdomen 6/5/2023. TECHNIQUE:  Multiplanar/multisequence MRI of the abdomen with 3D MRCP was performed before and after administration of contrast. IV Contrast:  7 mL of Gadobutrol injection (SINGLE-DOSE) FINDINGS: LOWER CHEST:   Unremarkable. LIVER: Severe hepatic steatosis, significantly worsened since 6/5/2023. No suspicious mass. The hepatic veins and portal veins are patent. BILE DUCTS:  No intrahepatic or extrahepatic bile duct dilation. Common bile duct is normal in caliber.  No choledocholithiasis, biliary stricture or suspicious mass. GALLBLADDER:  Normal. PANCREAS: Pancreas divisum. Postsurgical changes of distal pancreatectomy. Multiple cystic lesions in the remaining pancreas measuring up to 1.3 x 1.0 cm in the residual distal pancreatic body (series 11 image 45) no solid enhancing components. Findings are grossly stable compared to the prior study, although somewhat difficult to compare directly, particularly in the distal body, due to different pancreatic morphology related to prior distal pancreatectomy. No main duct dilatation. ADRENAL GLANDS:  Unremarkable. SPLEEN: Postsurgical changes of splenectomy. KIDNEYS/PROXIMAL URETERS:  No hydroureteronephrosis.  No suspicious renal mass. Right renal upper pole simple cyst. Stable 1.7 cm right mid renal angiomyolipoma. BOWEL:   No dilated loops of bowel. PERITONEUM/RETROPERITONEUM:  No ascites. LYMPH NODES:  No abdominal lymphadenopathy. VASCULAR STRUCTURES:  No aneurysm. ABDOMINAL WALL:  Unremarkable. OSSEOUS STRUCTURES:  No suspicious osseous lesion.     Impression: 1. Postsurgical changes of distal pancreatectomy and splenectomy with multiple cystic lesions without suspicious features in the remaining pancreas measuring up to 1.3 cm, likely sidebranch IPMNs. Findings are grossly stable compared to the prior study, although somewhat difficult to compare directly, particularly in the  distal body, due to different pancreatic morphology related to pancreatectomy. Continued surveillance is recommended. 2. Severe hepatic steatosis, significantly worsened since 6/5/2023. 3. 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 Workstation performed: [id number]     I personally reviewed and interpreted the above laboratory and imaging data.    Discussion/Summary: 83-year-old female status post distal pancreatectomy and splenectomy for an enlarging pancreatic cyst.  Pathology revealed multifocal IPMN.  There was mucinous cyst without high-grade dysplasia at the margin.  I recommended observation.  We will repeat her imaging in 6 months.  I will see her again at that time for another clinical exam.  She is agreeable to this plan.  All her questions were answered.

## 2023-12-19 NOTE — PROGRESS NOTES
Surgical Oncology Follow Up       [facility] [place]  [address] [facility] [address]  [phone]    Radha Vidal  1940  [id number]  [facility] [place]  [address] [facility] [address]  [phone]    Diagnoses and all orders for this visit:    IPMN (intraductal papillary mucinous neoplasm)  -     MRI abdomen w wo contrast and mrcp; Future  -     BUN; Future  -     Creatinine, serum; Future        Chief Complaint   Patient presents with    Follow-up       Return in about 6 months (around 6/19/2024) for Office Visit, Imaging - See orders, with Heather.      Oncology History    No history exists.       Staging: Multifocal IPMN, focal high-grade dysplasia   mucinous neoplasm at resection margin, negative for high-grade dysplasia  Treatment history: Distal pancreatectomy with splenectomy, July 2023  Current treatment: Observation  Disease status:     History of Present Illness: Patient returns in follow-up of her IPMN.  She is doing well at this time with no complaints.  No abdominal pain, nausea or vomiting.  Her appetite is fair.  She is maintaining her weight.  MRI from December 13, 2023 reveals changes from her distal pancreatectomy.  There were multiple cysts remaining in the pancreas.  The largest was 1.3 cm in the body.  There were no worrisome or suspicious features.  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  I personally reviewed the films.    Review of Systems  Complete ROS Surg Onc:   Complete ROS Surg Onc:   Constitutional: The patient denies new or recent history of general fatigue, no recent weight loss, no change in appetite.   Eyes: No complaints of visual problems, no scleral icterus.   ENT: no complaints of ear pain, no hoarseness, no difficulty swallowing,  no tinnitus and no new masses in head, oral cavity, or neck.    Cardiovascular: No complaints of chest pain, no palpitations, no ankle edema.   Respiratory: No complaints of shortness of breath, no cough.   Gastrointestinal: No complaints of jaundice, no bloody stools, no pale stools.   Genitourinary: No complaints of dysuria, no hematuria, no nocturia, no frequent urination, no urethral discharge.   Musculoskeletal: No complaints of weakness, paralysis, joint stiffness or arthralgias.  Integumentary: No complaints of rash, no new lesions.   Neurological: No complaints of convulsions, no seizures, no dizziness.   Hematologic/Lymphatic: No complaints of easy bruising.   Endocrine:  No hot or cold intolerance.  No polydipsia, polyphagia, or polyuria.  Allergy/immunology:  No environmental allergies.  No food allergies.  Not immunocompromised.  Skin:  No pallor or rash.  No wound.        Patient Active Problem List   Diagnosis    OAB (overactive bladder)    Parkinson's disease    Primary insomnia    History of skin cancer    Seasonal allergic rhinitis    Impaired fasting glucose    Mood disturbance    Obesity (BMI 30-39.9)    Claustrophobia    Mitral valve disorder    Menopause ovarian failure    Vitamin D deficiency    Dysphagia    Multiple thyroid nodules    Gastro-esophageal reflux disease without esophagitis    Osteopenia    Chronic idiopathic constipation    History of adenomatous polyp of colon    Tremor    Trochanteric bursitis, right hip    IPMN (intraductal papillary mucinous neoplasm)    Cherry angioma    Dyspnea on exertion    Postablative hypothyroidism    Sleep apnea    Closed compression fracture of body of L1 vertebra (HCC)    Cerebrovascular disease    Age-related osteoporosis without current pathological fracture    Urge incontinence    Encounter for geriatric assessment    s/p distal pancreatectomy/splenectomy     Past Medical History:   Diagnosis Date    Actinic keratosis 03/11/2016    Acute midline low back pain without sciatica 11/19/2020    Anxiety disorder  due to general medical condition with panic attack     Benign neoplasm of skin     Cancer (HCC)     skin    Chest pain     Claustrophobia     Diverticulitis     Diverticulitis     Fracture     L1-L2    GERD (gastroesophageal reflux disease)     H. pylori infection 2020    Muscle weakness     Nonmelanoma skin cancer     last assessed 2017    Palpitations     Pancreatic cyst     Seasonal allergies     Seborrheic keratosis 2014    Sleep apnea     no cpap    Spontaneous      without mention of complications     Squamous cell carcinoma of forehead 2014    Syncope      Past Surgical History:   Procedure Laterality Date    ABDOMINAL ADHESION SURGERY N/A 2023    Procedure: LYSIS ADHESIONS;  Surgeon: Kyle Julien MD;  [facility];  Service: Surgical Oncology    BOTOX INJECTION N/A 2017    Procedure: CYSTOSCOPY; BLADDER BOTOX 100 UNITS ;  Surgeon: Juan Edward MD;  [facility];  Service:     BOWEL RESECTION      related ot diverticulitis    CARDIAC CATHETERIZATION      CARPAL TUNNEL RELEASE Right     COLONOSCOPY  2019    COLOSTOMY      COLOSTOMY CLOSURE      CYSTOSCOPY  2021    DISTAL PANCREATECTOMY N/A 2023    Procedure: DISTAL PANCREATECTOMY;  Surgeon: Kyle Julien MD;  [facility];  Service: Surgical Oncology    FOOT SURGERY Left     neuroma    HYSTERECTOMY      MYRINGOTOMY W/ TUBES Right 2023    office procedure - Dr. Grace    NASAL SINUS SURGERY  age 39    [place]    PANCREATIC CYST BIOPSY  2023    MI CYSTOURETHROSCOPY N/A 2018    Procedure: CYSTOSCOPY WITH BOTOX;  Surgeon: Juan Edward MD;  [facility];  Service: Urology    MI ESOPHAGOGASTRODUODENOSCOPY TRANSORAL DIAGNOSTIC N/A 2019    Procedure: ESOPHAGOGASTRODUODENOSCOPY (EGD);  Surgeon: Edu Dickerson DO;  [facility];  Service: Gastroenterology    SPLENECTOMY, TOTAL N/A 2023    Procedure: SPLENECTOMY;  Surgeon:  Kyle Julien MD;  [facility];  Service: Surgical Oncology    TONSILLECTOMY  age 49    UPPER GASTROINTESTINAL ENDOSCOPY  04/23/2019     Family History   Problem Relation Age of Onset    Alcohol abuse Mother     Heart disease Mother     Alcohol abuse Father     Alcohol abuse Brother     Cancer Brother     Tremor Neg Hx     Parkinsonism Neg Hx     Colon cancer Neg Hx      Social History     Socioeconomic History    Marital status:      Spouse name: Not on file    Number of children: Not on file    Years of education: Not on file    Highest education level: Not on file   Occupational History    Occupation: RETIRED    Tobacco Use    Smoking status: Never     Passive exposure: Past    Smokeless tobacco: Never   Vaping Use    Vaping status: Never Used   Substance and Sexual Activity    Alcohol use: Yes     Comment: patient states rare use on holidays     Drug use: No    Sexual activity: Not Currently   Other Topics Concern    Not on file   Social History Narrative    LIVING INDEPENDENTLY ALONE         No caffeine use     Social Determinants of Health     Financial Resource Strain: Low Risk  (4/19/2023)    Overall Financial Resource Strain (CARDIA)     Difficulty of Paying Living Expenses: Not very hard   Food Insecurity: No Food Insecurity (8/2/2023)    Hunger Vital Sign     Worried About Running Out of Food in the Last Year: Never true     Ran Out of Food in the Last Year: Never true   Transportation Needs: No Transportation Needs (8/2/2023)    PRAPARE - Transportation     Lack of Transportation (Medical): No     Lack of Transportation (Non-Medical): No   Physical Activity: Not on file   Stress: Not on file   Social Connections: Not on file   Intimate Partner Violence: Not on file   Housing Stability: Low Risk  (8/2/2023)    Housing Stability Vital Sign     Unable to Pay for Housing in the Last Year: No     Number of Places Lived in the Last Year: 1     Unstable Housing in the Last Year: No       Current  Outpatient Medications:     Ascorbic Acid (VITAMIN C) 1000 MG tablet, Take 1,000 mg by mouth daily, Disp: , Rfl:     B Complex Vitamins (B COMPLEX 100 PO), Take 1 capsule by mouth daily after lunch, Disp: , Rfl:     calcium carbonate (OS-JOHN) 1250 (500 Ca) MG tablet, Take 1 tablet by mouth daily after lunch, Disp: , Rfl:     carbidopa-levodopa (SINEMET)  mg per tablet, Take 1 tablet by mouth 3 (three) times a day before meals, Disp: 270 tablet, Rfl: 3    cholecalciferol (VITAMIN D3) 1,000 units tablet, Take 5,000 Units by mouth daily after lunch, Disp: , Rfl:     Coenzyme Q10 (CO Q 10 PO), Take 1 capsule by mouth daily after lunch 600 mg BID, Disp: , Rfl:     Cyanocobalamin (VITAMIN B 12 PO), Take 1 capsule by mouth daily, Disp: , Rfl:     LORazepam (ATIVAN) 1 mg tablet, Take 1 tablet (1 mg total) by mouth see administration instructions TAKE 1 TABLET 30 MINUTES PRIOR TO MRI, TAKE 2ND TABLET RIGHT BEFORE MRI IF NEEDED, Disp: 2 tablet, Rfl: 0    multivitamin (THERAGRAN) TABS, Take 1 tablet by mouth every morning, Disp: , Rfl:     Myrbetriq 50 MG TB24, Take 1 tablet (50 mg total) by mouth Daily at 2am, Disp: 90 tablet, Rfl: 3    ofloxacin (OCUFLOX) 0.3 % ophthalmic solution, Instill 5 drops in right ear two times a day for three days following tube placement, Disp: 5 mL, Rfl: 0    Omega-3 350 MG CPDR, Take 1 capsule by mouth daily in the early morning, Disp: , Rfl:     pantoprazole (PROTONIX) 40 mg tablet, Take 1 tablet (40 mg total) by mouth daily, Disp: 90 tablet, Rfl: 3    Potassium Gluconate 595 (99 K) MG TABS, Take 1 each by mouth every other day, Disp: , Rfl:     Probiotic Product (PROBIOTIC-10) CAPS, Take 1 capsule by mouth daily after lunch, Disp: , Rfl:     senna-docusate sodium (SENOKOT S) 8.6-50 mg per tablet, Take 2 tablets by mouth 2 (two) times a day, Disp: , Rfl: 0    docusate sodium (COLACE) 100 mg capsule, Take 1 capsule (100 mg total) by mouth 2 (two) times a day for 10 days, Disp: 20 capsule,  Rfl: 0  Allergies   Allergen Reactions    Caffeine - Food Allergy GI Intolerance    Iodine - Food Allergy Rash    Latex Rash    Other Rash     Adhesive tape       Vitals:    12/19/23 1004   BP: 152/74   Pulse: 88   Temp: 98.3 °F (36.8 °C)   SpO2: 96%       Physical Exam  Constitutional: General appearance: The Patient is well-developed and well-nourished who appears the stated age in no acute distress. Patient is pleasant and talkative.     HEENT:  Normocephalic.  Sclerae are anicteric. Mucous membranes are moist. Neck is supple without adenopathy. No JVD.     Chest: The lungs are clear to auscultation.     Cardiac: Heart is regular rate.     Abdomen: Abdomen is soft, non-tender, non-distended and without masses.     Extremities: There is no clubbing or cyanosis. There is no edema.  Symmetric.  Neuro: Grossly nonfocal. Gait is normal.     Lymphatic: No evidence of cervical adenopathy bilaterally.   No evidence of axillary adenopathy bilaterally. No evidence of inguinal adenopathy bilaterally.     Skin: Warm, anicteric.    Psych:  Patient is pleasant and talkative.  Breasts:        Pathology:  [unfilled]    Labs:      Imaging  MRI abdomen w wo contrast and mrcp    Result Date: 12/18/2023  Narrative: MRI OF THE ABDOMEN WITH AND WITHOUT CONTRAST WITH MRCP INDICATION: 83 years / Female  K86.2: Cyst of pancreas. COMPARISON: MRI abdomen 6/5/2023. TECHNIQUE:  Multiplanar/multisequence MRI of the abdomen with 3D MRCP was performed before and after administration of contrast. IV Contrast:  7 mL of Gadobutrol injection (SINGLE-DOSE) FINDINGS: LOWER CHEST:   Unremarkable. LIVER: Severe hepatic steatosis, significantly worsened since 6/5/2023. No suspicious mass. The hepatic veins and portal veins are patent. BILE DUCTS:  No intrahepatic or extrahepatic bile duct dilation. Common bile duct is normal in caliber.  No choledocholithiasis, biliary stricture or suspicious mass. GALLBLADDER:  Normal. PANCREAS: Pancreas divisum.  Postsurgical changes of distal pancreatectomy. Multiple cystic lesions in the remaining pancreas measuring up to 1.3 x 1.0 cm in the residual distal pancreatic body (series 11 image 45) no solid enhancing components. Findings are grossly stable compared to the prior study, although somewhat difficult to compare directly, particularly in the distal body, due to different pancreatic morphology related to prior distal pancreatectomy. No main duct dilatation. ADRENAL GLANDS:  Unremarkable. SPLEEN: Postsurgical changes of splenectomy. KIDNEYS/PROXIMAL URETERS:  No hydroureteronephrosis.  No suspicious renal mass. Right renal upper pole simple cyst. Stable 1.7 cm right mid renal angiomyolipoma. BOWEL:   No dilated loops of bowel. PERITONEUM/RETROPERITONEUM:  No ascites. LYMPH NODES:  No abdominal lymphadenopathy. VASCULAR STRUCTURES:  No aneurysm. ABDOMINAL WALL:  Unremarkable. OSSEOUS STRUCTURES:  No suspicious osseous lesion.     Impression: 1. Postsurgical changes of distal pancreatectomy and splenectomy with multiple cystic lesions without suspicious features in the remaining pancreas measuring up to 1.3 cm, likely sidebranch IPMNs. Findings are grossly stable compared to the prior study, although somewhat difficult to compare directly, particularly in the distal body, due to different pancreatic morphology related to pancreatectomy. Continued surveillance is recommended. 2. Severe hepatic steatosis, significantly worsened since 6/5/2023. 3. 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 Workstation performed: [id number]     I personally reviewed and interpreted the above laboratory and imaging data.    Discussion/Summary: 83-year-old female status post distal pancreatectomy and splenectomy for an enlarging pancreatic cyst.  Pathology revealed multifocal IPMN.  There was mucinous cyst without high-grade dysplasia at the margin.  I recommended observation.  We will repeat her imaging in 6 months.  I will see her again at  that time for another clinical exam.  She is agreeable to this plan.  All her questions were answered.

## 2024-01-02 ENCOUNTER — OFFICE VISIT (OUTPATIENT)
Dept: GASTROENTEROLOGY | Facility: CLINIC | Age: 84
End: 2024-01-02
Payer: COMMERCIAL

## 2024-01-02 VITALS
DIASTOLIC BLOOD PRESSURE: 62 MMHG | SYSTOLIC BLOOD PRESSURE: 122 MMHG | HEART RATE: 81 BPM | BODY MASS INDEX: 32.28 KG/M2 | OXYGEN SATURATION: 94 % | WEIGHT: 171 LBS | HEIGHT: 61 IN

## 2024-01-02 DIAGNOSIS — R13.10 DYSPHAGIA, UNSPECIFIED TYPE: ICD-10-CM

## 2024-01-02 DIAGNOSIS — D49.0 IPMN (INTRADUCTAL PAPILLARY MUCINOUS NEOPLASM): ICD-10-CM

## 2024-01-02 DIAGNOSIS — K21.9 GASTRO-ESOPHAGEAL REFLUX DISEASE WITHOUT ESOPHAGITIS: Primary | ICD-10-CM

## 2024-01-02 DIAGNOSIS — K86.2 PANCREATIC CYST: ICD-10-CM

## 2024-01-02 PROCEDURE — 99213 OFFICE O/P EST LOW 20 MIN: CPT | Performed by: PHYSICIAN ASSISTANT

## 2024-01-02 RX ORDER — PANTOPRAZOLE SODIUM 40 MG/1
40 TABLET, DELAYED RELEASE ORAL DAILY
Qty: 90 TABLET | Refills: 3 | Status: SHIPPED | OUTPATIENT
Start: 2024-01-02

## 2024-01-02 NOTE — PROGRESS NOTES
[facility] - Outpatient Follow-up Note  Radha Vidal 83 y.o. female MRN: [id number]  Encounter: [id number]          ASSESSMENT AND PLAN:      1. Dysphagia, unspecified type  2. Gastro-esophageal reflux disease without esophagitis  -Will continue Pantoprazole 40mg daily.     -No plans for repeat EGD at this time.    3. Pancreatic cyst  4. IPMN (intraductal papillary mucinous neoplasm)  -MRI/MRCP from December 2023 shows stable findings.    -Patient will continue follow-up with Dr. Julien.  ______________________________________________________________________    SUBJECTIVE:    83-year-old female with a past medical history significant for GERD who presents to the GI clinic today for follow-up.  Patient reports that overall she is doing quite well on pantoprazole 40 mg daily.  She denies any breakthrough symptoms.  She denies any dysphagia or regurgitation.  She denies any nausea or vomiting.  She denies any abdominal pain.  She was actually seen for an emergency visit at the end of October due to change in bowels and rectal bleeding.  Overall patient reports that symptom has improved significantly after she started taking Metamucil.    Patient also does have a history of a multifocal IPMN with focal high-grade dysplasia status post distal pancreatectomy and splenectomy in July 2023.  Overall patient is doing quite well.  She does follow routinely with Dr. Julien.    REVIEW OF SYSTEMS IS OTHERWISE NEGATIVE.      Historical Information   Past Medical History:   Diagnosis Date    Actinic keratosis 03/11/2016    Acute midline low back pain without sciatica 11/19/2020    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     Benign neoplasm of skin     Cancer (HCC)     skin    Chest pain     Claustrophobia     Diverticulitis     Diverticulitis     Fracture     L1-L2    GERD (gastroesophageal reflux disease)     H. pylori infection 11/24/2020    Muscle weakness     Nonmelanoma  skin cancer     last assessed 2017    Palpitations     Pancreatic cyst     Seasonal allergies     Seborrheic keratosis 2014    Sleep apnea     no cpap    Spontaneous      without mention of complications     Squamous cell carcinoma of forehead 2014    Syncope      Past Surgical History:   Procedure Laterality Date    ABDOMINAL ADHESION SURGERY N/A 2023    Procedure: LYSIS ADHESIONS;  Surgeon: Kyle Julien MD;  [facility];  Service: Surgical Oncology    BOTOX INJECTION N/A 2017    Procedure: CYSTOSCOPY; BLADDER BOTOX 100 UNITS ;  Surgeon: Juan Edward MD;  [facility];  Service:     BOWEL RESECTION      related ot diverticulitis    CARDIAC CATHETERIZATION      CARPAL TUNNEL RELEASE Right     COLONOSCOPY  2019    COLOSTOMY      COLOSTOMY CLOSURE      CYSTOSCOPY  2021    DISTAL PANCREATECTOMY N/A 2023    Procedure: DISTAL PANCREATECTOMY;  Surgeon: Kyle Julien MD;  [facility];  Service: Surgical Oncology    FOOT SURGERY Left     neuroma    HYSTERECTOMY      MYRINGOTOMY W/ TUBES Right 2023    office procedure - Dr. Grace    NASAL SINUS SURGERY  age 39    [place]    PANCREATIC CYST BIOPSY  2023    MN CYSTOURETHROSCOPY N/A 2018    Procedure: CYSTOSCOPY WITH BOTOX;  Surgeon: Juan Edward MD;  [facility];  Service: Urology    MN ESOPHAGOGASTRODUODENOSCOPY TRANSORAL DIAGNOSTIC N/A 2019    Procedure: ESOPHAGOGASTRODUODENOSCOPY (EGD);  Surgeon: Edu Dickerson DO;  [facility];  Service: Gastroenterology    SPLENECTOMY, TOTAL N/A 2023    Procedure: SPLENECTOMY;  Surgeon: Kyle Julien MD;  [facility];  Service: Surgical Oncology    TONSILLECTOMY  age 49    UPPER GASTROINTESTINAL ENDOSCOPY  2019     Social History   Social History     Substance and Sexual Activity   Alcohol Use Yes    Comment: patient states rare use on holidays      Social History     Substance  "and Sexual Activity   Drug Use No     Social History     Tobacco Use   Smoking Status Never    Passive exposure: Past   Smokeless Tobacco Never     Family History   Problem Relation Age of Onset    Alcohol abuse Mother     Heart disease Mother     Alcohol abuse Father     Alcohol abuse Brother     Cancer Brother     Tremor Neg Hx     Parkinsonism Neg Hx     Colon cancer Neg Hx        Meds/Allergies       Current Outpatient Medications:     Ascorbic Acid (VITAMIN C) 1000 MG tablet    B Complex Vitamins (B COMPLEX 100 PO)    calcium carbonate (OS-JOHN) 1250 (500 Ca) MG tablet    carbidopa-levodopa (SINEMET)  mg per tablet    cholecalciferol (VITAMIN D3) 1,000 units tablet    Coenzyme Q10 (CO Q 10 PO)    Cyanocobalamin (VITAMIN B 12 PO)    LORazepam (ATIVAN) 1 mg tablet    multivitamin (THERAGRAN) TABS    Myrbetriq 50 MG TB24    Omega-3 350 MG CPDR    pantoprazole (PROTONIX) 40 mg tablet    Potassium Gluconate 595 (99 K) MG TABS    Probiotic Product (PROBIOTIC-10) CAPS    docusate sodium (COLACE) 100 mg capsule    ofloxacin (OCUFLOX) 0.3 % ophthalmic solution    senna-docusate sodium (SENOKOT S) 8.6-50 mg per tablet    Allergies   Allergen Reactions    Caffeine - Food Allergy GI Intolerance    Iodine - Food Allergy Rash    Latex Rash    Other Rash     Adhesive tape             Objective     Blood pressure 122/62, pulse 81, height 5' 1\" (1.549 m), weight 77.6 kg (171 lb), SpO2 94%, not currently breastfeeding. Body mass index is 32.31 kg/m².      PHYSICAL EXAM:      General Appearance:   Alert, cooperative, no distress   HEENT:   Normocephalic, atraumatic, anicteric.     Neck:  Supple, symmetrical, trachea midline   Lungs:   Clear to auscultation bilaterally; no rales, rhonchi or wheezing; respirations unlabored    Heart::   Regular rate and rhythm; no murmur, rub, or gallop.   Abdomen:   Soft, non-tender, non-distended; normal bowel sounds; no masses, no organomegaly    Genitalia:   Deferred    Rectal:   Deferred  "   Extremities:  No cyanosis, clubbing or edema    Pulses:  2+ and symmetric    Skin:  No jaundice, rashes, or lesions    Lymph nodes:  No palpable cervical lymphadenopathy        Lab Results:   No visits with results within 1 Day(s) from this visit.   Latest known visit with results is:   Office Visit on 10/13/2023   Component Date Value    POST-VOID RESIDUAL VOLUM* 10/13/2023 19          Radiology Results:   MRI abdomen w wo contrast and mrcp    Result Date: 12/18/2023  Narrative: MRI OF THE ABDOMEN WITH AND WITHOUT CONTRAST WITH MRCP INDICATION: 83 years / Female  K86.2: Cyst of pancreas. COMPARISON: MRI abdomen 6/5/2023. TECHNIQUE:  Multiplanar/multisequence MRI of the abdomen with 3D MRCP was performed before and after administration of contrast. IV Contrast:  7 mL of Gadobutrol injection (SINGLE-DOSE) FINDINGS: LOWER CHEST:   Unremarkable. LIVER: Severe hepatic steatosis, significantly worsened since 6/5/2023. No suspicious mass. The hepatic veins and portal veins are patent. BILE DUCTS:  No intrahepatic or extrahepatic bile duct dilation. Common bile duct is normal in caliber.  No choledocholithiasis, biliary stricture or suspicious mass. GALLBLADDER:  Normal. PANCREAS: Pancreas divisum. Postsurgical changes of distal pancreatectomy. Multiple cystic lesions in the remaining pancreas measuring up to 1.3 x 1.0 cm in the residual distal pancreatic body (series 11 image 45) no solid enhancing components. Findings are grossly stable compared to the prior study, although somewhat difficult to compare directly, particularly in the distal body, due to different pancreatic morphology related to prior distal pancreatectomy. No main duct dilatation. ADRENAL GLANDS:  Unremarkable. SPLEEN: Postsurgical changes of splenectomy. KIDNEYS/PROXIMAL URETERS:  No hydroureteronephrosis.  No suspicious renal mass. Right renal upper pole simple cyst. Stable 1.7 cm right mid renal angiomyolipoma. BOWEL:   No dilated loops of bowel.  PERITONEUM/RETROPERITONEUM:  No ascites. LYMPH NODES:  No abdominal lymphadenopathy. VASCULAR STRUCTURES:  No aneurysm. ABDOMINAL WALL:  Unremarkable. OSSEOUS STRUCTURES:  No suspicious osseous lesion.     Impression: 1. Postsurgical changes of distal pancreatectomy and splenectomy with multiple cystic lesions without suspicious features in the remaining pancreas measuring up to 1.3 cm, likely sidebranch IPMNs. Findings are grossly stable compared to the prior study, although somewhat difficult to compare directly, particularly in the distal body, due to different pancreatic morphology related to pancreatectomy. Continued surveillance is recommended. 2. Severe hepatic steatosis, significantly worsened since 6/5/2023. 3. 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 Workstation performed: [id number]

## 2024-01-08 ENCOUNTER — OFFICE VISIT (OUTPATIENT)
Age: 84
End: 2024-01-08
Payer: COMMERCIAL

## 2024-01-08 VITALS — WEIGHT: 171 LBS | TEMPERATURE: 97.7 F | BODY MASS INDEX: 32.28 KG/M2 | HEIGHT: 61 IN

## 2024-01-08 DIAGNOSIS — L82.1 SEBORRHEIC KERATOSIS: ICD-10-CM

## 2024-01-08 DIAGNOSIS — Z85.828 HISTORY OF SKIN CANCER: ICD-10-CM

## 2024-01-08 DIAGNOSIS — D18.01 CHERRY ANGIOMA: ICD-10-CM

## 2024-01-08 DIAGNOSIS — Z13.89 SCREENING FOR SKIN CONDITION: Primary | ICD-10-CM

## 2024-01-08 DIAGNOSIS — D22.9 MULTIPLE NEVI: ICD-10-CM

## 2024-01-08 PROCEDURE — 99213 OFFICE O/P EST LOW 20 MIN: CPT | Performed by: DERMATOLOGY

## 2024-01-08 NOTE — PATIENT INSTRUCTIONS
When outside we recommend using a wide brim hat, sunglasses, long sleeve and pants, sunscreen with SPF 30+ with reapplication every 2 hours, or SPF specific clothing     ACTINIC KERATOSIS    Actinic keratoses are very common on sites repeatedly exposed to the sun, especially the backs of the hands and the face, most often affecting the ears, nose, cheeks, upper lip, vermilion of the lower lip, temples, forehead and balding scalp. In severely chronically sun-damaged individuals, they may also be found on the upper trunk, upper and lower limbs, and dorsum of feet.    We discussed the theoretical premalignant (“pre-cancerous”) nature and etiology of these growths.  We discussed the prevailing notion that actinic keratoses are a reflection of abnormal skin cell development due to DNA damage by short wavelength UVB.  They are more likely to appear if the immune function is poor, due to aging, recent sun exposure, predisposing disease or certain drugs.    We discussed that the main concern is that actinic keratoses may predispose to squamous cell carcinoma. It is rare for a solitary actinic keratosis to evolve to squamous cell carcinoma (SCC), but the risk of SCC occurring at some stage in a patient with more than 10 actinic keratoses is thought to be about 10 to 15%. A tender, thickened, ulcerated or enlarging actinic keratosis is suspicious of SCC.    Actinic keratoses may be prevented by strict sun protection. If already present, keratoses may improve with a very high sun protection factor (50+) broad-spectrum sunscreen applied at least daily to affected areas, year-round.  We recommend that UPF-rated clothing and hats and sunglasses be worn whenever possible and that a sunscreen-moisturizer combination product such as Neutrogena Daily Defense be applied at least three times a day.    We performed a thorough discussion of treatment options and specific risk/benefits/alternatives including but not limited to medical  “field” treatment with medications such as the following:    Topical “field area” medications such as 5-fluorouracil or Aldara (specifically, the trouble with long-term compliance, blistering and local skin reaction versus the convenience of at-home therapy and that field therapy “gets what is not yet seen”).    Cryotherapy (specifically, local pain, scarring, dyspigmentation, blistering, possible superinfection, and treats “only what we see” versus directed treatment today).    Photodynamic therapy (specifically, local pain, scarring, dyspigmentation, blistering, possible superinfection, need to schedule for a later date, and time spent in the office versus field therapy that “gets what is not yet seen”).      BASAL CELL CARCINOMA    What is basal cell carcinoma?  Basal cell carcinoma (BCC) is a common, locally invasive, keratinocytic, or non-melanoma, skin cancer. It is also known as rodent ulcer and basalioma. Patients with BCC often develop multiple primary tumours over time.    Who gets basal cell carcinoma?  Risk factors for BCC include:  Age and sex: BCCs are particularly prevalent in elderly males. However, they also affect females and younger adults   Previous BCC or other form of skin cancer (squamous cell carcinoma, melanoma)   Sun damage (photoaging, actinic keratoses)   Repeated prior episodes of sunburn   Fair skin, blue eyes and blond or red hair--note; BCC can also affect darker skin types   Previous cutaneous injury, thermal burn, disease (eg cutaneous lupus, sebaceous naevus)   Inherited syndromes: BCC is a particular problem for families with basal cell naevus syndrome (Gorlin syndrome), Khrjn-Flvié-Gfrqlmjq syndrome, Rombo syndrome, Oley syndrome and xeroderma pigmentosum   Other risk factors include ionising radiation, exposure to arsenic, and immune suppression due to disease or medicines    What causes basal cell carcinoma?  The cause of BCC is multifactorial.  Most often, there are DNA  mutations in the patched (PTCH) tumour suppressor gene, part of hedgehog signaling pathway   These may be triggered by exposure to ultraviolet radiation   Various spontaneous and inherited gene defects predispose to BCC    What are the clinical features of basal cell carcinoma?  BCC is a locally invasive skin tumour. The main characteristics are:  Slowly growing plaque or nodule   Skin coloured, pink or pigmented   Varies in size from a few millimetres to several centimetres in diameter   Spontaneous bleeding or ulceration  BCC is very rarely a threat to life. A tiny proportion of BCCs grow rapidly, invade deeply, and/or metastasise to local lymph nodes.    Types of basal cell carcinoma  There are several distinct clinical types of BCC, and over 20 histological growth patterns of BCC.  Nodular BCC  Most common type of facial BCC   Shiny or pearly nodule with a smooth surface   May have central depression or ulceration, so its edges appear rolled   Blood vessels cross its surface   Cystic variant is soft, with jelly-like contents   Micronodular, microcystic and infiltrative types are potentially aggressive subtypes   Also known as nodulocystic carcinoma  Superficial BCC  Most common type in younger adults   Most common type on upper trunk and shoulders   Slightly scaly, irregular plaque   Thin, translucent rolled border   Multiple microerosions  Morphoeaform BCC  Usually found in mid-facial sites   Waxy, scar-like plaque with indistinct borders   Wide and deep subclinical extension   May infiltrate cutaneous nerves (perineural spread)   Also known as morpheic, morphoeiform or sclerosing BCC  Basosquamous carcinoma  Mixed basal cell carcinoma (BCC) and squamous cell carcinoma (SCC)   Infiltrative growth pattern   Potentially more aggressive than other forms of BCC   Also known as basisquamous carcinoma and mixed basal-squamous cell carcinoma       Complications of basal cell carcinoma    Recurrent BCC  Recurrence of  BCC after initial treatment is not uncommon. Characteristics of recurrent BCC often include:  Incomplete excision or narrow margins at primary excision   Morphoeic, micronodular, and infiltrative subtypes   Location on head and neck    Advanced BCC  Advanced BCCs are large, often neglected tumours.  They may be several centimetres in diameter   They may be deeply infiltrating into tissues below the skin   They are difficult or impossible to treat surgically    Metastatic BCC  Very rare   Primary tumour is often large, neglected or recurrent, located on head and neck, with aggressive subtype   May have had multiple prior treatments   May arise in site exposed to ionising radiation   Can be fatal    How is basal cell carcinoma diagnosed?  BCC is diagnosed clinically by the presence of a slowly enlarging skin lesion with typical appearance. The diagnosis and  by a diagnostic biopsy or following excision.  Some typical superficial BCCs on trunk and limbs are clinically diagnosed and have non-surgical treatment without histology.    What is the treatment for primary basal cell carcinoma?  The treatment for a BCC depends on its type, size and location, the number to be treated, patient factors, and the preference or expertise of the doctor. Most BCCs are treated surgically. Long-term follow-up is recommended to check for new lesions and recurrence; the latter may be unnecessary if histology has reported wide clear margins.    Excision biopsy  Excision means the lesion is cut out and the skin stitched up.  Most appropriate treatment for nodular, infiltrative and morphoeic BCCs   Should include 3 to 5 mm margin of normal skin around the tumour   Very large lesions may require flap or skin graft to repair the defect   Pathologist will report deep and lateral margins   Further surgery is recommended for lesions that are incompletely excised    Mohs micrographically controlled excision  Mohs micrographically controlled surgery  involves examining carefully marked excised tissue under the microscope, layer by layer, to ensure complete excision.  Very high cure rates achieved by trained Mohs surgeons   Used in high-risk areas of the face around eyes, lips and nose   Suitable for ill-defined, morphoeic, infiltrative and recurrent subtypes   Large defects are repaired by flap or skin graft    Superficial skin surgery  Superficial skin surgery comprises shave, curettage, and electrocautery. It is a rapid technique using local anaesthesia and does not require sutures.  Suitable for small, well-defined nodular or superficial BCCs   Lesions are usually located on trunk or limbs   Wound is left open to heal by secondary intention   Moist wound dressings lead to healing within a few weeks   Eventual scar quality variable    Cryotherapy  Cryotherapy is the treatment of a superficial skin lesion by freezing it, usually with liquid nitrogen.  Suitable for small superficial BCCs on covered areas of trunk and limbs   Best avoided for BCCs on head and neck, and distal to knees   Double freeze-thaw technique   Results in a blister that crusts over and heals within several weeks.   Leaves permanent white wood    Photodynamic therapy  Photodynamic therapy (PDT) refers to a technique in which BCC is treated with a photosensitising chemical, and exposed to light several hours later.  Topical photosensitisers include aminolevulinic acid lotion and methyl aminolevulinate cream   Suitable for low-risk small, superficial BCCs   Best avoided if tumour in site at high risk of recurrence   Results in inflammatory reaction, maximal 3-4 days after procedure   Treatment repeated 7 days after initial treatment   Excellent cosmetic results    Imiquimod cream  Imiquimod is an immune response modifier.  Best used for superficial BCCs less than 2 cm diameter   Applied three to five times each week, for 6-16 weeks   Results in a variable inflammatory reaction, maximal at  three weeks   Minimal scarring is usual    Fluorouracil cream  5-Fluorouracil cream is a topical cytotoxic agent.  Used to treat small superficial basal cell carcinomas   Requires prolonged course, eg twice daily for 6-12 weeks   Causes inflammatory reaction   Has high recurrence rates    Radiotherapy  Radiotherapy or X-ray treatment can be used to treat primary BCCs or as adjunctive treatment if margins are incomplete.  Mainly used if surgery is not suitable   Best avoided in young patients and in genetic conditions predisposing to skin cancer   Best cosmetic results achieved using multiple fractions   Typically, patient attends once-weekly for several weeks   Causes inflammatory reaction followed by scar   Risk of radiodermatitis, late recurrence, and new tumours    What is the treatment for advanced or metastatic basal cell carcinoma?  Locally advanced primary, recurrent or metastatic BCC requires multidisciplinary consultation. Often a combination of treatments is used.  Surgery   Radiotherapy   Targeted therapy  Targeted therapy refers to the hedgehog signalling pathway inhibitors, vismodegib and sonidegib. These drugs have some important risks and side effects.    How can basal cell carcinoma be prevented?  The most important way to prevent BCC is to avoid sunburn. This is especially important in childhood and early life. Fair skinned individuals and those with a personal or family history of BCC should protect their skin from sun exposure daily, year-round and lifelong.  Stay indoors or under the shade in the middle of the day   Wear covering clothing   Apply high protection factor SPF50+ broad-spectrum sunscreens generously to exposed skin if outdoors   Avoid indoor tanning (sun beds, solaria)  Oral nicotinamide (vitamin B3) in a dose of 500 mg twice daily may reduce the number and severity of BCCs.    What is the outlook for basal cell carcinoma?  Most BCCs are cured by treatment. Cure is most likely if  treatment is undertaken when the lesion is small.  About 50% of people with BCC develop a second one within 3 years of the first. They are also at increased risk of other skin cancers, especially melanoma. Regular self-skin examinations and long-term annual skin checks by an experienced health professional are recommended.        SQUAMOUS CELL CARCINOMA    What is cutaneous squamous cell carcinoma?  Cutaneous squamous cell carcinoma (SCC) is a common type of keratinocyte or non-melanoma skin cancer. It is derived from cells within the epidermis that make keratin -- the horny protein that makes up skin, hair and nails.  Cutaneous SCC is an invasive disease, referring to cancer cells that have grown beyond the epidermis. SCC can sometimes metastasise and may prove fatal.  Intraepidermal carcinoma (cutaneous SCC in situ) and mucosal SCC are considered elsewhere.    Who gets cutaneous squamous cell carcinoma?  Risk factors for cutaneous SCC include:  Age and sex: SCCs are particularly prevalent in elderly males. However, they also affect females and younger adults.   Previous SCC or another form of skin cancer (basal cell carcinoma, melanoma) are a strong predictor for further skin cancers.   Actinic keratoses   Outdoor occupation or recreation   Smoking   Fair skin, blue eyes and blond or red hair   Previous cutaneous injury, thermal burn, disease (eg cutaneous lupus, epidermolysis bullosa, leg ulcer)   Inherited syndromes: SCC is a particular problem for families with xeroderma pigmentosum and albinism   Other risk factors include ionising radiation, exposure to arsenic, and immune suppression due to disease (eg chronic lymphocytic leukaemia) or medicines. Organ transplant recipients have a massively increased risk of developing SCC.    What causes cutaneous squamous cell carcinoma?  More than 90% of cases of SCC are associated with numerous DNA mutations in multiple somatic genes. Mutations in the p53 tumour  suppressor gene are caused by exposure to ultraviolet radiation (UV), especially UVB (known as signature 7). Other signature mutations relate to cigarette smoking, ageing and immune suppression (eg, to drugs such as azathioprine). Mutations in signalling pathways affect the epidermal growth factor receptor, ASAF, Fyn, and [id number] signalling.   Beta-genus human papillomaviruses (wart virus) are thought to play a role in SCC arising in immune-suppressed populations. ?-HPV and HPV subtypes 5, 8, 17, 20, 24, and 38 have also been associated with an increased risk of cutaneous SCC in immunocompetent individuals.     What are the clinical features of cutaneous squamous cell carcinoma?  Cutaneous SCCs present as enlarging scaly or crusted lumps. They usually arise within pre-existing actinic keratosis or intraepidermal carcinoma.  They grow over weeks to months   They may ulcerate   They are often tender or painful   Located on sun-exposed sites, particularly the face, lips, ears, hands, forearms and lower legs   Size varies from a few millimetres to several centimetres in diameter.    Types of cutaneous squamous cell carcinoma  Distinct clinical types of invasive cutaneous SCC include:  Cutaneous horn -- the horn is due to excessive production of keratin   Keratoacanthoma (KA) -- a rapidly growing keratinising nodule that may resolve without treatment   Carcinoma cuniculatum (‘verrucous carcinoma’), a slow-growing, warty tumour on the sole of the foot.   Multiple eruptive SCC/KA-like lesions arising in syndromes, such as multiple self-healing squamous epitheliomas of Gorman-Smith and Grzybowski syndrome  The pathologist may classify a tumour as well differentiated, moderately well differentiated, poorly differentiated or anaplastic cutaneous SCC. There are other variants.    Classification of squamous cell carcinoma by risk  Cutaneous SCC is classified as low-risk or high-risk, depending on the chance of tumour  recurrence and metastasis. Characteristics of high-risk SCC include:  High-risk cutaneous squamous cell carcinoma has the following characteristics:  Diameter greater than or equal to 2 cm   Location on the ear, vermilion of the lip, central face, hands, feet, genitalia   Arising in elderly or immune suppressed patient   Histological thickness greater than 2 mm, poorly differentiated histology, or with the invasion of the subcutaneous tissue, nerves and blood vessels  Metastatic SCC is found in regional lymph nodes (80%), lungs, liver, brain, bones and skin.    Staging cutaneous squamous cell carcinoma  In 2011, the American Joint Committee on Cancer (AJCC) published a new staging systemic for cutaneous SCC for the 7th Edition of the AJCC manual. This evaluates the dimensions of the original primary tumour (T) and its metastases to lymph nodes (N).    Tumour staging for cutaneous SCC  TX: Th Primary tumour cannot be assessed  T0: No evidence of a primary tumour  Tis: Carcinoma in situ  T1: Tumour ? 2cm without high-risk features  T2: Tumour ? 2cm; or; Tumour ? 2 cm with high-risk features  T3: Tumour with the invasion of maxilla, mandible, orbit or temporal bone  T4: Tumour with the invasion of axial or appendicular skeleton or perineural invasion of skull base    Nicole staging for cutaneous SCC  NX: Regional lymph nodes cannot be assessed  N0: No regional lymph node metastasis  N1: Metastasis in one local lymph node ? 3cm  N2: Metastasis in one local lymph node ? 3cm; or; Metastasis in >1 local lymph node ? 6cm  N3: Metastasis in lymph node ? 6cm    How is squamous cell carcinoma diagnosed?  Diagnosis of cutaneous SCC is based on clinical features. The diagnosis and histological subtype are confirmed pathologically by diagnostic biopsy or following excision. See squamous cell carcinoma - pathology.  Patients with high-risk SCC may also undergo staging investigations to determine whether it has spread to lymph nodes  or elsewhere. These may include:  Imaging using ultrasound scan, X-rays, CT scans, MRI scans   Lymph node or other tissue biopsies    What is the treatment for cutaneous squamous cell carcinoma?  Cutaneous SCC is nearly always treated surgically. Most cases are excised with a 3-10 mm margin of normal tissue around a visible tumour. A flap or skin graft may be needed to repair the defect.  Other methods of removal include:  Shave, curettage, and electrocautery for low-risk tumours on trunk and limbs   Aggressive cryotherapy for very small, thin, low-risk tumours   Mohs micrographic surgery for large facial lesions with indistinct margins or recurrent tumours   Radiotherapy for an inoperable tumour, patients unsuitable for surgery, or as adjuvant    What is the treatment for advanced or metastatic squamous cell carcinoma?  Locally advanced primary, recurrent or metastatic SCC requires multidisciplinary consultation. Often a combination of treatments is used.  Surgery   Radiotherapy   Cemiplimab   Experimental targeted therapy using epidermal growth factor receptor inhibitors    How can cutaneous squamous cell carcinoma be prevented?  There is a great deal of evidence to show that very careful sun protection at any time of life reduces the number of SCCs. This is particularly important in ageing, sun-damaged, fair skin; in patients that are immune suppressed; and in those who already have actinic keratoses or previous SCC.  Stay indoors or under the shade in the middle of the day   Wear covering clothing   Apply high protection factor SPF50+ broad-spectrum sunscreens generously to exposed skin if outdoors   Avoid indoor tanning (sun beds, solaria)    Oral nicotinamide (vitamin B3) in a dose of 500 mg twice daily may reduce the number and severity of SCCs in people at high risk.  Patients with multiple squamous cell carcinomas may be prescribed an oral retinoid (acitretin or isotretinoin). These reduce the number of  tumours but have some nuisance side effects.    What is the outlook for cutaneous squamous cell carcinoma?  Most SCCs are cured by treatment. A cure is most likely if treatment is undertaken when the lesion is small. The risk of recurrence or disease-associated death is greater for tumours that are > 20 mm in diameter and/or > 2 mm in thickness at the time of surgical excision.  About 50% of people at high risk of SCC develop a second one within 5 years of the first. They are also at increased risk of other skin cancers, especially melanoma. Regular self-skin examinations and long-term annual skin checks by an experienced health professional are recommended.

## 2024-01-08 NOTE — PROGRESS NOTES
"[facility] Note     Patient Name: Radha Vidal  Encounter Date: January 8, 2024     Have you been cared for by a [facility] Dermatologist in the last 3 years and, if so, which description applies to you?    Yes.  I have been here within the last 3 years, and my medical history has NOT changed since that time.  I am FEMALE/of child-bearing potential.    REVIEW OF SYSTEMS:  Have you recently had or currently have any of the following? No changes in my recent health.   PAST MEDICAL HISTORY:  Have you personally ever had or currently have any of the following?  If \"YES,\" then please provide more detail. No changes in my medical history.   HISTORY OF IMMUNOSUPPRESSION: Do you have a history of any of the following:  Systemic Immunosuppression such as Diabetes, Biologic or Immunotherapy, Chemotherapy, Organ Transplantation, Bone Marrow Transplantation?  No     Answering \"YES\" requires the addition of the dotphrase \"IMMUNOSUPPRESSED\" as the first diagnosis of the patient's visit.   FAMILY HISTORY:  Any \"first degree relatives\" (parent, brother, sister, or child) with the following?    No changes in my family's known health.   PATIENT EXPERIENCE:    Do you want the Dermatologist to perform a COMPLETE skin exam today including a clinical examination under the \"bra and underwear\" areas?  Yes  If necessary, do we have your permission to call and leave a detailed message on your Preferred Phone number that includes your specific medical information?  Yes      Allergies   Allergen Reactions    Caffeine - Food Allergy GI Intolerance    Iodine - Food Allergy Rash    Latex Rash    Other Rash     Adhesive tape        Current Outpatient Medications:     Ascorbic Acid (VITAMIN C) 1000 MG tablet, Take 1,000 mg by mouth daily, Disp: , Rfl:     B Complex Vitamins (B COMPLEX 100 PO), Take 1 capsule by mouth daily after lunch, Disp: , Rfl:     calcium carbonate (OS-JOHN) 1250 (500 Ca) MG tablet, Take 1 tablet by mouth " daily after lunch, Disp: , Rfl:     carbidopa-levodopa (SINEMET)  mg per tablet, Take 1 tablet by mouth 3 (three) times a day before meals, Disp: 270 tablet, Rfl: 3    cholecalciferol (VITAMIN D3) 1,000 units tablet, Take 5,000 Units by mouth daily after lunch, Disp: , Rfl:     Coenzyme Q10 (CO Q 10 PO), Take 1 capsule by mouth daily after lunch 600 mg BID, Disp: , Rfl:     Cyanocobalamin (VITAMIN B 12 PO), Take 1 capsule by mouth daily, Disp: , Rfl:     multivitamin (THERAGRAN) TABS, Take 1 tablet by mouth every morning, Disp: , Rfl:     Myrbetriq 50 MG TB24, Take 1 tablet (50 mg total) by mouth Daily at 2am, Disp: 90 tablet, Rfl: 3    Omega-3 350 MG CPDR, Take 1 capsule by mouth daily in the early morning, Disp: , Rfl:     pantoprazole (PROTONIX) 40 mg tablet, Take 1 tablet (40 mg total) by mouth daily, Disp: 90 tablet, Rfl: 3    Potassium Gluconate 595 (99 K) MG TABS, Take 1 each by mouth every other day, Disp: , Rfl:     Probiotic Product (PROBIOTIC-10) CAPS, Take 1 capsule by mouth daily after lunch, Disp: , Rfl:     LORazepam (ATIVAN) 1 mg tablet, Take 1 tablet (1 mg total) by mouth see administration instructions TAKE 1 TABLET 30 MINUTES PRIOR TO MRI, TAKE 2ND TABLET RIGHT BEFORE MRI IF NEEDED (Patient not taking: Reported on 1/8/2024), Disp: 2 tablet, Rfl: 0          Whom besides the patient is providing clinical information about today's encounter?   NO ADDITIONAL HISTORIAN (patient alone provided history)    Physical Exam and Assessment/Plan by Diagnosis:    Chief complaint: Patient is a 84 y/o female present for a routine skin exam with history of non-melanoma skin cancer and no spots of concern for today.    HISTORY OF SQUAMOUS CELL CARCINOMA     Physical Exam:  Anatomic Location Affected:  Left Cheek - 01/2021  Forehead - 2016  Morphological Description of Scar:  well healed  Suspected Recurrence: no  Regional adenopathy: no    Additional History of Present Condition:  Treated squamous cell  "carcinomas    Assessment and Plan:  Based on a thorough discussion of this condition and the management approach to it (including a comprehensive discussion of the known risks, side effects and potential benefits of treatment), the patient (family) agrees to implement the following specific plan:  Monitor for change          MELANOCYTIC NEVI (\"Moles\")    Physical Exam:  Anatomic Location Affected: Mostly on sun-exposed areas of the trunk and extremities  Morphological Description:  Scattered, 1-4mm round to ovoid, symmetrical-appearing, even bordered, skin colored to dark brown macules/papules, mostly in sun-exposed areas  Pertinent Positives:  Pertinent Negatives:    Additional History of Present Condition:  present on exam    Assessment and Plan:  Based on a thorough discussion of this condition and the management approach to it (including a comprehensive discussion of the known risks, side effects and potential benefits of treatment), the patient (family) agrees to implement the following specific plan:  Provided handout with information regarding the ABCDE's of moles   Recommend routine skin exams every year   Sun avoidance, protective clothing (known as UPF clothing), and the use of at least SPF 30 sunscreens is advised. Sunscreen should be reapplied every two hours when outside.     SEBORRHEIC KERATOSIS; NON-INFLAMED    Physical Exam:  Anatomic Location Affected:  scattered across trunk, extremities, face  Morphological Description:  Flat and raised, waxy, smooth to warty textured, yellow to brownish-grey to dark brown to blackish, discrete, \"stuck-on\" appearing papules.  Pertinent Positives:  Pertinent Negatives:    Additional History of Present Condition:  Patient reports new bumps on the skin.  Denies itch, burn, pain, bleeding or ulceration.  Present constantly; nothing seems to make it worse or better.  No prior treatment.      Assessment and Plan:  Based on a thorough discussion of this condition and the " "management approach to it (including a comprehensive discussion of the known risks, side effects and potential benefits of treatment), the patient (family) agrees to implement the following specific plan:  Reassured benign    ANGIOMA (\"CHERRY ANGIOMA\")    Physical Exam:  Anatomic Location: scattered across sun exposed areas of the trunk and extremities   Morphologic Description: Firm red to reddish-blue discrete papules  Pertinent Positives:  Pertinent Negatives:    Additional History of Present Condition:  Present on exam.     Assessment and Plan:  Reassured benign    Scribe Attestation      I,:  Alee Rankin am acting as a scribe while in the presence of the attending physician.:       I,:  Jori Flores MD personally performed the services described in this documentation    as scribed in my presence.:               "

## 2024-01-11 ENCOUNTER — TELEPHONE (OUTPATIENT)
Age: 84
End: 2024-01-11

## 2024-01-11 DIAGNOSIS — N32.81 OAB (OVERACTIVE BLADDER): Primary | ICD-10-CM

## 2024-01-11 RX ORDER — TROSPIUM CHLORIDE 20 MG/1
20 TABLET, FILM COATED ORAL 2 TIMES DAILY
Qty: 180 TABLET | Refills: 2 | Status: SHIPPED | OUTPATIENT
Start: 2024-01-11

## 2024-01-11 NOTE — TELEPHONE ENCOUNTER
Called and spoke to patient. Informed patient new medication was sent to see if it is cheaper. Informed patient she may have to contact insurance company to see what medication would be cheaper if this medication is too expensive

## 2024-01-11 NOTE — TELEPHONE ENCOUNTER
Pt called refill line, she states that insurance upped her cost of:   Myrbetriq 50 MG TB24 [[id number]]    Order Details  Dose: 50 mg Route: Oral Frequency: Daily  (0200)   Dispense Quantity: 90 tablet Refills: 3          Sig: Take 1 tablet (50 mg total) by mouth Daily at 2am       She states that she cannot afford the $300, please advise.

## 2024-01-14 ENCOUNTER — APPOINTMENT (INPATIENT)
Dept: CT IMAGING | Facility: HOSPITAL | Age: 84
DRG: 312 | End: 2024-01-14
Payer: COMMERCIAL

## 2024-01-14 ENCOUNTER — APPOINTMENT (EMERGENCY)
Dept: CT IMAGING | Facility: HOSPITAL | Age: 84
DRG: 312 | End: 2024-01-14
Payer: COMMERCIAL

## 2024-01-14 ENCOUNTER — HOSPITAL ENCOUNTER (INPATIENT)
Facility: HOSPITAL | Age: 84
LOS: 5 days | Discharge: NURSING, REHAB OR LONG-TERM CARE | DRG: 312 | End: 2024-01-19
Attending: EMERGENCY MEDICINE | Admitting: STUDENT IN AN ORGANIZED HEALTH CARE EDUCATION/TRAINING PROGRAM
Payer: COMMERCIAL

## 2024-01-14 DIAGNOSIS — R26.81 UNSTEADY GAIT: ICD-10-CM

## 2024-01-14 DIAGNOSIS — R29.90 STROKE-LIKE SYMPTOMS: ICD-10-CM

## 2024-01-14 DIAGNOSIS — H93.8X1 EAR PRESSURE, RIGHT: ICD-10-CM

## 2024-01-14 DIAGNOSIS — R42 DIZZINESS: Primary | ICD-10-CM

## 2024-01-14 DIAGNOSIS — R55 SYNCOPE: ICD-10-CM

## 2024-01-14 DIAGNOSIS — I48.91 NEW ONSET A-FIB (HCC): ICD-10-CM

## 2024-01-14 LAB
2HR DELTA HS TROPONIN: 3 NG/L
4HR DELTA HS TROPONIN: 8 NG/L
ANION GAP SERPL CALCULATED.3IONS-SCNC: 6 MMOL/L
APTT PPP: 32 SECONDS (ref 23–37)
ATRIAL RATE: 81 BPM
ATRIAL RATE: 91 BPM
BUN SERPL-MCNC: 17 MG/DL (ref 5–25)
CALCIUM SERPL-MCNC: 9.3 MG/DL (ref 8.4–10.2)
CARDIAC TROPONIN I PNL SERPL HS: 13 NG/L
CARDIAC TROPONIN I PNL SERPL HS: 5 NG/L
CARDIAC TROPONIN I PNL SERPL HS: 8 NG/L
CHLORIDE SERPL-SCNC: 104 MMOL/L (ref 96–108)
CO2 SERPL-SCNC: 28 MMOL/L (ref 21–32)
CREAT SERPL-MCNC: 0.7 MG/DL (ref 0.6–1.3)
ERYTHROCYTE [DISTWIDTH] IN BLOOD BY AUTOMATED COUNT: 14.3 % (ref 11.6–15.1)
FLUAV RNA RESP QL NAA+PROBE: NEGATIVE
FLUBV RNA RESP QL NAA+PROBE: NEGATIVE
GFR SERPL CREATININE-BSD FRML MDRD: 79 ML/MIN/1.73SQ M
GLUCOSE SERPL-MCNC: 140 MG/DL (ref 65–140)
GLUCOSE SERPL-MCNC: 145 MG/DL (ref 65–140)
HCT VFR BLD AUTO: 44.4 % (ref 34.8–46.1)
HGB BLD-MCNC: 14.8 G/DL (ref 11.5–15.4)
INR PPP: 0.9 (ref 0.84–1.19)
MCH RBC QN AUTO: 30.1 PG (ref 26.8–34.3)
MCHC RBC AUTO-ENTMCNC: 33.3 G/DL (ref 31.4–37.4)
MCV RBC AUTO: 90 FL (ref 82–98)
P AXIS: 51 DEGREES
P AXIS: 82 DEGREES
PLATELET # BLD AUTO: 398 THOUSANDS/UL (ref 149–390)
PMV BLD AUTO: 9.5 FL (ref 8.9–12.7)
POTASSIUM SERPL-SCNC: 4.3 MMOL/L (ref 3.5–5.3)
PR INTERVAL: 156 MS
PR INTERVAL: 168 MS
PROTHROMBIN TIME: 12.7 SECONDS (ref 11.6–14.5)
QRS AXIS: 62 DEGREES
QRS AXIS: 75 DEGREES
QRSD INTERVAL: 78 MS
QRSD INTERVAL: 86 MS
QT INTERVAL: 402 MS
QT INTERVAL: 404 MS
QTC INTERVAL: 466 MS
QTC INTERVAL: 496 MS
RBC # BLD AUTO: 4.92 MILLION/UL (ref 3.81–5.12)
RSV RNA RESP QL NAA+PROBE: NEGATIVE
SARS-COV-2 RNA RESP QL NAA+PROBE: NEGATIVE
SODIUM SERPL-SCNC: 138 MMOL/L (ref 135–147)
T WAVE AXIS: 77 DEGREES
T WAVE AXIS: 91 DEGREES
VENTRICULAR RATE: 81 BPM
VENTRICULAR RATE: 91 BPM
WBC # BLD AUTO: 6.75 THOUSAND/UL (ref 4.31–10.16)

## 2024-01-14 PROCEDURE — 93005 ELECTROCARDIOGRAM TRACING: CPT

## 2024-01-14 PROCEDURE — 80048 BASIC METABOLIC PNL TOTAL CA: CPT

## 2024-01-14 PROCEDURE — 85027 COMPLETE CBC AUTOMATED: CPT

## 2024-01-14 PROCEDURE — 99285 EMERGENCY DEPT VISIT HI MDM: CPT

## 2024-01-14 PROCEDURE — 99291 CRITICAL CARE FIRST HOUR: CPT | Performed by: PSYCHIATRY & NEUROLOGY

## 2024-01-14 PROCEDURE — G1004 CDSM NDSC: HCPCS

## 2024-01-14 PROCEDURE — 70450 CT HEAD/BRAIN W/O DYE: CPT

## 2024-01-14 PROCEDURE — 70498 CT ANGIOGRAPHY NECK: CPT

## 2024-01-14 PROCEDURE — 93010 ELECTROCARDIOGRAM REPORT: CPT | Performed by: INTERNAL MEDICINE

## 2024-01-14 PROCEDURE — 99291 CRITICAL CARE FIRST HOUR: CPT | Performed by: NURSE PRACTITIONER

## 2024-01-14 PROCEDURE — 36415 COLL VENOUS BLD VENIPUNCTURE: CPT

## 2024-01-14 PROCEDURE — 84484 ASSAY OF TROPONIN QUANT: CPT

## 2024-01-14 PROCEDURE — 0241U HB NFCT DS VIR RESP RNA 4 TRGT: CPT

## 2024-01-14 PROCEDURE — 85610 PROTHROMBIN TIME: CPT

## 2024-01-14 PROCEDURE — 82948 REAGENT STRIP/BLOOD GLUCOSE: CPT

## 2024-01-14 PROCEDURE — 84484 ASSAY OF TROPONIN QUANT: CPT | Performed by: NURSE PRACTITIONER

## 2024-01-14 PROCEDURE — 85730 THROMBOPLASTIN TIME PARTIAL: CPT

## 2024-01-14 PROCEDURE — 70496 CT ANGIOGRAPHY HEAD: CPT

## 2024-01-14 PROCEDURE — 3E03317 INTRODUCTION OF OTHER THROMBOLYTIC INTO PERIPHERAL VEIN, PERCUTANEOUS APPROACH: ICD-10-PCS | Performed by: EMERGENCY MEDICINE

## 2024-01-14 RX ORDER — ASCORBIC ACID 500 MG
1000 TABLET ORAL DAILY
Status: DISCONTINUED | OUTPATIENT
Start: 2024-01-14 | End: 2024-01-19 | Stop reason: HOSPADM

## 2024-01-14 RX ORDER — MELATONIN
5000
Status: DISCONTINUED | OUTPATIENT
Start: 2024-01-14 | End: 2024-01-19 | Stop reason: HOSPADM

## 2024-01-14 RX ORDER — ONDANSETRON 2 MG/ML
INJECTION INTRAMUSCULAR; INTRAVENOUS
Status: COMPLETED
Start: 2024-01-14 | End: 2024-01-14

## 2024-01-14 RX ORDER — CHLORHEXIDINE GLUCONATE ORAL RINSE 1.2 MG/ML
15 SOLUTION DENTAL EVERY 12 HOURS SCHEDULED
Status: DISCONTINUED | OUTPATIENT
Start: 2024-01-14 | End: 2024-01-19 | Stop reason: HOSPADM

## 2024-01-14 RX ORDER — ALBUMIN, HUMAN INJ 5% 5 %
12.5 SOLUTION INTRAVENOUS ONCE
Status: COMPLETED | OUTPATIENT
Start: 2024-01-14 | End: 2024-01-15

## 2024-01-14 RX ORDER — ATORVASTATIN CALCIUM 40 MG/1
40 TABLET, FILM COATED ORAL EVERY EVENING
Status: DISCONTINUED | OUTPATIENT
Start: 2024-01-14 | End: 2024-01-19 | Stop reason: HOSPADM

## 2024-01-14 RX ORDER — ONDANSETRON 2 MG/ML
4 INJECTION INTRAMUSCULAR; INTRAVENOUS EVERY 6 HOURS PRN
Status: DISCONTINUED | OUTPATIENT
Start: 2024-01-14 | End: 2024-01-19 | Stop reason: HOSPADM

## 2024-01-14 RX ORDER — HYDRALAZINE HYDROCHLORIDE 20 MG/ML
10 INJECTION INTRAMUSCULAR; INTRAVENOUS EVERY 4 HOURS PRN
Status: DISCONTINUED | OUTPATIENT
Start: 2024-01-14 | End: 2024-01-19 | Stop reason: HOSPADM

## 2024-01-14 RX ORDER — HYDRALAZINE HYDROCHLORIDE 20 MG/ML
10 INJECTION INTRAMUSCULAR; INTRAVENOUS EVERY 4 HOURS PRN
Status: DISCONTINUED | OUTPATIENT
Start: 2024-01-14 | End: 2024-01-14

## 2024-01-14 RX ORDER — LABETALOL HYDROCHLORIDE 5 MG/ML
10 INJECTION, SOLUTION INTRAVENOUS EVERY 4 HOURS PRN
Status: DISCONTINUED | OUTPATIENT
Start: 2024-01-14 | End: 2024-01-19 | Stop reason: HOSPADM

## 2024-01-14 RX ORDER — PANTOPRAZOLE SODIUM 40 MG/1
40 TABLET, DELAYED RELEASE ORAL DAILY
Status: DISCONTINUED | OUTPATIENT
Start: 2024-01-14 | End: 2024-01-19 | Stop reason: HOSPADM

## 2024-01-14 RX ORDER — LABETALOL HYDROCHLORIDE 5 MG/ML
10 INJECTION, SOLUTION INTRAVENOUS EVERY 4 HOURS PRN
Status: DISCONTINUED | OUTPATIENT
Start: 2024-01-14 | End: 2024-01-14

## 2024-01-14 RX ADMIN — Medication 19 MG: at 11:30

## 2024-01-14 RX ADMIN — ONDANSETRON 4 MG: 2 INJECTION INTRAMUSCULAR; INTRAVENOUS at 13:35

## 2024-01-14 RX ADMIN — B-COMPLEX W/ C & FOLIC ACID TAB 1 TABLET: TAB at 14:05

## 2024-01-14 RX ADMIN — CHLORHEXIDINE GLUCONATE 0.12% ORAL RINSE 15 ML: 1.2 LIQUID ORAL at 20:36

## 2024-01-14 RX ADMIN — ALBUMIN (HUMAN) 12.5 G: 12.5 INJECTION, SOLUTION INTRAVENOUS at 23:25

## 2024-01-14 RX ADMIN — CARBIDOPA AND LEVODOPA 1 TABLET: 25; 100 TABLET ORAL at 17:59

## 2024-01-14 RX ADMIN — CARBIDOPA AND LEVODOPA 1 TABLET: 25; 100 TABLET ORAL at 14:05

## 2024-01-14 RX ADMIN — ATORVASTATIN CALCIUM 40 MG: 40 TABLET, FILM COATED ORAL at 17:59

## 2024-01-14 RX ADMIN — PANTOPRAZOLE SODIUM 40 MG: 40 TABLET, DELAYED RELEASE ORAL at 14:05

## 2024-01-14 RX ADMIN — Medication 5000 UNITS: at 14:05

## 2024-01-14 RX ADMIN — IOHEXOL 85 ML: 350 INJECTION, SOLUTION INTRAVENOUS at 10:49

## 2024-01-14 RX ADMIN — CHLORHEXIDINE GLUCONATE 0.12% ORAL RINSE 15 ML: 1.2 LIQUID ORAL at 14:05

## 2024-01-14 RX ADMIN — OXYCODONE HYDROCHLORIDE AND ACETAMINOPHEN 1000 MG: 500 TABLET ORAL at 14:08

## 2024-01-14 NOTE — PLAN OF CARE
Problem: Neurological Deficit  Goal: Neurological status is stable or improving  Description: Interventions:  - Monitor and assess patient's level of consciousness, motor function, sensory function, and level of assistance needed for ADLs.   - Monitor and report changes from baseline. Collaborate with interdisciplinary team to initiate plan and implement interventions as ordered.   - Provide and maintain a safe environment.  - Consider seizure precautions.  - Consider fall precautions.  - Consider aspiration precautions.  - Consider bleeding precautions.  Outcome: Progressing     Problem: Activity Intolerance/Impaired Mobility  Goal: Mobility/activity is maintained at optimum level for patient  Description: Interventions:  - Assess and monitor patient  barriers to mobility and need for assistive/adaptive devices.  - Assess patient's emotional response to limitations.  - Collaborate with interdisciplinary team and initiate plans and interventions as ordered.  - Encourage independent activity per ability.  - Maintain proper body alignment.  - Perform active/passive rom as tolerated/ordered.  - Plan activities to conserve energy.  - Turn patient as appropriate  Outcome: Progressing     Problem: Communication Impairment  Goal: Ability to express needs and understand communication  Description: Assess patient's communication skills and ability to understand information.  Patient will demonstrate use of effective communication techniques, alternative methods of communication and understanding even if not able to speak.     - Encourage communication and provide alternate methods of communication as needed.  - Collaborate with case management/ for discharge needs.  - Include patient/family/caregiver in decisions related to communication.  Outcome: Progressing     Problem: Potential for Aspiration  Goal: Non-ventilated patient's risk of aspiration is minimized  Description: Assess and monitor vital signs,  respiratory status, and labs (WBC).  Monitor for signs of aspiration (tachypnea, cough, rales, wheezing, cyanosis, fever).    - Assess and monitor patient's ability to swallow.  - Place patient up in chair to eat if possible.  - HOB up at 90 degrees to eat if unable to get patient up into chair.  - Supervise patient during oral intake.   - Instruct patient/ family to take small bites.  - Instruct patient/ family to take small single sips when taking liquids.  - Follow patient-specific strategies generated by speech pathologist.  Outcome: Progressing  Goal: Ventilated patient's risk of aspiration is minimized  Description: Assess and monitor vital signs, respiratory status, airway cuff pressure, and labs (WBC).  Monitor for signs of aspiration (tachypnea, cough, rales, wheezing, cyanosis, fever).    - Elevate head of bed 30 degrees if patient has tube feeding.  - Monitor tube feeding.  Outcome: Progressing     Problem: Nutrition  Goal: Nutrition/Hydration status is improving  Description: Monitor and assess patient's nutrition/hydration status for malnutrition (ex- brittle hair, bruises, dry skin, pale skin and conjunctiva, muscle wasting, smooth red tongue, and disorientation). Collaborate with interdisciplinary team and initiate plan and interventions as ordered.  Monitor patient's weight and dietary intake as ordered or per policy. Utilize nutrition screening tool and intervene per policy. Determine patient's food preferences and provide high-protein, high-caloric foods as appropriate.     - Assist patient with eating.  - Allow adequate time for meals.  - Encourage patient to take dietary supplement as ordered.  - Collaborate with clinical nutritionist.  - Include patient/family/caregiver in decisions related to nutrition.  Outcome: Progressing

## 2024-01-14 NOTE — CASE MANAGEMENT
Case Management Assessment & Discharge Planning Note    Patient name Radha Vidal  Location / MRN [id number]  : 1940 Date 2024       Current Admission Date: 2024  Current Admission Diagnosis:Stroke-like symptoms   Patient Active Problem List    Diagnosis Date Noted    Stroke-like symptoms 2024    New onset a-fib (HCC) 2024    s/p distal pancreatectomy/splenectomy 2023    Encounter for geriatric assessment 2023    Urge incontinence 2021    Age-related osteoporosis without current pathological fracture 2021    Closed compression fracture of body of L1 vertebra (HCC) 2020    Cerebrovascular disease 2020    IPMN (intraductal papillary mucinous neoplasm) 2020    Trochanteric bursitis, right hip 2020    Tremor 2020    Chronic idiopathic constipation 2019    History of adenomatous polyp of colon 2019    Osteopenia 2019    Gastro-esophageal reflux disease without esophagitis 2019    Dysphagia 2019    Multiple thyroid nodules 2019    Menopause ovarian failure 2019    Vitamin D deficiency 2019    Mitral valve disorder 2018    Mood disturbance 2018    Obesity (BMI 30-39.9) 2018    Claustrophobia 2018    Impaired fasting glucose 2018    Seasonal allergic rhinitis 2018    History of skin cancer 2018    Parkinson's disease 2018    Primary insomnia 2018    Cherry angioma 11/15/2017    Postablative hypothyroidism 2016    Dyspnea on exertion 10/02/2015    OAB (overactive bladder) 2015    Sleep apnea 04/15/2014      LOS (days): 0  Geometric Mean LOS (GMLOS) (days):   Days to GMLOS:     OBJECTIVE:    Risk of Unplanned Readmission Score: 8.58         Current admission status: Inpatient       Preferred Pharmacy:   [facility] - [place], [place] - [address] [place] [address]  [address]  Phone: [phone] Fax: [phone]    [facility] - [place] - [address]  [address]  [address]  [place] [address]  Phone: [phone] Fax: [phone]    [facility] - [place] - [address]  [address]  [place] [address]  Phone: [phone] Fax: [phone]    Primary Care Provider: Hoang Tovar MD    Primary Insurance: [employer]  [employer]  Secondary Insurance:     ASSESSMENT:  Active Health Care Proxies       Melba Vidal Health Care Agent - Daughter   Primary Phone: [phone] (Mobile)                 Advance Directives  Does patient have a Health Care POA?: Yes  Does patient have Advance Directives?: Yes  Advance Directives: Living will, Power of  for health care  Primary Contact: daughter Melba         Readmission Root Cause  30 Day Readmission: No    Patient Information  Admitted from:: Home  Mental Status: Alert  During Assessment patient was accompanied by: Not accompanied during assessment  Assessment information provided by:: Patient  Primary Caregiver: Self  Support Systems: Daughter  County of Residence: [place]  What city do you live in?: [place]  Home entry access options. Select all that apply.: Stairs  Number of steps to enter home.: 5  Do the steps have railings?: Yes  Type of Current Residence: [facility]  Living Arrangements: Lives Alone  Is patient a ?: No    Activities of Daily Living Prior to Admission  Functional Status: Independent  Completes ADLs independently?: Yes  Ambulates independently?: Yes  Does patient use assisted devices?: No  Does patient currently own DME?: No  Does patient have a history of Outpatient Therapy (PT/OT)?: No  Does the patient have a history of Short-Term Rehab?: No  Does patient have a history of HHC?: Yes  Does patient currently have HHC?: No         Patient Information Continued  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)  Does patient have prescription coverage?: Yes  Does patient receive dialysis treatments?: No  Does patient have a history of substance abuse?: No  Does patient have a history of Mental Health Diagnosis?: No    PHQ 2/9 Screening   Reviewed PHQ 2/9 Depression Screening Score?: No    Means of Transportation  Means of Transport to Appts:: Drives Self      Housing Stability: Low Risk  (1/14/2024)    Housing Stability Vital Sign     Unable to Pay for Housing in the Last Year: No     Number of Places Lived in the Last Year: 1     Unstable Housing in the Last Year: No   Food Insecurity: No Food Insecurity (1/14/2024)    Hunger Vital Sign     Worried About Running Out of Food in the Last Year: Never true     Ran Out of Food in the Last Year: Never true   Transportation Needs: No Transportation Needs (1/14/2024)    PRAPARE - Transportation     Lack of Transportation (Medical): No     Lack of Transportation (Non-Medical): No   Utilities: Not At Risk (1/14/2024)    [facility] Utilities     Threatened with loss of utilities: No       DISCHARGE DETAILS:    Discharge planning discussed with:: patient  Freedom of Choice: Yes  Comments - Freedom of Choice: CM discussed d/c needs including acute rehab and VNA services.  Pt is IPTA.  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  Pt drives.  She does not feel that OP services will be needed.  Will wait for PT/OT's recommendations  CM contacted family/caregiver?: No- see comments (pt will update her daughter Melba herself)  Were Treatment Team discharge recommendations reviewed with patient/caregiver?: Yes  Did patient/caregiver verbalize understanding of patient care needs?: Yes  Were patient/caregiver advised of the risks associated with not following Treatment Team discharge recommendations?: Yes    Contacts  Patient Contacts: Melba  Relationship to Patient:: Family    Requested Home Health Care         Is the patient interested in HHC at discharge?:  No    DME Referral Provided  Referral made for DME?: No    Other Referral/Resources/Interventions Provided:  Referral Comments: No anticipated d/c needs    Would you like to participate in our [facility] service program?  : No - Declined    Treatment Team Recommendation: Home  Discharge Destination Plan:: Home  Transport at Discharge : Family

## 2024-01-14 NOTE — ASSESSMENT & PLAN NOTE
84 y.o. female with Parkinson's disease on Sinemet who presented to [facility] ED on 1/14/2024 as a stroke alert with acute onset of vertigo/dizziness and gait imbalance with new inability to ambulate.     BP on presentation 162/88.  NIHSS was 0.  CT head and CTA head and neck unremarkable for acute acute abnormalities or large vessel occlusion/critical stenosis.  Given inability to ambulate being a new functional deficits, decision was made to administer IV TNK.    Workup:  - Initial CT head: Unremarkable for acute intracranial abnormalities  - CTA head and neck: Unremarkable for large vessel occlusion/critical stenosis  - Repeat CT head for headache s/p IV TNK: Unremarkable for acute intracranial abnormalities  - MRI brain: White matter changes suggestive of chronic microangiopathy.  No acute intracranial pathology.   - Echo: EF 60%. No regional wall motion abnormality. Left atrium mildly dilated. Right atrium size normal.  - Hemoglobin A1c: Elevated 6.7  - Lipid panel: Total cholesterol 180, triglycerides 67, HDL 79, LDL 88    Patient does report similar episodes of dizziness in the past, however this episode more severe and longer in duration, favoring peripheral vestibulopathy at possible etiology in the setting of history of myringotomy with tube on the right (12/6/2023).  Other considerations include orthostatic hypotension in the setting of PD with suspected autonomic dysfunction as patient's BPs throughout admission appear to have been labile.    Plan:  - No need to continue aspirin or statin from neuro standpoint.  - Goal normotension, euglycemia, normothermia   - Telemetry  - Cardiology following and recommending initiation of AC in the setting of new onset Afib PTA  - PT/OT/Speech   - Recommend compression stockings for orthostatic hypotension  - Recommend follow up with ENT outpatient  - Monitor neuro exam; notify with any changes  - Medical management and supportive care per primary team. Correction of any  metabolic or infectious disturbances.   - No further inpatient neurology recommendations at this time. Please call with any questions.

## 2024-01-14 NOTE — ASSESSMENT & PLAN NOTE
Will hold home dose sanctura   Will utilize purwik while in on bedrest   Hold on yanez catheter placement given TNK

## 2024-01-14 NOTE — ASSESSMENT & PLAN NOTE
Noted by EMS en route  No history of afib per patient  Stroke like symptoms may be cardio-embolic etiology   Will need cardiology consultation,may need halter monitor   Will continue to monitor on telemetry while in patient  Echo per stroke protocol

## 2024-01-14 NOTE — ED PROVIDER NOTES
History  Chief Complaint   Patient presents with    Dizziness     After doing laundry patient felt dizzy fell back into chair, Pt not sure if she consciuosness     Patient is an 84-year-old female with a past medical history of GERD, claustrophobia, skin cancer, anxiety presenting to the emergency department for evaluation of dizziness.  Patient states this morning she was carrying laundry to her bed when she became dizzy.  Patient states she held onto the dresser next to her and tried to get herself into the nearby chair.  Patient states she was unable to get to the chair effectively and fell into the chair.  Patient states she is unsure if she lost consciousness, believes she did.  This was an unwitnessed incident.  Patient states at that time she did feel like the room was spinning.  Patient states on presentation she feels lightheaded, denies headache or visual change.  Patient states she does have right-sided jaw numbness but states that has been ongoing for the past month and was recently diagnosed with TMJ disorder.  Patient states he feels unsteady when walking, daughter at bedside states that she does not appear steady on her feet.  Patient offers no other concerns or complaints at this time.  Patient denies being on any blood thinners or aspirin daily.        Prior to Admission Medications   Prescriptions Last Dose Informant Patient Reported? Taking?   Ascorbic Acid (VITAMIN C) 1000 MG tablet 1/13/2024 Self Yes Yes   Sig: Take 1,000 mg by mouth daily   B Complex Vitamins (B COMPLEX 100 PO) 1/13/2024 Self Yes Yes   Sig: Take 1 capsule by mouth daily after lunch   Coenzyme Q10 (CO Q 10 PO) 1/13/2024 Self Yes Yes   Sig: Take 1 capsule by mouth daily after lunch 600 mg BID   Cyanocobalamin (VITAMIN B 12 PO) 1/13/2024 Self Yes Yes   Sig: Take 1 capsule by mouth daily   LORazepam (ATIVAN) 1 mg tablet Not Taking Self No No   Sig: Take 1 tablet (1 mg total) by mouth see administration instructions TAKE 1 TABLET 30  MINUTES PRIOR TO MRI, TAKE 2ND TABLET RIGHT BEFORE MRI IF NEEDED   Patient not taking: Reported on 2024   Omega-3 350 MG CPDR 2024 Self Yes Yes   Sig: Take 1 capsule by mouth daily in the early morning   Potassium Gluconate 595 (99 K) MG TABS 2024 Self Yes Yes   Sig: Take 1 each by mouth every other day   Probiotic Product (PROBIOTIC-10) CAPS 2024 Self Yes Yes   Sig: Take 1 capsule by mouth daily after lunch   calcium carbonate (OS-JOHN) 1250 (500 Ca) MG tablet 2024 Self Yes Yes   Sig: Take 1 tablet by mouth daily after lunch   carbidopa-levodopa (SINEMET)  mg per tablet 2024 Self No Yes   Sig: Take 1 tablet by mouth 3 (three) times a day before meals   cholecalciferol (VITAMIN D3) 1,000 units tablet 2024 Self Yes Yes   Sig: Take 5,000 Units by mouth daily after lunch   multivitamin (THERAGRAN) TABS 2024 Self Yes Yes   Sig: Take 1 tablet by mouth every morning   pantoprazole (PROTONIX) 40 mg tablet 2024  No Yes   Sig: Take 1 tablet (40 mg total) by mouth daily   trospium chloride (SANCTURA) 20 mg tablet 2024  No Yes   Sig: Take 1 tablet (20 mg total) by mouth 2 (two) times a day      Facility-Administered Medications: None       Past Medical History:   Diagnosis Date    Actinic keratosis 2016    Acute midline low back pain without sciatica 2020    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     Benign neoplasm of skin     Cancer (HCC)     skin    Chest pain     Claustrophobia     Diverticulitis     Diverticulitis     Fracture     L1-L2    GERD (gastroesophageal reflux disease)     H. pylori infection 2020    Muscle weakness     Nonmelanoma skin cancer     last assessed 2017    Palpitations     Pancreatic cyst     Seasonal allergies     Seborrheic keratosis 2014    Sleep apnea     no cpap    Spontaneous      without mention of complications     Squamous cell carcinoma of forehead 2014    Syncope         Past Surgical History:   Procedure Laterality Date    ABDOMINAL ADHESION SURGERY N/A 07/31/2023    Procedure: LYSIS ADHESIONS;  Surgeon: Kyle Julien MD;  [facility];  Service: Surgical Oncology    BOTOX INJECTION N/A 03/06/2017    Procedure: CYSTOSCOPY; BLADDER BOTOX 100 UNITS ;  Surgeon: Juan Edward MD;  [facility];  Service:     BOWEL RESECTION      related ot diverticulitis    CARDIAC CATHETERIZATION      CARPAL TUNNEL RELEASE Right     COLONOSCOPY  07/31/2019    COLOSTOMY      COLOSTOMY CLOSURE      CYSTOSCOPY  06/01/2021    DISTAL PANCREATECTOMY N/A 07/31/2023    Procedure: DISTAL PANCREATECTOMY;  Surgeon: Kyle Julien MD;  [facility];  Service: Surgical Oncology    FOOT SURGERY Left     neuroma    HYSTERECTOMY      MYRINGOTOMY W/ TUBES Right 12/06/2023    office procedure - Dr. Grace    NASAL SINUS SURGERY  age 39    [place]    PANCREATIC CYST BIOPSY  07/31/2023    WA CYSTOURETHROSCOPY N/A 04/13/2018    Procedure: CYSTOSCOPY WITH BOTOX;  Surgeon: Juan Edward MD;  [facility];  Service: Urology    WA ESOPHAGOGASTRODUODENOSCOPY TRANSORAL DIAGNOSTIC N/A 04/23/2019    Procedure: ESOPHAGOGASTRODUODENOSCOPY (EGD);  Surgeon: Edu Dickerson DO;  Location: [facility];  Service: Gastroenterology    SPLENECTOMY, TOTAL N/A 07/31/2023    Procedure: SPLENECTOMY;  Surgeon: Kyle Julien MD;  [facility];  Service: Surgical Oncology    TONSILLECTOMY  age 49    UPPER GASTROINTESTINAL ENDOSCOPY  04/23/2019       Family History   Problem Relation Age of Onset    Alcohol abuse Mother     Heart disease Mother     Alcohol abuse Father     Alcohol abuse Brother     Cancer Brother     Tremor Neg Hx     Parkinsonism Neg Hx     Colon cancer Neg Hx      I have reviewed and agree with the history as documented.    E-Cigarette/Vaping    E-Cigarette Use Never User      E-Cigarette/Vaping Substances    Nicotine No     THC No     CBD No     Flavoring No     Other No      Unknown No      Social History     Tobacco Use    Smoking status: Never     Passive exposure: Past    Smokeless tobacco: Never   Vaping Use    Vaping status: Never Used   Substance Use Topics    Alcohol use: Yes     Comment: patient states rare use on holidays     Drug use: No       Review of Systems   Constitutional:  Negative for chills and fever.   HENT:  Negative for ear pain and sore throat.    Eyes:  Negative for pain and visual disturbance.   Respiratory:  Negative for cough and shortness of breath.    Cardiovascular:  Negative for chest pain and palpitations.   Gastrointestinal:  Negative for abdominal pain and vomiting.   Genitourinary:  Negative for dysuria and hematuria.   Musculoskeletal:  Positive for gait problem. Negative for arthralgias and back pain.   Skin:  Negative for color change and rash.   Neurological:  Positive for dizziness and syncope. Negative for seizures.   All other systems reviewed and are negative.      Physical Exam  Physical Exam  Vitals and nursing note reviewed.   Constitutional:       General: She is not in acute distress.     Appearance: Normal appearance. She is not toxic-appearing or diaphoretic.   HENT:      Head: Normocephalic and atraumatic.      Right Ear: External ear normal.      Left Ear: External ear normal.      Nose: Nose normal.      Mouth/Throat:      Mouth: Mucous membranes are moist.   Eyes:      General: No scleral icterus.        Right eye: No discharge.         Left eye: No discharge.      Conjunctiva/sclera: Conjunctivae normal.   Pulmonary:      Effort: Pulmonary effort is normal. No respiratory distress.   Musculoskeletal:         General: No swelling, deformity or signs of injury. Normal range of motion.      Cervical back: Normal range of motion and neck supple. No rigidity.   Skin:     General: Skin is warm and dry.      Coloration: Skin is not jaundiced.      Findings: No erythema or rash.   Neurological:      General: No focal deficit present.       Mental Status: She is alert and oriented to person, place, and time. Mental status is at baseline.      GCS: GCS eye subscore is 4. GCS verbal subscore is 5. GCS motor subscore is 6.      Cranial Nerves: Cranial nerves 2-12 are intact. No cranial nerve deficit.      Sensory: Sensation is intact.      Motor: Motor function is intact.      Coordination: Coordination is intact.      Comments: Patient does appear unsteady on exam with walking.  States she is feeling lightheaded on exam when walking.  Patient is taking small guarded steps and needs 2 hand-held assist.  Patient states at baseline she does not walk with any assistance.   Psychiatric:         Mood and Affect: Mood normal.         Behavior: Behavior normal.         Thought Content: Thought content normal.         Judgment: Judgment normal.         Vital Signs  ED Triage Vitals   Temperature Pulse Respirations Blood Pressure SpO2   01/14/24 1004 01/14/24 1004 01/14/24 1004 01/14/24 1004 01/14/24 1004   98.2 °F (36.8 °C) 77 18 162/88 97 %      Temp Source Heart Rate Source Patient Position - Orthostatic VS BP Location FiO2 (%)   01/14/24 1300 01/14/24 1015 01/14/24 1004 01/14/24 1004 --   Oral Monitor Lying Left arm       Pain Score       01/14/24 1004       No Pain           Vitals:    01/14/24 1415 01/14/24 1430 01/14/24 1445 01/14/24 1500   BP: 164/67 160/70 142/65 146/60   Pulse: 80 84 74 82   Patient Position - Orthostatic VS:    Lying         Visual Acuity  Visual Acuity      Flowsheet Row Most Recent Value   L Pupil Size (mm) 3   R Pupil Size (mm) 3   L Pupil Shape Round   R Pupil Shape Round            ED Medications  Medications   ascorbic acid (VITAMIN C) tablet 1,000 mg (1,000 mg Oral Given 1/14/24 1408)   carbidopa-levodopa (SINEMET)  mg per tablet 1 tablet (1 tablet Oral Given 1/14/24 1405)   cholecalciferol (VITAMIN D3) tablet 5,000 Units (5,000 Units Oral Given 1/14/24 1405)   multivitamin stress formula tablet 1 tablet (1 tablet Oral  Given 1/14/24 1405)   pantoprazole (PROTONIX) EC tablet 40 mg (40 mg Oral Given 1/14/24 1405)   chlorhexidine (PERIDEX) 0.12 % oral rinse 15 mL (15 mL Mouth/Throat Given 1/14/24 1405)   atorvastatin (LIPITOR) tablet 40 mg (has no administration in time range)   hydrALAZINE (APRESOLINE) injection 10 mg (has no administration in time range)   labetalol (NORMODYNE) injection 10 mg (has no administration in time range)   ondansetron (ZOFRAN) injection 4 mg (4 mg Intravenous Given 1/14/24 1335)   iohexol (OMNIPAQUE) 350 MG/ML injection (MULTI-DOSE) 85 mL (85 mL Intravenous Given 1/14/24 1049)   tenecteplase (TNKase) injection 19 mg (19 mg Intravenous Given 1/14/24 1130)       Diagnostic Studies  Results Reviewed       Procedure Component Value Units Date/Time    HS Troponin I 4hr [[id number]] Collected: 01/14/24 1615    Lab Status: In process Specimen: Blood from Arm, Right Updated: 01/14/24 1627    HS Troponin I 2hr [[id number]]  (Normal) Collected: 01/14/24 1219    Lab Status: Final result Specimen: Blood from Arm, Right Updated: 01/14/24 1250     hs TnI 2hr 8 ng/L      Delta 2hr hsTnI 3 ng/L     FLU/RSV/COVID - if FLU/RSV clinically relevant [[id number]]  (Normal) Collected: 01/14/24 1038    Lab Status: Final result Specimen: Nares from Nose Updated: 01/14/24 1122     SARS-CoV-2 Negative     INFLUENZA A PCR Negative     INFLUENZA B PCR Negative     RSV PCR Negative    Narrative:      FOR PEDIATRIC PATIENTS - copy/paste COVID Guidelines URL to browser: [email or web address]    SARS-CoV-2 assay is a Nucleic Acid Amplification assay intended for the  qualitative detection of nucleic acid from SARS-CoV-2 in nasopharyngeal  swabs. Results are for the presumptive identification of SARS-CoV-2 RNA.    Positive results are indicative of infection with SARS-CoV-2, the virus  causing COVID-19, but do not rule out bacterial infection or co-infection  with other viruses.  Laboratories within the [place] and its  territories are required to report all positive results to the appropriate  public health authorities. Negative results do not preclude SARS-CoV-2  infection and should not be used as the sole basis for treatment or other  patient management decisions. Negative results must be combined with  clinical observations, patient history, and epidemiological information.  This test has not been FDA cleared or approved.    This test has been authorized by FDA under an Emergency Use Authorization  (EUA). This test is only authorized for the duration of time the  declaration that circumstances exist justifying the authorization of the  emergency use of an in vitro diagnostic tests for detection of SARS-CoV-2  virus and/or diagnosis of COVID-19 infection under section 564(b)(1) of  the Act, 21 U.S.C. 360bbb-3(b)(1), unless the authorization is terminated  or revoked sooner. The test has been validated but independent review by FDA  and CLIA is pending.    Test performed using [employer] GeneXpert: This RT-PCR assay targets N2,  a region unique to SARS-CoV-2. A conserved region in the E-gene was chosen  for pan-Sarbecovirus detection which includes SARS-CoV-2.    According to CMS-2020-01-R, this platform meets the definition of high-throughput technology.    HS Troponin 0hr (reflex protocol) [[id number]]  (Normal) Collected: 01/14/24 1038    Lab Status: Final result Specimen: Blood from Arm, Right Updated: 01/14/24 1116     hs TnI 0hr 5 ng/L     Basic metabolic panel [[id number]]  (Abnormal) Collected: 01/14/24 1038    Lab Status: Final result Specimen: Blood from Arm, Right Updated: 01/14/24 1107     Sodium 138 mmol/L      Potassium 4.3 mmol/L      Chloride 104 mmol/L      CO2 28 mmol/L      ANION GAP 6 mmol/L      BUN 17 mg/dL      Creatinine 0.70 mg/dL      Glucose 145 mg/dL      Calcium 9.3 mg/dL      eGFR 79 ml/min/1.73sq m     Narrative:      [employer] Kid[employer]  guidelines for Chronic Kidney Disease (CKD):     Stage 1 with normal or high GFR (GFR > 90 mL/min/1.73 square meters)    Stage 2 Mild CKD (GFR = 60-89 mL/min/1.73 square meters)    Stage 3A Moderate CKD (GFR = 45-59 mL/min/1.73 square meters)    Stage 3B Moderate CKD (GFR = 30-44 mL/min/1.73 square meters)    Stage 4 Severe CKD (GFR = 15-29 mL/min/1.73 square meters)    Stage 5 End Stage CKD (GFR <15 mL/min/1.73 square meters)  Note: GFR calculation is accurate only with a steady state creatinine    Protime-INR [[id number]]  (Normal) Collected: 01/14/24 1038    Lab Status: Final result Specimen: Blood from Arm, Right Updated: 01/14/24 1101     Protime 12.7 seconds      INR 0.90    APTT [[id number]]  (Normal) Collected: 01/14/24 1038    Lab Status: Final result Specimen: Blood from Arm, Right Updated: 01/14/24 1101     PTT 32 seconds     CBC and Platelet [[id number]]  (Abnormal) Collected: 01/14/24 1038    Lab Status: Final result Specimen: Blood from Arm, Right Updated: 01/14/24 1045     WBC 6.75 Thousand/uL      RBC 4.92 Million/uL      Hemoglobin 14.8 g/dL      Hematocrit 44.4 %      MCV 90 fL      MCH 30.1 pg      MCHC 33.3 g/dL      RDW 14.3 %      Platelets 398 Thousands/uL      MPV 9.5 fL     Fingerstick Glucose (POCT) [[id number]]  (Normal) Collected: 01/14/24 0959    Lab Status: Final result Updated: 01/14/24 1001     POC Glucose 140 mg/dl                    CT head wo contrast   Final Result by Apolinar Goel MD (01/14 1408)         1. No acute intracranial hemorrhage.   2. No large evolving territorial infarction.   3. Mild, chronic microangiopathy is similar to the CT from earlier today                  Workstation performed: [id number]         CTA stroke alert (head/neck)   Final Result by Arsalan Lindsey MD (01/14 1113)      1. CTA head: Negative for large vessel intracranial occlusion or hemodynamically significant stenosis.      2. CTA neck:  No extracranial carotid stenosis.  The cervical  vertebral arteries are patent.      Subcentimeter thyroid nodules.      Findings were directly discussed with Dr. Jamal Ferreira at 11:10 a.m.                           Workstation performed: [id number]         CT stroke alert brain   Final Result by Arsalan Lindsey MD (01/14 1113)      No acute intracranial abnormality.      Findings were directly discussed with Dr. Jamal Ferreira at 11:10 a.m.         Workstation performed: [id number]         CT head wo contrast    (Results Pending)   MRI Inpatient Order    (Results Pending)   CT head wo contrast    (Results Pending)              Procedures  Procedures         ED Course             HEART Risk Score      Flowsheet Row Most Recent Value   Heart Score Risk Calculator    History 1 Filed at: 01/14/2024 1616   ECG 1 Filed at: 01/14/2024 1616   Age 2 Filed at: 01/14/2024 1616   Risk Factors 1 Filed at: 01/14/2024 1616   Troponin 0 Filed at: 01/14/2024 1616   HEART Score 5 Filed at: 01/14/2024 1616              SBIRT 22yo+      Flowsheet Row Most Recent Value   Initial Alcohol Screen: US AUDIT-C     1. How often do you have a drink containing alcohol? 0 Filed at: 01/14/2024 1012   2. How many drinks containing alcohol do you have on a typical day you are drinking?  0 Filed at: 01/14/2024 1012   3b. FEMALE Any Age, or MALE 65+: How often do you have 4 or more drinks on one occassion? 0 Filed at: 01/14/2024 1012   Audit-C Score 0 Filed at: 01/14/2024 1012   MELIA: How many times in the past year have you...    Used an illegal drug or used a prescription medication for non-medical reasons? Never Filed at: 01/14/2024 1012                      Medical Decision Making    This is a 84-year-old female presenting to the emergency department for evaluation of dizziness.  Patient states this morning she had sudden onset sensation of the room spinning.  Patient states she believes she then had a syncopal episode when landing into a chair.  Patient states she continues to be lightheaded  on presentation.  Patient states she is having difficulty ambulating due to feeling unsteady in her gait.  Daughter states she does appear unsteady when walking.  Patient is in no acute distress with stable vital signs on initial examination.    Differential diagnosis to include but is not limited to: Stroke, electrolyte abnormality, electrolyte abnormality, ACS, STEMI, arrhythmia    Initial ED Plan: imaging, labs    ED results:  1. CTA head: Negative for large vessel intracranial occlusion or hemodynamically significant stenosis.  2. CTA neck:  No extracranial carotid stenosis.  The cervical vertebral arteries are patent.  Subcentimeter thyroid nodules.    0 hour troponin: 5  Heart score: 5    Discussed with Dr. Ferreira, who discussed TNK with the patient and daughter. They would like to proceed with TNK, consent obtained by Dr. Ferreira. TNK ordered.     Final ED assessment: Patient is stable and well appearing. Discussed radiologic studies and laboratory results. Patient verbalized understanding and is agreeable with the plan for admission. Discussed with Dr. Tracey, inpatient ICU, bridging orders placed.      Amount and/or Complexity of Data Reviewed  Labs: ordered.  Radiology: ordered.    Risk  Prescription drug management.  Decision regarding hospitalization.             Disposition  Final diagnoses:   Dizziness   Unsteady gait   Syncope     Time reflects when diagnosis was documented in both MDM as applicable and the Disposition within this note       Time User Action Codes Description Comment    1/14/2024 10:35 AM Graciela Cadena Add [R42] Dizziness     1/14/2024 10:35 AM Graciela Cadena Add [R26.81] Unsteady gait     1/14/2024 10:35 AM Graciela Cadena [R55] Syncope     1/14/2024 12:16 PM Gisele Holland Add [R29.90] Stroke-like symptoms     1/14/2024  1:09 PM Gisele Holland Add [I48.91] New onset a-fib (HCC)           ED Disposition       ED Disposition   Admit    Condition   Stable    Date/Time    Sun Jan 14, 2024 1142    Comment   Case was discussed with Dr. Tracey and the patient's admission status was agreed to be Admission Status: inpatient status to the service of Dr. Tracey .               Follow-up Information    None         Current Discharge Medication List        CONTINUE these medications which have NOT CHANGED    Details   Ascorbic Acid (VITAMIN C) 1000 MG tablet Take 1,000 mg by mouth daily      B Complex Vitamins (B COMPLEX 100 PO) Take 1 capsule by mouth daily after lunch      calcium carbonate (OS-JOHN) 1250 (500 Ca) MG tablet Take 1 tablet by mouth daily after lunch      carbidopa-levodopa (SINEMET)  mg per tablet Take 1 tablet by mouth 3 (three) times a day before meals  Qty: 270 tablet, Refills: 3    Associated Diagnoses: Parkinson's disease; Tremor      cholecalciferol (VITAMIN D3) 1,000 units tablet Take 5,000 Units by mouth daily after lunch      Coenzyme Q10 (CO Q 10 PO) Take 1 capsule by mouth daily after lunch 600 mg BID      Cyanocobalamin (VITAMIN B 12 PO) Take 1 capsule by mouth daily      multivitamin (THERAGRAN) TABS Take 1 tablet by mouth every morning      Omega-3 350 MG CPDR Take 1 capsule by mouth daily in the early morning      pantoprazole (PROTONIX) 40 mg tablet Take 1 tablet (40 mg total) by mouth daily  Qty: 90 tablet, Refills: 3    Associated Diagnoses: Dysphagia, unspecified type      Potassium Gluconate 595 (99 K) MG TABS Take 1 each by mouth every other day      Probiotic Product (PROBIOTIC-10) CAPS Take 1 capsule by mouth daily after lunch      trospium chloride (SANCTURA) 20 mg tablet Take 1 tablet (20 mg total) by mouth 2 (two) times a day  Qty: 180 tablet, Refills: 2    Associated Diagnoses: OAB (overactive bladder)      LORazepam (ATIVAN) 1 mg tablet Take 1 tablet (1 mg total) by mouth see administration instructions TAKE 1 TABLET 30 MINUTES PRIOR TO MRI, TAKE 2ND TABLET RIGHT BEFORE MRI IF NEEDED  Qty: 2 tablet, Refills: 0    Associated Diagnoses:  IPMN (intraductal papillary mucinous neoplasm)             No discharge procedures on file.    PDMP Review         Value Time User    PDMP Reviewed  Yes 11/13/2023 12:33 PM Kyle Julien MD            ED Provider  Electronically Signed by             Graciela Cadena PA-C  01/14/24 0159

## 2024-01-14 NOTE — CONSULTS
Consultation - Stroke   Radha Vidal 84 y.o. female MRN: [id number]  Unit/Bed#: ED 29 Encounter: [id number]      Assessment/Plan     * Stroke-like symptoms  Assessment & Plan  83 y/o F with Parkinson's on Sinemet that presents with acute onset of vertigo/dizziness and gait imbalance with new inability to ambulate concerning for stroke s/p TNK. This this may represent a peripheral vestibulopathy, her very high BP and question of new diagnosis of Afib along with her significant ambulatory dysfunction does raise the suspicion for a central etiology. Differential would include hypertensive encephalopathy.    -continue neurochecks; notify with changes  -HCT reviewed - no evidence of acute ischemia/hemorrhage  -CTA reviewed - no significant vascular abnormality  -obtain MRI brain w/o contrast  -obtain TTE  -telemetry; would confirm EMS's read of possible Afib  -obtain 24 hr CT - if clear, can initiate ASA and DVT ppx  -as I do not suspect an atherosclerotic etiology and limited benefit given her age, can defer statin at this time  -check lipid panel/A1c  -permissive HTN up to 220/110; if pt returns to baseline or MRI negative can make goal normotension with gradual reduction especially as pt is prone to orthostasis  -will follow results; call with questions      Thrombolytic Decision: After a discussion of risks, benefits and alternatives (including best medical therapy excluding thrombolysis) reviewing inclusion and exclusion criteria the decision was made to proceed with thrombolytic therapy. Verbal consent was obtained from the patient..  Consent was obtained bymyself.    Recommendations for outpatient neurological follow up have yet to be determined.      Reason for Consult / Principal Problem: stroke alert  Hx and PE limited by: N/A  Patient last known well: 8am 1/14  Stroke alert called: 10:39am 1/14  Neurology time of arrival: immediate    HPI: Radha Vidal is a 84 y.o. female with Parkinson's disease on Sinemet  who presents with onset of vertigo/dizziness and gait difficulty. Pt states that she was in her usual state of health at 8am when she suddenly felt very dizzy, which she describes as a spinning sensation, that caused her to fall. She feels like she lost consciousness briefly but came back to. Daughter noticed that pt was unable to walk and she was brought to the ED. Here, NIHSS is 0 without any overt deficits. Pt reports she has chronic R sided facial numbness. She admits that she is having a lot of difficulty walking now whereas she would be able to without assistance at home. She has had similar dizziness in the past but not nearly as severe. She states her dizziness is more of a lightheadedness now than spinning. No other new deficits/symptoms.    HCT and CTA were personally reviewed - no evidence of acute ischemia/hemorrhage and no significant vascular abnormality. Given her current inability to ambulate, which is a new functional deficit, and lack of known contraindications, risks and benefits were discussed regarding administration of TNK. Initially pt had refused however she and her daughter discussed and eventually agreed to administration. Of note, pt was reportedly told she had Afib by EMS on her way to the hospital but it has not been definitively found here; she did not carry this diagnosis prior to admission.    Consult to Neurology  Consult performed by: Jamal Ferreira DO  Consult ordered by: Graciela Cadena PA-C        Review of Systems   Musculoskeletal:  Positive for gait problem.   Neurological:  Positive for dizziness, syncope, light-headedness and numbness.   All other systems reviewed and are negative.      Historical Information   Past Medical History:   Diagnosis Date    Actinic keratosis 03/11/2016    Acute midline low back pain without sciatica 11/19/2020    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     Benign neoplasm of skin     Cancer (HCC)     skin    Chest  pain     Claustrophobia     Diverticulitis     Diverticulitis     Fracture     L1-L2    GERD (gastroesophageal reflux disease)     H. pylori infection 2020    Muscle weakness     Nonmelanoma skin cancer     last assessed 2017    Palpitations     Pancreatic cyst     Seasonal allergies     Seborrheic keratosis 2014    Sleep apnea     no cpap    Spontaneous      without mention of complications     Squamous cell carcinoma of forehead 2014    Syncope      Past Surgical History:   Procedure Laterality Date    ABDOMINAL ADHESION SURGERY N/A 2023    Procedure: LYSIS ADHESIONS;  Surgeon: Kyle Julien MD;  Location: [facility];  Service: Surgical Oncology    BOTOX INJECTION N/A 2017    Procedure: CYSTOSCOPY; BLADDER BOTOX 100 UNITS ;  Surgeon: Juan Edward MD;  Location[facility];  Service:     BOWEL RESECTION      related ot diverticulitis    CARDIAC CATHETERIZATION      CARPAL TUNNEL RELEASE Right     COLONOSCOPY  2019    COLOSTOMY      COLOSTOMY CLOSURE      CYSTOSCOPY  2021    DISTAL PANCREATECTOMY N/A 2023    Procedure: DISTAL PANCREATECTOMY;  Surgeon: Kyle Julien MD;  Location: [facility];  Service: Surgical Oncology    FOOT SURGERY Left     neuroma    HYSTERECTOMY      MYRINGOTOMY W/ TUBES Right 2023    office procedure - Dr. Grace    NASAL SINUS SURGERY  age 39    [place]    PANCREATIC CYST BIOPSY  2023    MS CYSTOURETHROSCOPY N/A 2018    Procedure: CYSTOSCOPY WITH BOTOX;  Surgeon: Juan Edward MD;  [facility];  Service: Urology    MS ESOPHAGOGASTRODUODENOSCOPY TRANSORAL DIAGNOSTIC N/A 2019    Procedure: ESOPHAGOGASTRODUODENOSCOPY (EGD);  Surgeon: Edu Dickerson DO;  Location: [facility];  Service: Gastroenterology    SPLENECTOMY, TOTAL N/A 2023    Procedure: SPLENECTOMY;  Surgeon: Kyle Julien MD;  [facility]: [facility];  Service: Surgical Oncology    TONSILLECTOMY  age 49    UPPER  GASTROINTESTINAL ENDOSCOPY  04/23/2019     Social History   Social History     Substance and Sexual Activity   Alcohol Use Yes    Comment: patient states rare use on holidays      Social History     Substance and Sexual Activity   Drug Use No     E-Cigarette/Vaping    E-Cigarette Use Never User      E-Cigarette/Vaping Substances    Nicotine No     THC No     CBD No     Flavoring No     Other No     Unknown No      Social History     Tobacco Use   Smoking Status Never    Passive exposure: Past   Smokeless Tobacco Never     Family History:   Family History   Problem Relation Age of Onset    Alcohol abuse Mother     Heart disease Mother     Alcohol abuse Father     Alcohol abuse Brother     Cancer Brother     Tremor Neg Hx     Parkinsonism Neg Hx     Colon cancer Neg Hx        Review of previous medical records was completed.    Meds/Allergies   all current active meds have been reviewed, current meds:   No current facility-administered medications for this encounter.   , and PTA meds:   Prior to Admission Medications   Prescriptions Last Dose Informant Patient Reported? Taking?   Ascorbic Acid (VITAMIN C) 1000 MG tablet  Self Yes No   Sig: Take 1,000 mg by mouth daily   B Complex Vitamins (B COMPLEX 100 PO)  Self Yes No   Sig: Take 1 capsule by mouth daily after lunch   Coenzyme Q10 (CO Q 10 PO)  Self Yes No   Sig: Take 1 capsule by mouth daily after lunch 600 mg BID   Cyanocobalamin (VITAMIN B 12 PO)  Self Yes No   Sig: Take 1 capsule by mouth daily   LORazepam (ATIVAN) 1 mg tablet  Self No No   Sig: Take 1 tablet (1 mg total) by mouth see administration instructions TAKE 1 TABLET 30 MINUTES PRIOR TO MRI, TAKE 2ND TABLET RIGHT BEFORE MRI IF NEEDED   Patient not taking: Reported on 1/8/2024   Omega-3 350 MG CPDR  Self Yes No   Sig: Take 1 capsule by mouth daily in the early morning   Potassium Gluconate 595 (99 K) MG TABS  Self Yes No   Sig: Take 1 each by mouth every other day   Probiotic Product (PROBIOTIC-10) CAPS   "Self Yes No   Sig: Take 1 capsule by mouth daily after lunch   calcium carbonate (OS-JOHN) 1250 (500 Ca) MG tablet  Self Yes No   Sig: Take 1 tablet by mouth daily after lunch   carbidopa-levodopa (SINEMET)  mg per tablet  Self No No   Sig: Take 1 tablet by mouth 3 (three) times a day before meals   cholecalciferol (VITAMIN D3) 1,000 units tablet  Self Yes No   Sig: Take 5,000 Units by mouth daily after lunch   multivitamin (THERAGRAN) TABS  Self Yes No   Sig: Take 1 tablet by mouth every morning   pantoprazole (PROTONIX) 40 mg tablet   No No   Sig: Take 1 tablet (40 mg total) by mouth daily   trospium chloride (SANCTURA) 20 mg tablet   No No   Sig: Take 1 tablet (20 mg total) by mouth 2 (two) times a day      Facility-Administered Medications: None       Allergies   Allergen Reactions    Caffeine - Food Allergy GI Intolerance    Iodine - Food Allergy Rash    Latex Rash    Other Rash     Adhesive tape         Objective   Vitals:Blood pressure 148/74, pulse 86, temperature 98.2 °F (36.8 °C), resp. rate (!) 23, height 5' 1\" (1.549 m), weight 75.9 kg (167 lb 6.4 oz), SpO2 96%, not currently breastfeeding.,Body mass index is 31.63 kg/m².  No intake or output data in the 24 hours ending 01/14/24 1144    Invasive Devices:   Invasive Devices       Peripheral Intravenous Line  Duration             Peripheral IV 01/14/24 Dorsal (posterior);Left Hand <1 day    Peripheral IV 01/14/24 Right Antecubital <1 day              Drain  Duration             Closed/Suction Drain Left LUQ Bulb 19 Fr. 166 days                    Physical Exam  Constitutional:       Appearance: She is well-developed.   HENT:      Head: Normocephalic and atraumatic.   Eyes:      Extraocular Movements: EOM normal.      Pupils: Pupils are equal, round, and reactive to light.   Cardiovascular:      Rate and Rhythm: Normal rate and regular rhythm.      Heart sounds: Normal heart sounds.   Pulmonary:      Effort: Pulmonary effort is normal.      Breath " sounds: Normal breath sounds.   Abdominal:      General: Bowel sounds are normal.      Palpations: Abdomen is soft.   Musculoskeletal:      Cervical back: Normal range of motion and neck supple.   Neurological:      Mental Status: She is alert and oriented to person, place, and time.      Motor: Motor strength is normal.     Coordination: Finger-Nose-Finger Test and Heel to Shin Test normal.   Psychiatric:         Speech: Speech normal.       Neurologic Exam     Mental Status   Oriented to person, place, and time.   Attention: normal. Concentration: normal.   Speech: speech is normal   Level of consciousness: alert  Knowledge: good.   Able to name object. Able to repeat. Normal comprehension.     Cranial Nerves     CN II   Visual fields full to confrontation.     CN III, IV, VI   Pupils are equal, round, and reactive to light.  Extraocular motions are normal.     CN V   Facial sensation intact.     CN VII   Facial expression full, symmetric.     CN VIII   Hearing: intact    CN IX, X   Palate: symmetric    CN XI   Right sternocleidomastoid strength: normal  Left sternocleidomastoid strength: normal  Right trapezius strength: normal  Left trapezius strength: normal    CN XII   Tongue deviation: none    Motor Exam     Strength   Strength 5/5 throughout.     Sensory Exam   Light touch normal.     Gait, Coordination, and Reflexes     Coordination   Finger to nose coordination: normal  Heel to shin coordination: normal    Reflexes   Reflexes 2+ except as noted. Requires significant assistance with gait testing and unable to ambulate otherwise; usually able to ambulate independently and without assistance       NIHSS:  1a.Level of Consciousness: 0 = Alert   1b. LOC Questions: 0 = Answers both correctly   1c. LOC Commands: 0 = Obeys both correctly   2. Best Gaze: 0 = Normal   3. Visual: 0 = No visual field loss   4. Facial Palsy: 0=Normal symmetric movement   5a. Motor Right Arm: 0=No drift, limb holds 90 (or 45) degrees  for full 10 seconds   5b. Motor Left Arm: 0=No drift, limb holds 90 (or 45) degrees for full 10 seconds   6a. Motor Right Le=No drift, limb holds 90 (or 45) degrees for full 10 seconds   6b. Motor Left Le=No drift, limb holds 90 (or 45) degrees for full 10 seconds   7. Limb Ataxia:  0=Absent   8. Sensory: 0=Normal; no sensory loss   9. Best Language:  0=No aphasia, normal   10. Dysarthria: 0=Normal articulation   11. Extinction and Inattention (formerly Neglect): 0=No abnormality   Total Score: 0     Time NIHSS was completed: 11am     Modified Henderson Score:  1 (No significant disability. Able to carry out all usual activities, despite some symptoms)    Lab Results: I have personally reviewed pertinent reports.  , CBC:   Results from last 7 days   Lab Units 24  1038   WBC Thousand/uL 6.75   RBC Million/uL 4.92   HEMOGLOBIN g/dL 14.8   HEMATOCRIT % 44.4   MCV fL 90   PLATELETS Thousands/uL 398*   , BMP/CMP:   Results from last 7 days   Lab Units 24  1038   SODIUM mmol/L 138   POTASSIUM mmol/L 4.3   CHLORIDE mmol/L 104   CO2 mmol/L 28   BUN mg/dL 17   CREATININE mg/dL 0.70   CALCIUM mg/dL 9.3   EGFR ml/min/1.73sq m 79   , HgBA1C:   , Coagulation:   Results from last 7 days   Lab Units 24  1038   INR  0.90   , Lipid Profile:     Imaging Studies: I have personally reviewed pertinent films in PACS with a Radiologist.  EKG, Pathology, and Other Studies: I have personally reviewed pertinent reports.    VTE Prophylaxis: Sequential compression device (Venodyne)     Code Status: Prior  Advance Directive and Living Will: Yes    Power of : Yes  POLST:      Counseling / Coordination of Care  Total Critical Care time spent 40 minutes excluding procedures, teaching and family updates.

## 2024-01-14 NOTE — H&P
[facility]  H&P  Name: Radha Vidal 84 y.o. female I MRN: [id number]  Unit/Bed#:  I Date of Admission: 1/14/2024   Date of Service: 1/14/2024 I Hospital Day: 0      Assessment/Plan   * Stroke-like symptoms  Assessment & Plan  Presents to the emergency department for evaluation of acute dizziness and gait dysfunction  No specific focal deficits or dysarthria  Questionable afib en route per EMS, no known history per patient raising concern for cardio-embolic etiology   CT head negative for hemorrhage   CTA negative for large vessel occlusion   NIH 0 on presentation.  However, given new functional deficit, patient received TNK at 1130   Will continue on stroke pathway   No venipuncture for 24 hours while in TNK window  Neuro checks per TNK protocol   Will need MRI and echocardiogram   PRN antihypertensives only for sbp >185 while in the TNK window   Tight glycemic control   Repeat CT head 24 hours post TNK, and as needed for neuro status change   Consult to neurology, appreciate recommendations   Consult to PT/OT, physical medicine     New onset a-fib (HCC)  Assessment & Plan  Noted by EMS en route  No history of afib per patient  Stroke like symptoms may be cardio-embolic etiology   Will need cardiology consultation,may need halter monitor   Will continue to monitor on telemetry while in patient  Echo per stroke protocol     Parkinson's disease  Assessment & Plan  Continue home dose carbidopa-levodopa     Obesity (BMI 30-39.9)  Assessment & Plan  Outpatient weight management  Nutrition consult     Gastro-esophageal reflux disease without esophagitis  Assessment & Plan  Will continue home dose PPI     Urge incontinence  Assessment & Plan  Will hold home dose sanctura   Will utilize purwik while in on bedrest   Hold on yanez catheter placement given TNK     s/p distal pancreatectomy/splenectomy  Assessment & Plan  Noted in history            History of Present Illness     HPI: Radha ZAVALETA  Marcy is a 84 y.o. who presents with a past medical history of GERD, parkinson's on sinemet, pancreatectomy/splenectomy for pancreatic mass.  She presents to the emergency department for evaluation of stroke like symptoms.  Today, she had acute onset dizziness and gait dysfunction with subjective right facial numbness.  Afib noted by EMS en route.  Patient has no notable history for afib. In the emergency department, NIH was 0.  However, given the functional gait dysfunction, TNK was administered.  She is referred to the ICU for further management and care.    History obtained from chart review and the patient.  Review of Systems   Constitutional:  Positive for activity change.   HENT: Negative.     Eyes: Negative.    Respiratory: Negative.     Cardiovascular: Negative.    Gastrointestinal: Negative.    Endocrine: Negative.    Genitourinary: Negative.    Musculoskeletal:  Positive for gait problem.   Skin: Negative.    Allergic/Immunologic: Negative.    Neurological:  Positive for dizziness and weakness.   Hematological: Negative.    Psychiatric/Behavioral: Negative.       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 pylori infection  No date: Muscle weakness  No date: Nonmelanoma skin cancer      Comment:  last assessed 2017  No date: Palpitations  No date: Pancreatic cyst  No date: Seasonal allergies  2014: Seborrheic keratosis  No date: Sleep apnea      Comment:  no cpap  No date: Spontaneous       Comment:  without mention  of complications   07/09/2014: Squamous cell carcinoma of forehead  No date: Syncope Past Surgical History:  07/31/2023: ABDOMINAL ADHESION SURGERY; N/A      Comment:  Procedure: LYSIS ADHESIONS;  Surgeon: Kyle Julien MD;                [facility];  Service: Surgical Oncology  03/06/2017: BOTOX INJECTION; N/A      Comment:  Procedure: CYSTOSCOPY; BLADDER BOTOX 100 UNITS ;                 Surgeon: Juan Edward MD;  [facility];                Service:   No date: BOWEL RESECTION      Comment:  related ot diverticulitis  No date: CARDIAC CATHETERIZATION  No date: CARPAL TUNNEL RELEASE; Right  07/31/2019: COLONOSCOPY  No date: COLOSTOMY  No date: COLOSTOMY CLOSURE  06/01/2021: CYSTOSCOPY  07/31/2023: DISTAL PANCREATECTOMY; N/A      Comment:  Procedure: DISTAL PANCREATECTOMY;  Surgeon: Kyle Julien MD;  [facility];  Service: Surgical                Oncology  No date: FOOT SURGERY; Left      Comment:  neuroma  No date: HYSTERECTOMY  12/06/2023: MYRINGOTOMY W/ TUBES; Right      Comment:  office procedure - Dr. Grace  age 39: NASAL SINUS SURGERY      Comment:  [place]  07/31/2023: PANCREATIC CYST BIOPSY  04/13/2018: [place] CYSTOURETHROSCOPY; N/A      Comment:  Procedure: CYSTOSCOPY WITH BOTOX;  Surgeon: Juan Edward MD;  [facility];  Service:                Urology  04/23/2019: [place] ESOPHAGOGASTRODUODENOSCOPY TRANSORAL DIAGNOSTIC; N/A      Comment:  Procedure: ESOPHAGOGASTRODUODENOSCOPY (EGD);  Surgeon:                Edu Dickerson DO;  [facility];  Service:                Gastroenterology  07/31/2023: SPLENECTOMY, TOTAL; N/A      Comment:  Procedure: SPLENECTOMY;  Surgeon: Kyle Julien MD;                 [facility];  Service: Surgical Oncology  age 49: TONSILLECTOMY  04/23/2019: UPPER GASTROINTESTINAL ENDOSCOPY   Current Outpatient Medications   Medication Instructions    B Complex Vitamins (B COMPLEX 100 PO) 1 capsule,  Oral, Daily after lunch    calcium carbonate (OS-JOHN) 1250 (500 Ca) MG tablet 1 tablet, Oral, Daily after lunch    carbidopa-levodopa (SINEMET)  mg per tablet 1 tablet, Oral, 3 times daily before meals    cholecalciferol (VITAMIN D3) 5,000 Units, Oral, Daily after lunch    Coenzyme Q10 (CO Q 10 PO) 1 capsule, Oral, Daily after lunch, 600 mg BID     Cyanocobalamin (VITAMIN B 12 PO) 1 capsule, Oral, Daily    LORazepam (ATIVAN) 1 mg, Oral, See admin instructions, TAKE 1 TABLET 30 MINUTES PRIOR TO MRI, TAKE 2ND TABLET RIGHT BEFORE MRI IF NEEDED    multivitamin (THERAGRAN) TABS 1 tablet, Oral, Every morning    Omega-3 350 MG CPDR 1 capsule, Oral, Daily (early morning)    pantoprazole (PROTONIX) 40 mg, Oral, Daily    Potassium Gluconate 595 (99 K) MG TABS 1 each, Oral, Every other day    Probiotic Product (PROBIOTIC-10) CAPS 1 capsule, Oral, Daily after lunch    trospium chloride (SANCTURA) 20 mg, Oral, 2 times daily    vitamin C 1,000 mg, Oral, Daily    Allergies   Allergen Reactions    Caffeine - Food Allergy GI Intolerance    Iodine - Food Allergy Rash    Latex Rash    Other Rash     Adhesive tape        Social History     Tobacco Use    Smoking status: Never     Passive exposure: Past    Smokeless tobacco: Never   Vaping Use    Vaping status: Never Used   Substance Use Topics    Alcohol use: Yes     Comment: patient states rare use on holidays     Drug use: No    Family History   Problem Relation Age of Onset    Alcohol abuse Mother     Heart disease Mother     Alcohol abuse Father     Alcohol abuse Brother     Cancer Brother     Tremor Neg Hx     Parkinsonism Neg Hx     Colon cancer Neg Hx           Objective                            Vitals I/O      Most Recent Min/Max in 24hrs   Temp 98.2 °F (36.8 °C) Temp  Min: 98.2 °F (36.8 °C)  Max: 98.2 °F (36.8 °C)   Pulse 98 Pulse  Min: 77  Max: 101   Resp 21 Resp  Min: 12  Max: 23   /79 BP  Min: 147/63  Max: 166/79   O2 Sat 97 % SpO2  Min: 96 %  Max: 97 %    No  intake or output data in the 24 hours ending 01/14/24 1313    Diet Cardiovascular; Cardiac    Invasive Monitoring           Physical Exam   Physical Exam  Eyes:      General: Lids are normal.      Extraocular Movements: Extraocular movements intact.      Conjunctiva/sclera: Conjunctivae normal.   Skin:     General: Skin is warm and dry.   HENT:      Head: Normocephalic and atraumatic.   Neck:      Trachea: Trachea normal.   Cardiovascular:      Rate and Rhythm: Normal rate and regular rhythm.      Pulses: Normal pulses.   Musculoskeletal:      Cervical back: Normal range of motion.      Right lower leg: No edema.      Left lower leg: No edema.   Abdominal: General: Abdomen is flat.      Palpations: Abdomen is soft.      Tenderness: There is no abdominal tenderness.   Constitutional:       General: She is awake.      Appearance: She is well-developed and well-groomed.      Interventions: Nasal cannula in place.   Pulmonary:      Effort: Pulmonary effort is normal.      Breath sounds: Normal breath sounds.   Psychiatric:         Behavior: Behavior is cooperative.   Neurological:      General: No focal deficit present.      Mental Status: She is alert.      GCS: GCS eye subscore is 4. GCS verbal subscore is 5. GCS motor subscore is 6.            Diagnostic Studies      EKG: NSR   Imaging:   CTA stroke alert (head/neck)   Final Result      1. CTA head: Negative for large vessel intracranial occlusion or hemodynamically significant stenosis.      2. CTA neck:  No extracranial carotid stenosis.  The cervical vertebral arteries are patent.      Subcentimeter thyroid nodules.      Findings were directly discussed with Dr. Jamal Ferreira at 11:10 a.m.                           Workstation performed: [id number]         CT stroke alert brain   Final Result      No acute intracranial abnormality.      Findings were directly discussed with Dr. Jamal Ferreira at 11:10 a.m.         Workstation performed: [id number]         CT head wo  contrast    (Results Pending)   MRI Inpatient Order    (Results Pending)   CT head wo contrast    (Results Pending)   CT head wo contrast    (Results Pending)         Medications:  Scheduled PRN   vitamin C, 1,000 mg, Daily  atorvastatin, 40 mg, QPM  carbidopa-levodopa, 1 tablet, TID AC  chlorhexidine, 15 mL, Q12H DARI  cholecalciferol, 5,000 Units, After Lunch  multivitamin stress formula, 1 tablet, QAM  pantoprazole, 40 mg, Daily      hydrALAZINE, 10 mg, Q4H PRN  labetalol, 10 mg, Q4H PRN       Continuous          Labs:    CBC    Recent Labs     01/14/24  1038   WBC 6.75   HGB 14.8   HCT 44.4   *     BMP    Recent Labs     01/14/24  1038   SODIUM 138   K 4.3      CO2 28   AGAP 6   BUN 17   CREATININE 0.70   CALCIUM 9.3       Coags    Recent Labs     01/14/24  1038   INR 0.90   PTT 32        Additional Electrolytes  No recent results       Blood Gas    No recent results  No recent results LFTs  No recent results    Infectious  No recent results  Glucose  Recent Labs     01/14/24  1038   GLUC 145*             Critical Care Time Statement: Upon my evaluation, this patient had a high probability of imminent or life-threatening deterioration due to stroke like symptoms s/p TNK, which required my direct attention, intervention, and personal management.  I spent a total of 40 minutes directly providing critical care services, including interpretation of complex medical databases, evaluating for the presence of life-threatening injuries or illnesses, complex medical decision making (to support/prevent further life-threatening deterioration)., interpretation of hemodynamic data, and titration of vasoactive medications. This time is exclusive of procedures, teaching, family meetings, and any prior time recorded by providers other than myself.    Anticipated Length of Stay is > 2 midnights  RISHI Jefferson

## 2024-01-14 NOTE — ASSESSMENT & PLAN NOTE
Presents to the emergency department for evaluation of acute dizziness and gait dysfunction  No specific focal deficits or dysarthria  Questionable afib en route per EMS, no known history per patient raising concern for cardio-embolic etiology   CT head negative for hemorrhage   CTA negative for large vessel occlusion   NIH 0 on presentation.  However, given new functional deficit, patient received TNK at 1130   Will continue on stroke pathway   No venipuncture for 24 hours while in TNK window  Neuro checks per TNK protocol   Will need MRI and echocardiogram   PRN antihypertensives only for sbp >185 while in the TNK window   Tight glycemic control   Repeat CT head 24 hours post TNK, and as needed for neuro status change   Consult to neurology, appreciate recommendations   Consult to PT/OT, physical medicine

## 2024-01-15 ENCOUNTER — APPOINTMENT (INPATIENT)
Dept: MRI IMAGING | Facility: HOSPITAL | Age: 84
DRG: 312 | End: 2024-01-15
Payer: COMMERCIAL

## 2024-01-15 ENCOUNTER — APPOINTMENT (INPATIENT)
Dept: NON INVASIVE DIAGNOSTICS | Facility: HOSPITAL | Age: 84
DRG: 312 | End: 2024-01-15
Payer: COMMERCIAL

## 2024-01-15 ENCOUNTER — APPOINTMENT (INPATIENT)
Dept: CT IMAGING | Facility: HOSPITAL | Age: 84
DRG: 312 | End: 2024-01-15
Payer: COMMERCIAL

## 2024-01-15 PROBLEM — E11.9 TYPE 2 DIABETES MELLITUS (HCC): Status: ACTIVE | Noted: 2024-01-15

## 2024-01-15 LAB
ANION GAP SERPL CALCULATED.3IONS-SCNC: 6 MMOL/L
AORTIC ROOT: 3.6 CM
APICAL FOUR CHAMBER EJECTION FRACTION: 71 %
ASCENDING AORTA: 3.1 CM
BSA FOR ECHO PROCEDURE: 1.79 M2
BUN SERPL-MCNC: 18 MG/DL (ref 5–25)
CALCIUM SERPL-MCNC: 9.2 MG/DL (ref 8.4–10.2)
CHLORIDE SERPL-SCNC: 105 MMOL/L (ref 96–108)
CHOLEST SERPL-MCNC: 180 MG/DL
CO2 SERPL-SCNC: 26 MMOL/L (ref 21–32)
CREAT SERPL-MCNC: 0.76 MG/DL (ref 0.6–1.3)
E WAVE DECELERATION TIME: 274 MS
E/A RATIO: 0.92
ERYTHROCYTE [DISTWIDTH] IN BLOOD BY AUTOMATED COUNT: 14.6 % (ref 11.6–15.1)
EST. AVERAGE GLUCOSE BLD GHB EST-MCNC: 146 MG/DL
FRACTIONAL SHORTENING: 40 (ref 28–44)
GFR SERPL CREATININE-BSD FRML MDRD: 71 ML/MIN/1.73SQ M
GLUCOSE SERPL-MCNC: 113 MG/DL (ref 65–140)
GLUCOSE SERPL-MCNC: 157 MG/DL (ref 65–140)
GLUCOSE SERPL-MCNC: 161 MG/DL (ref 65–140)
GLUCOSE SERPL-MCNC: 213 MG/DL (ref 65–140)
HBA1C MFR BLD: 6.7 %
HCT VFR BLD AUTO: 38.9 % (ref 34.8–46.1)
HDLC SERPL-MCNC: 79 MG/DL
HGB BLD-MCNC: 13.5 G/DL (ref 11.5–15.4)
INTERVENTRICULAR SEPTUM IN DIASTOLE (PARASTERNAL SHORT AXIS VIEW): 1.2 CM
INTERVENTRICULAR SEPTUM: 1.2 CM (ref 0.6–1.1)
LAAS-AP2: 18 CM2
LAAS-AP4: 24.1 CM2
LDLC SERPL CALC-MCNC: 88 MG/DL (ref 0–100)
LEFT ATRIUM AREA SYSTOLE SINGLE PLANE A4C: 24.8 CM2
LEFT ATRIUM SIZE: 2.9 CM
LEFT ATRIUM VOLUME (MOD BIPLANE): 71 ML
LEFT ATRIUM VOLUME INDEX (MOD BIPLANE): 39.4 ML/M2
LEFT INTERNAL DIMENSION IN SYSTOLE: 2.4 CM (ref 2.1–4)
LEFT VENTRICULAR INTERNAL DIMENSION IN DIASTOLE: 4 CM (ref 3.5–6)
LEFT VENTRICULAR POSTERIOR WALL IN END DIASTOLE: 1.1 CM
LEFT VENTRICULAR STROKE VOLUME: 51 ML
LVSV (TEICH): 51 ML
MAGNESIUM SERPL-MCNC: 2 MG/DL (ref 1.9–2.7)
MCH RBC QN AUTO: 30.5 PG (ref 26.8–34.3)
MCHC RBC AUTO-ENTMCNC: 34.7 G/DL (ref 31.4–37.4)
MCV RBC AUTO: 88 FL (ref 82–98)
MV E'TISSUE VEL-SEP: 7 CM/S
MV PEAK A VEL: 0.86 M/S
MV PEAK E VEL: 79 CM/S
MV STENOSIS PRESSURE HALF TIME: 80 MS
MV VALVE AREA P 1/2 METHOD: 2.75
PLATELET # BLD AUTO: 337 THOUSANDS/UL (ref 149–390)
PMV BLD AUTO: 9.4 FL (ref 8.9–12.7)
POTASSIUM SERPL-SCNC: 4 MMOL/L (ref 3.5–5.3)
RBC # BLD AUTO: 4.42 MILLION/UL (ref 3.81–5.12)
RIGHT ATRIUM AREA SYSTOLE A4C: 13.1 CM2
RIGHT VENTRICLE ID DIMENSION: 3.3 CM
SL CV LEFT ATRIUM LENGTH A2C: 4.8 CM
SL CV LV EF: 60
SL CV PED ECHO LEFT VENTRICLE DIASTOLIC VOLUME (MOD BIPLANE) 2D: 72 ML
SL CV PED ECHO LEFT VENTRICLE SYSTOLIC VOLUME (MOD BIPLANE) 2D: 21 ML
SODIUM SERPL-SCNC: 137 MMOL/L (ref 135–147)
TR MAX PG: 26 MMHG
TR PEAK VELOCITY: 2.5 M/S
TRICUSPID ANNULAR PLANE SYSTOLIC EXCURSION: 2.5 CM
TRICUSPID VALVE PEAK REGURGITATION VELOCITY: 2.54 M/S
TRIGL SERPL-MCNC: 67 MG/DL
WBC # BLD AUTO: 8.03 THOUSAND/UL (ref 4.31–10.16)

## 2024-01-15 PROCEDURE — 80061 LIPID PANEL: CPT | Performed by: NURSE PRACTITIONER

## 2024-01-15 PROCEDURE — 99232 SBSQ HOSP IP/OBS MODERATE 35: CPT | Performed by: STUDENT IN AN ORGANIZED HEALTH CARE EDUCATION/TRAINING PROGRAM

## 2024-01-15 PROCEDURE — 82948 REAGENT STRIP/BLOOD GLUCOSE: CPT

## 2024-01-15 PROCEDURE — G1004 CDSM NDSC: HCPCS

## 2024-01-15 PROCEDURE — 93306 TTE W/DOPPLER COMPLETE: CPT

## 2024-01-15 PROCEDURE — 99222 1ST HOSP IP/OBS MODERATE 55: CPT | Performed by: INTERNAL MEDICINE

## 2024-01-15 PROCEDURE — 93306 TTE W/DOPPLER COMPLETE: CPT | Performed by: INTERNAL MEDICINE

## 2024-01-15 PROCEDURE — 70551 MRI BRAIN STEM W/O DYE: CPT

## 2024-01-15 PROCEDURE — 97163 PT EVAL HIGH COMPLEX 45 MIN: CPT

## 2024-01-15 PROCEDURE — 70450 CT HEAD/BRAIN W/O DYE: CPT

## 2024-01-15 PROCEDURE — 97167 OT EVAL HIGH COMPLEX 60 MIN: CPT

## 2024-01-15 PROCEDURE — 80048 BASIC METABOLIC PNL TOTAL CA: CPT | Performed by: NURSE PRACTITIONER

## 2024-01-15 PROCEDURE — 85027 COMPLETE CBC AUTOMATED: CPT | Performed by: NURSE PRACTITIONER

## 2024-01-15 PROCEDURE — 83036 HEMOGLOBIN GLYCOSYLATED A1C: CPT | Performed by: NURSE PRACTITIONER

## 2024-01-15 PROCEDURE — 99223 1ST HOSP IP/OBS HIGH 75: CPT | Performed by: PHYSICAL MEDICINE & REHABILITATION

## 2024-01-15 PROCEDURE — 83735 ASSAY OF MAGNESIUM: CPT | Performed by: NURSE PRACTITIONER

## 2024-01-15 RX ORDER — INSULIN LISPRO 100 [IU]/ML
1-6 INJECTION, SOLUTION INTRAVENOUS; SUBCUTANEOUS
Status: DISCONTINUED | OUTPATIENT
Start: 2024-01-15 | End: 2024-01-19 | Stop reason: HOSPADM

## 2024-01-15 RX ORDER — LORAZEPAM 1 MG/1
1 TABLET ORAL ONCE AS NEEDED
Status: COMPLETED | OUTPATIENT
Start: 2024-01-15 | End: 2024-01-15

## 2024-01-15 RX ORDER — INSULIN LISPRO 100 [IU]/ML
1-5 INJECTION, SOLUTION INTRAVENOUS; SUBCUTANEOUS
Status: DISCONTINUED | OUTPATIENT
Start: 2024-01-15 | End: 2024-01-19 | Stop reason: HOSPADM

## 2024-01-15 RX ORDER — MECLIZINE HCL 12.5 MG/1
12.5 TABLET ORAL EVERY 6 HOURS PRN
Status: DISCONTINUED | OUTPATIENT
Start: 2024-01-15 | End: 2024-01-19 | Stop reason: HOSPADM

## 2024-01-15 RX ORDER — ENOXAPARIN SODIUM 100 MG/ML
40 INJECTION SUBCUTANEOUS
Status: DISCONTINUED | OUTPATIENT
Start: 2024-01-15 | End: 2024-01-16

## 2024-01-15 RX ADMIN — LORAZEPAM 1 MG: 1 TABLET ORAL at 16:30

## 2024-01-15 RX ADMIN — ENOXAPARIN SODIUM 40 MG: 40 INJECTION SUBCUTANEOUS at 13:02

## 2024-01-15 RX ADMIN — MECLIZINE HYDROCHLORIDE 12.5 MG: 25 TABLET ORAL at 17:45

## 2024-01-15 RX ADMIN — ONDANSETRON 4 MG: 2 INJECTION INTRAMUSCULAR; INTRAVENOUS at 14:24

## 2024-01-15 RX ADMIN — Medication 5000 UNITS: at 15:33

## 2024-01-15 RX ADMIN — ASPIRIN 81 MG: 81 TABLET, COATED ORAL at 13:03

## 2024-01-15 RX ADMIN — INSULIN LISPRO 1 UNITS: 100 INJECTION, SOLUTION INTRAVENOUS; SUBCUTANEOUS at 21:44

## 2024-01-15 RX ADMIN — OXYCODONE HYDROCHLORIDE AND ACETAMINOPHEN 1000 MG: 500 TABLET ORAL at 08:10

## 2024-01-15 RX ADMIN — INSULIN LISPRO 2 UNITS: 100 INJECTION, SOLUTION INTRAVENOUS; SUBCUTANEOUS at 13:01

## 2024-01-15 RX ADMIN — ATORVASTATIN CALCIUM 40 MG: 40 TABLET, FILM COATED ORAL at 17:45

## 2024-01-15 RX ADMIN — CARBIDOPA AND LEVODOPA 1 TABLET: 25; 100 TABLET ORAL at 08:10

## 2024-01-15 RX ADMIN — CARBIDOPA AND LEVODOPA 1 TABLET: 25; 100 TABLET ORAL at 11:19

## 2024-01-15 RX ADMIN — CHLORHEXIDINE GLUCONATE 0.12% ORAL RINSE 15 ML: 1.2 LIQUID ORAL at 20:40

## 2024-01-15 RX ADMIN — CHLORHEXIDINE GLUCONATE 0.12% ORAL RINSE 15 ML: 1.2 LIQUID ORAL at 08:10

## 2024-01-15 RX ADMIN — B-COMPLEX W/ C & FOLIC ACID TAB 1 TABLET: TAB at 08:10

## 2024-01-15 RX ADMIN — CARBIDOPA AND LEVODOPA 1 TABLET: 25; 100 TABLET ORAL at 16:30

## 2024-01-15 RX ADMIN — PANTOPRAZOLE SODIUM 40 MG: 40 TABLET, DELAYED RELEASE ORAL at 08:10

## 2024-01-15 NOTE — OCCUPATIONAL THERAPY NOTE
Occupational Therapy Evaluation      Radha MEGHANN Vidal    1/15/2024    Principal Problem:    Stroke-like symptoms  Active Problems:    Parkinson's disease    Obesity (BMI 30-39.9)    Gastro-esophageal reflux disease without esophagitis    Urge incontinence    s/p distal pancreatectomy/splenectomy    New onset a-fib (HCC)    Type 2 diabetes mellitus (HCC)      Past Medical History:   Diagnosis Date    Actinic keratosis 2016    Acute midline low back pain without sciatica 2020    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     Benign neoplasm of skin     Cancer (HCC)     skin    Chest pain     Claustrophobia     Diverticulitis     Diverticulitis     Fracture     L1-L2    GERD (gastroesophageal reflux disease)     H. pylori infection 2020    Muscle weakness     Nonmelanoma skin cancer     last assessed 2017    Palpitations     Pancreatic cyst     Seasonal allergies     Seborrheic keratosis 2014    Sleep apnea     no cpap    Spontaneous      without mention of complications     Squamous cell carcinoma of forehead 2014    Syncope        Past Surgical History:   Procedure Laterality Date    ABDOMINAL ADHESION SURGERY N/A 2023    Procedure: LYSIS ADHESIONS;  Surgeon: Kyle Julien MD;  [facility];  Service: Surgical Oncology    BOTOX INJECTION N/A 2017    Procedure: CYSTOSCOPY; BLADDER BOTOX 100 UNITS ;  Surgeon: Juan Edward MD;  [facility];  Service:     BOWEL RESECTION      related ot diverticulitis    CARDIAC CATHETERIZATION      CARPAL TUNNEL RELEASE Right     COLONOSCOPY  2019    COLOSTOMY      COLOSTOMY CLOSURE      CYSTOSCOPY  2021    DISTAL PANCREATECTOMY N/A 2023    Procedure: DISTAL PANCREATECTOMY;  Surgeon: Kyle Julien MD;  [facility];  Service: Surgical Oncology    FOOT SURGERY Left     neuroma    HYSTERECTOMY      MYRINGOTOMY W/ TUBES Right 2023    office procedure -   [place]    NA[place]AL SINUS SURGERY  age 39    [place]    PANCREATIC CYST BIOPSY  07/31/2023    SD CYSTOURETHROSCOPY N/A 04/13/2018    Procedure: CYSTOSCOPY WITH BOTOX;  Surgeon: Juan Edward MD;  [facility];  Service: Urology    SD ESOPHAGOGASTRODUODENOSCOPY TRANSORAL DIAGNOSTIC N/A 04/23/2019    Procedure: ESOPHAGOGASTRODUODENOSCOPY (EGD);  Surgeon: Edu Dickerson DO;  [facility];  Service: Gastroenterology    SPLENECTOMY, TOTAL N/A 07/31/2023    Procedure: SPLENECTOMY;  Surgeon: Kyle Julien MD;  [facility];  Service: Surgical Oncology    TONSILLECTOMY  age 49    UPPER GASTROINTESTINAL ENDOSCOPY  04/23/2019       01/15/24 0958   OT Last Visit   OT Visit Date 01/15/24   Note Type   Note type Evaluation   Additional Comments BP at start of session 144/79.   Pain Assessment   Pain Assessment Tool 0-10   Pain Score No Pain   Restrictions/Precautions   Weight Bearing Precautions Per Order No   Other Precautions Chair Alarm;Bed Alarm;Multiple lines;Fall Risk;Telemetry   Home Living   Type of Home Mobile home   Home Layout One level;Able to live on main level with bedroom/bathroom;Performs ADLs on one level;Stairs to enter with rails  (5 LOIDA)   Bathroom Shower/Tub Walk-in shower   Bathroom Toilet Standard   Bathroom Equipment Built-in shower seat;Hand-held shower   Bathroom Accessibility Accessible   Home Equipment Walker;Cane  (pt has access to)   Additional Comments Pt ambulates without an AD.   Prior Function   Level of Hubert Independent with functional mobility;Independent with IADLS   Lives With Alone   Receives Help From Family;Neighbor  (family within 15 minutes)   IADLs Independent with driving;Independent with meal prep;Independent with medication management   Falls in the last 6 months 0   Vocational Part time employment  ([employer]; retired )   Lifestyle   Autonomy Pt reports PLOF was Ind with ADLs, IADLs, and (+)driving   Reciprocal Relationships  alone   ADL   Eating Assistance 6  Modified independent   Grooming Assistance 5  Supervision/Setup   Grooming Deficit Increased time to complete;Supervision/safety;Setup   UB Bathing Assistance 4  Minimal Assistance   UB Bathing Deficit Increased time to complete;Supervision/safety;Verbal cueing;Steadying;Setup   LB Bathing Assistance 4  Minimal Assistance   LB Bathing Deficit Increased time to complete;Supervision/safety;Verbal cueing;Steadying;Setup   UB Dressing Assistance 4  Minimal Assistance   UB Dressing Deficit Increased time to complete;Supervision/safety;Verbal cueing;Steadying;Setup   LB Dressing Assistance 4  Minimal Assistance   LB Dressing Deficit Increased time to complete;Supervision/safety;Verbal cueing;Steadying;Setup   Toileting Assistance  4  Minimal Assistance   Toileting Deficit Increased time to complete;Supervison/safety;Verbal cueing;Steadying;Setup   Functional Assistance 4  Minimal Assistance   Functional Deficit Increased time to complete;Supervision/safety;Verbal cueing;Steadying;Setup   Bed Mobility   Supine to Sit 4  Minimal assistance   Additional items Assist x 2;HOB elevated;Bedrails;Increased time required;Verbal cues;LE management   Transfers   Sit to Stand 4  Minimal assistance   Additional items Assist x 2;Increased time required;Verbal cues   Stand to Sit 4  Minimal assistance   Additional items Assist x 2;Armrests;Increased time required;Verbal cues   Functional Mobility   Functional Mobility 4  Minimal assistance   Additional Comments A of 2   Additional items Rolling walker   Balance   Static Sitting Fair   Dynamic Sitting Fair -   Static Standing Poor +   Dynamic Standing Poor   Ambulatory Poor   Activity Tolerance   Activity Tolerance Patient limited by fatigue   Medical Staff Made Aware Co-evaluation performed with PT Ira secondary to complex medical condition of patient, possible A of 2 required to achieve and maintain transitional movements, and pt's regression of  functional status from baseline. PT/OT goals were addressed separately.   Nurse Made Aware RON Lazo made aware of session outcomes   RUE Assessment   RUE Assessment   (ROM WFL/ MMT 4/5)   LUE Assessment   LUE Assessment   (ROM WFL/ MMT 4/5)   Hand Function   Gross Motor Coordination Functional   Fine Motor Coordination Functional   Sensation   Light Touch No apparent deficits   Sharp/Dull No apparent deficits   Stereognosis No apparent deficits   Proprioception   Proprioception No apparent deficits   Vision-Basic Assessment   Current Vision Wears glasses all the time   Vision - Complex Assessment   Ocular Range of Motion Intact   Psychosocial   Psychosocial (WDL) X   Patient Behaviors/Mood Flat affect   Perception   Inattention/Neglect Appears intact   Cognition   Overall Cognitive Status   (questionable - delayed problem solving and processing)   Arousal/Participation Alert;Cooperative   Attention Within functional limits   Orientation Level Oriented X4   Memory Within functional limits   Following Commands Follows one step commands without difficulty   Assessment   Limitation Decreased ADL status;Decreased UE strength;Decreased endurance;Decreased self-care trans;Decreased high-level ADLs;Decreased cognition;Decreased Safe judgement during ADL   Prognosis Good   Assessment Pt is a 84 y.o. female seen for OT evaluation s/p admit [facility] w/ Stroke-like symptoms. Comorbidities affecting pt's functional performance at time of assessment include:Afib, DM, obesity, Parkinsons, mitral valve disorder, s/p distal pancreatectomy/ splenectomy, OAB, mood disturbances, and h/o skin cancer. Orders placed for OT evaluation and treatment.  Performed at least two patient identifiers during session including name and wristband. Personal factors affecting pt at time of IE include:steps to enter environment, limited home support, difficulty performing ADLS, difficulty performing IADLS , limited insight into  deficits, decreased initiation and engagement , financial barriers, health management , and environment. Prior to admission, pt reports Ind with ADLs, Ind with IADLs, and (+) driving.  Upon evaluation: Pt requires min A with UB ADLs, min A with LB ADLs, min A of 2 with xfers and min A of 3 with functional mobility 2* the following deficits impacting occupational performance: weakness, decreased dynamic sit/ stand balance, decreased activity tolerance, decreased standing tolerance time for self care and functional mobility, environmental deficits, delayed processing and problem solving, decreased coping skills, decreased mobilty, and requiring external assistance to complete transitional movements. Pt to benefit from continued skilled OT tx while in the hospital to address deficits as defined above and maximize level of functional independence w ADL's and functional mobility. Occupational Performance areas to address include: bathing/shower, toilet hygiene, dressing, medication management, health maintenance, functional mobility, community mobility, clothing management, cleaning, meal prep, money management, and household maintenance. From OT standpoint, recommendation at time of d/c would be Level 2 (Mod Resource Intensity).   Plan   Treatment Interventions ADL retraining;Functional transfer training;UE strengthening/ROM;Endurance training;Patient/family training;Equipment evaluation/education;Compensatory technique education;Continued evaluation;Cardiac education;Energy conservation;Activityengagement   Goal Expiration Date 01/28/24   OT Frequency 3-5x/wk   Discharge Recommendation   Rehab Resource Intensity Level, OT II (Moderate Resource Intensity)   AM-PAC Daily Activity Inpatient   Lower Body Dressing 3   Bathing 3   Toileting 3   Upper Body Dressing 3   Grooming 3   Eating 3   Daily Activity Raw Score 18   Daily Activity Standardized Score (Calc for Raw Score >=11) 38.66   AM-PAC Applied Cognition Inpatient    Following a Speech/Presentation 4   Understanding Ordinary Conversation 4   Taking Medications 4   Remembering Where Things Are Placed or Put Away 4   Remembering List of 4-5 Errands 3   Taking Care of Complicated Tasks 3   Applied Cognition Raw Score 22   Applied Cognition Standardized Score 47.83   Barthel Index   Grooming Score 0   Dressing Score 5   Toilet Use Score 5   Transfers (Bed/Chair) Score 5   Mobility (Level Surface) Score 0     Occupational therapy Goals: In 5-7 days    Patient will verbalize and demonstrate use of energy conservation/ deep breathing technique and work simplification skills during functional activity with no verbal cues.    Patient will verbalize and demonstrate good body mechanics and joint protection techniques during ADLs/ IADLs with no verbal cues     Patient will increase OOB/ sitting tolerance to 4-6 hours per day for increased participation in self care and leisure tasks with no s/s of exertion.    Patient will identify s/s of exertion during ADL and functional mobility with no verbal cues.     Patient will verbalize/ demonstrate compensatory strategies to recover from exertion with no verbal cues.     Patient will increase standing tolerance time to 13-15 minutes with No UE support to complete sink level ADLs at modified independent level.    Patient will increase sitting tolerance at edge of bed to 30-45 minutes to complete UB ADLs at modified independent level.     Patient will demonstrate ability to safely perform LB ADLS with MI with long handled adaptive dressing equipment.    Patient/ Family will demonstrate competency with UE Home Exercise Program.       Tiara Cervantes MS OTR/L

## 2024-01-15 NOTE — PLAN OF CARE
Problem: PHYSICAL THERAPY ADULT  Goal: Performs mobility at highest level of function for planned discharge setting.  See evaluation for individualized goals.  Description: Treatment/Interventions: Functional transfer training, LE strengthening/ROM, Elevations, Therapeutic exercise, Endurance training, Patient/family training, Bed mobility, Gait training, Spoke to nursing, Spoke to advanced practitioner, OT          See flowsheet documentation for full assessment, interventions and recommendations.  Note: Prognosis: Good  Problem List: Decreased strength, Decreased endurance, Impaired balance, Decreased mobility  Assessment: Pt is 84 year old female seen for PT evaluation s/p admit to [facility] on 1/14/2024 with Stroke-like symptoms. PT consulted to assess pt's functional mobility and discharge needs. Order placed for PT evaluation and treatment, with out of bed to chair order. Comorbidities affecting pt's physical performance at time of assessment include Parkinson's disease, obesity, GERD, urge incontinence, history of distal pancreatectomy and splenectomy, new onset a-fib, and type 2 DM. Prior to hospitalization, pt was independent with all functional mobility without an AD. Pt ambulates household and community distances. Pt resides alone, in a one level house with five steps to enter. Personal factors affecting pt at time of initial evaluation include stairs to enter home, inability to ambulate household distances, inability to ambulate community distances, inability to navigate level surfaces without external assistance, unable to perform dynamic tasks in the community, limited home support, difficulty performing ADLs, and inability to perform IADLs. Please find objective findings from PT assessment regarding body systems outlined above with impairments and limitations including weakness, impaired balance, decreased endurance, gait deviations, decreased activity tolerance, decreased functional  mobility tolerance, and fall risk. The following objective measures were performed on initial evaluation Barthel Index: 40/100, Modified [place]: 4 (moderate/severe disability), and AM-PAC 6-Clicks: 11/24. Pt's clinical presentation is currently unstable/unpredictable seen in pt's presentation of need for ongoing medical management/monitoring, pt is a fall risk, and pt requires cues and assist for safety with functional mobility. Pt to benefit from continued PT treatment to address deficits as defined above and maximize pt's level of function and independence with mobility. From a PT standpoint, recommendation at time of discharge would be level 2, moderate resource intensity in order to facilitate return to prior level of function.    Barriers to Discharge: Inaccessible home environment, Decreased caregiver support     Rehab Resource Intensity Level, PT: II (Moderate Resource Intensity)    See flowsheet documentation for full assessment.

## 2024-01-15 NOTE — ASSESSMENT & PLAN NOTE
Presents to the emergency department for evaluation of acute dizziness and gait dysfunction  No specific focal deficits or dysarthria  Questionable afib en route per EMS, no known history per patient raising concern for cardio-embolic etiology   CT head negative for hemorrhage   CTA negative for large vessel occlusion   NIH 0 on presentation.  However, given new functional deficit, patient received TNK at 1130   Will continue on stroke pathway   No venipuncture for 24 hours while in TNK window  Neuro checks per TNK protocol   MRI and echocardiogram ordered  PRN antihypertensives only for sbp >185 while in the TNK window   Tight glycemic control   Repeat CT head 24 hours post TNK, and as needed for neuro status change   Consult to neurology, appreciate recommendations   Consult to PT/OT, physical medicine

## 2024-01-15 NOTE — PHYSICAL THERAPY NOTE
Physical Therapy Evaluation     Patient's Name: Radha Vidal    Admitting Diagnosis  Dizziness [R42]  Syncope [R55]  Unsteady gait [R26.81]  New onset a-fib (HCC) [I48.91]  Stroke-like symptoms [R29.90]    Problem List  Patient Active Problem List   Diagnosis    OAB (overactive bladder)    Parkinson's disease    Primary insomnia    History of skin cancer    Seasonal allergic rhinitis    Impaired fasting glucose    Mood disturbance    Obesity (BMI 30-39.9)    Claustrophobia    Mitral valve disorder    Menopause ovarian failure    Vitamin D deficiency    Dysphagia    Multiple thyroid nodules    Gastro-esophageal reflux disease without esophagitis    Osteopenia    Chronic idiopathic constipation    History of adenomatous polyp of colon    Tremor    Trochanteric bursitis, right hip    IPMN (intraductal papillary mucinous neoplasm)    Cherry angioma    Dyspnea on exertion    Postablative hypothyroidism    Sleep apnea    Closed compression fracture of body of L1 vertebra (HCC)    Cerebrovascular disease    Age-related osteoporosis without current pathological fracture    Urge incontinence    Encounter for geriatric assessment    s/p distal pancreatectomy/splenectomy    Stroke-like symptoms    New onset a-fib (HCC)    Type 2 diabetes mellitus (HCC)     Past Medical History  Past Medical History:   Diagnosis Date    Actinic keratosis 2016    Acute midline low back pain without sciatica 2020    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     Benign neoplasm of skin     Cancer (HCC)     skin    Chest pain     Claustrophobia     Diverticulitis     Diverticulitis     Fracture     L1-L2    GERD (gastroesophageal reflux disease)     H. pylori infection 2020    Muscle weakness     Nonmelanoma skin cancer     last assessed 2017    Palpitations     Pancreatic cyst     Seasonal allergies     Seborrheic keratosis 2014    Sleep apnea     no cpap    Spontaneous      without  mention of complications     Squamous cell carcinoma of forehead 07/09/2014    Syncope      Past Surgical History  Past Surgical History:   Procedure Laterality Date    ABDOMINAL ADHESION SURGERY N/A 07/31/2023    Procedure: LYSIS ADHESIONS;  Surgeon: Kyle Julien MD;  [facility];  Service: Surgical Oncology    BOTOX INJECTION N/A 03/06/2017    Procedure: CYSTOSCOPY; BLADDER BOTOX 100 UNITS ;  Surgeon: Juan Edward MD;  [facility];  Service:     BOWEL RESECTION      related ot diverticulitis    CARDIAC CATHETERIZATION      CARPAL TUNNEL RELEASE Right     COLONOSCOPY  07/31/2019    COLOSTOMY      COLOSTOMY CLOSURE      CYSTOSCOPY  06/01/2021    DISTAL PANCREATECTOMY N/A 07/31/2023    Procedure: DISTAL PANCREATECTOMY;  Surgeon: Kyle Julien MD;  [facility];  Service: Surgical Oncology    FOOT SURGERY Left     neuroma    HYSTERECTOMY      MYRINGOTOMY W/ TUBES Right 12/06/2023    office procedure - Dr. Grace    NAS[place] SIN[place] SURGERY  age 39    [place]    PANCREATIC CYST BIOPSY  07/31/2023    NC CYSTOURETHROSCOPY N/A 04/13/2018    Procedure: CYSTOSCOPY WITH BOTOX;  Surgeon: Juan Edward MD;  [facility];  Service: Urology    NC ESOPHAGOGASTRODUODENOSCOPY TRANSORAL DIAGNOSTIC N/A 04/23/2019    Procedure: ESOPHAGOGASTRODUODENOSCOPY (EGD);  Surgeon: Edu Dickerson DO;  Location: [facility];  Service: Gastroenterology    SPLENECTOMY, TOTAL N/A 07/31/2023    Procedure: SPLENECTOMY;  Surgeon: Kyle Julien MD;  [facility];  Service: Surgical Oncology    TONSILLECTOMY  age 49    UPPER GASTROINTESTINAL ENDOSCOPY  04/23/2019      01/15/24 0957   PT Last Visit   PT Visit Date 01/15/24   Note Type   Note type Evaluation   Additional Comments BP at start of session 144/79.   Pain Assessment   Pain Assessment Tool 0-10   Pain Score No Pain   Restrictions/Precautions   Weight Bearing Precautions Per Order No   Other Precautions Chair Alarm;Bed Alarm;Multiple lines;Fall  "Risk;Telemetry   Home Living   Type of Home Mobile home   Home Layout One level;Able to live on main level with bedroom/bathroom;Performs ADLs on one level;Stairs to enter with rails  (5 LOIDA)   Bathroom Shower/Tub Walk-in shower   Bathroom Toilet Standard   Bathroom Equipment Built-in shower seat;Hand-held shower   Bathroom Accessibility Accessible   Home Equipment Walker;Cane  (pt has access to)   Additional Comments Pt ambulates without an AD.   Prior Function   Level of [place] Independent with functional mobility;Independent with IADLS   Lives With Alone   Receives Help From Family;Neighbor  (family within 15 minutes)   IADLs Independent with driving;Independent with meal prep;Independent with medication management   Falls in the last 6 months 0   Vocational Part time employment  ([employer]; retired )   General   Additional Pertinent History CRNP Alia cleared for PT   Family/Caregiver Present No   Cognition   Overall Cognitive Status   (questionable)   Arousal/Participation Alert   Orientation Level Oriented X4   Memory Within functional limits   Following Commands Follows one step commands without difficulty   Comments Pt agreeable to PT.   Subjective   Subjective \"I've only been up to the commode.\"   RLE Assessment   RLE Assessment X   Strength RLE   RLE Overall Strength 4-/5   LLE Assessment   LLE Assessment X   Strength LLE   LLE Overall Strength 4-/5   Light Touch   RLE Light Touch Grossly intact   LLE Light Touch Grossly intact   Bed Mobility   Supine to Sit 4  Minimal assistance   Additional items Assist x 2;HOB elevated;Bedrails;Increased time required;Verbal cues;LE management   Transfers   Sit to Stand 4  Minimal assistance   Additional items Assist x 2;Increased time required;Verbal cues   Stand to Sit 4  Minimal assistance   Additional items Assist x 2;Armrests;Increased time required;Verbal cues   Ambulation/Elevation   Gait pattern Decreased toe off;Decreased heel " strike;Decreased hip extension;Excessively slow;Step to;Short stride;Shuffling   Gait Assistance 4  Minimal assist   Additional items Assist x 2;Verbal cues   Assistive Device Other (Comment)  (bilateral hand held assist)   Distance 2 feet, bed to chair   Balance   Static Sitting Fair   Dynamic Sitting Fair -   Static Standing Poor +   Dynamic Standing Poor   Ambulatory Poor   Endurance Deficit   Endurance Deficit Yes   Endurance Deficit Description decreased activity tolerance   Activity Tolerance   Activity Tolerance Patient limited by fatigue   Medical Staff Made Aware OT Tiara  (Co-evaluation performed with OT secondary to complex medical condition of patient and regression of functional status from baseline. PT/OT goals were addressed separately.)   Nurse Made Aware RN Violet made aware of session outcomes   Assessment   Prognosis Good   Problem List Decreased strength;Decreased endurance;Impaired balance;Decreased mobility   Assessment Pt is 84 year old female seen for PT evaluation s/p admit to [facility] on 1/14/2024 with Stroke-like symptoms. PT consulted to assess pt's functional mobility and discharge needs. Order placed for PT evaluation and treatment, with out of bed to chair order. Comorbidities affecting pt's physical performance at time of assessment include Parkinson's disease, obesity, GERD, urge incontinence, history of distal pancreatectomy and splenectomy, new onset a-fib, and type 2 DM. Prior to hospitalization, pt was independent with all functional mobility without an AD. Pt ambulates household and community distances. Pt resides alone, in a one level house with five steps to enter. Personal factors affecting pt at time of initial evaluation include stairs to enter home, inability to ambulate household distances, inability to ambulate community distances, inability to navigate level surfaces without external assistance, unable to perform dynamic tasks in the community, limited home  support, difficulty performing ADLs, and inability to perform IADLs. Please find objective findings from PT assessment regarding body systems outlined above with impairments and limitations including weakness, impaired balance, decreased endurance, gait deviations, decreased activity tolerance, decreased functional mobility tolerance, and fall risk. The following objective measures were performed on initial evaluation Barthel Index: 40/100, Modified [place]: 4 (moderate/severe disability), and AM-PAC 6-Clicks: 11/24. Pt's clinical presentation is currently unstable/unpredictable seen in pt's presentation of need for ongoing medical management/monitoring, pt is a fall risk, and pt requires cues and assist for safety with functional mobility. Pt to benefit from continued PT treatment to address deficits as defined above and maximize pt's level of function and independence with mobility. From a PT standpoint, recommendation at time of discharge would be level 2, moderate resource intensity in order to facilitate return to prior level of function.   Barriers to Discharge Inaccessible home environment;Decreased caregiver support   Goals   STG Expiration Date 01/25/24   Short Term Goal #1 In 10 days: Increase bilateral LE strength 1/2 grade to facilitate independent mobility, Perform all bed mobility tasks modified independent to decrease caregiver burden, Perform all transfers modified independent to improve independence, Ambulate > 150 ft. with least restrictive assistive device modified independent w/o LOB and w/ normalized gait pattern 100% of the time, Navigate 5 stairs modified independent with unilateral handrail to facilitate return to previous living environment, and Increase all balance 1/2 grade to decrease risk for falls   Plan   Treatment/Interventions Functional transfer training;LE strengthening/ROM;Elevations;Therapeutic exercise;Endurance training;Patient/family training;Bed mobility;Gait training;Spoke to  nursing;Spoke to advanced practitioner;OT   PT Frequency 3-5x/wk   Discharge Recommendation   Rehab Resource Intensity Level, PT II (Moderate Resource Intensity)   AM-PAC Basic Mobility Inpatient   Turning in Flat Bed Without Bedrails 2   Lying on Back to Sitting on Edge of Flat Bed Without Bedrails 2   Moving Bed to Chair 2   Standing Up From Chair Using Arms 2   Walk in Room 2   Climb 3-5 Stairs With Railing 1   Basic Mobility Inpatient Raw Score 11   Basic Mobility Standardized Score 30.25   Highest Level Of Mobility   -HLM Goal 4: Move to chair/commode   -HLM Achieved 4: Move to chair/commode   Modified [place] Scale   Modified [place] Scale 4   Barthel Index   Feeding 10   Bathing 0   Grooming Score 0   Dressing Score 5   Bladder Score 5   Bowels Score 10   Toilet Use Score 5   Transfers (Bed/Chair) Score 5   Mobility (Level Surface) Score 0   Stairs Score 0   Barthel Index Score 40     PT Evaluation Time: [id number]  Nadira Yee, PT, DPT

## 2024-01-15 NOTE — ASSESSMENT & PLAN NOTE
Lab Results   Component Value Date    HGBA1C 6.7 (H) 01/15/2024       Recent Labs     01/14/24  0959   POCGLU 140       Blood Sugar Average: Last 72 hrs:  (P) 140  Hemoglobin A1c 6.7%  Start accuchecks  If found to be elevated can initiate insulin while admitted  Advise follow up with PCP for ongoing management

## 2024-01-15 NOTE — ASSESSMENT & PLAN NOTE
Noted by EMS en route  No history of afib per patient  Stroke like symptoms may be cardio-embolic etiology   Cardiology consultation, may need holter monitor   Continue cardiac monitoring  Echo per stroke protocol

## 2024-01-15 NOTE — SPEECH THERAPY NOTE
"Consult received and chart reviewed. Per chart review, and discussion with RN, pt passed RN dysphagia assessment and is tolerating regular diet with thin liquids with no s/s of aspiration.  Patient known to this department from prior admission and participated in VBS which was GWFL + noted prominent CP bar not obstructing bolus flow. She was referred for ENT f/u - she reports she did go to see them but they \"did not look at my throat.\" She denies any dysphagia. Education was provided on VBS results and recommendations if dysphagia is noted. No dysarthria or language deficits noted or reported. Therefore, formal speech/swallow evaluation not indicated at this time. If new concerns arise, please reconsult.     Chitra Hardy M.S., CCC-SLP  Speech Language Pathologist   Available via [facility] #[id number]  [place] #[id number]    "

## 2024-01-15 NOTE — PROGRESS NOTES
Progress Note - Neurology   Radha Vidal 84 y.o. female MRN: [id number]  Unit/Bed#:  Encounter: [id number]      Assessment/Plan   * Stroke-like symptoms  Assessment & Plan  Radha Vidal is a 84 y.o. female with Parkinson's disease on Sinemet who presented to [facility] ED on 1/14/2024 as a stroke alert with acute onset of vertigo/dizziness and gait imbalance with new inability to ambulate.     BP on presentation 162/88.  NIHSS per attending neurologist was 0.  CT head and CTA head and neck unremarkable for acute acute abnormalities or large vessel occlusion/critical stenosis.  Given inability to ambulate being a new functional deficits, decision was made to administer IV TNK.    Workup:  - Initial CT head: Unremarkable for acute intracranial abnormalities  - CTA head and neck: Unremarkable for large vessel occlusion/critical stenosis  - Repeat CT head for headache s/p IV TNK: Unremarkable for acute intracranial abnormalities    - Hemoglobin A1c: Elevated 6.7  - Lipid panel: Total cholesterol 180, triglycerides 67, HDL 79, LDL 88    Concerning for acute stroke s/p IV TNK as patient had presented with elevated BP and a question of new diagnosis of A-fib. Will obtain MRI brain for further assessment.  Patient does report similar episodes of dizziness in the past, however this episode more severe and longer in duration, favoring peripheral vestibulopathy at possible etiology in the setting of history of myringotomy with tube on the right (12/6/2023).  Other considerations include orthostatic hypotension in the setting of PD with suspected autonomic dysfunction as patient's BPs throughout admission appear to have been labile.    Plan:  Admit on stroke pathway  - Repeat CTH 24h post TNK pending  - MRI brain pending  - Echo pending  - Recommend: Fasting lipid panel, Hemoglobin A1c, TSH  - Hold aspirin, anticoagulation, and DVT ppx 24h post TNK  - If repeat CT head 24 hours post TNK appears stable, can initiate ASA 81  "mg daily and DVT prophylaxis  - Continue Statin therapy   - Goal normotension, euglycemia, normothermia   - Telemetry; would confirm EMSs read of possible A-fib  - PT/OT/Speech   - Frequent neuro checks  - STAT CTH for acute change  - Medical management and supportive care per primary team, notify with changes      Recommendations for outpatient neurological follow up have yet to be determined.    Subjective:   Patient states she feels much better today than she did yesterday. She states she is no longer feeling dizzy or feeling like she is going to pass out. Patient reports she was able to ambulate to the bathroom this morning and state her walking is much better not back to normal, stating she feel generally weak. Denies CP, SOB, abdominal pain, N/V, headache, numbness/tingling     ROS:  12 point ROS performed, as stated above, all others negative.    Vitals: Blood pressure 118/55, pulse 59, temperature 97.7 °F (36.5 °C), temperature source Oral, resp. rate 18, height 5' 1\" (1.549 m), weight 80.5 kg (177 lb 7.5 oz), SpO2 95%, not currently breastfeeding.,Body mass index is 33.53 kg/m².    Physical Exam  Vitals and nursing note reviewed.   Constitutional:       General: She is not in acute distress.     Appearance: Normal appearance. She is not ill-appearing, toxic-appearing or diaphoretic.   HENT:      Head: Normocephalic and atraumatic.   Eyes:      General: No scleral icterus.        Right eye: No discharge.         Left eye: No discharge.      Extraocular Movements: Extraocular movements intact.      Conjunctiva/sclera: Conjunctivae normal.   Skin:     General: Skin is warm and dry.   Neurological:      Mental Status: She is alert.   Psychiatric:         Mood and Affect: Mood normal.         Behavior: Behavior normal.         Thought Content: Thought content normal.         Judgment: Judgment normal.       Neurologic Exam     Mental Status   Patient is alert, sitting up in bed. Oriented x 3. No dysarthria or " "aphasia noted. Able to follow central and appendicular commands and answers all questions appropriately.      Cranial Nerves     CN VIII   Hearing: intact  Primary gaze midline, conjugate gaze noted.   EOMs intact, no evidence of nystagmus  No visual field deficits noted  Facial sensation intact throughout  No facial asymmetry noted  Tongue midline     Motor Exam   Muscle bulk: normal  Overall muscle tone: normal  Right arm pronator drift: absent  Left arm pronator drift: absent  Mild cogwheel rigidity noted in BUE    Bilateral upper and lower extremity strength testing 5/5 throughout     Sensory Exam   Light touch normal.     Gait, Coordination, and Reflexes   No ataxia or dysmetria bilateral upper extremity finger-nose testing    Resting tremor noted in right hand> left hand    No seizure-like activity noted throughout examination     Lab Results: I have personally reviewed pertinent reports.  Recent Results (from the past 24 hour(s))   HS Troponin I 2hr    Collection Time: 01/14/24 12:19 PM   Result Value Ref Range    hs TnI 2hr 8 \"Refer to ACS Flowchart\"- see link ng/L    Delta 2hr hsTnI 3 <20 ng/L   HS Troponin I 4hr    Collection Time: 01/14/24  4:15 PM   Result Value Ref Range    hs TnI 4hr 13 \"Refer to ACS Flowchart\"- see link ng/L    Delta 4hr hsTnI 8 <20 ng/L   Lipid Panel with Direct LDL reflex    Collection Time: 01/15/24  4:53 AM   Result Value Ref Range    Cholesterol 180 See Comment mg/dL    Triglycerides 67 See Comment mg/dL    HDL, Direct 79 >=50 mg/dL    LDL Calculated 88 0 - 100 mg/dL   Hemoglobin A1c w/EAG Estimation    Collection Time: 01/15/24  4:53 AM   Result Value Ref Range    Hemoglobin A1C 6.7 (H) Normal 4.0-5.6%; PreDiabetic 5.7-6.4%; Diabetic >=6.5%; Glycemic control for adults with diabetes <7.0% %     mg/dl   CBC and Platelet    Collection Time: 01/15/24  4:53 AM   Result Value Ref Range    WBC 8.03 4.31 - 10.16 Thousand/uL    RBC 4.42 3.81 - 5.12 Million/uL    Hemoglobin 13.5 " 11.5 - 15.4 g/dL    Hematocrit 38.9 34.8 - 46.1 %    MCV 88 82 - 98 fL    MCH 30.5 26.8 - 34.3 pg    MCHC 34.7 31.4 - 37.4 g/dL    RDW 14.6 11.6 - 15.1 %    Platelets 337 149 - 390 Thousands/uL    MPV 9.4 8.9 - 12.7 fL   Basic metabolic panel    Collection Time: 01/15/24  4:53 AM   Result Value Ref Range    Sodium 137 135 - 147 mmol/L    Potassium 4.0 3.5 - 5.3 mmol/L    Chloride 105 96 - 108 mmol/L    CO2 26 21 - 32 mmol/L    ANION GAP 6 mmol/L    BUN 18 5 - 25 mg/dL    Creatinine 0.76 0.60 - 1.30 mg/dL    Glucose 113 65 - 140 mg/dL    Calcium 9.2 8.4 - 10.2 mg/dL    eGFR 71 ml/min/1.73sq m   Magnesium    Collection Time: 01/15/24  4:53 AM   Result Value Ref Range    Magnesium 2.0 1.9 - 2.7 mg/dL     Imaging Studies: I have personally reviewed pertinent reports and I have personally reviewed pertinent films in PACS.    EKG, Pathology, and Other Studies: I have personally reviewed pertinent reports.    VTE Prophylaxis: Reason for no pharmacologic prophylaxis s/p IV TNK    Counseling / Coordination of Care  Total time spent today 32 minutes.  Greater than 50% of total time was spent with the patient and/or family counseling and/or coordination of care.  A description of the counseling/coordination of care:  Patient was seen and evaluated.  Discussed with attending.  Chart reviewed thoroughly including laboratory and imaging studies.  Plan of care discussed with patient and primary team.  Discussed plan to obtain repeat CT head and MRI brain with patient at bedside.    Dictation voice to text software has been used in the creation of this document.  Please consider this in light of any contextual or grammatical errors.

## 2024-01-15 NOTE — CONSULTS
PHYSICAL MEDICINE AND REHABILITATION CONSULT NOTE  Radha Vidal 84 y.o. female MRN: [id number]  Unit/Bed#:  Encounter: [id number]    Requested by (Physician/Service): Cristobal Echevarria MD  Reason for Consultation:  Assessment of rehabilitation needs  Reason for Hospitalization:  Dizziness [R42]  Syncope [R55]  Unsteady gait [R26.81]  New onset a-fib (HCC) [I48.91]  Stroke-like symptoms [R29.90]      History of Present Illness:  Radha Vidal is a 84 y.o. female who  has a past medical history of Actinic keratosis (2016), Acute midline low back pain without sciatica (2020), 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, Benign neoplasm of skin, Cancer (HCC), Chest pain, Claustrophobia, Diverticulitis, Diverticulitis, Fracture, GERD (gastroesophageal reflux disease), H. pylori infection (2020), Muscle weakness, Nonmelanoma skin cancer, Palpitations, Pancreatic cyst, Seasonal allergies, Seborrheic keratosis (2014), Sleep apnea, Spontaneous , Squamous cell carcinoma of forehead (2014), and Syncope. who presented to the [facility] on 2024 with dizziness and gait dysfunction but no focal weakness and possibly atrial fibrillation during transport but not in the ED.  Patient was treated with IV TNK and was admitted to ICU for further monitoring.  He describes the dizziness as a spinning sensation and a feeling of lightheadedness at times .She has chronic right side facial numbness    PM&R consulted for rehabilitation recommendations.    Restrictions include:  none    Hospital Complications/Comorbidities:   Complications: As above  Comorbidities: As above    Morbid Obesity (BMI > 40) No     Last Weight Last BMI   Wt Readings from Last 1 Encounters:   01/15/24 80.3 kg (177 lb)    Body mass index is 33.44 kg/m².     Functional History:     Prior to Admission:     Functional Status: Patient was independent with mobility/ambulation,  transfers, ADL's, IADL's.     Present:  Physical Therapy Occupational Therapy Speech Therapy   Minimal assistance with sit to stand and stand to sit transfers, ambulated with minimal assistance and verbal cues with decreased toe off, decreased heel strike, decreased hip extension and excessively slow. Modified independent with eating, minimal assistance with upper body bathing, minimal assistance with lower body bathing, minimal assistance with upper body dressing, minimal assistance with lower body dressing and minimal assistance with toileting. Tolerating regular diet with thin liquids.  No dysarthria or language deficits noted or reported.     Past Medical History:   Past Surgical History:   Family History:     Past Medical History:   Diagnosis Date    Actinic keratosis 2016    Acute midline low back pain without sciatica 2020    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     Benign neoplasm of skin     Cancer (HCC)     skin    Chest pain     Claustrophobia     Diverticulitis     Diverticulitis     Fracture     L1-L2    GERD (gastroesophageal reflux disease)     H. pylori infection 2020    Muscle weakness     Nonmelanoma skin cancer     last assessed 2017    Palpitations     Pancreatic cyst     Seasonal allergies     Seborrheic keratosis 2014    Sleep apnea     no cpap    Spontaneous      without mention of complications     Squamous cell carcinoma of forehead 2014    Syncope     Past Surgical History:   Procedure Laterality Date    ABDOMINAL ADHESION SURGERY N/A 2023    Procedure: LYSIS ADHESIONS;  Surgeon: Kyle Julien MD;  Location: [facility];  Service: Surgical Oncology    BOTOX INJECTION N/A 2017    Procedure: CYSTOSCOPY; BLADDER BOTOX 100 UNITS ;  Surgeon: Juan Edward MD;  Location[facility];  Service:     BOWEL RESECTION      related ot diverticulitis    CARDIAC CATHETERIZATION      CARPAL TUNNEL RELEASE Right      COLONOSCOPY  07/31/2019    COLOSTOMY      COLOSTOMY CLOSURE      CYSTOSCOPY  06/01/2021    DISTAL PANCREATECTOMY N/A 07/31/2023    Procedure: DISTAL PANCREATECTOMY;  Surgeon: Kyle Julien MD;  Location: [facility];  Service: Surgical Oncology    FOOT SURGERY Left     neuroma    HYSTERECTOMY      MYRINGOTOMY W/ TUBES Right 12/06/2023    office procedure - Dr. Grace    NASAL SINUS SURGERY  age 39    [place]    PANCREATIC CYST BIOPSY  07/31/2023    ME CYSTOURETHROSCOPY N/A 04/13/2018    Procedure: CYSTOSCOPY WITH BOTOX;  Surgeon: Juan Edward MD;  [facility];  Service: Urology    ME ESOPHAGOGASTRODUODENOSCOPY TRANSORAL DIAGNOSTIC N/A 04/23/2019    Procedure: ESOPHAGOGASTRODUODENOSCOPY (EGD);  Surgeon: Edu Dickerson DO;  Location: [facility];  Service: Gastroenterology    SPLENECTOMY, TOTAL N/A 07/31/2023    Procedure: SPLENECTOMY;  Surgeon: Kyle Julien MD;  [facility];  Service: Surgical Oncology    TONSILLECTOMY  age 49    UPPER GASTROINTESTINAL ENDOSCOPY  04/23/2019     Family History   Problem Relation Age of Onset    Alcohol abuse Mother     Heart disease Mother     Alcohol abuse Father     Alcohol abuse Brother     Cancer Brother     Tremor Neg Hx     Parkinsonism Neg Hx     Colon cancer Neg Hx      -No family history of neurologic diseases.      Medications:    Current Facility-Administered Medications:     ascorbic acid (VITAMIN C) tablet 1,000 mg, 1,000 mg, Oral, Daily, RISHI Palomino, 1,000 mg at 01/15/24 0810    aspirin (ECOTRIN LOW STRENGTH) EC tablet 81 mg, 81 mg, Oral, Daily, RISHI Palomino, 81 mg at 01/15/24 1303    atorvastatin (LIPITOR) tablet 40 mg, 40 mg, Oral, QPM, RISHI Palomino, 40 mg at 01/14/24 1759    carbidopa-levodopa (SINEMET)  mg per tablet 1 tablet, 1 tablet, Oral, TID BETI, RISHI Palomino, 1 tablet at 01/15/24 1119    chlorhexidine (PERIDEX) 0.12 % oral rinse 15 mL, 15 mL, Mouth/Throat, Q12H Nya CHAPIN  RISHI Bush, 15 mL at 01/15/24 0810    cholecalciferol (VITAMIN D3) tablet 5,000 Units, 5,000 Units, Oral, After Lunch, RISHI Palomino, 5,000 Units at 01/14/24 1405    enoxaparin (LOVENOX) subcutaneous injection 40 mg, 40 mg, Subcutaneous, Q24H DARI, RISHI Palomino, 40 mg at 01/15/24 1302    hydrALAZINE (APRESOLINE) injection 10 mg, 10 mg, Intravenous, Q4H PRN, RISHI Palomino    insulin lispro (HumaLOG) 100 units/mL subcutaneous injection 1-5 Units, 1-5 Units, Subcutaneous, HS, RISHI Palomino    insulin lispro (HumaLOG) 100 units/mL subcutaneous injection 1-6 Units, 1-6 Units, Subcutaneous, TID AC, 2 Units at 01/15/24 1301 **AND** Fingerstick Glucose (POCT), , , TID AC, RISHI Palomino    labetalol (NORMODYNE) injection 10 mg, 10 mg, Intravenous, Q4H PRN, RISHI Palomino    LORazepam (ATIVAN) tablet 1 mg, 1 mg, Oral, Once PRN, RISHI Palomino    multivitamin stress formula tablet 1 tablet, 1 tablet, Oral, QAM, RISHI Palomino, 1 tablet at 01/15/24 0810    ondansetron (ZOFRAN) injection 4 mg, 4 mg, Intravenous, Q6H PRN, RISHI Palomino, 4 mg at 01/15/24 1424    pantoprazole (PROTONIX) EC tablet 40 mg, 40 mg, Oral, Daily, RISHI Palomino, 40 mg at 01/15/24 0810    Allergies:   Allergies   Allergen Reactions    Caffeine - Food Allergy GI Intolerance    Iodine - Food Allergy Rash    Latex Rash    Other Rash     Adhesive tape          Social History:    Social History     Socioeconomic History    Marital status:      Spouse name: Not on file    Number of children: Not on file    Years of education: Not on file    Highest education level: Not on file   Occupational History    Occupation: RETIRED    Tobacco Use    Smoking status: Never     Passive exposure: Past    Smokeless tobacco: Never   Vaping Use    Vaping status: Never Used   Substance and Sexual Activity    Alcohol use: Yes     Comment: patient states rare use on  holidays     Drug use: No    Sexual activity: Not Currently   Other Topics Concern    Not on file   Social History Narrative    LIVING INDEPENDENTLY ALONE         No caffeine use     Social Determinants of Health     Financial Resource Strain: Low Risk  (4/19/2023)    Overall Financial Resource Strain (CARDIA)     Difficulty of Paying Living Expenses: Not very hard   Food Insecurity: No Food Insecurity (1/14/2024)    Hunger Vital Sign     Worried About Running Out of Food in the Last Year: Never true     Ran Out of Food in the Last Year: Never true   Transportation Needs: No Transportation Needs (1/14/2024)    PRAPARE - Transportation     Lack of Transportation (Medical): No     Lack of Transportation (Non-Medical): No   Physical Activity: Not on file   Stress: Not on file   Social Connections: Not on file   Intimate Partner Violence: Not on file   Housing Stability: Low Risk  (1/14/2024)    Housing Stability Vital Sign     Unable to Pay for Housing in the Last Year: No     Number of Places Lived in the Last Year: 1     Unstable Housing in the Last Year: No        Radha Vidal is in a 1 level mobile house able to live on main level with bedroom/bathroom, 5 steps to enter.    Review of Systems: A 10-point review of systems was performed. Negative except as listed above.     Physical Exam:  Vital Signs:      Temp:  [97.4 °F (36.3 °C)-98.4 °F (36.9 °C)] 97.7 °F (36.5 °C)  HR:  [58-99] 58  Resp:  [11-26] 18  BP: ()/(43-84) 137/63   Intake/Output Summary (Last 24 hours) at 1/15/2024 1520  Last data filed at 1/15/2024 1123  Gross per 24 hour   Intake 358 ml   Output 815 ml   Net -457 ml        Laboratory:      Lab Results   Component Value Date    HGB 13.5 01/15/2024    HGB 14.2 10/03/2015    HCT 38.9 01/15/2024    HCT 41.4 10/03/2015    WBC 8.03 01/15/2024    WBC 5.17 10/03/2015     Lab Results   Component Value Date    BUN 18 01/15/2024    BUN 13 10/03/2015     (H) 10/03/2015    K 4.0 01/15/2024    K 4.0  10/03/2015     01/15/2024     (H) 10/03/2015    GLUCOSE 139 07/31/2023    GLUCOSE 108 10/03/2015    CREATININE 0.76 01/15/2024    CREATININE 0.75 10/03/2015     Lab Results   Component Value Date    PROTIME 12.7 01/14/2024    INR 0.90 01/14/2024        General: alert, no apparent distress, cooperative, and comfortable  Head: Normal, normocephalic, atraumatic.  Eye: Normal external eye, conjunctiva, lids   Ears: Normal external ears  Nose: Normal external nose, mucus membranes.  Pharynx: Dental Hygiene adequate. Normal buccal mucosa. Normal pharynx.  Neck / Thyroid: Supple, no masses, nodes, nodules or enlargement.    Skin/Extremity: no rashes, no erythema.    Neurologic: No dysarthria or aphasia noted.  Follows central and appendicular commands, answers all questions appropriately.  Cranial nerves II to XII are intact, normal tone and bulk on motor examination.  Mild cogwheel rigidity in both upper extremities.  Patient is intact to light touch bilaterally.  No ataxia on finger-to-nose testing.  Resting tremor noted in the right hand more than the left hand.    Psych: Appropriate affect, alert and oriented to person, place and time   Musculoskeletal - Strength:   Right  Left  Site  Right  Left  Site    5 5  S Ab: Shoulder Abductors  5  5  HF: Hip Flexors    5 5  EF: Elbow Flexors  5 5 KF: Knee Flexors    5  5  EE: Elbow Extensors  5  5  KE: Knee Extensors    5  5  WE: Wrist Extensors  5  5  DR: Dorsi Flexors    5  5  FF: Finger Flexors  5  5  PF: Plantar Flexors    5  5  HI: Hand Intrinsics  5  5  EHL: Extensor Hallucis Longus       Imaging: Reviewed  CT head wo contrast    Result Date: 1/15/2024  Impression: -No acute intracranial abnormality.  Stable chronic microangiopathic changes. Workstation performed: [id number]     CT head wo contrast    Result Date: 1/14/2024  Impression: 1. No acute intracranial hemorrhage. 2. No large evolving territorial infarction. 3. Mild, chronic microangiopathy is similar  to the CT from earlier today Workstation performed: [id number]     CT stroke alert brain    Result Date: 1/14/2024  Impression: No acute intracranial abnormality. Findings were directly discussed with Dr. Jamal Ferreira at 11:10 a.m. Workstation performed: [id number]     CTA stroke alert (head/neck)    Result Date: 1/14/2024  Impression: 1. CTA head: Negative for large vessel intracranial occlusion or hemodynamically significant stenosis. 2. CTA neck:  No extracranial carotid stenosis.  The cervical vertebral arteries are patent. Subcentimeter thyroid nodules. Findings were directly discussed with Dr. Jamal Ferreira at 11:10 a.m. Workstation performed: [id number]       Assessment and Recommendations:    84-year-old female presents with acute onset dizziness, likely secondary to peripheral vestibulopathy.    MRI of the brain is pending.  Impairments:  Impaired functional mobility and ability to perform ADL's      Recommendations:  - Chart reviewed  - Imaging reviewed   - Continue PT/OT while inpatient.      - Pt is unable to safely return home until she is at the supervision/contact-guard functional level (25% assistance level with or without a device)  modified-independent functional level (0% assistance with a device) independent functional level (0% assistance without a device)        - Based upon patient's current functional level, we recommend referral to outpatient  vestibular to allow for achievement of modified-independent functional level.     - Disposition unclear at this point in time but inpatient rehab a possibility in the near future pending achievement of clinically stability, potential for functional progress.    Thank you for allowing the PM&R service to participate in the care of this patient. We will continue to follow Radha Vidal's progress with you. Please do not hesitate to call with questions or concerns    ** Please Note: Fluency Direct voice to text software may have been used in the creation of  this document. **

## 2024-01-15 NOTE — ASSESSMENT & PLAN NOTE
Will hold home dose sanctura   Will utilize purewick while in on bedrest   Hold on yanez catheter placement given TNK

## 2024-01-15 NOTE — UTILIZATION REVIEW
Initial Clinical Review    Admission: Date/Time/Statement:   Admission Orders (From admission, onward)       Ordered        01/14/24 1142  INPATIENT ADMISSION  Once                          Orders Placed This Encounter   Procedures    INPATIENT ADMISSION     Standing Status:   Standing     Number of Occurrences:   1     Order Specific Question:   Level of Care     Answer:   Critical Care [15]     Order Specific Question:   Estimated length of stay     Answer:   More than 2 Midnights     Order Specific Question:   Certification     Answer:   I certify that inpatient services are medically necessary for this patient for a duration of greater than two midnights. See H&P and MD Progress Notes for additional information about the patient's course of treatment.     ED Arrival Information       Expected   -    Arrival   1/14/2024 09:55    Acuity   Emergent              Means of arrival   Ambulance    Escorted by   [facility]    Service   Critical Care/ICU    Admission type   Emergency              Arrival complaint   DIZZINESS             Chief Complaint   Patient presents with    Dizziness     After doing laundry patient felt dizzy fell back into chair, Pt not sure if she consciuosness       Initial Presentation: 84 y.o. female with Pmhx of dementia/Parkinsons, obesity, distal pancreatectomy/splenectomy (pancreatic mass), right sided TMJ, GERD, BRANDON, anxiety, diverticulitis, presents to ED by ems w/ dizziness and gait dysfunction.  A-fib noted by EMS en route (pt with no h/o A-fib). No specific focal deficits or dysarthria on exam. CTH, CTA head/neck neg for acute findings. NIH was 0, given the functional gait dysfunction, TNK was administered.   Admitted Inpatient to ICU for serial neuro exams and post-TNK eval --  Q1H neuro checks. Telemetry. MRI, echo ordered. PRN antihypertensives only for SBP >185 while in the TNK window. Accu-checks w/ ssi. Repeat CTH 24 hrs post TNK and prn neuro checks. Neuro &  cardiology consulted. PT/OT evals. Nutrition consult. Hold home Sanctura. Use Purwik while pt on bedrest. Continue pta Sinemet, pantoprazole. Hold ativan. Cardiac diet pending speech eval.     Date: 1/15   Day 2: Pt denies dizziness overnight.  Hemodynamically stable overnight. AA&O. Continue cardiac monitoring. MRI and echo pending.     Date: 1/16    Day 3: Has surpassed a 2nd midnight with active treatments and services, which include continued tx of new onset A-fib with need for cardiology consult and po med adj and need to start anticoagulant    Per neuro note -  MRI showed no infarct.  Presentation consistent with episodic vestibular syndrome with contributions from peripheral vestibulopathy and orthostatic hypotension.  Already has ENT follow-up scheduled for the ear issue.  Recommend thigh-high compression stockings for orthostatic hypotension.  Also advised patient on preactivation leg exercises to minimize risk of cerebral hypoperfusion upon standing.       ED Triage Vitals   Temperature Pulse Respirations Blood Pressure SpO2   01/14/24 1004 01/14/24 1004 01/14/24 1004 01/14/24 1004 01/14/24 1004   98.2 °F (36.8 °C) 77 18 162/88 97 %      Temp Source Heart Rate Source Patient Position - Orthostatic VS BP Location FiO2 (%)   01/14/24 1300 01/14/24 1015 01/14/24 1004 01/14/24 1004 --   Oral Monitor Lying Left arm       Pain Score       01/14/24 1004       No Pain          Wt Readings from Last 1 Encounters:   01/15/24 80.5 kg (177 lb 7.5 oz)     Additional Vital Signs:   Date/Time Temp Pulse Resp BP MAP (mmHg) SpO2 O2 Device Patient Position - Orthostatic VS   01/15/24 1100 97.7 °F (36.5 °C) 59 18 118/55 77 95 % None (Room air) Sitting   01/15/24 0800 97.7 °F (36.5 °C) 80 16 135/61 88 92 % None (Room air) Lying   01/15/24 0700 97.7 °F (36.5 °C) 80 16 142/61 88 95 % None (Room air) Lying   01/15/24 0300 97.4 °F (36.3 °C) Abnormal  75 13 108/53 76 93 % None (Room air) --   01/15/24 0000 98.2 °F (36.8 °C) 78 13  105/52 75 92 % None (Room air) Lying   01/14/24 2345 -- 78 13 113/53 76 93 % -- --   01/14/24 2320 -- 80 11 Abnormal  80/45 Abnormal  57 Abnormal  94 % -- --   01/14/24 2300 -- 79 14 79/43 Abnormal  57 Abnormal  91 % -- --   01/14/24 2000 -- 94 17 95/49 Abnormal  68 91 % None (Room air) --   01/14/24 1930 98.4 °F (36.9 °C) 93 16 106/49 Abnormal  71 91 % -- --   01/14/24 1900 -- 99 18 99/49 Abnormal  71 93 % None (Room air) Lying   01/14/24 1800 -- 97 26 Abnormal  122/84 100 93 % -- --   01/14/24 1600 -- 89 13 135/68 93 93 % -- --   01/14/24 1445 -- 74 9 Abnormal  142/65 93 94 % -- --   01/14/24 1430 -- 84 9 Abnormal  160/70 101 96 % -- --   01/14/24 1415 -- 80 9 Abnormal  164/67 97 96 % -- --   01/14/24 1400 -- 95 28 Abnormal  169/89 119 93 % -- --   01/14/24 1315 -- 66 9 Abnormal  158/75 108 95 % -- --   01/14/24 1300 98.7 °F (37.1 °C) 64 16 185/85 Abnormal  -- 95 % None (Room air) Lying   01/14/24 1200 -- 91 15 155/80 -- 96 % None (Room air) Lying   01/14/24 1130 -- 86 23 Abnormal  148/74 99 96 % None (Room air) Lying   01/14/24 1004 98.2 °F (36.8 °C) 77 18 162/88 -- 97 % -- Lying     Pertinent Labs/Diagnostic Test Results:   EKG 1/14:  Sinus rhythm with marked sinus arrhythmia  Nonspecific ST abnormality    ECG 1/14:  Sinus rhythm with Premature atrial complexes  Nonspecific ST and T wave abnormality  Prolonged QT  Abnormal ECG    CT head wo contrast   Final Result by Apolinar Goel MD (01/14 4127)      1. No acute intracranial hemorrhage.   2. No large evolving territorial infarction.   3. Mild, chronic microangiopathy is similar to the CT from earlier today         CTA stroke alert (head/neck)   Final Result by Arsalan Lindsey MD (01/14 1113)      1. CTA head: Negative for large vessel intracranial occlusion or hemodynamically significant stenosis.      2. CTA neck:  No extracranial carotid stenosis.  The cervical vertebral arteries are patent.      Subcentimeter thyroid nodules.         CT stroke  alert brain   Final Result by Arsalan Lindsey MD (01/14 1113)      No acute intracranial abnormality.         CT head wo contrast  1/15: No acute intracranial abnormality.  Stable chronic microangiopathic changes.      MRI brain 1/15: White matter changes suggestive of chronic microangiopathy.  No acute intracranial pathology.          Results from last 7 days   Lab Units 01/14/24  1038   SARS-COV-2  Negative     Results from last 7 days   Lab Units 01/15/24  0453 01/14/24  1038   WBC Thousand/uL 8.03 6.75   HEMOGLOBIN g/dL 13.5 14.8   HEMATOCRIT % 38.9 44.4   PLATELETS Thousands/uL 337 398*     Results from last 7 days   Lab Units 01/15/24  0453 01/14/24  1038   SODIUM mmol/L 137 138   POTASSIUM mmol/L 4.0 4.3   CHLORIDE mmol/L 105 104   CO2 mmol/L 26 28   ANION GAP mmol/L 6 6   BUN mg/dL 18 17   CREATININE mg/dL 0.76 0.70   EGFR ml/min/1.73sq m 71 79   CALCIUM mg/dL 9.2 9.3   MAGNESIUM mg/dL 2.0  --      Results from last 7 days   Lab Units 01/14/24  0959   POC GLUCOSE mg/dl 140     Results from last 7 days   Lab Units 01/15/24  0453 01/14/24  1038   GLUCOSE RANDOM mg/dL 113 145*     Results from last 7 days   Lab Units 01/15/24  0453   HEMOGLOBIN A1C % 6.7*   EAG mg/dl 146     Results from last 7 days   Lab Units 01/14/24  1615 01/14/24  1219 01/14/24  1038   HS TNI 0HR ng/L  --   --  5   HS TNI 2HR ng/L  --  8  --    HSTNI D2 ng/L  --  3  --    HS TNI 4HR ng/L 13  --   --    HSTNI D4 ng/L 8  --   --      Results from last 7 days   Lab Units 01/14/24  1038   PROTIME seconds 12.7   INR  0.90   PTT seconds 32     Results from last 7 days   Lab Units 01/14/24  1038   INFLUENZA A PCR  Negative   INFLUENZA B PCR  Negative   RSV PCR  Negative       ED Treatment:   Medication Administration from 01/14/2024 0955 to 01/14/2024 1313         Date/Time Order Dose Route Action     01/14/2024 1130 EST tenecteplase (TNKase) injection 19 mg 19 mg Intravenous Given       Past Medical History:   Diagnosis Date    Actinic  keratosis 2016    Acute midline low back pain without sciatica 2020    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     Benign neoplasm of skin     Cancer (HCC)     skin    Chest pain     Claustrophobia     Diverticulitis     Diverticulitis     Fracture     L1-L2    GERD (gastroesophageal reflux disease)     H. pylori infection 2020    Muscle weakness     Nonmelanoma skin cancer     last assessed 2017    Palpitations     Pancreatic cyst     Seasonal allergies     Seborrheic keratosis 2014    Sleep apnea     no cpap    Spontaneous      without mention of complications     Squamous cell carcinoma of forehead 2014    Syncope      Present on Admission:   Stroke-like symptoms   Gastro-esophageal reflux disease without esophagitis   Obesity (BMI 30-39.9)   Parkinson's disease   Urge incontinence   New onset a-fib (HCC)      Admitting Diagnosis: Dizziness [R42]  Syncope [R55]  Unsteady gait [R26.81]  New onset a-fib (HCC) [I48.91]  Stroke-like symptoms [R29.90]  Age/Sex: 84 y.o. female  Admission Orders:  Scheduled Medications:  vitamin C, 1,000 mg, Oral, Daily  atorvastatin, 40 mg, Oral, QPM  carbidopa-levodopa, 1 tablet, Oral, TID AC  chlorhexidine, 15 mL, Mouth/Throat, Q12H DARI  cholecalciferol, 5,000 Units, Oral, After Lunch  multivitamin stress formula, 1 tablet, Oral, QAM  pantoprazole, 40 mg, Oral, Daily      PRN Meds:  hydrALAZINE, 10 mg, Intravenous, Q4H PRN  labetalol, 10 mg, Intravenous, Q4H PRN  ondansetron, 4 mg, Intravenous, Q6H PRN 1/14 x1        IP CONSULT TO NEUROLOGY  IP CONSULT TO CASE MANAGEMENT  IP CONSULT TO PHYSICAL MEDICINE REHAB  IP CONSULT TO CARDIOLOGY  IP CONSULT TO NUTRITION SERVICES    Network Utilization Review Department  ATTENTION: Please call with any questions or concerns to [phone] and carefully listen to the prompts so that you are directed to the right person. All voicemails are confidential.   For Discharge needs,  contact Care Management DC Support Team at [phone] opt. 2  Send all requests for admission clinical reviews, approved or denied determinations and any other requests to dedicated fax number below belonging to the campus where the patient is receiving treatment. List of dedicated fax numbers for the Facilities:  FACILITY NAME UR FAX NUMBER   ADMISSION DENIALS (Administrative/Medical Necessity) [phone]   DISCHARGE SUPPORT TEAM (NETWORK) [phone]   PARENT CHILD HEALTH (Maternity/NICU/Pediatrics) [phone]   [facility] [phone]   [facility] [phone]   Critical access hospital [phone]   [facility] [phone]   [facility] [phone]   [facility] [phone]   [facility] [phone]   [facility] [phone]   [facility] [phone]   [facility] [phone]   [facility] [phone]

## 2024-01-15 NOTE — PLAN OF CARE
Problem: OCCUPATIONAL THERAPY ADULT  Goal: Performs self-care activities at highest level of function for planned discharge setting.  See evaluation for individualized goals.  Description: Treatment Interventions: ADL retraining, Functional transfer training, UE strengthening/ROM, Endurance training, Patient/family training, Equipment evaluation/education, Compensatory technique education, Continued evaluation, Cardiac education, Energy conservation, Activityengagement          See flowsheet documentation for full assessment, interventions and recommendations.   Note: Limitation: Decreased ADL status, Decreased UE strength, Decreased endurance, Decreased self-care trans, Decreased high-level ADLs, Decreased cognition, Decreased Safe judgement during ADL  Prognosis: Good  Assessment: Pt is a 84 y.o. female seen for OT evaluation s/p admit to [facility] on 1/14/2024 w/ Stroke-like symptoms. Comorbidities affecting pt's functional performance at time of assessment include:Afib, DM, obesity, Parkinsons, mitral valve disorder, s/p distal pancreatectomy/ splenectomy, OAB, mood disturbances, and h/o skin cancer. Orders placed for OT evaluation and treatment.  Performed at least two patient identifiers during session including name and wristband. Personal factors affecting pt at time of IE include:steps to enter environment, limited home support, difficulty performing ADLS, difficulty performing IADLS , limited insight into deficits, decreased initiation and engagement , financial barriers, health management , and environment. Prior to admission, pt reports Ind with ADLs, Ind with IADLs, and (+) driving.  Upon evaluation: Pt requires min A with UB ADLs, min A with LB ADLs, min A of 2 with xfers and min A of 3 with functional mobility 2* the following deficits impacting occupational performance: weakness, decreased dynamic sit/ stand balance, decreased activity tolerance, decreased standing tolerance time for self  care and functional mobility, environmental deficits, delayed processing and problem solving, decreased coping skills, decreased mobilty, and requiring external assistance to complete transitional movements. Pt to benefit from continued skilled OT tx while in the hospital to address deficits as defined above and maximize level of functional independence w ADL's and functional mobility. Occupational Performance areas to address include: bathing/shower, toilet hygiene, dressing, medication management, health maintenance, functional mobility, community mobility, clothing management, cleaning, meal prep, money management, and household maintenance. From OT standpoint, recommendation at time of d/c would be Level 2 (Mod Resource Intensity).     Rehab Resource Intensity Level, OT: II (Moderate Resource Intensity)

## 2024-01-15 NOTE — PROGRESS NOTES
[facility]  Progress Note  Name: Radha Vidal I  MRN: [id number]  Unit/Bed#:  I Date of Admission: 1/14/2024   Date of Service: 1/15/2024 I Hospital Day: 1    Assessment/Plan   * Stroke-like symptoms  Assessment & Plan  Presents to the emergency department for evaluation of acute dizziness and gait dysfunction  No specific focal deficits or dysarthria  Questionable afib en route per EMS, no known history per patient raising concern for cardio-embolic etiology   CT head negative for hemorrhage   CTA negative for large vessel occlusion   NIH 0 on presentation.  However, given new functional deficit, patient received TNK at 1130   Will continue on stroke pathway   No venipuncture for 24 hours while in TNK window  Neuro checks per TNK protocol   MRI and echocardiogram ordered  PRN antihypertensives only for sbp >185 while in the TNK window   Tight glycemic control   Repeat CT head 24 hours post TNK, and as needed for neuro status change   Consult to neurology, appreciate recommendations   Consult to PT/OT, physical medicine     New onset a-fib (HCC)  Assessment & Plan  Noted by EMS en route  No history of afib per patient  Stroke like symptoms may be cardio-embolic etiology   Cardiology consultation, may need holter monitor   Continue cardiac monitoring  Echo per stroke protocol     Parkinson's disease  Assessment & Plan  Continue home dose carbidopa-levodopa     Obesity (BMI 30-39.9)  Assessment & Plan  Outpatient weight management  Nutrition consult     Gastro-esophageal reflux disease without esophagitis  Assessment & Plan  Continue home dose PPI     Urge incontinence  Assessment & Plan  Will hold home dose sanctura   Will utilize purewick while in on bedrest   Hold on yanez catheter placement given TNK     s/p distal pancreatectomy/splenectomy  Assessment & Plan  Noted in history              Disposition: Critical care    ICU Core Measures     A: Assess, Prevent, and Manage Pain Has  pain been assessed? Yes  Need for changes to pain regimen? NA   B: Both SAT/SAT  N/A   C: Choice of Sedation RASS Goal: 0 Alert and Calm  Need for changes to sedation or analgesia regimen? Yes   D: Delirium CAM-ICU: Negative   E: Early Mobility  Plan for early mobility? Yes   F: Family Engagement Plan for family engagement today? Yes         Prophylaxis:  VTE Contraindicated secondary to: TNKase   Stress Ulcer  covered bypantoprazole (PROTONIX) 40 mg tablet [[id number]] (Long-Term Med), pantoprazole (PROTONIX) EC tablet 40 mg [[id number]]         Significant 24hr Events     24hr events: Admitted with dizziness and gait disturbance, stroke alert initiated.  Patient received TNKase at 11:30 AM yesterday.  Patient denies dizziness overnight.  Hemodynamically stable overnight.     Subjective   Review of Systems   Objective                            Vitals I/O      Most Recent Min/Max in 24hrs   Temp (!) 97.4 °F (36.3 °C) Temp  Min: 97.4 °F (36.3 °C)  Max: 98.7 °F (37.1 °C)   Pulse 78 Pulse  Min: 64  Max: 101   Resp 13 Resp  Min: 9  Max: 28   /52 BP  Min: 79/43  Max: 185/85   O2 Sat 92 % SpO2  Min: 91 %  Max: 97 %    No intake or output data in the 24 hours ending 01/15/24 0443    Diet Cardiovascular; Cardiac    Invasive Monitoring           Physical Exam   Physical Exam  Vitals and nursing note reviewed.   Eyes:      General: No scleral icterus.     Extraocular Movements: Extraocular movements intact.      Conjunctiva/sclera: Conjunctivae normal.      Pupils: Pupils are equal, round, and reactive to light.   Skin:     General: Skin is warm and dry.      Capillary Refill: Capillary refill takes less than 2 seconds.   HENT:      Head: Normocephalic and atraumatic.      Mouth/Throat:      Mouth: Mucous membranes are moist.   Neck:      Vascular: No JVD.   Cardiovascular:      Rate and Rhythm: Normal rate. Rhythm irregular.      Pulses: Normal pulses.      Heart sounds: Normal heart sounds.   Musculoskeletal:          General: Normal range of motion.      Right lower leg: No edema.      Left lower leg: No edema.   Abdominal: General: Bowel sounds are normal. There is no distension.      Palpations: Abdomen is soft.      Tenderness: There is no abdominal tenderness. There is no guarding.   Constitutional:       General: She is not in acute distress.     Appearance: She is well-developed and well-nourished.   Pulmonary:      Effort: Pulmonary effort is normal. No accessory muscle usage, respiratory distress or accessory muscle usage.      Breath sounds: Normal breath sounds.   Secretions are normal.Chest:      Chest wall: No tenderness.   Psychiatric:         Speech: Speech is not no receptive aphasia or no expressive aphasia.   Neurological:      General: No focal deficit present.      Mental Status: She is oriented to person, place and time. Mental status is at baseline. She is calm.      Cranial Nerves: No dysarthria or facial asymmetry.      Motor: gross motor function is at baseline for patient. Strength full and intact in all extremities. No motor deficit.            Diagnostic Studies      EKG: Atrial fibrillation  Imaging:  I have personally reviewed pertinent reports.   and I have personally reviewed pertinent films in PACS     Medications:  Scheduled PRN   vitamin C, 1,000 mg, Daily  atorvastatin, 40 mg, QPM  carbidopa-levodopa, 1 tablet, TID AC  chlorhexidine, 15 mL, Q12H DARI  cholecalciferol, 5,000 Units, After Lunch  multivitamin stress formula, 1 tablet, QAM  pantoprazole, 40 mg, Daily      hydrALAZINE, 10 mg, Q4H PRN  labetalol, 10 mg, Q4H PRN  ondansetron, 4 mg, Q6H PRN       Continuous          Labs:    CBC    Recent Labs     01/14/24  1038   WBC 6.75   HGB 14.8   HCT 44.4   *     BMP    Recent Labs     01/14/24  1038   SODIUM 138   K 4.3      CO2 28   AGAP 6   BUN 17   CREATININE 0.70   CALCIUM 9.3       Coags    Recent Labs     01/14/24  1038   INR 0.90   PTT 32        Additional Electrolytes  No  recent results       Blood Gas    No recent results  No recent results LFTs  No recent results    Infectious  No recent results  Glucose  Recent Labs     01/14/24  1038   GLUC 145*               Non-Critical Care Time Statement: I have spent a total time of 30 minutes in caring for this patient including Diagnostic results, Documenting in the medical record, and Reviewing / ordering tests, medicine, procedures  .     RISHI Valenzuela

## 2024-01-15 NOTE — PLAN OF CARE
Problem: Potential for Falls  Goal: Patient will remain free of falls  Description: INTERVENTIONS:  - Educate patient/family on patient safety including physical limitations  - Instruct patient to call for assistance with activity   - Consult OT/PT to assist with strengthening/mobility   - Keep Call bell within reach  - Keep bed low and locked with side rails adjusted as appropriate  - Keep care items and personal belongings within reach  - Initiate and maintain comfort rounds  - Make Fall Risk Sign visible to staff  - Offer Toileting every 3 Hours, in advance of need  - Initiate/Maintain bed/chair alarm  - Obtain necessary fall risk management equipment:   - Apply yellow socks and bracelet for high fall risk patients  - Consider moving patient to room near nurses station  Outcome: Progressing     Problem: Neurological Deficit  Goal: Neurological status is stable or improving  Description: Interventions:  - Monitor and assess patient's level of consciousness, motor function, sensory function, and level of assistance needed for ADLs.   - Monitor and report changes from baseline. Collaborate with interdisciplinary team to initiate plan and implement interventions as ordered.   - Provide and maintain a safe environment.  - Consider seizure precautions.  - Consider fall precautions.  - Consider aspiration precautions.  - Consider bleeding precautions.  Outcome: Progressing     Problem: Activity Intolerance/Impaired Mobility  Goal: Mobility/activity is maintained at optimum level for patient  Description: Interventions:  - Assess and monitor patient  barriers to mobility and need for assistive/adaptive devices.  - Assess patient's emotional response to limitations.  - Collaborate with interdisciplinary team and initiate plans and interventions as ordered.  - Encourage independent activity per ability.  - Maintain proper body alignment.  - Perform active/passive rom as tolerated/ordered.  - Plan activities to conserve  energy.  - Turn patient as appropriate  Outcome: Progressing     Problem: Communication Impairment  Goal: Ability to express needs and understand communication  Description: Assess patient's communication skills and ability to understand information.  Patient will demonstrate use of effective communication techniques, alternative methods of communication and understanding even if not able to speak.     - Encourage communication and provide alternate methods of communication as needed.  - Collaborate with case management/ for discharge needs.  - Include patient/family/caregiver in decisions related to communication.  Outcome: Progressing     Problem: Potential for Aspiration  Goal: Non-ventilated patient's risk of aspiration is minimized  Description: Assess and monitor vital signs, respiratory status, and labs (WBC).  Monitor for signs of aspiration (tachypnea, cough, rales, wheezing, cyanosis, fever).    - Assess and monitor patient's ability to swallow.  - Place patient up in chair to eat if possible.  - HOB up at 90 degrees to eat if unable to get patient up into chair.  - Supervise patient during oral intake.   - Instruct patient/ family to take small bites.  - Instruct patient/ family to take small single sips when taking liquids.  - Follow patient-specific strategies generated by speech pathologist.  Outcome: Progressing  Goal: Ventilated patient's risk of aspiration is minimized  Description: Assess and monitor vital signs, respiratory status, airway cuff pressure, and labs (WBC).  Monitor for signs of aspiration (tachypnea, cough, rales, wheezing, cyanosis, fever).    - Elevate head of bed 30 degrees if patient has tube feeding.  - Monitor tube feeding.  Outcome: Progressing     Problem: Nutrition  Goal: Nutrition/Hydration status is improving  Description: Monitor and assess patient's nutrition/hydration status for malnutrition (ex- brittle hair, bruises, dry skin, pale skin and conjunctiva,  muscle wasting, smooth red tongue, and disorientation). Collaborate with interdisciplinary team and initiate plan and interventions as ordered.  Monitor patient's weight and dietary intake as ordered or per policy. Utilize nutrition screening tool and intervene per policy. Determine patient's food preferences and provide high-protein, high-caloric foods as appropriate.     - Assist patient with eating.  - Allow adequate time for meals.  - Encourage patient to take dietary supplement as ordered.  - Collaborate with clinical nutritionist.  - Include patient/family/caregiver in decisions related to nutrition.  Outcome: Progressing     Problem: PAIN - ADULT  Goal: Verbalizes/displays adequate comfort level or baseline comfort level  Description: Interventions:  - Encourage patient to monitor pain and request assistance  - Assess pain using appropriate pain scale  - Administer analgesics based on type and severity of pain and evaluate response  - Implement non-pharmacological measures as appropriate and evaluate response  - Consider cultural and social influences on pain and pain management  - Notify physician/advanced practitioner if interventions unsuccessful or patient reports new pain  Outcome: Progressing     Problem: INFECTION - ADULT  Goal: Absence or prevention of progression during hospitalization  Description: INTERVENTIONS:  - Assess and monitor for signs and symptoms of infection  - Monitor lab/diagnostic results  - Monitor all insertion sites, i.e. indwelling lines, tubes, and drains  - Monitor endotracheal if appropriate and nasal secretions for changes in amount and color  - Ayden appropriate cooling/warming therapies per order  - Administer medications as ordered  - Instruct and encourage patient and family to use good hand hygiene technique  - Identify and instruct in appropriate isolation precautions for identified infection/condition  Outcome: Progressing  Goal: Absence of fever/infection during  neutropenic period  Description: INTERVENTIONS:  - Monitor WBC    Outcome: Progressing     Problem: SAFETY ADULT  Goal: Patient will remain free of falls  Description: INTERVENTIONS:  - Educate patient/family on patient safety including physical limitations  - Instruct patient to call for assistance with activity   - Consult OT/PT to assist with strengthening/mobility   - Keep Call bell within reach  - Keep bed low and locked with side rails adjusted as appropriate  - Keep care items and personal belongings within reach  - Initiate and maintain comfort rounds  - Make Fall Risk Sign visible to staff  - Offer Toileting every 3 Hours, in advance of need  - Initiate/Maintain bed/chair alarm  - Obtain necessary fall risk management equipment:   - Apply yellow socks and bracelet for high fall risk patients  - Consider moving patient to room near nurses station  Outcome: Progressing  Goal: Maintain or return to baseline ADL function  Description: INTERVENTIONS:  -  Assess patient's ability to carry out ADLs; assess patient's baseline for ADL function and identify physical deficits which impact ability to perform ADLs (bathing, care of mouth/teeth, toileting, grooming, dressing, etc.)  - Assess/evaluate cause of self-care deficits   - Assess range of motion  - Assess patient's mobility; develop plan if impaired  - Assess patient's need for assistive devices and provide as appropriate  - Encourage maximum independence but intervene and supervise when necessary  - Involve family in performance of ADLs  - Assess for home care needs following discharge   - Consider OT consult to assist with ADL evaluation and planning for discharge  - Provide patient education as appropriate  Outcome: Progressing  Goal: Maintains/Returns to pre admission functional level  Description: INTERVENTIONS:  - Perform AM-PAC 6 Click Basic Mobility/ Daily Activity assessment daily.  - Set and communicate daily mobility goal to care team and  patient/family/caregiver.   - Collaborate with rehabilitation services on mobility goals if consulted  - Reposition patient every 2 hours.  - Dangle patient 2 times a day  - Stand patient 2 times a day  - Ambulate patient 2 times a day  - Out of bed to chair 2 times a day   - Out of bed for meals 2 times a day  - Out of bed for toileting  - Record patient progress and toleration of activity level   Outcome: Progressing     Problem: DISCHARGE PLANNING  Goal: Discharge to home or other facility with appropriate resources  Description: INTERVENTIONS:  - Identify barriers to discharge w/patient and caregiver  - Arrange for needed discharge resources and transportation as appropriate  - Identify discharge learning needs (meds, wound care, etc.)  - Arrange for interpretive services to assist at discharge as needed  - Refer to Case Management Department for coordinating discharge planning if the patient needs post-hospital services based on physician/advanced practitioner order or complex needs related to functional status, cognitive ability, or social support system  Outcome: Progressing     Problem: Knowledge Deficit  Goal: Patient/family/caregiver demonstrates understanding of disease process, treatment plan, medications, and discharge instructions  Description: Complete learning assessment and assess knowledge base.  Interventions:  - Provide teaching at level of understanding  - Provide teaching via preferred learning methods  Outcome: Progressing

## 2024-01-15 NOTE — CONSULTS
Consultation - Cardiology   Radha Vidal 84 y.o. female MRN: [id number]  Unit/Bed#:  Encounter: [id number]  01/15/24  8:38 AM    Assessment/ Plan:  1.  Reported new onset atrial fibrillation  Reported by EMS during transportation to the ED, unfortunately no EKG is available for review.  Telemetry shows sinus rhythm with frequent PACs.  TTE pending.  HR is well-controlled without AV kwesi blockers.  [id number] 4, start AC when cleared by neurology.  Plan for outpatient event monitor on discharge.    2.  Strokelike symptoms s/p TNK  3.  Parkinson's disease  4.  T2DM, A1c 6.7  5.  Pancreatic mass s/p pancreatectomy/splenectomy        History of Present Illness   Physician Requesting Consult: Danny Tracey MD PhD    Reason for Consult / Principal Problem: New onset atrial fibrillation    HPI: Radha Vidal is a 84 y.o. year old female with history of Parkinson's disease and pancreatectomy/splenectomy who presents with strokelike symptoms.  Endorses dizziness, gait dysfunction, and right-sided facial numbness.  Reportedly found to be in atrial fibrillation by EMS en route.  Unfortunately, no EKG is available for review.  Cardiology consulted for further evaluation and management.  Denies previous history of atrial fibrillation.  Recent pharmacological MPI stress test was negative for evidence of stress-induced myocardial ischemia.  Denies chest pain, SOB, palpitations, LE edema, or syncope.  Follows with Dr. Morillo as primary cardiologist.      Inpatient consult to Cardiology  Consult performed by: José Luis Tobar PA-C  Consult ordered by: RISHI Jefferson        EKG: Sinus rhythm with premature atrial complexes; nonspecific ST and T wave abnormality; prolonged QT      Review of Systems   Constitutional:  Negative for chills and fever.   HENT:  Negative for ear pain and sore throat.    Eyes:  Negative for pain and visual disturbance.   Respiratory:  Negative for cough and shortness of breath.     Cardiovascular:  Negative for chest pain, palpitations and leg swelling.   Gastrointestinal:  Negative for abdominal pain and vomiting.   Genitourinary:  Negative for dysuria and hematuria.   Musculoskeletal:  Positive for gait problem. Negative for arthralgias and back pain.   Skin:  Negative for color change and rash.   Neurological:  Positive for dizziness and numbness. Negative for seizures and syncope.   All other systems reviewed and are negative.      Historical Information   Past Medical History:   Diagnosis Date    Actinic keratosis 2016    Acute midline low back pain without sciatica 2020    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     Benign neoplasm of skin     Cancer (HCC)     skin    Chest pain     Claustrophobia     Diverticulitis     Diverticulitis     Fracture     L1-L2    GERD (gastroesophageal reflux disease)     H. pylori infection 2020    Muscle weakness     Nonmelanoma skin cancer     last assessed 2017    Palpitations     Pancreatic cyst     Seasonal allergies     Seborrheic keratosis 2014    Sleep apnea     no cpap    Spontaneous      without mention of complications     Squamous cell carcinoma of forehead 2014    Syncope      Past Surgical History:   Procedure Laterality Date    ABDOMINAL ADHESION SURGERY N/A 2023    Procedure: LYSIS ADHESIONS;  Surgeon: Kyle Julien MD;  [facility];  Service: Surgical Oncology    BOTOX INJECTION N/A 2017    Procedure: CYSTOSCOPY; BLADDER BOTOX 100 UNITS ;  Surgeon: Juan Edward MD;  [facility];  Service:     BOWEL RESECTION      related ot diverticulitis    CARDIAC CATHETERIZATION      CARPAL TUNNEL RELEASE Right     COLONOSCOPY  2019    COLOSTOMY      COLOSTOMY CLOSURE      CYSTOSCOPY  2021    DISTAL PANCREATECTOMY N/A 2023    Procedure: DISTAL PANCREATECTOMY;  Surgeon: Kyle Julien MD;  [facility];  Service:  "Surgical Oncology    FOOT SURGERY Left     neuroma    HYSTERECTOMY      MYRINGOTOMY W/ TUBES Right 12/06/2023    office procedure - Dr. Grace    NA[place] SINUS SURGERY  age 39    [place]    PANCREATIC CYST BIOPSY  07/31/2023    KS CYSTOURETHROSCOPY N/A 04/13/2018    Procedure: CYSTOSCOPY WITH BOTOX;  Surgeon: Juan Edward MD;  Location: [facility];  Service: Urology    KS ESOPHAGOGASTRODUODENOSCOPY TRANSORAL DIAGNOSTIC N/A 04/23/2019    Procedure: ESOPHAGOGASTRODUODENOSCOPY (EGD);  Surgeon: Edu Dickerson DO;  [facility];  Service: Gastroenterology    SPLENECTOMY, TOTAL N/A 07/31/2023    Procedure: SPLENECTOMY;  Surgeon: Kyle Julien MD;  [facility];  Service: Surgical Oncology    TONSILLECTOMY  age 49    UPPER GASTROINTESTINAL ENDOSCOPY  04/23/2019     Social History     Substance and Sexual Activity   Alcohol Use Yes    Comment: patient states rare use on holidays      Social History     Substance and Sexual Activity   Drug Use No     Social History     Tobacco Use   Smoking Status Never    Passive exposure: Past   Smokeless Tobacco Never       Family History:   Family History   Problem Relation Age of Onset    Alcohol abuse Mother     Heart disease Mother     Alcohol abuse Father     Alcohol abuse Brother     Cancer Brother     Tremor Neg Hx     Parkinsonism Neg Hx     Colon cancer Neg Hx        Meds/Allergies   all current active meds have been reviewed  Allergies   Allergen Reactions    Caffeine - Food Allergy GI Intolerance    Iodine - Food Allergy Rash    Latex Rash    Other Rash     Adhesive tape         Objective   Vitals: Blood pressure 135/61, pulse 80, temperature 97.7 °F (36.5 °C), temperature source Oral, resp. rate 16, height 5' 1\" (1.549 m), weight 80.5 kg (177 lb 7.5 oz), SpO2 92%, not currently breastfeeding., Body mass index is 33.53 kg/m².,   Orthostatic Blood Pressures      Flowsheet Row Most Recent Value   Blood Pressure 135/61 filed at 01/15/2024 0800   Patient " Position - Orthostatic VS Lying filed at 01/15/2024 0800            Systolic (24hrs), Av , Min:79 , Max:185     Diastolic (24hrs), Av, Min:43, Max:89        Intake/Output Summary (Last 24 hours) at 1/15/2024 0838  Last data filed at 1/15/2024 0501  Gross per 24 hour   Intake 240 ml   Output 400 ml   Net -160 ml       Invasive Devices       Peripheral Intravenous Line  Duration             Peripheral IV 24 Dorsal (posterior);Left Hand <1 day    Peripheral IV 24 Right Antecubital <1 day              Drain  Duration             Closed/Suction Drain Left LUQ Bulb 19 Fr. 167 days                        Physical Exam:  GEN: Alert and oriented x3, in no acute distress.  Well appearing and well nourished.   HEENT: Sclera anicteric, conjunctivae pink, mucous membranes moist. Oropharynx clear.   NECK: Supple, no carotid bruits, no significant JVD. Trachea midline, no thyromegaly.   HEART: Regular rhythm, normal S1 and S2, no murmurs, clicks, gallops or rubs. PMI nondisplaced, no thrills.   LUNGS: Clear to auscultation bilaterally; no wheezes, rales, or rhonchi. No increased work of breathing or signs of respiratory distress.   ABDOMEN: Soft, nontender, nondistended, normoactive bowel sounds.   EXTREMITIES: Skin warm and well perfused, no clubbing, cyanosis, or edema.  NEURO: No focal findings. Normal speech. Mood and affect normal.   SKIN: Normal without suspicious lesions on exposed skin.    Lab Results:     Cardiac Profile:   Results from last 7 days   Lab Units 24  1615 24  1219 24  1038   HS TNI 0HR ng/L  --   --  5   HS TNI 2HR ng/L  --  8  --    HSTNI D2 ng/L  --  3  --    HS TNI 4HR ng/L 13  --   --    HSTNI D4 ng/L 8  --   --        CBC with diff:   Results from last 7 days   Lab Units 01/15/24  0453 24  1038   WBC Thousand/uL 8.03 6.75   HEMOGLOBIN g/dL 13.5 14.8   HEMATOCRIT % 38.9 44.4   MCV fL 88 90   PLATELETS Thousands/uL 337 398*   RBC Million/uL 4.42 4.92   MCH pg  30.5 30.1   MCHC g/dL 34.7 33.3   RDW % 14.6 14.3   MPV fL 9.4 9.5         CMP:   Results from last 7 days   Lab Units 01/15/24  0453 01/14/24  1038   POTASSIUM mmol/L 4.0 4.3   CHLORIDE mmol/L 105 104   CO2 mmol/L 26 28   BUN mg/dL 18 17   CREATININE mg/dL 0.76 0.70   CALCIUM mg/dL 9.2 9.3   EGFR ml/min/1.73sq m 71 79

## 2024-01-16 DIAGNOSIS — I48.91 NEW ONSET A-FIB (HCC): Primary | ICD-10-CM

## 2024-01-16 LAB
ANION GAP SERPL CALCULATED.3IONS-SCNC: 7 MMOL/L
BUN SERPL-MCNC: 15 MG/DL (ref 5–25)
CA-I BLD-SCNC: 1.18 MMOL/L (ref 1.12–1.32)
CALCIUM SERPL-MCNC: 9.3 MG/DL (ref 8.4–10.2)
CHLORIDE SERPL-SCNC: 105 MMOL/L (ref 96–108)
CO2 SERPL-SCNC: 27 MMOL/L (ref 21–32)
CREAT SERPL-MCNC: 0.59 MG/DL (ref 0.6–1.3)
ERYTHROCYTE [DISTWIDTH] IN BLOOD BY AUTOMATED COUNT: 14.3 % (ref 11.6–15.1)
GFR SERPL CREATININE-BSD FRML MDRD: 83 ML/MIN/1.73SQ M
GLUCOSE SERPL-MCNC: 113 MG/DL (ref 65–140)
GLUCOSE SERPL-MCNC: 124 MG/DL (ref 65–140)
GLUCOSE SERPL-MCNC: 138 MG/DL (ref 65–140)
GLUCOSE SERPL-MCNC: 188 MG/DL (ref 65–140)
GLUCOSE SERPL-MCNC: 98 MG/DL (ref 65–140)
HCT VFR BLD AUTO: 39 % (ref 34.8–46.1)
HGB BLD-MCNC: 13.1 G/DL (ref 11.5–15.4)
MAGNESIUM SERPL-MCNC: 2 MG/DL (ref 1.9–2.7)
MCH RBC QN AUTO: 30.2 PG (ref 26.8–34.3)
MCHC RBC AUTO-ENTMCNC: 33.6 G/DL (ref 31.4–37.4)
MCV RBC AUTO: 90 FL (ref 82–98)
PHOSPHATE SERPL-MCNC: 3.8 MG/DL (ref 2.3–4.1)
PLATELET # BLD AUTO: 312 THOUSANDS/UL (ref 149–390)
PMV BLD AUTO: 9.2 FL (ref 8.9–12.7)
POTASSIUM SERPL-SCNC: 3.7 MMOL/L (ref 3.5–5.3)
RBC # BLD AUTO: 4.34 MILLION/UL (ref 3.81–5.12)
SODIUM SERPL-SCNC: 139 MMOL/L (ref 135–147)
WBC # BLD AUTO: 9.56 THOUSAND/UL (ref 4.31–10.16)

## 2024-01-16 PROCEDURE — 80048 BASIC METABOLIC PNL TOTAL CA: CPT

## 2024-01-16 PROCEDURE — 84100 ASSAY OF PHOSPHORUS: CPT

## 2024-01-16 PROCEDURE — 97535 SELF CARE MNGMENT TRAINING: CPT

## 2024-01-16 PROCEDURE — 83735 ASSAY OF MAGNESIUM: CPT

## 2024-01-16 PROCEDURE — 97116 GAIT TRAINING THERAPY: CPT

## 2024-01-16 PROCEDURE — 82948 REAGENT STRIP/BLOOD GLUCOSE: CPT

## 2024-01-16 PROCEDURE — 97530 THERAPEUTIC ACTIVITIES: CPT

## 2024-01-16 PROCEDURE — 99232 SBSQ HOSP IP/OBS MODERATE 35: CPT | Performed by: STUDENT IN AN ORGANIZED HEALTH CARE EDUCATION/TRAINING PROGRAM

## 2024-01-16 PROCEDURE — 85027 COMPLETE CBC AUTOMATED: CPT

## 2024-01-16 PROCEDURE — 99233 SBSQ HOSP IP/OBS HIGH 50: CPT | Performed by: INTERNAL MEDICINE

## 2024-01-16 PROCEDURE — 82330 ASSAY OF CALCIUM: CPT

## 2024-01-16 PROCEDURE — 99232 SBSQ HOSP IP/OBS MODERATE 35: CPT | Performed by: INTERNAL MEDICINE

## 2024-01-16 RX ADMIN — APIXABAN 5 MG: 5 TABLET, FILM COATED ORAL at 14:41

## 2024-01-16 RX ADMIN — ASPIRIN 81 MG: 81 TABLET, COATED ORAL at 08:14

## 2024-01-16 RX ADMIN — CHLORHEXIDINE GLUCONATE 0.12% ORAL RINSE 15 ML: 1.2 LIQUID ORAL at 08:14

## 2024-01-16 RX ADMIN — ENOXAPARIN SODIUM 40 MG: 40 INJECTION SUBCUTANEOUS at 08:14

## 2024-01-16 RX ADMIN — CARBIDOPA AND LEVODOPA 1 TABLET: 25; 100 TABLET ORAL at 08:33

## 2024-01-16 RX ADMIN — INSULIN LISPRO 1 UNITS: 100 INJECTION, SOLUTION INTRAVENOUS; SUBCUTANEOUS at 21:38

## 2024-01-16 RX ADMIN — CHLORHEXIDINE GLUCONATE 0.12% ORAL RINSE 15 ML: 1.2 LIQUID ORAL at 21:37

## 2024-01-16 RX ADMIN — CARBIDOPA AND LEVODOPA 1 TABLET: 25; 100 TABLET ORAL at 18:12

## 2024-01-16 RX ADMIN — CARBIDOPA AND LEVODOPA 1 TABLET: 25; 100 TABLET ORAL at 14:40

## 2024-01-16 RX ADMIN — OXYCODONE HYDROCHLORIDE AND ACETAMINOPHEN 1000 MG: 500 TABLET ORAL at 08:14

## 2024-01-16 RX ADMIN — ATORVASTATIN CALCIUM 40 MG: 40 TABLET, FILM COATED ORAL at 18:12

## 2024-01-16 RX ADMIN — PANTOPRAZOLE SODIUM 40 MG: 40 TABLET, DELAYED RELEASE ORAL at 08:14

## 2024-01-16 RX ADMIN — B-COMPLEX W/ C & FOLIC ACID TAB 1 TABLET: TAB at 08:14

## 2024-01-16 RX ADMIN — Medication 5000 UNITS: at 14:40

## 2024-01-16 NOTE — PLAN OF CARE
Problem: PHYSICAL THERAPY ADULT  Goal: Performs mobility at highest level of function for planned discharge setting.  See evaluation for individualized goals.  Description: Treatment/Interventions: Functional transfer training, LE strengthening/ROM, Elevations, Therapeutic exercise, Endurance training, Patient/family training, Bed mobility, Gait training, Spoke to nursing, Spoke to advanced practitioner, OT          See flowsheet documentation for full assessment, interventions and recommendations.  Outcome: Progressing  Note: Prognosis: Good  Problem List: Decreased strength, Decreased endurance, Impaired balance, Decreased mobility  Assessment: Chart reviewed. Patient was received supine in bed in NAD and agreeable to PT session. Today's PT treatment session consisted of therapeutic activity for facilitation of transitional movements and safe performance of correct technique for bed mobility and sit to stand transfers, therapeutic exercise to increase lower extremity muscle strength, and gait training to promote safe and functional ambulation on level surfaces. In comparison to the previous session the patient has made progress as evident by requiring decreased assistance with all functional mobility. Pt was able to perform all functional mobility with assist of one. Pt also demonstrated progress as evident by ability to ambulate an increased distance. When ambulating pt exhibits decreased yuliet, decreased stride length, narrow base of support, and lack of heel strike. Pt ambulated with intermittent use of right hand held assist; recommend use of walker however pt refused. Pt's BP post ambulation 143/66. Pt tolerated static/dynamic standing balance for approximately five minutes during self care activities. Pt tolerated all therapeutic exercise well without complaints. Co-treatment was performed with OT secondary to complex medical condition of pt. PT/OT goals were addressed separately. Overall, patient  tolerated today's session well and continues to be making progress towards achieving her STG's. Patient's prognosis for achieving their STG's is good as evident by pt's motivation. PT intervention continues to be appropriate as the patient continues to be limited by decreased lower extremity strength, impaired balance, decreased endurance, gait deviations, and decreased functional mobility. Continue to recommend level 2, moderate resource intensity. PT to continue to see patient in order to address the deficits listed above and provide interventions consistent with the POC in order to achieve STG's and optimize the patient's independence with functional mobility.    Barriers to Discharge: Inaccessible home environment, Decreased caregiver support     Rehab Resource Intensity Level, PT: II (Moderate Resource Intensity)    See flowsheet documentation for full assessment.

## 2024-01-16 NOTE — CASE MANAGEMENT
Case Management Discharge Planning Note    Patient name Radha Vidal  Location / MRN [id number]  : 1940 Date 2024       Current Admission Date: 2024  Current Admission Diagnosis:Stroke-like symptoms   Patient Active Problem List    Diagnosis Date Noted    Type 2 diabetes mellitus (HCC) 01/15/2024    Stroke-like symptoms 2024    New onset a-fib (HCC) 2024    s/p distal pancreatectomy/splenectomy 2023    Encounter for geriatric assessment 2023    Urge incontinence 2021    Age-related osteoporosis without current pathological fracture 2021    Closed compression fracture of body of L1 vertebra (HCC) 2020    Cerebrovascular disease 2020    IPMN (intraductal papillary mucinous neoplasm) 2020    Trochanteric bursitis, right hip 2020    Tremor 2020    Chronic idiopathic constipation 2019    History of adenomatous polyp of colon 2019    Osteopenia 2019    Gastro-esophageal reflux disease without esophagitis 2019    Dysphagia 2019    Multiple thyroid nodules 2019    Menopause ovarian failure 2019    Vitamin D deficiency 2019    Mitral valve disorder 2018    Mood disturbance 2018    Obesity (BMI 30-39.9) 2018    Claustrophobia 2018    Impaired fasting glucose 2018    Seasonal allergic rhinitis 2018    History of skin cancer 2018    Parkinson's disease 2018    Primary insomnia 2018    Cherry angioma 11/15/2017    Postablative hypothyroidism 2016    Dyspnea on exertion 10/02/2015    OAB (overactive bladder) 2015    Sleep apnea 04/15/2014      LOS (days): 2  Geometric Mean LOS (GMLOS) (days):   Days to GMLOS:     OBJECTIVE:  Risk of Unplanned Readmission Score: 11.31         Current admission status: Inpatient   Preferred Pharmacy:   [facility] - [place], [place] - [address] [place] [address]  [facility] [address]  Phone: [phone] Fax: [phone]    [facility] - [place] - [address]  [address]  [address]  [place] [address]  Phone: [phone] Fax: [phone]    [facility] - [place], [place] - [address]  [address]  [place] [address]  Phone: [phone] Fax: [phone]    Primary Care Provider: Hoang Tovar MD    Primary Insurance: [employer] [facility]  Secondary Insurance:     DISCHARGE DETAILS:                                          Other Referral/Resources/Interventions Provided:  Referral Comments: Met w pt and daughter to discuss d/c.  Pt amenable to STR placement;  hoping for [facility].  Provided them w list of referrals made;  [facility] reports no availability.  Advised pt/daughter that referral deadline is tomorrow, and CM can provide a more definitive list at that time.  Also reminded them that insurance has to approve placement.  Sent message to [facility] via [email or web address], making them aware of pt's interest.  Awaiting response.         Treatment Team Recommendation: Short Term Rehab, Home with Home Health Care, SNF  Discharge Destination Plan:: SNF, Short Term Rehab  Transport at Discharge : Other (Comment) (TBD)                             IMM Given (Date):: 01/16/24  IMM Given to:: Family (pt gave document to daughter)  Family notified:: daughter at bedside

## 2024-01-16 NOTE — PROGRESS NOTES
General Cardiology   Progress Note   Radha Vidal 84 y.o. female MRN: [id number]  Unit/Bed#:  Encounter: [id number]      SUBJECTIVE:   No significant events overnight.  Patient does have brief episodes of A-fib on telemetry.    REVIEW OF SYSTEMS:  Constitutional:  Denies fever or chills   Eyes:  Denies change in visual acuity   HENT:  Denies nasal congestion or sore throat   Respiratory:  Denies cough or shortness of breath   Cardiovascular:  Denies chest pain or edema   GI:  Denies abdominal pain, nausea, vomiting, bloody stools or diarrhea   :  Denies dysuria, frequency, difficulty in micturition and nocturia  Musculoskeletal:  Denies back pain or joint pain   Neurologic: Was admitted with strokelike symptoms.  Patient also has history of parkinsonism.  Endocrine:  Denies polyuria or polydipsia   Lymphatic:  Denies swollen glands   Psychiatric:  Denies depression or anxiety     OBJECTIVE:   Vitals:  Vitals:    24 0830   BP: 166/72   Pulse:    Resp:    Temp: 98.5 °F (36.9 °C)   SpO2:      Body mass index is 32.99 kg/m².  Systolic (24hrs), Av , Min:118 , Max:169     Diastolic (24hrs), Av, Min:55, Max:79      Intake/Output Summary (Last 24 hours) at 2024 0937  Last data filed at 2024 0800  Gross per 24 hour   Intake 608 ml   Output 875 ml   Net -267 ml     Weight (last 2 days)       Date/Time Weight    24 0600 79.2 (174.6)    01/15/24 1100 80.3 (177)    01/15/24 0548 80.5 (177.47)    24 1310 75.8 (167)    24 1007 75.9 (167.4)            Telemetry Review: Sinus rhythm with brief episodes of A-fib        PHYSICAL EXAMS:  General:  Patient is not in acute distress, laying in the bed comfortably, awake, alert responding to commands.  Head: Normocephalic, Atraumatic.  HEENT:  Both pupils normal-size atraumatic, normocephalic, nonicteric  Neck:  JVP not raised. Trachea central  Respiratory:  Bronchovascular breathing all over the chest without any  accompaniment  Cardiovascular: Regular rate and rhythm no send no murmurs  GI:  Abdomen soft nontender. Liver and spleen normal size  Lymphatic:  No cervical or inguinal lymphadenopathy  Neurologic:  Patient is awake alert, responding to command, tremors consistent with parkinsonism    LABORATORY RESULTS:        CBC with diff:   Results from last 7 days   Lab Units 01/16/24  0436 01/15/24  0453 01/14/24  1038   WBC Thousand/uL 9.56 8.03 6.75   HEMOGLOBIN g/dL 13.1 13.5 14.8   HEMATOCRIT % 39.0 38.9 44.4   MCV fL 90 88 90   PLATELETS Thousands/uL 312 337 398*   RBC Million/uL 4.34 4.42 4.92   MCH pg 30.2 30.5 30.1   MCHC g/dL 33.6 34.7 33.3   RDW % 14.3 14.6 14.3   MPV fL 9.2 9.4 9.5       CMP:  Results from last 7 days   Lab Units 01/16/24  0436 01/15/24  0453 01/14/24  1038   POTASSIUM mmol/L 3.7 4.0 4.3   CHLORIDE mmol/L 105 105 104   CO2 mmol/L 27 26 28   BUN mg/dL 15 18 17   CREATININE mg/dL 0.59* 0.76 0.70   CALCIUM mg/dL 9.3 9.2 9.3   EGFR ml/min/1.73sq m 83 71 79       BMP:  Results from last 7 days   Lab Units 01/16/24  0436 01/15/24  0453 01/14/24  1038   POTASSIUM mmol/L 3.7 4.0 4.3   CHLORIDE mmol/L 105 105 104   CO2 mmol/L 27 26 28   BUN mg/dL 15 18 17   CREATININE mg/dL 0.59* 0.76 0.70   CALCIUM mg/dL 9.3 9.2 9.3            Results from last 7 days   Lab Units 01/16/24  0436 01/15/24  0453   MAGNESIUM mg/dL 2.0 2.0     Results from last 7 days   Lab Units 01/15/24  0453   HEMOGLOBIN A1C % 6.7*         Results from last 7 days   Lab Units 01/14/24  1038   INR  0.90       Lipid Profile:   Lab Results   Component Value Date    CHOL 202 10/03/2015     Lab Results   Component Value Date    HDL 79 01/15/2024    HDL 88 03/08/2022    HDL 85 11/25/2020     Lab Results   Component Value Date    LDLCALC 88 01/15/2024    LDLCALC 101 (H) 03/08/2022    LDLCALC 79 11/25/2020     Lab Results   Component Value Date    TRIG 67 01/15/2024    TRIG 63 03/08/2022    TRIG 42 11/25/2020       Cardiac testing:  Results for  orders placed during the hospital encounter of 20    Echo complete with contrast if indicated    Narrative  [facility]  [address] [place] [address]  [phone]    Transthoracic Echocardiogram  2D, M-mode, Doppler, and Color Doppler    Study date:  2020    Patient: FRANCIE BURGESS  MR number: [id number]  Account number: [id number]  : 1940  Age: 80 years  Gender: Female  Status: Inpatient  Location: Bedside  Height: 62 in  Weight: 188.1 lb  BP: 141/ 73 mmHg    Indications: CVA, Evaluate for suspected cardiac source of embolism. Assess left ventricular function.    Diagnoses: I63.50 - Cerebral infarction due to unspecified occlusion or stenosis of unspecified cerebral artery    Sonographer:  Colleen Weems RDCS  Referring Physician:  George Diez MD  Group:  [facility]  Interpreting Physician:  Jose Osullivan MD    SUMMARY    LEFT VENTRICLE:  Systolic function was normal. Ejection fraction was estimated to be 60 %.  There were no regional wall motion abnormalities.  Wall thickness was at the upper limits of normal.    RIGHT VENTRICLE:  The size was normal.  Systolic function was normal.    MITRAL VALVE:  There was mild annular calcification.  There was trace to mild regurgitation.    TRICUSPID VALVE:  There was mild regurgitation.  Estimated peak PA pressure was 36 mmHg.    PULMONIC VALVE:  There was trace regurgitation.    HISTORY: Symptoms: chest pain. Dizziness. Syncope. PRIOR HISTORY: GERD, Palpitations,    PROCEDURE: The procedure was performed at the bedside. This was a routine study. The transthoracic approach was used. The study included complete 2D imaging, M-mode, complete spectral Doppler, and color Doppler. The heart rate was 81 bpm,  at the start of the study. Images were obtained from the parasternal, apical, subcostal, and suprasternal notch acoustic windows. Image quality was adequate.    LEFT VENTRICLE: Size was  normal. Systolic function was normal. Ejection fraction was estimated to be 60 %. There were no regional wall motion abnormalities. Wall thickness was at the upper limits of normal. No evidence of apical thrombus.  DOPPLER: Left ventricular diastolic function parameters were normal.    RIGHT VENTRICLE: The size was normal. Systolic function was normal. Wall thickness was normal.    LEFT ATRIUM: Size was normal.    RIGHT ATRIUM: Size was normal.    MITRAL VALVE: There was mild annular calcification. There was normal leaflet separation. DOPPLER: The transmitral velocity was within the normal range. There was no evidence for stenosis. There was trace to mild regurgitation.    AORTIC VALVE: The valve was trileaflet. Leaflets exhibited normal thickness and normal cuspal separation. DOPPLER: Transaortic velocity was within the normal range. There was no evidence for stenosis. There was no significant  regurgitation.    TRICUSPID VALVE: The valve structure was normal. There was normal leaflet separation. DOPPLER: The transtricuspid velocity was within the normal range. There was no evidence for stenosis. There was mild regurgitation. Estimated peak PA  pressure was 36 mmHg.    PULMONIC VALVE: Leaflets exhibited normal thickness, no calcification, and normal cuspal separation. DOPPLER: The transpulmonic velocity was within the normal range. There was trace regurgitation.    PERICARDIUM: There was no pericardial effusion. The pericardium was normal in appearance.    AORTA: The root exhibited normal size.    SYSTEMIC VEINS: IVC: The inferior vena cava was normal in size. Respirophasic changes were normal.    SYSTEM MEASUREMENT TABLES    2D  %FS: 27.9 %  AVC: 393.1 ms  Ao Diam: 3.2 cm  EDV(Teich): 64.2 ml  EF(Teich): 54.8 %  ESV(Teich): 29 ml  IVSd: 1.1 cm  LA Area: 19.8 cm2  LA Diam: 3.6 cm  LVEDV MOD A4C: 76.4 ml  LVEF MOD A4C: 61.7 %  LVESV MOD A4C: 29.2 ml  LVIDd: 3.9 cm  LVIDs: 2.8 cm  LVLd A4C: 7.2 cm  LVLs A4C: 5.9  cm  LVPWd: 1 cm  RA Area: 14.9 cm2  RVIDd: 3.1 cm  SV MOD A4C: 47.2 ml  SV(Teich): 35.2 ml    CW  TR Vmax: 2.9 m/s  TR maxP.9 mmHg    MM  TAPSE: 2.3 cm    PW  E' Sept: 0.1 m/s  E/E' Sept: 12.5  MV A New: 0.8 m/s  MV Dec [place]: 3.9 m/s2  MV DecT: 216.1 ms  MV E New: 0.8 m/s  MV E/A Ratio: 1  MV PHT: 62.7 ms  MVA By PHT: 3.5 cm2    [employer] Accredited Echocardiography Laboratory    Prepared and electronically signed by    Jose Osullivan MD  Signed 2020 11:53:12    No results found for this or any previous visit.    No results found for this or any previous visit.    No valid procedures specified.  No results found for this or any previous visit.      Meds/Allergies   all current active meds have been reviewed  Medications Prior to Admission   Medication    Ascorbic Acid (VITAMIN C) 1000 MG tablet    B Complex Vitamins (B COMPLEX 100 PO)    calcium carbonate (OS-JOHN) 1250 (500 Ca) MG tablet    carbidopa-levodopa (SINEMET)  mg per tablet    cholecalciferol (VITAMIN D3) 1,000 units tablet    Coenzyme Q10 (CO Q 10 PO)    Cyanocobalamin (VITAMIN B 12 PO)    multivitamin (THERAGRAN) TABS    Omega-3 350 MG CPDR    pantoprazole (PROTONIX) 40 mg tablet    Potassium Gluconate 595 (99 K) MG TABS    Probiotic Product (PROBIOTIC-10) CAPS    trospium chloride (SANCTURA) 20 mg tablet    LORazepam (ATIVAN) 1 mg tablet          ASSESSMENT & PLAN   Principal Problem:    Stroke-like symptoms  Active Problems:    Parkinson's disease    Obesity (BMI 30-39.9)    Gastro-esophageal reflux disease without esophagitis    Urge incontinence    s/p distal pancreatectomy/splenectomy    New onset a-fib (HCC)    Type 2 diabetes mellitus (HCC)      This patient who has known history of parkinsonism was admitted with strokelike symptoms and received TNKase.  Patient was evaluated by neurology.  Patient was also found to have new onset atrial fibrillation by EMS.  Presently patient is in sinus rhythm but has brief  "episodes of paroxysmal A-fib.    Patient will need long-term anticoagulation to prevent thromboembolic risk from atrial fibrillation.  Risks and benefits of anticoagulation discussed at length with patient.  Also discussed with neurology.  Cleared by neurology to start Eliquis 5 mg twice a day.  Patient instructed to avoid NSAIDs.  Patient to report any bleeding issues.    Will check coverage status for Eliquis.  Patient also counseled about other anticoagulation depending on coverage for medications.    Echocardiogram shows normal ejection fraction with no significant valvular abnormalities.  This was reviewed with the patient.  Discussed with patient as well as neurology.    Cherrie Morillo MD  1/16/2024,9:37 AM    Portions of the record may have been created with voice recognition software.  Occasional wrong word or \"sound a like\" substitutions may have occurred due to the inherent limitations of voice recognition software.  Read the chart carefully and recognize, using context, where substitutions have occurred.   "

## 2024-01-16 NOTE — CASE MANAGEMENT
Case Management Discharge Planning Note    Patient name Radha Vidal  Location / MRN [id number]  : 1940 Date 2024       Current Admission Date: 2024  Current Admission Diagnosis:Stroke-like symptoms   Patient Active Problem List    Diagnosis Date Noted    Type 2 diabetes mellitus (HCC) 01/15/2024    Stroke-like symptoms 2024    New onset a-fib (HCC) 2024    s/p distal pancreatectomy/splenectomy 2023    Encounter for geriatric assessment 2023    Urge incontinence 2021    Age-related osteoporosis without current pathological fracture 2021    Closed compression fracture of body of L1 vertebra (HCC) 2020    Cerebrovascular disease 2020    IPMN (intraductal papillary mucinous neoplasm) 2020    Trochanteric bursitis, right hip 2020    Tremor 2020    Chronic idiopathic constipation 2019    History of adenomatous polyp of colon 2019    Osteopenia 2019    Gastro-esophageal reflux disease without esophagitis 2019    Dysphagia 2019    Multiple thyroid nodules 2019    Menopause ovarian failure 2019    Vitamin D deficiency 2019    Mitral valve disorder 2018    Mood disturbance 2018    Obesity (BMI 30-39.9) 2018    Claustrophobia 2018    Impaired fasting glucose 2018    Seasonal allergic rhinitis 2018    History of skin cancer 2018    Parkinson's disease 2018    Primary insomnia 2018    Cherry angioma 11/15/2017    Postablative hypothyroidism 2016    Dyspnea on exertion 10/02/2015    OAB (overactive bladder) 2015    Sleep apnea 04/15/2014      LOS (days): 2  Geometric Mean LOS (GMLOS) (days):   Days to GMLOS:     OBJECTIVE:  Risk of Unplanned Readmission Score: 11.28         Current admission status: Inpatient   Preferred Pharmacy:   [facility] - [place], [place] - [address] [place] [address]  [facility] [address]  Phone: [phone] Fax: [phone]    [facility] - [place] - [address]  [address]  [address]  [place] [address]  Phone: [phone] Fax: [phone]    [facility] - [place], [place] - [address]  [address]  [place] [address]  Phone: [phone] Fax: [phone]    Primary Care Provider: Hoang Tovar MD    Primary Insurance: [facility] [facility]  Secondary Insurance:     DISCHARGE DETAILS:                                          Other Referral/Resources/Interventions Provided:  Referral Comments: PT/OT recommending Level II, subacute SNF vs home w home health.  Per [facility] rounds, team recommending rehab stay as pt lives alone.  Per Dr. Farnsworth, pt is agreeable to rehab stay.  [facility] referrals made;  awaiting responses.         Treatment Team Recommendation: Home with Home Health Care, Short Term Rehab, SNF  Discharge Destination Plan:: Home with Home Health Care, SNF, Short Term Rehab  Transport at Discharge : Stretcher sandi Wheelchair sandi

## 2024-01-16 NOTE — PROGRESS NOTES
Progress Note - Neurology   Radha Vidal 84 y.o. female [id number]  Unit/Bed#: /    Assessment:    * Stroke-like symptoms  Assessment & Plan  84 y.o. female with Parkinson's disease on Sinemet who presented to [facility] on 1/14/2024 as a stroke alert with acute onset of vertigo/dizziness and gait imbalance with new inability to ambulate.     BP on presentation 162/88.  NIHSS was 0.  CT head and CTA head and neck unremarkable for acute acute abnormalities or large vessel occlusion/critical stenosis.  Given inability to ambulate being a new functional deficits, decision was made to administer IV TNK.    Workup:  - Initial CT head: Unremarkable for acute intracranial abnormalities  - CTA head and neck: Unremarkable for large vessel occlusion/critical stenosis  - Repeat CT head for headache s/p IV TNK: Unremarkable for acute intracranial abnormalities  - MRI brain: White matter changes suggestive of chronic microangiopathy.  No acute intracranial pathology.   - Echo: EF 60%. No regional wall motion abnormality. Left atrium mildly dilated. Right atrium size normal.  - Hemoglobin A1c: Elevated 6.7  - Lipid panel: Total cholesterol 180, triglycerides 67, HDL 79, LDL 88    Patient does report similar episodes of dizziness in the past, however this episode more severe and longer in duration, favoring peripheral vestibulopathy at possible etiology in the setting of history of myringotomy with tube on the right (12/6/2023).  Other considerations include orthostatic hypotension in the setting of PD with suspected autonomic dysfunction as patient's BPs throughout admission appear to have been labile.    Plan:  - No need to continue aspirin or statin from neuro standpoint.  - Goal normotension, euglycemia, normothermia   - Telemetry  - Cardiology following and recommending initiation of AC in the setting of new onset Afib PTA  - PT/OT/Speech   - Recommend compression stockings for orthostatic hypotension  -  Recommend follow up with ENT outpatient  - Monitor neuro exam; notify with any changes  - Medical management and supportive care per primary team. Correction of any metabolic or infectious disturbances.   - No further inpatient neurology recommendations at this time. Please call with any questions.       Radha Vidal will need follow up in at the next regular appointment with movement disorder and memory attending/AP .  She will not require outpatient neurological testing.     Case and treatment plan reviewed with attending neurologist, Dr. Brandt. Please see attending attestation for any further recommendations.    Subjective:   Patient seen and evaluated at the bedside with attending neurologist. She feels better today. Patient reports some dizziness when she first stands but she is able to walk without issues. She denies any changes in vision or speech. She denies numbness/tingling or difficulty swallowing. She did vomit yesterday because she felt nauseous. She has not had a bowel movement since being here and she usually has 1-3 a day at home because she takes Metamucil.        Past Medical History:   Diagnosis Date    Actinic keratosis 2016    Acute midline low back pain without sciatica 2020    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     Benign neoplasm of skin     Cancer (HCC)     skin    Chest pain     Claustrophobia     Diverticulitis     Diverticulitis     Fracture     L1-L2    GERD (gastroesophageal reflux disease)     H. pylori infection 2020    Muscle weakness     Nonmelanoma skin cancer     last assessed 2017    Palpitations     Pancreatic cyst     Seasonal allergies     Seborrheic keratosis 2014    Sleep apnea     no cpap    Spontaneous      without mention of complications     Squamous cell carcinoma of forehead 2014    Syncope      Past Surgical History:   Procedure Laterality Date    ABDOMINAL ADHESION SURGERY N/A 2023     Procedure: LYSIS ADHESIONS;  Surgeon: Kyle Julien MD;  [facility]: [facility];  Service: Surgical Oncology    BOTOX INJECTION N/A 03/06/2017    Procedure: CYSTOSCOPY; BLADDER BOTOX 100 UNITS ;  Surgeon: Juan Edward MD;  Location[facility];  Service:     BOWEL RESECTION      related ot diverticulitis    CARDIAC CATHETERIZATION      CARPAL TUNNEL RELEASE Right     COLONOSCOPY  07/31/2019    COLOSTOMY      COLOSTOMY CLOSURE      CYSTOSCOPY  06/01/2021    DISTAL PANCREATECTOMY N/A 07/31/2023    Procedure: DISTAL PANCREATECTOMY;  Surgeon: Kyle Julien MD;  Location[facility];  Service: Surgical Oncology    FOOT SURGERY Left     neuroma    HYSTERECTOMY      MYRINGOTOMY W/ TUBES Right 12/06/2023    office procedure - Dr. Grace    NAS[place] SINUS SURGERY  age 39    [place]    PANCREATIC CYST BIOPSY  07/31/2023    OH CYSTOURETHROSCOPY N/A 04/13/2018    Procedure: CYSTOSCOPY WITH BOTOX;  Surgeon: Juan Edward MD;  [facility];  Service: Urology    OH ESOPHAGOGASTRODUODENOSCOPY TRANSORAL DIAGNOSTIC N/A 04/23/2019    Procedure: ESOPHAGOGASTRODUODENOSCOPY (EGD);  Surgeon: Edu Dickerson DO;  Location: [facility];  Service: Gastroenterology    SPLENECTOMY, TOTAL N/A 07/31/2023    Procedure: SPLENECTOMY;  Surgeon: Kyle Julien MD;  [facility];  Service: Surgical Oncology    TONSILLECTOMY  age 49    UPPER GASTROINTESTINAL ENDOSCOPY  04/23/2019     Family History   Problem Relation Age of Onset    Alcohol abuse Mother     Heart disease Mother     Alcohol abuse Father     Alcohol abuse Brother     Cancer Brother     Tremor Neg Hx     Parkinsonism Neg Hx     Colon cancer Neg Hx      Social History     Socioeconomic History    Marital status:      Spouse name: Not on file    Number of children: Not on file    Years of education: Not on file    Highest education level: Not on file   Occupational History    Occupation: RETIRED    Tobacco Use    Smoking status: Never     Passive exposure:  Past    Smokeless tobacco: Never   Vaping Use    Vaping status: Never Used   Substance and Sexual Activity    Alcohol use: Yes     Comment: patient states rare use on holidays     Drug use: No    Sexual activity: Not Currently   Other Topics Concern    Not on file   Social History Narrative    LIVING INDEPENDENTLY ALONE         No caffeine use     Social Determinants of Health     Financial Resource Strain: Low Risk  (4/19/2023)    Overall Financial Resource Strain (CARDIA)     Difficulty of Paying Living Expenses: Not very hard   Food Insecurity: No Food Insecurity (1/14/2024)    Hunger Vital Sign     Worried About Running Out of Food in the Last Year: Never true     Ran Out of Food in the Last Year: Never true   Transportation Needs: No Transportation Needs (1/14/2024)    PRAPARE - Transportation     Lack of Transportation (Medical): No     Lack of Transportation (Non-Medical): No   Physical Activity: Not on file   Stress: Not on file   Social Connections: Not on file   Intimate Partner Violence: Not on file   Housing Stability: Low Risk  (1/14/2024)    Housing Stability Vital Sign     Unable to Pay for Housing in the Last Year: No     Number of Places Lived in the Last Year: 1     Unstable Housing in the Last Year: No         Medications:  All current active meds have been reviewed and current meds:  Scheduled Meds:  Current Facility-Administered Medications   Medication Dose Route Frequency Provider Last Rate    vitamin C  1,000 mg Oral Daily RISHI Palomino      aspirin  81 mg Oral Daily RISHI Palomino      atorvastatin  40 mg Oral QPM RISHI Palomino      carbidopa-levodopa  1 tablet Oral TID AC RISHI Palomino      chlorhexidine  15 mL Mouth/Throat Q12H [facility] RISHI Palomino      cholecalciferol  5,000 Units Oral After Lunch RISHI Palomino      enoxaparin  40 mg Subcutaneous Q24H [facility] RISHI Palomino      hydrALAZINE  10 mg Intravenous Q4H PRN  "RISHI Palomino      insulin lispro  1-5 Units Subcutaneous HS RISHI Palomino      insulin lispro  1-6 Units Subcutaneous TID AC RISHI Palomino      labetalol  10 mg Intravenous Q4H PRN RISHI Palomino      meclizine  12.5 mg Oral Q6H PRN RISHI Palomino      multivitamin stress formula  1 tablet Oral QAM RISHI Palomino      ondansetron  4 mg Intravenous Q6H PRN RISHI Palomino      pantoprazole  40 mg Oral Daily RISHI Palomino       Continuous Infusions:   PRN Meds:.  hydrALAZINE    labetalol    meclizine    ondansetron       ROS:   A 12 system ROS was completed. Other than the above mentioned complaints in the HPI and those commented on below, all remaining systems were negative.      Vitals:   /72   Pulse 64   Temp 98.5 °F (36.9 °C) (Oral)   Resp 20   Ht 5' 1\" (1.549 m)   Wt 79.2 kg (174 lb 9.7 oz)   SpO2 93%   BMI 32.99 kg/m²       Physical and neurologic exam performed by attending neurologist:   Physical Exam  Vitals and nursing note reviewed.   Constitutional:       General: She is not in acute distress.     Appearance: Normal appearance. She is not ill-appearing.   HENT:      Head: Normocephalic.      Mouth/Throat:      Mouth: Mucous membranes are moist.      Pharynx: Oropharynx is clear.   Eyes:      General: No scleral icterus.        Right eye: No discharge.         Left eye: No discharge.      Extraocular Movements: Extraocular movements intact and EOM normal.      Conjunctiva/sclera: Conjunctivae normal.   Cardiovascular:      Rate and Rhythm: Normal rate.   Pulmonary:      Effort: Pulmonary effort is normal. No respiratory distress.   Musculoskeletal:         General: Normal range of motion.      Cervical back: Normal range of motion.   Skin:     General: Skin is warm and dry.      Coloration: Skin is not jaundiced or pale.   Neurological:      Mental Status: She is alert and oriented to person, place, and time.    "   Coordination: Finger-Nose-Finger Test and Heel to Shin Test normal.   Psychiatric:         Mood and Affect: Mood normal.         Behavior: Behavior normal.       Neurologic Exam     Mental Status   Oriented to person, place, and time.   Level of consciousness: alert  Able to follow simple, crossover, and complex commands appropriately. Able to name objects and repeat/read phrases correctly. No dysarthria noted.     Cranial Nerves     CN II   Right visual field deficit: none  Left visual field deficit: none     CN III, IV, VI   Extraocular motions are normal.     CN V   Facial sensation intact.     CN VII   Facial expression full, symmetric.     CN IX, X   Palate: symmetric    CN XII   CN XII normal.   Fatiguable horizontal nystagmus bilaterally with L gaze     Motor Exam   Muscle bulk: normal  Right arm pronator drift: absent  Left arm pronator drift: absent  Bilateral UE strength 5/5 deltoids, biceps, triceps, hand   Bilateral LE strength 5/5 hip flexion, dorsiflexion, plantar flexion     Sensory Exam   Light touch normal.   No extinction noted     Gait, Coordination, and Reflexes     Coordination   Finger to nose coordination: normal  Heel to shin coordination: normal    Tremor   Resting tremor: absent  Decreased amplitude with finger tapping on R  Decreased smoothness with hand opening and closing         Labs: I have personally reviewed pertinent reports.   Recent Results (from the past 24 hour(s))   Echo complete w/ contrast if indicated    Collection Time: 01/15/24 11:06 AM   Result Value Ref Range    Triscuspid Valve Regurgitation Peak Gradient 26.0 mmHg    RAA A4C 13.1 cm2    IRIS A4C 24.8 cm2    LA Volume Index (BP) 39.4 mL/m2    MV Peak A New 0.86 m/s    MV stenosis pressure 1/2 time 80 ms    MV Peak E New 79 cm/s    E wave deceleration time 274 ms    E/A ratio 0.92     MV valve area p 1/2 method 2.75     TR Peak New 2.5 m/s    RVID d 3.3 cm    A4C EF 71 %    Tricuspid valve peak regurgitation  velocity 2.54 m/s    Left ventricular stroke volume (2D) 51.00 mL    IVSd 1.20 cm    Tricuspid annular plane systolic excursion 2.50 cm    Ao root 3.60 cm    LVPWd 1.10 cm    LA size 2.9 cm    Asc Ao 3.1 cm    LA volume (BP) 71 mL    FS 40 28 - 44    LVIDS 2.40 cm    IVS 1.2 cm    LVIDd 4.00 cm    LA length (A2C) 4.80 cm    LEFT VENTRICLE SYSTOLIC VOLUME (MOD BIPLANE) 2D 21 mL    LV DIASTOLIC VOLUME (MOD BIPLANE) 2D 72 mL    Left Atrium Area-systolic Four Chamber 24.1 cm2    Left Atrium Area-systolic Apical Two Chamber 18 cm2    MV E' Tissue Velocity Septal 7 cm/s    LVSV, 2D 51 mL    BSA 1.79 m2    LV EF 60    Fingerstick Glucose (POCT)    Collection Time: 01/15/24 12:07 PM   Result Value Ref Range    POC Glucose 213 (H) 65 - 140 mg/dl   Fingerstick Glucose (POCT)    Collection Time: 01/15/24  5:44 PM   Result Value Ref Range    POC Glucose 161 (H) 65 - 140 mg/dl   Fingerstick Glucose (POCT)    Collection Time: 01/15/24  8:44 PM   Result Value Ref Range    POC Glucose 157 (H) 65 - 140 mg/dl   Basic metabolic panel    Collection Time: 01/16/24  4:36 AM   Result Value Ref Range    Sodium 139 135 - 147 mmol/L    Potassium 3.7 3.5 - 5.3 mmol/L    Chloride 105 96 - 108 mmol/L    CO2 27 21 - 32 mmol/L    ANION GAP 7 mmol/L    BUN 15 5 - 25 mg/dL    Creatinine 0.59 (L) 0.60 - 1.30 mg/dL    Glucose 113 65 - 140 mg/dL    Calcium 9.3 8.4 - 10.2 mg/dL    eGFR 83 ml/min/1.73sq m   Calcium, ionized    Collection Time: 01/16/24  4:36 AM   Result Value Ref Range    Calcium, Ionized 1.18 1.12 - 1.32 mmol/L   CBC    Collection Time: 01/16/24  4:36 AM   Result Value Ref Range    WBC 9.56 4.31 - 10.16 Thousand/uL    RBC 4.34 3.81 - 5.12 Million/uL    Hemoglobin 13.1 11.5 - 15.4 g/dL    Hematocrit 39.0 34.8 - 46.1 %    MCV 90 82 - 98 fL    MCH 30.2 26.8 - 34.3 pg    MCHC 33.6 31.4 - 37.4 g/dL    RDW 14.3 11.6 - 15.1 %    Platelets 312 149 - 390 Thousands/uL    MPV 9.2 8.9 - 12.7 fL   Magnesium    Collection Time: 01/16/24  4:36 AM    Result Value Ref Range    Magnesium 2.0 1.9 - 2.7 mg/dL   Phosphorus    Collection Time: 01/16/24  4:36 AM   Result Value Ref Range    Phosphorus 3.8 2.3 - 4.1 mg/dL   Fingerstick Glucose (POCT)    Collection Time: 01/16/24  5:58 AM   Result Value Ref Range    POC Glucose 98 65 - 140 mg/dl       Imaging: I have personally reviewed pertinent imaging in PACS, and I have personally reviewed PACS reports.     EKG, Pathology, and Other Studies: I have personally reviewed pertinent reports.       VTE Prophylaxis: Sequential compression device (Venodyne)  and Enoxaparin (Lovenox)        Total time spent today 32 minutes. Greater than 50% of total time was spent with the patient and / or family counseling and / or coordination of care. A description of the counseling / coordination of care: Patient seen and evaluated. Case reviewed with attending. Chart thoroughly reviewed including imaging and labs. Coordination of care with primary team.

## 2024-01-16 NOTE — ASSESSMENT & PLAN NOTE
AF noted on arrival to ED 1/14  Currently between NSR and rate controlled A-fib    Plan  Initiate eliquis 5mg BID per cardiology

## 2024-01-16 NOTE — ASSESSMENT & PLAN NOTE
Lab Results   Component Value Date    HGBA1C 6.7 (H) 01/15/2024       Recent Labs     01/15/24  1207 01/15/24  1744 01/15/24  2044 01/16/24  0558   POCGLU 213* 161* 157* 98       Blood Sugar Average: Last 72 hrs:  (P) 153.8    Plan  Continue SSI insulin while in hospital

## 2024-01-16 NOTE — ASSESSMENT & PLAN NOTE
ED with vertigo/dizziness and gait abnormality and new inability to ambulate  Currently not experiencing vertigo/dizziness and describes being able to walk with standby assist  No focal deficits  Imaging unremarkable  TNK given 48 hours ago  Low concern for cerebrovascular cause of symptoms  Neurology does not see the need for outpatient neurology follow up    Plan  Continue to monitor for new onset stroke like symptoms  Start eliquis 5mg BID per cardiology

## 2024-01-16 NOTE — PLAN OF CARE
Problem: OCCUPATIONAL THERAPY ADULT  Goal: Performs self-care activities at highest level of function for planned discharge setting.  See evaluation for individualized goals.  Description: Treatment Interventions: ADL retraining, Functional transfer training, UE strengthening/ROM, Endurance training, Patient/family training, Equipment evaluation/education, Compensatory technique education, Continued evaluation, Cardiac education, Energy conservation, Activityengagement          See flowsheet documentation for full assessment, interventions and recommendations.   Note: Limitation: Decreased ADL status, Decreased UE strength, Decreased endurance, Decreased self-care trans, Decreased high-level ADLs, Decreased cognition, Decreased Safe judgement during ADL  Prognosis: Good  Assessment: Patient participated in Skilled OT session this date with interventions consisting of ADL re training with the use of correct body mechnaics, Energy Conservation techniques, safety awareness and fall prevention techniques, therapeutic exercise to: increase functional use of BUEs, increase BUE muscle strength ,  therapeutic activities to: increase activity tolerance, increase postural control, and increase OOB/ sitting tolerance . Patient agreeable to OT treatment session, upon arrival patient was found supine in bed, alert, responsive , and in no apparent distress.  In comparison to previous session, patient with improvements in overall endurance and activity tolerance, demonstrated ability to complete positional changes and functional transfers and ambulation with assist of 1 person, vs.  need for assist of 2 in previous session. Patient demonstrated increased postural control with ability to complete self care routine standing sink side.  Please refer to flow sheet for detailed performance. Patient requiring verbal cues for safety, verbal cues for correct technique, and one step directives. Patient continues to be functioning below  baseline level, occupational performance remains limited secondary to factors listed above and increased risk for falls and injury.   From OT standpoint, recommendation at time of d/c would be level 2- Rehab.   Patient to benefit from continued Occupational Therapy treatment while in the hospital to address deficits as defined above and maximize level of functional independence with ADLs and functional mobility.     Rehab Resource Intensity Level, OT: II (Moderate Resource Intensity)

## 2024-01-16 NOTE — OCCUPATIONAL THERAPY NOTE
Occupational Therapy Treatment note        Patient Name: Radha Vidal  Today's Date: 1/16/2024 01/16/24 1055   OT Last Visit   OT Visit Date 01/16/24   Pain Assessment   Pain Assessment Tool 0-10   Pain Score No Pain   ADL   Where Assessed Sitting at sink   Grooming Assistance 5  Supervision/Setup   Grooming Deficit Setup;Verbal cueing;Supervision/safety   UB Bathing Assistance 4  Minimal Assistance   UB Bathing Deficit Setup;Supervision/safety;Increased time to complete   LB Bathing Assistance 4  Minimal Assistance   LB Bathing Deficit Supervision/safety;Increased time to complete   UB Dressing Assistance 5  Supervision/Setup   UB Dressing Deficit Setup;Increased time to complete   LB Dressing Assistance 3  Moderate Assistance   LB Dressing Deficit Setup;Supervision/safety;Increased time to complete   Toileting Assistance  4  Minimal Assistance   Toileting Deficit Setup;Increased time to complete   Bed Mobility   Supine to Sit 4  Minimal assistance   Additional items Assist x 1;Increased time required;Verbal cues   Transfers   Sit to Stand 4  Minimal assistance   Additional items Assist x 1;Increased time required;Verbal cues   Stand to Sit 4  Minimal assistance   Additional items Assist x 1;Increased time required;Verbal cues   Toilet Transfers   Toilet Transfer From Bed   Toilet Transfer Type To and from   Toilet Transfer to Standard toilet   Toilet Transfer Technique Ambulating   Toilet Transfers Minimal assistance   Toilet Transfers Comments assist of 1/ verbal cues/ use of grab bar.   Therapeutic Exercise - ROM   UE-ROM Yes   ROM- Right Upper Extremities   R Shoulder AROM;Flexion;ABduction;Extension   R Elbow AROM;Elbow flexion;Elbow extension   R Weight/Reps/Sets 2 sets of 10 repetitions each   ROM - Left Upper Extremities    L Shoulder AROM;Flexion;ABduction;Extension   L Elbow AROM;Elbow flexion;Elbow extension   L Weight/Reps/Sets 2 sets of 10 repetitions   Cognition   Overall Cognitive  Status WFL   Arousal/Participation Alert;Cooperative   Attention Within functional limits   Orientation Level Oriented X4   Memory Within functional limits   Following Commands Follows one step commands without difficulty   Additional Activities   Additional Activities Other (Comment)  (UE HEP)   Additional Activities Comments Educated patient on UE HEP/ verbalized understanding and demonstrated correctly   Activity Tolerance   Activity Tolerance Patient tolerated treatment well   Assessment   Assessment Patient participated in Skilled OT session this date with interventions consisting of ADL re training with the use of correct body mechnaics, Energy Conservation techniques, safety awareness and fall prevention techniques, therapeutic exercise to: increase functional use of BUEs, increase BUE muscle strength ,  therapeutic activities to: increase activity tolerance, increase postural control, and increase OOB/ sitting tolerance . Patient agreeable to OT treatment session, upon arrival patient was found supine in bed, alert, responsive , and in no apparent distress.  In comparison to previous session, patient with improvements in overall endurance and activity tolerance, demonstrated ability to complete positional changes and functional transfers and ambulation with assist of 1 person, vs.  need for assist of 2 in previous session. Patient demonstrated increased postural control with ability to complete self care routine standing sink side.  Please refer to flow sheet for detailed performance. Patient requiring verbal cues for safety, verbal cues for correct technique, and one step directives. Patient continues to be functioning below baseline level, occupational performance remains limited secondary to factors listed above and increased risk for falls and injury.   From OT standpoint, recommendation at time of d/c would be level 2- Rehab.   Patient to benefit from continued Occupational Therapy treatment while in  the hospital to address deficits as defined above and maximize level of functional independence with ADLs and functional mobility.   Discharge Recommendation   Rehab Resource Intensity Level, OT II (Moderate Resource Intensity)   AM-PAC Daily Activity Inpatient   Lower Body Dressing 3   Bathing 3   Toileting 3   Upper Body Dressing 3   Grooming 3   Eating 4   Daily Activity Raw Score 19   Daily Activity Standardized Score (Calc for Raw Score >=11) 40.22   AM-PAC Applied Cognition Inpatient   Following a Speech/Presentation 4   Understanding Ordinary Conversation 4   Taking Medications 4   Remembering Where Things Are Placed or Put Away 4   Remembering List of 4-5 Errands 3   Taking Care of Complicated Tasks 3   Applied Cognition Raw Score 22   Applied Cognition Standardized Score 47.83

## 2024-01-16 NOTE — PROGRESS NOTES
[facility]  Progress Note  Name: Radha Vidal I  MRN: [id number]  Unit/Bed#:  I Date of Admission: 1/14/2024   Date of Service: 1/16/2024 I Hospital Day: 2    Assessment/Plan   * Stroke-like symptoms  Assessment & Plan  ED with vertigo/dizziness and gait abnormality and new inability to ambulate  Currently not experiencing vertigo/dizziness and describes being able to walk with standby assist  No focal deficits  Imaging unremarkable  TNK given 48 hours ago  Low concern for cerebrovascular cause of symptoms  Neurology does not see the need for outpatient neurology follow up    Plan  Continue to monitor for new onset stroke like symptoms  Start eliquis 5mg BID per cardiology    New onset a-fib (HCC)  Assessment & Plan  AF noted on arrival to ED 1/14  Currently between NSR and rate controlled A-fib    Plan  Initiate eliquis 5mg BID per cardiology      Type 2 diabetes mellitus (HCC)  Assessment & Plan  Lab Results   Component Value Date    HGBA1C 6.7 (H) 01/15/2024       Recent Labs     01/15/24  1207 01/15/24  1744 01/15/24  2044 01/16/24  0558   POCGLU 213* 161* 157* 98       Blood Sugar Average: Last 72 hrs:  (P) 153.8    Plan  Continue SSI insulin while in hospital    Parkinson's disease  Assessment & Plan  Currently Stable  Continue outpatient treatment of carbidopa-levodopa           VTE Pharmacologic Prophylaxis:   Moderate Risk (Score 3-4) - Pharmacological DVT Prophylaxis Ordered: apixaban (Eliquis).    Mobility:   Basic Mobility Inpatient Raw Score: 14  JH-HLM Goal: 4: Move to chair/commode  JH-HLM Achieved: 4: Move to chair/commode  HLM Goal NOT achieved. Continue with multidisciplinary rounding and encourage appropriate mobility to improve upon HLM goals.    Patient Centered Rounds: I performed bedside rounds with nursing staff today.  Discussions with Specialists or Other Care Team Provider:  IP CONSULT TO NEUROLOGY  IP CONSULT TO CASE MANAGEMENT  IP CONSULT TO PHYSICAL  MEDICINE REHAB  IP CONSULT TO CARDIOLOGY  IP CONSULT TO NUTRITION SERVICES  IP CONSULT TO CASE MANAGEMENT      Education and Discussions with Family / Patient: Discussed current disease process and plan moving forward.  Answered all questions that she had    Current Length of Stay: 2 day(s)  Current Patient Status: Inpatient   Certification Statement: The patient will continue to require additional inpatient hospital stay due to plan as noted above  Discharge Plan: Anticipate discharge in 24-48 hrs to Plan for discharge to [facility] home as the patient lives along    Code Status: Level 1 - Full Code    Subjective:   No acute overnight events.  Ms Vidal is feeling well this morning and happy to be feeling better than yesterday.  States that she was able to get good sleep.  She is not complaining of any dizziness and was happy to report that she was able to walk today in her room without the help of anyone,  She is currently experiencing no dizziness, SOB, CP or nausea.  She is passing flatus and having bowel movements.  IS over 2500mL.       Objective:     Vitals:   Temp (24hrs), Av.9 °F (36.6 °C), Min:97.4 °F (36.3 °C), Max:98.5 °F (36.9 °C)    Temp:  [97.4 °F (36.3 °C)-98.5 °F (36.9 °C)] 98.5 °F (36.9 °C)  HR:  [64-84] 64  Resp:  [16-20] 20  BP: (147-169)/(65-74) 166/72  SpO2:  [92 %-95 %] 93 %  Body mass index is 32.99 kg/m².     Input and Output Summary (last 24 hours):     Intake/Output Summary (Last 24 hours) at 2024 1329  Last data filed at 2024 0800  Gross per 24 hour   Intake 358 ml   Output 750 ml   Net -392 ml       Physical Exam:   Physical Exam  Constitutional:       Appearance: Normal appearance.   HENT:      Mouth/Throat:      Mouth: Mucous membranes are moist.   Eyes:      Extraocular Movements: Extraocular movements intact.      Pupils: Pupils are equal, round, and reactive to light.   Cardiovascular:      Rate and Rhythm: Rhythm irregular.      Pulses: Normal pulses.      Heart  sounds: Normal heart sounds.   Pulmonary:      Effort: Pulmonary effort is normal.      Breath sounds: Normal breath sounds.   Abdominal:      General: Abdomen is flat. There is no distension.      Palpations: Abdomen is soft.      Tenderness: There is no abdominal tenderness.   Skin:     General: Skin is warm and dry.      Capillary Refill: Capillary refill takes less than 2 seconds.   Neurological:      General: No focal deficit present.      Mental Status: She is alert and oriented to person, place, and time.          Additional Data:     Labs:  Results from last 7 days   Lab Units 01/16/24  0436   WBC Thousand/uL 9.56   HEMOGLOBIN g/dL 13.1   HEMATOCRIT % 39.0   PLATELETS Thousands/uL 312     Results from last 7 days   Lab Units 01/16/24  0436   SODIUM mmol/L 139   POTASSIUM mmol/L 3.7   CHLORIDE mmol/L 105   CO2 mmol/L 27   BUN mg/dL 15   CREATININE mg/dL 0.59*   ANION GAP mmol/L 7   CALCIUM mg/dL 9.3   GLUCOSE RANDOM mg/dL 113     Results from last 7 days   Lab Units 01/14/24  1038   INR  0.90     Results from last 7 days   Lab Units 01/16/24  0558 01/15/24  2044 01/15/24  1744 01/15/24  1207 01/14/24  0959   POC GLUCOSE mg/dl 98 157* 161* 213* 140     Results from last 7 days   Lab Units 01/15/24  0453   HEMOGLOBIN A1C % 6.7*           Lines/Drains:  Invasive Devices       Peripheral Intravenous Line  Duration             Peripheral IV 01/14/24 Dorsal (posterior);Left Hand 2 days    Peripheral IV 01/14/24 Right Antecubital 2 days              Drain  Duration             Closed/Suction Drain Left LUQ Bulb 19 Fr. 169 days                      Telemetry:  Telemetry Orders (From admission, onward)               24 Hour Telemetry Monitoring  Continuous x 24 Hours (Telem)        Expiring   Question:  Reason for 24 Hour Telemetry  Answer:  TIA/Suspected CVA/ Confirmed CVA                     Telemetry Reviewed:  Fluctuating between NSR and rate controlled AF  Indication for Continued Telemetry Use: Arrthymias  requiring medical therapy             Imaging: Reviewed radiology reports from this admission including:   MRI brain wo contrast    Result Date: 1/15/2024  Impression: White matter changes suggestive of chronic microangiopathy.  No acute intracranial pathology. Workstation performed: [id number]     CT head wo contrast    Result Date: 1/15/2024  Impression: -No acute intracranial abnormality.  Stable chronic microangiopathic changes. Workstation performed: [id number]     CT head wo contrast    Result Date: 1/14/2024  Impression: 1. No acute intracranial hemorrhage. 2. No large evolving territorial infarction. 3. Mild, chronic microangiopathy is similar to the CT from earlier today Workstation performed: [id number]     CT stroke alert brain    Result Date: 1/14/2024  Impression: No acute intracranial abnormality. Findings were directly discussed with Dr. Jamal Ferreira at 11:10 a.m. Workstation performed: [id number]     CTA stroke alert (head/neck)    Result Date: 1/14/2024  Impression: 1. CTA head: Negative for large vessel intracranial occlusion or hemodynamically significant stenosis. 2. CTA neck:  No extracranial carotid stenosis.  The cervical vertebral arteries are patent. Subcentimeter thyroid nodules. Findings were directly discussed with Dr. Jamal Ferreira at 11:10 a.m. Workstation performed: [id number]       No Chest XR results available for this patient.     Results for orders placed during the hospital encounter of 01/14/24    Echo complete w/ contrast if indicated    Interpretation Summary    Left Ventricle: Left ventricular cavity size is normal. Wall thickness is normal. The left ventricular ejection fraction is 60%. Systolic function is normal. Wall motion is normal. Diastolic function is mildly abnormal, consistent with grade I (abnormal) relaxation.    Left Atrium: The atrium is mildly dilated.    Mitral Valve: There is mild annular calcification. There is mild regurgitation.    Tricuspid Valve: There is  mild regurgitation.    Pericardium: Trace pericardial effusion versus epicardial fat.      Recent Cultures (last 7 days):         Last 24 Hours Medication List:   Current Facility-Administered Medications   Medication Dose Route Frequency Provider Last Rate    apixaban  5 mg Oral BID RISHI Cuellar      vitamin C  1,000 mg Oral Daily RISHI Palomino      aspirin  81 mg Oral Daily RISHI Palomion      atorvastatin  40 mg Oral QPM RISHI Palomino      carbidopa-levodopa  1 tablet Oral TID AC RISHI Palomino      chlorhexidine  15 mL Mouth/Throat Q12H DARI RISHI Palomino      cholecalciferol  5,000 Units Oral After Lunch RISHI Palomino      hydrALAZINE  10 mg Intravenous Q4H PRN RISHI Palomino      insulin lispro  1-5 Units Subcutaneous HS RISHI Palomino      insulin lispro  1-6 Units Subcutaneous TID AC RISHI Palomino      labetalol  10 mg Intravenous Q4H PRN RISHI Palomino      meclizine  12.5 mg Oral Q6H PRN RISHI Palomino      multivitamin stress formula  1 tablet Oral QAM RISHI Palomino      ondansetron  4 mg Intravenous Q6H PRN RISHI Palomino      pantoprazole  40 mg Oral Daily RISHI Palomino          Today, Patient Was Seen By: Ramon Conklin and the attending physician, Bradly Farnsworth DO        **Please Note: This note may have been constructed using a voice recognition system.**

## 2024-01-16 NOTE — PHYSICAL THERAPY NOTE
"   Physical Therapy Treatment Note    Patient Name: Radha Vidal    Diagnosis: Dizziness [R42]  Syncope [R55]  Unsteady gait [R26.81]  New onset a-fib (HCC) [I48.91]  Stroke-like symptoms [R29.90]     01/16/24 1121   PT Last Visit   PT Visit Date 01/16/24   Note Type   Note Type Treatment   Pain Assessment   Pain Assessment Tool 0-10   Pain Score No Pain   Restrictions/Precautions   Weight Bearing Precautions Per Order No   Other Precautions Chair Alarm;Bed Alarm;Multiple lines;Telemetry;Fall Risk   General   Chart Reviewed Yes   Response to Previous Treatment Patient with no complaints from previous session.   Family/Caregiver Present No   Cognition   Overall Cognitive Status WFL   Arousal/Participation Alert;Responsive;Cooperative   Attention Within functional limits   Orientation Level Oriented X4   Memory Within functional limits   Following Commands Follows one step commands without difficulty   Comments Pt agreeable to PT.   Subjective   Subjective \"I am still unsteady and a little weak.\"   Bed Mobility   Supine to Sit 4  Minimal assistance   Additional items Assist x 1;HOB elevated;Bedrails;Increased time required;Verbal cues   Transfers   Sit to Stand 4  Minimal assistance   Additional items Assist x 1;Increased time required;Verbal cues   Stand to Sit 4  Minimal assistance   Additional items Assist x 1;Armrests;Increased time required;Verbal cues   Ambulation/Elevation   Gait pattern Decreased toe off;Decreased heel strike;Decreased hip extension;Excessively slow;Step to;Short stride;Shuffling;Decreased foot clearance;Narrow MINDY   Gait Assistance 3  Moderate assist   Additional items Assist x 1;Verbal cues   Assistive Device Other (Comment)  (intermittent right hand held assist; recommend use of RW-pt refusing)   Distance 30 feet   Ambulation/Elevation Additional Comments Pt's BP post ambulation 143/66   Balance   Static Sitting Fair +   Dynamic Sitting Fair   Static Standing Poor +   Dynamic Standing " Poor   Ambulatory Poor   Endurance Deficit   Endurance Deficit Yes   Endurance Deficit Description decreased activity tolerance   Activity Tolerance   Activity Tolerance Patient tolerated treatment well   Medical Staff Made Aware OT [place]   Exercises   Hip Flexion Sitting;10 reps;AROM;Bilateral   Knee AROM Long Arc Quad Sitting;10 reps;AROM;Bilateral   Balance training  pt tolerated static/dynamic standing balance for ~5 minutes during self care activities   Assessment   Prognosis Good   Problem List Decreased strength;Decreased endurance;Impaired balance;Decreased mobility   Assessment Chart reviewed. Patient was received supine in bed in NAD and agreeable to PT session. Today's PT treatment session consisted of therapeutic activity for facilitation of transitional movements and safe performance of correct technique for bed mobility and sit to stand transfers, therapeutic exercise to increase lower extremity muscle strength, and gait training to promote safe and functional ambulation on level surfaces. In comparison to the previous session the patient has made progress as evident by requiring decreased assistance with all functional mobility. Pt was able to perform all functional mobility with assist of one. Pt also demonstrated progress as evident by ability to ambulate an increased distance. When ambulating pt exhibits decreased yuliet, decreased stride length, narrow base of support, and lack of heel strike. Pt ambulated with intermittent use of right hand held assist; recommend use of walker however pt refused. Pt's BP post ambulation 143/66. Pt tolerated static/dynamic standing balance for approximately five minutes during self care activities. Pt tolerated all therapeutic exercise well without complaints. Co-treatment was performed with OT secondary to complex medical condition of pt. PT/OT goals were addressed separately. Overall, patient tolerated today's session well and continues to be making progress  towards achieving her STG's. Patient's prognosis for achieving their STG's is good as evident by pt's motivation. PT intervention continues to be appropriate as the patient continues to be limited by decreased lower extremity strength, impaired balance, decreased endurance, gait deviations, and decreased functional mobility. Continue to recommend level 2, moderate resource intensity. PT to continue to see patient in order to address the deficits listed above and provide interventions consistent with the POC in order to achieve STG's and optimize the patient's independence with functional mobility.   Barriers to Discharge Inaccessible home environment;Decreased caregiver support   Goals   STG Expiration Date 01/25/24   PT Treatment Day 1   Plan   Treatment/Interventions Functional transfer training;LE strengthening/ROM;Elevations;Therapeutic exercise;Endurance training;Patient/family training;Bed mobility;Gait training;OT   Progress Progressing toward goals   PT Frequency 3-5x/wk   Discharge Recommendation   Rehab Resource Intensity Level, PT II (Moderate Resource Intensity)   AM-PAC Basic Mobility Inpatient   Turning in Flat Bed Without Bedrails 3   Lying on Back to Sitting on Edge of Flat Bed Without Bedrails 3   Moving Bed to Chair 2   Standing Up From Chair Using Arms 3   Walk in Room 2   Climb 3-5 Stairs With Railing 2   Basic Mobility Inpatient Raw Score 15   Basic Mobility Standardized Score 36.97   Highest Level Of Mobility   JH-HLM Goal 4: Move to chair/commode   JH-HLM Achieved 7: Walk 25 feet or more   Education   Education Provided Mobility training;Home exercise program;Assistive device   Patient Reinforcement needed   End of Consult   Patient Position at End of Consult Bedside chair;Bed/Chair alarm activated;All needs within reach       Nadira Yee, PT, DPT    Time of PT treatment session: 5274-8001  26 minutes     per pst/yes

## 2024-01-17 PROBLEM — R26.2 AMBULATORY DYSFUNCTION: Status: ACTIVE | Noted: 2024-01-17

## 2024-01-17 PROBLEM — H93.8X1 EAR PRESSURE, RIGHT: Status: ACTIVE | Noted: 2024-01-17

## 2024-01-17 LAB
GLUCOSE SERPL-MCNC: 122 MG/DL (ref 65–140)
GLUCOSE SERPL-MCNC: 126 MG/DL (ref 65–140)
GLUCOSE SERPL-MCNC: 130 MG/DL (ref 65–140)
GLUCOSE SERPL-MCNC: 141 MG/DL (ref 65–140)

## 2024-01-17 PROCEDURE — 99233 SBSQ HOSP IP/OBS HIGH 50: CPT | Performed by: INTERNAL MEDICINE

## 2024-01-17 PROCEDURE — 82948 REAGENT STRIP/BLOOD GLUCOSE: CPT

## 2024-01-17 RX ADMIN — ATORVASTATIN CALCIUM 40 MG: 40 TABLET, FILM COATED ORAL at 18:14

## 2024-01-17 RX ADMIN — CHLORHEXIDINE GLUCONATE 0.12% ORAL RINSE 15 ML: 1.2 LIQUID ORAL at 09:09

## 2024-01-17 RX ADMIN — CARBIDOPA AND LEVODOPA 1 TABLET: 25; 100 TABLET ORAL at 13:32

## 2024-01-17 RX ADMIN — CARBIDOPA AND LEVODOPA 1 TABLET: 25; 100 TABLET ORAL at 18:14

## 2024-01-17 RX ADMIN — OXYCODONE HYDROCHLORIDE AND ACETAMINOPHEN 1000 MG: 500 TABLET ORAL at 09:09

## 2024-01-17 RX ADMIN — Medication 5000 UNITS: at 13:32

## 2024-01-17 RX ADMIN — ASPIRIN 81 MG: 81 TABLET, COATED ORAL at 09:09

## 2024-01-17 RX ADMIN — CARBIDOPA AND LEVODOPA 1 TABLET: 25; 100 TABLET ORAL at 09:09

## 2024-01-17 RX ADMIN — APIXABAN 5 MG: 5 TABLET, FILM COATED ORAL at 09:09

## 2024-01-17 RX ADMIN — APIXABAN 5 MG: 5 TABLET, FILM COATED ORAL at 18:13

## 2024-01-17 RX ADMIN — PSYLLIUM HUSK 1 PACKET: 3.4 POWDER ORAL at 13:32

## 2024-01-17 RX ADMIN — MECLIZINE HYDROCHLORIDE 12.5 MG: 25 TABLET ORAL at 13:34

## 2024-01-17 RX ADMIN — PANTOPRAZOLE SODIUM 40 MG: 40 TABLET, DELAYED RELEASE ORAL at 09:09

## 2024-01-17 RX ADMIN — B-COMPLEX W/ C & FOLIC ACID TAB 1 TABLET: TAB at 09:09

## 2024-01-17 NOTE — ASSESSMENT & PLAN NOTE
Lab Results   Component Value Date    HGBA1C 6.7 (H) 01/15/2024       Recent Labs     01/16/24  1444 01/16/24  1815 01/16/24  2111 01/17/24  0707   POCGLU 124 138 188* 122       Blood Sugar Average: Last 72 hrs:  (P) 149    Plan  Continue SSI insulin while in hospital

## 2024-01-17 NOTE — ASSESSMENT & PLAN NOTE
AF noted on arrival to ED 1/14  Currently between NSR and rate controlled A-fib  [id number] - 4     Plan  Initiate eliquis 5mg BID per cardiology

## 2024-01-17 NOTE — ASSESSMENT & PLAN NOTE
ED with vertigo/dizziness and gait abnormality and new inability to ambulate  S/P TNK  Currently not experiencing vertigo/dizziness and describes being able to walk with standby assist  No focal deficits  Neurology signed off  Consider orthostatic hypotension as etiology    Plan  Continue to monitor for new onset stroke like symptoms  Start eliquis 5mg BID per cardiology

## 2024-01-17 NOTE — PLAN OF CARE
Problem: Potential for Falls  Goal: Patient will remain free of falls  Description: INTERVENTIONS:  - Educate patient/family on patient safety including physical limitations  - Instruct patient to call for assistance with activity   - Consult OT/PT to assist with strengthening/mobility   - Keep Call bell within reach  - Keep bed low and locked with side rails adjusted as appropriate  - Keep care items and personal belongings within reach  - Initiate and maintain comfort rounds  - Make Fall Risk Sign visible to staff  - Offer Toileting every 2 Hours, in advance of need  - Initiate/Maintain bed alarm  - Obtain necessary fall risk management equipment: yes   - Apply yellow socks and bracelet for high fall risk patients  - Consider moving patient to room near nurses station  Outcome: Progressing     Problem: Neurological Deficit  Goal: Neurological status is stable or improving  Description: Interventions:  - Monitor and assess patient's level of consciousness, motor function, sensory function, and level of assistance needed for ADLs.   - Monitor and report changes from baseline. Collaborate with interdisciplinary team to initiate plan and implement interventions as ordered.   - Provide and maintain a safe environment.  - Consider seizure precautions.  - Consider fall precautions.  - Consider aspiration precautions.  - Consider bleeding precautions.  Outcome: Progressing     Problem: Activity Intolerance/Impaired Mobility  Goal: Mobility/activity is maintained at optimum level for patient  Description: Interventions:  - Assess and monitor patient  barriers to mobility and need for assistive/adaptive devices.  - Assess patient's emotional response to limitations.  - Collaborate with interdisciplinary team and initiate plans and interventions as ordered.  - Encourage independent activity per ability.  - Maintain proper body alignment.  - Perform active/passive rom as tolerated/ordered.  - Plan activities to conserve  energy.  - Turn patient as appropriate  Outcome: Progressing     Problem: Communication Impairment  Goal: Ability to express needs and understand communication  Description: Assess patient's communication skills and ability to understand information.  Patient will demonstrate use of effective communication techniques, alternative methods of communication and understanding even if not able to speak.     - Encourage communication and provide alternate methods of communication as needed.  - Collaborate with case management/ for discharge needs.  - Include patient/family/caregiver in decisions related to communication.  Outcome: Progressing     Problem: Potential for Aspiration  Goal: Non-ventilated patient's risk of aspiration is minimized  Description: Assess and monitor vital signs, respiratory status, and labs (WBC).  Monitor for signs of aspiration (tachypnea, cough, rales, wheezing, cyanosis, fever).    - Assess and monitor patient's ability to swallow.  - Place patient up in chair to eat if possible.  - HOB up at 90 degrees to eat if unable to get patient up into chair.  - Supervise patient during oral intake.   - Instruct patient/ family to take small bites.  - Instruct patient/ family to take small single sips when taking liquids.  - Follow patient-specific strategies generated by speech pathologist.  Outcome: Progressing  Goal: Ventilated patient's risk of aspiration is minimized  Description: Assess and monitor vital signs, respiratory status, airway cuff pressure, and labs (WBC).  Monitor for signs of aspiration (tachypnea, cough, rales, wheezing, cyanosis, fever).    - Elevate head of bed 30 degrees if patient has tube feeding.  - Monitor tube feeding.  Outcome: Progressing     Problem: Nutrition  Goal: Nutrition/Hydration status is improving  Description: Monitor and assess patient's nutrition/hydration status for malnutrition (ex- brittle hair, bruises, dry skin, pale skin and conjunctiva,  muscle wasting, smooth red tongue, and disorientation). Collaborate with interdisciplinary team and initiate plan and interventions as ordered.  Monitor patient's weight and dietary intake as ordered or per policy. Utilize nutrition screening tool and intervene per policy. Determine patient's food preferences and provide high-protein, high-caloric foods as appropriate.     - Assist patient with eating.  - Allow adequate time for meals.  - Encourage patient to take dietary supplement as ordered.  - Collaborate with clinical nutritionist.  - Include patient/family/caregiver in decisions related to nutrition.  Outcome: Progressing     Problem: PAIN - ADULT  Goal: Verbalizes/displays adequate comfort level or baseline comfort level  Description: Interventions:  - Encourage patient to monitor pain and request assistance  - Assess pain using appropriate pain scale  - Administer analgesics based on type and severity of pain and evaluate response  - Implement non-pharmacological measures as appropriate and evaluate response  - Consider cultural and social influences on pain and pain management  - Notify physician/advanced practitioner if interventions unsuccessful or patient reports new pain  Outcome: Progressing     Problem: INFECTION - ADULT  Goal: Absence or prevention of progression during hospitalization  Description: INTERVENTIONS:  - Assess and monitor for signs and symptoms of infection  - Monitor lab/diagnostic results  - Monitor all insertion sites, i.e. indwelling lines, tubes, and drains  - Monitor endotracheal if appropriate and nasal secretions for changes in amount and color  - Pringle appropriate cooling/warming therapies per order  - Administer medications as ordered  - Instruct and encourage patient and family to use good hand hygiene technique  - Identify and instruct in appropriate isolation precautions for identified infection/condition  Outcome: Progressing  Goal: Absence of fever/infection during  neutropenic period  Description: INTERVENTIONS:  - Monitor WBC    Outcome: Progressing     Problem: SAFETY ADULT  Goal: Patient will remain free of falls  Description: INTERVENTIONS:  - Educate patient/family on patient safety including physical limitations  - Instruct patient to call for assistance with activity   - Consult OT/PT to assist with strengthening/mobility   - Keep Call bell within reach  - Keep bed low and locked with side rails adjusted as appropriate  - Keep care items and personal belongings within reach  - Initiate and maintain comfort rounds  - Make Fall Risk Sign visible to staff  - Offer Toileting every 2 Hours, in advance of need  - Initiate/Maintain bed alarm  - Obtain necessary fall risk management equipment: yes   - Apply yellow socks and bracelet for high fall risk patients  - Consider moving patient to room near nurses station  Outcome: Progressing     Problem: DISCHARGE PLANNING  Goal: Discharge to home or other facility with appropriate resources  Description: INTERVENTIONS:  - Identify barriers to discharge w/patient and caregiver  - Arrange for needed discharge resources and transportation as appropriate  - Identify discharge learning needs (meds, wound care, etc.)  - Arrange for interpretive services to assist at discharge as needed  - Refer to Case Management Department for coordinating discharge planning if the patient needs post-hospital services based on physician/advanced practitioner order or complex needs related to functional status, cognitive ability, or social support system  Outcome: Progressing     Problem: Knowledge Deficit  Goal: Patient/family/caregiver demonstrates understanding of disease process, treatment plan, medications, and discharge instructions  Description: Complete learning assessment and assess knowledge base.  Interventions:  - Provide teaching at level of understanding  - Provide teaching via preferred learning methods  Outcome: Progressing     Problem:  Nutrition/Hydration-ADULT  Goal: Nutrient/Hydration intake appropriate for improving, restoring or maintaining nutritional needs  Description: Monitor and assess patient's nutrition/hydration status for malnutrition. Collaborate with interdisciplinary team and initiate plan and interventions as ordered.  Monitor patient's weight and dietary intake as ordered or per policy. Utilize nutrition screening tool and intervene as necessary. Determine patient's food preferences and provide high-protein, high-caloric foods as appropriate.     INTERVENTIONS:  - Monitor oral intake, urinary output, labs, and treatment plans  - Assess nutrition and hydration status and recommend course of action  - Evaluate amount of meals eaten  - Assist patient with eating if necessary   - Allow adequate time for meals  - Recommend/ encourage appropriate diets, oral nutritional supplements, and vitamin/mineral supplements  - Order, calculate, and assess calorie counts as needed  - Recommend, monitor, and adjust tube feedings and TPN/PPN based on assessed needs  - Assess need for intravenous fluids  - Provide specific nutrition/hydration education as appropriate  - Include patient/family/caregiver in decisions related to nutrition  Outcome: Progressing

## 2024-01-17 NOTE — PROGRESS NOTES
[facility]  Progress Note  Name: Radha Vidal I  MRN: [id number]  Unit/Bed#:  I Date of Admission: 1/14/2024   Date of Service: 1/17/2024 I Hospital Day: 3    Assessment/Plan   * Stroke-like symptoms  Assessment & Plan  ED with vertigo/dizziness and gait abnormality and new inability to ambulate  S/P TNK  Currently not experiencing vertigo/dizziness and describes being able to walk with standby assist  No focal deficits  Neurology signed off  Consider orthostatic hypotension as etiology    Plan  Continue to monitor for new onset stroke like symptoms  Start eliquis 5mg BID per cardiology    New onset a-fib (HCC)  Assessment & Plan  AF noted on arrival to ED 1/14  Currently between NSR and rate controlled A-fib  [id number] - 4     Plan  Initiate eliquis 5mg BID per cardiology      Ear pressure, right  Assessment & Plan  12/23 right myringotomy tube placed in treatment of rt middle ear effusion  Currently feels fullness and echo when she talks  No acute hearing loss  Feels as if he symptoms have increased the past few days in hospital  Tube appeared patent on otoscopic examination    Plan  Follow with ENT outpatient     Ambulatory dysfunction  Assessment & Plan  PT recommending: Rehab Resource Intensity Level II (Moderate Resource Intensity)   Agreeable to rehab    Type 2 diabetes mellitus (HCC)  Assessment & Plan  Lab Results   Component Value Date    HGBA1C 6.7 (H) 01/15/2024       Recent Labs     01/16/24  1444 01/16/24  1815 01/16/24  2111 01/17/24  0707   POCGLU 124 138 188* 122       Blood Sugar Average: Last 72 hrs:  (P) 149    Plan  Continue SSI insulin while in hospital    Parkinson's disease  Assessment & Plan  Currently Stable  Continue outpatient treatment of carbidopa-levodopa             VTE Pharmacologic Prophylaxis:   Moderate Risk (Score 3-4) - Pharmacological DVT Prophylaxis Ordered: apixaban (Eliquis).    Mobility:   Basic Mobility Inpatient Raw Score: 15  JH-HLM  Goal: 4: Move to chair/commode  -HLM Achieved: 7: Walk 25 feet or more  HLM Goal NOT achieved. Continue with multidisciplinary rounding and encourage appropriate mobility to improve upon HLM goals.    Patient Centered Rounds: I performed bedside rounds with nursing staff today.  Discussions with Specialists or Other Care Team Provider:  IP CONSULT TO NEUROLOGY  IP CONSULT TO CASE MANAGEMENT  IP CONSULT TO PHYSICAL MEDICINE REHAB  IP CONSULT TO CARDIOLOGY  IP CONSULT TO NUTRITION SERVICES  IP CONSULT TO CASE MANAGEMENT      Education and Discussions with Family / Patient: Discussed plan for rehab at discharge with goal of getting as healthy as possible prior to living alone.  Discussed any other concerns that were present    Current Length of Stay: 3 day(s)  Current Patient Status: Inpatient   Certification Statement: The patient will continue to require additional inpatient hospital stay due to plan as noted above  Discharge Plan: Anticipate discharge tomorrow to rehab facility.    Code Status: Level 1 - Full Code    Subjective:   Ms Vidal was complaining of right ear pressure this morning.  She states that she had a tube placed a few weeks ago with ENT.  She was not experiencing any dizziness, lightheadedness, nausea, chest pain or shortness of breath.  She states that she has not had a bowel movement in a couple of days.      Objective:     Vitals:   Temp (24hrs), Av.5 °F (36.9 °C), Min:98.2 °F (36.8 °C), Max:98.7 °F (37.1 °C)    Temp:  [98.2 °F (36.8 °C)-98.7 °F (37.1 °C)] 98.2 °F (36.8 °C)  HR:  [78-89] 85  Resp:  [16-20] 16  BP: ()/(53-79) 108/63  SpO2:  [92 %-96 %] 93 %  Body mass index is 32.41 kg/m².     Input and Output Summary (last 24 hours):     Intake/Output Summary (Last 24 hours) at 2024 1234  Last data filed at 2024 0800  Gross per 24 hour   Intake 480 ml   Output 800 ml   Net -320 ml       Physical Exam:   Physical Exam  Constitutional:       Appearance: Normal appearance.    HENT:      Mouth/Throat:      Mouth: Mucous membranes are dry.   Eyes:      Extraocular Movements: Extraocular movements intact.      Conjunctiva/sclera: Conjunctivae normal.      Pupils: Pupils are equal, round, and reactive to light.   Cardiovascular:      Rate and Rhythm: Rhythm irregular.      Pulses: Normal pulses.      Heart sounds: Normal heart sounds.   Pulmonary:      Effort: Pulmonary effort is normal.      Breath sounds: Normal breath sounds.   Abdominal:      General: Abdomen is flat. There is no distension.      Palpations: Abdomen is soft.   Skin:     General: Skin is warm and dry.      Capillary Refill: Capillary refill takes less than 2 seconds.   Neurological:      General: No focal deficit present.      Mental Status: She is alert and oriented to person, place, and time.          Additional Data:     Labs:  Results from last 7 days   Lab Units 01/16/24  0436   WBC Thousand/uL 9.56   HEMOGLOBIN g/dL 13.1   HEMATOCRIT % 39.0   PLATELETS Thousands/uL 312     Results from last 7 days   Lab Units 01/16/24  0436   SODIUM mmol/L 139   POTASSIUM mmol/L 3.7   CHLORIDE mmol/L 105   CO2 mmol/L 27   BUN mg/dL 15   CREATININE mg/dL 0.59*   ANION GAP mmol/L 7   CALCIUM mg/dL 9.3   GLUCOSE RANDOM mg/dL 113     Results from last 7 days   Lab Units 01/14/24  1038   INR  0.90     Results from last 7 days   Lab Units 01/17/24  1141 01/17/24  0707 01/16/24  2111 01/16/24  1815 01/16/24  1444 01/16/24  0558 01/15/24  2044 01/15/24  1744 01/15/24  1207 01/14/24  0959   POC GLUCOSE mg/dl 130 122 188* 138 124 98 157* 161* 213* 140     Results from last 7 days   Lab Units 01/15/24  0453   HEMOGLOBIN A1C % 6.7*           Lines/Drains:  Invasive Devices       Peripheral Intravenous Line  Duration             Peripheral IV 01/14/24 Dorsal (posterior);Left Hand 3 days    Peripheral IV 01/14/24 Right Antecubital 3 days              Drain  Duration             Closed/Suction Drain Left LUQ Bulb 19 Fr. 169 days                       Telemetry:  Telemetry Orders (From admission, onward)               24 Hour Telemetry Monitoring  Continuous x 24 Hours (Telem)           Question:  Reason for 24 Hour Telemetry  Answer:  TIA/Suspected CVA/ Confirmed CVA                     Telemetry Reviewed: Atrial fibrillation. HR averaging    Indication for Continued Telemetry Use: No indication for continued use. Will discontinue.              Imaging: Reviewed radiology reports from this admission including:   MRI brain wo contrast    Result Date: 1/15/2024  Impression: White matter changes suggestive of chronic microangiopathy.  No acute intracranial pathology. Workstation performed: [id number]     CT head wo contrast    Result Date: 1/15/2024  Impression: -No acute intracranial abnormality.  Stable chronic microangiopathic changes. Workstation performed: [id number]     CT head wo contrast    Result Date: 2024  Impression: 1. No acute intracranial hemorrhage. 2. No large evolving territorial infarction. 3. Mild, chronic microangiopathy is similar to the CT from earlier today Workstation performed: [id number]     CT stroke alert brain    Result Date: 2024  Impression: No acute intracranial abnormality. Findings were directly discussed with Dr. Jamal Ferreira at 11:10 a.m. Workstation performed: [id number]     CTA stroke alert (head/neck)    Result Date: 2024  Impression: 1. CTA head: Negative for large vessel intracranial occlusion or hemodynamically significant stenosis. 2. CTA neck:  No extracranial carotid stenosis.  The cervical vertebral arteries are patent. Subcentimeter thyroid nodules. Findings were directly discussed with Dr. Jamal Ferreira at 11:10 a.m. Workstation performed: [id number]       No Chest XR results available for this patient.     Results for orders placed during the hospital encounter of 24    Echo complete w/ contrast if indicated    Interpretation Summary    Left Ventricle: Left ventricular cavity size is  normal. Wall thickness is normal. The left ventricular ejection fraction is 60%. Systolic function is normal. Wall motion is normal. Diastolic function is mildly abnormal, consistent with grade I (abnormal) relaxation.    Left Atrium: The atrium is mildly dilated.    Mitral Valve: There is mild annular calcification. There is mild regurgitation.    Tricuspid Valve: There is mild regurgitation.    Pericardium: Trace pericardial effusion versus epicardial fat.      Recent Cultures (last 7 days):         Last 24 Hours Medication List:   Current Facility-Administered Medications   Medication Dose Route Frequency Provider Last Rate    apixaban  5 mg Oral BID RISHI Cuellar      vitamin C  1,000 mg Oral Daily RISHI Palomino      aspirin  81 mg Oral Daily Nya Bush, PAULNP      atorvastatin  40 mg Oral QPM RISHI Palomino      carbidopa-levodopa  1 tablet Oral TID AC Nya Bush, RISHI      chlorhexidine  15 mL Mouth/Throat Q12H [facility] Nya Bush, RISHI      cholecalciferol  5,000 Units Oral After Lunch RISHI Palomino      hydrALAZINE  10 mg Intravenous Q4H PRN Nya Bush, RISHI      insulin lispro  1-5 Units Subcutaneous HS Nya Bush, RISHI      insulin lispro  1-6 Units Subcutaneous TID AC RISHI Palomino      labetalol  10 mg Intravenous Q4H PRN Nya Bush, RISHI      meclizine  12.5 mg Oral Q6H PRN Nya Bush, RISHI      multivitamin stress formula  1 tablet Oral QAM RISHI Palomino      ondansetron  4 mg Intravenous Q6H PRN Nya Bush, RISHI      pantoprazole  40 mg Oral Daily Nya Bush, PAULNP      psyllium  1 packet Oral Daily Bradly Farnsworth DO          Today, Patient Was Seen By: Ramon Conklin and the attending physician, Bradly Farnsworth DO        **Please Note: This note may have been constructed using a voice recognition system.**

## 2024-01-17 NOTE — ASSESSMENT & PLAN NOTE
PT recommending: Rehab Resource Intensity Level II (Moderate Resource Intensity)   Agreeable to rehab

## 2024-01-17 NOTE — ASSESSMENT & PLAN NOTE
12/23 right myringotomy tube placed in treatment of rt middle ear effusion  Currently feels fullness and echo when she talks  No acute hearing loss  Feels as if he symptoms have increased the past few days in hospital  Tube appeared patent on otoscopic examination    Plan  Follow with ENT outpatient

## 2024-01-17 NOTE — CASE MANAGEMENT
Case Management Discharge Planning Note    Patient name Radha Vidal  Location / MRN [id number]  : 1940 Date 2024       Current Admission Date: 2024  Current Admission Diagnosis:Stroke-like symptoms   Patient Active Problem List    Diagnosis Date Noted    Ambulatory dysfunction 2024    Ear pressure, right 2024    Type 2 diabetes mellitus (HCC) 01/15/2024    Stroke-like symptoms 2024    New onset a-fib (HCC) 2024    s/p distal pancreatectomy/splenectomy 2023    Encounter for geriatric assessment 2023    Urge incontinence 2021    Age-related osteoporosis without current pathological fracture 2021    Closed compression fracture of body of L1 vertebra (HCC) 2020    Cerebrovascular disease 2020    IPMN (intraductal papillary mucinous neoplasm) 2020    Trochanteric bursitis, right hip 2020    Tremor 2020    Chronic idiopathic constipation 2019    History of adenomatous polyp of colon 2019    Osteopenia 2019    Gastro-esophageal reflux disease without esophagitis 2019    Dysphagia 2019    Multiple thyroid nodules 2019    Menopause ovarian failure 2019    Vitamin D deficiency 2019    Mitral valve disorder 2018    Mood disturbance 2018    Obesity (BMI 30-39.9) 2018    Claustrophobia 2018    Impaired fasting glucose 2018    Seasonal allergic rhinitis 2018    History of skin cancer 2018    Parkinson's disease 2018    Primary insomnia 2018    Cherry angioma 11/15/2017    Postablative hypothyroidism 2016    Dyspnea on exertion 10/02/2015    OAB (overactive bladder) 2015    Sleep apnea 04/15/2014      LOS (days): 3  Geometric Mean LOS (GMLOS) (days): 2.4  Days to GMLOS:-0.7     OBJECTIVE:  Risk of Unplanned Readmission Score: 11.46         Current admission status: Inpatient   Preferred Pharmacy:   [facility] - [place], [place] - [address]  [address] [address]  Phone: [phone] Fax: [phone]    [facility] [place] - [address]  [address]  [address]  [facility] [address]  Phone: [phone] Fax: [phone]    [facility] - [place] - [address]  [address]  [place] [address]  Phone: [phone] Fax: [phone]    Primary Care Provider: Hoang Tovar MD    Primary Insurance: [employer]  REP  Secondary Insurance:     DISCHARGE DETAILS:                                          Other Referral/Resources/Interventions Provided:  Referral Comments: S/W pt at bedside and daughter on t/c;  provider list printed from [employer] faxed to daughter at [phone].  Daughter confirmed receipt; says she has reviewed w pt and they have decided on [facility].  Facility reserved in [employer];  sent message requesting admitting MD and NPI numbers.  Awaiting response in order to initiate insurance auth.  Daughter w questions re: transport;  advised her that  does not pay for non emergency transport unless pt qualifies medically.  Advised daughter that pt would likely not qualify for an ambulance, likely more appropriate for stretcher van or /ch van, which is private pay.  Daughter expressed understanding.  CM also mentioned that she could transport pt herself if MD felt it to be safe.  Will continue to follow.         Treatment Team Recommendation: Short Term Rehab, SNF, Home with Home Health Care  Discharge Destination Plan:: SNF, Short Term Rehab  Transport at Discharge : Other (Comment) (TBD)                                           Accepting Facility Name, City & State : [facility]  [address] [place] [address]  Receiving Facility/Agency Phone Number: Phone: [phone]  Facility/Agency Fax Number: Fax: [phone]

## 2024-01-18 LAB
GLUCOSE SERPL-MCNC: 106 MG/DL (ref 65–140)
GLUCOSE SERPL-MCNC: 116 MG/DL (ref 65–140)
GLUCOSE SERPL-MCNC: 209 MG/DL (ref 65–140)
GLUCOSE SERPL-MCNC: 58 MG/DL (ref 65–140)
GLUCOSE SERPL-MCNC: 94 MG/DL (ref 65–140)

## 2024-01-18 PROCEDURE — 99233 SBSQ HOSP IP/OBS HIGH 50: CPT | Performed by: INTERNAL MEDICINE

## 2024-01-18 PROCEDURE — 82948 REAGENT STRIP/BLOOD GLUCOSE: CPT

## 2024-01-18 RX ADMIN — PSYLLIUM HUSK 1 PACKET: 3.4 POWDER ORAL at 08:25

## 2024-01-18 RX ADMIN — APIXABAN 5 MG: 5 TABLET, FILM COATED ORAL at 08:24

## 2024-01-18 RX ADMIN — OXYCODONE HYDROCHLORIDE AND ACETAMINOPHEN 1000 MG: 500 TABLET ORAL at 08:24

## 2024-01-18 RX ADMIN — MECLIZINE HYDROCHLORIDE 12.5 MG: 25 TABLET ORAL at 08:24

## 2024-01-18 RX ADMIN — CARBIDOPA AND LEVODOPA 1 TABLET: 25; 100 TABLET ORAL at 18:01

## 2024-01-18 RX ADMIN — PANTOPRAZOLE SODIUM 40 MG: 40 TABLET, DELAYED RELEASE ORAL at 08:25

## 2024-01-18 RX ADMIN — B-COMPLEX W/ C & FOLIC ACID TAB 1 TABLET: TAB at 08:24

## 2024-01-18 RX ADMIN — ASPIRIN 81 MG: 81 TABLET, COATED ORAL at 08:25

## 2024-01-18 RX ADMIN — ATORVASTATIN CALCIUM 40 MG: 40 TABLET, FILM COATED ORAL at 18:01

## 2024-01-18 RX ADMIN — INSULIN LISPRO 2 UNITS: 100 INJECTION, SOLUTION INTRAVENOUS; SUBCUTANEOUS at 14:08

## 2024-01-18 RX ADMIN — CARBIDOPA AND LEVODOPA 1 TABLET: 25; 100 TABLET ORAL at 08:24

## 2024-01-18 RX ADMIN — APIXABAN 5 MG: 5 TABLET, FILM COATED ORAL at 18:01

## 2024-01-18 RX ADMIN — CARBIDOPA AND LEVODOPA 1 TABLET: 25; 100 TABLET ORAL at 14:07

## 2024-01-18 RX ADMIN — Medication 5000 UNITS: at 14:08

## 2024-01-18 RX ADMIN — CHLORHEXIDINE GLUCONATE 0.12% ORAL RINSE 15 ML: 1.2 LIQUID ORAL at 08:25

## 2024-01-18 NOTE — CASE MANAGEMENT
Case Management Discharge Planning Note    Patient name Radha Vidal  Location / MRN [id number]  : 1940 Date 2024       Current Admission Date: 2024  Current Admission Diagnosis:Stroke-like symptoms   Patient Active Problem List    Diagnosis Date Noted    Ambulatory dysfunction 2024    Ear pressure, right 2024    Type 2 diabetes mellitus (HCC) 01/15/2024    Stroke-like symptoms 2024    New onset a-fib (HCC) 2024    s/p distal pancreatectomy/splenectomy 2023    Encounter for geriatric assessment 2023    Urge incontinence 2021    Age-related osteoporosis without current pathological fracture 2021    Closed compression fracture of body of L1 vertebra (HCC) 2020    Cerebrovascular disease 2020    IPMN (intraductal papillary mucinous neoplasm) 2020    Trochanteric bursitis, right hip 2020    Tremor 2020    Chronic idiopathic constipation 2019    History of adenomatous polyp of colon 2019    Osteopenia 2019    Gastro-esophageal reflux disease without esophagitis 2019    Dysphagia 2019    Multiple thyroid nodules 2019    Menopause ovarian failure 2019    Vitamin D deficiency 2019    Mitral valve disorder 2018    Mood disturbance 2018    Obesity (BMI 30-39.9) 2018    Claustrophobia 2018    Impaired fasting glucose 2018    Seasonal allergic rhinitis 2018    History of skin cancer 2018    Parkinson's disease 2018    Primary insomnia 2018    Cherry angioma 11/15/2017    Postablative hypothyroidism 2016    Dyspnea on exertion 10/02/2015    OAB (overactive bladder) 2015    Sleep apnea 04/15/2014      LOS (days): 4  Geometric Mean LOS (GMLOS) (days): 2.4  Days to GMLOS:-1.9     OBJECTIVE:  Risk of Unplanned Readmission Score: 11.67         Current admission status: Inpatient   Preferred Pharmacy:   [facility] - [place], [place] - [address]  [address] [address]  Phone: [phone] Fax: [phone]    [facility] - [place] - [address]  [address]  [place] [address]  Phone: [phone] Fax: [phone]    [facility] - [place] - [address]  [address]  [place] [address]  Phone: [phone] Fax: [phone]    Primary Care Provider: Hoang Tovar MD    Primary Insurance: [employer] [facility]  Secondary Insurance:     DISCHARGE DETAILS:                                          Other Referral/Resources/Interventions Provided:  Referral Comments: Made aware there are no beds at [facility];  auth pending.  S/W pt's daughter to make aware;  her next choice is [facility].  Facility reserved in [facility];  T/C from Whitney Ramsey at facility.  She will have bed tomorrow and will obtain auth if CM cancelled auth request for [facility] placement.  Message sent to CM D/C support team to withdraw [facility] auth request.  CM will continue to follow.         Treatment Team Recommendation: Short Term Rehab, SNF, Home with Home Health Care  Discharge Destination Plan:: SNF, Short Term Rehab  Transport at Discharge : Other (Comment) (TBD)

## 2024-01-18 NOTE — CASE MANAGEMENT
Case Management Discharge Planning Note    Patient name Radha Vidal  Location / MRN [id number]  : 1940 Date 2024       Current Admission Date: 2024  Current Admission Diagnosis:Stroke-like symptoms   Patient Active Problem List    Diagnosis Date Noted    Ambulatory dysfunction 2024    Ear pressure, right 2024    Type 2 diabetes mellitus (HCC) 01/15/2024    Stroke-like symptoms 2024    New onset a-fib (HCC) 2024    s/p distal pancreatectomy/splenectomy 2023    Encounter for geriatric assessment 2023    Urge incontinence 2021    Age-related osteoporosis without current pathological fracture 2021    Closed compression fracture of body of L1 vertebra (HCC) 2020    Cerebrovascular disease 2020    IPMN (intraductal papillary mucinous neoplasm) 2020    Trochanteric bursitis, right hip 2020    Tremor 2020    Chronic idiopathic constipation 2019    History of adenomatous polyp of colon 2019    Osteopenia 2019    Gastro-esophageal reflux disease without esophagitis 2019    Dysphagia 2019    Multiple thyroid nodules 2019    Menopause ovarian failure 2019    Vitamin D deficiency 2019    Mitral valve disorder 2018    Mood disturbance 2018    Obesity (BMI 30-39.9) 2018    Claustrophobia 2018    Impaired fasting glucose 2018    Seasonal allergic rhinitis 2018    History of skin cancer 2018    Parkinson's disease 2018    Primary insomnia 2018    Cherry angioma 11/15/2017    Postablative hypothyroidism 2016    Dyspnea on exertion 10/02/2015    OAB (overactive bladder) 2015    Sleep apnea 04/15/2014      LOS (days): 4  Geometric Mean LOS (GMLOS) (days): 2.4  Days to GMLOS:-1.5     OBJECTIVE:  Risk of Unplanned Readmission Score: 11.61         Current admission status: Inpatient   Preferred Pharmacy:   [facility] - [place], [place] - [address]  [address] [facility] [address]  Phone: [phone] Fax: [phone]    [facility] - [place], [place] - [address]  [address]  [address]  [facility] [place] [address]  Phone: [phone] Fax: [phone]    [facility]  [place], [place] - [address]  [address]  [place] [address]  Phone: [phone] Fax: [phone]    Primary Care Provider: Hoang Tovar MD    Primary Insurance: [employer] [facility]  Secondary Insurance:     DISCHARGE DETAILS:                                          Other Referral/Resources/Interventions Provided:  Referral Comments: Consult ordered to check pricing of eliquis;  s/w Jose Carlos at [facility] in [place] ([phone]);  cost will be $43.50.  Waiting for approval from Carol  for STR placement, hopefully at [facility].         Treatment Team Recommendation: Short Term Rehab, SNF, Acute Rehab  Discharge Destination Plan:: SNF, Short Term Rehab  Transport at Discharge : Other (Comment) (TBD)

## 2024-01-18 NOTE — PLAN OF CARE
Problem: Potential for Falls  Goal: Patient will remain free of falls  Description: INTERVENTIONS:  - Educate patient/family on patient safety including physical limitations  - Instruct patient to call for assistance with activity   - Consult OT/PT to assist with strengthening/mobility   - Keep Call bell within reach  - Keep bed low and locked with side rails adjusted as appropriate  - Keep care items and personal belongings within reach  - Initiate and maintain comfort rounds  - Make Fall Risk Sign visible to staff  - Offer Toileting every 2 Hours, in advance of need  - Initiate/Maintain bed alarm  - Obtain necessary fall risk management equipment: bed alarm  - Apply yellow socks and bracelet for high fall risk patients  - Consider moving patient to room near nurses station  Outcome: Progressing     Problem: Neurological Deficit  Goal: Neurological status is stable or improving  Description: Interventions:  - Monitor and assess patient's level of consciousness, motor function, sensory function, and level of assistance needed for ADLs.   - Monitor and report changes from baseline. Collaborate with interdisciplinary team to initiate plan and implement interventions as ordered.   - Provide and maintain a safe environment.  - Consider seizure precautions.  - Consider fall precautions.  - Consider aspiration precautions.  - Consider bleeding precautions.  Outcome: Progressing     Problem: Activity Intolerance/Impaired Mobility  Goal: Mobility/activity is maintained at optimum level for patient  Description: Interventions:  - Assess and monitor patient  barriers to mobility and need for assistive/adaptive devices.  - Assess patient's emotional response to limitations.  - Collaborate with interdisciplinary team and initiate plans and interventions as ordered.  - Encourage independent activity per ability.  - Maintain proper body alignment.  - Perform active/passive rom as tolerated/ordered.  - Plan activities to  conserve energy.  - Turn patient as appropriate  Outcome: Progressing     Problem: Communication Impairment  Goal: Ability to express needs and understand communication  Description: Assess patient's communication skills and ability to understand information.  Patient will demonstrate use of effective communication techniques, alternative methods of communication and understanding even if not able to speak.     - Encourage communication and provide alternate methods of communication as needed.  - Collaborate with case management/ for discharge needs.  - Include patient/family/caregiver in decisions related to communication.  Outcome: Progressing     Problem: Potential for Aspiration  Goal: Non-ventilated patient's risk of aspiration is minimized  Description: Assess and monitor vital signs, respiratory status, and labs (WBC).  Monitor for signs of aspiration (tachypnea, cough, rales, wheezing, cyanosis, fever).    - Assess and monitor patient's ability to swallow.  - Place patient up in chair to eat if possible.  - HOB up at 90 degrees to eat if unable to get patient up into chair.  - Supervise patient during oral intake.   - Instruct patient/ family to take small bites.  - Instruct patient/ family to take small single sips when taking liquids.  - Follow patient-specific strategies generated by speech pathologist.  Outcome: Progressing  Goal: Ventilated patient's risk of aspiration is minimized  Description: Assess and monitor vital signs, respiratory status, airway cuff pressure, and labs (WBC).  Monitor for signs of aspiration (tachypnea, cough, rales, wheezing, cyanosis, fever).    - Elevate head of bed 30 degrees if patient has tube feeding.  - Monitor tube feeding.  Outcome: Progressing     Problem: Nutrition  Goal: Nutrition/Hydration status is improving  Description: Monitor and assess patient's nutrition/hydration status for malnutrition (ex- brittle hair, bruises, dry skin, pale skin and  conjunctiva, muscle wasting, smooth red tongue, and disorientation). Collaborate with interdisciplinary team and initiate plan and interventions as ordered.  Monitor patient's weight and dietary intake as ordered or per policy. Utilize nutrition screening tool and intervene per policy. Determine patient's food preferences and provide high-protein, high-caloric foods as appropriate.     - Assist patient with eating.  - Allow adequate time for meals.  - Encourage patient to take dietary supplement as ordered.  - Collaborate with clinical nutritionist.  - Include patient/family/caregiver in decisions related to nutrition.  Outcome: Progressing     Problem: PAIN - ADULT  Goal: Verbalizes/displays adequate comfort level or baseline comfort level  Description: Interventions:  - Encourage patient to monitor pain and request assistance  - Assess pain using appropriate pain scale  - Administer analgesics based on type and severity of pain and evaluate response  - Implement non-pharmacological measures as appropriate and evaluate response  - Consider cultural and social influences on pain and pain management  - Notify physician/advanced practitioner if interventions unsuccessful or patient reports new pain  Outcome: Progressing     Problem: INFECTION - ADULT  Goal: Absence or prevention of progression during hospitalization  Description: INTERVENTIONS:  - Assess and monitor for signs and symptoms of infection  - Monitor lab/diagnostic results  - Monitor all insertion sites, i.e. indwelling lines, tubes, and drains  - Monitor endotracheal if appropriate and nasal secretions for changes in amount and color  - [place] appropriate cooling/warming therapies per order  - Administer medications as ordered  - Instruct and encourage patient and family to use good hand hygiene technique  - Identify and instruct in appropriate isolation precautions for identified infection/condition  Outcome: Progressing  Goal: Absence of  fever/infection during neutropenic period  Description: INTERVENTIONS:  - Monitor WBC    Outcome: Progressing     Problem: SAFETY ADULT  Goal: Patient will remain free of falls  Description: INTERVENTIONS:  - Educate patient/family on patient safety including physical limitations  - Instruct patient to call for assistance with activity   - Consult OT/PT to assist with strengthening/mobility   - Keep Call bell within reach  - Keep bed low and locked with side rails adjusted as appropriate  - Keep care items and personal belongings within reach  - Initiate and maintain comfort rounds  - Make Fall Risk Sign visible to staff  - Offer Toileting every 2 Hours, in advance of need  - Initiate/Maintain bed alarm  - Obtain necessary fall risk management equipment: bed alarm  - Apply yellow socks and bracelet for high fall risk patients  - Consider moving patient to room near nurses station  Outcome: Progressing  Goal: Maintain or return to baseline ADL function  Description: INTERVENTIONS:  -  Assess patient's ability to carry out ADLs; assess patient's baseline for ADL function and identify physical deficits which impact ability to perform ADLs (bathing, care of mouth/teeth, toileting, grooming, dressing, etc.)  - Assess/evaluate cause of self-care deficits   - Assess range of motion  - Assess patient's mobility; develop plan if impaired  - Assess patient's need for assistive devices and provide as appropriate  - Encourage maximum independence but intervene and supervise when necessary  - Involve family in performance of ADLs  - Assess for home care needs following discharge   - Consider OT consult to assist with ADL evaluation and planning for discharge  - Provide patient education as appropriate  Outcome: Progressing  Goal: Maintains/Returns to pre admission functional level  Description: INTERVENTIONS:  - Perform AM-PAC 6 Click Basic Mobility/ Daily Activity assessment daily.  - Set and communicate daily mobility goal to  care team and patient/family/caregiver.   - Collaborate with rehabilitation services on mobility goals if consulted  - Perform Range of Motion 3 times a day.  - Reposition patient every 2 hours.  - Dangle patient 3 times a day  - Stand patient 3 times a day  - Ambulate patient 3 times a day  - Out of bed to chair 3 times a day   - Out of bed for meals 3 times a day  - Out of bed for toileting  - Record patient progress and toleration of activity level   Outcome: Progressing     Problem: DISCHARGE PLANNING  Goal: Discharge to home or other facility with appropriate resources  Description: INTERVENTIONS:  - Identify barriers to discharge w/patient and caregiver  - Arrange for needed discharge resources and transportation as appropriate  - Identify discharge learning needs (meds, wound care, etc.)  - Arrange for interpretive services to assist at discharge as needed  - Refer to Case Management Department for coordinating discharge planning if the patient needs post-hospital services based on physician/advanced practitioner order or complex needs related to functional status, cognitive ability, or social support system  Outcome: Progressing     Problem: Knowledge Deficit  Goal: Patient/family/caregiver demonstrates understanding of disease process, treatment plan, medications, and discharge instructions  Description: Complete learning assessment and assess knowledge base.  Interventions:  - Provide teaching at level of understanding  - Provide teaching via preferred learning methods  Outcome: Progressing     Problem: Nutrition/Hydration-ADULT  Goal: Nutrient/Hydration intake appropriate for improving, restoring or maintaining nutritional needs  Description: Monitor and assess patient's nutrition/hydration status for malnutrition. Collaborate with interdisciplinary team and initiate plan and interventions as ordered.  Monitor patient's weight and dietary intake as ordered or per policy. Utilize nutrition screening tool  and intervene as necessary. Determine patient's food preferences and provide high-protein, high-caloric foods as appropriate.     INTERVENTIONS:  - Monitor oral intake, urinary output, labs, and treatment plans  - Assess nutrition and hydration status and recommend course of action  - Evaluate amount of meals eaten  - Assist patient with eating if necessary   - Allow adequate time for meals  - Recommend/ encourage appropriate diets, oral nutritional supplements, and vitamin/mineral supplements  - Order, calculate, and assess calorie counts as needed  - Recommend, monitor, and adjust tube feedings and TPN/PPN based on assessed needs  - Assess need for intravenous fluids  - Provide specific nutrition/hydration education as appropriate  - Include patient/family/caregiver in decisions related to nutrition  Outcome: Progressing      Pt to gradually and intentionally lose wt towards IBW.

## 2024-01-18 NOTE — ASSESSMENT & PLAN NOTE
12/23 right myringotomy tube placed in treatment of rt middle ear effusion  Currently feels fullness and echo when she talks  Symptoms not currently worsening  Tube appeared patent on otoscopic examination    Plan  Follow with ENT outpatient

## 2024-01-18 NOTE — ASSESSMENT & PLAN NOTE
AF noted on arrival to ED 1/14  Currently between NSR and rate controlled A-fib  [id number] - 4   HASBLED - 1     Plan  Continue eliquis 5mg BID per cardiology

## 2024-01-18 NOTE — ASSESSMENT & PLAN NOTE
Lab Results   Component Value Date    HGBA1C 6.7 (H) 01/15/2024       Recent Labs     01/17/24  1141 01/17/24  1658 01/17/24 2052 01/18/24  0723   POCGLU 130 126 141* 106       Blood Sugar Average: Last 72 hrs:  (P) 142    Plan  Continue SSI insulin while in hospital  goal -180 while inpatient

## 2024-01-18 NOTE — CASE MANAGEMENT
[facility] received request for authorization from Care Manager.  Authorization request for: SNF  [facility] NPI: [id number]  Facility MD: Genna Lang NPI: [id number]  Authorization initiated by contacting insurance: [employer]    Via: [employer] submitted via: fax # [phone]  Pending Reference #: [id number]

## 2024-01-18 NOTE — PROGRESS NOTES
[facility]  Progress Note  Name: Radha Vidal I  MRN: [id number]  Unit/Bed#:  I Date of Admission: 1/14/2024   Date of Service: 1/18/2024 I Hospital Day: 4    Assessment/Plan   * Stroke-like symptoms  Assessment & Plan  ED with vertigo/dizziness and gait abnormality and new inability to ambulate  S/P TNK  Currently not experiencing vertigo/dizziness and describes being able to walk with standby assist  No focal deficits  Neurology signed off  Consider orthostatic hypotension as etiology    Plan  Continue to monitor for new onset stroke like symptoms  Start eliquis 5mg BID per cardiology    New onset a-fib (HCC)  Assessment & Plan  AF noted on arrival to ED 1/14  Currently between NSR and rate controlled A-fib  [id number] - 4   HASBLED - 1     Plan  Continue eliquis 5mg BID per cardiology      Ear pressure, right  Assessment & Plan  12/23 right myringotomy tube placed in treatment of rt middle ear effusion  Currently feels fullness and echo when she talks  Symptoms not currently worsening  Tube appeared patent on otoscopic examination    Plan  Follow with ENT outpatient     Ambulatory dysfunction  Assessment & Plan  PT recommending: Rehab Resource Intensity Level II (Moderate Resource Intensity)   Agreeable to rehab    Type 2 diabetes mellitus (HCC)  Assessment & Plan  Lab Results   Component Value Date    HGBA1C 6.7 (H) 01/15/2024       Recent Labs     01/17/24  1141 01/17/24  1658 01/17/24  2052 01/18/24  0723   POCGLU 130 126 141* 106       Blood Sugar Average: Last 72 hrs:  (P) 142    Plan  Continue SSI insulin while in hospital  goal -180 while inpatient     Parkinson's disease  Assessment & Plan  Currently Stable  Continue outpatient treatment of carbidopa-levodopa             VTE Pharmacologic Prophylaxis:   Moderate Risk (Score 3-4) - Pharmacological DVT Prophylaxis Ordered: apixaban (Eliquis).    Mobility:   Basic Mobility Inpatient Raw Score: 20  -[facility] Goal: 6: Walk  10 steps or more  JH-HLM Achieved: 6: Walk 10 steps or more  HLM Goal NOT achieved. Continue with multidisciplinary rounding and encourage appropriate mobility to improve upon HLM goals.    Patient Centered Rounds: I performed bedside rounds with nursing staff today.  Discussions with Specialists or Other Care Team Provider:  IP CONSULT TO NEUROLOGY  IP CONSULT TO CASE MANAGEMENT  IP CONSULT TO PHYSICAL MEDICINE REHAB  IP CONSULT TO CARDIOLOGY  IP CONSULT TO NUTRITION SERVICES  IP CONSULT TO CASE MANAGEMENT      Education and Discussions with Family / Patient: Discussed discharge plan and utility of rehab.  Answered all other questions that Ms Vidal had at present    Current Length of Stay: 4 day(s)  Current Patient Status: Inpatient   Certification Statement: The patient will continue to require additional inpatient hospital stay due to plan as noted above  Discharge Plan: Anticipate discharge in 24-48 hrs to rehab facility.    Code Status: Level 1 - Full Code    Subjective:   There was no acute overnight events.  Ms Vidal continue to endorse right ear fullness and a auditory tinny sensation when she speaks, which is not getting worse.  She is not experiencing any dizziness or lightheadedness. She reports that she feels she is gaining good strength back and is feeling better when walking around.  She states that she had a small BM today and is passing flatus.  She is not experiencing any SOB, chest pain or palpitations.  Awaiting placement at a rehab facility    Objective:     Vitals:   Temp (24hrs), Av.2 °F (36.8 °C), Min:97.6 °F (36.4 °C), Max:98.6 °F (37 °C)    Temp:  [97.6 °F (36.4 °C)-98.6 °F (37 °C)] 98.2 °F (36.8 °C)  HR:  [83] 83  Resp:  [14-16] 15  BP: (123-158)/(62-67) 158/67  SpO2:  [93 %] 93 %  Body mass index is 32.03 kg/m².     Input and Output Summary (last 24 hours):     Intake/Output Summary (Last 24 hours) at 2024 1150  Last data filed at 2024 0800  Gross per 24 hour   Intake 720  ml   Output 600 ml   Net 120 ml       Physical Exam:   Physical Exam  Constitutional:       Appearance: Normal appearance.   HENT:      Mouth/Throat:      Mouth: Mucous membranes are moist.   Eyes:      Extraocular Movements: Extraocular movements intact.      Conjunctiva/sclera: Conjunctivae normal.      Pupils: Pupils are equal, round, and reactive to light.   Cardiovascular:      Rate and Rhythm: Normal rate. Rhythm irregular.      Pulses: Normal pulses.      Heart sounds: Normal heart sounds.   Pulmonary:      Effort: Pulmonary effort is normal.      Breath sounds: Normal breath sounds.   Abdominal:      General: Abdomen is flat. There is no distension.      Palpations: Abdomen is soft.      Tenderness: There is no abdominal tenderness.   Musculoskeletal:      Comments: Trace bilateral LL edema   Skin:     General: Skin is warm and dry.      Capillary Refill: Capillary refill takes less than 2 seconds.   Neurological:      General: No focal deficit present.      Mental Status: She is alert and oriented to person, place, and time.          Additional Data:     Labs:  Results from last 7 days   Lab Units 01/16/24  0436   WBC Thousand/uL 9.56   HEMOGLOBIN g/dL 13.1   HEMATOCRIT % 39.0   PLATELETS Thousands/uL 312     Results from last 7 days   Lab Units 01/16/24  0436   SODIUM mmol/L 139   POTASSIUM mmol/L 3.7   CHLORIDE mmol/L 105   CO2 mmol/L 27   BUN mg/dL 15   CREATININE mg/dL 0.59*   ANION GAP mmol/L 7   CALCIUM mg/dL 9.3   GLUCOSE RANDOM mg/dL 113     Results from last 7 days   Lab Units 01/14/24  1038   INR  0.90     Results from last 7 days   Lab Units 01/18/24  1140 01/18/24  0723 01/17/24  2052 01/17/24  1658 01/17/24  1141 01/17/24  0707 01/16/24  2111 01/16/24  1815 01/16/24  1444 01/16/24  0558 01/15/24  2044 01/15/24  1744   POC GLUCOSE mg/dl 209* 106 141* 126 130 122 188* 138 124 98 157* 161*     Results from last 7 days   Lab Units 01/15/24  0453   HEMOGLOBIN A1C % 6.7*            Lines/Drains:  Invasive Devices       Peripheral Intravenous Line  Duration             Peripheral IV 24 Left;Proximal;Ventral (anterior) Forearm <1 day                      Telemetry:  Telemetry Orders (From admission, onward)               24 Hour Telemetry Monitoring  Continuous x 24 Hours (Telem)           Question:  Reason for 24 Hour Telemetry  Answer:  TIA/Suspected CVA/ Confirmed CVA                     Telemetry Reviewed:  NSR with bouts of rate controlled AF  Indication for Continued Telemetry Use: Arrthymias requiring medical therapy             Imaging: Reviewed radiology reports from this admission including:   MRI brain wo contrast    Result Date: 1/15/2024  Impression: White matter changes suggestive of chronic microangiopathy.  No acute intracranial pathology. Workstation performed: [id number]     CT head wo contrast    Result Date: 1/15/2024  Impression: -No acute intracranial abnormality.  Stable chronic microangiopathic changes. Workstation performed: [id number]     CT head wo contrast    Result Date: 2024  Impression: 1. No acute intracranial hemorrhage. 2. No large evolving territorial infarction. 3. Mild, chronic microangiopathy is similar to the CT from earlier today Workstation performed: [id number]     CT stroke alert brain    Result Date: 2024  Impression: No acute intracranial abnormality. Findings were directly discussed with Dr. Jamal Ferreira at 11:10 a.m. Workstation performed: [id number]     CTA stroke alert (head/neck)    Result Date: 2024  Impression: 1. CTA head: Negative for large vessel intracranial occlusion or hemodynamically significant stenosis. 2. CTA neck:  No extracranial carotid stenosis.  The cervical vertebral arteries are patent. Subcentimeter thyroid nodules. Findings were directly discussed with Dr. Jamal Ferreira at 11:10 a.m. Workstation performed: [id number]       No Chest XR results available for this patient.     Results for orders  placed during the hospital encounter of 01/14/24    Echo complete w/ contrast if indicated    Interpretation Summary    Left Ventricle: Left ventricular cavity size is normal. Wall thickness is normal. The left ventricular ejection fraction is 60%. Systolic function is normal. Wall motion is normal. Diastolic function is mildly abnormal, consistent with grade I (abnormal) relaxation.    Left Atrium: The atrium is mildly dilated.    Mitral Valve: There is mild annular calcification. There is mild regurgitation.    Tricuspid Valve: There is mild regurgitation.    Pericardium: Trace pericardial effusion versus epicardial fat.      Recent Cultures (last 7 days):         Last 24 Hours Medication List:   Current Facility-Administered Medications   Medication Dose Route Frequency Provider Last Rate    apixaban  5 mg Oral BID RISHI Cuellar      vitamin C  1,000 mg Oral Daily RISHI Palomino      aspirin  81 mg Oral Daily RISHI Palomino      atorvastatin  40 mg Oral QPM RISHI Palomino      carbidopa-levodopa  1 tablet Oral TID AC RISHI Palomino      chlorhexidine  15 mL Mouth/Throat Q12H [facility] RISHI Palomino      cholecalciferol  5,000 Units Oral After Lunch RISHI Palomino      hydrALAZINE  10 mg Intravenous Q4H PRN RISHI Palomino      insulin lispro  1-5 Units Subcutaneous HS RISHI Palomino      insulin lispro  1-6 Units Subcutaneous TID AC RISHI Palomino      labetalol  10 mg Intravenous Q4H PRN RISHI Palomino      meclizine  12.5 mg Oral Q6H PRN RISHI Palomino      multivitamin stress formula  1 tablet Oral QAM RISHI Palomino      ondansetron  4 mg Intravenous Q6H PRN RISHI Palomino      pantoprazole  40 mg Oral Daily RISHI Palomino      psyllium  1 packet Oral Daily Bradly Farnsworth DO          Today, Patient Was Seen By: Ramon Conklin and the attending physician, Bradly  DO Daja        **Please Note: This note may have been constructed using a voice recognition system.**

## 2024-01-19 ENCOUNTER — TRANSITIONAL CARE MANAGEMENT (OUTPATIENT)
Dept: FAMILY MEDICINE CLINIC | Facility: CLINIC | Age: 84
End: 2024-01-19

## 2024-01-19 VITALS
BODY MASS INDEX: 31.68 KG/M2 | OXYGEN SATURATION: 91 % | HEIGHT: 61 IN | TEMPERATURE: 97.5 F | DIASTOLIC BLOOD PRESSURE: 70 MMHG | HEART RATE: 70 BPM | SYSTOLIC BLOOD PRESSURE: 126 MMHG | RESPIRATION RATE: 16 BRPM | WEIGHT: 167.77 LBS

## 2024-01-19 LAB
GLUCOSE SERPL-MCNC: 108 MG/DL (ref 65–140)
GLUCOSE SERPL-MCNC: 201 MG/DL (ref 65–140)

## 2024-01-19 PROCEDURE — 99239 HOSP IP/OBS DSCHRG MGMT >30: CPT | Performed by: INTERNAL MEDICINE

## 2024-01-19 PROCEDURE — 82948 REAGENT STRIP/BLOOD GLUCOSE: CPT

## 2024-01-19 RX ORDER — MECLIZINE HCL 12.5 MG/1
12.5 TABLET ORAL EVERY 6 HOURS PRN
Qty: 30 TABLET | Refills: 0 | Status: SHIPPED | OUTPATIENT
Start: 2024-01-19 | End: 2024-01-19

## 2024-01-19 RX ORDER — MECLIZINE HCL 12.5 MG/1
12.5 TABLET ORAL EVERY 6 HOURS PRN
Qty: 30 TABLET | Refills: 0 | Status: SHIPPED | OUTPATIENT
Start: 2024-01-19

## 2024-01-19 RX ADMIN — PSYLLIUM HUSK 1 PACKET: 3.4 POWDER ORAL at 08:30

## 2024-01-19 RX ADMIN — B-COMPLEX W/ C & FOLIC ACID TAB 1 TABLET: TAB at 08:30

## 2024-01-19 RX ADMIN — OXYCODONE HYDROCHLORIDE AND ACETAMINOPHEN 1000 MG: 500 TABLET ORAL at 08:30

## 2024-01-19 RX ADMIN — CARBIDOPA AND LEVODOPA 1 TABLET: 25; 100 TABLET ORAL at 06:01

## 2024-01-19 RX ADMIN — ASPIRIN 81 MG: 81 TABLET, COATED ORAL at 08:30

## 2024-01-19 RX ADMIN — PANTOPRAZOLE SODIUM 40 MG: 40 TABLET, DELAYED RELEASE ORAL at 08:30

## 2024-01-19 RX ADMIN — APIXABAN 5 MG: 5 TABLET, FILM COATED ORAL at 08:30

## 2024-01-19 NOTE — DISCHARGE SUMMARY
[facility]   Discharge - Name: Radha Vidal I  MRN: [id number]  Unit/Bed#: -01 I Date of Admission: 1/14/2024   Date of Service: 1/19/2024 I Hospital Day: 5    No new Assessment & Plan notes have been filed under this hospital service since the last note was generated.  Service: Hospitalist      Principal Problem:    Stroke-like symptoms  Active Problems:    Parkinson's disease    New onset a-fib (HCC)    Type 2 diabetes mellitus (HCC)    Ambulatory dysfunction    Ear pressure, right      Medical Problems       Resolved Problems  Date Reviewed: 1/15/2024   None       Discharging Physician / Practitioner: Bradly Farnsworth DO  PCP: Hoang Tovar MD  Admission Date:   Admission Orders (From admission, onward)       Ordered        01/14/24 1142  INPATIENT ADMISSION  Once                          Discharge Date: 01/19/24    Disposition:    Other Skilled Nursing Facility at [facility]    Discharge Diagnoses:   Please see assessment and plan section above for further details regarding discharge diagnoses.     Consultations During Hospital Stay:  IP CONSULT TO NEUROLOGY  IP CONSULT TO CASE MANAGEMENT  IP CONSULT TO PHYSICAL MEDICINE REHAB  IP CONSULT TO CARDIOLOGY  IP CONSULT TO NUTRITION SERVICES  IP CONSULT TO CASE MANAGEMENT    Procedures Performed:   None     Significant Findings / Test Results:   MRI brain wo contrast    Result Date: 1/15/2024  Impression: White matter changes suggestive of chronic microangiopathy.  No acute intracranial pathology. Workstation performed: [id number]     CT head wo contrast    Result Date: 1/15/2024  Impression: -No acute intracranial abnormality.  Stable chronic microangiopathic changes. Workstation performed: [id number]     CT head wo contrast    Result Date: 1/14/2024  Impression: 1. No acute intracranial hemorrhage. 2. No large evolving territorial infarction. 3. Mild, chronic microangiopathy is similar to the CT from earlier today  Workstation performed: [id number]     CT stroke alert brain    Result Date: 1/14/2024  Impression: No acute intracranial abnormality. Findings were directly discussed with Dr. Jamal Ferreira at 11:10 a.m. Workstation performed: [id number]     CTA stroke alert (head/neck)    Result Date: 1/14/2024  Impression: 1. CTA head: Negative for large vessel intracranial occlusion or hemodynamically significant stenosis. 2. CTA neck:  No extracranial carotid stenosis.  The cervical vertebral arteries are patent. Subcentimeter thyroid nodules. Findings were directly discussed with Dr. Jamal Ferreira at 11:10 a.m. Workstation performed: [id number]       No Chest XR results available for this patient.       Incidental Findings:   None other than noted above. I reviewed the above mentioned incidental findings with the patient and/or family and they expressed understanding    Test Results Pending at Discharge (will require follow up):   None    Outpatient Tests Requested:  None     Complications:  none     Reason for Admission:   Chief Complaint   Patient presents with    Dizziness     After doing laundry patient felt dizzy fell back into chair, Pt not sure if she consciuosness       Hospital Course:      Radha Vidal is a 84 y.o. female patient who originally presented to the hospital on 1/14/2024 due to strokelike symptoms; admission was TNK and was transferred to the ICU for post TNK management and subsequently transferred to [facility] floor after being stable.  There was concern for poor mobility and PT OT was consulted recommending rehab which patient and daughter are agreeable.  Appreciate assistance with case management for placement to rehab facility.    Condition at Discharge: Stable    Discharge Day Visit / Exam:   Subjective: Offers no new complaints at this time. No acute events reported overnight. Understanding of plan.  All questions answered. Eager for discharge.  Vitals: Blood Pressure: 126/70 (01/19/24 0717)  Pulse: 70  "(01/19/24 0717)  Temperature: 97.5 °F (36.4 °C) (01/19/24 0717)  Temp Source: Oral (01/18/24 1500)  Respirations: 16 (01/18/24 2152)  Height: 5' 1\" (154.9 cm) (01/15/24 1100)  Weight - Scale: 76.1 kg (167 lb 12.3 oz) (01/19/24 0600)  SpO2: 91 % (01/19/24 0717)  Exam:   Physical Exam  Vitals and nursing note reviewed.   Constitutional:       Appearance: Normal appearance.   HENT:      Mouth/Throat:      Mouth: Mucous membranes are moist.   Eyes:      Extraocular Movements: Extraocular movements intact.      Conjunctiva/sclera: Conjunctivae normal.      Pupils: Pupils are equal, round, and reactive to light.   Cardiovascular:      Rate and Rhythm: Normal rate. Rhythm irregular.      Pulses: Normal pulses.      Heart sounds: Normal heart sounds.   Pulmonary:      Effort: Pulmonary effort is normal.      Breath sounds: Normal breath sounds.   Abdominal:      General: Abdomen is flat. There is no distension.      Palpations: Abdomen is soft.      Tenderness: There is no abdominal tenderness.   Musculoskeletal:      Comments: Trace bilateral LL edema   Skin:     General: Skin is warm and dry.      Capillary Refill: Capillary refill takes less than 2 seconds.   Neurological:      General: No focal deficit present.      Mental Status: She is alert and oriented to person, place, and time.          Discussion with Family: Daughter    Medication Adjustments and Discharge Medications:  Discharge Medication List: See after visit summary for reconciled discharge medications.   Medication Dosing Tapers - Please refer to Discharge Medication List for details on any medication dosing tapers (if applicable to patient).   Summary of Medication Adjustments made as a result of this hospitalization: as noted on med rec  Medications being temporarily held (include recommended restart time): as noted on med rec    Wound Care Recommendations:  When applicable, please see wound care section of After Visit Summary.    Instructions for any " Catheters / Lines Present at Discharge (including removal date, if applicable): none    Diet Recommendations at Discharge:  Diet -        Diet Orders   (From admission, onward)                 Start     Ordered    01/15/24 1301  Diet Cardiovascular; Cardiac; Consistent Carbohydrate Diet Level 3 (6 carb servings/90 grams CHO/meal)  Diet effective now        References:    Adult Nutrition Support Algorithm    RD Therapeutic Diet Order Protocol   Question Answer Comment   Diet Type Cardiovascular    Cardiac Cardiac    Other Restriction(s): Consistent Carbohydrate Diet Level 3 (6 carb servings/90 grams CHO/meal)    RD to adjust diet per protocol? Yes        01/15/24 1300                    Mobility at time of Discharge:   Basic Mobility Inpatient Raw Score: 23  JH-HLM Goal: 7: Walk 25 feet or more  JH-HLM Achieved: 7: Walk 25 feet or more  HLM Goal achieved. Continue to encourage appropriate mobility.    Goals of Care Discussions:  Code Status at Discharge: Level 1 - Full Code  Goals of care were not discussed during this admission.    Discharge instructions/Information to patient and family:   See after visit summary section titled Discharge Instructions for information provided to patient and family.      Planned Readmission: none      Discharge Statement:  I spent 39 minutes discharging the patient. This time was spent on the day of discharge. I had direct contact with the patient on the day of discharge. Greater than 50% of the total time was spent examining patient, answering all patient questions, arranging and discussing plan of care with patient as well as directly providing post-discharge instructions.  Additional time then spent on discharge activities.    **Please Note: This note may have been constructed using a voice recognition system.**

## 2024-01-19 NOTE — PLAN OF CARE
Problem: Neurological Deficit  Goal: Neurological status is stable or improving  Description: Interventions:  - Monitor and assess patient's level of consciousness, motor function, sensory function, and level of assistance needed for ADLs.   - Monitor and report changes from baseline. Collaborate with interdisciplinary team to initiate plan and implement interventions as ordered.   - Provide and maintain a safe environment.  - Consider seizure precautions.  - Consider fall precautions.  - Consider aspiration precautions.  - Consider bleeding precautions.  Outcome: Progressing

## 2024-01-19 NOTE — CASE MANAGEMENT
Case Management Discharge Planning Note    Patient name Radha Vidal  Location /-01 MRN [id number]  : 1940 Date 2024       Current Admission Date: 2024  Current Admission Diagnosis:Stroke-like symptoms   Patient Active Problem List    Diagnosis Date Noted    Ambulatory dysfunction 2024    Ear pressure, right 2024    Type 2 diabetes mellitus (HCC) 01/15/2024    Stroke-like symptoms 2024    New onset a-fib (HCC) 2024    s/p distal pancreatectomy/splenectomy 2023    Encounter for geriatric assessment 2023    Urge incontinence 2021    Age-related osteoporosis without current pathological fracture 2021    Closed compression fracture of body of L1 vertebra (HCC) 2020    Cerebrovascular disease 2020    IPMN (intraductal papillary mucinous neoplasm) 2020    Trochanteric bursitis, right hip 2020    Tremor 2020    Chronic idiopathic constipation 2019    History of adenomatous polyp of colon 2019    Osteopenia 2019    Gastro-esophageal reflux disease without esophagitis 2019    Dysphagia 2019    Multiple thyroid nodules 2019    Menopause ovarian failure 2019    Vitamin D deficiency 2019    Mitral valve disorder 2018    Mood disturbance 2018    Obesity (BMI 30-39.9) 2018    Claustrophobia 2018    Impaired fasting glucose 2018    Seasonal allergic rhinitis 2018    History of skin cancer 2018    Parkinson's disease 2018    Primary insomnia 2018    Cherry angioma 11/15/2017    Postablative hypothyroidism 2016    Dyspnea on exertion 10/02/2015    OAB (overactive bladder) 2015    Sleep apnea 04/15/2014      LOS (days): 5  Geometric Mean LOS (GMLOS) (days): 2.4  Days to GMLOS:-2.6     OBJECTIVE:  Risk of Unplanned Readmission Score: 10.71     Current admission status: Inpatient   Preferred Pharmacy:   [facility] - [place], [place] - [address]  [address] [address]  Phone: [phone] Fax: [phone]    [facility] - [place] - [address]  [address]  [address]  [facility] [address]  Phone: [phone] Fax: [phone]    [facility] - [place], [place] - [address]  [address]  [place] [address]  Phone: [phone] Fax: [phone]    Primary Care Provider: Hoang Tovar MD  Primary Insurance: [employer] [facility]  Secondary Insurance:     DISCHARGE DETAILS:     Transported by (Company and Unit #): [employer]  [phone]    Accepting Facility Name, City & State : [facility] - [address] [place] [address]  Receiving Facility/Agency Phone Number: Phone: [phone]  Facility/Agency Fax Number: Fax: [phone]     PASRR completed in [facility].   Family & team updated on DCP & transport time.

## 2024-01-19 NOTE — PLAN OF CARE
Problem: Potential for Falls  Goal: Patient will remain free of falls  Description: INTERVENTIONS:  - Educate patient/family on patient safety including physical limitations  - Instruct patient to call for assistance with activity   - Consult OT/PT to assist with strengthening/mobility   - Keep Call bell within reach  - Keep bed low and locked with side rails adjusted as appropriate  - Keep care items and personal belongings within reach  - Initiate and maintain comfort rounds  - Make Fall Risk Sign visible to staff  - Offer Toileting every 2 Hours, in advance of need  - Initiate/Maintain bed alarm  - Obtain necessary fall risk management equipment: bed alarm  - Apply yellow socks and bracelet for high fall risk patients  - Consider moving patient to room near nurses station  Outcome: Progressing     Problem: Neurological Deficit  Goal: Neurological status is stable or improving  Description: Interventions:  - Monitor and assess patient's level of consciousness, motor function, sensory function, and level of assistance needed for ADLs.   - Monitor and report changes from baseline. Collaborate with interdisciplinary team to initiate plan and implement interventions as ordered.   - Provide and maintain a safe environment.  - Consider seizure precautions.  - Consider fall precautions.  - Consider aspiration precautions.  - Consider bleeding precautions.  Outcome: Progressing     Problem: Activity Intolerance/Impaired Mobility  Goal: Mobility/activity is maintained at optimum level for patient  Description: Interventions:  - Assess and monitor patient  barriers to mobility and need for assistive/adaptive devices.  - Assess patient's emotional response to limitations.  - Collaborate with interdisciplinary team and initiate plans and interventions as ordered.  - Encourage independent activity per ability.  - Maintain proper body alignment.  - Perform active/passive rom as tolerated/ordered.  - Plan activities to  conserve energy.  - Turn patient as appropriate  Outcome: Progressing     Problem: Communication Impairment  Goal: Ability to express needs and understand communication  Description: Assess patient's communication skills and ability to understand information.  Patient will demonstrate use of effective communication techniques, alternative methods of communication and understanding even if not able to speak.     - Encourage communication and provide alternate methods of communication as needed.  - Collaborate with case management/ for discharge needs.  - Include patient/family/caregiver in decisions related to communication.  Outcome: Progressing     Problem: Potential for Aspiration  Goal: Non-ventilated patient's risk of aspiration is minimized  Description: Assess and monitor vital signs, respiratory status, and labs (WBC).  Monitor for signs of aspiration (tachypnea, cough, rales, wheezing, cyanosis, fever).    - Assess and monitor patient's ability to swallow.  - Place patient up in chair to eat if possible.  - HOB up at 90 degrees to eat if unable to get patient up into chair.  - Supervise patient during oral intake.   - Instruct patient/ family to take small bites.  - Instruct patient/ family to take small single sips when taking liquids.  - Follow patient-specific strategies generated by speech pathologist.  Outcome: Progressing  Goal: Ventilated patient's risk of aspiration is minimized  Description: Assess and monitor vital signs, respiratory status, airway cuff pressure, and labs (WBC).  Monitor for signs of aspiration (tachypnea, cough, rales, wheezing, cyanosis, fever).    - Elevate head of bed 30 degrees if patient has tube feeding.  - Monitor tube feeding.  Outcome: Progressing     Problem: Nutrition  Goal: Nutrition/Hydration status is improving  Description: Monitor and assess patient's nutrition/hydration status for malnutrition (ex- brittle hair, bruises, dry skin, pale skin and  conjunctiva, muscle wasting, smooth red tongue, and disorientation). Collaborate with interdisciplinary team and initiate plan and interventions as ordered.  Monitor patient's weight and dietary intake as ordered or per policy. Utilize nutrition screening tool and intervene per policy. Determine patient's food preferences and provide high-protein, high-caloric foods as appropriate.     - Assist patient with eating.  - Allow adequate time for meals.  - Encourage patient to take dietary supplement as ordered.  - Collaborate with clinical nutritionist.  - Include patient/family/caregiver in decisions related to nutrition.  Outcome: Progressing     Problem: PAIN - ADULT  Goal: Verbalizes/displays adequate comfort level or baseline comfort level  Description: Interventions:  - Encourage patient to monitor pain and request assistance  - Assess pain using appropriate pain scale  - Administer analgesics based on type and severity of pain and evaluate response  - Implement non-pharmacological measures as appropriate and evaluate response  - Consider cultural and social influences on pain and pain management  - Notify physician/advanced practitioner if interventions unsuccessful or patient reports new pain  Outcome: Progressing     Problem: INFECTION - ADULT  Goal: Absence or prevention of progression during hospitalization  Description: INTERVENTIONS:  - Assess and monitor for signs and symptoms of infection  - Monitor lab/diagnostic results  - Monitor all insertion sites, i.e. indwelling lines, tubes, and drains  - Monitor endotracheal if appropriate and nasal secretions for changes in amount and color  - [place] appropriate cooling/warming therapies per order  - Administer medications as ordered  - Instruct and encourage patient and family to use good hand hygiene technique  - Identify and instruct in appropriate isolation precautions for identified infection/condition  Outcome: Progressing  Goal: Absence of  fever/infection during neutropenic period  Description: INTERVENTIONS:  - Monitor WBC    Outcome: Progressing     Problem: SAFETY ADULT  Goal: Patient will remain free of falls  Description: INTERVENTIONS:  - Educate patient/family on patient safety including physical limitations  - Instruct patient to call for assistance with activity   - Consult OT/PT to assist with strengthening/mobility   - Keep Call bell within reach  - Keep bed low and locked with side rails adjusted as appropriate  - Keep care items and personal belongings within reach  - Initiate and maintain comfort rounds  - Make Fall Risk Sign visible to staff  - Offer Toileting every 2 Hours, in advance of need  - Initiate/Maintain bed alarm  - Obtain necessary fall risk management equipment: bed alarm  - Apply yellow socks and bracelet for high fall risk patients  - Consider moving patient to room near nurses station  Outcome: Progressing  Goal: Maintain or return to baseline ADL function  Description: INTERVENTIONS:  -  Assess patient's ability to carry out ADLs; assess patient's baseline for ADL function and identify physical deficits which impact ability to perform ADLs (bathing, care of mouth/teeth, toileting, grooming, dressing, etc.)  - Assess/evaluate cause of self-care deficits   - Assess range of motion  - Assess patient's mobility; develop plan if impaired  - Assess patient's need for assistive devices and provide as appropriate  - Encourage maximum independence but intervene and supervise when necessary  - Involve family in performance of ADLs  - Assess for home care needs following discharge   - Consider OT consult to assist with ADL evaluation and planning for discharge  - Provide patient education as appropriate  Outcome: Progressing  Goal: Maintains/Returns to pre admission functional level  Description: INTERVENTIONS:  - Perform AM-PAC 6 Click Basic Mobility/ Daily Activity assessment daily.  - Set and communicate daily mobility goal to  care team and patient/family/caregiver.   - Collaborate with rehabilitation services on mobility goals if consulted  - Perform Range of Motion 3 times a day.  - Reposition patient every 2 hours.  - Dangle patient 3 times a day  - Stand patient 3 times a day  - Ambulate patient 3 times a day  - Out of bed to chair 3 times a day   - Out of bed for meals 3 times a day  - Out of bed for toileting  - Record patient progress and toleration of activity level   Outcome: Progressing     Problem: DISCHARGE PLANNING  Goal: Discharge to home or other facility with appropriate resources  Description: INTERVENTIONS:  - Identify barriers to discharge w/patient and caregiver  - Arrange for needed discharge resources and transportation as appropriate  - Identify discharge learning needs (meds, wound care, etc.)  - Arrange for interpretive services to assist at discharge as needed  - Refer to Case Management Department for coordinating discharge planning if the patient needs post-hospital services based on physician/advanced practitioner order or complex needs related to functional status, cognitive ability, or social support system  Outcome: Progressing     Problem: Knowledge Deficit  Goal: Patient/family/caregiver demonstrates understanding of disease process, treatment plan, medications, and discharge instructions  Description: Complete learning assessment and assess knowledge base.  Interventions:  - Provide teaching at level of understanding  - Provide teaching via preferred learning methods  Outcome: Progressing     Problem: Nutrition/Hydration-ADULT  Goal: Nutrient/Hydration intake appropriate for improving, restoring or maintaining nutritional needs  Description: Monitor and assess patient's nutrition/hydration status for malnutrition. Collaborate with interdisciplinary team and initiate plan and interventions as ordered.  Monitor patient's weight and dietary intake as ordered or per policy. Utilize nutrition screening tool  and intervene as necessary. Determine patient's food preferences and provide high-protein, high-caloric foods as appropriate.     INTERVENTIONS:  - Monitor oral intake, urinary output, labs, and treatment plans  - Assess nutrition and hydration status and recommend course of action  - Evaluate amount of meals eaten  - Assist patient with eating if necessary   - Allow adequate time for meals  - Recommend/ encourage appropriate diets, oral nutritional supplements, and vitamin/mineral supplements  - Order, calculate, and assess calorie counts as needed  - Recommend, monitor, and adjust tube feedings and TPN/PPN based on assessed needs  - Assess need for intravenous fluids  - Provide specific nutrition/hydration education as appropriate  - Include patient/family/caregiver in decisions related to nutrition  Outcome: Progressing     Problem: Prexisting or High Potential for Compromised Skin Integrity  Goal: Skin integrity is maintained or improved  Description: INTERVENTIONS:  - Identify patients at risk for skin breakdown  - Assess and monitor skin integrity  - Assess and monitor nutrition and hydration status  - Monitor labs   - Assess for incontinence   - Turn and reposition patient  - Assist with mobility/ambulation  - Relieve pressure over bony prominences  - Avoid friction and shearing  - Provide appropriate hygiene as needed including keeping skin clean and dry  - Evaluate need for skin moisturizer/barrier cream  - Collaborate with interdisciplinary team   - Patient/family teaching  - Consider wound care consult   Outcome: Progressing

## 2024-01-20 ENCOUNTER — TELEPHONE (OUTPATIENT)
Dept: OTHER | Facility: OTHER | Age: 84
End: 2024-01-20

## 2024-01-20 NOTE — TELEPHONE ENCOUNTER
Candi from [facility] called to speak to the on call provider regarding the pt's bowel movements.    Contacted on call via TC.

## 2024-01-22 ENCOUNTER — NURSING HOME VISIT (OUTPATIENT)
Dept: GERIATRICS | Facility: OTHER | Age: 84
End: 2024-01-22
Payer: COMMERCIAL

## 2024-01-22 VITALS
HEART RATE: 91 BPM | WEIGHT: 158 LBS | BODY MASS INDEX: 29.85 KG/M2 | RESPIRATION RATE: 16 BRPM | DIASTOLIC BLOOD PRESSURE: 67 MMHG | SYSTOLIC BLOOD PRESSURE: 123 MMHG | OXYGEN SATURATION: 98 % | TEMPERATURE: 97.2 F

## 2024-01-22 DIAGNOSIS — R29.90 STROKE-LIKE SYMPTOMS: ICD-10-CM

## 2024-01-22 DIAGNOSIS — G20.A1 PARKINSON'S DISEASE, UNSPECIFIED WHETHER DYSKINESIA PRESENT, UNSPECIFIED WHETHER MANIFESTATIONS FLUCTUATE: ICD-10-CM

## 2024-01-22 DIAGNOSIS — H93.8X1 EAR PRESSURE, RIGHT: Primary | ICD-10-CM

## 2024-01-22 PROCEDURE — 99306 1ST NF CARE HIGH MDM 50: CPT | Performed by: FAMILY MEDICINE

## 2024-01-22 NOTE — PROGRESS NOTES
[facility]  [address]  [place] [id number]   [facility]  History and Physical    NAME: Radha Vidal  AGE: 84 y.o. SEX: female [phone]    DATE OF ENCOUNTER: 1/22/2024    Code status:  No CPR    Assessment and Plan     1. Ear pressure, right  -Had been started on Debrox for this see ear pressure with visible impacted cerumen  - Review of ENT notes showed tube in tympanic membrane on the right which was not readily visible on otoscope exam due to cerumen  - Discontinue Debrox as patient reports burning pain with use and tubes in the ears are a listed contraindication for this medication  - Continue follow-up with ENT but for now avoid further irrigation of the ear as well as further eardrops    2. Stroke-like symptoms  -Recent hospitalization for strokelike symptoms with negative workup  - Symptoms appear greatly resolved or improved  - Continue to work with PT OT to ensure safe discharge  -On blood thinners are ready avoid antiplatelet medication concomitant with this given advanced age and increased bleeding risk (discontinue aspirin)    3. Parkinson's disease, unspecified whether dyskinesia present, unspecified whether manifestations fluctuate  -Continue home medications      All medications and routine orders were reviewed and updated as needed.    Plan discussed with: Patient    Chief Complaint     Seen for admission at Nursing Facility    History of Present Illness     Patient is a very pleasant 84-year-old who was recently seen for strokelike symptoms at the hospital.  Workup was negative.  She ports that she would like some continued physical therapy but does not anticipate a prolonged need to stay at the rehab.  She relies on very minimal assistive devices at home and is largely independent.  She lives alone ambulates independently does have 5 steps to enter the home.  Support system involves her daughter.    HISTORY:  Past Medical History:   Diagnosis Date     Actinic keratosis 2016    Acute midline low back pain without sciatica 2020    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     Benign neoplasm of skin     Cancer (HCC)     skin    Chest pain     Claustrophobia     Diverticulitis     Diverticulitis     Fracture     L1-L2    GERD (gastroesophageal reflux disease)     H. pylori infection 2020    Muscle weakness     Nonmelanoma skin cancer     last assessed 2017    Palpitations     Pancreatic cyst     Seasonal allergies     Seborrheic keratosis 2014    Sleep apnea     no cpap    Spontaneous      without mention of complications     Squamous cell carcinoma of forehead 2014    Syncope      Family History   Problem Relation Age of Onset    Alcohol abuse Mother     Heart disease Mother     Alcohol abuse Father     Alcohol abuse Brother     Cancer Brother     Tremor Neg Hx     Parkinsonism Neg Hx     Colon cancer Neg Hx      Social History     Socioeconomic History    Marital status:      Spouse name: Not on file    Number of children: Not on file    Years of education: Not on file    Highest education level: Not on file   Occupational History    Occupation: RETIRED    Tobacco Use    Smoking status: Never     Passive exposure: Past    Smokeless tobacco: Never   Vaping Use    Vaping status: Never Used   Substance and Sexual Activity    Alcohol use: Yes     Comment: patient states rare use on holidays     Drug use: No    Sexual activity: Not Currently   Other Topics Concern    Not on file   Social History Narrative    LIVING INDEPENDENTLY ALONE         No caffeine use     Social Determinants of Health     Financial Resource Strain: Low Risk  (2023)    Overall Financial Resource Strain (CARDIA)     Difficulty of Paying Living Expenses: Not very hard   Food Insecurity: No Food Insecurity (2024)    Hunger Vital Sign     Worried About Running Out of Food in the Last Year: Never true     Ran Out  of Food in the Last Year: Never true   Transportation Needs: No Transportation Needs (1/14/2024)    PRAPARE - Transportation     Lack of Transportation (Medical): No     Lack of Transportation (Non-Medical): No   Physical Activity: Not on file   Stress: Not on file   Social Connections: Not on file   Intimate Partner Violence: Not on file   Housing Stability: Low Risk  (1/14/2024)    Housing Stability Vital Sign     Unable to Pay for Housing in the Last Year: No     Number of Places Lived in the Last Year: 1     Unstable Housing in the Last Year: No       Allergies:  Allergies   Allergen Reactions    Caffeine - Food Allergy GI Intolerance    Iodine - Food Allergy Rash    Latex Rash    Other Rash     Adhesive tape         Review of Systems     Review of Systems   Constitutional:  Negative for chills, fatigue and fever.   HENT:  Positive for ear pain. Negative for congestion, hearing loss, rhinorrhea, sore throat and trouble swallowing.    Eyes:  Negative for pain and visual disturbance.   Respiratory:  Negative for cough and shortness of breath.    Cardiovascular:  Negative for chest pain.   Gastrointestinal:  Negative for constipation, diarrhea, nausea and vomiting.   Endocrine: Negative for polyuria.   Genitourinary:  Negative for difficulty urinating and dysuria.   Musculoskeletal:  Negative for arthralgias and myalgias.   Neurological:  Negative for dizziness, light-headedness and headaches.       Medications and orders     All medications reviewed and updated in senior living EMR.      Objective     Vitals:   Vitals:    01/22/24 0934   BP: 123/67   Pulse: 91   Resp: 16   Temp: (!) 97.2 °F (36.2 °C)   SpO2: 98%         Physical Exam  Constitutional:       General: She is not in acute distress.     Appearance: She is not toxic-appearing.   HENT:      Head: Normocephalic and atraumatic.      Left Ear: Tympanic membrane and ear canal normal.      Ears:      Comments: Cerumen around the periphery unable to clearly  visualize entirety of TM  Eyes:      Pupils: Pupils are equal, round, and reactive to light.   Cardiovascular:      Rate and Rhythm: Normal rate and regular rhythm.   Pulmonary:      Effort: Pulmonary effort is normal.   Abdominal:      Palpations: Abdomen is soft.      Tenderness: There is no abdominal tenderness.   Musculoskeletal:         General: No deformity.      Cervical back: Normal range of motion.      Right lower leg: No edema.      Left lower leg: No edema.   Skin:     General: Skin is warm and dry.   Neurological:      Mental Status: She is alert and oriented to person, place, and time.   Psychiatric:         Mood and Affect: Mood normal.         Behavior: Behavior normal.         Pertinent Laboratory/Diagnostic Studies:   The following labs/studies were reviewed please see chart or hospital paperwork for details.  Results for orders placed or performed during the hospital encounter of 01/14/24   FLU/RSV/COVID - if FLU/RSV clinically relevant    Specimen: Nose; Nares   Result Value Ref Range    SARS-CoV-2 Negative Negative    INFLUENZA A PCR Negative Negative    INFLUENZA B PCR Negative Negative    RSV PCR Negative Negative   Basic metabolic panel   Result Value Ref Range    Sodium 138 135 - 147 mmol/L    Potassium 4.3 3.5 - 5.3 mmol/L    Chloride 104 96 - 108 mmol/L    CO2 28 21 - 32 mmol/L    ANION GAP 6 mmol/L    BUN 17 5 - 25 mg/dL    Creatinine 0.70 0.60 - 1.30 mg/dL    Glucose 145 (H) 65 - 140 mg/dL    Calcium 9.3 8.4 - 10.2 mg/dL    eGFR 79 ml/min/1.73sq m   CBC and Platelet   Result Value Ref Range    WBC 6.75 4.31 - 10.16 Thousand/uL    RBC 4.92 3.81 - 5.12 Million/uL    Hemoglobin 14.8 11.5 - 15.4 g/dL    Hematocrit 44.4 34.8 - 46.1 %    MCV 90 82 - 98 fL    MCH 30.1 26.8 - 34.3 pg    MCHC 33.3 31.4 - 37.4 g/dL    RDW 14.3 11.6 - 15.1 %    Platelets 398 (H) 149 - 390 Thousands/uL    MPV 9.5 8.9 - 12.7 fL   Protime-INR   Result Value Ref Range    Protime 12.7 11.6 - 14.5 seconds    INR 0.90  "0.84 - 1.19   APTT   Result Value Ref Range    PTT 32 23 - 37 seconds   HS Troponin 0hr (reflex protocol)   Result Value Ref Range    hs TnI 0hr 5 \"Refer to ACS Flowchart\"- see link ng/L   HS Troponin I 2hr   Result Value Ref Range    hs TnI 2hr 8 \"Refer to ACS Flowchart\"- see link ng/L    Delta 2hr hsTnI 3 <20 ng/L   HS Troponin I 4hr   Result Value Ref Range    hs TnI 4hr 13 \"Refer to ACS Flowchart\"- see link ng/L    Delta 4hr hsTnI 8 <20 ng/L   Lipid Panel with Direct LDL reflex   Result Value Ref Range    Cholesterol 180 See Comment mg/dL    Triglycerides 67 See Comment mg/dL    HDL, Direct 79 >=50 mg/dL    LDL Calculated 88 0 - 100 mg/dL   Hemoglobin A1c w/EAG Estimation   Result Value Ref Range    Hemoglobin A1C 6.7 (H) Normal 4.0-5.6%; PreDiabetic 5.7-6.4%; Diabetic >=6.5%; Glycemic control for adults with diabetes <7.0% %     mg/dl   CBC and Platelet   Result Value Ref Range    WBC 8.03 4.31 - 10.16 Thousand/uL    RBC 4.42 3.81 - 5.12 Million/uL    Hemoglobin 13.5 11.5 - 15.4 g/dL    Hematocrit 38.9 34.8 - 46.1 %    MCV 88 82 - 98 fL    MCH 30.5 26.8 - 34.3 pg    MCHC 34.7 31.4 - 37.4 g/dL    RDW 14.6 11.6 - 15.1 %    Platelets 337 149 - 390 Thousands/uL    MPV 9.4 8.9 - 12.7 fL   Basic metabolic panel   Result Value Ref Range    Sodium 137 135 - 147 mmol/L    Potassium 4.0 3.5 - 5.3 mmol/L    Chloride 105 96 - 108 mmol/L    CO2 26 21 - 32 mmol/L    ANION GAP 6 mmol/L    BUN 18 5 - 25 mg/dL    Creatinine 0.76 0.60 - 1.30 mg/dL    Glucose 113 65 - 140 mg/dL    Calcium 9.2 8.4 - 10.2 mg/dL    eGFR 71 ml/min/1.73sq m   Magnesium   Result Value Ref Range    Magnesium 2.0 1.9 - 2.7 mg/dL   Basic metabolic panel   Result Value Ref Range    Sodium 139 135 - 147 mmol/L    Potassium 3.7 3.5 - 5.3 mmol/L    Chloride 105 96 - 108 mmol/L    CO2 27 21 - 32 mmol/L    ANION GAP 7 mmol/L    BUN 15 5 - 25 mg/dL    Creatinine 0.59 (L) 0.60 - 1.30 mg/dL    Glucose 113 65 - 140 mg/dL    Calcium 9.3 8.4 - 10.2 mg/dL    " eGFR 83 ml/min/1.73sq m   Calcium, ionized   Result Value Ref Range    Calcium, Ionized 1.18 1.12 - 1.32 mmol/L   CBC   Result Value Ref Range    WBC 9.56 4.31 - 10.16 Thousand/uL    RBC 4.34 3.81 - 5.12 Million/uL    Hemoglobin 13.1 11.5 - 15.4 g/dL    Hematocrit 39.0 34.8 - 46.1 %    MCV 90 82 - 98 fL    MCH 30.2 26.8 - 34.3 pg    MCHC 33.6 31.4 - 37.4 g/dL    RDW 14.3 11.6 - 15.1 %    Platelets 312 149 - 390 Thousands/uL    MPV 9.2 8.9 - 12.7 fL   Magnesium   Result Value Ref Range    Magnesium 2.0 1.9 - 2.7 mg/dL   Phosphorus   Result Value Ref Range    Phosphorus 3.8 2.3 - 4.1 mg/dL   ECG 12 lead   Result Value Ref Range    Ventricular Rate 81 BPM    Atrial Rate 81 BPM    IA Interval 156 ms    QRSD Interval 78 ms    QT Interval 402 ms    QTC Interval 466 ms    P Axis 51 degrees    QRS Axis 75 degrees    T Wave Axis 91 degrees   ECG 12 lead   Result Value Ref Range    Ventricular Rate 91 BPM    Atrial Rate 91 BPM    IA Interval 168 ms    QRSD Interval 86 ms    QT Interval 404 ms    QTC Interval 496 ms    P Axis 82 degrees    QRS Axis 62 degrees    T Wave Terrell 77 degrees   Echo complete w/ contrast if indicated   Result Value Ref Range    Triscuspid Valve Regurgitation Peak Gradient 26.0 mmHg    RAA A4C 13.1 cm2    IRIS A4C 24.8 cm2    LA Volume Index (BP) 39.4 mL/m2    MV Peak A New 0.86 m/s    MV stenosis pressure 1/2 time 80 ms    MV Peak E New 79 cm/s    E wave deceleration time 274 ms    E/A ratio 0.92     MV valve area p 1/2 method 2.75     TR Peak New 2.5 m/s    RVID d 3.3 cm    A4C EF 71 %    Tricuspid valve peak regurgitation velocity 2.54 m/s    Left ventricular stroke volume (2D) 51.00 mL    IVSd 1.20 cm    Tricuspid annular plane systolic excursion 2.50 cm    Ao root 3.60 cm    LVPWd 1.10 cm    LA size 2.9 cm    Asc Ao 3.1 cm    LA volume (BP) 71 mL    FS 40 28 - 44    LVIDS 2.40 cm    IVS 1.2 cm    LVIDd 4.00 cm    LA length (A2C) 4.80 cm    LEFT VENTRICLE SYSTOLIC VOLUME (MOD BIPLANE) 2D 21 mL    LV  DIASTOLIC VOLUME (MOD BIPLANE) 2D 72 mL    Left Atrium Area-systolic Four Chamber 24.1 cm2    Left Atrium Area-systolic Apical Two Chamber 18 cm2    MV E' Tissue Velocity Septal 7 cm/s    LVSV, 2D 51 mL    BSA 1.79 m2    LV EF 60    Fingerstick Glucose (POCT)   Result Value Ref Range    POC Glucose 140 65 - 140 mg/dl   Fingerstick Glucose (POCT)   Result Value Ref Range    POC Glucose 213 (H) 65 - 140 mg/dl   Fingerstick Glucose (POCT)   Result Value Ref Range    POC Glucose 161 (H) 65 - 140 mg/dl   Fingerstick Glucose (POCT)   Result Value Ref Range    POC Glucose 157 (H) 65 - 140 mg/dl   Fingerstick Glucose (POCT)   Result Value Ref Range    POC Glucose 98 65 - 140 mg/dl   Fingerstick Glucose (POCT)   Result Value Ref Range    POC Glucose 124 65 - 140 mg/dl   Fingerstick Glucose (POCT)   Result Value Ref Range    POC Glucose 138 65 - 140 mg/dl   Fingerstick Glucose (POCT)   Result Value Ref Range    POC Glucose 188 (H) 65 - 140 mg/dl   Fingerstick Glucose (POCT)   Result Value Ref Range    POC Glucose 122 65 - 140 mg/dl   Fingerstick Glucose (POCT)   Result Value Ref Range    POC Glucose 130 65 - 140 mg/dl   Fingerstick Glucose (POCT)   Result Value Ref Range    POC Glucose 126 65 - 140 mg/dl   Fingerstick Glucose (POCT)   Result Value Ref Range    POC Glucose 141 (H) 65 - 140 mg/dl   Fingerstick Glucose (POCT)   Result Value Ref Range    POC Glucose 106 65 - 140 mg/dl   Fingerstick Glucose (POCT)   Result Value Ref Range    POC Glucose 209 (H) 65 - 140 mg/dl   Fingerstick Glucose (POCT)   Result Value Ref Range    POC Glucose 58 (L) 65 - 140 mg/dl   Fingerstick Glucose (POCT)   Result Value Ref Range    POC Glucose 94 65 - 140 mg/dl   Fingerstick Glucose (POCT)   Result Value Ref Range    POC Glucose 116 65 - 140 mg/dl   Fingerstick Glucose (POCT)   Result Value Ref Range    POC Glucose 108 65 - 140 mg/dl   Fingerstick Glucose (POCT)   Result Value Ref Range    POC Glucose 201 (H) 65 - 140 mg/dl

## 2024-01-23 NOTE — UTILIZATION REVIEW
NOTIFICATION OF ADMISSION DISCHARGE   This is a Notification of Discharge from [facility]. Please be advised that this patient has been discharge from our facility. Below you will find the admission and discharge date and time including the patient’s disposition.   UTILIZATION REVIEW CONTACT:  Kamryn Garcia  Utilization   Network Utilization Review Department  Phone: [phone] x carefully listen to the prompts. All voicemails are confidential.  Email: [email or web address]     ADMISSION INFORMATION  PRESENTATION DATE: 1/14/2024  9:56 AM  OBERVATION ADMISSION DATE:   INPATIENT ADMISSION DATE: 1/14/24 11:43 AM   DISCHARGE DATE: 1/19/2024  1:28 PM   DISPOSITION:Non [facility] SNF/TCU/SNU    Network Utilization Review Department  ATTENTION: Please call with any questions or concerns to [phone] and carefully listen to the prompts so that you are directed to the right person. All voicemails are confidential.   For Discharge needs, contact Care Management DC Support Team at [phone] opt. 2  Send all requests for admission clinical reviews, approved or denied determinations and any other requests to dedicated fax number below belonging to the campus where the patient is receiving treatment. List of dedicated fax numbers for the Facilities:  FACILITY NAME UR FAX NUMBER   ADMISSION DENIALS (Administrative/Medical Necessity) [phone]   DISCHARGE SUPPORT TEAM ([facility]) [phone]   PARENT CHILD HEALTH (Maternity/NICU/Pediatrics) [phone]   [facility] [phone]   [facility] [phone]   [facility] [phone]   [facility] [phone]   [facility] [phone]   [facility] [phone]   [facility] [phone]   [facility]  [phone]   [facility] [phone]   [facility] [phone]   [facility] [phone]

## 2024-01-26 ENCOUNTER — TELEPHONE (OUTPATIENT)
Dept: OTHER | Facility: OTHER | Age: 84
End: 2024-01-26

## 2024-01-26 ENCOUNTER — APPOINTMENT (EMERGENCY)
Dept: CT IMAGING | Facility: HOSPITAL | Age: 84
DRG: 389 | End: 2024-01-26
Payer: COMMERCIAL

## 2024-01-26 ENCOUNTER — NURSING HOME VISIT (OUTPATIENT)
Dept: GERIATRICS | Facility: OTHER | Age: 84
End: 2024-01-26
Payer: COMMERCIAL

## 2024-01-26 ENCOUNTER — HOSPITAL ENCOUNTER (INPATIENT)
Facility: HOSPITAL | Age: 84
LOS: 8 days | Discharge: HOME HEALTH | DRG: 389 | End: 2024-02-04
Attending: EMERGENCY MEDICINE | Admitting: SURGERY
Payer: COMMERCIAL

## 2024-01-26 VITALS
RESPIRATION RATE: 18 BRPM | DIASTOLIC BLOOD PRESSURE: 64 MMHG | HEART RATE: 90 BPM | SYSTOLIC BLOOD PRESSURE: 137 MMHG | OXYGEN SATURATION: 98 % | WEIGHT: 162.1 LBS | BODY MASS INDEX: 30.63 KG/M2 | TEMPERATURE: 97.7 F

## 2024-01-26 DIAGNOSIS — R11.0 NAUSEA: ICD-10-CM

## 2024-01-26 DIAGNOSIS — K56.609 SMALL BOWEL OBSTRUCTION (HCC): ICD-10-CM

## 2024-01-26 DIAGNOSIS — R11.2 NAUSEA AND VOMITING: ICD-10-CM

## 2024-01-26 DIAGNOSIS — R26.2 AMBULATORY DYSFUNCTION: ICD-10-CM

## 2024-01-26 DIAGNOSIS — K56.609 SBO (SMALL BOWEL OBSTRUCTION) (HCC): Primary | ICD-10-CM

## 2024-01-26 DIAGNOSIS — I48.91 ATRIAL FIBRILLATION WITH RVR (HCC): ICD-10-CM

## 2024-01-26 DIAGNOSIS — R29.90 STROKE-LIKE SYMPTOMS: ICD-10-CM

## 2024-01-26 DIAGNOSIS — I48.91 NEW ONSET A-FIB (HCC): ICD-10-CM

## 2024-01-26 DIAGNOSIS — G20.A1 PARKINSON'S DISEASE, UNSPECIFIED WHETHER DYSKINESIA PRESENT, UNSPECIFIED WHETHER MANIFESTATIONS FLUCTUATE: ICD-10-CM

## 2024-01-26 DIAGNOSIS — R79.89 ELEVATED TROPONIN: ICD-10-CM

## 2024-01-26 DIAGNOSIS — R29.90 STROKE-LIKE SYMPTOMS: Primary | ICD-10-CM

## 2024-01-26 LAB
ALBUMIN SERPL BCP-MCNC: 4.5 G/DL (ref 3.5–5)
ALP SERPL-CCNC: 90 U/L (ref 34–104)
ALT SERPL W P-5'-P-CCNC: 13 U/L (ref 7–52)
ANION GAP SERPL CALCULATED.3IONS-SCNC: 11 MMOL/L
AST SERPL W P-5'-P-CCNC: 20 U/L (ref 13–39)
BASOPHILS # BLD AUTO: 0.02 THOUSANDS/ÂΜL (ref 0–0.1)
BASOPHILS NFR BLD AUTO: 0 % (ref 0–1)
BILIRUB SERPL-MCNC: 0.75 MG/DL (ref 0.2–1)
BUN SERPL-MCNC: 23 MG/DL (ref 5–25)
CALCIUM SERPL-MCNC: 10.1 MG/DL (ref 8.4–10.2)
CHLORIDE SERPL-SCNC: 93 MMOL/L (ref 96–108)
CO2 SERPL-SCNC: 28 MMOL/L (ref 21–32)
CREAT SERPL-MCNC: 0.76 MG/DL (ref 0.6–1.3)
EOSINOPHIL # BLD AUTO: 0 THOUSAND/ÂΜL (ref 0–0.61)
EOSINOPHIL NFR BLD AUTO: 0 % (ref 0–6)
ERYTHROCYTE [DISTWIDTH] IN BLOOD BY AUTOMATED COUNT: 13.8 % (ref 11.6–15.1)
GFR SERPL CREATININE-BSD FRML MDRD: 71 ML/MIN/1.73SQ M
GLUCOSE SERPL-MCNC: 217 MG/DL (ref 65–140)
HCT VFR BLD AUTO: 43.2 % (ref 34.8–46.1)
HGB BLD-MCNC: 14.5 G/DL (ref 11.5–15.4)
IMM GRANULOCYTES # BLD AUTO: 0.08 THOUSAND/UL (ref 0–0.2)
IMM GRANULOCYTES NFR BLD AUTO: 1 % (ref 0–2)
LACTATE SERPL-SCNC: 1.6 MMOL/L (ref 0.5–2)
LIPASE SERPL-CCNC: <6 U/L (ref 11–82)
LYMPHOCYTES # BLD AUTO: 1.57 THOUSANDS/ÂΜL (ref 0.6–4.47)
LYMPHOCYTES NFR BLD AUTO: 10 % (ref 14–44)
MCH RBC QN AUTO: 29.9 PG (ref 26.8–34.3)
MCHC RBC AUTO-ENTMCNC: 33.6 G/DL (ref 31.4–37.4)
MCV RBC AUTO: 89 FL (ref 82–98)
MONOCYTES # BLD AUTO: 1.05 THOUSAND/ÂΜL (ref 0.17–1.22)
MONOCYTES NFR BLD AUTO: 6 % (ref 4–12)
NEUTROPHILS # BLD AUTO: 13.71 THOUSANDS/ÂΜL (ref 1.85–7.62)
NEUTS SEG NFR BLD AUTO: 83 % (ref 43–75)
NRBC BLD AUTO-RTO: 0 /100 WBCS
PLATELET # BLD AUTO: 410 THOUSANDS/UL (ref 149–390)
PMV BLD AUTO: 10.2 FL (ref 8.9–12.7)
POTASSIUM SERPL-SCNC: 4 MMOL/L (ref 3.5–5.3)
PROT SERPL-MCNC: 7.1 G/DL (ref 6.4–8.4)
RBC # BLD AUTO: 4.85 MILLION/UL (ref 3.81–5.12)
SODIUM SERPL-SCNC: 132 MMOL/L (ref 135–147)
WBC # BLD AUTO: 16.43 THOUSAND/UL (ref 4.31–10.16)

## 2024-01-26 PROCEDURE — 83690 ASSAY OF LIPASE: CPT | Performed by: EMERGENCY MEDICINE

## 2024-01-26 PROCEDURE — 93005 ELECTROCARDIOGRAM TRACING: CPT

## 2024-01-26 PROCEDURE — G1004 CDSM NDSC: HCPCS

## 2024-01-26 PROCEDURE — 85025 COMPLETE CBC W/AUTO DIFF WBC: CPT | Performed by: EMERGENCY MEDICINE

## 2024-01-26 PROCEDURE — 99285 EMERGENCY DEPT VISIT HI MDM: CPT | Performed by: EMERGENCY MEDICINE

## 2024-01-26 PROCEDURE — 96365 THER/PROPH/DIAG IV INF INIT: CPT

## 2024-01-26 PROCEDURE — 99316 NF DSCHRG MGMT 30 MIN+: CPT

## 2024-01-26 PROCEDURE — 80053 COMPREHEN METABOLIC PANEL: CPT | Performed by: EMERGENCY MEDICINE

## 2024-01-26 PROCEDURE — 36415 COLL VENOUS BLD VENIPUNCTURE: CPT | Performed by: EMERGENCY MEDICINE

## 2024-01-26 PROCEDURE — 74176 CT ABD & PELVIS W/O CONTRAST: CPT

## 2024-01-26 PROCEDURE — 96375 TX/PRO/DX INJ NEW DRUG ADDON: CPT

## 2024-01-26 PROCEDURE — 99284 EMERGENCY DEPT VISIT MOD MDM: CPT

## 2024-01-26 PROCEDURE — 83605 ASSAY OF LACTIC ACID: CPT | Performed by: EMERGENCY MEDICINE

## 2024-01-26 RX ORDER — ONDANSETRON 2 MG/ML
4 INJECTION INTRAMUSCULAR; INTRAVENOUS ONCE
Status: COMPLETED | OUTPATIENT
Start: 2024-01-26 | End: 2024-01-26

## 2024-01-26 RX ORDER — ACETAMINOPHEN 10 MG/ML
1000 INJECTION, SOLUTION INTRAVENOUS ONCE
Status: COMPLETED | OUTPATIENT
Start: 2024-01-26 | End: 2024-01-26

## 2024-01-26 RX ADMIN — SODIUM CHLORIDE, SODIUM LACTATE, POTASSIUM CHLORIDE, AND CALCIUM CHLORIDE 1000 ML: .6; .31; .03; .02 INJECTION, SOLUTION INTRAVENOUS at 23:22

## 2024-01-26 RX ADMIN — ACETAMINOPHEN 1000 MG: 10 INJECTION INTRAVENOUS at 22:53

## 2024-01-26 RX ADMIN — ONDANSETRON 4 MG: 2 INJECTION INTRAMUSCULAR; INTRAVENOUS at 22:48

## 2024-01-26 NOTE — PROGRESS NOTES
[facility]  [address] [place] [id number]  [phone]  DISCHARGE SUMMARY  FACILITY: [facility]  Code 31    NAME: Radha Vidal  AGE: 84 y.o. SEX: female   CODE STATUS: No CPR    DATE OF ADMISSION: 24   DATE OF DISCHARGE: 24   DISCHARGE DISPOSITION: Stable for discharge to home with family support and home health PT/OT/SN services.     Labs completed 24 showing WBC 21.2 while previous lab on  results 9.56. Discussed with patient staying at SNF until CBC lab resulted tomorrow. If WBC elevated, discontinue discharge. Patient in agreement at this time.      Reason for Admission: Patient was admitted to [facility] for rehabilitation after hospitalization for stroke like symptoms including dizziness and gate dysfunction.    Past Medical and Surgical History:   Past Medical History:   Diagnosis Date    Actinic keratosis 2016    Acute midline low back pain without sciatica 2020    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     Benign neoplasm of skin     Cancer (HCC)     skin    Chest pain     Claustrophobia     Diverticulitis     Diverticulitis     Fracture     L1-L2    GERD (gastroesophageal reflux disease)     H. pylori infection 2020    Muscle weakness     Nonmelanoma skin cancer     last assessed 2017    Palpitations     Pancreatic cyst     Seasonal allergies     Seborrheic keratosis 2014    Sleep apnea     no cpap    Spontaneous      without mention of complications     Squamous cell carcinoma of forehead 2014    Syncope       Past Surgical History:   Procedure Laterality Date    ABDOMINAL ADHESION SURGERY N/A 2023    Procedure: LYSIS ADHESIONS;  Surgeon: Kyle Julien MD;  [facility];  Service: Surgical Oncology    BOTOX INJECTION N/A 2017    Procedure: CYSTOSCOPY; BLADDER BOTOX 100 UNITS ;  Surgeon: Juan Edward MD;  [facility];  Service:      BOWEL RESECTION      related ot diverticulitis    CARDIAC CATHETERIZATION      CARPAL TUNNEL RELEASE Right     COLONOSCOPY  07/31/2019    COLOSTOMY      COLOSTOMY CLOSURE      CYSTOSCOPY  06/01/2021    DISTAL PANCREATECTOMY N/A 07/31/2023    Procedure: DISTAL PANCREATECTOMY;  Surgeon: Kyle Julien MD;  [facility];  Service: Surgical Oncology    FOOT SURGERY Left     neuroma    HYSTERECTOMY      MYRINGOTOMY W/ TUBES Right 12/06/2023    office procedure - Dr. Grace    NA[place]  age 39    [place]    PANCREATIC CYST BIOPSY  07/31/2023    SC CYSTOURETHROSCOPY N/A 04/13/2018    Procedure: CYSTOSCOPY WITH BOTOX;  Surgeon: Juan Edward MD;  [facility];  Service: Urology    SC ESOPHAGOGASTRODUODENOSCOPY TRANSORAL DIAGNOSTIC N/A 04/23/2019    Procedure: ESOPHAGOGASTRODUODENOSCOPY (EGD);  Surgeon: Edu Dickerson DO;  [facility]: [facility];  Service: Gastroenterology    SPLENECTOMY, TOTAL N/A 07/31/2023    Procedure: SPLENECTOMY;  Surgeon: Kyle Julien MD;  [facility]: [facility];  Service: Surgical Oncology    TONSILLECTOMY  age 49    UPPER GASTROINTESTINAL ENDOSCOPY  04/23/2019       Course of stay:   Radha Vidal is a 84 year old female, she is a STR resident of [facility] since 1/19/24. Past Medical Hx including but not limited to Parkinson's, hx of skin cancer, gerd, T2DM, and diverticulitis and is seen in collaboration with nursing for medical mgmt and LTC follow up.     Seen and examined at bedside today. Patient is a reliable historian. Upon exam, patient is resting in bed. She is calm and cooperative. When asking if patient still has nausea or pain in her stomach, she declines, stating the medication helped. She is alert and oriented x 3 and is looking forward to being discharged tomorrow. She has no other complaints at this time.        Denies CP/SOB/N/V/D. Denies lightheadedness, dizziness, headaches, vision changes. Patient states they are eating well and  staying hydrated. Denies any bowel or bladder issues. Per review of SNF records, Patient is eating 3 meals per day, consuming  %. Last documented BM 1/26/24. No concerns from nursing at this time.              ROS:  Review of Systems   All other systems reviewed and are negative.      PHYSICAL EXAM:  VITALS:   Vitals:    01/26/24 1843   BP: 137/64   Pulse: 90   Resp: 18   Temp: 97.7 °F (36.5 °C)   SpO2: 98%        Physical Exam  Vitals and nursing note reviewed.   Constitutional:       Appearance: Normal appearance.   HENT:      Head: Normocephalic.      Right Ear: External ear normal.      Left Ear: External ear normal.      Nose: Nose normal.      Mouth/Throat:      Mouth: Mucous membranes are moist.      Pharynx: Oropharynx is clear.   Eyes:      Pupils: Pupils are equal, round, and reactive to light.   Cardiovascular:      Rate and Rhythm: Normal rate and regular rhythm.      Pulses: Normal pulses.      Heart sounds: Normal heart sounds.   Pulmonary:      Effort: Pulmonary effort is normal.      Breath sounds: Normal breath sounds.   Abdominal:      General: Bowel sounds are normal.      Palpations: Abdomen is soft.      Tenderness: There is no abdominal tenderness. There is no guarding.   Musculoskeletal:         General: Normal range of motion.      Cervical back: Normal range of motion.   Skin:     General: Skin is warm and dry.      Capillary Refill: Capillary refill takes less than 2 seconds.   Neurological:      Mental Status: She is alert and oriented to person, place, and time. Mental status is at baseline.   Psychiatric:         Mood and Affect: Mood normal.         Thought Content: Thought content normal.         Admission Diagnoses:   1. Stroke-like symptoms  Assessment & Plan:  Presented to hospital with dizziness, gait instability 1/14/24  CT/MRI of head showing no signs of stroke  Given TNK  Monitored in ICU  Continue anticoagulation, Eliquis 5 mg BID        2. Nausea  Assessment & Plan:  C/o  nausea yesterday  Offered ginger ale, pt refused  Tums given, no relief  Zofran given with relief  Upon exam today, pt denies n/v  Normal bowel sounds, soft abdomen, no distention noted; denies indigestion, burning  Last bowel movement today, 1/26/24  Continue Zofran prn TID       3. New onset a-fib (HCC)  Assessment & Plan:  Appreciated via EMS prehospital admission  No EKG completed prehospital to document rhythm  EKG in ED showing normal sinus rhythm with PVCs  Heart rate and rhythm regular upon exam today  Continue Eliquis 5 mg BID      4. Ambulatory dysfunction  Assessment & Plan:  Symptomatic upon admission 1/14/24  Has both cane/walker at home  Refuses to use assistance  Moderate assist during hospital admission with walker  Continue PT/OT  Fall precautions      5. Parkinson's disease, unspecified whether dyskinesia present, unspecified whether manifestations fluctuate  Assessment & Plan:  Continue Sinemet  mg TID  Tremors not appreciated on exam today  Continue good sleep hygiene  PT/OT  Follow up with neuro and PCP           Follow-up Recommendations:    Outpatient Follow up with PCP in the next 2 weeks  Home Health PT/OT/SN services     Labs and testing performed during stay:    Reviewed in chart    Discharge Medications: See discharge medication list which was reviewed and signed.      Current Outpatient Medications:     apixaban (Eliquis) 5 mg, Take 1 tablet (5 mg total) by mouth 2 (two) times a day, Disp: 60 tablet, Rfl: 3    Ascorbic Acid (VITAMIN C) 1000 MG tablet, Take 1,000 mg by mouth daily, Disp: , Rfl:     B Complex Vitamins (B COMPLEX 100 PO), Take 1 capsule by mouth daily after lunch, Disp: , Rfl:     calcium carbonate (OS-JOHN) 1250 (500 Ca) MG tablet, Take 1 tablet by mouth daily after lunch, Disp: , Rfl:     carbidopa-levodopa (SINEMET)  mg per tablet, Take 1 tablet by mouth 3 (three) times a day before meals, Disp: 270 tablet, Rfl: 3    cholecalciferol (VITAMIN D3) 1,000 units  "tablet, Take 5,000 Units by mouth daily after lunch, Disp: , Rfl:     Coenzyme Q10 (CO Q 10 PO), Take 1 capsule by mouth daily after lunch 600 mg BID, Disp: , Rfl:     Cyanocobalamin (VITAMIN B 12 PO), Take 1 capsule by mouth daily, Disp: , Rfl:     LORazepam (ATIVAN) 1 mg tablet, Take 1 tablet (1 mg total) by mouth see administration instructions TAKE 1 TABLET 30 MINUTES PRIOR TO MRI, TAKE 2ND TABLET RIGHT BEFORE MRI IF NEEDED (Patient not taking: Reported on 1/8/2024), Disp: 2 tablet, Rfl: 0    meclizine (ANTIVERT) 12.5 MG tablet, Take 1 tablet (12.5 mg total) by mouth every 6 (six) hours as needed for dizziness, Disp: 30 tablet, Rfl: 0    multivitamin (THERAGRAN) TABS, Take 1 tablet by mouth every morning, Disp: , Rfl:     Omega-3 350 MG CPDR, Take 1 capsule by mouth daily in the early morning, Disp: , Rfl:     Potassium Gluconate 595 (99 K) MG TABS, Take 1 each by mouth every other day, Disp: , Rfl:     Probiotic Product (PROBIOTIC-10) CAPS, Take 1 capsule by mouth daily after lunch, Disp: , Rfl:     trospium chloride (SANCTURA) 20 mg tablet, Take 1 tablet (20 mg total) by mouth 2 (two) times a day, Disp: 180 tablet, Rfl: 2     Discussion with patient/family and further instructions:  -Fall precautions  -Aspiration precautions  -Bleeding precautions  -Monitor for signs/symptoms of infection  -Medication list was reviewed and signed  -DME form was completed    Status at time of discharge: Stable      Billing based on time. Time spent on unit, 40 minutes. Time spent counseling pt on debility/condition, 30 minutes.      Please note:  Voice-recognition software may have been used in the preparation of this document.  Occasional wrong word or \"sound-alike\" substitutions may have occurred due to the inherent limitations of voice recognition software.  Interpretation should be guided by context.        RISHI Coronel  1/26/2024   "

## 2024-01-26 NOTE — ASSESSMENT & PLAN NOTE
Appreciated via EMS prehospital admission  No EKG completed prehospital to document rhythm  EKG in ED showing normal sinus rhythm with PVCs  Heart rate and rhythm regular upon exam today  Continue Eliquis 5 mg BID

## 2024-01-26 NOTE — ASSESSMENT & PLAN NOTE
Presented to hospital with dizziness, gait instability 1/14/24  CT/MRI of head showing no signs of stroke  Given TNK  Monitored in ICU  Continue anticoagulation, Eliquis 5 mg BID

## 2024-01-26 NOTE — ASSESSMENT & PLAN NOTE
Continue Sinemet  mg TID  Tremors not appreciated on exam today  Continue good sleep hygiene  PT/OT  Follow up with neuro and PCP

## 2024-01-26 NOTE — ASSESSMENT & PLAN NOTE
C/o nausea yesterday  Offered ginger ale, pt refused  Tums given, no relief  Zofran given with relief  Upon exam today, pt denies n/v  Normal bowel sounds, soft abdomen, no distention noted; denies indigestion, burning  Last bowel movement today, 1/26/24  Continue Zofran prn TID

## 2024-01-26 NOTE — ASSESSMENT & PLAN NOTE
Symptomatic upon admission 1/14/24  Has both cane/walker at home  Refuses to use assistance  Moderate assist during hospital admission with walker  Continue PT/OT  Fall precautions

## 2024-01-27 ENCOUNTER — APPOINTMENT (INPATIENT)
Dept: RADIOLOGY | Facility: HOSPITAL | Age: 84
DRG: 389 | End: 2024-01-27
Payer: COMMERCIAL

## 2024-01-27 LAB
ANION GAP SERPL CALCULATED.3IONS-SCNC: 8 MMOL/L
ATRIAL RATE: 104 BPM
BACTERIA UR QL AUTO: ABNORMAL /HPF
BASOPHILS # BLD AUTO: 0.01 THOUSANDS/ÂΜL (ref 0–0.1)
BASOPHILS NFR BLD AUTO: 0 % (ref 0–1)
BILIRUB UR QL STRIP: NEGATIVE
BUN SERPL-MCNC: 27 MG/DL (ref 5–25)
CALCIUM SERPL-MCNC: 9.5 MG/DL (ref 8.4–10.2)
CHLORIDE SERPL-SCNC: 93 MMOL/L (ref 96–108)
CLARITY UR: CLEAR
CO2 SERPL-SCNC: 32 MMOL/L (ref 21–32)
COLOR UR: YELLOW
CREAT SERPL-MCNC: 0.72 MG/DL (ref 0.6–1.3)
EOSINOPHIL # BLD AUTO: 0 THOUSAND/ÂΜL (ref 0–0.61)
EOSINOPHIL NFR BLD AUTO: 0 % (ref 0–6)
ERYTHROCYTE [DISTWIDTH] IN BLOOD BY AUTOMATED COUNT: 13.9 % (ref 11.6–15.1)
GFR SERPL CREATININE-BSD FRML MDRD: 76 ML/MIN/1.73SQ M
GLUCOSE SERPL-MCNC: 185 MG/DL (ref 65–140)
GLUCOSE UR STRIP-MCNC: ABNORMAL MG/DL
HCT VFR BLD AUTO: 39.9 % (ref 34.8–46.1)
HGB BLD-MCNC: 13.5 G/DL (ref 11.5–15.4)
HGB UR QL STRIP.AUTO: NEGATIVE
IMM GRANULOCYTES # BLD AUTO: 0.13 THOUSAND/UL (ref 0–0.2)
IMM GRANULOCYTES NFR BLD AUTO: 1 % (ref 0–2)
KETONES UR STRIP-MCNC: ABNORMAL MG/DL
LEUKOCYTE ESTERASE UR QL STRIP: ABNORMAL
LYMPHOCYTES # BLD AUTO: 1.91 THOUSANDS/ÂΜL (ref 0.6–4.47)
LYMPHOCYTES NFR BLD AUTO: 11 % (ref 14–44)
MAGNESIUM SERPL-MCNC: 1.6 MG/DL (ref 1.9–2.7)
MCH RBC QN AUTO: 29.9 PG (ref 26.8–34.3)
MCHC RBC AUTO-ENTMCNC: 33.8 G/DL (ref 31.4–37.4)
MCV RBC AUTO: 89 FL (ref 82–98)
MONOCYTES # BLD AUTO: 1.48 THOUSAND/ÂΜL (ref 0.17–1.22)
MONOCYTES NFR BLD AUTO: 9 % (ref 4–12)
NEUTROPHILS # BLD AUTO: 13.36 THOUSANDS/ÂΜL (ref 1.85–7.62)
NEUTS SEG NFR BLD AUTO: 79 % (ref 43–75)
NITRITE UR QL STRIP: NEGATIVE
NON-SQ EPI CELLS URNS QL MICRO: ABNORMAL /HPF
NRBC BLD AUTO-RTO: 0 /100 WBCS
P AXIS: 53 DEGREES
PH UR STRIP.AUTO: 6 [PH]
PHOSPHATE SERPL-MCNC: 2.8 MG/DL (ref 2.3–4.1)
PLATELET # BLD AUTO: 394 THOUSANDS/UL (ref 149–390)
PMV BLD AUTO: 10.5 FL (ref 8.9–12.7)
POTASSIUM SERPL-SCNC: 3.7 MMOL/L (ref 3.5–5.3)
PR INTERVAL: 160 MS
PROT UR STRIP-MCNC: ABNORMAL MG/DL
QRS AXIS: 12 DEGREES
QRSD INTERVAL: 78 MS
QT INTERVAL: 344 MS
QTC INTERVAL: 452 MS
RBC # BLD AUTO: 4.51 MILLION/UL (ref 3.81–5.12)
RBC #/AREA URNS AUTO: ABNORMAL /HPF
SODIUM SERPL-SCNC: 133 MMOL/L (ref 135–147)
SP GR UR STRIP.AUTO: 1.03 (ref 1–1.03)
T WAVE AXIS: 32 DEGREES
UROBILINOGEN UR STRIP-ACNC: <2 MG/DL
VENTRICULAR RATE: 104 BPM
WBC # BLD AUTO: 16.89 THOUSAND/UL (ref 4.31–10.16)
WBC #/AREA URNS AUTO: ABNORMAL /HPF

## 2024-01-27 PROCEDURE — 96376 TX/PRO/DX INJ SAME DRUG ADON: CPT

## 2024-01-27 PROCEDURE — 81001 URINALYSIS AUTO W/SCOPE: CPT | Performed by: EMERGENCY MEDICINE

## 2024-01-27 PROCEDURE — 96366 THER/PROPH/DIAG IV INF ADDON: CPT

## 2024-01-27 PROCEDURE — 71045 X-RAY EXAM CHEST 1 VIEW: CPT

## 2024-01-27 PROCEDURE — 87086 URINE CULTURE/COLONY COUNT: CPT | Performed by: EMERGENCY MEDICINE

## 2024-01-27 PROCEDURE — 36415 COLL VENOUS BLD VENIPUNCTURE: CPT | Performed by: SURGERY

## 2024-01-27 PROCEDURE — 85025 COMPLETE CBC W/AUTO DIFF WBC: CPT | Performed by: SURGERY

## 2024-01-27 PROCEDURE — 99223 1ST HOSP IP/OBS HIGH 75: CPT | Performed by: SURGERY

## 2024-01-27 PROCEDURE — 80048 BASIC METABOLIC PNL TOTAL CA: CPT | Performed by: SURGERY

## 2024-01-27 PROCEDURE — 83735 ASSAY OF MAGNESIUM: CPT | Performed by: SURGERY

## 2024-01-27 PROCEDURE — 84100 ASSAY OF PHOSPHORUS: CPT | Performed by: SURGERY

## 2024-01-27 PROCEDURE — C9113 INJ PANTOPRAZOLE SODIUM, VIA: HCPCS | Performed by: SURGERY

## 2024-01-27 RX ORDER — HYDROMORPHONE HCL/PF 1 MG/ML
0.5 SYRINGE (ML) INJECTION
Status: DISCONTINUED | OUTPATIENT
Start: 2024-01-27 | End: 2024-01-30

## 2024-01-27 RX ORDER — HYDROMORPHONE HCL IN WATER/PF 6 MG/30 ML
0.2 PATIENT CONTROLLED ANALGESIA SYRINGE INTRAVENOUS
Status: DISCONTINUED | OUTPATIENT
Start: 2024-01-27 | End: 2024-01-30

## 2024-01-27 RX ORDER — ENOXAPARIN SODIUM 100 MG/ML
40 INJECTION SUBCUTANEOUS DAILY
Status: DISCONTINUED | OUTPATIENT
Start: 2024-01-27 | End: 2024-01-31

## 2024-01-27 RX ORDER — PANTOPRAZOLE SODIUM 40 MG/10ML
40 INJECTION, POWDER, LYOPHILIZED, FOR SOLUTION INTRAVENOUS
Status: DISCONTINUED | OUTPATIENT
Start: 2024-01-27 | End: 2024-02-03

## 2024-01-27 RX ORDER — OXYBUTYNIN CHLORIDE 5 MG/1
5 TABLET ORAL DAILY
Status: DISCONTINUED | OUTPATIENT
Start: 2024-01-27 | End: 2024-02-04 | Stop reason: HOSPADM

## 2024-01-27 RX ORDER — ACETAMINOPHEN 160 MG/5ML
650 SUSPENSION ORAL EVERY 6 HOURS PRN
Status: DISCONTINUED | OUTPATIENT
Start: 2024-01-27 | End: 2024-01-30

## 2024-01-27 RX ORDER — ONDANSETRON 2 MG/ML
4 INJECTION INTRAMUSCULAR; INTRAVENOUS ONCE
Status: COMPLETED | OUTPATIENT
Start: 2024-01-27 | End: 2024-01-27

## 2024-01-27 RX ORDER — ONDANSETRON 2 MG/ML
4 INJECTION INTRAMUSCULAR; INTRAVENOUS EVERY 6 HOURS PRN
Status: DISCONTINUED | OUTPATIENT
Start: 2024-01-27 | End: 2024-02-04 | Stop reason: HOSPADM

## 2024-01-27 RX ORDER — SODIUM CHLORIDE, SODIUM LACTATE, POTASSIUM CHLORIDE, CALCIUM CHLORIDE 600; 310; 30; 20 MG/100ML; MG/100ML; MG/100ML; MG/100ML
125 INJECTION, SOLUTION INTRAVENOUS CONTINUOUS
Status: DISCONTINUED | OUTPATIENT
Start: 2024-01-27 | End: 2024-01-28

## 2024-01-27 RX ADMIN — CARBIDOPA AND LEVODOPA 1 TABLET: 25; 100 TABLET ORAL at 06:50

## 2024-01-27 RX ADMIN — ONDANSETRON 4 MG: 2 INJECTION INTRAMUSCULAR; INTRAVENOUS at 00:29

## 2024-01-27 RX ADMIN — OXYBUTYNIN CHLORIDE 5 MG: 5 TABLET ORAL at 09:36

## 2024-01-27 RX ADMIN — CARBIDOPA AND LEVODOPA 1 TABLET: 25; 100 TABLET ORAL at 10:39

## 2024-01-27 RX ADMIN — CARBIDOPA AND LEVODOPA 1 TABLET: 25; 100 TABLET ORAL at 16:06

## 2024-01-27 RX ADMIN — ONDANSETRON 4 MG: 2 INJECTION INTRAMUSCULAR; INTRAVENOUS at 09:40

## 2024-01-27 RX ADMIN — ENOXAPARIN SODIUM 40 MG: 40 INJECTION SUBCUTANEOUS at 09:35

## 2024-01-27 RX ADMIN — SODIUM CHLORIDE, SODIUM LACTATE, POTASSIUM CHLORIDE, AND CALCIUM CHLORIDE 125 ML/HR: .6; .31; .03; .02 INJECTION, SOLUTION INTRAVENOUS at 02:21

## 2024-01-27 RX ADMIN — PANTOPRAZOLE SODIUM 40 MG: 40 INJECTION, POWDER, FOR SOLUTION INTRAVENOUS at 09:36

## 2024-01-27 RX ADMIN — SODIUM CHLORIDE, SODIUM LACTATE, POTASSIUM CHLORIDE, AND CALCIUM CHLORIDE 125 ML/HR: .6; .31; .03; .02 INJECTION, SOLUTION INTRAVENOUS at 22:03

## 2024-01-27 NOTE — H&P
H&P - General Surgery  Radha Vidal 84 y.o. female MRN: [id number]  Unit/Bed#: ED-37 Encounter: [id number]        Assessment/Plan     Assessment:  84-year-old female with Parkinson's, A-fib, multiple surgeries presents with SBO    Plan:  Admit to surgery  N.p.o./NG tube  IVF  Intake output  PT/OT  Please tigertext on call [facility] surgery resident with any questions    History of Present Illness     HPI:  Radha Vidal is a 84 y.o. female who presents with symptoms of nausea and vomiting as well as decreased bowel function since Wednesday.  Patient denies any prior bowel obstructions.  She is currently at a rehab facility after recent hospitalization.  Patient states that last bowel movement was on Wednesday and she last passed gas possibly yesterday.  Has not been able to take in more than small amounts of liquids and soups since symptoms started.  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    Review of Systems   Constitutional:  Negative for activity change, chills and fever.   HENT:  Negative for congestion, ear pain and sore throat.    Eyes:  Negative for pain and visual disturbance.   Respiratory:  Negative for chest tightness and shortness of breath.    Cardiovascular:  Negative for chest pain and palpitations.   Gastrointestinal:  Positive for abdominal pain and nausea. Negative for abdominal distention.   Endocrine: Negative for cold intolerance and heat intolerance.   Genitourinary:  Negative for difficulty urinating and dysuria.   Musculoskeletal:  Negative for arthralgias and myalgias.   Skin:  Negative for color change and pallor.   Neurological:  Negative for dizziness and weakness.   Hematological:  Negative for adenopathy. Does not bruise/bleed easily.   Psychiatric/Behavioral:  Negative for agitation and behavioral problems.    All other systems reviewed and are  negative.        Historical Information   Past Medical History:   Diagnosis Date    Actinic keratosis 2016    Acute midline low back pain without sciatica 2020    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     Benign neoplasm of skin     Cancer (HCC)     skin    Chest pain     Claustrophobia     Diverticulitis     Diverticulitis     Fracture     L1-L2    GERD (gastroesophageal reflux disease)     H. pylori infection 2020    Muscle weakness     Nonmelanoma skin cancer     last assessed 2017    Palpitations     Pancreatic cyst     Seasonal allergies     Seborrheic keratosis 2014    Sleep apnea     no cpap    Spontaneous      without mention of complications     Squamous cell carcinoma of forehead 2014    Syncope      Past Surgical History:   Procedure Laterality Date    ABDOMINAL ADHESION SURGERY N/A 2023    Procedure: LYSIS ADHESIONS;  Surgeon: Kyle Julien MD;  [facility];  Service: Surgical Oncology    BOTOX INJECTION N/A 2017    Procedure: CYSTOSCOPY; BLADDER BOTOX 100 UNITS ;  Surgeon: Juan Edward MD;  [facility];  Service:     BOWEL RESECTION      related ot diverticulitis    CARDIAC CATHETERIZATION      CARPAL TUNNEL RELEASE Right     COLONOSCOPY  2019    COLOSTOMY      COLOSTOMY CLOSURE      CYSTOSCOPY  2021    DISTAL PANCREATECTOMY N/A 2023    Procedure: DISTAL PANCREATECTOMY;  Surgeon: Kyle Julien MD;  [facility];  Service: Surgical Oncology    FOOT SURGERY Left     neuroma    HYSTERECTOMY      MYRINGOTOMY W/ TUBES Right 2023    office procedure - Dr. Grace    [place]  age 39    [place]    PANCREATIC CYST BIOPSY  2023    VT CYSTOURETHROSCOPY N/A 2018    Procedure: CYSTOSCOPY WITH BOTOX;  Surgeon: Juan Edward MD;  [facility];  Service: Urology    VT ESOPHAGOGASTRODUODENOSCOPY TRANSORAL DIAGNOSTIC N/A 2019     Procedure: ESOPHAGOGASTRODUODENOSCOPY (EGD);  Surgeon: Edu Dickerson DO;  [facility];  Service: Gastroenterology    SPLENECTOMY, TOTAL N/A 07/31/2023    Procedure: SPLENECTOMY;  Surgeon: Kyle Julien MD;  Location:  MAIN OR;  Service: Surgical Oncology    TONSILLECTOMY  age 49    UPPER GASTROINTESTINAL ENDOSCOPY  04/23/2019     Social History   Social History     Substance and Sexual Activity   Alcohol Use Yes    Comment: patient states rare use on holidays      Social History     Substance and Sexual Activity   Drug Use No     Social History     Tobacco Use   Smoking Status Never    Passive exposure: Past   Smokeless Tobacco Never     Family History:   Family History   Problem Relation Age of Onset    Alcohol abuse Mother     Heart disease Mother     Alcohol abuse Father     Alcohol abuse Brother     Cancer Brother     Tremor Neg Hx     Parkinsonism Neg Hx     Colon cancer Neg Hx        Meds/Allergies   all medications and allergies reviewed  Allergies   Allergen Reactions    Caffeine - Food Allergy GI Intolerance    Iodine - Food Allergy Rash    Latex Rash    Other Rash     Adhesive tape         Objective   First Vitals:   Blood Pressure: (!) 173/111 (01/26/24 2210)  Pulse: (!) 111 (01/26/24 2210)  Temperature: 98.2 °F (36.8 °C) (01/26/24 2210)  Temp Source: Oral (01/26/24 2210)  Respirations: 17 (01/26/24 2210)  Weight - Scale: 73.5 kg (162 lb 1.6 oz) (01/26/24 2210)  SpO2: 95 % (01/26/24 2210)    Current Vitals:   Blood Pressure: 130/61 (01/27/24 0000)  Pulse: 100 (01/27/24 0000)  Temperature: 98.2 °F (36.8 °C) (01/26/24 2210)  Temp Source: Oral (01/26/24 2210)  Respirations: 17 (01/26/24 2210)  Weight - Scale: 73.5 kg (162 lb 1.6 oz) (01/26/24 2210)  SpO2: 93 % (01/26/24 2300)      Intake/Output Summary (Last 24 hours) at 1/27/2024 0128  Last data filed at 1/26/2024 2308  Gross per 24 hour   Intake 100 ml   Output --   Net 100 ml       Invasive Devices       Peripheral Intravenous Line  Duration              Peripheral IV 01/26/24 Left;Ventral (anterior) Antecubital <1 day                    Physical Exam  Vitals reviewed.   Constitutional:       General: She is not in acute distress.     Appearance: She is not toxic-appearing.   HENT:      Head: Normocephalic and atraumatic.      Right Ear: External ear normal.      Left Ear: External ear normal.      Nose: Nose normal. No rhinorrhea.      Mouth/Throat:      Mouth: Mucous membranes are moist.      Pharynx: Oropharynx is clear.   Eyes:      General: No scleral icterus.     Extraocular Movements: Extraocular movements intact.      Conjunctiva/sclera: Conjunctivae normal.      Pupils: Pupils are equal, round, and reactive to light.   Cardiovascular:      Rate and Rhythm: Normal rate and regular rhythm.      Pulses: Normal pulses.   Pulmonary:      Effort: Pulmonary effort is normal. No respiratory distress.   Abdominal:      Comments: Abdomen soft, mild distention, mild tenderness in mid abdomen   Musculoskeletal:         General: No swelling or deformity.      Cervical back: Normal range of motion and neck supple.   Skin:     General: Skin is warm.      Coloration: Skin is not jaundiced.   Neurological:      General: No focal deficit present.      Mental Status: She is alert and oriented to person, place, and time.   Psychiatric:         Mood and Affect: Mood normal.         Behavior: Behavior normal.         Lab Results: I have personally reviewed pertinent lab results.    Imaging: I have personally reviewed pertinent reports.    EKG, Pathology, and Other Studies: I have personally reviewed pertinent reports.      Code Status: Level 1 - Full Code  Advance Directive and Living Will: Yes    Power of : Yes  POLST:

## 2024-01-27 NOTE — ED PROVIDER NOTES
History  Chief Complaint   Patient presents with    Vomiting     Pt comes via EMS from [employer] [facility]. C/o mid abdominal pain and vomiting x2 days. States she hasn't had a BM in 3 days. Denies urinary symptoms/chest pain/fevers.     84-year-old female history of Parkinson's, new onset A-fib on Eliquis, and surgical history of diverticulitis status post colostomy reversal, hysterectomy, splenectomy presenting to the ED from [facility] via EMS due to complaints of nausea vomiting and mild left lower quadrant abdominal tenderness with decreased BMs over the past 2 to 3 days.  Denies fevers or chills, denies chest pain or shortness of breath, denies radiation of pain to her back or flank, denies urinary symptoms or vaginal bleeding.  States that she has not been able to tolerate p.o. for the past 1 to 2 days, and has been throwing up dark brown emesis without streaks of blood.  States that she is passing gas, but has not passed a bowel movement in 2 to 3 days which is abnormal for her.  Patient recently admitted to rehab facility status post negative strokelike symptom workup in ED 1 week ago.  Was given Zofran at rehab facility with mild relief initially.  Denies taking anything for pain.  Denies hemoptysis, lower leg swelling or pain.        Prior to Admission Medications   Prescriptions Last Dose Informant Patient Reported? Taking?   Ascorbic Acid (VITAMIN C) 1000 MG tablet  Self Yes No   Sig: Take 1,000 mg by mouth daily   B Complex Vitamins (B COMPLEX 100 PO)  Self Yes No   Sig: Take 1 capsule by mouth daily after lunch   Coenzyme Q10 (CO Q 10 PO)  Self Yes No   Sig: Take 1 capsule by mouth daily after lunch 600 mg BID   Cyanocobalamin (VITAMIN B 12 PO)  Self Yes No   Sig: Take 1 capsule by mouth daily   LORazepam (ATIVAN) 1 mg tablet  Self No No   Sig: Take 1 tablet (1 mg total) by mouth see administration instructions TAKE 1 TABLET 30 MINUTES PRIOR TO MRI, TAKE 2ND TABLET RIGHT BEFORE MRI  IF NEEDED   Patient not taking: Reported on 2024   Omega-3 350 MG CPDR  Self Yes No   Sig: Take 1 capsule by mouth daily in the early morning   Potassium Gluconate 595 (99 K) MG TABS  Self Yes No   Sig: Take 1 each by mouth every other day   Probiotic Product (PROBIOTIC-10) CAPS  Self Yes No   Sig: Take 1 capsule by mouth daily after lunch   apixaban (Eliquis) 5 mg   No No   Sig: Take 1 tablet (5 mg total) by mouth 2 (two) times a day   calcium carbonate (OS-OJHN) 1250 (500 Ca) MG tablet  Self Yes No   Sig: Take 1 tablet by mouth daily after lunch   carbidopa-levodopa (SINEMET)  mg per tablet  Self No No   Sig: Take 1 tablet by mouth 3 (three) times a day before meals   cholecalciferol (VITAMIN D3) 1,000 units tablet  Self Yes No   Sig: Take 5,000 Units by mouth daily after lunch   meclizine (ANTIVERT) 12.5 MG tablet   No No   Sig: Take 1 tablet (12.5 mg total) by mouth every 6 (six) hours as needed for dizziness   multivitamin (THERAGRAN) TABS  Self Yes No   Sig: Take 1 tablet by mouth every morning   trospium chloride (SANCTURA) 20 mg tablet   No No   Sig: Take 1 tablet (20 mg total) by mouth 2 (two) times a day      Facility-Administered Medications: None       Past Medical History:   Diagnosis Date    Actinic keratosis 2016    Acute midline low back pain without sciatica 2020    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     Benign neoplasm of skin     Cancer (HCC)     skin    Chest pain     Claustrophobia     Diverticulitis     Diverticulitis     Fracture     L1-L2    GERD (gastroesophageal reflux disease)     H. pylori infection 2020    Muscle weakness     Nonmelanoma skin cancer     last assessed 2017    Palpitations     Pancreatic cyst     Seasonal allergies     Seborrheic keratosis 2014    Sleep apnea     no cpap    Spontaneous      without mention of complications     Squamous cell carcinoma of forehead 2014    Syncope        Past  Surgical History:   Procedure Laterality Date    ABDOMINAL ADHESION SURGERY N/A 07/31/2023    Procedure: LYSIS ADHESIONS;  Surgeon: Kyle Julien MD;  [facility]: [facility];  Service: Surgical Oncology    BOTOX INJECTION N/A 03/06/2017    Procedure: CYSTOSCOPY; BLADDER BOTOX 100 UNITS ;  Surgeon: Juan Edward MD;  Location[facility];  Service:     BOWEL RESECTION      related ot diverticulitis    CARDIAC CATHETERIZATION      CARPAL TUNNEL RELEASE Right     COLONOSCOPY  07/31/2019    COLOSTOMY      COLOSTOMY CLOSURE      CYSTOSCOPY  06/01/2021    DISTAL PANCREATECTOMY N/A 07/31/2023    Procedure: DISTAL PANCREATECTOMY;  Surgeon: Kyle Julien MD;  [facility]: [facility];  Service: Surgical Oncology    FOOT SURGERY Left     neuroma    HYSTERECTOMY      MYRINGOTOMY W/ TUBES Right 12/06/2023    office procedure - Dr. Grace    NASAL SINUS SURGERY  age 39    [place]    PANCREATIC CYST BIOPSY  07/31/2023    AL CYSTOURETHROSCOPY N/A 04/13/2018    Procedure: CYSTOSCOPY WITH BOTOX;  Surgeon: Juan Edward MD;  [facility];  Service: Urology    AL ESOPHAGOGASTRODUODENOSCOPY TRANSORAL DIAGNOSTIC N/A 04/23/2019    Procedure: ESOPHAGOGASTRODUODENOSCOPY (EGD);  Surgeon: Edu Dickerson DO;  Location: [facility];  Service: Gastroenterology    SPLENECTOMY, TOTAL N/A 07/31/2023    Procedure: SPLENECTOMY;  Surgeon: Kyle Julien MD;  [facility]: [facility];  Service: Surgical Oncology    TONSILLECTOMY  age 49    UPPER GASTROINTESTINAL ENDOSCOPY  04/23/2019       Family History   Problem Relation Age of Onset    Alcohol abuse Mother     Heart disease Mother     Alcohol abuse Father     Alcohol abuse Brother     Cancer Brother     Tremor Neg Hx     Parkinsonism Neg Hx     Colon cancer Neg Hx      I have reviewed and agree with the history as documented.    E-Cigarette/Vaping    E-Cigarette Use Never User      E-Cigarette/Vaping Substances    Nicotine No     THC No     CBD No     Flavoring No     Other No     Unknown  No      Social History     Tobacco Use    Smoking status: Never     Passive exposure: Past    Smokeless tobacco: Never   Vaping Use    Vaping status: Never Used   Substance Use Topics    Alcohol use: Yes     Comment: patient states rare use on holidays     Drug use: No        Review of Systems   Constitutional:  Positive for fever. Negative for chills.   HENT:  Positive for hearing loss. Negative for congestion, ear pain and sore throat.    Eyes:  Negative for photophobia, pain, redness and visual disturbance.   Respiratory:  Negative for cough, chest tightness and shortness of breath.    Cardiovascular:  Negative for chest pain and palpitations.   Gastrointestinal:  Positive for abdominal pain, nausea and vomiting. Negative for blood in stool, constipation, diarrhea and rectal pain.   Genitourinary:  Negative for difficulty urinating, dysuria, flank pain, frequency, hematuria, pelvic pain, urgency, vaginal bleeding, vaginal discharge and vaginal pain.   Musculoskeletal:  Negative for arthralgias, back pain, neck pain and neck stiffness.   Skin:  Negative for color change and rash.   Neurological:  Positive for tremors. Negative for dizziness, seizures, syncope, facial asymmetry, speech difficulty, weakness, light-headedness, numbness and headaches.   All other systems reviewed and are negative.      Physical Exam  ED Triage Vitals [01/26/24 2210]   Temperature Pulse Respirations Blood Pressure SpO2   98.2 °F (36.8 °C) (!) 111 17 (!) 173/111 95 %      Temp Source Heart Rate Source Patient Position - Orthostatic VS BP Location FiO2 (%)   Oral Monitor Sitting Right arm --      Pain Score       4             Orthostatic Vital Signs  Vitals:    01/26/24 2210 01/26/24 2300 01/27/24 0000   BP: (!) 173/111 150/67 130/61   Pulse: (!) 111 102 100   Patient Position - Orthostatic VS: Sitting Lying Lying       Physical Exam  Vitals and nursing note reviewed.   Constitutional:       General: She is in acute distress (mild).       Appearance: She is obese. She is ill-appearing. She is not toxic-appearing.   HENT:      Head: Normocephalic.      Right Ear: No middle ear effusion. Tympanic membrane is scarred. Tympanic membrane is not erythematous or bulging.      Left Ear: Tympanic membrane and external ear normal.  No middle ear effusion. There is no impacted cerumen.      Ears:      Comments: Right tympanostomy tube (white in color) in place.  No purulent drainage.  TM dull, but not acutely bulging or erythematous.     Nose: Nose normal. No congestion.      Mouth/Throat:      Mouth: Mucous membranes are dry.      Pharynx: Oropharynx is clear. No oropharyngeal exudate.   Eyes:      Extraocular Movements: Extraocular movements intact.      Conjunctiva/sclera: Conjunctivae normal.      Pupils: Pupils are equal, round, and reactive to light.   Cardiovascular:      Rate and Rhythm: Regular rhythm. Tachycardia present.      Pulses: Normal pulses.      Heart sounds: Normal heart sounds. No murmur heard.     No friction rub. No gallop.   Pulmonary:      Effort: Pulmonary effort is normal. No respiratory distress.      Breath sounds: No stridor. No wheezing, rhonchi or rales.   Abdominal:      General: Abdomen is flat. Bowel sounds are decreased.      Palpations: Abdomen is soft.      Tenderness: There is abdominal tenderness in the left lower quadrant. There is no right CVA tenderness, left CVA tenderness, guarding or rebound. Negative signs include Abad's sign, Rovsing's sign and McBurney's sign.   Musculoskeletal:         General: No swelling or tenderness. Normal range of motion.      Cervical back: Normal range of motion. No rigidity or tenderness.      Right lower leg: No edema.      Left lower leg: No edema.   Skin:     General: Skin is warm and dry.      Capillary Refill: Capillary refill takes less than 2 seconds.      Coloration: Skin is not jaundiced or pale.      Findings: No erythema, lesion or rash.   Neurological:      General: No  focal deficit present.      Mental Status: She is alert and oriented to person, place, and time. Mental status is at baseline.      GCS: GCS eye subscore is 4. GCS verbal subscore is 5. GCS motor subscore is 6.      Cranial Nerves: No cranial nerve deficit.      Sensory: Sensation is intact. No sensory deficit.      Motor: Motor function is intact.   Psychiatric:         Mood and Affect: Mood normal.         Behavior: Behavior normal.         Thought Content: Thought content normal.         ED Medications  Medications   lactated ringers infusion (125 mL/hr Intravenous New Bag 1/27/24 0221)   carbidopa-levodopa (SINEMET)  mg per tablet 1 tablet (has no administration in time range)   oxybutynin (DITROPAN) tablet 5 mg (has no administration in time range)   ondansetron (ZOFRAN) injection 4 mg (has no administration in time range)   enoxaparin (LOVENOX) subcutaneous injection 40 mg (has no administration in time range)   acetaminophen (TYLENOL) oral suspension 650 mg (has no administration in time range)   HYDROmorphone HCl (DILAUDID) injection 0.2 mg (has no administration in time range)   HYDROmorphone (DILAUDID) injection 0.5 mg (has no administration in time range)   pantoprazole (PROTONIX) injection 40 mg (has no administration in time range)   ondansetron (ZOFRAN) injection 4 mg (4 mg Intravenous Given 1/26/24 2248)   acetaminophen (Ofirmev) injection 1,000 mg (0 mg Intravenous Stopped 1/26/24 2308)   lactated ringers bolus 1,000 mL (0 mL Intravenous Stopped 1/27/24 0134)   ondansetron (ZOFRAN) injection 4 mg (4 mg Intravenous Given 1/27/24 0029)       Diagnostic Studies  Results Reviewed       Procedure Component Value Units Date/Time    Basic metabolic panel [[id number]]     Lab Status: No result Specimen: Blood     Magnesium [[id number]]     Lab Status: No result Specimen: Blood     Phosphorus [[id number]]     Lab Status: No result Specimen: Blood     CBC and differential [[id number]]     Lab Status: No  result Specimen: Blood     Urine Microscopic [[id number]]  (Abnormal) Collected: 01/27/24 0037    Lab Status: Final result Specimen: Urine, Clean Catch Updated: 01/27/24 0045     RBC, UA 2-4 /hpf      WBC, UA Innumerable /hpf      Epithelial Cells Occasional /hpf      Bacteria, UA Occasional /hpf     Urine culture [[id number]] Collected: 01/27/24 0037    Lab Status: In process Specimen: Urine, Clean Catch Updated: 01/27/24 0045    UA w Reflex to Microscopic w Reflex to Culture [[id number]]  (Abnormal) Collected: 01/27/24 0037    Lab Status: Final result Specimen: Urine, Clean Catch Updated: 01/27/24 0044     Color, UA Yellow     Clarity, UA Clear     Specific Gravity, UA 1.031     pH, UA 6.0     Leukocytes, UA Moderate     Nitrite, UA Negative     Protein, UA 30 (1+) mg/dl      Glucose, UA 70 (7/100%) mg/dl      Ketones, UA 10 (1+) mg/dl      Urobilinogen, UA <2.0 mg/dl      Bilirubin, UA Negative     Occult Blood, UA Negative    Comprehensive metabolic panel [[id number]]  (Abnormal) Collected: 01/26/24 2246    Lab Status: Final result Specimen: Blood from Arm, Left Updated: 01/26/24 2349     Sodium 132 mmol/L      Potassium 4.0 mmol/L      Chloride 93 mmol/L      CO2 28 mmol/L      ANION GAP 11 mmol/L      BUN 23 mg/dL      Creatinine 0.76 mg/dL      Glucose 217 mg/dL      Calcium 10.1 mg/dL      AST 20 U/L      ALT 13 U/L      Alkaline Phosphatase 90 U/L      Total Protein 7.1 g/dL      Albumin 4.5 g/dL      Total Bilirubin 0.75 mg/dL      eGFR 71 ml/min/1.73sq m     Narrative:      National Kidney Disease Foundation guidelines for Chronic Kidney Disease (CKD):     Stage 1 with normal or high GFR (GFR > 90 mL/min/1.73 square meters)    Stage 2 Mild CKD (GFR = 60-89 mL/min/1.73 square meters)    Stage 3A Moderate CKD (GFR = 45-59 mL/min/1.73 square meters)    Stage 3B Moderate CKD (GFR = 30-44 mL/min/1.73 square meters)    Stage 4 Severe CKD (GFR = 15-29 mL/min/1.73 square meters)    Stage 5 End Stage CKD (GFR <15  mL/min/1.73 square meters)  Note: GFR calculation is accurate only with a steady state creatinine    Lipase [[id number]]  (Abnormal) Collected: 01/26/24 2246    Lab Status: Final result Specimen: Blood from Arm, Left Updated: 01/26/24 2349     Lipase <6 u/L     Lactic acid, plasma (w/reflex if result > 2.0) [[id number]]  (Normal) Collected: 01/26/24 2246    Lab Status: Final result Specimen: Blood from Arm, Left Updated: 01/26/24 2311     LACTIC ACID 1.6 mmol/L     Narrative:      Result may be elevated if tourniquet was used during collection.    CBC and differential [[id number]]  (Abnormal) Collected: 01/26/24 2246    Lab Status: Final result Specimen: Blood from Arm, Left Updated: 01/26/24 2258     WBC 16.43 Thousand/uL      RBC 4.85 Million/uL      Hemoglobin 14.5 g/dL      Hematocrit 43.2 %      MCV 89 fL      MCH 29.9 pg      MCHC 33.6 g/dL      RDW 13.8 %      MPV 10.2 fL      Platelets 410 Thousands/uL      nRBC 0 /100 WBCs      Neutrophils Relative 83 %      Immat GRANS % 1 %      Lymphocytes Relative 10 %      Monocytes Relative 6 %      Eosinophils Relative 0 %      Basophils Relative 0 %      Neutrophils Absolute 13.71 Thousands/µL      Immature Grans Absolute 0.08 Thousand/uL      Lymphocytes Absolute 1.57 Thousands/µL      Monocytes Absolute 1.05 Thousand/µL      Eosinophils Absolute 0.00 Thousand/µL      Basophils Absolute 0.02 Thousands/µL                    CT abdomen pelvis wo contrast    (Results Pending)   XR chest 1 view portable    (Results Pending)         Procedures  ECG 12 Lead Documentation Only    Date/Time: 1/26/2024 10:40 PM    Performed by: Heron Washington DO  Authorized by: Heron Washington DO    Patient location:  ED  Previous ECG:     Previous ECG:  Compared to current    Comparison ECG info:  1/14/24    Similarity:  Changes noted  Interpretation:     Interpretation: abnormal    Quality:     Tracing quality:  Limited by artifact  Rate:     ECG rate:  104    ECG rate assessment: tachycardic     Rhythm:     Rhythm: sinus tachycardia    Ectopy:     Ectopy: none    QRS:     QRS axis:  Normal  Conduction:     Conduction: normal    ST segments:     ST segments:  Non-specific    Depression:  V6 and II  T waves:     T waves: normal          ED Course  ED Course as of 01/27/24 0434   Fri Jan 26, 2024 2212 Pulse(!): 111   2212 Blood Pressure(!): 173/111   2212 SpO2: 95 %   2235 Independently interpreted patient's EKG showing sinus tachycardia at 104.  Normal axis.  Normal intervals.  No acute ischemic changes.  Artifact noted in V1.  Nonspecific ST changes in 2 and V6   2259 WBC(!): 16.43   2259 Platelet Count(!): 410   2259 Hemoglobin: 14.5   2259 CBC with leukocytosis 16.4, increasing from earlier today of 12.  Hemoglobin stable at 14.5.  Platelets slightly elevated at 410   2314 LACTIC ACID: 1.6  WNL   2314 Pulse: 102   2315 Blood Pressure: 150/67   2343 CT A/P: Small bowel obstruction with focal transition point in the left hemiabdomen and no evidence for perforation.      2349 Sugery contaced for eval given SBO on CT scan   2352 Sodium(!): 132   2352 GLUCOSE(!): 217   2352 Creatinine: 0.76   2352 Lipase(!): <6  wnl   2352 Mild hyponatremia and hypochloremia, otherwise CMP without acute electrolyte abnormality.  Renal function at baseline.  Glucose elevated at 217.  LFTs WNL.   Sat Jan 27, 2024   0109 WBC, UA(!): Innumerable   0109 Epithelial Cells: Occasional   0109 Leukocytes, UA(!): Moderate   0125 Surgery to admit the patient to their service.                              SBIRT 20yo+      Flowsheet Row Most Recent Value   Initial Alcohol Screen: US AUDIT-C     1. How often do you have a drink containing alcohol? 1 Filed at: 01/26/2024 2213   2. How many drinks containing alcohol do you have on a typical day you are drinking?  0 Filed at: 01/26/2024 2213   3a. Male UNDER 65: How often do you have five or more drinks on one occasion? 0 Filed at: 01/26/2024 2213   3b. FEMALE Any Age, or MALE 65+: How often  "do you have 4 or more drinks on one occassion? 0 Filed at: 01/26/2024 2213   Audit-C Score 1 Filed at: 01/26/2024 2213   MELIA: How many times in the past year have you...    Used an illegal drug or used a prescription medication for non-medical reasons? Never Filed at: 01/26/2024 2213                  Medical Decision Making  Patient with history as above presented to triage with CC: \"Patient presents with:  Vomiting: Pt comes via EMS from [facility] acute [facility]. C/o mid abdominal pain and vomiting x2 days. States she hasn't had a BM in 3 days. Denies urinary symptoms/chest pain/fevers.  \"    Hx obtained from pt and daughter    84-year-old female with history of multiple abdominal surgeries presenting with abdominal pain and distention with associated with nausea and vomiting with decreased bowel movements over the past 2 to 3 days.  Afebrile.  No recent sick contacts.  Patient recently started on anticoagulation for new onset A-fib on recent admission.  Patient denies any significant bleeding, hematemesis/hemoptysis, hematochezia.  N.p.o. for the past 24 hours    Plan: Labs, CT abdomen pelvis, n.p.o., antiemetic/pain control, IV fluids    Labs significant for leukocytosis, otherwise unremarkable.  UA still pending at time of admission.  Patient allergic to contrast dye, CT abdomen pelvis without ordered and significant for SBO with focal transition point in the left hemiabdomen without evidence of perforation.  Patient with improvement in her pain after Tylenol, and nausea after Zofran.  General surgery contacted, will admit the patient to their service.      Patient stable. Exam as above.    I have independently ordered, reviewed and interpreted the following: labs and/or imaging studies listed above, EKG above  Reviewed external records including notes, and prior labs/imaging results.    .Differential diagnosis includes small bowel obstruction, UTI/cystitis, intra-abdominal infection (appendicitis, " cholecystitis, diverticulitis), pancreatitis, renal colic, mesenteric ischemia.  Overall presentation is consistent with SBO.     Patient was treated with improvement in symptoms from the following:  Tylenol and Zofran    Disposition: Discussed need for admission for further management and workup with pt and they are agreeable with plan. Discussed case with general surgery, who agreed to admit patient to [facility] IP status.     See ED Course for further MDM.      PLEASE NOTE:  This encounter was completed utilizing the [employer] Direct Speech Voice Recognition Software. Grammatical errors, random word insertions, pronoun errors and incomplete sentences are occasional inherent consequences of the system due to software limitations, ambient noise and hardware issues.These may be missed by proof reading prior to affixing electronic signature. Any questions or concerns about the content, text or information contained within the body of this dictation should be directly addressed to the physician for clarification. Please do not hesitate to call me directly if you have any questions or concerns.      Amount and/or Complexity of Data Reviewed  External Data Reviewed: labs, radiology, ECG and notes.  Labs: ordered. Decision-making details documented in ED Course.  Radiology: ordered. Decision-making details documented in ED Course.  ECG/medicine tests: ordered and independent interpretation performed. Decision-making details documented in ED Course.    Risk  Prescription drug management.  Decision regarding hospitalization.          Disposition  Final diagnoses:   SBO (small bowel obstruction) (HCC)   Nausea and vomiting     Time reflects when diagnosis was documented in both MDM as applicable and the Disposition within this note       Time User Action Codes Description Comment    1/26/2024 11:51 PM Heron Washington Add [K56.609] SBO (small bowel obstruction) (HCC)     1/26/2024 11:51 PM Heron Washington Add [R11.2] Nausea and  vomiting           ED Disposition       ED Disposition   Admit    Condition   Stable    Date/Time   Sat Jan 27, 2024 0118    Comment   Case was discussed with gen surg and the patient's admission status was agreed to be Admission Status: inpatient status to the service of Dr. Brasher  .               Follow-up Information    None         Patient's Medications   Discharge Prescriptions    No medications on file     No discharge procedures on file.    PDMP Review         Value Time User    PDMP Reviewed  Yes 1/19/2024 10:46 AM Bradly Farnsworth DO             ED Provider  Attending physically available and evaluated Radha Davisman. I managed the patient along with the ED Attending.    Electronically Signed by           Heron Washington DO  01/27/24 0438

## 2024-01-27 NOTE — ED ATTENDING ATTESTATION
1/26/2024  I, Vernell Bhandari MD, saw and evaluated the patient. I have discussed the patient with the resident/non-physician practitioner and agree with the resident's/non-physician practitioner's findings, Plan of Care, and MDM as documented in the resident's/non-physician practitioner's note, except where noted. All available labs and Radiology studies were reviewed.  I was present for key portions of any procedure(s) performed by the resident/non-physician practitioner and I was immediately available to provide assistance.       At this point I agree with the current assessment done in the Emergency Department.  I have conducted an independent evaluation of this patient a history and physical is as follows:      85 yo female with h/o Parkinson's, afib, h/o remote complicated diverticulitis requiring ostomy now reversed, pancreatic cyst, recently seen for CVA like symptoms at acute rehab had n/v x 3 days, belly pain, no diarrhea, is passing gas, last BM 2-3 days ago, abnormal for her.  No  complaints or back pain.  Reports LLQ pain, no radiation.  NO fevers.  On exam pt distended, TTP.  No guarding/rebound.  Labs largely reassuring.  Leukocytosis without banding.  Normal lactate, no AG, normal bicarb.  Imaging concerning for SBO.  Surgery contacted and admitted to their service for further work up and eval as indicated.  HD stable throughout.           ED Course  ED Course as of 01/27/24 0157   Fri Jan 26, 2024   2303 CBC and differential(!)  Elevated WBC without banding, normal h/h, elevated plts   2318 LACTIC ACID: 1.6  wnl   2350 Lipase(!): <6  wnl   2350 Comprehensive metabolic panel(!)  Minimally low sodium, elevated glucose without AG.  Bicarb is normal.  No end organ dysfunction.  Overall reassuring.    Sat Jan 27, 2024   0045 UA w Reflex to Microscopic w Reflex to Culture(!)  Glucosuria, moderate LE, ketones, and innumerable WBC, occasional Epi         Critical Care Time  Procedures

## 2024-01-28 LAB
ANION GAP SERPL CALCULATED.3IONS-SCNC: 6 MMOL/L
BACTERIA UR CULT: NORMAL
BASOPHILS # BLD AUTO: 0.03 THOUSANDS/ÂΜL (ref 0–0.1)
BASOPHILS NFR BLD AUTO: 0 % (ref 0–1)
BUN SERPL-MCNC: 22 MG/DL (ref 5–25)
CALCIUM SERPL-MCNC: 8.5 MG/DL (ref 8.4–10.2)
CHLORIDE SERPL-SCNC: 100 MMOL/L (ref 96–108)
CO2 SERPL-SCNC: 27 MMOL/L (ref 21–32)
CREAT SERPL-MCNC: 0.63 MG/DL (ref 0.6–1.3)
EOSINOPHIL # BLD AUTO: 0.01 THOUSAND/ÂΜL (ref 0–0.61)
EOSINOPHIL NFR BLD AUTO: 0 % (ref 0–6)
ERYTHROCYTE [DISTWIDTH] IN BLOOD BY AUTOMATED COUNT: 14 % (ref 11.6–15.1)
GFR SERPL CREATININE-BSD FRML MDRD: 82 ML/MIN/1.73SQ M
GLUCOSE SERPL-MCNC: 128 MG/DL (ref 65–140)
HCT VFR BLD AUTO: 38 % (ref 34.8–46.1)
HGB BLD-MCNC: 12.6 G/DL (ref 11.5–15.4)
IMM GRANULOCYTES # BLD AUTO: 0.11 THOUSAND/UL (ref 0–0.2)
IMM GRANULOCYTES NFR BLD AUTO: 1 % (ref 0–2)
LYMPHOCYTES # BLD AUTO: 2.08 THOUSANDS/ÂΜL (ref 0.6–4.47)
LYMPHOCYTES NFR BLD AUTO: 12 % (ref 14–44)
MAGNESIUM SERPL-MCNC: 1.7 MG/DL (ref 1.9–2.7)
MCH RBC QN AUTO: 29.9 PG (ref 26.8–34.3)
MCHC RBC AUTO-ENTMCNC: 33.2 G/DL (ref 31.4–37.4)
MCV RBC AUTO: 90 FL (ref 82–98)
MONOCYTES # BLD AUTO: 2.05 THOUSAND/ÂΜL (ref 0.17–1.22)
MONOCYTES NFR BLD AUTO: 12 % (ref 4–12)
NEUTROPHILS # BLD AUTO: 12.98 THOUSANDS/ÂΜL (ref 1.85–7.62)
NEUTS SEG NFR BLD AUTO: 75 % (ref 43–75)
NRBC BLD AUTO-RTO: 0 /100 WBCS
PLATELET # BLD AUTO: 350 THOUSANDS/UL (ref 149–390)
PMV BLD AUTO: 10.2 FL (ref 8.9–12.7)
POTASSIUM SERPL-SCNC: 3.5 MMOL/L (ref 3.5–5.3)
RBC # BLD AUTO: 4.22 MILLION/UL (ref 3.81–5.12)
SODIUM SERPL-SCNC: 133 MMOL/L (ref 135–147)
WBC # BLD AUTO: 17.26 THOUSAND/UL (ref 4.31–10.16)

## 2024-01-28 PROCEDURE — 83735 ASSAY OF MAGNESIUM: CPT

## 2024-01-28 PROCEDURE — C9113 INJ PANTOPRAZOLE SODIUM, VIA: HCPCS | Performed by: SURGERY

## 2024-01-28 PROCEDURE — 85025 COMPLETE CBC W/AUTO DIFF WBC: CPT

## 2024-01-28 PROCEDURE — 80048 BASIC METABOLIC PNL TOTAL CA: CPT

## 2024-01-28 PROCEDURE — 99233 SBSQ HOSP IP/OBS HIGH 50: CPT | Performed by: SURGERY

## 2024-01-28 RX ORDER — SODIUM CHLORIDE 9 MG/ML
125 INJECTION, SOLUTION INTRAVENOUS CONTINUOUS
Status: DISCONTINUED | OUTPATIENT
Start: 2024-01-28 | End: 2024-01-30

## 2024-01-28 RX ORDER — MAGNESIUM SULFATE HEPTAHYDRATE 40 MG/ML
2 INJECTION, SOLUTION INTRAVENOUS ONCE
Status: COMPLETED | OUTPATIENT
Start: 2024-01-28 | End: 2024-01-28

## 2024-01-28 RX ADMIN — CARBIDOPA AND LEVODOPA 1 TABLET: 25; 100 TABLET ORAL at 10:48

## 2024-01-28 RX ADMIN — ONDANSETRON 4 MG: 2 INJECTION INTRAMUSCULAR; INTRAVENOUS at 11:09

## 2024-01-28 RX ADMIN — SODIUM CHLORIDE 125 ML/HR: 0.9 INJECTION, SOLUTION INTRAVENOUS at 13:09

## 2024-01-28 RX ADMIN — CARBIDOPA AND LEVODOPA 1 TABLET: 25; 100 TABLET ORAL at 17:28

## 2024-01-28 RX ADMIN — CARBIDOPA AND LEVODOPA 1 TABLET: 25; 100 TABLET ORAL at 06:39

## 2024-01-28 RX ADMIN — PANTOPRAZOLE SODIUM 40 MG: 40 INJECTION, POWDER, FOR SOLUTION INTRAVENOUS at 11:03

## 2024-01-28 RX ADMIN — TOPICAL ANESTHETIC 2 SPRAY: 200 SPRAY DENTAL; PERIODONTAL at 17:37

## 2024-01-28 RX ADMIN — SODIUM CHLORIDE 125 ML/HR: 0.9 INJECTION, SOLUTION INTRAVENOUS at 21:03

## 2024-01-28 RX ADMIN — ONDANSETRON 4 MG: 2 INJECTION INTRAMUSCULAR; INTRAVENOUS at 21:51

## 2024-01-28 RX ADMIN — OXYBUTYNIN CHLORIDE 5 MG: 5 TABLET ORAL at 10:48

## 2024-01-28 RX ADMIN — TRIMETHOBENZAMIDE HYDROCHLORIDE 200 MG: 100 INJECTION INTRAMUSCULAR at 15:01

## 2024-01-28 RX ADMIN — MAGNESIUM SULFATE HEPTAHYDRATE 2 G: 40 INJECTION, SOLUTION INTRAVENOUS at 11:13

## 2024-01-28 NOTE — PROGRESS NOTES
Progress Note - General Surgery   Radha Vidal 84 y.o. female MRN: [id number]  Unit/Bed#: W -01 Encounter: [id number]    Assessment:  84-year-old female with Parkinson's, atrial fibrillation, multiple surgeries now presenting with SBO    AVSS   cc bilious  WBC 17.2 from 16.8  Plan:  Continue NPO/NGT  IVF  Strict I's and O's  Appropriate home medications for Parkinson's  Analgesia and antiemetic as needed  Pulmonary toilet and I-S  DVT PPx  PT/OT    Subjective/Objective     Subjective: Patient seen and examined.  NAEO.  Patient denies abdominal pain this morning.  She denies bowel function.  No other complaints at this time.    Objective:     Blood pressure 132/71, pulse 64, temperature 98.6 °F (37 °C), resp. rate 16, weight 73.5 kg (162 lb 1.6 oz), SpO2 92%, not currently breastfeeding.,Body mass index is 30.63 kg/m².      Intake/Output Summary (Last 24 hours) at 1/28/2024 0818  Last data filed at 1/28/2024 0641  Gross per 24 hour   Intake 2492.5 ml   Output 550 ml   Net 1942.5 ml       Invasive Devices       Peripheral Intravenous Line  Duration             Peripheral IV 01/26/24 Left;Ventral (anterior) Antecubital 1 day              Drain  Duration             NG/OG/Enteral Tube Nasogastric 16 Fr Right nare 1 day                    Physical Exam:   General - no acute distress, responsive and cooperative  CV - warm, regular rate  Pulm - normal work of breathing, no respiratory distress  Abd -soft, mildly distended, tympanitic, nontender.  No peritonitis  Neuro - m/s grossly intact, cn grossly intact  Ext - moving all extremities

## 2024-01-29 ENCOUNTER — APPOINTMENT (INPATIENT)
Dept: RADIOLOGY | Facility: HOSPITAL | Age: 84
DRG: 389 | End: 2024-01-29
Payer: COMMERCIAL

## 2024-01-29 LAB
ANION GAP SERPL CALCULATED.3IONS-SCNC: 7 MMOL/L
BUN SERPL-MCNC: 14 MG/DL (ref 5–25)
CALCIUM SERPL-MCNC: 7.9 MG/DL (ref 8.4–10.2)
CHLORIDE SERPL-SCNC: 107 MMOL/L (ref 96–108)
CO2 SERPL-SCNC: 26 MMOL/L (ref 21–32)
CREAT SERPL-MCNC: 0.65 MG/DL (ref 0.6–1.3)
ERYTHROCYTE [DISTWIDTH] IN BLOOD BY AUTOMATED COUNT: 14.1 % (ref 11.6–15.1)
GFR SERPL CREATININE-BSD FRML MDRD: 81 ML/MIN/1.73SQ M
GLUCOSE SERPL-MCNC: 99 MG/DL (ref 65–140)
HCT VFR BLD AUTO: 35.1 % (ref 34.8–46.1)
HGB BLD-MCNC: 11.6 G/DL (ref 11.5–15.4)
MAGNESIUM SERPL-MCNC: 1.9 MG/DL (ref 1.9–2.7)
MCH RBC QN AUTO: 29.9 PG (ref 26.8–34.3)
MCHC RBC AUTO-ENTMCNC: 33 G/DL (ref 31.4–37.4)
MCV RBC AUTO: 91 FL (ref 82–98)
PLATELET # BLD AUTO: 311 THOUSANDS/UL (ref 149–390)
PMV BLD AUTO: 10.2 FL (ref 8.9–12.7)
POTASSIUM SERPL-SCNC: 3.3 MMOL/L (ref 3.5–5.3)
RBC # BLD AUTO: 3.88 MILLION/UL (ref 3.81–5.12)
SODIUM SERPL-SCNC: 140 MMOL/L (ref 135–147)
WBC # BLD AUTO: 10.96 THOUSAND/UL (ref 4.31–10.16)

## 2024-01-29 PROCEDURE — 80048 BASIC METABOLIC PNL TOTAL CA: CPT | Performed by: SURGERY

## 2024-01-29 PROCEDURE — 85027 COMPLETE CBC AUTOMATED: CPT | Performed by: SURGERY

## 2024-01-29 PROCEDURE — 83735 ASSAY OF MAGNESIUM: CPT | Performed by: SURGERY

## 2024-01-29 PROCEDURE — 97163 PT EVAL HIGH COMPLEX 45 MIN: CPT

## 2024-01-29 PROCEDURE — C9113 INJ PANTOPRAZOLE SODIUM, VIA: HCPCS | Performed by: SURGERY

## 2024-01-29 PROCEDURE — 99233 SBSQ HOSP IP/OBS HIGH 50: CPT | Performed by: SURGERY

## 2024-01-29 RX ORDER — POTASSIUM CHLORIDE 14.9 MG/ML
20 INJECTION INTRAVENOUS EVERY 6 HOURS
Status: COMPLETED | OUTPATIENT
Start: 2024-01-29 | End: 2024-01-29

## 2024-01-29 RX ADMIN — PANTOPRAZOLE SODIUM 40 MG: 40 INJECTION, POWDER, FOR SOLUTION INTRAVENOUS at 08:01

## 2024-01-29 RX ADMIN — ONDANSETRON 4 MG: 2 INJECTION INTRAMUSCULAR; INTRAVENOUS at 10:55

## 2024-01-29 RX ADMIN — SODIUM CHLORIDE 125 ML/HR: 0.9 INJECTION, SOLUTION INTRAVENOUS at 23:00

## 2024-01-29 RX ADMIN — SODIUM CHLORIDE 125 ML/HR: 0.9 INJECTION, SOLUTION INTRAVENOUS at 05:15

## 2024-01-29 RX ADMIN — Medication 1 SPRAY: at 20:35

## 2024-01-29 RX ADMIN — TOPICAL ANESTHETIC 2 SPRAY: 200 SPRAY DENTAL; PERIODONTAL at 15:46

## 2024-01-29 RX ADMIN — CARBIDOPA AND LEVODOPA 1 TABLET: 25; 100 TABLET ORAL at 08:01

## 2024-01-29 RX ADMIN — POTASSIUM CHLORIDE 20 MEQ: 14.9 INJECTION, SOLUTION INTRAVENOUS at 10:03

## 2024-01-29 RX ADMIN — SODIUM CHLORIDE 125 ML/HR: 0.9 INJECTION, SOLUTION INTRAVENOUS at 13:01

## 2024-01-29 RX ADMIN — POTASSIUM CHLORIDE 20 MEQ: 14.9 INJECTION, SOLUTION INTRAVENOUS at 14:00

## 2024-01-29 RX ADMIN — ENOXAPARIN SODIUM 40 MG: 40 INJECTION SUBCUTANEOUS at 08:00

## 2024-01-29 RX ADMIN — Medication 1 SPRAY: at 12:08

## 2024-01-29 RX ADMIN — OXYBUTYNIN CHLORIDE 5 MG: 5 TABLET ORAL at 08:01

## 2024-01-29 RX ADMIN — Medication 1 SPRAY: at 14:00

## 2024-01-29 RX ADMIN — CARBIDOPA AND LEVODOPA 1 TABLET: 25; 100 TABLET ORAL at 12:08

## 2024-01-29 RX ADMIN — CARBIDOPA AND LEVODOPA 1 TABLET: 25; 100 TABLET ORAL at 15:46

## 2024-01-29 NOTE — PLAN OF CARE
Problem: Potential for Falls  Goal: Patient will remain free of falls  Description: INTERVENTIONS:  - Educate patient/family on patient safety including physical limitations  - Instruct patient to call for assistance with activity   - Consult OT/PT to assist with strengthening/mobility   - Keep Call bell within reach  - Keep bed low and locked with side rails adjusted as appropriate  - Keep care items and personal belongings within reach  - Initiate and maintain comfort rounds  - Make Fall Risk Sign visible to staff  - Consider moving patient to room near nurses station  Outcome: Progressing     Problem: PAIN - ADULT  Goal: Verbalizes/displays adequate comfort level or baseline comfort level  Description: Interventions:  - Encourage patient to monitor pain and request assistance  - Assess pain using appropriate pain scale  - Administer analgesics based on type and severity of pain and evaluate response  - Implement non-pharmacological measures as appropriate and evaluate response  - Consider cultural and social influences on pain and pain management  - Notify physician/advanced practitioner if interventions unsuccessful or patient reports new pain  Outcome: Progressing     Problem: INFECTION - ADULT  Goal: Absence or prevention of progression during hospitalization  Description: INTERVENTIONS:  - Assess and monitor for signs and symptoms of infection  - Monitor lab/diagnostic results  - Monitor all insertion sites, i.e. indwelling lines, tubes, and drains  - Monitor endotracheal if appropriate and nasal secretions for changes in amount and color  - [place] appropriate cooling/warming therapies per order  - Administer medications as ordered  - Instruct and encourage patient and family to use good hand hygiene technique  - Identify and instruct in appropriate isolation precautions for identified infection/condition  Outcome: Progressing  Goal: Absence of fever/infection during neutropenic period  Description:  INTERVENTIONS:  - Monitor WBC    Outcome: Progressing     Problem: SAFETY ADULT  Goal: Patient will remain free of falls  Description: INTERVENTIONS:  - Educate patient/family on patient safety including physical limitations  - Instruct patient to call for assistance with activity   - Consult OT/PT to assist with strengthening/mobility   - Keep Call bell within reach  - Keep bed low and locked with side rails adjusted as appropriate  - Keep care items and personal belongings within reach  - Initiate and maintain comfort rounds  - Make Fall Risk Sign visible to staff  - Apply yellow socks and bracelet for high fall risk patients  - Consider moving patient to room near nurses station  Outcome: Progressing  Goal: Maintain or return to baseline ADL function  Description: INTERVENTIONS:  -  Assess patient's ability to carry out ADLs; assess patient's baseline for ADL function and identify physical deficits which impact ability to perform ADLs (bathing, care of mouth/teeth, toileting, grooming, dressing, etc.)  - Assess/evaluate cause of self-care deficits   - Assess range of motion  - Assess patient's mobility; develop plan if impaired  - Assess patient's need for assistive devices and provide as appropriate  - Encourage maximum independence but intervene and supervise when necessary  - Involve family in performance of ADLs  - Assess for home care needs following discharge   - Consider OT consult to assist with ADL evaluation and planning for discharge  - Provide patient education as appropriate  Outcome: Progressing  Goal: Maintains/Returns to pre admission functional level  Description: INTERVENTIONS:  - Perform AM-PAC 6 Click Basic Mobility/ Daily Activity assessment daily.  - Set and communicate daily mobility goal to care team and patient/family/caregiver.   - Collaborate with rehabilitation services on mobility goals if consulted  - Out of bed for toileting  - Record patient progress and toleration of activity  level   Outcome: Progressing     Problem: DISCHARGE PLANNING  Goal: Discharge to home or other facility with appropriate resources  Description: INTERVENTIONS:  - Identify barriers to discharge w/patient and caregiver  - Arrange for needed discharge resources and transportation as appropriate  - Identify discharge learning needs (meds, wound care, etc.)  - Arrange for interpretive services to assist at discharge as needed  - Refer to Case Management Department for coordinating discharge planning if the patient needs post-hospital services based on physician/advanced practitioner order or complex needs related to functional status, cognitive ability, or social support system  Outcome: Progressing     Problem: Knowledge Deficit  Goal: Patient/family/caregiver demonstrates understanding of disease process, treatment plan, medications, and discharge instructions  Description: Complete learning assessment and assess knowledge base.  Interventions:  - Provide teaching at level of understanding  - Provide teaching via preferred learning methods  Outcome: Progressing     Problem: Nutrition/Hydration-ADULT  Goal: Nutrient/Hydration intake appropriate for improving, restoring or maintaining nutritional needs  Description: Monitor and assess patient's nutrition/hydration status for malnutrition. Collaborate with interdisciplinary team and initiate plan and interventions as ordered.  Monitor patient's weight and dietary intake as ordered or per policy. Utilize nutrition screening tool and intervene as necessary. Determine patient's food preferences and provide high-protein, high-caloric foods as appropriate.     INTERVENTIONS:  - Monitor oral intake, urinary output, labs, and treatment plans  - Assess nutrition and hydration status and recommend course of action  - Evaluate amount of meals eaten  - Assist patient with eating if necessary   - Allow adequate time for meals  - Recommend/ encourage appropriate diets, oral  nutritional supplements, and vitamin/mineral supplements  - Order, calculate, and assess calorie counts as needed  - Recommend, monitor, and adjust tube feedings and TPN/PPN based on assessed needs  - Assess need for intravenous fluids  - Provide specific nutrition/hydration education as appropriate  - Include patient/family/caregiver in decisions related to nutrition  Outcome: Progressing     Problem: GASTROINTESTINAL - ADULT  Goal: Minimal or absence of nausea and/or vomiting  Description: INTERVENTIONS:  - Administer IV fluids if ordered to ensure adequate hydration  - Maintain NPO status until nausea and vomiting are resolved  - Nasogastric tube if ordered  - Administer ordered antiemetic medications as needed  - Provide nonpharmacologic comfort measures as appropriate  - Advance diet as tolerated, if ordered  - Consider nutrition services referral to assist patient with adequate nutrition and appropriate food choices  Outcome: Progressing  Goal: Maintains or returns to baseline bowel function  Description: INTERVENTIONS:  - Assess bowel function  - Encourage oral fluids to ensure adequate hydration  - Administer IV fluids if ordered to ensure adequate hydration  - Administer ordered medications as needed  - Encourage mobilization and activity  - Consider nutritional services referral to assist patient with adequate nutrition and appropriate food choices  Outcome: Progressing

## 2024-01-29 NOTE — UTILIZATION REVIEW
Initial Clinical Review    Admission: Date/Time/Statement:   Admission Orders (From admission, onward)       Ordered        01/27/24 0121  Inpatient Admission  Once                          Orders Placed This Encounter   Procedures    Inpatient Admission     Standing Status:   Standing     Number of Occurrences:   1     Order Specific Question:   Level of Care     Answer:   Med Surg [16]     Order Specific Question:   Estimated length of stay     Answer:   More than 2 Midnights     Order Specific Question:   Certification     Answer:   I certify that inpatient services are medically necessary for this patient for a duration of greater than two midnights. See H&P and MD Progress Notes for additional information about the patient's course of treatment.     ED Arrival Information       Expected   -    Arrival   1/26/2024 22:07    Acuity   Urgent              Means of arrival   Ambulance    Escorted by   [facility]    Service   Surgery-General    Admission type   Emergency              Arrival complaint   EMS-vomiting             Chief Complaint   Patient presents with    Vomiting     Pt comes via EMS from [facility]. C/o mid abdominal pain and vomiting x2 days. States she hasn't had a BM in 3 days. Denies urinary symptoms/chest pain/fevers.       Initial Presentation: 84 y.o. 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 Possibly passed gas yesterday. Not been able to take in more than small amounts of liquids and soups since symptoms started. On exam, tachycardic, BP elevated , abdomen soft, mild distention,  tenderness in mid abdomen   . Labs - elevated WBC, low NA. UA innumerable WBC, mod leuks. CT A/P shows SBO . Pt given IVF, IV antiemetic in ED. Admitted as Inpatient 1/27 bt general sx service with SBO. Plan - NPO, IVF, NGT . PT/OT .       1/27- Pt remains NPO, IVFinfusing . IV PPI . NGT in place , 450 ml drainage  since placed . Abdomen soft, rounded. Labs in am .     Date: 1/28    Day 3: Has surpassed a 2nd midnight with active treatments and services, which include :  NGT with 550 ml drainage past 24 hrs . Denies abdominal pain this morning.  She denies bowel function.  Abdomen soft, mildly distended, tympanitic  WBC uip to 17.2 from 16.8 .Mag 1.7- repletion ordered. Na 133.  Pt remains NPO with NGT. Strict I/O . IVF infusing . Await PT/OT . Labs in am .     ED Triage Vitals [01/26/24 2210]   Temperature Pulse Respirations Blood Pressure SpO2   98.2 °F (36.8 °C) (!) 111 17 (!) 173/111 95 %      Temp Source Heart Rate Source Patient Position - Orthostatic VS BP Location FiO2 (%)   Oral Monitor Sitting Right arm --      Pain Score       4          Wt Readings from Last 1 Encounters:   01/26/24 73.5 kg (162 lb 1.6 oz)     Additional Vital Signs:   Date/Time Temp Pulse Resp BP MAP (mmHg) SpO2   01/28/24 21:37:50 100 °F (37.8 °C) 68 12 123/66 85 90 %   01/28/24 2100 100.2 °F (37.9 °C) -- -- -- -- --   01/28/24 15:58:59 99 °F (37.2 °C) 72 12 154/73 100 94 %   01/28/24 07:54:36 98.6 °F (37 °C) 64 16 132/71 91 92 %   01/27/24 21:41:27 99.3 °F (37.4 °C) 72 12 141/81 101 90 %   01/27/24 2100 -- -- -- -- -- --   01/27/24 17:49:44 99.3 °F (37.4 °C) 92 -- 151/86 108 92 %   01/27/24 17:48:39 -- 88 12 151/86 108 93 %   01/27/24 1600 -- 77 18 148/67 96 92 %   01/27/24 1533 -- 102 18 151/72 103 92 %   01/27/24 1050 -- 100 17 155/68 -- 91 %     Pertinent Labs/Diagnostic Test Results:    1/26 ECG- Sinus tachycardia  Nonspecific ST and T wave abnormality  XR chest 1 view portable   Final Result by Maddi Saldana MD (01/27 1032)      No acute  cardiopulmonary disease.      NG tube in stomach.         Workstation performed: [id number]         CT abdomen pelvis wo contrast   Final Result by Raffaele Walls MD (01/27 0744)      High-grade mid small bowel obstruction with a sharp transition from distended to nondistended loops in the anterior left upper quadrant at the site of an angulated loop almost certainly representing an adhesion.      Findings are consistent with the preliminary report from [facility] which was provided shortly after completion of the exam.         Workstation performed: [id number]               Results from last 7 days   Lab Units 01/29/24 0445 01/28/24  0504 01/27/24 0644 01/26/24 2246   WBC Thousand/uL 10.96* 17.26* 16.89* 16.43*   HEMOGLOBIN g/dL 11.6 12.6 13.5 14.5   HEMATOCRIT % 35.1 38.0 39.9 43.2   PLATELETS Thousands/uL 311 350 394* 410*   NEUTROS ABS Thousands/µL  --  12.98* 13.36* 13.71*         Results from last 7 days   Lab Units 01/29/24 0445 01/28/24  0504 01/27/24  0644 01/26/24  2246   SODIUM mmol/L 140 133* 133* 132*   POTASSIUM mmol/L 3.3* 3.5 3.7 4.0   CHLORIDE mmol/L 107 100 93* 93*   CO2 mmol/L 26 27 32 28   ANION GAP mmol/L 7 6 8 11   BUN mg/dL 14 22 27* 23   CREATININE mg/dL 0.65 0.63 0.72 0.76   EGFR ml/min/1.73sq m 81 82 76 71   CALCIUM mg/dL 7.9* 8.5 9.5 10.1   MAGNESIUM mg/dL 1.9 1.7* 1.6*  --    PHOSPHORUS mg/dL  --   --  2.8  --      Results from last 7 days   Lab Units 01/26/24  2246   AST U/L 20   ALT U/L 13   ALK PHOS U/L 90   TOTAL PROTEIN g/dL 7.1   ALBUMIN g/dL 4.5   TOTAL BILIRUBIN mg/dL 0.75         Results from last 7 days   Lab Units 01/29/24 0445 01/28/24  0504 01/27/24  0644 01/26/24  2246   GLUCOSE RANDOM mg/dL 99 128 185* 217*                       Results from last 7 days   Lab Units 01/26/24  2246   LACTIC ACID mmol/L 1.6                   Results from last 7 days   Lab Units 01/26/24  2246   LIPASE u/L <6*                 Results from last 7 days   Lab Units 01/27/24  0037    CLARITY UA  Clear   COLOR UA  Yellow   SPEC GRAV UA  1.031*   PH UA  6.0   GLUCOSE UA mg/dl 70 (7/100%)*   KETONES UA mg/dl 10 (1+)*   BLOOD UA  Negative   PROTEIN UA mg/dl 30 (1+)*   NITRITE UA  Negative   BILIRUBIN UA  Negative   UROBILINOGEN UA (BE) mg/dl <2.0   LEUKOCYTES UA  Moderate*   WBC UA /hpf Innumerable*   RBC UA /hpf 2-4*   BACTERIA UA /hpf Occasional   EPITHELIAL CELLS WET PREP /hpf Occasional                                 Results from last 7 days   Lab Units 01/27/24  0037   URINE CULTURE  >100,000 cfu/ml                   ED Treatment:   Medication Administration from 01/26/2024 2207 to 01/27/2024 1743         Date/Time Order Dose Route Action     01/26/2024 2248 EST ondansetron (ZOFRAN) injection 4 mg 4 mg Intravenous Given     01/26/2024 2308 EST acetaminophen (Ofirmev) injection 1,000 mg 0 mg Intravenous Stopped     01/26/2024 2253 EST acetaminophen (Ofirmev) injection 1,000 mg 1,000 mg Intravenous New Bag     01/27/2024 0134 EST lactated ringers bolus 1,000 mL 0 mL Intravenous Stopped     01/26/2024 2322 EST lactated ringers bolus 1,000 mL 1,000 mL Intravenous New Bag     01/27/2024 0029 EST ondansetron (ZOFRAN) injection 4 mg 4 mg Intravenous Given     01/27/2024 0221 EST lactated ringers infusion 125 mL/hr Intravenous New Bag     01/27/2024 1606 EST carbidopa-levodopa (SINEMET)  mg per tablet 1 tablet 1 tablet Oral Given     01/27/2024 1039 EST carbidopa-levodopa (SINEMET)  mg per tablet 1 tablet 1 tablet Oral Given     01/27/2024 0650 EST carbidopa-levodopa (SINEMET)  mg per tablet 1 tablet 1 tablet Oral Given     01/27/2024 0936 EST oxybutynin (DITROPAN) tablet 5 mg 5 mg Oral Given     01/27/2024 0940 EST ondansetron (ZOFRAN) injection 4 mg 4 mg Intravenous Given     01/27/2024 0935 EST enoxaparin (LOVENOX) subcutaneous injection 40 mg 40 mg Subcutaneous Given     01/27/2024 0936 EST pantoprazole (PROTONIX) injection 40 mg 40 mg Intravenous Given          Past Medical  History:   Diagnosis Date    Actinic keratosis 2016    Acute midline low back pain without sciatica 2020    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     Benign neoplasm of skin     Cancer (HCC)     skin    Chest pain     Claustrophobia     Diverticulitis     Diverticulitis     Fracture     L1-L2    GERD (gastroesophageal reflux disease)     H. pylori infection 2020    Muscle weakness     Nonmelanoma skin cancer     last assessed 2017    Palpitations     Pancreatic cyst     Seasonal allergies     Seborrheic keratosis 2014    Sleep apnea     no cpap    Spontaneous      without mention of complications     Squamous cell carcinoma of forehead 2014    Syncope      Present on Admission:  **None**      Admitting Diagnosis: Vomiting [R11.10]  SBO (small bowel obstruction) (HCC) [K56.609]  Nausea and vomiting [R11.2]  Age/Sex: 84 y.o. female  Admission Orders:  Scheduled Medications:  carbidopa-levodopa, 1 tablet, Oral, TID AC  enoxaparin, 40 mg, Subcutaneous, Daily  oxybutynin, 5 mg, Oral, Daily  pantoprazole, 40 mg, Intravenous, Q24H DARI    trimethobenzamide (TIGAN) IM injection 200 mg  Dose: 200 mg  Freq: Once Route: IM  Start: 24 1515 End: 24 1501   magnesium sulfate 2 g/50 mL IVPB (premix) 2 g  Dose: 2 g  Freq: Once Route: IV  Last Dose: Stopped (24)  Start: 24 End: 24   benzocaine (HURRICAINE) 20 % mucosal spray 2 spray  Dose: 2 spray  Freq: Once Route: Mucosal  Start: 24 163 End: 24 173   potassium chloride 20 mEq IVPB (premix)  Dose: 20 mEq  Freq: Every 6 hours Route: IV  Start: 24 0845 End: 24         Continuous IV Infusions:  sodium chloride, 125 mL/hr, Intravenous, Continuous Start: 24     lactated ringers infusion  Rate: 125 mL/hr Dose: 125 mL/hr  Freq: Continuous Route: IV  Indications of Use: IV Hydration  Last Dose: Stopped (24 191)  Start: 24  0115 End: 01/28/24 0826   PRN Meds:  acetaminophen, 650 mg, Oral, Q6H PRN  HYDROmorphone, 0.5 mg, Intravenous, Q3H PRN  HYDROmorphone, 0.2 mg, Intravenous, Q3H PRN  ondansetron, 4 mg, Intravenous, Q6H PRN x2 1/28   phenol, 1 spray, Mouth/Throat, Q2H PRN         NPO   NGT to [facility]    Network Utilization Review Department  ATTENTION: Please call with any questions or concerns to [phone] and carefully listen to the prompts so that you are directed to the right person. All voicemails are confidential.   For Discharge needs, contact Care Management DC Support Team at [phone] opt. 2  Send all requests for admission clinical reviews, approved or denied determinations and any other requests to dedicated fax number below belonging to the campus where the patient is receiving treatment. List of dedicated fax numbers for the Facilities:  FACILITY NAME UR FAX NUMBER   ADMISSION DENIALS (Administrative/Medical Necessity) [phone]   DISCHARGE SUPPORT TEAM (NETWORK) [phone]   PARENT CHILD HEALTH (Maternity/NICU/Pediatrics) [phone]   [facility] [phone]   [facility] [phone]   [facility] [phone]   [facility] [phone]   [facility] [phone]   [facility] [phone]   [facility] [phone]   [facility] [phone]   [facility] [phone]   [facility] [phone]   [facility] [phone]   [facility] [phone]

## 2024-01-29 NOTE — PLAN OF CARE
Problem: PAIN - ADULT  Goal: Verbalizes/displays adequate comfort level or baseline comfort level  Description: Interventions:  - Encourage patient to monitor pain and request assistance  - Assess pain using appropriate pain scale  - Administer analgesics based on type and severity of pain and evaluate response  - Implement non-pharmacological measures as appropriate and evaluate response  - Consider cultural and social influences on pain and pain management  - Notify physician/advanced practitioner if interventions unsuccessful or patient reports new pain  Outcome: Progressing     Problem: INFECTION - ADULT  Goal: Absence of fever/infection during neutropenic period  Description: INTERVENTIONS:  - Monitor WBC    Outcome: Progressing     Problem: SAFETY ADULT  Goal: Maintain or return to baseline ADL function  Description: INTERVENTIONS:  -  Assess patient's ability to carry out ADLs; assess patient's baseline for ADL function and identify physical deficits which impact ability to perform ADLs (bathing, care of mouth/teeth, toileting, grooming, dressing, etc.)  - Assess/evaluate cause of self-care deficits   - Assess range of motion  - Assess patient's mobility; develop plan if impaired  - Assess patient's need for assistive devices and provide as appropriate  - Encourage maximum independence but intervene and supervise when necessary  - Involve family in performance of ADLs  - Assess for home care needs following discharge   - Consider OT consult to assist with ADL evaluation and planning for discharge  - Provide patient education as appropriate  Outcome: Progressing     Problem: DISCHARGE PLANNING  Goal: Discharge to home or other facility with appropriate resources  Description: INTERVENTIONS:  - Identify barriers to discharge w/patient and caregiver  - Arrange for needed discharge resources and transportation as appropriate  - Identify discharge learning needs (meds, wound care, etc.)  - Arrange for  interpretive services to assist at discharge as needed  - Refer to Case Management Department for coordinating discharge planning if the patient needs post-hospital services based on physician/advanced practitioner order or complex needs related to functional status, cognitive ability, or social support system  Outcome: Progressing     Problem: Knowledge Deficit  Goal: Patient/family/caregiver demonstrates understanding of disease process, treatment plan, medications, and discharge instructions  Description: Complete learning assessment and assess knowledge base.  Interventions:  - Provide teaching at level of understanding  - Provide teaching via preferred learning methods  Outcome: Progressing     Problem: GASTROINTESTINAL - ADULT  Goal: Minimal or absence of nausea and/or vomiting  Description: INTERVENTIONS:  - Administer IV fluids if ordered to ensure adequate hydration  - Maintain NPO status until nausea and vomiting are resolved  - Nasogastric tube if ordered  - Administer ordered antiemetic medications as needed  - Provide nonpharmacologic comfort measures as appropriate  - Advance diet as tolerated, if ordered  - Consider nutrition services referral to assist patient with adequate nutrition and appropriate food choices  Outcome: Progressing     Problem: GASTROINTESTINAL - ADULT  Goal: Maintains or returns to baseline bowel function  Description: INTERVENTIONS:  - Assess bowel function  - Encourage oral fluids to ensure adequate hydration  - Administer IV fluids if ordered to ensure adequate hydration  - Administer ordered medications as needed  - Encourage mobilization and activity  - Consider nutritional services referral to assist patient with adequate nutrition and appropriate food choices  Outcome: Progressing

## 2024-01-29 NOTE — PROGRESS NOTES
Progress Note - General Surgery   Radha Vidal 84 y.o. female MRN: [id number]  Unit/Bed#: W -01 Encounter: [id number]    Assessment:  83 yo F with SBO  Tmax 100.2 overnight, OVSS  WBC 11 (17)    UOP: 675 cc  NGT: 290 cc thin very light brown (550 cc)    Urine cx: no bacterial growth    Plan:  - consider gastrografin challenge today vs NGT removal   - replete elytes as needed  - prn pain, antiemetic regimen  - dvt ppx  - encourage oob, ambulation in halls  - strict I/os  - [facility] score: 15, f/u PT/OT    Subjective/Objective     Subjective: improving today. Denies abd pain, n/v. Started passing flatus overnight    Objective:     Blood pressure 123/66, pulse 68, temperature 100 °F (37.8 °C), temperature source Oral, resp. rate 12, weight 73.5 kg (162 lb 1.6 oz), SpO2 90%, not currently breastfeeding.,Body mass index is 30.63 kg/m².      Intake/Output Summary (Last 24 hours) at 1/29/2024 0917  Last data filed at 1/29/2024 0515  Gross per 24 hour   Intake 2242.5 ml   Output 615 ml   Net 1627.5 ml       Invasive Devices       Peripheral Intravenous Line  Duration             Peripheral IV 01/26/24 Left;Ventral (anterior) Antecubital 2 days              Drain  Duration             NG/OG/Enteral Tube Nasogastric 16 Fr Right nare 2 days                    Physical Exam  Vitals and nursing note reviewed.   Constitutional:       General: She is not in acute distress.     Appearance: Normal appearance. She is normal weight. She is not ill-appearing, toxic-appearing or diaphoretic.   HENT:      Head: Normocephalic and atraumatic.   Eyes:      Extraocular Movements: Extraocular movements intact.   Cardiovascular:      Rate and Rhythm: Normal rate.   Pulmonary:      Effort: Pulmonary effort is normal. No respiratory distress.   Abdominal:      General: There is distension.      Palpations: Abdomen is soft.      Tenderness: There is no abdominal tenderness. There is no guarding or rebound.      Comments: Soft, minimally  distended. NT   Musculoskeletal:         General: No swelling or tenderness.   Skin:     General: Skin is warm.      Capillary Refill: Capillary refill takes less than 2 seconds.   Neurological:      General: No focal deficit present.      Mental Status: She is alert and oriented to person, place, and time.   Psychiatric:         Mood and Affect: Mood normal.         Behavior: Behavior normal.            Scheduled Meds:  Current Facility-Administered Medications   Medication Dose Route Frequency Provider Last Rate    acetaminophen  650 mg Oral Q6H PRN Danny Brasher MD      carbidopa-levodopa  1 tablet Oral TID AC Danny Brasher MD      enoxaparin  40 mg Subcutaneous Daily Morteza Joseph MD      HYDROmorphone  0.5 mg Intravenous Q3H PRN Morteza Joseph MD      HYDROmorphone  0.2 mg Intravenous Q3H PRN Morteza Joseph MD      ondansetron  4 mg Intravenous Q6H PRN Morteza Joseph MD      oxybutynin  5 mg Oral Daily Danny Brasher MD      pantoprazole  40 mg Intravenous Q24H DARI Morteza Joseph MD      phenol  1 spray Mouth/Throat Q2H PRN Morteza Joseph MD      potassium chloride  20 mEq Intravenous Q6H Yamilka Ahn PA-C      sodium chloride  125 mL/hr Intravenous Continuous Morteza Joseph  mL/hr (01/29/24 0515)     Continuous Infusions:sodium chloride, 125 mL/hr, Last Rate: 125 mL/hr (01/29/24 0515)      PRN Meds:.  acetaminophen    HYDROmorphone    HYDROmorphone    ondansetron    phenol      Lab, Imaging and other studies:I have personally reviewed pertinent lab results.  , CBC:   Lab Results   Component Value Date    WBC 10.96 (H) 01/29/2024    HGB 11.6 01/29/2024    HCT 35.1 01/29/2024    MCV 91 01/29/2024     01/29/2024    RBC 3.88 01/29/2024    MCH 29.9 01/29/2024    MCHC 33.0 01/29/2024    RDW 14.1 01/29/2024    MPV 10.2 01/29/2024   , CMP:   Lab Results   Component Value Date    SODIUM 140 01/29/2024    K 3.3 (L) 01/29/2024     01/29/2024    CO2 26  "01/29/2024    BUN 14 01/29/2024    CREATININE 0.65 01/29/2024    CALCIUM 7.9 (L) 01/29/2024    EGFR 81 01/29/2024   , Coagulation: No results found for: \"PT\", \"INR\", \"APTT\", Urinalysis: No results found for: \"COLORU\", \"CLARITYU\", \"SPECGRAV\", \"PHUR\", \"LEUKOCYTESUR\", \"NITRITE\", \"PROTEINUA\", \"GLUCOSEU\", \"KETONESU\", \"BILIRUBINUR\", \"BLOODU\"  VTE Pharmacologic Prophylaxis: Enoxaparin (Lovenox)  VTE Mechanical Prophylaxis: sequential compression device      Allie Solano PA-C  1/29/2024 9:17 AM    "

## 2024-01-29 NOTE — PHYSICAL THERAPY NOTE
Physical Therapy Evaluation:    2 forms of pt ID verified:name,birthdate and pt ID [email or web address]    Patient's Name: Radha Vidal    Admitting Diagnosis  Vomiting [R11.10]  SBO (small bowel obstruction) ([facility]) [K56.609]  Nausea and vomiting [R11.2]    Problem List  Patient Active Problem List   Diagnosis    OAB (overactive bladder)    Parkinson's disease    Primary insomnia    History of skin cancer    Seasonal allergic rhinitis    Impaired fasting glucose    Mood disturbance    Obesity (BMI 30-39.9)    Claustrophobia    Mitral valve disorder    Menopause ovarian failure    Vitamin D deficiency    Dysphagia    Multiple thyroid nodules    Gastro-esophageal reflux disease without esophagitis    Osteopenia    Chronic idiopathic constipation    History of adenomatous polyp of colon    Tremor    Trochanteric bursitis, right hip    IPMN (intraductal papillary mucinous neoplasm)    Cherry angioma    Dyspnea on exertion    Postablative hypothyroidism    Sleep apnea    Closed compression fracture of body of L1 vertebra ([facility])    Cerebrovascular disease    Age-related osteoporosis without current pathological fracture    Urge incontinence    Encounter for geriatric assessment    s/p distal pancreatectomy/splenectomy    Stroke-like symptoms    New onset a-fib ([facility])    Type 2 diabetes mellitus ([facility])    Ambulatory dysfunction    Ear pressure, right    Nausea       Past Medical History  Past Medical History:   Diagnosis Date    Actinic keratosis 03/11/2016    Acute midline low back pain without sciatica 11/19/2020    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     Benign neoplasm of skin     Cancer (HCC)     skin    Chest pain     Claustrophobia     Diverticulitis     Diverticulitis     Fracture     L1-L2    GERD (gastroesophageal reflux disease)     H. pylori infection 11/24/2020    Muscle weakness     Nonmelanoma skin cancer     last assessed 21mar2017    Palpitations     Pancreatic cyst     Seasonal  allergies     Seborrheic keratosis 2014    Sleep apnea     no cpap    Spontaneous      without mention of complications     Squamous cell carcinoma of forehead 2014    Syncope        Past Surgical History  Past Surgical History:   Procedure Laterality Date    ABDOMINAL ADHESION SURGERY N/A 2023    Procedure: LYSIS ADHESIONS;  Surgeon: Kyle Julien MD;  [facility];  Service: Surgical Oncology    BOTOX INJECTION N/A 2017    Procedure: CYSTOSCOPY; BLADDER BOTOX 100 UNITS ;  Surgeon: Juan Edward MD;  [facility];  Service:     BOWEL RESECTION      related ot diverticulitis    CARDIAC CATHETERIZATION      CARPAL TUNNEL RELEASE Right     COLONOSCOPY  2019    COLOSTOMY      COLOSTOMY CLOSURE      CYSTOSCOPY  2021    DISTAL PANCREATECTOMY N/A 2023    Procedure: DISTAL PANCREATECTOMY;  Surgeon: Kyle Julien MD;  [facility];  Service: Surgical Oncology    FOOT SURGERY Left     neuroma    HYSTERECTOMY      MYRINGOTOMY W/ TUBES Right 2023    office procedure - Dr. Grace    NASAL SINUS SURGERY  age 39    [place]    PANCREATIC CYST BIOPSY  2023    GA CYSTOURETHROSCOPY N/A 2018    Procedure: CYSTOSCOPY WITH BOTOX;  Surgeon: Juan Edward MD;  [facility];  Service: Urology    GA ESOPHAGOGASTRODUODENOSCOPY TRANSORAL DIAGNOSTIC N/A 2019    Procedure: ESOPHAGOGASTRODUODENOSCOPY (EGD);  Surgeon: Edu Dickerson DO;  Location: [facility];  Service: Gastroenterology    SPLENECTOMY, TOTAL N/A 2023    Procedure: SPLENECTOMY;  Surgeon: Kyle Julien MD;  [facility];  Service: Surgical Oncology    TONSILLECTOMY  age 49    UPPER GASTROINTESTINAL ENDOSCOPY  2019 1102   PT Last Visit   PT Visit Date 24   Note Type   Note type Evaluation   Pain Assessment   Pain Assessment Tool 0-10   Pain Score 4   Pain Location/Orientation Orientation: Mid;Orientation: Lower;Location:  "Abdomen   Hospital Pain Intervention(s) Repositioned;Ambulation/increased activity;Elevated;Emotional support;Rest   Restrictions/Precautions   Other Precautions Pain;Fall Risk;Telemetry;Multiple lines;Chair Alarm;Bed Alarm;Hard of hearing   Home Living   Type of Home House   Home Layout One level;Performs ADLs on one level;Able to live on main level with bedroom/bathroom  (pt reports no steps to enter)   Home Equipment Other (Comment)  (no DME prior to recent rehab and hospital admission; completely (I); per pt, \"my daughter has a walker and cane if I need them\".)   Additional Comments Pt was receiving inpt rehab at [facility]. Prior to rehab, pt was living (I)ly at home alone with ability to perforl mobility without DME and (I) with ADLs and IADLs. Pt reports no recent falls.   Prior Function   Level of [place] Independent with ADLs;Independent with functional mobility;Independent with IADLS   Lives With Alone   Receives Help From Family  (as needed per pt)   IADLs Independent with driving;Independent with meal prep;Independent with medication management   Falls in the last 6 months 0  (per pt)   General   Additional Pertinent History PD, Afib, SBO, (+)vomitting and nausea   Family/Caregiver Present No   Cognition   Overall Cognitive Status WFL   Arousal/Participation Cooperative   Orientation Level Oriented X4   Following Commands Follows one step commands with increased time or repetition   Comments 2* Teller and pain   Subjective   Subjective Pt sitting upright in recliner reports (+)nausea. Nursing made aware. Pt willing and agreeable to work with PT and to participate in therapy intervention; \"I can walk and maybe try to go to the bathroom again\".   RLE Assessment   RLE Assessment   (at least 4/5 grossly throughout)   LLE Assessment   LLE Assessment   (at least 4/5 grossly throughout)   Vision-Basic Assessment   Current Vision Wears glasses all the time   Coordination "   Movements are Fluid and Coordinated 0   Coordination and Movement Description pain, ataxic and unsteady gait and movement patterns,B lateral sway,rigid and guarded gait pattern   Sensation WFL   Light Touch   RLE Light Touch Grossly intact   LLE Light Touch Grossly intact   Bed Mobility   Supine to Sit Unable to assess  (pt located in static sit pre and post mobility)   Transfers   Sit to Stand 4  Minimal assistance   Additional items Assist x 1;Armrests;Increased time required;Verbal cues   Stand to Sit 4  Minimal assistance   Additional items Assist x 1;Armrests;Increased time required;Verbal cues   Toilet transfer 4  Minimal assistance   Additional items Assist x 1;Increased time required;Verbal cues;Standard toilet   Ambulation/Elevation   Gait pattern Poor UE support;Antalgic;Narrow MINDY;Forward Flexion;Shuffling;Inconsistent yuliet;Foward flexed;Short stride;Ataxia   Gait Assistance 4  Minimal assist   Additional items Assist x 1;Verbal cues   Assistive Device Rolling walker   Distance pt agreeable to ambulate a total of 40 feet with use of RW and static sit rest breaks due to fatigue and request to use the BR   Balance   Static Sitting Fair +  (chair alarm intact prior ro leaving pts room)   Dynamic Sitting Poor +   Static Standing Poor +   Dynamic Standing Poor +   Ambulatory Poor +   Endurance Deficit   Endurance Deficit Yes   Endurance Deficit Description weakness,fatigue,limited activity tolerance   Activity Tolerance   Activity Tolerance Patient limited by fatigue;Patient limited by pain  (fair)   Nurse Made Aware yes   Assessment   Prognosis Fair   Problem List Decreased strength;Decreased endurance;Impaired balance;Decreased mobility;Decreased safety awareness;Impaired hearing;Decreased skin integrity;Pain   Assessment Pt is a 85 yo female admitted to [facility] 2* SBO, (+)V and N,c/o mid ABD pain and vomitting x2 days, h/o PD and Afib. Pt was receiving inpt rehab services at [place] and reports ambulating with A and use of RW. Prior to post acute rehab facility, pt was (I) without use of DME,(I) with ADLs and IADLs and living alone in 1  and no LOIDA. Pt reports no recent falls. Pt currently is not at functional mobility baseline, needs A for mobility and safety with use of RW, multiple lines, masimo monitoring,reports ABD pain and unsteady gait and movement patterns. Pt demonstrates minimal deficits during functional mobility and gait including dec endurance, dec balance, dec BLE strength, ataxic and unsteady gait and movement patterns, reports ABD pain and needs min Ax1 for GT with use of RW and TT. Pt would cont to benefit from skilled inpt PT services to maximize functional independence and to dec caregiver burden upon being DC from the hospital.   Barriers to Discharge Decreased caregiver support   Goals   Patient Goals to get better and to eventually go home   STG Expiration Date 02/08/24   Short Term Goal #1 in 7-10 days:  (1) Pt will be able to ambulate greater than 100 feet with use of RW on various surfaces needing S level of A in order to A pt to return to PLOF, (2) activity tolerance:45 mins/45mins, (3) pt will be able to perform sit to stand transfers needing S level of A to and from various surfaces consistently in order to return to PLOF, (4) pt will be able to perform BM needing S level of A to A pt to return to PLOF, (5) (I) with BLE therapeutic ex HEP in various positions to A pt to inc balance,strength,mobility,endurance and to A to dec pain, (6) inc balance 1/2 grade in order to dec fall risk,  (7) cont to provide pt and pt family education for safe D/C planning, (8) inc BLE strength 1/2 to 1 full grade in order to A pt to inc balance,strength,mobility,endurance and to A to dec pain   PT Treatment Day 0   Plan   Treatment/Interventions Functional transfer training;LE strengthening/ROM;Therapeutic exercise;Endurance training;Patient/family training;Equipment  eval/education;Bed mobility;Gait training;Continued evaluation;Spoke to nursing   PT Frequency 3-5x/wk   Discharge Recommendation   Rehab Resource Intensity Level, PT II (Moderate Resource Intensity)  (to return to [facility] post acute rehab facility)   Equipment Recommended Walker   AM-PAC Basic Mobility Inpatient   Turning in Flat Bed Without Bedrails 3   Lying on Back to Sitting on Edge of Flat Bed Without Bedrails 3   Moving Bed to Chair 3   Standing Up From Chair Using Arms 3   Walk in Room 3   Climb 3-5 Stairs With Railing 2   Basic Mobility Inpatient Raw Score 17   Basic Mobility Standardized Score 39.67   Highest Level Of Mobility   JH-HLM Goal 5: Stand one or more mins   JH-HLM Achieved 7: Walk 25 feet or more           @Nya Lund, PT, DPT@

## 2024-01-29 NOTE — PLAN OF CARE
Problem: PHYSICAL THERAPY ADULT  Goal: Performs mobility at highest level of function for planned discharge setting.  See evaluation for individualized goals.  Description: Treatment/Interventions: Functional transfer training, LE strengthening/ROM, Therapeutic exercise, Endurance training, Patient/family training, Equipment eval/education, Bed mobility, Gait training, Continued evaluation, Spoke to nursing  Equipment Recommended: Walker       See flowsheet documentation for full assessment, interventions and recommendations.  Note: Prognosis: Fair  Problem List: Decreased strength, Decreased endurance, Impaired balance, Decreased mobility, Decreased safety awareness, Impaired hearing, Decreased skin integrity, Pain  Assessment: Pt is a 83 yo female admitted to [facility] 2* SBO, (+)V and N,c/o mid ABD pain and vomitting x2 days, h/o PD and Afib. Pt was receiving inpt rehab services at [place] [facility] and reports ambulating with A and use of RW. Prior to post acute rehab facility, pt was (I) without use of DME,(I) with ADLs and IADLs and living alone in 1  and no LOIDA. Pt reports no recent falls. Pt currently is not at functional mobility baseline, needs A for mobility and safety with use of RW, multiple lines, masimo monitoring,reports ABD pain and unsteady gait and movement patterns. Pt demonstrates minimal deficits during functional mobility and gait including dec endurance, dec balance, dec BLE strength, ataxic and unsteady gait and movement patterns, reports ABD pain and needs min Ax1 for GT with use of RW and TT. Pt would cont to benefit from skilled inpt PT services to maximize functional independence and to dec caregiver burden upon being DC from the hospital.  Barriers to Discharge: Decreased caregiver support     Rehab Resource Intensity Level, PT: II (Moderate Resource Intensity) (to return to [facility] post acute rehab [facility])    See flowsheet documentation for full assessment.

## 2024-01-30 ENCOUNTER — APPOINTMENT (INPATIENT)
Dept: RADIOLOGY | Facility: HOSPITAL | Age: 84
DRG: 389 | End: 2024-01-30
Payer: COMMERCIAL

## 2024-01-30 LAB
ANION GAP SERPL CALCULATED.3IONS-SCNC: 9 MMOL/L
BASOPHILS # BLD AUTO: 0.04 THOUSANDS/ÂΜL (ref 0–0.1)
BASOPHILS NFR BLD AUTO: 0 % (ref 0–1)
BUN SERPL-MCNC: 14 MG/DL (ref 5–25)
CALCIUM SERPL-MCNC: 7.9 MG/DL (ref 8.4–10.2)
CHLORIDE SERPL-SCNC: 110 MMOL/L (ref 96–108)
CO2 SERPL-SCNC: 22 MMOL/L (ref 21–32)
CREAT SERPL-MCNC: 0.61 MG/DL (ref 0.6–1.3)
EOSINOPHIL # BLD AUTO: 0.03 THOUSAND/ÂΜL (ref 0–0.61)
EOSINOPHIL NFR BLD AUTO: 0 % (ref 0–6)
ERYTHROCYTE [DISTWIDTH] IN BLOOD BY AUTOMATED COUNT: 14.3 % (ref 11.6–15.1)
GFR SERPL CREATININE-BSD FRML MDRD: 82 ML/MIN/1.73SQ M
GLUCOSE SERPL-MCNC: 73 MG/DL (ref 65–140)
HCT VFR BLD AUTO: 35.8 % (ref 34.8–46.1)
HGB BLD-MCNC: 12 G/DL (ref 11.5–15.4)
IMM GRANULOCYTES # BLD AUTO: 0.17 THOUSAND/UL (ref 0–0.2)
IMM GRANULOCYTES NFR BLD AUTO: 1 % (ref 0–2)
LYMPHOCYTES # BLD AUTO: 2.6 THOUSANDS/ÂΜL (ref 0.6–4.47)
LYMPHOCYTES NFR BLD AUTO: 18 % (ref 14–44)
MCH RBC QN AUTO: 30.9 PG (ref 26.8–34.3)
MCHC RBC AUTO-ENTMCNC: 33.5 G/DL (ref 31.4–37.4)
MCV RBC AUTO: 92 FL (ref 82–98)
MONOCYTES # BLD AUTO: 1.49 THOUSAND/ÂΜL (ref 0.17–1.22)
MONOCYTES NFR BLD AUTO: 10 % (ref 4–12)
NEUTROPHILS # BLD AUTO: 9.98 THOUSANDS/ÂΜL (ref 1.85–7.62)
NEUTS SEG NFR BLD AUTO: 71 % (ref 43–75)
NRBC BLD AUTO-RTO: 0 /100 WBCS
PLATELET # BLD AUTO: 304 THOUSANDS/UL (ref 149–390)
PMV BLD AUTO: 11.1 FL (ref 8.9–12.7)
POTASSIUM SERPL-SCNC: 3.4 MMOL/L (ref 3.5–5.3)
RBC # BLD AUTO: 3.88 MILLION/UL (ref 3.81–5.12)
SODIUM SERPL-SCNC: 141 MMOL/L (ref 135–147)
WBC # BLD AUTO: 14.31 THOUSAND/UL (ref 4.31–10.16)

## 2024-01-30 PROCEDURE — C9113 INJ PANTOPRAZOLE SODIUM, VIA: HCPCS | Performed by: SURGERY

## 2024-01-30 PROCEDURE — 85025 COMPLETE CBC W/AUTO DIFF WBC: CPT | Performed by: PHYSICIAN ASSISTANT

## 2024-01-30 PROCEDURE — 74018 RADEX ABDOMEN 1 VIEW: CPT

## 2024-01-30 PROCEDURE — 80048 BASIC METABOLIC PNL TOTAL CA: CPT | Performed by: PHYSICIAN ASSISTANT

## 2024-01-30 PROCEDURE — 99233 SBSQ HOSP IP/OBS HIGH 50: CPT | Performed by: SURGERY

## 2024-01-30 RX ORDER — OXYCODONE HYDROCHLORIDE 5 MG/1
5 TABLET ORAL EVERY 4 HOURS PRN
Status: DISCONTINUED | OUTPATIENT
Start: 2024-01-30 | End: 2024-02-03

## 2024-01-30 RX ORDER — ACETAMINOPHEN 325 MG/1
975 TABLET ORAL EVERY 6 HOURS SCHEDULED
Status: DISCONTINUED | OUTPATIENT
Start: 2024-01-30 | End: 2024-02-03

## 2024-01-30 RX ORDER — DEXTROSE MONOHYDRATE, SODIUM CHLORIDE, AND POTASSIUM CHLORIDE 50; 1.49; 4.5 G/1000ML; G/1000ML; G/1000ML
50 INJECTION, SOLUTION INTRAVENOUS CONTINUOUS
Status: DISCONTINUED | OUTPATIENT
Start: 2024-01-30 | End: 2024-02-01

## 2024-01-30 RX ADMIN — Medication 1 SPRAY: at 06:18

## 2024-01-30 RX ADMIN — OXYBUTYNIN CHLORIDE 5 MG: 5 TABLET ORAL at 09:22

## 2024-01-30 RX ADMIN — Medication 1 SPRAY: at 19:54

## 2024-01-30 RX ADMIN — ACETAMINOPHEN 975 MG: 325 TABLET, FILM COATED ORAL at 14:52

## 2024-01-30 RX ADMIN — CARBIDOPA AND LEVODOPA 1 TABLET: 25; 100 TABLET ORAL at 17:08

## 2024-01-30 RX ADMIN — CARBIDOPA AND LEVODOPA 1 TABLET: 25; 100 TABLET ORAL at 06:10

## 2024-01-30 RX ADMIN — CARBIDOPA AND LEVODOPA 1 TABLET: 25; 100 TABLET ORAL at 11:33

## 2024-01-30 RX ADMIN — PANTOPRAZOLE SODIUM 40 MG: 40 INJECTION, POWDER, FOR SOLUTION INTRAVENOUS at 09:22

## 2024-01-30 RX ADMIN — DEXTROSE, SODIUM CHLORIDE, AND POTASSIUM CHLORIDE 75 ML/HR: 5; .45; .15 INJECTION INTRAVENOUS at 14:52

## 2024-01-30 RX ADMIN — ENOXAPARIN SODIUM 40 MG: 40 INJECTION SUBCUTANEOUS at 09:22

## 2024-01-30 NOTE — PROGRESS NOTES
Progress Note - General Surgery   Radha Vidal 84 y.o. female MRN: [id number]  Unit/Bed#: W -01 Encounter: [id number]    Assessment:  85 yo F with SBO    AVSS on RA    NGT: 210 cc thin very light bilious    Plan:  - KUB this morning  - possibly remove NGT, was found off suction this morning due to loose connections  - prn pain, antiemetic regimen  - dvt ppx  - encourage oob, ambulation in halls  - strict I/os    Subjective/Objective     Subjective: no acute events overnight. Unable to complete gastrografin challenge yesterday due to nausea. Still with mild nausea this morning, but no vomiting, no pain. Passing flatus, no BM    Objective:     Blood pressure 165/87, pulse 103, temperature 98.9 °F (37.2 °C), resp. rate 12, weight 73.5 kg (162 lb 1.6 oz), SpO2 92%, not currently breastfeeding.,Body mass index is 30.63 kg/m².      Intake/Output Summary (Last 24 hours) at 1/30/2024 0734  Last data filed at 1/30/2024 0100  Gross per 24 hour   Intake 2308.75 ml   Output 290 ml   Net 2018.75 ml       Invasive Devices       Peripheral Intravenous Line  Duration             Peripheral IV 01/26/24 Left;Ventral (anterior) Antecubital 3 days              Drain  Duration             NG/OG/Enteral Tube Nasogastric 16 Fr Right nare 3 days                    Physical Exam:  General: No acute distress  Neuro: alert and oriented  HEENT: moist mucous membranes, NGT in place but not to suction  CV: Well perfused, regular rate and rhythm  Lungs: Normal work of breathing, no increased respiratory effort  Abdomen: Soft, non-tender, non-distended.   Extremities: No edema, clubbing or cyanosis  Skin: Warm, dry         Scheduled Meds:  Current Facility-Administered Medications   Medication Dose Route Frequency Provider Last Rate    acetaminophen  650 mg Oral Q6H PRN Danny Brasher MD      carbidopa-levodopa  1 tablet Oral TID AC Danny Brasher MD      enoxaparin  40 mg Subcutaneous Daily Morteza Joseph MD      HYDROmorphone   "0.5 mg Intravenous Q3H PRN Morteza Joseph MD      HYDROmorphone  0.2 mg Intravenous Q3H PRN Morteza Joseph MD      ondansetron  4 mg Intravenous Q6H PRN Morteza Joseph MD      oxybutynin  5 mg Oral Daily Danny Brasher MD      pantoprazole  40 mg Intravenous Q24H DARI Morteza Joseph MD      phenol  1 spray Mouth/Throat Q2H PRN Morteza Joseph MD      sodium chloride  125 mL/hr Intravenous Continuous Morteza Joseph  mL/hr (01/29/24 2300)     Continuous Infusions:sodium chloride, 125 mL/hr, Last Rate: 125 mL/hr (01/29/24 2300)      PRN Meds:.  acetaminophen    HYDROmorphone    HYDROmorphone    ondansetron    phenol      Lab, Imaging and other studies:I have personally reviewed pertinent lab results.  , CBC:   Lab Results   Component Value Date    WBC 14.31 (H) 01/30/2024    HGB 12.0 01/30/2024    HCT 35.8 01/30/2024    MCV 92 01/30/2024     01/30/2024    RBC 3.88 01/30/2024    MCH 30.9 01/30/2024    MCHC 33.5 01/30/2024    RDW 14.3 01/30/2024    MPV 11.1 01/30/2024    NRBC 0 01/30/2024   , CMP:   Lab Results   Component Value Date    SODIUM 141 01/30/2024    K 3.4 (L) 01/30/2024     (H) 01/30/2024    CO2 22 01/30/2024    BUN 14 01/30/2024    CREATININE 0.61 01/30/2024    CALCIUM 7.9 (L) 01/30/2024    EGFR 82 01/30/2024   , Coagulation: No results found for: \"PT\", \"INR\", \"APTT\", Urinalysis: No results found for: \"COLORU\", \"CLARITYU\", \"SPECGRAV\", \"PHUR\", \"LEUKOCYTESUR\", \"NITRITE\", \"PROTEINUA\", \"GLUCOSEU\", \"KETONESU\", \"BILIRUBINUR\", \"BLOODU\"  VTE Pharmacologic Prophylaxis: Enoxaparin (Lovenox)  VTE Mechanical Prophylaxis: sequential compression device      Yamilka Gamble MD  1/30/2024 7:34 AM    "

## 2024-01-30 NOTE — PLAN OF CARE
Problem: Potential for Falls  Goal: Patient will remain free of falls  Description: INTERVENTIONS:  - Educate patient/family on patient safety including physical limitations  - Instruct patient to call for assistance with activity   - Consult OT/PT to assist with strengthening/mobility   - Keep Call bell within reach  - Keep bed low and locked with side rails adjusted as appropriate  - Keep care items and personal belongings within reach  - Initiate and maintain comfort rounds  - Make Fall Risk Sign visible to staff  - Offer Toileting every  Hours, in advance of need  - Initiate/Maintain alarm  - Obtain necessary fall risk management equipment:   - Apply yellow socks and bracelet for high fall risk patients  - Consider moving patient to room near nurses station  Outcome: Progressing     Problem: PAIN - ADULT  Goal: Verbalizes/displays adequate comfort level or baseline comfort level  Description: Interventions:  - Encourage patient to monitor pain and request assistance  - Assess pain using appropriate pain scale  - Administer analgesics based on type and severity of pain and evaluate response  - Implement non-pharmacological measures as appropriate and evaluate response  - Consider cultural and social influences on pain and pain management  - Notify physician/advanced practitioner if interventions unsuccessful or patient reports new pain  Outcome: Progressing     Problem: INFECTION - ADULT  Goal: Absence or prevention of progression during hospitalization  Description: INTERVENTIONS:  - Assess and monitor for signs and symptoms of infection  - Monitor lab/diagnostic results  - Monitor all insertion sites, i.e. indwelling lines, tubes, and drains  - Monitor endotracheal if appropriate and nasal secretions for changes in amount and color  - [place] appropriate cooling/warming therapies per order  - Administer medications as ordered  - Instruct and encourage patient and family to use good hand hygiene technique  -  Identify and instruct in appropriate isolation precautions for identified infection/condition  Outcome: Progressing  Goal: Absence of fever/infection during neutropenic period  Description: INTERVENTIONS:  - Monitor WBC    Outcome: Progressing     Problem: SAFETY ADULT  Goal: Patient will remain free of falls  Description: INTERVENTIONS:  - Educate patient/family on patient safety including physical limitations  - Instruct patient to call for assistance with activity   - Consult OT/PT to assist with strengthening/mobility   - Keep Call bell within reach  - Keep bed low and locked with side rails adjusted as appropriate  - Keep care items and personal belongings within reach  - Initiate and maintain comfort rounds  - Make Fall Risk Sign visible to staff  - Offer Toileting every  Hours, in advance of need  - Initiate/Maintain alarm  - Obtain necessary fall risk management equipment:   - Apply yellow socks and bracelet for high fall risk patients  - Consider moving patient to room near nurses station  Outcome: Progressing  Goal: Maintain or return to baseline ADL function  Description: INTERVENTIONS:  -  Assess patient's ability to carry out ADLs; assess patient's baseline for ADL function and identify physical deficits which impact ability to perform ADLs (bathing, care of mouth/teeth, toileting, grooming, dressing, etc.)  - Assess/evaluate cause of self-care deficits   - Assess range of motion  - Assess patient's mobility; develop plan if impaired  - Assess patient's need for assistive devices and provide as appropriate  - Encourage maximum independence but intervene and supervise when necessary  - Involve family in performance of ADLs  - Assess for home care needs following discharge   - Consider OT consult to assist with ADL evaluation and planning for discharge  - Provide patient education as appropriate  Outcome: Progressing  Goal: Maintains/Returns to pre admission functional level  Description:  INTERVENTIONS:  - Perform AM-PAC 6 Click Basic Mobility/ Daily Activity assessment daily.  - Set and communicate daily mobility goal to care team and patient/family/caregiver.   - Collaborate with rehabilitation services on mobility goals if consulted  - Perform Range of Motion  times a day.  - Reposition patient every  hours.  - Dangle patient  times a day  - Stand patient  times a day  - Ambulate patient  times a day  - Out of bed to chair  times a day   - Out of bed for meals  times a day  - Out of bed for toileting  - Record patient progress and toleration of activity level   Outcome: Progressing     Problem: DISCHARGE PLANNING  Goal: Discharge to home or other facility with appropriate resources  Description: INTERVENTIONS:  - Identify barriers to discharge w/patient and caregiver  - Arrange for needed discharge resources and transportation as appropriate  - Identify discharge learning needs (meds, wound care, etc.)  - Arrange for interpretive services to assist at discharge as needed  - Refer to Case Management Department for coordinating discharge planning if the patient needs post-hospital services based on physician/advanced practitioner order or complex needs related to functional status, cognitive ability, or social support system  Outcome: Progressing     Problem: Knowledge Deficit  Goal: Patient/family/caregiver demonstrates understanding of disease process, treatment plan, medications, and discharge instructions  Description: Complete learning assessment and assess knowledge base.  Interventions:  - Provide teaching at level of understanding  - Provide teaching via preferred learning methods  Outcome: Progressing     Problem: Nutrition/Hydration-ADULT  Goal: Nutrient/Hydration intake appropriate for improving, restoring or maintaining nutritional needs  Description: Monitor and assess patient's nutrition/hydration status for malnutrition. Collaborate with interdisciplinary team and initiate plan and  interventions as ordered.  Monitor patient's weight and dietary intake as ordered or per policy. Utilize nutrition screening tool and intervene as necessary. Determine patient's food preferences and provide high-protein, high-caloric foods as appropriate.     INTERVENTIONS:  - Monitor oral intake, urinary output, labs, and treatment plans  - Assess nutrition and hydration status and recommend course of action  - Evaluate amount of meals eaten  - Assist patient with eating if necessary   - Allow adequate time for meals  - Recommend/ encourage appropriate diets, oral nutritional supplements, and vitamin/mineral supplements  - Order, calculate, and assess calorie counts as needed  - Recommend, monitor, and adjust tube feedings and TPN/PPN based on assessed needs  - Assess need for intravenous fluids  - Provide specific nutrition/hydration education as appropriate  - Include patient/family/caregiver in decisions related to nutrition  Outcome: Progressing     Problem: GASTROINTESTINAL - ADULT  Goal: Minimal or absence of nausea and/or vomiting  Description: INTERVENTIONS:  - Administer IV fluids if ordered to ensure adequate hydration  - Maintain NPO status until nausea and vomiting are resolved  - Nasogastric tube if ordered  - Administer ordered antiemetic medications as needed  - Provide nonpharmacologic comfort measures as appropriate  - Advance diet as tolerated, if ordered  - Consider nutrition services referral to assist patient with adequate nutrition and appropriate food choices  Outcome: Progressing  Goal: Maintains or returns to baseline bowel function  Description: INTERVENTIONS:  - Assess bowel function  - Encourage oral fluids to ensure adequate hydration  - Administer IV fluids if ordered to ensure adequate hydration  - Administer ordered medications as needed  - Encourage mobilization and activity  - Consider nutritional services referral to assist patient with adequate nutrition and appropriate food  choices  Outcome: Progressing

## 2024-01-30 NOTE — OCCUPATIONAL THERAPY NOTE
"       Occupational Therapy Cancelled Session    Patient Name: Radha Vidal  Today's Date: 1/30/2024 01/30/24 1401   Note Type   Note type Cancelled Session   Additional Comments OT orders received and chart review performed. Pt admitted w/ SBO from [facility]. Attempted to see pt for OT eval however pt politely declined at this time stating \"I had a rough morning\". Pt is agreeeable and motivated to participate in session in AM. Will continue to follow.     Corrine Galaviz, OTD, OTR/L  [place] License [id number]  [place] License [id number]                             "

## 2024-01-30 NOTE — PLAN OF CARE
Problem: Potential for Falls  Goal: Patient will remain free of falls  Description: INTERVENTIONS:  - Educate patient/family on patient safety including physical limitations  - Instruct patient to call for assistance with activity   - Consult OT/PT to assist with strengthening/mobility   - Keep Call bell within reach  - Keep bed low and locked with side rails adjusted as appropriate  - Keep care items and personal belongings within reach  - Initiate and maintain comfort rounds  - Make Fall Risk Sign visible to staff  - Offer Toileting every  Hours, in advance of need  - Initiate/Maintain alarm  - Obtain necessary fall risk management equipment:   - Apply yellow socks and bracelet for high fall risk patients  - Consider moving patient to room near nurses station  Outcome: Progressing     Problem: PAIN - ADULT  Goal: Verbalizes/displays adequate comfort level or baseline comfort level  Description: Interventions:  - Encourage patient to monitor pain and request assistance  - Assess pain using appropriate pain scale  - Administer analgesics based on type and severity of pain and evaluate response  - Implement non-pharmacological measures as appropriate and evaluate response  - Consider cultural and social influences on pain and pain management  - Notify physician/advanced practitioner if interventions unsuccessful or patient reports new pain  Outcome: Progressing     Problem: INFECTION - ADULT  Goal: Absence or prevention of progression during hospitalization  Description: INTERVENTIONS:  - Assess and monitor for signs and symptoms of infection  - Monitor lab/diagnostic results  - Monitor all insertion sites, i.e. indwelling lines, tubes, and drains  - Monitor endotracheal if appropriate and nasal secretions for changes in amount and color  - [place] appropriate cooling/warming therapies per order  - Administer medications as ordered  - Instruct and encourage patient and family to use good hand hygiene technique  -  Identify and instruct in appropriate isolation precautions for identified infection/condition  Outcome: Progressing  Goal: Absence of fever/infection during neutropenic period  Description: INTERVENTIONS:  - Monitor WBC    Outcome: Progressing     Problem: SAFETY ADULT  Goal: Patient will remain free of falls  Description: INTERVENTIONS:  - Educate patient/family on patient safety including physical limitations  - Instruct patient to call for assistance with activity   - Consult OT/PT to assist with strengthening/mobility   - Keep Call bell within reach  - Keep bed low and locked with side rails adjusted as appropriate  - Keep care items and personal belongings within reach  - Initiate and maintain comfort rounds  - Make Fall Risk Sign visible to staff  - Offer Toileting every  Hours, in advance of need  - Initiate/Maintain alarm  - Obtain necessary fall risk management equipment:   - Apply yellow socks and bracelet for high fall risk patients  - Consider moving patient to room near nurses station  Outcome: Progressing  Goal: Maintain or return to baseline ADL function  Description: INTERVENTIONS:  -  Assess patient's ability to carry out ADLs; assess patient's baseline for ADL function and identify physical deficits which impact ability to perform ADLs (bathing, care of mouth/teeth, toileting, grooming, dressing, etc.)  - Assess/evaluate cause of self-care deficits   - Assess range of motion  - Assess patient's mobility; develop plan if impaired  - Assess patient's need for assistive devices and provide as appropriate  - Encourage maximum independence but intervene and supervise when necessary  - Involve family in performance of ADLs  - Assess for home care needs following discharge   - Consider OT consult to assist with ADL evaluation and planning for discharge  - Provide patient education as appropriate  Outcome: Progressing  Goal: Maintains/Returns to pre admission functional level  Description:  INTERVENTIONS:  - Perform AM-PAC 6 Click Basic Mobility/ Daily Activity assessment daily.  - Set and communicate daily mobility goal to care team and patient/family/caregiver.   - Collaborate with rehabilitation services on mobility goals if consulted  - Perform Range of Motion  times a day.  - Reposition patient every  hours.  - Dangle patient  times a day  - Stand patient  times a day  - Ambulate patient  times a day  - Out of bed to chair  times a day   - Out of bed for meals  times a day  - Out of bed for toileting  - Record patient progress and toleration of activity level   Outcome: Progressing     Problem: DISCHARGE PLANNING  Goal: Discharge to home or other facility with appropriate resources  Description: INTERVENTIONS:  - Identify barriers to discharge w/patient and caregiver  - Arrange for needed discharge resources and transportation as appropriate  - Identify discharge learning needs (meds, wound care, etc.)  - Arrange for interpretive services to assist at discharge as needed  - Refer to Case Management Department for coordinating discharge planning if the patient needs post-hospital services based on physician/advanced practitioner order or complex needs related to functional status, cognitive ability, or social support system  Outcome: Progressing     Problem: Knowledge Deficit  Goal: Patient/family/caregiver demonstrates understanding of disease process, treatment plan, medications, and discharge instructions  Description: Complete learning assessment and assess knowledge base.  Interventions:  - Provide teaching at level of understanding  - Provide teaching via preferred learning methods  Outcome: Progressing     Problem: Nutrition/Hydration-ADULT  Goal: Nutrient/Hydration intake appropriate for improving, restoring or maintaining nutritional needs  Description: Monitor and assess patient's nutrition/hydration status for malnutrition. Collaborate with interdisciplinary team and initiate plan and  interventions as ordered.  Monitor patient's weight and dietary intake as ordered or per policy. Utilize nutrition screening tool and intervene as necessary. Determine patient's food preferences and provide high-protein, high-caloric foods as appropriate.     INTERVENTIONS:  - Monitor oral intake, urinary output, labs, and treatment plans  - Assess nutrition and hydration status and recommend course of action  - Evaluate amount of meals eaten  - Assist patient with eating if necessary   - Allow adequate time for meals  - Recommend/ encourage appropriate diets, oral nutritional supplements, and vitamin/mineral supplements  - Order, calculate, and assess calorie counts as needed  - Recommend, monitor, and adjust tube feedings and TPN/PPN based on assessed needs  - Assess need for intravenous fluids  - Provide specific nutrition/hydration education as appropriate  - Include patient/family/caregiver in decisions related to nutrition  Outcome: Progressing     Problem: GASTROINTESTINAL - ADULT  Goal: Minimal or absence of nausea and/or vomiting  Description: INTERVENTIONS:  - Administer IV fluids if ordered to ensure adequate hydration  - Maintain NPO status until nausea and vomiting are resolved  - Nasogastric tube if ordered  - Administer ordered antiemetic medications as needed  - Provide nonpharmacologic comfort measures as appropriate  - Advance diet as tolerated, if ordered  - Consider nutrition services referral to assist patient with adequate nutrition and appropriate food choices  Outcome: Progressing  Goal: Maintains or returns to baseline bowel function  Description: INTERVENTIONS:  - Assess bowel function  - Encourage oral fluids to ensure adequate hydration  - Administer IV fluids if ordered to ensure adequate hydration  - Administer ordered medications as needed  - Encourage mobilization and activity  - Consider nutritional services referral to assist patient with adequate nutrition and appropriate food  choices  Outcome: Progressing

## 2024-01-30 NOTE — CASE MANAGEMENT
Case Management Assessment & Discharge Planning Note    Patient name Radha Vidal  Location W /W -01 MRN [id number]  : 1940 Date 2024       Current Admission Date: 2024  Current Admission Diagnosis:Vomiting, SBO (small bowel obstruction) ([facility]), Nausea and vomiting   Patient Active Problem List    Diagnosis Date Noted    Nausea 2024    Ambulatory dysfunction 2024    Ear pressure, right 2024    Type 2 diabetes mellitus ([facility]) 01/15/2024    Stroke-like symptoms 2024    New onset a-fib ([facility]) 2024    s/p distal pancreatectomy/splenectomy 2023    Encounter for geriatric assessment 2023    Urge incontinence 2021    Age-related osteoporosis without current pathological fracture 2021    Closed compression fracture of body of L1 vertebra ([facility]) 2020    Cerebrovascular disease 2020    IPMN (intraductal papillary mucinous neoplasm) 2020    Trochanteric bursitis, right hip 2020    Tremor 2020    Chronic idiopathic constipation 2019    History of adenomatous polyp of colon 2019    Osteopenia 2019    Gastro-esophageal reflux disease without esophagitis 2019    Dysphagia 2019    Multiple thyroid nodules 2019    Menopause ovarian failure 2019    Vitamin D deficiency 2019    Mitral valve disorder 2018    Mood disturbance 2018    Obesity (BMI 30-39.9) 2018    Claustrophobia 2018    Impaired fasting glucose 2018    Seasonal allergic rhinitis 2018    History of skin cancer 2018    Parkinson's disease 2018    Primary insomnia 2018    Cherry angioma 11/15/2017    Postablative hypothyroidism 2016    Dyspnea on exertion 10/02/2015    OAB (overactive bladder) 2015    Sleep apnea 04/15/2014      LOS (days): 3  Geometric Mean LOS (GMLOS) (days): 2.3  Days to GMLOS:-1.3     OBJECTIVE:  PATIENT READMITTED TO  HOSPITAL  Risk of Unplanned Readmission Score: 13.44         Current admission status: Inpatient       Preferred Pharmacy:   [facility] - [place], [place] - [address]  [address] [address]  Phone: [phone] Fax: [phone]    [facility] - [place] - [address]  [address]  [address]  [facility] [address]  Phone: [phone] Fax: [phone]    [facility]  [place], [place] - [address]  [address]  [place] [address]  Phone: [phone] Fax: [phone]    Primary Care Provider: Hoang Tovar MD    Primary Insurance: [employer]  Secondary Insurance:     ASSESSMENT:  Active Health Care Proxies       Melba Vidal Health Care Agent - Daughter   Primary Phone: [phone] (Mobile)                 Patient Information  Admitted from:: Facility ([facility])  Mental Status: Alert  During Assessment patient was accompanied by: Not accompanied during assessment  Assessment information provided by:: Daughter (Melba)  Primary Caregiver: Family  Support Systems: Daughter, Family members  County of Residence: [place]  What city do you live in?: [place]  Home entry access options. Select all that apply.: Stairs  Number of steps to enter home.: 3  Type of Current Residence: Other (Comment) ([facility] with first floor set up)    Activities of Daily Living Prior to Admission  Functional Status: Independent (Prior to admission to [facility])  Completes ADLs independently?: Yes  Ambulates independently?: Yes  Does patient use assisted devices?: No  Does patient currently own DME?: Yes  What DME does the patient currently own?: Walker, Hospital Bed, Shower Chair, Other (Comment) (grab bars and raised toilet seat)  Does patient have a history of Outpatient Therapy (PT/OT)?: No  Does the patient have a history of Short-Term Rehab?: Yes  Does patient have a history of HHC?:  Yes  Does patient currently have C?: No    Patient Information Continued  Income Source: Pension/FCI  Does patient have prescription coverage?: Yes  Does patient receive dialysis treatments?: No  Does patient have a history of substance abuse?: No  Does patient have a history of Mental Health Diagnosis?: No    Means of Transportation  Means of Transport to Appts:: Family transport    Housing Stability: Low Risk  (1/14/2024)    Housing Stability Vital Sign     Unable to Pay for Housing in the Last Year: No     Number of Places Lived in the Last Year: 1     Unstable Housing in the Last Year: No   Food Insecurity: No Food Insecurity (1/14/2024)    Hunger Vital Sign     Worried About Running Out of Food in the Last Year: Never true     Ran Out of Food in the Last Year: Never true   Transportation Needs: No Transportation Needs (1/14/2024)    PRAPARE - Transportation     Lack of Transportation (Medical): No     Lack of Transportation (Non-Medical): No   Utilities: Not At Risk (1/14/2024)    [facility] Utilities     Threatened with loss of utilities: No     DISCHARGE DETAILS:    Discharge planning discussed with:: pt daughterMelba  Freedom of Choice: Yes  Comments - Freedom of Choice: [facility] (Not [facility]) vs Home with HHC    Contacts  Patient Contacts: Melba  Relationship to Patient:: Family  Contact Method: Phone  Reason/Outcome: Emergency Contact, Continuity of Care, Referral    Other Referral/Resources/Interventions Provided:  Interventions: Short Term Rehab, C  Referral Comments: CM met with pt who directed CM to contact her daughter, Melba. CM spoke with Melba and introduced self/role with OHp. Melba reports pt was [facility] for [facility]hab until she was admitted to [facility]. Melba reports prior to rehab pt was independent. Melba reports plan from rehab was to stay at her home (assessment completed with daughters home set up). Address: [address] [place] [address] [place], [place]. Melba reports that  her  is home 24/7 to assist pt. Pt would have first floor set up with bedroom and full bathroom. Daughter has a hospital bed, grab bars, raised toiliet seat, shower chair, and walker. Melba reports that she is agreeable to referrals for STR, but does NOT want pt to return to [facility] vs return to her home with HHC. Melba will f/u pending pt's progress. Melba ok with CM sending referrals for both STR and HHC. Referrals in [place]. CM provided Mleba with primary CM contact information.

## 2024-01-31 ENCOUNTER — HOME HEALTH ADMISSION (OUTPATIENT)
Dept: HOME HEALTH SERVICES | Facility: HOME HEALTHCARE | Age: 84
End: 2024-01-31
Payer: COMMERCIAL

## 2024-01-31 ENCOUNTER — RA CDI HCC (OUTPATIENT)
Dept: OTHER | Facility: HOSPITAL | Age: 84
End: 2024-01-31

## 2024-01-31 ENCOUNTER — APPOINTMENT (INPATIENT)
Dept: RADIOLOGY | Facility: HOSPITAL | Age: 84
DRG: 389 | End: 2024-01-31
Payer: COMMERCIAL

## 2024-01-31 PROBLEM — S32.010A CLOSED COMPRESSION FRACTURE OF BODY OF L1 VERTEBRA (HCC): Status: RESOLVED | Noted: 2020-11-25 | Resolved: 2024-01-31

## 2024-01-31 LAB
ANION GAP SERPL CALCULATED.3IONS-SCNC: 8 MMOL/L
BASOPHILS # BLD AUTO: 0.05 THOUSANDS/ÂΜL (ref 0–0.1)
BASOPHILS NFR BLD AUTO: 0 % (ref 0–1)
BUN SERPL-MCNC: 10 MG/DL (ref 5–25)
CALCIUM SERPL-MCNC: 8.1 MG/DL (ref 8.4–10.2)
CHLORIDE SERPL-SCNC: 108 MMOL/L (ref 96–108)
CO2 SERPL-SCNC: 24 MMOL/L (ref 21–32)
CREAT SERPL-MCNC: 0.48 MG/DL (ref 0.6–1.3)
EOSINOPHIL # BLD AUTO: 0.15 THOUSAND/ÂΜL (ref 0–0.61)
EOSINOPHIL NFR BLD AUTO: 1 % (ref 0–6)
ERYTHROCYTE [DISTWIDTH] IN BLOOD BY AUTOMATED COUNT: 14.3 % (ref 11.6–15.1)
GFR SERPL CREATININE-BSD FRML MDRD: 89 ML/MIN/1.73SQ M
GLUCOSE SERPL-MCNC: 136 MG/DL (ref 65–140)
HCT VFR BLD AUTO: 34.9 % (ref 34.8–46.1)
HGB BLD-MCNC: 11.5 G/DL (ref 11.5–15.4)
IMM GRANULOCYTES # BLD AUTO: 0.12 THOUSAND/UL (ref 0–0.2)
IMM GRANULOCYTES NFR BLD AUTO: 1 % (ref 0–2)
LYMPHOCYTES # BLD AUTO: 2.66 THOUSANDS/ÂΜL (ref 0.6–4.47)
LYMPHOCYTES NFR BLD AUTO: 21 % (ref 14–44)
MCH RBC QN AUTO: 30 PG (ref 26.8–34.3)
MCHC RBC AUTO-ENTMCNC: 33 G/DL (ref 31.4–37.4)
MCV RBC AUTO: 91 FL (ref 82–98)
MONOCYTES # BLD AUTO: 1.08 THOUSAND/ÂΜL (ref 0.17–1.22)
MONOCYTES NFR BLD AUTO: 9 % (ref 4–12)
NEUTROPHILS # BLD AUTO: 8.72 THOUSANDS/ÂΜL (ref 1.85–7.62)
NEUTS SEG NFR BLD AUTO: 68 % (ref 43–75)
NRBC BLD AUTO-RTO: 0 /100 WBCS
PLATELET # BLD AUTO: 302 THOUSANDS/UL (ref 149–390)
PMV BLD AUTO: 10.2 FL (ref 8.9–12.7)
POTASSIUM SERPL-SCNC: 3.4 MMOL/L (ref 3.5–5.3)
RBC # BLD AUTO: 3.83 MILLION/UL (ref 3.81–5.12)
SODIUM SERPL-SCNC: 140 MMOL/L (ref 135–147)
WBC # BLD AUTO: 12.78 THOUSAND/UL (ref 4.31–10.16)

## 2024-01-31 PROCEDURE — 80048 BASIC METABOLIC PNL TOTAL CA: CPT | Performed by: SURGERY

## 2024-01-31 PROCEDURE — 97535 SELF CARE MNGMENT TRAINING: CPT

## 2024-01-31 PROCEDURE — 97530 THERAPEUTIC ACTIVITIES: CPT

## 2024-01-31 PROCEDURE — C9113 INJ PANTOPRAZOLE SODIUM, VIA: HCPCS | Performed by: SURGERY

## 2024-01-31 PROCEDURE — 74022 RADEX COMPL AQT ABD SERIES: CPT

## 2024-01-31 PROCEDURE — 85025 COMPLETE CBC W/AUTO DIFF WBC: CPT | Performed by: SURGERY

## 2024-01-31 PROCEDURE — 97129 THER IVNTJ 1ST 15 MIN: CPT

## 2024-01-31 PROCEDURE — 97167 OT EVAL HIGH COMPLEX 60 MIN: CPT

## 2024-01-31 PROCEDURE — 99232 SBSQ HOSP IP/OBS MODERATE 35: CPT | Performed by: SURGERY

## 2024-01-31 PROCEDURE — 97116 GAIT TRAINING THERAPY: CPT

## 2024-01-31 RX ORDER — POTASSIUM CHLORIDE 20 MEQ/1
20 TABLET, EXTENDED RELEASE ORAL ONCE
Status: DISCONTINUED | OUTPATIENT
Start: 2024-01-31 | End: 2024-02-01

## 2024-01-31 RX ORDER — POTASSIUM CHLORIDE 20 MEQ/1
40 TABLET, EXTENDED RELEASE ORAL ONCE
Status: DISCONTINUED | OUTPATIENT
Start: 2024-01-31 | End: 2024-02-01

## 2024-01-31 RX ADMIN — ENOXAPARIN SODIUM 40 MG: 40 INJECTION SUBCUTANEOUS at 08:49

## 2024-01-31 RX ADMIN — ACETAMINOPHEN 975 MG: 325 TABLET, FILM COATED ORAL at 03:10

## 2024-01-31 RX ADMIN — CARBIDOPA AND LEVODOPA 1 TABLET: 25; 100 TABLET ORAL at 08:14

## 2024-01-31 RX ADMIN — APIXABAN 5 MG: 5 TABLET, FILM COATED ORAL at 17:30

## 2024-01-31 RX ADMIN — PANTOPRAZOLE SODIUM 40 MG: 40 INJECTION, POWDER, FOR SOLUTION INTRAVENOUS at 08:49

## 2024-01-31 RX ADMIN — CARBIDOPA AND LEVODOPA 1 TABLET: 25; 100 TABLET ORAL at 17:30

## 2024-01-31 RX ADMIN — CARBIDOPA AND LEVODOPA 1 TABLET: 25; 100 TABLET ORAL at 12:30

## 2024-01-31 RX ADMIN — ACETAMINOPHEN 975 MG: 325 TABLET, FILM COATED ORAL at 08:49

## 2024-01-31 RX ADMIN — OXYBUTYNIN CHLORIDE 5 MG: 5 TABLET ORAL at 08:49

## 2024-01-31 NOTE — PROGRESS NOTES
General Surgery  Progress Note   Radha Vidal 84 y.o. female MRN: [id number]  Unit/Bed#: W -01 Encounter: [id number]    Assessment:  84 year old female with small bowel obstruction, resolved  Afib with RVR overnight    Afebrile, in afib but rate controlled  WBC 10.91 (12.78)  Hgb 12.9 (11.5)  Cr 0.68 (0.48)    Plan:  Cont full liquid diet  IV fluids  Telemetry  Electrolytes repleted this morning  DVT ppx: Lovenox  Pain/ nausea control PRN  OOB/ ambulation  Incentive Spirometry      Subjective/Objective     Subjective:   Went into afib with RVR overnight. Also saying she feels chest tightness mostly sternal without radiation. Still endorses about the same level of abdominal pain and nausea, but without vomiting. Tolerating full liquid diet.     Objective:   Vitals:Blood pressure 114/57, pulse 89, temperature 98 °F (36.7 °C), resp. rate 13, weight 73.5 kg (162 lb 1.6 oz), SpO2 91%, not currently breastfeeding.  Temp (24hrs), Av °F (36.7 °C), Min:97.6 °F (36.4 °C), Max:98.5 °F (36.9 °C)        Intake/Output Summary (Last 24 hours) at 2024 1444  Last data filed at 2024 0723  Gross per 24 hour   Intake 1375 ml   Output --   Net 1375 ml       Invasive Devices       Peripheral Intravenous Line  Duration             Peripheral IV 24 Right Antecubital <1 day                    Physical Exam:  General: No acute distress  Neuro: alert and oriented  HEENT: moist mucous membranes  CV: Well perfused, regular rate and rhythm  Lungs: Normal work of breathing, no increased respiratory effort  Abdomen: Soft, tender in RLQ, non-distended.   Extremities: No edema, clubbing or cyanosis  Skin: Warm, dry      Lab Results: BMP/CMP:   Lab Results   Component Value Date    SODIUM 140 2024    K 3.4 (L) 2024     2024    CO2 24 2024    BUN 10 2024    CREATININE 0.48 (L) 2024    CALCIUM 8.1 (L) 2024    EGFR 89 2024    and CBC:   Lab Results   Component Value Date     WBC 12.78 (H) 01/31/2024    HGB 11.5 01/31/2024    HCT 34.9 01/31/2024    MCV 91 01/31/2024     01/31/2024    RBC 3.83 01/31/2024    MCH 30.0 01/31/2024    MCHC 33.0 01/31/2024    RDW 14.3 01/31/2024    MPV 10.2 01/31/2024    NRBC 0 01/31/2024     VTE Prophylaxis: Sequential compression device (Venodyne)  and Enoxaparin (Lovenox)    Yamilka Gamble MD  1/31/2024

## 2024-01-31 NOTE — PHYSICAL THERAPY NOTE
"   PT TREATMENT     24 1259   PT Last Visit   PT Visit Date 24   Note Type   Note Type Treatment   Pain Assessment   Pain Assessment Tool 0-10   Pain Score No Pain   Restrictions/Precautions   Other Precautions Fall Risk;Bed Alarm;Chair Alarm  (Ghassan;IV)   General   Chart Reviewed Yes   Family/Caregiver Present No   Cognition   Arousal/Participation Cooperative   Attention Within functional limits   Orientation Level Oriented X4   Memory Decreased recall of precautions   Following Commands Follows one step commands with increased time or repetition   Comments At least 2 pt identifiers including name and    Subjective   Subjective \"I'm just so tired - I had a rough morning\"   Bed Mobility   Sit to Supine 4  Minimal assistance   Additional items Assist x 1;Verbal cues;Increased time required;Bedrails;LE management   Additional Comments Pt received OOB in chair   Transfers   Sit to Stand 4  Minimal assistance  (supervision subsequent transfers)   Additional items Armrests;Assist x 1;Verbal cues;Increased time required   Stand to Sit 5  Supervision   Additional items Armrests;Assist x 1;Verbal cues;Increased time required   Toilet transfer 5  Supervision  (pt supervision with underwear management before and after toileting - pt is supervision with hygiene after toileting)   Additional items Assist x 1;Verbal cues;Increased time required   Ambulation/Elevation   Gait pattern Foward flexed;Short stride;Decreased foot clearance   Gait Assistance 5  Supervision   Additional items Assist x 1;Verbal cues;Tactile cues   Assistive Device Rolling walker   Distance 10 feet to and from the bathroom;20 feet with change in direction   Balance   Static Sitting Good   Static Standing Fair  (w RW)   Ambulatory   (F-/F with RW)   Activity Tolerance   Activity Tolerance Patient limited by fatigue   Nurse Made Aware RON Church   Assessment   Problem List Decreased strength;Decreased endurance;Impaired balance;Decreased " "mobility;Decreased safety awareness   Assessment Pt agreeable to PT session despite fatigue/\"tired\" stating \"I want to walk.\" Pt is noted with improved trans, gait balance and gait endurance with the RW. Pt with good tolerance to functional mobility/toileting. While adm, pt will cont to benefit from skilled PT services to cont to increase pt's strength, functional mobility, gait endurance and balance. When medically stable for dc, pt is appropriate for Level III Minimum Resource Intensity. This is a change from inital recommendation of Level II Moderate Resource Intensity - CM is aware.    The patient's AM-PAC Basic Mobility Inpatient Short Form Raw Score is 17. A Raw score of greater than 16 suggests the patient may benefit from discharge to home. Please also refer to the recommendation of the Physical Therapist for safe discharge planning.   Goals   STG Expiration Date 02/08/24   Short Term Goal #1 in 7-10 days:  (1) Pt will be able to ambulate greater than 100 feet with use of RW on various surfaces needing S level of A in order to A pt to return to PLOF, (2) activity tolerance:45 mins/45mins, (3) pt will be able to perform sit to stand transfers needing S level of A to and from various surfaces consistently in order to return to PLOF, (4) pt will be able to perform BM needing S level of A to A pt to return to PLOF, (5) (I) with BLE therapeutic ex HEP in various positions to A pt to inc balance,strength,mobility,endurance and to A to dec pain, (6) inc balance 1/2 grade in order to dec fall risk,  (7) cont to provide pt and pt family education for safe D/C planning, (8) inc BLE strength 1/2 to 1 full grade in order to A pt to inc balance,strength,mobility,endurance and to A to dec pain   Plan   Treatment/Interventions ADL retraining;Functional transfer training;LE strengthening/ROM;Therapeutic exercise;Endurance training;Patient/family training;Equipment eval/education;Bed mobility;Gait training;Compensatory " "technique education;Spoke to case management;OT   PT Frequency 3-5x/wk   Discharge Recommendation   Rehab Resource Intensity Level, PT III (Minimum Resource Intensity)   Additional Comments Pt states she is going to dc to her dtr's house - she will have to amb up/down 5 steps with a rail to enter her dtr's house. Pt states - \"I've stayed there before when I needed to\"   AM-PAC Basic Mobility Inpatient   Turning in Flat Bed Without Bedrails 3   Lying on Back to Sitting on Edge of Flat Bed Without Bedrails 3   Moving Bed to Chair 3   Standing Up From Chair Using Arms 3   Walk in Room 3   Climb 3-5 Stairs With Railing 2   Basic Mobility Inpatient Raw Score 17   Basic Mobility Standardized Score 39.67   Highest Level Of Mobility   JH-HLM Goal 5: Stand one or more mins   JH-HLM Achieved 6: Walk 10 steps or more   Education   Education Provided Mobility training;Assistive device   Patient Explanation/teachback used;Reinforcement needed   End of Consult   Patient Position at End of Consult Supine;All needs within reach;Bed/Chair alarm activated   Licensure   [place] License Number  Palak Ernandez, PT       "

## 2024-01-31 NOTE — OCCUPATIONAL THERAPY NOTE
Occupational Therapy Evaluation     Patient Name: Radha Vidal  Today's Date: 2024  Problem List  Active Problems:  There are no active Hospital Problems.    Past Medical History  Past Medical History:   Diagnosis Date    Actinic keratosis 2016    Acute midline low back pain without sciatica 2020    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     Benign neoplasm of skin     Cancer (HCC)     skin    Chest pain     Claustrophobia     Closed compression fracture of body of L1 vertebra (HCC)     Diverticulitis     Diverticulitis     Fracture     L1-L2    GERD (gastroesophageal reflux disease)     H. pylori infection 2020    Muscle weakness     Nonmelanoma skin cancer     last assessed 2017    Palpitations     Pancreatic cyst     Seasonal allergies     Seborrheic keratosis 2014    Sleep apnea     no cpap    Spontaneous      without mention of complications     Squamous cell carcinoma of forehead 2014    Syncope      Past Surgical History  Past Surgical History:   Procedure Laterality Date    ABDOMINAL ADHESION SURGERY N/A 2023    Procedure: LYSIS ADHESIONS;  Surgeon: Kyle Julien MD;  [facility];  Service: Surgical Oncology    BOTOX INJECTION N/A 2017    Procedure: CYSTOSCOPY; BLADDER BOTOX 100 UNITS ;  Surgeon: Juan Edward MD;  [facility];  Service:     BOWEL RESECTION      related ot diverticulitis    CARDIAC CATHETERIZATION      CARPAL TUNNEL RELEASE Right     COLONOSCOPY  2019    COLOSTOMY      COLOSTOMY CLOSURE      CYSTOSCOPY  2021    DISTAL PANCREATECTOMY N/A 2023    Procedure: DISTAL PANCREATECTOMY;  Surgeon: yKle Julien MD;  [facility];  Service: Surgical Oncology    FOOT SURGERY Left     neuroma    HYSTERECTOMY      MYRINGOTOMY W/ TUBES Right 2023    office procedure - Dr. Grace    NAS[place] SURGERY  age 39    [place]    PANCREATIC CYST BIOPSY  2023     SC CYSTOURETHROSCOPY N/A 04/13/2018    Procedure: CYSTOSCOPY WITH BOTOX;  Surgeon: Juan Edward MD;  [facility];  Service: Urology    SC ESOPHAGOGASTRODUODENOSCOPY TRANSORAL DIAGNOSTIC N/A 04/23/2019    Procedure: ESOPHAGOGASTRODUODENOSCOPY (EGD);  Surgeon: Edu Dickerson DO;  [facility]: [facility];  Service: Gastroenterology    SPLENECTOMY, TOTAL N/A 07/31/2023    Procedure: SPLENECTOMY;  Surgeon: Kyle Julien MD;  [facility];  Service: Surgical Oncology    TONSILLECTOMY  age 49    UPPER GASTROINTESTINAL ENDOSCOPY  04/23/2019 01/31/24 0902   OT Last Visit   OT Visit Date 01/31/24   Note Type   Note type Evaluation  (and Treatment (MoCA))   Pain Assessment   Pain Assessment Tool 0-10   Pain Score No Pain   Restrictions/Precautions   Weight Bearing Precautions Per Order No   Other Precautions Cognitive;Chair Alarm;Bed Alarm;Multiple lines;Fall Risk  (IV)   Home Living   Type of Home House   Home Layout One level;Able to live on main level with bedroom/bathroom;Performs ADLs on one level;Stairs to enter with rails  (5 LOIDA)   Bathroom Shower/Tub Walk-in shower   Bathroom Toilet Raised   Bathroom Equipment Shower chair;Grab bars in shower   Bathroom Accessibility Accessible   Home Equipment Walker;Cane;Hospital bed   Additional Comments Pt admitted from [facility] for STR hwoever will be staying w/ daughter upon DC (home setup listed above)   Prior Function   Level of [place] Independent with ADLs;Independent with functional mobility;Needs assistance with IADLS  (pt reports being (I) w/ ADLs at STR)   Lives With Daughter;Family  (at true baseline pt lives alone however will DC to live w/ daughter and YULIYA)   Receives Help From Family  (YULIYA will be home 24/7 to assist)   IADLs Independent with driving;Independent with meal prep;Independent with medication management  (at true baseline)   Falls in the last 6 months 0   Vocational 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 Pt reports she has been (I) w/ ADLs/mobility at STR w/ no AD and has been working w/ therapy on LE exercises.   Lifestyle   Autonomy Pt admitted from [facility] for [facility] and was (I) w/ ADLs/mobility, no AD, (-) falls, (+)    Reciprocal Relationships Supportive local children, family   Service to Others Has her own business as a seamstress, is looking forward to getting back eventually   Intrinsic Gratification Pt enjoys being a [employer]   General   Additional Pertinent History Pt admitted w/ SBO. NGT removed yesterday. PMh includes: Parkinson's disease, obesity, T2DM, dysphagia   Family/Caregiver Present No   Subjective   Subjective \"My memory, good luck\"   ADL   Where Assessed Chair   Eating Assistance 6  Modified independent   Grooming Assistance 5  Supervision/Setup   UB Bathing Assistance 5  Supervision/Setup   LB Bathing Assistance 4  Minimal Assistance   UB Dressing Assistance 5  Supervision/Setup   LB Dressing Assistance 4  Minimal Assistance   LB Dressing Deficit Setup;Thread RLE into underwear;Pull up over hips;Requires assistive device for steadying;Supervision/safety;Increased time to complete  (sitting in recliner)   Toileting Assistance  4  Minimal Assistance   Bed Mobility   Additional Comments Pt received in recliner upon arrival, returned at end of session   Transfers   Sit to Stand 5  Supervision   Additional items Assist x 1;Increased time required;Verbal cues;Armrests   Stand to Sit 5  Supervision   Additional items Assist x 1;Armrests;Increased time required;Verbal cues   Additional Comments VC for hand placement   Functional Mobility   Functional Mobility 4  Minimal assistance  (close S)   Additional Comments Short household distance to bathroom and back w/ RW and min A   Additional items Rolling walker   Balance   Static Sitting Fair +   Dynamic Sitting Fair "   Static Standing Fair   Dynamic Standing Fair -   Activity Tolerance   Activity Tolerance Patient limited by fatigue  (generalized weakness)   Medical Staff Made Aware Spoke to CM   Nurse Made Aware yes, RN Lynnette   RUE Assessment   RUE Assessment WFL  (limited shoulder ROM otherwise WFL)   LUE Assessment   LUE Assessment WFL  (limited shoulder ROM otherwise WFL)   Hand Function   Gross Motor Coordination Functional   Fine Motor Coordination Functional   Sensation   Light Touch No apparent deficits   Vision-Basic Assessment   Current Vision Wears glasses all the time   Cognition   Overall Cognitive Status Impaired  (generally WFL in conversation, impaired higher level deficits)   Arousal/Participation Alert;Cooperative   Attention Attends with cues to redirect   Orientation Level Oriented X4   Memory Decreased recall of precautions   Following Commands Follows one step commands without difficulty   Comments Pleasant and agreeable, motivated. Flat affect, increased time to process/respond. See MoCA below   Cognition Assessment Tools (S)  MOCA   Score (S)  23   Assessment   Limitation Decreased ADL status;Decreased Safe judgement during ADL;Decreased cognition;Decreased endurance;Decreased self-care trans;Decreased high-level ADLs  (balance, fxnl mobility, act dante, fxnl reach, standing dante, and strength, safety awareness, pacing, and problem solving, display of emotion, response time)   Prognosis Good   Assessment Pt is a 84 y.o. female seen for OT evaluation s/p admit to [facility] on 1/26/2024 w/ SBO. Prior to admission, pt was at [facility] for [facility], (I) with ADLs, (A) with IADLs, (-) falls, (+) . Personal and environmental factors affecting patient at time of evaluation include current habits and behavioral patterns, difficulty completing ADLs, and difficulty completing IADLs. Personal factors supporting patient at time of evaluation include (I) PLOF, supportive family and facility staff,  attitude towards recovery, and FFSU. Based upon this evaluation, pt is functioning below baseline. Pt will benefit from continued skilled inpatient OT to maximize safety, level of independence overall performance in ADLs, functional transfers, functional mobility to return to functional baseline/highest level of function.   Goals   Patient Goals to be able to go home   LTG Time Frame 10-14   Long Term Goal #1 see goals below   Plan   Treatment Interventions ADL retraining;Functional transfer training;Endurance training;Cognitive reorientation;Equipment evaluation/education;Patient/family training;Compensatory technique education;Continued evaluation;Energy conservation;Activityengagement   Goal Expiration Date 02/10/24   OT Treatment Day 0   OT Frequency 3-5x/wk   Discharge Recommendation   Rehab Resource Intensity Level, OT III (Minimum Resource Intensity)  (and increased social support for ADL/IADL completion)   Equipment Recommended Bedside commode   Commode Type Standard   Additional Comments  The patient's raw score on the -PAC Daily Activity Inpatient Short Form is 19. A raw score of greater than or equal to 19 suggests the patient may benefit from discharge to home. Please refer to the recommendation of the Occupational Therapist for safe discharge planning.   AM-PAC Daily Activity Inpatient   Lower Body Dressing 3   Bathing 3   Toileting 3   Upper Body Dressing 3   Grooming 3   Eating 4   Daily Activity Raw Score 19   Daily Activity Standardized Score (Calc for Raw Score >=11) 40.22   AM-PAC Applied Cognition Inpatient   Following a Speech/Presentation 3   Understanding Ordinary Conversation 4   Taking Medications 3   Remembering Where Things Are Placed or Put Away 3   Remembering List of 4-5 Errands 2   Taking Care of Complicated Tasks 2   Applied Cognition Raw Score 17   Applied Cognition Standardized Score 36.52   MOCA   Version 8.1   Visuopatial/Executive 3  (trail making: connect numbers/letters  "separately)   Naming 3   Memory 0  (4/5 both trials)   Attention: Digits 2   Attention: Letters 1   Attention: Serial (S)  2  (93, 86, 77, 60, 63)   Language: Repeat 2   Language: Fluency 0  (able to state x 8 words; x 2 duplicates)   Abstraction 2   Delayed Recall 2  (required x 1 multiple choice and x 2 category cues)   Orientation 6   Does patient have less than or equal to 12 years of education? 0   MOCA Total Score 23   MOCA Comments Pt demonstrated good attention to task. Pt required intermittent repetition of multistep commands due to decreased processing. Pt states: \"Good luck with my memory\". Pt showed deficits w/ short term memory/recall as well as visuspatial/executive funcitoning tasks. Pt scored an overall 23/30 on this assessment inidcating mild cognitive impairment. Pt's at this level may benefit from further cognitive testing and participation in fitness to drive evaluation.   Additional Treatment Session   Start Time 0910   End Time 0939   Treatment Assessment Pt seen for additional treatment session to assess activity tolerance via toileting task as well as complete MoCA for continued congitive evaluation. Pt states: \"Can I use the bathroom before I get back to bed?\". Pt compelte MoCA from 4569-8863 (See MoCA above for scoring and recommendations). Pt then able to manage household distance to bathroom and back w/ RW and S demonstrating improved safety/use of RW as compared to initial evaluation. Pt continues to require S for functional transfers and VC for safe hand placement to/from BSC over toilet. Pt declined hand hygiene at sink. Pt able to return to supine in bed w/ min A for BLE management. Pt continues to be limited by generalized weakness/deconditioning however remains motivated to return home w/ her daughter. Pt will benefit from continued active participation in OT services this admission to maximize safety/independence w/ ADLs/mobility.   Additional Treatment Day 1   End of Consult "   Patient Position at End of Consult Bedside chair;Bed/Chair alarm activated;All needs within reach   Nurse Communication Nurse aware of consult     GOALS:    *Goals established to promote patient goal of to go home:      *Patient will perform grooming tasks standing at sink with (I) in order to increase overall independence    *LB ADL with mod (I) using AE prn for inc'd independence with self care    *Toileting with mod (I) for clothing management and hygiene to increase hygiene/thoroughness in order to reduce caregiver burden    *Patient will verbalize and demonstrate use of energy conservation/deep breathing techniques and work simplification skills during functional activities with no verbal cues.     *ADL transfers with (I) for inc'd independence with ADLs/purposeful tasks    *Patient will increase functional mobility to and from bathroom with rolling walker independently to increase independence with toileting    *Increase stand tolerance x 5  m for inc'd tolerance with standing purposeful tasks including meal prep/household management    *Patient will engage in ongoing cognitive assessment to assist with safe discharge planning/recommendations.  DAXA Kearney, OTR/L    [place] License [id number]  [place] License [id number]

## 2024-01-31 NOTE — PLAN OF CARE
Problem: OCCUPATIONAL THERAPY ADULT  Goal: Performs self-care activities at highest level of function for planned discharge setting.  See evaluation for individualized goals.  Description: Treatment Interventions: ADL retraining, Functional transfer training, Endurance training, Cognitive reorientation, Equipment evaluation/education, Patient/family training, Compensatory technique education, Continued evaluation, Energy conservation, Activityengagement  Equipment Recommended: Bedside commode       See flowsheet documentation for full assessment, interventions and recommendations.   Note: Limitation: Decreased ADL status, Decreased Safe judgement during ADL, Decreased cognition, Decreased endurance, Decreased self-care trans, Decreased high-level ADLs (balance, fxnl mobility, act dante, fxnl reach, standing dante, and strength, safety awareness, pacing, and problem solving, display of emotion, response time)  Prognosis: Good  Assessment: Pt is a 84 y.o. female seen for OT evaluation s/p admit to [facility] on 1/26/2024 w/ SBO. Prior to admission, pt was at [facility] for [facility], (I) with ADLs, (A) with IADLs, (-) falls, (+) . Personal and environmental factors affecting patient at time of evaluation include current habits and behavioral patterns, difficulty completing ADLs, and difficulty completing IADLs. Personal factors supporting patient at time of evaluation include (I) PLOF, supportive family and facility staff, attitude towards recovery, and FFSU. Based upon this evaluation, pt is functioning below baseline. Pt will benefit from continued skilled inpatient OT to maximize safety, level of independence overall performance in ADLs, functional transfers, functional mobility to return to functional baseline/highest level of function.     Rehab Resource Intensity Level, OT: III (Minimum Resource Intensity) (and increased social support for ADL/IADL completion)     DAXA Kearney, OTR/L  [place]  License [id number]  [place] License [id number]

## 2024-01-31 NOTE — CASE MANAGEMENT
Case Management Discharge Planning Note    Patient name Radha Vidal  Location W /W -01 MRN [id number]  : 1940 Date 2024       Current Admission Date: 2024  Current Admission Diagnosis:Vomiting, SBO (small bowel obstruction) (HCC), Nausea and vomiting   Patient Active Problem List    Diagnosis Date Noted    Nausea 2024    Ambulatory dysfunction 2024    Ear pressure, right 2024    Type 2 diabetes mellitus (HCC) 01/15/2024    Stroke-like symptoms 2024    New onset a-fib (HCC) 2024    s/p distal pancreatectomy/splenectomy 2023    Encounter for geriatric assessment 2023    Urge incontinence 2021    Age-related osteoporosis without current pathological fracture 2021    Cerebrovascular disease 2020    IPMN (intraductal papillary mucinous neoplasm) 2020    Trochanteric bursitis, right hip 2020    Tremor 2020    Chronic idiopathic constipation 2019    History of adenomatous polyp of colon 2019    Osteopenia 2019    Gastro-esophageal reflux disease without esophagitis 2019    Dysphagia 2019    Multiple thyroid nodules 2019    Menopause ovarian failure 2019    Vitamin D deficiency 2019    Mitral valve disorder 2018    Mood disturbance 2018    Obesity (BMI 30-39.9) 2018    Claustrophobia 2018    Impaired fasting glucose 2018    Seasonal allergic rhinitis 2018    History of skin cancer 2018    Parkinson's disease 2018    Primary insomnia 2018    Cherry angioma 11/15/2017    Postablative hypothyroidism 2016    Dyspnea on exertion 10/02/2015    OAB (overactive bladder) 2015    Sleep apnea 04/15/2014      LOS (days): 4  Geometric Mean LOS (GMLOS) (days): 2.3  Days to GMLOS:-2.3     OBJECTIVE:  Risk of Unplanned Readmission Score: 13.91         Current admission status: Inpatient   Preferred Pharmacy:    [facility] - [place], [place] - [address]  [address] [address]  Phone: [phone] Fax: [phone]    [facility] - [place] - [address]  [address]  [address]  [place] [address]  Phone: [phone] Fax: [phone]    [facility] - [place], [place] - [address]  [address]  [place] [address]  Phone: [phone] Fax: [phone]    Primary Care Provider: Hoang Tovar MD    Primary Insurance: [employer] [facility]  Secondary Insurance:     DISCHARGE DETAILS:    Discharge planning discussed with:: Pt daughter  Freedom of Choice: Yes  Comments - Freedom of Choice: CM reviewed PT rec of HHC - daughter reported preference of [facility], however reported she will be in to visit pt tonight to see her before fully commiting to d/c home with [facility]  CM contacted family/caregiver?: Yes  Were Treatment Team discharge recommendations reviewed with patient/caregiver?: Yes  Did patient/caregiver verbalize understanding of patient care needs?: Yes  Were patient/caregiver advised of the risks associated with not following Treatment Team discharge recommendations?: Yes    Contacts  Patient Contacts: Melba  Relationship to Patient:: Family  Contact Method: Phone  Phone Number: [phone] ([phone])  Reason/Outcome: Emergency Contact, Continuity of Care, Referral, Discharge Planning    Requested Home Health Care         Is the patient interested in [facility] at discharge?: Yes  Home Health Discipline requested:: Nursing, Occupational Therapy, Physical Therapy  Home Health Agency Name:: [facility]  Home Health Follow-Up Provider:: PCP  Home Health Services Needed:: Evaluate Functional Status and Safety, Gait/ADL Training, Strengthening/Theraputic Exercises to Improve Function  Homebound Criteria Met:: Uses an Assist Device (i.e. cane, walker, etc)  Supporting Clincal Findings::  Limited Endurance, Fatigues Easliy in Short Distances    DME Referral Provided  Referral made for DME?: No    Other Referral/Resources/Interventions Provided:  Interventions: HHC  Referral Comments: CM spoke with pt daughter Melba via phone, introduced self and role with discharge planning. CM reviewed PT rec of home with [facility]. Daughter reported she will be in this evening to visit pt before she is fully agreeable with home with [facility]. Daughter reported preference of [facility], reserved in [place]. Daughter aware CM will continue to follow for discharge coordination.    Would you like to participate in our [facility] service program?  : No - Declined    Treatment Team Recommendation: Home with Home Health Care  Discharge Destination Plan:: Home with Home Health Care

## 2024-01-31 NOTE — PLAN OF CARE
Problem: Potential for Falls  Goal: Patient will remain free of falls  Description: INTERVENTIONS:  - Educate patient/family on patient safety including physical limitations  - Instruct patient to call for assistance with activity   - Consult OT/PT to assist with strengthening/mobility   - Keep Call bell within reach  - Keep bed low and locked with side rails adjusted as appropriate  - Keep care items and personal belongings within reach  - Initiate and maintain comfort rounds  - Make Fall Risk Sign visible to staff  - Offer Toileting every \\ Hours, in advance of need  - Initiate/Maintain \alarm  - Obtain necessary fall risk management equipment: \\\  - Apply yellow socks and bracelet for high fall risk patients  - Consider moving patient to room near nurses station  Outcome: Progressing     Problem: PAIN - ADULT  Goal: Verbalizes/displays adequate comfort level or baseline comfort level  Description: Interventions:  - Encourage patient to monitor pain and request assistance  - Assess pain using appropriate pain scale  - Administer analgesics based on type and severity of pain and evaluate response  - Implement non-pharmacological measures as appropriate and evaluate response  - Consider cultural and social influences on pain and pain management  - Notify physician/advanced practitioner if interventions unsuccessful or patient reports new pain  Outcome: Progressing     Problem: INFECTION - ADULT  Goal: Absence or prevention of progression during hospitalization  Description: INTERVENTIONS:  - Assess and monitor for signs and symptoms of infection  - Monitor lab/diagnostic results  - Monitor all insertion sites, i.e. indwelling lines, tubes, and drains  - Monitor endotracheal if appropriate and nasal secretions for changes in amount and color  - Dodge appropriate cooling/warming therapies per order  - Administer medications as ordered  - Instruct and encourage patient and family to use good hand hygiene  technique  - Identify and instruct in appropriate isolation precautions for identified infection/condition  Outcome: Progressing  Goal: Absence of fever/infection during neutropenic period  Description: INTERVENTIONS:  - Monitor WBC    Outcome: Progressing     Problem: SAFETY ADULT  Goal: Patient will remain free of falls  Description: INTERVENTIONS:  - Educate patient/family on patient safety including physical limitations  - Instruct patient to call for assistance with activity   - Consult OT/PT to assist with strengthening/mobility   - Keep Call bell within reach  - Keep bed low and locked with side rails adjusted as appropriate  - Keep care items and personal belongings within reach  - Initiate and maintain comfort rounds  - Make Fall Risk Sign visible to staff  - Offer Toileting every \ Hours, in advance of need  - Initiate/Maintain \alarm  - Obtain necessary fall risk management equipment: \  - Apply yellow socks and bracelet for high fall risk patients  - Consider moving patient to room near nurses station  Outcome: Progressing  Goal: Maintain or return to baseline ADL function  Description: INTERVENTIONS:  -  Assess patient's ability to carry out ADLs; assess patient's baseline for ADL function and identify physical deficits which impact ability to perform ADLs (bathing, care of mouth/teeth, toileting, grooming, dressing, etc.)  - Assess/evaluate cause of self-care deficits   - Assess range of motion  - Assess patient's mobility; develop plan if impaired  - Assess patient's need for assistive devices and provide as appropriate  - Encourage maximum independence but intervene and supervise when necessary  - Involve family in performance of ADLs  - Assess for home care needs following discharge   - Consider OT consult to assist with ADL evaluation and planning for discharge  - Provide patient education as appropriate  Outcome: Progressing  Goal: Maintains/Returns to pre admission functional level  Description:  INTERVENTIONS:  - Perform AM-PAC 6 Click Basic Mobility/ Daily Activity assessment daily.  - Set and communicate daily mobility goal to care team and patient/family/caregiver.   - Collaborate with rehabilitation services on mobility goals if consulted  - Perform Range of Motion times a day.  - Reposition patient every  hours.  - Dangle patient  times a day  - Stand patient times a day  - Ambulate patient times a day  - Out of bed to chair times a day   - Out of bed for meals times a day  - Out of bed for toileting  - Record patient progress and toleration of activity level   Outcome: Progressing     Problem: DISCHARGE PLANNING  Goal: Discharge to home or other facility with appropriate resources  Description: INTERVENTIONS:  - Identify barriers to discharge w/patient and caregiver  - Arrange for needed discharge resources and transportation as appropriate  - Identify discharge learning needs (meds, wound care, etc.)  - Arrange for interpretive services to assist at discharge as needed  - Refer to Case Management Department for coordinating discharge planning if the patient needs post-hospital services based on physician/advanced practitioner order or complex needs related to functional status, cognitive ability, or social support system  Outcome: Progressing     Problem: Knowledge Deficit  Goal: Patient/family/caregiver demonstrates understanding of disease process, treatment plan, medications, and discharge instructions  Description: Complete learning assessment and assess knowledge base.  Interventions:  - Provide teaching at level of understanding  - Provide teaching via preferred learning methods  Outcome: Progressing     Problem: Nutrition/Hydration-ADULT  Goal: Nutrient/Hydration intake appropriate for improving, restoring or maintaining nutritional needs  Description: Monitor and assess patient's nutrition/hydration status for malnutrition. Collaborate with interdisciplinary team and initiate plan and  interventions as ordered.  Monitor patient's weight and dietary intake as ordered or per policy. Utilize nutrition screening tool and intervene as necessary. Determine patient's food preferences and provide high-protein, high-caloric foods as appropriate.     INTERVENTIONS:  - Monitor oral intake, urinary output, labs, and treatment plans  - Assess nutrition and hydration status and recommend course of action  - Evaluate amount of meals eaten  - Assist patient with eating if necessary   - Allow adequate time for meals  - Recommend/ encourage appropriate diets, oral nutritional supplements, and vitamin/mineral supplements  - Order, calculate, and assess calorie counts as needed  - Recommend, monitor, and adjust tube feedings and TPN/PPN based on assessed needs  - Assess need for intravenous fluids  - Provide specific nutrition/hydration education as appropriate  - Include patient/family/caregiver in decisions related to nutrition  Outcome: Progressing     Problem: GASTROINTESTINAL - ADULT  Goal: Minimal or absence of nausea and/or vomiting  Description: INTERVENTIONS:  - Administer IV fluids if ordered to ensure adequate hydration  - Maintain NPO status until nausea and vomiting are resolved  - Nasogastric tube if ordered  - Administer ordered antiemetic medications as needed  - Provide nonpharmacologic comfort measures as appropriate  - Advance diet as tolerated, if ordered  - Consider nutrition services referral to assist patient with adequate nutrition and appropriate food choices  Outcome: Progressing  Goal: Maintains or returns to baseline bowel function  Description: INTERVENTIONS:  - Assess bowel function  - Encourage oral fluids to ensure adequate hydration  - Administer IV fluids if ordered to ensure adequate hydration  - Administer ordered medications as needed  - Encourage mobilization and activity  - Consider nutritional services referral to assist patient with adequate nutrition and appropriate food  choices  Outcome: Progressing

## 2024-01-31 NOTE — PLAN OF CARE
"  Problem: PHYSICAL THERAPY ADULT  Goal: Performs mobility at highest level of function for planned discharge setting.  See evaluation for individualized goals.  Description: Treatment/Interventions: Functional transfer training, LE strengthening/ROM, Therapeutic exercise, Endurance training, Patient/family training, Equipment eval/education, Bed mobility, Gait training, Continued evaluation, Spoke to nursing  Equipment Recommended: Walker       See flowsheet documentation for full assessment, interventions and recommendations.  Outcome: Progressing  Note: Prognosis: Fair  Problem List: Decreased strength, Decreased endurance, Impaired balance, Decreased mobility, Decreased safety awareness  Assessment: Pt agreeable to PT session despite fatigue/\"tired\" stating \"I want to walk.\" Pt is noted with improved trans, gait balance and gait endurance with the RW. Pt with good tolerance to functional mobility/toileting. While adm, pt will cont to benefit from skilled PT services to cont to increase pt's strength, functional mobility, gait endurance and balance. When medically stable for dc, pt is appropriate for Level III Minimum Resource Intensity. This is a change from inital recommendation of Level II Moderate Resource Intensity - CM is aware.  Barriers to Discharge: Decreased caregiver support     Rehab Resource Intensity Level, PT: III (Minimum Resource Intensity)    See flowsheet documentation for full assessment.        "

## 2024-01-31 NOTE — PLAN OF CARE
Problem: Potential for Falls  Goal: Patient will remain free of falls  Description: INTERVENTIONS:  - Educate patient/family on patient safety including physical limitations  - Instruct patient to call for assistance with activity   - Consult OT/PT to assist with strengthening/mobility   - Keep Call bell within reach  - Keep bed low and locked with side rails adjusted as appropriate  - Keep care items and personal belongings within reach  - Initiate and maintain comfort rounds  - Make Fall Risk Sign visible to staff  - Apply yellow socks and bracelet for high fall risk patients  - Consider moving patient to room near nurses station  Outcome: Progressing     Problem: PAIN - ADULT  Goal: Verbalizes/displays adequate comfort level or baseline comfort level  Description: Interventions:  - Encourage patient to monitor pain and request assistance  - Assess pain using appropriate pain scale  - Administer analgesics based on type and severity of pain and evaluate response  - Implement non-pharmacological measures as appropriate and evaluate response  - Consider cultural and social influences on pain and pain management  - Notify physician/advanced practitioner if interventions unsuccessful or patient reports new pain  Outcome: Progressing     Problem: INFECTION - ADULT  Goal: Absence or prevention of progression during hospitalization  Description: INTERVENTIONS:  - Assess and monitor for signs and symptoms of infection  - Monitor lab/diagnostic results  - Monitor all insertion sites, i.e. indwelling lines, tubes, and drains  - Monitor endotracheal if appropriate and nasal secretions for changes in amount and color  - Fate appropriate cooling/warming therapies per order  - Administer medications as ordered  - Instruct and encourage patient and family to use good hand hygiene technique  - Identify and instruct in appropriate isolation precautions for identified infection/condition  Outcome: Progressing  Goal: Absence  of fever/infection during neutropenic period  Description: INTERVENTIONS:  - Monitor WBC    Outcome: Progressing     Problem: SAFETY ADULT  Goal: Patient will remain free of falls  Description: INTERVENTIONS:  - Educate patient/family on patient safety including physical limitations  - Instruct patient to call for assistance with activity   - Consult OT/PT to assist with strengthening/mobility   - Keep Call bell within reach  - Keep bed low and locked with side rails adjusted as appropriate  - Keep care items and personal belongings within reach  - Initiate and maintain comfort rounds  - Make Fall Risk Sign visible to staff  - Apply yellow socks and bracelet for high fall risk patients  - Consider moving patient to room near nurses station  Outcome: Progressing  Goal: Maintain or return to baseline ADL function  Description: INTERVENTIONS:  -  Assess patient's ability to carry out ADLs; assess patient's baseline for ADL function and identify physical deficits which impact ability to perform ADLs (bathing, care of mouth/teeth, toileting, grooming, dressing, etc.)  - Assess/evaluate cause of self-care deficits   - Assess range of motion  - Assess patient's mobility; develop plan if impaired  - Assess patient's need for assistive devices and provide as appropriate  - Encourage maximum independence but intervene and supervise when necessary  - Involve family in performance of ADLs  - Assess for home care needs following discharge   - Consider OT consult to assist with ADL evaluation and planning for discharge  - Provide patient education as appropriate  Outcome: Progressing  Goal: Maintains/Returns to pre admission functional level  Description: INTERVENTIONS:  - Perform AM-PAC 6 Click Basic Mobility/ Daily Activity assessment daily.  - Set and communicate daily mobility goal to care team and patient/family/caregiver.   - Collaborate with rehabilitation services on mobility goals if consulted  - Out of bed for  toileting  - Record patient progress and toleration of activity level   Outcome: Progressing     Problem: DISCHARGE PLANNING  Goal: Discharge to home or other facility with appropriate resources  Description: INTERVENTIONS:  - Identify barriers to discharge w/patient and caregiver  - Arrange for needed discharge resources and transportation as appropriate  - Identify discharge learning needs (meds, wound care, etc.)  - Arrange for interpretive services to assist at discharge as needed  - Refer to Case Management Department for coordinating discharge planning if the patient needs post-hospital services based on physician/advanced practitioner order or complex needs related to functional status, cognitive ability, or social support system  Outcome: Progressing     Problem: Knowledge Deficit  Goal: Patient/family/caregiver demonstrates understanding of disease process, treatment plan, medications, and discharge instructions  Description: Complete learning assessment and assess knowledge base.  Interventions:  - Provide teaching at level of understanding  - Provide teaching via preferred learning methods  Outcome: Progressing     Problem: Nutrition/Hydration-ADULT  Goal: Nutrient/Hydration intake appropriate for improving, restoring or maintaining nutritional needs  Description: Monitor and assess patient's nutrition/hydration status for malnutrition. Collaborate with interdisciplinary team and initiate plan and interventions as ordered.  Monitor patient's weight and dietary intake as ordered or per policy. Utilize nutrition screening tool and intervene as necessary. Determine patient's food preferences and provide high-protein, high-caloric foods as appropriate.     INTERVENTIONS:  - Monitor oral intake, urinary output, labs, and treatment plans  - Assess nutrition and hydration status and recommend course of action  - Evaluate amount of meals eaten  - Assist patient with eating if necessary   - Allow adequate time for  meals  - Recommend/ encourage appropriate diets, oral nutritional supplements, and vitamin/mineral supplements  - Order, calculate, and assess calorie counts as needed  - Recommend, monitor, and adjust tube feedings and TPN/PPN based on assessed needs  - Assess need for intravenous fluids  - Provide specific nutrition/hydration education as appropriate  - Include patient/family/caregiver in decisions related to nutrition  Outcome: Progressing     Problem: GASTROINTESTINAL - ADULT  Goal: Minimal or absence of nausea and/or vomiting  Description: INTERVENTIONS:  - Administer IV fluids if ordered to ensure adequate hydration  - Maintain NPO status until nausea and vomiting are resolved  - Nasogastric tube if ordered  - Administer ordered antiemetic medications as needed  - Provide nonpharmacologic comfort measures as appropriate  - Advance diet as tolerated, if ordered  - Consider nutrition services referral to assist patient with adequate nutrition and appropriate food choices  Outcome: Progressing  Goal: Maintains or returns to baseline bowel function  Description: INTERVENTIONS:  - Assess bowel function  - Encourage oral fluids to ensure adequate hydration  - Administer IV fluids if ordered to ensure adequate hydration  - Administer ordered medications as needed  - Encourage mobilization and activity  - Consider nutritional services referral to assist patient with adequate nutrition and appropriate food choices  Outcome: Progressing

## 2024-01-31 NOTE — PROGRESS NOTES
General Surgery  Progress Note   Radha Vidal 84 y.o. female MRN: [id number]  Unit/Bed#: W -01 Encounter: [id number]    Assessment:  84 year old female with small bowel obstruction    Vital signs stable, afebrile.     WBC 12.78 (14.31)  Hgb 11.5 (12.0)  Cr 0.48 (0.61)    Plan:  Advance diet as tolerated  IV fluids  DVT ppx: Lovenox  Pain/ nausea control PRN  OOB/ ambulation  Incentive Spirometry      Subjective/Objective     Subjective:   Patient seen and examined at bedside, in no acute distress. No acute events overnight.denies pain, nausea or vomiting. Has had return of bowel function.    Objective:   Vitals:Blood pressure 151/71, pulse 69, temperature 98 °F (36.7 °C), resp. rate 13, weight 73.5 kg (162 lb 1.6 oz), SpO2 93%, not currently breastfeeding.  Temp (24hrs), Av °F (36.7 °C), Min:97.6 °F (36.4 °C), Max:98.5 °F (36.9 °C)        Intake/Output Summary (Last 24 hours) at 2024 0818  Last data filed at 2024 1300  Gross per 24 hour   Intake 40 ml   Output 50 ml   Net -10 ml       Invasive Devices       Peripheral Intravenous Line  Duration             Peripheral IV 24 Right Antecubital <1 day                    Physical Exam:  General: No acute distress, alert and oriented  CV: Well perfused, regular rate and rhythm  Lungs: Normal work of breathing, no increased respiratory effort   Abdomen: Soft, non-tender, non-distended.  Extremities: No edema, clubbing or cyanosis  Skin: Warm, dry    Lab Results: BMP/CMP:   Lab Results   Component Value Date    SODIUM 140 2024    K 3.4 (L) 2024     2024    CO2 24 2024    BUN 10 2024    CREATININE 0.48 (L) 2024    CALCIUM 8.1 (L) 2024    EGFR 89 2024    and CBC:   Lab Results   Component Value Date    WBC 12.78 (H) 2024    HGB 11.5 2024    HCT 34.9 2024    MCV 91 2024     2024    RBC 3.83 2024    MCH 30.0 2024    MCHC 33.0 2024    RDW 14.3  01/31/2024    MPV 10.2 01/31/2024    NRBC 0 01/31/2024     VTE Prophylaxis: Sequential compression device (Venodyne)  and Enoxaparin (Lovenox)    Madeline Cortes MD  1/31/2024

## 2024-02-01 PROBLEM — R79.89 ELEVATED TROPONIN: Status: ACTIVE | Noted: 2024-02-01

## 2024-02-01 PROBLEM — E87.8 ELECTROLYTE ABNORMALITY: Status: ACTIVE | Noted: 2024-02-01

## 2024-02-01 PROBLEM — K56.609 SMALL BOWEL OBSTRUCTION (HCC): Status: ACTIVE | Noted: 2024-02-01

## 2024-02-01 LAB
2HR DELTA HS TROPONIN: 922 NG/L
4HR DELTA HS TROPONIN: 1066 NG/L
ANION GAP SERPL CALCULATED.3IONS-SCNC: 11 MMOL/L
APTT PPP: 37 SECONDS (ref 23–37)
APTT PPP: 38 SECONDS (ref 23–37)
ATRIAL RATE: 187 BPM
BASOPHILS # BLD AUTO: 0.04 THOUSANDS/ÂΜL (ref 0–0.1)
BASOPHILS NFR BLD AUTO: 0 % (ref 0–1)
BUN SERPL-MCNC: 6 MG/DL (ref 5–25)
CALCIUM SERPL-MCNC: 8.6 MG/DL (ref 8.4–10.2)
CARDIAC TROPONIN I PNL SERPL HS: 15 NG/L (ref 8–18)
CARDIAC TROPONIN I PNL SERPL HS: 1731 NG/L
CARDIAC TROPONIN I PNL SERPL HS: 2653 NG/L
CARDIAC TROPONIN I PNL SERPL HS: 2797 NG/L
CHLORIDE SERPL-SCNC: 106 MMOL/L (ref 96–108)
CO2 SERPL-SCNC: 24 MMOL/L (ref 21–32)
CREAT SERPL-MCNC: 0.68 MG/DL (ref 0.6–1.3)
EOSINOPHIL # BLD AUTO: 0.37 THOUSAND/ÂΜL (ref 0–0.61)
EOSINOPHIL NFR BLD AUTO: 3 % (ref 0–6)
ERYTHROCYTE [DISTWIDTH] IN BLOOD BY AUTOMATED COUNT: 14 % (ref 11.6–15.1)
GFR SERPL CREATININE-BSD FRML MDRD: 80 ML/MIN/1.73SQ M
GLUCOSE SERPL-MCNC: 118 MG/DL (ref 65–140)
GLUCOSE SERPL-MCNC: 131 MG/DL (ref 65–140)
GLUCOSE SERPL-MCNC: 136 MG/DL (ref 65–140)
HCT VFR BLD AUTO: 39.1 % (ref 34.8–46.1)
HGB BLD-MCNC: 12.9 G/DL (ref 11.5–15.4)
IMM GRANULOCYTES # BLD AUTO: 0.16 THOUSAND/UL (ref 0–0.2)
IMM GRANULOCYTES NFR BLD AUTO: 2 % (ref 0–2)
INR PPP: 1.3 (ref 0.84–1.19)
LYMPHOCYTES # BLD AUTO: 3.72 THOUSANDS/ÂΜL (ref 0.6–4.47)
LYMPHOCYTES NFR BLD AUTO: 34 % (ref 14–44)
MAGNESIUM SERPL-MCNC: 1.7 MG/DL (ref 1.9–2.7)
MCH RBC QN AUTO: 29.7 PG (ref 26.8–34.3)
MCHC RBC AUTO-ENTMCNC: 33 G/DL (ref 31.4–37.4)
MCV RBC AUTO: 90 FL (ref 82–98)
MONOCYTES # BLD AUTO: 0.82 THOUSAND/ÂΜL (ref 0.17–1.22)
MONOCYTES NFR BLD AUTO: 8 % (ref 4–12)
NEUTROPHILS # BLD AUTO: 5.8 THOUSANDS/ÂΜL (ref 1.85–7.62)
NEUTS SEG NFR BLD AUTO: 53 % (ref 43–75)
NRBC BLD AUTO-RTO: 0 /100 WBCS
PHOSPHATE SERPL-MCNC: 2.6 MG/DL (ref 2.3–4.1)
PLATELET # BLD AUTO: 342 THOUSANDS/UL (ref 149–390)
PMV BLD AUTO: 10.2 FL (ref 8.9–12.7)
POTASSIUM SERPL-SCNC: 3.4 MMOL/L (ref 3.5–5.3)
PROTHROMBIN TIME: 16.9 SECONDS (ref 11.6–14.5)
QRS AXIS: 38 DEGREES
QRSD INTERVAL: 162 MS
QT INTERVAL: 292 MS
QTC INTERVAL: 505 MS
RBC # BLD AUTO: 4.35 MILLION/UL (ref 3.81–5.12)
SODIUM SERPL-SCNC: 141 MMOL/L (ref 135–147)
T WAVE AXIS: 86 DEGREES
TSH SERPL DL<=0.05 MIU/L-ACNC: 1.26 UIU/ML (ref 0.45–4.5)
VENTRICULAR RATE: 180 BPM
WBC # BLD AUTO: 10.91 THOUSAND/UL (ref 4.31–10.16)

## 2024-02-01 PROCEDURE — 93005 ELECTROCARDIOGRAM TRACING: CPT

## 2024-02-01 PROCEDURE — 83735 ASSAY OF MAGNESIUM: CPT

## 2024-02-01 PROCEDURE — 99223 1ST HOSP IP/OBS HIGH 75: CPT | Performed by: INTERNAL MEDICINE

## 2024-02-01 PROCEDURE — 85025 COMPLETE CBC W/AUTO DIFF WBC: CPT

## 2024-02-01 PROCEDURE — 82948 REAGENT STRIP/BLOOD GLUCOSE: CPT

## 2024-02-01 PROCEDURE — 84100 ASSAY OF PHOSPHORUS: CPT

## 2024-02-01 PROCEDURE — 85730 THROMBOPLASTIN TIME PARTIAL: CPT | Performed by: NURSE PRACTITIONER

## 2024-02-01 PROCEDURE — 99232 SBSQ HOSP IP/OBS MODERATE 35: CPT | Performed by: SURGERY

## 2024-02-01 PROCEDURE — 93010 ELECTROCARDIOGRAM REPORT: CPT | Performed by: INTERNAL MEDICINE

## 2024-02-01 PROCEDURE — 84443 ASSAY THYROID STIM HORMONE: CPT | Performed by: NURSE PRACTITIONER

## 2024-02-01 PROCEDURE — C9113 INJ PANTOPRAZOLE SODIUM, VIA: HCPCS | Performed by: SURGERY

## 2024-02-01 PROCEDURE — 85730 THROMBOPLASTIN TIME PARTIAL: CPT | Performed by: SURGERY

## 2024-02-01 PROCEDURE — 85730 THROMBOPLASTIN TIME PARTIAL: CPT | Performed by: INTERNAL MEDICINE

## 2024-02-01 PROCEDURE — 80048 BASIC METABOLIC PNL TOTAL CA: CPT

## 2024-02-01 PROCEDURE — 85610 PROTHROMBIN TIME: CPT | Performed by: NURSE PRACTITIONER

## 2024-02-01 PROCEDURE — 84484 ASSAY OF TROPONIN QUANT: CPT | Performed by: SURGERY

## 2024-02-01 RX ORDER — HYDROXYZINE HYDROCHLORIDE 25 MG/1
25 TABLET, FILM COATED ORAL EVERY 6 HOURS PRN
Status: DISCONTINUED | OUTPATIENT
Start: 2024-02-01 | End: 2024-02-04 | Stop reason: HOSPADM

## 2024-02-01 RX ORDER — POTASSIUM CHLORIDE 14.9 MG/ML
20 INJECTION INTRAVENOUS
Status: COMPLETED | OUTPATIENT
Start: 2024-02-01 | End: 2024-02-01

## 2024-02-01 RX ORDER — HEPARIN SODIUM 10000 [USP'U]/100ML
3-20 INJECTION, SOLUTION INTRAVENOUS
Status: DISCONTINUED | OUTPATIENT
Start: 2024-02-01 | End: 2024-02-02

## 2024-02-01 RX ORDER — POTASSIUM CHLORIDE 14.9 MG/ML
20 INJECTION INTRAVENOUS
Qty: 200 ML | Refills: 0 | Status: DISCONTINUED | OUTPATIENT
Start: 2024-02-01 | End: 2024-02-01

## 2024-02-01 RX ORDER — HEPARIN SODIUM 1000 [USP'U]/ML
4000 INJECTION, SOLUTION INTRAVENOUS; SUBCUTANEOUS EVERY 6 HOURS PRN
Status: DISCONTINUED | OUTPATIENT
Start: 2024-02-01 | End: 2024-02-02

## 2024-02-01 RX ORDER — HEPARIN SODIUM 1000 [USP'U]/ML
2000 INJECTION, SOLUTION INTRAVENOUS; SUBCUTANEOUS EVERY 6 HOURS PRN
Status: DISCONTINUED | OUTPATIENT
Start: 2024-02-01 | End: 2024-02-02

## 2024-02-01 RX ORDER — MAGNESIUM SULFATE HEPTAHYDRATE 40 MG/ML
2 INJECTION, SOLUTION INTRAVENOUS ONCE
Status: COMPLETED | OUTPATIENT
Start: 2024-02-01 | End: 2024-02-01

## 2024-02-01 RX ORDER — METOPROLOL TARTRATE 1 MG/ML
2.5 INJECTION, SOLUTION INTRAVENOUS ONCE
Status: COMPLETED | OUTPATIENT
Start: 2024-02-01 | End: 2024-02-01

## 2024-02-01 RX ORDER — INSULIN LISPRO 100 [IU]/ML
1-5 INJECTION, SOLUTION INTRAVENOUS; SUBCUTANEOUS
Status: DISCONTINUED | OUTPATIENT
Start: 2024-02-01 | End: 2024-02-04 | Stop reason: HOSPADM

## 2024-02-01 RX ORDER — POLYETHYLENE GLYCOL 3350 17 G/17G
17 POWDER, FOR SOLUTION ORAL DAILY
Status: DISCONTINUED | OUTPATIENT
Start: 2024-02-01 | End: 2024-02-01

## 2024-02-01 RX ORDER — METOPROLOL SUCCINATE 25 MG/1
25 TABLET, EXTENDED RELEASE ORAL DAILY
Status: DISCONTINUED | OUTPATIENT
Start: 2024-02-01 | End: 2024-02-04 | Stop reason: HOSPADM

## 2024-02-01 RX ORDER — METOPROLOL TARTRATE 1 MG/ML
5 INJECTION, SOLUTION INTRAVENOUS EVERY 6 HOURS PRN
Status: DISCONTINUED | OUTPATIENT
Start: 2024-02-01 | End: 2024-02-01

## 2024-02-01 RX ORDER — HEPARIN SODIUM 1000 [USP'U]/ML
4000 INJECTION, SOLUTION INTRAVENOUS; SUBCUTANEOUS ONCE
Status: COMPLETED | OUTPATIENT
Start: 2024-02-01 | End: 2024-02-01

## 2024-02-01 RX ORDER — METOPROLOL TARTRATE 1 MG/ML
10 INJECTION, SOLUTION INTRAVENOUS EVERY 6 HOURS PRN
Status: DISCONTINUED | OUTPATIENT
Start: 2024-02-01 | End: 2024-02-04 | Stop reason: HOSPADM

## 2024-02-01 RX ADMIN — METOPROLOL TARTRATE 2.5 MG: 1 INJECTION, SOLUTION INTRAVENOUS at 04:55

## 2024-02-01 RX ADMIN — PANTOPRAZOLE SODIUM 40 MG: 40 INJECTION, POWDER, FOR SOLUTION INTRAVENOUS at 08:20

## 2024-02-01 RX ADMIN — POTASSIUM CHLORIDE 20 MEQ: 14.9 INJECTION, SOLUTION INTRAVENOUS at 10:04

## 2024-02-01 RX ADMIN — MAGNESIUM SULFATE HEPTAHYDRATE 2 G: 40 INJECTION, SOLUTION INTRAVENOUS at 05:58

## 2024-02-01 RX ADMIN — HEPARIN SODIUM 4000 UNITS: 1000 INJECTION INTRAVENOUS; SUBCUTANEOUS at 16:42

## 2024-02-01 RX ADMIN — METOPROLOL SUCCINATE 25 MG: 25 TABLET, EXTENDED RELEASE ORAL at 16:52

## 2024-02-01 RX ADMIN — ONDANSETRON 4 MG: 2 INJECTION INTRAMUSCULAR; INTRAVENOUS at 05:59

## 2024-02-01 RX ADMIN — ACETAMINOPHEN 975 MG: 325 TABLET, FILM COATED ORAL at 08:20

## 2024-02-01 RX ADMIN — CARBIDOPA AND LEVODOPA 1 TABLET: 25; 100 TABLET ORAL at 06:05

## 2024-02-01 RX ADMIN — CARBIDOPA AND LEVODOPA 1 TABLET: 25; 100 TABLET ORAL at 16:41

## 2024-02-01 RX ADMIN — CARBIDOPA AND LEVODOPA 1 TABLET: 25; 100 TABLET ORAL at 12:06

## 2024-02-01 RX ADMIN — METOPROLOL TARTRATE 5 MG: 1 INJECTION, SOLUTION INTRAVENOUS at 22:06

## 2024-02-01 RX ADMIN — POTASSIUM CHLORIDE 20 MEQ: 14.9 INJECTION, SOLUTION INTRAVENOUS at 08:17

## 2024-02-01 RX ADMIN — HYDROXYZINE HYDROCHLORIDE 25 MG: 25 TABLET, FILM COATED ORAL at 23:28

## 2024-02-01 RX ADMIN — OXYBUTYNIN CHLORIDE 5 MG: 5 TABLET ORAL at 08:19

## 2024-02-01 RX ADMIN — DEXTROSE, SODIUM CHLORIDE, AND POTASSIUM CHLORIDE 50 ML/HR: 5; .45; .15 INJECTION INTRAVENOUS at 15:36

## 2024-02-01 RX ADMIN — HEPARIN SODIUM 12 UNITS/KG/HR: 10000 INJECTION, SOLUTION INTRAVENOUS at 16:43

## 2024-02-01 RX ADMIN — APIXABAN 5 MG: 5 TABLET, FILM COATED ORAL at 08:19

## 2024-02-01 NOTE — CASE MANAGEMENT
Case Management Progress Note    Patient name Radha Vidal  Location W /W -01 MRN [id number]  : 1940 Date 2024       LOS (days): 5  Geometric Mean LOS (GMLOS) (days): 2.3  Days to GMLOS:-3.1        OBJECTIVE:        Current admission status: Inpatient  Preferred Pharmacy:   [facility] - [place], [place] - [address] [place] [address]  [address] [facility] [address]  Phone: [phone] Fax: [phone]    [facility] [place] [place] - [address]  [address]  [address]  [facility] [address]  Phone: [phone] Fax: [phone]    [facility] [place] - [address]  [address]  [place] [address]  Phone: [phone] Fax: [phone]    Primary Care Provider: Hoang Tovar MD    Primary Insurance: [employer]  REP  Secondary Insurance:     PROGRESS NOTE:  VM left for daughter Melba to discuss dcp, as daughter came to hospital last night to see how patient was mobilizing before making a decision on HHC vs STR. Call back number provided.

## 2024-02-01 NOTE — ASSESSMENT & PLAN NOTE
Troponins elevated into the 1,000's.   EKG A. Fib with RVR with some ST depressions.   Cardiology started Heprin ACS protocol at this time and will determine if any intervention is needed.  Non ischemic in the setting of A. Fib with RVR vs. Ischemic.      Plan:  Continue Heparin ACS protocol  Follow up on 4 hr troponin  Follow up on Echo

## 2024-02-01 NOTE — ASSESSMENT & PLAN NOTE
Has A. Fib history, on Eliquis, no rate or rhythm control agent in home regimen.  Had chest pressure early morning today, found to be in A. Fib with RVR with HR of 180's with some ST depressions.  Cardiology was consulted and are recommending metoprolol 25 mg for rate control.      Plan:  Continue Metoprolol

## 2024-02-01 NOTE — ASSESSMENT & PLAN NOTE
"Lab Results   Component Value Date    HGBA1C 6.7 (H) 01/15/2024       No results for input(s): \"POCGLU\" in the last 72 hours.    Blood Sugar Average: Last 72 hrs:  Not on any antidiabetic medications outpatient.    Plan:  Insulin sliding scale  Hypoglycemia protocol    "

## 2024-02-01 NOTE — CASE MANAGEMENT
Case Management Progress Note    Patient name Radha Vidal  Location W /W -01 MRN [id number]  : 1940 Date 2024       LOS (days): 5  Geometric Mean LOS (GMLOS) (days): 2.3  Days to GMLOS:-3.1        OBJECTIVE:        Current admission status: Inpatient  Preferred Pharmacy:   [facility] - [place], [place] - [address] [place] [address]  [address] [facility] [address]  Phone: [phone] Fax: [phone]    [facility] [place], [place] - [address]  [address]  [address]  [facility] [address]  Phone: [phone] Fax: [phone]    [facility] [place] - [place] - [address]  [address]  [place] [address]  Phone: [phone] Fax: [phone]    Primary Care Provider: Hoang Tovar MD    Primary Insurance: [employer]  REP  Secondary Insurance:     PROGRESS NOTE:    Weekly Care Management Length of Stay Review     Current LOS: 5 Days    Most Recent Labs:     Lab Results   Component Value Date/Time    WBC 10.91 (H) 2024 04:50 AM    HGB 12.9 2024 04:50 AM    HCT 39.1 2024 04:50 AM     2024 04:50 AM    SODIUM 141 2024 04:50 AM    K 3.4 (L) 2024 04:50 AM     2024 04:50 AM    CO2 24 2024 04:50 AM    BUN 6 2024 04:50 AM    CREATININE 0.68 2024 04:50 AM    GLUC 136 2024 04:50 AM       Most Recent Vitals:   Vitals:    24 0721   BP:    Pulse: 92   Resp:    Temp:    SpO2: 92%        Identified Barriers to Discharge/Discharge Goals/Care Management Interventions: SBO-NGT    Intended Discharge Disposition: home-[facility]    Expected Discharge Date: 24hrs

## 2024-02-01 NOTE — PLAN OF CARE
Problem: Potential for Falls  Goal: Patient will remain free of falls  Description: INTERVENTIONS:  - Educate patient/family on patient safety including physical limitations  - Instruct patient to call for assistance with activity   - Consult OT/PT to assist with strengthening/mobility   - Keep Call bell within reach  - Keep bed low and locked with side rails adjusted as appropriate  - Keep care items and personal belongings within reach  - Initiate and maintain comfort rounds  - Make Fall Risk Sign visible to staff  - Offer Toileting every  Hours, in advance of need  - Initiate/Maintainalarm  - Obtain necessary fall risk management equipment:   - Apply yellow socks and bracelet for high fall risk patients  - Consider moving patient to room near nurses station  Outcome: Progressing     Problem: PAIN - ADULT  Goal: Verbalizes/displays adequate comfort level or baseline comfort level  Description: Interventions:  - Encourage patient to monitor pain and request assistance  - Assess pain using appropriate pain scale  - Administer analgesics based on type and severity of pain and evaluate response  - Implement non-pharmacological measures as appropriate and evaluate response  - Consider cultural and social influences on pain and pain management  - Notify physician/advanced practitioner if interventions unsuccessful or patient reports new pain  Outcome: Progressing     Problem: INFECTION - ADULT  Goal: Absence or prevention of progression during hospitalization  Description: INTERVENTIONS:  - Assess and monitor for signs and symptoms of infection  - Monitor lab/diagnostic results  - Monitor all insertion sites, i.e. indwelling lines, tubes, and drains  - Monitor endotracheal if appropriate and nasal secretions for changes in amount and color  - [place] appropriate cooling/warming therapies per order  - Administer medications as ordered  - Instruct and encourage patient and family to use good hand hygiene technique  -  Identify and instruct in appropriate isolation precautions for identified infection/condition  Outcome: Progressing  Goal: Absence of fever/infection during neutropenic period  Description: INTERVENTIONS:  - Monitor WBC    Outcome: Progressing     Problem: SAFETY ADULT  Goal: Patient will remain free of falls  Description: INTERVENTIONS:  - Educate patient/family on patient safety including physical limitations  - Instruct patient to call for assistance with activity   - Consult OT/PT to assist with strengthening/mobility   - Keep Call bell within reach  - Keep bed low and locked with side rails adjusted as appropriate  - Keep care items and personal belongings within reach  - Initiate and maintain comfort rounds  - Make Fall Risk Sign visible to staff  - Offer Toileting ever Hours, in advance of need  - Initiate/Maintain alarm  - Obtain necessary fall risk management equipment:   - Apply yellow socks and bracelet for high fall risk patients  - Consider moving patient to room near nurses station  Outcome: Progressing  Goal: Maintain or return to baseline ADL function  Description: INTERVENTIONS:  -  Assess patient's ability to carry out ADLs; assess patient's baseline for ADL function and identify physical deficits which impact ability to perform ADLs (bathing, care of mouth/teeth, toileting, grooming, dressing, etc.)  - Assess/evaluate cause of self-care deficits   - Assess range of motion  - Assess patient's mobility; develop plan if impaired  - Assess patient's need for assistive devices and provide as appropriate  - Encourage maximum independence but intervene and supervise when necessary  - Involve family in performance of ADLs  - Assess for home care needs following discharge   - Consider OT consult to assist with ADL evaluation and planning for discharge  - Provide patient education as appropriate  Outcome: Progressing  Goal: Maintains/Returns to pre admission functional level  Description: INTERVENTIONS:  -  Perform AM-PAC 6 Click Basic Mobility/ Daily Activity assessment daily.  - Set and communicate daily mobility goal to care team and patient/family/caregiver.   - Collaborate with rehabilitation services on mobility goals if consulted  - Perform Range of Motion times a day.  - Reposition patient every  hours.  - Dangle patient  times a day  - Stand patient  times a day  - Ambulate patient times a day  - Out of bed to chair  times a day   - Out of bed for meals times a day  - Out of bed for toileting  - Record patient progress and toleration of activity level   Outcome: Progressing     Problem: DISCHARGE PLANNING  Goal: Discharge to home or other facility with appropriate resources  Description: INTERVENTIONS:  - Identify barriers to discharge w/patient and caregiver  - Arrange for needed discharge resources and transportation as appropriate  - Identify discharge learning needs (meds, wound care, etc.)  - Arrange for interpretive services to assist at discharge as needed  - Refer to Case Management Department for coordinating discharge planning if the patient needs post-hospital services based on physician/advanced practitioner order or complex needs related to functional status, cognitive ability, or social support system  Outcome: Progressing     Problem: Knowledge Deficit  Goal: Patient/family/caregiver demonstrates understanding of disease process, treatment plan, medications, and discharge instructions  Description: Complete learning assessment and assess knowledge base.  Interventions:  - Provide teaching at level of understanding  - Provide teaching via preferred learning methods  Outcome: Progressing     Problem: Nutrition/Hydration-ADULT  Goal: Nutrient/Hydration intake appropriate for improving, restoring or maintaining nutritional needs  Description: Monitor and assess patient's nutrition/hydration status for malnutrition. Collaborate with interdisciplinary team and initiate plan and interventions as  ordered.  Monitor patient's weight and dietary intake as ordered or per policy. Utilize nutrition screening tool and intervene as necessary. Determine patient's food preferences and provide high-protein, high-caloric foods as appropriate.     INTERVENTIONS:  - Monitor oral intake, urinary output, labs, and treatment plans  - Assess nutrition and hydration status and recommend course of action  - Evaluate amount of meals eaten  - Assist patient with eating if necessary   - Allow adequate time for meals  - Recommend/ encourage appropriate diets, oral nutritional supplements, and vitamin/mineral supplements  - Order, calculate, and assess calorie counts as needed  - Recommend, monitor, and adjust tube feedings and TPN/PPN based on assessed needs  - Assess need for intravenous fluids  - Provide specific nutrition/hydration education as appropriate  - Include patient/family/caregiver in decisions related to nutrition  Outcome: Progressing     Problem: GASTROINTESTINAL - ADULT  Goal: Minimal or absence of nausea and/or vomiting  Description: INTERVENTIONS:  - Administer IV fluids if ordered to ensure adequate hydration  - Maintain NPO status until nausea and vomiting are resolved  - Nasogastric tube if ordered  - Administer ordered antiemetic medications as needed  - Provide nonpharmacologic comfort measures as appropriate  - Advance diet as tolerated, if ordered  - Consider nutrition services referral to assist patient with adequate nutrition and appropriate food choices  Outcome: Progressing  Goal: Maintains or returns to baseline bowel function  Description: INTERVENTIONS:  - Assess bowel function  - Encourage oral fluids to ensure adequate hydration  - Administer IV fluids if ordered to ensure adequate hydration  - Administer ordered medications as needed  - Encourage mobilization and activity  - Consider nutritional services referral to assist patient with adequate nutrition and appropriate food choices  Outcome:  Progressing

## 2024-02-01 NOTE — PLAN OF CARE
Problem: Potential for Falls  Goal: Patient will remain free of falls  Description: INTERVENTIONS:  - Educate patient/family on patient safety including physical limitations  - Instruct patient to call for assistance with activity   - Consult OT/PT to assist with strengthening/mobility   - Keep Call bell within reach  - Keep bed low and locked with side rails adjusted as appropriate  - Keep care items and personal belongings within reach  - Initiate and maintain comfort rounds  - Make Fall Risk Sign visible to staff  - Obtain necessary fall risk management equipment:   - Apply yellow socks and bracelet for high fall risk patients  - Consider moving patient to room near nurses station  Outcome: Progressing     Problem: PAIN - ADULT  Goal: Verbalizes/displays adequate comfort level or baseline comfort level  Description: Interventions:  - Encourage patient to monitor pain and request assistance  - Assess pain using appropriate pain scale  - Administer analgesics based on type and severity of pain and evaluate response  - Implement non-pharmacological measures as appropriate and evaluate response  - Consider cultural and social influences on pain and pain management  - Notify physician/advanced practitioner if interventions unsuccessful or patient reports new pain  Outcome: Progressing     Problem: INFECTION - ADULT  Goal: Absence or prevention of progression during hospitalization  Description: INTERVENTIONS:  - Assess and monitor for signs and symptoms of infection  - Monitor lab/diagnostic results  - Monitor all insertion sites, i.e. indwelling lines, tubes, and drains  - Monitor endotracheal if appropriate and nasal secretions for changes in amount and color  - [place] appropriate cooling/warming therapies per order  - Administer medications as ordered  - Instruct and encourage patient and family to use good hand hygiene technique  - Identify and instruct in appropriate isolation precautions for identified  infection/condition  Outcome: Progressing  Goal: Absence of fever/infection during neutropenic period  Description: INTERVENTIONS:  - Monitor WBC    Outcome: Progressing     Problem: SAFETY ADULT  Goal: Patient will remain free of falls  Description: INTERVENTIONS:  - Educate patient/family on patient safety including physical limitations  - Instruct patient to call for assistance with activity   - Consult OT/PT to assist with strengthening/mobility   - Keep Call bell within reach  - Keep bed low and locked with side rails adjusted as appropriate  - Keep care items and personal belongings within reach  - Initiate and maintain comfort rounds  - Make Fall Risk Sign visible to staff  - Apply yellow socks and bracelet for high fall risk patients  - Consider moving patient to room near nurses station  Outcome: Progressing  Goal: Maintain or return to baseline ADL function  Description: INTERVENTIONS:  -  Assess patient's ability to carry out ADLs; assess patient's baseline for ADL function and identify physical deficits which impact ability to perform ADLs (bathing, care of mouth/teeth, toileting, grooming, dressing, etc.)  - Assess/evaluate cause of self-care deficits   - Assess range of motion  - Assess patient's mobility; develop plan if impaired  - Assess patient's need for assistive devices and provide as appropriate  - Encourage maximum independence but intervene and supervise when necessary  - Involve family in performance of ADLs  - Assess for home care needs following discharge   - Consider OT consult to assist with ADL evaluation and planning for discharge  - Provide patient education as appropriate  Outcome: Progressing  Goal: Maintains/Returns to pre admission functional level  Description: INTERVENTIONS:  - Perform AM-PAC 6 Click Basic Mobility/ Daily Activity assessment daily.  - Set and communicate daily mobility goal to care team and patient/family/caregiver.   - Collaborate with rehabilitation services  on mobility goals if consulted  - Out of bed for toileting  - Record patient progress and toleration of activity level   Outcome: Progressing     Problem: DISCHARGE PLANNING  Goal: Discharge to home or other facility with appropriate resources  Description: INTERVENTIONS:  - Identify barriers to discharge w/patient and caregiver  - Arrange for needed discharge resources and transportation as appropriate  - Identify discharge learning needs (meds, wound care, etc.)  - Arrange for interpretive services to assist at discharge as needed  - Refer to Case Management Department for coordinating discharge planning if the patient needs post-hospital services based on physician/advanced practitioner order or complex needs related to functional status, cognitive ability, or social support system  Outcome: Progressing     Problem: Knowledge Deficit  Goal: Patient/family/caregiver demonstrates understanding of disease process, treatment plan, medications, and discharge instructions  Description: Complete learning assessment and assess knowledge base.  Interventions:  - Provide teaching at level of understanding  - Provide teaching via preferred learning methods  Outcome: Progressing     Problem: Nutrition/Hydration-ADULT  Goal: Nutrient/Hydration intake appropriate for improving, restoring or maintaining nutritional needs  Description: Monitor and assess patient's nutrition/hydration status for malnutrition. Collaborate with interdisciplinary team and initiate plan and interventions as ordered.  Monitor patient's weight and dietary intake as ordered or per policy. Utilize nutrition screening tool and intervene as necessary. Determine patient's food preferences and provide high-protein, high-caloric foods as appropriate.     INTERVENTIONS:  - Monitor oral intake, urinary output, labs, and treatment plans  - Assess nutrition and hydration status and recommend course of action  - Evaluate amount of meals eaten  - Assist patient  with eating if necessary   - Allow adequate time for meals  - Recommend/ encourage appropriate diets, oral nutritional supplements, and vitamin/mineral supplements  - Order, calculate, and assess calorie counts as needed  - Recommend, monitor, and adjust tube feedings and TPN/PPN based on assessed needs  - Assess need for intravenous fluids  - Provide specific nutrition/hydration education as appropriate  - Include patient/family/caregiver in decisions related to nutrition  Outcome: Progressing     Problem: GASTROINTESTINAL - ADULT  Goal: Minimal or absence of nausea and/or vomiting  Description: INTERVENTIONS:  - Administer IV fluids if ordered to ensure adequate hydration  - Maintain NPO status until nausea and vomiting are resolved  - Nasogastric tube if ordered  - Administer ordered antiemetic medications as needed  - Provide nonpharmacologic comfort measures as appropriate  - Advance diet as tolerated, if ordered  - Consider nutrition services referral to assist patient with adequate nutrition and appropriate food choices  Outcome: Progressing  Goal: Maintains or returns to baseline bowel function  Description: INTERVENTIONS:  - Assess bowel function  - Encourage oral fluids to ensure adequate hydration  - Administer IV fluids if ordered to ensure adequate hydration  - Administer ordered medications as needed  - Encourage mobilization and activity  - Consider nutritional services referral to assist patient with adequate nutrition and appropriate food choices  Outcome: Progressing

## 2024-02-01 NOTE — CASE MANAGEMENT
Case Management Progress Note    Patient name Radha Vidal  Location S /S -01 MRN [id number]  : 1940 Date 2024       LOS (days): 5  Geometric Mean LOS (GMLOS) (days): 2.3  Days to GMLOS:-3.2        OBJECTIVE:        Current admission status: Inpatient  Preferred Pharmacy:   [facility] - [place], [place] - [address] [place] [address]  [address] [facility] [address]  Phone: [phone] Fax: [phone]    [facility] [place], [place] - [address]  [address]  [address]  [facility] [address]  Phone: [phone] Fax: [phone]    [facility] - [place], [place] - [address]  [address]  [place] [address]  Phone: [phone] Fax: [phone]    Primary Care Provider: Hoang Tovar MD    Primary Insurance: [employer]  REP  Secondary Insurance:     PROGRESS NOTE:  CM received call back from daughter Melba stating she feels comfortable with patient returning to her home with [facility]. SNF referrals canceled.     CM to follow up as needed.

## 2024-02-01 NOTE — CONSULTS
"[facility] [place]  Consult  Name: Radha Vidal 84 y.o. female I MRN: [id number]  Unit/Bed#: S -01 I Date of Admission: 1/26/2024   Date of Service: 2/1/2024 I Hospital Day: 5    Inpatient consult to Internal Medicine  Consult performed by: Agustin Verde MD  Consult ordered by: Yamilka Ahn PA-C          Assessment/Plan   * Atrial fibrillation with RVR (HCC)  Assessment & Plan  Has A. Fib history, on Eliquis, no rate or rhythm control agent in home regimen.  Had chest pressure early morning today, found to be in A. Fib with RVR with HR of 180's with some ST depressions.  Cardiology was consulted and are recommending metoprolol 25 mg for rate control.      Plan:  Continue Metoprolol    Elevated troponin  Assessment & Plan  Troponins elevated into the 1,000's.   EKG A. Fib with RVR with some ST depressions.   Cardiology started Heprin ACS protocol at this time and will determine if any intervention is needed.  Non ischemic in the setting of A. Fib with RVR vs. Ischemic.      Plan:  Continue Heparin ACS protocol  Follow up on 4 hr troponin  Follow up on Echo      Type 2 diabetes mellitus (HCC)  Assessment & Plan  Lab Results   Component Value Date    HGBA1C 6.7 (H) 01/15/2024       No results for input(s): \"POCGLU\" in the last 72 hours.    Blood Sugar Average: Last 72 hrs:  Not on any antidiabetic medications outpatient.    Plan:  Insulin sliding scale  Hypoglycemia protocol      Electrolyte abnormality  Assessment & Plan  Hypokalemia and hypomagnesemia  Replete today.  Patient is on potasium gluconate at home.    Plan:  Monitor K and Mg levels to keep K>4  and mg >2  If K is low tomorrow consider resuming home potassium gluconate    Parkinson's disease  Assessment & Plan  Continue carbidopa-levodopa    OAB (overactive bladder)  Assessment & Plan  Generally incontinent but currently endorses improved bladder control.    Continue home Oxybutynin.             VTE Prophylaxis: VTE " Score: 4 Moderate Risk (Score 3-4) - Pharmacological DVT Prophylaxis Ordered: heparin drip.      Recommendations for Discharge:  To be determined closer to the time of discharge    Collaboration of Care: Were Recommendations Directly Discussed with Primary Treatment Team? Yes    History of Present Illness:  Radha Vidal is a 84 y.o. female who is originally admitted to the surgery service due to small bowel obstruction. We are consulted for atrial fibrillation with RVR. Patient has an episode of substernal chest pressure around 4 am this morning and was found to have elevated HR. EKG showed A. Fib with RVR with ST depressions. HR improved with prn metoprolol temporarily but fluctuated through the morning and patient continued to have some chest pressure. Troponin's were found to be elevated and cardiology was brought on board. Patient received one dose of metoprolol XL 25 mg with HR ranging from 70's to 110. Patient is currently comfortable and denies any concerns at this time.    Review of Systems:  Review of Systems   Constitutional:  Negative for chills and fever.   HENT:  Positive for sore throat (due to recent NG tube). Negative for ear pain.    Eyes:  Negative for visual disturbance.   Respiratory:  Negative for cough and shortness of breath.    Cardiovascular:  Positive for chest pain (pressure). Negative for palpitations and leg swelling.   Gastrointestinal:  Negative for abdominal pain, diarrhea, nausea and vomiting.   Genitourinary:  Negative for dysuria, hematuria and urgency.   Musculoskeletal:  Negative for arthralgias and back pain.   Skin:  Negative for color change and rash.   Neurological:  Negative for headaches.   Psychiatric/Behavioral:  Negative for confusion.    All other systems reviewed and are negative.      Past Medical and Surgical History:   Past Medical History:   Diagnosis Date    Actinic keratosis 03/11/2016    Acute midline low back pain without sciatica 11/19/2020    Anxiety  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     Benign neoplasm of skin     Cancer (HCC)     skin    Chest pain     Claustrophobia     Closed compression fracture of body of L1 vertebra (HCC)     Diverticulitis     Diverticulitis     Fracture     L1-L2    GERD (gastroesophageal reflux disease)     H. pylori infection 2020    Muscle weakness     Nonmelanoma skin cancer     last assessed 2017    Palpitations     Pancreatic cyst     Seasonal allergies     Seborrheic keratosis 2014    Sleep apnea     no cpap    Spontaneous      without mention of complications     Squamous cell carcinoma of forehead 2014    Syncope        Past Surgical History:   Procedure Laterality Date    ABDOMINAL ADHESION SURGERY N/A 2023    Procedure: LYSIS ADHESIONS;  Surgeon: Kyle Julien MD;  Location: [facility];  Service: Surgical Oncology    BOTOX INJECTION N/A 2017    Procedure: CYSTOSCOPY; BLADDER BOTOX 100 UNITS ;  Surgeon: Juan Edward MD;  [facility];  Service:     BOWEL RESECTION      related ot diverticulitis    CARDIAC CATHETERIZATION      CARPAL TUNNEL RELEASE Right     COLONOSCOPY  2019    COLOSTOMY      COLOSTOMY CLOSURE      CYSTOSCOPY  2021    DISTAL PANCREATECTOMY N/A 2023    Procedure: DISTAL PANCREATECTOMY;  Surgeon: Kyle Julien MD;  Location: [facility];  Service: Surgical Oncology    FOOT SURGERY Left     neuroma    HYSTERECTOMY      MYRINGOTOMY W/ TUBES Right 2023    office procedure - Dr. Grace    NASAL SINUS SURGERY  age 39    [place]    PANCREATIC CYST BIOPSY  2023    CA CYSTOURETHROSCOPY N/A 2018    Procedure: CYSTOSCOPY WITH BOTOX;  Surgeon: Juan Edward MD;  [facility];  Service: Urology    CA ESOPHAGOGASTRODUODENOSCOPY TRANSORAL DIAGNOSTIC N/A 2019    Procedure: ESOPHAGOGASTRODUODENOSCOPY (EGD);  Surgeon: Edu Dickerson DO;  Location: [facility];  Service: Gastroenterology     SPLENECTOMY, TOTAL N/A 07/31/2023    Procedure: SPLENECTOMY;  Surgeon: Kyle Julien MD;  [facility]: [facility];  Service: Surgical Oncology    TONSILLECTOMY  age 49    UPPER GASTROINTESTINAL ENDOSCOPY  04/23/2019       Meds/Allergies:  all medications and allergies reviewed    Allergies:   Allergies   Allergen Reactions    Caffeine - Food Allergy GI Intolerance    Iodine - Food Allergy Rash    Latex Rash    Other Rash     Adhesive tape         Social History:  Marital Status:   Substance Use History:   Social History     Substance and Sexual Activity   Alcohol Use Yes    Comment: patient states rare use on holidays      Social History     Tobacco Use   Smoking Status Never    Passive exposure: Past   Smokeless Tobacco Never     Social History     Substance and Sexual Activity   Drug Use No       Family History:  Family History   Problem Relation Age of Onset    Alcohol abuse Mother     Heart disease Mother     Alcohol abuse Father     Alcohol abuse Brother     Cancer Brother     Tremor Neg Hx     Parkinsonism Neg Hx     Colon cancer Neg Hx        Physical Exam:   Vitals:   Blood Pressure: 135/86 (02/01/24 1451)  Pulse: (!) 116 (02/01/24 1451)  Temperature: 98.4 °F (36.9 °C) (02/01/24 1451)  Temp Source: Oral (02/01/24 1451)  Respirations: 18 (02/01/24 1451)  Weight - Scale: 73.5 kg (162 lb 1.6 oz) (01/26/24 2210)  SpO2: 93 % (02/01/24 1451)    Physical Exam  Vitals and nursing note reviewed.   Constitutional:       General: She is not in acute distress.     Appearance: She is well-developed. She is not ill-appearing.   HENT:      Head: Normocephalic and atraumatic.      Nose: No congestion or rhinorrhea.      Mouth/Throat:      Mouth: Mucous membranes are moist.      Pharynx: Oropharynx is clear. No oropharyngeal exudate or posterior oropharyngeal erythema.   Eyes:      General: No scleral icterus.     Conjunctiva/sclera: Conjunctivae normal.   Cardiovascular:      Rate and Rhythm: Normal rate. Rhythm  irregular.      Pulses: Normal pulses.      Heart sounds: No murmur heard.  Pulmonary:      Effort: Pulmonary effort is normal. No respiratory distress.      Breath sounds: Normal breath sounds. No stridor. No wheezing.   Abdominal:      General: Bowel sounds are normal. There is no distension.      Palpations: Abdomen is soft. There is no mass.      Tenderness: There is no abdominal tenderness.      Hernia: No hernia is present.   Musculoskeletal:         General: No swelling.      Cervical back: Neck supple.      Right lower leg: No edema.      Left lower leg: No edema.   Skin:     General: Skin is warm.      Capillary Refill: Capillary refill takes less than 2 seconds.   Neurological:      Mental Status: She is alert and oriented to person, place, and time. Mental status is at baseline.      Cranial Nerves: No cranial nerve deficit.      Sensory: No sensory deficit.      Motor: No weakness.   Psychiatric:         Mood and Affect: Mood normal.         Behavior: Behavior normal.         Thought Content: Thought content normal.        Additional Data:   Lab Results:    Results from last 7 days   Lab Units 02/01/24  0450   WBC Thousand/uL 10.91*   HEMOGLOBIN g/dL 12.9   HEMATOCRIT % 39.1   PLATELETS Thousands/uL 342   NEUTROS PCT % 53   LYMPHS PCT % 34   MONOS PCT % 8   EOS PCT % 3     Results from last 7 days   Lab Units 02/01/24  0450 01/27/24  0644 01/26/24  2246   SODIUM mmol/L 141   < > 132*   POTASSIUM mmol/L 3.4*   < > 4.0   CHLORIDE mmol/L 106   < > 93*   CO2 mmol/L 24   < > 28   BUN mg/dL 6   < > 23   CREATININE mg/dL 0.68   < > 0.76   ANION GAP mmol/L 11   < > 11   CALCIUM mg/dL 8.6   < > 10.1   ALBUMIN g/dL  --   --  4.5   TOTAL BILIRUBIN mg/dL  --   --  0.75   ALK PHOS U/L  --   --  90   ALT U/L  --   --  13   AST U/L  --   --  20   GLUCOSE RANDOM mg/dL 136   < > 217*    < > = values in this interval not displayed.             Lab Results   Component Value Date/Time    HGBA1C 6.7 (H) 01/15/2024 04:53 AM     HGBA1C 5.8 (H) 07/17/2023 12:07 PM    HGBA1C 6.4 (H) 08/11/2021 09:38 AM         Results from last 7 days   Lab Units 01/26/24  2246   LACTIC ACID mmol/L 1.6       Imaging: Reviewed radiology reports from this admission including: xray(s)  RADIOLOGY RESULTS   Final Result by  (02/01 1455)      XR abdomen obstruction series   Final Result by Luis Carlos Garcia MD (02/01 0836)      Nonobstructive bowel gas pattern.      Bilateral pleural effusions and subsegmental atelectasis.         Workstation performed: [id number]         XR abdomen 1 view kub   Final Result by iWlber Cassidy MD (01/31 1003)      Nonobstructive bowel gas pattern.               Workstation performed: [id number]         XR chest 1 view portable   Final Result by Maddi Saldana MD (01/27 1032)      No acute cardiopulmonary disease.      NG tube in stomach.         Workstation performed: [id number]         CT abdomen pelvis wo contrast   Final Result by Raffaele Walls MD (01/27 8508)      High-grade mid small bowel obstruction with a sharp transition from distended to nondistended loops in the anterior left upper quadrant at the site of an angulated loop almost certainly representing an adhesion.      Findings are consistent with the preliminary report from [facility] which was provided shortly after completion of the exam.         Workstation performed: [id number]             EKG, Pathology, and Other Studies Reviewed on Admission:   EKG: Atrial fibrillation. .    ** Please Note: This note may have been constructed using a voice recognition system.**

## 2024-02-01 NOTE — ASSESSMENT & PLAN NOTE
Hypokalemia and hypomagnesemia  Replete today.  Patient is on potasium gluconate at home.    Plan:  Monitor K and Mg levels to keep K>4  and mg >2  If K is low tomorrow consider resuming home potassium gluconate

## 2024-02-01 NOTE — CONSULTS
Consultation - Cardiology Team One  Radha Vidal 84 y.o. female MRN: [id number]  Unit/Bed#: S -01 Encounter: [id number]    Inpatient consult to Cardiology  Consult performed by: RISHI Najera  Consult ordered by: Allie Solano PA-C      Physician Requesting Consult: Danny Brasher MD  Reason for Consult / Principal Problem: Afib RVR    Assessment    Paroxysmal atrial fibrillation  She was recently diagnosed with this and placed on Eliquis 5 mg p.o. twice daily for stroke risk reduction.  She was not started on any AVN blockers or antiarrythmic drugs.  Reports symptoms of dizziness to me but reported chest pain to the surgical team.  She denies chest pain to me  ECG ajjipjzp-O-ejx with RVR, .  Diffuse ST depressions  Telemetry reviewed- NSR, brief NSVT (5 beats)  Remains on Eliquis for anticoagulation  K3.4 and Mg 1.7    Elevated troponin  This most likely represents a nonischemic myocardial injury in the setting of A-fib with RVR  0-hour troponin 1731.  Repeat pending.  Nuclear stress test 07/2023- negative for ischemia  Recent TTE reviewed- preserved BiV function    HTN-brief hypotension earlier this morning.  Now normotensive, 124/65.  She does not take antihypertensives at home.    Type 2 DM-A1c 6.7%. Not on diabetes medications in hospital or as an outpatient    Parkinson's Disease- on Sinemet TID    SBO- resolved. Tolerating diet.    Hypokalemia, hypomagnesemia- K 3.4, Mg 1.6    Plan  Add Toprol-XL 25 mg daily  Check limited echo  Hold Eliquis in case troponin continues to rise and she needs ischemic evaluation tomorrow  Continue to monitor on telemetry x 24 hours  Replete potassium and magnesium, goal K >4, goal Mg >2  Repeat BMP and Mg in the morning     History of Present Illness   HPI: Radha Vidal is a 84 y.o. year old female with recently diagnosed PAF, parkinson's disease, HTN, type 2 DM, and obesity who follows with cardiologist Dr. Morillo.  She was recently admitted to  the hospital at [facility] for strokelike symptoms.  Fortunately stroke was ruled out on both CT scan and MRI.  During that hospital stay she was noted to have new onset paroxysmal atrial fibrillation.  The episodes were brief and self-limiting.  She was started on Eliquis for anticoagulation.  An echocardiogram revealed LVEF 60% with grade 1 diastolic dysfunction, normal RV size and function, mild MR, and mild TR.  Prior cardiac testing also includes nuclear stress testing in July 2023 that was negative for ischemia.    She presented to the ED on 1/26/2024 with complaints of left-sided abdominal pain, nausea, and vomiting.  She was diagnosed with a bowel obstruction which resolved with conservative treatment.  She was supposed to going home soon but overnight was noted to have atrial fibrillation with RVR with ECG demonstrating heart rates up to 180 with diffuse ST depressions.  She was transferred over to a telemetry bed and is at this time back in normal sinus rhythm.  Per notes, she reported chest tightness but I interviewed her, she denies this.  She does admit to a little bit of dizziness during the episode.  She also denies shortness of breath, diaphoresis, palpitations, or lower extremity edema.  She is consulted for further management of her atrial fibrillation with RVR.    Of note, labs revealed hypomagnesemia and hypokalemia today with Mg 1.6 and K 3.4. Both have been repleted. HS troponin 1700.    EKG reviewed personally: Afib with RVR, . Diffuse ST depressions    Telemetry reviewed personally: NSR    Review of Systems   Constitutional: Negative for chills and malaise/fatigue.   Cardiovascular:  Negative for chest pain, dyspnea on exertion, leg swelling, orthopnea, palpitations and syncope.   Respiratory:  Negative for cough, shortness of breath, sleep disturbances due to breathing and sputum production.    Endocrine: Negative.    Hematologic/Lymphatic: Negative.    Gastrointestinal:   Negative for bloating, nausea and vomiting.   Genitourinary: Negative.    Neurological:  Positive for dizziness. Negative for light-headedness and weakness.   Psychiatric/Behavioral:  Negative for altered mental status.    All other systems reviewed and are negative.    Historical Information   Past Medical History:   Diagnosis Date    Actinic keratosis 2016    Acute midline low back pain without sciatica 2020    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     Benign neoplasm of skin     Cancer (HCC)     skin    Chest pain     Claustrophobia     Closed compression fracture of body of L1 vertebra (HCC)     Diverticulitis     Diverticulitis     Fracture     L1-L2    GERD (gastroesophageal reflux disease)     H. pylori infection 2020    Muscle weakness     Nonmelanoma skin cancer     last assessed 2017    Palpitations     Pancreatic cyst     Seasonal allergies     Seborrheic keratosis 2014    Sleep apnea     no cpap    Spontaneous      without mention of complications     Squamous cell carcinoma of forehead 2014    Syncope      Past Surgical History:   Procedure Laterality Date    ABDOMINAL ADHESION SURGERY N/A 2023    Procedure: LYSIS ADHESIONS;  Surgeon: Kyle Julien MD;  [facility]: [facility];  Service: Surgical Oncology    BOTOX INJECTION N/A 2017    Procedure: CYSTOSCOPY; BLADDER BOTOX 100 UNITS ;  Surgeon: Juan Edward MD;  [facility];  Service:     BOWEL RESECTION      related ot diverticulitis    CARDIAC CATHETERIZATION      CARPAL TUNNEL RELEASE Right     COLONOSCOPY  2019    COLOSTOMY      COLOSTOMY CLOSURE      CYSTOSCOPY  2021    DISTAL PANCREATECTOMY N/A 2023    Procedure: DISTAL PANCREATECTOMY;  Surgeon: Kyle Julien MD;  Location[facility];  Service: Surgical Oncology    FOOT SURGERY Left     neuroma    HYSTERECTOMY      MYRINGOTOMY W/ TUBES Right 2023    office procedure -   [place]    NAS[place] SINUS SURGERY  age 39    [place]    PANCREATIC CYST BIOPSY  07/31/2023    IA CYSTOURETHROSCOPY N/A 04/13/2018    Procedure: CYSTOSCOPY WITH BOTOX;  Surgeon: Juan Edward MD;  [facility];  Service: Urology    IA ESOPHAGOGASTRODUODENOSCOPY TRANSORAL DIAGNOSTIC N/A 04/23/2019    Procedure: ESOPHAGOGASTRODUODENOSCOPY (EGD);  Surgeon: Edu Dickerson DO;  Location: [facility];  Service: Gastroenterology    SPLENECTOMY, TOTAL N/A 07/31/2023    Procedure: SPLENECTOMY;  Surgeon: Kyle Julien MD;  [facility];  Service: Surgical Oncology    TONSILLECTOMY  age 49    UPPER GASTROINTESTINAL ENDOSCOPY  04/23/2019     Social History     Substance and Sexual Activity   Alcohol Use Yes    Comment: patient states rare use on holidays      Social History     Substance and Sexual Activity   Drug Use No     Social History     Tobacco Use   Smoking Status Never    Passive exposure: Past   Smokeless Tobacco Never     Family History:   Family History   Problem Relation Age of Onset    Alcohol abuse Mother     Heart disease Mother     Alcohol abuse Father     Alcohol abuse Brother     Cancer Brother     Tremor Neg Hx     Parkinsonism Neg Hx     Colon cancer Neg Hx        Meds/Allergies   all current active meds have been reviewed and current meds:   Current Facility-Administered Medications   Medication Dose Route Frequency    acetaminophen (TYLENOL) tablet 975 mg  975 mg Oral Q6H DARI    apixaban (ELIQUIS) tablet 5 mg  5 mg Oral BID    carbidopa-levodopa (SINEMET)  mg per tablet 1 tablet  1 tablet Oral TID AC    dextrose 5 % and sodium chloride 0.45 % with KCl 20 mEq/L infusion  50 mL/hr Intravenous Continuous    magnesium hydroxide (MILK OF MAGNESIA) oral suspension 30 mL  30 mL Oral Daily PRN    ondansetron (ZOFRAN) injection 4 mg  4 mg Intravenous Q6H PRN    oxybutynin (DITROPAN) tablet 5 mg  5 mg Oral Daily    oxyCODONE (ROXICODONE) IR tablet 5 mg  5 mg Oral Q4H PRN    oxyCODONE  (ROXICODONE) split tablet 2.5 mg  2.5 mg Oral Q4H PRN    pantoprazole (PROTONIX) injection 40 mg  40 mg Intravenous Q24H DARI    phenol (CHLORASEPTIC) 1.4 % mucosal liquid 1 spray  1 spray Mouth/Throat Q2H PRN     dextrose 5 % and sodium chloride 0.45 % with KCl 20 mEq/L, 50 mL/hr, Last Rate: 50 mL/hr (24 0849)        Allergies   Allergen Reactions    Caffeine - Food Allergy GI Intolerance    Iodine - Food Allergy Rash    Latex Rash    Other Rash     Adhesive tape         Objective   Vitals: Blood pressure 124/65, pulse 94, temperature 98.5 °F (36.9 °C), resp. rate 17, weight 73.5 kg (162 lb 1.6 oz), SpO2 92%, not currently breastfeeding., Body mass index is 30.63 kg/m².,     Systolic (24hrs), Av , Min:72 , Max:142     Diastolic (24hrs), Av, Min:46, Max:78        Intake/Output Summary (Last 24 hours) at 2024 1401  Last data filed at 2024 1225  Gross per 24 hour   Intake 360 ml   Output --   Net 360 ml     Wt Readings from Last 3 Encounters:   24 73.5 kg (162 lb 1.6 oz)   24 73.5 kg (162 lb 1.6 oz)   24 71.7 kg (158 lb)     Invasive Devices       Peripheral Intravenous Line  Duration             Peripheral IV 24 Right Antecubital 1 day                    Physical Exam  Vitals reviewed.   Constitutional:       General: She is not in acute distress.  Neck:      Vascular: No hepatojugular reflux or JVD.   Cardiovascular:      Rate and Rhythm: Normal rate and regular rhythm.      Pulses: Normal pulses.      Heart sounds: Normal heart sounds. No murmur heard.     No friction rub. No gallop.   Pulmonary:      Effort: Pulmonary effort is normal. No respiratory distress.      Breath sounds: No rales.      Comments: Fine crackles at bases, room air  Abdominal:      General: Bowel sounds are normal. There is no distension.      Palpations: Abdomen is soft.      Tenderness: There is no abdominal tenderness.   Musculoskeletal:         General: No tenderness. Normal range of motion.       Cervical back: Neck supple.      Right lower leg: No edema.      Left lower leg: No edema.   Skin:     General: Skin is warm and dry.      Findings: No erythema.   Neurological:      Mental Status: She is alert and oriented to person, place, and time.   Psychiatric:         Mood and Affect: Mood normal.         LABORATORY RESULTS:      CBC with diff:   Results from last 7 days   Lab Units 02/01/24  0450 01/31/24  0444 01/30/24  0500 01/29/24  0445 01/28/24  0504 01/27/24  0644 01/26/24  2246   WBC Thousand/uL 10.91* 12.78* 14.31* 10.96* 17.26* 16.89* 16.43*   HEMOGLOBIN g/dL 12.9 11.5 12.0 11.6 12.6 13.5 14.5   HEMATOCRIT % 39.1 34.9 35.8 35.1 38.0 39.9 43.2   MCV fL 90 91 92 91 90 89 89   PLATELETS Thousands/uL 342 302 304 311 350 394* 410*   RBC Million/uL 4.35 3.83 3.88 3.88 4.22 4.51 4.85   MCH pg 29.7 30.0 30.9 29.9 29.9 29.9 29.9   MCHC g/dL 33.0 33.0 33.5 33.0 33.2 33.8 33.6   RDW % 14.0 14.3 14.3 14.1 14.0 13.9 13.8   MPV fL 10.2 10.2 11.1 10.2 10.2 10.5 10.2   NRBC AUTO /100 WBCs 0 0 0  --  0 0 0       CMP:  Results from last 7 days   Lab Units 02/01/24 0450 01/31/24 0444 01/30/24  0520 01/29/24  0445 01/28/24  0504 01/27/24  0644 01/26/24  2246   POTASSIUM mmol/L 3.4* 3.4* 3.4* 3.3* 3.5 3.7 4.0   CHLORIDE mmol/L 106 108 110* 107 100 93* 93*   CO2 mmol/L 24 24 22 26 27 32 28   BUN mg/dL 6 10 14 14 22 27* 23   CREATININE mg/dL 0.68 0.48* 0.61 0.65 0.63 0.72 0.76   CALCIUM mg/dL 8.6 8.1* 7.9* 7.9* 8.5 9.5 10.1   AST U/L  --   --   --   --   --   --  20   ALT U/L  --   --   --   --   --   --  13   ALK PHOS U/L  --   --   --   --   --   --  90   EGFR ml/min/1.73sq m 80 89 82 81 82 76 71       BMP:  Results from last 7 days   Lab Units 02/01/24  0450 01/31/24  0444 01/30/24  0520 01/29/24  0445 01/28/24  0504 01/27/24  0644 01/26/24  2246   POTASSIUM mmol/L 3.4* 3.4* 3.4* 3.3* 3.5 3.7 4.0   CHLORIDE mmol/L 106 108 110* 107 100 93* 93*   CO2 mmol/L 24 24 22 26 27 32 28   BUN mg/dL 6 10 14 14 22 27* 23  "  CREATININE mg/dL 0.68 0.48* 0.61 0.65 0.63 0.72 0.76   CALCIUM mg/dL 8.6 8.1* 7.9* 7.9* 8.5 9.5 10.1       Lab Results   Component Value Date    CREATININE 0.68 2024    CREATININE 0.48 (L) 2024    CREATININE 0.61 2024       No results found for: \"NTBNP\"       Results from last 7 days   Lab Units 24  0450 24  0445 24  0504 24  0644   MAGNESIUM mg/dL 1.7* 1.9 1.7* 1.6*                         Lipid Profile:   Lab Results   Component Value Date    CHOL 202 10/03/2015     Lab Results   Component Value Date    HDL 79 01/15/2024    HDL 88 2022    HDL 85 2020     Lab Results   Component Value Date    LDLCALC 88 01/15/2024    LDLCALC 101 (H) 2022    LDLCALC 79 2020     Lab Results   Component Value Date    TRIG 67 01/15/2024    TRIG 63 2022    TRIG 42 2020       Cardiac testing:   Results for orders placed during the hospital encounter of 20    Echo complete with contrast if indicated    [address] [address]  [phone]    Transthoracic Echocardiogram  2D, M-mode, Doppler, and Color Doppler    Study date:  2020    Patient: FRANCIE BURGESS  MR number: [id number]  Account number: [id number]  : 1940  Age: 80 years  Gender: Female  Status: Inpatient  Location: Bedside  Height: 62 in  Weight: 188.1 lb  BP: 141/ 73 mmHg    Indications: CVA, Evaluate for suspected cardiac source of embolism. Assess left ventricular function.    Diagnoses: I63.50 - Cerebral infarction due to unspecified occlusion or stenosis of unspecified cerebral artery    Sonographer:  Colleen Weems RDCS  Referring Physician:  George Diez MD  Group:  [facility]  Interpreting Physician:  Jose Osullivan MD    SUMMARY    LEFT VENTRICLE:  Systolic function was normal. Ejection fraction was estimated to be 60 %.  There were no regional wall motion " abnormalities.  Wall thickness was at the upper limits of normal.    RIGHT VENTRICLE:  The size was normal.  Systolic function was normal.    MITRAL VALVE:  There was mild annular calcification.  There was trace to mild regurgitation.    TRICUSPID VALVE:  There was mild regurgitation.  Estimated peak PA pressure was 36 mmHg.    PULMONIC VALVE:  There was trace regurgitation.    HISTORY: Symptoms: chest pain. Dizziness. Syncope. PRIOR HISTORY: GERD, Palpitations,    PROCEDURE: The procedure was performed at the bedside. This was a routine study. The transthoracic approach was used. The study included complete 2D imaging, M-mode, complete spectral Doppler, and color Doppler. The heart rate was 81 bpm,  at the start of the study. Images were obtained from the parasternal, apical, subcostal, and suprasternal notch acoustic windows. Image quality was adequate.    LEFT VENTRICLE: Size was normal. Systolic function was normal. Ejection fraction was estimated to be 60 %. There were no regional wall motion abnormalities. Wall thickness was at the upper limits of normal. No evidence of apical thrombus.  DOPPLER: Left ventricular diastolic function parameters were normal.    RIGHT VENTRICLE: The size was normal. Systolic function was normal. Wall thickness was normal.    LEFT ATRIUM: Size was normal.    RIGHT ATRIUM: Size was normal.    MITRAL VALVE: There was mild annular calcification. There was normal leaflet separation. DOPPLER: The transmitral velocity was within the normal range. There was no evidence for stenosis. There was trace to mild regurgitation.    AORTIC VALVE: The valve was trileaflet. Leaflets exhibited normal thickness and normal cuspal separation. DOPPLER: Transaortic velocity was within the normal range. There was no evidence for stenosis. There was no significant  regurgitation.    TRICUSPID VALVE: The valve structure was normal. There was normal leaflet separation. DOPPLER: The transtricuspid velocity was  within the normal range. There was no evidence for stenosis. There was mild regurgitation. Estimated peak PA  pressure was 36 mmHg.    PULMONIC VALVE: Leaflets exhibited normal thickness, no calcification, and normal cuspal separation. DOPPLER: The transpulmonic velocity was within the normal range. There was trace regurgitation.    PERICARDIUM: There was no pericardial effusion. The pericardium was normal in appearance.    AORTA: The root exhibited normal size.    SYSTEMIC VEINS: IVC: The inferior vena cava was normal in size. Respirophasic changes were normal.    SYSTEM MEASUREMENT TABLES    2D  %FS: 27.9 %  AVC: 393.1 ms  Ao Diam: 3.2 cm  EDV(Teich): 64.2 ml  EF(Teich): 54.8 %  ESV(Teich): 29 ml  IVSd: 1.1 cm  LA Area: 19.8 cm2  LA Diam: 3.6 cm  LVEDV MOD A4C: 76.4 ml  LVEF MOD A4C: 61.7 %  LVESV MOD A4C: 29.2 ml  LVIDd: 3.9 cm  LVIDs: 2.8 cm  LVLd A4C: 7.2 cm  LVLs A4C: 5.9 cm  LVPWd: 1 cm  RA Area: 14.9 cm2  RVIDd: 3.1 cm  SV MOD A4C: 47.2 ml  SV(Teich): 35.2 ml    CW  TR Vmax: 2.9 m/s  TR maxP.9 mmHg    MM  TAPSE: 2.3 cm    PW  E' Sept: 0.1 m/s  E/E' Sept: 12.5  MV A New: 0.8 m/s  MV Dec Dyer: 3.9 m/s2  MV DecT: 216.1 ms  MV E New: 0.8 m/s  MV E/A Ratio: 1  MV PHT: 62.7 ms  MVA By PHT: 3.5 cm2    [employer] Accredited Echocardiography Laboratory    Prepared and electronically signed by    Jose Osullivan MD  Signed 2020 11:53:12    No results found for this or any previous visit.    No valid procedures specified.  No results found for this or any previous visit.      Imaging: I have personally reviewed pertinent reports.   and I have personally reviewed pertinent films in PACS  XR abdomen obstruction series    Result Date: 2024  Narrative: OBSTRUCTION SERIES INDICATION:   sbo. COMPARISON: X-ray performed the previous day. EXAM PERFORMED/VIEWS:  XR ABDOMEN OBSTRUCTION SERIES FINDINGS: There is a nonobstructive bowel gas pattern. No free air beneath the hemidiaphragms. No  pathologic calcifications or soft tissue masses evident. Left upper quadrant surgical clips. Surgical clips in the pelvis consistent with a bowel anastomosis are noted.. Osseous structures are unremarkable. Examination of the chest reveals a normal cardiomediastinal silhouette. Small bilateral pleural effusions with subsegmental bibasilar atelectasis.     Impression: Nonobstructive bowel gas pattern. Bilateral pleural effusions and subsegmental atelectasis. Workstation performed: [id number]     XR abdomen 1 view kub    Result Date: 1/31/2024  Narrative: ABDOMEN INDICATION:   Eval SBO. COMPARISON: Abdominal radiograph 6/22/2020 VIEWS:  AP supine FINDINGS: Enteric catheter tip in the stomach. There is a nonobstructive bowel gas pattern. No discernible free air on this supine study.  Upright or left lateral decubitus imaging is more sensitive to detect subtle free air in the appropriate setting. No pathologic calcifications or soft tissue masses. Visualized lung bases are clear. Visualized osseous structures are unremarkable for the patient's age.     Impression: Nonobstructive bowel gas pattern. Workstation performed: [id number]     XR chest 1 view portable    Result Date: 1/27/2024  Narrative: CHEST INDICATION:   NG tube placement verification. COMPARISON: CXR 7/17/2023, abdomen CT 1/26/2024. EXAM PERFORMED/VIEWS:  XR CHEST PORTABLE. FINDINGS: NG tube in stomach. Cardiomediastinal silhouette normal. Lungs clear. No effusion or pneumothorax. Upper abdomen normal. Clip in the left upper quadrant. Bones normal for age.     Impression: No acute cardiopulmonary disease. NG tube in stomach. Workstation performed: [id number]     CT abdomen pelvis wo contrast    Result Date: 1/27/2024  Narrative: CT ABDOMEN AND PELVIS WITHOUT IV CONTRAST INDICATION: LLQ abdominal pain LLQ tenderness to palpation, hx of diverticulitis s/p colostomy reversal, splenectomy, N/V, no BM in 2-3 days LLQ ttp, hx of diverticulitis s/p  colostomy reversal, splenectomy, N/V. COMPARISON: Most recent MRI performed December 13, 2023 TECHNIQUE: CT examination of the abdomen and pelvis was performed without intravenous contrast. Multiplanar 2D reformatted images were created from the source data. This examination, like all CT scans performed in the [facility], was performed utilizing techniques to minimize radiation dose exposure, including the use of iterative reconstruction and automated exposure control. Radiation dose length product (DLP) for this visit: 881 mGy-cm Enteric Contrast: Not administered. FINDINGS: ABDOMEN LOWER CHEST: Bibasilar atelectasis LIVER/BILIARY TREE: Hepatic steatosis. No suspicious mass. Normal hepatic contours. No biliary dilation. GALLBLADDER: Cholelithiasis without findings of acute cholecystitis. SPLEEN: Splenectomy. PANCREAS: Fatty pancreatic atrophy. Resection of pancreatic tail. ADRENAL GLANDS: Unremarkable. KIDNEYS/URETERS: Cortical cyst at the posterior upper pole of the right kidney. No urinary tract calculus. No hydronephrosis. STOMACH AND BOWEL: High-grade small bowel obstruction with a sharp transition from distended to completely collapsed small bowel loops in the anterior left upper quadrant at the site of an angulated loop probably statistically most likely to represent an  adhesion. Rectosigmoid anastomosis. APPENDIX: No findings to suggest appendicitis. ABDOMINOPELVIC CAVITY: No ascites. No pneumoperitoneum. No lymphadenopathy. VESSELS: Unremarkable for patient's age. PELVIS REPRODUCTIVE ORGANS: Post hysterectomy. URINARY BLADDER: Unremarkable. ABDOMINAL WALL/INGUINAL REGIONS: Unremarkable. BONES: No acute fracture or suspicious osseous lesion. Spinal degenerative changes. Mild inferior endplate compression deformity at L1 related to prominent inferior endplate Schmorl's node.     Impression: High-grade mid small bowel obstruction with a sharp transition from distended to nondistended loops in  the anterior left upper quadrant at the site of an angulated loop almost certainly representing an adhesion. Findings are consistent with the preliminary report from [facility] which was provided shortly after completion of the exam. Workstation performed: [id number]     MRI brain wo contrast    Result Date: 1/15/2024  Narrative: MRI BRAIN WITHOUT CONTRAST INDICATION: stroke like symptoms. COMPARISON:   4/2/2023 TECHNIQUE:  Multiplanar, multisequence imaging of the brain was performed. IMAGE QUALITY:  Diagnostic. FINDINGS: BRAIN PARENCHYMA:  There is no discrete mass, mass effect or midline shift. There is no intracranial hemorrhage.  There is no evidence of acute infarction and diffusion imaging is unremarkable. Small scattered hyperintensities on T2/FLAIR imaging are noted in the periventricular and subcortical white matter demonstrating an appearance that is statistically most likely to represent mild microangiopathic change. VENTRICLES:  Normal for the patient's age. SELLA AND PITUITARY GLAND:  Normal. ORBITS:  Normal. PARANASAL SINUSES:  Normal. VASCULATURE:  Evaluation of the major intracranial vasculature demonstrates appropriate flow voids. CALVARIUM AND SKULL BASE: Small mastoid effusions bilaterally. EXTRACRANIAL SOFT TISSUES:  Normal.     Impression: White matter changes suggestive of chronic microangiopathy.  No acute intracranial pathology. Workstation performed: [id number]     Echo complete w/ contrast if indicated    Result Date: 1/15/2024  Narrative:   Left Ventricle: Left ventricular cavity size is normal. Wall thickness is normal. The left ventricular ejection fraction is 60%. Systolic function is normal. Wall motion is normal. Diastolic function is mildly abnormal, consistent with grade I (abnormal) relaxation.   Left Atrium: The atrium is mildly dilated.   Mitral Valve: There is mild annular calcification. There is mild regurgitation.   Tricuspid Valve: There is mild regurgitation.    Pericardium: Trace pericardial effusion versus epicardial fat.     CT head wo contrast    Result Date: 1/15/2024  Narrative: CT BRAIN - WITHOUT CONTRAST INDICATION:   Stroke, follow up 24 h post thrombolytic administration. COMPARISON: CT head and CTA head and neck from 1/14/2024 TECHNIQUE:  CT examination of the brain was performed.  Multiplanar 2D reformatted images were created from the source data. Radiation dose length product (DLP) for this visit:  802 mGy-cm .  This examination, like all CT scans performed in the [facility], was performed utilizing techniques to minimize radiation dose exposure, including the use of iterative reconstruction and automated exposure control. IMAGE QUALITY:  Diagnostic. FINDINGS: PARENCHYMA: Decreased attenuation is noted in periventricular and subcortical white matter demonstrating an appearance that is statistically most likely to represent moderate microangiopathic change; this appearance is similar when compared to most recent prior examination. No CT signs of acute infarction.  No intracranial mass, mass effect or midline shift.  No acute parenchymal hemorrhage. Atherosclerotic calcifications of the carotid siphons and intradural vertebral arteries. VENTRICLES AND EXTRA-AXIAL SPACES:  Normal for the patient's age. VISUALIZED ORBITS: Normal visualized orbits. PARANASAL SINUSES: Normal visualized paranasal sinuses. CALVARIUM AND EXTRACRANIAL SOFT TISSUES:  Normal.     Impression: -No acute intracranial abnormality.  Stable chronic microangiopathic changes. Workstation performed: [id number]     CT head wo contrast    Result Date: 1/14/2024  Narrative: CT BRAIN - WITHOUT CONTRAST INDICATION:   Neuro deficit, acute, stroke suspected acute head ache s/p TNK administration. COMPARISON:   1/14/2024; 4/2/2023 TECHNIQUE:  CT examination of the brain was performed.  Multiplanar 2D reformatted images were created from the source data. Radiation dose length product (DLP)  for this visit:  781 mGy-cm .  This examination, like all CT scans performed in the [facility], was performed utilizing techniques to minimize radiation dose exposure, including the use of iterative reconstruction and automated exposure control. IMAGE QUALITY:  Diagnostic. FINDINGS: PARENCHYMA: Decreased attenuation is noted in periventricular and subcortical white matter demonstrating an appearance that is statistically most likely to represent mild microangiopathic change. No new or large evolving territorial infarction. High density within the intracranial vascular structures likely related to retained recirculating contrast from CTA earlier today. No CT signs of acute infarction.  No intracranial mass, mass effect or midline shift.  No acute parenchymal hemorrhage. VENTRICLES AND EXTRA-AXIAL SPACES:  Normal for the patient's age. VISUALIZED ORBITS: Bilateral lens implants noted. PARANASAL SINUSES: Normal visualized paranasal sinuses. CALVARIUM AND EXTRACRANIAL SOFT TISSUES:  Normal.     Impression: 1. No acute intracranial hemorrhage. 2. No large evolving territorial infarction. 3. Mild, chronic microangiopathy is similar to the CT from earlier today Workstation performed: [id number]     CT stroke alert brain    Result Date: 1/14/2024  Narrative: CT BRAIN - STROKE ALERT PROTOCOL INDICATION:   Stroke Alert. COMPARISON: MRI brain 4/2/2023 TECHNIQUE:  CT examination of the brain was performed.  In addition to axial images, coronal reformatted images were created and submitted for interpretation. Radiation dose length product (DLP) for this visit:  844 mGy-cm .  This examination, like all CT scans performed in the [facility], was performed utilizing techniques to minimize radiation dose exposure, including the use of iterative reconstruction and automated exposure control. IMAGE QUALITY:  Diagnostic. FINDINGS: PARENCHYMA: Decreased attenuation is noted in periventricular and  subcortical white matter demonstrating an appearance that is statistically most likely to represent mild microangiopathic change. No CT signs of acute infarction.  No intracranial mass, mass effect or midline shift.  No acute parenchymal hemorrhage. Mild calcification of the cavernous internal carotid ateries. VENTRICLES AND EXTRA-AXIAL SPACES:  Normal for the patient's age. VISUALIZED ORBITS: Thinning of the bilateral ocular lenses which can be degenerative or postsurgical. PARANASAL SINUSES: Normal visualized paranasal sinuses. CALVARIUM AND EXTRACRANIAL SOFT TISSUES:   Normal.     Impression: No acute intracranial abnormality. Findings were directly discussed with Dr. Jamal Ferreira at 11:10 a.m. Workstation performed: [id number]     CTA stroke alert (head/neck)    Result Date: 1/14/2024  Narrative: CTA NECK AND BRAIN WITH CONTRAST INDICATION: Stroke Alert COMPARISON:   MRI brain 4/2/2023 TECHNIQUE:   Post contrast imaging was performed after administration of iodinated contrast through the neck and brain. Post contrast axial 0.625 mm images timed to opacify the arterial system. 3D rendering was performed on an independent workstation.   MIP reconstructions performed. Coronal reconstructions were performed of the noncontrast portion of the brain. Radiation dose length product (DLP) for this visit:  435 mGy-cm .  This examination, like all CT scans performed in the [facility], was performed utilizing techniques to minimize radiation dose exposure, including the use of iterative reconstruction and automated exposure control. IV Contrast:  85 mL of iohexol (OMNIPAQUE) IMAGE QUALITY:   Diagnostic FINDINGS: CERVICAL VASCULATURE AORTIC ARCH AND GREAT VESSELS:  Normal aortic arch and great vessel origins. Normal visualized subclavian vessels. RIGHT VERTEBRAL ARTERY CERVICAL SEGMENT:  Normal origin. The vessel is normal in caliber throughout the neck. LEFT VERTEBRAL ARTERY CERVICAL SEGMENT:  Normal origin.  The vessel is normal in caliber throughout the neck. RIGHT EXTRACRANIAL CAROTID SEGMENT:  Normal caliber common carotid artery.  Normal bifurcation and cervical internal carotid artery.  No stenosis or dissection. LEFT EXTRACRANIAL CAROTID SEGMENT:  Normal caliber common carotid artery.  Normal bifurcation and cervical internal carotid artery.  No stenosis or dissection. NASCET criteria was used to determine the degree of internal carotid artery diameter stenosis. INTRACRANIAL VASCULATURE INTERNAL CAROTID ARTERIES:  Normal enhancement of the intracranial portions of the internal carotid arteries.  Normal ophthalmic artery origins.  Normal ICA terminus. ANTERIOR CIRCULATION:  Symmetric A1 segments and anterior cerebral arteries with normal enhancement.  Normal anterior communicating artery. MIDDLE CEREBRAL ARTERY CIRCULATION:  M1 segment and middle cerebral artery branches demonstrate normal enhancement bilaterally. DISTAL VERTEBRAL ARTERIES:  Normal distal vertebral arteries.  Posterior inferior cerebellar artery origins are normal. Normal vertebral basilar junction. BASILAR ARTERY:  Basilar artery is normal in caliber.  Normal superior cerebellar arteries. POSTERIOR CEREBRAL ARTERIES: Both posterior cerebral arteries arises from the basilar tip.  Both arteries demonstrate normal enhancement.   Normal posterior communicating arteries. VENOUS STRUCTURES:  Normal. NON VASCULAR ANATOMY BONY STRUCTURES: Degenerative disc disease with diffuse disc osteophyte complex and disc space narrowing at the C5-6 level. Multilevel facet arthrosis. SOFT TISSUES OF THE NECK: Heterogeneous thyroid gland with multiple small hypodense nodules, largest measuring up to 0.9 cm in maximal dimension. Incidental discovery of one or more thyroid nodule(s) measuring less than 1.5 cm and without suspicious features is noted in this patient who is above 35 years old; according to guidelines published in the February 2015 white paper on incidental  thyroid nodules in the Journal of the American College of Radiology (JACR), no further evaluation is recommended. THORACIC INLET: Multiple small mediastinal nodes which are below size criteria for pathologic adenopathy.     Impression: 1. CTA head: Negative for large vessel intracranial occlusion or hemodynamically significant stenosis. 2. CTA neck:  No extracranial carotid stenosis.  The cervical vertebral arteries are patent. Subcentimeter thyroid nodules. Findings were directly discussed with Dr. Jamal Ferreira at 11:10 a.m. Workstation performed: [id number]     Thank you for allowing us to participate in this patient's care. This pt will follow up with Dr. Morillo once discharged.     Counseling / Coordination of Care  Total floor / unit time spent today 45 minutes.  Greater than 50% of total time was spent with the patient and / or family counseling and / or coordination of care.  A description of the counseling / coordination of care: Review of history, current assessment, development of a plan.    Code Status: Level 1 - Full Code    ** Please Note: Dragon 360 Dictation voice to text software may have been used in the creation of this document. **

## 2024-02-01 NOTE — ASSESSMENT & PLAN NOTE
Generally incontinent but currently endorses improved bladder control.    Continue home Oxybutynin.

## 2024-02-01 NOTE — PLAN OF CARE
Problem: Potential for Falls  Goal: Patient will remain free of falls  Description: INTERVENTIONS:  - Educate patient/family on patient safety including physical limitations  - Instruct patient to call for assistance with activity   - Consult OT/PT to assist with strengthening/mobility   - Keep Call bell within reach  - Keep bed low and locked with side rails adjusted as appropriate  - Keep care items and personal belongings within reach  - Initiate and maintain comfort rounds  - Make Fall Risk Sign visible to staff  - Offer Toileting every  Hours, in advance of need  - Initiate/Maintain alarm  - Obtain necessary fall risk management equipment:   - Apply yellow socks and bracelet for high fall risk patients  - Consider moving patient to room near nurses station  Outcome: Progressing     Problem: PAIN - ADULT  Goal: Verbalizes/displays adequate comfort level or baseline comfort level  Description: Interventions:  - Encourage patient to monitor pain and request assistance  - Assess pain using appropriate pain scale  - Administer analgesics based on type and severity of pain and evaluate response  - Implement non-pharmacological measures as appropriate and evaluate response  - Consider cultural and social influences on pain and pain management  - Notify physician/advanced practitioner if interventions unsuccessful or patient reports new pain  Outcome: Progressing     Problem: INFECTION - ADULT  Goal: Absence or prevention of progression during hospitalization  Description: INTERVENTIONS:  - Assess and monitor for signs and symptoms of infection  - Monitor lab/diagnostic results  - Monitor all insertion sites, i.e. indwelling lines, tubes, and drains  - Monitor endotracheal if appropriate and nasal secretions for changes in amount and color  - [place] appropriate cooling/warming therapies per order  - Administer medications as ordered  - Instruct and encourage patient and family to use good hand hygiene technique  -  Identify and instruct in appropriate isolation precautions for identified infection/condition  Outcome: Progressing  Goal: Absence of fever/infection during neutropenic period  Description: INTERVENTIONS:  - Monitor WBC    Outcome: Progressing     Problem: SAFETY ADULT  Goal: Patient will remain free of falls  Description: INTERVENTIONS:  - Educate patient/family on patient safety including physical limitations  - Instruct patient to call for assistance with activity   - Consult OT/PT to assist with strengthening/mobility   - Keep Call bell within reach  - Keep bed low and locked with side rails adjusted as appropriate  - Keep care items and personal belongings within reach  - Initiate and maintain comfort rounds  - Make Fall Risk Sign visible to staff  - Offer Toileting every  Hours, in advance of need  - Initiate/Maintain alarm  - Obtain necessary fall risk management equipment:   - Apply yellow socks and bracelet for high fall risk patients  - Consider moving patient to room near nurses station  Outcome: Progressing  Goal: Maintain or return to baseline ADL function  Description: INTERVENTIONS:  -  Assess patient's ability to carry out ADLs; assess patient's baseline for ADL function and identify physical deficits which impact ability to perform ADLs (bathing, care of mouth/teeth, toileting, grooming, dressing, etc.)  - Assess/evaluate cause of self-care deficits   - Assess range of motion  - Assess patient's mobility; develop plan if impaired  - Assess patient's need for assistive devices and provide as appropriate  - Encourage maximum independence but intervene and supervise when necessary  - Involve family in performance of ADLs  - Assess for home care needs following discharge   - Consider OT consult to assist with ADL evaluation and planning for discharge  - Provide patient education as appropriate  Outcome: Progressing  Goal: Maintains/Returns to pre admission functional level  Description:  INTERVENTIONS:  - Perform AM-PAC 6 Click Basic Mobility/ Daily Activity assessment daily.  - Set and communicate daily mobility goal to care team and patient/family/caregiver.   - Collaborate with rehabilitation services on mobility goals if consulted  - Perform Range of Motion  times a day.  - Reposition patient every  hours.  - Dangle patient  times a day  - Stand patient  times a day  - Ambulate patient  times a day  - Out of bed to chair  times a day   - Out of bed for meals times a day  - Out of bed for toileting  - Record patient progress and toleration of activity level   Outcome: Progressing     Problem: DISCHARGE PLANNING  Goal: Discharge to home or other facility with appropriate resources  Description: INTERVENTIONS:  - Identify barriers to discharge w/patient and caregiver  - Arrange for needed discharge resources and transportation as appropriate  - Identify discharge learning needs (meds, wound care, etc.)  - Arrange for interpretive services to assist at discharge as needed  - Refer to Case Management Department for coordinating discharge planning if the patient needs post-hospital services based on physician/advanced practitioner order or complex needs related to functional status, cognitive ability, or social support system  Outcome: Progressing     Problem: Knowledge Deficit  Goal: Patient/family/caregiver demonstrates understanding of disease process, treatment plan, medications, and discharge instructions  Description: Complete learning assessment and assess knowledge base.  Interventions:  - Provide teaching at level of understanding  - Provide teaching via preferred learning methods  Outcome: Progressing     Problem: Nutrition/Hydration-ADULT  Goal: Nutrient/Hydration intake appropriate for improving, restoring or maintaining nutritional needs  Description: Monitor and assess patient's nutrition/hydration status for malnutrition. Collaborate with interdisciplinary team and initiate plan and  interventions as ordered.  Monitor patient's weight and dietary intake as ordered or per policy. Utilize nutrition screening tool and intervene as necessary. Determine patient's food preferences and provide high-protein, high-caloric foods as appropriate.     INTERVENTIONS:  - Monitor oral intake, urinary output, labs, and treatment plans  - Assess nutrition and hydration status and recommend course of action  - Evaluate amount of meals eaten  - Assist patient with eating if necessary   - Allow adequate time for meals  - Recommend/ encourage appropriate diets, oral nutritional supplements, and vitamin/mineral supplements  - Order, calculate, and assess calorie counts as needed  - Recommend, monitor, and adjust tube feedings and TPN/PPN based on assessed needs  - Assess need for intravenous fluids  - Provide specific nutrition/hydration education as appropriate  - Include patient/family/caregiver in decisions related to nutrition  Outcome: Progressing     Problem: GASTROINTESTINAL - ADULT  Goal: Minimal or absence of nausea and/or vomiting  Description: INTERVENTIONS:  - Administer IV fluids if ordered to ensure adequate hydration  - Maintain NPO status until nausea and vomiting are resolved  - Nasogastric tube if ordered  - Administer ordered antiemetic medications as needed  - Provide nonpharmacologic comfort measures as appropriate  - Advance diet as tolerated, if ordered  - Consider nutrition services referral to assist patient with adequate nutrition and appropriate food choices  Outcome: Progressing  Goal: Maintains or returns to baseline bowel function  Description: INTERVENTIONS:  - Assess bowel function  - Encourage oral fluids to ensure adequate hydration  - Administer IV fluids if ordered to ensure adequate hydration  - Administer ordered medications as needed  - Encourage mobilization and activity  - Consider nutritional services referral to assist patient with adequate nutrition and appropriate food  choices  Outcome: Progressing

## 2024-02-01 NOTE — ASSESSMENT & PLAN NOTE
Lab Results   Component Value Date    HGBA1C 6.7 (H) 01/15/2024       Recent Labs     02/01/24  1713 02/01/24  2047 02/02/24  0632 02/02/24  1056   POCGLU 131 118 121 121       Blood Sugar Average: Last 72 hrs:  (P) 122.75Not on any antidiabetic medications outpatient.  Prior to 01/15 was prediabetic, this is first A1C at or above 6.5    Plan:  Insulin sliding scale  Hypoglycemia protocol

## 2024-02-02 ENCOUNTER — APPOINTMENT (INPATIENT)
Dept: NON INVASIVE DIAGNOSTICS | Facility: HOSPITAL | Age: 84
DRG: 389 | End: 2024-02-02
Payer: COMMERCIAL

## 2024-02-02 LAB
ANION GAP SERPL CALCULATED.3IONS-SCNC: 6 MMOL/L
AORTIC ROOT: 3.5 CM
APICAL FOUR CHAMBER EJECTION FRACTION: 63 %
APTT PPP: 114 SECONDS (ref 23–37)
APTT PPP: 50 SECONDS (ref 23–37)
ASCENDING AORTA: 4.3 CM
ATRIAL RATE: 87 BPM
BSA FOR ECHO PROCEDURE: 1.73 M2
BUN SERPL-MCNC: 8 MG/DL (ref 5–25)
CALCIUM SERPL-MCNC: 8.1 MG/DL (ref 8.4–10.2)
CARDIAC TROPONIN I PNL SERPL HS: 1681 NG/L (ref 8–18)
CHLORIDE SERPL-SCNC: 105 MMOL/L (ref 96–108)
CO2 SERPL-SCNC: 28 MMOL/L (ref 21–32)
CREAT SERPL-MCNC: 0.58 MG/DL (ref 0.6–1.3)
E WAVE DECELERATION TIME: 109 MS
E/A RATIO: 1.12
FRACTIONAL SHORTENING: 34 (ref 28–44)
GFR SERPL CREATININE-BSD FRML MDRD: 84 ML/MIN/1.73SQ M
GLUCOSE SERPL-MCNC: 108 MG/DL (ref 65–140)
GLUCOSE SERPL-MCNC: 115 MG/DL (ref 65–140)
GLUCOSE SERPL-MCNC: 121 MG/DL (ref 65–140)
GLUCOSE SERPL-MCNC: 121 MG/DL (ref 65–140)
INTERVENTRICULAR SEPTUM IN DIASTOLE (PARASTERNAL SHORT AXIS VIEW): 1.4 CM
INTERVENTRICULAR SEPTUM: 1.4 CM (ref 0.6–1.1)
LAAS-AP2: 21 CM2
LAAS-AP4: 19.9 CM2
LEFT ATRIUM SIZE: 4.1 CM
LEFT ATRIUM VOLUME (MOD BIPLANE): 59 ML
LEFT ATRIUM VOLUME INDEX (MOD BIPLANE): 34.1 ML/M2
LEFT INTERNAL DIMENSION IN SYSTOLE: 2.7 CM (ref 2.1–4)
LEFT VENTRICULAR INTERNAL DIMENSION IN DIASTOLE: 4.1 CM (ref 3.5–6)
LEFT VENTRICULAR POSTERIOR WALL IN END DIASTOLE: 1.4 CM
LEFT VENTRICULAR STROKE VOLUME: 46 ML
LVSV (TEICH): 46 ML
MAGNESIUM SERPL-MCNC: 1.9 MG/DL (ref 1.9–2.7)
MV PEAK A VEL: 0.82 M/S
MV PEAK E VEL: 92 CM/S
MV STENOSIS PRESSURE HALF TIME: 32 MS
MV VALVE AREA P 1/2 METHOD: 6.88
PHOSPHATE SERPL-MCNC: 3 MG/DL (ref 2.3–4.1)
POTASSIUM SERPL-SCNC: 3.8 MMOL/L (ref 3.5–5.3)
QRS AXIS: 36 DEGREES
QRSD INTERVAL: 76 MS
QT INTERVAL: 318 MS
QTC INTERVAL: 544 MS
RA PRESSURE ESTIMATED: 10 MMHG
RV PSP: 47 MMHG
SL CV LEFT ATRIUM LENGTH A2C: 6.7 CM
SL CV LV EF: 65
SL CV PED ECHO LEFT VENTRICLE DIASTOLIC VOLUME (MOD BIPLANE) 2D: 74 ML
SL CV PED ECHO LEFT VENTRICLE SYSTOLIC VOLUME (MOD BIPLANE) 2D: 27 ML
SODIUM SERPL-SCNC: 139 MMOL/L (ref 135–147)
T WAVE AXIS: 229 DEGREES
TR MAX PG: 37 MMHG
TR PEAK VELOCITY: 3.1 M/S
TRICUSPID VALVE PEAK REGURGITATION VELOCITY: 3.05 M/S
VENTRICULAR RATE: 176 BPM

## 2024-02-02 PROCEDURE — C1769 GUIDE WIRE: HCPCS | Performed by: INTERNAL MEDICINE

## 2024-02-02 PROCEDURE — 83735 ASSAY OF MAGNESIUM: CPT

## 2024-02-02 PROCEDURE — 93321 DOPPLER ECHO F-UP/LMTD STD: CPT

## 2024-02-02 PROCEDURE — 82948 REAGENT STRIP/BLOOD GLUCOSE: CPT

## 2024-02-02 PROCEDURE — 99232 SBSQ HOSP IP/OBS MODERATE 35: CPT | Performed by: SURGERY

## 2024-02-02 PROCEDURE — 84484 ASSAY OF TROPONIN QUANT: CPT | Performed by: SURGERY

## 2024-02-02 PROCEDURE — 99232 SBSQ HOSP IP/OBS MODERATE 35: CPT | Performed by: INTERNAL MEDICINE

## 2024-02-02 PROCEDURE — C1894 INTRO/SHEATH, NON-LASER: HCPCS | Performed by: INTERNAL MEDICINE

## 2024-02-02 PROCEDURE — 93308 TTE F-UP OR LMTD: CPT | Performed by: INTERNAL MEDICINE

## 2024-02-02 PROCEDURE — 93325 DOPPLER ECHO COLOR FLOW MAPG: CPT | Performed by: INTERNAL MEDICINE

## 2024-02-02 PROCEDURE — 85730 THROMBOPLASTIN TIME PARTIAL: CPT | Performed by: INTERNAL MEDICINE

## 2024-02-02 PROCEDURE — 93454 CORONARY ARTERY ANGIO S&I: CPT | Performed by: INTERNAL MEDICINE

## 2024-02-02 PROCEDURE — C9113 INJ PANTOPRAZOLE SODIUM, VIA: HCPCS | Performed by: SURGERY

## 2024-02-02 PROCEDURE — 93010 ELECTROCARDIOGRAM REPORT: CPT | Performed by: INTERNAL MEDICINE

## 2024-02-02 PROCEDURE — 93321 DOPPLER ECHO F-UP/LMTD STD: CPT | Performed by: INTERNAL MEDICINE

## 2024-02-02 PROCEDURE — 93308 TTE F-UP OR LMTD: CPT

## 2024-02-02 PROCEDURE — 84100 ASSAY OF PHOSPHORUS: CPT

## 2024-02-02 PROCEDURE — 99152 MOD SED SAME PHYS/QHP 5/>YRS: CPT | Performed by: INTERNAL MEDICINE

## 2024-02-02 PROCEDURE — 4A023N7 MEASUREMENT OF CARDIAC SAMPLING AND PRESSURE, LEFT HEART, PERCUTANEOUS APPROACH: ICD-10-PCS | Performed by: INTERNAL MEDICINE

## 2024-02-02 PROCEDURE — 80048 BASIC METABOLIC PNL TOTAL CA: CPT

## 2024-02-02 PROCEDURE — 99233 SBSQ HOSP IP/OBS HIGH 50: CPT | Performed by: INTERNAL MEDICINE

## 2024-02-02 PROCEDURE — 93325 DOPPLER ECHO COLOR FLOW MAPG: CPT

## 2024-02-02 RX ORDER — POTASSIUM CHLORIDE 14.9 MG/ML
20 INJECTION INTRAVENOUS
Status: COMPLETED | OUTPATIENT
Start: 2024-02-02 | End: 2024-02-02

## 2024-02-02 RX ORDER — FENTANYL CITRATE 50 UG/ML
INJECTION, SOLUTION INTRAMUSCULAR; INTRAVENOUS CODE/TRAUMA/SEDATION MEDICATION
Status: DISCONTINUED | OUTPATIENT
Start: 2024-02-02 | End: 2024-02-02 | Stop reason: HOSPADM

## 2024-02-02 RX ORDER — DIPHENHYDRAMINE HYDROCHLORIDE 50 MG/ML
50 INJECTION INTRAMUSCULAR; INTRAVENOUS ONCE
Status: DISCONTINUED | OUTPATIENT
Start: 2024-02-02 | End: 2024-02-02

## 2024-02-02 RX ORDER — AMIODARONE HYDROCHLORIDE 200 MG/1
200 TABLET ORAL
Status: DISCONTINUED | OUTPATIENT
Start: 2024-02-16 | End: 2024-02-04 | Stop reason: HOSPADM

## 2024-02-02 RX ORDER — VERAPAMIL HYDROCHLORIDE 2.5 MG/ML
INJECTION, SOLUTION INTRAVENOUS CODE/TRAUMA/SEDATION MEDICATION
Status: DISCONTINUED | OUTPATIENT
Start: 2024-02-02 | End: 2024-02-02 | Stop reason: HOSPADM

## 2024-02-02 RX ORDER — MAGNESIUM SULFATE HEPTAHYDRATE 40 MG/ML
2 INJECTION, SOLUTION INTRAVENOUS ONCE
Status: COMPLETED | OUTPATIENT
Start: 2024-02-02 | End: 2024-02-02

## 2024-02-02 RX ORDER — AMIODARONE HYDROCHLORIDE 200 MG/1
200 TABLET ORAL 2 TIMES DAILY WITH MEALS
Status: DISCONTINUED | OUTPATIENT
Start: 2024-02-09 | End: 2024-02-04 | Stop reason: HOSPADM

## 2024-02-02 RX ORDER — NITROGLYCERIN 20 MG/100ML
INJECTION INTRAVENOUS CODE/TRAUMA/SEDATION MEDICATION
Status: DISCONTINUED | OUTPATIENT
Start: 2024-02-02 | End: 2024-02-02 | Stop reason: HOSPADM

## 2024-02-02 RX ORDER — ASPIRIN 81 MG/1
324 TABLET, CHEWABLE ORAL ONCE
Status: COMPLETED | OUTPATIENT
Start: 2024-02-02 | End: 2024-02-02

## 2024-02-02 RX ORDER — ATORVASTATIN CALCIUM 40 MG/1
40 TABLET, FILM COATED ORAL
Status: DISCONTINUED | OUTPATIENT
Start: 2024-02-02 | End: 2024-02-04 | Stop reason: HOSPADM

## 2024-02-02 RX ORDER — AMIODARONE HYDROCHLORIDE 200 MG/1
200 TABLET ORAL
Status: DISCONTINUED | OUTPATIENT
Start: 2024-02-02 | End: 2024-02-04 | Stop reason: HOSPADM

## 2024-02-02 RX ORDER — MIDAZOLAM HYDROCHLORIDE 2 MG/2ML
INJECTION, SOLUTION INTRAMUSCULAR; INTRAVENOUS CODE/TRAUMA/SEDATION MEDICATION
Status: DISCONTINUED | OUTPATIENT
Start: 2024-02-02 | End: 2024-02-02 | Stop reason: HOSPADM

## 2024-02-02 RX ORDER — LIDOCAINE HYDROCHLORIDE 10 MG/ML
INJECTION, SOLUTION EPIDURAL; INFILTRATION; INTRACAUDAL; PERINEURAL CODE/TRAUMA/SEDATION MEDICATION
Status: DISCONTINUED | OUTPATIENT
Start: 2024-02-02 | End: 2024-02-02 | Stop reason: HOSPADM

## 2024-02-02 RX ORDER — HEPARIN SODIUM 1000 [USP'U]/ML
INJECTION, SOLUTION INTRAVENOUS; SUBCUTANEOUS CODE/TRAUMA/SEDATION MEDICATION
Status: DISCONTINUED | OUTPATIENT
Start: 2024-02-02 | End: 2024-02-02 | Stop reason: HOSPADM

## 2024-02-02 RX ADMIN — POTASSIUM CHLORIDE 20 MEQ: 14.9 INJECTION, SOLUTION INTRAVENOUS at 10:29

## 2024-02-02 RX ADMIN — CARBIDOPA AND LEVODOPA 1 TABLET: 25; 100 TABLET ORAL at 11:38

## 2024-02-02 RX ADMIN — PANTOPRAZOLE SODIUM 40 MG: 40 INJECTION, POWDER, FOR SOLUTION INTRAVENOUS at 08:23

## 2024-02-02 RX ADMIN — MAGNESIUM SULFATE HEPTAHYDRATE 2 G: 40 INJECTION, SOLUTION INTRAVENOUS at 08:23

## 2024-02-02 RX ADMIN — CARBIDOPA AND LEVODOPA 1 TABLET: 25; 100 TABLET ORAL at 15:34

## 2024-02-02 RX ADMIN — AMIODARONE HYDROCHLORIDE 200 MG: 200 TABLET ORAL at 15:34

## 2024-02-02 RX ADMIN — AMIODARONE HYDROCHLORIDE 200 MG: 200 TABLET ORAL at 11:38

## 2024-02-02 RX ADMIN — POTASSIUM CHLORIDE 20 MEQ: 14.9 INJECTION, SOLUTION INTRAVENOUS at 08:24

## 2024-02-02 RX ADMIN — OXYBUTYNIN CHLORIDE 5 MG: 5 TABLET ORAL at 08:23

## 2024-02-02 RX ADMIN — CARBIDOPA AND LEVODOPA 1 TABLET: 25; 100 TABLET ORAL at 06:35

## 2024-02-02 RX ADMIN — HEPARIN SODIUM 2000 UNITS: 1000 INJECTION INTRAVENOUS; SUBCUTANEOUS at 10:32

## 2024-02-02 RX ADMIN — AMIODARONE HYDROCHLORIDE 200 MG: 200 TABLET ORAL at 10:07

## 2024-02-02 RX ADMIN — METOPROLOL TARTRATE 10 MG: 1 INJECTION, SOLUTION INTRAVENOUS at 00:28

## 2024-02-02 RX ADMIN — MAGNESIUM HYDROXIDE 30 ML: 2400 SUSPENSION ORAL at 20:23

## 2024-02-02 RX ADMIN — ASPIRIN 81 MG 324 MG: 81 TABLET ORAL at 10:08

## 2024-02-02 RX ADMIN — AMIODARONE HYDROCHLORIDE 0.5 MG/MIN: 50 INJECTION, SOLUTION INTRAVENOUS at 16:36

## 2024-02-02 RX ADMIN — ATORVASTATIN CALCIUM 40 MG: 40 TABLET, FILM COATED ORAL at 15:34

## 2024-02-02 RX ADMIN — AMIODARONE HYDROCHLORIDE 1 MG/MIN: 50 INJECTION, SOLUTION INTRAVENOUS at 10:34

## 2024-02-02 RX ADMIN — DEXTROSE 150 MG: 50 INJECTION, SOLUTION INTRAVENOUS at 10:16

## 2024-02-02 RX ADMIN — METOPROLOL SUCCINATE 25 MG: 25 TABLET, EXTENDED RELEASE ORAL at 08:23

## 2024-02-02 RX ADMIN — MAGNESIUM HYDROXIDE 30 ML: 2400 SUSPENSION ORAL at 00:38

## 2024-02-02 NOTE — QUICK NOTE
Notified by nursing around 2100 that patient was persistently tachycardic in the 150-190 bpm range and asymptomatic. Patient given IV lopressor 5 mg and monitored for 1 hour at which her heart rate initially improved but then elevated in the 150-190 bpm range. I saw patient at the bedside, she endorses anxiety but denies chest pain, shortness of breath, chest tightness, palpitations, numbness, tingling, abdominal pain, lightheadedness, and dizziness. Telemetry revealed SVT with labile rates as high as 190s and seconds later persistently in the 80-90s bpm. Vagal maneuvers were attempted which had minimal nonsustained effect. Patient given atarax 25 mg once with the thought that her anxiety may be driving her labile rate. After 1 hour did not have resolution and was given IV lopressor 10 mg once which normalized rates into the persistent 80s.

## 2024-02-02 NOTE — OCCUPATIONAL THERAPY NOTE
Occupational Therapy Cancellation Note     02/02/24 0800   OT Last Visit   OT Visit Date 02/02/24   Note Type   Note Type Cancelled Session   Cancel Reasons Medical status     Per chart, pt with -180 early this AM and planned for possible cardiac catheterization if wall motion abnormalities/continued upward trend in trops.  OT to continue to follow and attempt treatment session as appropriate.    Tiara Jennings, MOT, OTR/L, CSRS, CBIS  [place] License [id number]  [place] License [id number]

## 2024-02-02 NOTE — ASSESSMENT & PLAN NOTE
Hypokalemia and hypomagnesemia  Replete today.  Patient is on potasium gluconate at home.    Plan:  Monitor K and Mg levels to keep K>4  and mg >2  If K is low consistently consider resuming home K gluconate

## 2024-02-02 NOTE — PROGRESS NOTES
"Progress Note - General Surgery   Radha Vidal 84 y.o. 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 Patient denies nausea and vomiting. Tolerating diet. Passing flatus, no BM. Denies abdominal pain.    Objective:    Blood pressure 155/77, pulse 67, temperature 98.2 °F (36.8 °C), resp. rate 18, height 5' 1\" (1.549 m), weight 73.5 kg (162 lb 0.6 oz), SpO2 95%, not currently breastfeeding.,Body mass index is 30.62 kg/m².      Intake/Output Summary (Last 24 hours) at 2/3/2024 0520  Last data filed at 2/3/2024 0201  Gross per 24 hour   Intake 720 ml   Output 1100 ml   Net -380 ml       Invasive Devices       Peripheral Intravenous Line  Duration             Peripheral IV 01/31/24 Right Antecubital 2 days    Peripheral IV 02/02/24 Dorsal (posterior);Right Hand <1 day    Peripheral IV 02/02/24 Left;Ventral (anterior) Forearm <1 day                    Physical Exam:   General: No acute distress  Neuro: alert and oriented  HEENT: moist mucous membranes  CV: Well perfused, regular rate and rhythm  Lungs: Normal work of breathing, no increased respiratory effort  Abdomen: Soft, non-tender, non-distended.  Extremities: No edema, clubbing or cyanosis  Skin: Warm, dry        VTE Pharmacologic Prophylaxis: Heparin Drip  VTE Mechanical Prophylaxis: sequential compression device    "

## 2024-02-02 NOTE — ASSESSMENT & PLAN NOTE
Has A. Fib history, on Eliquis, no rate or rhythm control agent in home regimen.  Had chest pressure in hospital, found to be in A. Fib with RVR with HR of 180's with some ST depressions.  Cardiology was consulted and are recommending metoprolol 25 mg for rate control.  Trops up to 2.8k attributed to this Afib RVR.    Plan:  Continue Metoprolol  Amio continuous begun  Eliquis restarted post cath

## 2024-02-02 NOTE — PROGRESS NOTES
[facility]  Progress Note  Name: Radha Vidal I  MRN: [id number]  Unit/Bed#: S -Christo I Date of Admission: 1/26/2024   Date of Service: 2/2/2024 I Hospital Day: 6    Assessment/Plan   * Atrial fibrillation with RVR (HCC)  Assessment & Plan  Has A. Fib history, on Eliquis, no rate or rhythm control agent in home regimen.  Had chest pressure in hospital, found to be in A. Fib with RVR with HR of 180's with some ST depressions.  Cardiology was consulted and are recommending metoprolol 25 mg for rate control.  Trops up to 2.8k attributed to this Afib RVR.    Plan:  Continue Metoprolol  Amio continuous begun  Eliquis restarted post cath    Elevated troponin  Assessment & Plan  Troponins elevated to 2.8k.   EKG A. Fib with RVR with some ST depressions mostly inf & ant leads.   Cardiology started Heprin ACS protocol at this time and will determine if any intervention is needed.  Non ischemic in the setting of A. Fib with RVR vs. Ischemic.  Echo & cath 02/02 unremarkable, leaving Afib as most likely source      Plan:  Heparin stopped, Eliquis begun    Electrolyte abnormality  Assessment & Plan  Hypokalemia and hypomagnesemia  Replete today.  Patient is on potasium gluconate at home.    Plan:  Monitor K and Mg levels to keep K>4  and mg >2  If K is low consistently consider resuming home K gluconate    Type 2 diabetes mellitus (HCC)  Assessment & Plan  Lab Results   Component Value Date    HGBA1C 6.7 (H) 01/15/2024       Recent Labs     02/01/24  1713 02/01/24  2047 02/02/24  0632 02/02/24  1056   POCGLU 131 118 121 121       Blood Sugar Average: Last 72 hrs:  (P) 122.75Not on any antidiabetic medications outpatient.  Prior to 01/15 was prediabetic, this is first A1C at or above 6.5    Plan:  Insulin sliding scale  Hypoglycemia protocol    OAB (overactive bladder)  Assessment & Plan  Generally incontinent but currently endorses improved bladder control.    Continue home  Oxybutynin.    Parkinson's disease  Assessment & Plan  Continue carbidopa-levodopa               VTE Pharmacologic Prophylaxis: VTE Score: 4 Moderate Risk (Score 3-4) - Pharmacological DVT Prophylaxis Ordered: apixaban (Eliquis).    Mobility:   Basic Mobility Inpatient Raw Score: 17  JH-HLM Goal: 5: Stand one or more mins  JH-HLM Achieved: 6: Walk 10 steps or more  HLM Goal achieved. Continue to encourage appropriate mobility.    Patient Centered Rounds: I performed bedside rounds with nursing staff today.  Discussions with Specialists or Other Care Team Provider: Cardio, Gen Surg, OT, PT    Education and Discussions with Family / Patient: Attempted to update  (daughter) via phone. Left voicemail.     Current Length of Stay: 6 day(s)  Current Patient Status: Inpatient   Discharge Plan: Anticipate discharge in 24-48 hrs to rehab facility.    Code Status: Level 1 - Full Code    Subjective:   Pt reports minimal sx during episodes of tachy & arrhythmias of night prior. Received cath & echo today which were largely unrevealing, leaving the trop bump of yesterday likely related to A fib RVR. Labile HR but pressures & RR remain stable. May need OT PT eval if no bed hold at prior rehab facility. W/ no BM for past 24+ hrs, milk of mag to begin.    Objective:     Vitals:   Temp (24hrs), Av.5 °F (36.9 °C), Min:98.2 °F (36.8 °C), Max:98.7 °F (37.1 °C)    Temp:  [98.2 °F (36.8 °C)-98.7 °F (37.1 °C)] 98.2 °F (36.8 °C)  HR:  [] 71  Resp:  [18] 18  BP: (104-158)/() 145/71  SpO2:  [90 %-95 %] 95 %  Body mass index is 30.62 kg/m².     Input and Output Summary (last 24 hours):     Intake/Output Summary (Last 24 hours) at 2024 1548  Last data filed at 2024 1300  Gross per 24 hour   Intake --   Output 1740 ml   Net -1740 ml       Physical Exam:   Physical Exam  Vitals and nursing note reviewed.   Constitutional:       General: She is not in acute distress.     Appearance: She is well-developed. She  is not ill-appearing.   HENT:      Head: Normocephalic and atraumatic.      Nose: Nose normal. No congestion or rhinorrhea.      Mouth/Throat:      Pharynx: Oropharynx is clear. No oropharyngeal exudate or posterior oropharyngeal erythema.   Eyes:      General: No scleral icterus.        Right eye: No discharge.         Left eye: No discharge.      Conjunctiva/sclera: Conjunctivae normal.   Cardiovascular:      Rate and Rhythm: Tachycardia present. Rhythm irregular.      Pulses: Normal pulses.      Heart sounds: No murmur heard.  Pulmonary:      Effort: Pulmonary effort is normal. No respiratory distress.      Breath sounds: Normal breath sounds. No stridor. No wheezing.   Abdominal:      General: Bowel sounds are normal. There is no distension.      Palpations: Abdomen is soft. There is no mass.      Tenderness: There is no abdominal tenderness. There is no guarding.   Musculoskeletal:         General: No swelling.      Cervical back: Neck supple.      Right lower leg: No edema.      Left lower leg: No edema.   Skin:     General: Skin is warm and dry.   Neurological:      General: No focal deficit present.      Mental Status: She is alert and oriented to person, place, and time. Mental status is at baseline.      Cranial Nerves: No cranial nerve deficit.      Sensory: No sensory deficit.      Motor: No weakness.   Psychiatric:         Mood and Affect: Mood normal.         Behavior: Behavior normal.         Thought Content: Thought content normal.          Additional Data:     Labs:  Results from last 7 days   Lab Units 02/01/24  0450   WBC Thousand/uL 10.91*   HEMOGLOBIN g/dL 12.9   HEMATOCRIT % 39.1   PLATELETS Thousands/uL 342   NEUTROS PCT % 53   LYMPHS PCT % 34   MONOS PCT % 8   EOS PCT % 3     Results from last 7 days   Lab Units 02/02/24  0520 01/27/24  0644 01/26/24  2246   SODIUM mmol/L 139   < > 132*   POTASSIUM mmol/L 3.8   < > 4.0   CHLORIDE mmol/L 105   < > 93*   CO2 mmol/L 28   < > 28   BUN mg/dL 8   <  > 23   CREATININE mg/dL 0.58*   < > 0.76   ANION GAP mmol/L 6   < > 11   CALCIUM mg/dL 8.1*   < > 10.1   ALBUMIN g/dL  --   --  4.5   TOTAL BILIRUBIN mg/dL  --   --  0.75   ALK PHOS U/L  --   --  90   ALT U/L  --   --  13   AST U/L  --   --  20   GLUCOSE RANDOM mg/dL 115   < > 217*    < > = values in this interval not displayed.     Results from last 7 days   Lab Units 02/01/24  1635   INR  1.30*     Results from last 7 days   Lab Units 02/02/24  1056 02/02/24  0632 02/01/24  2047 02/01/24  1713   POC GLUCOSE mg/dl 121 121 118 131         Results from last 7 days   Lab Units 01/26/24  2246   LACTIC ACID mmol/L 1.6       Lines/Drains:  Invasive Devices       Peripheral Intravenous Line  Duration             Peripheral IV 01/31/24 Right Antecubital 2 days    Peripheral IV 02/02/24 Dorsal (posterior);Right Hand <1 day    Peripheral IV 02/02/24 Left;Ventral (anterior) Forearm <1 day                      Telemetry:  Telemetry Orders (From admission, onward)               Telemetry Monitoring  Continuous x 24 Hours (Telem)        Question:  Reason for 24 Hour Telemetry  Answer:  Arrhythmias requiring acute medical intervention / PPM or ICD malfunction                     Telemetry Reviewed: Atrial fibrillation. HR averaging RVR up to 190  Indication for Continued Telemetry Use: Arrthymias requiring medical therapy             Imaging: Reviewed radiology reports from this admission including: chest xray, abdominal/pelvic CT, and xray(s)    Recent Cultures (last 7 days):   Results from last 7 days   Lab Units 01/27/24  0037   URINE CULTURE  >100,000 cfu/ml       Last 24 Hours Medication List:   Current Facility-Administered Medications   Medication Dose Route Frequency Provider Last Rate    acetaminophen  975 mg Oral Q6H [facility] Kiet Jiang DO      amiodarone (CORDARONE) 900 mg in dextrose 5 % 500 mL infusion  1 mg/min Intravenous Continuous Kiet Jiang DO 1 mg/min (02/02/24 1034)    Followed by    amiodarone  (CORDARONE) 900 mg in dextrose 5 % 500 mL infusion  0.5 mg/min Intravenous Continuous Kiet Jiang,       amiodarone  200 mg Oral TID With Meals Kiet Jiang DO      Followed by    [START ON 2/9/2024] amiodarone  200 mg Oral BID With Meals Kiet Jiang DO      Followed by    [START ON 2/16/2024] amiodarone  200 mg Oral Daily With Breakfast Kiet Jiang, DO      [START ON 2/3/2024] apixaban  5 mg Oral BID Kiet Jiang, DO      atorvastatin  40 mg Oral Daily With Dinner Kiet Jiang, DO      carbidopa-levodopa  1 tablet Oral TID AC Kiet Jiang, DO      hydrOXYzine HCL  25 mg Oral Q6H PRN Kiet Jiang, DO      insulin lispro  1-5 Units Subcutaneous TID AC Kiet Jiang, DO      magnesium hydroxide  30 mL Oral Daily PRN Kiet Jiang, DO      metoprolol  10 mg Intravenous Q6H PRN Kiet Jiang, DO      metoprolol succinate  25 mg Oral Daily Kiet Jiang, DO      ondansetron  4 mg Intravenous Q6H PRN Kiet Jiang, DO      oxybutynin  5 mg Oral Daily Kiet Jiang, DO      oxyCODONE  5 mg Oral Q4H PRN Kiet Jiang, DO      oxyCODONE  2.5 mg Oral Q4H PRN Kiet Jiang, DO      pantoprazole  40 mg Intravenous Q24H DARI Kiet Jiang, DO      phenol  1 spray Mouth/Throat Q2H PRN Kiet Jiang,           Today, Patient Was Seen By: Rock Kwan DO    **Please Note: This note may have been constructed using a voice recognition system.**

## 2024-02-02 NOTE — PROGRESS NOTES
Progress Note - General Surgery   Radha Vidal 84 y.o. female MRN: [id number]  Unit/Bed#: S -01 Encounter: [id number]    Assessment:  84 year old female with small bowel obstruction, resolved  Afib with RVR  Troponin elevation with ST changes      Plan:  Appreciate SLIM and Cards input  Follow up Echo  Follow trops  Possible cath today if wall motion abnormalities/ continued upward trend in trops  Diet as tolerated  PT/OT   Please [employer] on call [facility] Surgery Role with any questions     Subjective/Objective     Subjective:   No acute events overnight.  No pain.  No nausea or vomiting.  Passing gas    Pertinent review of systems as above.All other review of systems negative.    Objective:    Blood pressure 137/82, pulse 82, temperature 98.7 °F (37.1 °C), resp. rate 18, weight 73.5 kg (162 lb 1.6 oz), SpO2 93%, not currently breastfeeding.,Body mass index is 30.63 kg/m².      Intake/Output Summary (Last 24 hours) at 2/2/2024 0641  Last data filed at 2/1/2024 2255  Gross per 24 hour   Intake 360 ml   Output 640 ml   Net -280 ml       Invasive Devices       Peripheral Intravenous Line  Duration             Peripheral IV 01/31/24 Right Antecubital 2 days    Peripheral IV 02/02/24 Left;Ventral (anterior) Forearm <1 day                    Physical Exam:   Gen:  NAD.  HEENT: NCAT. MMM  CV: well perfused  Lungs: Normal respiratory effort  Abd: soft, nt/nd  Skin: warm/ dry  Extremities: no peripheral edema, no clubbing or cyanosis  Neuro: AxO x3    I have personally reviewed pertinent films in PACS.      VTE Pharmacologic Prophylaxis: Heparin Drip  VTE Mechanical Prophylaxis: sequential compression device

## 2024-02-02 NOTE — PLAN OF CARE
Problem: PAIN - ADULT  Goal: Verbalizes/displays adequate comfort level or baseline comfort level  Description: Interventions:  - Encourage patient to monitor pain and request assistance  - Assess pain using appropriate pain scale  - Administer analgesics based on type and severity of pain and evaluate response  - Implement non-pharmacological measures as appropriate and evaluate response  - Consider cultural and social influences on pain and pain management  - Notify physician/advanced practitioner if interventions unsuccessful or patient reports new pain  Outcome: Progressing     Problem: INFECTION - ADULT  Goal: Absence or prevention of progression during hospitalization  Description: INTERVENTIONS:  - Assess and monitor for signs and symptoms of infection  - Monitor lab/diagnostic results  - Monitor all insertion sites, i.e. indwelling lines, tubes, and drains  - Monitor endotracheal if appropriate and nasal secretions for changes in amount and color  - [place] appropriate cooling/warming therapies per order  - Administer medications as ordered  - Instruct and encourage patient and family to use good hand hygiene technique  - Identify and instruct in appropriate isolation precautions for identified infection/condition  Outcome: Progressing  Goal: Absence of fever/infection during neutropenic period  Description: INTERVENTIONS:  - Monitor WBC    Outcome: Progressing     Problem: SAFETY ADULT  Goal: Patient will remain free of falls  Description: INTERVENTIONS:  - Educate patient/family on patient safety including physical limitations  - Instruct patient to call for assistance with activity   - Consult OT/PT to assist with strengthening/mobility   - Keep Call bell within reach  - Keep bed low and locked with side rails adjusted as appropriate  - Keep care items and personal belongings within reach  - Initiate and maintain comfort rounds  - Make Fall Risk Sign visible to staff  - Offer Toileting every 2 Hours,  in advance of need  - Initiate/Maintain bed alarm  - Apply yellow socks and bracelet for high fall risk patients  - Consider moving patient to room near nurses station  Outcome: Progressing  Goal: Maintain or return to baseline ADL function  Description: INTERVENTIONS:  -  Assess patient's ability to carry out ADLs; assess patient's baseline for ADL function and identify physical deficits which impact ability to perform ADLs (bathing, care of mouth/teeth, toileting, grooming, dressing, etc.)  - Assess/evaluate cause of self-care deficits   - Assess range of motion  - Assess patient's mobility; develop plan if impaired  - Assess patient's need for assistive devices and provide as appropriate  - Encourage maximum independence but intervene and supervise when necessary  - Involve family in performance of ADLs  - Assess for home care needs following discharge   - Consider OT consult to assist with ADL evaluation and planning for discharge  - Provide patient education as appropriate  Outcome: Progressing  Goal: Maintains/Returns to pre admission functional level  Description: INTERVENTIONS:  - Perform AM-PAC 6 Click Basic Mobility/ Daily Activity assessment daily.  - Set and communicate daily mobility goal to care team and patient/family/caregiver.   - Collaborate with rehabilitation services on mobility goals if consulted  - Perform Range of Motion 3 times a day.  - Reposition patient every 2 hours.  - Dangle patient 3 times a day  - Stand patient 3 times a day  - Ambulate patient 3 times a day  - Out of bed to chair 3 times a day   - Out of bed for meals 3 times a day  - Out of bed for toileting  - Record patient progress and toleration of activity level   Outcome: Progressing     Problem: Nutrition/Hydration-ADULT  Goal: Nutrient/Hydration intake appropriate for improving, restoring or maintaining nutritional needs  Description: Monitor and assess patient's nutrition/hydration status for malnutrition. Collaborate with  interdisciplinary team and initiate plan and interventions as ordered.  Monitor patient's weight and dietary intake as ordered or per policy. Utilize nutrition screening tool and intervene as necessary. Determine patient's food preferences and provide high-protein, high-caloric foods as appropriate.     INTERVENTIONS:  - Monitor oral intake, urinary output, labs, and treatment plans  - Assess nutrition and hydration status and recommend course of action  - Evaluate amount of meals eaten  - Assist patient with eating if necessary   - Allow adequate time for meals  - Recommend/ encourage appropriate diets, oral nutritional supplements, and vitamin/mineral supplements  - Order, calculate, and assess calorie counts as needed  - Assess need for intravenous fluids  - Provide specific nutrition/hydration education as appropriate  - Include patient/family/caregiver in decisions related to nutrition  Outcome: Progressing     Problem: GASTROINTESTINAL - ADULT  Goal: Minimal or absence of nausea and/or vomiting  Description: INTERVENTIONS:  - Administer IV fluids if ordered to ensure adequate hydration  - Maintain NPO status until nausea and vomiting are resolved  - Nasogastric tube if ordered  - Administer ordered antiemetic medications as needed  - Provide nonpharmacologic comfort measures as appropriate  - Advance diet as tolerated, if ordered  - Consider nutrition services referral to assist patient with adequate nutrition and appropriate food choices  Outcome: Progressing  Goal: Maintains or returns to baseline bowel function  Description: INTERVENTIONS:  - Assess bowel function  - Encourage oral fluids to ensure adequate hydration  - Administer IV fluids if ordered to ensure adequate hydration  - Administer ordered medications as needed  - Encourage mobilization and activity  - Consider nutritional services referral to assist patient with adequate nutrition and appropriate food choices  Outcome: Progressing

## 2024-02-02 NOTE — PROGRESS NOTES
General Cardiology   Progress Note -  [facility] One   Radha Vidal 84 y.o. female MRN: [id number]  Unit/Bed#: S -01 Encounter: [id number]    Assessment     Paroxysmal atrial fibrillation with RVR  She was recently diagnosed with this and placed on Eliquis 5 mg p.o. twice daily for stroke risk reduction in mid January.  She was not started on any AVN blockers or antiarrythmic drugs.  Reports symptoms of dizziness to me but also reported chest tightness to the surgical team and cardiology attending.  ECG [id number] with RVR, .  Diffuse ST depressions  Telemetry reviewed- NSR, intermittent Afib with RVR with rates in the 150s-170s. Brief NSVT. Currently NSR.  On IV heparin, Eliquis held for possible cath  K 3.8, Mg 1.9, TSH WNL     Elevated troponin  Random troponin 2/1/24 0500 - 15  0 hr troponin 1100- 1731  2 hr troponin 1430- 2653  4 hr troponin 1700- 2797  Nuclear stress test 07/2023- negative for ischemia  Recent TTE reviewed- preserved BiV function  ECG with diffuse ST depressions in rapid Afib  Updated TTE to be completed  Started on IV heparin yesterday     HTN-stable, average /75. Does not take antihypertensives at home     Type 2 DM-A1c 6.7%. Not on diabetes medications in hospital or as an outpatient     Parkinson's Disease- on Sinemet TID     SBO- resolved. Tolerating diet.     Plan  Continue Toprol-XL 25 mg daily  Check limited echo- to be done this morning  Continue IV heparin and give 324 mg ASA   Plan for cardiac catheterization today to define coronary anatomy   Continue to monitor on telemetry  Replete potassium and magnesium, goal K >4, goal Mg >2  Repeat BMP and Mg in the morning     Subjective  Review of Systems   Constitutional: Negative for chills, malaise/fatigue and weight gain.   Cardiovascular:  Negative for chest pain, dyspnea on exertion, leg swelling, orthopnea, palpitations and syncope.   Respiratory:  Negative for cough, shortness of breath, sleep disturbances due to  breathing and sputum production.    Gastrointestinal:  Negative for bloating, nausea and vomiting.   Neurological:  Negative for dizziness, light-headedness and weakness.   Psychiatric/Behavioral:  Negative for altered mental status.    All other systems reviewed and are negative.    Objective:   Vitals: Blood pressure (!) 158/102, pulse 64, temperature 98.2 °F (36.8 °C), resp. rate 18, weight 73.5 kg (162 lb 1.6 oz), SpO2 92%, not currently breastfeeding., Body mass index is 30.63 kg/m².,     Systolic (24hrs), Av , Min:104 , Max:158     Diastolic (24hrs), Av, Min:63, Max:102      Intake/Output Summary (Last 24 hours) at 2024 0850  Last data filed at 2024 0716  Gross per 24 hour   Intake 120 ml   Output 940 ml   Net -820 ml     Wt Readings from Last 3 Encounters:   24 73.5 kg (162 lb 1.6 oz)   24 73.5 kg (162 lb 1.6 oz)   24 71.7 kg (158 lb)     Telemetry Review: Afib with RVR, . Diffuse ST/T wave changes    Physical Exam  Vitals reviewed.   Constitutional:       General: She is not in acute distress.     Appearance: She is obese.   Neck:      Vascular: No hepatojugular reflux or JVD.   Cardiovascular:      Rate and Rhythm: Normal rate and regular rhythm. Extrasystoles are present.     Heart sounds: Normal heart sounds. No murmur heard.     No friction rub. No gallop.   Pulmonary:      Effort: Pulmonary effort is normal. No respiratory distress.      Breath sounds: No rales.   Abdominal:      General: Bowel sounds are normal. There is no distension.      Palpations: Abdomen is soft.      Tenderness: There is no abdominal tenderness.   Musculoskeletal:         General: No tenderness. Normal range of motion.      Cervical back: Neck supple.      Right lower leg: No edema.      Left lower leg: No edema.   Skin:     General: Skin is warm and dry.      Capillary Refill: Capillary refill takes less than 2 seconds.      Findings: No erythema.   Neurological:      Mental Status: She  is alert and oriented to person, place, and time.   Psychiatric:         Mood and Affect: Mood normal.         LABORATORY RESULTS      CBC with diff:   Results from last 7 days   Lab Units 02/01/24  0450 01/31/24 0444 01/30/24  0500 01/29/24 0445 01/28/24  0504 01/27/24  0644 01/26/24  2246   WBC Thousand/uL 10.91* 12.78* 14.31* 10.96* 17.26* 16.89* 16.43*   HEMOGLOBIN g/dL 12.9 11.5 12.0 11.6 12.6 13.5 14.5   HEMATOCRIT % 39.1 34.9 35.8 35.1 38.0 39.9 43.2   MCV fL 90 91 92 91 90 89 89   PLATELETS Thousands/uL 342 302 304 311 350 394* 410*   RBC Million/uL 4.35 3.83 3.88 3.88 4.22 4.51 4.85   MCH pg 29.7 30.0 30.9 29.9 29.9 29.9 29.9   MCHC g/dL 33.0 33.0 33.5 33.0 33.2 33.8 33.6   RDW % 14.0 14.3 14.3 14.1 14.0 13.9 13.8   MPV fL 10.2 10.2 11.1 10.2 10.2 10.5 10.2   NRBC AUTO /100 WBCs 0 0 0  --  0 0 0     CMP:  Results from last 7 days   Lab Units 02/02/24  0520 02/01/24  0450 01/31/24 0444 01/30/24  0520 01/29/24 0445 01/28/24  0504 01/27/24  0644 01/26/24  2246   POTASSIUM mmol/L 3.8 3.4* 3.4* 3.4* 3.3* 3.5 3.7 4.0   CHLORIDE mmol/L 105 106 108 110* 107 100 93* 93*   CO2 mmol/L 28 24 24 22 26 27 32 28   BUN mg/dL 8 6 10 14 14 22 27* 23   CREATININE mg/dL 0.58* 0.68 0.48* 0.61 0.65 0.63 0.72 0.76   CALCIUM mg/dL 8.1* 8.6 8.1* 7.9* 7.9* 8.5 9.5 10.1   AST U/L  --   --   --   --   --   --   --  20   ALT U/L  --   --   --   --   --   --   --  13   ALK PHOS U/L  --   --   --   --   --   --   --  90   EGFR ml/min/1.73sq m 84 80 89 82 81 82 76 71     BMP:  Results from last 7 days   Lab Units 02/02/24  0520 02/01/24  0450 01/31/24  0444 01/30/24  0520 01/29/24  0445 01/28/24  0504 01/27/24  0644   POTASSIUM mmol/L 3.8 3.4* 3.4* 3.4* 3.3* 3.5 3.7   CHLORIDE mmol/L 105 106 108 110* 107 100 93*   CO2 mmol/L 28 24 24 22 26 27 32   BUN mg/dL 8 6 10 14 14 22 27*   CREATININE mg/dL 0.58* 0.68 0.48* 0.61 0.65 0.63 0.72   CALCIUM mg/dL 8.1* 8.6 8.1* 7.9* 7.9* 8.5 9.5     Lab Results   Component Value Date    CREATININE  0.58 (L) 2024    CREATININE 0.68 2024    CREATININE 0.48 (L) 2024     Results from last 7 days   Lab Units 24  0520 24  0450 24  0445 24  0504 24  0644   MAGNESIUM mg/dL 1.9 1.7* 1.9 1.7* 1.6*      Results from last 7 days   Lab Units 24  0450   TSH 3RD GENERATON uIU/mL 1.257     Results from last 7 days   Lab Units 24  1635   INR  1.30*     Lipid Profile:   Lab Results   Component Value Date    CHOL 202 10/03/2015     Lab Results   Component Value Date    HDL 79 01/15/2024    HDL 88 2022    HDL 85 2020     Lab Results   Component Value Date    LDLCALC 88 01/15/2024    LDLCALC 101 (H) 2022    LDLCALC 79 2020     Lab Results   Component Value Date    TRIG 67 01/15/2024    TRIG 63 2022    TRIG 42 2020       Cardiac testing:   Results for orders placed during the hospital encounter of 20    Echo complete with contrast if indicated    Narrative  [place] [phone]    Transthoracic Echocardiogram  2D, M-mode, Doppler, and Color Doppler    Study date:  2020    Patient: FRANCIE BURGESS  MR number: [id number]  Account number: [id number]  : 1940  Age: 80 years  Gender: Female  Status: Inpatient  Location: Bedside  Height: 62 in  Weight: 188.1 lb  BP: 141/ 73 mmHg    Indications: CVA, Evaluate for suspected cardiac source of embolism. Assess left ventricular function.    Diagnoses: I63.50 - Cerebral infarction due to unspecified occlusion or stenosis of unspecified cerebral artery    Sonographer:  Colleen Weems RDCS  Referring Physician:  George Diez MD  Group:  [facility]  Interpreting Physician:  Jose Osullivan MD    SUMMARY    LEFT VENTRICLE:  Systolic function was normal. Ejection fraction was estimated to be 60 %.  There were no regional wall motion abnormalities.  Wall thickness was at the upper  limits of normal.    RIGHT VENTRICLE:  The size was normal.  Systolic function was normal.    MITRAL VALVE:  There was mild annular calcification.  There was trace to mild regurgitation.    TRICUSPID VALVE:  There was mild regurgitation.  Estimated peak PA pressure was 36 mmHg.    PULMONIC VALVE:  There was trace regurgitation.    HISTORY: Symptoms: chest pain. Dizziness. Syncope. PRIOR HISTORY: GERD, Palpitations,    PROCEDURE: The procedure was performed at the bedside. This was a routine study. The transthoracic approach was used. The study included complete 2D imaging, M-mode, complete spectral Doppler, and color Doppler. The heart rate was 81 bpm,  at the start of the study. Images were obtained from the parasternal, apical, subcostal, and suprasternal notch acoustic windows. Image quality was adequate.    LEFT VENTRICLE: Size was normal. Systolic function was normal. Ejection fraction was estimated to be 60 %. There were no regional wall motion abnormalities. Wall thickness was at the upper limits of normal. No evidence of apical thrombus.  DOPPLER: Left ventricular diastolic function parameters were normal.    RIGHT VENTRICLE: The size was normal. Systolic function was normal. Wall thickness was normal.    LEFT ATRIUM: Size was normal.    RIGHT ATRIUM: Size was normal.    MITRAL VALVE: There was mild annular calcification. There was normal leaflet separation. DOPPLER: The transmitral velocity was within the normal range. There was no evidence for stenosis. There was trace to mild regurgitation.    AORTIC VALVE: The valve was trileaflet. Leaflets exhibited normal thickness and normal cuspal separation. DOPPLER: Transaortic velocity was within the normal range. There was no evidence for stenosis. There was no significant  regurgitation.    TRICUSPID VALVE: The valve structure was normal. There was normal leaflet separation. DOPPLER: The transtricuspid velocity was within the normal range. There was no evidence  for stenosis. There was mild regurgitation. Estimated peak PA  pressure was 36 mmHg.    PULMONIC VALVE: Leaflets exhibited normal thickness, no calcification, and normal cuspal separation. DOPPLER: The transpulmonic velocity was within the normal range. There was trace regurgitation.    PERICARDIUM: There was no pericardial effusion. The pericardium was normal in appearance.    AORTA: The root exhibited normal size.    SYSTEMIC VEINS: IVC: The inferior vena cava was normal in size. Respirophasic changes were normal.    SYSTEM MEASUREMENT TABLES    2D  %FS: 27.9 %  AVC: 393.1 ms  Ao Diam: 3.2 cm  EDV(Teich): 64.2 ml  EF(Teich): 54.8 %  ESV(Teich): 29 ml  IVSd: 1.1 cm  LA Area: 19.8 cm2  LA Diam: 3.6 cm  LVEDV MOD A4C: 76.4 ml  LVEF MOD A4C: 61.7 %  LVESV MOD A4C: 29.2 ml  LVIDd: 3.9 cm  LVIDs: 2.8 cm  LVLd A4C: 7.2 cm  LVLs A4C: 5.9 cm  LVPWd: 1 cm  RA Area: 14.9 cm2  RVIDd: 3.1 cm  SV MOD A4C: 47.2 ml  SV(Teich): 35.2 ml    CW  TR Vmax: 2.9 m/s  TR maxP.9 mmHg    MM  TAPSE: 2.3 cm    PW  E' Sept: 0.1 m/s  E/E' Sept: 12.5  MV A New: 0.8 m/s  MV Dec [place]: 3.9 m/s2  MV DecT: 216.1 ms  MV E New: 0.8 m/s  MV E/A Ratio: 1  MV PHT: 62.7 ms  MVA By PHT: 3.5 cm2    [employer] Accredited Echocardiography Laboratory    Prepared and electronically signed by    Jose Osullivan MD  Signed 2020 11:53:12    Meds/Allergies   all current active meds have been reviewed and current meds:   Current Facility-Administered Medications   Medication Dose Route Frequency    acetaminophen (TYLENOL) tablet 975 mg  975 mg Oral Q6H DARI    amiodarone (CORDARONE) 900 mg in dextrose 5 % 500 mL infusion  1 mg/min Intravenous Continuous    Followed by    amiodarone (CORDARONE) 900 mg in dextrose 5 % 500 mL infusion  0.5 mg/min Intravenous Continuous    amiodarone 150 mg in dextrose 5 % 100 mL IV bolus  150 mg Intravenous Once    amiodarone tablet 200 mg  200 mg Oral TID With Meals    Followed by    [START ON 2024]  amiodarone tablet 200 mg  200 mg Oral BID With Meals    Followed by    [START ON 2/16/2024] amiodarone tablet 200 mg  200 mg Oral Daily With Breakfast    aspirin chewable tablet 324 mg  324 mg Oral Once    atorvastatin (LIPITOR) tablet 40 mg  40 mg Oral Daily With Dinner    carbidopa-levodopa (SINEMET)  mg per tablet 1 tablet  1 tablet Oral TID AC    heparin (porcine) 25,000 units in 0.45% NaCl 250 mL infusion (premix)  3-20 Units/kg/hr (Order-Specific) Intravenous Titrated    heparin (porcine) injection 2,000 Units  2,000 Units Intravenous Q6H PRN    heparin (porcine) injection 4,000 Units  4,000 Units Intravenous Q6H PRN    hydrOXYzine HCL (ATARAX) tablet 25 mg  25 mg Oral Q6H PRN    insulin lispro (HumaLOG) 100 units/mL subcutaneous injection 1-5 Units  1-5 Units Subcutaneous TID AC    magnesium hydroxide (MILK OF MAGNESIA) oral suspension 30 mL  30 mL Oral Daily PRN    magnesium sulfate 2 g/50 mL IVPB (premix) 2 g  2 g Intravenous Once    metoprolol (LOPRESSOR) injection 10 mg  10 mg Intravenous Q6H PRN    metoprolol succinate (TOPROL-XL) 24 hr tablet 25 mg  25 mg Oral Daily    ondansetron (ZOFRAN) injection 4 mg  4 mg Intravenous Q6H PRN    oxybutynin (DITROPAN) tablet 5 mg  5 mg Oral Daily    oxyCODONE (ROXICODONE) IR tablet 5 mg  5 mg Oral Q4H PRN    oxyCODONE (ROXICODONE) split tablet 2.5 mg  2.5 mg Oral Q4H PRN    pantoprazole (PROTONIX) injection 40 mg  40 mg Intravenous Q24H [facility]    phenol (CHLORASEPTIC) 1.4 % mucosal liquid 1 spray  1 spray Mouth/Throat Q2H PRN    potassium chloride 20 mEq IVPB (premix)  20 mEq Intravenous Q2H     Medications Prior to Admission   Medication    apixaban (Eliquis) 5 mg    Ascorbic Acid (VITAMIN C) 1000 MG tablet    B Complex Vitamins (B COMPLEX 100 PO)    calcium carbonate (OS-JOHN) 1250 (500 Ca) MG tablet    carbidopa-levodopa (SINEMET)  mg per tablet    cholecalciferol (VITAMIN D3) 1,000 units tablet    Coenzyme Q10 (CO Q 10 PO)    Cyanocobalamin (VITAMIN B 12  PO)    LORazepam (ATIVAN) 1 mg tablet    meclizine (ANTIVERT) 12.5 MG tablet    multivitamin (THERAGRAN) TABS    Omega-3 350 MG CPDR    Potassium Gluconate 595 (99 K) MG TABS    Probiotic Product (PROBIOTIC-10) CAPS    trospium chloride (SANCTURA) 20 mg tablet     amiodarone (CORDARONE) 900 mg in dextrose 5 % 500 mL infusion, 1 mg/min   Followed by  amiodarone (CORDARONE) 900 mg in dextrose 5 % 500 mL infusion, 0.5 mg/min  heparin (porcine), 3-20 Units/kg/hr (Order-Specific), Last Rate: 9 Units/kg/hr (02/02/24 0215)    Counseling / Coordination of Care  Total floor / unit time spent today 20 minutes.  Greater than 50% of total time was spent with the patient and / or family counseling and / or coordination of care.      ** Please Note: Dragon 360 Dictation voice to text software may have been used in the creation of this document. **

## 2024-02-02 NOTE — PHYSICAL THERAPY NOTE
Physical Therapy Cancellation Note           02/02/24 1358   PT Last Visit   PT Visit Date 02/02/24   Note Type   Note Type Cancelled Session   Cancel Reasons Patient off floor/test   Assessment   Assessment PT intervention is cx for this afternoon as pt is currently off the floor in cardiac cath lab. PT will follow and attempt to see pt when PT schedule will allow       Jr Kellogg

## 2024-02-02 NOTE — ASSESSMENT & PLAN NOTE
Troponins elevated to 2.8k.   EKG A. Fib with RVR with some ST depressions mostly inf & ant leads.   Cardiology started Heprin ACS protocol at this time and will determine if any intervention is needed.  Non ischemic in the setting of A. Fib with RVR vs. Ischemic.  Echo & cath 02/02 unremarkable, leaving Afib as most likely source      Plan:  Heparin stopped, Eliquis begun

## 2024-02-03 LAB
GLUCOSE SERPL-MCNC: 120 MG/DL (ref 65–140)
GLUCOSE SERPL-MCNC: 141 MG/DL (ref 65–140)
GLUCOSE SERPL-MCNC: 156 MG/DL (ref 65–140)
GLUCOSE SERPL-MCNC: 218 MG/DL (ref 65–140)

## 2024-02-03 PROCEDURE — 99232 SBSQ HOSP IP/OBS MODERATE 35: CPT | Performed by: INTERNAL MEDICINE

## 2024-02-03 PROCEDURE — 82948 REAGENT STRIP/BLOOD GLUCOSE: CPT

## 2024-02-03 PROCEDURE — C9113 INJ PANTOPRAZOLE SODIUM, VIA: HCPCS | Performed by: INTERNAL MEDICINE

## 2024-02-03 PROCEDURE — 99232 SBSQ HOSP IP/OBS MODERATE 35: CPT | Performed by: SURGERY

## 2024-02-03 RX ORDER — POLYETHYLENE GLYCOL 3350 17 G/17G
17 POWDER, FOR SOLUTION ORAL DAILY
Status: DISCONTINUED | OUTPATIENT
Start: 2024-02-03 | End: 2024-02-04 | Stop reason: HOSPADM

## 2024-02-03 RX ORDER — PANTOPRAZOLE SODIUM 40 MG/1
40 TABLET, DELAYED RELEASE ORAL
Status: DISCONTINUED | OUTPATIENT
Start: 2024-02-04 | End: 2024-02-04 | Stop reason: HOSPADM

## 2024-02-03 RX ORDER — AMOXICILLIN 250 MG
1 CAPSULE ORAL DAILY PRN
Qty: 30 TABLET | Refills: 0 | Status: CANCELLED | OUTPATIENT
Start: 2024-02-03

## 2024-02-03 RX ORDER — AMOXICILLIN 250 MG
2 CAPSULE ORAL 2 TIMES DAILY
Status: DISCONTINUED | OUTPATIENT
Start: 2024-02-03 | End: 2024-02-04 | Stop reason: HOSPADM

## 2024-02-03 RX ORDER — ACETAMINOPHEN 325 MG/1
650 TABLET ORAL EVERY 6 HOURS PRN
Status: DISCONTINUED | OUTPATIENT
Start: 2024-02-03 | End: 2024-02-04 | Stop reason: HOSPADM

## 2024-02-03 RX ORDER — AMOXICILLIN 250 MG
1 CAPSULE ORAL
Status: DISCONTINUED | OUTPATIENT
Start: 2024-02-03 | End: 2024-02-03

## 2024-02-03 RX ADMIN — OXYBUTYNIN CHLORIDE 5 MG: 5 TABLET ORAL at 08:52

## 2024-02-03 RX ADMIN — PANTOPRAZOLE SODIUM 40 MG: 40 INJECTION, POWDER, FOR SOLUTION INTRAVENOUS at 08:53

## 2024-02-03 RX ADMIN — CARBIDOPA AND LEVODOPA 1 TABLET: 25; 100 TABLET ORAL at 12:33

## 2024-02-03 RX ADMIN — POLYETHYLENE GLYCOL 3350 17 G: 17 POWDER, FOR SOLUTION ORAL at 08:53

## 2024-02-03 RX ADMIN — APIXABAN 5 MG: 5 TABLET, FILM COATED ORAL at 17:39

## 2024-02-03 RX ADMIN — SENNOSIDES AND DOCUSATE SODIUM 1 TABLET: 8.6; 5 TABLET ORAL at 08:52

## 2024-02-03 RX ADMIN — AMIODARONE HYDROCHLORIDE 200 MG: 200 TABLET ORAL at 12:33

## 2024-02-03 RX ADMIN — ATORVASTATIN CALCIUM 40 MG: 40 TABLET, FILM COATED ORAL at 17:39

## 2024-02-03 RX ADMIN — CARBIDOPA AND LEVODOPA 1 TABLET: 25; 100 TABLET ORAL at 17:39

## 2024-02-03 RX ADMIN — AMIODARONE HYDROCHLORIDE 200 MG: 200 TABLET ORAL at 08:52

## 2024-02-03 RX ADMIN — AMIODARONE HYDROCHLORIDE 0.5 MG/MIN: 50 INJECTION, SOLUTION INTRAVENOUS at 07:37

## 2024-02-03 RX ADMIN — SENNOSIDES AND DOCUSATE SODIUM 2 TABLET: 8.6; 5 TABLET ORAL at 17:39

## 2024-02-03 RX ADMIN — CARBIDOPA AND LEVODOPA 1 TABLET: 25; 100 TABLET ORAL at 08:52

## 2024-02-03 RX ADMIN — AMIODARONE HYDROCHLORIDE 200 MG: 200 TABLET ORAL at 17:39

## 2024-02-03 RX ADMIN — METOPROLOL SUCCINATE 25 MG: 25 TABLET, EXTENDED RELEASE ORAL at 08:52

## 2024-02-03 RX ADMIN — INSULIN LISPRO 1 UNITS: 100 INJECTION, SOLUTION INTRAVENOUS; SUBCUTANEOUS at 12:34

## 2024-02-03 RX ADMIN — APIXABAN 5 MG: 5 TABLET, FILM COATED ORAL at 08:52

## 2024-02-03 NOTE — PROGRESS NOTES
"General Surgery  Progress Note   Radha Vidal 84 y.o. female MRN: [id number]  Unit/Bed#: S -01 Encounter: [id number]    Assessment:  84 year old female with small bowel obstruction, resolved  Afib with RVR  Troponin elevation with ST changes s/p 2/2 cardiac cath (normal)    Vital signs stable, afebrile.     Patient tolerating diet with continued flatus, no bowel movement. No abdominal pain.    Plan:  Continue diet as tolerated. Ok to include nutritional supplements  Continue bowel regimen  Continue to monitor for further episodes of afib  Eliquis resumed  Cardiology followed - stable for discharge from their standpoint  Rest of care per primary team      Subjective/Objective     Subjective:   Patient seen and examined at bedside, in no acute distress. No acute events overnight. She denies pain, nausea or vomiting. Passing flatus but no BM. Would like to try something to 'clear her out'. Does report minimal appetite.     Objective:   Vitals:Blood pressure 130/63, pulse 71, temperature 97.8 °F (36.6 °C), temperature source Oral, resp. rate 20, height 5' 1\" (1.549 m), weight 73.6 kg (162 lb 4.1 oz), SpO2 94%, not currently breastfeeding.  Temp (24hrs), Av.2 °F (36.8 °C), Min:97.8 °F (36.6 °C), Max:98.7 °F (37.1 °C)        Intake/Output Summary (Last 24 hours) at 2024 0645  Last data filed at 2024 0627  Gross per 24 hour   Intake 300 ml   Output 1000 ml   Net -700 ml       Invasive Devices       Peripheral Intravenous Line  Duration             Peripheral IV 24 Right Antecubital 4 days    Peripheral IV 24 Left;Ventral (anterior) Forearm 2 days    Peripheral IV 24 Dorsal (posterior);Right Hand 1 day                    Physical Exam:  General: No acute distress, alert and oriented  CV: Well perfused, regular rate and rhythm  Lungs: Normal work of breathing, no increased respiratory effort   Abdomen: Soft, non-tender, non-distended.   Extremities: No edema, clubbing or cyanosis  Skin: " Warm, dry    Lab Results: Results: I have personally reviewed all pertinent laboratory/tests results  VTE Prophylaxis: Sequential compression device (Venodyne)  Teo Cortes MD  2/4/2024

## 2024-02-03 NOTE — PROGRESS NOTES
[facility]  Progress Note  Name: Radha Vidal I  MRN: [id number]  Unit/Bed#: S -Christo I Date of Admission: 1/26/2024   Date of Service: 2/3/2024 I Hospital Day: 7    Assessment/Plan   * Atrial fibrillation with RVR (HCC)  Assessment & Plan  Has A. Fib history, on Eliquis, no rate or rhythm control agent in home regimen.  Had chest pressure in hospital, found to be in A. Fib with RVR with HR of 180's with some ST depressions.  Cardiology was consulted and are recommending metoprolol 25 mg for rate control.  Trops up to 2.8k attributed to this Afib RVR.    Plan:  Continue Metoprolol  Amio regimen begun  Eliquis restarted post cath    Elevated troponin  Assessment & Plan  Troponins elevated to 2.8k.   EKG A. Fib with RVR with some ST depressions mostly inf & ant leads.   Echo & cath 02/02 unremarkable, leaving Afib as most likely source      Plan:  Heparin stopped, Eliquis begun    Electrolyte abnormality  Assessment & Plan  Hypokalemia and hypomagnesemia  Replete today.  Patient is on potasium gluconate at home.    Plan:  Monitor K and Mg levels to keep K>4  and mg >2  If K is low consistently consider resuming home K gluconate    Type 2 diabetes mellitus (HCC)  Assessment & Plan  Lab Results   Component Value Date    HGBA1C 6.7 (H) 01/15/2024       Recent Labs     02/01/24  2047 02/02/24  0632 02/02/24  1056 02/02/24  1553   POCGLU 118 121 121 108         Blood Sugar Average: Last 72 hrs:  (P) 119.8Not on any antidiabetic medications outpatient.  Prior to 01/15 was prediabetic, this is first A1C at or above 6.5    Plan:  Insulin sliding scale  Hypoglycemia protocol    OAB (overactive bladder)  Assessment & Plan  Generally incontinent but currently endorses improved bladder control.    Continue home Oxybutynin.    Parkinson's disease  Assessment & Plan  Continue carbidopa-levodopa               VTE Pharmacologic Prophylaxis: VTE Score: 4 Moderate Risk (Score 3-4) - Pharmacological DVT  Prophylaxis Ordered: apixaban (Eliquis).    Mobility:   Basic Mobility Inpatient Raw Score: 17  JH-HLM Goal: 5: Stand one or more mins  JH-HLM Achieved: 6: Walk 10 steps or more  HLM Goal achieved. Continue to encourage appropriate mobility.    Patient Centered Rounds: I performed bedside rounds with nursing staff today.  Discussions with Specialists or Other Care Team Provider: Gen Surg, Cardio, Irlanda    Education and Discussions with Family / Patient: Updated  (daughter) at bedside.    Current Length of Stay: 7 day(s)  Current Patient Status: Inpatient   Discharge Plan: Anticipate discharge later today or tomorrow to home with home services.    Code Status: Level 1 - Full Code    Subjective:   Pt reports no new complaints this morning, slept well w/ RAYNA o/n. NAD, awaiting dispo plans, cardio today ok for dc, we await BM prior to dc, may stay another day if difficulty passing    Objective:     Vitals:   Temp (24hrs), Av.2 °F (36.8 °C), Min:98.2 °F (36.8 °C), Max:98.2 °F (36.8 °C)    Temp:  [98.2 °F (36.8 °C)] 98.2 °F (36.8 °C)  HR:  [67-71] 71  Resp:  [18] 18  BP: (138-156)/(69-78) 139/69  SpO2:  [91 %-95 %] 95 %  Body mass index is 30.62 kg/m².     Input and Output Summary (last 24 hours):     Intake/Output Summary (Last 24 hours) at 2/3/2024 1408  Last data filed at 2/3/2024 0201  Gross per 24 hour   Intake 720 ml   Output --   Net 720 ml       Physical Exam:   Physical Exam  Vitals and nursing note reviewed.   Constitutional:       General: She is not in acute distress.     Appearance: She is well-developed. She is not ill-appearing, toxic-appearing or diaphoretic.   HENT:      Head: Normocephalic and atraumatic.      Nose: Nose normal. No congestion or rhinorrhea.      Mouth/Throat:      Pharynx: Oropharynx is clear. No oropharyngeal exudate or posterior oropharyngeal erythema.   Eyes:      General: No scleral icterus.        Right eye: No discharge.         Left eye: No discharge.       Conjunctiva/sclera: Conjunctivae normal.   Cardiovascular:      Rate and Rhythm: Normal rate. Rhythm irregular.      Pulses: Normal pulses.      Heart sounds: Normal heart sounds.   Pulmonary:      Effort: Pulmonary effort is normal. No respiratory distress.      Breath sounds: Normal breath sounds. No stridor. No wheezing.   Abdominal:      General: Bowel sounds are normal. There is no distension.      Palpations: Abdomen is soft. There is no mass.      Tenderness: There is no abdominal tenderness. There is no guarding.   Musculoskeletal:         General: No swelling.      Cervical back: No rigidity or tenderness.      Right lower leg: No edema.      Left lower leg: No edema.   Skin:     General: Skin is warm and dry.   Neurological:      General: No focal deficit present.      Mental Status: She is alert and oriented to person, place, and time. Mental status is at baseline.      Cranial Nerves: No cranial nerve deficit.      Sensory: No sensory deficit.      Motor: No weakness.   Psychiatric:         Mood and Affect: Mood normal.         Behavior: Behavior normal.         Thought Content: Thought content normal.          Additional Data:     Labs:  Results from last 7 days   Lab Units 02/01/24  0450   WBC Thousand/uL 10.91*   HEMOGLOBIN g/dL 12.9   HEMATOCRIT % 39.1   PLATELETS Thousands/uL 342   NEUTROS PCT % 53   LYMPHS PCT % 34   MONOS PCT % 8   EOS PCT % 3     Results from last 7 days   Lab Units 02/02/24  0520   SODIUM mmol/L 139   POTASSIUM mmol/L 3.8   CHLORIDE mmol/L 105   CO2 mmol/L 28   BUN mg/dL 8   CREATININE mg/dL 0.58*   ANION GAP mmol/L 6   CALCIUM mg/dL 8.1*   GLUCOSE RANDOM mg/dL 115     Results from last 7 days   Lab Units 02/01/24  1635   INR  1.30*     Results from last 7 days   Lab Units 02/03/24  1150 02/03/24  0758 02/02/24  1553 02/02/24  1056 02/02/24  0632 02/01/24  2047 02/01/24  1713   POC GLUCOSE mg/dl 218* 120 108 121 121 118 131               Lines/Drains:  Invasive Devices        Peripheral Intravenous Line  Duration             Peripheral IV 01/31/24 Right Antecubital 3 days    Peripheral IV 02/02/24 Dorsal (posterior);Right Hand 1 day    Peripheral IV 02/02/24 Left;Ventral (anterior) Forearm 1 day                      Telemetry:  Telemetry Orders (From admission, onward)               Telemetry Monitoring  Continuous x 24 Hours (Telem)        Question:  Reason for 24 Hour Telemetry  Answer:  Arrhythmias requiring acute medical intervention / PPM or ICD malfunction                     Telemetry Reviewed: Pause(s)  Indication for Continued Telemetry Use: Arrthymias requiring medical therapy             Imaging: Reviewed radiology reports from this admission including: chest xray, abdominal/pelvic CT, and xray(s)    Recent Cultures (last 7 days):         Last 24 Hours Medication List:   Current Facility-Administered Medications   Medication Dose Route Frequency Provider Last Rate    acetaminophen  975 mg Oral Q6H DARI Kiet Jiang, DO      amiodarone  200 mg Oral TID With Meals Kiet Jiang,       Followed by    [START ON 2/9/2024] amiodarone  200 mg Oral BID With Meals Kiet Jiang DO      Followed by    [START ON 2/16/2024] amiodarone  200 mg Oral Daily With Breakfast Kiet Jiang, DO      apixaban  5 mg Oral BID Kiet Jiang, DO      atorvastatin  40 mg Oral Daily With Dinner Kiet Jiang, DO      carbidopa-levodopa  1 tablet Oral TID  Kiet Jiang, DO      hydrOXYzine HCL  25 mg Oral Q6H PRN Kiet Jiang, DO      insulin lispro  1-5 Units Subcutaneous TID AC Kiet Jiang, DO      metoprolol  10 mg Intravenous Q6H PRN Kiet Jiang, DO      metoprolol succinate  25 mg Oral Daily Kiet Jiang, DO      ondansetron  4 mg Intravenous Q6H PRN Kiet Jiang, DO      oxybutynin  5 mg Oral Daily Kiet Jiang, DO      oxyCODONE  5 mg Oral Q4H PRN Kiet Jiang, DO      oxyCODONE  2.5 mg Oral Q4H PRN Kiet Jiang, DO      pantoprazole  40 mg Intravenous  Q24H DARI Kiet Jiang DO      phenol  1 spray Mouth/Throat Q2H PRN Kiet Jiang DO      polyethylene glycol  17 g Oral Daily Madeline Cortes MD      senna-docusate sodium  2 tablet Oral BID Kiet Jiang DO          Today, Patient Was Seen By: Rock Kwan DO    **Please Note: This note may have been constructed using a voice recognition system.**

## 2024-02-03 NOTE — PROGRESS NOTES
The pantoprazole has / have been converted to Oral per [facility] IV-to-PO Auto-Conversion Protocol for Adults as approved by the Pharmacy and Therapeutics Committee. The patient met all eligible criteria:  1) Age = 18 years old   2) Received at least one dose of the IV form   3) Receiving at least one other scheduled oral/enteral medication   4) Tolerating an oral/enteral diet   and did not have any exclusions:   1) Critical care patient   2) Active GI bleed (IF assessing H2RAs or PPIs)   3) Continuous tube feeding (IF assessing cipro, doxycycline, levofloxacin, minocycline, rifampin, or voriconazole)   4) Receiving PO vancomycin (IF assessing metronidazole)   5) Persistent nausea and/or vomiting   6) Ileus or gastrointestinal obstruction   7) Nelson/nasogastric tube set for continuous suction   8) Specific order not to automatically convert to PO (in the order's comments or if discussed in the most recent Infectious Disease or primary team's progress notes).

## 2024-02-03 NOTE — DISCHARGE INSTR - AVS FIRST PAGE
Dear Radha Vidal,     It was our pleasure to care for you here at [facility].  It is our hope that we were always able to exceed the expected standards for your care during your stay.  You were hospitalized due to bowel obstruction.  You were cared for on the Hospital S# floor by Naomie Kelley DO under the service of Kiet Jiang DO with the [facility] who covers for your primary care physician (PCP), Hoang Tovar MD, while you were hospitalized.  If you have any questions or concerns related to this hospitalization, you may contact us at .  For follow up as well as any medication refills, we recommend that you follow up with your primary care physician.  A registered nurse will reach out to you by phone within a few days after your discharge to answer any additional questions that you may have after going home.  However, at this time we provide for you here, the most important instructions / recommendations at discharge:     Notable Medication Adjustments -   Continue taking Amiodarone 200 mg, 3 times daily until 02/08; then on 2/9, begin 200 twice daily until 02/15; then on 2/16, begin 200 mg once daily indefinitely, until otherwise instructed by cardiology  Take Atorvastatin 40 mg once daily  Take Metoprolol succinate 25 mg once daily  Continue taking Eliquis 5 mg twice daily  Take senokot up to 2 tablets daily as needed for constipation.  If this alone is not working, then add miralax to this as well (take as directed on package).    Testing Required after Discharge -   None  Important follow up information -   Visit your prior established cardiologist within 2 weeks of discharge for further discussion of arrhythmia  Visit your PCP within a week of discharge for continuity of care & to address new heart & diabetes findings  Other Instructions -   We hope you feel better soon. If your symptoms worsen, please call 911 immediately or  go to the nearest Emergency Department.  Please review this entire after visit summary as additional general instructions including medication list, appointments, activity, diet, any pertinent wound care, and other additional recommendations from your care team that may be provided for you.      Sincerely,     Naomie Kelley, DO

## 2024-02-03 NOTE — DISCHARGE SUMMARY
[facility]  Discharge- Radha Vidal 1940, 84 y.o. female MRN: [id number]  Unit/Bed#: S -Christo Encounter: [id number]  Primary Care Provider: Hoang Tovar MD   Date and time admitted to hospital: 1/26/2024 10:07 PM    * Atrial fibrillation with RVR (HCC)  Assessment & Plan  Has A. Fib history, on Eliquis, no rate or rhythm control agent in home regimen.  Had chest pressure in hospital, found to be in A. Fib with RVR with HR of 180's with some ST depressions.  Cardiology was consulted and are recommending metoprolol 25 mg for rate control.  Trops up to 2.8k attributed to this Afib RVR.    Plan:  Continue Metoprolol  Amio regimen:   200 mg tid thru 2/8/2024 then 200 mg bid from 2/9/2024 - 2/15/2024, then maintenance dose of 200 mg daily starting 2/16/2024.    Eliquis restarted post cath    Elevated troponin  Assessment & Plan  Troponins elevated to 2.8k.   EKG A. Fib with RVR with some ST depressions mostly inf & ant leads.   Echo & cath 02/02 unremarkable, leaving Afib as most likely source      Plan:  Heparin stopped, Eliquis begun    Small bowel obstruction (HCC)  Assessment & Plan  Most recent abdominal imaging shows nonobstructive bowel gas pattern.  Bowel regimen   Senna/docusate 2 tabs twice daily.  MiraLAX 17 g p.o. daily.  Milk of magnesia as a as needed.  Will make adjustments to bowel regimen as needed.  Patient encouraged to ambulate multiple times per day either with nursing or family to help further encourage bowel movement.  Monitor BMs.  I would like to see at least 1 bowel movement prior to discharge.  Pain control:    Tylenol scheduled for mild pain but she has been declining this so we will switch this to as needed dosing.  Discontinued oxycodone as patient has not been using this and would also slow motility further.  Monitor pain levels.      Type 2 diabetes mellitus (HCC)  Assessment & Plan  Lab Results   Component Value Date    HGBA1C 6.7 (H) 01/15/2024        Recent Labs     02/03/24  1600 02/03/24 2033 02/04/24  0801 02/04/24  1137   POCGLU 141* 156* 115 162*       Blood Sugar Average: Last 72 hrs:  (P) 137.9701378879549460Jzk on any antidiabetic medications outpatient.  Prior to 01/15 was prediabetic, this is first A1C at or above 6.5    Plan:  Insulin sliding scale  Hypoglycemia protocol    OAB (overactive bladder)  Assessment & Plan  Generally incontinent but currently endorses improved bladder control.  Continue home Oxybutynin.    Parkinson's disease  Assessment & Plan  Continue carbidopa-levodopa    Electrolyte abnormality  Assessment & Plan  Patient is on potasium gluconate at home.    Plan:  Stable.         Medical Problems       Resolved Problems  Date Reviewed: 2/1/2024   None       Discharging Resident: Naomie Kelley DO  Discharging Attending: Kiet Jiang DO  PCP: Hoang Tovar MD  Admission Date:   Admission Orders (From admission, onward)       Ordered        01/27/24 0121  Inpatient Admission  Once                          Discharge Date: 02/04/24    Consultations During Hospital Stay:  Gen Surg, IM, Cardio    Procedures Performed:   Cath    Significant Findings / Test Results:   RADIOLOGY RESULTS   Final Result by  (02/01 1455)      XR abdomen obstruction series   Final Result by Luis Carlos Garcia MD (02/01 0836)      Nonobstructive bowel gas pattern.      Bilateral pleural effusions and subsegmental atelectasis.         Workstation performed: [id number]         XR abdomen 1 view kub   Final Result by Wilber Cassidy MD (01/31 1003)      Nonobstructive bowel gas pattern.               Workstation performed: [id number]         XR chest 1 view portable   Final Result by Maddi Saldana MD (01/27 1032)      No acute cardiopulmonary disease.      NG tube in stomach.         Workstation performed: [id number]         CT abdomen pelvis wo contrast   Final Result by Raffaele Walls MD (01/27 4363)      High-grade mid small bowel  obstruction with a sharp transition from distended to nondistended loops in the anterior left upper quadrant at the site of an angulated loop almost certainly representing an adhesion.      Findings are consistent with the preliminary report from [facility] which was provided shortly after completion of the exam.         Workstation performed: [id number]               Incidental Findings:   None     Test Results Pending at Discharge (will require follow up):  None     Outpatient Tests Requested:  None    Complications:  None    Reason for Admission: SBO    Hospital Course:   Radha iVdal is a 84 y.o. female patient who originally presented to the hospital on 1/26/2024 due to SBO under the service of gen surg, expected etiology of adnesive disease from previous known surgeries, treated during this hospitalization w/ NG tube & IV fluids. Was found to have arrhythmias frequently, w/ labile HR of , changing quickly between the extremes. Some Chest pressure noted at times, trops found to be elevated to 2.8k max, EKG inferoanterior lead ST depressions, so echo & cath performed during this stay, both w/ no concerning findings. Begun on metoprolol & amio by cardio, restarted on eliquis home med, stabilized bp significantly. Awaited BM before dc as last known occurrence was near 10 days ago. On 2/4, patient had a bowel movement, was cleared by surgery for discharge as well. Pt no longer requiring hospital level of care, but should continue on meds as prescribed, f/u w/ prior established cardio o/p w/in 2 weeks, & visit PCP for continuity of care & to address new dx of diabetes w/ A1C of 6.7.    Please see above list of diagnoses and related plan for additional information.     Condition at Discharge: stable    Discharge Day Visit / Exam:   Subjective:  No acute overnight events. Patient has several liquid bowel movement this morning.   Vitals: Blood Pressure: 144/65 (02/04/24 0802)  Pulse: 77 (02/04/24  "0802)  Temperature: 97.8 °F (36.6 °C) (02/04/24 0802)  Temp Source: Oral (02/04/24 0802)  Respirations: 20 (02/04/24 0802)  Height: 5' 1\" (154.9 cm) (02/02/24 1037)  Weight - Scale: 73.6 kg (162 lb 4.1 oz) (02/04/24 0300)  SpO2: 93 % (02/04/24 0802)  Exam:   Physical Exam  Constitutional:       General: She is not in acute distress.     Appearance: Normal appearance. She is obese.   HENT:      Head: Normocephalic and atraumatic.      Mouth/Throat:      Mouth: Mucous membranes are moist.      Pharynx: Oropharynx is clear.   Eyes:      Extraocular Movements: Extraocular movements intact.      Pupils: Pupils are equal, round, and reactive to light.   Cardiovascular:      Rate and Rhythm: Normal rate and regular rhythm.   Pulmonary:      Effort: Pulmonary effort is normal.      Breath sounds: Normal breath sounds.   Abdominal:      General: Abdomen is flat.      Palpations: Abdomen is soft.      Tenderness: There is no abdominal tenderness. There is no guarding or rebound.   Musculoskeletal:      Right lower leg: No edema.      Left lower leg: No edema.   Skin:     General: Skin is warm and dry.      Capillary Refill: Capillary refill takes less than 2 seconds.   Neurological:      General: No focal deficit present.      Mental Status: She is alert and oriented to person, place, and time.          Discussion with Family: Updated  (daughter) at bedside.    Discharge instructions/Information to patient and family:   See after visit summary for information provided to patient and family.      Provisions for Follow-Up Care:  See after visit summary for information related to follow-up care and any pertinent home health orders.      Mobility at time of Discharge:   Basic Mobility Inpatient Raw Score: 17  JH-HLM Goal: 5: Stand one or more mins  JH-HLM Achieved: 6: Walk 10 steps or more  HLM Goal NOT achieved. Continue to encourage mobility in post discharge setting.     Disposition:   Home with [facility] " (Reminder: Complete face to face encounter)    Planned Readmission: No    Discharge Medications:  See after visit summary for reconciled discharge medications provided to patient and/or family.      **Please Note: This note may have been constructed using a voice recognition system**

## 2024-02-03 NOTE — ASSESSMENT & PLAN NOTE
Troponins elevated to 2.8k.   EKG A. Fib with RVR with some ST depressions mostly inf & ant leads.   Echo & cath 02/02 unremarkable, leaving Afib as most likely source      Plan:  Heparin stopped, Eliquis begun

## 2024-02-03 NOTE — ASSESSMENT & PLAN NOTE
Has A. Fib history, on Eliquis, no rate or rhythm control agent in home regimen.  Had chest pressure in hospital, found to be in A. Fib with RVR with HR of 180's with some ST depressions.  Cardiology was consulted and are recommending metoprolol 25 mg for rate control.  Trops up to 2.8k attributed to this Afib RVR.    Plan:  Continue Metoprolol  Amio regimen begun  Eliquis restarted post cath

## 2024-02-03 NOTE — ASSESSMENT & PLAN NOTE
Lab Results   Component Value Date    HGBA1C 6.7 (H) 01/15/2024       Recent Labs     02/01/24  2047 02/02/24  0632 02/02/24  1056 02/02/24  1553   POCGLU 118 121 121 108         Blood Sugar Average: Last 72 hrs:  (P) 119.8Not on any antidiabetic medications outpatient.  Prior to 01/15 was prediabetic, this is first A1C at or above 6.5    Plan:  Insulin sliding scale  Hypoglycemia protocol

## 2024-02-03 NOTE — PLAN OF CARE
Problem: PAIN - ADULT  Goal: Verbalizes/displays adequate comfort level or baseline comfort level  Description: Interventions:  - Encourage patient to monitor pain and request assistance  - Assess pain using appropriate pain scale  - Administer analgesics based on type and severity of pain and evaluate response  - Implement non-pharmacological measures as appropriate and evaluate response  - Consider cultural and social influences on pain and pain management  - Notify physician/advanced practitioner if interventions unsuccessful or patient reports new pain  Outcome: Progressing    Problem: INFECTION - ADULT  Goal: Absence or prevention of progression during hospitalization  Description: INTERVENTIONS:  - Assess and monitor for signs and symptoms of infection  - Monitor lab/diagnostic results  - Monitor all insertion sites, i.e. indwelling lines, tubes, and drains  - Monitor endotracheal if appropriate and nasal secretions for changes in amount and color  - [place] appropriate cooling/warming therapies per order  - Administer medications as ordered  - Instruct and encourage patient and family to use good hand hygiene technique  - Identify and instruct in appropriate isolation precautions for identified infection/condition  Outcome: Progressing  Goal: Absence of fever/infection during neutropenic period  Description: INTERVENTIONS:  - Monitor WBC    Outcome: Progressing     Problem: SAFETY ADULT  Goal: Patient will remain free of falls  Description: INTERVENTIONS:  - Educate patient/family on patient safety including physical limitations  - Instruct patient to call for assistance with activity   - Consult OT/PT to assist with strengthening/mobility   - Keep Call bell within reach  - Keep bed low and locked with side rails adjusted as appropriate  - Keep care items and personal belongings within reach  - Initiate and maintain comfort rounds  - Make Fall Risk Sign visible to staff  - Offer Toileting every 2 Hours,  in advance of need  - Initiate/Maintain bed/chair alarm  - Apply yellow socks and bracelet for high fall risk patients  - Consider moving patient to room near nurses station  Outcome: Progressing  Goal: Maintain or return to baseline ADL function  Description: INTERVENTIONS:  -  Assess patient's ability to carry out ADLs; assess patient's baseline for ADL function and identify physical deficits which impact ability to perform ADLs (bathing, care of mouth/teeth, toileting, grooming, dressing, etc.)  - Assess/evaluate cause of self-care deficits   - Assess range of motion  - Assess patient's mobility; develop plan if impaired  - Assess patient's need for assistive devices and provide as appropriate  - Encourage maximum independence but intervene and supervise when necessary  - Involve family in performance of ADLs  - Assess for home care needs following discharge   - Consider OT consult to assist with ADL evaluation and planning for discharge  - Provide patient education as appropriate  Outcome: Progressing  Goal: Maintains/Returns to pre admission functional level  Description: INTERVENTIONS:  - Perform AM-PAC 6 Click Basic Mobility/ Daily Activity assessment daily.  - Set and communicate daily mobility goal to care team and patient/family/caregiver.   - Collaborate with rehabilitation services on mobility goals if consulted  - Perform Range of Motion 3 times a day.  - Reposition patient every 2 hours.  - Dangle patient 3 times a day  - Stand patient 3 times a day  - Ambulate patient 3 times a day  - Out of bed to chair 3 times a day   - Out of bed for meals 3 times a day  - Out of bed for toileting  - Record patient progress and toleration of activity level   Outcome: Progressing     Problem: Nutrition/Hydration-ADULT  Goal: Nutrient/Hydration intake appropriate for improving, restoring or maintaining nutritional needs  Description: Monitor and assess patient's nutrition/hydration status for malnutrition.  Collaborate with interdisciplinary team and initiate plan and interventions as ordered.  Monitor patient's weight and dietary intake as ordered or per policy. Utilize nutrition screening tool and intervene as necessary. Determine patient's food preferences and provide high-protein, high-caloric foods as appropriate.     INTERVENTIONS:  - Monitor oral intake, urinary output, labs, and treatment plans  - Assess nutrition and hydration status and recommend course of action  - Evaluate amount of meals eaten  - Assist patient with eating if necessary   - Allow adequate time for meals  - Recommend/ encourage appropriate diets, oral nutritional supplements, and vitamin/mineral supplements  - Order, calculate, and assess calorie counts as needed  - Assess need for intravenous fluids  - Provide specific nutrition/hydration education as appropriate  - Include patient/family/caregiver in decisions related to nutrition  Outcome: Progressing     Problem: Prexisting or High Potential for Compromised Skin Integrity  Goal: Skin integrity is maintained or improved  Description: INTERVENTIONS:  - Identify patients at risk for skin breakdown  - Assess and monitor skin integrity  - Assess and monitor nutrition and hydration status  - Monitor labs   - Assess for incontinence   - Turn and reposition patient  - Assist with mobility/ambulation  - Relieve pressure over bony prominences  - Avoid friction and shearing  - Provide appropriate hygiene as needed including keeping skin clean and dry  - Evaluate need for skin moisturizer/barrier cream  - Collaborate with interdisciplinary team   - Patient/family teaching  - Consider wound care consult   Outcome: Progressing

## 2024-02-03 NOTE — PROGRESS NOTES
Cardiology Progress Note - Radha Vidal 84 y.o. female MRN: [id number]    Unit/Bed#: S -01 Encounter: [id number]      Assessment/Recommendations:  1.  Paroxysmal atrial fibrillation: Recent diagnosis in the setting of strokelike symptoms.  Episodes this admission are very fast with a heart rate in the 180s with ST depressions.  Continues on Eliquis for anticoagulation.  Replete potassium to keep greater than 4 and magnesium to keep greater than 2.  Currently maintaining sinus rhythm.  We also started on low-dose beta-blocker to help with rate control.  Started on amiodarone as well for rhythm control.  2.  Elevated troponin: Most likely related to nonischemic myocardial injury in the setting of A-fib with RVR, however considering that elevation was significant and there were ST changes on the EKG, with patient reporting chest tightness, we checked an echocardiogram to assess wall motion, which was normal.  Considering that patient has a significant troponin elevation, we also performed a cardiac catheterization yesterday, which was normal for obstructive coronary artery disease.    3.  Hypertension: Relatively well-controlled, tolerating addition of beta-blocker.  4.  Type 2 diabetes mellitus: As per primary team.  5.  Parkinson's disease: Continued on Sinemet.  6.  Small bowel obstruction: Resolved, tolerating p.o. diet, currently n.p.o. for cardiac catheterization.  7.  Stable from cardiac standpoint for discharge with current medication regiment.  Continue on amiodarone p.o. loading dose of 200 mg 3 times daily for 7 days, then 200 mg 2 times daily for 7 days, then 200 mg daily thereafter.  Continue anticoagulation and beta-blocker as well.  Outpatient follow-up recommended in the next 3 to 4 weeks.    Subjective:   Patient seen and examined.  No significant events overnight.  ; pertinent negatives - chest pain, chest pressure/discomfort, dyspnea, irregular heart beat, lower extremity edema, near-syncope,  "and palpitations.    Objective:     Vitals: Blood pressure 139/69, pulse 71, temperature 98.2 °F (36.8 °C), temperature source Oral, resp. rate 18, height 5' 1\" (1.549 m), weight 73.5 kg (162 lb 0.6 oz), SpO2 95%, not currently breastfeeding., Body mass index is 30.62 kg/m².,   Orthostatic Blood Pressures      Flowsheet Row Most Recent Value   Blood Pressure 139/69 filed at 2024 0757   Patient Position - Orthostatic VS Sitting filed at 2024 0750              Intake/Output Summary (Last 24 hours) at 2/3/2024 0959  Last data filed at 2/3/2024 0201  Gross per 24 hour   Intake 720 ml   Output 800 ml   Net -80 ml       TELE: No significant arrhythmias seen.    Physical Exam:    GEN: Radha Vidal appears well, alert and oriented x 3, pleasant and cooperative   HEENT: pupils equal, round, and reactive to light; extraocular muscles intact  NECK: supple, no carotid bruits   HEART: regular rhythm, normal S1 and S2, no murmurs, clicks, gallops or rubs   LUNGS: clear to auscultation bilaterally; no wheezes, rales, or rhonchi   ABDOMEN: normal bowel sounds, soft, no tenderness, no distention  EXTREMITIES: peripheral pulses normal; no clubbing, cyanosis, or edema  NEURO: no focal findings   SKIN: normal without suspicious lesions on exposed skin      Medications:      Current Facility-Administered Medications:     acetaminophen (TYLENOL) tablet 975 mg, 975 mg, Oral, Q6H [facility], Kiet Jiang DO, 975 mg at 24 0820    [] amiodarone (CORDARONE) 900 mg in dextrose 5 % 500 mL infusion, 1 mg/min, Intravenous, Continuous, Last Rate: 33.3 mL/hr at 24 1034, 1 mg/min at 24 1034 **FOLLOWED BY** amiodarone (CORDARONE) 900 mg in dextrose 5 % 500 mL infusion, 0.5 mg/min, Intravenous, Continuous, Kiet Jiang DO, Last Rate: 16.7 mL/hr at 24 0737, 0.5 mg/min at 24 0737    amiodarone tablet 200 mg, 200 mg, Oral, TID With Meals, 200 mg at 24 0852 **FOLLOWED BY** [START ON 2024] " amiodarone tablet 200 mg, 200 mg, Oral, BID With Meals **FOLLOWED BY** [START ON 2/16/2024] amiodarone tablet 200 mg, 200 mg, Oral, Daily With Breakfast, Kiet Jiang DO    apixaban (ELIQUIS) tablet 5 mg, 5 mg, Oral, BID, Kiet Jiang DO, 5 mg at 02/03/24 0852    atorvastatin (LIPITOR) tablet 40 mg, 40 mg, Oral, Daily With Dinner, Kiet Jiang DO, 40 mg at 02/02/24 1534    carbidopa-levodopa (SINEMET)  mg per tablet 1 tablet, 1 tablet, Oral, TID AC, Kiet Jiang DO, 1 tablet at 02/03/24 0852    hydrOXYzine HCL (ATARAX) tablet 25 mg, 25 mg, Oral, Q6H PRN, Kiet Jiang DO, 25 mg at 02/01/24 2328    insulin lispro (HumaLOG) 100 units/mL subcutaneous injection 1-5 Units, 1-5 Units, Subcutaneous, TID AC **AND** Fingerstick Glucose (POCT), , , TID AC, Kiet Jiang DO    metoprolol (LOPRESSOR) injection 10 mg, 10 mg, Intravenous, Q6H PRN, Kiet Jiang DO, 10 mg at 02/02/24 0028    metoprolol succinate (TOPROL-XL) 24 hr tablet 25 mg, 25 mg, Oral, Daily, Kiet Jiang DO, 25 mg at 02/03/24 0852    ondansetron (ZOFRAN) injection 4 mg, 4 mg, Intravenous, Q6H PRN, Kiet Jiang DO, 4 mg at 02/01/24 0559    oxybutynin (DITROPAN) tablet 5 mg, 5 mg, Oral, Daily, Kiet Jiang DO, 5 mg at 02/03/24 0852    oxyCODONE (ROXICODONE) IR tablet 5 mg, 5 mg, Oral, Q4H PRN, Kiet Jiang DO    oxyCODONE (ROXICODONE) split tablet 2.5 mg, 2.5 mg, Oral, Q4H PRN, Kiet Jiang DO    pantoprazole (PROTONIX) injection 40 mg, 40 mg, Intravenous, Q24H DARI, Kiet Jiang DO, 40 mg at 02/03/24 0853    phenol (CHLORASEPTIC) 1.4 % mucosal liquid 1 spray, 1 spray, Mouth/Throat, Q2H PRN, Kiet Jiang DO, 1 spray at 01/30/24 1954    polyethylene glycol (MIRALAX) packet 17 g, 17 g, Oral, Daily, Madeline Cortes MD, 17 g at 02/03/24 0853    senna-docusate sodium (SENOKOT S) 8.6-50 mg per tablet 1 tablet, 1 tablet, Oral, Early Morning, Madeline Cortes MD, 1 tablet at 02/03/24 0852     Labs &  Results:        Results from last 7 days   Lab Units 02/01/24  0450 01/31/24  0444 01/30/24  0500   WBC Thousand/uL 10.91* 12.78* 14.31*   HEMOGLOBIN g/dL 12.9 11.5 12.0   HEMATOCRIT % 39.1 34.9 35.8   PLATELETS Thousands/uL 342 302 304         Results from last 7 days   Lab Units 02/02/24  0520 02/01/24  0450 01/31/24  0444   POTASSIUM mmol/L 3.8 3.4* 3.4*   CHLORIDE mmol/L 105 106 108   CO2 mmol/L 28 24 24   BUN mg/dL 8 6 10   CREATININE mg/dL 0.58* 0.68 0.48*   CALCIUM mg/dL 8.1* 8.6 8.1*     Results from last 7 days   Lab Units 02/02/24  0852 02/01/24  2315 02/01/24  1635   INR   --   --  1.30*   PTT seconds 50* 114* 38*     Results from last 7 days   Lab Units 02/02/24  0520 02/01/24  0450 01/29/24  0445   MAGNESIUM mg/dL 1.9 1.7* 1.9       Echo: personally reviewed -normal LV size and function with normal wall motion.    EKG personally reviewed by Ping Holland MD.

## 2024-02-04 VITALS
DIASTOLIC BLOOD PRESSURE: 65 MMHG | RESPIRATION RATE: 20 BRPM | SYSTOLIC BLOOD PRESSURE: 144 MMHG | TEMPERATURE: 97.8 F | WEIGHT: 162.26 LBS | OXYGEN SATURATION: 93 % | HEART RATE: 77 BPM | HEIGHT: 61 IN | BODY MASS INDEX: 30.63 KG/M2

## 2024-02-04 PROBLEM — K56.609 SMALL BOWEL OBSTRUCTION (HCC): Status: ACTIVE | Noted: 2024-02-04

## 2024-02-04 LAB
GLUCOSE SERPL-MCNC: 115 MG/DL (ref 65–140)
GLUCOSE SERPL-MCNC: 162 MG/DL (ref 65–140)

## 2024-02-04 PROCEDURE — 99232 SBSQ HOSP IP/OBS MODERATE 35: CPT | Performed by: SURGERY

## 2024-02-04 PROCEDURE — 82948 REAGENT STRIP/BLOOD GLUCOSE: CPT

## 2024-02-04 PROCEDURE — 99239 HOSP IP/OBS DSCHRG MGMT >30: CPT | Performed by: INTERNAL MEDICINE

## 2024-02-04 RX ORDER — BISACODYL 10 MG
10 SUPPOSITORY, RECTAL RECTAL DAILY PRN
Status: DISCONTINUED | OUTPATIENT
Start: 2024-02-04 | End: 2024-02-04

## 2024-02-04 RX ORDER — ATORVASTATIN CALCIUM 40 MG/1
40 TABLET, FILM COATED ORAL
Qty: 30 TABLET | Refills: 0 | Status: SHIPPED | OUTPATIENT
Start: 2024-02-04 | End: 2024-02-15 | Stop reason: SDUPTHER

## 2024-02-04 RX ORDER — PANTOPRAZOLE SODIUM 40 MG/1
40 TABLET, DELAYED RELEASE ORAL DAILY
COMMUNITY

## 2024-02-04 RX ORDER — POLYETHYLENE GLYCOL 3350 17 G/17G
17 POWDER, FOR SOLUTION ORAL DAILY PRN
Start: 2024-02-04

## 2024-02-04 RX ORDER — MAGNESIUM CARB/ALUMINUM HYDROX 105-160MG
296 TABLET,CHEWABLE ORAL ONCE
Status: COMPLETED | OUTPATIENT
Start: 2024-02-04 | End: 2024-02-04

## 2024-02-04 RX ORDER — AMIODARONE HYDROCHLORIDE 200 MG/1
TABLET ORAL
Qty: 59 TABLET | Refills: 0 | Status: SHIPPED | OUTPATIENT
Start: 2024-02-04 | End: 2024-02-15 | Stop reason: SDUPTHER

## 2024-02-04 RX ORDER — AMOXICILLIN 250 MG
2 CAPSULE ORAL DAILY PRN
Qty: 60 TABLET | Refills: 0 | Status: SHIPPED | OUTPATIENT
Start: 2024-02-04

## 2024-02-04 RX ORDER — METOPROLOL SUCCINATE 25 MG/1
25 TABLET, EXTENDED RELEASE ORAL DAILY
Qty: 30 TABLET | Refills: 0 | Status: SHIPPED | OUTPATIENT
Start: 2024-02-04 | End: 2024-02-15 | Stop reason: SDUPTHER

## 2024-02-04 RX ADMIN — AMIODARONE HYDROCHLORIDE 200 MG: 200 TABLET ORAL at 12:32

## 2024-02-04 RX ADMIN — CARBIDOPA AND LEVODOPA 1 TABLET: 25; 100 TABLET ORAL at 04:57

## 2024-02-04 RX ADMIN — CARBIDOPA AND LEVODOPA 1 TABLET: 25; 100 TABLET ORAL at 12:33

## 2024-02-04 RX ADMIN — AMIODARONE HYDROCHLORIDE 200 MG: 200 TABLET ORAL at 08:16

## 2024-02-04 RX ADMIN — INSULIN LISPRO 1 UNITS: 100 INJECTION, SOLUTION INTRAVENOUS; SUBCUTANEOUS at 12:33

## 2024-02-04 RX ADMIN — APIXABAN 5 MG: 5 TABLET, FILM COATED ORAL at 08:16

## 2024-02-04 RX ADMIN — OXYBUTYNIN CHLORIDE 5 MG: 5 TABLET ORAL at 08:16

## 2024-02-04 RX ADMIN — PANTOPRAZOLE SODIUM 40 MG: 40 TABLET, DELAYED RELEASE ORAL at 04:57

## 2024-02-04 RX ADMIN — MAGNESIUM CITRATE 296 ML: 1.75 LIQUID ORAL at 08:18

## 2024-02-04 RX ADMIN — POLYETHYLENE GLYCOL 3350 17 G: 17 POWDER, FOR SOLUTION ORAL at 08:19

## 2024-02-04 RX ADMIN — SENNOSIDES AND DOCUSATE SODIUM 2 TABLET: 8.6; 5 TABLET ORAL at 08:16

## 2024-02-04 RX ADMIN — METOPROLOL SUCCINATE 25 MG: 25 TABLET, EXTENDED RELEASE ORAL at 08:16

## 2024-02-04 NOTE — ASSESSMENT & PLAN NOTE
Has A. Fib history, on Eliquis, no rate or rhythm control agent in home regimen.  Had chest pressure in hospital, found to be in A. Fib with RVR with HR of 180's with some ST depressions.  Cardiology was consulted and are recommending metoprolol 25 mg for rate control.  Trops up to 2.8k attributed to this Afib RVR.    Plan:  Continue Metoprolol  Amio regimen:   200 mg tid thru 2/8/2024 then 200 mg bid from 2/9/2024 - 2/15/2024, then maintenance dose of 200 mg daily starting 2/16/2024.    Eliquis restarted post cath

## 2024-02-04 NOTE — ASSESSMENT & PLAN NOTE
Lab Results   Component Value Date    HGBA1C 6.7 (H) 01/15/2024       Recent Labs     02/03/24  0758 02/03/24  1150 02/03/24  1600 02/03/24 2033   POCGLU 120 218* 141* 156*       Blood Sugar Average: Last 72 hrs:  (P) [id number] on any antidiabetic medications outpatient.  Prior to 01/15 was prediabetic, this is first A1C at or above 6.5    Plan:  Insulin sliding scale  Hypoglycemia protocol

## 2024-02-04 NOTE — ASSESSMENT & PLAN NOTE
Most recent abdominal imaging shows nonobstructive bowel gas pattern.  Bowel regimen   Senna/docusate 2 tabs twice daily.  MiraLAX 17 g p.o. daily.  Milk of magnesia as a as needed.  Will make adjustments to bowel regimen as needed.  Patient encouraged to ambulate multiple times per day either with nursing or family to help further encourage bowel movement.  Monitor BMs.   Pain control:    Tylenol scheduled for mild pain but she has been declining this so we will switch this to as needed dosing.  Discontinued oxycodone as patient has not been using this and would also slow motility further.  Monitor pain levels.

## 2024-02-04 NOTE — PLAN OF CARE
Problem: PAIN - ADULT  Goal: Verbalizes/displays adequate comfort level or baseline comfort level  Description: Interventions:  - Encourage patient to monitor pain and request assistance  - Assess pain using appropriate pain scale  - Administer analgesics based on type and severity of pain and evaluate response  - Implement non-pharmacological measures as appropriate and evaluate response  - Consider cultural and social influences on pain and pain management  - Notify physician/advanced practitioner if interventions unsuccessful or patient reports new pain  Outcome: Progressing     Problem: INFECTION - ADULT  Goal: Absence or prevention of progression during hospitalization  Description: INTERVENTIONS:  - Assess and monitor for signs and symptoms of infection  - Monitor lab/diagnostic results  - Monitor all insertion sites, i.e. indwelling lines, tubes, and drains  - Monitor endotracheal if appropriate and nasal secretions for changes in amount and color  - [place] appropriate cooling/warming therapies per order  - Administer medications as ordered  - Instruct and encourage patient and family to use good hand hygiene technique  - Identify and instruct in appropriate isolation precautions for identified infection/condition  Outcome: Progressing  Goal: Absence of fever/infection during neutropenic period  Description: INTERVENTIONS:  - Monitor WBC    Outcome: Progressing     Problem: SAFETY ADULT  Goal: Patient will remain free of falls  Description: INTERVENTIONS:  - Educate patient/family on patient safety including physical limitations  - Instruct patient to call for assistance with activity   - Consult OT/PT to assist with strengthening/mobility   - Keep Call bell within reach  - Keep bed low and locked with side rails adjusted as appropriate  - Keep care items and personal belongings within reach  - Initiate and maintain comfort rounds  - Make Fall Risk Sign visible to staff  - Offer Toileting every 2 Hours,  in advance of need  - Initiate/Maintain bed/chair alarm  - Apply yellow socks and bracelet for high fall risk patients  - Consider moving patient to room near nurses station  Outcome: Progressing  Goal: Maintain or return to baseline ADL function  Description: INTERVENTIONS:  -  Assess patient's ability to carry out ADLs; assess patient's baseline for ADL function and identify physical deficits which impact ability to perform ADLs (bathing, care of mouth/teeth, toileting, grooming, dressing, etc.)  - Assess/evaluate cause of self-care deficits   - Assess range of motion  - Assess patient's mobility; develop plan if impaired  - Assess patient's need for assistive devices and provide as appropriate  - Encourage maximum independence but intervene and supervise when necessary  - Involve family in performance of ADLs  - Assess for home care needs following discharge   - Consider OT consult to assist with ADL evaluation and planning for discharge  - Provide patient education as appropriate  Outcome: Progressing  Goal: Maintains/Returns to pre admission functional level  Description: INTERVENTIONS:  - Perform AM-PAC 6 Click Basic Mobility/ Daily Activity assessment daily.  - Set and communicate daily mobility goal to care team and patient/family/caregiver.   - Collaborate with rehabilitation services on mobility goals if consulted  - Perform Range of Motion 3 times a day.  - Reposition patient every 2 hours.  - Dangle patient 3 times a day  - Stand patient 3 times a day  - Ambulate patient 3 times a day  - Out of bed to chair 3 times a day   - Out of bed for meals 3 times a day  - Out of bed for toileting  - Record patient progress and toleration of activity level   Outcome: Progressing     Problem: Knowledge Deficit  Goal: Patient/family/caregiver demonstrates understanding of disease process, treatment plan, medications, and discharge instructions  Description: Complete learning assessment and assess knowledge  base.  Interventions:  - Provide teaching at level of understanding  - Provide teaching via preferred learning methods  Outcome: Progressing     Problem: Nutrition/Hydration-ADULT  Goal: Nutrient/Hydration intake appropriate for improving, restoring or maintaining nutritional needs  Description: Monitor and assess patient's nutrition/hydration status for malnutrition. Collaborate with interdisciplinary team and initiate plan and interventions as ordered.  Monitor patient's weight and dietary intake as ordered or per policy. Utilize nutrition screening tool and intervene as necessary. Determine patient's food preferences and provide high-protein, high-caloric foods as appropriate.     INTERVENTIONS:  - Monitor oral intake, urinary output, labs, and treatment plans  - Assess nutrition and hydration status and recommend course of action  - Evaluate amount of meals eaten  - Assist patient with eating if necessary   - Allow adequate time for meals  - Recommend/ encourage appropriate diets, oral nutritional supplements, and vitamin/mineral supplements  - Order, calculate, and assess calorie counts as needed  - Assess need for intravenous fluids  - Provide specific nutrition/hydration education as appropriate  - Include patient/family/caregiver in decisions related to nutrition  Outcome: Progressing     Problem: Prexisting or High Potential for Compromised Skin Integrity  Goal: Skin integrity is maintained or improved  Description: INTERVENTIONS:  - Identify patients at risk for skin breakdown  - Assess and monitor skin integrity  - Assess and monitor nutrition and hydration status  - Monitor labs   - Assess for incontinence   - Turn and reposition patient  - Assist with mobility/ambulation  - Relieve pressure over bony prominences  - Avoid friction and shearing  - Provide appropriate hygiene as needed including keeping skin clean and dry  - Evaluate need for skin moisturizer/barrier cream  - Collaborate with  interdisciplinary team   - Patient/family teaching  - Consider wound care consult   Outcome: Progressing

## 2024-02-04 NOTE — PROGRESS NOTES
[facility]  Progress Note  Name: Radha Vidal I  MRN: [id number]  Unit/Bed#: S -01 I Date of Admission: 1/26/2024   Date of Service: 2/4/2024 I Hospital Day: 8    Assessment/Plan   * Atrial fibrillation with RVR (HCC)  Assessment & Plan  Has A. Fib history, on Eliquis, no rate or rhythm control agent in home regimen.  Had chest pressure in hospital, found to be in A. Fib with RVR with HR of 180's with some ST depressions.  Cardiology was consulted and are recommending metoprolol 25 mg for rate control.  Trops up to 2.8k attributed to this Afib RVR.    Plan:  Continue Metoprolol  Amio regimen:   200 mg tid thru 2/8/2024 then 200 mg bid from 2/9/2024 - 2/15/2024, then maintenance dose of 200 mg daily starting 2/16/2024.    Eliquis restarted post cath    Elevated troponin  Assessment & Plan  Troponins elevated to 2.8k.   EKG A. Fib with RVR with some ST depressions mostly inf & ant leads.   Echo & cath 02/02 unremarkable, leaving Afib as most likely source      Plan:  Heparin stopped, Eliquis begun    Small bowel obstruction (HCC)  Assessment & Plan  Most recent abdominal imaging shows nonobstructive bowel gas pattern.  Bowel regimen   Senna/docusate 2 tabs twice daily.  MiraLAX 17 g p.o. daily.  Milk of magnesia as a as needed.  Will make adjustments to bowel regimen as needed.  Patient encouraged to ambulate multiple times per day either with nursing or family to help further encourage bowel movement.  Monitor BMs.  I would like to see at least 1 bowel movement prior to discharge.  Pain control:    Tylenol scheduled for mild pain but she has been declining this so we will switch this to as needed dosing.  Discontinued oxycodone as patient has not been using this and would also slow motility further.  Monitor pain levels.      Type 2 diabetes mellitus (HCC)  Assessment & Plan  Lab Results   Component Value Date    HGBA1C 6.7 (H) 01/15/2024       Recent Labs     02/03/24  0758  24  1150 24  1600 24   POCGLU 120 218* 141* 156*       Blood Sugar Average: Last 72 hrs:  (P) [id number] on any antidiabetic medications outpatient.  Prior to 01/15 was prediabetic, this is first A1C at or above 6.5    Plan:  Insulin sliding scale  Hypoglycemia protocol    OAB (overactive bladder)  Assessment & Plan  Generally incontinent but currently endorses improved bladder control.  Continue home Oxybutynin.    Parkinson's disease  Assessment & Plan  Continue carbidopa-levodopa    Electrolyte abnormality  Assessment & Plan  Hypokalemia and hypomagnesemia  Patient is on potasium gluconate at home.    Plan:  Monitor K and Mg levels to keep K>4  and mg >2  If K is low consistently consider resuming home K gluconate           VTE Pharmacologic Prophylaxis: VTE Score: 4 Moderate Risk (Score 3-4) - Pharmacological DVT Prophylaxis Ordered: apixaban (Eliquis).    Mobility:   Basic Mobility Inpatient Raw Score: 17  JH-HLM Goal: 5: Stand one or more mins  JH-HLM Achieved: 7: Walk 25 feet or more  HLM Goal achieved. Continue to encourage appropriate mobility.    Patient Centered Rounds: I performed bedside rounds with nursing staff today.   Discussions with Specialists or Other Care Team Provider: Cardiology    Education and Discussions with Family / Patient: Updated  (daughter) via phone.    Current Length of Stay: 8 day(s)  Current Patient Status: Inpatient   Certification Statement: The patient will continue to require additional inpatient hospital stay due to  nonobstructive SBO  Discharge Plan: Anticipate discharge in 24-48 hrs to home with home services.    Code Status: Level 1 - Full Code    Subjective:   No acute overnight events. Patient still has not had a bowel movement but is passing flatus. She denies any fever, chills, nausea, vomiting, abd pain.     Objective:     Vitals:   Temp (24hrs), Av.2 °F (36.8 °C), Min:97.8 °F (36.6 °C), Max:98.7 °F (37.1  °C)    Temp:  [97.8 °F (36.6 °C)-98.7 °F (37.1 °C)] 97.8 °F (36.6 °C)  HR:  [71] 71  Resp:  [18-20] 20  BP: (123-139)/(63-69) 130/63  SpO2:  [94 %] 94 %  Body mass index is 30.66 kg/m².     Input and Output Summary (last 24 hours):     Intake/Output Summary (Last 24 hours) at 2/4/2024 0646  Last data filed at 2/4/2024 0627  Gross per 24 hour   Intake 300 ml   Output 1000 ml   Net -700 ml       Physical Exam:   Physical Exam  Constitutional:       General: She is not in acute distress.     Appearance: Normal appearance. She is obese.   HENT:      Head: Normocephalic and atraumatic.      Mouth/Throat:      Mouth: Mucous membranes are moist.      Pharynx: Oropharynx is clear.   Eyes:      Extraocular Movements: Extraocular movements intact.      Conjunctiva/sclera: Conjunctivae normal.   Cardiovascular:      Rate and Rhythm: Normal rate and regular rhythm.   Pulmonary:      Effort: Pulmonary effort is normal.      Breath sounds: Rales (on the bases bilaterally. On room air.) present.   Abdominal:      General: Abdomen is flat. Bowel sounds are normal.      Palpations: Abdomen is soft.      Tenderness: There is no abdominal tenderness. There is no guarding or rebound.   Skin:     General: Skin is warm and dry.      Capillary Refill: Capillary refill takes less than 2 seconds.   Neurological:      Mental Status: She is alert and oriented to person, place, and time. Mental status is at baseline.          Additional Data:     Labs:  Results from last 7 days   Lab Units 02/01/24  0450   WBC Thousand/uL 10.91*   HEMOGLOBIN g/dL 12.9   HEMATOCRIT % 39.1   PLATELETS Thousands/uL 342   NEUTROS PCT % 53   LYMPHS PCT % 34   MONOS PCT % 8   EOS PCT % 3     Results from last 7 days   Lab Units 02/02/24  0520   SODIUM mmol/L 139   POTASSIUM mmol/L 3.8   CHLORIDE mmol/L 105   CO2 mmol/L 28   BUN mg/dL 8   CREATININE mg/dL 0.58*   ANION GAP mmol/L 6   CALCIUM mg/dL 8.1*   GLUCOSE RANDOM mg/dL 115     Results from last 7 days   Lab  Units 02/01/24  1635   INR  1.30*     Results from last 7 days   Lab Units 02/03/24  2033 02/03/24  1600 02/03/24  1150 02/03/24  0758 02/02/24  1553 02/02/24  1056 02/02/24  0632 02/01/24  2047 02/01/24  1713   POC GLUCOSE mg/dl 156* 141* 218* 120 108 121 121 118 131               Lines/Drains:  Invasive Devices       Peripheral Intravenous Line  Duration             Peripheral IV 01/31/24 Right Antecubital 4 days    Peripheral IV 02/02/24 Left;Ventral (anterior) Forearm 2 days    Peripheral IV 02/02/24 Dorsal (posterior);Right Hand 1 day                          Imaging: No pertinent imaging reviewed.    Recent Cultures (last 7 days):         Last 24 Hours Medication List:   Current Facility-Administered Medications   Medication Dose Route Frequency Provider Last Rate    acetaminophen  650 mg Oral Q6H PRN Kiet Jiang DO      amiodarone  200 mg Oral TID With Meals Kiet Jiang DO      Followed by    [START ON 2/9/2024] amiodarone  200 mg Oral BID With Meals Kiet Jiang DO      Followed by    [START ON 2/16/2024] amiodarone  200 mg Oral Daily With Breakfast Kiet Jiang DO      apixaban  5 mg Oral BID Kiet Jiang, DO      atorvastatin  40 mg Oral Daily With Dinner Kiet Jiang DO      carbidopa-levodopa  1 tablet Oral TID AC Kiet Jiang, DO      hydrOXYzine HCL  25 mg Oral Q6H PRN Kiet Jiang DO      insulin lispro  1-5 Units Subcutaneous TID AC Kiet Jiang, DO      metoprolol  10 mg Intravenous Q6H PRN Kiet Jiang, DO      metoprolol succinate  25 mg Oral Daily Kiet Jiang, DO      ondansetron  4 mg Intravenous Q6H PRN Kiet Jiang, DO      oxybutynin  5 mg Oral Daily Kiet Jiang, DO      pantoprazole  40 mg Oral Early Morning Benito Garcia MD      phenol  1 spray Mouth/Throat Q2H PRN Kiet Jiang DO      polyethylene glycol  17 g Oral Daily Madeline Cortes MD      senna-docusate sodium  2 tablet Oral BID Kiet Jiang, DO          Today,  Patient Was Seen By: Naomie Kelley DO    **Please Note: This note may have been constructed using a voice recognition system.**

## 2024-02-04 NOTE — ASSESSMENT & PLAN NOTE
Most recent abdominal imaging shows nonobstructive bowel gas pattern.  Bowel regimen   Senna/docusate 2 tabs twice daily.  MiraLAX 17 g p.o. daily.  Milk of magnesia as a as needed.  Will make adjustments to bowel regimen as needed.  Patient encouraged to ambulate multiple times per day either with nursing or family to help further encourage bowel movement.  Monitor BMs.  I would like to see at least 1 bowel movement prior to discharge.  Pain control:    Tylenol scheduled for mild pain but she has been declining this so we will switch this to as needed dosing.  Discontinued oxycodone as patient has not been using this and would also slow motility further.  Monitor pain levels.

## 2024-02-04 NOTE — ASSESSMENT & PLAN NOTE
Hypokalemia and hypomagnesemia  Patient is on potasium gluconate at home.    Plan:  Monitor K and Mg levels to keep K>4  and mg >2  If K is low consistently consider resuming home K gluconate

## 2024-02-04 NOTE — ASSESSMENT & PLAN NOTE
Lab Results   Component Value Date    HGBA1C 6.7 (H) 01/15/2024       Recent Labs     02/03/24  1600 02/03/24 2033 02/04/24  0801 02/04/24  1137   POCGLU 141* 156* 115 162*       Blood Sugar Average: Last 72 hrs:  (P) [id number] on any antidiabetic medications outpatient.  Prior to 01/15 was prediabetic, this is first A1C at or above 6.5    Plan:  Insulin sliding scale  Hypoglycemia protocol

## 2024-02-05 ENCOUNTER — TRANSITIONAL CARE MANAGEMENT (OUTPATIENT)
Dept: FAMILY MEDICINE CLINIC | Facility: CLINIC | Age: 84
End: 2024-02-05

## 2024-02-05 ENCOUNTER — HOME CARE VISIT (OUTPATIENT)
Dept: HOME HEALTH SERVICES | Facility: HOME HEALTHCARE | Age: 84
End: 2024-02-05

## 2024-02-06 ENCOUNTER — HOME CARE VISIT (OUTPATIENT)
Dept: HOME HEALTH SERVICES | Facility: HOME HEALTHCARE | Age: 84
End: 2024-02-06

## 2024-02-07 ENCOUNTER — HOME CARE VISIT (OUTPATIENT)
Dept: HOME HEALTH SERVICES | Facility: HOME HEALTHCARE | Age: 84
End: 2024-02-07

## 2024-02-08 ENCOUNTER — HOME CARE VISIT (OUTPATIENT)
Dept: HOME HEALTH SERVICES | Facility: HOME HEALTHCARE | Age: 84
End: 2024-02-08
Payer: COMMERCIAL

## 2024-02-08 VITALS
DIASTOLIC BLOOD PRESSURE: 70 MMHG | RESPIRATION RATE: 16 BRPM | TEMPERATURE: 98 F | HEART RATE: 56 BPM | SYSTOLIC BLOOD PRESSURE: 124 MMHG

## 2024-02-08 PROCEDURE — 400013 VN SOC

## 2024-02-08 PROCEDURE — G0299 HHS/HOSPICE OF RN EA 15 MIN: HCPCS

## 2024-02-09 ENCOUNTER — HOME CARE VISIT (OUTPATIENT)
Dept: HOME HEALTH SERVICES | Facility: HOME HEALTHCARE | Age: 84
End: 2024-02-09
Payer: COMMERCIAL

## 2024-02-09 VITALS — DIASTOLIC BLOOD PRESSURE: 84 MMHG | SYSTOLIC BLOOD PRESSURE: 144 MMHG | HEART RATE: 82 BPM | OXYGEN SATURATION: 86 %

## 2024-02-09 PROCEDURE — G0151 HHCP-SERV OF PT,EA 15 MIN: HCPCS

## 2024-02-12 ENCOUNTER — HOME CARE VISIT (OUTPATIENT)
Dept: HOME HEALTH SERVICES | Facility: HOME HEALTHCARE | Age: 84
End: 2024-02-12
Payer: COMMERCIAL

## 2024-02-12 VITALS — DIASTOLIC BLOOD PRESSURE: 64 MMHG | SYSTOLIC BLOOD PRESSURE: 108 MMHG | HEART RATE: 65 BPM | OXYGEN SATURATION: 94 %

## 2024-02-12 PROCEDURE — G0151 HHCP-SERV OF PT,EA 15 MIN: HCPCS

## 2024-02-14 ENCOUNTER — HOME CARE VISIT (OUTPATIENT)
Dept: HOME HEALTH SERVICES | Facility: HOME HEALTHCARE | Age: 84
End: 2024-02-14
Payer: COMMERCIAL

## 2024-02-15 ENCOUNTER — HOME CARE VISIT (OUTPATIENT)
Dept: HOME HEALTH SERVICES | Facility: HOME HEALTHCARE | Age: 84
End: 2024-02-15
Payer: COMMERCIAL

## 2024-02-15 ENCOUNTER — OFFICE VISIT (OUTPATIENT)
Dept: CARDIOLOGY CLINIC | Facility: CLINIC | Age: 84
End: 2024-02-15
Payer: COMMERCIAL

## 2024-02-15 VITALS — DIASTOLIC BLOOD PRESSURE: 60 MMHG | HEART RATE: 61 BPM | OXYGEN SATURATION: 94 % | SYSTOLIC BLOOD PRESSURE: 112 MMHG

## 2024-02-15 VITALS
DIASTOLIC BLOOD PRESSURE: 60 MMHG | BODY MASS INDEX: 30.21 KG/M2 | WEIGHT: 160 LBS | HEART RATE: 59 BPM | HEIGHT: 61 IN | SYSTOLIC BLOOD PRESSURE: 116 MMHG | OXYGEN SATURATION: 95 % | RESPIRATION RATE: 16 BRPM

## 2024-02-15 DIAGNOSIS — R00.2 PALPITATION: ICD-10-CM

## 2024-02-15 DIAGNOSIS — R29.90 STROKE-LIKE SYMPTOMS: ICD-10-CM

## 2024-02-15 DIAGNOSIS — Z79.899 ON AMIODARONE THERAPY: ICD-10-CM

## 2024-02-15 DIAGNOSIS — Z79.01 ANTICOAGULATED: ICD-10-CM

## 2024-02-15 DIAGNOSIS — I48.0 PAF (PAROXYSMAL ATRIAL FIBRILLATION) (HCC): Primary | ICD-10-CM

## 2024-02-15 PROCEDURE — 99214 OFFICE O/P EST MOD 30 MIN: CPT | Performed by: INTERNAL MEDICINE

## 2024-02-15 PROCEDURE — G0151 HHCP-SERV OF PT,EA 15 MIN: HCPCS

## 2024-02-15 PROCEDURE — 93000 ELECTROCARDIOGRAM COMPLETE: CPT | Performed by: INTERNAL MEDICINE

## 2024-02-15 RX ORDER — METOPROLOL SUCCINATE 25 MG/1
25 TABLET, EXTENDED RELEASE ORAL DAILY
Qty: 90 TABLET | Refills: 3 | Status: SHIPPED | OUTPATIENT
Start: 2024-02-15

## 2024-02-15 RX ORDER — AMIODARONE HYDROCHLORIDE 200 MG/1
200 TABLET ORAL DAILY
Qty: 90 TABLET | Refills: 3 | Status: SHIPPED | OUTPATIENT
Start: 2024-02-15 | End: 2024-05-15

## 2024-02-15 RX ORDER — ATORVASTATIN CALCIUM 40 MG/1
40 TABLET, FILM COATED ORAL
Qty: 90 TABLET | Refills: 3 | Status: SHIPPED | OUTPATIENT
Start: 2024-02-15

## 2024-02-15 NOTE — PROGRESS NOTES
[facility]  [address] [place] [address]  Cardiology Follow Up    Radha Vidal  1940  [id number]      1. PAF (paroxysmal atrial fibrillation) ([facility])  Ambulatory referral to Cardiology    POCT ECG      2. On amiodarone therapy        3. Palpitation  amiodarone 200 mg tablet    metoprolol succinate (TOPROL-XL) 25 mg 24 hr tablet      4. Stroke-like symptoms  atorvastatin (LIPITOR) 40 mg tablet      5. Anticoagulated  apixaban (Eliquis) 5 mg          Chief Complaint   Patient presents with    Follow-up       Interval History: This patient presents for follow-up visit after recent hospitalization.  Patient had small bowel obstruction and found to be in atrial fibrillation with rapid ventricular response.  Patient was anticoagulated with Eliquis and was started on a loading dose of amiodarone.  Patient presents for follow-up visit.  Patient denies any chest pain or shortness of breath.  Patient seems to have significantly improved after recent hospitalization.  Patient denies any history of palpitations.  No history of bleeding issues.  She states that she has been compliant with all her present medications.  Patient does have history of parkinsonism and has tremors and is on medications for the same.    Patient Active Problem List   Diagnosis    OAB (overactive bladder)    Parkinson's disease    Primary insomnia    History of skin cancer    Seasonal allergic rhinitis    Impaired fasting glucose    Mood disturbance    Obesity (BMI 30-39.9)    Claustrophobia    Mitral valve disorder    Menopause ovarian failure    Vitamin D deficiency    Dysphagia    Multiple thyroid nodules    Gastro-esophageal reflux disease without esophagitis    Osteopenia    Chronic idiopathic constipation    History of adenomatous polyp of colon    Tremor    Trochanteric bursitis, right hip    IPMN (intraductal papillary mucinous neoplasm)    Cherry angioma    Dyspnea on exertion    Postablative hypothyroidism     Sleep apnea    Cerebrovascular disease    Age-related osteoporosis without current pathological fracture    Urge incontinence    Encounter for geriatric assessment    s/p distal pancreatectomy/splenectomy    Stroke-like symptoms    Atrial fibrillation with RVR (HCC)    Type 2 diabetes mellitus (HCC)    Ambulatory dysfunction    Ear pressure, right    Nausea    Electrolyte abnormality    Elevated troponin    Small bowel obstruction (HCC)     Past Medical History:   Diagnosis Date    Actinic keratosis 2016    Acute midline low back pain without sciatica 2020    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     Benign neoplasm of skin     Cancer (HCC)     skin    Chest pain     Claustrophobia     Closed compression fracture of body of L1 vertebra (HCC)     Diverticulitis     Diverticulitis     Fracture     L1-L2    GERD (gastroesophageal reflux disease)     H. pylori infection 2020    Muscle weakness     Nonmelanoma skin cancer     last assessed 2017    Palpitations     Pancreatic cyst     Seasonal allergies     Seborrheic keratosis 2014    Sleep apnea     no cpap    Spontaneous      without mention of complications     Squamous cell carcinoma of forehead 2014    Syncope      Social History     Socioeconomic History    Marital status:      Spouse name: Not on file    Number of children: Not on file    Years of education: Not on file    Highest education level: Not on file   Occupational History    Occupation: RETIRED    Tobacco Use    Smoking status: Never     Passive exposure: Past    Smokeless tobacco: Never   Vaping Use    Vaping status: Never Used   Substance and Sexual Activity    Alcohol use: Yes     Comment: patient states rare use on holidays     Drug use: No    Sexual activity: Not Currently   Other Topics Concern    Not on file   Social History Narrative    LIVING INDEPENDENTLY ALONE         No caffeine use     Social Determinants of  Health     Financial Resource Strain: Low Risk  (4/19/2023)    Overall Financial Resource Strain (CARDIA)     Difficulty of Paying Living Expenses: Not very hard   Food Insecurity: No Food Insecurity (1/14/2024)    Hunger Vital Sign     Worried About Running Out of Food in the Last Year: Never true     Ran Out of Food in the Last Year: Never true   Transportation Needs: No Transportation Needs (1/14/2024)    PRAPARE - Transportation     Lack of Transportation (Medical): No     Lack of Transportation (Non-Medical): No   Physical Activity: Not on file   Stress: Not on file   Social Connections: Not on file   Intimate Partner Violence: Not on file   Housing Stability: Low Risk  (1/14/2024)    Housing Stability Vital Sign     Unable to Pay for Housing in the Last Year: No     Number of Places Lived in the Last Year: 1     Unstable Housing in the Last Year: No      Family History   Problem Relation Age of Onset    Alcohol abuse Mother     Heart disease Mother     Alcohol abuse Father     Alcohol abuse Brother     Cancer Brother     Tremor Neg Hx     Parkinsonism Neg Hx     Colon cancer Neg Hx      Past Surgical History:   Procedure Laterality Date    ABDOMINAL ADHESION SURGERY N/A 07/31/2023    Procedure: LYSIS ADHESIONS;  Surgeon: Kyle Julien MD;  [facility];  Service: Surgical Oncology    BOTOX INJECTION N/A 03/06/2017    Procedure: CYSTOSCOPY; BLADDER BOTOX 100 UNITS ;  Surgeon: Juan Edward MD;  Location[facility];  Service:     BOWEL RESECTION      related ot diverticulitis    CARDIAC CATHETERIZATION      CARDIAC CATHETERIZATION Left 2/2/2024    Procedure: Cardiac Left Heart Cath;  Surgeon: Carrington Kiser DO;  [facility]: [facility];  Service: Cardiology    CARPAL TUNNEL RELEASE Right     COLONOSCOPY  07/31/2019    COLOSTOMY      COLOSTOMY CLOSURE      CYSTOSCOPY  06/01/2021    DISTAL PANCREATECTOMY N/A 07/31/2023    Procedure: DISTAL PANCREATECTOMY;  Surgeon: Kyle Julien MD;   Location: [facility];  Service: Surgical Oncology    FOOT SURGERY Left     neuroma    HYSTERECTOMY      MYRINGOTOMY W/ TUBES Right 12/06/2023    office procedure - Dr. Grace    NASAL SINUS SURGERY  age 39    [place]    PANCREATIC CYST BIOPSY  07/31/2023    NH CYSTOURETHROSCOPY N/A 04/13/2018    Procedure: CYSTOSCOPY WITH BOTOX;  Surgeon: Juan Edward MD;  [facility];  Service: Urology    NH ESOPHAGOGASTRODUODENOSCOPY TRANSORAL DIAGNOSTIC N/A 04/23/2019    Procedure: ESOPHAGOGASTRODUODENOSCOPY (EGD);  Surgeon: Edu Dickerson DO;  Location: [facility];  Service: Gastroenterology    SPLENECTOMY, TOTAL N/A 07/31/2023    Procedure: SPLENECTOMY;  Surgeon: Kyle Julien MD;  [facility];  Service: Surgical Oncology    TONSILLECTOMY  age 49    UPPER GASTROINTESTINAL ENDOSCOPY  04/23/2019       Current Outpatient Medications:     amiodarone 200 mg tablet, Take 1 tablet (200 mg total) by mouth daily, Disp: 90 tablet, Rfl: 3    apixaban (Eliquis) 5 mg, Take 1 tablet (5 mg total) by mouth 2 (two) times a day, Disp: 180 tablet, Rfl: 3    Ascorbic Acid (VITAMIN C) 1000 MG tablet, Take 1,000 mg by mouth daily, Disp: , Rfl:     atorvastatin (LIPITOR) 40 mg tablet, Take 1 tablet (40 mg total) by mouth daily with dinner, Disp: 90 tablet, Rfl: 3    B Complex Vitamins (B COMPLEX 100 PO), Take 1 capsule by mouth daily after lunch not taking, Disp: , Rfl:     calcium carbonate (OS-JOHN) 1250 (500 Ca) MG tablet, Take 1 tablet by mouth daily after lunch, Disp: , Rfl:     carbidopa-levodopa (SINEMET)  mg per tablet, Take 1 tablet by mouth 3 (three) times a day before meals, Disp: 270 tablet, Rfl: 3    cholecalciferol (VITAMIN D3) 1,000 units tablet, Take 5,000 Units by mouth daily after lunch, Disp: , Rfl:     Coenzyme Q10 (CO Q 10 PO), Take 1 capsule by mouth daily after lunch 600 mg BID, Disp: , Rfl:     Cyanocobalamin (VITAMIN B 12 PO), Take 1 capsule by mouth daily, Disp: , Rfl:     metoprolol succinate  (TOPROL-XL) 25 mg 24 hr tablet, Take 1 tablet (25 mg total) by mouth daily, Disp: 90 tablet, Rfl: 3    multivitamin (THERAGRAN) TABS, Take 1 tablet by mouth every morning, Disp: , Rfl:     Omega-3 350 MG CPDR, Take 1 capsule by mouth daily in the early morning, Disp: , Rfl:     pantoprazole (PROTONIX) 40 mg tablet, Take 40 mg by mouth daily, Disp: , Rfl:     polyethylene glycol (MIRALAX) 17 g packet, Take 17 g by mouth daily as needed (constipation if not responding to senna/docusate), Disp: , Rfl:     Potassium Gluconate 595 (99 K) MG TABS, Take 1 each by mouth every other day, Disp: , Rfl:     Probiotic Product (PROBIOTIC-10) CAPS, Take 1 capsule by mouth daily after lunch, Disp: , Rfl:     senna-docusate sodium (SENOKOT S) 8.6-50 mg per tablet, Take 2 tablets by mouth daily as needed for constipation, Disp: 60 tablet, Rfl: 0    trospium chloride (SANCTURA) 20 mg tablet, Take 1 tablet (20 mg total) by mouth 2 (two) times a day, Disp: 180 tablet, Rfl: 2  Allergies   Allergen Reactions    Caffeine - Food Allergy GI Intolerance    Iodine - Food Allergy Rash    Latex Rash    Other Rash     Adhesive tape         Labs:  No results displayed because visit has over 200 results.      Admission on 01/14/2024, Discharged on 01/19/2024   Component Date Value    POC Glucose 01/14/2024 140     Ventricular Rate 01/14/2024 81     Atrial Rate 01/14/2024 81     PA Interval 01/14/2024 156     QRSD Interval 01/14/2024 78     QT Interval 01/14/2024 402     QTC Interval 01/14/2024 466     P Axis 01/14/2024 51     QRS Axis 01/14/2024 75     T Wave Axis 01/14/2024 91     Sodium 01/14/2024 138     Potassium 01/14/2024 4.3     Chloride 01/14/2024 104     CO2 01/14/2024 28     ANION GAP 01/14/2024 6     BUN 01/14/2024 17     Creatinine 01/14/2024 0.70     Glucose 01/14/2024 145 (H)     Calcium 01/14/2024 9.3     eGFR 01/14/2024 79     WBC 01/14/2024 6.75     RBC 01/14/2024 4.92     Hemoglobin 01/14/2024 14.8     Hematocrit 01/14/2024 44.4      MCV 01/14/2024 90     MCH 01/14/2024 30.1     MCHC 01/14/2024 33.3     RDW 01/14/2024 14.3     Platelets 01/14/2024 398 (H)     MPV 01/14/2024 9.5     Protime 01/14/2024 12.7     INR 01/14/2024 0.90     PTT 01/14/2024 32     hs TnI 0hr 01/14/2024 5     SARS-CoV-2 01/14/2024 Negative     INFLUENZA A PCR 01/14/2024 Negative     INFLUENZA B PCR 01/14/2024 Negative     RSV PCR 01/14/2024 Negative     hs TnI 2hr 01/14/2024 8     Delta 2hr hsTnI 01/14/2024 3     Ventricular Rate 01/14/2024 91     Atrial Rate 01/14/2024 91     TN Interval 01/14/2024 168     QRSD Interval 01/14/2024 86     QT Interval 01/14/2024 404     QTC Interval 01/14/2024 496     P Axis 01/14/2024 82     QRS Axis 01/14/2024 62     T Wave [place] 01/14/2024 77     hs TnI 4hr 01/14/2024 13     Delta 4hr hsTnI 01/14/2024 8     Triscuspid Valve Regurgi* 01/15/2024 26.0     RAA A4C 01/15/2024 13.1     IRIS A4C 01/15/2024 24.8     LA Volume Index (BP) 01/15/2024 39.4     MV Peak A New 01/15/2024 0.86     MV stenosis pressure 1/2* 01/15/2024 80     MV Peak E New 01/15/2024 79     E wave deceleration time 01/15/2024 274     E/A ratio 01/15/2024 0.92     MV valve area p 1/2 meth* 01/15/2024 2.75     TR Peak New 01/15/2024 2.5     RVID d 01/15/2024 3.3     A4C EF 01/15/2024 71     Tricuspid valve peak reg* 01/15/2024 2.54     Left ventricular stroke * 01/15/2024 51.00     IVSd 01/15/2024 1.20     Tricuspid annular plane * 01/15/2024 2.50     Ao root 01/15/2024 3.60     LVPWd 01/15/2024 1.10     LA size 01/15/2024 2.9     Asc Ao 01/15/2024 3.1     LA volume (BP) 01/15/2024 71     FS 01/15/2024 40     LVIDS 01/15/2024 2.40     IVS 01/15/2024 1.2     LVIDd 01/15/2024 4.00     LA length (A2C) 01/15/2024 4.80     LEFT VENTRICLE SYSTOLIC * 01/15/2024 21     LV DIASTOLIC VOLUME (MOD* 01/15/2024 72     Left Atrium Area-systoli* 01/15/2024 24.1     Left Atrium Area-systoli* 01/15/2024 18     MV E' Tissue Velocity Se* 01/15/2024 7     LVSV, 2D 01/15/2024 51     BSA  01/15/2024 1.79     LV EF 01/15/2024 60     Cholesterol 01/15/2024 180     Triglycerides 01/15/2024 67     HDL, Direct 01/15/2024 79     LDL Calculated 01/15/2024 88     Hemoglobin A1C 01/15/2024 6.7 (H)     EAG 01/15/2024 146     WBC 01/15/2024 8.03     RBC 01/15/2024 4.42     Hemoglobin 01/15/2024 13.5     Hematocrit 01/15/2024 38.9     MCV 01/15/2024 88     MCH 01/15/2024 30.5     MCHC 01/15/2024 34.7     RDW 01/15/2024 14.6     Platelets 01/15/2024 337     MPV 01/15/2024 9.4     Sodium 01/15/2024 137     Potassium 01/15/2024 4.0     Chloride 01/15/2024 105     CO2 01/15/2024 26     ANION GAP 01/15/2024 6     BUN 01/15/2024 18     Creatinine 01/15/2024 0.76     Glucose 01/15/2024 113     Calcium 01/15/2024 9.2     eGFR 01/15/2024 71     Magnesium 01/15/2024 2.0     POC Glucose 01/15/2024 213 (H)     POC Glucose 01/15/2024 161 (H)     POC Glucose 01/15/2024 157 (H)     Sodium 01/16/2024 139     Potassium 01/16/2024 3.7     Chloride 01/16/2024 105     CO2 01/16/2024 27     ANION GAP 01/16/2024 7     BUN 01/16/2024 15     Creatinine 01/16/2024 0.59 (L)     Glucose 01/16/2024 113     Calcium 01/16/2024 9.3     eGFR 01/16/2024 83     Calcium, Ionized 01/16/2024 1.18     WBC 01/16/2024 9.56     RBC 01/16/2024 4.34     Hemoglobin 01/16/2024 13.1     Hematocrit 01/16/2024 39.0     MCV 01/16/2024 90     MCH 01/16/2024 30.2     MCHC 01/16/2024 33.6     RDW 01/16/2024 14.3     Platelets 01/16/2024 312     MPV 01/16/2024 9.2     Magnesium 01/16/2024 2.0     Phosphorus 01/16/2024 3.8     POC Glucose 01/16/2024 98     POC Glucose 01/16/2024 124     POC Glucose 01/16/2024 138     POC Glucose 01/16/2024 188 (H)     POC Glucose 01/17/2024 122     POC Glucose 01/17/2024 130     POC Glucose 01/17/2024 126     POC Glucose 01/17/2024 141 (H)     POC Glucose 01/18/2024 106     POC Glucose 01/18/2024 209 (H)     POC Glucose 01/18/2024 58 (L)     POC Glucose 01/18/2024 94     POC Glucose 01/18/2024 116     POC Glucose 01/19/2024 108      POC Glucose 01/19/2024 201 (H)      Imaging: Echo follow up/limited w/ contrast if indicated    Result Date: 2/2/2024  Narrative:   Left Ventricle: Left ventricular cavity size is normal. Wall thickness is moderately increased. Wall motion is normal.   Aorta: The aortic root is normal in size. The ascending aorta is mildly dilated. The ascending aorta is 4.3 cm.     Cardiac catheterization    Result Date: 2/2/2024  Narrative:   No significant obstructive epicardial CAD     XR abdomen obstruction series    Result Date: 2/1/2024  Narrative: OBSTRUCTION SERIES INDICATION:   sbo. COMPARISON: X-ray performed the previous day. EXAM PERFORMED/VIEWS:  XR ABDOMEN OBSTRUCTION SERIES FINDINGS: There is a nonobstructive bowel gas pattern. No free air beneath the hemidiaphragms. No pathologic calcifications or soft tissue masses evident. Left upper quadrant surgical clips. Surgical clips in the pelvis consistent with a bowel anastomosis are noted.. Osseous structures are unremarkable. Examination of the chest reveals a normal cardiomediastinal silhouette. Small bilateral pleural effusions with subsegmental bibasilar atelectasis.     Impression: Nonobstructive bowel gas pattern. Bilateral pleural effusions and subsegmental atelectasis. Workstation performed: [id number]     XR abdomen 1 view kub    Result Date: 1/31/2024  Narrative: ABDOMEN INDICATION:   Eval SBO. COMPARISON: Abdominal radiograph 6/22/2020 VIEWS:  AP supine FINDINGS: Enteric catheter tip in the stomach. There is a nonobstructive bowel gas pattern. No discernible free air on this supine study.  Upright or left lateral decubitus imaging is more sensitive to detect subtle free air in the appropriate setting. No pathologic calcifications or soft tissue masses. Visualized lung bases are clear. Visualized osseous structures are unremarkable for the patient's age.     Impression: Nonobstructive bowel gas pattern. Workstation performed: [id number]     XR chest 1  view portable    Result Date: 1/27/2024  Narrative: CHEST INDICATION:   NG tube placement verification. COMPARISON: CXR 7/17/2023, abdomen CT 1/26/2024. EXAM PERFORMED/VIEWS:  XR CHEST PORTABLE. FINDINGS: NG tube in stomach. Cardiomediastinal silhouette normal. Lungs clear. No effusion or pneumothorax. Upper abdomen normal. Clip in the left upper quadrant. Bones normal for age.     Impression: No acute cardiopulmonary disease. NG tube in stomach. Workstation performed: [id number]     CT abdomen pelvis wo contrast    Result Date: 1/27/2024  Narrative: CT ABDOMEN AND PELVIS WITHOUT IV CONTRAST INDICATION: LLQ abdominal pain LLQ tenderness to palpation, hx of diverticulitis s/p colostomy reversal, splenectomy, N/V, no BM in 2-3 days LLQ ttp, hx of diverticulitis s/p colostomy reversal, splenectomy, N/V. COMPARISON: Most recent MRI performed December 13, 2023 TECHNIQUE: CT examination of the abdomen and pelvis was performed without intravenous contrast. Multiplanar 2D reformatted images were created from the source data. This examination, like all CT scans performed in the [facility], was performed utilizing techniques to minimize radiation dose exposure, including the use of iterative reconstruction and automated exposure control. Radiation dose length product (DLP) for this visit: 881 mGy-cm Enteric Contrast: Not administered. FINDINGS: ABDOMEN LOWER CHEST: Bibasilar atelectasis LIVER/BILIARY TREE: Hepatic steatosis. No suspicious mass. Normal hepatic contours. No biliary dilation. GALLBLADDER: Cholelithiasis without findings of acute cholecystitis. SPLEEN: Splenectomy. PANCREAS: Fatty pancreatic atrophy. Resection of pancreatic tail. ADRENAL GLANDS: Unremarkable. KIDNEYS/URETERS: Cortical cyst at the posterior upper pole of the right kidney. No urinary tract calculus. No hydronephrosis. STOMACH AND BOWEL: High-grade small bowel obstruction with a sharp transition from distended to completely  "collapsed small bowel loops in the anterior left upper quadrant at the site of an angulated loop probably statistically most likely to represent an  adhesion. Rectosigmoid anastomosis. APPENDIX: No findings to suggest appendicitis. ABDOMINOPELVIC CAVITY: No ascites. No pneumoperitoneum. No lymphadenopathy. VESSELS: Unremarkable for patient's age. PELVIS REPRODUCTIVE ORGANS: Post hysterectomy. URINARY BLADDER: Unremarkable. ABDOMINAL WALL/INGUINAL REGIONS: Unremarkable. BONES: No acute fracture or suspicious osseous lesion. Spinal degenerative changes. Mild inferior endplate compression deformity at L1 related to prominent inferior endplate Schmorl's node.     Impression: High-grade mid small bowel obstruction with a sharp transition from distended to nondistended loops in the anterior left upper quadrant at the site of an angulated loop almost certainly representing an adhesion. Findings are consistent with the preliminary report from [facility] which was provided shortly after completion of the exam. Workstation performed: [id number]       Review of Systems:  Review of Systems  REVIEW OF SYSTEMS:  Constitutional:  Denies fever or chills   Eyes:  Denies change in visual acuity   HENT:  Denies nasal congestion or sore throat   Respiratory:  Denies cough or shortness of breath   Cardiovascular:  Denies chest pain or edema   GI: Recent admission for small bowel obstruction  :  Denies dysuria, frequency, difficulty in micturition and nocturia  Musculoskeletal:  Denies back pain or joint pain   Neurologic:  Denies headache, focal weakness or sensory changes   Endocrine:  Denies polyuria or polydipsia   Lymphatic:  Denies swollen glands   Psychiatric:  Denies depression or anxiety    Physical Exam:    /60 (BP Location: Left arm, Patient Position: Sitting, Cuff Size: Standard)   Pulse 59   Resp 16   Ht 5' 1\" (1.549 m)   Wt 72.6 kg (160 lb)   SpO2 95%   BMI 30.23 kg/m²     Physical Exam  PHYSICAL " EXAM:  General:  Patient is not in acute distress   Head: Normocephalic, Atraumatic.  HEENT:  Both pupils normal-size atraumatic, normocephalic, nonicteric  Neck:  JVP not raised. Trachea central. No carotid bruit  Respiratory:  normal breath sounds no crackles. no rhonchi  Cardiovascular:  Regular rate and rhythm no S3 no murmurs  GI:  Abdomen soft nontender. No organomegaly.   Lymphatic:  No cervical or inguinal lymphadenopathy  Neurologic:  Patient is awake alert, oriented . Grossly nonfocal  Extremities no edema     EKG showed sinus bradycardia with sinus arrhythmia    .    Discussion/Summary:    Patient with multiple medical problems who seems to be doing reasonably well from cardiac standpoint. Previous studies reviewed with patient. Medications reviewed and possible side effects discussed. concepts of cardiovascular disease , signs and symptoms of heart disease. Dietary and risk factor modification reinforced. All questions answered.  Safety measures reviewed. Patient advised to report any problems prompting medical attention.    Risks and benefits  and alternatives of anticoagulation to prevent thromboembolic risk from atrial fibrillation discussed at length.  Patient to report any bleeding issues.    Recent echocardiogram showed ejection fraction of 65% with mildly dilated aortic root measuring 4.3 cm.    Patient also found to have elevated troponins and underwent cardiac catheterization which showed no significant CAD.  Elevated troponins likely from atrial fibrillation with RVR.  This was also discussed with patient and family.    Continue amiodarone 200 mg p.o. daily.  Patient to report any worsening of tremors from her parkinsonism.  Long-term side effects of amiodarone discussed with patient.  Will need to consider PFTs with [facility] after next visit.  Follow-up in 3 to 4 months or earlier as needed.  Patient is agreeable with the plan of care.

## 2024-02-15 NOTE — CASE COMMUNICATION
Patient has progressed more rapidly with mobility and ADLs. Has no identified need for [facility] OT. Will cancel OT evaluation. Patient to be discharged from [facility] PT next Tuesday 2/20 to her [facility].

## 2024-02-16 ENCOUNTER — HOME CARE VISIT (OUTPATIENT)
Dept: HOME HEALTH SERVICES | Facility: HOME HEALTHCARE | Age: 84
End: 2024-02-16
Payer: COMMERCIAL

## 2024-02-16 PROCEDURE — G0299 HHS/HOSPICE OF RN EA 15 MIN: HCPCS

## 2024-02-18 VITALS
SYSTOLIC BLOOD PRESSURE: 128 MMHG | OXYGEN SATURATION: 96 % | DIASTOLIC BLOOD PRESSURE: 82 MMHG | TEMPERATURE: 97.8 F | RESPIRATION RATE: 20 BRPM | HEART RATE: 62 BPM

## 2024-02-20 ENCOUNTER — HOME CARE VISIT (OUTPATIENT)
Dept: HOME HEALTH SERVICES | Facility: HOME HEALTHCARE | Age: 84
End: 2024-02-20
Payer: COMMERCIAL

## 2024-02-20 VITALS — HEART RATE: 64 BPM | DIASTOLIC BLOOD PRESSURE: 72 MMHG | OXYGEN SATURATION: 95 % | SYSTOLIC BLOOD PRESSURE: 110 MMHG

## 2024-02-20 PROCEDURE — G0151 HHCP-SERV OF PT,EA 15 MIN: HCPCS

## 2024-02-20 NOTE — CASE COMMUNICATION
Patient is discharged from all [facility] services at this time with PT completion of final visit. She is indep in all home mobility with use of SPC and is indep in HEP.  She is currently planning to stay at her dt home for short period of time, then return to her own home.  No further services recommended at this time.

## 2024-03-22 ENCOUNTER — OFFICE VISIT (OUTPATIENT)
Dept: FAMILY MEDICINE CLINIC | Facility: CLINIC | Age: 84
End: 2024-03-22
Payer: COMMERCIAL

## 2024-03-22 VITALS
BODY MASS INDEX: 31.91 KG/M2 | SYSTOLIC BLOOD PRESSURE: 124 MMHG | OXYGEN SATURATION: 97 % | DIASTOLIC BLOOD PRESSURE: 84 MMHG | HEIGHT: 61 IN | WEIGHT: 169 LBS | HEART RATE: 64 BPM | TEMPERATURE: 97.6 F

## 2024-03-22 DIAGNOSIS — K56.609 SMALL BOWEL OBSTRUCTION (HCC): ICD-10-CM

## 2024-03-22 DIAGNOSIS — R73.01 IMPAIRED FASTING GLUCOSE: ICD-10-CM

## 2024-03-22 DIAGNOSIS — R79.89 ELEVATED TROPONIN: ICD-10-CM

## 2024-03-22 DIAGNOSIS — I48.91 ATRIAL FIBRILLATION WITH RVR (HCC): Primary | ICD-10-CM

## 2024-03-22 PROBLEM — E87.8 ELECTROLYTE ABNORMALITY: Status: RESOLVED | Noted: 2024-02-01 | Resolved: 2024-03-22

## 2024-03-22 PROBLEM — R29.90 STROKE-LIKE SYMPTOMS: Status: RESOLVED | Noted: 2024-01-14 | Resolved: 2024-03-22

## 2024-03-22 PROBLEM — Z01.89 ENCOUNTER FOR GERIATRIC ASSESSMENT: Status: RESOLVED | Noted: 2023-07-17 | Resolved: 2024-03-22

## 2024-03-22 PROBLEM — E11.9 TYPE 2 DIABETES MELLITUS (HCC): Status: RESOLVED | Noted: 2024-01-15 | Resolved: 2024-03-22

## 2024-03-22 PROBLEM — R11.0 NAUSEA: Status: RESOLVED | Noted: 2024-01-26 | Resolved: 2024-03-22

## 2024-03-22 PROCEDURE — 99214 OFFICE O/P EST MOD 30 MIN: CPT | Performed by: FAMILY MEDICINE

## 2024-03-22 PROCEDURE — G2211 COMPLEX E/M VISIT ADD ON: HCPCS | Performed by: FAMILY MEDICINE

## 2024-03-22 NOTE — PROGRESS NOTES
Name: Radha Vidal      : 1940      MRN: [id number]  Encounter Provider: Hoang Tovar MD  Encounter Date: 3/22/2024   Encounter department: [facility]    Assessment & Plan     1. Atrial fibrillation with RVR (HCC)  Rate controlled  Continue medical therapy    2. Small bowel obstruction (HCC)  Denies any symptoms related to this    3. Impaired fasting glucose  -     Basic metabolic panel; Future; Expected date: 2024  -     Hemoglobin A1C; Future; Expected date: 2024    4. Elevated troponin  Negative cardiac cath for occlusions    Follow up in 6 months         Subjective     Patient was hospitalized from  to  at [facility]    Radha Vidal is a 84 y.o. female patient who originally presented to the hospital on 2024 due to SBO under the service of gen surg, expected etiology of adhesive disease from previous known surgeries, treated during this hospitalization w/ NG tube & IV fluids. Was found to have arrhythmias frequently, w/ labile HR of , changing quickly between the extremes. Some Chest pressure noted at times, trops found to be elevated to 2.8k max, EKG inferoanterior lead ST depressions, so echo & cath performed during this stay, both w/ no concerning findings. Begun on metoprolol & amio by cardio, restarted on eliquis home med, stabilized bp significantly. Awaited BM before dc as last known occurrence was near 10 days ago. On , patient had a bowel movement, was cleared by surgery for discharge as well. Pt no longer requiring hospital level of care, but should continue on meds as prescribed, f/u w/ prior established cardio o/p w/in 2 weeks, & visit PCP for continuity of care & to address new dx of diabetes w/ A1C of 6.7.    She denies any symptoms at this time. She is going to the bathroom regularly taking miralax for this.  Denies any palpitations taking amiodarone and eliquis for anticoagulation.      Review of Systems    Constitutional:  Negative for activity change, appetite change, fatigue and fever.   HENT:  Negative for congestion and ear discharge.    Respiratory:  Negative for cough and shortness of breath.    Cardiovascular:  Negative for chest pain and palpitations.   Gastrointestinal:  Negative for diarrhea and nausea.   Musculoskeletal:  Negative for arthralgias and back pain.   Skin:  Negative for color change and rash.   Neurological:  Negative for dizziness and headaches.   Psychiatric/Behavioral:  Negative for agitation and behavioral problems.        Past Medical History:   Diagnosis Date    Actinic keratosis 2016    Acute midline low back pain without sciatica 2020    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     Benign neoplasm of skin     Cancer (HCC)     skin    Chest pain     Claustrophobia     Closed compression fracture of body of L1 vertebra (HCC)     Diverticulitis     Diverticulitis     Fracture     L1-L2    GERD (gastroesophageal reflux disease)     H. pylori infection 2020    Muscle weakness     Nausea 2024    Nonmelanoma skin cancer     last assessed 2017    Palpitations     Pancreatic cyst     Seasonal allergies     Seborrheic keratosis 2014    Sleep apnea     no cpap    Spontaneous      without mention of complications     Squamous cell carcinoma of forehead 2014    Syncope      Past Surgical History:   Procedure Laterality Date    ABDOMINAL ADHESION SURGERY N/A 2023    Procedure: LYSIS ADHESIONS;  Surgeon: Kyle Julien MD;  [facility];  Service: Surgical Oncology    BOTOX INJECTION N/A 2017    Procedure: CYSTOSCOPY; BLADDER BOTOX 100 UNITS ;  Surgeon: Juan Edward MD;  [facility];  Service:     BOWEL RESECTION      related ot diverticulitis    CARDIAC CATHETERIZATION      CARDIAC CATHETERIZATION Left 2024    Procedure: Cardiac Left Heart Cath;  Surgeon: Carrington Kiser DO;   [facility];  Service: Cardiology    CARPAL TUNNEL RELEASE Right     COLONOSCOPY  07/31/2019    COLOSTOMY      COLOSTOMY CLOSURE      CYSTOSCOPY  06/01/2021    DISTAL PANCREATECTOMY N/A 07/31/2023    Procedure: DISTAL PANCREATECTOMY;  Surgeon: Kyle Julien MD;  [facility];  Service: Surgical Oncology    FOOT SURGERY Left     neuroma    HYSTERECTOMY      MYRINGOTOMY W/ TUBES Right 12/06/2023    office procedure - Dr. Grace    NAS[place] SINUS SURGERY  age 39    [place]    PANCREATIC CYST BIOPSY  07/31/2023    ME CYSTOURETHROSCOPY N/A 04/13/2018    Procedure: CYSTOSCOPY WITH BOTOX;  Surgeon: Juan Edward MD;  [facility];  Service: Urology    ME ESOPHAGOGASTRODUODENOSCOPY TRANSORAL DIAGNOSTIC N/A 04/23/2019    Procedure: ESOPHAGOGASTRODUODENOSCOPY (EGD);  Surgeon: Edu Dickerson DO;  [facility];  Service: Gastroenterology    SPLENECTOMY, TOTAL N/A 07/31/2023    Procedure: SPLENECTOMY;  Surgeon: Kyle Julien MD;  [facility];  Service: Surgical Oncology    TONSILLECTOMY  age 49    UPPER GASTROINTESTINAL ENDOSCOPY  04/23/2019     Family History   Problem Relation Age of Onset    Alcohol abuse Mother     Heart disease Mother     Alcohol abuse Father     Alcohol abuse Brother     Cancer Brother     Tremor Neg Hx     Parkinsonism Neg Hx     Colon cancer Neg Hx      Social History     Socioeconomic History    Marital status:      Spouse name: None    Number of children: None    Years of education: None    Highest education level: None   Occupational History    Occupation: RETIRED    Tobacco Use    Smoking status: Never     Passive exposure: Past    Smokeless tobacco: Never   Vaping Use    Vaping status: Never Used   Substance and Sexual Activity    Alcohol use: Yes     Comment: patient states rare use on holidays     Drug use: No    Sexual activity: Not Currently   Other Topics Concern    None   Social History Narrative    LIVING INDEPENDENTLY ALONE          No caffeine use     Social Determinants of Health     Financial Resource Strain: Low Risk  (4/19/2023)    Overall Financial Resource Strain (CARDIA)     Difficulty of Paying Living Expenses: Not very hard   Food Insecurity: No Food Insecurity (1/14/2024)    Hunger Vital Sign     Worried About Running Out of Food in the Last Year: Never true     Ran Out of Food in the Last Year: Never true   Transportation Needs: No Transportation Needs (1/14/2024)    PRAPARE - Transportation     Lack of Transportation (Medical): No     Lack of Transportation (Non-Medical): No   Physical Activity: Not on file   Stress: Not on file   Social Connections: Not on file   Intimate Partner Violence: Not on file   Housing Stability: Low Risk  (1/14/2024)    Housing Stability Vital Sign     Unable to Pay for Housing in the Last Year: No     Number of Places Lived in the Last Year: 1     Unstable Housing in the Last Year: No     Current Outpatient Medications on File Prior to Visit   Medication Sig    amiodarone 200 mg tablet Take 1 tablet (200 mg total) by mouth daily    apixaban (Eliquis) 5 mg Take 1 tablet (5 mg total) by mouth 2 (two) times a day    Ascorbic Acid (VITAMIN C) 1000 MG tablet Take 1,000 mg by mouth daily    atorvastatin (LIPITOR) 40 mg tablet Take 1 tablet (40 mg total) by mouth daily with dinner    B Complex Vitamins (B COMPLEX 100 PO) Take 1 capsule by mouth daily after lunch not taking    calcium carbonate (OS-JOHN) 1250 (500 Ca) MG tablet Take 1 tablet by mouth daily after lunch    carbidopa-levodopa (SINEMET)  mg per tablet Take 1 tablet by mouth 3 (three) times a day before meals    cholecalciferol (VITAMIN D3) 1,000 units tablet Take 5,000 Units by mouth daily after lunch    Coenzyme Q10 (CO Q 10 PO) Take 1 capsule by mouth daily after lunch 600 mg BID    Cyanocobalamin (VITAMIN B 12 PO) Take 1 capsule by mouth daily    metoprolol succinate (TOPROL-XL) 25 mg 24 hr tablet Take 1 tablet (25 mg total) by mouth daily  "   multivitamin (THERAGRAN) TABS Take 1 tablet by mouth every morning    Omega-3 350 MG CPDR Take 1 capsule by mouth daily in the early morning    Potassium Gluconate 595 (99 K) MG TABS Take 1 each by mouth every other day    Probiotic Product (PROBIOTIC-10) CAPS Take 1 capsule by mouth daily after lunch    trospium chloride (SANCTURA) 20 mg tablet Take 1 tablet (20 mg total) by mouth 2 (two) times a day    polyethylene glycol (MIRALAX) 17 g packet Take 17 g by mouth daily as needed (constipation if not responding to senna/docusate)    [DISCONTINUED] pantoprazole (PROTONIX) 40 mg tablet Take 40 mg by mouth daily    [DISCONTINUED] senna-docusate sodium (SENOKOT S) 8.6-50 mg per tablet Take 2 tablets by mouth daily as needed for constipation     Allergies   Allergen Reactions    Caffeine - Food Allergy GI Intolerance    Iodine - Food Allergy Rash    Latex Rash    Other Rash     Adhesive tape       Immunization History   Administered Date(s) Administered    COVID-19 MODERNA VACC 0.5 ML IM 02/10/2021, 03/10/2021, 11/09/2021    HiB 08/04/2023    Hib (PRP-T) 08/04/2023    Meningococcal MCV4, Unspecified 08/04/2023    Pneumococcal Conjugate Vaccine 20-valent (Pcv20), Polysace 08/04/2023    meningococcal ACYW-135 TT Conjugate 08/04/2023       Objective     /84   Pulse 64   Temp 97.6 °F (36.4 °C)   Ht 5' 1\" (1.549 m)   Wt 76.7 kg (169 lb)   SpO2 97%   BMI 31.93 kg/m²     Physical Exam  Constitutional:       General: She is not in acute distress.     Appearance: She is well-developed. She is not diaphoretic.   HENT:      Head: Normocephalic and atraumatic.      Nose: Nose normal.   Eyes:      Conjunctiva/sclera: Conjunctivae normal.      Pupils: Pupils are equal, round, and reactive to light.   Cardiovascular:      Rate and Rhythm: Normal rate and regular rhythm.      Heart sounds: Normal heart sounds. No murmur heard.  Pulmonary:      Effort: Pulmonary effort is normal. No respiratory distress.      Breath " sounds: Normal breath sounds. No wheezing.   Abdominal:      General: Bowel sounds are normal. There is no distension.      Palpations: Abdomen is soft.      Tenderness: There is no abdominal tenderness.   Skin:     General: Skin is warm and dry.      Findings: No erythema or rash.   Neurological:      Mental Status: She is alert and oriented to person, place, and time.      Coordination: Coordination normal.       Hoang Tovar MD

## 2024-03-24 NOTE — TELEPHONE ENCOUNTER
If she is continuing to have dizziness and lightheadedness despite stopping Sinemet, it does not sound like this was a side effect of the medication  Please have her contact her primary care provider to be evaluated for her acute onset of dizziness and lightheadedness  I would defer starting any new medication until this is taking care of  DISCHARGE

## 2024-04-11 ENCOUNTER — OFFICE VISIT (OUTPATIENT)
Dept: NEUROLOGY | Facility: CLINIC | Age: 84
End: 2024-04-11
Payer: COMMERCIAL

## 2024-04-11 VITALS
SYSTOLIC BLOOD PRESSURE: 122 MMHG | BODY MASS INDEX: 31.93 KG/M2 | DIASTOLIC BLOOD PRESSURE: 80 MMHG | HEART RATE: 50 BPM | HEIGHT: 61 IN

## 2024-04-11 DIAGNOSIS — I48.91 ATRIAL FIBRILLATION WITH RVR (HCC): ICD-10-CM

## 2024-04-11 DIAGNOSIS — G20.A1 PARKINSON'S DISEASE, UNSPECIFIED WHETHER DYSKINESIA PRESENT, UNSPECIFIED WHETHER MANIFESTATIONS FLUCTUATE: Primary | ICD-10-CM

## 2024-04-11 PROCEDURE — 99214 OFFICE O/P EST MOD 30 MIN: CPT | Performed by: PSYCHIATRY & NEUROLOGY

## 2024-04-11 NOTE — PROGRESS NOTES
Radha Vidal is a 84 y.o. female.   Chief Complaint   Patient presents with    Parkinson's Disease       Assessment:  1. Parkinson's disease, unspecified whether dyskinesia present, unspecified whether manifestations fluctuate    2. Atrial fibrillation with RVR ([facility])         Plan:  Patient was advised to continue with Sinemet 25/100 mg 1 tablet 3 times a day if agreeable with her gastroenterologist's since patient has prior history of constipation and recently had a small bowel obstruction , it looks like that is helping the patient with her tremor and Parkinson symptoms.  She is going to talk to him and let us know until then she will continue with that.  Recent blood work from 2/2/2024 including magnesium phosphorus and basic metabolic profile were reviewed.    Continue staying physically and mentally active and to take fall and safety precautions and to take a multivitamin every day.    She is in follow-up with her cardiologist regarding her recent A-fib and also with her surgical oncologist status post penectomy and pancreatic surgery.    She was advised to keep her blood pressure cholesterol and sugar under control.  To take fall and safety precautions to go to the hospital if has any worsening symptoms and call me otherwise to see me back in 6 months or sooner if needed and follow-up with her other physicians          Subjective:    HPI   Patient is here in follow-up for history of Parkinson's disease she had been seeing my associate Dr. 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 Julien and tells me that her lesions were benign her appetite is good weight has been stable, she is able to drive and do her ADLs denies any freezing episodes no difficulty in swallowing no falls, no hallucinations, memory is good no other complaints.    Vitals:    04/11/24 0916   BP: 122/80   BP Location: Right arm   Patient Position: Sitting   Cuff Size: Large   Pulse: (!) 50   Height: 5' 1\" (1.549 m)       Current Medications    Current Outpatient Medications:     amiodarone 200 mg tablet, Take 1 tablet (200 mg total) by mouth daily, Disp: 90 tablet, Rfl: 3    Ascorbic Acid (VITAMIN C) 1000 MG tablet, Take 1,000 mg by mouth daily, Disp: , Rfl:     atorvastatin (LIPITOR) 40 mg tablet, Take 1 tablet (40 mg total) by mouth daily with dinner, Disp: 90 tablet, Rfl: 3    B Complex Vitamins (B COMPLEX 100 PO), Take 1 capsule by mouth daily after lunch not taking, Disp: , Rfl:     calcium carbonate (OS-JOHN) 1250 (500 Ca) MG tablet, Take 1 tablet by mouth daily after lunch, Disp: , Rfl:     carbidopa-levodopa (SINEMET)  mg per tablet, Take 1 tablet by mouth 3 (three) times a day before meals, Disp: 270 tablet, Rfl: 3    cholecalciferol (VITAMIN D3) 1,000 units tablet, Take 5,000 Units by mouth daily after lunch, Disp: , Rfl:     Cyanocobalamin (VITAMIN B 12 PO), Take 1 capsule by mouth daily, Disp: , Rfl:     metoprolol succinate (TOPROL-XL) 25 mg 24 hr tablet, Take 1 tablet (25 mg total) by mouth daily, Disp: 90 tablet, Rfl: 3    multivitamin (THERAGRAN) TABS, Take 1 tablet by mouth every morning, Disp: , Rfl:     polyethylene glycol (MIRALAX) 17 g packet, Take 17 g by mouth daily as needed (constipation if not responding to senna/docusate), Disp: , Rfl:     Potassium Gluconate 595 (99 K) MG TABS, Take 1 each by mouth every other day, Disp: , Rfl:     Probiotic Product (PROBIOTIC-10) CAPS, " Take 1 capsule by mouth daily after lunch, Disp: , Rfl:     trospium chloride (SANCTURA) 20 mg tablet, Take 1 tablet (20 mg total) by mouth 2 (two) times a day, Disp: 180 tablet, Rfl: 2    apixaban (Eliquis) 5 mg, Take 1 tablet (5 mg total) by mouth 2 (two) times a day (Patient not taking: Reported on 2024), Disp: 180 tablet, Rfl: 3    Coenzyme Q10 (CO Q 10 PO), Take 1 capsule by mouth daily after lunch 600 mg BID (Patient not taking: Reported on 2024), Disp: , Rfl:     Omega-3 350 MG CPDR, Take 1 capsule by mouth daily in the early morning (Patient not taking: Reported on 2024), Disp: , Rfl:       Allergies  Caffeine - food allergy, Medical tape, Iodine - food allergy, Latex, and Other    Past Medical History  Past Medical History:   Diagnosis Date    Actinic keratosis 2016    Acute midline low back pain without sciatica 2020    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     Benign neoplasm of skin     Cancer (HCC)     skin    Chest pain     Claustrophobia     Closed compression fracture of body of L1 vertebra (HCC)     Diverticulitis     Diverticulitis     Fracture     L1-L2    GERD (gastroesophageal reflux disease)     H. pylori infection 2020    Muscle weakness     Nausea 2024    Nonmelanoma skin cancer     last assessed 2017    Palpitations     Pancreatic cyst     Seasonal allergies     Seborrheic keratosis 2014    Sleep apnea     no cpap    Spontaneous      without mention of complications     Squamous cell carcinoma of forehead 2014    Syncope          Past Surgical History:  Past Surgical History:   Procedure Laterality Date    ABDOMINAL ADHESION SURGERY N/A 2023    Procedure: LYSIS ADHESIONS;  Surgeon: Kyle Julien MD;  [facility];  Service: Surgical Oncology    BOTOX INJECTION N/A 2017    Procedure: CYSTOSCOPY; BLADDER BOTOX 100 UNITS ;  Surgeon: Juan Edward MD;  [facility];   Service:     BOWEL RESECTION      related ot diverticulitis    CARDIAC CATHETERIZATION      CARDIAC CATHETERIZATION Left 2/2/2024    Procedure: Cardiac Left Heart Cath;  Surgeon: Carrington Kiser DO;  [facility];  Service: Cardiology    CARPAL TUNNEL RELEASE Right     COLONOSCOPY  07/31/2019    COLOSTOMY      COLOSTOMY CLOSURE      CYSTOSCOPY  06/01/2021    DISTAL PANCREATECTOMY N/A 07/31/2023    Procedure: DISTAL PANCREATECTOMY;  Surgeon: Kyle Julien MD;  [facility];  Service: Surgical Oncology    FOOT SURGERY Left     neuroma    HYSTERECTOMY      MYRINGOTOMY W/ TUBES Right 12/06/2023    office procedure - Dr. Grace    NASAL SINUS SURGERY  age 39    [place]    PANCREATIC CYST BIOPSY  07/31/2023    MA CYSTOURETHROSCOPY N/A 04/13/2018    Procedure: CYSTOSCOPY WITH BOTOX;  Surgeon: Juan Edward MD;  [facility];  Service: Urology    MA ESOPHAGOGASTRODUODENOSCOPY TRANSORAL DIAGNOSTIC N/A 04/23/2019    Procedure: ESOPHAGOGASTRODUODENOSCOPY (EGD);  Surgeon: Edu Dickerson DO;  [facility];  Service: Gastroenterology    SPLENECTOMY, TOTAL N/A 07/31/2023    Procedure: SPLENECTOMY;  Surgeon: Kyle Julien MD;  [facility];  Service: Surgical Oncology    TONSILLECTOMY  age 49    UPPER GASTROINTESTINAL ENDOSCOPY  04/23/2019         Family History:  Family History   Problem Relation Age of Onset    Alcohol abuse Mother     Heart disease Mother     Alcohol abuse Father     Alcohol abuse Brother     Cancer Brother     Tremor Neg Hx     Parkinsonism Neg Hx     Colon cancer Neg Hx        Social History:   reports that she has never smoked. She has been exposed to tobacco smoke. She has never used smokeless tobacco. She reports current alcohol use. She reports that she does not use drugs.    I have reviewed the past medical history, surgical history, social and family history, current medications, allergies vitals, review of systems, and updated this information  as appropriate today.   Objective:    Physical Exam    Neurological Exam     GENERAL:  Cooperative in no acute distress. Well-developed and well-nourished     HEAD and NECK   Head is atraumatic normocephalic with no lesions or masses. Neck is supple with full range of motion     CARDIOVASCULAR  Carotid Arteries-no carotid bruits.     NEUROLOGIC:  Mental Status-the patient is awake alert and oriented without aphasia or apraxia  Cranial Nerves: Visual fields are full to confrontation.   Extraocular movements are full without nystagmus. Pupils are 2-1/2 mm and reactive. Face is symmetrical to light touch. Movements of facial expression move symmetrically. Hearing is normal to finger rub bilaterally. Soft palate lifts symmetrically. Shoulder shrug is symmetrical. Tongue is midline without atrophy.  Motor: No drift is noted on arm extension. Strength is full in the upper and lower extremities with normal bulk and cogwheeling rigidity and resting tremor of bilateral upper extremities.  Ambulates with a stooped posture and decreased arm swing    ROS:  Review of Systems   Constitutional:  Negative for appetite change, fatigue and fever.   HENT: Negative.  Negative for hearing loss, tinnitus, trouble swallowing and voice change.    Eyes: Negative.  Negative for photophobia, pain and visual disturbance.   Respiratory: Negative.  Negative for shortness of breath.    Cardiovascular: Negative.  Negative for palpitations.   Gastrointestinal: Negative.  Negative for nausea and vomiting.   Endocrine: Negative.  Negative for cold intolerance.   Genitourinary: Negative.  Negative for dysuria, frequency and urgency.   Musculoskeletal:  Negative for back pain, gait problem, myalgias, neck pain and neck stiffness.   Skin: Negative.  Negative for rash.   Allergic/Immunologic: Negative.    Neurological: Negative.  Negative for dizziness, tremors, seizures, syncope, facial asymmetry, speech difficulty, weakness, light-headedness, numbness  and headaches.   Hematological: Negative.  Does not bruise/bleed easily.   Psychiatric/Behavioral: Negative.  Negative for confusion, hallucinations and sleep disturbance.

## 2024-04-15 ENCOUNTER — OFFICE VISIT (OUTPATIENT)
Dept: CARDIOLOGY CLINIC | Facility: CLINIC | Age: 84
End: 2024-04-15
Payer: COMMERCIAL

## 2024-04-15 VITALS
HEART RATE: 52 BPM | SYSTOLIC BLOOD PRESSURE: 160 MMHG | BODY MASS INDEX: 31.34 KG/M2 | DIASTOLIC BLOOD PRESSURE: 70 MMHG | RESPIRATION RATE: 16 BRPM | HEIGHT: 61 IN | WEIGHT: 166 LBS | OXYGEN SATURATION: 95 %

## 2024-04-15 DIAGNOSIS — E78.49 OTHER HYPERLIPIDEMIA: ICD-10-CM

## 2024-04-15 DIAGNOSIS — R03.0 ELEVATED BLOOD PRESSURE READING: ICD-10-CM

## 2024-04-15 DIAGNOSIS — E11.69 TYPE 2 DIABETES MELLITUS WITH OTHER SPECIFIED COMPLICATION, UNSPECIFIED WHETHER LONG TERM INSULIN USE (HCC): ICD-10-CM

## 2024-04-15 DIAGNOSIS — I77.810 ASCENDING AORTA DILATATION (HCC): ICD-10-CM

## 2024-04-15 DIAGNOSIS — I48.0 PAROXYSMAL ATRIAL FIBRILLATION (HCC): Primary | ICD-10-CM

## 2024-04-15 PROCEDURE — 99214 OFFICE O/P EST MOD 30 MIN: CPT

## 2024-04-15 NOTE — PROGRESS NOTES
[facility] Cardiology   Office Visit    Radha Vidal 84 y.o. female MRN: [id number]    04/15/24        Assessment/Plan:  1.  Paroxysmal atrial fibrillation  Recent EKG shows maintenance of sinus rhythm, denies palpitations.  Continue amiodarone and Toprol-XL.  Tendency for bradycardia noted, denies associated lightheadedness/dizziness or syncope.  [id number] at least 3, continue Eliquis 5 mg bid.    2.  Elevated blood pressure  BP today is elevated, however predominantly well-controlled per patient.  Continue Toprol-XL.  Reports history of whitecoat hypertension.  Encouraged ambulatory monitoring and low-sodium diet.  Advised to notify our office for persistently abnormal BP readings.    3.  Hyperlipidemia  Well-controlled, continue atorvastatin and dietary control.    4.  Dilated ascending aorta  Recent TTE suggests mildly dilated ascending aorta measuring 4.3 cm.  Recommend periodic surveillance.    5.  T2DM, A1c 6.7  6.  Parkinson's disease        HPI: Radha Vidal is a 84 y.o. year old female with history of paroxysmal atrial fibrillation, hyperlipidemia, dilated ascending aorta, T2DM, and Parkinson's disease who presents for office visit.  Patient reports she has been fairly well overall from a cardiac standpoint since last office visit with cardiology.  She has been experiencing dry mouth and increased strain when urinating.  Patient plans to discuss this further with her urologist.  Denies any additional complaints at this time.  Endorses strict compliance to all medications.  Denies adverse effects to current medications.  Cardiac catheterization in February 2024 revealed no significant obstructive epicardial CAD.  TTE at that time suggested dilated ascending aorta measuring 4.3 cm.  Follows with Dr. Morillo as primary cardiologist.      Review of Systems:  Review of Systems   Constitutional:  Negative for chills and fever.   HENT:  Negative for ear pain and sore throat.    Eyes:  Negative for pain  "and visual disturbance.   Respiratory:  Negative for cough and shortness of breath.    Cardiovascular:  Negative for chest pain, palpitations and leg swelling.   Gastrointestinal:  Negative for abdominal pain and vomiting.   Genitourinary:  Positive for difficulty urinating. Negative for dysuria and hematuria.   Musculoskeletal:  Negative for arthralgias and back pain.   Skin:  Negative for color change and rash.   Neurological:  Negative for seizures and syncope.   All other systems reviewed and are negative.      PHYSICAL EXAM:  Vitals:   Vitals:    04/15/24 1134   BP: 160/70   BP Location: Left arm   Patient Position: Sitting   Pulse: (!) 52   Resp: 16   SpO2: 95%   Weight: 75.3 kg (166 lb)   Height: 5' 1\" (1.549 m)        Physical Exam:  GEN: Alert and oriented x 3, in no acute distress.  Well appearing and well nourished.   HEENT: Sclera anicteric, conjunctivae pink, mucous membranes moist. Oropharynx clear.   NECK: Supple, no carotid bruits, no significant JVD. Trachea midline, no thyromegaly.   HEART: Regular rhythm, normal S1 and S2, no murmurs, clicks, gallops or rubs. PMI nondisplaced, no thrills.   LUNGS: Clear to auscultation bilaterally; no wheezes, rales, or rhonchi. No increased work of breathing or signs of respiratory distress.   ABDOMEN: Soft, nontender, nondistended, normoactive bowel sounds.   EXTREMITIES: Skin warm and well perfused, no clubbing, cyanosis, or edema.  NEURO: No focal findings. Normal speech. Mood and affect normal.   SKIN: Normal without suspicious lesions on exposed skin.    Follow up: 3 months or sooner as needed    Allergies   Allergen Reactions    Caffeine - Food Allergy GI Intolerance    Medical Tape Other (See Comments)    Iodine - Food Allergy Rash    Latex Rash    Other Rash     Adhesive tape           Current Outpatient Medications:     amiodarone 200 mg tablet, Take 1 tablet (200 mg total) by mouth daily, Disp: 90 tablet, Rfl: 3    apixaban (Eliquis) 5 mg, Take 1 tablet " (5 mg total) by mouth 2 (two) times a day, Disp: 180 tablet, Rfl: 3    Ascorbic Acid (VITAMIN C) 1000 MG tablet, Take 1,000 mg by mouth daily, Disp: , Rfl:     atorvastatin (LIPITOR) 40 mg tablet, Take 1 tablet (40 mg total) by mouth daily with dinner, Disp: 90 tablet, Rfl: 3    B Complex Vitamins (B COMPLEX 100 PO), Take 1 capsule by mouth daily after lunch not taking, Disp: , Rfl:     calcium carbonate (OS-JOHN) 1250 (500 Ca) MG tablet, Take 1 tablet by mouth daily after lunch, Disp: , Rfl:     carbidopa-levodopa (SINEMET)  mg per tablet, Take 1 tablet by mouth 3 (three) times a day before meals, Disp: 270 tablet, Rfl: 3    cholecalciferol (VITAMIN D3) 1,000 units tablet, Take 5,000 Units by mouth daily after lunch, Disp: , Rfl:     metoprolol succinate (TOPROL-XL) 25 mg 24 hr tablet, Take 1 tablet (25 mg total) by mouth daily, Disp: 90 tablet, Rfl: 3    multivitamin (THERAGRAN) TABS, Take 1 tablet by mouth every morning, Disp: , Rfl:     pantoprazole (PROTONIX) 20 mg tablet, Take 20 mg by mouth daily, Disp: , Rfl:     Potassium Gluconate 595 (99 K) MG TABS, Take 1 each by mouth every other day, Disp: , Rfl:     Probiotic Product (PROBIOTIC-10) CAPS, Take 1 capsule by mouth daily after lunch, Disp: , Rfl:     trospium chloride (SANCTURA) 20 mg tablet, Take 1 tablet (20 mg total) by mouth 2 (two) times a day, Disp: 180 tablet, Rfl: 2    Coenzyme Q10 (CO Q 10 PO), Take 1 capsule by mouth daily after lunch 600 mg BID (Patient not taking: Reported on 4/11/2024), Disp: , Rfl:     Cyanocobalamin (VITAMIN B 12 PO), Take 1 capsule by mouth daily (Patient not taking: Reported on 4/15/2024), Disp: , Rfl:     Omega-3 350 MG CPDR, Take 1 capsule by mouth daily in the early morning (Patient not taking: Reported on 4/11/2024), Disp: , Rfl:     polyethylene glycol (MIRALAX) 17 g packet, Take 17 g by mouth daily as needed (constipation if not responding to senna/docusate) (Patient not taking: Reported on 4/11/2024), Disp: ,  Rfl:     Past Medical History:   Diagnosis Date    Actinic keratosis 2016    Acute midline low back pain without sciatica 2020    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     Benign neoplasm of skin     Cancer (HCC)     skin    Chest pain     Claustrophobia     Closed compression fracture of body of L1 vertebra (HCC)     Diverticulitis     Diverticulitis     Fracture     L1-L2    GERD (gastroesophageal reflux disease)     H. pylori infection 2020    Muscle weakness     Nausea 2024    Nonmelanoma skin cancer     last assessed 2017    Palpitations     Pancreatic cyst     Seasonal allergies     Seborrheic keratosis 2014    Sleep apnea     no cpap    Spontaneous      without mention of complications     Squamous cell carcinoma of forehead 2014    Syncope        Family History   Problem Relation Age of Onset    Alcohol abuse Mother     Heart disease Mother     Alcohol abuse Father     Alcohol abuse Brother     Cancer Brother     Tremor Neg Hx     Parkinsonism Neg Hx     Colon cancer Neg Hx        Past Medical History:   Diagnosis Date    Actinic keratosis 2016    Acute midline low back pain without sciatica 2020    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     Benign neoplasm of skin     Cancer (HCC)     skin    Chest pain     Claustrophobia     Closed compression fracture of body of L1 vertebra (HCC)     Diverticulitis     Diverticulitis     Fracture     L1-L2    GERD (gastroesophageal reflux disease)     H. pylori infection 2020    Muscle weakness     Nausea 2024    Nonmelanoma skin cancer     last assessed 2017    Palpitations     Pancreatic cyst     Seasonal allergies     Seborrheic keratosis 2014    Sleep apnea     no cpap    Spontaneous      without mention of complications     Squamous cell carcinoma of forehead 2014    Syncope        Past Surgical History:    Procedure Laterality Date    ABDOMINAL ADHESION SURGERY N/A 07/31/2023    Procedure: LYSIS ADHESIONS;  Surgeon: Kyle Julien MD;  Location: [facility];  Service: Surgical Oncology    BOTOX INJECTION N/A 03/06/2017    Procedure: CYSTOSCOPY; BLADDER BOTOX 100 UNITS ;  Surgeon: Juan Edward MD;  Location[facility];  Service:     BOWEL RESECTION      related ot diverticulitis    CARDIAC CATHETERIZATION      CARDIAC CATHETERIZATION Left 2/2/2024    Procedure: Cardiac Left Heart Cath;  Surgeon: Carrington Kiser DO;  [facility];  Service: Cardiology    CARPAL TUNNEL RELEASE Right     COLONOSCOPY  07/31/2019    COLOSTOMY      COLOSTOMY CLOSURE      CYSTOSCOPY  06/01/2021    DISTAL PANCREATECTOMY N/A 07/31/2023    Procedure: DISTAL PANCREATECTOMY;  Surgeon: Kyle Julien MD;  Location: [facility];  Service: Surgical Oncology    FOOT SURGERY Left     neuroma    HYSTERECTOMY      MYRINGOTOMY W/ TUBES Right 12/06/2023    office procedure - Dr. Grace    NASAL SINUS SURGERY  age 39    [place]    PANCREATIC CYST BIOPSY  07/31/2023    ID CYSTOURETHROSCOPY N/A 04/13/2018    Procedure: CYSTOSCOPY WITH BOTOX;  Surgeon: Juan Edward MD;  [facility];  Service: Urology    ID ESOPHAGOGASTRODUODENOSCOPY TRANSORAL DIAGNOSTIC N/A 04/23/2019    Procedure: ESOPHAGOGASTRODUODENOSCOPY (EGD);  Surgeon: Edu Dickerson DO;  Location: [facility];  Service: Gastroenterology    SPLENECTOMY, TOTAL N/A 07/31/2023    Procedure: SPLENECTOMY;  Surgeon: Kyle Julien MD;  [facility]: [facility];  Service: Surgical Oncology    TONSILLECTOMY  age 49    UPPER GASTROINTESTINAL ENDOSCOPY  04/23/2019       Social History     Socioeconomic History    Marital status:      Spouse name: Not on file    Number of children: Not on file    Years of education: Not on file    Highest education level: Not on file   Occupational History    Occupation: RETIRED    Tobacco Use    Smoking status: Never     Passive  exposure: Past    Smokeless tobacco: Never   Vaping Use    Vaping status: Never Used   Substance and Sexual Activity    Alcohol use: Yes     Comment: patient states rare use on holidays     Drug use: No    Sexual activity: Not Currently   Other Topics Concern    Not on file   Social History Narrative    LIVING INDEPENDENTLY ALONE         No caffeine use     Social Determinants of Health     Financial Resource Strain: Low Risk  (4/19/2023)    Overall Financial Resource Strain (CARDIA)     Difficulty of Paying Living Expenses: Not very hard   Food Insecurity: No Food Insecurity (1/14/2024)    Hunger Vital Sign     Worried About Running Out of Food in the Last Year: Never true     Ran Out of Food in the Last Year: Never true   Transportation Needs: No Transportation Needs (1/14/2024)    PRAPARE - Transportation     Lack of Transportation (Medical): No     Lack of Transportation (Non-Medical): No   Physical Activity: Not on file   Stress: Not on file   Social Connections: Not on file   Intimate Partner Violence: Not on file   Housing Stability: Low Risk  (1/14/2024)    Housing Stability Vital Sign     Unable to Pay for Housing in the Last Year: No     Number of Places Lived in the Last Year: 1     Unstable Housing in the Last Year: No             LABORATORY RESULTS:    Lab Results   Component Value Date    WBC 10.91 (H) 02/01/2024    HGB 12.9 02/01/2024    HCT 39.1 02/01/2024    MCV 90 02/01/2024     02/01/2024     Lab Results   Component Value Date    GLUCOSE 139 07/31/2023    CALCIUM 8.1 (L) 02/02/2024     (H) 10/03/2015    K 3.8 02/02/2024    CO2 28 02/02/2024     02/02/2024    BUN 8 02/02/2024    CREATININE 0.58 (L) 02/02/2024     Lab Results   Component Value Date    HGBA1C 6.7 (H) 01/15/2024       Lipid Profile:   Lab Results   Component Value Date    CHOL 202 10/03/2015     Lab Results   Component Value Date    HDL 79 01/15/2024    HDL 88 03/08/2022    HDL 85 11/25/2020     Lab Results    Component Value Date    LDLCALC 88 01/15/2024    LDLCALC 101 (H) 03/08/2022    LDLCALC 79 11/25/2020     Lab Results   Component Value Date    TRIG 67 01/15/2024    TRIG 63 03/08/2022    TRIG 42 11/25/2020       The ASCVD Risk score (Kat YE, et al., 2019) failed to calculate for the following reasons:    The 2019 ASCVD risk score is only valid for ages 40 to 79    1. Paroxysmal atrial fibrillation (HCC)        2. Other hyperlipidemia        3. Elevated blood pressure reading        4. Ascending aorta dilatation (HCC)  Echo follow up/limited w/ contrast if indicated      5. Type 2 diabetes mellitus with other specified complication, unspecified whether long term insulin use (HCC)            Imaging: I have personally reviewed pertinent reports.        Recommend aggressive risk factor modification and therapeutic lifestyle changes.  Low-salt, low-calorie, low-fat, low-cholesterol diet with regular exercise and to optimize weight.    Discussed concepts of atherosclerosis, including signs and symptoms of cardiac disease.    Medications reviewed and possible side effects discussed.  Previous studies were reviewed.    Safety measures were reviewed.  All questions and concerns addressed.  Patient was advised to report any problems requiring medical attention.    Follow-up with PCP and appropriate specialist and lab work as discussed.    Return for follow up visit as scheduled or earlier, if needed.  Thank you for allowing me to participate in the care and evaluation of your patient.  Should you have any questions, please feel free to contact me.    José Luis Tobar PA-C  4/15/2024,12:08 PM

## 2024-04-22 ENCOUNTER — NURSE TRIAGE (OUTPATIENT)
Age: 84
End: 2024-04-22

## 2024-04-22 NOTE — TELEPHONE ENCOUNTER
"Patient's BP in office was 160 systolic on 4/15.  She is using a wrist cuff at home.  She does not know her heart rate. Today's BP is 131/62 on the wrist cuff PRE AM meds.      Patient lives close and said she can come in for a BP check.      Reason for Disposition   Systolic BP  while taking blood pressure medications and NOT dizzy, lightheaded or weak    Answer Assessment - Initial Assessment Questions  1. BLOOD PRESSURE: \"What is the blood pressure?\" \"Did you take at least two measurements 5 minutes apart?\"    I started taking my BP on Weds, 416. All BPs reported are AFTER meds in the AM.  106/45, 102/46, 102, 43, 90/42, 104/49        2. ONSET: \"When did you take your blood pressure?\"     Taking my blood pressure daily since 4/16 in the AM after meds  3. HOW: \"How did you obtain the blood pressure?\" Automatic BP cuff at home on my wrist.        4. HISTORY: \"Do you have a history of low blood pressure?\" No, my blood pressure is usually high.        5. MEDICATIONS: \"Are you taking any medications for blood pressure?\" If Yes, ask: \"Have they been changed recently?\"      Started a few months ago on Amiodarone 200 mg daily, metoprolol succinate 25 mg daily,   6. PULSE RATE: \"Do you know what your pulse rate is?\"       No  7. OTHER SYMPTOMS: I had a dizzy spell 3 days ago when I was at my [employer] for a meeting.  I went down to the ground when my legs wouldn't hold me up.  I didn't actually pass out, but I had to be helped up.  Two other days I felt dizzy and I sat down.    Protocols used: Blood Pressure - Low-ADULT-OH    "

## 2024-04-22 NOTE — TELEPHONE ENCOUNTER
The blood pressure readings noted are overall okay.    Patient can come for nurse BP check and bring her machine.  Thank you

## 2024-04-23 ENCOUNTER — CLINICAL SUPPORT (OUTPATIENT)
Dept: CARDIOLOGY CLINIC | Facility: CLINIC | Age: 84
End: 2024-04-23
Payer: COMMERCIAL

## 2024-04-23 VITALS
RESPIRATION RATE: 16 BRPM | DIASTOLIC BLOOD PRESSURE: 76 MMHG | HEART RATE: 52 BPM | BODY MASS INDEX: 31.34 KG/M2 | HEIGHT: 61 IN | SYSTOLIC BLOOD PRESSURE: 158 MMHG | OXYGEN SATURATION: 98 % | WEIGHT: 166 LBS

## 2024-04-23 DIAGNOSIS — I10 HYPERTENSION, UNSPECIFIED TYPE: Primary | ICD-10-CM

## 2024-04-23 DIAGNOSIS — I10 HYPERTENSION, ESSENTIAL: Primary | ICD-10-CM

## 2024-04-23 PROCEDURE — 99211 OFF/OP EST MAY X REQ PHY/QHP: CPT

## 2024-04-23 RX ORDER — AMLODIPINE BESYLATE 5 MG/1
5 TABLET ORAL DAILY
Qty: 30 TABLET | Refills: 4 | Status: SHIPPED | OUTPATIENT
Start: 2024-04-23 | End: 2024-05-03

## 2024-04-23 NOTE — Clinical Note
Pt was in the office today for a BP check Orthostatic BP was done since pt reported recent symptoms of Dizziness.  BP LA Sitting 160/76   HR 52  Laying  163/71  HR 49          Standing   161/77  HR 48 Please review pt's BP and advise if there is any changes. Thanks!

## 2024-04-23 NOTE — PROGRESS NOTES
Pt was in the office today for a nurse visit? BP check instructed by Dr. RICHMOND     Orthostatic BP was done since pt reported recent symptoms of Dizziness.    BP LA Sitting 160/76   HR 52   Laying  163/71  HR 49           Standing   161/77  HR 48    Pt was discussed with Dr. Krishnamurthy and was advised to continue her current meds.    Pt's BP wrist machine was not working in the office and pt stated that her machine sometimes takes several tries to read her BP.    Pt was advised to notify her PCP of her dizziness as per Dr. Krishnamurthy.    Pt and her son both decided to get a new BP machine.    Pt was advised to continue her current meds, check her BP and notify us of ant high BP.    Pt and her son verbalized understanding.

## 2024-04-24 ENCOUNTER — TELEPHONE (OUTPATIENT)
Dept: CARDIOLOGY CLINIC | Facility: CLINIC | Age: 84
End: 2024-04-24

## 2024-04-24 NOTE — TELEPHONE ENCOUNTER
Spoke with patient relayed  advised, patient verbally understood.    Transferred call to clerical to schedule to for BP check in 2 weeks.

## 2024-04-24 NOTE — TELEPHONE ENCOUNTER
----- Message from Cherrie Morillo MD sent at 4/23/2024  1:29 PM EDT -----  Blood pressures are elevated.    Patient to start amlodipine 5 mg p.o. daily.  Prescription sent to her local pharmacy.    Patient can be scheduled for repeat BP check in 2 weeks.  ----- Message -----  From: Tete Banks  Sent: 4/23/2024  11:30 AM EDT  To: Cherrie Morillo MD    Pt was in the office today for a BP check  Orthostatic BP was done since pt reported recent symptoms of Dizziness.    BP LA Sitting 160/76   HR 52   Laying  163/71  HR 49           Standing   161/77  HR 48  Please review pt's BP and advise if there is any changes.  Thanks!

## 2024-04-25 ENCOUNTER — APPOINTMENT (OUTPATIENT)
Dept: LAB | Facility: CLINIC | Age: 84
End: 2024-04-25
Payer: COMMERCIAL

## 2024-04-25 ENCOUNTER — OFFICE VISIT (OUTPATIENT)
Dept: FAMILY MEDICINE CLINIC | Facility: CLINIC | Age: 84
End: 2024-04-25
Payer: COMMERCIAL

## 2024-04-25 VITALS
TEMPERATURE: 97 F | OXYGEN SATURATION: 96 % | BODY MASS INDEX: 31.15 KG/M2 | SYSTOLIC BLOOD PRESSURE: 137 MMHG | DIASTOLIC BLOOD PRESSURE: 80 MMHG | HEART RATE: 46 BPM | WEIGHT: 165 LBS | HEIGHT: 61 IN

## 2024-04-25 DIAGNOSIS — R73.01 IMPAIRED FASTING GLUCOSE: ICD-10-CM

## 2024-04-25 DIAGNOSIS — R00.1 BRADYCARDIA: ICD-10-CM

## 2024-04-25 DIAGNOSIS — K86.2 PANCREATIC CYST: ICD-10-CM

## 2024-04-25 DIAGNOSIS — R00.2 PALPITATION: ICD-10-CM

## 2024-04-25 DIAGNOSIS — R00.1 BRADYCARDIA, UNSPECIFIED: ICD-10-CM

## 2024-04-25 DIAGNOSIS — R42 LIGHTHEADEDNESS: Primary | ICD-10-CM

## 2024-04-25 DIAGNOSIS — Z00.00 MEDICARE ANNUAL WELLNESS VISIT, SUBSEQUENT: ICD-10-CM

## 2024-04-25 DIAGNOSIS — R42 LIGHTHEADEDNESS: ICD-10-CM

## 2024-04-25 DIAGNOSIS — Z90.81 S/P SPLENECTOMY: ICD-10-CM

## 2024-04-25 LAB
ALBUMIN SERPL BCP-MCNC: 4.4 G/DL (ref 3.5–5)
ALP SERPL-CCNC: 92 U/L (ref 34–104)
ALT SERPL W P-5'-P-CCNC: 27 U/L (ref 7–52)
ANION GAP SERPL CALCULATED.3IONS-SCNC: 7 MMOL/L (ref 4–13)
AST SERPL W P-5'-P-CCNC: 24 U/L (ref 13–39)
BASOPHILS # BLD AUTO: 0.03 THOUSANDS/ÂΜL (ref 0–0.1)
BASOPHILS NFR BLD AUTO: 0 % (ref 0–1)
BILIRUB SERPL-MCNC: 0.91 MG/DL (ref 0.2–1)
BUN SERPL-MCNC: 19 MG/DL (ref 5–25)
CALCIUM SERPL-MCNC: 9.3 MG/DL (ref 8.4–10.2)
CHLORIDE SERPL-SCNC: 105 MMOL/L (ref 96–108)
CO2 SERPL-SCNC: 27 MMOL/L (ref 21–32)
CREAT SERPL-MCNC: 0.84 MG/DL (ref 0.6–1.3)
EOSINOPHIL # BLD AUTO: 0.07 THOUSAND/ÂΜL (ref 0–0.61)
EOSINOPHIL NFR BLD AUTO: 1 % (ref 0–6)
ERYTHROCYTE [DISTWIDTH] IN BLOOD BY AUTOMATED COUNT: 15.2 % (ref 11.6–15.1)
EST. AVERAGE GLUCOSE BLD GHB EST-MCNC: 148 MG/DL
GFR SERPL CREATININE-BSD FRML MDRD: 63 ML/MIN/1.73SQ M
GLUCOSE SERPL-MCNC: 179 MG/DL (ref 65–140)
HBA1C MFR BLD: 6.8 %
HCT VFR BLD AUTO: 39.4 % (ref 34.8–46.1)
HGB BLD-MCNC: 12.7 G/DL (ref 11.5–15.4)
IMM GRANULOCYTES # BLD AUTO: 0.06 THOUSAND/UL (ref 0–0.2)
IMM GRANULOCYTES NFR BLD AUTO: 1 % (ref 0–2)
LYMPHOCYTES # BLD AUTO: 1.85 THOUSANDS/ÂΜL (ref 0.6–4.47)
LYMPHOCYTES NFR BLD AUTO: 21 % (ref 14–44)
MCH RBC QN AUTO: 30.1 PG (ref 26.8–34.3)
MCHC RBC AUTO-ENTMCNC: 32.2 G/DL (ref 31.4–37.4)
MCV RBC AUTO: 93 FL (ref 82–98)
MONOCYTES # BLD AUTO: 0.96 THOUSAND/ÂΜL (ref 0.17–1.22)
MONOCYTES NFR BLD AUTO: 11 % (ref 4–12)
NEUTROPHILS # BLD AUTO: 5.84 THOUSANDS/ÂΜL (ref 1.85–7.62)
NEUTS SEG NFR BLD AUTO: 66 % (ref 43–75)
NRBC BLD AUTO-RTO: 0 /100 WBCS
PLATELET # BLD AUTO: 313 THOUSANDS/UL (ref 149–390)
PMV BLD AUTO: 12.3 FL (ref 8.9–12.7)
POTASSIUM SERPL-SCNC: 4.6 MMOL/L (ref 3.5–5.3)
PROT SERPL-MCNC: 6.7 G/DL (ref 6.4–8.4)
RBC # BLD AUTO: 4.22 MILLION/UL (ref 3.81–5.12)
SODIUM SERPL-SCNC: 139 MMOL/L (ref 135–147)
TSH SERPL DL<=0.05 MIU/L-ACNC: 1.6 UIU/ML (ref 0.45–4.5)
WBC # BLD AUTO: 8.81 THOUSAND/UL (ref 4.31–10.16)

## 2024-04-25 PROCEDURE — 99214 OFFICE O/P EST MOD 30 MIN: CPT | Performed by: PHYSICIAN ASSISTANT

## 2024-04-25 PROCEDURE — 93000 ELECTROCARDIOGRAM COMPLETE: CPT | Performed by: PHYSICIAN ASSISTANT

## 2024-04-25 PROCEDURE — 36415 COLL VENOUS BLD VENIPUNCTURE: CPT

## 2024-04-25 PROCEDURE — 80053 COMPREHEN METABOLIC PANEL: CPT

## 2024-04-25 PROCEDURE — 84443 ASSAY THYROID STIM HORMONE: CPT

## 2024-04-25 PROCEDURE — G0439 PPPS, SUBSEQ VISIT: HCPCS | Performed by: PHYSICIAN ASSISTANT

## 2024-04-25 PROCEDURE — 83036 HEMOGLOBIN GLYCOSYLATED A1C: CPT

## 2024-04-25 PROCEDURE — 85025 COMPLETE CBC W/AUTO DIFF WBC: CPT

## 2024-04-25 RX ORDER — METOPROLOL SUCCINATE 25 MG/1
12.5 TABLET, EXTENDED RELEASE ORAL DAILY
Start: 2024-04-25 | End: 2024-05-03

## 2024-04-25 NOTE — PATIENT INSTRUCTIONS

## 2024-04-25 NOTE — PROGRESS NOTES
Assessment and Plan:     Problem List Items Addressed This Visit    None  Visit Diagnoses     Lightheadedness    -  Primary    Relevant Orders    Comprehensive metabolic panel    CBC and differential    TSH, 3rd generation with Free T4 reflex    Bradycardia        Relevant Orders    POCT ECG    Palpitation        Relevant Medications    metoprolol succinate (TOPROL-XL) 25 mg 24 hr tablet    Bradycardia, unspecified        Relevant Orders    TSH, 3rd generation with Free T4 reflex    Medicare annual wellness visit, subsequent          EKG sinus bradycardia, nonspecific ST changes, no significant change from previous besides slower rate. Her sx are more presyncopal/lightheadedness. Normal neurologic exam today. No recent trauma. Recommend decreasing toprol to 12.5mg given her bradycardia and symptomatology and newly initiated amlodipine. Monitor BP/HR. Labs as above to further evaluate. ER/return precautions. Follow up with cardiology.      Preventive health issues were discussed with patient, and age appropriate screening tests were ordered as noted in patient's After Visit Summary.  Personalized health advice and appropriate referrals for health education or preventive services given if needed, as noted in patient's After Visit Summary.     History of Present Illness:     Patient presents for a Medicare Wellness Visit    Pt presents with concerns of lightheadedness. Ongoing since January 2024 but improved and then last week worsened. Notes 1 week ago had syncopal event while walking and had to grab onto stairs, unclear if full LOC. No associated chest pain, SOB. She feels generally weak. She saw cardiology who evaluated her and recommended to follow up with PCP. She took her first dose of amlodipine yesterday and feels more lightheaded. Hx of afib on amiodarone and toprol and her HR is in the mid-high 40s today. She denies bleeding/bruising. She denies head injury/strike. She denies any neurologic sx. She denies  true dizziness, more lightheadedness like she is going to black out. No EKG done at cardiology. She was scheduled for an echo     Dizziness  Pertinent negatives include no abdominal pain, chest pain, chills, congestion, coughing, fatigue, fever, nausea, numbness, sore throat, vomiting or weakness.      Patient Care Team:  Hoang Tovar MD as PCP - General (Family Medicine)  Mary Last MD as PCP - Endocrinology (Endocrinology)  DO Jori Toussaint MD Zachariah Goldsmith, MD Tiffany Meckler, PA-C as Physician Assistant (Physician Assistant)  Kyle Julien MD (Surgical Oncology)     Review of Systems:     Review of Systems   Constitutional:  Negative for chills, fatigue and fever.   HENT:  Negative for congestion, ear pain, hearing loss, nosebleeds, postnasal drip, rhinorrhea, sinus pressure, sinus pain, sneezing and sore throat.    Eyes:  Negative for pain, discharge, itching and visual disturbance.   Respiratory:  Negative for cough, chest tightness, shortness of breath and wheezing.    Cardiovascular:  Negative for chest pain, palpitations and leg swelling.   Gastrointestinal:  Negative for abdominal pain, blood in stool, constipation, diarrhea, nausea and vomiting.   Genitourinary:  Negative for frequency and urgency.   Neurological:  Positive for syncope and light-headedness. Negative for dizziness, seizures, facial asymmetry, speech difficulty, weakness and numbness.        Problem List:     Patient Active Problem List   Diagnosis   • OAB (overactive bladder)   • Parkinson's disease   • Primary insomnia   • History of skin cancer   • Seasonal allergic rhinitis   • Impaired fasting glucose   • Mood disturbance   • Obesity (BMI 30-39.9)   • Claustrophobia   • Mitral valve disorder   • Menopause ovarian failure   • Vitamin D deficiency   • Dysphagia   • Multiple thyroid nodules   • Gastro-esophageal reflux disease without esophagitis   • Osteopenia   • Chronic idiopathic constipation    • History of adenomatous polyp of colon   • Tremor   • Trochanteric bursitis, right hip   • IPMN (intraductal papillary mucinous neoplasm)   • Cherry angioma   • Postablative hypothyroidism   • Sleep apnea   • Cerebrovascular disease   • Age-related osteoporosis without current pathological fracture   • Urge incontinence   • s/p distal pancreatectomy/splenectomy   • Paroxysmal atrial fibrillation (HCC)   • Ambulatory dysfunction   • Ear pressure, right   • Elevated troponin   • Small bowel obstruction (HCC)      Past Medical and Surgical History:     Past Medical History:   Diagnosis Date   • Actinic keratosis 2016   • Acute midline low back pain without sciatica 2020   • 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    • Benign neoplasm of skin    • Cancer (HCC)     skin   • Chest pain    • Claustrophobia    • Closed compression fracture of body of L1 vertebra (HCC)    • Diverticulitis    • Diverticulitis    • Fracture     L1-L2   • GERD (gastroesophageal reflux disease)    • H. pylori infection 2020   • Muscle weakness    • Nausea 2024   • Nonmelanoma skin cancer     last assessed 2017   • Palpitations    • Pancreatic cyst    • Seasonal allergies    • Seborrheic keratosis 2014   • Sleep apnea     no cpap   • Spontaneous      without mention of complications    • Squamous cell carcinoma of forehead 2014   • Syncope      Past Surgical History:   Procedure Laterality Date   • ABDOMINAL ADHESION SURGERY N/A 2023    Procedure: LYSIS ADHESIONS;  Surgeon: Kyle Julien MD;  [facility]: [facility];  Service: Surgical Oncology   • BOTOX INJECTION N/A 2017    Procedure: CYSTOSCOPY; BLADDER BOTOX 100 UNITS ;  Surgeon: Juan Edward MD;  [facility];  Service:    • BOWEL RESECTION      related ot diverticulitis   • CARDIAC CATHETERIZATION     • CARDIAC CATHETERIZATION Left 2024    Procedure: Cardiac Left Heart Cath;   Surgeon: Carrington Kiser DO;  [facility];  Service: Cardiology   • CARPAL TUNNEL RELEASE Right    • COLONOSCOPY  07/31/2019   • COLOSTOMY     • COLOSTOMY CLOSURE     • CYSTOSCOPY  06/01/2021   • DISTAL PANCREATECTOMY N/A 07/31/2023    Procedure: DISTAL PANCREATECTOMY;  Surgeon: Kyle Julien MD;  [facility];  Service: Surgical Oncology   • FOOT SURGERY Left     neuroma   • HYSTERECTOMY     • MYRINGOTOMY W/ TUBES Right 12/06/2023    office procedure - Dr. Grace   • NASAL SINUS SURGERY  age 39    [place]   • PANCREATIC CYST BIOPSY  07/31/2023   • ME CYSTOURETHROSCOPY N/A 04/13/2018    Procedure: CYSTOSCOPY WITH BOTOX;  Surgeon: Juan Edward MD;  [facility];  Service: Urology   • ME ESOPHAGOGASTRODUODENOSCOPY TRANSORAL DIAGNOSTIC N/A 04/23/2019    Procedure: ESOPHAGOGASTRODUODENOSCOPY (EGD);  Surgeon: Edu Dickerson DO;  Location: [facility];  Service: Gastroenterology   • SPLENECTOMY, TOTAL N/A 07/31/2023    Procedure: SPLENECTOMY;  Surgeon: Kyle Julien MD;  [facility];  Service: Surgical Oncology   • TONSILLECTOMY  age 49   • UPPER GASTROINTESTINAL ENDOSCOPY  04/23/2019      Family History:     Family History   Problem Relation Age of Onset   • Alcohol abuse Mother    • Heart disease Mother    • Alcohol abuse Father    • Alcohol abuse Brother    • Cancer Brother    • Tremor Neg Hx    • Parkinsonism Neg Hx    • Colon cancer Neg Hx       Social History:     Social History     Socioeconomic History   • Marital status:      Spouse name: None   • Number of children: None   • Years of education: None   • Highest education level: None   Occupational History   • Occupation: RETIRED    Tobacco Use   • Smoking status: Never     Passive exposure: Past   • Smokeless tobacco: Never   Vaping Use   • Vaping status: Never Used   Substance and Sexual Activity   • Alcohol use: Yes     Comment: patient states rare use on holidays    • Drug use: No   • Sexual  activity: Not Currently   Other Topics Concern   • None   Social History Narrative    LIVING INDEPENDENTLY ALONE         No caffeine use     Social Determinants of Health     Financial Resource Strain: Low Risk  (4/19/2023)    Overall Financial Resource Strain (CARDIA)    • Difficulty of Paying Living Expenses: Not very hard   Food Insecurity: No Food Insecurity (4/25/2024)    Hunger Vital Sign    • Worried About Running Out of Food in the Last Year: Never true    • Ran Out of Food in the Last Year: Never true   Transportation Needs: No Transportation Needs (4/25/2024)    PRAPARE - Transportation    • Lack of Transportation (Medical): No    • Lack of Transportation (Non-Medical): No   Physical Activity: Not on file   Stress: Not on file   Social Connections: Not on file   Intimate Partner Violence: Not on file   Housing Stability: Low Risk  (4/25/2024)    Housing Stability Vital Sign    • Unable to Pay for Housing in the Last Year: No    • Number of Places Lived in the Last Year: 1    • Unstable Housing in the Last Year: No      Medications and Allergies:     Current Outpatient Medications   Medication Sig Dispense Refill   • amiodarone 200 mg tablet Take 1 tablet (200 mg total) by mouth daily 90 tablet 3   • amLODIPine (NORVASC) 5 mg tablet Take 1 tablet (5 mg total) by mouth daily 30 tablet 4   • apixaban (Eliquis) 5 mg Take 1 tablet (5 mg total) by mouth 2 (two) times a day 180 tablet 3   • Ascorbic Acid (VITAMIN C) 1000 MG tablet Take 1,000 mg by mouth daily     • atorvastatin (LIPITOR) 40 mg tablet Take 1 tablet (40 mg total) by mouth daily with dinner 90 tablet 3   • B Complex Vitamins (B COMPLEX 100 PO) Take 1 capsule by mouth daily after lunch not taking     • carbidopa-levodopa (SINEMET)  mg per tablet Take 1 tablet by mouth 3 (three) times a day before meals 270 tablet 3   • cholecalciferol (VITAMIN D3) 1,000 units tablet Take 5,000 Units by mouth daily after lunch     • Cyanocobalamin (VITAMIN B 12  PO) Take 1 capsule by mouth daily     • metoprolol succinate (TOPROL-XL) 25 mg 24 hr tablet Take 0.5 tablets (12.5 mg total) by mouth daily     • multivitamin (THERAGRAN) TABS Take 1 tablet by mouth every morning     • pantoprazole (PROTONIX) 20 mg tablet Take 20 mg by mouth daily     • polyethylene glycol (MIRALAX) 17 g packet Take 17 g by mouth daily as needed (constipation if not responding to senna/docusate)     • Potassium Gluconate 595 (99 K) MG TABS Take 1 each by mouth every other day     • Probiotic Product (PROBIOTIC-10) CAPS Take 1 capsule by mouth daily after lunch     • trospium chloride (SANCTURA) 20 mg tablet Take 1 tablet (20 mg total) by mouth 2 (two) times a day 180 tablet 2   • calcium carbonate (OS-JOHN) 1250 (500 Ca) MG tablet Take 1 tablet by mouth daily after lunch     • Coenzyme Q10 (CO Q 10 PO) Take 1 capsule by mouth daily after lunch 600 mg BID     • Omega-3 350 MG CPDR Take 1 capsule by mouth daily in the early morning       No current facility-administered medications for this visit.     Allergies   Allergen Reactions   • Caffeine - Food Allergy GI Intolerance   • Medical Tape Other (See Comments)   • Iodine - Food Allergy Rash   • Latex Rash   • Other Rash     Adhesive tape        Immunizations:     Immunization History   Administered Date(s) Administered   • COVID-19 MODERNA VACC 0.5 ML IM 02/10/2021, 03/10/2021, 11/09/2021   • HiB 08/04/2023   • Hib (PRP-T) 08/04/2023   • Meningococcal MCV4, Unspecified 08/04/2023   • Pneumococcal Conjugate Vaccine 20-valent (Pcv20), Polysace 08/04/2023   • meningococcal ACYW-135 TT Conjugate 08/04/2023      Health Maintenance:         Topic Date Due   • Breast Cancer Screening: Mammogram  11/21/2017   • Colorectal Cancer Screening  07/31/2022         Topic Date Due   • Influenza Vaccine (1) Never done   • COVID-19 Vaccine (4 - 2023-24 season) 09/01/2023   • Meningococcal ACWY Vaccine (2 - Risk 2-dose series) 09/29/2023      Medicare Screening Tests  and Risk Assessments:     Radha is here for her Subsequent Wellness visit.     Health Risk Assessment:   Patient rates overall health as good. Patient feels that their physical health rating is same. Patient is satisfied with their life. Eyesight was rated as same. Hearing was rated as same. Patient feels that their emotional and mental health rating is same. Patients states they are never, rarely angry. Patient states they are never, rarely unusually tired/fatigued. Pain experienced in the last 7 days has been some. Patient's pain rating has been 1/10. Patient states that she has experienced no weight loss or gain in last 6 months.     Depression Screening:   PHQ-2 Score: 1      Fall Risk Screening:   In the past year, patient has experienced: history of falling in past year    Number of falls: 1  Injured during fall?: No    Feels unsteady when standing or walking?: No    Worried about falling?: Yes      Urinary Incontinence Screening:   Patient has leaked urine accidently in the last six months.     Home Safety:  Patient does not have trouble with stairs inside or outside of their home. Patient has working smoke alarms and has working carbon monoxide detector. Home safety hazards include: none.     Nutrition:   Current diet is Regular.     Medications:   Patient is currently taking over-the-counter supplements. OTC medications include: see medication list. Patient is able to manage medications.     Activities of Daily Living (ADLs)/Instrumental Activities of Daily Living (IADLs):   Walk and transfer into and out of bed and chair?: Yes  Dress and groom yourself?: Yes    Bathe or shower yourself?: Yes    Feed yourself? Yes  Do your laundry/housekeeping?: Yes  Manage your money, pay your bills and track your expenses?: Yes  Make your own meals?: Yes    Do your own shopping?: Yes    Previous Hospitalizations:   Any hospitalizations or ED visits within the last 12 months?: Yes    How many hospitalizations have you had  "in the last year?: 3-4    Advance Care Planning:   Living will: Yes    Durable POA for healthcare: No    Advanced directive: Yes    Advanced directive counseling given: No      PREVENTIVE SCREENINGS      Cardiovascular Screening:    General: Screening Not Indicated and History Lipid Disorder      Diabetes Screening:     General: Screening Not Indicated and History Diabetes      Colorectal Cancer Screening:     General: Screening Current      Breast Cancer Screening:     General: Screening Not Indicated      Cervical Cancer Screening:    General: Screening Not Indicated      Osteoporosis Screening:    General: Screening Not Indicated and History Osteoporosis      Abdominal Aortic Aneurysm (AAA) Screening:        General: Screening Not Indicated      Lung Cancer Screening:     General: Screening Not Indicated      Hepatitis C Screening:    General: Screening Not Indicated    Screening, Brief Intervention, and Referral to Treatment (SBIRT)    Screening  Typical number of drinks in a day: 0  Typical number of drinks in a week: 0  Interpretation: Low risk drinking behavior.    Single Item Drug Screening:  How often have you used an illegal drug (including marijuana) or a prescription medication for non-medical reasons in the past year? never    Single Item Drug Screen Score: 0  Interpretation: Negative screen for possible drug use disorder    No results found.     Physical Exam:     /80   Pulse (!) 46   Temp (!) 97 °F (36.1 °C)   Ht 5' 1\" (1.549 m)   Wt 74.8 kg (165 lb)   SpO2 96%   BMI 31.18 kg/m²     Physical Exam  Vitals and nursing note reviewed.   Constitutional:       General: She is not in acute distress.     Appearance: She is well-developed.   HENT:      Head: Normocephalic and atraumatic.   Eyes:      Conjunctiva/sclera: Conjunctivae normal.   Neck:      Vascular: No carotid bruit.   Cardiovascular:      Rate and Rhythm: Regular rhythm. Bradycardia present.      Heart sounds: Murmur heard. "   Pulmonary:      Effort: Pulmonary effort is normal. No respiratory distress.      Breath sounds: Normal breath sounds. No wheezing or rales.   Musculoskeletal:         General: No swelling.      Cervical back: Neck supple.      Right lower leg: No edema.      Left lower leg: No edema.   Skin:     General: Skin is warm and dry.      Capillary Refill: Capillary refill takes less than 2 seconds.   Neurological:      Mental Status: She is alert and oriented to person, place, and time.      Cranial Nerves: No cranial nerve deficit.      Sensory: No sensory deficit.      Motor: No weakness.      Coordination: Coordination normal.      Gait: Gait normal.   Psychiatric:         Mood and Affect: Mood normal.          Apolinar Shaw PA-C

## 2024-04-26 ENCOUNTER — TELEPHONE (OUTPATIENT)
Dept: FAMILY MEDICINE CLINIC | Facility: CLINIC | Age: 84
End: 2024-04-26

## 2024-04-26 NOTE — TELEPHONE ENCOUNTER
Thanks for update  No meds needed for DM as of now. Low carb diet enocuraged. (Cut down breads, sweets, rice, pasta, potato)  Continue with the med change discussed yesterday and complete echo. If sx worsen please follow up.

## 2024-04-26 NOTE — TELEPHONE ENCOUNTER
Apolinar Shaw PA-C  4/26/2024  7:57 AM EDT Back to Top      Normal blood counts  She is diabetic though do not believe this is causing her lighthadedness  Normal thyroid function  Normal kidney function    Hoang Tovar MD  4/25/2024  6:56 PM EDT       Her glucose is elevated indicating diabetes recommend a low carb diet and we can discuss at her next appt.

## 2024-04-26 NOTE — TELEPHONE ENCOUNTER
Patient notified - she is not on any medications for diabetes. Still feeling tired and somewhat still lightheaded.

## 2024-04-30 ENCOUNTER — APPOINTMENT (EMERGENCY)
Dept: RADIOLOGY | Facility: HOSPITAL | Age: 84
DRG: 310 | End: 2024-04-30
Payer: COMMERCIAL

## 2024-04-30 ENCOUNTER — HOSPITAL ENCOUNTER (INPATIENT)
Facility: HOSPITAL | Age: 84
LOS: 2 days | Discharge: HOME | DRG: 310 | End: 2024-05-03
Attending: EMERGENCY MEDICINE | Admitting: STUDENT IN AN ORGANIZED HEALTH CARE EDUCATION/TRAINING PROGRAM
Payer: COMMERCIAL

## 2024-04-30 ENCOUNTER — APPOINTMENT (EMERGENCY)
Dept: CT IMAGING | Facility: HOSPITAL | Age: 84
DRG: 310 | End: 2024-04-30
Payer: COMMERCIAL

## 2024-04-30 DIAGNOSIS — R55 SYNCOPE: Primary | ICD-10-CM
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
## 2024-04-30 DIAGNOSIS — R00.1 SYMPTOMATIC BRADYCARDIA: ICD-10-CM

## 2024-04-30 DIAGNOSIS — R00.1 SINUS BRADYCARDIA: ICD-10-CM

## 2024-04-30 LAB
2HR DELTA HS TROPONIN: 27 NG/L
4HR DELTA HS TROPONIN: 24 NG/L
ABO GROUP BLD: NORMAL
ANION GAP SERPL CALCULATED.3IONS-SCNC: 5 MMOL/L (ref 4–13)
APTT PPP: 35 SECONDS (ref 23–37)
BASOPHILS # BLD AUTO: 0.03 THOUSANDS/ÂΜL (ref 0–0.1)
BASOPHILS NFR BLD AUTO: 0 % (ref 0–1)
BLD GP AB SCN SERPL QL: NEGATIVE
BUN SERPL-MCNC: 20 MG/DL (ref 5–25)
CALCIUM SERPL-MCNC: 8.8 MG/DL (ref 8.4–10.2)
CARDIAC TROPONIN I PNL SERPL HS: 13 NG/L
CARDIAC TROPONIN I PNL SERPL HS: 37 NG/L
CARDIAC TROPONIN I PNL SERPL HS: 40 NG/L
CHLORIDE SERPL-SCNC: 104 MMOL/L (ref 96–108)
CO2 SERPL-SCNC: 27 MMOL/L (ref 21–32)
CREAT SERPL-MCNC: 0.84 MG/DL (ref 0.6–1.3)
EOSINOPHIL # BLD AUTO: 0.07 THOUSAND/ÂΜL (ref 0–0.61)
EOSINOPHIL NFR BLD AUTO: 1 % (ref 0–6)
ERYTHROCYTE [DISTWIDTH] IN BLOOD BY AUTOMATED COUNT: 15.2 % (ref 11.6–15.1)
GFR SERPL CREATININE-BSD FRML MDRD: 63 ML/MIN/1.73SQ M
GLUCOSE SERPL-MCNC: 126 MG/DL (ref 65–140)
GLUCOSE SERPL-MCNC: 137 MG/DL (ref 65–140)
GLUCOSE SERPL-MCNC: 225 MG/DL (ref 65–140)
GLUCOSE SERPL-MCNC: 253 MG/DL (ref 65–140)
HCT VFR BLD AUTO: 41.6 % (ref 34.8–46.1)
HGB BLD-MCNC: 13.3 G/DL (ref 11.5–15.4)
IMM GRANULOCYTES # BLD AUTO: 0.03 THOUSAND/UL (ref 0–0.2)
IMM GRANULOCYTES NFR BLD AUTO: 0 % (ref 0–2)
INR PPP: 1.36 (ref 0.84–1.19)
LYMPHOCYTES # BLD AUTO: 1.56 THOUSANDS/ÂΜL (ref 0.6–4.47)
LYMPHOCYTES NFR BLD AUTO: 19 % (ref 14–44)
MAGNESIUM SERPL-MCNC: 1.9 MG/DL (ref 1.9–2.7)
MCH RBC QN AUTO: 29.6 PG (ref 26.8–34.3)
MCHC RBC AUTO-ENTMCNC: 32 G/DL (ref 31.4–37.4)
MCV RBC AUTO: 93 FL (ref 82–98)
MONOCYTES # BLD AUTO: 0.8 THOUSAND/ÂΜL (ref 0.17–1.22)
MONOCYTES NFR BLD AUTO: 10 % (ref 4–12)
NEUTROPHILS # BLD AUTO: 5.83 THOUSANDS/ÂΜL (ref 1.85–7.62)
NEUTS SEG NFR BLD AUTO: 70 % (ref 43–75)
NRBC BLD AUTO-RTO: 0 /100 WBCS
PLATELET # BLD AUTO: 304 THOUSANDS/UL (ref 149–390)
PMV BLD AUTO: 11.8 FL (ref 8.9–12.7)
POTASSIUM SERPL-SCNC: 4.5 MMOL/L (ref 3.5–5.3)
PROTHROMBIN TIME: 17.5 SECONDS (ref 11.6–14.5)
RBC # BLD AUTO: 4.49 MILLION/UL (ref 3.81–5.12)
RH BLD: POSITIVE
SODIUM SERPL-SCNC: 136 MMOL/L (ref 135–147)
SPECIMEN EXPIRATION DATE: NORMAL
TSH SERPL DL<=0.05 MIU/L-ACNC: 2.28 UIU/ML (ref 0.45–4.5)
WBC # BLD AUTO: 8.32 THOUSAND/UL (ref 4.31–10.16)

## 2024-04-30 PROCEDURE — 82948 REAGENT STRIP/BLOOD GLUCOSE: CPT

## 2024-04-30 PROCEDURE — 36415 COLL VENOUS BLD VENIPUNCTURE: CPT | Performed by: EMERGENCY MEDICINE

## 2024-04-30 PROCEDURE — 71260 CT THORAX DX C+: CPT

## 2024-04-30 PROCEDURE — 85025 COMPLETE CBC W/AUTO DIFF WBC: CPT | Performed by: EMERGENCY MEDICINE

## 2024-04-30 PROCEDURE — 71045 X-RAY EXAM CHEST 1 VIEW: CPT

## 2024-04-30 PROCEDURE — 80048 BASIC METABOLIC PNL TOTAL CA: CPT | Performed by: EMERGENCY MEDICINE

## 2024-04-30 PROCEDURE — 86901 BLOOD TYPING SEROLOGIC RH(D): CPT | Performed by: EMERGENCY MEDICINE

## 2024-04-30 PROCEDURE — 83735 ASSAY OF MAGNESIUM: CPT | Performed by: INTERNAL MEDICINE

## 2024-04-30 PROCEDURE — 99285 EMERGENCY DEPT VISIT HI MDM: CPT | Performed by: EMERGENCY MEDICINE

## 2024-04-30 PROCEDURE — 84484 ASSAY OF TROPONIN QUANT: CPT | Performed by: EMERGENCY MEDICINE

## 2024-04-30 PROCEDURE — 84443 ASSAY THYROID STIM HORMONE: CPT | Performed by: EMERGENCY MEDICINE

## 2024-04-30 PROCEDURE — 74177 CT ABD & PELVIS W/CONTRAST: CPT

## 2024-04-30 PROCEDURE — 99223 1ST HOSP IP/OBS HIGH 75: CPT | Performed by: STUDENT IN AN ORGANIZED HEALTH CARE EDUCATION/TRAINING PROGRAM

## 2024-04-30 PROCEDURE — 85610 PROTHROMBIN TIME: CPT | Performed by: EMERGENCY MEDICINE

## 2024-04-30 PROCEDURE — 93005 ELECTROCARDIOGRAM TRACING: CPT

## 2024-04-30 PROCEDURE — 86850 RBC ANTIBODY SCREEN: CPT | Performed by: EMERGENCY MEDICINE

## 2024-04-30 PROCEDURE — 70450 CT HEAD/BRAIN W/O DYE: CPT

## 2024-04-30 PROCEDURE — 99285 EMERGENCY DEPT VISIT HI MDM: CPT

## 2024-04-30 PROCEDURE — 86900 BLOOD TYPING SEROLOGIC ABO: CPT | Performed by: EMERGENCY MEDICINE

## 2024-04-30 PROCEDURE — 85730 THROMBOPLASTIN TIME PARTIAL: CPT | Performed by: EMERGENCY MEDICINE

## 2024-04-30 RX ORDER — AMIODARONE HYDROCHLORIDE 200 MG/1
200 TABLET ORAL DAILY
Status: DISCONTINUED | OUTPATIENT
Start: 2024-04-30 | End: 2024-04-30

## 2024-04-30 RX ORDER — METOPROLOL SUCCINATE 25 MG/1
12.5 TABLET, EXTENDED RELEASE ORAL DAILY
Status: DISCONTINUED | OUTPATIENT
Start: 2024-04-30 | End: 2024-04-30

## 2024-04-30 RX ORDER — AMLODIPINE BESYLATE 5 MG/1
5 TABLET ORAL DAILY
Status: DISCONTINUED | OUTPATIENT
Start: 2024-04-30 | End: 2024-04-30

## 2024-04-30 RX ORDER — HEPARIN SODIUM 5000 [USP'U]/ML
5000 INJECTION, SOLUTION INTRAVENOUS; SUBCUTANEOUS EVERY 8 HOURS SCHEDULED
Status: DISCONTINUED | OUTPATIENT
Start: 2024-04-30 | End: 2024-04-30

## 2024-04-30 RX ORDER — ONDANSETRON 2 MG/ML
4 INJECTION INTRAMUSCULAR; INTRAVENOUS EVERY 6 HOURS PRN
Status: DISCONTINUED | OUTPATIENT
Start: 2024-04-30 | End: 2024-04-30

## 2024-04-30 RX ORDER — ACETAMINOPHEN 325 MG/1
650 TABLET ORAL EVERY 6 HOURS PRN
Status: DISCONTINUED | OUTPATIENT
Start: 2024-04-30 | End: 2024-05-03 | Stop reason: HOSPADM

## 2024-04-30 RX ORDER — DOCUSATE SODIUM 100 MG/1
100 CAPSULE, LIQUID FILLED ORAL 2 TIMES DAILY
Status: DISCONTINUED | OUTPATIENT
Start: 2024-04-30 | End: 2024-05-03 | Stop reason: HOSPADM

## 2024-04-30 RX ORDER — INSULIN LISPRO 100 [IU]/ML
1-6 INJECTION, SOLUTION INTRAVENOUS; SUBCUTANEOUS
Status: DISCONTINUED | OUTPATIENT
Start: 2024-04-30 | End: 2024-05-02

## 2024-04-30 RX ORDER — CALCIUM CARBONATE 500 MG/1
1000 TABLET, CHEWABLE ORAL DAILY PRN
Status: DISCONTINUED | OUTPATIENT
Start: 2024-04-30 | End: 2024-05-03 | Stop reason: HOSPADM

## 2024-04-30 RX ORDER — SENNOSIDES 8.6 MG
1 TABLET ORAL DAILY
Status: DISCONTINUED | OUTPATIENT
Start: 2024-04-30 | End: 2024-05-03 | Stop reason: HOSPADM

## 2024-04-30 RX ADMIN — CARBIDOPA AND LEVODOPA 1 TABLET: 25; 100 TABLET ORAL at 16:23

## 2024-04-30 RX ADMIN — IOHEXOL 100 ML: 350 INJECTION, SOLUTION INTRAVENOUS at 12:28

## 2024-04-30 RX ADMIN — APIXABAN 5 MG: 5 TABLET, FILM COATED ORAL at 18:51

## 2024-04-30 RX ADMIN — DOCUSATE SODIUM 100 MG: 100 CAPSULE, LIQUID FILLED ORAL at 18:51

## 2024-04-30 RX ADMIN — SENNOSIDES 8.6 MG: 8.6 TABLET, FILM COATED ORAL at 16:23

## 2024-04-30 NOTE — ED NOTES
Patient transported to CT with transport monitor, accompanied by RN.  Transport was uneventful.     Sumit Rosales  04/30/24 [id number]

## 2024-04-30 NOTE — ASSESSMENT & PLAN NOTE
Several episodes noted within last few weeks  Unclear etiology, but given history of Afib, placed under obs  Currently bradycardic, could be symptomatic bradycardia  Rate controlled currently, monitor on telemetry  Neuroimaging negative for acute pathology  Last ECHO with preserved EF  Hold home metoprolol for now, consider cardiology consult

## 2024-04-30 NOTE — ED NOTES
PT presenting with bradycardia persistent at 40 BPM. Provider Cristobal Echevarria made aware.      Violet Solorzano RN  04/30/24 1800

## 2024-04-30 NOTE — ED PROVIDER NOTES
History  Chief Complaint   Patient presents with    Fall     PT arrives VIA EMS from home. Unwitnessed fall today onto R side on carpet PER PT. +BT. +LOC, -HS, however PT complaining of headache. Denies pain to R side.        Fall      Prior to Admission Medications   Prescriptions Last Dose Informant Patient Reported? Taking?   Ascorbic Acid (VITAMIN C) 1000 MG tablet  Self Yes No   Sig: Take 1,000 mg by mouth daily   B Complex Vitamins (B COMPLEX 100 PO)  Self Yes No   Sig: Take 1 capsule by mouth daily after lunch not taking   Coenzyme Q10 (CO Q 10 PO)  Self Yes No   Sig: Take 1 capsule by mouth daily after lunch 600 mg BID   Cyanocobalamin (VITAMIN B 12 PO)  Self Yes No   Sig: Take 1 capsule by mouth daily   Omega-3 350 MG CPDR  Self Yes No   Sig: Take 1 capsule by mouth daily in the early morning   Potassium Gluconate 595 (99 K) MG TABS  Self Yes No   Sig: Take 1 each by mouth every other day   Probiotic Product (PROBIOTIC-10) CAPS  Self Yes No   Sig: Take 1 capsule by mouth daily after lunch   amLODIPine (NORVASC) 5 mg tablet   No No   Sig: Take 1 tablet (5 mg total) by mouth daily   amiodarone 200 mg tablet  Self No No   Sig: Take 1 tablet (200 mg total) by mouth daily   apixaban (Eliquis) 5 mg  Self No No   Sig: Take 1 tablet (5 mg total) by mouth 2 (two) times a day   atorvastatin (LIPITOR) 40 mg tablet  Self No No   Sig: Take 1 tablet (40 mg total) by mouth daily with dinner   calcium carbonate (OS-JOHN) 1250 (500 Ca) MG tablet  Self Yes No   Sig: Take 1 tablet by mouth daily after lunch   carbidopa-levodopa (SINEMET)  mg per tablet  Self No No   Sig: Take 1 tablet by mouth 3 (three) times a day before meals   cholecalciferol (VITAMIN D3) 1,000 units tablet  Self Yes No   Sig: Take 5,000 Units by mouth daily after lunch   metoprolol succinate (TOPROL-XL) 25 mg 24 hr tablet   No No   Sig: Take 0.5 tablets (12.5 mg total) by mouth daily   multivitamin (THERAGRAN) TABS  Self Yes No   Sig: Take 1 tablet  by mouth every morning   pantoprazole (PROTONIX) 20 mg tablet  Self Yes No   Sig: Take 20 mg by mouth daily   polyethylene glycol (MIRALAX) 17 g packet  Self No No   Sig: Take 17 g by mouth daily as needed (constipation if not responding to senna/docusate)   trospium chloride (SANCTURA) 20 mg tablet  Self No No   Sig: Take 1 tablet (20 mg total) by mouth 2 (two) times a day      Facility-Administered Medications: None       Past Medical History:   Diagnosis Date    Actinic keratosis 2016    Acute midline low back pain without sciatica 2020    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     Benign neoplasm of skin     Cancer (HCC)     skin    Chest pain     Claustrophobia     Closed compression fracture of body of L1 vertebra (HCC)     Diverticulitis     Diverticulitis     Fracture     L1-L2    GERD (gastroesophageal reflux disease)     H. pylori infection 2020    Muscle weakness     Nausea 2024    Nonmelanoma skin cancer     last assessed 2017    Palpitations     Pancreatic cyst     Seasonal allergies     Seborrheic keratosis 2014    Sleep apnea     no cpap    Spontaneous      without mention of complications     Squamous cell carcinoma of forehead 2014    Syncope        Past Surgical History:   Procedure Laterality Date    ABDOMINAL ADHESION SURGERY N/A 2023    Procedure: LYSIS ADHESIONS;  Surgeon: Kyle Julien MD;  [facility]: [facility];  Service: Surgical Oncology    BOTOX INJECTION N/A 2017    Procedure: CYSTOSCOPY; BLADDER BOTOX 100 UNITS ;  Surgeon: Juan Edward MD;  [facility];  Service:     BOWEL RESECTION      related ot diverticulitis    CARDIAC CATHETERIZATION      CARDIAC CATHETERIZATION Left 2024    Procedure: Cardiac Left Heart Cath;  Surgeon: Carrington Kiser DO;  [facility];  Service: Cardiology    CARPAL TUNNEL RELEASE Right     COLONOSCOPY  2019    COLOSTOMY       COLOSTOMY CLOSURE      CYSTOSCOPY  06/01/2021    DISTAL PANCREATECTOMY N/A 07/31/2023    Procedure: DISTAL PANCREATECTOMY;  Surgeon: Kyle Julien MD;  [facility]: [facility];  Service: Surgical Oncology    FOOT SURGERY Left     neuroma    HYSTERECTOMY      MYRINGOTOMY W/ TUBES Right 12/06/2023    office procedure - Dr. Grace    NASAL SINUS SURGERY  age 39    [place]    PANCREATIC CYST BIOPSY  07/31/2023    ID CYSTOURETHROSCOPY N/A 04/13/2018    Procedure: CYSTOSCOPY WITH BOTOX;  Surgeon: Juan Edward MD;  [facility];  Service: Urology    ID ESOPHAGOGASTRODUODENOSCOPY TRANSORAL DIAGNOSTIC N/A 04/23/2019    Procedure: ESOPHAGOGASTRODUODENOSCOPY (EGD);  Surgeon: Edu Dickerson DO;  Location: [facility];  Service: Gastroenterology    SPLENECTOMY, TOTAL N/A 07/31/2023    Procedure: SPLENECTOMY;  Surgeon: Kyle Julien MD;  [facility];  Service: Surgical Oncology    TONSILLECTOMY  age 49    UPPER GASTROINTESTINAL ENDOSCOPY  04/23/2019       Family History   Problem Relation Age of Onset    Alcohol abuse Mother     Heart disease Mother     Alcohol abuse Father     Alcohol abuse Brother     Cancer Brother     Tremor Neg Hx     Parkinsonism Neg Hx     Colon cancer Neg Hx      I have reviewed and agree with the history as documented.    E-Cigarette/Vaping    E-Cigarette Use Never User      E-Cigarette/Vaping Substances    Nicotine No     THC No     CBD No     Flavoring No     Other No     Unknown No      Social History     Tobacco Use    Smoking status: Never     Passive exposure: Past    Smokeless tobacco: Never   Vaping Use    Vaping status: Never Used   Substance Use Topics    Alcohol use: Not Currently     Comment: patient states rare use on holidays     Drug use: No       Review of Systems    Physical Exam  Physical Exam  Vitals and nursing note reviewed.   Constitutional:       General: She is not in acute distress.     Appearance: She is well-developed.      Comments: A: intact  B:  spontaneously  C: no active bleeding, palpable pulses  D: No obvious deformities  E: patient exposed   HENT:      Head: Normocephalic and atraumatic. No raccoon eyes, Fenton's sign, abrasion, contusion or laceration.   Eyes:      Extraocular Movements: Extraocular movements intact.      Conjunctiva/sclera: Conjunctivae normal.      Pupils: Pupils are equal, round, and reactive to light.   Neck:      Trachea: No tracheal deviation.   Cardiovascular:      Rate and Rhythm: Regular rhythm. Bradycardia present.      Pulses: Normal pulses.           Radial pulses are 2+ on the right side.      Heart sounds: Normal heart sounds.   Pulmonary:      Effort: Pulmonary effort is normal. No respiratory distress or retractions.      Breath sounds: Normal breath sounds.   Chest:      Chest wall: No deformity, tenderness or crepitus.   Abdominal:      General: There is no distension.      Palpations: Abdomen is soft.      Tenderness: There is no abdominal tenderness.   Musculoskeletal:         General: No tenderness or deformity. Normal range of motion.      Cervical back: Full passive range of motion without pain, normal range of motion and neck supple. No spinous process tenderness or muscular tenderness.      Right lower leg: No edema.      Left lower leg: No edema.   Skin:     General: Skin is warm and dry.      Findings: No abrasion, bruising, ecchymosis or laceration.   Neurological:      Mental Status: She is alert and oriented to person, place, and time.      GCS: GCS eye subscore is 4. GCS verbal subscore is 5. GCS motor subscore is 6.      Sensory: No sensory deficit.      Motor: No weakness.   Psychiatric:         Mood and Affect: Mood and affect normal.         Behavior: Behavior normal.         Vital Signs  ED Triage Vitals   Temperature Pulse Respirations Blood Pressure SpO2   04/30/24 1208 04/30/24 1211 04/30/24 1211 04/30/24 1211 04/30/24 1211   98.3 °F (36.8 °C) 55 16 152/70 97 %      Temp Source Heart Rate Source  Patient Position - Orthostatic VS BP Location FiO2 (%)   04/30/24 1208 04/30/24 1211 04/30/24 1211 04/30/24 1211 --   Oral Monitor Lying Right arm       Pain Score       04/30/24 1211       8           Vitals:    04/30/24 1212 04/30/24 1300 04/30/24 1315 04/30/24 1400   BP: 152/70 119/56 126/69 137/59   Pulse: 55 (!) 51 (!) 53 (!) 52   Patient Position - Orthostatic VS:  Lying           Visual Acuity  Visual Acuity      Flowsheet Row Most Recent Value   L Pupil Size (mm) 3   R Pupil Size (mm) 3            ED Medications  Medications   iohexol (OMNIPAQUE) 350 MG/ML injection (MULTI-DOSE) 100 mL (100 mL Intravenous Given 4/30/24 1228)       Diagnostic Studies  Results Reviewed       Procedure Component Value Units Date/Time    TSH, 3rd generation with Free T4 reflex [[id number]]  (Normal) Collected: 04/30/24 1235    Lab Status: Final result Specimen: Blood from Arm, Right Updated: 04/30/24 1317     TSH 3RD GENERATON 2.282 uIU/mL     HS Troponin I 2hr [[id number]]     Lab Status: No result Specimen: Blood     HS Troponin 0hr (reflex protocol) [[id number]]  (Normal) Collected: 04/30/24 1235    Lab Status: Final result Specimen: Blood from Arm, Right Updated: 04/30/24 1307     hs TnI 0hr 13 ng/L     Basic metabolic panel [[id number]]  (Abnormal) Collected: 04/30/24 1235    Lab Status: Final result Specimen: Blood from Arm, Right Updated: 04/30/24 1302     Sodium 136 mmol/L      Potassium 4.5 mmol/L      Chloride 104 mmol/L      CO2 27 mmol/L      ANION GAP 5 mmol/L      BUN 20 mg/dL      Creatinine 0.84 mg/dL      Glucose 225 mg/dL      Calcium 8.8 mg/dL      eGFR 63 ml/min/1.73sq m     Narrative:      [employer] Kid[employer] guidelines for Chronic Kidney Disease (CKD):     Stage 1 with normal or high GFR (GFR > 90 mL/min/1.73 square meters)    Stage 2 Mild CKD (GFR = 60-89 mL/min/1.73 square meters)    Stage 3A Moderate CKD (GFR = 45-59 mL/min/1.73 square meters)    Stage 3B Moderate CKD (GFR = 30-44 mL/min/1.73  square meters)    Stage 4 Severe CKD (GFR = 15-29 mL/min/1.73 square meters)    Stage 5 End Stage CKD (GFR <15 mL/min/1.73 square meters)  Note: GFR calculation is accurate only with a steady state creatinine    Protime-INR [[id number]]  (Abnormal) Collected: 04/30/24 1235    Lab Status: Final result Specimen: Blood from Arm, Right Updated: 04/30/24 1302     Protime 17.5 seconds      INR 1.36    APTT [[id number]]  (Normal) Collected: 04/30/24 1235    Lab Status: Final result Specimen: Blood from Arm, Right Updated: 04/30/24 1302     PTT 35 seconds     CBC and differential [[id number]]  (Abnormal) Collected: 04/30/24 1235    Lab Status: Final result Specimen: Blood from Arm, Right Updated: 04/30/24 1246     WBC 8.32 Thousand/uL      RBC 4.49 Million/uL      Hemoglobin 13.3 g/dL      Hematocrit 41.6 %      MCV 93 fL      MCH 29.6 pg      MCHC 32.0 g/dL      RDW 15.2 %      MPV 11.8 fL      Platelets 304 Thousands/uL      nRBC 0 /100 WBCs      Segmented % 70 %      Immature Grans % 0 %      Lymphocytes % 19 %      Monocytes % 10 %      Eosinophils Relative 1 %      Basophils Relative 0 %      Absolute Neutrophils 5.83 Thousands/µL      Absolute Immature Grans 0.03 Thousand/uL      Absolute Lymphocytes 1.56 Thousands/µL      Absolute Monocytes 0.80 Thousand/µL      Eosinophils Absolute 0.07 Thousand/µL      Basophils Absolute 0.03 Thousands/µL     Fingerstick Glucose (POCT) [[id number]]  (Abnormal) Collected: 04/30/24 1210    Lab Status: Final result Specimen: Blood Updated: 04/30/24 1211     POC Glucose 253 mg/dl                    XR Trauma chest portable   Final Result by Palak Garcia MD (04/30 1308)      No acute pulmonary pathology.      No obvious displaced fractures within limitation of supine AP chest technique.                     Workstation performed: [id number]         TRAUMA - CT head wo contrast   Final Result by Daniel Rm MD (04/30 1305)      No acute intracranial abnormality.      The  study was marked in EPIC for immediate notification.            Workstation performed: [id number]         TRAUMA - CT chest abdomen pelvis w contrast   Final Result by Daniel Rm MD (04/30 1305)      No acute finding in the chest, abdomen, or pelvis.         The study was marked in EPIC for immediate notification.      Workstation performed: [id number]                    Procedures  ECG 12 Lead Documentation Only    Date/Time: 4/30/2024 1:58 PM    Performed by: Sapphire Fitch MD  Authorized by: Sapphire Fitch MD    Indications / Diagnosis:  Syncope  ECG reviewed by me, the ED Provider: yes    Patient location:  ED  Previous ECG:     Previous ECG:  Compared to current    Comparison ECG info:  02/01/24    Similarity:  Changes noted  Interpretation:     Interpretation: abnormal    Quality:     Tracing quality:  Limited by artifact  Rate:     ECG rate:  56    ECG rate assessment: bradycardic    Rhythm:     Rhythm: sinus bradycardia    Ectopy:     Ectopy: none    QRS:     QRS axis:  Normal    QRS intervals:  Normal  Conduction:     Conduction: normal    ST segments:     ST segments:  Normal  T waves:     T waves: flattening      Flattening:  AVL, V2 and V3           ED Course                               SBIRT 20yo+      Flowsheet Row Most Recent Value   Initial Alcohol Screen: US AUDIT-C     1. How often do you have a drink containing alcohol? 0 Filed at: 04/30/2024 1210   2. How many drinks containing alcohol do you have on a typical day you are drinking?  0 Filed at: 04/30/2024 1210   3b. FEMALE Any Age, or MALE 65+: How often do you have 4 or more drinks on one occassion? 0 Filed at: 04/30/2024 1210   Audit-C Score 0 Filed at: 04/30/2024 1210   MELIA: How many times in the past year have you...    Used an illegal drug or used a prescription medication for non-medical reasons? Never Filed at: 04/30/2024 1210                      Medical Decision Making  This is an  84-year-old female who presents here today after syncopal event.  She says she had just ordered walking from her living room to the office when she started feeling lightheaded, like her vision was closing in on her and like she was going to pass out.  She was hoping to make it to the office to sit in the chair but too lightheaded before she could make it there and fell to the ground.  She thinks she might have passed out briefly.  She denies injuries from the fall or pain.  She says she had similar symptoms about 2 weeks ago where she did fall.  She is uncertain of if she hit her head then, but has been having intermittent headaches since.  She denies any new or worsening headache after the fall today, any nausea or vomiting, vision changes, focal weakness or numbness after her initial fall.  She does endorse intermittent episodes of feeling lightheaded when she is walking over the past 2 weeks but it has not otherwise been bad enough to make her fall until today.  She denies any chest pain or palpitations, shortness of breath, recent infectious symptoms, nausea, vomiting, diarrhea.  She has not no new medications or changes in medications, and says she has been taking her medications as prescribed.  She does take Eliquis.    ROS: Otherwise negative, unless stated as above.    She is well-appearing, no acute distress.  She has no focal neurologic episodes.  She has no external signs of trauma on exam.  Exam is otherwise unremarkable.  Concern given fall today is for injury related to this, including intracranial hemorrhage from Eliquis use.  However, she did have initial fall 2 weeks ago that could have led to hemorrhage at that time and persistent symptoms.  Recurrent symptoms could be due to anemia, dehydration, ISABELL, electrolyte or thyroid abnormality, ACS, dysrhythmia, orthostatic hypotension, particularly as symptoms are worse with standing.  Will check lab work and trauma scans to evaluate.    Imaging shows no  acute injuries.  Lab work shows mild hyperglycemia but is otherwise unremarkable.  Given syncopal event with fall, and recent issues with near syncope, she will be admitted as observation for further evaluation and management.  I updated her on findings and plan of care, and she expresses understanding.    Problems Addressed:  Fall, initial encounter: acute illness or injury  Sinus bradycardia: acute illness or injury  Syncope: acute illness or injury    Amount and/or Complexity of Data Reviewed  Labs: ordered. Decision-making details documented in ED Course.  Radiology: ordered and independent interpretation performed. Decision-making details documented in ED Course.  ECG/medicine tests: ordered and independent interpretation performed. Decision-making details documented in ED Course.    Risk  Prescription drug management.  Decision regarding hospitalization.             Disposition  Final diagnoses:   Syncope   Fall, initial encounter   Sinus bradycardia     Time reflects when diagnosis was documented in both MDM as applicable and the Disposition within this note       Time User Action Codes Description Comment    4/30/2024  1:55 PM Sapphire Fitch [R55] Syncope     4/30/2024  1:55 PM Sapphire Fitch [W19.XXXA] Fall, initial encounter     4/30/2024  1:55 PM Sapphire Fitch [R00.1] Sinus bradycardia           ED Disposition       ED Disposition   Admit    Condition   Stable    Date/Time   Tue Apr 30, 2024 1345    Comment   Case was discussed with Dr Echevarria and the patient's admission status was agreed to be Admission Status: observation status to the service of Dr. Echevarria.               Follow-up Information    None         Patient's Medications   Discharge Prescriptions    No medications on file       No discharge procedures on file.    PDMP Review         Value Time User    PDMP Reviewed  Yes 1/19/2024 10:46 AM Bradly Farnsworth DO            ED  Provider  Electronically Signed by             Sapphire Fitch MD  04/30/24 9935

## 2024-04-30 NOTE — ASSESSMENT & PLAN NOTE
Lab Results   Component Value Date    HGBA1C 6.8 (H) 04/25/2024       Recent Labs     04/30/24  1210   POCGLU 253*       Blood Sugar Average: Last 72 hrs:  (P) 253  Start sliding scale  Monitor glucose levels

## 2024-04-30 NOTE — ED NOTES
Provider Wale made aware of persistent bradycardia now between 33-40 BPM, slight drop in BP, and c/o of feeling weak w/ lack of strength.      Violet Solorzano RN  04/30/24 1910

## 2024-04-30 NOTE — H&P
[facility]  H&P  Name: Radha Vidal 84 y.o. female I MRN: [id number]  Unit/Bed#: TR 30 I Date of Admission: 4/30/2024   Date of Service: 4/30/2024 I Hospital Day: 0      Assessment/Plan   * Syncope  Assessment & Plan  Several episodes noted within last few weeks  Unclear etiology, but given history of Afib, placed under obs  Currently bradycardic, could be symptomatic bradycardia  Rate controlled currently, monitor on telemetry  Neuroimaging negative for acute pathology  Last ECHO with preserved EF  Hold home metoprolol for now, consider cardiology consult    Type 2 diabetes mellitus (HCC)  Assessment & Plan  Lab Results   Component Value Date    HGBA1C 6.8 (H) 04/25/2024       Recent Labs     04/30/24  1210   POCGLU 253*       Blood Sugar Average: Last 72 hrs:  (P) 253  Start sliding scale  Monitor glucose levels       Paroxysmal atrial fibrillation (HCC)  Assessment & Plan  Continue home meds  On eliquis for anticoagulation         VTE Pharmacologic Prophylaxis:   Moderate Risk (Score 3-4) - Pharmacological DVT Prophylaxis Ordered: apixaban (Eliquis).  Code Status: Level 1 - Full Code   Discussion with family: Patient declined call to .     Anticipated Length of Stay: Patient will be admitted on an observation basis with an anticipated length of stay of less than 2 midnights secondary to syncope.    Total Time Spent on Date of Encounter in care of patient: 30+ mins. This time was spent on one or more of the following: performing physical exam; counseling and coordination of care; obtaining or reviewing history; documenting in the medical record; reviewing/ordering tests, medications or procedures; communicating with other healthcare professionals and discussing with patient's family/caregivers.    Chief Complaint: syncope    History of Present Illness:  Radha Vidal is a 84 y.o. female with a PMH of afib, diabetes who presents with episodes of syncope at home. She reports  that she has no prodrome of symptoms but does feel the onset prior. Does not report chest pain or chest palpitations. No shortness of breath. No post ictal state. Was noted to be bradycardic in ED, now admitted to [facility].     Review of Systems:  Review of Systems   Constitutional:  Negative for chills and fever.   HENT:  Negative for ear pain and sore throat.    Eyes:  Negative for pain and visual disturbance.   Respiratory:  Negative for cough and shortness of breath.    Cardiovascular:  Negative for chest pain and palpitations.   Gastrointestinal:  Negative for abdominal pain and vomiting.   Genitourinary:  Negative for dysuria and hematuria.   Musculoskeletal:  Negative for arthralgias and back pain.   Skin:  Negative for color change and rash.   Neurological:  Positive for syncope. Negative for seizures.   All other systems reviewed and are negative.      Past Medical and Surgical History:   Past Medical History:   Diagnosis Date    Actinic keratosis 2016    Acute midline low back pain without sciatica 2020    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     Benign neoplasm of skin     Cancer (HCC)     skin    Chest pain     Claustrophobia     Closed compression fracture of body of L1 vertebra (HCC)     Diverticulitis     Diverticulitis     Fracture     L1-L2    GERD (gastroesophageal reflux disease)     H. pylori infection 2020    Muscle weakness     Nausea 2024    Nonmelanoma skin cancer     last assessed 2017    Palpitations     Pancreatic cyst     Seasonal allergies     Seborrheic keratosis 2014    Sleep apnea     no cpap    Spontaneous      without mention of complications     Squamous cell carcinoma of forehead 2014    Syncope        Past Surgical History:   Procedure Laterality Date    ABDOMINAL ADHESION SURGERY N/A 2023    Procedure: LYSIS ADHESIONS;  Surgeon: Kyle Julien MD;  [facility];  Service: Surgical Oncology     BOTOX INJECTION N/A 03/06/2017    Procedure: CYSTOSCOPY; BLADDER BOTOX 100 UNITS ;  Surgeon: Juan Edward MD;  [facility];  Service:     BOWEL RESECTION      related ot diverticulitis    CARDIAC CATHETERIZATION      CARDIAC CATHETERIZATION Left 2/2/2024    Procedure: Cardiac Left Heart Cath;  Surgeon: Carrington Kiser DO;  [facility];  Service: Cardiology    CARPAL TUNNEL RELEASE Right     COLONOSCOPY  07/31/2019    COLOSTOMY      COLOSTOMY CLOSURE      CYSTOSCOPY  06/01/2021    DISTAL PANCREATECTOMY N/A 07/31/2023    Procedure: DISTAL PANCREATECTOMY;  Surgeon: Kyle Julien MD;  [facility];  Service: Surgical Oncology    FOOT SURGERY Left     neuroma    HYSTERECTOMY      MYRINGOTOMY W/ TUBES Right 12/06/2023    office procedure - Dr. Grace    NAS[place] S[place]US SURG[place]  age 39    [place]    PANCREATIC CYST BIOPSY  07/31/2023    SC CYSTOURETHROSCOPY N/A 04/13/2018    Procedure: CYSTOSCOPY WITH BOTOX;  Surgeon: Juan Edward MD;  [facility];  Service: Urology    SC ESOPHAGOGASTRODUODENOSCOPY TRANSORAL DIAGNOSTIC N/A 04/23/2019    Procedure: ESOPHAGOGASTRODUODENOSCOPY (EGD);  Surgeon: Edu Dickerson DO;  [facility];  Service: Gastroenterology    SPLENECTOMY, TOTAL N/A 07/31/2023    Procedure: SPLENECTOMY;  Surgeon: Kyle Julien MD;  [facility];  Service: Surgical Oncology    TONSILLECTOMY  age 49    UPPER GASTROINTESTINAL ENDOSCOPY  04/23/2019       Meds/Allergies:  Prior to Admission medications    Medication Sig Start Date End Date Taking? Authorizing Provider   amiodarone 200 mg tablet Take 1 tablet (200 mg total) by mouth daily 2/15/24 5/15/24  Cherrie Morillo MD   amLODIPine (NORVASC) 5 mg tablet Take 1 tablet (5 mg total) by mouth daily 4/23/24   Cherrie Morillo MD   apixaban (Eliquis) 5 mg Take 1 tablet (5 mg total) by mouth 2 (two) times a day 2/15/24   Cherrie Morillo MD   Ascorbic Acid (VITAMIN C) 1000 MG tablet Take  1,000 mg by mouth daily    Historical Provider, MD   atorvastatin (LIPITOR) 40 mg tablet Take 1 tablet (40 mg total) by mouth daily with dinner 2/15/24   Cherrie Morillo MD   B Complex Vitamins (B COMPLEX 100 PO) Take 1 capsule by mouth daily after lunch not taking    Historical Provider, MD   calcium carbonate (OS-JOHN) 1250 (500 Ca) MG tablet Take 1 tablet by mouth daily after lunch    Historical Provider, MD   carbidopa-levodopa (SINEMET)  mg per tablet Take 1 tablet by mouth 3 (three) times a day before meals 10/3/23   Haley Ellis MD   cholecalciferol (VITAMIN D3) 1,000 units tablet Take 5,000 Units by mouth daily after lunch    Historical Provider, MD   Coenzyme Q10 (CO Q 10 PO) Take 1 capsule by mouth daily after lunch 600 mg BID    Historical Provider, MD   Cyanocobalamin (VITAMIN B 12 PO) Take 1 capsule by mouth daily    Historical Provider, MD   metoprolol succinate (TOPROL-XL) 25 mg 24 hr tablet Take 0.5 tablets (12.5 mg total) by mouth daily 4/25/24   Apolinar Shaw PA-C   multivitamin (THERAGRAN) TABS Take 1 tablet by mouth every morning    Historical Provider, MD   Omega-3 350 MG CPDR Take 1 capsule by mouth daily in the early morning    Historical Provider, MD   pantoprazole (PROTONIX) 20 mg tablet Take 20 mg by mouth daily    Historical Provider, MD   polyethylene glycol (MIRALAX) 17 g packet Take 17 g by mouth daily as needed (constipation if not responding to senna/docusate) 2/4/24   Naomie Kelley DO   Potassium Gluconate 595 (99 K) MG TABS Take 1 each by mouth every other day    Historical Provider, MD   Probiotic Product (PROBIOTIC-10) CAPS Take 1 capsule by mouth daily after lunch    Historical Provider, MD   trospium chloride (SANCTURA) 20 mg tablet Take 1 tablet (20 mg total) by mouth 2 (two) times a day 1/11/24   Xin Martinez PA-C     I have reviewed home medications using recent Epic encounter.    Allergies:   Allergies   Allergen Reactions    Caffeine - Food Allergy GI  Intolerance    Medical Tape Other (See Comments)    Iodine - Food Allergy Rash    Latex Rash    Other Rash     Adhesive tape         Social History:  Marital Status:    Occupation: na  Patient Pre-hospital Living Situation: Home  Patient Pre-hospital Level of Mobility: walks  Patient Pre-hospital Diet Restrictions: diabetic diet   Substance Use History:   Social History     Substance and Sexual Activity   Alcohol Use Not Currently    Comment: patient states rare use on holidays      Social History     Tobacco Use   Smoking Status Never    Passive exposure: Past   Smokeless Tobacco Never     Social History     Substance and Sexual Activity   Drug Use No       Family History:  Family History   Problem Relation Age of Onset    Alcohol abuse Mother     Heart disease Mother     Alcohol abuse Father     Alcohol abuse Brother     Cancer Brother     Tremor Neg Hx     Parkinsonism Neg Hx     Colon cancer Neg Hx        Physical Exam:     Vitals:   Blood Pressure: 131/62 (04/30/24 1500)  Pulse: (!) 52 (04/30/24 1500)  Temperature: 98.3 °F (36.8 °C) (04/30/24 1208)  Temp Source: Oral (04/30/24 1208)  Respirations: 16 (04/30/24 1500)  SpO2: 94 % (04/30/24 1500)    Physical Exam  Constitutional:       General: She is not in acute distress.     Appearance: Normal appearance. She is not toxic-appearing.   Cardiovascular:      Rate and Rhythm: Regular rhythm. Bradycardia present.      Heart sounds: Normal heart sounds. No murmur heard.  Pulmonary:      Effort: Pulmonary effort is normal. No respiratory distress.      Breath sounds: Normal breath sounds. No wheezing.   Abdominal:      General: Abdomen is flat. There is no distension.      Palpations: Abdomen is soft.      Tenderness: There is no abdominal tenderness.   Neurological:      General: No focal deficit present.      Mental Status: She is alert and oriented to person, place, and time. Mental status is at baseline.      Motor: No weakness.          Additional Data:      Lab Results:  Results from last 7 days   Lab Units 04/30/24  1235   WBC Thousand/uL 8.32   HEMOGLOBIN g/dL 13.3   HEMATOCRIT % 41.6   PLATELETS Thousands/uL 304   SEGS PCT % 70   LYMPHO PCT % 19   MONO PCT % 10   EOS PCT % 1     Results from last 7 days   Lab Units 04/30/24  1235 04/25/24  1131   SODIUM mmol/L 136 139   POTASSIUM mmol/L 4.5 4.6   CHLORIDE mmol/L 104 105   CO2 mmol/L 27 27   BUN mg/dL 20 19   CREATININE mg/dL 0.84 0.84   ANION GAP mmol/L 5 7   CALCIUM mg/dL 8.8 9.3   ALBUMIN g/dL  --  4.4   TOTAL BILIRUBIN mg/dL  --  0.91   ALK PHOS U/L  --  92   ALT U/L  --  27   AST U/L  --  24   GLUCOSE RANDOM mg/dL 225* 179*     Results from last 7 days   Lab Units 04/30/24  1235   INR  1.36*     Results from last 7 days   Lab Units 04/30/24  1210   POC GLUCOSE mg/dl 253*     Results from last 7 days   Lab Units 04/25/24  1131   HEMOGLOBIN A1C % 6.8*           Lines/Drains:  Invasive Devices       Peripheral Intravenous Line  Duration             Peripheral IV 04/30/24 Distal;Right;Upper;Ventral (anterior) Arm <1 day                        Imaging: Reviewed radiology reports from this admission including: chest CT scan, abdominal/pelvic CT, and CT head  XR Trauma chest portable   Final Result by Palak Garcia MD (04/30 1308)      No acute pulmonary pathology.      No obvious displaced fractures within limitation of supine AP chest technique.                     Workstation performed: [id number]         TRAUMA - CT head wo contrast   Final Result by Daniel Rm MD (04/30 1305)      No acute intracranial abnormality.      The study was marked in EPIC for immediate notification.            Workstation performed: [id number]         TRAUMA - CT chest abdomen pelvis w contrast   Final Result by Daniel Rm MD (04/30 1305)      No acute finding in the chest, abdomen, or pelvis.         The study was marked in EPIC for immediate notification.      Workstation performed: [id number]              EKG and Other Studies Reviewed on Admission:   EKG:  pending upload into epic chart.    ** Please Note: This note has been constructed using a voice recognition system. **

## 2024-05-01 LAB
ANION GAP SERPL CALCULATED.3IONS-SCNC: 7 MMOL/L (ref 4–13)
BUN SERPL-MCNC: 19 MG/DL (ref 5–25)
CALCIUM SERPL-MCNC: 9 MG/DL (ref 8.4–10.2)
CHLORIDE SERPL-SCNC: 108 MMOL/L (ref 96–108)
CO2 SERPL-SCNC: 25 MMOL/L (ref 21–32)
CREAT SERPL-MCNC: 0.87 MG/DL (ref 0.6–1.3)
ERYTHROCYTE [DISTWIDTH] IN BLOOD BY AUTOMATED COUNT: 15 % (ref 11.6–15.1)
GFR SERPL CREATININE-BSD FRML MDRD: 60 ML/MIN/1.73SQ M
GLUCOSE P FAST SERPL-MCNC: 115 MG/DL (ref 65–99)
GLUCOSE SERPL-MCNC: 115 MG/DL (ref 65–140)
GLUCOSE SERPL-MCNC: 123 MG/DL (ref 65–140)
GLUCOSE SERPL-MCNC: 124 MG/DL (ref 65–140)
GLUCOSE SERPL-MCNC: 140 MG/DL (ref 65–140)
HCT VFR BLD AUTO: 38.1 % (ref 34.8–46.1)
HGB BLD-MCNC: 12.3 G/DL (ref 11.5–15.4)
MAGNESIUM SERPL-MCNC: 2.1 MG/DL (ref 1.9–2.7)
MCH RBC QN AUTO: 29.6 PG (ref 26.8–34.3)
MCHC RBC AUTO-ENTMCNC: 32.3 G/DL (ref 31.4–37.4)
MCV RBC AUTO: 92 FL (ref 82–98)
PLATELET # BLD AUTO: 277 THOUSANDS/UL (ref 149–390)
PMV BLD AUTO: 11 FL (ref 8.9–12.7)
POTASSIUM SERPL-SCNC: 4 MMOL/L (ref 3.5–5.3)
RBC # BLD AUTO: 4.15 MILLION/UL (ref 3.81–5.12)
SODIUM SERPL-SCNC: 140 MMOL/L (ref 135–147)
WBC # BLD AUTO: 8.8 THOUSAND/UL (ref 4.31–10.16)

## 2024-05-01 PROCEDURE — 83735 ASSAY OF MAGNESIUM: CPT | Performed by: STUDENT IN AN ORGANIZED HEALTH CARE EDUCATION/TRAINING PROGRAM

## 2024-05-01 PROCEDURE — 99223 1ST HOSP IP/OBS HIGH 75: CPT | Performed by: INTERNAL MEDICINE

## 2024-05-01 PROCEDURE — 82948 REAGENT STRIP/BLOOD GLUCOSE: CPT

## 2024-05-01 PROCEDURE — 80048 BASIC METABOLIC PNL TOTAL CA: CPT | Performed by: STUDENT IN AN ORGANIZED HEALTH CARE EDUCATION/TRAINING PROGRAM

## 2024-05-01 PROCEDURE — 85027 COMPLETE CBC AUTOMATED: CPT | Performed by: STUDENT IN AN ORGANIZED HEALTH CARE EDUCATION/TRAINING PROGRAM

## 2024-05-01 RX ADMIN — CARBIDOPA AND LEVODOPA 1 TABLET: 25; 100 TABLET ORAL at 11:33

## 2024-05-01 RX ADMIN — DOCUSATE SODIUM 100 MG: 100 CAPSULE, LIQUID FILLED ORAL at 17:50

## 2024-05-01 RX ADMIN — CARBIDOPA AND LEVODOPA 1 TABLET: 25; 100 TABLET ORAL at 17:50

## 2024-05-01 RX ADMIN — SENNOSIDES 8.6 MG: 8.6 TABLET, FILM COATED ORAL at 08:21

## 2024-05-01 RX ADMIN — DOCUSATE SODIUM 100 MG: 100 CAPSULE, LIQUID FILLED ORAL at 08:21

## 2024-05-01 RX ADMIN — APIXABAN 5 MG: 5 TABLET, FILM COATED ORAL at 17:50

## 2024-05-01 RX ADMIN — CARBIDOPA AND LEVODOPA 1 TABLET: 25; 100 TABLET ORAL at 08:21

## 2024-05-01 RX ADMIN — APIXABAN 5 MG: 5 TABLET, FILM COATED ORAL at 08:21

## 2024-05-01 NOTE — QUICK NOTE
Reached out to cardiology regarding persistent bradycardia with systolic between 30 to 40s and soft blood pressure.  Recommended to hold metoprolol, amiodarone.  Will also hold amlodipine given soft blood pressure.    Monitor on telemetry  Hold metoprolol, amiodarone, amlodipine  Critical care informed  Monitor for symptoms overnight  Cardiology consulted

## 2024-05-01 NOTE — CONSULTS
Consultation - Cardiology   Radha Vidal 84 y.o. female MRN: [id number]  Unit/Bed#: -01 Encounter: [id number]  05/01/24  2:32 PM    Assessment/ Plan:    Syncopal episodes  Likely related to bradycardia  Recommend medication wash-out period - Continue to hold metoprolol, amiodarone, as well as amlodipine  Continue to monitor on telemetry  Patient will likely need outpatient event monitor    2. Sinus bradycardia  See #1    3. Paroxysmal atrial fibrillation  Continue anticoagulation with Eliquis.  [id number] 4  Hold off rate control per #1    4.  Hyperlipidemia  She is not on a statin at home.  Continue dietary control    5.  Dilated ascending aorta  No thoracic aortic aneurysm on CT 4/30/2024  Recommend continued outpatient surveillance    6.  DM 2 (A1c 6.8)    7.  Parkinson's disease      History of Present Illness   Physician Requesting Consult: Janes Irby MD    Reason for Consult / Principal Problem: Syncope    HPI: Radha Vidal is a 84 y.o. year old female with past medical history of paroxysmal atrial fibrillation, tendency for bradycardia noted in the past, hyperlipidemia, dilated ascending aorta, type II DM, Parkinson's disease who presents with syncopal episodes.  She states yesterday she was walking when she experienced sudden onset head pressure, lightheadedness, and subsequently passed out.  She states she had a similar episode 2 weeks ago, at which time she was walking and experienced sudden onset head pressure, lightheadedness, and subsequently passed out.  With the syncopal episode yesterday, patient states she believes she was only out for a few minutes at the longest, needing consciousness on her own.  She states she subsequently called for an ambulance presented to the ER.  She states has been experiencing increased fatigue over the past couple of weeks.  She otherwise denies chest pain, shortness of breath, lower extremity edema.  She has been noted to be in sinus bradycardia with  rates in the 30s-50s.  She did have soft BPs yesterday evening.  Her home metoprolol, amlodipine, amiodarone have been held.  She continues to have persistent sinus bradycardia.    She was started on metoprolol and amiodarone during hospitalization in February 2024 for atrial fibrillation with RVR.    Last TTE 1/2024 with EF 60%, systolic function normal, wall motion normal, mild MR, mild TR  Cardiac catheterization 2/2024 with no significant obstructive epicardial CAD    Family history: Mother-heart disease    She follows with Dr. Morillo as her outpatient cardiologist.    Inpatient consult to Cardiology  Consult performed by: Alexis Drummond PA-C  Consult ordered by: Janes Irby MD        EKG: Sinus bradycardia w PACs in [facility], nonspecific ST abnormality,   Tele: Sinus bradycardia    Review of Systems   Constitutional:  Positive for fatigue. Negative for chills, diaphoresis, fever and unexpected weight change.   HENT:  Negative for ear pain and sore throat.    Eyes:  Negative for pain and redness.   Respiratory:  Negative for cough and shortness of breath.    Cardiovascular:  Negative for chest pain, palpitations and leg swelling.   Gastrointestinal:  Negative for abdominal pain, nausea and vomiting.   Genitourinary:  Negative for dysuria.   Skin:  Negative for color change and rash.   Neurological:  Positive for syncope and light-headedness.   Hematological:  Does not bruise/bleed easily.   Psychiatric/Behavioral:  Negative for agitation and behavioral problems.    All other systems reviewed and are negative.      Historical Information   Past Medical History:   Diagnosis Date    Actinic keratosis 03/11/2016    Acute midline low back pain without sciatica 11/19/2020    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     Benign neoplasm of skin     Cancer (HCC)     skin    Chest pain     Claustrophobia     Closed compression fracture of body of L1 vertebra (HCC)      Diverticulitis     Diverticulitis     Fracture     L1-L2    GERD (gastroesophageal reflux disease)     H. pylori infection 2020    Muscle weakness     Nausea 2024    Nonmelanoma skin cancer     last assessed 2017    Palpitations     Pancreatic cyst     Seasonal allergies     Seborrheic keratosis 2014    Sleep apnea     no cpap    Spontaneous      without mention of complications     Squamous cell carcinoma of forehead 2014    Syncope      Past Surgical History:   Procedure Laterality Date    ABDOMINAL ADHESION SURGERY N/A 2023    Procedure: LYSIS ADHESIONS;  Surgeon: Kyle Julien MD;  [facility];  Service: Surgical Oncology    BOTOX INJECTION N/A 2017    Procedure: CYSTOSCOPY; BLADDER BOTOX 100 UNITS ;  Surgeon: Juan Edward MD;  [facility];  Service:     BOWEL RESECTION      related ot diverticulitis    CARDIAC CATHETERIZATION      CARDIAC CATHETERIZATION Left 2024    Procedure: Cardiac Left Heart Cath;  Surgeon: Carrington Kiser DO;  [facility];  Service: Cardiology    CARPAL TUNNEL RELEASE Right     COLONOSCOPY  2019    COLOSTOMY      COLOSTOMY CLOSURE      CYSTOSCOPY  2021    DISTAL PANCREATECTOMY N/A 2023    Procedure: DISTAL PANCREATECTOMY;  Surgeon: Kyle Julien MD;  [facility];  Service: Surgical Oncology    FOOT SURGERY Left     neuroma    HYSTERECTOMY      MYRINGOTOMY W/ TUBES Right 2023    office procedure - Dr. Grace    NASAL SINUS SURGERY  age 39    [place]    PANCREATIC CYST BIOPSY  2023    LA CYSTOURETHROSCOPY N/A 2018    Procedure: CYSTOSCOPY WITH BOTOX;  Surgeon: Juan Edward MD;  [facility];  Service: Urology    LA ESOPHAGOGASTRODUODENOSCOPY TRANSORAL DIAGNOSTIC N/A 2019    Procedure: ESOPHAGOGASTRODUODENOSCOPY (EGD);  Surgeon: Edu Dickerson DO;  [facility];  Service: Gastroenterology    SPLENECTOMY, TOTAL N/A  2023    Procedure: SPLENECTOMY;  Surgeon: Kyle Julien MD;  [facility];  Service: Surgical Oncology    TONSILLECTOMY  age 49    UPPER GASTROINTESTINAL ENDOSCOPY  2019     Social History     Substance and Sexual Activity   Alcohol Use Not Currently    Comment: patient states rare use on holidays      Social History     Substance and Sexual Activity   Drug Use No     Social History     Tobacco Use   Smoking Status Never    Passive exposure: Past   Smokeless Tobacco Never       Family History:   Family History   Problem Relation Age of Onset    Alcohol abuse Mother     Heart disease Mother     Alcohol abuse Father     Alcohol abuse Brother     Cancer Brother     Tremor Neg Hx     Parkinsonism Neg Hx     Colon cancer Neg Hx        Meds/Allergies   all current active meds have been reviewed  Allergies   Allergen Reactions    Caffeine - Food Allergy GI Intolerance    Medical Tape Other (See Comments)    Iodine - Food Allergy Rash    Latex Rash    Other Rash     Adhesive tape         Objective   Vitals: Blood pressure 133/63, pulse (!) 46, temperature 98.1 °F (36.7 °C), resp. rate 16, weight 75 kg (165 lb 5.5 oz), SpO2 96%, not currently breastfeeding., Body mass index is 31.24 kg/m².,   Orthostatic Blood Pressures      Flowsheet Row Most Recent Value   Blood Pressure 133/63 filed at 2024 1155   Patient Position - Orthostatic VS Lying filed at 2024 0200            Systolic (24hrs), Av , Min:95 , Max:139     Diastolic (24hrs), Av, Min:48, Max:65        Intake/Output Summary (Last 24 hours) at 2024 1432  Last data filed at 2024 0926  Gross per 24 hour   Intake 240 ml   Output 1570 ml   Net -1330 ml       Invasive Devices       Peripheral Intravenous Line  Duration             Peripheral IV 24 Distal;Right;Upper;Ventral (anterior) Arm 1 day                        Physical Exam:    GEN: Alert and oriented x 3, in no acute distress.  Well appearing and well nourished.    HEENT: Sclera anicteric, conjunctivae pink, mucous membranes moist. Oropharynx clear.   NECK: Supple, no significant JVD. Trachea midline, no thyromegaly.   HEART: Bradycardic, regular rhythm, normal S1 and S2, no murmurs, clicks, gallops or rubs. PMI nondisplaced, no thrills.   LUNGS: Clear to auscultation bilaterally; no wheezes, rales, or rhonchi. No increased work of breathing or signs of respiratory distress.   ABDOMEN: Soft, nontender, non-distended.   EXTREMITIES: Skin warm and well perfused, no clubbing, cyanosis, or edema.  NEURO: No focal findings. Normal speech. Mood and affect normal.   SKIN: Normal without suspicious lesions on exposed skin.      Lab Results:     Troponins:   Results from last 7 days   Lab Units 04/30/24  1634 04/30/24  1511 04/30/24  1235   HS TNI 0HR ng/L  --   --  13   HS TNI 2HR ng/L  --  40  --    HS TNI 4HR ng/L 37  --   --    HSTNI D4 ng/L 24*  --   --        CBC with diff:   Results from last 7 days   Lab Units 05/01/24  0429 04/30/24  1235 04/25/24  1131   WBC Thousand/uL 8.80 8.32 8.81   HEMOGLOBIN g/dL 12.3 13.3 12.7   HEMATOCRIT % 38.1 41.6 39.4   MCV fL 92 93 93   PLATELETS Thousands/uL 277 304 313   RBC Million/uL 4.15 4.49 4.22   MCH pg 29.6 29.6 30.1   MCHC g/dL 32.3 32.0 32.2   RDW % 15.0 15.2* 15.2*   MPV fL 11.0 11.8 12.3   NRBC AUTO /100 WBCs  --  0 0         CMP:   Results from last 7 days   Lab Units 05/01/24  0429 04/30/24  1235 04/25/24  1131   POTASSIUM mmol/L 4.0 4.5 4.6   CHLORIDE mmol/L 108 104 105   CO2 mmol/L 25 27 27   BUN mg/dL 19 20 19   CREATININE mg/dL 0.87 0.84 0.84   CALCIUM mg/dL 9.0 8.8 9.3   AST U/L  --   --  24   ALT U/L  --   --  27   ALK PHOS U/L  --   --  92   EGFR ml/min/1.73sq m 60 63 79

## 2024-05-01 NOTE — PLAN OF CARE
Problem: SAFETY ADULT  Goal: Patient will remain free of falls  Description: INTERVENTIONS:  - Educate patient/family on patient safety including physical limitations  - Instruct patient to call for assistance with activity   - Consult OT/PT to assist with strengthening/mobility   - Keep Call bell within reach  - Keep bed low and locked with side rails adjusted as appropriate  - Keep care items and personal belongings within reach  - Initiate and maintain comfort rounds  - Make Fall Risk Sign visible to staff  - Offer Toileting every 2 Hours, in advance of need  - Initiate/Maintain bed/ chair alarm  - Obtain necessary fall risk management equipment:   - Apply yellow socks and bracelet for high fall risk patients  - Consider moving patient to room near nurses station  Outcome: Progressing

## 2024-05-01 NOTE — PLAN OF CARE
Problem: Knowledge Deficit  Goal: Patient/family/caregiver demonstrates understanding of disease process, treatment plan, medications, and discharge instructions  Description: Complete learning assessment and assess knowledge base.  Interventions:  - Provide teaching at level of understanding  - Provide teaching via preferred learning methods  Outcome: Progressing     Problem: Knowledge Deficit  Goal: Patient/family/caregiver demonstrates understanding of disease process, treatment plan, medications, and discharge instructions  Description: Complete learning assessment and assess knowledge base.  Interventions:  - Provide teaching at level of understanding  - Provide teaching via preferred learning methods  Outcome: Progressing     Problem: CARDIOVASCULAR - ADULT  Goal: Maintains optimal cardiac output and hemodynamic stability  Description: INTERVENTIONS:  - Monitor I/O, vital signs and rhythm  - Monitor for S/S and trends of decreased cardiac output  - Administer and titrate ordered vasoactive medications to optimize hemodynamic stability  - Assess quality of pulses, skin color and temperature  - Assess for signs of decreased coronary artery perfusion  - Instruct patient to report change in severity of symptoms  Outcome: Progressing  Goal: Absence of cardiac dysrhythmias or at baseline rhythm  Description: INTERVENTIONS:  - Continuous cardiac monitoring, vital signs, obtain 12 lead EKG if ordered  - Administer antiarrhythmic and heart rate control medications as ordered  - Monitor electrolytes and administer replacement therapy as ordered  Outcome: Progressing

## 2024-05-01 NOTE — UTILIZATION REVIEW
Initial Clinical Review      OBS order 4/30 1358 converted to IP on 5/1 0922 for continued cardiac workup     Admission: Date/Time/Statement:   Admission Orders (From admission, onward)       Ordered        05/01/24 0922  INPATIENT ADMISSION  Once            04/30/24 1358  Place in Observation  Once                           INPATIENT ADMISSION     Standing Status:   Standing     Number of Occurrences:   1     Order Specific Question:   Level of Care     Answer:   Med Surg [16]     Order Specific Question:   Estimated length of stay     Answer:   More than 2 Midnights     Order Specific Question:   Certification     Answer:   I certify that inpatient services are medically necessary for this patient for a duration of greater than two midnights. See H&P and MD Progress Notes for additional information about the patient's course of treatment.     ED Arrival Information       Expected   -    Arrival   4/30/2024 12:05    Acuity   Emergent              Means of arrival   Ambulance    Escorted by   [facility]    Service   Hospitalist    Admission type   Emergency              Arrival complaint   Fall             Chief Complaint   Patient presents with    Fall     PT arrives VIA EMS from home. Unwitnessed fall today onto R side on carpet PER PT. +BT. +LOC, -HS, however PT complaining of headache. Denies pain to R side.        Initial Presentation: 84 y.o. female to ED from home via EMS w/ PMH of afib, diabetes who presents with episodes of syncope at home. in ED found to bradycardic . Unclear etiology . Admitted OBS status for syncope plan for tele ,lopressor , consider cardiology consult . DM SSI and monitor . Afib cont home meds , eliquis .     4/30 Quick Note   Reached out to cardiology regarding persistent bradycardia with systolic between 30 to 40s and soft blood pressure. Recommended to hold metoprolol, amiodarone. Will also hold amlodipine given soft blood pressure.     Anticipated Length of  Stay/Certification Statement: Patient will be admitted on an observation basis with an anticipated length of stay of less than 2 midnights secondary to syncope.     5/1 Cardiology Consult   Syncopal episodes likely r/t bradycardia . Rec medication wash out period . Hold metoprolol, amiodarone, as well as amlodipine . Monitor tele . Afib cont anticoagulation w/ eliquis . HLD not on a statin at home . Diet control .     Date: 5/2  Day 3: Has surpassed a 2nd midnight with active treatments and services. plan for zio patch vs pacemaker  . Cont tele . Anticipate DC to home tomorrow . Per cardiology syncope is likely related to bradycardia cont to hole lopressor and amiodarone and amlodipine . Cont to c/o fatigue and feeling weak .       ED Triage Vitals   Temperature Pulse Respirations Blood Pressure SpO2   04/30/24 1208 04/30/24 1211 04/30/24 1211 04/30/24 1211 04/30/24 1211   98.3 °F (36.8 °C) 55 16 152/70 97 %      Temp Source Heart Rate Source Patient Position - Orthostatic VS BP Location FiO2 (%)   04/30/24 1208 04/30/24 1211 04/30/24 1211 04/30/24 1211 --   Oral Monitor Lying Right arm       Pain Score       04/30/24 1211       8          Wt Readings from Last 1 Encounters:   05/01/24 75 kg (165 lb 5.5 oz)     Additional Vital Signs:              05/02/24 11:10:34 98.2 °F (36.8 °C) 43 Abnormal  18 100/50 67 96 % -- --   05/02/24 07:31:29 98 °F (36.7 °C) 52 Abnormal  16 123/60 81 95 % -- --   05/02/24 02:56:04 97.6 °F (36.4 °C) 53 Abnormal  16 116/55 75 94 % -- --   05/01/24 21:41:59 98.5 °F (36.9 °C) 54 Abnormal  16 119/57 78 92 % -- --   05/01/24 1940 -- -- -- -- -- 94 % None (Room air) --   05/01/24 14:54:26 98.3 °F (36.8 °C) 54 Abnormal  -- 118/57 77 92 % -- --   05/01/24 11:55:49 -- 46 Abnormal  -- 133/63 86 96 % -- --   05/01/24 07:40:33 98.1 °F (36.7 °C) 46 Abnormal  16 134/64 87 96 % -- --   05/01/24 0740 98.1 °F (36.7 °C) -- -- -- -- 96 % None (Room air)        05/01/24 0345 -- -- -- -- -- 92 % None (Room  air) --   05/01/24 03:34:02 98.2 °F (36.8 °C) 51 Abnormal  15 139/65 90 93 % -- --   05/01/24 0200 -- 44 Abnormal  16 118/53 77 93 % None (Room air) Lying   05/01/24 0100 -- 45 Abnormal  16 105/52 75 94 % None (Room air) Lying   05/01/24 0000 -- 46 Abnormal  16 109/53 77 93 % None (Room air) Lying   04/30/24 2300 -- 45 Abnormal   18 111/56 80 94 % None (Room air) Lying   Pulse: provider made aware of pt HR, pt asymptomatic at 04/30/24 2300   04/30/24 2200 -- 49 Abnormal  16 116/58 84 94 % None (Room air) Lying   04/30/24 2100 -- 44 Abnormal  16 105/52 75 95 % None (Room air) Lying   04/30/24 1930 -- 41 Abnormal  18 98/50 72 93 % None (Room air) Lying   04/30/24 1903 -- -- -- -- -- 93 % -- --   04/30/24 1900 -- 40 Abnormal  15 100/48 Abnormal  69 94 % None (Room air) Sitting   04/30/24 1815 -- 37 Abnormal  14 95/53 67 93 % -- --   04/30/24 1800 -- 40 Abnormal  16 102/63 78 94 % None (Room air) Sitting   04/30/24 1715 -- 40 Abnormal  -- -- -- 95 % -- --   04/30/24 1700 -- 46 Abnormal  15 119/57 82 96 % None (Room air) Sitting   04/30/24 1645 -- 52 Abnormal  17 -- -- 96 % -- --   04/30/24 1600 -- 52 Abnormal  16 127/58 83 96 % None (Room air) Lying   04/30/24 1500 -- 52 Abnormal  16 131/62 89 94 % None (Room air) Lying   04/30/24 1400 -- 52 Abnormal  15 137/59 -- 98 % None (Room air) --   04/30/24 1315 -- 53 Abnormal  16 126/69 -- 98 % None (Room air) --   04/30/24 1300 -- 51 Abnormal  15 119/56 -- 97 % None (Room air) Lying   04/30/24 1212 -- 55 16 152/70 -- 97 % None (Room air) --   04/30/24 1211 -- 55 16 152/70 100 97 % None (Room air) Lying     Pertinent Labs/Diagnostic Test Results:   4/30 EKG  ECG rate:  56     ECG rate assessment: bradycardic    Rhythm:     Rhythm: sinus bradycardia    Ectopy:     Ectopy: none    QRS:     QRS axis:  Normal     QRS intervals:  Normal   Conduction:     Conduction: normal    ST segments:     ST segments:  Normal   T waves:     T waves: flattening      Flattening:  AVL, V2 and V3    XR Trauma chest portable   Final Result by Palak Garcia MD (04/30 1308)      No acute pulmonary pathology.      No obvious displaced fractures within limitation of supine AP chest technique.                     Workstation performed: [id number]         TRAUMA - CT head wo contrast   Final Result by Daniel Rm MD (04/30 1305)      No acute intracranial abnormality.      The study was marked in EPIC for immediate notification.            Workstation performed: [id number]         TRAUMA - CT chest abdomen pelvis w contrast   Final Result by Daniel Rm MD (04/30 1305)      No acute finding in the chest, abdomen, or pelvis.         The study was marked in EPIC for immediate notification.      Workstation performed: [id number]               Results from last 7 days   Lab Units 05/01/24  0429 04/30/24  1235   WBC Thousand/uL 8.80 8.32   HEMOGLOBIN g/dL 12.3 13.3   HEMATOCRIT % 38.1 41.6   PLATELETS Thousands/uL 277 304   TOTAL NEUT ABS Thousands/µL  --  5.83         Results from last 7 days   Lab Units 05/01/24  0429 04/30/24  1235   SODIUM mmol/L 140 136   POTASSIUM mmol/L 4.0 4.5   CHLORIDE mmol/L 108 104   CO2 mmol/L 25 27   ANION GAP mmol/L 7 5   BUN mg/dL 19 20   CREATININE mg/dL 0.87 0.84   EGFR ml/min/1.73sq m 60 63   CALCIUM mg/dL 9.0 8.8   MAGNESIUM mg/dL 2.1 1.9     Results from last 7 days   Lab Units 05/01/24  1549 05/01/24  1154 05/01/24  0738 04/30/24  2110 04/30/24  1609 04/30/24  1210   POC GLUCOSE mg/dl 123 140 124 137 126 253*     Results from last 7 days   Lab Units 05/01/24  0429 04/30/24  1235   GLUCOSE RANDOM mg/dL 115 225*         Results from last 7 days   Lab Units 04/30/24  1634 04/30/24  1511 04/30/24  1235   HS TNI 0HR ng/L  --   --  13   HS TNI 2HR ng/L  --  40  --    HSTNI D2 ng/L  --  27*  --    HS TNI 4HR ng/L 37  --   --    HSTNI D4 ng/L 24*  --   --        Results from last 7 days   Lab Units 04/30/24  1235   PROTIME seconds 17.5*   INR  1.36*   PTT  seconds 35     Results from last 7 days   Lab Units 24  1235   TSH 3RD GENERATON uIU/mL 2.282       ED Treatment:   Medication Administration from 2024 1205 to 2024 0314         Date/Time Order Dose Route Action     2024 1851 EDT docusate sodium (COLACE) capsule 100 mg 100 mg Oral Given     2024 1623 EDT senna (SENOKOT) tablet 8.6 mg 8.6 mg Oral Given     2024 1851 EDT apixaban (ELIQUIS) tablet 5 mg 5 mg Oral Given     2024 1623 EDT carbidopa-levodopa (SINEMET)  mg per tablet 1 tablet 1 tablet Oral Given          Past Medical History:   Diagnosis Date    Actinic keratosis 2016    Acute midline low back pain without sciatica 2020    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     Benign neoplasm of skin     Cancer (HCC)     skin    Chest pain     Claustrophobia     Closed compression fracture of body of L1 vertebra (HCC)     Diverticulitis     Diverticulitis     Fracture     L1-L2    GERD (gastroesophageal reflux disease)     H. pylori infection 2020    Muscle weakness     Nausea 2024    Nonmelanoma skin cancer     last assessed 2017    Palpitations     Pancreatic cyst     Seasonal allergies     Seborrheic keratosis 2014    Sleep apnea     no cpap    Spontaneous      without mention of complications     Squamous cell carcinoma of forehead 2014    Syncope      Present on Admission:   Paroxysmal atrial fibrillation (HCC)   Type 2 diabetes mellitus (HCC)      Admitting Diagnosis: Syncope [R55]  Sinus bradycardia [R00.1]  Symptomatic bradycardia [R00.1]  Fall, initial encounter [W19.XXXA]  Headache [R51.9]  Age/Sex: 84 y.o. female  Admission Orders:  Scheduled Medications:  apixaban, 5 mg, Oral, BID  carbidopa-levodopa, 1 tablet, Oral, TID AC  docusate sodium, 100 mg, Oral, BID  insulin lispro, 1-6 Units, Subcutaneous, HS  senna, 1 tablet, Oral, Daily      Continuous IV Infusions:     PRN  Meds:  acetaminophen, 650 mg, Oral, Q6H PRN  calcium carbonate, 1,000 mg, Oral, Daily PRN  trimethobenzamide, 200 mg, Intramuscular, Q6H PRN    Fingerstick ac and hs   Tele   I&O   Up and OOB       IP CONSULT TO CARDIOLOGY    Network Utilization Review Department  ATTENTION: Please call with any questions or concerns to [phone] and carefully listen to the prompts so that you are directed to the right person. All voicemails are confidential.   For Discharge needs, contact Care Management DC Support Team at [phone] opt. 2  Send all requests for admission clinical reviews, approved or denied determinations and any other requests to dedicated fax number below belonging to the campus where the patient is receiving treatment. List of dedicated fax numbers for the Facilities:  FACILITY NAME UR FAX NUMBER   ADMISSION DENIALS (Administrative/Medical Necessity) [phone]   DISCHARGE SUPPORT TEAM (NETWORK) [phone]   PARENT CHILD HEALTH (Maternity/NICU/Pediatrics) [phone]   [facility] [phone]   [facility] [phone]   [facility] [phone]   [facility] [phone]   [facility] [phone]   [facility] [phone]   [facility] [phone]   [facility] [phone]   [facility] [phone]   [facility] [phone]   [facility] [phone]   [facility] [phone]

## 2024-05-01 NOTE — CASE MANAGEMENT
Case Management Discharge Planning Note    Patient name Radha Vidal  Location /-01 MRN [id number]  : 1940 Date 2024       Current Admission Date: 2024  Current Admission Diagnosis:Syncope   Patient Active Problem List    Diagnosis Date Noted    Syncope 2024    Small bowel obstruction (HCC) 2024    Elevated troponin 2024    Ambulatory dysfunction 2024    Ear pressure, right 2024    Type 2 diabetes mellitus (HCC) 01/15/2024    Paroxysmal atrial fibrillation (HCC) 2024    s/p distal pancreatectomy/splenectomy 2023    Urge incontinence 2021    Age-related osteoporosis without current pathological fracture 2021    Cerebrovascular disease 2020    IPMN (intraductal papillary mucinous neoplasm) 2020    Trochanteric bursitis, right hip 2020    Tremor 2020    Chronic idiopathic constipation 2019    History of adenomatous polyp of colon 2019    Osteopenia 2019    Gastro-esophageal reflux disease without esophagitis 2019    Dysphagia 2019    Multiple thyroid nodules 2019    Menopause ovarian failure 2019    Vitamin D deficiency 2019    Mitral valve disorder 2018    Mood disturbance 2018    Obesity (BMI 30-39.9) 2018    Claustrophobia 2018    Impaired fasting glucose 2018    Seasonal allergic rhinitis 2018    History of skin cancer 2018    Parkinson's disease 2018    Primary insomnia 2018    Cherry angioma 11/15/2017    Postablative hypothyroidism 2016    OAB (overactive bladder) 2015    Sleep apnea 04/15/2014      LOS (days): 0  Geometric Mean LOS (GMLOS) (days): 1.8  Days to GMLOS:1.5     OBJECTIVE:  Risk of Unplanned Readmission Score: 14.83         Current admission status: Inpatient   Preferred Pharmacy:   [facility] [address]  [facility] [address]  Phone: [phone] Fax: [phone]    [facility] [place] - [address]  [address]  [address]  [place] [address]  Phone: [phone] Fax: [phone]    [facility] [place] - [address]  [address]  [place] [address]  Phone: [phone] Fax: [phone]    Primary Care Provider: Hoang Tovar MD    Primary Insurance: [employer] [facility]  Secondary Insurance:     DISCHARGE DETAILS:    Discharge planning discussed with:: patient's daughter over the phone  Freedom of Choice: Yes  Comments - Freedom of Choice: CM maintained freedom of choice as it pertains to discharge planning. Cm received phone call back from patient's daughter  who reports she doesn't feel her mother will need HHC at this time, but will let CM prior to discharge if she changes her mind, she reports her mother recently had weeks of home health care and she feels her mother is at her functional baseline.  CM contacted family/caregiver?: Yes  Were Treatment Team discharge recommendations reviewed with patient/caregiver?: Yes  Did patient/caregiver verbalize understanding of patient care needs?: Yes  Were patient/caregiver advised of the risks associated with not following Treatment Team discharge recommendations?: Yes    Contacts  Patient Contacts: Melba  Relationship to Patient:: Family  Contact Method: Phone  Phone Number: [phone]  Reason/Outcome: Emergency Contact, Continuity of Care, Referral, Discharge Planning

## 2024-05-01 NOTE — CASE MANAGEMENT
Case Management Assessment & Discharge Planning Note    Patient name Radha Vidal  Location /-01 MRN [id number]  : 1940 Date 2024       Current Admission Date: 2024  Current Admission Diagnosis:Syncope   Patient Active Problem List    Diagnosis Date Noted    Syncope 2024    Small bowel obstruction (HCC) 2024    Elevated troponin 2024    Ambulatory dysfunction 2024    Ear pressure, right 2024    Type 2 diabetes mellitus (HCC) 01/15/2024    Paroxysmal atrial fibrillation (HCC) 2024    s/p distal pancreatectomy/splenectomy 2023    Urge incontinence 2021    Age-related osteoporosis without current pathological fracture 2021    Cerebrovascular disease 2020    IPMN (intraductal papillary mucinous neoplasm) 2020    Trochanteric bursitis, right hip 2020    Tremor 2020    Chronic idiopathic constipation 2019    History of adenomatous polyp of colon 2019    Osteopenia 2019    Gastro-esophageal reflux disease without esophagitis 2019    Dysphagia 2019    Multiple thyroid nodules 2019    Menopause ovarian failure 2019    Vitamin D deficiency 2019    Mitral valve disorder 2018    Mood disturbance 2018    Obesity (BMI 30-39.9) 2018    Claustrophobia 2018    Impaired fasting glucose 2018    Seasonal allergic rhinitis 2018    History of skin cancer 2018    Parkinson's disease 2018    Primary insomnia 2018    Cherry angioma 11/15/2017    Postablative hypothyroidism 2016    OAB (overactive bladder) 2015    Sleep apnea 04/15/2014      LOS (days): 0  Geometric Mean LOS (GMLOS) (days): 1.8  Days to GMLOS:1.5     OBJECTIVE:    Risk of Unplanned Readmission Score: 14.83         Current admission status: Inpatient       Preferred Pharmacy:   [facility] [place] [address]  [facility] [address]  Phone: [phone] Fax: [phone]    [facility] [place] - [address]  [address]  [address]  [place] [address]  Phone: [phone] Fax: [phone]    [facility] [place] [place] - [address]  [address]  [place] [address]  Phone: [phone] Fax: [phone]    Primary Care Provider: Hoang Tovar MD    Primary Insurance: [employer]  Secondary Insurance:     ASSESSMENT:  Active Health Care Proxies       VidalMelba Health Care Agent - Daughter   Primary Phone: [phone] (Mobile)                 Advance Directives  Does patient have a Health Care POA?: Yes  Does patient have Advance Directives?: Yes  Primary Contact: Melba (Daughter)  [phone]         Readmission Root Cause  30 Day Readmission: No    Patient Information  Admitted from:: Home  Mental Status: Alert  During Assessment patient was accompanied by: Not accompanied during assessment  Assessment information provided by:: Patient  Primary Caregiver: Self  Support Systems: Daughter, Family members  County of Residence: [place]  What city do you live in?: [place]  Home entry access options. Select all that apply.: Stairs  Number of steps to enter home.: 5  Do the steps have railings?: Yes  Type of Current Residence: [facility]  Living Arrangements: Lives Alone  Is patient a ?: No    Activities of Daily Living Prior to Admission  Functional Status: Independent  Completes ADLs independently?: Yes  Ambulates independently?: Yes  Does patient use assisted devices?: No  Does patient currently own DME?: Yes  What DME does the patient currently own?: Walker, [employer]  Does patient have a history of Outpatient Therapy (PT/OT)?: No  Does the patient have a history of Short-Term Rehab?: Yes (pt does not recall name)  Does patient have a history of HHC?: Yes (pt does not recall name of  agency)  Does patient currently have HHC?: No         Patient Information Continued  Income Source: SSI/SSD  Does patient have prescription coverage?: Yes  Does patient receive dialysis treatments?: No  Does patient have a history of substance abuse?: No  Does patient have a history of Mental Health Diagnosis?: No         Means of Transportation  Means of Transport to Appts:: Drives Self      Social Determinants of Health (SDOH)      Flowsheet Row Most Recent Value   Housing Stability    In the last 12 months, was there a time when you were not able to pay the mortgage or rent on time? N   In the last 12 months, how many places have you lived? 1   In the last 12 months, was there a time when you did not have a steady place to sleep or slept in a shelter (including now)? N   Transportation Needs    In the past 12 months, has lack of transportation kept you from medical appointments or from getting medications? no   In the past 12 months, has lack of transportation kept you from meetings, work, or from getting things needed for daily living? No   Food Insecurity    Within the past 12 months, you worried that your food would run out before you got the money to buy more. Never true   Within the past 12 months, the food you bought just didn't last and you didn't have money to get more. Never true   Utilities    In the past 12 months has the electric, gas, oil, or water company threatened to shut off services in your home? No            DISCHARGE DETAILS:    Discharge planning discussed with:: patient at bedside  Freedom of Choice: Yes  Comments - Freedom of Choice: CM maintained freedom of choice as it pertains to discharge planning. Patient reports plan to return home at discharge, she reports she may stay at her daughters home for a little while at discharge. Patient reports she may be interested in HHC and was agreeable to CM sending blanket referral, no preferences given, however she requests that CM call her  daughter and discuss this with her as she wishes for her daughter to make the decision. CM asked patient if her daughter was agreeable to HHC, if she would be and patient replied affirmatively, she reported she would do whatever her daughter thought was best.  CM contacted family/caregiver?: Yes  Were Treatment Team discharge recommendations reviewed with patient/caregiver?: Yes  Did patient/caregiver verbalize understanding of patient care needs?: Yes  Were patient/caregiver advised of the risks associated with not following Treatment Team discharge recommendations?: Yes    Contacts  Patient Contacts: Melba (CM called patient's daughter, no answer, left VM with call back number)  Relationship to Patient:: Family  Contact Method: Phone  Phone Number: [phone]  Reason/Outcome: Emergency Contact, Continuity of Care, Referral, Discharge Planning    Requested Home Health Care         Is the patient interested in HHC at discharge?: Yes  Home Health Discipline requested:: Nursing, Occupational Therapy, Physical Therapy  Home Health Agency Name:: Other (TBD, referrals sent)  Home Health Follow-Up Provider:: PCP  Home Health Services Needed:: Diabetes Management, Evaluate Functional Status and Safety, Gait/ADL Training, Strengthening/Theraputic Exercises to Improve Function  Homebound Criteria Met:: Requires the Assistance of Another Person for Safe Ambulation or to Leave the Home  Supporting Clincal Findings:: Limited Endurance    DME Referral Provided  Referral made for DME?: No    Other Referral/Resources/Interventions Provided:  Interventions: HHC  Referral Comments: CM sent blanket referral via [employer] for HHC.         Treatment Team Recommendation: Home with Home Health Care  Discharge Destination Plan:: Home with Home Health Care  Transport at Discharge : Family

## 2024-05-01 NOTE — NURSING NOTE
Cardiac monitoring continued.  Irregular Bradycardia.  WI 0.047  QRS 0.034  QT 0.571/0.515  R-R 1.227  Rate 49

## 2024-05-02 PROCEDURE — 99232 SBSQ HOSP IP/OBS MODERATE 35: CPT | Performed by: STUDENT IN AN ORGANIZED HEALTH CARE EDUCATION/TRAINING PROGRAM

## 2024-05-02 PROCEDURE — 97167 OT EVAL HIGH COMPLEX 60 MIN: CPT

## 2024-05-02 PROCEDURE — 99232 SBSQ HOSP IP/OBS MODERATE 35: CPT | Performed by: INTERNAL MEDICINE

## 2024-05-02 RX ADMIN — ACETAMINOPHEN 650 MG: 325 TABLET, FILM COATED ORAL at 13:19

## 2024-05-02 RX ADMIN — DOCUSATE SODIUM 100 MG: 100 CAPSULE, LIQUID FILLED ORAL at 08:02

## 2024-05-02 RX ADMIN — CARBIDOPA AND LEVODOPA 1 TABLET: 25; 100 TABLET ORAL at 08:02

## 2024-05-02 RX ADMIN — SENNOSIDES 8.6 MG: 8.6 TABLET, FILM COATED ORAL at 08:02

## 2024-05-02 RX ADMIN — CARBIDOPA AND LEVODOPA 1 TABLET: 25; 100 TABLET ORAL at 16:32

## 2024-05-02 RX ADMIN — APIXABAN 5 MG: 5 TABLET, FILM COATED ORAL at 08:02

## 2024-05-02 RX ADMIN — CARBIDOPA AND LEVODOPA 1 TABLET: 25; 100 TABLET ORAL at 11:35

## 2024-05-02 RX ADMIN — APIXABAN 5 MG: 5 TABLET, FILM COATED ORAL at 17:32

## 2024-05-02 NOTE — PLAN OF CARE
Problem: OCCUPATIONAL THERAPY ADULT  Goal: Performs self-care activities at highest level of function for planned discharge setting.  See evaluation for individualized goals.  Description: Treatment Interventions: ADL retraining, Functional transfer training, Endurance training, Patient/family training, Equipment evaluation/education, Compensatory technique education, Continued evaluation, Cardiac education, Energy conservation, Activityengagement          See flowsheet documentation for full assessment, interventions and recommendations.   Note: Limitation: Decreased ADL status, Decreased UE strength, Decreased endurance, Decreased high-level ADLs, Decreased self-care trans  Prognosis: Good  Assessment: Pt is a 84 y.o. female seen for OT evaluation s/p admit to [facility] on 4/30/2024 w/ Syncope. Comorbidities affecting pt's functional performance at time of assessment include:Afib, DM, obesity, Parkinsons, h/o skin cancer, dysphagia, mitral valve disorder, GERD, osteopenia, tremor, IPMN, cerebrovascular disease, urge incontinence, and s/p distal pancreatectomy/ splenectomy . Orders placed for OT evaluation and treatment.  Performed at least two patient identifiers during session including name and wristband. Personal factors affecting pt at time of IE include:steps to enter environment, limited home support, difficulty performing ADLS, difficulty performing IADLS , limited insight into deficits, decreased initiation and engagement , financial barriers, health management , and environment. Prior to admission, pt reports Ind with ADLs, Ind with IADLs, and (+) driving.  Upon evaluation: Pt requires min A with UB ADLs, min A with LB ADLs, min A with xfers and min A with functional mobility 2* the following deficits impacting occupational performance: weakness, decreased strength, decreased dynamic sit/ stand balance, decreased activity tolerance, decreased standing tolerance time for self care and functional  mobility, decreased safety awareness, environmental deficits, decreased mobilty, and requiring external assistance to complete transitional movements. Pt to benefit from continued skilled OT tx while in the hospital to address deficits as defined above and maximize level of functional independence w ADL's and functional mobility. Occupational Performance areas to address include: bathing/shower, toilet hygiene, dressing, health maintenance, functional mobility, community mobility, clothing management, cleaning, meal prep, and household maintenance. From OT standpoint, recommendation at time of d/c would be Level 2 (Mod Resource Intensity).     Rehab Resource Intensity Level, OT: II (Moderate Resource Intensity)

## 2024-05-02 NOTE — PLAN OF CARE
Problem: Knowledge Deficit  Goal: Patient/family/caregiver demonstrates understanding of disease process, treatment plan, medications, and discharge instructions  Description: Complete learning assessment and assess knowledge base.  Interventions:  - Provide teaching at level of understanding  - Provide teaching via preferred learning methods  Outcome: Progressing     Problem: CARDIOVASCULAR - ADULT  Goal: Maintains optimal cardiac output and hemodynamic stability  Description: INTERVENTIONS:  - Monitor I/O, vital signs and rhythm  - Monitor for S/S and trends of decreased cardiac output  - Administer and titrate ordered vasoactive medications to optimize hemodynamic stability  - Assess quality of pulses, skin color and temperature  - Assess for signs of decreased coronary artery perfusion  - Instruct patient to report change in severity of symptoms  Outcome: Progressing  Goal: Absence of cardiac dysrhythmias or at baseline rhythm  Description: INTERVENTIONS:  - Continuous cardiac monitoring, vital signs, obtain 12 lead EKG if ordered  - Administer antiarrhythmic and heart rate control medications as ordered  - Monitor electrolytes and administer replacement therapy as ordered  Outcome: Progressing

## 2024-05-02 NOTE — PLAN OF CARE
Problem: SAFETY ADULT  Goal: Patient will remain free of falls  Description: INTERVENTIONS:  - Educate patient/family on patient safety including physical limitations  - Instruct patient to call for assistance with activity   - Consult OT/PT to assist with strengthening/mobility   - Keep Call bell within reach  - Keep bed low and locked with side rails adjusted as appropriate  - Keep care items and personal belongings within reach  - Initiate and maintain comfort rounds  - Make Fall Risk Sign visible to staff  - Offer Toileting every 2  Hours, in advance of need  - Initiate/Maintain bed alarm  - Apply yellow socks and bracelet for high fall risk patients  - Consider moving patient to room near nurses station  Outcome: Progressing     Problem: CARDIOVASCULAR - ADULT  Goal: Maintains optimal cardiac output and hemodynamic stability  Description: INTERVENTIONS:  - Monitor I/O, vital signs and rhythm  - Monitor for S/S and trends of decreased cardiac output  - Administer and titrate ordered vasoactive medications to optimize hemodynamic stability  - Assess quality of pulses, skin color and temperature  - Assess for signs of decreased coronary artery perfusion  - Instruct patient to report change in severity of symptoms  Outcome: Progressing

## 2024-05-02 NOTE — ASSESSMENT & PLAN NOTE
Several episodes noted within last few weeks  Likely in setting of bradycardia  Was recently diagnosed with afib and started on metoprolol and amiodarone  Meds are being held but still bradycardic  For now, monitor on telemetry  Cardiology consulted, plan for zio patch vs pacemaker

## 2024-05-02 NOTE — PROGRESS NOTES
[facility]  Progress Note  Name: Radha Vidal I  MRN: [id number]  Unit/Bed#: -01 I Date of Admission: 4/30/2024   Date of Service: 5/2/2024  Hospital Day: 1    Assessment/Plan   * Syncope  Assessment & Plan  Several episodes noted within last few weeks  Likely in setting of bradycardia  Was recently diagnosed with afib and started on metoprolol and amiodarone  Meds are being held but still bradycardic  For now, monitor on telemetry  Cardiology consulted, plan for zio patch vs pacemaker     Paroxysmal atrial fibrillation (HCC)  Assessment & Plan  Hold home meds  On eliquis for anticoagulation               VTE Pharmacologic Prophylaxis:   Moderate Risk (Score 3-4) - Pharmacological DVT Prophylaxis Ordered: apixaban (Eliquis).    Mobility:   Basic Mobility Inpatient Raw Score: 18  -HLM Goal: 6: Walk 10 steps or more  JH-HLM Achieved: 4: Move to chair/commode  JH-HLM Goal achieved. Continue to encourage appropriate mobility.    Patient Centered Rounds: I performed bedside rounds with nursing staff today.   Discussions with Specialists or Other Care Team Provider: cardiology    Education and Discussions with Family / Patient: Updated  (daughter) via phone.    Total Time Spent on Date of Encounter in care of patient: 30+ mins. This time was spent on one or more of the following: performing physical exam; counseling and coordination of care; obtaining or reviewing history; documenting in the medical record; reviewing/ordering tests, medications or procedures; communicating with other healthcare professionals and discussing with patient's family/caregivers.    Current Length of Stay: 1 day(s)  Current Patient Status: Inpatient   Certification Statement: The patient will continue to require additional inpatient hospital stay due to monitor on tele  Discharge Plan: Anticipate discharge tomorrow to home.    Code Status: Level 1 - Full Code    Subjective:   Patient feels better      Objective:     Vitals:   Temp (24hrs), Av °F (36.7 °C), Min:97.6 °F (36.4 °C), Max:98.5 °F (36.9 °C)    Temp:  [97.6 °F (36.4 °C)-98.5 °F (36.9 °C)] 97.8 °F (36.6 °C)  HR:  [43-54] 53  Resp:  [16-18] 16  BP: (100-123)/(50-60) 118/57  SpO2:  [92 %-97 %] 97 %  Body mass index is 31.24 kg/m².     Input and Output Summary (last 24 hours):     Intake/Output Summary (Last 24 hours) at 2024 1544  Last data filed at 2024 1230  Gross per 24 hour   Intake 437 ml   Output 400 ml   Net 37 ml       Physical Exam:   Physical Exam  Constitutional:       General: She is not in acute distress.     Appearance: Normal appearance. She is not toxic-appearing.   Cardiovascular:      Rate and Rhythm: Regular rhythm. Bradycardia present.      Heart sounds: Normal heart sounds. No murmur heard.  Pulmonary:      Effort: Pulmonary effort is normal. No respiratory distress.      Breath sounds: Normal breath sounds. No wheezing.   Abdominal:      General: Abdomen is flat. There is no distension.      Palpations: Abdomen is soft.      Tenderness: There is no abdominal tenderness.   Neurological:      General: No focal deficit present.      Mental Status: She is alert and oriented to person, place, and time. Mental status is at baseline.      Motor: No weakness.          Additional Data:     Labs:  Results from last 7 days   Lab Units 24  0429 24  1235   WBC Thousand/uL 8.80 8.32   HEMOGLOBIN g/dL 12.3 13.3   HEMATOCRIT % 38.1 41.6   PLATELETS Thousands/uL 277 304   SEGS PCT %  --  70   LYMPHO PCT %  --  19   MONO PCT %  --  10   EOS PCT %  --  1     Results from last 7 days   Lab Units 24  0429   SODIUM mmol/L 140   POTASSIUM mmol/L 4.0   CHLORIDE mmol/L 108   CO2 mmol/L 25   BUN mg/dL 19   CREATININE mg/dL 0.87   ANION GAP mmol/L 7   CALCIUM mg/dL 9.0   GLUCOSE RANDOM mg/dL 115     Results from last 7 days   Lab Units 24  1235   INR  1.36*     Results from last 7 days   Lab Units 24  8282  05/01/24  1154 05/01/24  0738 04/30/24  2110 04/30/24  1609 04/30/24  1210   POC GLUCOSE mg/dl 123 140 124 137 126 253*               Lines/Drains:  Invasive Devices       Peripheral Intravenous Line  Duration             Peripheral IV 04/30/24 Distal;Right;Upper;Ventral (anterior) Arm 2 days              Drain  Duration             External Urinary Catheter <1 day                      Telemetry:  Telemetry Orders (From admission, onward)               24 Hour Telemetry Monitoring  Continuous x 24 Hours (Telem)        Question:  Reason for 24 Hour Telemetry  Answer:  Arrhythmias requiring acute medical intervention / PPM or ICD malfunction                              Imaging: No pertinent imaging reviewed.    Recent Cultures (last 7 days):         Last 24 Hours Medication List:   Current Facility-Administered Medications   Medication Dose Route Frequency Provider Last Rate    acetaminophen  650 mg Oral Q6H PRN Janes Irby MD      apixaban  5 mg Oral BID Janes Irby MD      calcium carbonate  1,000 mg Oral Daily PRN Janes Irby MD      carbidopa-levodopa  1 tablet Oral TID AC Janes Irby MD      docusate sodium  100 mg Oral BID Janes Irby MD      insulin lispro  1-6 Units Subcutaneous HS Janes Irby MD      senna  1 tablet Oral Daily Janes Irby MD      trimethobenzamide  200 mg Intramuscular Q6H PRN Janes Irby MD          Today, Patient Was Seen By: Janes Baker MD    **Please Note: This note may have been constructed using a voice recognition system.**

## 2024-05-02 NOTE — OCCUPATIONAL THERAPY NOTE
Occupational Therapy Evaluation      Radha Vidal    2024    Principal Problem:    Syncope  Active Problems:    Paroxysmal atrial fibrillation (HCC)    Type 2 diabetes mellitus (HCC)      Past Medical History:   Diagnosis Date    Actinic keratosis 2016    Acute midline low back pain without sciatica 2020    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     Benign neoplasm of skin     Cancer (HCC)     skin    Chest pain     Claustrophobia     Closed compression fracture of body of L1 vertebra (HCC)     Diverticulitis     Diverticulitis     Fracture     L1-L2    GERD (gastroesophageal reflux disease)     H. pylori infection 2020    Muscle weakness     Nausea 2024    Nonmelanoma skin cancer     last assessed 2017    Palpitations     Pancreatic cyst     Seasonal allergies     Seborrheic keratosis 2014    Sleep apnea     no cpap    Spontaneous      without mention of complications     Squamous cell carcinoma of forehead 2014    Syncope        Past Surgical History:   Procedure Laterality Date    ABDOMINAL ADHESION SURGERY N/A 2023    Procedure: LYSIS ADHESIONS;  Surgeon: Kyle Julien MD;  [facility]: [facility];  Service: Surgical Oncology    BOTOX INJECTION N/A 2017    Procedure: CYSTOSCOPY; BLADDER BOTOX 100 UNITS ;  Surgeon: Juan Edward MD;  [facility];  Service:     BOWEL RESECTION      related ot diverticulitis    CARDIAC CATHETERIZATION      CARDIAC CATHETERIZATION Left 2024    Procedure: Cardiac Left Heart Cath;  Surgeon: Carrington Kiser DO;  [facility];  Service: Cardiology    CARPAL TUNNEL RELEASE Right     COLONOSCOPY  2019    COLOSTOMY      COLOSTOMY CLOSURE      CYSTOSCOPY  2021    DISTAL PANCREATECTOMY N/A 2023    Procedure: DISTAL PANCREATECTOMY;  Surgeon: Kyle Julien MD;  [facility]: [facility];  Service: Surgical Oncology    FOOT SURGERY Left     neuroma     HYSTERECTOMY      MYRINGOTOMY W/ TUBES Right 12/06/2023    office procedure - Dr. Grace    NASAL SINUS SURGERY  age 39    [place]    PANCREATIC CYST BIOPSY  07/31/2023    MN CYSTOURETHROSCOPY N/A 04/13/2018    Procedure: CYSTOSCOPY WITH BOTOX;  Surgeon: Juan Edward MD;  Location: [facility];  Service: Urology    MN ESOPHAGOGASTRODUODENOSCOPY TRANSORAL DIAGNOSTIC N/A 04/23/2019    Procedure: ESOPHAGOGASTRODUODENOSCOPY (EGD);  Surgeon: Edu Dickerson DO;  Location: [facility];  Service: Gastroenterology    SPLENECTOMY, TOTAL N/A 07/31/2023    Procedure: SPLENECTOMY;  Surgeon: Kyle Julien MD;  [facility];  Service: Surgical Oncology    TONSILLECTOMY  age 49    UPPER GASTROINTESTINAL ENDOSCOPY  04/23/2019 05/02/24 1030   OT Last Visit   OT Visit Date 05/02/24   Note Type   Note type Evaluation   Pain Assessment   Pain Assessment Tool 0-10   Pain Score No Pain   Restrictions/Precautions   Weight Bearing Precautions Per Order No   Other Precautions Chair Alarm;Bed Alarm;Telemetry;Multiple lines;Fall Risk   Home Living   Type of Home Mobile home   Home Layout One level;Performs ADLs on one level;Able to live on main level with bedroom/bathroom;Stairs to enter with rails  (5STE)   Bathroom Shower/Tub Walk-in shower   Bathroom Toilet Standard   Bathroom Equipment Built-in shower seat;Hand-held shower;Toilet raiser   Bathroom Accessibility Accessible   Home Equipment Walker;Cane;Wheelchair-manual   Additional Comments no DME at baseline   Prior Function   Level of [place] Independent with ADLs;Independent with functional mobility;Independent with IADLS   Lives With Alone   Receives Help From Family   IADLs Independent with driving;Independent with meal prep;Independent with medication management  (laundryand cleaning and Ind)   Falls in the last 6 months 1 to 4  (2 in the past 2 weeks)   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)   Lifestyle   Autonomy Pt reports PLOF was Ind with  ADLs, IADLs and (+) driving   Reciprocal Relationships dtr   ADL   Eating Assistance 6  Modified independent   Eating Deficit Increased time to complete   Grooming Assistance 5  Supervision/Setup   Grooming Deficit Supervision/safety;Increased time to complete;Setup   UB Bathing Assistance 4  Minimal Assistance   UB Bathing Deficit Increased time to complete;Supervision/safety;Verbal cueing;Steadying;Setup   LB Bathing Assistance 4  Minimal Assistance   LB Bathing Deficit Increased time to complete;Supervision/safety;Verbal cueing;Steadying;Setup   UB Dressing Assistance 4  Minimal Assistance   UB Dressing Deficit Increased time to complete;Supervision/safety;Verbal cueing;Steadying;Setup   LB Dressing Assistance 4  Minimal Assistance   LB Dressing Deficit Increased time to complete;Supervision/safety;Verbal cueing;Steadying;Setup   Toileting Assistance  4  Minimal Assistance   Toileting Deficit Increased time to complete;Supervison/safety;Verbal cueing;Steadying;Setup   Functional Assistance 4  Minimal Assistance   Functional Deficit Increased time to complete;Supervision/safety;Verbal cueing;Steadying;Setup   Bed Mobility   Additional Comments Pt oob to bathroom when OT arrived   Transfers   Sit to Stand 4  Minimal assistance   Additional items Assist x 1;Increased time required;Verbal cues;Armrests   Stand to Sit 4  Minimal assistance   Additional items Assist x 1;Increased time required;Verbal cues;Armrests   Functional Mobility   Functional Mobility 4  Minimal assistance   Additional Comments needing cuing for hand placement and posturing. Pt also attempting to lift RW vs push.   Additional items Rolling walker   Balance   Static Sitting Good   Dynamic Sitting Fair +   Static Standing Fair +   Dynamic Standing Fair   Ambulatory Fair   Activity Tolerance   Activity Tolerance Patient tolerated treatment well   RUE Assessment   RUE Assessment   (ROM WFL/ MMT -4/5)   LUE Assessment   LUE Assessment   (ROM WFL/ MMT  -4/5)   Hand Function   Gross Motor Coordination Functional   Fine Motor Coordination Functional   Sensation   Light Touch No apparent deficits   Sharp/Dull No apparent deficits   Stereognosis No apparent deficits   Proprioception   Proprioception No apparent deficits   Vision-Basic Assessment   Current Vision Wears glasses all the time   Vision - Complex Assessment   Ocular Range of Motion Intact   Psychosocial   Psychosocial (WDL) WDL   Cognition   Overall Cognitive Status WFL   Arousal/Participation Alert;Cooperative   Attention Within functional limits   Orientation Level Oriented X4   Memory Within functional limits   Following Commands Follows all commands and directions without difficulty   Comments Pt was agreeable to OT eval   Assessment   Limitation Decreased ADL status;Decreased UE strength;Decreased endurance;Decreased high-level ADLs;Decreased self-care trans   Prognosis Good   Assessment Pt is a 84 y.o. female seen for OT evaluation s/p admit to [facility] on 4/30/2024 w/ Syncope. Comorbidities affecting pt's functional performance at time of assessment include:Afib, DM, obesity, Parkinsons, h/o skin cancer, dysphagia, mitral valve disorder, GERD, osteopenia, tremor, IPMN, cerebrovascular disease, urge incontinence, and s/p distal pancreatectomy/ splenectomy . Orders placed for OT evaluation and treatment.  Performed at least two patient identifiers during session including name and wristband. Personal factors affecting pt at time of IE include:steps to enter environment, limited home support, difficulty performing ADLS, difficulty performing IADLS , limited insight into deficits, decreased initiation and engagement , financial barriers, health management , and environment. Prior to admission, pt reports Ind with ADLs, Ind with IADLs, and (+) driving.  Upon evaluation: Pt requires min A with UB ADLs, min A with LB ADLs, min A with xfers and min A with functional mobility 2* the following  deficits impacting occupational performance: weakness, decreased strength, decreased dynamic sit/ stand balance, decreased activity tolerance, decreased standing tolerance time for self care and functional mobility, decreased safety awareness, environmental deficits, decreased mobilty, and requiring external assistance to complete transitional movements. Pt to benefit from continued skilled OT tx while in the hospital to address deficits as defined above and maximize level of functional independence w ADL's and functional mobility. Occupational Performance areas to address include: bathing/shower, toilet hygiene, dressing, health maintenance, functional mobility, community mobility, clothing management, cleaning, meal prep, and household maintenance. From OT standpoint, recommendation at time of d/c would be Level 2 (Mod Resource Intensity).   Plan   Treatment Interventions ADL retraining;Functional transfer training;Endurance training;Patient/family training;Equipment evaluation/education;Compensatory technique education;Continued evaluation;Cardiac education;Energy conservation;Activityengagement   Goal Expiration Date 05/15/24   OT Frequency 2-3x/wk   Discharge Recommendation   Rehab Resource Intensity Level, OT II (Moderate Resource Intensity)   AM-PAC Daily Activity Inpatient   Lower Body Dressing 3   Bathing 3   Toileting 3   Upper Body Dressing 3   Grooming 3   Eating 4   Daily Activity Raw Score 19   Daily Activity Standardized Score (Calc for Raw Score >=11) 40.22   AM-PAC Applied Cognition Inpatient   Following a Speech/Presentation 4   Understanding Ordinary Conversation 4   Taking Medications 4   Remembering Where Things Are Placed or Put Away 4   Remembering List of 4-5 Errands 4   Taking Care of Complicated Tasks 4   Applied Cognition Raw Score 24   Applied Cognition Standardized Score 62.21     Occupational therapy Goals: In 5-7 days    Patient will verbalize and demonstrate use of energy  conservation/ deep breathing technique and work simplification skills during functional activity with no verbal cues.    Patient will verbalize and demonstrate good body mechanics and joint protection techniques during ADLs/ IADLs with no verbal cues     Patient will increase OOB/ sitting tolerance to 4-6 hours per day for increased participation in self care and leisure tasks with no s/s of exertion.    Patient will identify s/s of exertion during ADL and functional mobility with no verbal cues.     Patient will verbalize/ demonstrate compensatory strategies to recover from exertion with no verbal cues.     Patient will increase standing tolerance time to 13-15 minutes with No UE support to complete sink level ADLs at modified independent level.    Patient will increase sitting tolerance at edge of bed to 30-45 minutes to complete UB ADLs at modified independent level.     Patient will demonstrate ability to safely perform LB ADLS with MI with long handled adaptive dressing equipment.    Patient/ Family will demonstrate competency with UE Home Exercise Program.       Tiara Cervantes MS OTR/L

## 2024-05-02 NOTE — PROGRESS NOTES
Cardiology Progress Note - Radha Vidal 84 y.o. female MRN: [id number]    Unit/Bed#: -01 Encounter: [id number]      Assessment/Plan:   Syncopal episodes  Likely related to bradycardia  Sinus bradycardia on telemetry, no bradycardia dysrhythmias recorded  Continue to hold metoprolol and amiodarone, as well as amlodipine  Continue telemetry  Plan for outpatient event monitor upon discharge    2.  Sinus bradycardia  See #1    3.  Paroxysmal atrial fibrillation  Continue anticoagulation with Eliquis.  [id number]  Continue to hold home metoprolol and amiodarone per #1    4.  Hypertension  BP is well-controlled  Continue to hold home metoprolol, amlodipine    5. Hyperlipidemia  She is not on a statin at home.  Continue dietary control    6.  Dilated ascending aorta  No thoracic aortic aneurysm on CT 4/30/2024  Recommend continued outpatient surveillance    7.  DM 2 (A1c 6.8)    8.  Parkinson's disease    Subjective:   Patient seen and examined.  She states she is overall feeling better compared to yesterday.  She is still feeling fatigued and weak, but denies repeat syncopal episodes, chest pain/pressure, shortness of breath, palpitations, lightheadedness, dizziness.    Objective:     Vitals: Blood pressure 118/57, pulse (!) 53, temperature 97.8 °F (36.6 °C), resp. rate 16, weight 75 kg (165 lb 5.5 oz), SpO2 97%, not currently breastfeeding., Body mass index is 31.24 kg/m².,   Orthostatic Blood Pressures      Flowsheet Row Most Recent Value   Blood Pressure 118/57 filed at 05/02/2024 1441   Patient Position - Orthostatic VS Lying filed at 05/01/2024 0200              Intake/Output Summary (Last 24 hours) at 5/2/2024 1457  Last data filed at 5/2/2024 1230  Gross per 24 hour   Intake 437 ml   Output 400 ml   Net 37 ml         Physical Exam:   GEN: Alert and oriented x 3, in no acute distress.  Well appearing and well nourished.   HEENT: Sclera anicteric, conjunctivae pink, mucous membranes moist. Oropharynx  clear.   NECK: Supple, no significant JVD. Trachea midline, no thyromegaly.   HEART: Bradycardic, regular rhythm, normal S1 and S2, no murmurs, clicks, gallops or rubs. PMI nondisplaced, no thrills.   LUNGS: Clear to auscultation bilaterally; no wheezes, rales, or rhonchi. No increased work of breathing or signs of respiratory distress.   ABDOMEN: Soft, nontender, non-distended.   EXTREMITIES: Skin warm and well perfused, no clubbing, cyanosis, or edema.  NEURO: Tremor noted.  Normal speech. Mood and affect normal.   SKIN: Normal without suspicious lesions on exposed skin.        Telemetry:  Telemetry Orders (From admission, onward)               24 Hour Telemetry Monitoring  Continuous x 24 Hours (Telem)           Question:  Reason for 24 Hour Telemetry  Answer:  Arrhythmias requiring acute medical intervention / PPM or ICD malfunction                     Telemetry Reviewed:  Sinus bradycardia      Medications:      Current Facility-Administered Medications:     acetaminophen (TYLENOL) tablet 650 mg, 650 mg, Oral, Q6H PRN, Janes Irby MD, 650 mg at 24 1319    apixaban (ELIQUIS) tablet 5 mg, 5 mg, Oral, BID, Janes Irby MD, 5 mg at 24 0802    calcium carbonate (TUMS) chewable tablet 1,000 mg, 1,000 mg, Oral, Daily PRN, Janes Irby MD    carbidopa-levodopa (SINEMET)  mg per tablet 1 tablet, 1 tablet, Oral, TID AC, Janes Irby MD, 1 tablet at 24 1135    docusate sodium (COLACE) capsule 100 mg, 100 mg, Oral, BID, Janes Irby MD, 100 mg at 24 0802    insulin lispro (HumALOG/ADMELOG) 100 units/mL subcutaneous injection 1-6 Units, 1-6 Units, Subcutaneous, HS, Janes Irby MD    senna (SENOKOT) tablet 8.6 mg, 1 tablet, Oral, Daily, Janes Irby MD, 8.6 mg at 24 0802    trimethobenzamide (TIGAN) IM injection 200 mg, 200 mg, Intramuscular, Q6H PRN, Janes Irby MD     Labs & Results:    Results from last 7 days   Lab Units  04/30/24  1634 04/30/24  1511 04/30/24  1235   HS TNI 0HR ng/L  --   --  13   HS TNI 2HR ng/L  --  40  --    HS TNI 4HR ng/L 37  --   --    HSTNI D4 ng/L 24*  --   --      Results from last 7 days   Lab Units 05/01/24 0429 04/30/24  1235   WBC Thousand/uL 8.80 8.32   HEMOGLOBIN g/dL 12.3 13.3   HEMATOCRIT % 38.1 41.6   PLATELETS Thousands/uL 277 304         Results from last 7 days   Lab Units 05/01/24 0429 04/30/24  1235   POTASSIUM mmol/L 4.0 4.5   CHLORIDE mmol/L 108 104   CO2 mmol/L 25 27   BUN mg/dL 19 20   CREATININE mg/dL 0.87 0.84   CALCIUM mg/dL 9.0 8.8     Results from last 7 days   Lab Units 04/30/24  1235   INR  1.36*   PTT seconds 35     Results from last 7 days   Lab Units 05/01/24  0429 04/30/24  1235   MAGNESIUM mg/dL 2.1 1.9

## 2024-05-02 NOTE — NURSING NOTE
Patient remains bradycardic, she has no symptoms at this time. Per provider, continue to monitor. Unable to print strip because telemetry printer is not working.

## 2024-05-03 VITALS
OXYGEN SATURATION: 97 % | DIASTOLIC BLOOD PRESSURE: 65 MMHG | TEMPERATURE: 98.3 F | HEART RATE: 52 BPM | BODY MASS INDEX: 31.24 KG/M2 | RESPIRATION RATE: 16 BRPM | WEIGHT: 165.34 LBS | SYSTOLIC BLOOD PRESSURE: 142 MMHG

## 2024-05-03 LAB
ANION GAP SERPL CALCULATED.3IONS-SCNC: 7 MMOL/L (ref 4–13)
BASOPHILS # BLD AUTO: 0.03 THOUSANDS/ÂΜL (ref 0–0.1)
BASOPHILS NFR BLD AUTO: 0 % (ref 0–1)
BUN SERPL-MCNC: 16 MG/DL (ref 5–25)
CALCIUM SERPL-MCNC: 9.1 MG/DL (ref 8.4–10.2)
CHLORIDE SERPL-SCNC: 107 MMOL/L (ref 96–108)
CO2 SERPL-SCNC: 26 MMOL/L (ref 21–32)
CREAT SERPL-MCNC: 0.81 MG/DL (ref 0.6–1.3)
EOSINOPHIL # BLD AUTO: 0.32 THOUSAND/ÂΜL (ref 0–0.61)
EOSINOPHIL NFR BLD AUTO: 4 % (ref 0–6)
ERYTHROCYTE [DISTWIDTH] IN BLOOD BY AUTOMATED COUNT: 14.7 % (ref 11.6–15.1)
GFR SERPL CREATININE-BSD FRML MDRD: 66 ML/MIN/1.73SQ M
GLUCOSE SERPL-MCNC: 115 MG/DL (ref 65–140)
HCT VFR BLD AUTO: 40.7 % (ref 34.8–46.1)
HGB BLD-MCNC: 13.4 G/DL (ref 11.5–15.4)
IMM GRANULOCYTES # BLD AUTO: 0.04 THOUSAND/UL (ref 0–0.2)
IMM GRANULOCYTES NFR BLD AUTO: 1 % (ref 0–2)
LYMPHOCYTES # BLD AUTO: 2.46 THOUSANDS/ÂΜL (ref 0.6–4.47)
LYMPHOCYTES NFR BLD AUTO: 33 % (ref 14–44)
MCH RBC QN AUTO: 29.7 PG (ref 26.8–34.3)
MCHC RBC AUTO-ENTMCNC: 32.9 G/DL (ref 31.4–37.4)
MCV RBC AUTO: 90 FL (ref 82–98)
MONOCYTES # BLD AUTO: 0.9 THOUSAND/ÂΜL (ref 0.17–1.22)
MONOCYTES NFR BLD AUTO: 12 % (ref 4–12)
NEUTROPHILS # BLD AUTO: 3.83 THOUSANDS/ÂΜL (ref 1.85–7.62)
NEUTS SEG NFR BLD AUTO: 50 % (ref 43–75)
NRBC BLD AUTO-RTO: 0 /100 WBCS
PLATELET # BLD AUTO: 308 THOUSANDS/UL (ref 149–390)
PMV BLD AUTO: 11.1 FL (ref 8.9–12.7)
POTASSIUM SERPL-SCNC: 4 MMOL/L (ref 3.5–5.3)
RBC # BLD AUTO: 4.51 MILLION/UL (ref 3.81–5.12)
SODIUM SERPL-SCNC: 140 MMOL/L (ref 135–147)
WBC # BLD AUTO: 7.58 THOUSAND/UL (ref 4.31–10.16)

## 2024-05-03 PROCEDURE — 99239 HOSP IP/OBS DSCHRG MGMT >30: CPT | Performed by: STUDENT IN AN ORGANIZED HEALTH CARE EDUCATION/TRAINING PROGRAM

## 2024-05-03 PROCEDURE — 99232 SBSQ HOSP IP/OBS MODERATE 35: CPT | Performed by: INTERNAL MEDICINE

## 2024-05-03 PROCEDURE — 85025 COMPLETE CBC W/AUTO DIFF WBC: CPT

## 2024-05-03 PROCEDURE — 97162 PT EVAL MOD COMPLEX 30 MIN: CPT

## 2024-05-03 PROCEDURE — 80048 BASIC METABOLIC PNL TOTAL CA: CPT

## 2024-05-03 RX ADMIN — CARBIDOPA AND LEVODOPA 1 TABLET: 25; 100 TABLET ORAL at 11:23

## 2024-05-03 RX ADMIN — CARBIDOPA AND LEVODOPA 1 TABLET: 25; 100 TABLET ORAL at 07:42

## 2024-05-03 RX ADMIN — SENNOSIDES 8.6 MG: 8.6 TABLET, FILM COATED ORAL at 08:51

## 2024-05-03 RX ADMIN — ACETAMINOPHEN 650 MG: 325 TABLET, FILM COATED ORAL at 11:23

## 2024-05-03 RX ADMIN — APIXABAN 5 MG: 5 TABLET, FILM COATED ORAL at 08:51

## 2024-05-03 NOTE — PLAN OF CARE
Problem: PHYSICAL THERAPY ADULT  Goal: Performs mobility at highest level of function for planned discharge setting.  See evaluation for individualized goals.  Description: Treatment/Interventions: Functional transfer training, LE strengthening/ROM, Elevations, Therapeutic exercise, Endurance training, Patient/family training, Bed mobility, Gait training, OT          See flowsheet documentation for full assessment, interventions and recommendations.  Note: Prognosis: Good  Problem List: Decreased strength, Impaired balance, Decreased endurance, Decreased mobility  Assessment: Pt is 84 year old female seen for PT evaluation s/p admit to [facility]24 with Syncope. PT consulted to assess pt's functional mobility and discharge needs. Order placed for PT evaluation and treatment. Comorbidities affecting pt's physical performance at time of assessment include paroxysmal atrial fibrillation and type 2 DM. Prior to hospitalization, pt was independent with all functional mobility without an AD. Pt ambulates unrestricted distances on all terrain and elevations. Pt resides alone, in a one level house with five steps to enter. Personal factors affecting pt at time of initial evaluation include stairs to enter home, ambulating with an assistive device, difficulty ambulating community distances, difficulty navigating level surfaces without external assistance, unable to perform dynamic tasks in the community, limited home support, positive fall history, and difficulty performing ADLs and IADLs. Please find objective findings from PT assessment regarding body systems outlined above with impairments and limitations including weakness, impaired balance, decreased endurance, gait deviations, decreased activity tolerance, decreased functional mobility tolerance, and fall risk. The following objective measures were performed on initial evaluation Barthel Index: 50/100, Modified [place]: 3 (moderate disability), and  AM-PAC 6-Clicks: 18/24. Pt's clinical presentation is currently evolving seen in pt's presentation of need for ongoing medical management/monitoring, pt is a fall risk, pt requires use of RW for safe ambulation, and pt requires cues and assist for safety with functional mobility. Pt to benefit from continued PT treatment to address deficits as defined above and maximize pt's level of function and independence with mobility. From a PT standpoint, recommendation at time of discharge would be level 3, minimal resource intensity in order to facilitate return to prior level of function.    Barriers to Discharge: Inaccessible home environment     Rehab Resource Intensity Level, PT: III (Minimum Resource Intensity)    See flowsheet documentation for full assessment.

## 2024-05-03 NOTE — PROGRESS NOTES
Cardiology Progress Note - Radha Vidal 84 y.o. female MRN: [id number]    Unit/Bed#: -01 Encounter: [id number]      Assessment/Plan:   Syncopal episodes  Likely related to bradycardia   Telemetry revealed sinus bradycardia, no bradycardia dysrhythmias recorded  Continue to hold home metoprolol, amiodarone, amlodipine.  Would not recommend resuming these medications upon discharge  Outpatient live event monitor ordered    2.  Sinus bradycardia  She remains in sinus bradycardia on telemetry  See #1    3.  Paroxysmal atrial fibrillation  Continue anticoagulation with Eliquis.  [id number]  Continue to hold off on rate or rhythm control per #1 and #2.    4.  Hypertension  BP stable.  Continue to hold home metoprolol, amlodipine.  Would not recommend resuming these medications upon discharge    5.  Hyperlipidemia  She is not on a statin at home.  Continue dietary control    6.  Dilated ascending aorta  No thoracic aortic aneurysm on CT 4/30/2024  Recommend continued outpatient surveillance    7.  DM 2 (A1c 6.8)    8.  Parkinson's disease       Cardiology will sign-off. Please reach out with any further questions or concerns.        Subjective:   Patient seen and examined.  She states she is feeling significantly better compared to when she came in.  She states she has been up and walking and denies lightheadedness, dizziness, repeat syncopal episode.  She denies chest pain, shortness of breath, palpitations, lower extremity edema.  She states she does still feel fatigued, but states the fatigue has been going on for about a year.      Objective:     Vitals: Blood pressure 138/61, pulse (!) 47, temperature 98.2 °F (36.8 °C), resp. rate 16, weight 75 kg (165 lb 5.5 oz), SpO2 94%, not currently breastfeeding., Body mass index is 31.24 kg/m².,   Orthostatic Blood Pressures      Flowsheet Row Most Recent Value   Blood Pressure 138/61 filed at 05/03/2024 0736   Patient Position - Orthostatic VS Lying filed at  05/01/2024 0200              Intake/Output Summary (Last 24 hours) at 5/3/2024 1046  Last data filed at 5/3/2024 0736  Gross per 24 hour   Intake 580 ml   Output 700 ml   Net -120 ml         Physical Exam:   GEN: Alert and oriented x 3, in no acute distress.  Well appearing and well nourished.   HEENT: Sclera anicteric, conjunctivae pink, mucous membranes moist. Oropharynx clear.   NECK: Supple, no significant JVD. Trachea midline, no thyromegaly.   HEART: Bradycardic, regular rhythm, normal S1 and S2, no murmurs, clicks, gallops or rubs. PMI nondisplaced, no thrills.   LUNGS: Clear to auscultation bilaterally; no wheezes, rales, or rhonchi. No increased work of breathing or signs of respiratory distress.   ABDOMEN: Soft, nontender, non-distended.   EXTREMITIES: Skin warm and well perfused, no clubbing, cyanosis, or edema.  NEURO: Tremor noted. Normal speech. Mood and affect normal.   SKIN: Normal without suspicious lesions on exposed skin.        Telemetry:  Telemetry Orders (From admission, onward)               24 Hour Telemetry Monitoring  Continuous x 24 Hours (Telem)        Expiring   Question:  Reason for 24 Hour Telemetry  Answer:  Arrhythmias requiring acute medical intervention / PPM or ICD malfunction                     Telemetry Reviewed:  Sinus bradycardia, no bradycardia dysrhythmias recorded.    Medications:      Current Facility-Administered Medications:     acetaminophen (TYLENOL) tablet 650 mg, 650 mg, Oral, Q6H PRN, Janes Irby MD, 650 mg at 05/02/24 1319    apixaban (ELIQUIS) tablet 5 mg, 5 mg, Oral, BID, Janes Irby MD, 5 mg at 05/03/24 0851    calcium carbonate (TUMS) chewable tablet 1,000 mg, 1,000 mg, Oral, Daily PRN, Janes Irby MD    carbidopa-levodopa (SINEMET)  mg per tablet 1 tablet, 1 tablet, Oral, TID AC, Janes Irby MD, 1 tablet at 05/03/24 0742    docusate sodium (COLACE) capsule 100 mg, 100 mg, Oral, BID, Janes Irby MD, 100 mg at 05/02/24  0802    senna (SENOKOT) tablet 8.6 mg, 1 tablet, Oral, Daily, Janes Irby MD, 8.6 mg at 05/03/24 0851    trimethobenzamide (TIGAN) IM injection 200 mg, 200 mg, Intramuscular, Q6H PRN, Janes Irby MD     Labs & Results:    Results from last 7 days   Lab Units 04/30/24  1634 04/30/24  1511 04/30/24  1235   HS TNI 0HR ng/L  --   --  13   HS TNI 2HR ng/L  --  40  --    HS TNI 4HR ng/L 37  --   --    HSTNI D4 ng/L 24*  --   --      Results from last 7 days   Lab Units 05/03/24  0500 05/01/24 0429 04/30/24  1235   WBC Thousand/uL 7.58 8.80 8.32   HEMOGLOBIN g/dL 13.4 12.3 13.3   HEMATOCRIT % 40.7 38.1 41.6   PLATELETS Thousands/uL 308 277 304         Results from last 7 days   Lab Units 05/03/24  0500 05/01/24 0429 04/30/24  1235   POTASSIUM mmol/L 4.0 4.0 4.5   CHLORIDE mmol/L 107 108 104   CO2 mmol/L 26 25 27   BUN mg/dL 16 19 20   CREATININE mg/dL 0.81 0.87 0.84   CALCIUM mg/dL 9.1 9.0 8.8     Results from last 7 days   Lab Units 04/30/24  1235   INR  1.36*   PTT seconds 35     Results from last 7 days   Lab Units 05/01/24 0429 04/30/24  1235   MAGNESIUM mg/dL 2.1 1.9

## 2024-05-03 NOTE — PHYSICAL THERAPY NOTE
Physical Therapy Evaluation     Patient's Name: Radha Vidal    Admitting Diagnosis  Syncope [R55]  Sinus bradycardia [R00.1]  Symptomatic bradycardia [R00.1]  Fall, initial encounter [W19.XXXA]  Headache [R51.9]    Problem List  Patient Active Problem List   Diagnosis    OAB (overactive bladder)    Parkinson's disease    Primary insomnia    History of skin cancer    Seasonal allergic rhinitis    Impaired fasting glucose    Mood disturbance    Obesity (BMI 30-39.9)    Claustrophobia    Mitral valve disorder    Menopause ovarian failure    Vitamin D deficiency    Dysphagia    Multiple thyroid nodules    Gastro-esophageal reflux disease without esophagitis    Osteopenia    Chronic idiopathic constipation    History of adenomatous polyp of colon    Tremor    Trochanteric bursitis, right hip    IPMN (intraductal papillary mucinous neoplasm)    Cherry angioma    Postablative hypothyroidism    Sleep apnea    Cerebrovascular disease    Age-related osteoporosis without current pathological fracture    Urge incontinence    s/p distal pancreatectomy/splenectomy    Paroxysmal atrial fibrillation (HCC)    Type 2 diabetes mellitus (HCC)    Ambulatory dysfunction    Ear pressure, right    Elevated troponin    Small bowel obstruction (HCC)    Syncope     Past Medical History  Past Medical History:   Diagnosis Date    Actinic keratosis 03/11/2016    Acute midline low back pain without sciatica 11/19/2020    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     Benign neoplasm of skin     Cancer (HCC)     skin    Chest pain     Claustrophobia     Closed compression fracture of body of L1 vertebra (HCC)     Diverticulitis     Diverticulitis     Fracture     L1-L2    GERD (gastroesophageal reflux disease)     H. pylori infection 11/24/2020    Muscle weakness     Nausea 01/26/2024    Nonmelanoma skin cancer     last assessed 21mar2017    Palpitations     Pancreatic cyst     Seasonal allergies     Seborrheic  keratosis 2014    Sleep apnea     no cpap    Spontaneous      without mention of complications     Squamous cell carcinoma of forehead 2014    Syncope      Past Surgical History  Past Surgical History:   Procedure Laterality Date    ABDOMINAL ADHESION SURGERY N/A 2023    Procedure: LYSIS ADHESIONS;  Surgeon: Kyle Julien MD;  [facility];  Service: Surgical Oncology    BOTOX INJECTION N/A 2017    Procedure: CYSTOSCOPY; BLADDER BOTOX 100 UNITS ;  Surgeon: Juan Edward MD;  [facility];  Service:     BOWEL RESECTION      related ot diverticulitis    CARDIAC CATHETERIZATION      CARDIAC CATHETERIZATION Left 2024    Procedure: Cardiac Left Heart Cath;  Surgeon: Carrington Kiser DO;  [facility];  Service: Cardiology    CARPAL TUNNEL RELEASE Right     COLONOSCOPY  2019    COLOSTOMY      COLOSTOMY CLOSURE      CYSTOSCOPY  2021    DISTAL PANCREATECTOMY N/A 2023    Procedure: DISTAL PANCREATECTOMY;  Surgeon: Kyle Julien MD;  Location: [facility];  Service: Surgical Oncology    FOOT SURGERY Left     neuroma    HYSTERECTOMY      MYRINGOTOMY W/ TUBES Right 2023    office procedure - Dr. Grace    NAS[place] SINUS SURGERY  age 39    [place]    PANCREATIC CYST BIOPSY  2023    RI CYSTOURETHROSCOPY N/A 2018    Procedure: CYSTOSCOPY WITH BOTOX;  Surgeon: Juan Edward MD;  [facility];  Service: Urology    RI ESOPHAGOGASTRODUODENOSCOPY TRANSORAL DIAGNOSTIC N/A 2019    Procedure: ESOPHAGOGASTRODUODENOSCOPY (EGD);  Surgeon: Edu Dickerson DO;  Location: [facility];  Service: Gastroenterology    SPLENECTOMY, TOTAL N/A 2023    Procedure: SPLENECTOMY;  Surgeon: Kyle Julien MD;  [facility]: [facility];  Service: Surgical Oncology    TONSILLECTOMY  age 49    UPPER GASTROINTESTINAL ENDOSCOPY  2019 0910   PT Last Visit   PT Visit Date 24   Note Type   Note type Evaluation  "  Pain Assessment   Pain Assessment Tool 0-10   Pain Score No Pain   Restrictions/Precautions   Weight Bearing Precautions Per Order No   Braces or Orthoses Other (Comment)  (none per patient)   Other Precautions Chair Alarm;Bed Alarm;Fall Risk;Telemetry   Home Living   Type of Home Mobile home   Home Layout One level;Able to live on main level with bedroom/bathroom;Performs ADLs on one level;Stairs to enter with rails  (5 LOIDA)   Bathroom Shower/Tub Walk-in shower   Bathroom Toilet Standard   Bathroom Equipment Built-in shower seat;Hand-held shower;Toilet raiser   Bathroom Accessibility Accessible   Home Equipment Walker;Cane   Additional Comments Pt ambulates without an AD.   Prior Function   Level of [place] Independent with functional mobility;Independent with ADLs;Independent with IADLS   Lives With Alone   Receives Help From Family   IADLs Independent with meal prep;Independent with driving;Independent with medication management   Falls in the last 6 months 1 to 4  (2 falls)   Vocational Retired   General   Family/Caregiver Present No   Cognition   Overall Cognitive Status WFL   Arousal/Participation Alert   Attention Within functional limits   Orientation Level Oriented X4   Memory Within functional limits   Following Commands Follows all commands and directions without difficulty   Comments Pt agreeable to PT.   Subjective   Subjective \"I've been up and walking.\"   RLE Assessment   RLE Assessment X   Strength RLE   RLE Overall Strength 4-/5   LLE Assessment   LLE Assessment X   Strength LLE   LLE Overall Strength 4-/5   Light Touch   RLE Light Touch Grossly intact   LLE Light Touch Grossly intact   Bed Mobility   Supine to Sit 5  Supervision   Additional items Assist x 1;HOB elevated;Bedrails;Increased time required;Verbal cues   Sit to Supine 4  Minimal assistance   Additional items Assist x 1;HOB elevated;Bedrails;Increased time required;Verbal cues;LE management   Transfers   Sit to Stand 5  " Supervision   Additional items Assist x 1;Increased time required;Verbal cues   Stand to Sit 5  Supervision   Additional items Assist x 1;Increased time required;Verbal cues   Ambulation/Elevation   Gait pattern Short stride;Shuffling   Gait Assistance 5  Supervision  (close supervision)   Additional items Assist x 1;Verbal cues   Assistive Device Rolling walker   Distance 50 feet   Balance   Static Sitting Good   Dynamic Sitting Fair +   Static Standing Fair +   Dynamic Standing Fair   Ambulatory Fair   Endurance Deficit   Endurance Deficit Yes   Endurance Deficit Description decreased activity tolerance   Activity Tolerance   Activity Tolerance Patient tolerated treatment well;Patient limited by fatigue   Assessment   Prognosis Good   Problem List Decreased strength;Impaired balance;Decreased endurance;Decreased mobility   Assessment Pt is 84 year old female seen for PT evaluation s/p admit to [facility] on 4/30/[facility]24 with Syncope. PT consulted to assess pt's functional mobility and discharge needs. Order placed for PT evaluation and treatment. Comorbidities affecting pt's physical performance at time of assessment include paroxysmal atrial fibrillation and type 2 DM. Prior to hospitalization, pt was independent with all functional mobility without an AD. Pt ambulates unrestricted distances on all terrain and elevations. Pt resides alone, in a one level house with five steps to enter. Personal factors affecting pt at time of initial evaluation include stairs to enter home, ambulating with an assistive device, difficulty ambulating community distances, difficulty navigating level surfaces without external assistance, unable to perform dynamic tasks in the community, limited home support, positive fall history, and difficulty performing ADLs and IADLs. Please find objective findings from PT assessment regarding body systems outlined above with impairments and limitations including weakness, impaired balance,  decreased endurance, gait deviations, decreased activity tolerance, decreased functional mobility tolerance, and fall risk. The following objective measures were performed on initial evaluation Barthel Index: 50/100, Modified [place]: 3 (moderate disability), and -PAC 6-Clicks: 18/24. Pt's clinical presentation is currently evolving seen in pt's presentation of need for ongoing medical management/monitoring, pt is a fall risk, pt requires use of RW for safe ambulation, and pt requires cues and assist for safety with functional mobility. Pt to benefit from continued PT treatment to address deficits as defined above and maximize pt's level of function and independence with mobility. From a PT standpoint, recommendation at time of discharge would be level 3, minimal resource intensity in order to facilitate return to prior level of function.   Barriers to Discharge Inaccessible home environment   Goals   STG Expiration Date 05/13/24   Short Term Goal #1 In 10 days: Increase bilateral LE strength 1/2 grade to facilitate independent mobility, Perform all bed mobility tasks modified independent to decrease caregiver burden, Perform all transfers modified independent to improve independence, Ambulate > 250 ft. with least restrictive assistive device modified independent w/o LOB and w/ normalized gait pattern 100% of the time, Navigate 5 stairs modified independent with unilateral handrail to facilitate return to previous living environment, and Increase all balance 1/2 grade to decrease risk for falls   Plan   Treatment/Interventions Functional transfer training;LE strengthening/ROM;Elevations;Therapeutic exercise;Endurance training;Patient/family training;Bed mobility;Gait training;OT   PT Frequency 2-3x/wk   Discharge Recommendation   Rehab Resource Intensity Level, PT III (Minimum Resource Intensity)   -PAC Basic Mobility Inpatient   Turning in Flat Bed Without Bedrails 3   Lying on Back to Sitting on Edge of Flat  Bed Without Bedrails 3   Moving Bed to Chair 3   Standing Up From Chair Using Arms 3   Walk in Room 3   Climb 3-5 Stairs With Railing 3   Basic Mobility Inpatient Raw Score 18   Basic Mobility Standardized Score 41.05   [facility] Level Of Mobility   -[facility] Goal 6: Walk 10 steps or more   -HL Achieved 7: Walk 25 feet or more   Modified [place] Scale   Modified [place] Scale 3   Barthel Index   Feeding 10   Bathing 0   Grooming Score 5   Dressing Score 5   Bladder Score 5   Bowels Score 10   Toilet Use Score 5   Transfers (Bed/Chair) Score 10   Mobility (Level Surface) Score 0   Stairs Score 0   Barthel Index Score 50     PT Evaluation Time: 0910-0923  Nadira Yee, PT, DPT

## 2024-05-03 NOTE — NURSING NOTE
"This writer reached out to on call provider about patient needing QID orders, as there was an insulin order to be given at 2200. Patient stated to PCA, \"We are not doing that, they have all been good\". This writer reached back out to on call via TT.   "

## 2024-05-03 NOTE — DISCHARGE INSTR - AVS FIRST PAGE
Dear Radha Vidal,     It was our pleasure to care for you here at [facility].  It is our hope that we were always able to exceed the expected standards for your care during your stay.  You were hospitalized due to syncope.  You were cared for on the 3rd floor by Janes Baker MD under the service of Janes Irby MD with the [facility] who covers for your primary care physician (PCP), Hoang Tovar MD, while you were hospitalized.  If you have any questions or concerns related to this hospitalization, you may contact us at .  For follow up as well as any medication refills, we recommend that you follow up with your primary care physician.  A registered nurse will reach out to you by phone within a few days after your discharge to answer any additional questions that you may have after going home.  However, at this time we provide for you here, the most important instructions / recommendations at discharge:     Notable Medication Adjustments -   Discontinue amlodipine, metoprolol, amiodarone  Testing Required after Discharge -   Follow up with cardiology on Monday for event monitor   Important follow up information -   na  Other Instructions -   na  Please review this entire after visit summary as additional general instructions including medication list, appointments, activity, diet, any pertinent wound care, and other additional recommendations from your care team that may be provided for you.      Sincerely,     Janes Baker MD

## 2024-05-04 LAB
ATRIAL RATE: 39 BPM
ATRIAL RATE: 42 BPM
ATRIAL RATE: 56 BPM
P AXIS: 29 DEGREES
P AXIS: 46 DEGREES
P AXIS: 67 DEGREES
PR INTERVAL: 162 MS
PR INTERVAL: 168 MS
PR INTERVAL: 184 MS
QRS AXIS: 63 DEGREES
QRS AXIS: 69 DEGREES
QRS AXIS: 72 DEGREES
QRSD INTERVAL: 76 MS
QRSD INTERVAL: 82 MS
QRSD INTERVAL: 84 MS
QT INTERVAL: 460 MS
QT INTERVAL: 518 MS
QT INTERVAL: 556 MS
QTC INTERVAL: 432 MS
QTC INTERVAL: 443 MS
QTC INTERVAL: 447 MS
T WAVE AXIS: 61 DEGREES
T WAVE AXIS: 75 DEGREES
T WAVE AXIS: 79 DEGREES
VENTRICULAR RATE: 39 BPM
VENTRICULAR RATE: 42 BPM
VENTRICULAR RATE: 56 BPM

## 2024-05-04 PROCEDURE — 93010 ELECTROCARDIOGRAM REPORT: CPT | Performed by: INTERNAL MEDICINE

## 2024-05-06 ENCOUNTER — TELEPHONE (OUTPATIENT)
Dept: FAMILY MEDICINE CLINIC | Facility: CLINIC | Age: 84
End: 2024-05-06

## 2024-05-06 ENCOUNTER — TRANSITIONAL CARE MANAGEMENT (OUTPATIENT)
Dept: FAMILY MEDICINE CLINIC | Facility: CLINIC | Age: 84
End: 2024-05-06

## 2024-05-06 ENCOUNTER — TELEPHONE (OUTPATIENT)
Age: 84
End: 2024-05-06

## 2024-05-06 NOTE — TELEPHONE ENCOUNTER
Caller: Patient (Radha)    Doctor: Dr Morillo    Reason for call:  Patient was seen in hospital and was told to call our office to schedule for 2 week holter monitor. Please call patient to schedule. (She does have a nurse visit already scheduled for 5/8)     Call back#: [phone]

## 2024-05-06 NOTE — ASSESSMENT & PLAN NOTE
"Lab Results   Component Value Date    HGBA1C 6.8 (H) 04/25/2024       No results for input(s): \"POCGLU\" in the last 72 hours.      Blood Sugar Average: Last 72 hrs:    Not on meds at home  Adequately controlled while admitted  "

## 2024-05-06 NOTE — DISCHARGE SUMMARY
"[facility]  Discharge- Radha Vidal 1940, 84 y.o. female MRN: [id number]  Unit/Bed#: -01 Encounter: [id number]  Primary Care Provider: Hoang Tovar MD   Date and time admitted to hospital: 4/30/2024 12:05 PM    * Syncope  Assessment & Plan  Secondary to symptomatic bradycardia  Recently diagnosed with Afib  Started on amiodarone and metoprolol  Now with sinus bradycardia  Symptoms did not improve with holding medications  Cardiology consulted, recommending zio path as an outpatient  Also discontinue amlodipine   Stable for discharge    Type 2 diabetes mellitus (HCC)  Assessment & Plan  Lab Results   Component Value Date    HGBA1C 6.8 (H) 04/25/2024       No results for input(s): \"POCGLU\" in the last 72 hours.      Blood Sugar Average: Last 72 hrs:    Not on meds at home  Adequately controlled while admitted    Paroxysmal atrial fibrillation (HCC)  Assessment & Plan  Hold home meds  On eliquis for anticoagulation        Medical Problems       Resolved Problems  Date Reviewed: 4/25/2024   None       Discharging Physician / Practitioner: Janes Baker MD  PCP: Hoang Tovar MD  Admission Date:   Admission Orders (From admission, onward)       Ordered        05/01/24 0922  INPATIENT ADMISSION  Once            04/30/24 1358  Place in Observation  Once                          Discharge Date: 5/3/24    Consultations During Hospital Stay:  IP CONSULT TO CARDIOLOGY      Procedures Performed:   imaging    Significant Findings / Test Results:   Principal Problem:    Syncope  Active Problems:    Paroxysmal atrial fibrillation (HCC)    Type 2 diabetes mellitus (HCC)  Resolved Problems:    * No resolved hospital problems. *        Incidental Findings:   See above    Test Results Pending at Discharge (will require follow up):   na     Outpatient Tests Requested:  Follow up with cardiology for pacemaker evaluation and zio patch    Complications:  na    Reason for Admission: " syncope    Hospital Course:   Radha Vidal is a 84 y.o. female patient who originally presented to the hospital on 4/30/2024 due to syncope secondary to symptomatic bradycardia from recent medication induction- amiodarone and metoprolol. Cardiology consulted and recommended zio patch as an outpatient. Now stable for discharge.     Condition at Discharge: good    Discharge Day Visit / Exam:   * Please refer to separate progress note for these details *    Discussion with Family: Updated  (daughter) via phone.    Discharge instructions/Information to patient and family:   See after visit summary for information provided to patient and family.      Provisions for Follow-Up Care:  See after visit summary for information related to follow-up care and any pertinent home health orders.      Mobility at time of Discharge:   Basic Mobility Inpatient Raw Score: 18  JH-HLM Goal: 6: Walk 10 steps or more  JH-HLM Achieved: 7: Walk 25 feet or more  HLM Goal achieved. Continue to encourage appropriate mobility.     Disposition:   Home    Planned Readmission: none     Discharge Statement:  I spent 30+ minutes discharging the patient. This time was spent on the day of discharge. I had direct contact with the patient on the day of discharge. Greater than 50% of the total time was spent examining patient, answering all patient questions, arranging and discussing plan of care with patient as well as directly providing post-discharge instructions.  Additional time then spent on discharge activities.    Discharge Medications:  See after visit summary for reconciled discharge medications provided to patient and/or family.      **Please Note: This note may have been constructed using a voice recognition system**

## 2024-05-06 NOTE — ASSESSMENT & PLAN NOTE
Secondary to symptomatic bradycardia  Recently diagnosed with Afib  Started on amiodarone and metoprolol  Now with sinus bradycardia  Symptoms did not improve with holding medications  Cardiology consulted, recommending zio patch as an outpatient  Also discontinue amlodipine   Stable for discharge

## 2024-05-06 NOTE — UTILIZATION REVIEW
NOTIFICATION OF ADMISSION DISCHARGE   This is a Notification of Discharge from [facility]. Please be advised that this patient has been discharge from our facility. Below you will find the admission and discharge date and time including the patient’s disposition.   UTILIZATION REVIEW CONTACT:  Kamryn Garcia  Utilization   Network Utilization Review Department  Phone: [phone] x carefully listen to the prompts. All voicemails are confidential.  Email: [email or web address]     ADMISSION INFORMATION  PRESENTATION DATE: 4/30/2024 12:05 PM  OBERVATION ADMISSION DATE:   INPATIENT ADMISSION DATE: 5/1/24  9:22 AM   DISCHARGE DATE: 5/3/2024  5:25 PM   DISPOSITION:Home/Self Care    Network Utilization Review Department  ATTENTION: Please call with any questions or concerns to [phone] and carefully listen to the prompts so that you are directed to the right person. All voicemails are confidential.   For Discharge needs, contact Care Management DC Support Team at [phone] opt. 2  Send all requests for admission clinical reviews, approved or denied determinations and any other requests to dedicated fax number below belonging to the campus where the patient is receiving treatment. List of dedicated fax numbers for the Facilities:  FACILITY NAME UR FAX NUMBER   ADMISSION DENIALS (Administrative/Medical Necessity) [phone]   DISCHARGE SUPPORT TEAM ([facility]) [phone]   PARENT CHILD HEALTH (Maternity/NICU/Pediatrics) [phone]   [facility] [phone]   [facility] [phone]   [facility] [phone]   [facility] [phone]   [facility] [phone]   [facility] [phone]   [facility] [phone]   [facility]  [phone]   [facility] [phone]   [facility] [phone]   [facility] [phone]   [facility] [phone]

## 2024-05-08 NOTE — TELEPHONE ENCOUNTER
PT IS DARI AT [facility] ON 5/08/24 FOR A BP CHECK & MONITOR  PT CAN NOT COME TO [facility] B/C WE DO NOT HAVE A DOCT IN THE P.M. AT [facility] & ONE HAS TO BE PRESENT FOR THE BP CHECK  SPOKE TO PT & SHE IS GOING TO CALL US BACK TO LET US KNOW IF SHE CAN GO TO [facility] TODAY FOR THE BP CHECK & MONITOR OR IF SHE CAN COME TO [facility] ON 5/09/24 FOR THE APPT

## 2024-05-09 ENCOUNTER — CLINICAL SUPPORT (OUTPATIENT)
Dept: CARDIOLOGY CLINIC | Facility: CLINIC | Age: 84
End: 2024-05-09
Payer: COMMERCIAL

## 2024-05-09 VITALS
BODY MASS INDEX: 31.15 KG/M2 | SYSTOLIC BLOOD PRESSURE: 138 MMHG | HEART RATE: 52 BPM | WEIGHT: 165 LBS | DIASTOLIC BLOOD PRESSURE: 60 MMHG | OXYGEN SATURATION: 96 % | HEIGHT: 61 IN | RESPIRATION RATE: 16 BRPM

## 2024-05-09 DIAGNOSIS — I10 HYPERTENSION, UNSPECIFIED TYPE: ICD-10-CM

## 2024-05-09 DIAGNOSIS — R55 SYNCOPE, UNSPECIFIED SYNCOPE TYPE: Primary | ICD-10-CM

## 2024-05-09 PROCEDURE — 99211 OFF/OP EST MAY X REQ PHY/QHP: CPT

## 2024-05-09 NOTE — PROGRESS NOTES
Pt was in the office for a nurse visit BP check and Biotel placement.    Pt's BP today was /60     HR 52    Pt's BP was reviewed with Dr. Krishnamurthy and pt was advised to continue her current meds.    Pt verbally understood.    Biotel flex was successfully placed for 2 weeks and wear and care was explained to pt and her son.    They verbally understood.

## 2024-05-12 ENCOUNTER — TELEPHONE (OUTPATIENT)
Dept: OTHER | Facility: OTHER | Age: 84
End: 2024-05-12

## 2024-05-12 ENCOUNTER — HOSPITAL ENCOUNTER (INPATIENT)
Facility: HOSPITAL | Age: 84
LOS: 4 days | Discharge: HOME | DRG: 310 | End: 2024-05-16
Attending: EMERGENCY MEDICINE | Admitting: STUDENT IN AN ORGANIZED HEALTH CARE EDUCATION/TRAINING PROGRAM
Payer: COMMERCIAL

## 2024-05-12 ENCOUNTER — APPOINTMENT (EMERGENCY)
Dept: RADIOLOGY | Facility: HOSPITAL | Age: 84
DRG: 310 | End: 2024-05-12
Payer: COMMERCIAL

## 2024-05-12 DIAGNOSIS — R00.1 SYMPTOMATIC BRADYCARDIA: Primary | ICD-10-CM

## 2024-05-12 LAB
2HR DELTA HS TROPONIN: 0 NG/L
ALBUMIN SERPL BCP-MCNC: 4.4 G/DL (ref 3.5–5)
ALP SERPL-CCNC: 91 U/L (ref 34–104)
ALT SERPL W P-5'-P-CCNC: 4 U/L (ref 7–52)
ANION GAP SERPL CALCULATED.3IONS-SCNC: 9 MMOL/L (ref 4–13)
APTT PPP: 34 SECONDS (ref 23–37)
AST SERPL W P-5'-P-CCNC: 22 U/L (ref 13–39)
ATRIAL RATE: 59 BPM
BASOPHILS # BLD AUTO: 0.04 THOUSANDS/ÂΜL (ref 0–0.1)
BASOPHILS NFR BLD AUTO: 1 % (ref 0–1)
BILIRUB SERPL-MCNC: 0.69 MG/DL (ref 0.2–1)
BUN SERPL-MCNC: 23 MG/DL (ref 5–25)
CALCIUM SERPL-MCNC: 9.5 MG/DL (ref 8.4–10.2)
CARDIAC TROPONIN I PNL SERPL HS: 5 NG/L
CARDIAC TROPONIN I PNL SERPL HS: 5 NG/L
CHLORIDE SERPL-SCNC: 107 MMOL/L (ref 96–108)
CO2 SERPL-SCNC: 23 MMOL/L (ref 21–32)
CREAT SERPL-MCNC: 0.92 MG/DL (ref 0.6–1.3)
EOSINOPHIL # BLD AUTO: 0.11 THOUSAND/ÂΜL (ref 0–0.61)
EOSINOPHIL NFR BLD AUTO: 1 % (ref 0–6)
ERYTHROCYTE [DISTWIDTH] IN BLOOD BY AUTOMATED COUNT: 15 % (ref 11.6–15.1)
GFR SERPL CREATININE-BSD FRML MDRD: 56 ML/MIN/1.73SQ M
GLUCOSE SERPL-MCNC: 124 MG/DL (ref 65–140)
HCT VFR BLD AUTO: 41.9 % (ref 34.8–46.1)
HGB BLD-MCNC: 13.7 G/DL (ref 11.5–15.4)
IMM GRANULOCYTES # BLD AUTO: 0.03 THOUSAND/UL (ref 0–0.2)
IMM GRANULOCYTES NFR BLD AUTO: 0 % (ref 0–2)
INR PPP: 1.17 (ref 0.84–1.19)
LYMPHOCYTES # BLD AUTO: 3 THOUSANDS/ÂΜL (ref 0.6–4.47)
LYMPHOCYTES NFR BLD AUTO: 36 % (ref 14–44)
MAGNESIUM SERPL-MCNC: 2.1 MG/DL (ref 1.9–2.7)
MCH RBC QN AUTO: 29.7 PG (ref 26.8–34.3)
MCHC RBC AUTO-ENTMCNC: 32.7 G/DL (ref 31.4–37.4)
MCV RBC AUTO: 91 FL (ref 82–98)
MONOCYTES # BLD AUTO: 0.86 THOUSAND/ÂΜL (ref 0.17–1.22)
MONOCYTES NFR BLD AUTO: 10 % (ref 4–12)
NEUTROPHILS # BLD AUTO: 4.41 THOUSANDS/ÂΜL (ref 1.85–7.62)
NEUTS SEG NFR BLD AUTO: 52 % (ref 43–75)
NRBC BLD AUTO-RTO: 0 /100 WBCS
P AXIS: 68 DEGREES
PLATELET # BLD AUTO: 350 THOUSANDS/UL (ref 149–390)
PMV BLD AUTO: 10.9 FL (ref 8.9–12.7)
POTASSIUM SERPL-SCNC: 4.3 MMOL/L (ref 3.5–5.3)
PR INTERVAL: 164 MS
PROT SERPL-MCNC: 6.6 G/DL (ref 6.4–8.4)
PROTHROMBIN TIME: 15.5 SECONDS (ref 11.6–14.5)
QRS AXIS: 74 DEGREES
QRSD INTERVAL: 78 MS
QT INTERVAL: 448 MS
QTC INTERVAL: 443 MS
RBC # BLD AUTO: 4.62 MILLION/UL (ref 3.81–5.12)
SODIUM SERPL-SCNC: 139 MMOL/L (ref 135–147)
T WAVE AXIS: 75 DEGREES
TSH SERPL DL<=0.05 MIU/L-ACNC: 2.15 UIU/ML (ref 0.45–4.5)
VENTRICULAR RATE: 59 BPM
WBC # BLD AUTO: 8.45 THOUSAND/UL (ref 4.31–10.16)

## 2024-05-12 PROCEDURE — 84443 ASSAY THYROID STIM HORMONE: CPT | Performed by: PHYSICIAN ASSISTANT

## 2024-05-12 PROCEDURE — 85730 THROMBOPLASTIN TIME PARTIAL: CPT

## 2024-05-12 PROCEDURE — 85610 PROTHROMBIN TIME: CPT

## 2024-05-12 PROCEDURE — 71045 X-RAY EXAM CHEST 1 VIEW: CPT

## 2024-05-12 PROCEDURE — 99285 EMERGENCY DEPT VISIT HI MDM: CPT | Performed by: PHYSICIAN ASSISTANT

## 2024-05-12 PROCEDURE — 84484 ASSAY OF TROPONIN QUANT: CPT | Performed by: EMERGENCY MEDICINE

## 2024-05-12 PROCEDURE — 85025 COMPLETE CBC W/AUTO DIFF WBC: CPT | Performed by: EMERGENCY MEDICINE

## 2024-05-12 PROCEDURE — 80053 COMPREHEN METABOLIC PANEL: CPT | Performed by: EMERGENCY MEDICINE

## 2024-05-12 PROCEDURE — 99285 EMERGENCY DEPT VISIT HI MDM: CPT

## 2024-05-12 PROCEDURE — 93005 ELECTROCARDIOGRAM TRACING: CPT

## 2024-05-12 PROCEDURE — 83735 ASSAY OF MAGNESIUM: CPT | Performed by: PHYSICIAN ASSISTANT

## 2024-05-12 PROCEDURE — 36415 COLL VENOUS BLD VENIPUNCTURE: CPT

## 2024-05-12 PROCEDURE — 93010 ELECTROCARDIOGRAM REPORT: CPT | Performed by: INTERNAL MEDICINE

## 2024-05-12 PROCEDURE — 99223 1ST HOSP IP/OBS HIGH 75: CPT | Performed by: STUDENT IN AN ORGANIZED HEALTH CARE EDUCATION/TRAINING PROGRAM

## 2024-05-12 RX ORDER — ATORVASTATIN CALCIUM 40 MG/1
40 TABLET, FILM COATED ORAL
Status: DISCONTINUED | OUTPATIENT
Start: 2024-05-13 | End: 2024-05-16 | Stop reason: HOSPADM

## 2024-05-12 RX ORDER — ATROPINE SULFATE 1 MG/ML
0.5 INJECTION, SOLUTION INTRAVENOUS ONCE
Qty: 1 ML | Refills: 0 | Status: DISCONTINUED | OUTPATIENT
Start: 2024-05-12 | End: 2024-05-13

## 2024-05-12 RX ORDER — ATROPINE SULFATE 1 MG/ML
1 INJECTION, SOLUTION INTRAVENOUS
Status: DISCONTINUED | OUTPATIENT
Start: 2024-05-12 | End: 2024-05-16 | Stop reason: HOSPADM

## 2024-05-12 RX ORDER — HEPARIN SODIUM 10000 [USP'U]/100ML
3-20 INJECTION, SOLUTION INTRAVENOUS
Status: DISCONTINUED | OUTPATIENT
Start: 2024-05-12 | End: 2024-05-15

## 2024-05-12 RX ORDER — CHLORHEXIDINE GLUCONATE ORAL RINSE 1.2 MG/ML
15 SOLUTION DENTAL EVERY 12 HOURS SCHEDULED
Status: DISCONTINUED | OUTPATIENT
Start: 2024-05-13 | End: 2024-05-13

## 2024-05-12 RX ORDER — PANTOPRAZOLE SODIUM 20 MG/1
20 TABLET, DELAYED RELEASE ORAL
Status: DISCONTINUED | OUTPATIENT
Start: 2024-05-13 | End: 2024-05-16 | Stop reason: HOSPADM

## 2024-05-12 RX ADMIN — HEPARIN SODIUM 12 UNITS/KG/HR: 10000 INJECTION, SOLUTION INTRAVENOUS at 21:46

## 2024-05-12 NOTE — ED NOTES
Code cart brought into room and pt placed on pads as precautionary measure.      Isabel West RN  05/12/24 2040

## 2024-05-12 NOTE — TELEPHONE ENCOUNTER
[facility] staff member's name: Evaristo   Message from [facility] staff: Calling to report an urgent alert.  Date and Time of Alert: 5/12/2024 12:15pm   Callback #: [phone] option 1    Paged on call VIA TT

## 2024-05-12 NOTE — Clinical Note
Prepped: left chest, subxiphoid process, left neck and left axillary. Prepped with: ChloraPrep. The patient was draped.

## 2024-05-12 NOTE — ED PROVIDER NOTES
"History  Chief Complaint   Patient presents with    Slow Heart Rate     Pt states \"my doctor called me today to tell me my holter monitor was showing my heart rate low. I'm feeling weak. Denies CP.\"     85 yo sent in after holter monitor captured her in a sinus noemi for 30 seconds with a rate of high 20s. She's had multiple syncopal episodes in past 3-4 months. Admitted recently for this. D/c'd with aforementioned holter. She notes when she exerts herself is usually when she becomes symptomatic. No chest pain or sob. Currently feels weak but not near syncopal. No chest pain.       History provided by:  Patient   used: No        Prior to Admission Medications   Prescriptions Last Dose Informant Patient Reported? Taking?   Ascorbic Acid (VITAMIN C) 1000 MG tablet  Self Yes No   Sig: Take 1,000 mg by mouth daily   Patient not taking: Reported on 5/9/2024   B Complex Vitamins (B COMPLEX 100 PO)  Self Yes No   Sig: Take 1 capsule by mouth daily after lunch not taking   Coenzyme Q10 (CO Q 10 PO)  Self Yes No   Sig: Take 1 capsule by mouth daily after lunch 600 mg BID   Patient not taking: Reported on 5/9/2024   Cyanocobalamin (VITAMIN B 12 PO)  Self Yes No   Sig: Take 1 capsule by mouth daily   Omega-3 350 MG CPDR  Self Yes No   Sig: Take 1 capsule by mouth daily in the early morning   Potassium Gluconate 595 (99 K) MG TABS  Self Yes No   Sig: Take 1 each by mouth every other day   Probiotic Product (PROBIOTIC-10) CAPS  Self Yes No   Sig: Take 1 capsule by mouth daily after lunch   apixaban (Eliquis) 5 mg  Self No No   Sig: Take 1 tablet (5 mg total) by mouth 2 (two) times a day   atorvastatin (LIPITOR) 40 mg tablet  Self No No   Sig: Take 1 tablet (40 mg total) by mouth daily with dinner   calcium carbonate (OS-JOHN) 1250 (500 Ca) MG tablet  Self Yes No   Sig: Take 1 tablet by mouth daily after lunch   carbidopa-levodopa (SINEMET)  mg per tablet  Self No No   Sig: Take 1 tablet by mouth 3 " (three) times a day before meals   cholecalciferol (VITAMIN D3) 1,000 units tablet  Self Yes No   Sig: Take 5,000 Units by mouth daily after lunch   multivitamin (THERAGRAN) TABS  Self Yes No   Sig: Take 1 tablet by mouth every morning   pantoprazole (PROTONIX) 20 mg tablet  Self Yes No   Sig: Take 20 mg by mouth daily   polyethylene glycol (MIRALAX) 17 g packet  Self No No   Sig: Take 17 g by mouth daily as needed (constipation if not responding to senna/docusate)   trospium chloride (SANCTURA) 20 mg tablet  Self No No   Sig: Take 1 tablet (20 mg total) by mouth 2 (two) times a day   Patient not taking: Reported on 2024      Facility-Administered Medications: None       Past Medical History:   Diagnosis Date    Actinic keratosis 2016    Acute midline low back pain without sciatica 2020    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     Benign neoplasm of skin     Cancer (HCC)     skin    Chest pain     Claustrophobia     Closed compression fracture of body of L1 vertebra (HCC)     Diverticulitis     Diverticulitis     Fracture     L1-L2    GERD (gastroesophageal reflux disease)     H. pylori infection 2020    Muscle weakness     Nausea 2024    Nonmelanoma skin cancer     last assessed 2017    Palpitations     Pancreatic cyst     Seasonal allergies     Seborrheic keratosis 2014    Sleep apnea     no cpap    Spontaneous      without mention of complications     Squamous cell carcinoma of forehead 2014    Syncope        Past Surgical History:   Procedure Laterality Date    ABDOMINAL ADHESION SURGERY N/A 2023    Procedure: LYSIS ADHESIONS;  Surgeon: Kyle Julien MD;  [facility];  Service: Surgical Oncology    BOTOX INJECTION N/A 2017    Procedure: CYSTOSCOPY; BLADDER BOTOX 100 UNITS ;  Surgeon: Juan Edward MD;  [facility];  Service:     BOWEL RESECTION      related ot diverticulitis    CARDIAC  CATHETERIZATION      CARDIAC CATHETERIZATION Left 2/2/2024    Procedure: Cardiac Left Heart Cath;  Surgeon: Carrington Kiser DO;  [facility];  Service: Cardiology    CARPAL TUNNEL RELEASE Right     COLONOSCOPY  07/31/2019    COLOSTOMY      COLOSTOMY CLOSURE      CYSTOSCOPY  06/01/2021    DISTAL PANCREATECTOMY N/A 07/31/2023    Procedure: DISTAL PANCREATECTOMY;  Surgeon: Kyle Julien MD;  [facility];  Service: Surgical Oncology    FOOT SURGERY Left     neuroma    HYSTERECTOMY      MYRINGOTOMY W/ TUBES Right 12/06/2023    office procedure - Dr. Grace    NA[place] S[place]  age 39    [place]    PANCREATIC CYST BIOPSY  07/31/2023    [place] CYSTOURETHROSCOPY N/A 04/13/2018    Procedure: CYSTOSCOPY WITH BOTOX;  Surgeon: Juan Edward MD;  [facility];  Service: Urology    [place] ESOPHAGOGASTRODUODENOSCOPY TRANSORAL DIAGNOSTIC N/A 04/23/2019    Procedure: ESOPHAGOGASTRODUODENOSCOPY (EGD);  Surgeon: Edu Dickerson DO;  Location: [facility];  Service: Gastroenterology    SPLENECTOMY, TOTAL N/A 07/31/2023    Procedure: SPLENECTOMY;  Surgeon: Kyle Julien MD;  [facility];  Service: Surgical Oncology    TONSILLECTOMY  age 49    UPPER GASTROINTESTINAL ENDOSCOPY  04/23/2019       Family History   Problem Relation Age of Onset    Alcohol abuse Mother     Heart disease Mother     Alcohol abuse Father     Alcohol abuse Brother     Cancer Brother     Tremor Neg Hx     Parkinsonism Neg Hx     Colon cancer Neg Hx      I have reviewed and agree with the history as documented.    E-Cigarette/Vaping    E-Cigarette Use Never User      E-Cigarette/Vaping Substances    Nicotine No     THC No     CBD No     Flavoring No     Other No     Unknown No      Social History     Tobacco Use    Smoking status: Never     Passive exposure: Past    Smokeless tobacco: Never   Vaping Use    Vaping status: Never Used   Substance Use Topics    Alcohol use: Not Currently     Comment: patient states rare  use on holidays     Drug use: No       Review of Systems   Constitutional:  Positive for fatigue. Negative for chills and fever.   HENT:  Negative for ear pain and sore throat.    Eyes:  Negative for pain and visual disturbance.   Respiratory:  Negative for cough and shortness of breath.    Cardiovascular:  Negative for chest pain and palpitations.   Gastrointestinal:  Negative for abdominal pain and vomiting.   Genitourinary:  Negative for dysuria and hematuria.   Musculoskeletal:  Negative for arthralgias and back pain.   Skin:  Negative for color change and rash.   Neurological:  Positive for weakness and light-headedness. Negative for seizures and syncope.   All other systems reviewed and are negative.      Physical Exam  Physical Exam  Vitals and nursing note reviewed.   Constitutional:       General: She is not in acute distress.     Appearance: She is well-developed.   HENT:      Head: Normocephalic and atraumatic.   Eyes:      Conjunctiva/sclera: Conjunctivae normal.   Cardiovascular:      Rate and Rhythm: Normal rate and regular rhythm.      Heart sounds: No murmur heard.  Pulmonary:      Effort: Pulmonary effort is normal. No respiratory distress.      Breath sounds: Normal breath sounds.   Abdominal:      Palpations: Abdomen is soft.      Tenderness: There is no abdominal tenderness.   Musculoskeletal:         General: No swelling.      Cervical back: Neck supple.   Skin:     General: Skin is warm and dry.      Capillary Refill: Capillary refill takes less than 2 seconds.   Neurological:      Mental Status: She is alert and oriented to person, place, and time.      GCS: GCS eye subscore is 4. GCS verbal subscore is 5. GCS motor subscore is 6.      Comments: GCS 15. AAOx3. Ambulating in department without difficulty. CN II-XII grossly intact. No focal neuro deficits.     Psychiatric:         Mood and Affect: Mood normal.         Vital Signs  ED Triage Vitals   Temperature Pulse Respirations Blood Pressure  SpO2   05/12/24 1521 05/12/24 1518 05/12/24 1518 05/12/24 1518 05/12/24 1518   97.7 °F (36.5 °C) 58 20 143/64 96 %      Temp Source Heart Rate Source Patient Position - Orthostatic VS BP Location FiO2 (%)   05/12/24 1518 05/12/24 1518 05/12/24 1518 05/12/24 1518 --   Oral Monitor Sitting Left arm       Pain Score       --                  Vitals:    05/12/24 1521 05/12/24 1808 05/12/24 2100 05/12/24 2200   BP: 143/64 166/72 144/72 (!) 171/71   Pulse: 60 (!) 52 (!) 50 69   Patient Position - Orthostatic VS:             Visual Acuity      ED Medications  Medications   Atropine Sulfate injection 0.5 mg (0 mg Intravenous Hold 5/12/24 1750)   atorvastatin (LIPITOR) tablet 40 mg (has no administration in time range)   carbidopa-levodopa (SINEMET)  mg per tablet 1 tablet (has no administration in time range)   pantoprazole (PROTONIX) EC tablet 20 mg (has no administration in time range)   chlorhexidine (PERIDEX) 0.12 % oral rinse 15 mL (has no administration in time range)   Atropine Sulfate injection 1 mg (has no administration in time range)   heparin (porcine) 25,000 units in 0.45% NaCl 250 mL infusion (premix) (12 Units/kg/hr × 70 kg (Order-Specific) Intravenous New Bag 5/12/24 2146)       Diagnostic Studies  Results Reviewed       Procedure Component Value Units Date/Time    APTT [[id number]]  (Normal) Collected: 05/12/24 2146    Lab Status: Final result Specimen: Blood from Arm, Right Updated: 05/12/24 2233     PTT 34 seconds     Protime-INR [[id number]]  (Abnormal) Collected: 05/12/24 2146    Lab Status: Final result Specimen: Blood from Arm, Right Updated: 05/12/24 2233     Protime 15.5 seconds      INR 1.17    TSH, 3rd generation with Free T4 reflex [[id number]]  (Normal) Collected: 05/12/24 2103    Lab Status: Final result Specimen: Blood from Arm, Right Updated: 05/12/24 2145     TSH 3RD GENERATON 2.147 uIU/mL     HS Troponin I 2hr [[id number]]  (Normal) Collected: 05/12/24 2104    Lab Status: Final result  Specimen: Blood from Arm, Right Updated: 05/12/24 2135     hs TnI 2hr 5 ng/L      Delta 2hr hsTnI 0 ng/L     HS Troponin I 4hr [[id number]]     Lab Status: No result Specimen: Blood     Magnesium [[id number]]  (Normal) Collected: 05/12/24 1652    Lab Status: Final result Specimen: Blood from Hand, Right Updated: 05/12/24 1824     Magnesium 2.1 mg/dL     HS Troponin 0hr (reflex protocol) [[id number]]  (Normal) Collected: 05/12/24 1652    Lab Status: Final result Specimen: Blood from Hand, Right Updated: 05/12/24 1753     hs TnI 0hr 5 ng/L     Comprehensive metabolic panel [[id number]]  (Abnormal) Collected: 05/12/24 1652    Lab Status: Final result Specimen: Blood from Hand, Right Updated: 05/12/24 1751     Sodium 139 mmol/L      Potassium 4.3 mmol/L      Chloride 107 mmol/L      CO2 23 mmol/L      ANION GAP 9 mmol/L      BUN 23 mg/dL      Creatinine 0.92 mg/dL      Glucose 124 mg/dL      Calcium 9.5 mg/dL      AST 22 U/L      ALT 4 U/L      Alkaline Phosphatase 91 U/L      Total Protein 6.6 g/dL      Albumin 4.4 g/dL      Total Bilirubin 0.69 mg/dL      eGFR 56 ml/min/1.73sq m     Narrative:      National [employer] guidelines for Chronic Kidney Disease (CKD):     Stage 1 with normal or high GFR (GFR > 90 mL/min/1.73 square meters)    Stage 2 Mild CKD (GFR = 60-89 mL/min/1.73 square meters)    Stage 3A Moderate CKD (GFR = 45-59 mL/min/1.73 square meters)    Stage 3B Moderate CKD (GFR = 30-44 mL/min/1.73 square meters)    Stage 4 Severe CKD (GFR = 15-29 mL/min/1.73 square meters)    Stage 5 End Stage CKD (GFR <15 mL/min/1.73 square meters)  Note: GFR calculation is accurate only with a steady state creatinine    CBC and differential [[id number]] Collected: 05/12/24 1652    Lab Status: Final result Specimen: Blood from Hand, Right Updated: 05/12/24 1712     WBC 8.45 Thousand/uL      RBC 4.62 Million/uL      Hemoglobin 13.7 g/dL      Hematocrit 41.9 %      MCV 91 fL      MCH 29.7 pg      MCHC 32.7 g/dL       RDW 15.0 %      MPV 10.9 fL      Platelets 350 Thousands/uL      nRBC 0 /100 WBCs      Segmented % 52 %      Immature Grans % 0 %      Lymphocytes % 36 %      Monocytes % 10 %      Eosinophils Relative 1 %      Basophils Relative 1 %      Absolute Neutrophils 4.41 Thousands/µL      Absolute Immature Grans 0.03 Thousand/uL      Absolute Lymphocytes 3.00 Thousands/µL      Absolute Monocytes 0.86 Thousand/µL      Eosinophils Absolute 0.11 Thousand/µL      Basophils Absolute 0.04 Thousands/µL                    XR chest 1 view portable    (Results Pending)              Procedures  ECG 12 Lead Documentation Only    Date/Time: 5/12/2024 5:29 PM    Performed by: Mac Zafar PA-C  Authorized by: Mac Zafar PA-C    ECG reviewed by me, the ED Provider: yes    Patient location:  ED  Interpretation:     Interpretation: normal    Quality:     Tracing quality:  Limited by artifact  Rate:     ECG rate:  59    ECG rate assessment: bradycardic    Rhythm:     Rhythm: sinus bradycardia    Ectopy:     Ectopy: none    QRS:     QRS axis:  Normal    QRS intervals:  Normal  Conduction:     Conduction: normal    ST segments:     ST segments:  Normal  T waves:     T waves: normal             ED Course  ED Course as of 05/12/24 2238   Sun May 12, 2024   1803 ICU to eval                               SBIRT 20yo+      Flowsheet Row Most Recent Value   Initial Alcohol Screen: US AUDIT-C     1. How often do you have a drink containing alcohol? 0 Filed at: 05/12/2024 1521   2. How many drinks containing alcohol do you have on a typical day you are drinking?  0 Filed at: 05/12/2024 1521   3b. FEMALE Any Age, or MALE 65+: How often do you have 4 or more drinks on one occassion? 0 Filed at: 05/12/2024 1521   Audit-C Score 0 Filed at: 05/12/2024 1521   MELIA: How many times in the past year have you...    Used an illegal drug or used a prescription medication for non-medical reasons? Never Filed at: 05/12/2024 1521                       Medical Decision Making  Differential diagnosis including but not limited to: vasovagal syncope, cardiac arrhythmia, MI, ACS, metabolic abnormality, anemia. Plan: XR. Atropine at bedside. Nurse instructed to hold at this time. Pt place on external pacing pads but without pacing at this time as HR 60s and BP hypertensive.     MDM: 85 yo with symptomatic bradycardia. Workup unremarkable. Discussed with on call cards who plans for PPM at [place] tomorrow. ICU admit here. Pt and daughter agrees with plan.     Amount and/or Complexity of Data Reviewed  Labs: ordered.  Radiology: ordered.    Risk  Prescription drug management.  Decision regarding hospitalization.             Disposition  Final diagnoses:   Symptomatic bradycardia     Time reflects when diagnosis was documented in both MDM as applicable and the Disposition within this note       Time User Action Codes Description Comment    5/12/2024  6:43 PM Mac Zafar Add [R00.1] Symptomatic bradycardia           ED Disposition       ED Disposition   Admit    Condition   Stable    Date/Time   Sun May 12, 2024 0399    Comment   Case was discussed with Dr. TERRY Villarreal and the patient's admission status was agreed to be Admission Status: inpatient status to the service of  .               Follow-up Information    None         Patient's Medications   Discharge Prescriptions    No medications on file       No discharge procedures on file.    PDMP Review         Value Time User    PDMP Reviewed  Yes 1/19/2024 10:46 AM Bradly Farnsworth DO            ED Provider  Electronically Signed by             Mac Zafar PA-C  05/12/24 5559

## 2024-05-12 NOTE — TELEPHONE ENCOUNTER
[facility] staff memeber name: Omaira  Message from [facility] staff:Severe Aristides HR 28 BPM for 30 seconds, underline rhythm was sinus aristides  Date and Time of Alert: 5/11/24 2:42pm  Callback #:[phone]      TT Dr. Osullivan

## 2024-05-13 ENCOUNTER — TELEPHONE (OUTPATIENT)
Age: 84
End: 2024-05-13

## 2024-05-13 LAB
ANION GAP SERPL CALCULATED.3IONS-SCNC: 6 MMOL/L (ref 4–13)
APTT PPP: 104 SECONDS (ref 23–37)
APTT PPP: 86 SECONDS (ref 23–37)
APTT PPP: 88 SECONDS (ref 23–37)
BASOPHILS # BLD AUTO: 0.03 THOUSANDS/ÂΜL (ref 0–0.1)
BASOPHILS NFR BLD AUTO: 0 % (ref 0–1)
BUN SERPL-MCNC: 18 MG/DL (ref 5–25)
CA-I BLD-SCNC: 1.17 MMOL/L (ref 1.12–1.32)
CALCIUM SERPL-MCNC: 9 MG/DL (ref 8.4–10.2)
CHLORIDE SERPL-SCNC: 108 MMOL/L (ref 96–108)
CO2 SERPL-SCNC: 26 MMOL/L (ref 21–32)
CREAT SERPL-MCNC: 0.8 MG/DL (ref 0.6–1.3)
EOSINOPHIL # BLD AUTO: 0.32 THOUSAND/ÂΜL (ref 0–0.61)
EOSINOPHIL NFR BLD AUTO: 4 % (ref 0–6)
ERYTHROCYTE [DISTWIDTH] IN BLOOD BY AUTOMATED COUNT: 14.7 % (ref 11.6–15.1)
EST. AVERAGE GLUCOSE BLD GHB EST-MCNC: 137 MG/DL
GFR SERPL CREATININE-BSD FRML MDRD: 67 ML/MIN/1.73SQ M
GLUCOSE SERPL-MCNC: 147 MG/DL (ref 65–140)
GLUCOSE SERPL-MCNC: 99 MG/DL (ref 65–140)
HBA1C MFR BLD: 6.4 %
HCT VFR BLD AUTO: 37.9 % (ref 34.8–46.1)
HGB BLD-MCNC: 12.3 G/DL (ref 11.5–15.4)
IMM GRANULOCYTES # BLD AUTO: 0.01 THOUSAND/UL (ref 0–0.2)
IMM GRANULOCYTES NFR BLD AUTO: 0 % (ref 0–2)
INR PPP: 1.2 (ref 0.84–1.19)
LYMPHOCYTES # BLD AUTO: 3.84 THOUSANDS/ÂΜL (ref 0.6–4.47)
LYMPHOCYTES NFR BLD AUTO: 49 % (ref 14–44)
MAGNESIUM SERPL-MCNC: 1.9 MG/DL (ref 1.9–2.7)
MCH RBC QN AUTO: 29.4 PG (ref 26.8–34.3)
MCHC RBC AUTO-ENTMCNC: 32.5 G/DL (ref 31.4–37.4)
MCV RBC AUTO: 91 FL (ref 82–98)
MONOCYTES # BLD AUTO: 0.8 THOUSAND/ÂΜL (ref 0.17–1.22)
MONOCYTES NFR BLD AUTO: 10 % (ref 4–12)
NEUTROPHILS # BLD AUTO: 2.96 THOUSANDS/ÂΜL (ref 1.85–7.62)
NEUTS SEG NFR BLD AUTO: 37 % (ref 43–75)
NRBC BLD AUTO-RTO: 0 /100 WBCS
PHOSPHATE SERPL-MCNC: 4 MG/DL (ref 2.3–4.1)
PLATELET # BLD AUTO: 327 THOUSANDS/UL (ref 149–390)
PMV BLD AUTO: 10.9 FL (ref 8.9–12.7)
POTASSIUM SERPL-SCNC: 4 MMOL/L (ref 3.5–5.3)
PROTHROMBIN TIME: 15.9 SECONDS (ref 11.6–14.5)
RBC # BLD AUTO: 4.19 MILLION/UL (ref 3.81–5.12)
SODIUM SERPL-SCNC: 140 MMOL/L (ref 135–147)
WBC # BLD AUTO: 7.96 THOUSAND/UL (ref 4.31–10.16)

## 2024-05-13 PROCEDURE — 85730 THROMBOPLASTIN TIME PARTIAL: CPT

## 2024-05-13 PROCEDURE — 82330 ASSAY OF CALCIUM: CPT

## 2024-05-13 PROCEDURE — 84100 ASSAY OF PHOSPHORUS: CPT

## 2024-05-13 PROCEDURE — 99291 CRITICAL CARE FIRST HOUR: CPT | Performed by: ANESTHESIOLOGY

## 2024-05-13 PROCEDURE — 85025 COMPLETE CBC W/AUTO DIFF WBC: CPT

## 2024-05-13 PROCEDURE — 83036 HEMOGLOBIN GLYCOSYLATED A1C: CPT | Performed by: NURSE PRACTITIONER

## 2024-05-13 PROCEDURE — 85610 PROTHROMBIN TIME: CPT

## 2024-05-13 PROCEDURE — 85730 THROMBOPLASTIN TIME PARTIAL: CPT | Performed by: NURSE PRACTITIONER

## 2024-05-13 PROCEDURE — 82948 REAGENT STRIP/BLOOD GLUCOSE: CPT

## 2024-05-13 PROCEDURE — 83735 ASSAY OF MAGNESIUM: CPT

## 2024-05-13 PROCEDURE — 80048 BASIC METABOLIC PNL TOTAL CA: CPT

## 2024-05-13 PROCEDURE — 99223 1ST HOSP IP/OBS HIGH 75: CPT | Performed by: INTERNAL MEDICINE

## 2024-05-13 RX ORDER — SACCHAROMYCES BOULARDII 250 MG
250 CAPSULE ORAL 2 TIMES DAILY
Status: DISCONTINUED | OUTPATIENT
Start: 2024-05-13 | End: 2024-05-16 | Stop reason: HOSPADM

## 2024-05-13 RX ORDER — MAGNESIUM SULFATE HEPTAHYDRATE 40 MG/ML
2 INJECTION, SOLUTION INTRAVENOUS ONCE
Status: COMPLETED | OUTPATIENT
Start: 2024-05-13 | End: 2024-05-13

## 2024-05-13 RX ORDER — DIPHENHYDRAMINE HCL 25 MG
50 TABLET ORAL
Status: DISCONTINUED | OUTPATIENT
Start: 2024-05-13 | End: 2024-05-13

## 2024-05-13 RX ORDER — HYDRALAZINE HYDROCHLORIDE 20 MG/ML
5 INJECTION INTRAMUSCULAR; INTRAVENOUS EVERY 6 HOURS PRN
Status: DISCONTINUED | OUTPATIENT
Start: 2024-05-13 | End: 2024-05-16 | Stop reason: HOSPADM

## 2024-05-13 RX ORDER — POLYETHYLENE GLYCOL 3350 17 G/17G
17 POWDER, FOR SOLUTION ORAL DAILY PRN
Status: DISCONTINUED | OUTPATIENT
Start: 2024-05-13 | End: 2024-05-16 | Stop reason: HOSPADM

## 2024-05-13 RX ORDER — DIPHENHYDRAMINE HCL 25 MG
50 TABLET ORAL
Status: DISCONTINUED | OUTPATIENT
Start: 2024-05-15 | End: 2024-05-16 | Stop reason: HOSPADM

## 2024-05-13 RX ORDER — CHLORHEXIDINE GLUCONATE ORAL RINSE 1.2 MG/ML
15 SOLUTION DENTAL ONCE
Status: DISCONTINUED | OUTPATIENT
Start: 2024-05-13 | End: 2024-05-13

## 2024-05-13 RX ORDER — METHYLPREDNISOLONE 16 MG/1
32 TABLET ORAL
Status: COMPLETED | OUTPATIENT
Start: 2024-05-15 | End: 2024-05-15

## 2024-05-13 RX ORDER — CEFAZOLIN SODIUM 2 G/50ML
2000 SOLUTION INTRAVENOUS ONCE
Status: DISCONTINUED | OUTPATIENT
Start: 2024-05-13 | End: 2024-05-13

## 2024-05-13 RX ORDER — CHLORHEXIDINE GLUCONATE ORAL RINSE 1.2 MG/ML
15 SOLUTION DENTAL ONCE
Status: COMPLETED | OUTPATIENT
Start: 2024-05-15 | End: 2024-05-15

## 2024-05-13 RX ORDER — ACETAMINOPHEN 325 MG/1
650 TABLET ORAL EVERY 6 HOURS PRN
Status: DISCONTINUED | OUTPATIENT
Start: 2024-05-13 | End: 2024-05-16 | Stop reason: HOSPADM

## 2024-05-13 RX ORDER — METHYLPREDNISOLONE 16 MG/1
32 TABLET ORAL
Status: DISCONTINUED | OUTPATIENT
Start: 2024-05-13 | End: 2024-05-13

## 2024-05-13 RX ORDER — CEFAZOLIN SODIUM 2 G/50ML
2000 SOLUTION INTRAVENOUS ONCE
Status: COMPLETED | OUTPATIENT
Start: 2024-05-15 | End: 2024-05-15

## 2024-05-13 RX ADMIN — Medication 250 MG: at 11:13

## 2024-05-13 RX ADMIN — ACETAMINOPHEN 650 MG: 325 TABLET, FILM COATED ORAL at 13:30

## 2024-05-13 RX ADMIN — B-COMPLEX W/ C & FOLIC ACID TAB 1 TABLET: TAB at 11:13

## 2024-05-13 RX ADMIN — CHLORHEXIDINE GLUCONATE 0.12% ORAL RINSE 15 ML: 1.2 LIQUID ORAL at 10:20

## 2024-05-13 RX ADMIN — ATORVASTATIN CALCIUM 40 MG: 40 TABLET, FILM COATED ORAL at 16:53

## 2024-05-13 RX ADMIN — CARBIDOPA AND LEVODOPA 1 TABLET: 25; 100 TABLET ORAL at 16:53

## 2024-05-13 RX ADMIN — MAGNESIUM SULFATE HEPTAHYDRATE 2 G: 40 INJECTION, SOLUTION INTRAVENOUS at 07:27

## 2024-05-13 RX ADMIN — CARBIDOPA AND LEVODOPA 1 TABLET: 25; 100 TABLET ORAL at 11:13

## 2024-05-13 RX ADMIN — PANTOPRAZOLE SODIUM 20 MG: 20 TABLET, DELAYED RELEASE ORAL at 05:53

## 2024-05-13 RX ADMIN — HEPARIN SODIUM 10 UNITS/KG/HR: 10000 INJECTION, SOLUTION INTRAVENOUS at 20:39

## 2024-05-13 RX ADMIN — Medication 250 MG: at 18:20

## 2024-05-13 NOTE — PLAN OF CARE
Problem: Potential for Falls  Goal: Patient will remain free of falls  Description: INTERVENTIONS:  - Educate patient/family on patient safety including physical limitations  - Instruct patient to call for assistance with activity   - Consult OT/PT to assist with strengthening/mobility   - Keep Call bell within reach  - Keep bed low and locked with side rails adjusted as appropriate  - Keep care items and personal belongings within reach  - Initiate and maintain comfort rounds  - Make Fall Risk Sign visible to staff  - Offer Toileting every 2 Hours, in advance of need  - Initiate/Maintain chair alarm  - Obtain necessary fall risk management equipment:   - Apply yellow socks and bracelet for high fall risk patients  - Consider moving patient to room near nurses station  Outcome: Progressing     Problem: CARDIOVASCULAR - ADULT  Goal: Maintains optimal cardiac output and hemodynamic stability  Description: INTERVENTIONS:  - Monitor I/O, vital signs and rhythm  - Monitor for S/S and trends of decreased cardiac output  - Administer and titrate ordered vasoactive medications to optimize hemodynamic stability  - Assess quality of pulses, skin color and temperature  - Assess for signs of decreased coronary artery perfusion  - Instruct patient to report change in severity of symptoms  Outcome: Progressing

## 2024-05-13 NOTE — UTILIZATION REVIEW
" Initial Clinical Review    Admission: Date/Time/Statement:   Admission Orders (From admission, onward)       Ordered        05/12/24 1845  INPATIENT ADMISSION  Once                          Orders Placed This Encounter   Procedures    INPATIENT ADMISSION     Standing Status:   Standing     Number of Occurrences:   1     Order Specific Question:   Level of Care     Answer:   Level 1 Stepdown [13]     Order Specific Question:   Estimated length of stay     Answer:   More than 2 Midnights     Order Specific Question:   Certification     Answer:   I certify that inpatient services are medically necessary for this patient for a duration of greater than two midnights. See H&P and MD Progress Notes for additional information about the patient's course of treatment.     ED Arrival Information       Expected   -    Arrival   5/12/2024 15:13    Acuity   Emergent              Means of arrival   Walk-In    Escorted by   Family Member    Service   Hospitalist    Admission type   Emergency              Arrival complaint   Low heart rate ,Abnormal diagnostic test result             Chief Complaint   Patient presents with    Slow Heart Rate     Pt states \"my doctor called me today to tell me my holter monitor was showing my heart rate low. I'm feeling weak. Denies CP.\"       Initial Presentation: 84 y.o. female to the ED from home with complaints of bradycardia as seen on holter monitor after having syncopal episodes over the past few months.  Admitted to CRITICAL CARE step down for symptomatic bradycardia, afib.  When she exerts herself she becomes dizzy.  On arrival, GCs 15, heart rate in the 50s.  Cardiology consult.      Date: 5/13   Day 2:   Lungs clear. GCs 15.  Started on IV heparin drip.  HOld eliquis. As per cardiology, plan for PPM placement.  On holter monitor had episode of heart rate in the 20s for about 30 seconds.  Heart rate has remained in 40s-50s since arrival.    Cardiology consult:  H/O Parkinson's disease, " paroxysmal atrial fibrillation, diabetes as well as hyperlipidemia. Was having episodes of syncope and cardiac event monitor showed sinus bradycardia. Was on Eliquis  which is being held currently.  Schedule for PPM placement. Continue IV heparin drip.     Date: 5/14  Day 3: Has surpassed a 2nd midnight with active treatments and services.  Initially admitted for symptomatic bradycardia. Cardiology following and the plan is for PPM placement.  AV kwesi blocking agents being held.  Transitioned from Eliquis to IV heparin drip. Heart rate in 40s.       ED Triage Vitals   Temperature Pulse Respirations Blood Pressure SpO2   05/12/24 1521 05/12/24 1518 05/12/24 1518 05/12/24 1518 05/12/24 1518   97.7 °F (36.5 °C) 58 20 143/64 96 %      Temp Source Heart Rate Source Patient Position - Orthostatic VS BP Location FiO2 (%)   05/12/24 1518 05/12/24 1518 05/12/24 1518 05/12/24 1518 --   Oral Monitor Sitting Left arm       Pain Score       05/13/24 0011       No Pain          Wt Readings from Last 1 Encounters:   05/14/24 76.2 kg (167 lb 15.9 oz)     Additional Vital Signs: Vital Signs (last 2 days)  Vital Signs (last 2 days)    Date/Time Temp Pulse Resp BP MAP (mmHg) SpO2 O2 Device Patient Position - Orthostatic VS   05/14/24 13:36:09 98.3 °F (36.8 °C) 50 Abnormal  -- 105/48 Abnormal  67 97 % -- --   05/14/24 07:21:03 98.1 °F (36.7 °C) -- 18 133/67 89 -- -- --   05/14/24 02:51:28 97.6 °F (36.4 °C) 50 Abnormal  16 122/56 78 95 % --      Date/Time Temp Pulse Resp BP MAP (mmHg) SpO2 O2 Device Patient Position - Orthostatic VS   05/13/24 1200 -- 49 Abnormal  13 -- -- -- -- --   05/13/24 1101 98.4 °F (36.9 °C) -- -- 108/52 79 92 % -- --   05/13/24 1000 -- -- 14 -- -- -- None (Room air) --   05/13/24 0800 -- 49 Abnormal  14 129/60 86 92 % None (Room air) --   05/13/24 0700 98.6 °F (37 °C) 52 Abnormal  16 162/68 98 94 % None (Room air) --   05/13/24 0400 -- 49 Abnormal  17 111/73 86 94 % -- Lying   05/13/24 0300 -- 50 Abnormal  17  141/62 89 95 % -- Lying   05/13/24 0100 -- 62 13 152/70 100 93 % -- --   05/13/24 0011 -- 59 34 Abnormal  172/77 Abnormal  111 95 % -- --   05/13/24 0002 98.2 °F (36.8 °C) 59 14 172/77 Abnormal  111 95 % None (Room air) Lying   05/12/24 2300 -- 54 Abnormal  12 140/60 87 93 % None (Room air) --   05/12/24 2200 -- 69 12 171/71 Abnormal  102 94 % None (Room air) --   05/12/24 2100 -- 50 Abnormal  14 144/72 96 94 % None (Room air) --   05/12/24 1808 -- 52 Abnormal  17 166/72 104 96 % None (Room air) --   05/12/24 1521 97.7 °F (36.5 °C) 60 18 143/64 -- 97 % None (Room air) --   05/12/24 1518 -- 58 20 143/64 -- 96 % None (Room air) Sitting     Pertinent Labs/Diagnostic Test Results:   5/12 EKG: Narrative & Impression    Sinus bradycardia with sinus arrhythmia  Nonspecific ST abnormality  Abnormal ECG  When compared with ECG of 30-APR-2024 18:36,  Vent. rate has increased BY  20 BPM     XR chest 1 view portable   Final Result by Pj Paniagua MD (05/13 1049)      No acute cardiopulmonary disease.      Resident: Skyler Chavez I, the attending radiologist, have reviewed the images and agree with the final report above.      Workstation performed: [id number]               Results from last 7 days   Lab Units 05/14/24  0545 05/13/24  0456 05/12/24  1652   WBC Thousand/uL 7.60 7.96 8.45   HEMOGLOBIN g/dL 13.2 12.3 13.7   HEMATOCRIT % 39.3 37.9 41.9   PLATELETS Thousands/uL 343 327 350   TOTAL NEUT ABS Thousands/µL 2.63 2.96 4.41         Results from last 7 days   Lab Units 05/14/24  0545 05/13/24  0456 05/12/24  1652   SODIUM mmol/L 139 140 139   POTASSIUM mmol/L 4.3 4.0 4.3   CHLORIDE mmol/L 107 108 107   CO2 mmol/L 27 26 23   ANION GAP mmol/L 5 6 9   BUN mg/dL 18 18 23   CREATININE mg/dL 0.85 0.80 0.92   EGFR ml/min/1.73sq m 62 67 56   CALCIUM mg/dL 9.2 9.0 9.5   CALCIUM, IONIZED mmol/L 1.18 1.17  --    MAGNESIUM mg/dL 2.1 1.9 2.1   PHOSPHORUS mg/dL 4.2* 4.0  --      Results from last 7 days   Lab Units  24  1652   AST U/L 22   ALT U/L 4*   ALK PHOS U/L 91   TOTAL PROTEIN g/dL 6.6   ALBUMIN g/dL 4.4   TOTAL BILIRUBIN mg/dL 0.69     Results from last 7 days   Lab Units 24  1637   POC GLUCOSE mg/dl 147*     Results from last 7 days   Lab Units 24  0545 24  0456 24  1652   GLUCOSE RANDOM mg/dL 107 99 124     Results from last 7 days   Lab Units 24  2103 24  1652   HS TNI 0HR ng/L  --  5   HS TNI 2HR ng/L 5  --    HSTNI D2 ng/L 0  --          Results from last 7 days   Lab Units 24  0545 24  1713 24  1033 24  0456 24  0248 24  2146   PROTIME seconds  --   --   --  15.9*  --  15.5*   INR   --   --   --  1.20*  --  1.17   PTT seconds 101* 88* 86*  --    < > 34    < > = values in this interval not displayed.     Results from last 7 days   Lab Units 24  2103   TSH 3RD GENERATON uIU/mL 2.147       ED Treatment:   Medication Administration from 2024 1513 to 2024 0010         Date/Time Order Dose Route Action Comments     2024 2146 EDT heparin (porcine) 25,000 units in 0.45% NaCl 250 mL infusion (premix) 12 Units/kg/hr Intravenous New Bag --          Past Medical History:   Diagnosis Date    Actinic keratosis 2016    Acute midline low back pain without sciatica 2020    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     Benign neoplasm of skin     Cancer (HCC)     skin    Chest pain     Claustrophobia     Closed compression fracture of body of L1 vertebra (HCC)     Diverticulitis     Diverticulitis     Fracture     L1-L2    GERD (gastroesophageal reflux disease)     H. pylori infection 2020    Muscle weakness     Nausea 2024    Nonmelanoma skin cancer     last assessed 2017    Palpitations     Pancreatic cyst     Seasonal allergies     Seborrheic keratosis 2014    Sleep apnea     no cpap    Spontaneous      without mention of complications     Squamous cell  carcinoma of forehead 07/09/2014    Syncope          Admitting Diagnosis: Slow heart rate [R00.1]  Symptomatic bradycardia [R00.1]  Age/Sex: 84 y.o. female  Admission Orders:  Scheduled Medications:  atorvastatin, 40 mg, Oral, Daily With Dinner  carbidopa-levodopa, 1 tablet, Oral, TID AC  multivitamin stress formula, 1 tablet, Oral, QAM  pantoprazole, 20 mg, Oral, Early Morning  saccharomyces boulardii, 250 mg, Oral, BID      Continuous IV Infusions:  heparin (porcine), 3-20 Units/kg/hr (Order-Specific), Intravenous, Titrated      PRN Meds:  acetaminophen, 650 mg, Oral, Q6H PRN  atropine, 1 mg, Intravenous, Q15 Min PRN  hydrALAZINE, 5 mg, Intravenous, Q6H PRN  polyethylene glycol, 17 g, Oral, Daily PRN        IP CONSULT TO CASE MANAGEMENT  IP CONSULT TO CARDIOLOGY    Network Utilization Review Department  ATTENTION: Please call with any questions or concerns to [phone] and carefully listen to the prompts so that you are directed to the right person. All voicemails are confidential.   For Discharge needs, contact Care Management DC Support Team at [phone] opt. [phone]  Send all requests for admission clinical reviews, approved or denied determinations and any other requests to dedicated fax number below belonging to the campus where the patient is receiving treatment. List of dedicated fax numbers for the Facilities:  FACILITY NAME UR FAX NUMBER   ADMISSION DENIALS (Administrative/Medical Necessity) [phone]   DISCHARGE SUPPORT TEAM (NETWORK) [phone]   PARENT CHILD HEALTH (Maternity/NICU/Pediatrics) [phone]   [facility] [phone]   [facility] [phone]   [facility] [phone]   [facility] [phone]   [facility] [phone]   [facility] [phone]   [facility] [phone]   [facility]  [facility] [phone]   [facility] [phone]   [facility] [phone]   [facility] [phone]   [facility] [phone]

## 2024-05-13 NOTE — ASSESSMENT & PLAN NOTE
"Lab Results   Component Value Date    HGBA1C 6.8 (H) 04/25/2024       No results for input(s): \"POCGLU\" in the last 72 hours.    Blood Sugar Average: Last 72 hrs:  ?Does not take any home medications  Monitor glucose on BMP    "

## 2024-05-13 NOTE — ASSESSMENT & PLAN NOTE
Several syncopal episodes over the previous few months likely related to bradycardia  Patient recently discharged with holter monitor  Holter monitor recorded sinus bradycardia with a rate of 28 for about 30 seconds  Patient advised to present to the ED for monitoring pending placement of PPM  HR sinus bradycardia in the 50s since arrival  Patient currently hemodynamically stable  Admit to SDU for close monitoring  Cardiology consult for PPM placement

## 2024-05-13 NOTE — CASE MANAGEMENT
Case Management Assessment & Discharge Planning Note    Patient name Radha Vidal  Location ICU 05/ICU 05 MRN [id number]  : 1940 Date 2024       Current Admission Date: 2024  Current Admission Diagnosis:Symptomatic bradycardia   Patient Active Problem List    Diagnosis Date Noted    Symptomatic bradycardia 2024    Syncope 2024    Small bowel obstruction (HCC) 2024    Elevated troponin 2024    Ambulatory dysfunction 2024    Ear pressure, right 2024    Type 2 diabetes mellitus (HCC) 01/15/2024    Paroxysmal atrial fibrillation (HCC) 2024    s/p distal pancreatectomy/splenectomy 2023    Urge incontinence 2021    Age-related osteoporosis without current pathological fracture 2021    Cerebrovascular disease 2020    IPMN (intraductal papillary mucinous neoplasm) 2020    Trochanteric bursitis, right hip 2020    Tremor 2020    Chronic idiopathic constipation 2019    History of adenomatous polyp of colon 2019    Osteopenia 2019    Gastro-esophageal reflux disease without esophagitis 2019    Dysphagia 2019    Multiple thyroid nodules 2019    Menopause ovarian failure 2019    Vitamin D deficiency 2019    Mitral valve disorder 2018    Mood disturbance 2018    Obesity (BMI 30-39.9) 2018    Claustrophobia 2018    Impaired fasting glucose 2018    Seasonal allergic rhinitis 2018    History of skin cancer 2018    Parkinson's disease 2018    Primary insomnia 2018    Cherry angioma 11/15/2017    Postablative hypothyroidism 2016    OAB (overactive bladder) 2015    Sleep apnea 04/15/2014      LOS (days): 1  Geometric Mean LOS (GMLOS) (days): 1.8  Days to GMLOS:0.9     OBJECTIVE:  PATIENT READMITTED TO HOSPITAL  Risk of Unplanned Readmission Score: 21.14         Current admission status: Inpatient       Preferred  Pharmacy:   [facility] - [place], [place] - [address]  [address] [address]  Phone: [phone] Fax: [phone]    [facility] - [place] - [address]  [address]  [address]  [place] [address]  Phone: [phone] Fax: [phone]    [facility]  [place], [place] - [address]  [address]  [place] [address]  Phone: [phone] Fax: [phone]    Primary Care Provider: Hoang Tovar MD    Primary Insurance: [email or web address]  Secondary Insurance:     ASSESSMENT:  Active Health Care Proxies       Melba Vidal Health Care Agent - Daughter   Primary Phone: [phone] (Mobile)                 Advance Directives  Does patient have a Health Care POA?: No  Was patient offered paperwork?: Yes (provided)  Does patient have Advance Directives?:  (pt not sure)  Primary Contact: Melba Vidal (Daughter)  [phone] (Mobile)         Readmission Root Cause  30 Day Readmission: Yes  Patient was readmitted due to: symptomatic bradycardia    Patient Information  Admitted from:: Home  Mental Status: Alert  During Assessment patient was accompanied by: Not accompanied during assessment  Assessment information provided by:: Patient  Primary Caregiver: Self  Support Systems: Daughter  County of Residence: [place]  What city do you live in[place]  Home entry access options. Select all that apply.: Stairs  Number of steps to enter home.: 5  Do the steps have railings?: Yes  Type of Current Residence: Trailer Home  Living Arrangements: Lives Alone  Is patient a ?: No    Activities of Daily Living Prior to Admission  Functional Status: Independent  Completes ADLs independently?: Yes  Ambulates independently?: Yes  Does patient use assisted devices?: No  Does patient currently own DME?: Yes  What DME does the patient currently own?: Walker, Straight Cane, Other (Comment)  (shower has built in seats)  Does patient have a history of Outpatient Therapy (PT/OT)?: No  Does the patient have a history of Short-Term Rehab?: Yes  Does patient have a history of HHC?: Yes  Does patient currently have HHC?: No         Patient Information Continued  Income Source: Other (Comment) (SSI and pension)  Does patient have prescription coverage?: Yes ([employer])  Does patient receive dialysis treatments?: No  Does patient have a history of substance abuse?: No  Does patient have a history of Mental Health Diagnosis?: No         Means of Transportation  Means of Transport to Appts:: Drives Self      Social Determinants of Health (SDOH)      Flowsheet Row Most Recent Value   Housing Stability    In the last 12 months, was there a time when you were not able to pay the mortgage or rent on time? N   In the last 12 months, how many places have you lived? 1   In the last 12 months, was there a time when you did not have a steady place to sleep or slept in a shelter (including now)? N   Transportation Needs    In the past 12 months, has lack of transportation kept you from medical appointments or from getting medications? no   In the past 12 months, has lack of transportation kept you from meetings, work, or from getting things needed for daily living? No   Food Insecurity    Within the past 12 months, you worried that your food would run out before you got the money to buy more. Never true   Within the past 12 months, the food you bought just didn't last and you didn't have money to get more. Never true   Utilities    In the past 12 months has the electric, gas, oil, or water company threatened to shut off services in your home? No            DISCHARGE DETAILS:    Discharge planning discussed with:: pt  Freedom of Choice: Yes  Comments - Freedom of Choice: Met w pt, introduced self and explained role of CM, including arranging DME, STR, home health, out pt tx.  Advised pt that CM will make appropriate  referrals based on treatment team recommendations and MD orders, and she can consider recommended plan of care and choose from available vendors.  CM contacted family/caregiver?: No- see comments (pt alert and oriented adult)  Were Treatment Team discharge recommendations reviewed with patient/caregiver?:  (none at present)          Contacts  Patient Contacts: Melba Vidal (Daughter)  [phone] (Mobile)  Relationship to Patient:: Family         DME Referral Provided  Referral made for DME?: No    Other Referral/Resources/Interventions Provided:  Referral Comments: Pt scheduled for insertion of PPM on Wed, 5/15.  CM will follow for post acute care needs.         Treatment Team Recommendation: Other (TBD)  Discharge Destination Plan:: Other (TBD)  Transport at Discharge : Family

## 2024-05-13 NOTE — QUICK NOTE
I called the patient's daughter, Melba, to provide a clinical update. No one answered.    Charo Rodriguez MD

## 2024-05-13 NOTE — CONSULTS
Consultation - Cardiology   Rahda Vidal 84 y.o. female MRN: [id number]  Unit/Bed#: ICU 05 Encounter: [id number]  05/13/24  9:33 AM    Assessment/ Plan:    Symptomatic bradycardia  Plan for PPM implantation.  Discussed the indications, alternatives, risks and benefit of pacemaker implantation. The procedure risks, benefits, and complications (including but not limited to bleeding, infection, air leak into the lungs, anesthesia complications, pericardial effusion, and complications related to device and leads) were reviewed.  Patient is alert and oriented x3  and wishes to proceed. All questions answered.  Procedure tentatively planned for Wednesday 5/15.  Continue to hold AV kwesi blocking agents.  Continue telemetry    2.  Paroxysmal atrial fibrillation  Home Eliquis on hold.  Continue heparin drip.  [id number] 5  Continue to hold rate controlling and antiarrhythmic medications in the setting of symptomatic bradycardia    3.  Hyperlipidemia  Continue statin    4.  DM 2 (A1C 6.4)    5.  Parkinson's disease    History of Present Illness   Physician Requesting Consult: Miladis Villarreal DO    Reason for Consult / Principal Problem: Symptomatic bradycardia    HPI: Radha Vidal is a 84 y.o. year old female with past medical history of PAF, tendency for bradycardia, hyperlipidemia, dilated ascending aorta, DM2, Parkinson's disease who presents with bradycardic episode on outpatient event monitor.  Patient was recently admitted at [facility] in early May for multiple syncopal episodes.  These episodes were attributed to sinus bradycardia, which was attributed to the metoprolol and amiodarone that she was recently started on.  Her metoprolol and amiodarone were held, and she was discharged with ambulatory event monitor.  She had been previously started on metoprolol and amiodarone during hospitalization in February 2024 for atrial fibrillation with RVR.  Yesterday ambulatory event monitor revealed severe sinus  bradycardia with heart rate of 28 bpm for 30 seconds.  On-call cardiologist was notified.  Patient was advised to proceed to the ER for further evaluation.  She has had multiple syncopal episodes over the past several weeks with associated lightheadedness and dizziness.  She is endorsed fatigue      Inpatient consult to Cardiology  Consult performed by: Alexis Drummond PA-C  Consult ordered by: RISHI Palomino          [id number]: Sinus bradycardia with sinus arrhythmia, nonspecific ST abnormality  Tele: Sinus bradycardia    Review of Systems   Constitutional:  Positive for fatigue. Negative for chills, diaphoresis, fever and unexpected weight change.   HENT:  Negative for ear pain and sore throat.    Eyes:  Negative for pain and redness.   Respiratory:  Negative for cough and shortness of breath.    Cardiovascular:  Negative for chest pain, palpitations and leg swelling.   Gastrointestinal:  Negative for abdominal pain, nausea and vomiting.   Genitourinary:  Negative for dysuria.   Skin:  Negative for color change and rash.   Neurological:  Positive for dizziness, syncope, weakness and light-headedness.   Hematological:  Does not bruise/bleed easily.   Psychiatric/Behavioral:  Negative for agitation and behavioral problems.    All other systems reviewed and are negative.      Historical Information   Past Medical History:   Diagnosis Date    Actinic keratosis 2016    Acute midline low back pain without sciatica 2020    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     Benign neoplasm of skin     Cancer (HCC)     skin    Chest pain     Claustrophobia     Closed compression fracture of body of L1 vertebra (HCC)     Diverticulitis     Diverticulitis     Fracture     L1-L2    GERD (gastroesophageal reflux disease)     H. pylori infection 2020    Muscle weakness     Nausea 2024    Nonmelanoma skin cancer     last assessed 2017    Palpitations      Pancreatic cyst     Seasonal allergies     Seborrheic keratosis 2014    Sleep apnea     no cpap    Spontaneous      without mention of complications     Squamous cell carcinoma of forehead 2014    Syncope      Past Surgical History:   Procedure Laterality Date    ABDOMINAL ADHESION SURGERY N/A 2023    Procedure: LYSIS ADHESIONS;  Surgeon: Kyle Julien MD;  [facility];  Service: Surgical Oncology    BOTOX INJECTION N/A 2017    Procedure: CYSTOSCOPY; BLADDER BOTOX 100 UNITS ;  Surgeon: Juan Edward MD;  [facility];  Service:     BOWEL RESECTION      related ot diverticulitis    CARDIAC CATHETERIZATION      CARDIAC CATHETERIZATION Left 2024    Procedure: Cardiac Left Heart Cath;  Surgeon: Carrington Kiser DO;  [facility];  Service: Cardiology    CARPAL TUNNEL RELEASE Right     COLONOSCOPY  2019    COLOSTOMY      COLOSTOMY CLOSURE      CYSTOSCOPY  2021    DISTAL PANCREATECTOMY N/A 2023    Procedure: DISTAL PANCREATECTOMY;  Surgeon: Kyle Julien MD;  [facility];  Service: Surgical Oncology    FOOT SURGERY Left     neuroma    HYSTERECTOMY      MYRINGOTOMY W/ TUBES Right 2023    office procedure - Dr. Grace    NA[place] S[place]US [place]  age 39    [place]    PANCREATIC CYST BIOPSY  2023    LA CYSTOURETHROSCOPY N/A 2018    Procedure: CYSTOSCOPY WITH BOTOX;  Surgeon: Juan Edward MD;  [facility];  Service: Urology    LA ESOPHAGOGASTRODUODENOSCOPY TRANSORAL DIAGNOSTIC N/A 2019    Procedure: ESOPHAGOGASTRODUODENOSCOPY (EGD);  Surgeon: dEu Dickerson DO;  [facility];  Service: Gastroenterology    SPLENECTOMY, TOTAL N/A 2023    Procedure: SPLENECTOMY;  Surgeon: Kyle Julien MD;  [facility];  Service: Surgical Oncology    TONSILLECTOMY  age 49    UPPER GASTROINTESTINAL ENDOSCOPY  2019     Social History     Substance and Sexual Activity   Alcohol  "Use Not Currently    Comment: patient states rare use on holidays      Social History     Substance and Sexual Activity   Drug Use No     Social History     Tobacco Use   Smoking Status Never    Passive exposure: Past   Smokeless Tobacco Never       Family History:   Family History   Problem Relation Age of Onset    Alcohol abuse Mother     Heart disease Mother     Alcohol abuse Father     Alcohol abuse Brother     Cancer Brother     Tremor Neg Hx     Parkinsonism Neg Hx     Colon cancer Neg Hx        Meds/Allergies   all current active meds have been reviewed  Allergies   Allergen Reactions    Caffeine - Food Allergy GI Intolerance    Medical Tape Other (See Comments)    Iodine - Food Allergy Rash    Latex Rash    Other Rash     Adhesive tape         Objective   Vitals: Blood pressure 162/68, pulse (!) 52, temperature 98.6 °F (37 °C), temperature source Oral, resp. rate 16, height 4' 11\" (1.499 m), weight 75.6 kg (166 lb 10.7 oz), SpO2 94%, not currently breastfeeding., Body mass index is 33.66 kg/m².,   Orthostatic Blood Pressures      Flowsheet Row Most Recent Value   Blood Pressure 162/68 filed at 2024 0700   Patient Position - Orthostatic VS Lying filed at 2024 0400            Systolic (24hrs), Av , Min:111 , Max:172     Diastolic (24hrs), Av, Min:60, Max:77      No intake or output data in the 24 hours ending 24 0933    Invasive Devices       Peripheral Intravenous Line  Duration             Peripheral IV 24 Left Antecubital <1 day    Peripheral IV 24 Right Hand <1 day              Drain  Duration             External Urinary Catheter <1 day                        Physical Exam:    GEN: Alert and oriented x 3, in no acute distress.  Well appearing and well nourished.   HEENT: Sclera anicteric, conjunctivae pink, mucous membranes moist. Oropharynx clear.   NECK: Supple, no significant JVD. Trachea midline, no thyromegaly.   HEART: Bradycardic, regular rhythm, normal S1 and " S2, no murmurs, clicks, gallops or rubs. PMI nondisplaced, no thrills.   LUNGS: Clear to auscultation bilaterally; no wheezes, rales, or rhonchi. No increased work of breathing or signs of respiratory distress.   ABDOMEN: Soft, nontender, non-distended.   EXTREMITIES: Skin warm and well perfused, no clubbing, cyanosis, or edema.  NEURO: No focal findings. Normal speech. Mood and affect normal.   SKIN: Normal without suspicious lesions on exposed skin.      Lab Results:     Troponins:   Results from last 7 days   Lab Units 05/12/24  2103 05/12/24  1652   HS TNI 0HR ng/L  --  5   HS TNI 2HR ng/L 5  --        CBC with diff:   Results from last 7 days   Lab Units 05/13/24  0456 05/12/24  1652   WBC Thousand/uL 7.96 8.45   HEMOGLOBIN g/dL 12.3 13.7   HEMATOCRIT % 37.9 41.9   MCV fL 91 91   PLATELETS Thousands/uL 327 350   RBC Million/uL 4.19 4.62   MCH pg 29.4 29.7   MCHC g/dL 32.5 32.7   RDW % 14.7 15.0   MPV fL 10.9 10.9   NRBC AUTO /100 WBCs 0 0         CMP:   Results from last 7 days   Lab Units 05/13/24  0456 05/12/24  1652   POTASSIUM mmol/L 4.0 4.3   CHLORIDE mmol/L 108 107   CO2 mmol/L 26 23   BUN mg/dL 18 23   CREATININE mg/dL 0.80 0.92   CALCIUM mg/dL 9.0 9.5   AST U/L  --  22   ALT U/L  --  4*   ALK PHOS U/L  --  91   EGFR ml/min/1.73sq m 67 56

## 2024-05-13 NOTE — PROGRESS NOTES
"[facility]  Progress Note  Name: Radha Vidal I  MRN: [id number]  Unit/Bed#: ICU 05 I Date of Admission: 5/12/2024   Date of Service: 5/13/2024 I Hospital Day: 1    Assessment/Plan   * Symptomatic bradycardia  Assessment & Plan  Several syncopal episodes over the previous few months likely related to bradycardia  Patient recently discharged with holter monitor  Holter monitor recorded sinus bradycardia with a rate of 28 for about 30 seconds  Patient advised to present to the ED for monitoring pending placement of PPM  HR sinus bradycardia in the 50s since arrival  Patient currently hemodynamically stable  Admit to SDU for close monitoring  Cardiology consult for PPM placement    Paroxysmal atrial fibrillation (HCC)  Assessment & Plan  Eliquis on hold  Continue heparin gtt for systemic AC  Rate controlling agents stopped during previous admission    Parkinson's disease  Assessment & Plan  Continue sinemet    Obesity (BMI 30-39.9)  Assessment & Plan  Encourage lifestyle changes    Gastro-esophageal reflux disease without esophagitis  Assessment & Plan  Continue PPI    Type 2 diabetes mellitus (HCC)  Assessment & Plan  Lab Results   Component Value Date    HGBA1C 6.8 (H) 04/25/2024       No results for input(s): \"POCGLU\" in the last 72 hours.    Blood Sugar Average: Last 72 hrs:  Does not take any home medications  Monitor glucose on BMP               Disposition: Stepdown Level 1    ICU Core Measures     A: Assess, Prevent, and Manage Pain Has pain been assessed? Yes  Need for changes to pain regimen? No   B: Both SAT/SAT  N/A   C: Choice of Sedation RASS Goal: N/A patient not on sedation  Need for changes to sedation or analgesia regimen? NA   D: Delirium CAM-ICU: Negative   E: Early Mobility  Plan for early mobility? Yes   F: Family Engagement Plan for family engagement today? Yes         Prophylaxis:  VTE VTE covered by:  heparin (porcine), Intravenous, 10 Units/kg/hr at 05/13/24 0400     "   Stress Ulcer  covered bypantoprazole (PROTONIX) EC tablet 20 mg [[id number]]         Significant 24hr Events     24hr events: Patient had no overnight events. HR has remained in the 40-50s since arrival.     Subjective   Review of Systems   All other systems reviewed and are negative.      Objective                            Vitals I/O      Most Recent Min/Max in 24hrs   Temp 98.2 °F (36.8 °C) Temp  Min: 97.7 °F (36.5 °C)  Max: 98.2 °F (36.8 °C)   Pulse (!) 49 Pulse  Min: 49  Max: 69   Resp 17 Resp  Min: 12  Max: 34   /73 BP  Min: 111/73  Max: 172/77   O2 Sat 94 % SpO2  Min: 93 %  Max: 97 %    No intake or output data in the 24 hours ending 05/13/24 0536    Diet NPO    Invasive Monitoring           Physical Exam   Physical Exam  Vitals reviewed.   Eyes:      Conjunctiva/sclera: Conjunctivae normal.   Skin:     General: Skin is warm and dry.   HENT:      Head: Normocephalic and atraumatic.      Mouth/Throat:      Mouth: Mucous membranes are moist.   Cardiovascular:      Rate and Rhythm: Regular rhythm. Bradycardia present.      Pulses: Normal pulses.      Heart sounds: Normal heart sounds.   Musculoskeletal:      Right lower leg: No edema.      Left lower leg: No edema.   Abdominal:      Palpations: Abdomen is soft.   Constitutional:       General: She is not in acute distress.     Appearance: Normal appearance. She is morbidly obese.   Pulmonary:      Effort: Pulmonary effort is normal.      Breath sounds: Normal breath sounds.   Neurological:      General: No focal deficit present.      Mental Status: She is alert and oriented to person, place and time. Mental status is at baseline.            Diagnostic Studies      EKG: Sinus bradycardia  Imaging:  I have personally reviewed pertinent reports.       Medications:  Scheduled PRN   atorvastatin, 40 mg, Daily With Dinner  atropine, 0.5 mg, Once  carbidopa-levodopa, 1 tablet, TID AC  chlorhexidine, 15 mL, Q12H DARI  pantoprazole, 20 mg, Early Morning       atropine, 1 mg, Q15 Min PRN       Continuous    heparin (porcine), 3-20 Units/kg/hr (Order-Specific), Last Rate: 10 Units/kg/hr (05/13/24 0400)         Labs:    CBC    Recent Labs     05/12/24 1652 05/13/24  0456   WBC 8.45 7.96   HGB 13.7 12.3   HCT 41.9 37.9    327     BMP    Recent Labs     05/12/24 1652 05/13/24  0456   SODIUM 139 140   K 4.3 4.0    108   CO2 23 26   AGAP 9 6   BUN 23 18   CREATININE 0.92 0.80   CALCIUM 9.5 9.0       Coags    Recent Labs     05/12/24  2146 05/13/24  0248 05/13/24  0456   INR 1.17  --  1.20*   PTT 34 104*  --         Additional Electrolytes  Recent Labs     05/12/24 1652 05/13/24 0456   MG 2.1 1.9   PHOS  --  4.0   CAIONIZED  --  1.17          Blood Gas    No recent results  No recent results LFTs  Recent Labs     05/12/24 1652   ALT 4*   AST 22   ALKPHOS 91   ALB 4.4   TBILI 0.69       Infectious  No recent results  Glucose  Recent Labs     05/12/24 1652 05/13/24  0456   GLUC 124 99               RISHI Palomino

## 2024-05-13 NOTE — TELEPHONE ENCOUNTER
Received urgent EKG notification from [facility].  Severe bradycardia, PAC's, junctional rhythm.  Auto triggered. HR 30 bpm 5/13/24 at 2:31 pm.    Please advise

## 2024-05-13 NOTE — TELEPHONE ENCOUNTER
Placed call to [employer]. S/w rep Viviane regarding urgent alert on 5/12 at 12:15pm for this pt. Rep states that there is no dictation regarding this alert. Thanked and ended call.     Pt currently admitted in [facility]

## 2024-05-13 NOTE — H&P
"[facility]  H&P  Name: Radha Vidal 84 y.o. female I MRN: [id number]  Unit/Bed#: ICU 05 I Date of Admission: 5/12/2024   Date of Service: 5/13/2024 I Hospital Day: 1      Assessment/Plan   * Symptomatic bradycardia  Assessment & Plan  Several syncopal episodes over the previous few months likely related to bradycardia  Patient recently discharged with holter monitor  Holter monitor recorded sinus bradycardia with a rate of 28 for about 30 seconds  Patient advised to present to the ED for monitoring pending placement of PPM  HR sinus bradycardia in the 50s since arrival  Patient currently hemodynamically stable  Admit to SDU for close monitoring  Cardiology consult for PPM placement    Paroxysmal atrial fibrillation (HCC)  Assessment & Plan  Eliquis on hold  Continue heparin gtt for systemic AC  Rate controlling agents stopped during previous admission    Parkinson's disease  Assessment & Plan  Continue sinemet    Obesity (BMI 30-39.9)  Assessment & Plan  Encourage lifestyle changes    Gastro-esophageal reflux disease without esophagitis  Assessment & Plan  Continue PPI    Type 2 diabetes mellitus (HCC)  Assessment & Plan  Lab Results   Component Value Date    HGBA1C 6.8 (H) 04/25/2024       No results for input(s): \"POCGLU\" in the last 72 hours.    Blood Sugar Average: Last 72 hrs:  Does not take any home medications  Monitor glucose on BMP             History of Present Illness     HPI: Radha Vidal is a 84 y.o. with PMH pAfib, HTN, Type 2 DM, parkinson's, obesity and GERD who presents with bradycardia. Patient has had multiple syncopal episodes over the last few months and was recently admitted for this issue. She was discharged with holter monitor in place. Today the patient had 30 seconds of sinus bradycardia with a heart rate of 28. She was notified to present to the ED for evaluation. Patient continues to be in a sinus bradycardia with rates in the 50's since arrival. She will be " admitted to the SDU for close monitoring and will be evaluated by cardiology for placement of PPM.    History obtained from chart review and the patient.  Review of Systems   Constitutional:  Negative for chills and fever.   Respiratory: Negative.     Cardiovascular: Negative.    Neurological:  Negative for dizziness, syncope (several times over the past few months) and light-headedness.     Disposition: Stepdown Level 1  Historical Information   Past Medical History:  2016: Actinic keratosis  2020: Acute midline low back pain without sciatica  No date: 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   attack  No date: Benign neoplasm of skin  No date: Cancer (HCC)      Comment:  skin  No date: Chest pain  No date: Claustrophobia  No date: Closed compression fracture of body of L1 vertebra (HCC)  No date: Diverticulitis  No date: Diverticulitis  No date: Fracture      Comment:  L1-L2  No date: GERD (gastroesophageal reflux disease)  2020: H. pylori infection  No date: Muscle weakness  2024: Nausea  No date: Nonmelanoma skin cancer      Comment:  last assessed 2017  No date: Palpitations  No date: Pancreatic cyst  No date: Seasonal allergies  2014: Seborrheic keratosis  No date: Sleep apnea      Comment:  no cpap  No date: Spontaneous       Comment:  without mention of complications   2014: Squamous cell carcinoma of forehead  No date: Syncope Past Surgical History:  2023: ABDOMINAL ADHESION SURGERY; N/A      Comment:  Procedure: LYSIS ADHESIONS;  Surgeon: Kyle Julien MD;                [facility];  Service: Surgical Oncology  2017: BOTOX INJECTION; N/A      Comment:  Procedure: CYSTOSCOPY; BLADDER BOTOX 100 UNITS ;                 Surgeon: Juan Edward MD;  [facility];                Service:   No date: BOWEL RESECTION      Comment:  related ot diverticulitis  No date: CARDIAC CATHETERIZATION  2024: CARDIAC  CATHETERIZATION; Left      Comment:  Procedure: Cardiac Left Heart Cath;  Surgeon:                Carrington Kiser DO;  [facility];               Service: Cardiology  No date: CARPAL TUNNEL RELEASE; Right  07/31/2019: COLONOSCOPY  No date: COLOSTOMY  No date: COLOSTOMY CLOSURE  06/01/2021: CYSTOSCOPY  07/31/2023: DISTAL PANCREATECTOMY; N/A      Comment:  Procedure: DISTAL PANCREATECTOMY;  Surgeon: Kyle Julien MD;  [facility];  Service: Surgical                Oncology  No date: FOOT SURGERY; Left      Comment:  neuroma  No date: HYSTERECTOMY  12/06/2023: MYRINGOTOMY W/ TUBES; Right      Comment:  office procedure - Dr. Grace  age 39: NASAL SINUS SURGERY      Comment:  [place]  07/31/2023: PANCREATIC CYST BIOPSY  04/13/2018: MT CYSTOURETHROSCOPY; N/A      Comment:  Procedure: CYSTOSCOPY WITH BOTOX;  Surgeon: Juan Edward MD;  [facility];  Service:                Urology  04/23/2019: MT ESOPHAGOGASTRODUODENOSCOPY TRANSORAL DIAGNOSTIC; N/A      Comment:  Procedure: ESOPHAGOGASTRODUODENOSCOPY (EGD);  Surgeon:                Edu Dickerson DO;  [facility];  Service:                Gastroenterology  07/31/2023: SPLENECTOMY, TOTAL; N/A      Comment:  Procedure: SPLENECTOMY;  Surgeon: Kyle Julien MD;                 [facility];  Service: Surgical Oncology  age 49: TONSILLECTOMY  04/23/2019: UPPER GASTROINTESTINAL ENDOSCOPY   Current Outpatient Medications   Medication Instructions    apixaban (ELIQUIS) 5 mg, Oral, 2 times daily    atorvastatin (LIPITOR) 40 mg, Oral, Daily with dinner    B Complex Vitamins (B COMPLEX 100 PO) 1 capsule, Oral, Daily after lunch, not taking    calcium carbonate (OS-JOHN) 1250 (500 Ca) MG tablet 1 tablet, Oral, Daily after lunch    carbidopa-levodopa (SINEMET)  mg per tablet 1 tablet, Oral, 3 times daily before meals    cholecalciferol (VITAMIN D3) 5,000 Units, Oral, Daily after lunch     Coenzyme Q10 (CO Q 10 PO) 1 capsule, Daily after lunch    Cyanocobalamin (VITAMIN B 12 PO) 1 capsule, Oral, Daily    multivitamin (THERAGRAN) TABS 1 tablet, Oral, Every morning    Omega-3 350 MG CPDR 1 capsule, Oral, Daily (early morning)    pantoprazole (PROTONIX) 20 mg, Oral, Daily    polyethylene glycol (MIRALAX) 17 g, Oral, Daily PRN    Potassium Gluconate 595 (99 K) MG TABS 1 each, Oral, Every other day    Probiotic Product (PROBIOTIC-10) CAPS 1 capsule, Oral, Daily after lunch    trospium chloride (SANCTURA) 20 mg, Oral, 2 times daily    vitamin C 1,000 mg, Oral, Daily    Allergies   Allergen Reactions    Caffeine - Food Allergy GI Intolerance    Medical Tape Other (See Comments)    Iodine - Food Allergy Rash    Latex Rash    Other Rash     Adhesive tape        Social History     Tobacco Use    Smoking status: Never     Passive exposure: Past    Smokeless tobacco: Never   Vaping Use    Vaping status: Never Used   Substance Use Topics    Alcohol use: Not Currently     Comment: patient states rare use on holidays     Drug use: No    Family History   Problem Relation Age of Onset    Alcohol abuse Mother     Heart disease Mother     Alcohol abuse Father     Alcohol abuse Brother     Cancer Brother     Tremor Neg Hx     Parkinsonism Neg Hx     Colon cancer Neg Hx           Objective                            Vitals I/O      Most Recent Min/Max in 24hrs   Temp 98.2 °F (36.8 °C) Temp  Min: 97.7 °F (36.5 °C)  Max: 98.2 °F (36.8 °C)   Pulse 59 Pulse  Min: 50  Max: 69   Resp 14 Resp  Min: 12  Max: 20   BP (!) 172/77 BP  Min: 140/60  Max: 172/77   O2 Sat 95 % SpO2  Min: 93 %  Max: 97 %    No intake or output data in the 24 hours ending 05/13/24 0037    Diet NPO    Invasive Monitoring           Physical Exam   Physical Exam  Vitals reviewed.   Eyes:      Conjunctiva/sclera: Conjunctivae normal.   Skin:     General: Skin is warm and dry.   HENT:      Head: Normocephalic and atraumatic.      Mouth/Throat:      Mouth:  Mucous membranes are moist.   Cardiovascular:      Rate and Rhythm: Regular rhythm. Bradycardia present.      Pulses: Normal pulses.      Heart sounds: Normal heart sounds.   Musculoskeletal:      Right lower leg: No edema.      Left lower leg: No edema.   Abdominal:      Palpations: Abdomen is soft.   Constitutional:       General: She is not in acute distress.     Appearance: Normal appearance. She is morbidly obese.   Pulmonary:      Effort: Pulmonary effort is normal.      Breath sounds: Normal breath sounds.   Neurological:      General: No focal deficit present.      Mental Status: She is alert and oriented to person, place and time. Mental status is at baseline.            Diagnostic Studies      EKG: Sinus bradycardia  Imaging:  I have personally reviewed pertinent reports.       Medications:  Scheduled PRN   atorvastatin, 40 mg, Daily With Dinner  atropine, 0.5 mg, Once  carbidopa-levodopa, 1 tablet, TID AC  chlorhexidine, 15 mL, Q12H DARI  pantoprazole, 20 mg, Early Morning      atropine, 1 mg, Q15 Min PRN       Continuous    heparin (porcine), 3-20 Units/kg/hr (Order-Specific), Last Rate: 12 Units/kg/hr (05/12/24 2146)         Labs:    CBC    Recent Labs     05/12/24  1652   WBC 8.45   HGB 13.7   HCT 41.9        BMP    Recent Labs     05/12/24  1652   SODIUM 139   K 4.3      CO2 23   AGAP 9   BUN 23   CREATININE 0.92   CALCIUM 9.5       Coags    Recent Labs     05/12/24  2146   INR 1.17   PTT 34        Additional Electrolytes  Recent Labs     05/12/24  1652   MG 2.1          Blood Gas    No recent results  No recent results LFTs  Recent Labs     05/12/24  1652   ALT 4*   AST 22   ALKPHOS 91   ALB 4.4   TBILI 0.69       Infectious  No recent results  Glucose  Recent Labs     05/12/24  1652   GLUC 124             Anticipated Length of Stay is > 2 midnights  RISHI Palomino

## 2024-05-13 NOTE — ASSESSMENT & PLAN NOTE
"Lab Results   Component Value Date    HGBA1C 6.8 (H) 04/25/2024       No results for input(s): \"POCGLU\" in the last 72 hours.    Blood Sugar Average: Last 72 hrs:  Does not take any home medications  Monitor glucose on BMP    "

## 2024-05-13 NOTE — ASSESSMENT & PLAN NOTE
Eliquis on hold  Continue heparin gtt for systemic AC  Rate controlling agents stopped during previous admission

## 2024-05-14 LAB
ANION GAP SERPL CALCULATED.3IONS-SCNC: 5 MMOL/L (ref 4–13)
APTT PPP: 101 SECONDS (ref 23–37)
APTT PPP: 64 SECONDS (ref 23–37)
BASOPHILS # BLD AUTO: 0.04 THOUSANDS/ÂΜL (ref 0–0.1)
BASOPHILS NFR BLD AUTO: 1 % (ref 0–1)
BUN SERPL-MCNC: 18 MG/DL (ref 5–25)
CA-I BLD-SCNC: 1.18 MMOL/L (ref 1.12–1.32)
CALCIUM SERPL-MCNC: 9.2 MG/DL (ref 8.4–10.2)
CHLORIDE SERPL-SCNC: 107 MMOL/L (ref 96–108)
CO2 SERPL-SCNC: 27 MMOL/L (ref 21–32)
CREAT SERPL-MCNC: 0.85 MG/DL (ref 0.6–1.3)
EOSINOPHIL # BLD AUTO: 0.26 THOUSAND/ÂΜL (ref 0–0.61)
EOSINOPHIL NFR BLD AUTO: 3 % (ref 0–6)
ERYTHROCYTE [DISTWIDTH] IN BLOOD BY AUTOMATED COUNT: 14.9 % (ref 11.6–15.1)
GFR SERPL CREATININE-BSD FRML MDRD: 62 ML/MIN/1.73SQ M
GLUCOSE SERPL-MCNC: 107 MG/DL (ref 65–140)
HCT VFR BLD AUTO: 39.3 % (ref 34.8–46.1)
HGB BLD-MCNC: 13.2 G/DL (ref 11.5–15.4)
IMM GRANULOCYTES # BLD AUTO: 0.02 THOUSAND/UL (ref 0–0.2)
IMM GRANULOCYTES NFR BLD AUTO: 0 % (ref 0–2)
LYMPHOCYTES # BLD AUTO: 3.8 THOUSANDS/ÂΜL (ref 0.6–4.47)
LYMPHOCYTES NFR BLD AUTO: 50 % (ref 14–44)
MAGNESIUM SERPL-MCNC: 2.1 MG/DL (ref 1.9–2.7)
MCH RBC QN AUTO: 30 PG (ref 26.8–34.3)
MCHC RBC AUTO-ENTMCNC: 33.6 G/DL (ref 31.4–37.4)
MCV RBC AUTO: 89 FL (ref 82–98)
MONOCYTES # BLD AUTO: 0.85 THOUSAND/ÂΜL (ref 0.17–1.22)
MONOCYTES NFR BLD AUTO: 11 % (ref 4–12)
NEUTROPHILS # BLD AUTO: 2.63 THOUSANDS/ÂΜL (ref 1.85–7.62)
NEUTS SEG NFR BLD AUTO: 35 % (ref 43–75)
NRBC BLD AUTO-RTO: 0 /100 WBCS
PHOSPHATE SERPL-MCNC: 4.2 MG/DL (ref 2.3–4.1)
PLATELET # BLD AUTO: 343 THOUSANDS/UL (ref 149–390)
PMV BLD AUTO: 11.1 FL (ref 8.9–12.7)
POTASSIUM SERPL-SCNC: 4.3 MMOL/L (ref 3.5–5.3)
RBC # BLD AUTO: 4.4 MILLION/UL (ref 3.81–5.12)
SODIUM SERPL-SCNC: 139 MMOL/L (ref 135–147)
WBC # BLD AUTO: 7.6 THOUSAND/UL (ref 4.31–10.16)

## 2024-05-14 PROCEDURE — 85730 THROMBOPLASTIN TIME PARTIAL: CPT | Performed by: FAMILY MEDICINE

## 2024-05-14 PROCEDURE — 85025 COMPLETE CBC W/AUTO DIFF WBC: CPT | Performed by: FAMILY MEDICINE

## 2024-05-14 PROCEDURE — 85730 THROMBOPLASTIN TIME PARTIAL: CPT | Performed by: STUDENT IN AN ORGANIZED HEALTH CARE EDUCATION/TRAINING PROGRAM

## 2024-05-14 PROCEDURE — 99233 SBSQ HOSP IP/OBS HIGH 50: CPT | Performed by: INTERNAL MEDICINE

## 2024-05-14 PROCEDURE — 82330 ASSAY OF CALCIUM: CPT | Performed by: FAMILY MEDICINE

## 2024-05-14 PROCEDURE — 84100 ASSAY OF PHOSPHORUS: CPT | Performed by: FAMILY MEDICINE

## 2024-05-14 PROCEDURE — 99233 SBSQ HOSP IP/OBS HIGH 50: CPT | Performed by: STUDENT IN AN ORGANIZED HEALTH CARE EDUCATION/TRAINING PROGRAM

## 2024-05-14 PROCEDURE — 83735 ASSAY OF MAGNESIUM: CPT | Performed by: FAMILY MEDICINE

## 2024-05-14 PROCEDURE — 80048 BASIC METABOLIC PNL TOTAL CA: CPT | Performed by: FAMILY MEDICINE

## 2024-05-14 RX ADMIN — CARBIDOPA AND LEVODOPA 1 TABLET: 25; 100 TABLET ORAL at 17:15

## 2024-05-14 RX ADMIN — CARBIDOPA AND LEVODOPA 1 TABLET: 25; 100 TABLET ORAL at 11:30

## 2024-05-14 RX ADMIN — CARBIDOPA AND LEVODOPA 1 TABLET: 25; 100 TABLET ORAL at 06:21

## 2024-05-14 RX ADMIN — ATORVASTATIN CALCIUM 40 MG: 40 TABLET, FILM COATED ORAL at 17:15

## 2024-05-14 RX ADMIN — PANTOPRAZOLE SODIUM 20 MG: 20 TABLET, DELAYED RELEASE ORAL at 06:21

## 2024-05-14 RX ADMIN — B-COMPLEX W/ C & FOLIC ACID TAB 1 TABLET: TAB at 09:19

## 2024-05-14 RX ADMIN — Medication 250 MG: at 09:19

## 2024-05-14 RX ADMIN — Medication 250 MG: at 17:15

## 2024-05-14 NOTE — PROGRESS NOTES
"Cardiology Progress Note - Radha Vidal 84 y.o. female MRN: [id number]    Unit/Bed#: -01 Encounter: [id number]      Assessment/Plan:   Symptomatic bradycardia  Plan for PPM implantation.Discussed the indications, alternatives, risks and benefit of pacemaker implantation. The procedure risks, benefits, and complications (including but not limited to bleeding, infection, air leak into the lungs, anesthesia complications, pericardial effusion, and complications related to device and leads) were reviewed.  Patient is alert and oriented x3 and wishes to proceed. All questions answered.  Patient does have a history of iodine allergy.  Contrast allergy prep has been ordered.  Procedure planned for tomorrow.  Continue to hold AV kwesi blocking agents  Continue telemetry    2.  Paroxysmal atrial fibrillation  Continue heparin drip.  [id number] 5  Continue to hold off on initiation of AV kwesi blocking agents and antiarrhythmic agents in the setting of symptomatic bradycardia    3.  Hyperlipidemia  Continue statin    4.  DM 2 (A1c 6.4)    5.  Parkinson's disease      Subjective:   Patient seen and examined.  She states she is overall feeling well today and offers no cardiac concerns or complaints.    Objective:     Vitals: Blood pressure 133/57, pulse 57, temperature 98.2 °F (36.8 °C), temperature source Oral, resp. rate 18, height 4' 11\" (1.499 m), weight 76.2 kg (167 lb 15.9 oz), SpO2 97%, not currently breastfeeding., Body mass index is 33.93 kg/m².,   Orthostatic Blood Pressures      Flowsheet Row Most Recent Value   Blood Pressure 133/57 filed at 05/14/2024 1507   Patient Position - Orthostatic VS Lying filed at 05/14/2024 1507              Intake/Output Summary (Last 24 hours) at 5/14/2024 1542  Last data filed at 5/14/2024 1300  Gross per 24 hour   Intake 609.63 ml   Output 0 ml   Net 609.63 ml         Physical Exam:   GEN: Alert and oriented x 3, in no acute distress.  Well appearing and well nourished. "   HEENT: Sclera anicteric, conjunctivae pink, mucous membranes moist. Oropharynx clear.   NECK: Supple, no significant JVD. Trachea midline, no thyromegaly.   HEART: Bradycardic, regular rhythm, normal S1 and S2, no murmurs, clicks, gallops or rubs. PMI nondisplaced, no thrills.   LUNGS: Clear to auscultation bilaterally; no wheezes, rales, or rhonchi. No increased work of breathing or signs of respiratory distress.   ABDOMEN: Soft, nontender, non-distended.   EXTREMITIES: Skin warm and well perfused, no clubbing, cyanosis, or edema.  NEURO: No focal findings. Normal speech. Mood and affect normal.   SKIN: Normal without suspicious lesions on exposed skin.      Medications:      Current Facility-Administered Medications:     acetaminophen (TYLENOL) tablet 650 mg, 650 mg, Oral, Q6H PRN, Gerhard Morel DO, 650 mg at 05/13/24 1330    atorvastatin (LIPITOR) tablet 40 mg, 40 mg, Oral, Daily With Dinner, Gerhard Morel DO, 40 mg at 05/13/24 1653    Atropine Sulfate injection 1 mg, 1 mg, Intravenous, Q15 Min PRN, Gerhard Morel DO    carbidopa-levodopa (SINEMET)  mg per tablet 1 tablet, 1 tablet, Oral, TID AC, Gerhard Morel DO, 1 tablet at 05/14/24 1130    [START ON 5/15/2024] ceFAZolin (ANCEF) IVPB (premix in dextrose) 2,000 mg 50 mL, 2,000 mg, Intravenous, Once, Gerhard Morel DO    [START ON 5/15/2024] chlorhexidine (PERIDEX) 0.12 % oral rinse 15 mL, 15 mL, Swish & Spit, Once, Gerhard Morel DO    [START ON 5/15/2024] diphenhydrAMINE (BENADRYL) tablet 50 mg, 50 mg, Oral, 60 Min Pre-Op, Gerhard Morel DO    heparin (porcine) 25,000 units in 0.45% NaCl 250 mL infusion (premix), 3-20 Units/kg/hr (Order-Specific), Intravenous, Titrated, Gerhard Morel DO, Last Rate: 5.6 mL/hr at 05/14/24 1131, 8 Units/kg/hr at 05/14/24 1131    hydrALAZINE (APRESOLINE) injection 5 mg, 5 mg, Intravenous, Q6H PRN, Gerhard Morel DO    [START ON 5/15/2024] methylPREDNISolone (MEDROL) tablet 32 mg, 32 mg, Oral, Q10H, Gerhard Morel DO     multivitamin stress formula tablet 1 tablet, 1 tablet, Oral, QAM, Gerhard Rueter, DO, 1 tablet at 05/14/24 0919    pantoprazole (PROTONIX) EC tablet 20 mg, 20 mg, Oral, Early Morning, Gerhard Rueter, DO, 20 mg at 05/14/24 0621    polyethylene glycol (MIRALAX) packet 17 g, 17 g, Oral, Daily PRN, Gerhard Rueter, DO    saccharomyces boulardii (FLORASTOR) capsule 250 mg, 250 mg, Oral, BID, Gerhard Rueter, DO, 250 mg at 05/14/24 0919     Labs & Results:    Results from last 7 days   Lab Units 05/12/24  2103 05/12/24  1652   HS TNI 0HR ng/L  --  5   HS TNI 2HR ng/L 5  --      Results from last 7 days   Lab Units 05/14/24  0545 05/13/24  0456 05/12/24  1652   WBC Thousand/uL 7.60 7.96 8.45   HEMOGLOBIN g/dL 13.2 12.3 13.7   HEMATOCRIT % 39.3 37.9 41.9   PLATELETS Thousands/uL 343 327 350         Results from last 7 days   Lab Units 05/14/24  0545 05/13/24  0456 05/12/24  1652   POTASSIUM mmol/L 4.3 4.0 4.3   CHLORIDE mmol/L 107 108 107   CO2 mmol/L 27 26 23   BUN mg/dL 18 18 23   CREATININE mg/dL 0.85 0.80 0.92   CALCIUM mg/dL 9.2 9.0 9.5   ALK PHOS U/L  --   --  91   ALT U/L  --   --  4*   AST U/L  --   --  22     Results from last 7 days   Lab Units 05/14/24  0545 05/13/24  1713 05/13/24  1033 05/13/24  0456 05/13/24  0248 05/12/24  2146   INR   --   --   --  1.20*  --  1.17   PTT seconds 101* 88* 86*  --    < > 34    < > = values in this interval not displayed.     Results from last 7 days   Lab Units 05/14/24  0545 05/13/24  0456 05/12/24  1652   MAGNESIUM mg/dL 2.1 1.9 2.1

## 2024-05-14 NOTE — ASSESSMENT & PLAN NOTE
Patient has been hospitalized due to symptomatic bradycardia.  Was initially under stepdown unit close observation and subsequently downgraded to Slim    Currently on encounter patient appears comfortable not in distress.  Heart rate noted 40s.  Blood pressure stable.  Continue to hold AV kwesi blocking agent.  Currently on IV heparin drip for A-fib  Currently she is asymptomatic.  Tentatively planning for pacemaker placement tomorrow.  Further care per cardiology recommendation.

## 2024-05-14 NOTE — PLAN OF CARE
Problem: Potential for Falls  Goal: Patient will remain free of falls  Description: INTERVENTIONS:  - Educate patient/family on patient safety including physical limitations  - Instruct patient to call for assistance with activity   - Consult OT/PT to assist with strengthening/mobility   - Keep Call bell within reach  - Keep bed low and locked with side rails adjusted as appropriate  - Keep care items and personal belongings within reach  - Initiate and maintain comfort rounds  - Make Fall Risk Sign visible to staff  - Offer Toileting every 3 Hours, in advance of need  - Initiate/Maintain bed alarm  - Obtain necessary fall risk management equipment:   - Apply yellow socks and bracelet for high fall risk patients  - Consider moving patient to room near nurses station  Outcome: Progressing     Problem: CARDIOVASCULAR - ADULT  Goal: Maintains optimal cardiac output and hemodynamic stability  Description: INTERVENTIONS:  - Monitor I/O, vital signs and rhythm  - Monitor for S/S and trends of decreased cardiac output  - Administer and titrate ordered vasoactive medications to optimize hemodynamic stability  - Assess quality of pulses, skin color and temperature  - Assess for signs of decreased coronary artery perfusion  - Instruct patient to report change in severity of symptoms  Outcome: Progressing  Goal: Absence of cardiac dysrhythmias or at baseline rhythm  Description: INTERVENTIONS:  - Continuous cardiac monitoring, vital signs, obtain 12 lead EKG if ordered  - Administer antiarrhythmic and heart rate control medications as ordered  - Monitor electrolytes and administer replacement therapy as ordered  Outcome: Progressing     Problem: RESPIRATORY - ADULT  Goal: Achieves optimal ventilation and oxygenation  Description: INTERVENTIONS:  - Assess for changes in respiratory status  - Assess for changes in mentation and behavior  - Position to facilitate oxygenation and minimize respiratory effort  - Oxygen  administered by appropriate delivery if ordered  - Initiate smoking cessation education as indicated  - Encourage broncho-pulmonary hygiene including cough, deep breathe, Incentive Spirometry  - Assess the need for suctioning and aspirate as needed  - Assess and instruct to report SOB or any respiratory difficulty  - Respiratory Therapy support as indicated  Outcome: Progressing     Problem: GENITOURINARY - ADULT  Goal: Maintains or returns to baseline urinary function  Description: INTERVENTIONS:  - Assess urinary function  - Encourage oral fluids to ensure adequate hydration if ordered  - Administer IV fluids as ordered to ensure adequate hydration  - Administer ordered medications as needed  - Offer frequent toileting  - Follow urinary retention protocol if ordered  Outcome: Progressing     Problem: MUSCULOSKELETAL - ADULT  Goal: Maintain or return mobility to safest level of function  Description: INTERVENTIONS:  - Assess patient's ability to carry out ADLs; assess patient's baseline for ADL function and identify physical deficits which impact ability to perform ADLs (bathing, care of mouth/teeth, toileting, grooming, dressing, etc.)  - Assess/evaluate cause of self-care deficits   - Assess range of motion  - Assess patient's mobility  - Assess patient's need for assistive devices and provide as appropriate  - Encourage maximum independence but intervene and supervise when necessary  - Involve family in performance of ADLs  - Assess for home care needs following discharge   - Consider OT consult to assist with ADL evaluation and planning for discharge  - Provide patient education as appropriate  Outcome: Progressing     Problem: Prexisting or High Potential for Compromised Skin Integrity  Goal: Skin integrity is maintained or improved  Description: INTERVENTIONS:  - Identify patients at risk for skin breakdown  - Assess and monitor skin integrity  - Assess and monitor nutrition and hydration status  - Monitor  labs   - Assess for incontinence   - Turn and reposition patient  - Assist with mobility/ambulation  - Relieve pressure over bony prominences  - Avoid friction and shearing  - Provide appropriate hygiene as needed including keeping skin clean and dry  - Evaluate need for skin moisturizer/barrier cream  - Collaborate with interdisciplinary team   - Patient/family teaching  - Consider wound care consult   Outcome: Progressing

## 2024-05-14 NOTE — PLAN OF CARE
Problem: Potential for Falls  Goal: Patient will remain free of falls  Description: INTERVENTIONS:  - Educate patient/family on patient safety including physical limitations  - Instruct patient to call for assistance with activity   - Consult OT/PT to assist with strengthening/mobility   - Keep Call bell within reach  - Keep bed low and locked with side rails adjusted as appropriate  - Keep care items and personal belongings within reach  - Initiate and maintain comfort rounds  - Make Fall Risk Sign visible to staff  - Offer Toileting every 4 Hours, in advance of need  - Initiate/Maintain bed alarm  - Obtain necessary fall risk management equipment: walker  - Apply yellow socks and bracelet for high fall risk patients  - Consider moving patient to room near nurses station  Outcome: Progressing     Problem: CARDIOVASCULAR - ADULT  Goal: Maintains optimal cardiac output and hemodynamic stability  Description: INTERVENTIONS:  - Monitor I/O, vital signs and rhythm  - Monitor for S/S and trends of decreased cardiac output  - Administer and titrate ordered vasoactive medications to optimize hemodynamic stability  - Assess quality of pulses, skin color and temperature  - Assess for signs of decreased coronary artery perfusion  - Instruct patient to report change in severity of symptoms  Outcome: Progressing     Problem: CARDIOVASCULAR - ADULT  Goal: Absence of cardiac dysrhythmias or at baseline rhythm  Description: INTERVENTIONS:  - Continuous cardiac monitoring, vital signs, obtain 12 lead EKG if ordered  - Administer antiarrhythmic and heart rate control medications as ordered  - Monitor electrolytes and administer replacement therapy as ordered  Outcome: Progressing       Problem: MUSCULOSKELETAL - ADULT  Goal: Maintain or return mobility to safest level of function  Description: INTERVENTIONS:  - Assess patient's ability to carry out ADLs; assess patient's baseline for ADL function and identify physical deficits  which impact ability to perform ADLs (bathing, care of mouth/teeth, toileting, grooming, dressing, etc.)  - Assess/evaluate cause of self-care deficits   - Assess range of motion  - Assess patient's mobility  - Assess patient's need for assistive devices and provide as appropriate  - Encourage maximum independence but intervene and supervise when necessary  - Involve family in performance of ADLs  - Assess for home care needs following discharge   - Consider OT consult to assist with ADL evaluation and planning for discharge  - Provide patient education as appropriate  Outcome: Progressing

## 2024-05-14 NOTE — PROGRESS NOTES
[facility]  Progress Note  Name: Radha Vidal I  MRN: [id number]  Unit/Bed#: -01 I Date of Admission: 5/12/2024   Date of Service: 5/14/2024 I Hospital Day: 2    Assessment/Plan   * Symptomatic bradycardia  Assessment & Plan  Patient has been hospitalized due to symptomatic bradycardia.  Was initially under stepdown unit close observation and subsequently downgraded to Slim    Currently on encounter patient appears comfortable not in distress.  Heart rate noted 40s.  Blood pressure stable.  Continue to hold AV kwesi blocking agent.  Currently on IV heparin drip for A-fib  Currently she is asymptomatic.  Tentatively planning for pacemaker placement tomorrow.  Further care per cardiology recommendation.    Paroxysmal atrial fibrillation (HCC)  Assessment & Plan  Currently off of rate controlling agent due to bradycardia.  Currently heart rate in 40s.  Eliquis on hold for planned pacemaker placement.  Continue on IV heparin drip.  Continue telemetry monitoring.  Cardiology following.    Gastro-esophageal reflux disease without esophagitis  Assessment & Plan  Continue Protonix.    Obesity (BMI 30-39.9)  Assessment & Plan  Obesity with BMI of 33.  Counseled on weight loss, lifestyle modification.    Parkinson's disease  Assessment & Plan  Continue Sinemet.               VTE Pharmacologic Prophylaxis:   Moderate Risk (Score 3-4) - Pharmacological DVT Prophylaxis Ordered: heparin drip.    Mobility:   Basic Mobility Inpatient Raw Score: 18  JH-HLM Goal: 6: Walk 10 steps or more  JH-HLM Achieved: 4: Move to chair/commode      Patient Centered Rounds: I performed bedside rounds with nursing staff today.   Discussions with Specialists or Other Care Team Provider: Cardiology    Education and Discussions with Family / Patient: Updated  (daughter) via phone. Spoke with Melba over the phone.     Total Time Spent on Date of Encounter in care of patient: 35 mins. This time was spent on  one or more of the following: performing physical exam; counseling and coordination of care; obtaining or reviewing history; documenting in the medical record; reviewing/ordering tests, medications or procedures; communicating with other healthcare professionals and discussing with patient's family/caregivers.    Current Length of Stay: 2 day(s)  Current Patient Status: Inpatient   Certification Statement: The patient will continue to require additional inpatient hospital stay due to symptomatic bradycardia plan for pacemaker placement.  Discharge Plan: Anticipate discharge in 24-48 hrs to discharge location to be determined pending rehab evaluations.    Code Status: Level 1 - Full Code    Subjective:     Seen during a.m. rounds.  Seen sitting in the chair.  Heart rate in 40s.  She is in good spirit.  Currently she does report feeling well.  Currently she denies chest pain, dyspnea, lightheadedness, dizziness, palpitation, nausea, vomiting, any other new complaints.  No other events reported.    Objective:     Vitals:   Temp (24hrs), Av °F (36.7 °C), Min:97.6 °F (36.4 °C), Max:98.1 °F (36.7 °C)    Temp:  [97.6 °F (36.4 °C)-98.1 °F (36.7 °C)] 98.1 °F (36.7 °C)  HR:  [] 50  Resp:  [16-18] 18  BP: ()/(50-67) 133/67  SpO2:  [95 %-97 %] 95 %  Body mass index is 33.93 kg/m².     Input and Output Summary (last 24 hours):     Intake/Output Summary (Last 24 hours) at 2024 1249  Last data filed at 2024 0721  Gross per 24 hour   Intake 503.63 ml   Output 0 ml   Net 503.63 ml       Physical Exam:   Physical Exam  Constitutional:       General: She is not in acute distress.     Appearance: Normal appearance. She is obese. She is not ill-appearing, toxic-appearing or diaphoretic.   HENT:      Head: Normocephalic and atraumatic.   Eyes:      Pupils: Pupils are equal, round, and reactive to light.   Cardiovascular:      Rate and Rhythm: Bradycardia present.   Pulmonary:      Effort: Pulmonary effort is  normal. No respiratory distress.      Breath sounds: No wheezing.   Abdominal:      General: Bowel sounds are normal. There is no distension.      Palpations: Abdomen is soft.      Tenderness: There is no abdominal tenderness.   Musculoskeletal:      Right lower leg: No edema.      Left lower leg: No edema.   Neurological:      Mental Status: She is alert and oriented to person, place, and time.   Psychiatric:         Mood and Affect: Mood normal.         Behavior: Behavior normal.          Additional Data:     Labs:  Results from last 7 days   Lab Units 05/14/24  0545   WBC Thousand/uL 7.60   HEMOGLOBIN g/dL 13.2   HEMATOCRIT % 39.3   PLATELETS Thousands/uL 343   SEGS PCT % 35*   LYMPHO PCT % 50*   MONO PCT % 11   EOS PCT % 3     Results from last 7 days   Lab Units 05/14/24  0545 05/13/24  0456 05/12/24  1652   SODIUM mmol/L 139   < > 139   POTASSIUM mmol/L 4.3   < > 4.3   CHLORIDE mmol/L 107   < > 107   CO2 mmol/L 27   < > 23   BUN mg/dL 18   < > 23   CREATININE mg/dL 0.85   < > 0.92   ANION GAP mmol/L 5   < > 9   CALCIUM mg/dL 9.2   < > 9.5   ALBUMIN g/dL  --   --  4.4   TOTAL BILIRUBIN mg/dL  --   --  0.69   ALK PHOS U/L  --   --  91   ALT U/L  --   --  4*   AST U/L  --   --  22   GLUCOSE RANDOM mg/dL 107   < > 124    < > = values in this interval not displayed.     Results from last 7 days   Lab Units 05/13/24  0456   INR  1.20*     Results from last 7 days   Lab Units 05/13/24  1637   POC GLUCOSE mg/dl 147*     Results from last 7 days   Lab Units 05/13/24  0456   HEMOGLOBIN A1C % 6.4*           Lines/Drains:  Invasive Devices       Peripheral Intravenous Line  Duration             Peripheral IV 05/12/24 Left Antecubital 1 day    Peripheral IV 05/12/24 Right Hand 1 day                      Telemetry:  Telemetry Orders (From admission, onward)               24 Hour Telemetry Monitoring  Continuous x 24 Hours (Telem)        Question:  Reason for 24 Hour Telemetry  Answer:  Arrhythmias requiring acute medical  intervention / PPM or ICD malfunction                     Telemetry Reviewed: Sinus Bradycardia  Indication for Continued Telemetry Use: Arrthymias requiring medical therapy             Imaging: Reviewed radiology reports from this admission including: chest xray    Recent Cultures (last 7 days):         Last 24 Hours Medication List:   Current Facility-Administered Medications   Medication Dose Route Frequency Provider Last Rate    acetaminophen  650 mg Oral Q6H PRN Gerhard Rueter, DO      atorvastatin  40 mg Oral Daily With Dinner Gerhard Rueter, DO      atropine  1 mg Intravenous Q15 Min PRN Gerhard Rueter, DO      carbidopa-levodopa  1 tablet Oral TID AC Gerhard Rueter, DO      [START ON 5/15/2024] cefazolin  2,000 mg Intravenous Once Gerhard Rueter, DO      [START ON 5/15/2024] chlorhexidine  15 mL Swish & Spit Once Gerhard Rueter, DO      [START ON 5/15/2024] diphenhydrAMINE  50 mg Oral 60 Min Pre-Op Gerhard Rueter, DO      heparin (porcine)  3-20 Units/kg/hr (Order-Specific) Intravenous Titrated Gerhard Rueter, DO 8 Units/kg/hr (05/14/24 1131)    hydrALAZINE  5 mg Intravenous Q6H PRN Gerhard Rueter, DO      [START ON 5/15/2024] methylPREDNISolone  32 mg Oral Q10H Gerhard Rueter, DO      multivitamin stress formula  1 tablet Oral QAM Gerhard Rueter, DO      pantoprazole  20 mg Oral Early Morning Gerhard Rueter, DO      polyethylene glycol  17 g Oral Daily PRN Gerhard Rueter, DO      saccharomyces boulardii  250 mg Oral BID Gerhard Rueter, DO          Today, Patient Was Seen By: Cheo Chavez MD    **Please Note: This note may have been constructed using a voice recognition system.**

## 2024-05-14 NOTE — ASSESSMENT & PLAN NOTE
Currently off of rate controlling agent due to bradycardia.  Currently heart rate in 40s.  Eliquis on hold for planned pacemaker placement.  Continue on IV heparin drip.  Continue telemetry monitoring.  Cardiology following.

## 2024-05-15 ENCOUNTER — APPOINTMENT (INPATIENT)
Dept: RADIOLOGY | Facility: HOSPITAL | Age: 84
DRG: 310 | End: 2024-05-15
Payer: COMMERCIAL

## 2024-05-15 ENCOUNTER — ANESTHESIA (INPATIENT)
Dept: NON INVASIVE DIAGNOSTICS | Facility: HOSPITAL | Age: 84
DRG: 310 | End: 2024-05-15
Payer: COMMERCIAL

## 2024-05-15 LAB
ANION GAP SERPL CALCULATED.3IONS-SCNC: 7 MMOL/L (ref 4–13)
APTT PPP: 66 SECONDS (ref 23–37)
ATRIAL RATE: 58 BPM
ATRIAL RATE: 64 BPM
ATRIAL RATE: 65 BPM
BASOPHILS # BLD AUTO: 0.02 THOUSANDS/ÂΜL (ref 0–0.1)
BASOPHILS NFR BLD AUTO: 0 % (ref 0–1)
BUN SERPL-MCNC: 16 MG/DL (ref 5–25)
CALCIUM SERPL-MCNC: 9.6 MG/DL (ref 8.4–10.2)
CHLORIDE SERPL-SCNC: 106 MMOL/L (ref 96–108)
CO2 SERPL-SCNC: 26 MMOL/L (ref 21–32)
CREAT SERPL-MCNC: 0.86 MG/DL (ref 0.6–1.3)
EOSINOPHIL # BLD AUTO: 0.03 THOUSAND/ÂΜL (ref 0–0.61)
EOSINOPHIL NFR BLD AUTO: 0 % (ref 0–6)
ERYTHROCYTE [DISTWIDTH] IN BLOOD BY AUTOMATED COUNT: 14.9 % (ref 11.6–15.1)
GFR SERPL CREATININE-BSD FRML MDRD: 61 ML/MIN/1.73SQ M
GLUCOSE SERPL-MCNC: 136 MG/DL (ref 65–140)
HCT VFR BLD AUTO: 43 % (ref 34.8–46.1)
HGB BLD-MCNC: 14.1 G/DL (ref 11.5–15.4)
IMM GRANULOCYTES # BLD AUTO: 0.02 THOUSAND/UL (ref 0–0.2)
IMM GRANULOCYTES NFR BLD AUTO: 0 % (ref 0–2)
INR PPP: 1.07 (ref 0.84–1.19)
LYMPHOCYTES # BLD AUTO: 1.71 THOUSANDS/ÂΜL (ref 0.6–4.47)
LYMPHOCYTES NFR BLD AUTO: 23 % (ref 14–44)
MCH RBC QN AUTO: 29.3 PG (ref 26.8–34.3)
MCHC RBC AUTO-ENTMCNC: 32.8 G/DL (ref 31.4–37.4)
MCV RBC AUTO: 89 FL (ref 82–98)
MONOCYTES # BLD AUTO: 0.15 THOUSAND/ÂΜL (ref 0.17–1.22)
MONOCYTES NFR BLD AUTO: 2 % (ref 4–12)
NEUTROPHILS # BLD AUTO: 5.61 THOUSANDS/ÂΜL (ref 1.85–7.62)
NEUTS SEG NFR BLD AUTO: 75 % (ref 43–75)
NRBC BLD AUTO-RTO: 0 /100 WBCS
P AXIS: 64 DEGREES
P AXIS: 65 DEGREES
P AXIS: 81 DEGREES
PLATELET # BLD AUTO: 369 THOUSANDS/UL (ref 149–390)
PMV BLD AUTO: 10.9 FL (ref 8.9–12.7)
POTASSIUM SERPL-SCNC: 4.5 MMOL/L (ref 3.5–5.3)
PR INTERVAL: 178 MS
PR INTERVAL: 182 MS
PR INTERVAL: 188 MS
PROTHROMBIN TIME: 14.6 SECONDS (ref 11.6–14.5)
QRS AXIS: -6 DEGREES
QRS AXIS: -7 DEGREES
QRS AXIS: 39 DEGREES
QRSD INTERVAL: 112 MS
QRSD INTERVAL: 114 MS
QRSD INTERVAL: 86 MS
QT INTERVAL: 442 MS
QT INTERVAL: 462 MS
QT INTERVAL: 466 MS
QTC INTERVAL: 453 MS
QTC INTERVAL: 459 MS
QTC INTERVAL: 480 MS
RBC # BLD AUTO: 4.81 MILLION/UL (ref 3.81–5.12)
SODIUM SERPL-SCNC: 139 MMOL/L (ref 135–147)
T WAVE AXIS: 46 DEGREES
T WAVE AXIS: 51 DEGREES
T WAVE AXIS: 97 DEGREES
VENTRICULAR RATE: 58 BPM
VENTRICULAR RATE: 64 BPM
VENTRICULAR RATE: 65 BPM
WBC # BLD AUTO: 7.54 THOUSAND/UL (ref 4.31–10.16)

## 2024-05-15 PROCEDURE — 85730 THROMBOPLASTIN TIME PARTIAL: CPT | Performed by: STUDENT IN AN ORGANIZED HEALTH CARE EDUCATION/TRAINING PROGRAM

## 2024-05-15 PROCEDURE — 71045 X-RAY EXAM CHEST 1 VIEW: CPT

## 2024-05-15 PROCEDURE — C1898 LEAD, PMKR, OTHER THAN TRANS: HCPCS | Performed by: INTERNAL MEDICINE

## 2024-05-15 PROCEDURE — C1769 GUIDE WIRE: HCPCS | Performed by: INTERNAL MEDICINE

## 2024-05-15 PROCEDURE — 93010 ELECTROCARDIOGRAM REPORT: CPT | Performed by: INTERNAL MEDICINE

## 2024-05-15 PROCEDURE — 33208 INSRT HEART PM ATRIAL & VENT: CPT | Performed by: INTERNAL MEDICINE

## 2024-05-15 PROCEDURE — 02HK3JZ INSERTION OF PACEMAKER LEAD INTO RIGHT VENTRICLE, PERCUTANEOUS APPROACH: ICD-10-PCS | Performed by: INTERNAL MEDICINE

## 2024-05-15 PROCEDURE — C1887 CATHETER, GUIDING: HCPCS | Performed by: INTERNAL MEDICINE

## 2024-05-15 PROCEDURE — C1892 INTRO/SHEATH,FIXED,PEEL-AWAY: HCPCS | Performed by: INTERNAL MEDICINE

## 2024-05-15 PROCEDURE — 36005 INJECTION EXT VENOGRAPHY: CPT | Performed by: INTERNAL MEDICINE

## 2024-05-15 PROCEDURE — 93005 ELECTROCARDIOGRAM TRACING: CPT

## 2024-05-15 PROCEDURE — 99232 SBSQ HOSP IP/OBS MODERATE 35: CPT | Performed by: STUDENT IN AN ORGANIZED HEALTH CARE EDUCATION/TRAINING PROGRAM

## 2024-05-15 PROCEDURE — C1785 PMKR, DUAL, RATE-RESP: HCPCS | Performed by: INTERNAL MEDICINE

## 2024-05-15 PROCEDURE — 02H63JZ INSERTION OF PACEMAKER LEAD INTO RIGHT ATRIUM, PERCUTANEOUS APPROACH: ICD-10-PCS | Performed by: INTERNAL MEDICINE

## 2024-05-15 PROCEDURE — 85610 PROTHROMBIN TIME: CPT | Performed by: FAMILY MEDICINE

## 2024-05-15 PROCEDURE — 85025 COMPLETE CBC W/AUTO DIFF WBC: CPT | Performed by: FAMILY MEDICINE

## 2024-05-15 PROCEDURE — 80048 BASIC METABOLIC PNL TOTAL CA: CPT | Performed by: FAMILY MEDICINE

## 2024-05-15 PROCEDURE — 0JH606Z INSERTION OF PACEMAKER, DUAL CHAMBER INTO CHEST SUBCUTANEOUS TISSUE AND FASCIA, OPEN APPROACH: ICD-10-PCS | Performed by: INTERNAL MEDICINE

## 2024-05-15 DEVICE — LEAD 457453 MRI US BI RCMCRD MVC
Type: IMPLANTABLE DEVICE | Site: HEART | Status: FUNCTIONAL
Brand: CAPSURE SENSE MRI™ SURESCAN™

## 2024-05-15 DEVICE — LEAD 3830 US MKT/ 69CM MRI LBBAP
Type: IMPLANTABLE DEVICE | Site: HEART | Status: FUNCTIONAL
Brand: SELECTSECURE™ MRI SURESCAN™

## 2024-05-15 DEVICE — ENVELOPE CMRM6122 ABSORB MED MR
Type: IMPLANTABLE DEVICE | Site: CHEST  WALL | Status: FUNCTIONAL
Brand: TYRX™

## 2024-05-15 DEVICE — IPG W1DR01 AZURE XT DR MRI USA
Type: IMPLANTABLE DEVICE | Site: CHEST  WALL | Status: FUNCTIONAL
Brand: AZURE™ XT DR MRI SURESCAN™

## 2024-05-15 RX ORDER — CEFAZOLIN SODIUM 2 G/50ML
SOLUTION INTRAVENOUS AS NEEDED
Status: DISCONTINUED | OUTPATIENT
Start: 2024-05-15 | End: 2024-05-15

## 2024-05-15 RX ORDER — ONDANSETRON 2 MG/ML
4 INJECTION INTRAMUSCULAR; INTRAVENOUS ONCE AS NEEDED
Status: DISCONTINUED | OUTPATIENT
Start: 2024-05-15 | End: 2024-05-15

## 2024-05-15 RX ORDER — PHENYLEPHRINE HCL IN 0.9% NACL 1 MG/10 ML
SYRINGE (ML) INTRAVENOUS AS NEEDED
Status: DISCONTINUED | OUTPATIENT
Start: 2024-05-15 | End: 2024-05-15

## 2024-05-15 RX ORDER — LABETALOL HYDROCHLORIDE 5 MG/ML
10 INJECTION, SOLUTION INTRAVENOUS ONCE AS NEEDED
Status: DISCONTINUED | OUTPATIENT
Start: 2024-05-15 | End: 2024-05-15 | Stop reason: HOSPADM

## 2024-05-15 RX ORDER — FENTANYL CITRATE/PF 50 MCG/ML
12.5 SYRINGE (ML) INJECTION
Status: DISCONTINUED | OUTPATIENT
Start: 2024-05-15 | End: 2024-05-15

## 2024-05-15 RX ORDER — LIDOCAINE HYDROCHLORIDE 10 MG/ML
INJECTION, SOLUTION EPIDURAL; INFILTRATION; INTRACAUDAL; PERINEURAL CODE/TRAUMA/SEDATION MEDICATION
Status: DISCONTINUED | OUTPATIENT
Start: 2024-05-15 | End: 2024-05-15 | Stop reason: HOSPADM

## 2024-05-15 RX ORDER — FENTANYL CITRATE 50 UG/ML
INJECTION, SOLUTION INTRAMUSCULAR; INTRAVENOUS AS NEEDED
Status: DISCONTINUED | OUTPATIENT
Start: 2024-05-15 | End: 2024-05-15

## 2024-05-15 RX ORDER — DIPHENHYDRAMINE HYDROCHLORIDE 50 MG/ML
INJECTION INTRAMUSCULAR; INTRAVENOUS AS NEEDED
Status: DISCONTINUED | OUTPATIENT
Start: 2024-05-15 | End: 2024-05-15

## 2024-05-15 RX ORDER — PROPOFOL 10 MG/ML
INJECTION, EMULSION INTRAVENOUS CONTINUOUS PRN
Status: DISCONTINUED | OUTPATIENT
Start: 2024-05-15 | End: 2024-05-15

## 2024-05-15 RX ORDER — KETAMINE HYDROCHLORIDE 50 MG/ML
INJECTION, SOLUTION INTRAMUSCULAR; INTRAVENOUS AS NEEDED
Status: DISCONTINUED | OUTPATIENT
Start: 2024-05-15 | End: 2024-05-15

## 2024-05-15 RX ORDER — MIDAZOLAM HYDROCHLORIDE 2 MG/2ML
INJECTION, SOLUTION INTRAMUSCULAR; INTRAVENOUS AS NEEDED
Status: DISCONTINUED | OUTPATIENT
Start: 2024-05-15 | End: 2024-05-15

## 2024-05-15 RX ORDER — SODIUM CHLORIDE 9 MG/ML
INJECTION, SOLUTION INTRAVENOUS CONTINUOUS PRN
Status: DISCONTINUED | OUTPATIENT
Start: 2024-05-15 | End: 2024-05-15

## 2024-05-15 RX ORDER — LIDOCAINE HYDROCHLORIDE 10 MG/ML
INJECTION, SOLUTION EPIDURAL; INFILTRATION; INTRACAUDAL; PERINEURAL AS NEEDED
Status: DISCONTINUED | OUTPATIENT
Start: 2024-05-15 | End: 2024-05-15

## 2024-05-15 RX ADMIN — CHLORHEXIDINE GLUCONATE 0.12% ORAL RINSE 15 ML: 1.2 LIQUID ORAL at 11:40

## 2024-05-15 RX ADMIN — FENTANYL CITRATE 25 MCG: 50 INJECTION, SOLUTION INTRAMUSCULAR; INTRAVENOUS at 14:46

## 2024-05-15 RX ADMIN — KETAMINE HYDROCHLORIDE 5 MG: 50 INJECTION INTRAMUSCULAR; INTRAVENOUS at 14:46

## 2024-05-15 RX ADMIN — MIDAZOLAM HYDROCHLORIDE 1 MG: 1 INJECTION, SOLUTION INTRAMUSCULAR; INTRAVENOUS at 13:24

## 2024-05-15 RX ADMIN — KETAMINE HYDROCHLORIDE 10 MG: 50 INJECTION INTRAMUSCULAR; INTRAVENOUS at 13:50

## 2024-05-15 RX ADMIN — CARBIDOPA AND LEVODOPA 1 TABLET: 25; 100 TABLET ORAL at 11:13

## 2024-05-15 RX ADMIN — SODIUM CHLORIDE: 0.9 INJECTION, SOLUTION INTRAVENOUS at 13:10

## 2024-05-15 RX ADMIN — PANTOPRAZOLE SODIUM 20 MG: 20 TABLET, DELAYED RELEASE ORAL at 07:34

## 2024-05-15 RX ADMIN — PROPOFOL 40 MCG/KG/MIN: 10 INJECTION, EMULSION INTRAVENOUS at 13:24

## 2024-05-15 RX ADMIN — CARBIDOPA AND LEVODOPA 1 TABLET: 25; 100 TABLET ORAL at 07:34

## 2024-05-15 RX ADMIN — DIPHENHYDRAMINE HYDROCHLORIDE 50 MG: 25 TABLET ORAL at 11:40

## 2024-05-15 RX ADMIN — FENTANYL CITRATE 25 MCG: 50 INJECTION, SOLUTION INTRAMUSCULAR; INTRAVENOUS at 13:40

## 2024-05-15 RX ADMIN — DIPHENHYDRAMINE HYDROCHLORIDE 12.5 MG: 50 INJECTION, SOLUTION INTRAMUSCULAR; INTRAVENOUS at 13:24

## 2024-05-15 RX ADMIN — METHYLPREDNISOLONE 32 MG: 16 TABLET ORAL at 11:14

## 2024-05-15 RX ADMIN — DIPHENHYDRAMINE HYDROCHLORIDE 12.5 MG: 50 INJECTION, SOLUTION INTRAMUSCULAR; INTRAVENOUS at 13:40

## 2024-05-15 RX ADMIN — Medication 250 MG: at 09:38

## 2024-05-15 RX ADMIN — METHYLPREDNISOLONE 32 MG: 16 TABLET ORAL at 01:52

## 2024-05-15 RX ADMIN — KETAMINE HYDROCHLORIDE 5 MG: 50 INJECTION INTRAMUSCULAR; INTRAVENOUS at 15:01

## 2024-05-15 RX ADMIN — CEFAZOLIN SODIUM 2000 MG: 2 SOLUTION INTRAVENOUS at 13:25

## 2024-05-15 RX ADMIN — FENTANYL CITRATE 25 MCG: 50 INJECTION, SOLUTION INTRAMUSCULAR; INTRAVENOUS at 13:24

## 2024-05-15 RX ADMIN — CEFAZOLIN SODIUM 2000 MG: 2 SOLUTION INTRAVENOUS at 11:14

## 2024-05-15 RX ADMIN — FENTANYL CITRATE 25 MCG: 50 INJECTION, SOLUTION INTRAMUSCULAR; INTRAVENOUS at 14:01

## 2024-05-15 RX ADMIN — Medication 100 MCG: at 15:34

## 2024-05-15 RX ADMIN — B-COMPLEX W/ C & FOLIC ACID TAB 1 TABLET: TAB at 09:38

## 2024-05-15 RX ADMIN — LIDOCAINE HYDROCHLORIDE 50 MG: 10 INJECTION, SOLUTION EPIDURAL; INFILTRATION; INTRACAUDAL; PERINEURAL at 13:24

## 2024-05-15 NOTE — PLAN OF CARE
Problem: SKIN/TISSUE INTEGRITY - ADULT  Goal: Skin Integrity remains intact(Skin Breakdown Prevention)  Description: Assess:  -Perform Cortez assessment every 12 hours  -Clean and moisturize skin every shift  -Inspect skin when repositioning, toileting, and assisting with ADLS  -Assess under medical devices  -Assess extremities for adequate circulation and sensation     Bed Management:  -Have minimal linens on bed & keep smooth, unwrinkled  -Change linens as needed when moist or perspiring  -Avoid sitting or lying in one position for more than 2 hours while in bed  -Keep HOB at 30degrees     Toileting:  -Offer bedside commode  -Assess for incontinence every 4 hours  -Use incontinent care products after each incontinent episode such as barrier cream    Activity:  -Mobilize patient e times a day  -Encourage activity and walks on unit  -Encourage or provide ROM exercises   -Turn and reposition patient every 2 Hours  -Use appropriate equipment to lift or move patient in bed  -Instruct/ Assist with weight shifting every hour when out of bed in chair  -Consider limitation of chair time 2 hour intervals    Skin Care:  -Avoid use of baby powder, tape, friction and shearing, hot water or constrictive clothing  -Relieve pressure over bony prominences using foam  -Do not massage red bony areas    Next Steps:  -Teach patient strategies to minimize risks    -Consider consults to  interdisciplinary teams  Outcome: Progressing     Problem: RESPIRATORY - ADULT  Goal: Achieves optimal ventilation and oxygenation  Description: INTERVENTIONS:  - Assess for changes in respiratory status  - Assess for changes in mentation and behavior  - Position to facilitate oxygenation and minimize respiratory effort  - Oxygen administered by appropriate delivery if ordered  - Initiate smoking cessation education as indicated  - Encourage broncho-pulmonary hygiene including cough, deep breathe, Incentive Spirometry  - Assess the need for suctioning  and aspirate as needed  - Assess and instruct to report SOB or any respiratory difficulty  - Respiratory Therapy support as indicated  Outcome: Progressing     Problem: CARDIOVASCULAR - ADULT  Goal: Maintains optimal cardiac output and hemodynamic stability  Description: INTERVENTIONS:  - Monitor I/O, vital signs and rhythm  - Monitor for S/S and trends of decreased cardiac output  - Administer and titrate ordered vasoactive medications to optimize hemodynamic stability  - Assess quality of pulses, skin color and temperature  - Assess for signs of decreased coronary artery perfusion  - Instruct patient to report change in severity of symptoms  Outcome: Progressing

## 2024-05-15 NOTE — ASSESSMENT & PLAN NOTE
Patient has been hospitalized due to symptomatic bradycardia.  Was initially under stepdown unit close observation and subsequently downgraded to Slim    Currently on encounter patient appears comfortable not in distress.  Blood pressure stable.  Continue to hold AV kwesi blocking agent.  Currently on IV heparin drip for A-fib  Currently she is asymptomatic.  Tentatively planning for pacemaker placement tomorrow.  Further care per cardiology recommendation.

## 2024-05-15 NOTE — ANESTHESIA PREPROCEDURE EVALUATION
Procedure:  Cardiac pacer implant (Chest)    Relevant Problems   CARDIO   (+) Paroxysmal atrial fibrillation (HCC)      ENDO   (+) Postablative hypothyroidism   (+) Type 2 diabetes mellitus (HCC)      GI/HEPATIC   (+) Dysphagia   (+) Gastro-esophageal reflux disease without esophagitis   (+) IPMN (intraductal papillary mucinous neoplasm)   (+) Small bowel obstruction (HCC)      NEURO/PSYCH   (+) Claustrophobia      PULMONARY   (+) Sleep apnea        Physical Exam    Airway    Mallampati score: III  TM Distance: >3 FB  Neck ROM: full     Dental   No notable dental hx     Cardiovascular  Rhythm: regular    Pulmonary      Other Findings  Feb ECHO:    ·  Left Ventricle: Left ventricular cavity size is normal. Wall thickness is moderately increased. Wall motion is normal.  ·  Aorta: The aortic root is normal in size. The ascending aorta is mildly dilated. The ascending aorta is 4.3 cm.     Feb LHC:      Left Main  The vessel is angiographically normal.    Left Anterior Descending  The vessel is angiographically normal.    Left Circumflex  The vessel is angiographically normal.    Right Coronary Artery  The vessel is angiographically normal and non-dominant.      post-pubertal.      Anesthesia Plan  ASA Score- 3     Anesthesia Type- IV sedation with anesthesia with ASA Monitors.         Additional Monitors:     Airway Plan:            Plan Factors-Exercise tolerance (METS): <4 METS.    Chart reviewed. EKG reviewed. Imaging results reviewed. Existing labs reviewed. Patient summary reviewed.    Patient is not a current smoker.  Patient did not smoke on day of surgery.            Induction- intravenous.    Postoperative Plan- Plan for postoperative opioid use.     Perioperative Resuscitation Plan - Level 1 - Full Code.       Informed Consent- Anesthetic plan and risks discussed with patient.  I personally reviewed this patient with the CRNA. Discussed and agreed on the Anesthesia Plan with the CRNA..

## 2024-05-15 NOTE — PLAN OF CARE
Problem: Potential for Falls  Goal: Patient will remain free of falls  Description: INTERVENTIONS:  - Educate patient/family on patient safety including physical limitations  - Instruct patient to call for assistance with activity   - Consult OT/PT to assist with strengthening/mobility   - Keep Call bell within reach  - Keep bed low and locked with side rails adjusted as appropriate  - Keep care items and personal belongings within reach  - Initiate and maintain comfort rounds  - Make Fall Risk Sign visible to staff  - Offer Toileting every  Hours, in advance of need  - Initiate/Maintain alarm  - Obtain necessary fall risk management equipment:   - Apply yellow socks and bracelet for high fall risk patients  - Consider moving patient to room near nurses station  Outcome: Progressing     Problem: CARDIOVASCULAR - ADULT  Goal: Maintains optimal cardiac output and hemodynamic stability  Description: INTERVENTIONS:  - Monitor I/O, vital signs and rhythm  - Monitor for S/S and trends of decreased cardiac output  - Administer and titrate ordered vasoactive medications to optimize hemodynamic stability  - Assess quality of pulses, skin color and temperature  - Assess for signs of decreased coronary artery perfusion  - Instruct patient to report change in severity of symptoms  Outcome: Progressing  Goal: Absence of cardiac dysrhythmias or at baseline rhythm  Description: INTERVENTIONS:  - Continuous cardiac monitoring, vital signs, obtain 12 lead EKG if ordered  - Administer antiarrhythmic and heart rate control medications as ordered  - Monitor electrolytes and administer replacement therapy as ordered  Outcome: Progressing     Problem: RESPIRATORY - ADULT  Goal: Achieves optimal ventilation and oxygenation  Description: INTERVENTIONS:  - Assess for changes in respiratory status  - Assess for changes in mentation and behavior  - Position to facilitate oxygenation and minimize respiratory effort  - Oxygen administered by  appropriate delivery if ordered  - Initiate smoking cessation education as indicated  - Encourage broncho-pulmonary hygiene including cough, deep breathe, Incentive Spirometry  - Assess the need for suctioning and aspirate as needed  - Assess and instruct to report SOB or any respiratory difficulty  - Respiratory Therapy support as indicated  Outcome: Progressing     Problem: GENITOURINARY - ADULT  Goal: Maintains or returns to baseline urinary function  Description: INTERVENTIONS:  - Assess urinary function  - Encourage oral fluids to ensure adequate hydration if ordered  - Administer IV fluids as ordered to ensure adequate hydration  - Administer ordered medications as needed  - Offer frequent toileting  - Follow urinary retention protocol if ordered  Outcome: Progressing     Problem: SKIN/TISSUE INTEGRITY - ADULT  Goal: Skin Integrity remains intact(Skin Breakdown Prevention)  Description: Assess:  -Perform Cortez assessment every   -Clean and moisturize skin every   -Inspect skin when repositioning, toileting, and assisting with ADLS  -Assess under medical devices such as  every   -Assess extremities for adequate circulation and sensation     Bed Management:  -Have minimal linens on bed & keep smooth, unwrinkled  -Change linens as needed when moist or perspiring  -Avoid sitting or lying in one position for more than  hours while in bed  -Keep HOB at degrees     Toileting:  -Offer bedside commode  -Assess for incontinence every   -Use incontinent care products after each incontinent episode such as     Activity:  -Mobilize patient  times a day  -Encourage activity and walks on unit  -Encourage or provide ROM exercises   -Turn and reposition patient every  Hours  -Use appropriate equipment to lift or move patient in bed  -Instruct/ Assist with weight shifting every  when out of bed in chair  -Consider limitation of chair time  hour intervals    Skin Care:  -Avoid use of baby powder, tape, friction and shearing,  hot water or constrictive clothing  -Relieve pressure over bony prominences using   -Do not massage red bony areas    Next Steps:  -Teach patient strategies to minimize risks such as    -Consider consults to  interdisciplinary teams such as  Outcome: Progressing     Problem: MUSCULOSKELETAL - ADULT  Goal: Maintain or return mobility to safest level of function  Description: INTERVENTIONS:  - Assess patient's ability to carry out ADLs; assess patient's baseline for ADL function and identify physical deficits which impact ability to perform ADLs (bathing, care of mouth/teeth, toileting, grooming, dressing, etc.)  - Assess/evaluate cause of self-care deficits   - Assess range of motion  - Assess patient's mobility  - Assess patient's need for assistive devices and provide as appropriate  - Encourage maximum independence but intervene and supervise when necessary  - Involve family in performance of ADLs  - Assess for home care needs following discharge   - Consider OT consult to assist with ADL evaluation and planning for discharge  - Provide patient education as appropriate  Outcome: Progressing

## 2024-05-15 NOTE — PROGRESS NOTES
[facility]  Progress Note  Name: Radha Vidal I  MRN: [id number]  Unit/Bed#: [facility] CATH LAB ROOM I Date of Admission: 5/12/2024   Date of Service: 5/15/2024 I Hospital Day: 3    Assessment & Plan   * Symptomatic bradycardia  Assessment & Plan  Patient has been hospitalized due to symptomatic bradycardia.  Was initially under stepdown unit close observation and subsequently downgraded to Slim    Currently on encounter patient appears comfortable not in distress.  Blood pressure stable.  Continue to hold AV kwesi blocking agent.  Currently on IV heparin drip for A-fib  Currently she is asymptomatic.  Tentatively planning for pacemaker placement tomorrow.  Further care per cardiology recommendation.    Type 2 diabetes mellitus (HCC)  Assessment & Plan  Lab Results   Component Value Date    HGBA1C 6.4 (H) 05/13/2024       Recent Labs     05/13/24  1637   POCGLU 147*       Blood Sugar Average: Last 72 hrs:  (P) 147      Sliding scale coverage.       Paroxysmal atrial fibrillation (HCC)  Assessment & Plan  Currently off of rate controlling agent due to bradycardia.  Eliquis on hold for planned pacemaker placement.  Continue on IV heparin drip.  Continue telemetry monitoring.  Cardiology following.    Gastro-esophageal reflux disease without esophagitis  Assessment & Plan  Continue Protonix.    Obesity (BMI 30-39.9)  Assessment & Plan  Obesity with BMI of 33.  Counseled on weight loss, lifestyle modification.    Parkinson's disease  Assessment & Plan  Continue Sinemet.               VTE Pharmacologic Prophylaxis:   Moderate Risk (Score 3-4) - Pharmacological DVT Prophylaxis Ordered: heparin drip.    Mobility:   Basic Mobility Inpatient Raw Score: 18  JH-HLM Goal: 6: Walk 10 steps or more  JH-HLM Achieved: 6: Walk 10 steps or more      Patient Centered Rounds: I performed bedside rounds with nursing staff today.   Discussions with Specialists or Other Care Team Provider: Cardiology    Total Time Spent  on Date of Encounter in care of patient: 25 mins. This time was spent on one or more of the following: performing physical exam; counseling and coordination of care; obtaining or reviewing history; documenting in the medical record; reviewing/ordering tests, medications or procedures; communicating with other healthcare professionals and discussing with patient's family/caregivers.    Current Length of Stay: 3 day(s)  Current Patient Status: Inpatient   Certification Statement: The patient will continue to require additional inpatient hospital stay due to plan for pacemaker placement today.  Discharge Plan: Anticipate discharge in 24-48 hrs to home.    Code Status: Level 1 - Full Code    Subjective:   Seen during a.m. rounds.  Patient appears comfortable not in distress.  Plan for pacemaker placement today.  No other events reported.    Objective:     Vitals:   Temp (24hrs), Av.3 °F (36.8 °C), Min:98.2 °F (36.8 °C), Max:98.5 °F (36.9 °C)    Temp:  [98.2 °F (36.8 °C)-98.5 °F (36.9 °C)] 98.2 °F (36.8 °C)  HR:  [52-65] 65  Resp:  [18] 18  BP: (129-161)/(57-77) 144/69  SpO2:  [93 %-100 %] 100 %  Body mass index is 33.08 kg/m².     Input and Output Summary (last 24 hours):   No intake or output data in the 24 hours ending 05/15/24 1407    Physical Exam:   Physical Exam  Constitutional:       General: She is not in acute distress.     Appearance: Normal appearance. She is obese. She is not ill-appearing, toxic-appearing or diaphoretic.   HENT:      Head: Normocephalic and atraumatic.   Eyes:      Pupils: Pupils are equal, round, and reactive to light.   Cardiovascular:      Rate and Rhythm: Bradycardia present.   Pulmonary:      Effort: Pulmonary effort is normal. No respiratory distress.      Breath sounds: No wheezing.   Abdominal:      General: Bowel sounds are normal. There is no distension.      Palpations: Abdomen is soft.      Tenderness: There is no abdominal tenderness.   Musculoskeletal:      Right lower  leg: No edema.      Left lower leg: No edema.   Neurological:      Mental Status: She is alert and oriented to person, place, and time.   Psychiatric:         Mood and Affect: Mood normal.         Behavior: Behavior normal.          Additional Data:     Labs:  Results from last 7 days   Lab Units 05/15/24  0549   WBC Thousand/uL 7.54   HEMOGLOBIN g/dL 14.1   HEMATOCRIT % 43.0   PLATELETS Thousands/uL 369   SEGS PCT % 75   LYMPHO PCT % 23   MONO PCT % 2*   EOS PCT % 0     Results from last 7 days   Lab Units 05/15/24  0549 05/13/24  0456 05/12/24  1652   SODIUM mmol/L 139   < > 139   POTASSIUM mmol/L 4.5   < > 4.3   CHLORIDE mmol/L 106   < > 107   CO2 mmol/L 26   < > 23   BUN mg/dL 16   < > 23   CREATININE mg/dL 0.86   < > 0.92   ANION GAP mmol/L 7   < > 9   CALCIUM mg/dL 9.6   < > 9.5   ALBUMIN g/dL  --   --  4.4   TOTAL BILIRUBIN mg/dL  --   --  0.69   ALK PHOS U/L  --   --  91   ALT U/L  --   --  4*   AST U/L  --   --  22   GLUCOSE RANDOM mg/dL 136   < > 124    < > = values in this interval not displayed.     Results from last 7 days   Lab Units 05/15/24  0549   INR  1.07     Results from last 7 days   Lab Units 05/13/24  1637   POC GLUCOSE mg/dl 147*     Results from last 7 days   Lab Units 05/13/24  0456   HEMOGLOBIN A1C % 6.4*           Lines/Drains:  Invasive Devices       Peripheral Intravenous Line  Duration             Peripheral IV 05/12/24 Left Antecubital 2 days    Peripheral IV 05/12/24 Right Hand 2 days                      Telemetry:  Telemetry Orders (From admission, onward)               24 Hour Telemetry Monitoring  Continuous x 24 Hours (Telem)        Question:  Reason for 24 Hour Telemetry  Answer:  Arrhythmias requiring acute medical intervention / PPM or ICD malfunction                     Telemetry Reviewed: Sinus Bradycardia  Indication for Continued Telemetry Use: Arrthymias requiring medical therapy               Recent Cultures (last 7 days):         Last 24 Hours Medication List:    Current Facility-Administered Medications   Medication Dose Route Frequency Provider Last Rate    [Transfer Hold] acetaminophen  650 mg Oral Q6H PRN Gerhard Rueter, DO      [Transfer Hold] atorvastatin  40 mg Oral Daily With Dinner Gerhard Rueter, DO      [Transfer Hold] atropine  1 mg Intravenous Q15 Min PRN Gerhard Rueter, DO      [Transfer Hold] carbidopa-levodopa  1 tablet Oral TID AC Gerhard Rueter, DO      [Transfer Hold] diphenhydrAMINE  50 mg Oral 60 Min Pre-Op Gerhard Rueter, DO      heparin (porcine)  3-20 Units/kg/hr (Order-Specific) Intravenous Titrated Gerhard Rueter, DO Stopped (05/15/24 1216)    [Transfer Hold] hydrALAZINE  5 mg Intravenous Q6H PRN Gerhard Rueter, DO      lidocaine (PF)   Infiltration Code/Trauma/Sedation Fred Woods MD      [Transfer Hold] multivitamin stress formula  1 tablet Oral QAM Gerhard Rueter, DO      [Transfer Hold] pantoprazole  20 mg Oral Early Morning Gerhard Rueter, DO      [Transfer Hold] polyethylene glycol  17 g Oral Daily PRN Gerhard Rueter, DO      [Transfer Hold] saccharomyces boulardii  250 mg Oral BID Gerhard Rueter, DO       Facility-Administered Medications Ordered in Other Encounters   Medication Dose Route Frequency Provider Last Rate    ceFAZolin   Intravenous PRN Jennifer Orfe, CRNA      diphenhydrAMINE   Intravenous PRN Jennifer Orfe, CRNA      fentaNYL   Intravenous PRN Jennifer Orfe, CRNA      ketamine   Intravenous PRN Jennifer Orfe, CRNA      lidocaine (PF)   Intravenous PRN Jennifer Orfe, CRNA      midazolam   Intravenous PRN Jennifer Orfe, CRNA      propofol   Intravenous Continuous PRN Jennifer Orfe, CRNA 50 mcg/kg/min (05/15/24 1339)    sodium chloride   Intravenous Continuous PRN Jennifer Orfe, CRNA          Today, Patient Was Seen By: Cheo Chavez MD    **Please Note: This note may have been constructed using a voice recognition system.**

## 2024-05-15 NOTE — ASSESSMENT & PLAN NOTE
Lab Results   Component Value Date    HGBA1C 6.4 (H) 05/13/2024       Recent Labs     05/13/24  1637   POCGLU 147*       Blood Sugar Average: Last 72 hrs:  (P) 147      Sliding scale coverage.

## 2024-05-15 NOTE — ANESTHESIA POSTPROCEDURE EVALUATION
Post-Op Assessment Note    CV Status:  Stable  Pain Score: 0    Pain management: adequate       Mental Status:  Alert and awake   Hydration Status:  Euvolemic   PONV Controlled:  Controlled   Airway Patency:  Patent and adequate  Two or more mitigation strategies used for obstructive sleep apnea   Post Op Vitals Reviewed: Yes    No anethesia notable event occurred.    Staff: CRNA               BP   171/76   Temp 97   Pulse 64   Resp 16   SpO2 97

## 2024-05-15 NOTE — ASSESSMENT & PLAN NOTE
Currently off of rate controlling agent due to bradycardia.  Eliquis on hold for planned pacemaker placement.  Continue on IV heparin drip.  Continue telemetry monitoring.  Cardiology following.

## 2024-05-16 ENCOUNTER — TELEPHONE (OUTPATIENT)
Dept: CARDIOLOGY CLINIC | Facility: CLINIC | Age: 84
End: 2024-05-16

## 2024-05-16 ENCOUNTER — APPOINTMENT (INPATIENT)
Dept: RADIOLOGY | Facility: HOSPITAL | Age: 84
DRG: 310 | End: 2024-05-16
Payer: COMMERCIAL

## 2024-05-16 VITALS
DIASTOLIC BLOOD PRESSURE: 64 MMHG | OXYGEN SATURATION: 93 % | SYSTOLIC BLOOD PRESSURE: 114 MMHG | BODY MASS INDEX: 31.87 KG/M2 | HEIGHT: 59 IN | TEMPERATURE: 97.5 F | HEART RATE: 58 BPM | WEIGHT: 158.07 LBS | RESPIRATION RATE: 17 BRPM

## 2024-05-16 LAB
ANION GAP SERPL CALCULATED.3IONS-SCNC: 8 MMOL/L (ref 4–13)
BUN SERPL-MCNC: 22 MG/DL (ref 5–25)
CALCIUM SERPL-MCNC: 9.5 MG/DL (ref 8.4–10.2)
CHLORIDE SERPL-SCNC: 106 MMOL/L (ref 96–108)
CO2 SERPL-SCNC: 24 MMOL/L (ref 21–32)
CREAT SERPL-MCNC: 0.85 MG/DL (ref 0.6–1.3)
ERYTHROCYTE [DISTWIDTH] IN BLOOD BY AUTOMATED COUNT: 15.1 % (ref 11.6–15.1)
GFR SERPL CREATININE-BSD FRML MDRD: 62 ML/MIN/1.73SQ M
GLUCOSE SERPL-MCNC: 155 MG/DL (ref 65–140)
HCT VFR BLD AUTO: 40.1 % (ref 34.8–46.1)
HGB BLD-MCNC: 13.7 G/DL (ref 11.5–15.4)
MCH RBC QN AUTO: 30 PG (ref 26.8–34.3)
MCHC RBC AUTO-ENTMCNC: 34.2 G/DL (ref 31.4–37.4)
MCV RBC AUTO: 88 FL (ref 82–98)
PLATELET # BLD AUTO: 353 THOUSANDS/UL (ref 149–390)
PMV BLD AUTO: 11.7 FL (ref 8.9–12.7)
POTASSIUM SERPL-SCNC: 4.6 MMOL/L (ref 3.5–5.3)
RBC # BLD AUTO: 4.57 MILLION/UL (ref 3.81–5.12)
SODIUM SERPL-SCNC: 138 MMOL/L (ref 135–147)
WBC # BLD AUTO: 10.62 THOUSAND/UL (ref 4.31–10.16)

## 2024-05-16 PROCEDURE — 80048 BASIC METABOLIC PNL TOTAL CA: CPT | Performed by: STUDENT IN AN ORGANIZED HEALTH CARE EDUCATION/TRAINING PROGRAM

## 2024-05-16 PROCEDURE — 71046 X-RAY EXAM CHEST 2 VIEWS: CPT

## 2024-05-16 PROCEDURE — 99024 POSTOP FOLLOW-UP VISIT: CPT | Performed by: INTERNAL MEDICINE

## 2024-05-16 PROCEDURE — 85027 COMPLETE CBC AUTOMATED: CPT | Performed by: STUDENT IN AN ORGANIZED HEALTH CARE EDUCATION/TRAINING PROGRAM

## 2024-05-16 PROCEDURE — 99239 HOSP IP/OBS DSCHRG MGMT >30: CPT | Performed by: STUDENT IN AN ORGANIZED HEALTH CARE EDUCATION/TRAINING PROGRAM

## 2024-05-16 RX ADMIN — Medication 250 MG: at 10:44

## 2024-05-16 RX ADMIN — CARBIDOPA AND LEVODOPA 1 TABLET: 25; 100 TABLET ORAL at 05:48

## 2024-05-16 RX ADMIN — APIXABAN 5 MG: 5 TABLET, FILM COATED ORAL at 10:43

## 2024-05-16 RX ADMIN — CARBIDOPA AND LEVODOPA 1 TABLET: 25; 100 TABLET ORAL at 10:43

## 2024-05-16 RX ADMIN — PANTOPRAZOLE SODIUM 20 MG: 20 TABLET, DELAYED RELEASE ORAL at 05:29

## 2024-05-16 NOTE — PLAN OF CARE
Problem: SKIN/TISSUE INTEGRITY - ADULT  Goal: Skin Integrity remains intact(Skin Breakdown Prevention)  Description: Assess:  -Perform Cortez assessment every shift    -Clean and moisturize skin every   -Inspect skin when repositioning, toileting, and assisting with ADLS  -Assess under medical devices   -Assess extremities for adequate circulation and sensation     Bed Management:  -Have minimal linens on bed & keep smooth, unwrinkled  -Change linens as needed when moist or perspiring  -Avoid sitting or lying in one position for more than 2 hours while in bed  -Keep HOB at 30 degrees     Toileting:  -Offer bedside commode  -Assess for incontinence every shift and prn  -Use incontinent care products after each incontinent episode such as moisture barrier    Activity:  -Mobilize patient 3 times a day  -Encourage activity and walks on unit  -Encourage or provide ROM exercises   -Turn and reposition patient every 2 Hours  -Use appropriate equipment to lift or move patient in bed  -Instruct/ Assist with weight shifting every 2 h  when out of bed in chair  -Consider limitation of chair time 2 hour intervals    Skin Care:  -Avoid use of baby powder, tape, friction and shearing, hot water or constrictive clothing  -Relieve pressure over bony prominences using walker  -Do not massage red bony areas    Next Steps:  -Teach patient strategies to minimize risks such as    -Consider consults to  interdisciplinary teams   Outcome: Progressing     Problem: MUSCULOSKELETAL - ADULT  Goal: Maintain or return mobility to safest level of function  Description: INTERVENTIONS:  - Assess patient's ability to carry out ADLs; assess patient's baseline for ADL function and identify physical deficits which impact ability to perform ADLs (bathing, care of mouth/teeth, toileting, grooming, dressing, etc.)  - Assess/evaluate cause of self-care deficits   - Assess range of motion  - Assess patient's mobility  - Assess patient's need for  assistive devices and provide as appropriate  - Encourage maximum independence but intervene and supervise when necessary  - Involve family in performance of ADLs  - Assess for home care needs following discharge   - Consider OT consult to assist with ADL evaluation and planning for discharge  - Provide patient education as appropriate  Outcome: Progressing     Problem: CARDIOVASCULAR - ADULT  Goal: Maintains optimal cardiac output and hemodynamic stability  Description: INTERVENTIONS:  - Monitor I/O, vital signs and rhythm  - Monitor for S/S and trends of decreased cardiac output  - Administer and titrate ordered vasoactive medications to optimize hemodynamic stability  - Assess quality of pulses, skin color and temperature  - Assess for signs of decreased coronary artery perfusion  - Instruct patient to report change in severity of symptoms  Outcome: Progressing

## 2024-05-16 NOTE — PLAN OF CARE
Problem: Potential for Falls  Goal: Patient will remain free of falls  Description: INTERVENTIONS:  - Educate patient/family on patient safety including physical limitations  - Instruct patient to call for assistance with activity   - Consult OT/PT to assist with strengthening/mobility   - Keep Call bell within reach  - Keep bed low and locked with side rails adjusted as appropriate  - Keep care items and personal belongings within reach  - Initiate and maintain comfort rounds  - Make Fall Risk Sign visible to staff  - Offer Toileting every  Hours, in advance of need  - Initiate/Maintain alarm  - Obtain necessary fall risk management equipment:   - Apply yellow socks and bracelet for high fall risk patients  - Consider moving patient to room near nurses station  Outcome: Adequate for Discharge     Problem: CARDIOVASCULAR - ADULT  Goal: Maintains optimal cardiac output and hemodynamic stability  Description: INTERVENTIONS:  - Monitor I/O, vital signs and rhythm  - Monitor for S/S and trends of decreased cardiac output  - Administer and titrate ordered vasoactive medications to optimize hemodynamic stability  - Assess quality of pulses, skin color and temperature  - Assess for signs of decreased coronary artery perfusion  - Instruct patient to report change in severity of symptoms  Outcome: Adequate for Discharge  Goal: Absence of cardiac dysrhythmias or at baseline rhythm  Description: INTERVENTIONS:  - Continuous cardiac monitoring, vital signs, obtain 12 lead EKG if ordered  - Administer antiarrhythmic and heart rate control medications as ordered  - Monitor electrolytes and administer replacement therapy as ordered  Outcome: Adequate for Discharge     Problem: RESPIRATORY - ADULT  Goal: Achieves optimal ventilation and oxygenation  Description: INTERVENTIONS:  - Assess for changes in respiratory status  - Assess for changes in mentation and behavior  - Position to facilitate oxygenation and minimize respiratory  effort  - Oxygen administered by appropriate delivery if ordered  - Initiate smoking cessation education as indicated  - Encourage broncho-pulmonary hygiene including cough, deep breathe, Incentive Spirometry  - Assess the need for suctioning and aspirate as needed  - Assess and instruct to report SOB or any respiratory difficulty  - Respiratory Therapy support as indicated  Outcome: Adequate for Discharge     Problem: GENITOURINARY - ADULT  Goal: Maintains or returns to baseline urinary function  Description: INTERVENTIONS:  - Assess urinary function  - Encourage oral fluids to ensure adequate hydration if ordered  - Administer IV fluids as ordered to ensure adequate hydration  - Administer ordered medications as needed  - Offer frequent toileting  - Follow urinary retention protocol if ordered  Outcome: Adequate for Discharge     Problem: SKIN/TISSUE INTEGRITY - ADULT  Goal: Skin Integrity remains intact(Skin Breakdown Prevention)  Description: Assess:  -Perform Cortez assessment every   -Clean and moisturize skin every   -Inspect skin when repositioning, toileting, and assisting with ADLS  -Assess under medical devices such as  every   -Assess extremities for adequate circulation and sensation     Bed Management:  -Have minimal linens on bed & keep smooth, unwrinkled  -Change linens as needed when moist or perspiring  -Avoid sitting or lying in one position for more than  hours while in bed  -Keep HOB at degrees     Toileting:  -Offer bedside commode  -Assess for incontinence every   -Use incontinent care products after each incontinent episode such as     Activity:  -Mobilize patient  times a day  -Encourage activity and walks on unit  -Encourage or provide ROM exercises   -Turn and reposition patient every  Hours  -Use appropriate equipment to lift or move patient in bed  -Instruct/ Assist with weight shifting every  when out of bed in chair  -Consider limitation of chair time  hour intervals    Skin  Care:  -Avoid use of baby powder, tape, friction and shearing, hot water or constrictive clothing  -Relieve pressure over bony prominences using   -Do not massage red bony areas    Next Steps:  -Teach patient strategies to minimize risks such as    -Consider consults to  interdisciplinary teams such as  Outcome: Adequate for Discharge     Problem: MUSCULOSKELETAL - ADULT  Goal: Maintain or return mobility to safest level of function  Description: INTERVENTIONS:  - Assess patient's ability to carry out ADLs; assess patient's baseline for ADL function and identify physical deficits which impact ability to perform ADLs (bathing, care of mouth/teeth, toileting, grooming, dressing, etc.)  - Assess/evaluate cause of self-care deficits   - Assess range of motion  - Assess patient's mobility  - Assess patient's need for assistive devices and provide as appropriate  - Encourage maximum independence but intervene and supervise when necessary  - Involve family in performance of ADLs  - Assess for home care needs following discharge   - Consider OT consult to assist with ADL evaluation and planning for discharge  - Provide patient education as appropriate  Outcome: Adequate for Discharge

## 2024-05-16 NOTE — TELEPHONE ENCOUNTER
----- Message from Alexis Drummond PA-C sent at 5/16/2024  4:09 PM EDT -----  Regarding: Hospital Follow-Up  Cardiology Follow-up:    Patient clinical visit in 3-4 week at the Cardio location: [place] office. .    Schedule visit with Cardio Memo Providers: Cherrie Morillo or first available provider.    Type of Visit: VISIT TYPE: in-person office visit.

## 2024-05-16 NOTE — DISCHARGE INSTR - AVS FIRST PAGE
Recommend close follow-up with primary care provider within 1 week of discharge.  Recommend close outpatient follow-up with cardiology.

## 2024-05-16 NOTE — DISCHARGE SUMMARY
"[facility]  Discharge- Radha Vidal 1940, 84 y.o. female MRN: [id number]  Unit/Bed#: -01 Encounter: [id number]  Primary Care Provider: Hoang Tovar MD   Date and time admitted to hospital: 5/12/2024  5:08 PM    * Symptomatic bradycardia  Assessment & Plan  Patient has been hospitalized due to symptomatic bradycardia.  Was initially under stepdown unit close observation and subsequently downgraded to Slim.  S/p dual-chamber pacemaker implanted yesterday by Dr. Woods.   Chest x-ray was unremarkable.      Currently on encounter patient appears comfortable not in distress.  Blood pressure stable.  Resume Eliquis on discharge.  Currently patient has been cleared by cardiology for discharge.  Follow-up with PCP and cardiology in 2 weeks.  Patient is agreeable with above plan.      Type 2 diabetes mellitus (HCC)  Assessment & Plan  Lab Results   Component Value Date    HGBA1C 6.4 (H) 05/13/2024       No results for input(s): \"POCGLU\" in the last 72 hours.      Blood Sugar Average: Last 72 hrs:  (P) 147      Resume home regimen upon discharge with outpatient follow-up with PCP.      Paroxysmal atrial fibrillation (HCC)  Assessment & Plan  Currently off of rate controlling agent due to bradycardia.  Now s/p pacemaker placed yesterday.  Currently normal sinus rhythm with heart rate 60.  Resume home dose of Eliquis.  Continue outpatient follow-up with PCP and cardiology.      Gastro-esophageal reflux disease without esophagitis  Assessment & Plan  Continue Protonix.    Obesity (BMI 30-39.9)  Assessment & Plan  Obesity with BMI of 33.  Counseled on weight loss, lifestyle modification.    Parkinson's disease  Assessment & Plan  Continue Sinemet.      Discharging Physician / Practitioner: Cheo Chavez MD  PCP: Hoang Tovar MD  Admission Date:   Admission Orders (From admission, onward)       Ordered        05/12/24 1845  INPATIENT ADMISSION  Once            05/12/24 1844  INPATIENT " ADMISSION  Once,   Status:  Canceled                          Discharge Date: 05/16/24    Medical Problems       Resolved Problems  Date Reviewed: 5/16/2024   None         Consultations During Hospital Stay:  Cardiology    Procedures Performed:   S/p dual-chamber pacemaker placed yesterday on 05/15/2024      Reason for Admission: Symptomatic bradycardia    Hospital Course:     Radha Vidal is a 84 y.o. female patient with past medical history of Parkinson's, syncope, paroxysmal A-fib, on Eliquis, diabetes, who originally presented to the hospital on 5/12/2024 due to Holter monitor reported bradycardia with heart rate in 20s and patient was contacted by office and was told to come to emergency room for evaluation.  Patient was asymptomatic however reported feeling tired and initially observed under stepdown unit and subsequently transition to Slim.  Now s/p dual chamber pacemaker placed yesterday on 05/15/2024.  Post pacemaker chest x-ray noted without acute finding, stable left pacer.  Patient cleared by cardiology for discharge for outpatient follow-up in 2 weeks with cardiology.  Currently heart rate in normal sinus rhythm with rate 60.  She is asymptomatic.  Reports that she lives by herself and has a walker at home.  Currently eager to go home.  No other events reported.  Refer to earlier notes for further clarification.      Please see above list of diagnoses and related plan for additional information.     Condition at Discharge:      Discharge Day Visit / Exam:     Subjective: Patient had pacemaker placed yesterday.  Currently in normal sinus rhythm heart rate 60.  Denies chest pain, dyspnea, fever, chills, nausea, vomiting, lightheadedness, any other new complaints.  Reports feeling much better and eager to go home.    Vitals: Blood Pressure: 114/64 (05/16/24 1149)  Pulse: 58 (05/16/24 0205)  Temperature: 97.5 °F (36.4 °C) (05/16/24 0205)  Temp Source: Oral (05/16/24 0205)  Respirations: 17 (05/16/24  "1149)  Height: 4' 11\" (149.9 cm) (05/13/24 1957)  Weight - Scale: 71.7 kg (158 lb 1.1 oz) (05/16/24 9725)  SpO2: 93 % (05/16/24 0205)  Exam:   Physical Exam  Constitutional:       General: She is not in acute distress.     Appearance: Normal appearance. She is obese. She is not ill-appearing, toxic-appearing or diaphoretic.   HENT:      Head: Normocephalic and atraumatic.   Eyes:      Pupils: Pupils are equal, round, and reactive to light.   Cardiovascular:      Rate and Rhythm: Normal rate and regular rhythm.      Comments: Left pacemaker in place. Dressing C/D/I. Left arm in a sling.   Pulmonary:      Effort: Pulmonary effort is normal. No respiratory distress.      Breath sounds: No wheezing.   Abdominal:      General: Bowel sounds are normal. There is no distension.      Palpations: Abdomen is soft.      Tenderness: There is no abdominal tenderness.   Musculoskeletal:      Right lower leg: No edema.      Left lower leg: No edema.   Neurological:      Mental Status: She is alert and oriented to person, place, and time.   Psychiatric:         Mood and Affect: Mood normal.         Behavior: Behavior normal.       Discussion with Family: spoke with daughter Melba over the phone.     Discharge instructions/Information to patient and family:   See after visit summary for information provided to patient and family.      Provisions for Follow-Up Care:  See after visit summary for information related to follow-up care and any pertinent home health orders.      Disposition:     Home      Planned Readmission:      Discharge Statement:  I spent 35 minutes discharging the patient. This time was spent on the day of discharge. I had direct contact with the patient on the day of discharge. Greater than 50% of the total time was spent examining patient, answering all patient questions, arranging and discussing plan of care with patient as well as directly providing post-discharge instructions.  Additional time then spent on " discharge activities.    Discharge Medications:  See after visit summary for reconciled discharge medications provided to patient and family.      ** Please Note: This note has been constructed using a voice recognition system **

## 2024-05-16 NOTE — PROGRESS NOTES
General Cardiology   Progress Note   Radha Vidal 84 y.o. female MRN: [id number]  Unit/Bed#: -01 Encounter: [id number]      SUBJECTIVE:   No significant events overnight.  Patient had dual-chamber pacemaker implantation yesterday.  Patient offers no complaints.    REVIEW OF SYSTEMS:  Constitutional:  Denies fever or chills   Eyes:  Denies change in visual acuity   HENT:  Denies nasal congestion or sore throat   Respiratory:  Denies cough or shortness of breath   Cardiovascular:  Denies chest pain or edema   GI:  Denies abdominal pain, nausea, vomiting, bloody stools or diarrhea   :  Denies dysuria, frequency, difficulty in micturition and nocturia  Musculoskeletal:  Denies back pain or joint pain   Neurologic: Parkinsonism  Endocrine:  Denies polyuria or polydipsia   Lymphatic:  Denies swollen glands   Psychiatric:  Denies depression or anxiety     OBJECTIVE:   Vitals:  Vitals:    24 1149   BP: 114/64   Pulse:    Resp: 17   Temp:    SpO2:      Body mass index is 31.93 kg/m².  Systolic (24hrs), Av , Min:93 , Max:164     Diastolic (24hrs), Av, Min:49, Max:97      Intake/Output Summary (Last 24 hours) at 2024 1620  Last data filed at 2024 1149  Gross per 24 hour   Intake 860 ml   Output 420 ml   Net 440 ml     Weight (last 2 days)       Date/Time Weight    24 0557 71.7 (158.07)    05/15/24 0548 74.3 (163.8)    24 0544 76.2 (167.99)                PHYSICAL EXAMS:  General:  Patient is not in acute distress, laying in the bed comfortably, awake, alert responding to commands.  Head: Normocephalic, Atraumatic.  HEENT:  Both pupils normal-size atraumatic, normocephalic, nonicteric  Neck:  JVP not raised. Trachea central  Respiratory:  Bronchovascular breathing all over the chest without any accompaniment  Cardiovascular: Regular rate and rhythm no murmurs  GI:  Abdomen soft nontender. Liver and spleen normal size  Lymphatic:  No cervical or inguinal lymphadenopathy  Neurologic:   Patient is awake alert, responding to command, well-oriented to time and place and person moving   Tremors consistent with parkinsonism  Extremities no edema    Pacer site looks fine.    LABORATORY RESULTS:        CBC with diff:   Results from last 7 days   Lab Units 05/16/24  0518 05/15/24  0549 05/14/24  0545 05/13/24 0456   WBC Thousand/uL 10.62* 7.54 7.60 7.96   HEMOGLOBIN g/dL 13.7 14.1 13.2 12.3   HEMATOCRIT % 40.1 43.0 39.3 37.9   MCV fL 88 89 89 91   PLATELETS Thousands/uL 353 369 343 327   RBC Million/uL 4.57 4.81 4.40 4.19   MCH pg 30.0 29.3 30.0 29.4   MCHC g/dL 34.2 32.8 33.6 32.5   RDW % 15.1 14.9 14.9 14.7   MPV fL 11.7 10.9 11.1 10.9   NRBC AUTO /100 WBCs  --  0 0 0       CMP:  Results from last 7 days   Lab Units 05/16/24  0518 05/15/24  0549 05/14/24 0545 05/13/24 0456 05/12/24  1652   POTASSIUM mmol/L 4.6 4.5 4.3   < > 4.3   CHLORIDE mmol/L 106 106 107   < > 107   CO2 mmol/L 24 26 27   < > 23   BUN mg/dL 22 16 18   < > 23   CREATININE mg/dL 0.85 0.86 0.85   < > 0.92   CALCIUM mg/dL 9.5 9.6 9.2   < > 9.5   AST U/L  --   --   --   --  22   ALT U/L  --   --   --   --  4*   ALK PHOS U/L  --   --   --   --  91   EGFR ml/min/1.73sq m 62 61 62   < > 56    < > = values in this interval not displayed.       BMP:  Results from last 7 days   Lab Units 05/16/24  0518 05/15/24  0549 05/14/24  0545   POTASSIUM mmol/L 4.6 4.5 4.3   CHLORIDE mmol/L 106 106 107   CO2 mmol/L 24 26 27   BUN mg/dL 22 16 18   CREATININE mg/dL 0.85 0.86 0.85   CALCIUM mg/dL 9.5 9.6 9.2            Results from last 7 days   Lab Units 05/14/24  0545 05/13/24  0456 05/12/24  1652   MAGNESIUM mg/dL 2.1 1.9 2.1     Results from last 7 days   Lab Units 05/13/24  0456   HEMOGLOBIN A1C % 6.4*     Results from last 7 days   Lab Units 05/12/24  2103   TSH 3RD GENERATON uIU/mL 2.147     Results from last 7 days   Lab Units 05/15/24  0549 05/13/24  0456 05/12/24  2146   INR  1.07 1.20* 1.17       Lipid Profile:   Lab Results   Component Value  Date    CHOL 202 10/03/2015     Lab Results   Component Value Date    HDL 79 01/15/2024    HDL 88 2022    HDL 85 2020     Lab Results   Component Value Date    LDLCALC 88 01/15/2024    LDLCALC 101 (H) 2022    LDLCALC 79 2020     Lab Results   Component Value Date    TRIG 67 01/15/2024    TRIG 63 2022    TRIG 42 2020       Cardiac testing:  Results for orders placed during the hospital encounter of 20    Echo complete with contrast if indicated    Narrative  Counts include 234 beds at the [facility]  [address] [place] [address]  [phone]    Transthoracic Echocardiogram  2D, M-mode, Doppler, and Color Doppler    Study date:  2020    Patient: FRANCIE BURGESS  MR number: [id number]  Account number: [id number]  : 1940  Age: 80 years  Gender: Female  Status: Inpatient  Location: Bedside  Height: 62 in  Weight: 188.1 lb  BP: 141/ 73 mmHg    Indications: CVA, Evaluate for suspected cardiac source of embolism. Assess left ventricular function.    Diagnoses: I63.50 - Cerebral infarction due to unspecified occlusion or stenosis of unspecified cerebral artery    Sonographer:  Colleen Weems RDCS  Referring Physician:  George Diez MD  Group:  [facility]  Interpreting Physician:  Jose Osullivan MD    SUMMARY    LEFT VENTRICLE:  Systolic function was normal. Ejection fraction was estimated to be 60 %.  There were no regional wall motion abnormalities.  Wall thickness was at the upper limits of normal.    RIGHT VENTRICLE:  The size was normal.  Systolic function was normal.    MITRAL VALVE:  There was mild annular calcification.  There was trace to mild regurgitation.    TRICUSPID VALVE:  There was mild regurgitation.  Estimated peak PA pressure was 36 mmHg.    PULMONIC VALVE:  There was trace regurgitation.    HISTORY: Symptoms: chest pain. Dizziness. Syncope. PRIOR HISTORY: GERD, Palpitations,    PROCEDURE: The procedure was  performed at the bedside. This was a routine study. The transthoracic approach was used. The study included complete 2D imaging, M-mode, complete spectral Doppler, and color Doppler. The heart rate was 81 bpm,  at the start of the study. Images were obtained from the parasternal, apical, subcostal, and suprasternal notch acoustic windows. Image quality was adequate.    LEFT VENTRICLE: Size was normal. Systolic function was normal. Ejection fraction was estimated to be 60 %. There were no regional wall motion abnormalities. Wall thickness was at the upper limits of normal. No evidence of apical thrombus.  DOPPLER: Left ventricular diastolic function parameters were normal.    RIGHT VENTRICLE: The size was normal. Systolic function was normal. Wall thickness was normal.    LEFT ATRIUM: Size was normal.    RIGHT ATRIUM: Size was normal.    MITRAL VALVE: There was mild annular calcification. There was normal leaflet separation. DOPPLER: The transmitral velocity was within the normal range. There was no evidence for stenosis. There was trace to mild regurgitation.    AORTIC VALVE: The valve was trileaflet. Leaflets exhibited normal thickness and normal cuspal separation. DOPPLER: Transaortic velocity was within the normal range. There was no evidence for stenosis. There was no significant  regurgitation.    TRICUSPID VALVE: The valve structure was normal. There was normal leaflet separation. DOPPLER: The transtricuspid velocity was within the normal range. There was no evidence for stenosis. There was mild regurgitation. Estimated peak PA  pressure was 36 mmHg.    PULMONIC VALVE: Leaflets exhibited normal thickness, no calcification, and normal cuspal separation. DOPPLER: The transpulmonic velocity was within the normal range. There was trace regurgitation.    PERICARDIUM: There was no pericardial effusion. The pericardium was normal in appearance.    AORTA: The root exhibited normal size.    SYSTEMIC VEINS: IVC: The  inferior vena cava was normal in size. Respirophasic changes were normal.    SYSTEM MEASUREMENT TABLES    2D  %FS: 27.9 %  AVC: 393.1 ms  Ao Diam: 3.2 cm  EDV(Teich): 64.2 ml  EF(Teich): 54.8 %  ESV(Teich): 29 ml  IVSd: 1.1 cm  LA Area: 19.8 cm2  LA Diam: 3.6 cm  LVEDV MOD A4C: 76.4 ml  LVEF MOD A4C: 61.7 %  LVESV MOD A4C: 29.2 ml  LVIDd: 3.9 cm  LVIDs: 2.8 cm  LVLd A4C: 7.2 cm  LVLs A4C: 5.9 cm  LVPWd: 1 cm  RA Area: 14.9 cm2  RVIDd: 3.1 cm  SV MOD A4C: 47.2 ml  SV(Teich): 35.2 ml    CW  TR Vmax: 2.9 m/s  TR maxP.9 mmHg    MM  TAPSE: 2.3 cm    PW  E' Sept: 0.1 m/s  E/E' Sept: 12.5  MV A New: 0.8 m/s  MV Dec [place]: 3.9 m/s2  MV DecT: 216.1 ms  MV E New: 0.8 m/s  MV E/A Ratio: 1  MV PHT: 62.7 ms  MVA By PHT: 3.5 cm2    [employer] Accredited Echocardiography Laboratory    Prepared and electronically signed by    Jose Osullivan MD  Signed 2020 11:53:12    No results found for this or any previous visit.    No results found for this or any previous visit.    No valid procedures specified.  No results found for this or any previous visit.      Meds/Allergies   all current active meds have been reviewed    Medications Prior to Admission:     apixaban (Eliquis) 5 mg    Ascorbic Acid (VITAMIN C) 1000 MG tablet    atorvastatin (LIPITOR) 40 mg tablet    B Complex Vitamins (B COMPLEX 100 PO)    calcium carbonate (OS-JOHN) 1250 (500 Ca) MG tablet    carbidopa-levodopa (SINEMET)  mg per tablet    cholecalciferol (VITAMIN D3) 1,000 units tablet    Cyanocobalamin (VITAMIN B 12 PO)    multivitamin (THERAGRAN) TABS    Omega-3 350 MG CPDR    pantoprazole (PROTONIX) 20 mg tablet    polyethylene glycol (MIRALAX) 17 g packet    Potassium Gluconate 595 (99 K) MG TABS    Probiotic Product (PROBIOTIC-10) CAPS    Coenzyme Q10 (CO Q 10 PO)    trospium chloride (SANCTURA) 20 mg tablet       ASSESSMENT & PLAN   Principal Problem:    Symptomatic bradycardia  Active Problems:    Parkinson's disease    Obesity (BMI  "30-39.9)    Gastro-esophageal reflux disease without esophagitis    Paroxysmal atrial fibrillation (HCC)    Type 2 diabetes mellitus (HCC)    Patient had recurrent syncope and had symptomatic bradycardia and underwent dual-chamber pacemaker implantation yesterday by Dr. Woods.  Patient also has history of paroxysmal atrial fibrillation.  Patient restarted on Eliquis.  Pacer check was fine.  Chest x-ray was unremarkable.    Post device instructions provided to the patient.  Patient can be discharged with follow-up appointment with device clinic in 2 weeks and subsequent follow-up with cardiology.    Follow-up with primary care physician.  Patient is agreeable with the plan of care.    Cherrie Morillo MD  5/16/2024,4:20 PM    Portions of the record may have been created with voice recognition software.  Occasional wrong word or \"sound a like\" substitutions may have occurred due to the inherent limitations of voice recognition software.  Read the chart carefully and recognize, using context, where substitutions have occurred.   "

## 2024-05-17 ENCOUNTER — TELEPHONE (OUTPATIENT)
Dept: HEMATOLOGY ONCOLOGY | Facility: CLINIC | Age: 84
End: 2024-05-17

## 2024-05-17 ENCOUNTER — TELEPHONE (OUTPATIENT)
Dept: SURGICAL ONCOLOGY | Facility: CLINIC | Age: 84
End: 2024-05-17

## 2024-05-17 ENCOUNTER — TRANSITIONAL CARE MANAGEMENT (OUTPATIENT)
Dept: FAMILY MEDICINE CLINIC | Facility: CLINIC | Age: 84
End: 2024-05-17

## 2024-05-17 NOTE — UTILIZATION REVIEW
NOTIFICATION OF ADMISSION DISCHARGE   This is a Notification of Discharge from [facility]. Please be advised that this patient has been discharge from our facility. Below you will find the admission and discharge date and time including the patient’s disposition.   UTILIZATION REVIEW CONTACT:  Candice Galvan  Utilization   Network Utilization Review Department  Phone: [phone] x carefully listen to the prompts. All voicemails are confidential.  Email: [email or web address]     ADMISSION INFORMATION  PRESENTATION DATE: 5/12/2024  5:08 PM  OBERVATION ADMISSION DATE:   INPATIENT ADMISSION DATE: 5/12/24  6:45 PM   DISCHARGE DATE: 5/16/2024  5:01 PM   DISPOSITION:Home/Self Care    Network Utilization Review Department  ATTENTION: Please call with any questions or concerns to [phone] and carefully listen to the prompts so that you are directed to the right person. All voicemails are confidential.   For Discharge needs, contact Care Management DC Support Team at [phone] opt. 2  Send all requests for admission clinical reviews, approved or denied determinations and any other requests to dedicated fax number below belonging to the campus where the patient is receiving treatment. List of dedicated fax numbers for the Facilities:  FACILITY NAME UR FAX NUMBER   ADMISSION DENIALS (Administrative/Medical Necessity) [phone]   DISCHARGE SUPPORT TEAM ([facility]) [phone]   PARENT CHILD HEALTH (Maternity/NICU/Pediatrics) [phone]   [facility] [phone]   [facility] [phone]   [facility] [phone]   [facility] [phone]   Critical access hospital [phone]   [facility] [phone]   [facility] [phone]   [facility] [phone]  "  [facility] [phone]   [facility] [phone]   [facility] [phone]   [facility] [phone]        [facility]  Discharge- Radha Vidal 1940, 84 y.o. female MRN: [id number]  Unit/Bed#: -01 Encounter: [id number]  Primary Care Provider: Hoang Tovar MD   Date and time admitted to hospital: 5/12/2024  5:08 PM     * Symptomatic bradycardia  Assessment & Plan  Patient has been hospitalized due to symptomatic bradycardia.  Was initially under stepdown unit close observation and subsequently downgraded to Slim.  S/p dual-chamber pacemaker implanted yesterday by Dr. Woods.   Chest x-ray was unremarkable.        Currently on encounter patient appears comfortable not in distress.  Blood pressure stable.  Resume Eliquis on discharge.  Currently patient has been cleared by cardiology for discharge.  Follow-up with PCP and cardiology in 2 weeks.  Patient is agreeable with above plan.        Type 2 diabetes mellitus (HCC)  Assessment & Plan        Lab Results   Component Value Date     HGBA1C 6.4 (H) 05/13/2024         No results for input(s): \"POCGLU\" in the last 72 hours.        Blood Sugar Average: Last 72 hrs:  (P) 147        Resume home regimen upon discharge with outpatient follow-up with PCP.        Paroxysmal atrial fibrillation (HCC)  Assessment & Plan  Currently off of rate controlling agent due to bradycardia.  Now s/p pacemaker placed yesterday.  Currently normal sinus rhythm with heart rate 60.  Resume home dose of Eliquis.  Continue outpatient follow-up with PCP and cardiology.        Gastro-esophageal reflux disease without esophagitis  Assessment & Plan  Continue Protonix.     Obesity (BMI 30-39.9)  Assessment & Plan  Obesity with BMI of 33.  Counseled on weight loss, lifestyle modification.     Parkinson's disease  Assessment & " Plan  Continue Sinemet.        Discharging Physician / Practitioner: Cheo Chavez MD  PCP: Hoang Tovar MD  Admission Date:   Admission Orders (From admission, onward)          Ordered         05/12/24 1845   INPATIENT ADMISSION  Once             05/12/24 1844   INPATIENT ADMISSION  Once,   Status:  Canceled                               Discharge Date: 05/16/24     Medical Problems         Resolved Problems  Date Reviewed: 5/16/2024   None            Consultations During Hospital Stay:  Cardiology     Procedures Performed:   S/p dual-chamber pacemaker placed yesterday on 05/15/2024        Reason for Admission: Symptomatic bradycardia     Hospital Course:      Radha Vidal is a 84 y.o. female patient with past medical history of Parkinson's, syncope, paroxysmal A-fib, on Eliquis, diabetes, who originally presented to the hospital on 5/12/2024 due to Holter monitor reported bradycardia with heart rate in 20s and patient was contacted by office and was told to come to emergency room for evaluation.  Patient was asymptomatic however reported feeling tired and initially observed under stepdown unit and subsequently transition to Slim.  Now s/p dual chamber pacemaker placed yesterday on 05/15/2024.  Post pacemaker chest x-ray noted without acute finding, stable left pacer.  Patient cleared by cardiology for discharge for outpatient follow-up in 2 weeks with cardiology.  Currently heart rate in normal sinus rhythm with rate 60.  She is asymptomatic.  Reports that she lives by herself and has a walker at home.  Currently eager to go home.  No other events reported.  Refer to earlier notes for further clarification.        Please see above list of diagnoses and related plan for additional information.      Condition at Discharge:       Discharge Day Visit / Exam:      Subjective: Patient had pacemaker placed yesterday.  Currently in normal sinus rhythm heart rate 60.  Denies chest pain, dyspnea, fever, chills, nausea,  "vomiting, lightheadedness, any other new complaints.  Reports feeling much better and eager to go home.     Vitals: Blood Pressure: 114/64 (05/16/24 1149)  Pulse: 58 (05/16/24 0205)  Temperature: 97.5 °F (36.4 °C) (05/16/24 0205)  Temp Source: Oral (05/16/24 0205)  Respirations: 17 (05/16/24 1149)  Height: 4' 11\" (149.9 cm) (05/13/24 1957)  Weight - Scale: 71.7 kg (158 lb 1.1 oz) (05/16/24 0557)  SpO2: 93 % (05/16/24 0205)  Exam:   Physical Exam  Constitutional:       General: She is not in acute distress.     Appearance: Normal appearance. She is obese. She is not ill-appearing, toxic-appearing or diaphoretic.   HENT:      Head: Normocephalic and atraumatic.   Eyes:      Pupils: Pupils are equal, round, and reactive to light.   Cardiovascular:      Rate and Rhythm: Normal rate and regular rhythm.      Comments: Left pacemaker in place. Dressing C/D/I. Left arm in a sling.   Pulmonary:      Effort: Pulmonary effort is normal. No respiratory distress.      Breath sounds: No wheezing.   Abdominal:      General: Bowel sounds are normal. There is no distension.      Palpations: Abdomen is soft.      Tenderness: There is no abdominal tenderness.   Musculoskeletal:      Right lower leg: No edema.      Left lower leg: No edema.   Neurological:      Mental Status: She is alert and oriented to person, place, and time.   Psychiatric:         Mood and Affect: Mood normal.         Behavior: Behavior normal.         Discussion with Family: spoke with daughter Melba over the phone.      Discharge instructions/Information to patient and family:   See after visit summary for information provided to patient and family.       Provisions for Follow-Up Care:  See after visit summary for information related to follow-up care and any pertinent home health orders.       Disposition:      Home        Planned Readmission:      Discharge Statement:  I spent 35 minutes discharging the patient. This time was spent on the day of discharge. I had " direct contact with the patient on the day of discharge. Greater than 50% of the total time was spent examining patient, answering all patient questions, arranging and discussing plan of care with patient as well as directly providing post-discharge instructions.  Additional time then spent on discharge activities.     Discharge Medications:  See after visit summary for reconciled discharge medications provided to patient and family.       ** Please Note: This note has been constructed using a voice recognition system **

## 2024-05-17 NOTE — TELEPHONE ENCOUNTER
Patient Call    Who are you speaking with? Patient    If it is not the patient, are they listed on an active communication consent form? N/A   What is the reason for this call? Pt had a pacemaker put in. She wanted staff to know for the mri 6/19   Does this require a call back? N/A   If a call back is required, please list best call back number N/a   If a call back is required, advise that a message will be forwarded to their care team and someone will return their call as soon as possible.   Did you relay this information to the patient? N/A

## 2024-05-17 NOTE — TELEPHONE ENCOUNTER
Called patient and spoke to Radha, I gave her central scheduling phone number per note from Jennifer so she can let them know about the pacemaker and they can let her know if it is compatible with the MRI machine. She said she will give them a call.

## 2024-05-17 NOTE — ASSESSMENT & PLAN NOTE
"Lab Results   Component Value Date    HGBA1C 6.4 (H) 05/13/2024       No results for input(s): \"POCGLU\" in the last 72 hours.      Blood Sugar Average: Last 72 hrs:  (P) 147      Sliding scale coverage.     "

## 2024-05-17 NOTE — TELEPHONE ENCOUNTER
Appointment Change  Cancel, Reschedule, Change to Virtual      Who are you speaking with? Patient   If it is not the patient, is the caller listed on the communication consent form? N/A   Which provider is the appointment scheduled with? RISHI Izquierdo   When was the original appointment scheduled?    Please list date and time 6/24/24   At which location is the appointment scheduled to take place? [place]   Was the appointment rescheduled?     Was the appointment changed from an in person visit to a virtual visit?    If so, please list the details of the change. 7/22/24   What is the reason for the appointment change? Scheduling conflict

## 2024-05-20 NOTE — TELEPHONE ENCOUNTER
Pt called back. Requested scheduling with Dr. MONDRAGON Scheduled for May 31 at 9 am.    Previous Accession (Optional): [id number] Previous Accession (Optional): [id number]

## 2024-05-22 DIAGNOSIS — N32.81 OAB (OVERACTIVE BLADDER): ICD-10-CM

## 2024-05-22 DIAGNOSIS — R29.90 STROKE-LIKE SYMPTOMS: ICD-10-CM

## 2024-05-22 DIAGNOSIS — Z79.01 ANTICOAGULATED: ICD-10-CM

## 2024-05-22 RX ORDER — ATORVASTATIN CALCIUM 40 MG/1
40 TABLET, FILM COATED ORAL
Qty: 90 TABLET | Refills: 1 | Status: SHIPPED | OUTPATIENT
Start: 2024-05-22

## 2024-05-22 RX ORDER — TROSPIUM CHLORIDE 20 MG/1
20 TABLET, FILM COATED ORAL 2 TIMES DAILY
Qty: 180 TABLET | Refills: 2 | Status: SHIPPED | OUTPATIENT
Start: 2024-05-22 | End: 2024-05-31 | Stop reason: SDUPTHER

## 2024-05-22 NOTE — TELEPHONE ENCOUNTER
Reason for call:   [x] Refill   [] Prior Auth  [] Other:     Office:   [] PCP/Provider -   [x] Specialty/Provider - CardJACKIE    Medication:   Eliquis 5 mg, 1 bid, 180  Atorvastatin 40 mg, 1 qd, 90      [facility]    Does the patient have enough for 3 days?   [x] Yes   [] No - Send as HP to POD

## 2024-05-28 ENCOUNTER — CLINICAL SUPPORT (OUTPATIENT)
Dept: CARDIOLOGY CLINIC | Facility: CLINIC | Age: 84
End: 2024-05-28
Payer: COMMERCIAL

## 2024-05-28 DIAGNOSIS — R55 SYNCOPE, UNSPECIFIED SYNCOPE TYPE: Primary | ICD-10-CM

## 2024-05-28 DIAGNOSIS — R55 SYNCOPE: ICD-10-CM

## 2024-05-28 DIAGNOSIS — R00.1 SINUS BRADYCARDIA: ICD-10-CM

## 2024-05-28 PROCEDURE — 93272 ECG/REVIEW INTERPRET ONLY: CPT | Performed by: INTERNAL MEDICINE

## 2024-05-31 ENCOUNTER — OFFICE VISIT (OUTPATIENT)
Dept: CARDIOLOGY CLINIC | Facility: CLINIC | Age: 84
End: 2024-05-31
Payer: COMMERCIAL

## 2024-05-31 ENCOUNTER — OFFICE VISIT (OUTPATIENT)
Dept: UROLOGY | Facility: CLINIC | Age: 84
End: 2024-05-31
Payer: COMMERCIAL

## 2024-05-31 VITALS
DIASTOLIC BLOOD PRESSURE: 82 MMHG | BODY MASS INDEX: 32.66 KG/M2 | WEIGHT: 162 LBS | HEIGHT: 59 IN | TEMPERATURE: 101 F | SYSTOLIC BLOOD PRESSURE: 132 MMHG | HEART RATE: 81 BPM | OXYGEN SATURATION: 97 %

## 2024-05-31 VITALS
RESPIRATION RATE: 16 BRPM | DIASTOLIC BLOOD PRESSURE: 80 MMHG | SYSTOLIC BLOOD PRESSURE: 130 MMHG | WEIGHT: 161 LBS | BODY MASS INDEX: 32.46 KG/M2 | OXYGEN SATURATION: 94 % | HEART RATE: 85 BPM | HEIGHT: 59 IN

## 2024-05-31 DIAGNOSIS — Z79.01 ANTICOAGULATED: ICD-10-CM

## 2024-05-31 DIAGNOSIS — N32.81 OAB (OVERACTIVE BLADDER): ICD-10-CM

## 2024-05-31 DIAGNOSIS — I48.0 PAF (PAROXYSMAL ATRIAL FIBRILLATION) (HCC): Primary | ICD-10-CM

## 2024-05-31 DIAGNOSIS — I49.5 SSS (SICK SINUS SYNDROME) (HCC): ICD-10-CM

## 2024-05-31 PROCEDURE — 99213 OFFICE O/P EST LOW 20 MIN: CPT | Performed by: PHYSICIAN ASSISTANT

## 2024-05-31 PROCEDURE — 99214 OFFICE O/P EST MOD 30 MIN: CPT | Performed by: INTERNAL MEDICINE

## 2024-05-31 RX ORDER — TROSPIUM CHLORIDE 20 MG/1
20 TABLET, FILM COATED ORAL 2 TIMES DAILY
Qty: 180 TABLET | Refills: 2 | Status: SHIPPED | OUTPATIENT
Start: 2024-05-31

## 2024-05-31 NOTE — PROGRESS NOTES
5/31/2024      Chief Complaint   Patient presents with   • Follow-up         Assessment and Plan    84 y.o. female     1. Urge urinary incontinence  2. Overactive bladder  - Previously taking Myrbetriq, however was transitioned to trospium due to cost. Patient now having dry mouth. States this is mild and not bothersome  - Discussed conservative measures  -  Continue Trospium  - Discussed following up as needed. Patient would like to follow up in 1 year for refill  - Call with any questions or concerns in the meantime  - All questions answered; patient understands and agrees with plan       History of Present Illness  Radha Vidal is a 84 y.o. female patient with history of urge urinary incontinence and overactive bladder here for follow up.     Underwent bladder botox 10/21/21.  Patient was taking Myrbetriq 50 mg daily with benefit, however switched to trospium due to cost.  Patient did previously failed PTNS.  She now has dry mouth associated with trospium. States this is very mild and is not bothered by this. States she has significant benefit with trospium. Does not feel the need for repeat botox.     Review of Systems   Constitutional:  Negative for activity change, appetite change, chills and fever.   HENT:  Negative for congestion and trouble swallowing.    Respiratory:  Negative for cough and shortness of breath.    Cardiovascular:  Negative for chest pain, palpitations and leg swelling.   Gastrointestinal:  Negative for abdominal pain, constipation, diarrhea, nausea and vomiting.   Genitourinary:  Negative for difficulty urinating, dysuria, flank pain, frequency, hematuria and urgency.   Musculoskeletal:  Negative for back pain and gait problem.   Skin:  Negative for wound.   Allergic/Immunologic: Negative for immunocompromised state.   Neurological:  Negative for dizziness and syncope.   Hematological:  Does not bruise/bleed easily.   Psychiatric/Behavioral:  Negative for confusion.    All other systems  "reviewed and are negative.      Vitals  Vitals:    05/31/24 1038   BP: 132/82   Pulse: 81   Temp: (!) 101 °F (38.3 °C)   SpO2: 97%   Weight: 73.5 kg (162 lb)   Height: 4' 11\" (1.499 m)       Physical Exam  Constitutional:       General: She is not in acute distress.     Appearance: Normal appearance. She is not ill-appearing, toxic-appearing or diaphoretic.   HENT:      Head: Normocephalic.      Nose: No congestion.   Eyes:      General: No scleral icterus.        Right eye: No discharge.         Left eye: No discharge.      Conjunctiva/sclera: Conjunctivae normal.      Pupils: Pupils are equal, round, and reactive to light.   Pulmonary:      Effort: Pulmonary effort is normal.   Musculoskeletal:      Cervical back: Normal range of motion.   Skin:     General: Skin is warm and dry.      Coloration: Skin is not jaundiced or pale.      Findings: No bruising, erythema, lesion or rash.   Neurological:      General: No focal deficit present.      Mental Status: She is alert and oriented to person, place, and time. Mental status is at baseline.      Gait: Gait normal.   Psychiatric:         Mood and Affect: Mood normal.         Behavior: Behavior normal.         Thought Content: Thought content normal.         Judgment: Judgment normal.         Past History  Past Medical History:   Diagnosis Date   • Actinic keratosis 03/11/2016   • Acute midline low back pain without sciatica 11/19/2020   • 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    • Benign neoplasm of skin    • Cancer (HCC)     skin   • Chest pain    • Claustrophobia    • Closed compression fracture of body of L1 vertebra (HCC)    • Diverticulitis    • Diverticulitis    • Fracture     L1-L2   • GERD (gastroesophageal reflux disease)    • H. pylori infection 11/24/2020   • Muscle weakness    • Nausea 01/26/2024   • Nonmelanoma skin cancer     last assessed 21mar2017   • Palpitations    • Pancreatic cyst    • Seasonal allergies    • Seborrheic " keratosis 2014   • Sleep apnea     no cpap   • Spontaneous      without mention of complications    • Squamous cell carcinoma of forehead 2014   • Syncope      Social History     Socioeconomic History   • Marital status:      Spouse name: None   • Number of children: None   • Years of education: None   • Highest education level: None   Occupational History   • Occupation: RETIRED    Tobacco Use   • Smoking status: Never     Passive exposure: Past   • Smokeless tobacco: Never   Vaping Use   • Vaping status: Never Used   Substance and Sexual Activity   • Alcohol use: Not Currently     Comment: patient states rare use on holidays    • Drug use: No   • Sexual activity: Not Currently   Other Topics Concern   • None   Social History Narrative    LIVING INDEPENDENTLY ALONE         No caffeine use     Social Determinants of Health     Financial Resource Strain: Low Risk  (2023)    Overall Financial Resource Strain (CARDIA)    • Difficulty of Paying Living Expenses: Not very hard   Food Insecurity: No Food Insecurity (2024)    Hunger Vital Sign    • Worried About Running Out of Food in the Last Year: Never true    • Ran Out of Food in the Last Year: Never true   Transportation Needs: No Transportation Needs (2024)    PRAPARE - Transportation    • Lack of Transportation (Medical): No    • Lack of Transportation (Non-Medical): No   Physical Activity: Not on file   Stress: Not on file   Social Connections: Not on file   Intimate Partner Violence: Not on file   Housing Stability: Low Risk  (2024)    Housing Stability Vital Sign    • Unable to Pay for Housing in the Last Year: No    • Number of Places Lived in the Last Year: 1    • Unstable Housing in the Last Year: No     Social History     Tobacco Use   Smoking Status Never   • Passive exposure: Past   Smokeless Tobacco Never     Family History   Problem Relation Age of Onset   • Alcohol abuse Mother    • Heart disease Mother    •  "Alcohol abuse Father    • Alcohol abuse Brother    • Cancer Brother    • Tremor Neg Hx    • Parkinsonism Neg Hx    • Colon cancer Neg Hx        The following portions of the patient's history were reviewed and updated as appropriate: allergies, current medications, past medical history, past social history, past surgical history and problem list.    Results  No results found for this or any previous visit (from the past 1 hour(s)).]  No results found for: \"PSA\"  Lab Results   Component Value Date    GLUCOSE 139 07/31/2023    CALCIUM 9.5 05/16/2024     (H) 10/03/2015    K 4.6 05/16/2024    CO2 24 05/16/2024     05/16/2024    BUN 22 05/16/2024    CREATININE 0.85 05/16/2024     Lab Results   Component Value Date    WBC 10.62 (H) 05/16/2024    HGB 13.7 05/16/2024    HCT 40.1 05/16/2024    MCV 88 05/16/2024     05/16/2024       Maddy Sarabia PA-C  "

## 2024-05-31 NOTE — PROGRESS NOTES
[facility]  [address] [id number]  Cardiology Follow Up    Radha Vidal  1940  [id number]      1. PAF (paroxysmal atrial fibrillation) (formerly [facility])        2. SSS (sick sinus syndrome) (formerly [facility])        3. Anticoagulated            Chief Complaint   Patient presents with    Follow-up       Interval History: Patient presents for follow-up visit.  Patient denies any history of chest pain shortness of breath.  Patient denies any history of leg edema or orthopnea PND.  No history of presyncope syncope.  Patient states compliance with the present list of medications.  Patient had history of recurrent syncope and had significant bradycardia and underwent permanent pacemaker implantation.  Patient also has history of paroxysmal atrial fibrillation.  She is back on Eliquis.  She is scheduled for pacer clinic appointment next week.    Patient Active Problem List   Diagnosis    OAB (overactive bladder)    Parkinson's disease    Primary insomnia    History of skin cancer    Seasonal allergic rhinitis    Impaired fasting glucose    Mood disturbance    Obesity (BMI 30-39.9)    Claustrophobia    Mitral valve disorder    Menopause ovarian failure    Vitamin D deficiency    Dysphagia    Multiple thyroid nodules    Gastro-esophageal reflux disease without esophagitis    Osteopenia    Chronic idiopathic constipation    History of adenomatous polyp of colon    Tremor    Trochanteric bursitis, right hip    IPMN (intraductal papillary mucinous neoplasm)    Cherry angioma    Postablative hypothyroidism    Sleep apnea    Cerebrovascular disease    Age-related osteoporosis without current pathological fracture    Urge incontinence    s/p distal pancreatectomy/splenectomy    Paroxysmal atrial fibrillation (HCC)    Type 2 diabetes mellitus (HCC)    Ambulatory dysfunction    Ear pressure, right    Elevated troponin    Small bowel obstruction (HCC)    Syncope    Symptomatic bradycardia     Past Medical  History:   Diagnosis Date    Actinic keratosis 2016    Acute midline low back pain without sciatica 2020    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     Benign neoplasm of skin     Cancer (HCC)     skin    Chest pain     Claustrophobia     Closed compression fracture of body of L1 vertebra (HCC)     Diverticulitis     Diverticulitis     Fracture     L1-L2    GERD (gastroesophageal reflux disease)     H. pylori infection 2020    Muscle weakness     Nausea 2024    Nonmelanoma skin cancer     last assessed 2017    Palpitations     Pancreatic cyst     Seasonal allergies     Seborrheic keratosis 2014    Sleep apnea     no cpap    Spontaneous      without mention of complications     Squamous cell carcinoma of forehead 2014    Syncope      Social History     Socioeconomic History    Marital status:      Spouse name: Not on file    Number of children: Not on file    Years of education: Not on file    Highest education level: Not on file   Occupational History    Occupation: RETIRED    Tobacco Use    Smoking status: Never     Passive exposure: Past    Smokeless tobacco: Never   Vaping Use    Vaping status: Never Used   Substance and Sexual Activity    Alcohol use: Not Currently     Comment: patient states rare use on holidays     Drug use: No    Sexual activity: Not Currently   Other Topics Concern    Not on file   Social History Narrative    LIVING INDEPENDENTLY ALONE         No caffeine use     Social Determinants of Health     Financial Resource Strain: Low Risk  (2023)    Overall Financial Resource Strain (CARDIA)     Difficulty of Paying Living Expenses: Not very hard   Food Insecurity: No Food Insecurity (2024)    Hunger Vital Sign     Worried About Running Out of Food in the Last Year: Never true     Ran Out of Food in the Last Year: Never true   Transportation Needs: No Transportation Needs (2024)    PRAPARE -  Transportation     Lack of Transportation (Medical): No     Lack of Transportation (Non-Medical): No   Physical Activity: Not on file   Stress: Not on file   Social Connections: Not on file   Intimate Partner Violence: Not on file   Housing Stability: Low Risk  (5/13/2024)    Housing Stability Vital Sign     Unable to Pay for Housing in the Last Year: No     Number of Places Lived in the Last Year: 1     Unstable Housing in the Last Year: No      Family History   Problem Relation Age of Onset    Alcohol abuse Mother     Heart disease Mother     Alcohol abuse Father     Alcohol abuse Brother     Cancer Brother     Tremor Neg Hx     Parkinsonism Neg Hx     Colon cancer Neg Hx      Past Surgical History:   Procedure Laterality Date    ABDOMINAL ADHESION SURGERY N/A 07/31/2023    Procedure: LYSIS ADHESIONS;  Surgeon: Kyle Julien MD;  [facility];  Service: Surgical Oncology    BOTOX INJECTION N/A 03/06/2017    Procedure: CYSTOSCOPY; BLADDER BOTOX 100 UNITS ;  Surgeon: Juan Edward MD;  [facility];  Service:     BOWEL RESECTION      related ot diverticulitis    CARDIAC CATHETERIZATION      CARDIAC CATHETERIZATION Left 2/2/2024    Procedure: Cardiac Left Heart Cath;  Surgeon: Carrington Kiser DO;  [facility];  Service: Cardiology    CARDIAC ELECTROPHYSIOLOGY PROCEDURE N/A 5/15/2024    Procedure: Cardiac pacer implant;  Surgeon: Brien Woods MD;  Location: [facility];  Service: Cardiology    CARPAL TUNNEL RELEASE Right     COLONOSCOPY  07/31/2019    COLOSTOMY      COLOSTOMY CLOSURE      CYSTOSCOPY  06/01/2021    DISTAL PANCREATECTOMY N/A 07/31/2023    Procedure: DISTAL PANCREATECTOMY;  Surgeon: Kyle Julien MD;  [facility];  Service: Surgical Oncology    FOOT SURGERY Left     neuroma    HYSTERECTOMY      MYRINGOTOMY W/ TUBES Right 12/06/2023    office procedure - Dr. Grace    NA[place] SIN[place]  age 39    [place]    PANCREATIC CYST BIOPSY   07/31/2023    AK CYSTOURETHROSCOPY N/A 04/13/2018    Procedure: CYSTOSCOPY WITH BOTOX;  Surgeon: Juan Edward MD;  [facility];  Service: Urology    AK ESOPHAGOGASTRODUODENOSCOPY TRANSORAL DIAGNOSTIC N/A 04/23/2019    Procedure: ESOPHAGOGASTRODUODENOSCOPY (EGD);  Surgeon: Edu Dickerson DO;  [facility]: [facility];  Service: Gastroenterology    SPLENECTOMY, TOTAL N/A 07/31/2023    Procedure: SPLENECTOMY;  Surgeon: Kyle Julien MD;  [facility];  Service: Surgical Oncology    TONSILLECTOMY  age 49    UPPER GASTROINTESTINAL ENDOSCOPY  04/23/2019       Current Outpatient Medications:     apixaban (Eliquis) 5 mg, Take 1 tablet (5 mg total) by mouth 2 (two) times a day, Disp: 180 tablet, Rfl: 1    Ascorbic Acid (VITAMIN C) 1000 MG tablet, Take 1,000 mg by mouth daily, Disp: , Rfl:     atorvastatin (LIPITOR) 40 mg tablet, Take 1 tablet (40 mg total) by mouth daily with dinner, Disp: 90 tablet, Rfl: 1    B Complex Vitamins (B COMPLEX 100 PO), Take 1 capsule by mouth daily after lunch not taking, Disp: , Rfl:     calcium carbonate (OS-JOHN) 1250 (500 Ca) MG tablet, Take 1 tablet by mouth daily after lunch, Disp: , Rfl:     carbidopa-levodopa (SINEMET)  mg per tablet, Take 1 tablet by mouth 3 (three) times a day before meals, Disp: 270 tablet, Rfl: 3    cholecalciferol (VITAMIN D3) 1,000 units tablet, Take 5,000 Units by mouth daily after lunch, Disp: , Rfl:     Cyanocobalamin (VITAMIN B 12 PO), Take 1 capsule by mouth daily, Disp: , Rfl:     multivitamin (THERAGRAN) TABS, Take 1 tablet by mouth every morning, Disp: , Rfl:     pantoprazole (PROTONIX) 20 mg tablet, Take 20 mg by mouth daily, Disp: , Rfl:     polyethylene glycol (MIRALAX) 17 g packet, Take 17 g by mouth daily as needed (constipation if not responding to senna/docusate), Disp: , Rfl:     Potassium Gluconate 595 (99 K) MG TABS, Take 1 each by mouth every other day, Disp: , Rfl:     Probiotic Product (PROBIOTIC-10) CAPS, Take 1  capsule by mouth daily after lunch, Disp: , Rfl:     trospium chloride (SANCTURA) 20 mg tablet, Take 1 tablet (20 mg total) by mouth 2 (two) times a day, Disp: 180 tablet, Rfl: 2  Allergies   Allergen Reactions    Caffeine - Food Allergy GI Intolerance    Medical Tape Other (See Comments)    Iodine - Food Allergy Rash    Latex Rash    Other Rash     Adhesive tape         Labs:  No results displayed because visit has over 200 results.      Admission on 04/30/2024, Discharged on 05/03/2024   Component Date Value    POC Glucose 04/30/2024 253 (H)     ABO Grouping 04/30/2024 O     Rh Factor 04/30/2024 Positive     Antibody Screen 04/30/2024 Negative     Specimen Expiration Date 04/30/2024 20240503     WBC 04/30/2024 8.32     RBC 04/30/2024 4.49     Hemoglobin 04/30/2024 13.3     Hematocrit 04/30/2024 41.6     MCV 04/30/2024 93     MCH 04/30/2024 29.6     MCHC 04/30/2024 32.0     RDW 04/30/2024 15.2 (H)     MPV 04/30/2024 11.8     Platelets 04/30/2024 304     nRBC 04/30/2024 0     Segmented % 04/30/2024 70     Immature Grans % 04/30/2024 0     Lymphocytes % 04/30/2024 19     Monocytes % 04/30/2024 10     Eosinophils Relative 04/30/2024 1     Basophils Relative 04/30/2024 0     Absolute Neutrophils 04/30/2024 5.83     Absolute Immature Grans 04/30/2024 0.03     Absolute Lymphocytes 04/30/2024 1.56     Absolute Monocytes 04/30/2024 0.80     Eosinophils Absolute 04/30/2024 0.07     Basophils Absolute 04/30/2024 0.03     Protime 04/30/2024 17.5 (H)     INR 04/30/2024 1.36 (H)     PTT 04/30/2024 35     Sodium 04/30/2024 136     Potassium 04/30/2024 4.5     Chloride 04/30/2024 104     CO2 04/30/2024 27     ANION GAP 04/30/2024 5     BUN 04/30/2024 20     Creatinine 04/30/2024 0.84     Glucose 04/30/2024 225 (H)     Calcium 04/30/2024 8.8     eGFR 04/30/2024 63     TSH 3RD GENERATON 04/30/2024 2.282     hs TnI 0hr 04/30/2024 13     hs TnI 2hr 04/30/2024 40     Delta 2hr hsTnI 04/30/2024 27 (H)     hs TnI 4hr 04/30/2024 37      Delta 4hr hsTnI 04/30/2024 24 (H)     POC Glucose 04/30/2024 126     Magnesium 04/30/2024 1.9     POC Glucose 04/30/2024 137     Sodium 05/01/2024 140     Potassium 05/01/2024 4.0     Chloride 05/01/2024 108     CO2 05/01/2024 25     ANION GAP 05/01/2024 7     BUN 05/01/2024 19     Creatinine 05/01/2024 0.87     Glucose 05/01/2024 115     Glucose, Fasting 05/01/2024 115 (H)     Calcium 05/01/2024 9.0     eGFR 05/01/2024 60     Magnesium 05/01/2024 2.1     WBC 05/01/2024 8.80     RBC 05/01/2024 4.15     Hemoglobin 05/01/2024 12.3     Hematocrit 05/01/2024 38.1     MCV 05/01/2024 92     MCH 05/01/2024 29.6     MCHC 05/01/2024 32.3     RDW 05/01/2024 15.0     Platelets 05/01/2024 277     MPV 05/01/2024 11.0     POC Glucose 05/01/2024 124     POC Glucose 05/01/2024 140     POC Glucose 05/01/2024 123     Sodium 05/03/2024 140     Potassium 05/03/2024 4.0     Chloride 05/03/2024 107     CO2 05/03/2024 26     ANION GAP 05/03/2024 7     BUN 05/03/2024 16     Creatinine 05/03/2024 0.81     Glucose 05/03/2024 115     Calcium 05/03/2024 9.1     eGFR 05/03/2024 66     WBC 05/03/2024 7.58     RBC 05/03/2024 4.51     Hemoglobin 05/03/2024 13.4     Hematocrit 05/03/2024 40.7     MCV 05/03/2024 90     MCH 05/03/2024 29.7     MCHC 05/03/2024 32.9     RDW 05/03/2024 14.7     MPV 05/03/2024 11.1     Platelets 05/03/2024 308     nRBC 05/03/2024 0     Segmented % 05/03/2024 50     Immature Grans % 05/03/2024 1     Lymphocytes % 05/03/2024 33     Monocytes % 05/03/2024 12     Eosinophils Relative 05/03/2024 4     Basophils Relative 05/03/2024 0     Absolute Neutrophils 05/03/2024 3.83     Absolute Immature Grans 05/03/2024 0.04     Absolute Lymphocytes 05/03/2024 2.46     Absolute Monocytes 05/03/2024 0.90     Eosinophils Absolute 05/03/2024 0.32     Basophils Absolute 05/03/2024 0.03     Ventricular Rate 04/30/2024 39     Atrial Rate 04/30/2024 39     [place] Interval 04/30/2024 184     QRSD Interval 04/30/2024 82     QT Interval 04/30/2024  556     QTC Interval 04/30/2024 447     P Axis 04/30/2024 46     QRS Axis 04/30/2024 69     T Wave [place] 04/30/2024 75     Ventricular Rate 04/30/2024 42     Atrial Rate 04/30/2024 42     MN Interval 04/30/2024 162     QRSD Interval 04/30/2024 76     QT Interval 04/30/2024 518     QTC Interval 04/30/2024 432     P Axis 04/30/2024 29     QRS [place] 04/30/2024 63     T Wave [place] 04/30/2024 61     Ventricular Rate 04/30/2024 56     Atrial Rate 04/30/2024 56     MN Interval 04/30/2024 168     QRSD Interval 04/30/2024 84     QT Interval 04/30/2024 460     QTC Interval 04/30/2024 443     P Axis 04/30/2024 67     QRS Axis 04/30/2024 72     T Wave [place] 04/30/2024 79    Appointment on 04/25/2024   Component Date Value    Hemoglobin A1C 04/25/2024 6.8 (H)     EAG 04/25/2024 148     Sodium 04/25/2024 139     Potassium 04/25/2024 4.6     Chloride 04/25/2024 105     CO2 04/25/2024 27     ANION GAP 04/25/2024 7     BUN 04/25/2024 19     Creatinine 04/25/2024 0.84     Glucose 04/25/2024 179 (H)     Calcium 04/25/2024 9.3     AST 04/25/2024 24     ALT 04/25/2024 27     Alkaline Phosphatase 04/25/2024 92     Total Protein 04/25/2024 6.7     Albumin 04/25/2024 4.4     Total Bilirubin 04/25/2024 0.91     eGFR 04/25/2024 63     WBC 04/25/2024 8.81     RBC 04/25/2024 4.22     Hemoglobin 04/25/2024 12.7     Hematocrit 04/25/2024 39.4     MCV 04/25/2024 93     MCH 04/25/2024 30.1     MCHC 04/25/2024 32.2     RDW 04/25/2024 15.2 (H)     MPV 04/25/2024 12.3     Platelets 04/25/2024 313     nRBC 04/25/2024 0     Segmented % 04/25/2024 66     Immature Grans % 04/25/2024 1     Lymphocytes % 04/25/2024 21     Monocytes % 04/25/2024 11     Eosinophils Relative 04/25/2024 1     Basophils Relative 04/25/2024 0     Absolute Neutrophils 04/25/2024 5.84     Absolute Immature Grans 04/25/2024 0.06     Absolute Lymphocytes 04/25/2024 1.85     Absolute Monocytes 04/25/2024 0.96     Eosinophils Absolute 04/25/2024 0.07     Basophils Absolute 04/25/2024 0.03      [address] 2024 1.599      Imaging: Cardiac ep lab eps/ablations    Result Date: 2024  Narrative: Images from the original result were not included. ELECTROPHYSIOLOGY OPERATIVE REPORT PATIENT NAME: Radha Vidal :  1940 MRN: [id number] Date of surgery: 05/15/24 Surgeon: Brien Woods MD Pt Location:  Cardiac Electrophysiology Laboratory PROCEDURE PERFORMED: 1)Dual Chamber Permanent Pacemaker Implantation Passive RA lead, RV his lead Preoperative Medications: Ancef ANESTHESIA: Per anesthesia PREOPERATIVE DIAGNOSIS: Symptomatic bradycardia, syncope POSTOPERATIVE DIAGNOSIS: Successful Dual Chamber Permanent Pacemaker implant.  Same as Preop. Informed Consent: Risks, benefits, and alternatives discussed with patient and any family present. The patient understands risks, which include but are not limited to life threatening  bleeding, infection, air around lungs, blood around the heart and reoperation dislodged or malfunctioning device. Procedure Description: After informed consent was obtained, the patient was brought to the electrophysiology laboratory NPO. A time out was called and the patient was properly identified. The patient was pre-medicated as above. The left infraclavicular area was prepped and draped in the usual sterile fashion. After local anesthetic infiltration with 1% lidocaine, an incision was made below clavicle. The incision was extended down to the level of the pectoral fascia. A pocket was formed above the pectoral fascia using cautery and blunt dissection.  First rib approach was done with venography.  2 wires were placed with separate punctures.  A safe sheath introducer was advanced over a wire into the axillary vein, the wire was confirmed to be in the right atrium on flouroscopy, the wire was removed. Through the introducer the pacing RV lead was passed into the right heart. Under fluoroscopic guidance the right ventricular lead was positioned on the right  ventricular septum. After satisfactory ventricular sensing and pacing thresholds were confirmed, the lead was sewn to the pectoral fascia/muscle using 0 silk sutures. The right atrial lead was placed in the right atrial appendage with similar fashion using a straight stylet.  The right atrial lead was repositioned multiple times and would only follow a lateral projection.  During one of the multiple repositionings of the right atrial lead, the RV lead got entangled and dislodged and needed to be repositioned.  Tissue contact was confirmed and good lead parameters were seen, the Safe-sheath was removed and the leads were tied down with 0 silk sutures to the muscle. Pocket flushed with antibiotic solution. Device placed in an antibiotic pouch.  The lead and pulse generator were placed in the pre-pectoral pocket. The pocket was then closed with 3 layers of 2-0, 3-0 Vicryl 4-0 Monocryl suture was used to close the skin. Sterile dressing applied. EBL: Minimal Complications: None Contrast: 10 cc Findings: None The patient tolerated the procedure well. Plan: Routine postoperative care and CXR. Follow-up in 2 weeks with incision check and interrogation.      XR chest 2 views    Result Date: 5/16/2024  Narrative: XR CHEST PA & LATERAL INDICATION: Patient s/p Pacemaker/ICD Insertion. COMPARISON: May 15, 2024 FINDINGS: Dual-lead left pacer stable right atrial and right ventricular lead placement with intact wires. Clear lungs. No pneumothorax or pleural effusion. Normal cardiomediastinal silhouette. Bones are unremarkable for age. Normal upper abdomen.     Impression: Stable left pacer. No acute cardiopulmonary disease. Workstation performed: [id number]     XR chest portable    Result Date: 5/16/2024  Narrative: XR CHEST PORTABLE INDICATION: Patient s/p Pacemaker/ICD Insertion. COMPARISON: Radiograph May 12, 2024 FINDINGS: Left chest wall intracardiac device with intact lead(s) overlying the right atrium and right  "ventricle. No abandoned lead(s). Clear lungs. No pneumothorax or pleural effusion. Stable enlarged cardiomediastinal silhouette. Bones are unremarkable for age. Normal upper abdomen.     Impression: No acute cardiopulmonary disease. Workstation performed: [id number]     XR chest 1 view portable    Result Date: 5/13/2024  Narrative: XR CHEST PORTABLE INDICATION: Weakness. COMPARISON: Chest radiograph and CT chest abdomen pelvis 4/30/2024 FINDINGS: Monitoring leads and clips project over the chest. Clear lungs. No pneumothorax or pleural effusion. Normal cardiomediastinal silhouette. Bones are unremarkable for age. Normal upper abdomen. Surgical clips project over the left upper quadrant.     Impression: No acute cardiopulmonary disease. Resident: Skyler Chavez I, the attending radiologist, have reviewed the images and agree with the final report above. Workstation performed: [id number]       Review of Systems:  Review of Systems  REVIEW OF SYSTEMS:  Constitutional:  Denies fever or chills   Eyes:  Denies change in visual acuity   HENT:  Denies nasal congestion or sore throat   Respiratory:  Denies cough or shortness of breath   Cardiovascular:  Denies chest pain or edema   GI:  Denies abdominal pain, nausea, vomiting, bloody stools or diarrhea   :  Denies dysuria, frequency, difficulty in micturition and nocturia  Musculoskeletal:  Denies back pain or joint pain   Neurologic: History of parkinsonism  Endocrine:  Denies polyuria or polydipsia   Lymphatic:  Denies swollen glands   Psychiatric:  Denies depression or anxiety    Physical Exam:    /80 (BP Location: Left arm, Patient Position: Sitting, Cuff Size: Standard)   Pulse 85   Resp 16   Ht 4' 11\" (1.499 m)   Wt 73 kg (161 lb)   SpO2 94%   BMI 32.52 kg/m²     Physical Exam  PHYSICAL EXAM:  General:  Patient is not in acute distress   Head: Normocephalic, Atraumatic.  HEENT:  Both pupils normal-size atraumatic, normocephalic, nonicteric  Neck:  JVP " not raised. Trachea central. No carotid bruit  Respiratory:  normal breath sounds no crackles. no rhonchi  Cardiovascular:  Regular rate and rhythm no S3 no murmurs  GI:  Abdomen soft nontender. No organomegaly.   Lymphatic:  No cervical or inguinal lymphadenopathy  Neurologic:  Patient is awake alert, oriented . Grossly nonfocal  Parkinsonian tremors  Extremities no edema  Pacer site looks well-healed    Discussion/Summary:    Patient with multiple medical problems who seems to be doing reasonably well from cardiac standpoint. Previous studies reviewed with patient. Medications reviewed and possible side effects discussed. concepts of cardiovascular disease , signs and symptoms of heart disease. Dietary and risk factor modification reinforced. All questions answered.  Safety measures reviewed. Patient advised to report any problems prompting medical attention.    Risks and benefits  and alternatives of anticoagulation to prevent thromboembolic risk from atrial fibrillation discussed at length.  Patient to report any bleeding issues.    Patient has been instructed to keep her appointment with the device clinic next week.    Patient will follow-up with primary care physician as well as neurology.  Follow-up in 6 months or earlier as needed.  Patient is agreeable with the plan of care.

## 2024-06-03 ENCOUNTER — TELEPHONE (OUTPATIENT)
Dept: HEMATOLOGY ONCOLOGY | Facility: CLINIC | Age: 84
End: 2024-06-03

## 2024-06-03 DIAGNOSIS — D49.0 IPMN (INTRADUCTAL PAPILLARY MUCINOUS NEOPLASM): Primary | ICD-10-CM

## 2024-06-03 RX ORDER — LORAZEPAM 1 MG/1
1 TABLET ORAL SEE ADMIN INSTRUCTIONS
Qty: 2 TABLET | Refills: 0 | Status: SHIPPED | OUTPATIENT
Start: 2024-06-03

## 2024-06-03 NOTE — TELEPHONE ENCOUNTER
Patient Call    Who are you speaking with? Patient    If it is not the patient, are they listed on an active communication consent form? N/A   What is the reason for this call? Patient is requesting a medication to be sent to [facility] for her MRI on 7/3   Does this require a call back? Yes   If a call back is required, please list best call back number [phone]   If a call back is required, advise that a message will be forwarded to their care team and someone will return their call as soon as possible.   Did you relay this information to the patient? Yes

## 2024-06-04 ENCOUNTER — OFFICE VISIT (OUTPATIENT)
Dept: FAMILY MEDICINE CLINIC | Facility: CLINIC | Age: 84
End: 2024-06-04
Payer: COMMERCIAL

## 2024-06-04 VITALS
HEART RATE: 90 BPM | TEMPERATURE: 97.8 F | HEIGHT: 59 IN | SYSTOLIC BLOOD PRESSURE: 104 MMHG | BODY MASS INDEX: 32.46 KG/M2 | DIASTOLIC BLOOD PRESSURE: 68 MMHG | WEIGHT: 161 LBS | OXYGEN SATURATION: 97 %

## 2024-06-04 DIAGNOSIS — R73.01 IMPAIRED FASTING GLUCOSE: ICD-10-CM

## 2024-06-04 DIAGNOSIS — R00.1 SYMPTOMATIC BRADYCARDIA: Primary | ICD-10-CM

## 2024-06-04 PROBLEM — M70.61 TROCHANTERIC BURSITIS, RIGHT HIP: Status: RESOLVED | Noted: 2020-08-24 | Resolved: 2024-06-04

## 2024-06-04 PROBLEM — R45.86 MOOD DISTURBANCE: Status: RESOLVED | Noted: 2018-08-28 | Resolved: 2024-06-04

## 2024-06-04 PROBLEM — R79.89 ELEVATED TROPONIN: Status: RESOLVED | Noted: 2024-02-01 | Resolved: 2024-06-04

## 2024-06-04 PROBLEM — H93.8X1 EAR PRESSURE, RIGHT: Status: RESOLVED | Noted: 2024-01-17 | Resolved: 2024-06-04

## 2024-06-04 PROBLEM — M85.80 OSTEOPENIA: Status: RESOLVED | Noted: 2019-06-13 | Resolved: 2024-06-04

## 2024-06-04 PROBLEM — E11.9 TYPE 2 DIABETES MELLITUS (HCC): Status: RESOLVED | Noted: 2024-01-15 | Resolved: 2024-06-04

## 2024-06-04 PROBLEM — R55 SYNCOPE: Status: RESOLVED | Noted: 2024-04-30 | Resolved: 2024-06-04

## 2024-06-04 PROCEDURE — 99213 OFFICE O/P EST LOW 20 MIN: CPT | Performed by: FAMILY MEDICINE

## 2024-06-04 PROCEDURE — G2211 COMPLEX E/M VISIT ADD ON: HCPCS | Performed by: FAMILY MEDICINE

## 2024-06-04 NOTE — PROGRESS NOTES
Ambulatory Visit  Name: Radha Vidal      : 1940      MRN: [id number]  Encounter Provider: Hoang Tovar MD  Encounter Date: 2024   Encounter department: [facility]    Assessment & Plan   1. Symptomatic bradycardia  S/P pacemaker placement    2. Impaired fasting glucose  -     Basic metabolic panel; Future  -     Hemoglobin A1C; Future- 6.4  Recommend a low carb diet    F/U in 1 year or as needed         History of Present Illness     Patient is here to follow up. Has a Hx of symptomatic bradycardia. She recently had a Pacemaker placed. Denies any symptoms feeling better has more energy.  She had borderline elevated [id number]-6.4 no on medications.      Review of Systems   Constitutional:  Negative for activity change, appetite change, fatigue and fever.   HENT:  Negative for congestion and ear discharge.    Respiratory:  Negative for cough and shortness of breath.    Cardiovascular:  Negative for chest pain and palpitations.   Gastrointestinal:  Negative for diarrhea and nausea.   Musculoskeletal:  Negative for arthralgias and back pain.   Skin:  Negative for color change and rash.   Neurological:  Negative for dizziness and headaches.   Psychiatric/Behavioral:  Negative for agitation and behavioral problems.      Past Medical History:   Diagnosis Date   • Actinic keratosis 2016   • Acute midline low back pain without sciatica 2020   • 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    • Benign neoplasm of skin    • Cancer (HCC)     skin   • Chest pain    • Claustrophobia    • Closed compression fracture of body of L1 vertebra (HCC)    • Diverticulitis    • Diverticulitis    • Fracture     L1-L2   • GERD (gastroesophageal reflux disease)    • H. pylori infection 2020   • Muscle weakness    • Nausea 2024   • Nonmelanoma skin cancer     last assessed 2017   • Palpitations    • Pancreatic cyst    • Seasonal allergies    • Seborrheic  keratosis 2014   • Sleep apnea     no cpap   • Spontaneous      without mention of complications    • Squamous cell carcinoma of forehead 2014   • Syncope      Past Surgical History:   Procedure Laterality Date   • ABDOMINAL ADHESION SURGERY N/A 2023    Procedure: LYSIS ADHESIONS;  Surgeon: Kyle Julien MD;  [facility];  Service: Surgical Oncology   • BOTOX INJECTION N/A 2017    Procedure: CYSTOSCOPY; BLADDER BOTOX 100 UNITS ;  Surgeon: Juan Edward MD;  [facility];  Service:    • BOWEL RESECTION      related ot diverticulitis   • CARDIAC CATHETERIZATION     • CARDIAC CATHETERIZATION Left 2024    Procedure: Cardiac Left Heart Cath;  Surgeon: Carrington Kiser DO;  [facility];  Service: Cardiology   • CARDIAC ELECTROPHYSIOLOGY PROCEDURE N/A 5/15/2024    Procedure: Cardiac pacer implant;  Surgeon: Brien Woods MD;  [facility]: [facility];  Service: Cardiology   • CARPAL TUNNEL RELEASE Right    • COLONOSCOPY  2019   • COLOSTOMY     • COLOSTOMY CLOSURE     • CYSTOSCOPY  2021   • DISTAL PANCREATECTOMY N/A 2023    Procedure: DISTAL PANCREATECTOMY;  Surgeon: Kyle Julien MD;  [facility];  Service: Surgical Oncology   • FOOT SURGERY Left     neuroma   • HYSTERECTOMY     • MYRINGOTOMY W/ TUBES Right 2023    office procedure - Dr. Grace   • NASAL SINUS SURGERY  age 39    [place]   • PANCREATIC CYST BIOPSY  2023   • MO CYSTOURETHROSCOPY N/A 2018    Procedure: CYSTOSCOPY WITH BOTOX;  Surgeon: Juan Edward MD;  [facility];  Service: Urology   • MO ESOPHAGOGASTRODUODENOSCOPY TRANSORAL DIAGNOSTIC N/A 2019    Procedure: ESOPHAGOGASTRODUODENOSCOPY (EGD);  Surgeon: Edu Dickerson DO;  [facility];  Service: Gastroenterology   • SPLENECTOMY, TOTAL N/A 2023    Procedure: SPLENECTOMY;  Surgeon: Kyle Julien MD;  [facility];  Service: Surgical  Oncology   • TONSILLECTOMY  age 49   • UPPER GASTROINTESTINAL ENDOSCOPY  04/23/2019     Family History   Problem Relation Age of Onset   • Alcohol abuse Mother    • Heart disease Mother    • Alcohol abuse Father    • Alcohol abuse Brother    • Cancer Brother    • Tremor Neg Hx    • Parkinsonism Neg Hx    • Colon cancer Neg Hx      Social History     Tobacco Use   • Smoking status: Never     Passive exposure: Past   • Smokeless tobacco: Never   Vaping Use   • Vaping status: Never Used   Substance and Sexual Activity   • Alcohol use: Not Currently     Comment: patient states rare use on holidays    • Drug use: No   • Sexual activity: Not Currently     Current Outpatient Medications on File Prior to Visit   Medication Sig   • apixaban (Eliquis) 5 mg Take 1 tablet (5 mg total) by mouth 2 (two) times a day   • atorvastatin (LIPITOR) 40 mg tablet Take 1 tablet (40 mg total) by mouth daily with dinner   • B Complex Vitamins (B COMPLEX 100 PO) Take 1 capsule by mouth daily after lunch not taking   • calcium carbonate (OS-JOHN) 1250 (500 Ca) MG tablet Take 1 tablet by mouth daily after lunch   • carbidopa-levodopa (SINEMET)  mg per tablet Take 1 tablet by mouth 3 (three) times a day before meals   • cholecalciferol (VITAMIN D3) 1,000 units tablet Take 5,000 Units by mouth daily after lunch   • Cyanocobalamin (VITAMIN B 12 PO) Take 1 capsule by mouth daily   • multivitamin (THERAGRAN) TABS Take 1 tablet by mouth every morning   • pantoprazole (PROTONIX) 20 mg tablet Take 20 mg by mouth daily   • Probiotic Product (PROBIOTIC-10) CAPS Take 1 capsule by mouth daily after lunch   • trospium chloride (SANCTURA) 20 mg tablet Take 1 tablet (20 mg total) by mouth 2 (two) times a day   • Ascorbic Acid (VITAMIN C) 1000 MG tablet Take 1,000 mg by mouth daily   • LORazepam (ATIVAN) 1 mg tablet Take 1 tablet (1 mg total) by mouth see administration instructions Take 1 tablet 1 hour prior to MRI, take 2nd tablet immediately before if  "needed.   • polyethylene glycol (MIRALAX) 17 g packet Take 17 g by mouth daily as needed (constipation if not responding to senna/docusate)   • Potassium Gluconate 595 (99 K) MG TABS Take 1 each by mouth every other day (Patient not taking: Reported on 6/4/2024)     Allergies   Allergen Reactions   • Caffeine - Food Allergy GI Intolerance   • Medical Tape Other (See Comments)   • Iodine - Food Allergy Rash   • Latex Rash   • Other Rash     Adhesive tape       Immunization History   Administered Date(s) Administered   • COVID-19 MODERNA VACC 0.5 ML IM 02/10/2021, 03/10/2021, 11/09/2021   • HiB 08/04/2023   • Hib (PRP-T) 08/04/2023   • Meningococcal MCV4, Unspecified 08/04/2023   • Pneumococcal Conjugate Vaccine 20-valent (Pcv20), Polysace 08/04/2023   • Pneumococcal Polysaccharide PPV23 08/04/2023   • Tuberculin Skin Test 01/19/2024   • meningococcal ACYW-135 TT Conjugate 08/04/2023     Objective     /68 (BP Location: Left arm, Patient Position: Sitting, Cuff Size: Large)   Pulse 90   Temp 97.8 °F (36.6 °C)   Ht 4' 11\" (1.499 m)   Wt 73 kg (161 lb)   SpO2 97%   BMI 32.52 kg/m²     Physical Exam  Vitals and nursing note reviewed.   Constitutional:       General: She is not in acute distress.     Appearance: She is well-developed.   HENT:      Head: Normocephalic and atraumatic.   Eyes:      Conjunctiva/sclera: Conjunctivae normal.   Cardiovascular:      Rate and Rhythm: Normal rate and regular rhythm.      Heart sounds: No murmur heard.  Pulmonary:      Effort: Pulmonary effort is normal. No respiratory distress.      Breath sounds: Normal breath sounds.   Abdominal:      Palpations: Abdomen is soft.      Tenderness: There is no abdominal tenderness.   Musculoskeletal:         General: No swelling.      Cervical back: Neck supple.   Skin:     General: Skin is warm and dry.      Capillary Refill: Capillary refill takes less than 2 seconds.   Neurological:      Mental Status: She is alert.   Psychiatric:    "      Mood and Affect: Mood normal.       Administrative Statements   I have spent a total time of 20 minutes on 06/04/24 In caring for this patient including Diagnostic results.

## 2024-06-07 ENCOUNTER — IN-CLINIC DEVICE VISIT (OUTPATIENT)
Dept: CARDIOLOGY CLINIC | Facility: CLINIC | Age: 84
End: 2024-06-07

## 2024-06-07 DIAGNOSIS — Z95.0 PRESENCE OF PERMANENT CARDIAC PACEMAKER: Primary | ICD-10-CM

## 2024-06-07 PROCEDURE — 99024 POSTOP FOLLOW-UP VISIT: CPT | Performed by: INTERNAL MEDICINE

## 2024-06-07 NOTE — PROGRESS NOTES
MDT DC PM/ACTIVE SYSTEM IS MRI CONDITIONAL   DEVICE INTERROGATED IN THE [place] OFFICE:  BATTERY VOLTAGE ADEQUATE (12.3 YR.).  AP 90.4%  0.5%.  ALL LEAD PARAMETERS WITHIN NORMAL LIMITS.  NO SIGNIFICANT HIGH RATE EPISODES.  NO PROGRAMMING CHANGES MADE TO DEVICE PARAMETERS.  WOUND CHECK: INCISION CLEAN AND DRY WITH EDGES APPROXIMATED (SEE MEDIA).  WOUND CARE AND RESTRICTIONS REVIEWED WITH PATIENT.  NORMAL DEVICE FUNCTION.  RG

## 2024-06-20 ENCOUNTER — OFFICE VISIT (OUTPATIENT)
Age: 84
End: 2024-06-20
Payer: COMMERCIAL

## 2024-06-20 VITALS — HEIGHT: 59 IN | WEIGHT: 162 LBS | TEMPERATURE: 97.4 F | BODY MASS INDEX: 32.66 KG/M2

## 2024-06-20 DIAGNOSIS — Z13.89 SCREENING FOR SKIN CONDITION: Primary | ICD-10-CM

## 2024-06-20 DIAGNOSIS — D22.9 MULTIPLE NEVI: ICD-10-CM

## 2024-06-20 DIAGNOSIS — D18.01 CHERRY ANGIOMA: ICD-10-CM

## 2024-06-20 DIAGNOSIS — L82.1 SEBORRHEIC KERATOSIS: ICD-10-CM

## 2024-06-20 DIAGNOSIS — Z85.828 HISTORY OF SKIN CANCER: ICD-10-CM

## 2024-06-20 PROCEDURE — 99213 OFFICE O/P EST LOW 20 MIN: CPT | Performed by: DERMATOLOGY

## 2024-06-20 NOTE — PATIENT INSTRUCTIONS
SQUAMOUS CELL CARCINOMA    What is cutaneous squamous cell carcinoma?  Cutaneous squamous cell carcinoma (SCC) is a common type of keratinocyte or non-melanoma skin cancer. It is derived from cells within the epidermis that make keratin -- the horny protein that makes up skin, hair and nails.  Cutaneous SCC is an invasive disease, referring to cancer cells that have grown beyond the epidermis. SCC can sometimes metastasise and may prove fatal.  Intraepidermal carcinoma (cutaneous SCC in situ) and mucosal SCC are considered elsewhere.    Who gets cutaneous squamous cell carcinoma?  Risk factors for cutaneous SCC include:  Age and sex: SCCs are particularly prevalent in elderly males. However, they also affect females and younger adults.   Previous SCC or another form of skin cancer (basal cell carcinoma, melanoma) are a strong predictor for further skin cancers.   Actinic keratoses   Outdoor occupation or recreation   Smoking   Fair skin, blue eyes and blond or red hair   Previous cutaneous injury, thermal burn, disease (eg cutaneous lupus, epidermolysis bullosa, leg ulcer)   Inherited syndromes: SCC is a particular problem for families with xeroderma pigmentosum and albinism   Other risk factors include ionising radiation, exposure to arsenic, and immune suppression due to disease (eg chronic lymphocytic leukaemia) or medicines. Organ transplant recipients have a massively increased risk of developing SCC.    What causes cutaneous squamous cell carcinoma?  More than 90% of cases of SCC are associated with numerous DNA mutations in multiple somatic genes. Mutations in the p53 tumour suppressor gene are caused by exposure to ultraviolet radiation (UV), especially UVB (known as signature 7). Other signature mutations relate to cigarette smoking, ageing and immune suppression (eg, to drugs such as azathioprine). Mutations in signalling pathways affect the epidermal growth factor receptor, ASAF, Fyn, and [id number]  signalling.   Beta-genus human papillomaviruses (wart virus) are thought to play a role in SCC arising in immune-suppressed populations. ?-HPV and HPV subtypes 5, 8, 17, 20, 24, and 38 have also been associated with an increased risk of cutaneous SCC in immunocompetent individuals.     What are the clinical features of cutaneous squamous cell carcinoma?  Cutaneous SCCs present as enlarging scaly or crusted lumps. They usually arise within pre-existing actinic keratosis or intraepidermal carcinoma.  They grow over weeks to months   They may ulcerate   They are often tender or painful   Located on sun-exposed sites, particularly the face, lips, ears, hands, forearms and lower legs   Size varies from a few millimetres to several centimetres in diameter.    Types of cutaneous squamous cell carcinoma  Distinct clinical types of invasive cutaneous SCC include:  Cutaneous horn -- the horn is due to excessive production of keratin   Keratoacanthoma (KA) -- a rapidly growing keratinising nodule that may resolve without treatment   Carcinoma cuniculatum (‘verrucous carcinoma’), a slow-growing, warty tumour on the sole of the foot.   Multiple eruptive SCC/KA-like lesions arising in syndromes, such as multiple self-healing squamous epitheliomas of Gorman-Smith and Grzybowski syndrome  The pathologist may classify a tumour as well differentiated, moderately well differentiated, poorly differentiated or anaplastic cutaneous SCC. There are other variants.    Classification of squamous cell carcinoma by risk  Cutaneous SCC is classified as low-risk or high-risk, depending on the chance of tumour recurrence and metastasis. Characteristics of high-risk SCC include:  High-risk cutaneous squamous cell carcinoma has the following characteristics:  Diameter greater than or equal to 2 cm   Location on the ear, vermilion of the lip, central face, hands, feet, genitalia   Arising in elderly or immune suppressed patient   Histological  thickness greater than 2 mm, poorly differentiated histology, or with the invasion of the subcutaneous tissue, nerves and blood vessels  Metastatic SCC is found in regional lymph nodes (80%), lungs, liver, brain, bones and skin.    Staging cutaneous squamous cell carcinoma  In 2011, the American Joint Committee on Cancer (AJCC) published a new staging systemic for cutaneous SCC for the 7th Edition of the AJCC manual. This evaluates the dimensions of the original primary tumour (T) and its metastases to lymph nodes (N).    Tumour staging for cutaneous SCC  TX: Th Primary tumour cannot be assessed  T0: No evidence of a primary tumour  Tis: Carcinoma in situ  T1: Tumour ? 2cm without high-risk features  T2: Tumour ? 2cm; or; Tumour ? 2 cm with high-risk features  T3: Tumour with the invasion of maxilla, mandible, orbit or temporal bone  T4: Tumour with the invasion of axial or appendicular skeleton or perineural invasion of skull base    Nicole staging for cutaneous SCC  NX: Regional lymph nodes cannot be assessed  N0: No regional lymph node metastasis  N1: Metastasis in one local lymph node ? 3cm  N2: Metastasis in one local lymph node ? 3cm; or; Metastasis in >1 local lymph node ? 6cm  N3: Metastasis in lymph node ? 6cm    How is squamous cell carcinoma diagnosed?  Diagnosis of cutaneous SCC is based on clinical features. The diagnosis and histological subtype are confirmed pathologically by diagnostic biopsy or following excision. See squamous cell carcinoma - pathology.  Patients with high-risk SCC may also undergo staging investigations to determine whether it has spread to lymph nodes or elsewhere. These may include:  Imaging using ultrasound scan, X-rays, CT scans, MRI scans   Lymph node or other tissue biopsies    What is the treatment for cutaneous squamous cell carcinoma?  Cutaneous SCC is nearly always treated surgically. Most cases are excised with a 3-10 mm margin of normal tissue around a visible tumour. A  flap or skin graft may be needed to repair the defect.  Other methods of removal include:  Shave, curettage, and electrocautery for low-risk tumours on trunk and limbs   Aggressive cryotherapy for very small, thin, low-risk tumours   Mohs micrographic surgery for large facial lesions with indistinct margins or recurrent tumours   Radiotherapy for an inoperable tumour, patients unsuitable for surgery, or as adjuvant    What is the treatment for advanced or metastatic squamous cell carcinoma?  Locally advanced primary, recurrent or metastatic SCC requires multidisciplinary consultation. Often a combination of treatments is used.  Surgery   Radiotherapy   Cemiplimab   Experimental targeted therapy using epidermal growth factor receptor inhibitors    How can cutaneous squamous cell carcinoma be prevented?  There is a great deal of evidence to show that very careful sun protection at any time of life reduces the number of SCCs. This is particularly important in ageing, sun-damaged, fair skin; in patients that are immune suppressed; and in those who already have actinic keratoses or previous SCC.  Stay indoors or under the shade in the middle of the day   Wear covering clothing   Apply high protection factor SPF50+ broad-spectrum sunscreens generously to exposed skin if outdoors   Avoid indoor tanning (sun beds, solaria)    Oral nicotinamide (vitamin B3) in a dose of 500 mg twice daily may reduce the number and severity of SCCs in people at high risk.  Patients with multiple squamous cell carcinomas may be prescribed an oral retinoid (acitretin or isotretinoin). These reduce the number of tumours but have some nuisance side effects.    What is the outlook for cutaneous squamous cell carcinoma?  Most SCCs are cured by treatment. A cure is most likely if treatment is undertaken when the lesion is small. The risk of recurrence or disease-associated death is greater for tumours that are > 20 mm in diameter and/or > 2 mm in  "thickness at the time of surgical excision.  About 50% of people at high risk of SCC develop a second one within 5 years of the first. They are also at increased risk of other skin cancers, especially melanoma. Regular self-skin examinations and long-term annual skin checks by an experienced health professional are recommended.         MELANOCYTIC NEVI (\"Moles\")    Melanocytic nevi (\"moles\") are tan or brown, raised or flat areas of the skin which have an increased number of melanocytes. Melanocytes are the cells in our body which make pigment and account for skin color.    Some moles are present at birth (I.e., \"congenital nevi\"), while others come up later in life (i.e., \"acquired nevi\").  The sun can stimulate the body to make more moles.  Sunburns are not the only thing that triggers more moles.  Chronic sun exposure can do it too.     Clinically distinguishing a healthy mole from melanoma may be difficult, even for experienced dermatologists. The \"ABCDE's\" of moles have been suggested as a means of helping to alert a person to a suspicious mole and the possible increased risk of melanoma.  The suggestions for raising alert are as follows:    Asymmetry: Healthy moles tend to be symmetric, while melanomas are often asymmetric.  Asymmetry means if you draw a line through the mole, the two halves do not match in color, size, shape, or surface texture. Asymmetry can be a result of rapid enlargement of a mole, the development of a raised area on a previously flat lesion, scaling, ulceration, bleeding or scabbing within the mole.  Any mole that starts to demonstrate \"asymmetry\" should be examined promptly by a board certified dermatologist.     Border: Healthy moles tend to have discrete, even borders.  The border of a melanoma often blends into the normal skin and does not sharply delineate the mole from normal skin.  Any mole that starts to demonstrate \"uneven borders\" should be examined promptly by a board " "certified dermatologist.     Color: Healthy moles tend to be one color throughout.  Melanomas tend to be made up of different colors ranging from dark black, blue, white, or red.  Any mole that demonstrates a color change should be examined promptly by a board certified dermatologist.     Diameter: Healthy moles tend to be smaller than 0.6 cm in size; an exception are \"congenital nevi\" that can be larger.  Melanomas tend to grow and can often be greater than 0.6 cm (1/4 of an inch, or the size of a pencil eraser). This is only a guideline, and many normal moles may be larger than 0.6 cm without being unhealthy.  Any mole that starts to change in size (small to bigger or bigger to smaller) should be examined promptly by a board certified dermatologist.     Evolving: Healthy moles tend to \"stay the same.\"  Melanomas may often show signs of change or evolution such as a change in size, shape, color, or elevation.  Any mole that starts to itch, bleed, crust, burn, hurt, or ulcerate or demonstrate a change or evolution should be examined promptly by a board certified dermatologist.      Dysplastic Nevi  Dysplastic moles are moles that fit the ABCDE rules of melanoma but are not identified as melanomas when examined under the microscope.  They may indicate an increased risk of melanoma in that person. If there is a family history of melanoma, most experts agree that the person may be at an increased risk for developing a melanoma.  Experts still do not agree on what dysplastic moles mean in patients without a personal or family history of melanoma.  Dysplastic moles are usually larger than common moles and have different colors within it with irregular borders. The appearance can be very similar to a melanoma. Biopsies of dysplastic moles may show abnormalities which are different from a regular mole.      Melanoma  Malignant melanoma is a type of skin cancer that can be deadly if it spreads throughout the body. The " "incidence of melanoma in the [place] is growing faster than any other cancer. Melanoma usually grows near the surface of the skin for a period of time, and then begins to grow deeper into the skin. Once it grows deeper into the skin, the risk of spread to other organs greatly increases. Therefore, early detection and removal of a malignant melanoma may result in a better chance at a complete cure; removal after the tumor has spread may not be as effective, leading to worse clinical outcomes such as death.    The true rate of nevus transformation into a melanoma is unknown. It has been estimated that the lifetime risk for any acquired melanocytic nevus on any 20-year-old individual transforming into melanoma by age 80 is 0.03% (1 in 3,164) for men and 0.009% (1 in 10,800) for women.     The appearance of a \"new mole\" remains one of the most reliable methods for identifying a malignant melanoma.  Occasionally, melanomas appear as rapidly growing, blue-black, dome-shaped bumps within a previous mole or previous area of normal skin.  Other times, melanomas are suspected when a mole suddenly appears or changes. Itching, burning, or pain in a pigmented lesion should increase suspicion, but most patients with early melanoma have no skin discomfort whatsoever.  Melanoma can occur anywhere on the skin, including areas that are difficult for self-examination. Many melanomas are first noticed by other family members.  Suspicious-looking moles may be removed for microscopic examination.       You may be able to prevent death from melanoma by doing two simple things:    Try to avoid unnecessary sun exposure and protect your skin when it is exposed to the sun.  People who live near the equator, people who have intermittent exposures to large amounts of sun, and people who have had sunburns in childhood or adolescence have an increased risk for melanoma. Sun sense and vigilant sun protection may be keys to helping to prevent " "melanoma.  We recommend wearing UPF-rated sun protective clothing and sunglasses whenever possible and applying a moisturizer-sunscreen combination product (SPF 50+) such as Neutrogena Daily Defense to sun exposed areas of skin at least three times a day.    Have your moles regularly examined by a board certified dermatologist AND by yourself or a family member/friend at home.  We recommend that you have your moles examined at least once a year by a board certified dermatologist.  Use your birthday as an annual reminder to have your \"Birthday Suit\" (I.e., your skin) examined; it is a nice birthday gift to yourself to know that your skin is healthy appearing!  Additionally, at-home self examinations may be helpful for detecting a possible melanoma.  Use the ABCDEs we discussed and check your moles once a month at home.        SEBORRHEIC KERATOSIS    A seborrheic keratosis is a harmless warty spot that appears during adult life as a common sign of skin aging.  Seborrheic keratoses can arise on any area of skin, covered or uncovered, with the usual exception of the palms and soles. They do not arise from mucous membranes. Seborrheic keratoses can have highly variable appearance.      Seborrheic keratoses are extremely common. It has been estimated that over 90% of adults over the age of 60 years have one or more of them. They occur in males and females of all races, typically beginning to erupt in the 30s or 40s. They are uncommon under the age of 20 years.  The precise cause of seborrhoeic keratoses is not known.  Seborrhoeic keratoses are considered degenerative in nature. As time goes by, seborrheic keratoses tend to become more numerous. Some people inherit a tendency to develop a very large number of them; some people may have hundreds of them.    The name \"seborrheic keratosis\" is misleading, because these lesions are not limited to a seborrhoeic distribution (scalp, mid-face, chest, upper back), nor are they " "formed from sebaceous glands, nor are they associated with sebum -- which is greasy.  Seborrheic keratosis may also be called \"SK,\" \"Seb K,\" \"basal cell papilloma,\" \"senile wart,\" or \"barnacle.\"      There is no easy way to remove multiple lesions on a single occasion.  Unless a specific lesion is \"inflamed\" and is causing pain or stinging/burning or is bleeding, most insurance companies do not authorize treatment.      ANGIOMA (\"CHERRY ANGIOMA\")    Ziegler angiomas markedly increase in number from about the age of 40, so it has been estimated that 75% of people over 75 years of age have them. Although they also called \"senile angiomas,\" they can occur in young people too - 5% of adolescents have been found to have them.     Cherry angiomas are very common in males and females of any age or race, with no difference in sexes or races affected. They are however more noticeable in white skin than in skin of colour.  There may be a family history of similar lesions. Eruptive (very large number appearing in a short period of time) cherry angiomas have been rarely reported to be associated with internal malignancy and pregnancy.   "

## 2024-06-20 NOTE — PROGRESS NOTES
"[facility] Note     Patient Name: Radha Vidal  Encounter Date: 06/20/2024     Have you been cared for by a [facility] in the last 3 years and, if so, which description applies to you?    Yes.  I have been here within the last 3 years, and my medical history has NOT changed since that time.  I am FEMALE/of child-bearing potential.    REVIEW OF SYSTEMS:  Have you recently had or currently have any of the following? No changes in my recent health.   PAST MEDICAL HISTORY:  Have you personally ever had or currently have any of the following?  If \"YES,\" then please provide more detail. No changes in my medical history.   HISTORY OF IMMUNOSUPPRESSION: Do you have a history of any of the following:  Systemic Immunosuppression such as Diabetes, Biologic or Immunotherapy, Chemotherapy, Organ Transplantation, Bone Marrow Transplantation?  No     Answering \"YES\" requires the addition of the dotphrase \"IMMUNOSUPPRESSED\" as the first diagnosis of the patient's visit.   FAMILY HISTORY:  Any \"first degree relatives\" (parent, brother, sister, or child) with the following?    No changes in my family's known health.   PATIENT EXPERIENCE:    Do you want the Dermatologist to perform a COMPLETE skin exam today including a clinical examination under the \"bra and underwear\" areas?  Yes  If necessary, do we have your permission to call and leave a detailed message on your Preferred Phone number that includes your specific medical information?  Yes      Allergies   Allergen Reactions    Caffeine - Food Allergy GI Intolerance    Medical Tape Other (See Comments)    Iodine - Food Allergy Rash    Latex Rash    Other Rash     Adhesive tape        Current Outpatient Medications:     apixaban (Eliquis) 5 mg, Take 1 tablet (5 mg total) by mouth 2 (two) times a day, Disp: 180 tablet, Rfl: 1    Ascorbic Acid (VITAMIN C) 1000 MG tablet, Take 1,000 mg by mouth daily, Disp: , Rfl:     atorvastatin (LIPITOR) 40 mg " tablet, Take 1 tablet (40 mg total) by mouth daily with dinner, Disp: 90 tablet, Rfl: 1    B Complex Vitamins (B COMPLEX 100 PO), Take 1 capsule by mouth daily after lunch not taking, Disp: , Rfl:     calcium carbonate (OS-JOHN) 1250 (500 Ca) MG tablet, Take 1 tablet by mouth daily after lunch, Disp: , Rfl:     carbidopa-levodopa (SINEMET)  mg per tablet, Take 1 tablet by mouth 3 (three) times a day before meals, Disp: 270 tablet, Rfl: 3    cholecalciferol (VITAMIN D3) 1,000 units tablet, Take 5,000 Units by mouth daily after lunch, Disp: , Rfl:     Cyanocobalamin (VITAMIN B 12 PO), Take 1 capsule by mouth daily, Disp: , Rfl:     LORazepam (ATIVAN) 1 mg tablet, Take 1 tablet (1 mg total) by mouth see administration instructions Take 1 tablet 1 hour prior to MRI, take 2nd tablet immediately before if needed., Disp: 2 tablet, Rfl: 0    multivitamin (THERAGRAN) TABS, Take 1 tablet by mouth every morning, Disp: , Rfl:     pantoprazole (PROTONIX) 20 mg tablet, Take 20 mg by mouth daily, Disp: , Rfl:     polyethylene glycol (MIRALAX) 17 g packet, Take 17 g by mouth daily as needed (constipation if not responding to senna/docusate), Disp: , Rfl:     Potassium Gluconate 595 (99 K) MG TABS, Take 1 each by mouth every other day (Patient not taking: Reported on 6/4/2024), Disp: , Rfl:     Probiotic Product (PROBIOTIC-10) CAPS, Take 1 capsule by mouth daily after lunch, Disp: , Rfl:     trospium chloride (SANCTURA) 20 mg tablet, Take 1 tablet (20 mg total) by mouth 2 (two) times a day, Disp: 180 tablet, Rfl: 2          Whom besides the patient is providing clinical information about today's encounter?   NO ADDITIONAL HISTORIAN (patient alone provided history)    84 year old female with history of skin cancer and parkinson's disease present for a yearly skin exam.     Physical Exam and Assessment/Plan by Diagnosis:    HISTORY OF SQUAMOUS CELL CARCINOMA      Physical Exam:  Anatomic Location Affected:  Left Cheek -  "01/2021  Forehead - 2016  Morphological Description of Scar:  well healed  Suspected Recurrence: no  Regional adenopathy: no     Additional History of Present Condition:  Treated squamous cell carcinomas     Assessment and Plan:  Based on a thorough discussion of this condition and the management approach to it (including a comprehensive discussion of the known risks, side effects and potential benefits of treatment), the patient (family) agrees to implement the following specific plan:  Monitor for change  When outside we recommend using a wide brim hat, sunglasses, long sleeve and pants, sunscreen with SPF 30+ with reapplication every 2 hours, or SPF specific clothing          MELANOCYTIC NEVI (\"Moles\")    Physical Exam:  Anatomic Location Affected: Mostly on sun-exposed areas of the body  Morphological Description:  Scattered, 1-4mm round to ovoid, symmetrical-appearing, even bordered, skin colored to dark brown macules/papules, mostly in sun-exposed areas    Additional History of Present Condition:  present on exam.     Assessment and Plan:  Based on a thorough discussion of this condition and the management approach to it (including a comprehensive discussion of the known risks, side effects and potential benefits of treatment), the patient (family) agrees to implement the following specific plan:  Provided handout with information regarding the ABCDE's of moles   Recommend routine skin exams every year   Sun avoidance, protective clothing (known as UPF clothing), and the use of at least SPF 30 sunscreens is advised. Sunscreen should be reapplied every two hours when outside.       SEBORRHEIC KERATOSIS; NON-INFLAMED    Physical Exam:  Anatomic Location Affected:  scattered across trunk, extremities,  face  Morphological Description:  Flat and raised, waxy, smooth to warty textured, yellow to brownish-grey to dark brown to blackish, discrete, \"stuck-on\" appearing papules.    Additional History of Present " "Condition:  Patient reports new bumps on the skin.  Denies itch, burn, pain, bleeding or ulceration.  Present constantly; nothing seems to make it worse or better.  No prior treatment.      Assessment and Plan:  Based on a thorough discussion of this condition and the management approach to it (including a comprehensive discussion of the known risks, side effects and potential benefits of treatment), the patient (family) agrees to implement the following specific plan:  Reassured benign      ANGIOMA (\"CHERRY ANGIOMA\")    Physical Exam:  Anatomic Location: scattered across sun exposed areas of the trunk and extremities   Morphologic Description: Firm red to reddish-blue discrete papules    Additional History of Present Condition:  Present on exam.     Assessment and Plan:  Reassured benign      Scribe Attestation      I,:  Jennifer Cevallos am acting as a scribe while in the presence of the attending physician.:       I,:  Jori Flores MD personally performed the services described in this documentation    as scribed in my presence.:           "

## 2024-07-03 ENCOUNTER — HOSPITAL ENCOUNTER (OUTPATIENT)
Dept: MRI IMAGING | Facility: HOSPITAL | Age: 84
Discharge: HOME | End: 2024-07-03
Payer: COMMERCIAL

## 2024-07-03 DIAGNOSIS — D49.0 IPMN (INTRADUCTAL PAPILLARY MUCINOUS NEOPLASM): ICD-10-CM

## 2024-07-03 PROCEDURE — 74183 MRI ABD W/O CNTR FLWD CNTR: CPT

## 2024-07-03 PROCEDURE — A9585 GADOBUTROL INJECTION: HCPCS | Performed by: FAMILY MEDICINE

## 2024-07-03 PROCEDURE — G1004 CDSM NDSC: HCPCS

## 2024-07-03 RX ORDER — GADOBUTROL 604.72 MG/ML
7 INJECTION INTRAVENOUS
Status: COMPLETED | OUTPATIENT
Start: 2024-07-03 | End: 2024-07-03

## 2024-07-03 RX ADMIN — GADOBUTROL 7 ML: 604.72 INJECTION INTRAVENOUS at 09:31

## 2024-07-08 DIAGNOSIS — R13.10 DYSPHAGIA, UNSPECIFIED TYPE: ICD-10-CM

## 2024-07-09 RX ORDER — PANTOPRAZOLE SODIUM 40 MG/1
40 TABLET, DELAYED RELEASE ORAL DAILY
Qty: 90 TABLET | Refills: 3 | Status: SHIPPED | OUTPATIENT
Start: 2024-07-09

## 2024-07-22 ENCOUNTER — OFFICE VISIT (OUTPATIENT)
Dept: SURGICAL ONCOLOGY | Facility: CLINIC | Age: 84
End: 2024-07-22
Payer: COMMERCIAL

## 2024-07-22 VITALS
HEIGHT: 59 IN | WEIGHT: 163.2 LBS | OXYGEN SATURATION: 98 % | DIASTOLIC BLOOD PRESSURE: 80 MMHG | BODY MASS INDEX: 32.9 KG/M2 | RESPIRATION RATE: 16 BRPM | SYSTOLIC BLOOD PRESSURE: 142 MMHG | HEART RATE: 80 BPM

## 2024-07-22 DIAGNOSIS — D49.0 IPMN (INTRADUCTAL PAPILLARY MUCINOUS NEOPLASM): Primary | ICD-10-CM

## 2024-07-22 PROCEDURE — G2211 COMPLEX E/M VISIT ADD ON: HCPCS

## 2024-07-22 PROCEDURE — 99213 OFFICE O/P EST LOW 20 MIN: CPT

## 2024-07-22 NOTE — LETTER
July 23, 2024     Kyle Julien MD  [address] [facility]  2nd Floor  [facility] [address]    Patient: Radha Vidal   YOB: 1940   Date of Visit: 7/22/2024       Dear Dr. Julien:    Thank you for referring Radha Vidal to me for evaluation. Below are my notes for this consultation.    If you have questions, please do not hesitate to call me. I look forward to following your patient along with you.         Sincerely,        RISHI Gamez        CC: No Recipients    RISHI Gamez  7/23/2024  8:37 AM  Sign when Signing Visit               Surgical Oncology Follow Up       [facility] [place]  [address]  [address]  [place] [address]  [phone]    Radha Vidal  1940  [id number]  [facility] [place]  [address]  [address]  [place] [address]  [phone]    1. IPMN (intraductal papillary mucinous neoplasm)  Assessment & Plan:  Patient is doing well s/p distal pancreatectomy.  MRI shows stable cysts in the remaining pancreas. Will plan to repeat imaging in 6 months.   Orders:  -     BUN; Future; Expected date: 01/01/2025  -     Creatinine, serum; Future; Expected date: 01/01/2025  -     MRI abdomen w wo contrast and mrcp; Future; Expected date: 01/22/2025         Chief Complaint   Patient presents with   • Follow-up     6 mo IPMN f/u       Return in about 6 months (around 1/22/2025) for Office visit with Dr Julien, Imaging - See orders.        History of Present Illness: This is an 83 y/o female who returns to the office today in follow-up for IPMN.  She is 1 year s/p distal pancreatectomy at which time a mucinous neoplasm with high grade dysplasia was resected.  She reports her appetite has not completely returned but she isn't losing any weight.  She denies any nausea, vomiting, diarrhea or abdominal pain.  She states  she feels well overall.  MRI was performed on July 3 and I have reviewed these results with the patient today.      Review of Systems   Constitutional:  Negative for activity change, appetite change, fatigue and unexpected weight change.   HENT: Negative.     Respiratory: Negative.     Cardiovascular: Negative.    Gastrointestinal: Negative.  Negative for abdominal distention, abdominal pain, nausea and vomiting.   Musculoskeletal: Negative.    Skin: Negative.  Negative for color change.   Neurological: Negative.  Negative for dizziness and headaches.   Hematological: Negative.    Psychiatric/Behavioral: Negative.               Patient Active Problem List   Diagnosis   • OAB (overactive bladder)   • Parkinson's disease   • Primary insomnia   • History of skin cancer   • Seasonal allergic rhinitis   • Impaired fasting glucose   • Obesity (BMI 30-39.9)   • Claustrophobia   • Mitral valve disorder   • Menopause ovarian failure   • Vitamin D deficiency   • Dysphagia   • Multiple thyroid nodules   • Gastro-esophageal reflux disease without esophagitis   • Chronic idiopathic constipation   • History of adenomatous polyp of colon   • Tremor   • IPMN (intraductal papillary mucinous neoplasm)   • Cherry angioma   • Postablative hypothyroidism   • Sleep apnea   • Cerebrovascular disease   • Age-related osteoporosis without current pathological fracture   • Urge incontinence   • s/p distal pancreatectomy/splenectomy   • Paroxysmal atrial fibrillation (HCC)   • Ambulatory dysfunction   • Small bowel obstruction (HCC)   • Symptomatic bradycardia     Past Medical History:   Diagnosis Date   • Actinic keratosis 03/11/2016   • Acute midline low back pain without sciatica 11/19/2020   • 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    • Benign neoplasm of skin    • Cancer (HCC)     skin   • Chest pain    • Claustrophobia    • Closed compression fracture of body of L1 vertebra (HCC)    • Diverticulitis    •  Diverticulitis    • Fracture     L1-L2   • GERD (gastroesophageal reflux disease)    • H. pylori infection 2020   • Muscle weakness    • Nausea 2024   • Nonmelanoma skin cancer     last assessed 2017   • Palpitations    • Pancreatic cyst    • Seasonal allergies    • Seborrheic keratosis 2014   • Sleep apnea     no cpap   • Spontaneous      without mention of complications    • Squamous cell carcinoma of forehead 2014   • Syncope      Past Surgical History:   Procedure Laterality Date   • ABDOMINAL ADHESION SURGERY N/A 2023    Procedure: LYSIS ADHESIONS;  Surgeon: Kyle Julien MD;  [facility]: [facility];  Service: Surgical Oncology   • BOTOX INJECTION N/A 2017    Procedure: CYSTOSCOPY; BLADDER BOTOX 100 UNITS ;  Surgeon: Juan Edward MD;  [facility];  Service:    • BOWEL RESECTION      related ot diverticulitis   • CARDIAC CATHETERIZATION     • CARDIAC CATHETERIZATION Left 2024    Procedure: Cardiac Left Heart Cath;  Surgeon: Carrington Kiser DO;  [facility];  Service: Cardiology   • CARDIAC ELECTROPHYSIOLOGY PROCEDURE N/A 5/15/2024    Procedure: Cardiac pacer implant;  Surgeon: Brien Woods MD;  [facility]: [facility];  Service: Cardiology   • CARPAL TUNNEL RELEASE Right    • COLONOSCOPY  2019   • COLOSTOMY     • COLOSTOMY CLOSURE     • CYSTOSCOPY  2021   • DISTAL PANCREATECTOMY N/A 2023    Procedure: DISTAL PANCREATECTOMY;  Surgeon: Kyle Julien MD;  [facility];  Service: Surgical Oncology   • FOOT SURGERY Left     neuroma   • HYSTERECTOMY     • MYRINGOTOMY W/ TUBES Right 2023    office procedure - Dr. Grace   • NASAL SINUS SURGERY  age 39    [place]   • PANCREATIC CYST BIOPSY  2023   • GA CYSTOURETHROSCOPY N/A 2018    Procedure: CYSTOSCOPY WITH BOTOX;  Surgeon: Juan Edward MD;  [facility];  Service: Urology   • GA ESOPHAGOGASTRODUODENOSCOPY  TRANSORAL DIAGNOSTIC N/A 04/23/2019    Procedure: ESOPHAGOGASTRODUODENOSCOPY (EGD);  Surgeon: Edu Dickerson DO;  Location: [facility];  Service: Gastroenterology   • SPLENECTOMY, TOTAL N/A 07/31/2023    Procedure: SPLENECTOMY;  Surgeon: Kyle Julien MD;  [facility];  Service: Surgical Oncology   • TONSILLECTOMY  age 49   • UPPER GASTROINTESTINAL ENDOSCOPY  04/23/2019     Family History   Problem Relation Age of Onset   • Alcohol abuse Mother    • Heart disease Mother    • Alcohol abuse Father    • Alcohol abuse Brother    • Cancer Brother    • Tremor Neg Hx    • Parkinsonism Neg Hx    • Colon cancer Neg Hx      Social History     Socioeconomic History   • Marital status:      Spouse name: Not on file   • Number of children: Not on file   • Years of education: Not on file   • Highest education level: Not on file   Occupational History   • Occupation: RETIRED    Tobacco Use   • Smoking status: Never     Passive exposure: Past   • Smokeless tobacco: Never   Vaping Use   • Vaping status: Never Used   Substance and Sexual Activity   • Alcohol use: Not Currently     Comment: patient states rare use on holidays    • Drug use: No   • Sexual activity: Not Currently   Other Topics Concern   • Not on file   Social History Narrative    LIVING INDEPENDENTLY ALONE         No caffeine use     Social Determinants of Health     Financial Resource Strain: Low Risk  (4/19/2023)    Overall Financial Resource Strain (CARDIA)    • Difficulty of Paying Living Expenses: Not very hard   Food Insecurity: No Food Insecurity (5/13/2024)    Hunger Vital Sign    • Worried About Running Out of Food in the Last Year: Never true    • Ran Out of Food in the Last Year: Never true   Transportation Needs: No Transportation Needs (5/13/2024)    PRAPARE - Transportation    • Lack of Transportation (Medical): No    • Lack of Transportation (Non-Medical): No   Physical Activity: Not on file   Stress: Not on file   Social Connections: Not  on file   Intimate Partner Violence: Not on file   Housing Stability: Low Risk  (5/13/2024)    Housing Stability Vital Sign    • Unable to Pay for Housing in the Last Year: No    • Number of Times Moved in the Last Year: 1    • Homeless in the Last Year: No       Current Outpatient Medications:   •  apixaban (Eliquis) 5 mg, Take 1 tablet (5 mg total) by mouth 2 (two) times a day, Disp: 180 tablet, Rfl: 1  •  Ascorbic Acid (VITAMIN C) 1000 MG tablet, Take 1,000 mg by mouth daily, Disp: , Rfl:   •  atorvastatin (LIPITOR) 40 mg tablet, Take 1 tablet (40 mg total) by mouth daily with dinner, Disp: 90 tablet, Rfl: 1  •  B Complex Vitamins (B COMPLEX 100 PO), Take 1 capsule by mouth daily after lunch not taking, Disp: , Rfl:   •  calcium carbonate (OS-JOHN) 1250 (500 Ca) MG tablet, Take 1 tablet by mouth daily after lunch, Disp: , Rfl:   •  carbidopa-levodopa (SINEMET)  mg per tablet, Take 1 tablet by mouth 3 (three) times a day before meals, Disp: 270 tablet, Rfl: 3  •  cholecalciferol (VITAMIN D3) 1,000 units tablet, Take 5,000 Units by mouth daily after lunch, Disp: , Rfl:   •  Cyanocobalamin (VITAMIN B 12 PO), Take 1 capsule by mouth daily, Disp: , Rfl:   •  fluticasone (FLONASE) 50 mcg/act nasal spray, 2 sprays into each nostril daily, Disp: 16 g, Rfl: 11  •  multivitamin (THERAGRAN) TABS, Take 1 tablet by mouth every morning, Disp: , Rfl:   •  pantoprazole (PROTONIX) 20 mg tablet, Take 20 mg by mouth daily, Disp: , Rfl:   •  pantoprazole (PROTONIX) 40 mg tablet, TAKE 1 TABLET DAILY, Disp: 90 tablet, Rfl: 3  •  Potassium Gluconate 595 (99 K) MG TABS, Take 1 each by mouth every other day, Disp: , Rfl:   •  Probiotic Product (PROBIOTIC-10) CAPS, Take 1 capsule by mouth daily after lunch, Disp: , Rfl:   •  trospium chloride (SANCTURA) 20 mg tablet, Take 1 tablet (20 mg total) by mouth 2 (two) times a day, Disp: 180 tablet, Rfl: 2  •  LORazepam (ATIVAN) 1 mg tablet, Take 1 tablet (1 mg total) by mouth see  administration instructions Take 1 tablet 1 hour prior to MRI, take 2nd tablet immediately before if needed. (Patient not taking: Reported on 7/22/2024), Disp: 2 tablet, Rfl: 0  •  polyethylene glycol (MIRALAX) 17 g packet, Take 17 g by mouth daily as needed (constipation if not responding to senna/docusate) (Patient not taking: Reported on 7/22/2024), Disp: , Rfl:   Allergies   Allergen Reactions   • Caffeine - Food Allergy GI Intolerance   • Medical Tape Other (See Comments)   • Iodine - Food Allergy Rash   • Latex Rash   • Other Rash     Adhesive tape       Vitals:    07/22/24 1319   BP: 142/80   Pulse: 80   Resp: 16   SpO2: 98%       Physical Exam  Vitals reviewed.   Constitutional:       General: She is not in acute distress.     Appearance: Normal appearance. She is not ill-appearing or toxic-appearing.   HENT:      Head: Normocephalic and atraumatic.      Nose: Nose normal.      Mouth/Throat:      Mouth: Mucous membranes are moist.   Eyes:      General: No scleral icterus.  Cardiovascular:      Rate and Rhythm: Normal rate.   Pulmonary:      Effort: Pulmonary effort is normal.   Abdominal:      General: Abdomen is flat. There is no distension.      Palpations: Abdomen is soft. There is no mass.      Tenderness: There is no abdominal tenderness. There is no guarding.   Musculoskeletal:         General: Normal range of motion.      Cervical back: Normal range of motion and neck supple.   Skin:     General: Skin is warm and dry.      Coloration: Skin is not jaundiced.   Neurological:      General: No focal deficit present.      Mental Status: She is alert and oriented to person, place, and time.   Psychiatric:         Mood and Affect: Mood normal.         Behavior: Behavior normal.         Thought Content: Thought content normal.         Judgment: Judgment normal.             Imaging  MRI abdomen w wo contrast and mrcp    Result Date: 7/9/2024  Narrative: MRI OF THE ABDOMEN WITH AND WITHOUT CONTRAST WITH MRCP  "INDICATION: 84 years / Female. D49.0: Neoplasm of unspecified behavior of digestive system. Excerpt from Surgical Oncology progress note dated 7/3/2024:  \"83-year-old female status post distal pancreatectomy and splenectomy for an enlarging pancreatic cyst.  Pathology revealed multifocal IPMN.  There was mucinous cyst without high-grade dysplasia at the margin.  I recommended observation.  We will repeat her imaging in 6 months.  I will see her again at that time for another clinical exam.  She is agreeable to this plan.  All her questions were answered\" COMPARISON: MRI abdomen 12/13/2023. TECHNIQUE: Multiplanar/multisequence MRI of the abdomen with 3D MRCP was performed before and after administration of contrast. IV Contrast: 7 mL of Gadobutrol injection (SINGLE-DOSE) FINDINGS: LOWER CHEST: Unremarkable. LIVER: Hepatomegaly with reidentified severe hepatic steatosis. No suspicious mass. Patent hepatic and portal veins. BILE DUCTS: No intrahepatic or extrahepatic bile duct dilation. Common bile duct is normal in caliber. No choledocholithiasis, biliary stricture or suspicious mass. GALLBLADDER: Normal. PANCREAS: Status post distal pancreatectomy. Reidentified pancreas divisum. Unchanged 13 mm distal residual pancreatic body cyst with numerous other smaller cysts. No suspicious components. #5/15. ADRENAL GLANDS: Unremarkable. SPLEEN: Splenectomy. KIDNEYS/PROXIMAL URETERS: No hydroureteronephrosis. No suspicious renal mass. BOWEL: No dilated loops of bowel. PERITONEUM/RETROPERITONEUM: No ascites. LYMPH NODES: No abdominal lymphadenopathy. VESSELS: No aneurysm. ABDOMINAL WALL: Unremarkable BONES: No suspicious osseous lesion.     Impression: Unchanged pancreatic cysts measuring up to 13 mm in this patient status post distal pancreatectomy for mucinous cyst. Follow-up interval as per surgical oncology. Workstation performed: [id number]     I personally reviewed and interpreted the above imaging data.          "

## 2024-07-22 NOTE — PROGRESS NOTES
Surgical Oncology Follow Up       [facility] [place]  [address]  [address]  [place] [address]  [phone]    Radha Vidal  1940  [id number]  [facility]  [facility] [place]  [address]  [address]  [place] [address]  [phone]    1. IPMN (intraductal papillary mucinous neoplasm)  Assessment & Plan:  Patient is doing well s/p distal pancreatectomy.  MRI shows stable cysts in the remaining pancreas. Will plan to repeat imaging in 6 months.   Orders:  -     BUN; Future; Expected date: 01/01/2025  -     Creatinine, serum; Future; Expected date: 01/01/2025  -     MRI abdomen w wo contrast and mrcp; Future; Expected date: 01/22/2025         Chief Complaint   Patient presents with    Follow-up     6 mo IPMN f/u       Return in about 6 months (around 1/22/2025) for Office visit with Dr Julien, Imaging - See orders.        History of Present Illness: This is an 85 y/o female who returns to the office today in follow-up for IPMN.  She is 1 year s/p distal pancreatectomy at which time a mucinous neoplasm with high grade dysplasia was resected.  She reports her appetite has not completely returned but she isn't losing any weight.  She denies any nausea, vomiting, diarrhea or abdominal pain.  She states she feels well overall.  MRI was performed on July 3 and I have reviewed these results with the patient today.      Review of Systems   Constitutional:  Negative for activity change, appetite change, fatigue and unexpected weight change.   HENT: Negative.     Respiratory: Negative.     Cardiovascular: Negative.    Gastrointestinal: Negative.  Negative for abdominal distention, abdominal pain, nausea and vomiting.   Musculoskeletal: Negative.    Skin: Negative.  Negative for color change.   Neurological: Negative.  Negative for dizziness and headaches.    Hematological: Negative.    Psychiatric/Behavioral: Negative.               Patient Active Problem List   Diagnosis    OAB (overactive bladder)    Parkinson's disease    Primary insomnia    History of skin cancer    Seasonal allergic rhinitis    Impaired fasting glucose    Obesity (BMI 30-39.9)    Claustrophobia    Mitral valve disorder    Menopause ovarian failure    Vitamin D deficiency    Dysphagia    Multiple thyroid nodules    Gastro-esophageal reflux disease without esophagitis    Chronic idiopathic constipation    History of adenomatous polyp of colon    Tremor    IPMN (intraductal papillary mucinous neoplasm)    Cherry angioma    Postablative hypothyroidism    Sleep apnea    Cerebrovascular disease    Age-related osteoporosis without current pathological fracture    Urge incontinence    s/p distal pancreatectomy/splenectomy    Paroxysmal atrial fibrillation (HCC)    Ambulatory dysfunction    Small bowel obstruction (HCC)    Symptomatic bradycardia     Past Medical History:   Diagnosis Date    Actinic keratosis 2016    Acute midline low back pain without sciatica 2020    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     Benign neoplasm of skin     Cancer (HCC)     skin    Chest pain     Claustrophobia     Closed compression fracture of body of L1 vertebra (HCC)     Diverticulitis     Diverticulitis     Fracture     L1-L2    GERD (gastroesophageal reflux disease)     H. pylori infection 2020    Muscle weakness     Nausea 2024    Nonmelanoma skin cancer     last assessed 2017    Palpitations     Pancreatic cyst     Seasonal allergies     Seborrheic keratosis 2014    Sleep apnea     no cpap    Spontaneous      without mention of complications     Squamous cell carcinoma of forehead 2014    Syncope      Past Surgical History:   Procedure Laterality Date    ABDOMINAL ADHESION SURGERY N/A 2023    Procedure: LYSIS ADHESIONS;  Surgeon:  Kyle Julien MD;  Location[facility];  Service: Surgical Oncology    BOTOX INJECTION N/A 03/06/2017    Procedure: CYSTOSCOPY; BLADDER BOTOX 100 UNITS ;  Surgeon: Juan Edward MD;  Location[facility];  Service:     BOWEL RESECTION      related ot diverticulitis    CARDIAC CATHETERIZATION      CARDIAC CATHETERIZATION Left 2/2/2024    Procedure: Cardiac Left Heart Cath;  Surgeon: Carrington Kiser DO;  [facility];  Service: Cardiology    CARDIAC ELECTROPHYSIOLOGY PROCEDURE N/A 5/15/2024    Procedure: Cardiac pacer implant;  Surgeon: Brien Woods MD;  Location: [facility];  Service: Cardiology    CARPAL TUNNEL RELEASE Right     COLONOSCOPY  07/31/2019    COLOSTOMY      COLOSTOMY CLOSURE      CYSTOSCOPY  06/01/2021    DISTAL PANCREATECTOMY N/A 07/31/2023    Procedure: DISTAL PANCREATECTOMY;  Surgeon: Kyle Julien MD;  [facility];  Service: Surgical Oncology    FOOT SURGERY Left     neuroma    HYSTERECTOMY      MYRINGOTOMY W/ TUBES Right 12/06/2023    office procedure - Dr. Grace    NAS[place] S[place] SURG[place]  age 39    [place]    PANCREATIC CYST BIOPSY  07/31/2023    TN CYSTOURETHROSCOPY N/A 04/13/2018    Procedure: CYSTOSCOPY WITH BOTOX;  Surgeon: Juan Edward MD;  [facility];  Service: Urology    TN ESOPHAGOGASTRODUODENOSCOPY TRANSORAL DIAGNOSTIC N/A 04/23/2019    Procedure: ESOPHAGOGASTRODUODENOSCOPY (EGD);  Surgeon: Edu Dickerson DO;  Location: [facility];  Service: Gastroenterology    SPLENECTOMY, TOTAL N/A 07/31/2023    Procedure: SPLENECTOMY;  Surgeon: Kyle Julien MD;  [facility];  Service: Surgical Oncology    TONSILLECTOMY  age 49    UPPER GASTROINTESTINAL ENDOSCOPY  04/23/2019     Family History   Problem Relation Age of Onset    Alcohol abuse Mother     Heart disease Mother     Alcohol abuse Father     Alcohol abuse Brother     Cancer Brother     Tremor Neg Hx     Parkinsonism Neg Hx     Colon cancer Neg Hx      Social  History     Socioeconomic History    Marital status:      Spouse name: Not on file    Number of children: Not on file    Years of education: Not on file    Highest education level: Not on file   Occupational History    Occupation: RETIRED    Tobacco Use    Smoking status: Never     Passive exposure: Past    Smokeless tobacco: Never   Vaping Use    Vaping status: Never Used   Substance and Sexual Activity    Alcohol use: Not Currently     Comment: patient states rare use on holidays     Drug use: No    Sexual activity: Not Currently   Other Topics Concern    Not on file   Social History Narrative    LIVING INDEPENDENTLY ALONE         No caffeine use     Social Determinants of Health     Financial Resource Strain: Low Risk  (4/19/2023)    Overall Financial Resource Strain (CARDIA)     Difficulty of Paying Living Expenses: Not very hard   Food Insecurity: No Food Insecurity (5/13/2024)    Hunger Vital Sign     Worried About Running Out of Food in the Last Year: Never true     Ran Out of Food in the Last Year: Never true   Transportation Needs: No Transportation Needs (5/13/2024)    PRAPARE - Transportation     Lack of Transportation (Medical): No     Lack of Transportation (Non-Medical): No   Physical Activity: Not on file   Stress: Not on file   Social Connections: Not on file   Intimate Partner Violence: Not on file   Housing Stability: Low Risk  (5/13/2024)    Housing Stability Vital Sign     Unable to Pay for Housing in the Last Year: No     Number of Times Moved in the Last Year: 1     Homeless in the Last Year: No       Current Outpatient Medications:     apixaban (Eliquis) 5 mg, Take 1 tablet (5 mg total) by mouth 2 (two) times a day, Disp: 180 tablet, Rfl: 1    Ascorbic Acid (VITAMIN C) 1000 MG tablet, Take 1,000 mg by mouth daily, Disp: , Rfl:     atorvastatin (LIPITOR) 40 mg tablet, Take 1 tablet (40 mg total) by mouth daily with dinner, Disp: 90 tablet, Rfl: 1    B Complex Vitamins (B COMPLEX 100 PO),  Take 1 capsule by mouth daily after lunch not taking, Disp: , Rfl:     calcium carbonate (OS-JOHN) 1250 (500 Ca) MG tablet, Take 1 tablet by mouth daily after lunch, Disp: , Rfl:     carbidopa-levodopa (SINEMET)  mg per tablet, Take 1 tablet by mouth 3 (three) times a day before meals, Disp: 270 tablet, Rfl: 3    cholecalciferol (VITAMIN D3) 1,000 units tablet, Take 5,000 Units by mouth daily after lunch, Disp: , Rfl:     Cyanocobalamin (VITAMIN B 12 PO), Take 1 capsule by mouth daily, Disp: , Rfl:     fluticasone (FLONASE) 50 mcg/act nasal spray, 2 sprays into each nostril daily, Disp: 16 g, Rfl: 11    multivitamin (THERAGRAN) TABS, Take 1 tablet by mouth every morning, Disp: , Rfl:     pantoprazole (PROTONIX) 20 mg tablet, Take 20 mg by mouth daily, Disp: , Rfl:     pantoprazole (PROTONIX) 40 mg tablet, TAKE 1 TABLET DAILY, Disp: 90 tablet, Rfl: 3    Potassium Gluconate 595 (99 K) MG TABS, Take 1 each by mouth every other day, Disp: , Rfl:     Probiotic Product (PROBIOTIC-10) CAPS, Take 1 capsule by mouth daily after lunch, Disp: , Rfl:     trospium chloride (SANCTURA) 20 mg tablet, Take 1 tablet (20 mg total) by mouth 2 (two) times a day, Disp: 180 tablet, Rfl: 2    LORazepam (ATIVAN) 1 mg tablet, Take 1 tablet (1 mg total) by mouth see administration instructions Take 1 tablet 1 hour prior to MRI, take 2nd tablet immediately before if needed. (Patient not taking: Reported on 7/22/2024), Disp: 2 tablet, Rfl: 0    polyethylene glycol (MIRALAX) 17 g packet, Take 17 g by mouth daily as needed (constipation if not responding to senna/docusate) (Patient not taking: Reported on 7/22/2024), Disp: , Rfl:   Allergies   Allergen Reactions    Caffeine - Food Allergy GI Intolerance    Medical Tape Other (See Comments)    Iodine - Food Allergy Rash    Latex Rash    Other Rash     Adhesive tape       Vitals:    07/22/24 1319   BP: 142/80   Pulse: 80   Resp: 16   SpO2: 98%       Physical Exam  Vitals reviewed.  "  Constitutional:       General: She is not in acute distress.     Appearance: Normal appearance. She is not ill-appearing or toxic-appearing.   HENT:      Head: Normocephalic and atraumatic.      Nose: Nose normal.      Mouth/Throat:      Mouth: Mucous membranes are moist.   Eyes:      General: No scleral icterus.  Cardiovascular:      Rate and Rhythm: Normal rate.   Pulmonary:      Effort: Pulmonary effort is normal.   Abdominal:      General: Abdomen is flat. There is no distension.      Palpations: Abdomen is soft. There is no mass.      Tenderness: There is no abdominal tenderness. There is no guarding.   Musculoskeletal:         General: Normal range of motion.      Cervical back: Normal range of motion and neck supple.   Skin:     General: Skin is warm and dry.      Coloration: Skin is not jaundiced.   Neurological:      General: No focal deficit present.      Mental Status: She is alert and oriented to person, place, and time.   Psychiatric:         Mood and Affect: Mood normal.         Behavior: Behavior normal.         Thought Content: Thought content normal.         Judgment: Judgment normal.             Imaging  MRI abdomen w wo contrast and mrcp    Result Date: 7/9/2024  Narrative: MRI OF THE ABDOMEN WITH AND WITHOUT CONTRAST WITH MRCP INDICATION: 84 years / Female. D49.0: Neoplasm of unspecified behavior of digestive system. Excerpt from Surgical Oncology progress note dated 7/3/2024:  \"83-year-old female status post distal pancreatectomy and splenectomy for an enlarging pancreatic cyst.  Pathology revealed multifocal IPMN.  There was mucinous cyst without high-grade dysplasia at the margin.  I recommended observation.  We will repeat her imaging in 6 months.  I will see her again at that time for another clinical exam.  She is agreeable to this plan.  All her questions were answered\" COMPARISON: MRI abdomen 12/13/2023. TECHNIQUE: Multiplanar/multisequence MRI of the abdomen with 3D MRCP was performed " before and after administration of contrast. IV Contrast: 7 mL of Gadobutrol injection (SINGLE-DOSE) FINDINGS: LOWER CHEST: Unremarkable. LIVER: Hepatomegaly with reidentified severe hepatic steatosis. No suspicious mass. Patent hepatic and portal veins. BILE DUCTS: No intrahepatic or extrahepatic bile duct dilation. Common bile duct is normal in caliber. No choledocholithiasis, biliary stricture or suspicious mass. GALLBLADDER: Normal. PANCREAS: Status post distal pancreatectomy. Reidentified pancreas divisum. Unchanged 13 mm distal residual pancreatic body cyst with numerous other smaller cysts. No suspicious components. #5/15. ADRENAL GLANDS: Unremarkable. SPLEEN: Splenectomy. KIDNEYS/PROXIMAL URETERS: No hydroureteronephrosis. No suspicious renal mass. BOWEL: No dilated loops of bowel. PERITONEUM/RETROPERITONEUM: No ascites. LYMPH NODES: No abdominal lymphadenopathy. VESSELS: No aneurysm. ABDOMINAL WALL: Unremarkable BONES: No suspicious osseous lesion.     Impression: Unchanged pancreatic cysts measuring up to 13 mm in this patient status post distal pancreatectomy for mucinous cyst. Follow-up interval as per surgical oncology. Workstation performed: [id number]     I personally reviewed and interpreted the above imaging data.

## 2024-07-22 NOTE — ASSESSMENT & PLAN NOTE
Patient is doing well s/p distal pancreatectomy.  MRI shows stable cysts in the remaining pancreas. Will plan to repeat imaging in 6 months.

## 2024-08-26 ENCOUNTER — TELEPHONE (OUTPATIENT)
Age: 84
End: 2024-08-26

## 2024-08-26 DIAGNOSIS — Z79.01 ANTICOAGULATED: ICD-10-CM

## 2024-08-26 NOTE — TELEPHONE ENCOUNTER
Pt Eliquis used to cost 169.00 for a 90 day supply and now it will cost $411 dollars and she cannot afford it, but is unsure why the cost climbed so high.

## 2024-08-26 NOTE — TELEPHONE ENCOUNTER
Patient can get 1 month samples.    Please see if she is eligible for patient assistance.  Otherwise, she may need to consider switching to warfarin.

## 2024-08-27 NOTE — TELEPHONE ENCOUNTER
Spoke with patient she will go to [employer] to drop office her SS income and get application done today .    Patient is aware she will only get 1 month sample if she not cover she can be changed to warfarin

## 2024-08-27 NOTE — TELEPHONE ENCOUNTER
Pt came to [employer] & dropped off her proof of income & also filled out the application  The forms have been scanned into [employer]

## 2024-08-28 DIAGNOSIS — Z79.01 ANTICOAGULATED: ICD-10-CM

## 2024-08-28 NOTE — TELEPHONE ENCOUNTER
Form was signed and prescription was signed and given to Sandra.   Breath sounds clear and equal bilaterally.

## 2024-08-28 NOTE — TELEPHONE ENCOUNTER
Forms completed and faxed to [facility] [phone]  Scanned completed forms in pts chart.

## 2024-09-13 DIAGNOSIS — Z79.01 ANTICOAGULATED: ICD-10-CM

## 2024-09-13 NOTE — TELEPHONE ENCOUNTER
Reason for call:   [x] Refill   [] Prior Auth  [x] Other: Patient stated she wants to stay on Eliquis and not take Coumadin    Office:   [] PCP/Provider -   [x] Specialty/Provider - [facility] [place]     Medication: apixaban (Eliquis) 5 mg     Dose/Frequency: 5 mg, 2 times daily     Quantity: 180    Pharmacy: [facility]     Does the patient have enough for 3 days?   [x] Yes   [] No - Send as HP to POD

## 2024-10-03 ENCOUNTER — IN-CLINIC DEVICE VISIT (OUTPATIENT)
Dept: CARDIOLOGY CLINIC | Facility: CLINIC | Age: 84
End: 2024-10-03
Payer: COMMERCIAL

## 2024-10-03 DIAGNOSIS — Z95.0 PRESENCE OF PERMANENT CARDIAC PACEMAKER: Primary | ICD-10-CM

## 2024-10-03 PROCEDURE — 93280 PM DEVICE PROGR EVAL DUAL: CPT | Performed by: INTERNAL MEDICINE

## 2024-10-03 NOTE — PROGRESS NOTES
MDT DC PM/ACTIVE SYSTEM IS MRI CONDITIONAL   DEVICE INTERROGATED IN THE [place] OFFICE:  BATTERY VOLTAGE ADEQUATE (13.7 YR.).  AP 77.6%  0.2%.  ALL LEAD PARAMETERS WITHIN NORMAL LIMITS.  NO SIGNIFICANT HIGH RATE EPISODES.  NO PROGRAMMING CHANGES MADE TO DEVICE PARAMETERS.  NORMAL DEVICE FUNCTION.  RG

## 2024-10-09 ENCOUNTER — OFFICE VISIT (OUTPATIENT)
Dept: GASTROENTEROLOGY | Facility: CLINIC | Age: 84
End: 2024-10-09
Payer: COMMERCIAL

## 2024-10-09 ENCOUNTER — TELEPHONE (OUTPATIENT)
Age: 84
End: 2024-10-09

## 2024-10-09 VITALS
HEART RATE: 73 BPM | OXYGEN SATURATION: 95 % | WEIGHT: 166 LBS | HEIGHT: 59 IN | TEMPERATURE: 97.2 F | SYSTOLIC BLOOD PRESSURE: 112 MMHG | BODY MASS INDEX: 33.47 KG/M2 | DIASTOLIC BLOOD PRESSURE: 68 MMHG

## 2024-10-09 DIAGNOSIS — K21.9 GASTRO-ESOPHAGEAL REFLUX DISEASE WITHOUT ESOPHAGITIS: Primary | ICD-10-CM

## 2024-10-09 DIAGNOSIS — K59.04 CHRONIC IDIOPATHIC CONSTIPATION: ICD-10-CM

## 2024-10-09 DIAGNOSIS — D49.0 IPMN (INTRADUCTAL PAPILLARY MUCINOUS NEOPLASM): ICD-10-CM

## 2024-10-09 PROCEDURE — 99213 OFFICE O/P EST LOW 20 MIN: CPT | Performed by: PHYSICIAN ASSISTANT

## 2024-10-09 NOTE — TELEPHONE ENCOUNTER
Patient wanted to know if she should still do her echo since she has a pacemaker? Wanted to ask , can you please advise and give her a call back. Thanks        [phone] (Home)

## 2024-10-09 NOTE — ASSESSMENT & PLAN NOTE
-Will discontinue Miralax for now. She is having spontaneous BM's with no alarm symptoms.       Follow up in 1 year.

## 2024-10-09 NOTE — ASSESSMENT & PLAN NOTE
-Continue surg onc follow up.     -Repeat MRI/MRCP is ordered.     -MRI/MRCP from 7/2024 showed stable pancreatic cysts.

## 2024-10-09 NOTE — TELEPHONE ENCOUNTER
At this time, we can hold off on the echocardiogram if she is feeling fine.  She has an appointment next month.  Will discuss further.

## 2024-10-09 NOTE — PROGRESS NOTES
"Ambulatory Visit  Name: Radha Vidal      : 1940      MRN: [id number]  Encounter Provider: Payton Fuller PA-C  Encounter Date: 10/9/2024   Encounter department: [facility] [place]    Assessment & Plan  Gastro-esophageal reflux disease without esophagitis  -Will continue Pantoprazole 40mg QD.     -Continue GERD dietary restrictions.       IPMN (intraductal papillary mucinous neoplasm)  -Continue surg onc follow up.     -Repeat MRI/MRCP is ordered.     -MRI/MRCP from 2024 showed stable pancreatic cysts.        Chronic idiopathic constipation  -Will discontinue Miralax for now. She is having spontaneous BM's with no alarm symptoms.       Follow up in 1 year.     History of Present Illness     Radha Vidal is a 84 y.o. female who presents for routine follow-up in regard to her IBS-C as well as GERD.    Overall patient is doing great.  She reports that she actually stopped taking her MiraLAX and stool softeners because she is having spontaneous bowel movements.  She reports that she has a bowel movement generally every day or every other day.  She denies any melena or rectal bleeding.  In regard to her acid reflux she is still taking pantoprazole 40 mg which does control her symptoms significantly.  She denies any regurgitation or dysphagia.    In regard to patient's history of IPMN she is doing well in regard to this.  She most recently had an MRI/MRCP in 2024 that does show stable pancreatic cyst.  Patient does still follow regularly with surgical oncology.    Patient's weight is stable.    Review of Systems   All other systems reviewed and are negative.      Objective     /68 (BP Location: Right arm, Patient Position: Sitting, Cuff Size: Standard)   Pulse 73   Temp (!) 97.2 °F (36.2 °C) (Temporal)   Ht 4' 11\" (1.499 m)   Wt 75.3 kg (166 lb)   SpO2 95%   BMI 33.53 kg/m²     Physical Exam  Vitals reviewed.   Constitutional:       Appearance: Normal " appearance.   Neurological:      Mental Status: She is alert.

## 2024-10-09 NOTE — TELEPHONE ENCOUNTER
Spoke with pt relayed  advised, pt verbally understood.    Pt stated that she feels fine and will proceed with getting the echocardiogram done.

## 2024-10-09 NOTE — PATIENT INSTRUCTIONS
"Patient Education     Acid reflux and GERD in adults   The Basics   Written by the doctors and editors at [facility]   What is acid reflux? -- Acid reflux is when the acid that is normally in the stomach backs up into the esophagus (figure 1). The esophagus is the tube that carries food from the mouth to the stomach.  When acid reflux causes bothersome symptoms or damage, doctors call it \"gastroesophageal reflux disease\" (\"GERD\").  What are the symptoms of acid reflux? -- The most common symptoms are:   Heartburn, which is a burning feeling in the chest   Regurgitation, which is when acid and undigested food flow back into your throat or mouth  Other symptoms might include:   Stomach or chest pain   Trouble swallowing   Raspy voice or sore throat   Unexplained cough   Nausea or vomiting  Is there anything I can do on my own to feel better? -- Yes. You might feel better if you:   Lose weight (if you are overweight).   Raise the head of your bed by 6 to 8 inches - You can do this by putting blocks of wood or rubber under 2 legs of the bed or a foam wedge under the mattress. It is not enough to sleep with your head raised on pillows.   Avoid foods that make your symptoms worse - For some people, these include coffee, chocolate, alcohol, peppermint, and fatty foods. It might help to write down what you ate before having reflux. This can help you figure out if a food is causing your problem.   Stop smoking, if you smoke. Your doctor or nurse can help you try to quit.   Avoid late meals - Lying down with a full stomach can make reflux worse. Try to plan meals for at least 2 to 3 hours before bedtime.   Avoid tight clothing - Some people feel better if they wear comfortable clothing that does not squeeze the stomach area.  How is acid reflux treated? -- There are a few main types of medicines that can help with the symptoms of acid reflux. The most common are antacids, histamine blockers, and proton pump inhibitors (table " "1). All of these medicines work by reducing or blocking stomach acid. But they each do that in a different way.   For mild symptoms, antacids can help, but they work only for a short time. Histamine blockers are stronger and last longer than antacids. You can buy antacids and most histamine blockers without a prescription.   For frequent and more severe symptoms, proton pump inhibitors are the most effective medicines. Some of these medicines are sold without a prescription. But there are other versions that your doctor can prescribe.  Sometimes, medicines cost less if you get them with a doctor's prescription. Other times, non-prescription medicines cost less. If you are worried about cost, ask your pharmacist about ways to pay less for your medicines.  When should I call the doctor? -- While pain or burning in the chest can be a symptom of acid reflux, it can also be a symptom of something more serious like a heart problem. Call for emergency help right away (in the [place] and [place], call 9-1-1) if you think that you might be having a heart attack.  Symptoms of a heart attack might include:   Severe chest pain, pressure, or discomfort with:   Breathing trouble, sweating, upset stomach, or cold and clammy skin   Pain in your arms, back, or jaw   Worse pain with activity like walking up stairs   Fast or irregular heartbeat   Feeling dizzy, faint, or weak  Some people can manage their acid reflux on their own by changing their habits or taking non-prescription medicines. Call your doctor for advice if:   Your symptoms are severe or last a long time.   You cannot seem to control your symptoms.   You have had symptoms for many years.  You should also see a doctor or nurse right away if you:   Have trouble swallowing, or feel as though food gets \"stuck\" on the way down   Lose weight when you are not trying to   Have chest pain   Choke when you eat   Vomit blood, or have bowel movements that are red, black, or look like " "tar  What if my child or teen has acid reflux? -- If your child or teen has acid reflux, take them to see a doctor or nurse. Do not give your child medicines to treat acid reflux without talking to a doctor or nurse.  In children, acid reflux can be caused by a number of problems. Have a doctor or nurse check for these problems before trying any treatments.  All topics are updated as new evidence becomes available and our peer review process is complete.  This topic retrieved from [employer] on: Mar 29, 2024.  Topic 25881 Version 15.0  Release: 32.2.4 - C32.87  © 2024 UpToDate, Inc. and/or its affiliates. All rights reserved.  figure 1: Acid reflux     Acid reflux is when the acid that is normally in the stomach backs up into the esophagus. This can happen if a muscle called the \"lower esophageal sphincter\" relaxes too much.  Graphic 443265 Version 1.0  table 1: Medicines that reduce or block stomach acid  Medicine type  Medicine name examples    Antacids* Calcium carbonate (sample brand names: Maalox, Tums)    Aluminum hydroxide, magnesium hydroxide, and simethicone (sample brand name: Mylanta)   Surface agents Sucralfate (brand name: Carafate)   Histamine blockers¶  Famotidine (brand name: Pepcid)    Cimetidine (brand name: Tagamet)   Proton pump inhibitors Omeprazole (brand name: Prilosec)    Esomeprazole (brand name: Nexium)    Pantoprazole (brand name: Protonix)    Lansoprazole (brand name: Prevacid)    Dexlansoprazole (brand name: Dexilant)    Rabeprazole (brand name: AcipHex)   * Some antacids contain aspirin, which can increase the risk of internal bleeding. Examples of antacids with aspirin include Kavitha-Thayne, Medi-Thayne, and Neutralin. But there are others, too, so it's important to check labels. Talk to your doctor or nurse before taking any medicines that contain aspirin.  ¶ Another histamine blocker, ranitidine (brand name: Zantac), stopped being sold in 2020. That's because it was found to sometimes " contain a substance that could increase a person's risk of cancer if they took a lot of it over time. If you have any of this medicine in your home, stop taking it and throw away any that is left over. Ask your doctor or nurse about what other medicine you should use instead.  Graphic 45875 Version 15.0  Consumer Information Use and Disclaimer   Disclaimer: This generalized information is a limited summary of diagnosis, treatment, and/or medication information. It is not meant to be comprehensive and should be used as a tool to help the user understand and/or assess potential diagnostic and treatment options. It does NOT include all information about conditions, treatments, medications, side effects, or risks that may apply to a specific patient. It is not intended to be medical advice or a substitute for the medical advice, diagnosis, or treatment of a health care provider based on the health care provider's examination and assessment of a patient's specific and unique circumstances. Patients must speak with a health care provider for complete information about their health, medical questions, and treatment options, including any risks or benefits regarding use of medications. This information does not endorse any treatments or medications as safe, effective, or approved for treating a specific patient. UpToDate, Inc. and its affiliates disclaim any warranty or liability relating to this information or the use thereof.The use of this information is governed by the Terms of Use, available at https://www.woltersAIRSISuwer.com/en/know/clinical-effectiveness-terms. 2024© UpToDate, Inc. and its affiliates and/or licensors. All rights reserved.  Copyright   © 2024 UpToDate, Inc. and/or its affiliates. All rights reserved.

## 2024-10-15 ENCOUNTER — OFFICE VISIT (OUTPATIENT)
Dept: NEUROLOGY | Facility: CLINIC | Age: 84
End: 2024-10-15
Payer: COMMERCIAL

## 2024-10-15 VITALS
HEIGHT: 59 IN | WEIGHT: 166 LBS | SYSTOLIC BLOOD PRESSURE: 150 MMHG | HEART RATE: 92 BPM | BODY MASS INDEX: 33.47 KG/M2 | OXYGEN SATURATION: 100 % | DIASTOLIC BLOOD PRESSURE: 80 MMHG

## 2024-10-15 DIAGNOSIS — G20.A1 PARKINSON'S DISEASE (HCC): ICD-10-CM

## 2024-10-15 DIAGNOSIS — I48.0 PAROXYSMAL ATRIAL FIBRILLATION (HCC): ICD-10-CM

## 2024-10-15 PROCEDURE — 99214 OFFICE O/P EST MOD 30 MIN: CPT | Performed by: PSYCHIATRY & NEUROLOGY

## 2024-10-15 RX ORDER — CARBIDOPA AND LEVODOPA 25; 100 MG/1; MG/1
1 TABLET ORAL
Qty: 270 TABLET | Refills: 3 | Status: SHIPPED | OUTPATIENT
Start: 2024-10-15

## 2024-10-15 NOTE — ASSESSMENT & PLAN NOTE
Orders:    carbidopa-levodopa (SINEMET)  mg per tablet; Take 1 tablet by mouth 3 (three) times a day before meals    And is doing well on Sinemet 25/100 mg 1 tablet 3 times a day, she feels her tremor is well-controlled, she is not having any side effects to the medication and tells me that her gastroenterologist has agreed for her to continue with the medication.    She continues to remain physically active and is doing home exercise program and is able to do her ADLs, she was advised to take fall and safety and aspiration precautions, continue with mentally stimulating exercises to eat a healthy nutritious diet and to take a multivitamin every day.  To keep her blood pressure, cholesterol, sugar under control, to go to the hospital if has any worsening symptoms and call me otherwise to see me back in 6 months or sooner if needed and follow-up with the other physicians.     Difficult Access

## 2024-10-15 NOTE — PROGRESS NOTES
Neurology Ambulatory Visit  Name: Radha Vidal       : 1940       MRN: [id number]   Encounter Provider: Haley Ellis MD   Encounter Date: 10/15/2024  Encounter department: [facility]      Radha Vidal is a 84 y.o. female.       Assessment & Plan  Parkinson's disease (HCC)    Orders:    carbidopa-levodopa (SINEMET)  mg per tablet; Take 1 tablet by mouth 3 (three) times a day before meals    And is doing well on Sinemet 25/100 mg 1 tablet 3 times a day, she feels her tremor is well-controlled, she is not having any side effects to the medication and tells me that her gastroenterologist has agreed for her to continue with the medication.    She continues to remain physically active and is doing home exercise program and is able to do her ADLs, she was advised to take fall and safety and aspiration precautions, continue with mentally stimulating exercises to eat a healthy nutritious diet and to take a multivitamin every day.  To keep her blood pressure, cholesterol, sugar under control, to go to the hospital if has any worsening symptoms and call me otherwise to see me back in 6 months or sooner if needed and follow-up with the other physicians.    Paroxysmal atrial fibrillation (HCC)         She is on Eliquis status post pacemaker and is in follow-up with cardiology.    Subjective:  Chief Complaint   Patient presents with    Parkinson's disease, unspecified whether dyskinesia present       HISTORY OF PRESENT ILLNESS    Patient is here in follow-up for her history of Parkinson's disease, since the last visit she tells me that she had a pacemaker for her history of atrial fibrillation and her tremor seems to be well-controlled on Sinemet she denies having any side effects she did speak to her gastroenterologist and he told her it was okay for her to continue with Sinemet, she has not had any falls, no freezing episodes, no early morning stiffness, no swallowing  difficulty, she feels her bowel movement is okay though she has some mild constipation, memory is good, sleep is good, she is able to do her ADLs, she does have urinary incontinence and does follow-up with the urologist, she is also in follow-up with her surgical oncologist regarding her history of pancreas and spleen surgery, appetite is good weight has been stable no other complaints        Prior Work-up:   MRI: Brain without contrast from 1/15/2024 showed white matter changes suggestive of chronic microangiopathy, no acute intracranial pathology.       Past Medical History:    Past Medical History:   Diagnosis Date    Actinic keratosis 2016    Acute midline low back pain without sciatica 2020    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     Benign neoplasm of skin     Cancer (HCC)     skin    Chest pain     Claustrophobia     Closed compression fracture of body of L1 vertebra (HCC)     Diverticulitis     Diverticulitis     Fracture     L1-L2    GERD (gastroesophageal reflux disease)     H. pylori infection 2020    Muscle weakness     Nausea 2024    Nonmelanoma skin cancer     last assessed 2017    Palpitations     Pancreatic cyst     Seasonal allergies     Seborrheic keratosis 2014    Sleep apnea     no cpap    Spontaneous      without mention of complications     Squamous cell carcinoma of forehead 2014    Syncope      Past Surgical History:   Procedure Laterality Date    ABDOMINAL ADHESION SURGERY N/A 2023    Procedure: LYSIS ADHESIONS;  Surgeon: Kyle Julien MD;  [facility]: [facility];  Service: Surgical Oncology    BOTOX INJECTION N/A 2017    Procedure: CYSTOSCOPY; BLADDER BOTOX 100 UNITS ;  Surgeon: Juan Edward MD;  [facility];  Service:     BOWEL RESECTION      related ot diverticulitis    CARDIAC CATHETERIZATION      CARDIAC CATHETERIZATION Left 2024    Procedure: Cardiac Left Heart Cath;  Surgeon:  Carrington Kiser DO;  [facility];  Service: Cardiology    CARDIAC ELECTROPHYSIOLOGY PROCEDURE N/A 5/15/2024    Procedure: Cardiac pacer implant;  Surgeon: Brien Woods MD;  Location: [facility];  Service: Cardiology    CARPAL TUNNEL RELEASE Right     COLONOSCOPY  07/31/2019    COLOSTOMY      COLOSTOMY CLOSURE      CYSTOSCOPY  06/01/2021    DISTAL PANCREATECTOMY N/A 07/31/2023    Procedure: DISTAL PANCREATECTOMY;  Surgeon: Kyle Julien MD;  [facility];  Service: Surgical Oncology    FOOT SURGERY Left     neuroma    HYSTERECTOMY      MYRINGOTOMY W/ TUBES Right 12/06/2023    office procedure - Dr. Grace    NASAL SINUS SURGERY  age 39    [place]    PANCREATIC CYST BIOPSY  07/31/2023    OK CYSTOURETHROSCOPY N/A 04/13/2018    Procedure: CYSTOSCOPY WITH BOTOX;  Surgeon: Juan Edward MD;  [facility];  Service: Urology    OK ESOPHAGOGASTRODUODENOSCOPY TRANSORAL DIAGNOSTIC N/A 04/23/2019    Procedure: ESOPHAGOGASTRODUODENOSCOPY (EGD);  Surgeon: Edu Dickerson DO;  [facility]: [facility];  Service: Gastroenterology    SPLENECTOMY, TOTAL N/A 07/31/2023    Procedure: SPLENECTOMY;  Surgeon: Kyle Julien MD;  [facility];  Service: Surgical Oncology    TONSILLECTOMY  age 49    UPPER GASTROINTESTINAL ENDOSCOPY  04/23/2019       Family History:  Family History   Problem Relation Age of Onset    Alcohol abuse Mother     Heart disease Mother     Alcohol abuse Father     Alcohol abuse Brother     Cancer Brother     Tremor Neg Hx     Parkinsonism Neg Hx     Colon cancer Neg Hx        Social History:  Social History     Tobacco Use    Smoking status: Never     Passive exposure: Past    Smokeless tobacco: Never   Vaping Use    Vaping status: Never Used   Substance Use Topics    Alcohol use: Not Currently     Comment: patient states rare use on holidays     Drug use: No      Living situation:    Work:      Allergies:  Allergies   Allergen Reactions    Caffeine -  Food Allergy GI Intolerance    Medical Tape Other (See Comments)    Iodine - Food Allergy Rash    Latex Rash    Other Rash     Adhesive tape         Medications:    Current Outpatient Medications:     apixaban (Eliquis) 5 mg, Take 1 tablet (5 mg total) by mouth 2 (two) times a day, Disp: 180 tablet, Rfl: 3    Ascorbic Acid (VITAMIN C) 1000 MG tablet, Take 1,000 mg by mouth daily, Disp: , Rfl:     atorvastatin (LIPITOR) 40 mg tablet, Take 1 tablet (40 mg total) by mouth daily with dinner, Disp: 90 tablet, Rfl: 1    B Complex Vitamins (B COMPLEX 100 PO), Take 1 capsule by mouth daily after lunch not taking, Disp: , Rfl:     calcium carbonate (OS-JOHN) 1250 (500 Ca) MG tablet, Take 1 tablet by mouth daily after lunch, Disp: , Rfl:     carbidopa-levodopa (SINEMET)  mg per tablet, Take 1 tablet by mouth 3 (three) times a day before meals, Disp: 270 tablet, Rfl: 3    cholecalciferol (VITAMIN D3) 1,000 units tablet, Take 5,000 Units by mouth daily after lunch, Disp: , Rfl:     Cyanocobalamin (VITAMIN B 12 PO), Take 1 capsule by mouth daily, Disp: , Rfl:     LORazepam (ATIVAN) 1 mg tablet, Take 1 tablet (1 mg total) by mouth see administration instructions Take 1 tablet 1 hour prior to MRI, take 2nd tablet immediately before if needed., Disp: 2 tablet, Rfl: 0    multivitamin (THERAGRAN) TABS, Take 1 tablet by mouth every morning, Disp: , Rfl:     pantoprazole (PROTONIX) 20 mg tablet, Take 20 mg by mouth daily, Disp: , Rfl:     pantoprazole (PROTONIX) 40 mg tablet, TAKE 1 TABLET DAILY, Disp: 90 tablet, Rfl: 3    Potassium Gluconate 595 (99 K) MG TABS, Take 1 each by mouth every other day, Disp: , Rfl:     Probiotic Product (PROBIOTIC-10) CAPS, Take 1 capsule by mouth daily after lunch, Disp: , Rfl:     trospium chloride (SANCTURA) 20 mg tablet, Take 1 tablet (20 mg total) by mouth 2 (two) times a day, Disp: 180 tablet, Rfl: 2    fluticasone (FLONASE) 50 mcg/act nasal spray, 2 sprays into each nostril daily (Patient not  "taking: Reported on 10/9/2024), Disp: 16 g, Rfl: 11    ofloxacin (OCUFLOX) 0.3 % ophthalmic solution, INSTILL 5 DROPS IN LEFT EAR TWO TIMES A DAY FOR THREE DAYS (Patient not taking: Reported on 10/9/2024), Disp: 5 mL, Rfl: 0    polyethylene glycol (MIRALAX) 17 g packet, Take 17 g by mouth daily as needed (constipation if not responding to senna/docusate) (Patient not taking: Reported on 7/22/2024), Disp: , Rfl:     Objective:  /80 (BP Location: Left arm, Patient Position: Sitting, Cuff Size: Large)   Pulse 92   Ht 4' 11\" (1.499 m)   Wt 75.3 kg (166 lb)   SpO2 100%   BMI 33.53 kg/m²      Labs  I have reviewed pertinent labs:  CBC:   Lab Results   Component Value Date    WBC 10.62 (H) 05/16/2024    RBC 4.57 05/16/2024    HGB 13.7 05/16/2024    HCT 40.1 05/16/2024    MCV 88 05/16/2024     05/16/2024    MCH 30.0 05/16/2024    MCHC 34.2 05/16/2024    RDW 15.1 05/16/2024    MPV 11.7 05/16/2024    NEUTROABS 5.61 05/15/2024     CMP:   Lab Results   Component Value Date    SODIUM 138 05/16/2024    K 4.6 05/16/2024     05/16/2024    CO2 24 05/16/2024    AGAP 8 05/16/2024    BUN 22 05/16/2024    CREATININE 0.85 05/16/2024    GLUC 155 (H) 05/16/2024    GLUF 115 (H) 05/01/2024    CALCIUM 9.5 05/16/2024    AST 22 05/12/2024    ALT 4 (L) 05/12/2024    ALKPHOS 91 05/12/2024    TP 6.6 05/12/2024    ALB 4.4 05/12/2024    TBILI 0.69 05/12/2024    EGFR 62 05/16/2024              General Exam  GENERAL APPEARANCE:  No distress, alert, interactive and cooperative.  CARDIOVASCULAR: Warm and well perfused  LUNGS: normal work of breathing on room air  EXTREMITIES: no peripheral edema     Neurologic Exam  MENTAL STATE:  Orientation was normal to time, place and person without aphasia or apraxia. Recent and remote memory was intact.  Attention span and concentration were normal. Language testing was normal for comprehension, repetition, expression, and naming.     CRANIAL NERVES:  CN 2       visual fields full to " confrontation.  CN 3, 4, 6  Pupils round, 4 mm in diameter, equally reactive to light. Lids symmetric; no ptosis. EOMs normal alignment, full range. No nystagmus.  CN 5  Facial sensation intact bilaterally.  CN 7  Normal and symmetric facial strength.   CN 8  Hearing intact to finger rub bilaterally.  CN 9  Palate elevates symmetrically.  CN 11  Normal strength of shoulder shrug and neck turning.  CN 12  Tongue midline, with normal bulk and strength.     MOTOR:  Motor exam was normal. Muscle bulk  were normal in both upper and lower extremities. Muscle strength was 5/5 in distal and proximal muscles in both upper and lower extremities. No fasciculations,  or other abnormal movements were present.  Patient has bilateral cogwheeling rigidity, mild resting tremor of bilateral upper extremities especially while walking     REFLEXES:  RIGHT UE   LEFT UE  BR:2              BR:2    Biceps:2      Biceps:2    Triceps:2     Triceps:2       RIGHT LLE   LEFT LLE    Knee:2           Knee:2    Ankle:2         Ankle:2             COORDINATION:   Coordination exam was normal. In both upper extremities, finger-nose-finger was intact without dysmetria or overshoot.      GAIT:  Ambulates with a stooped posture and decreased arm swing    ROS:  Review of Systems   Constitutional:  Negative for appetite change, fatigue and fever.   HENT: Negative.  Negative for hearing loss, tinnitus, trouble swallowing and voice change.    Eyes: Negative.  Negative for photophobia, pain and visual disturbance.   Respiratory: Negative.  Negative for shortness of breath.    Cardiovascular: Negative.  Negative for palpitations.   Gastrointestinal: Negative.  Negative for nausea and vomiting.   Endocrine: Negative.  Negative for cold intolerance.   Genitourinary: Negative.  Negative for dysuria, frequency and urgency.   Musculoskeletal:  Negative for back pain, gait problem, myalgias, neck pain and neck stiffness.   Skin: Negative.  Negative for rash.    Allergic/Immunologic: Negative.    Neurological: Negative.  Negative for dizziness, tremors, seizures, syncope, facial asymmetry, speech difficulty, weakness, light-headedness, numbness and headaches.   Hematological: Negative.  Does not bruise/bleed easily.   Psychiatric/Behavioral: Negative.  Negative for confusion, hallucinations and sleep disturbance.        I have reviewed the past medical history, surgical history, social and family history, current medications, allergies vitals, review of systems, and updated this information as appropriate today.    Administrative Statements   The total amount of time spent with the patient and on chart review and documentation was 25 minutes. Issues addressed during this clinic visit included overall management, medication counseling or monitoring, and counseling and coordination of care.         Haley Ellis MD

## 2024-10-16 ENCOUNTER — HOSPITAL ENCOUNTER (OUTPATIENT)
Dept: NON INVASIVE DIAGNOSTICS | Facility: CLINIC | Age: 84
Discharge: HOME | End: 2024-10-16
Payer: COMMERCIAL

## 2024-10-16 VITALS
DIASTOLIC BLOOD PRESSURE: 68 MMHG | HEART RATE: 74 BPM | HEIGHT: 59 IN | SYSTOLIC BLOOD PRESSURE: 112 MMHG | BODY MASS INDEX: 33.47 KG/M2 | WEIGHT: 166 LBS

## 2024-10-16 DIAGNOSIS — I77.810 ASCENDING AORTA DILATATION (HCC): ICD-10-CM

## 2024-10-16 LAB
AORTIC ROOT: 3.2 CM
APICAL FOUR CHAMBER EJECTION FRACTION: 75 %
ASCENDING AORTA: 4.1 CM
BSA FOR ECHO PROCEDURE: 1.7 M2
E WAVE DECELERATION TIME: 203 MS
E/A RATIO: 1.2
FRACTIONAL SHORTENING: 33 (ref 28–44)
INTERVENTRICULAR SEPTUM IN DIASTOLE (PARASTERNAL SHORT AXIS VIEW): 1.5 CM
INTERVENTRICULAR SEPTUM: 1.5 CM (ref 0.6–1.1)
LEFT ATRIUM SIZE: 3.8 CM
LEFT INTERNAL DIMENSION IN SYSTOLE: 2.2 CM (ref 2.1–4)
LEFT VENTRICULAR INTERNAL DIMENSION IN DIASTOLE: 3.3 CM (ref 3.5–6)
LEFT VENTRICULAR POSTERIOR WALL IN END DIASTOLE: 1.3 CM
LEFT VENTRICULAR STROKE VOLUME: 28 ML
LVSV (TEICH): 28 ML
MITRAL REGURGITATION PEAK VELOCITY: 5.12 M/S
MITRAL VALVE MEAN INFLOW VELOCITY: 4.18 M/S
MITRAL VALVE REGURGITANT PEAK GRADIENT: 105 MMHG
MV PEAK A VEL: 0.83 M/S
MV PEAK E VEL: 100 CM/S
MV STENOSIS PRESSURE HALF TIME: 59 MS
MV VALVE AREA P 1/2 METHOD: 3.73
RA PRESSURE ESTIMATED: 3 MMHG
RV PSP: 41 MMHG
SL CV DOP CALC MV VTI RETROGRADE: 178.3 CM
SL CV LV EF: 65
SL CV MV MEAN GRADIENT RETROGRADE: 77 MMHG
SL CV PED ECHO LEFT VENTRICLE DIASTOLIC VOLUME (MOD BIPLANE) 2D: 44 ML
SL CV PED ECHO LEFT VENTRICLE SYSTOLIC VOLUME (MOD BIPLANE) 2D: 16 ML
TR MAX PG: 38 MMHG
TR PEAK VELOCITY: 3.1 M/S
TRICUSPID ANNULAR PLANE SYSTOLIC EXCURSION: 2.5 CM
TRICUSPID VALVE PEAK REGURGITATION VELOCITY: 3.1 M/S

## 2024-10-16 PROCEDURE — 93308 TTE F-UP OR LMTD: CPT | Performed by: INTERNAL MEDICINE

## 2024-10-16 PROCEDURE — 93308 TTE F-UP OR LMTD: CPT

## 2024-10-18 ENCOUNTER — TELEPHONE (OUTPATIENT)
Dept: CARDIOLOGY CLINIC | Facility: CLINIC | Age: 84
End: 2024-10-18

## 2024-10-18 NOTE — TELEPHONE ENCOUNTER
Spoke with pt. Patient verbally understood the result of her Echo follow up/limited w/ contrast if indicated / Keshawn's message.

## 2024-10-18 NOTE — TELEPHONE ENCOUNTER
----- Message from José Luis Tobar PA-C sent at 10/17/2024  4:17 PM EDT -----  Please call patient to advise echocardiogram shows stable ascending aorta dilation and normal heart pump function.  She has mild valvular leakiness which we will continue to monitor periodically.  These results can be discussed in more detail at next office visit.  Thank you.

## 2024-11-12 DIAGNOSIS — R29.90 STROKE-LIKE SYMPTOMS: ICD-10-CM

## 2024-11-13 RX ORDER — ATORVASTATIN CALCIUM 40 MG/1
40 TABLET, FILM COATED ORAL
Qty: 90 TABLET | Refills: 1 | Status: SHIPPED | OUTPATIENT
Start: 2024-11-13 | End: 2024-11-21 | Stop reason: SDUPTHER

## 2024-11-21 ENCOUNTER — OFFICE VISIT (OUTPATIENT)
Dept: CARDIOLOGY CLINIC | Facility: CLINIC | Age: 84
End: 2024-11-21
Payer: COMMERCIAL

## 2024-11-21 VITALS
HEIGHT: 59 IN | WEIGHT: 165.6 LBS | BODY MASS INDEX: 33.38 KG/M2 | SYSTOLIC BLOOD PRESSURE: 110 MMHG | RESPIRATION RATE: 16 BRPM | HEART RATE: 83 BPM | OXYGEN SATURATION: 98 % | DIASTOLIC BLOOD PRESSURE: 68 MMHG

## 2024-11-21 DIAGNOSIS — I49.5 SSS (SICK SINUS SYNDROME) (HCC): ICD-10-CM

## 2024-11-21 DIAGNOSIS — R29.90 STROKE-LIKE SYMPTOMS: ICD-10-CM

## 2024-11-21 DIAGNOSIS — I48.0 PAF (PAROXYSMAL ATRIAL FIBRILLATION) (HCC): Primary | ICD-10-CM

## 2024-11-21 DIAGNOSIS — Z79.01 ANTICOAGULATED: ICD-10-CM

## 2024-11-21 DIAGNOSIS — E78.2 MIXED HYPERLIPIDEMIA: ICD-10-CM

## 2024-11-21 PROCEDURE — 99214 OFFICE O/P EST MOD 30 MIN: CPT | Performed by: INTERNAL MEDICINE

## 2024-11-21 RX ORDER — ATORVASTATIN CALCIUM 40 MG/1
40 TABLET, FILM COATED ORAL
Qty: 90 TABLET | Refills: 3 | Status: SHIPPED | OUTPATIENT
Start: 2024-11-21

## 2024-11-21 NOTE — PROGRESS NOTES
[facility]  [address] [id number]  Cardiology Follow Up    Radha Vidal  1940  [id number]    Assessment & Plan  PAF (paroxysmal atrial fibrillation) ([facility])  Patient understands the risks and benefits of anticoagulation to prevent thromboembolic risk.  Patient to report any bleeding issues.  Anticoagulated  Patient has been on Eliquis 5 mg twice a day.  Patient is planning to get the prescription from [facility] as it has become expensive for her.  Printed prescription given to her.  SSS (sick sinus syndrome) ([facility])  Patient will continue to follow-up with device clinic.  Previous pacemaker interrogation data reviewed.  Normally functioning pacemaker.  Battery status is good.  Mixed hyperlipidemia  Patient will continue atorvastatin 40 mg p.o. daily.  Prescription for the same sent to the patient.  Continue diet and risk factor modification.         Chief Complaint   Patient presents with    Follow-up       Interval History:   Patient presents for follow-up visit.  Patient denies any history of chest pain shortness of breath.  Patient denies any history of leg edema or orthopnea PND.  No history of presyncope syncope.  Patient states compliance with the present list of medications.  Patient does have a history of parkinsonism and is on Sinemet and followed by neurology.    Patient Active Problem List   Diagnosis    OAB (overactive bladder)    Parkinson's disease ([facility])    Primary insomnia    History of skin cancer    Seasonal allergic rhinitis    Impaired fasting glucose    Obesity (BMI 30-39.9)    Claustrophobia    Mitral valve disorder    Menopause ovarian failure    Vitamin D deficiency    Dysphagia    Multiple thyroid nodules    Gastro-esophageal reflux disease without esophagitis    Chronic idiopathic constipation    History of adenomatous polyp of colon    Tremor    IPMN (intraductal papillary mucinous neoplasm)    Cherry angioma    Postablative hypothyroidism    Sleep  apnea    Cerebrovascular disease    Age-related osteoporosis without current pathological fracture    Urge incontinence    s/p distal pancreatectomy/splenectomy    Paroxysmal atrial fibrillation (HCC)    Ambulatory dysfunction    Small bowel obstruction (HCC)    Symptomatic bradycardia     Past Medical History:   Diagnosis Date    Actinic keratosis 2016    Acute midline low back pain without sciatica 2020    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     Benign neoplasm of skin     Cancer (HCC)     skin    Chest pain     Claustrophobia     Closed compression fracture of body of L1 vertebra (HCC) 2020    Diverticulitis     Diverticulitis     Fracture     L1-L2    GERD (gastroesophageal reflux disease)     H. pylori infection 2020    Muscle weakness     Nausea 2024    Nonmelanoma skin cancer     last assessed 2017    Palpitations     Pancreatic cyst     Seasonal allergies     Seborrheic keratosis 2014    Sleep apnea     no cpap    Spontaneous      without mention of complications     Squamous cell carcinoma of forehead 2014    Syncope      Social History     Socioeconomic History    Marital status:      Spouse name: Not on file    Number of children: Not on file    Years of education: Not on file    Highest education level: Not on file   Occupational History    Occupation: RETIRED    Tobacco Use    Smoking status: Never     Passive exposure: Past    Smokeless tobacco: Never   Vaping Use    Vaping status: Never Used   Substance and Sexual Activity    Alcohol use: Not Currently     Comment: patient states rare use on holidays     Drug use: No    Sexual activity: Not Currently   Other Topics Concern    Not on file   Social History Narrative    LIVING INDEPENDENTLY ALONE         No caffeine use     Social Drivers of Health     Financial Resource Strain: Low Risk  (2023)    Overall Financial Resource Strain (CARDIA)     Difficulty  of Paying Living Expenses: Not very hard   Food Insecurity: No Food Insecurity (5/13/2024)    Nursing - Inadequate Food Risk Classification     Worried About Running Out of Food in the Last Year: Never true     Ran Out of Food in the Last Year: Never true     Ran Out of Food in the Last Year: Not on file   Transportation Needs: No Transportation Needs (5/13/2024)    PRAPARE - Transportation     Lack of Transportation (Medical): No     Lack of Transportation (Non-Medical): No   Physical Activity: Not on file   Stress: Not on file   Social Connections: Not on file   Intimate Partner Violence: Not on file   Housing Stability: Low Risk  (5/13/2024)    Housing Stability Vital Sign     Unable to Pay for Housing in the Last Year: No     Number of Times Moved in the Last Year: 1     Homeless in the Last Year: No      Family History   Problem Relation Age of Onset    Alcohol abuse Mother     Heart disease Mother     Alcohol abuse Father     Alcohol abuse Brother     Cancer Brother     Tremor Neg Hx     Parkinsonism Neg Hx     Colon cancer Neg Hx      Past Surgical History:   Procedure Laterality Date    ABDOMINAL ADHESION SURGERY N/A 07/31/2023    Procedure: LYSIS ADHESIONS;  Surgeon: Kyle Julien MD;  [facility]: [facility];  Service: Surgical Oncology    BOTOX INJECTION N/A 03/06/2017    Procedure: CYSTOSCOPY; BLADDER BOTOX 100 UNITS ;  Surgeon: Juan Edward MD;  Location[facility];  Service:     BOWEL RESECTION      related ot diverticulitis    CARDIAC CATHETERIZATION      CARDIAC CATHETERIZATION Left 2/2/2024    Procedure: Cardiac Left Heart Cath;  Surgeon: Carrington Kiser DO;  [facility];  Service: Cardiology    CARDIAC ELECTROPHYSIOLOGY PROCEDURE N/A 5/15/2024    Procedure: Cardiac pacer implant;  Surgeon: Brien Woods MD;  Location: [facility];  Service: Cardiology    CARPAL TUNNEL RELEASE Right     COLONOSCOPY  07/31/2019    COLOSTOMY      COLOSTOMY CLOSURE       CYSTOSCOPY  06/01/2021    DISTAL PANCREATECTOMY N/A 07/31/2023    Procedure: DISTAL PANCREATECTOMY;  Surgeon: Kyle Julien MD;  [facility];  Service: Surgical Oncology    FOOT SURGERY Left     neuroma    HYSTERECTOMY      MYRINGOTOMY W/ TUBES Right 12/06/2023    office procedure - Dr. Grace    NASAL SINUS SURGERY  age 39    [place]    PANCREATIC CYST BIOPSY  07/31/2023    [place] CYSTOURETHROSCOPY N/A 04/13/2018    Procedure: CYSTOSCOPY WITH BOTOX;  Surgeon: Juan Edward MD;  [facility];  Service: Urology    AZ ESOPHAGOGASTRODUODENOSCOPY TRANSORAL DIAGNOSTIC N/A 04/23/2019    Procedure: ESOPHAGOGASTRODUODENOSCOPY (EGD);  Surgeon: Edu Dickerson DO;  Location: [facility];  Service: Gastroenterology    SPLENECTOMY, TOTAL N/A 07/31/2023    Procedure: SPLENECTOMY;  Surgeon: Kyle Julien MD;  [facility];  Service: Surgical Oncology    TONSILLECTOMY  age 49    UPPER GASTROINTESTINAL ENDOSCOPY  04/23/2019       Current Outpatient Medications:     apixaban (Eliquis) 5 mg, Take 1 tablet (5 mg total) by mouth 2 (two) times a day, Disp: 180 tablet, Rfl: 3    Ascorbic Acid (VITAMIN C) 1000 MG tablet, Take 1,000 mg by mouth daily, Disp: , Rfl:     atorvastatin (LIPITOR) 40 mg tablet, TAKE 1 TABLET DAILY WITH DINNER, Disp: 90 tablet, Rfl: 1    B Complex Vitamins (B COMPLEX 100 PO), Take 1 capsule by mouth daily after lunch not taking, Disp: , Rfl:     calcium carbonate (OS-JOHN) 1250 (500 Ca) MG tablet, Take 1 tablet by mouth daily after lunch, Disp: , Rfl:     carbidopa-levodopa (SINEMET)  mg per tablet, Take 1 tablet by mouth 3 (three) times a day before meals, Disp: 270 tablet, Rfl: 3    cholecalciferol (VITAMIN D3) 1,000 units tablet, Take 5,000 Units by mouth daily after lunch, Disp: , Rfl:     Cyanocobalamin (VITAMIN B 12 PO), Take 1 capsule by mouth daily, Disp: , Rfl:     multivitamin (THERAGRAN) TABS, Take 1 tablet by mouth every morning, Disp: , Rfl:     pantoprazole (PROTONIX) 40  mg tablet, TAKE 1 TABLET DAILY, Disp: 90 tablet, Rfl: 3    Potassium Gluconate 595 (99 K) MG TABS, Take 1 each by mouth every other day, Disp: , Rfl:     Probiotic Product (PROBIOTIC-10) CAPS, Take 1 capsule by mouth daily after lunch, Disp: , Rfl:     trospium chloride (SANCTURA) 20 mg tablet, Take 1 tablet (20 mg total) by mouth 2 (two) times a day, Disp: 180 tablet, Rfl: 2  Allergies   Allergen Reactions    Caffeine - Food Allergy GI Intolerance    Medical Tape Other (See Comments)    Iodine - Food Allergy Rash    Latex Rash    Other Rash     Adhesive tape         Labs:  Hospital Outpatient Visit on 10/16/2024   Component Date Value    MV mean gradient retrogr* 10/16/2024 77     Triscuspid Valve Regurgi* 10/16/2024 38.0     MR PG 10/16/2024 105     MV Peak A New 10/16/2024 0.83     MV stenosis pressure 1/2* 10/16/2024 59     MV Peak E New 10/16/2024 100     E wave deceleration time 10/16/2024 203     E/A ratio 10/16/2024 1.20     MV valve area p 1/2 meth* 10/16/2024 3.73     TR Peak New 10/16/2024 3.1     A4C EF 10/16/2024 75     Mitral regurgitation pea* 10/16/2024 5.12     Mitral valve mean inflow* 10/16/2024 4.18     Tricuspid valve peak reg* 10/16/2024 3.10     Left ventricular stroke * 10/16/2024 28.00     IVSd 10/16/2024 1.50     Tricuspid annular plane * 10/16/2024 2.50     Ao root 10/16/2024 3.20     LVPWd 10/16/2024 1.30     LA size 10/16/2024 3.8     Asc Ao 10/16/2024 4.1     FS 10/16/2024 33     LVIDS 10/16/2024 2.20     IVS 10/16/2024 1.5     LVIDd 10/16/2024 3.30     MV VTI RETROGRADE 10/16/2024 178.3     LEFT VENTRICLE SYSTOLIC * 10/16/2024 16     LV DIASTOLIC VOLUME (MOD* 10/16/2024 44     LVSV, 2D 10/16/2024 28     BSA 10/16/2024 1.7     LV EF 10/16/2024 65     Est. RA pres 10/16/2024 3.0     Right Ventricular Peak S* 10/16/2024 41.00      Imaging: No results found.    Review of Systems:  Review of Systems  REVIEW OF SYSTEMS:  Constitutional:  Denies fever or chills   Eyes:  Denies change in  "visual acuity   HENT:  Denies nasal congestion or sore throat   Respiratory:  Denies cough or shortness of breath   Cardiovascular:  Denies chest pain or edema   GI:  Denies abdominal pain, nausea, vomiting, bloody stools or diarrhea   :  Denies dysuria, frequency, difficulty in micturition and nocturia  Musculoskeletal:  Denies back pain or joint pain   Neurologic: Parkinsonism  Endocrine:  Denies polyuria or polydipsia   Lymphatic:  Denies swollen glands   Psychiatric:  Denies depression or anxiety    Physical Exam:    /68 (BP Location: Left arm, Patient Position: Sitting, Cuff Size: Standard)   Pulse 83   Resp 16   Ht 4' 11\" (1.499 m)   Wt 75.1 kg (165 lb 9.6 oz)   SpO2 98%   BMI 33.45 kg/m²     Physical Exam  PHYSICAL EXAM:  General:  Patient is not in acute distress   Head: Normocephalic, Atraumatic.  HEENT:  Both pupils normal-size atraumatic, normocephalic, nonicteric  Neck:  JVP not raised. Trachea central. No carotid bruit  Respiratory:  normal breath sounds no crackles. no rhonchi  Cardiovascular:  Regular rate and rhythm no S3 no murmurs  GI:  Abdomen soft nontender. No organomegaly.   Lymphatic:  No cervical or inguinal lymphadenopathy  Neurologic:  Patient is awake alert, oriented . Grossly nonfocal  Extremities no edema      "

## 2024-11-27 NOTE — PATIENT INSTRUCTIONS
Will continue Pantoprazole 40mg daily.     No plans for EGD.    GERD dietary modifications.     GERD (Gastroesophageal Reflux Disease)   WHAT YOU NEED TO KNOW:   Gastroesophageal reflux disease (GERD) is reflux that happens more than 2 times a week for a few weeks. Reflux means acid and food in your stomach back up into your esophagus. GERD can cause other health problems over time if it is not treated.       DISCHARGE INSTRUCTIONS:   Call your local emergency number (911 in the [place]) if:   You have severe chest pain and sudden trouble breathing.      Return to the emergency department if:   You have trouble breathing after you vomit.    You have trouble swallowing, or pain with swallowing.    Your bowel movements are black, bloody, or tarry-looking.    Your vomit looks like coffee grounds or has blood in it.    Call your doctor or gastroenterologist if:   You feel full and cannot burp or vomit.    You vomit large amounts, or you vomit often.    You are losing weight without trying.    Your symptoms get worse or do not improve with treatment.    You have questions or concerns about your condition or care.    Medicines:   Medicines  are used to decrease stomach acid. Medicine may also be used to help your lower esophageal sphincter and stomach contract (tighten) more.    Take your medicine as directed.  Contact your healthcare provider if you think your medicine is not helping or if you have side effects. Tell your provider if you are allergic to any medicine. Keep a list of the medicines, vitamins, and herbs you take. Include the amounts, and when and why you take them. Bring the list or the pill bottles to follow-up visits. Carry your medicine list with you in case of an emergency.    Manage GERD:       Do not have foods or drinks that may increase heartburn.  These include chocolate, peppermint, fried or fatty foods, drinks that contain caffeine, or carbonated drinks (soda). Other foods include spicy foods, onions,  tomatoes, and tomato-based foods. Do not have foods or drinks that can irritate your esophagus, such as citrus fruits, juices, and alcohol.    Do not eat large meals.  When you eat a lot of food at one time, your stomach needs more acid to digest it. Eat 6 small meals each day instead of 3 large ones, and eat slowly. Do not eat meals 2 to 3 hours before bedtime.    Elevate the head of your bed.  Place 6-inch blocks under the head of your bed frame. You may also use more than one pillow under your head and shoulders while you sleep.    Maintain a healthy weight.  If you are overweight, weight loss may help relieve symptoms of GERD.    Do not smoke.  Smoking weakens the lower esophageal sphincter and increases the risk of GERD. Ask your healthcare provider for information if you currently smoke and need help to quit. E-cigarettes or smokeless tobacco still contain nicotine. Talk to your healthcare provider before you use these products.    Do not put pressure on your abdomen.  Pressure pushes acid up into your esophagus. Do not wear clothing that is tight around your waist. Do not bend over. Bend at the knees if you need to pick something up.  Follow up with your doctor or gastroenterologist as directed:  Write down your questions so you remember to ask them during your visits.  © Copyright [employer] 2023 Information is for End User's use only and may not be sold, redistributed or otherwise used for commercial purposes.  The above information is an  only. It is not intended as medical advice for individual conditions or treatments. Talk to your doctor, nurse or pharmacist before following any medical regimen to see if it is safe and effective for you.     Yes

## 2024-12-10 ENCOUNTER — TELEPHONE (OUTPATIENT)
Dept: SURGICAL ONCOLOGY | Facility: CLINIC | Age: 84
End: 2024-12-10

## 2024-12-10 NOTE — TELEPHONE ENCOUNTER
Called and spoke with pt to let her know that she is on a wait list to have her MRI completed around the end of Jan due to her pacemaker.  She will be receiving a call from them to get that scheduled per [facility] Sched[facility].

## 2024-12-13 ENCOUNTER — TELEPHONE (OUTPATIENT)
Age: 84
End: 2024-12-13

## 2024-12-13 DIAGNOSIS — F40.240 CLAUSTROPHOBIA: Primary | ICD-10-CM

## 2024-12-13 RX ORDER — LORAZEPAM 1 MG/1
TABLET ORAL
Qty: 2 TABLET | Refills: 0 | Status: SHIPPED | OUTPATIENT
Start: 2024-12-13

## 2024-12-13 NOTE — TELEPHONE ENCOUNTER
Pt would like RISHI Gamez  to know that she now has an MRI scheduled for 1/8/25 at 9:15 am. She is also scheduled for a follow up for 1/13/25 to go over the MRI results.    Pt requested a medication be sent in to help with claustrophobia that she experiences for this upcoming MRI. Pt can be reached at [phone]. Thank you.

## 2025-01-02 ENCOUNTER — APPOINTMENT (OUTPATIENT)
Dept: RADIOLOGY | Facility: CLINIC | Age: 85
End: 2025-01-02
Payer: COMMERCIAL

## 2025-01-02 ENCOUNTER — APPOINTMENT (OUTPATIENT)
Dept: LAB | Facility: CLINIC | Age: 85
End: 2025-01-02
Payer: COMMERCIAL

## 2025-01-02 DIAGNOSIS — D49.0 IPMN (INTRADUCTAL PAPILLARY MUCINOUS NEOPLASM): ICD-10-CM

## 2025-01-02 DIAGNOSIS — Z95.0 MRI SAFE CARDIAC PACEMAKER IN SITU: ICD-10-CM

## 2025-01-02 LAB
BUN SERPL-MCNC: 22 MG/DL (ref 5–25)
CREAT SERPL-MCNC: 0.88 MG/DL (ref 0.6–1.3)
GFR SERPL CREATININE-BSD FRML MDRD: 60 ML/MIN/1.73SQ M

## 2025-01-02 PROCEDURE — 36415 COLL VENOUS BLD VENIPUNCTURE: CPT

## 2025-01-02 PROCEDURE — 84520 ASSAY OF UREA NITROGEN: CPT

## 2025-01-02 PROCEDURE — 82565 ASSAY OF CREATININE: CPT

## 2025-01-06 ENCOUNTER — RESULTS FOLLOW-UP (OUTPATIENT)
Dept: CARDIOLOGY CLINIC | Facility: CLINIC | Age: 85
End: 2025-01-06

## 2025-01-06 ENCOUNTER — REMOTE DEVICE CLINIC VISIT (OUTPATIENT)
Dept: CARDIOLOGY CLINIC | Facility: CLINIC | Age: 85
End: 2025-01-06
Payer: COMMERCIAL

## 2025-01-06 DIAGNOSIS — I49.5 SSS (SICK SINUS SYNDROME) (HCC): Primary | ICD-10-CM

## 2025-01-06 PROCEDURE — 93296 REM INTERROG EVL PM/IDS: CPT | Performed by: INTERNAL MEDICINE

## 2025-01-06 PROCEDURE — 93294 REM INTERROG EVL PM/LDLS PM: CPT | Performed by: INTERNAL MEDICINE

## 2025-01-06 NOTE — PROGRESS NOTES
Results for orders placed or performed in visit on 01/06/25   Cardiac EP device report    Narrative    MDT DC PM/ACTIVE SYSTEM IS MRI CONDITIONAL  CARELINK TRANSMISSION: BATTERY VOLTAGE ADEQUATE (13.7 YRS). AP: 39.5%. : 4.9% (MVP-ON). ALL AVAILABLE LEAD PARAMETERS WITHIN NORMAL LIMITS. NO SIGNIFICANT HIGH RATE EPISODES. NORMAL DEVICE FUNCTION. CH

## 2025-01-08 ENCOUNTER — HOSPITAL ENCOUNTER (OUTPATIENT)
Dept: MRI IMAGING | Facility: HOSPITAL | Age: 85
Discharge: HOME | End: 2025-01-08
Payer: COMMERCIAL

## 2025-01-08 DIAGNOSIS — D49.0 IPMN (INTRADUCTAL PAPILLARY MUCINOUS NEOPLASM): ICD-10-CM

## 2025-01-08 PROCEDURE — 74183 MRI ABD W/O CNTR FLWD CNTR: CPT

## 2025-01-08 PROCEDURE — A9585 GADOBUTROL INJECTION: HCPCS

## 2025-01-08 RX ORDER — GADOBUTROL 604.72 MG/ML
7 INJECTION INTRAVENOUS
Status: COMPLETED | OUTPATIENT
Start: 2025-01-08 | End: 2025-01-08

## 2025-01-08 RX ADMIN — GADOBUTROL 7 ML: 604.72 INJECTION INTRAVENOUS at 12:56

## 2025-01-13 ENCOUNTER — TELEPHONE (OUTPATIENT)
Facility: MEDICAL CENTER | Age: 85
End: 2025-01-13

## 2025-01-13 ENCOUNTER — OFFICE VISIT (OUTPATIENT)
Dept: SURGICAL ONCOLOGY | Facility: CLINIC | Age: 85
End: 2025-01-13
Payer: COMMERCIAL

## 2025-01-13 VITALS
HEART RATE: 93 BPM | WEIGHT: 167 LBS | HEIGHT: 59 IN | RESPIRATION RATE: 16 BRPM | DIASTOLIC BLOOD PRESSURE: 80 MMHG | OXYGEN SATURATION: 97 % | BODY MASS INDEX: 33.67 KG/M2 | SYSTOLIC BLOOD PRESSURE: 118 MMHG | TEMPERATURE: 97.1 F

## 2025-01-13 DIAGNOSIS — D49.0 IPMN (INTRADUCTAL PAPILLARY MUCINOUS NEOPLASM): Primary | ICD-10-CM

## 2025-01-13 PROCEDURE — 99213 OFFICE O/P EST LOW 20 MIN: CPT

## 2025-01-13 NOTE — LETTER
2025     Kyle Julien MD  [address] [facility]  2nd [address]  [place] [address]    Patient: Radha Vidal   YOB: 1940   Date of Visit: 2025       Dear Dr. Julien:    Thank you for referring Radha Vidal to me for evaluation. Below are my notes for this consultation.    If you have questions, please do not hesitate to call me. I look forward to following your patient along with you.         Sincerely,        RISHI Gamez        CC: No Recipients    RISHI Gamez  2025  3:55 PM  Sign when Signing Visit  Name: Radha Vidal      : 1940      MRN: [id number]  Encounter Provider: RISHI Gamez  Encounter Date: 2025   Encounter department: [facility]  :  Assessment & Plan  IPMN (intraductal papillary mucinous neoplasm)  Cysts in remaining pancreas are stable on her most recent imaging, and she is doing well.  We will plan to repeat MRI in 6 months, and I will have her see Dr Julien at that time to discuss further surveillance.  Orders:  •  MRI abdomen w wo contrast and mrcp; Future  •  BUN; Future  •  Creatinine, serum; Future        History of Present Illness  Chief Complaint   Patient presents with   • Follow-up     Return in about 6 months (around 2025) for Office visit with Dr Julien, Imaging - See orders.    Pertinent Medical History  This is an 86 y/o female who returns to the office today in follow-up for her history of IPMN.  She is currently 1-1/2 years s/p distal pancreatectomy and splenectomy, and continues with surveillance for cysts in the remaining pancreas. She is maintaining her weight without difficulty, although she does admit she doesn't have much appetite.  She denies any abdominal pain, N/V/D, or bowel changes.  Overall she states she is doing very well.  MRI with MRCP was performed , which demonstrated stability of multiple pancreatic cysts, the largest measuring  "1.3cm.  I have reviewed these results with her today.      Review of Systems A complete review of systems is negative other than that noted above in the HPI.       Current Outpatient Medications   Medication Sig Dispense Refill   • apixaban (Eliquis) 5 mg Take 1 tablet (5 mg total) by mouth 2 (two) times a day 180 tablet 3   • Ascorbic Acid (VITAMIN C) 1000 MG tablet Take 1,000 mg by mouth daily     • atorvastatin (LIPITOR) 40 mg tablet Take 1 tablet (40 mg total) by mouth daily with dinner 90 tablet 3   • B Complex Vitamins (B COMPLEX 100 PO) Take 1 capsule by mouth daily after lunch not taking     • calcium carbonate (OS-JOHN) 1250 (500 Ca) MG tablet Take 1 tablet by mouth daily after lunch     • carbidopa-levodopa (SINEMET)  mg per tablet Take 1 tablet by mouth 3 (three) times a day before meals 270 tablet 3   • cholecalciferol (VITAMIN D3) 1,000 units tablet Take 5,000 Units by mouth daily after lunch     • Cyanocobalamin (VITAMIN B 12 PO) Take 1 capsule by mouth daily     • LORazepam (ATIVAN) 1 mg tablet Take 1 tablet 1 hour prior to MRI.  Repeat immediately prior to MRI if necessary. 2 tablet 0   • multivitamin (THERAGRAN) TABS Take 1 tablet by mouth every morning     • pantoprazole (PROTONIX) 40 mg tablet TAKE 1 TABLET DAILY 90 tablet 3   • Potassium Gluconate 595 (99 K) MG TABS Take 1 each by mouth every other day     • Probiotic Product (PROBIOTIC-10) CAPS Take 1 capsule by mouth daily after lunch     • trospium chloride (SANCTURA) 20 mg tablet Take 1 tablet (20 mg total) by mouth 2 (two) times a day 180 tablet 2     No current facility-administered medications for this visit.      Objective  /80 (BP Location: Left arm, Patient Position: Sitting, Cuff Size: Standard)   Pulse 93   Temp (!) 97.1 °F (36.2 °C) (Temporal)   Resp 16   Ht 4' 11\" (1.499 m)   Wt 75.8 kg (167 lb)   SpO2 97%   BMI 33.73 kg/m²     Pain Screening:  Pain Score: 0-No pain  ECOG    Physical Exam  Vitals reviewed. "   Constitutional:       General: She is not in acute distress.     Appearance: Normal appearance. She is normal weight. She is not ill-appearing or toxic-appearing.   HENT:      Head: Normocephalic and atraumatic.   Eyes:      General: No scleral icterus.  Cardiovascular:      Rate and Rhythm: Normal rate.   Pulmonary:      Effort: Pulmonary effort is normal.   Abdominal:      General: Abdomen is flat. A surgical scar is present. There is no distension.      Palpations: Abdomen is soft. There is no mass.      Tenderness: There is no abdominal tenderness. There is no guarding.   Musculoskeletal:         General: Normal range of motion.      Cervical back: Normal range of motion and neck supple.   Skin:     General: Skin is warm and dry.      Coloration: Skin is not jaundiced.   Neurological:      General: No focal deficit present.      Mental Status: She is alert and oriented to person, place, and time.   Psychiatric:         Mood and Affect: Mood normal.         Behavior: Behavior normal.         Thought Content: Thought content normal.         Judgment: Judgment normal.              Radiology Results Review: I have reviewed radiology reports from 1/8/2025 including: MRI abdomen/MRCP.

## 2025-01-13 NOTE — TELEPHONE ENCOUNTER
Note in chart after leaving a message for someone to schedule there MRI appointment:  I left a message today  to call me at [phone] to schedule their MRI appointment.

## 2025-01-13 NOTE — TELEPHONE ENCOUNTER
7-2-25 AT 915AM AT [facility]   Note in chart after scheduling pacemaker:  Topics covered in our conversation:  MRI scheduled on above date and time.

## 2025-01-13 NOTE — ASSESSMENT & PLAN NOTE
Cysts in remaining pancreas are stable on her most recent imaging, and she is doing well.  We will plan to repeat MRI in 6 months, and I will have her see Dr Julien at that time to discuss further surveillance.  Orders:  •  MRI abdomen w wo contrast and mrcp; Future  •  BUN; Future  •  Creatinine, serum; Future

## 2025-01-14 NOTE — PROGRESS NOTES
Name: Radha Vidal      : 1940      MRN: [id number]  Encounter Provider: RISHI Gamez  Encounter Date: 2025   Encounter department: [facility] [place]  :  Assessment & Plan  IPMN (intraductal papillary mucinous neoplasm)  Cysts in remaining pancreas are stable on her most recent imaging, and she is doing well.  We will plan to repeat MRI in 6 months, and I will have her see Dr Julien at that time to discuss further surveillance.  Orders:  •  MRI abdomen w wo contrast and mrcp; Future  •  BUN; Future  •  Creatinine, serum; Future        History of Present Illness   Chief Complaint   Patient presents with   • Follow-up     Return in about 6 months (around 2025) for Office visit with Dr Julien, Imaging - See orders.    Pertinent Medical History   This is an 86 y/o female who returns to the office today in follow-up for her history of IPMN.  She is currently 1-1/2 years s/p distal pancreatectomy and splenectomy, and continues with surveillance for cysts in the remaining pancreas. She is maintaining her weight without difficulty, although she does admit she doesn't have much appetite.  She denies any abdominal pain, N/V/D, or bowel changes.  Overall she states she is doing very well.  MRI with MRCP was performed , which demonstrated stability of multiple pancreatic cysts, the largest measuring 1.3cm.  I have reviewed these results with her today.      Review of Systems A complete review of systems is negative other than that noted above in the HPI.       Current Outpatient Medications   Medication Sig Dispense Refill   • apixaban (Eliquis) 5 mg Take 1 tablet (5 mg total) by mouth 2 (two) times a day 180 tablet 3   • Ascorbic Acid (VITAMIN C) 1000 MG tablet Take 1,000 mg by mouth daily     • atorvastatin (LIPITOR) 40 mg tablet Take 1 tablet (40 mg total) by mouth daily with dinner 90 tablet 3   • B Complex Vitamins (B COMPLEX 100 PO) Take 1  "capsule by mouth daily after lunch not taking     • calcium carbonate (OS-JOHN) 1250 (500 Ca) MG tablet Take 1 tablet by mouth daily after lunch     • carbidopa-levodopa (SINEMET)  mg per tablet Take 1 tablet by mouth 3 (three) times a day before meals 270 tablet 3   • cholecalciferol (VITAMIN D3) 1,000 units tablet Take 5,000 Units by mouth daily after lunch     • Cyanocobalamin (VITAMIN B 12 PO) Take 1 capsule by mouth daily     • LORazepam (ATIVAN) 1 mg tablet Take 1 tablet 1 hour prior to MRI.  Repeat immediately prior to MRI if necessary. 2 tablet 0   • multivitamin (THERAGRAN) TABS Take 1 tablet by mouth every morning     • pantoprazole (PROTONIX) 40 mg tablet TAKE 1 TABLET DAILY 90 tablet 3   • Potassium Gluconate 595 (99 K) MG TABS Take 1 each by mouth every other day     • Probiotic Product (PROBIOTIC-10) CAPS Take 1 capsule by mouth daily after lunch     • trospium chloride (SANCTURA) 20 mg tablet Take 1 tablet (20 mg total) by mouth 2 (two) times a day 180 tablet 2     No current facility-administered medications for this visit.      Objective   /80 (BP Location: Left arm, Patient Position: Sitting, Cuff Size: Standard)   Pulse 93   Temp (!) 97.1 °F (36.2 °C) (Temporal)   Resp 16   Ht 4' 11\" (1.499 m)   Wt 75.8 kg (167 lb)   SpO2 97%   BMI 33.73 kg/m²     Pain Screening:  Pain Score: 0-No pain  ECOG    Physical Exam  Vitals reviewed.   Constitutional:       General: She is not in acute distress.     Appearance: Normal appearance. She is normal weight. She is not ill-appearing or toxic-appearing.   HENT:      Head: Normocephalic and atraumatic.   Eyes:      General: No scleral icterus.  Cardiovascular:      Rate and Rhythm: Normal rate.   Pulmonary:      Effort: Pulmonary effort is normal.   Abdominal:      General: Abdomen is flat. A surgical scar is present. There is no distension.      Palpations: Abdomen is soft. There is no mass.      Tenderness: There is no abdominal tenderness. " There is no guarding.   Musculoskeletal:         General: Normal range of motion.      Cervical back: Normal range of motion and neck supple.   Skin:     General: Skin is warm and dry.      Coloration: Skin is not jaundiced.   Neurological:      General: No focal deficit present.      Mental Status: She is alert and oriented to person, place, and time.   Psychiatric:         Mood and Affect: Mood normal.         Behavior: Behavior normal.         Thought Content: Thought content normal.         Judgment: Judgment normal.              Radiology Results Review: I have reviewed radiology reports from 1/8/2025 including: MRI abdomen/MRCP.

## 2025-01-24 ENCOUNTER — OFFICE VISIT (OUTPATIENT)
Dept: GASTROENTEROLOGY | Facility: CLINIC | Age: 85
End: 2025-01-24
Payer: COMMERCIAL

## 2025-01-24 VITALS
SYSTOLIC BLOOD PRESSURE: 135 MMHG | TEMPERATURE: 97 F | HEIGHT: 59 IN | HEART RATE: 86 BPM | OXYGEN SATURATION: 97 % | WEIGHT: 166 LBS | DIASTOLIC BLOOD PRESSURE: 83 MMHG | BODY MASS INDEX: 33.47 KG/M2

## 2025-01-24 DIAGNOSIS — Z86.0100 HISTORY OF COLON POLYPS: Primary | ICD-10-CM

## 2025-01-24 PROCEDURE — 99214 OFFICE O/P EST MOD 30 MIN: CPT | Performed by: PHYSICIAN ASSISTANT

## 2025-01-24 NOTE — PROGRESS NOTES
Name: Radha Vidal      : 1940      MRN: [id number]  Encounter Provider: Nadira Holt PA-C  Encounter Date: 2025   Encounter department: [facility] [place]  :  Assessment & Plan  History of colon polyps    Patient presents for consideration of a repeat colonoscopy for surveillance of history of colon polyps.  She had a colonoscopy in 2019 and a <5mm adenoma was removed.  She denies any changes in her bowel movements, rectal bleeding, unintentional weight loss. She is not anemic.    Giver her advanced age of 85, we reviewed increased risks of a colonoscopy at this time.  She is comfortable with holding off on a repeat colonoscopy and will contact us in the future if she were to develop any alarm symptoms that would necessitate a diagnostic colonoscopy.        History of Present Illness   HPI  Radha Vidal is a 85 y.o. female with a PMH of PAF, mitral valve disorder, IPMN with HGD s/p distal pancreatectomy and splenectomy, sleep apnea who presents to the office for consideration of a repeat colonoscopy for surveillance of history of colon polyps.  She had a colonoscopy in 2019 and a small <5mm adenoma was removed.  She denies any changes in her bowel movements. She does have occasional straining. No rectal bleeding. No abdominal pain. No unintentional weight loss.  No family history of colon cancer.  History obtained from: patient      Medical History Reviewed by provider this encounter:  Meds     .  Past Medical History   Past Medical History:   Diagnosis Date    Actinic keratosis 2016    Acute midline low back pain without sciatica 2020    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     Benign neoplasm of skin     Cancer (HCC)     skin    Chest pain     Claustrophobia     Closed compression fracture of body of L1 vertebra (HCC) 2020    Diverticulitis     Diverticulitis     Fracture     L1-L2    GERD  (gastroesophageal reflux disease)     H. pylori infection 2020    Muscle weakness     Nausea 2024    Nonmelanoma skin cancer     last assessed 2017    Palpitations     Pancreatic cyst     Seasonal allergies     Seborrheic keratosis 2014    Sleep apnea     no cpap    Spontaneous      without mention of complications     Squamous cell carcinoma of forehead 2014    Syncope      Past Surgical History:   Procedure Laterality Date    ABDOMINAL ADHESION SURGERY N/A 2023    Procedure: LYSIS ADHESIONS;  Surgeon: Kyle Julien MD;  Location: [facility];  Service: Surgical Oncology    BOTOX INJECTION N/A 2017    Procedure: CYSTOSCOPY; BLADDER BOTOX 100 UNITS ;  Surgeon: Juan Edward MD;  Location[facility];  Service:     BOWEL RESECTION      related ot diverticulitis    CARDIAC CATHETERIZATION      CARDIAC CATHETERIZATION Left 2024    Procedure: Cardiac Left Heart Cath;  Surgeon: Carrington Kiser DO;  [facility];  Service: Cardiology    CARDIAC ELECTROPHYSIOLOGY PROCEDURE N/A 5/15/2024    Procedure: Cardiac pacer implant;  Surgeon: Brien Woods MD;  Location: [facility];  Service: Cardiology    CARPAL TUNNEL RELEASE Right     COLONOSCOPY  2019    COLOSTOMY      COLOSTOMY CLOSURE      CYSTOSCOPY  2021    DISTAL PANCREATECTOMY N/A 2023    Procedure: DISTAL PANCREATECTOMY;  Surgeon: Kyle Julien MD;  Location: [facility];  Service: Surgical Oncology    FOOT SURGERY Left     neuroma    HYSTERECTOMY      MYRINGOTOMY W/ TUBES Right 2023    office procedure - Dr. Grace    NASAL SINUS SURGERY  age 39    [place]    PANCREATIC CYST BIOPSY  2023    ID CYSTOURETHROSCOPY N/A 2018    Procedure: CYSTOSCOPY WITH BOTOX;  Surgeon: Juan Edward MD;  [facility]: [facility];  Service: Urology    ID ESOPHAGOGASTRODUODENOSCOPY TRANSORAL DIAGNOSTIC N/A 2019    Procedure: ESOPHAGOGASTRODUODENOSCOPY  (EGD);  Surgeon: Edu Dickerson DO;  Location: [facility];  Service: Gastroenterology    SPLENECTOMY, TOTAL N/A 07/31/2023    Procedure: SPLENECTOMY;  Surgeon: Kyle Julien MD;  [facility]: [facility];  Service: Surgical Oncology    TONSILLECTOMY  age 49    UPPER GASTROINTESTINAL ENDOSCOPY  04/23/2019     Family History   Problem Relation Age of Onset    Alcohol abuse Mother     Heart disease Mother     Alcohol abuse Father     Alcohol abuse Brother     Cancer Brother     Tremor Neg Hx     Parkinsonism Neg Hx     Colon cancer Neg Hx       reports that she has never smoked. She has been exposed to tobacco smoke. She has never used smokeless tobacco. She reports that she does not currently use alcohol. She reports that she does not use drugs.  Current Outpatient Medications on File Prior to Visit   Medication Sig Dispense Refill    apixaban (Eliquis) 5 mg Take 1 tablet (5 mg total) by mouth 2 (two) times a day 180 tablet 3    Ascorbic Acid (VITAMIN C) 1000 MG tablet Take 1,000 mg by mouth daily      atorvastatin (LIPITOR) 40 mg tablet Take 1 tablet (40 mg total) by mouth daily with dinner 90 tablet 3    B Complex Vitamins (B COMPLEX 100 PO) Take 1 capsule by mouth daily after lunch not taking      calcium carbonate (OS-JOHN) 1250 (500 Ca) MG tablet Take 1 tablet by mouth daily after lunch      carbidopa-levodopa (SINEMET)  mg per tablet Take 1 tablet by mouth 3 (three) times a day before meals 270 tablet 3    cholecalciferol (VITAMIN D3) 1,000 units tablet Take 5,000 Units by mouth daily after lunch      Cyanocobalamin (VITAMIN B 12 PO) Take 1 capsule by mouth daily      LORazepam (ATIVAN) 1 mg tablet Take 1 tablet 1 hour prior to MRI.  Repeat immediately prior to MRI if necessary. 2 tablet 0    multivitamin (THERAGRAN) TABS Take 1 tablet by mouth every morning      pantoprazole (PROTONIX) 40 mg tablet TAKE 1 TABLET DAILY 90 tablet 3    Potassium Gluconate 595 (99 K) MG TABS Take 1 each by mouth every other day       Probiotic Product (PROBIOTIC-10) CAPS Take 1 capsule by mouth daily after lunch      trospium chloride (SANCTURA) 20 mg tablet Take 1 tablet (20 mg total) by mouth 2 (two) times a day 180 tablet 2     No current facility-administered medications on file prior to visit.     Allergies   Allergen Reactions    Caffeine - Food Allergy GI Intolerance    Medical Tape Other (See Comments)    Iodine - Food Allergy Rash    Latex Rash    Other Rash     Adhesive tape        Current Outpatient Medications on File Prior to Visit   Medication Sig Dispense Refill    apixaban (Eliquis) 5 mg Take 1 tablet (5 mg total) by mouth 2 (two) times a day 180 tablet 3    Ascorbic Acid (VITAMIN C) 1000 MG tablet Take 1,000 mg by mouth daily      atorvastatin (LIPITOR) 40 mg tablet Take 1 tablet (40 mg total) by mouth daily with dinner 90 tablet 3    B Complex Vitamins (B COMPLEX 100 PO) Take 1 capsule by mouth daily after lunch not taking      calcium carbonate (OS-JOHN) 1250 (500 Ca) MG tablet Take 1 tablet by mouth daily after lunch      carbidopa-levodopa (SINEMET)  mg per tablet Take 1 tablet by mouth 3 (three) times a day before meals 270 tablet 3    cholecalciferol (VITAMIN D3) 1,000 units tablet Take 5,000 Units by mouth daily after lunch      Cyanocobalamin (VITAMIN B 12 PO) Take 1 capsule by mouth daily      LORazepam (ATIVAN) 1 mg tablet Take 1 tablet 1 hour prior to MRI.  Repeat immediately prior to MRI if necessary. 2 tablet 0    multivitamin (THERAGRAN) TABS Take 1 tablet by mouth every morning      pantoprazole (PROTONIX) 40 mg tablet TAKE 1 TABLET DAILY 90 tablet 3    Potassium Gluconate 595 (99 K) MG TABS Take 1 each by mouth every other day      Probiotic Product (PROBIOTIC-10) CAPS Take 1 capsule by mouth daily after lunch      trospium chloride (SANCTURA) 20 mg tablet Take 1 tablet (20 mg total) by mouth 2 (two) times a day 180 tablet 2     No current facility-administered medications on file prior to visit.     "  Social History     Tobacco Use    Smoking status: Never     Passive exposure: Past    Smokeless tobacco: Never   Vaping Use    Vaping status: Never Used   Substance and Sexual Activity    Alcohol use: Not Currently     Comment: patient states rare use on holidays     Drug use: No    Sexual activity: Not Currently        Objective   /83 (BP Location: Left arm, Patient Position: Sitting, Cuff Size: Standard)   Pulse 86   Temp (!) 97 °F (36.1 °C) (Temporal)   Ht 4' 11\" (1.499 m)   Wt 75.3 kg (166 lb)   SpO2 97%   BMI 33.53 kg/m²      Physical Exam  Constitutional:       Appearance: Normal appearance.   HENT:      Head: Normocephalic and atraumatic.   Cardiovascular:      Rate and Rhythm: Normal rate and regular rhythm.   Abdominal:      General: Bowel sounds are normal.      Palpations: Abdomen is soft.      Tenderness: There is no abdominal tenderness.   Skin:     General: Skin is warm and dry.   Neurological:      Mental Status: She is alert and oriented to person, place, and time.           "

## 2025-02-05 NOTE — PROGRESS NOTES
Surgical Oncology Follow Up       [address] [facility],[address] [facility]  [facility] [place]  [address] [address]    Marcheta Better  1940  [id number]  [address] [facility],[address] [place] [address]  [facility] [place]  [address] [facility] [address]    Diagnoses and all orders for this visit:    Pancreatic cyst  -     MRI abdomen w wo contrast and mrcp; Future  -     BUN; Future  -     Creatinine, serum; Future        Chief Complaint   Patient presents with   • Follow-up       Return in about 6 months (around 12/13/2023) for Office Visit, Imaging - See orders  Oncology History    No history exists  Staging: Pancreatic tail cyst  Treatment history: EUS October 2020  Cyst fluid  ng/mL, Amylase 31 U/L  Statistically higher risk on Pancreagen testing  Current treatment: Observation    History of Present Illness: Patient returns in follow-up of her pancreas cyst   She is doing well  No abdominal pain, nausea or vomiting  No change in appetite  No early satiety  MRI from June 5, 2023 shows that the largest lesion in the tail of the pancreas measures 5 5 cm  This was 4 5 cm in August 2020  There are several other smaller cystic lesions throughout the pancreas  No other worrisome or suspicious features  The pancreatic duct is dilated to 3 mm  I personally reviewed the films  She has had a previous colectomy for diverticulitis with an upper midline incision  Review of Systems  Complete ROS Surg Onc:   Complete ROS Surg Onc:   Constitutional: The patient denies new or recent history of general fatigue, no recent weight loss, no change in appetite  Eyes: No complaints of visual problems, no scleral icterus  ENT: no complaints of ear pain, no hoarseness, no difficulty swallowing,  no tinnitus and no new masses in head, oral cavity, or neck  Cardiovascular: No complaints of chest pain, no palpitations, no ankle edema  Respiratory: No complaints of shortness of breath, no cough  Gastrointestinal: No complaints of jaundice, no bloody stools, no pale stools  Genitourinary: No complaints of dysuria, no hematuria, no nocturia, no frequent urination, no urethral discharge  Musculoskeletal: No complaints of weakness, paralysis, joint stiffness or arthralgias  Integumentary: No complaints of rash, no new lesions  Neurological: No complaints of convulsions, no seizures, no dizziness  Hematologic/Lymphatic: No complaints of easy bruising  Endocrine:  No hot or cold intolerance  No polydipsia, polyphagia, or polyuria  Allergy/immunology:  No environmental allergies  No food allergies  Not immunocompromised  Skin:  No pallor or rash  No wound  Patient Active Problem List   Diagnosis   • OAB (overactive bladder)   • Parkinson's disease ([facility] [address] )   • Primary insomnia   • History of skin cancer   • Seasonal allergic rhinitis   • Impaired fasting glucose   • Mood disturbance   • Obesity (BMI 30-39  9)   • Claustrophobia   • Mitral valve disorder   • Menopause ovarian failure   • Vitamin D deficiency   • Dysphagia   • Multiple thyroid nodules   • Gastro-esophageal reflux disease without esophagitis   • Osteopenia   • Chronic idiopathic constipation   • History of adenomatous polyp of colon   • Tremor   • Medicare annual wellness visit, subsequent   • Trochanteric bursitis, right hip   • Pancreatic cyst   • Cherry angioma   • Dyspnea on exertion   • Postablative hypothyroidism   • Sleep apnea   • Closed compression fracture of body of L1 vertebra (HCC)   • Cerebrovascular disease   • Age-related osteoporosis without current pathological fracture   • Urge incontinence     Past Medical History:   Diagnosis Date   • Actinic keratosis 03/11/2016   • Acute midline low back pain without sciatica 11/19/2020   • 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    • Benign neoplasm of skin    • Chest pain    • Claustrophobia    • Diverticulitis    • Fracture     L1-L2   • GERD (gastroesophageal reflux disease)    • H  pylori infection 2020   • Muscle weakness    • Nonmelanoma skin cancer     last assessed 2017   • Palpitations    • Pancreatic cyst    • Seasonal allergies    • Seborrheic keratosis 2014   • Sleep apnea     no cpap   • Spontaneous      without mention of complications    • Squamous cell carcinoma of forehead 2014   • Syncope      Past Surgical History:   Procedure Laterality Date   • BOTOX INJECTION N/A 3/6/2017    Procedure: CYSTOSCOPY; BLADDER BOTOX 100 UNITS ;  Surgeon: Juan Antonio Stock MD;  Location[facility];  Service:    • BOWEL RESECTION      related ot diverticulitis   • CARDIAC CATHETERIZATION     • CARPAL TUNNEL RELEASE Right    • COLONOSCOPY  2019   • COLOSTOMY     • COLOSTOMY CLOSURE     • CYSTOSCOPY  2021   • FOOT SURGERY Left     neuroma   • HYSTERECTOMY     • NASAL SINUS SURGERY  age 35    [place]   • IN CYSTOURETHROSCOPY N/A 2018    Procedure: CYSTOSCOPY WITH BOTOX;  Surgeon: Juan Antonio Stock MD;  [facility];  Service: Urology   • IN ESOPHAGOGASTRODUODENOSCOPY TRANSORAL DIAGNOSTIC N/A 2019    Procedure: ESOPHAGOGASTRODUODENOSCOPY (EGD); Surgeon: Mauri Doan DO;  Location: [facility]; Service: Gastroenterology   • TONSILLECTOMY  age 47   • UPPER GASTROINTESTINAL ENDOSCOPY  2019     Family History   Problem Relation Age of Onset   • Alcohol abuse Mother    • Heart disease Mother    • Alcohol abuse Father    • Alcohol abuse Brother    • Cancer Brother    • Tremor Neg Hx    • Parkinsonism Neg Hx    • Colon cancer Neg Hx      Social History     Socioeconomic History   • Marital status:       Spouse name: Not on file   • Number of children: Not on file   • Years of education: Not on file   • Highest education level: Not on file   Occupational History   • Occupation: RETIRED    Tobacco Use   • Smoking status: Never   • Smokeless tobacco: Never   Vaping Use   • Vaping Use: Never used   Substance and Sexual Activity   • Alcohol use: Yes     Comment: patient states rare use on holidays    • Drug use: No   • Sexual activity: Not Currently   Other Topics Concern   • Not on file   Social History Narrative    LIVING INDEPENDENTLY ALONE         No caffeine use     Social Determinants of Health     Financial Resource Strain: Low Risk  (4/19/2023)    Overall Financial Resource Strain (CARDIA)    • Difficulty of Paying Living Expenses: Not very hard   Food Insecurity: Not on file   Transportation Needs: No Transportation Needs (4/19/2023)    PRAPARE - Transportation    • Lack of Transportation (Medical): No    • Lack of Transportation (Non-Medical):  No   Physical Activity: Not on file   Stress: Not on file   Social Connections: Not on file   Intimate Partner Violence: Not on file   Housing Stability: Not on file       Current Outpatient Medications:   •  Ascorbic Acid (VITAMIN C) 1000 MG tablet, Take 1,000 mg by mouth daily, Disp: , Rfl:   •  B Complex Vitamins (B COMPLEX 100 PO), Take by mouth, Disp: , Rfl:   •  calcium carbonate (OS-JOHN) 1250 (500 Ca) MG tablet, Take 1 tablet by mouth daily, Disp: , Rfl:   •  carbidopa-levodopa (SINEMET)  mg per tablet, Take 1 tablet by mouth 3 (three) times a day before meals, Disp: 270 tablet, Rfl: 3  •  cholecalciferol (VITAMIN D3) 1,000 units tablet, Take 5,000 Units by mouth daily , Disp: , Rfl:   •  Coenzyme Q10 (CO Q 10 PO), Take by mouth 600 mg BID, Disp: , Rfl:   •  Cyanocobalamin (VITAMIN B 12 PO), Take by mouth daily, Disp: , Rfl:   •  Echinacea 125 MG CAPS, Take by mouth, Disp: , Rfl:   •  LORazepam (ATIVAN) 1 mg tablet, TAKE 1 TABLET 30 MINUTES PRIOR TO MRI, TAKE 2ND TABLET RIGHT BEFORE MRI IF NEEDED , Disp: 2 tablet, Rfl: 0  •  Magnesium 200 MG TABS, Take 1 tablet (200 mg total) by mouth daily, Disp: 30 tablet, Rfl: 3  •  Mirabegron ER (Myrbetriq) 50 MG TB24, Take 1 tablet (50 mg total) by mouth daily, Disp: 90 tablet, Rfl: 3  •  multivitamin (THERAGRAN) TABS, Take 1 tablet by mouth daily, Disp: , Rfl:   •  Omega-3 350 MG CPDR, Take by mouth, Disp: , Rfl:   •  pantoprazole (PROTONIX) 40 mg tablet, Take 1 tablet (40 mg total) by mouth daily, Disp: 90 tablet, Rfl: 3  •  Potassium Gluconate 595 (99 K) MG TABS, Take by mouth, Disp: , Rfl:   •  Probiotic Product (PROBIOTIC-10) CAPS, Take by mouth, Disp: , Rfl:   Allergies   Allergen Reactions   • Caffeine - Food Allergy GI Intolerance   • Iodine - Food Allergy Rash   • Latex Rash   • Other Rash     Adhesive tape       Vitals:    06/13/23 0843   BP: 126/72   Pulse: 84   Resp: 22   Temp: (!) 97 3 °F (36 3 °C)   SpO2: 98%       Physical Exam  Constitutional: General appearance: The Patient is well-developed and well-nourished who appears the stated age in no acute distress  Patient is pleasant and talkative  HEENT:  Normocephalic  Sclerae are anicteric  Mucous membranes are moist  Neck is supple without adenopathy  No JVD  Chest: The lungs are clear to auscultation  Cardiac: Heart is regular rate  Abdomen: Abdomen is soft, non-tender, non-distended and without masses  Extremities: There is no clubbing or cyanosis  There is no edema  Symmetric  Neuro: Grossly nonfocal  Gait is normal      Lymphatic: No evidence of cervical adenopathy bilaterally  No evidence of axillary adenopathy bilaterally  No evidence of inguinal adenopathy bilaterally  Skin: Warm, anicteric  Psych:  Patient is pleasant and talkative  Breasts:        Pathology:  [unfilled]    Labs:      Imaging  MRI abdomen w wo contrast and mrcp    Result Date: 6/8/2023  Narrative: MRI OF THE ABDOMEN WITH AND WITHOUT CONTRAST WITH MRCP INDICATION: 79 years / Female  K86 2: Cyst of pancreas   COMPARISON: MRI abdomen 12/5/2022, CT abdomen/pelvis 8/24/2020 TECHNIQUE:  Multiplanar/multisequence MRI of the abdomen with 3D MRCP was performed before and after administration of contrast  IV Contrast:  7 mL of Gadobutrol injection (SINGLE-DOSE) FINDINGS: LOWER CHEST:   Focal linear enhancing consolidation in the lingula (series 17 image 14), which can represent atelectasis or pneumonitis  LIVER: Normal in size and configuration  No suspicious mass  The hepatic veins and portal veins are patent  BILE DUCTS:  No intrahepatic or extrahepatic bile duct dilation  Common bile duct is normal in caliber  No choledocholithiasis, biliary stricture or suspicious mass  GALLBLADDER: Multiple small calculi in the gallbladder  PANCREAS: Pancreas divisum  Stable mild dilation of the main pancreatic duct measuring up to 0 3 cm in the pancreatic body  Again seen are multiple cystic lesions throughout the pancreas with the largest lesion measuring up to 5 5 cm in the pancreatic tail (series 8 image 17), previously measuring 5 3 cm on 12/5/2022, 5 0 cm on 5/18/2022 and 4 5 cm on 8/24/2020  Again seen are multiple adjacent smaller simple and septated cystic lesions  Partial fatty replacement of the pancreatic parenchyma  ADRENAL GLANDS:  Normal  SPLEEN:  Normal  KIDNEYS/PROXIMAL URETERS:  No hydroureteronephrosis  No suspicious renal mass  Right renal upper pole simple cyst  Stable size of the exophytic right renal interpole fat-containing lesion, measuring 1 7 cm and compatible with an angiomyolipoma  A small septated proteinaceous left renal upper pole cyst measuring 0 7 cm (series 13 image 51)  BOWEL:   No dilated loops of bowel  PERITONEUM/RETROPERITONEUM:  No ascites  LYMPH NODES:  No abdominal lymphadenopathy  VASCULAR STRUCTURES:  No aneurysm  ABDOMINAL WALL:  Unremarkable  OSSEOUS STRUCTURES:  No suspicious osseous lesion  Impression: Again seen are innumerable pancreatic cysts, the largest cyst in the pancreatic tail measuring up to 5 5 cm, slowly growing in size since 8/24/2020 when it measured 4 5 cm  There are no internal worrisome features   Findings are favored to represent multiple IPMN  Stable mild main pancreatic ductal dilation measuring up to 3 mm  No suspicious focal pancreatic lesion  Continued surveillance is recommended as clinically warranted  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  Lingular atelectasis versus focal consolidation  Clinical correlation is recommended  The study was marked in EPIC for significant notification  Workstation performed: [id number]     I reviewed the above laboratory and imaging data  Discussion/Summary: 50-year-old female with an enlarging pancreas cyst   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  There continued to be no worrisome or suspicious features  Her main duct is not significantly dilated  This has grown more than 2 mm/year over the last 2 years  Her previous pathology showed that this was at statistically we higher risk to become malignant  We discussed the procedure would likely include distal pancreatectomy and possible splenectomy  This may not be able to be performed minimally invasively given her previous midline incision and colostomy  I discussed based on her age, continued observation would also be reasonable, but the growth rate of the cyst is concerning  I will have her case presented and working group  I have tentatively scheduled a follow-up MRI in 6 months  I will call her with the results of the working group meeting  She is agreeable to this  All her questions were answered  Pressure injury stage 2 or greater

## 2025-04-04 NOTE — NURSING NOTE
Attempted labs x2, unsuccessful by RN. Patient refused further lab draws, even with another nurse attempt. Day team made aware.    Shira Adame RN   Spoke with pt appt r/s to Dr. Nair.

## 2025-04-07 ENCOUNTER — REMOTE DEVICE CLINIC VISIT (OUTPATIENT)
Dept: CARDIOLOGY CLINIC | Facility: CLINIC | Age: 85
End: 2025-04-07
Payer: COMMERCIAL

## 2025-04-07 ENCOUNTER — RESULTS FOLLOW-UP (OUTPATIENT)
Dept: CARDIOLOGY CLINIC | Facility: CLINIC | Age: 85
End: 2025-04-07

## 2025-04-07 ENCOUNTER — TELEPHONE (OUTPATIENT)
Age: 85
End: 2025-04-07

## 2025-04-07 DIAGNOSIS — Z95.0 PRESENCE OF PERMANENT CARDIAC PACEMAKER: Primary | ICD-10-CM

## 2025-04-07 PROCEDURE — 93296 REM INTERROG EVL PM/IDS: CPT | Performed by: INTERNAL MEDICINE

## 2025-04-07 PROCEDURE — 93294 REM INTERROG EVL PM/LDLS PM: CPT | Performed by: INTERNAL MEDICINE

## 2025-04-07 NOTE — TELEPHONE ENCOUNTER
I can switch her to Gemtesa, however, this medication is costly and can take up to 12 weeks to fully take effect. Please let me know if she would like to trial this medication. I would still recommend urine testing as the only symptom of UTI may be worsening urinary frequency or incontinence.

## 2025-04-07 NOTE — TELEPHONE ENCOUNTER
Spoke with pt relaying Maddy MORENO message,   Urine testing to rule out UTI with worsening LUTS over past few weeks.    Pt verbalized understanding   Pt stated she know it is not a infection and does not want do do urine testing. Please advise

## 2025-04-07 NOTE — TELEPHONE ENCOUNTER
Can be offered bladder botox. Would need urine testing first though. Can schedule this if she wants to proceed and follow up can be cancelled. If she does not want to have botox scheduled, then she will need office visit to discuss further.

## 2025-04-07 NOTE — TELEPHONE ENCOUNTER
Pt states she does not want to go that route as well. Pt stated it seems like she tried every pill under the sun and it does not work.

## 2025-04-07 NOTE — PROGRESS NOTES
Results for orders placed or performed in visit on 04/07/25   Cardiac EP device report    Narrative    MDT DC PM/ACTIVE SYSTEM IS MRI CONDITIONAL  CARELINK TRANSMISSION: BATTERY VOLTAGE ADEQUATE (13.5 YR). AP 25.2%  6.3% (AAIR-DDDR 60 PPM). ALL AVAILABLE LEAD PARAMETERS WITHIN NORMAL LIMITS. 1 VT-NS EPISODE (13 BEATS @  BPM). EF 65% (10/16/2024 ECHO). PATIENT TAKING ELIQUIS, NO AV BEVERLY BLOCKER OR AAD. NORMAL DEVICE FUNCTION.  ES

## 2025-04-07 NOTE — TELEPHONE ENCOUNTER
Patient called today to say that the   trospium chloride (SANCTURA) 20 mg is no longer working. Pt states that as soon as her feet hit the floor in the morning, she is unable to make it to the bathroom before urinating herself. Patient states this has been happening for the last couple of weeks and its been getting worse.     Please review.    Call back [phone]

## 2025-04-08 NOTE — TELEPHONE ENCOUNTER
Pt called to return missed call and pt is not interested in bladder botox due to id not work the last time she had it. Pt is scheduled for appt 5/8/25 @ 2:00pm to discuss next plan of care

## 2025-04-08 NOTE — TELEPHONE ENCOUNTER
LVM providing office number. If pt calls back please relay Maddy MORENO message,     Can be offered bladder botox. Would need urine testing first though. Can schedule this if she wants to proceed and follow up can be cancelled. If she does not want to have botox scheduled, then she will need office visit to discuss further.

## 2025-04-10 NOTE — PROGRESS NOTES
Assessment/Plan:       Problem List Items Addressed This Visit        Endocrine    Postablative hypothyroidism    Relevant Orders    TSH, 3rd generation with Free T4 reflex       Nervous and Auditory    Parkinson's disease ([facility] [address] )      Other Visit Diagnoses     Hypotension, unspecified hypotension type    -  Primary    Relevant Orders    Ambulatory Referral to Cardiology    Lightheadedness        Relevant Orders    Ambulatory Referral to Cardiology    Comprehensive metabolic panel    CBC and differential    Screening for lipid disorders        Relevant Orders    Lipid Panel with Direct LDL reflex        exam unremarkable today, she is not hypotensive in the office  Not reproducible with head movements today  Unclear if this is true dizziness or more lightheadedness/presyncope  Given her hypotension and postural component, I would recommend cardiology input  Her EKG today was sinus rhythm without acute changes  Will update labs as above  Change positions slowly, stay hydrated  May benefit from PT/OT  Does not appear to be s/e of sinemet, would be okay if she restarted this  Continue with neurology  Follow up prn     Subjective:      Patient ID: Debbie Burger is a 79 y o  female  Pt presents with concerns of chronic "dizziness and lightheadedness" has been ongoing for many years  She notes she feels "off balance" and like she is going to pass out when she changes positions  She denies syncope  She denies CP, SOB, palpitations  She has a hx of parkinson's and recently let her neurologist know about this and her sinemet was stopped but sx did not improve  She notes her BP at home is often low, this morning was 90/41  She does not take antihypertensives  She shares she saw an ENT in the past and the workup was unremarkable  She denies unilateral weakness, numbness, trouble speaking/swallowing, SADLER         The following portions of the patient's history were reviewed and updated as appropriate:   She  has a past medical   HENT:      Head:      Comments: Left side in generalized facial swelling and fullness into the left cheekbone.  There is no swelling or erythema of the periorbital or preseptal area     Nose: Nose normal.      Mouth/Throat:      Mouth: Mucous membranes are moist.      Pharynx: No oropharyngeal exudate or posterior oropharyngeal erythema.      Comments: Poor dentition.  No areas of fluctuance or palpable or drainable abscess, no trismus, no submandibular or sublingual firmness or swelling  Eyes:      Extraocular Movements: Extraocular movements intact.      Conjunctiva/sclera: Conjunctivae normal.      Pupils: Pupils are equal, round, and reactive to light.   Cardiovascular:      Rate and Rhythm: Normal rate.   Pulmonary:      Effort: Pulmonary effort is normal.   Musculoskeletal:      Cervical back: No tenderness.   Lymphadenopathy:      Cervical: Cervical adenopathy present.   Skin:     General: Skin is warm and dry.      Findings: No bruising, erythema or rash.   Neurological:      Mental Status: He is alert and oriented to person, place, and time.   Psychiatric:         Behavior: Behavior normal.               An electronic signature was used to authenticate this note.    GEMA Tomlinson          history of Actinic keratosis (3/11/2016), Acute midline low back pain without sciatica (2020), 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, Benign neoplasm of skin, Chest pain, Claustrophobia, Diverticulitis, Fracture, GERD (gastroesophageal reflux disease), H  pylori infection (2020), Muscle weakness, Nonmelanoma skin cancer, Palpitations, Seasonal allergies, Seborrheic keratosis (2014), Sleep apnea, Spontaneous , Squamous cell carcinoma of forehead (2014), and Syncope    She   Patient Active Problem List    Diagnosis Date Noted    Urge incontinence 2021    Age-related osteoporosis without current pathological fracture 2021    Excessive ear wax, right 03/10/2021    Closed compression fracture of body of L1 vertebra (HCC) 2020    Cerebrovascular disease 2020    Headache 2020    Pancreatic cyst 2020    Trochanteric bursitis, right hip 2020    Tremor 2020    Medicare annual wellness visit, subsequent 2020    Chronic idiopathic constipation 2019    History of adenomatous polyp of colon 2019    Osteopenia 2019    Gastro-esophageal reflux disease without esophagitis 2019    Dysphagia 2019    Multiple thyroid nodules 2019    Menopause ovarian failure 2019    Vitamin D deficiency 2019    Mitral valve disorder 2018    Mood disturbance 2018    Obesity (BMI 30-39 9) 2018    Claustrophobia 2018    Impaired fasting glucose 2018    Seasonal allergic rhinitis 2018    History of skin cancer 2018    Parkinson's disease ([facility] [address] ) 2018    Primary insomnia 2018    Fatigue 2018    Cherry angioma 11/15/2017    Postablative hypothyroidism 2016    Dyspnea on exertion 10/02/2015    OAB (overactive bladder) 2015    Sleep apnea 04/15/2014     She  has a past surgical history that includes Hysterectomy; Colostomy; Carpal tunnel release (Right); Colostomy closure; Foot surgery (Left); Cardiac catheterization; BOTOX INJECTION (N/A, 3/6/2017); pr cystourethroscopy (N/A, 4/13/2018); Bowel resection; pr esophagogastroduodenoscopy transoral diagnostic (N/A, 4/23/2019); Colonoscopy (07/31/2019); Upper gastrointestinal endoscopy (04/23/2019); Tonsillectomy (age 47); Nasal sinus surgery (age 35); and Cystoscopy (06/01/2021)  Her family history includes Alcohol abuse in her brother, father, and mother; Cancer in her brother; Heart disease in her mother  She  reports that she has never smoked  She has never used smokeless tobacco  She reports current alcohol use  She reports that she does not use drugs    Current Outpatient Medications   Medication Sig Dispense Refill    Ascorbic Acid (VITAMIN C) 1000 MG tablet Take 1,000 mg by mouth daily      aspirin 81 mg chewable tablet Chew 1 tablet (81 mg total) daily 30 tablet 0    B Complex Vitamins (B COMPLEX 100 PO) Take by mouth      Biotin 2500 MCG CAPS Take by mouth      calcium carbonate (OS-JOHN) 1250 (500 Ca) MG tablet Take 1 tablet by mouth daily      carbidopa-levodopa (SINEMET)  mg per tablet Take 1 tablet by mouth 3 (three) times a day before meals 270 tablet 6    cholecalciferol (VITAMIN D3) 1,000 units tablet Take 5,000 Units by mouth daily       Coenzyme Q10 (CO Q 10 PO) Take by mouth 600 mg BID      Cyanocobalamin (VITAMIN B 12 PO) Take by mouth daily      Echinacea 125 MG CAPS Take by mouth      fexofenadine (ALLEGRA) 180 MG tablet Take 180 mg by mouth as needed (prn)       Magnesium 200 MG TABS Take 1 tablet (200 mg total) by mouth daily 30 tablet 3    Mirabegron ER 50 MG TB24 Take 1 tablet (50 mg total) by mouth daily 90 tablet 3    multivitamin (THERAGRAN) TABS Take 1 tablet by mouth daily      naproxen sodium (Aleve) 220 MG tablet Take 220 mg by mouth as needed       Omega-3 350 MG CPDR Take by mouth      pantoprazole (PROTONIX) 40 mg tablet TAKE 1 TABLET DAILY 90 tablet 3    Potassium Gluconate 595 (99 K) MG TABS Take by mouth      Probiotic Product (PROBIOTIC-10) CAPS Take by mouth       No current facility-administered medications for this visit  Current Outpatient Medications on File Prior to Visit   Medication Sig    Ascorbic Acid (VITAMIN C) 1000 MG tablet Take 1,000 mg by mouth daily    aspirin 81 mg chewable tablet Chew 1 tablet (81 mg total) daily    B Complex Vitamins (B COMPLEX 100 PO) Take by mouth    Biotin 2500 MCG CAPS Take by mouth    calcium carbonate (OS-JOHN) 1250 (500 Ca) MG tablet Take 1 tablet by mouth daily    carbidopa-levodopa (SINEMET)  mg per tablet Take 1 tablet by mouth 3 (three) times a day before meals    cholecalciferol (VITAMIN D3) 1,000 units tablet Take 5,000 Units by mouth daily     Coenzyme Q10 (CO Q 10 PO) Take by mouth 600 mg BID    Cyanocobalamin (VITAMIN B 12 PO) Take by mouth daily    Echinacea 125 MG CAPS Take by mouth    fexofenadine (ALLEGRA) 180 MG tablet Take 180 mg by mouth as needed (prn)     Magnesium 200 MG TABS Take 1 tablet (200 mg total) by mouth daily    Mirabegron ER 50 MG TB24 Take 1 tablet (50 mg total) by mouth daily    multivitamin (THERAGRAN) TABS Take 1 tablet by mouth daily    naproxen sodium (Aleve) 220 MG tablet Take 220 mg by mouth as needed     Omega-3 350 MG CPDR Take by mouth    pantoprazole (PROTONIX) 40 mg tablet TAKE 1 TABLET DAILY    Potassium Gluconate 595 (99 K) MG TABS Take by mouth    Probiotic Product (PROBIOTIC-10) CAPS Take by mouth     No current facility-administered medications on file prior to visit  She is allergic to caffeine - food allergy, iodine - food allergy, latex, and other       Review of Systems   Constitutional: Negative for chills, fatigue and fever  HENT: Negative for congestion, ear pain, hearing loss, nosebleeds, postnasal drip, rhinorrhea, sinus pressure, sinus pain, sneezing and sore throat  Eyes: Negative for pain, discharge, itching and visual disturbance  Respiratory: Negative for cough, chest tightness, shortness of breath and wheezing  Cardiovascular: Negative for chest pain, palpitations and leg swelling  Gastrointestinal: Negative for abdominal pain, blood in stool, constipation, diarrhea, nausea and vomiting  Genitourinary: Negative for frequency and urgency  Neurological: Positive for dizziness, tremors and light-headedness  Negative for syncope, facial asymmetry, speech difficulty, weakness, numbness and headaches  Objective:      BP (!) 134/102   Pulse 81   Temp (!) 97 4 °F (36 3 °C)   Ht 5' 2" (1 575 m)   Wt 79 8 kg (176 lb)   SpO2 97%   BMI 32 19 kg/m²          Physical Exam  Vitals and nursing note reviewed  Constitutional:       General: She is not in acute distress  Appearance: Normal appearance  HENT:      Head: Normocephalic and atraumatic  Nose: Nose normal       Mouth/Throat:      Mouth: Mucous membranes are moist       Pharynx: Oropharynx is clear  No oropharyngeal exudate or posterior oropharyngeal erythema  Eyes:      Pupils: Pupils are equal, round, and reactive to light  Cardiovascular:      Rate and Rhythm: Normal rate and regular rhythm  Heart sounds: Normal heart sounds  No murmur heard  Pulmonary:      Effort: Pulmonary effort is normal  No respiratory distress  Breath sounds: Normal breath sounds  No wheezing, rhonchi or rales  Musculoskeletal:         General: Normal range of motion  Cervical back: Normal range of motion and neck supple  Right lower leg: No edema  Left lower leg: No edema  Skin:     General: Skin is warm and dry  Neurological:      General: No focal deficit present  Mental Status: She is alert and oriented to person, place, and time  Mental status is at baseline  Cranial Nerves: No cranial nerve deficit  Sensory: No sensory deficit  Motor: No weakness  Coordination: Coordination normal       Deep Tendon Reflexes: Reflexes normal    Psychiatric:         Mood and Affect: Mood and affect normal

## 2025-05-08 ENCOUNTER — OFFICE VISIT (OUTPATIENT)
Dept: UROLOGY | Facility: CLINIC | Age: 85
End: 2025-05-08
Payer: COMMERCIAL

## 2025-05-08 VITALS
OXYGEN SATURATION: 97 % | BODY MASS INDEX: 34.88 KG/M2 | TEMPERATURE: 98.1 F | WEIGHT: 173 LBS | HEART RATE: 93 BPM | HEIGHT: 59 IN | SYSTOLIC BLOOD PRESSURE: 127 MMHG | DIASTOLIC BLOOD PRESSURE: 76 MMHG

## 2025-05-08 DIAGNOSIS — N32.81 OAB (OVERACTIVE BLADDER): ICD-10-CM

## 2025-05-08 DIAGNOSIS — N32.81 OAB (OVERACTIVE BLADDER): Primary | ICD-10-CM

## 2025-05-08 DIAGNOSIS — E11.69 TYPE 2 DIABETES MELLITUS WITH OTHER SPECIFIED COMPLICATION, UNSPECIFIED WHETHER LONG TERM INSULIN USE (HCC): ICD-10-CM

## 2025-05-08 PROCEDURE — 99213 OFFICE O/P EST LOW 20 MIN: CPT | Performed by: PHYSICIAN ASSISTANT

## 2025-05-08 RX ORDER — TROSPIUM CHLORIDE 20 MG/1
20 TABLET, FILM COATED ORAL 2 TIMES DAILY
Qty: 180 TABLET | Refills: 3 | Status: SHIPPED | OUTPATIENT
Start: 2025-05-08

## 2025-05-08 NOTE — TELEPHONE ENCOUNTER
Reason for call:   [x] Refill   [] Prior Auth  [] Other:     Office:   [] PCP/Provider -   [] Specialty/Provider -  Maddy Sarabia PA-C    Medication: trospium chloride (SANCTURA) 20 mg tablet     Dose/Frequency: Take 1 tablet (20 mg total) by mouth 2 (two) times a day     Quantity: 180 tablet     Pharmacy: [facility]    Local Pharmacy   Does the patient have enough for 3 days?   [x] Yes   [] No - Send as HP to POD    Mail Away Pharmacy   Does the patient have enough for 10 days?   [] Yes   [] No - Send as HP to POD

## 2025-05-08 NOTE — PROGRESS NOTES
Name: Radha Vidal      : 1940      MRN: [id number]  Encounter Provider: Maddy Sarabia PA-C  Encounter Date: 2025   Encounter department: [facility] [place]  :  Assessment & Plan  OAB (overactive bladder)  S/p Botox  Failed PTNS  Previously taking Myrbetriq, however was transitioned to trospium due to cost.   Discussed conservative measures  Discussed repeat Botox, patient declines  Patient is not a candidate for other anticholinergics due to instability and fall risk  Discussed trial of Gemtesa. Patient would like to call insurance first to see if covered. If she would like this sent to mail in pharmacy, she will call   Discussed interstim, patient declines   Continue Trospium  Follow up in 1 year for refill  Orders:    trospium chloride (SANCTURA) 20 mg tablet; Take 1 tablet (20 mg total) by mouth 2 (two) times a day    Type 2 diabetes mellitus with other specified complication, unspecified whether long term insulin use (HCC)    Lab Results   Component Value Date    HGBA1C 6.4 (H) 2024   Follows with PCP             History of Present Illness   Radha Vidal is a 85 y.o. female who presents for follow up.      Underwent bladder botox 10/21/21.  Patient was taking Myrbetriq 50 mg daily with benefit, however switched to trospium due to cost.  Patient did previously failed PTNS.   Patient having worsening urge urinary incontinence.     Review of Systems   Constitutional:  Negative for activity change, appetite change, chills and fever.   HENT:  Negative for congestion and trouble swallowing.    Respiratory:  Negative for cough and shortness of breath.    Cardiovascular:  Negative for chest pain, palpitations and leg swelling.   Gastrointestinal:  Negative for abdominal pain, constipation, diarrhea, nausea and vomiting.   Genitourinary:  Positive for frequency and urgency. Negative for difficulty urinating, dysuria, flank pain and hematuria.   Musculoskeletal:  Negative for  "back pain and gait problem.   Skin:  Negative for wound.   Allergic/Immunologic: Negative for immunocompromised state.   Neurological:  Negative for dizziness and syncope.   Hematological:  Does not bruise/bleed easily.   Psychiatric/Behavioral:  Negative for confusion.    All other systems reviewed and are negative.         Objective   /76 (BP Location: Left arm, Patient Position: Sitting, Cuff Size: Large)   Pulse 93   Temp 98.1 °F (36.7 °C)   Ht 4' 11\" (1.499 m)   Wt 78.5 kg (173 lb)   SpO2 97%   BMI 34.94 kg/m²     Physical Exam  Constitutional:       Appearance: Normal appearance.   HENT:      Head: Normocephalic.      Nose: Nose normal.      Mouth/Throat:      Pharynx: Oropharynx is clear.   Eyes:      Pupils: Pupils are equal, round, and reactive to light.   Pulmonary:      Effort: Pulmonary effort is normal.   Musculoskeletal:      Cervical back: Normal range of motion.   Skin:     General: Skin is warm.   Neurological:      General: No focal deficit present.      Mental Status: She is alert and oriented to person, place, and time. Mental status is at baseline.   Psychiatric:         Mood and Affect: Mood normal.         Behavior: Behavior normal.         Thought Content: Thought content normal.         Judgment: Judgment normal.           Results   No results found for: \"PSA\"  Lab Results   Component Value Date    GLUCOSE 139 07/31/2023    CALCIUM 9.5 05/16/2024     (H) 10/03/2015    K 4.6 05/16/2024    CO2 24 05/16/2024     05/16/2024    BUN 22 01/02/2025    CREATININE 0.88 01/02/2025     Lab Results   Component Value Date    WBC 10.62 (H) 05/16/2024    HGB 13.7 05/16/2024    HCT 40.1 05/16/2024    MCV 88 05/16/2024     05/16/2024       Office Urine Dip  No results found for this or any previous visit (from the past hour).      "

## 2025-05-08 NOTE — ASSESSMENT & PLAN NOTE
S/p Botox  Failed PTNS  Previously taking Myrbetriq, however was transitioned to trospium due to cost.   Discussed conservative measures  Discussed repeat Botox, patient declines  Patient is not a candidate for other anticholinergics due to instability and fall risk  Discussed trial of Gemtesa. Patient would like to call insurance first to see if covered. If she would like this sent to mail in pharmacy, she will call   Discussed interstim, patient declines   Continue Trospium  Follow up in 1 year for refill  Orders:    trospium chloride (SANCTURA) 20 mg tablet; Take 1 tablet (20 mg total) by mouth 2 (two) times a day

## 2025-05-09 RX ORDER — TROSPIUM CHLORIDE 20 MG/1
20 TABLET, FILM COATED ORAL 2 TIMES DAILY
Qty: 180 TABLET | Refills: 0 | OUTPATIENT
Start: 2025-05-09

## 2025-05-12 ENCOUNTER — TELEPHONE (OUTPATIENT)
Dept: FAMILY MEDICINE CLINIC | Facility: CLINIC | Age: 85
End: 2025-05-12

## 2025-05-29 ENCOUNTER — RA CDI HCC (OUTPATIENT)
Dept: OTHER | Facility: HOSPITAL | Age: 85
End: 2025-05-29

## 2025-05-29 DIAGNOSIS — Z95.0 PACEMAKER: ICD-10-CM

## 2025-05-29 DIAGNOSIS — I49.5 SSS (SICK SINUS SYNDROME) (HCC): Primary | ICD-10-CM

## 2025-05-29 NOTE — PROGRESS NOTES
HCC coding opportunities          Chart Reviewed number of suggestions sent to Provider: 2    K56.609  I49.5     Patients Insurance     Medicare Insurance: [employer]

## 2025-06-03 ENCOUNTER — OFFICE VISIT (OUTPATIENT)
Dept: NEUROLOGY | Facility: CLINIC | Age: 85
End: 2025-06-03
Payer: COMMERCIAL

## 2025-06-03 ENCOUNTER — OFFICE VISIT (OUTPATIENT)
Dept: FAMILY MEDICINE CLINIC | Facility: CLINIC | Age: 85
End: 2025-06-03
Payer: COMMERCIAL

## 2025-06-03 VITALS
BODY MASS INDEX: 34.88 KG/M2 | HEART RATE: 87 BPM | DIASTOLIC BLOOD PRESSURE: 88 MMHG | OXYGEN SATURATION: 95 % | SYSTOLIC BLOOD PRESSURE: 130 MMHG | WEIGHT: 173 LBS | HEIGHT: 59 IN

## 2025-06-03 VITALS
WEIGHT: 172.8 LBS | BODY MASS INDEX: 34.84 KG/M2 | SYSTOLIC BLOOD PRESSURE: 132 MMHG | HEART RATE: 92 BPM | TEMPERATURE: 97.8 F | OXYGEN SATURATION: 96 % | HEIGHT: 59 IN | DIASTOLIC BLOOD PRESSURE: 84 MMHG

## 2025-06-03 DIAGNOSIS — Z00.00 MEDICARE ANNUAL WELLNESS VISIT, SUBSEQUENT: Primary | ICD-10-CM

## 2025-06-03 DIAGNOSIS — R73.01 IMPAIRED FASTING GLUCOSE: ICD-10-CM

## 2025-06-03 DIAGNOSIS — I48.0 PAROXYSMAL ATRIAL FIBRILLATION (HCC): ICD-10-CM

## 2025-06-03 DIAGNOSIS — Z13.220 NEED FOR LIPID SCREENING: ICD-10-CM

## 2025-06-03 DIAGNOSIS — G20.A1 PARKINSON'S DISEASE, UNSPECIFIED WHETHER DYSKINESIA PRESENT, UNSPECIFIED WHETHER MANIFESTATIONS FLUCTUATE (HCC): Primary | ICD-10-CM

## 2025-06-03 DIAGNOSIS — G20.A1 PARKINSON'S DISEASE, UNSPECIFIED WHETHER DYSKINESIA PRESENT, UNSPECIFIED WHETHER MANIFESTATIONS FLUCTUATE (HCC): ICD-10-CM

## 2025-06-03 DIAGNOSIS — E28.39 MENOPAUSE OVARIAN FAILURE: ICD-10-CM

## 2025-06-03 DIAGNOSIS — G20.A1 PARKINSON'S DISEASE (HCC): ICD-10-CM

## 2025-06-03 DIAGNOSIS — R53.83 OTHER FATIGUE: ICD-10-CM

## 2025-06-03 PROCEDURE — 99214 OFFICE O/P EST MOD 30 MIN: CPT | Performed by: PSYCHIATRY & NEUROLOGY

## 2025-06-03 PROCEDURE — G0439 PPPS, SUBSEQ VISIT: HCPCS | Performed by: FAMILY MEDICINE

## 2025-06-03 RX ORDER — CARBIDOPA AND LEVODOPA 25; 100 MG/1; MG/1
1 TABLET ORAL
Qty: 270 TABLET | Refills: 3 | Status: SHIPPED | OUTPATIENT
Start: 2025-06-03

## 2025-06-03 NOTE — PROGRESS NOTES
Name: Radha Vidal      : 1940      MRN: [id number]  Encounter Provider: Hoang Tovar MD  Encounter Date: 6/3/2025   Encounter department: [facility]  :  Assessment & Plan  Medicare annual wellness visit, subsequent  Normal exam       Impaired fasting glucose    Orders:  •  Comprehensive metabolic panel; Future  •  Hemoglobin A1C; Future    Need for lipid screening    Orders:  •  Lipid panel; Future    Other fatigue    Orders:  •  CBC and differential; Future  •  TSH, 3rd generation with Free T4 reflex; Future  •  T4, free; Future    Menopause ovarian failure    Orders:  •  DXA bone density spine hip and pelvis; Future    Parkinson's disease, unspecified whether dyskinesia present, unspecified whether manifestations fluctuate (HCC)  Continue carbido-levodopa.    Follow up in 1 year          Preventive health issues were discussed with patient, and age appropriate screening tests were ordered as noted in patient's After Visit Summary. Personalized health advice and appropriate referrals for health education or preventive services given if needed, as noted in patient's After Visit Summary.    History of Present Illness     Patient has a hx of pre-diabetes and diabetes in the past. Denies any symptoms related to this. No recent blood work.  Has a hx of Parkinson's disease, takes carbidopa levodopa.         Patient Care Team:  Hoang Tovar MD as PCP - General (Family Medicine)  Mary Last MD as PCP - Endocrinology (Endocrinology)  DO Jori Toussaint MD Zachariah Goldsmith, MD Tiffany Meckler, PA-C as Physician Assistant (Physician Assistant)  Kyle Julien MD (Surgical Oncology)    Review of Systems   Constitutional:  Negative for activity change, appetite change, fatigue and fever.   HENT:  Negative for congestion and ear discharge.    Respiratory:  Negative for cough and shortness of breath.    Cardiovascular:  Negative for chest pain and palpitations.    Gastrointestinal:  Negative for diarrhea and nausea.   Musculoskeletal:  Negative for arthralgias and back pain.   Skin:  Negative for color change and rash.   Neurological:  Negative for dizziness and headaches.   Psychiatric/Behavioral:  Negative for agitation and behavioral problems.      Medical History Reviewed by provider this encounter:  Tobacco  Allergies  Meds  Problems  Med Hx  Surg Hx  Fam Hx       Annual Wellness Visit Questionnaire   Radha is here for her Subsequent Wellness visit.     Health Risk Assessment:   Patient rates overall health as fair. Patient feels that their physical health rating is same. Patient is satisfied with their life. Eyesight was rated as same. Hearing was rated as same. Patient feels that their emotional and mental health rating is same. Patients states they are never, rarely angry. Patient states they are often unusually tired/fatigued. Pain experienced in the last 7 days has been none. Patient states that she has experienced no weight loss or gain in last 6 months.     Depression Screening:   PHQ-2 Score: 0      Fall Risk Screening:   In the past year, patient has experienced: no history of falling in past year      Urinary Incontinence Screening:   Patient has not leaked urine accidently in the last six months.     Home Safety:  Patient has trouble with stairs inside or outside of their home. Patient has no working smoke alarms and has no working carbon monoxide detector. Home safety hazards include: none.     Nutrition:   Current diet is Regular.     Medications:   Patient is not currently taking any over-the-counter supplements. Patient is able to manage medications.     Activities of Daily Living (ADLs)/Instrumental Activities of Daily Living (IADLs):   Walk and transfer into and out of bed and chair?: Yes  Dress and groom yourself?: Yes    Bathe or shower yourself?: Yes    Feed yourself? Yes  Do your laundry/housekeeping?: Yes  Manage your money, pay your bills  and track your expenses?: Yes  Make your own meals?: Yes    Do your own shopping?: Yes    Previous Hospitalizations:   Any hospitalizations or ED visits within the last 12 months?: No      Preventive Screenings      Cardiovascular Screening:    General: History Lipid Disorder    Due for: Lipid Panel      Diabetes Screening:     General: History Diabetes    Due for: Blood Glucose      Colorectal Cancer Screening:     General: Screening Not Indicated      Breast Cancer Screening:     General: Screening Not Indicated      Cervical Cancer Screening:    General: Screening Not Indicated      Osteoporosis Screening:    General: Screening Not Indicated and History Osteoporosis      Abdominal Aortic Aneurysm (AAA) Screening:        General: Screening Not Indicated      Lung Cancer Screening:     General: Screening Not Indicated      Hepatitis C Screening:    General: Screening Not Indicated    Immunizations:  - Immunizations due: Zoster (Shingrix), HPV (Gardasil 9) and MenQuadfi (MenACWY)    Screening, Brief Intervention, and Referral to Treatment (SBIRT)     Screening  Typical number of drinks in a day: 0    Single Item Drug Screening:  How often have you used an illegal drug (including marijuana) or a prescription medication for non-medical reasons in the past year? never    Single Item Drug Screen Score: 0  Interpretation: Negative screen for possible drug use disorder    Social Drivers of Health     Financial Resource Strain: Low Risk  (4/19/2023)    Overall Financial Resource Strain (CARDIA)    • Difficulty of Paying Living Expenses: Not very hard   Food Insecurity: Patient Declined (6/3/2025)    Nursing - Inadequate Food Risk Classification    • Worried About Running Out of Food in the Last Year: Patient declined    • Ran Out of Food in the Last Year: Patient declined   Transportation Needs: No Transportation Needs (6/3/2025)    PRAPARE - Transportation    • Lack of Transportation (Medical): No    • Lack of  "Transportation (Non-Medical): No   Housing Stability: Low Risk  (6/3/2025)    Housing Stability Vital Sign    • Unable to Pay for Housing in the Last Year: No    • Number of Times Moved in the Last Year: 0    • Homeless in the Last Year: No   Utilities: Not At Risk (6/3/2025)    [facility] Utilities    • Threatened with loss of utilities: No     No results found.    Objective   /84 (BP Location: Left arm, Patient Position: Sitting)   Pulse 92   Temp 97.8 °F (36.6 °C) (Temporal)   Ht 4' 11\" (1.499 m)   Wt 78.4 kg (172 lb 12.8 oz)   SpO2 96%   BMI 34.90 kg/m²     Physical Exam  Vitals and nursing note reviewed.   Constitutional:       General: She is not in acute distress.     Appearance: She is well-developed.   HENT:      Head: Normocephalic and atraumatic.     Eyes:      Conjunctiva/sclera: Conjunctivae normal.       Cardiovascular:      Rate and Rhythm: Normal rate and regular rhythm.      Heart sounds: No murmur heard.  Pulmonary:      Effort: Pulmonary effort is normal. No respiratory distress.      Breath sounds: Normal breath sounds.   Abdominal:      Palpations: Abdomen is soft.      Tenderness: There is no abdominal tenderness.     Musculoskeletal:         General: No swelling.      Cervical back: Neck supple.     Skin:     General: Skin is warm and dry.      Capillary Refill: Capillary refill takes less than 2 seconds.     Neurological:      Mental Status: She is alert.     Psychiatric:         Mood and Affect: Mood normal.         "

## 2025-06-03 NOTE — ASSESSMENT & PLAN NOTE
Patient was advised to continue with Sinemet 25/100 mg 1 tablet 3 times a day before meals continue to be physically and mentally active, continue with eating a healthy nutritious diet, continue with daily multivitamin, to keep her blood pressure, cholesterol, sugar and weight under control, patient was advised to keep her hemoglobin A1c less than 6.0 she has an appointment to follow-up with family physician today ,to go to the hospital if has any worsening symptoms and call me otherwise to see me back in 6 months or sooner if needed and follow-up with the other physicians.

## 2025-06-03 NOTE — PROGRESS NOTES
Neurology Ambulatory Visit  Name: Radha Vidal       : 1940       MRN: [id number]   Encounter Provider: Haley Ellis MD   Encounter Date: 6/3/2025  [facility]      Radha Vidal is a 85 y.o. female.       Assessment & Plan  Parkinson's disease, unspecified whether dyskinesia present, unspecified whether manifestations fluctuate (HCC)       Patient was advised to continue with Sinemet 25/100 mg 1 tablet 3 times a day before meals continue to be physically and mentally active, continue with eating a healthy nutritious diet, continue with daily multivitamin, to keep her blood pressure, cholesterol, sugar and weight under control, patient was advised to keep her hemoglobin A1c less than 6.0 she has an appointment to follow-up with family physician today ,to go to the hospital if has any worsening symptoms and call me otherwise to see me back in 6 months or sooner if needed and follow-up with the other physicians.  Paroxysmal atrial fibrillation (HCC)       Patient is on Eliquis management as per cardiology.  Parkinson's disease ([facility])    Orders:    carbidopa-levodopa (SINEMET)  mg per tablet; Take 1 tablet by mouth 3 (three) times a day before meals        Subjective:  Chief Complaint   Patient presents with    Parkinson's Disease       HISTORY OF PRESENT ILLNESS    Patient is here in follow-up for her history of Parkinson's disease, she has had tremor for the last 5 years and tells me that overall she is doing good on Sinemet 25/100 mg 1 tablet 3 times a day and is not having any side effects, she denies any early morning stiffness she does stretching exercises and pelvic floor exercises every day she denies having any falls, no bowel incontinence she does have some urge incontinence, no hallucinations memory seems to be good except on rare occasion she might forget why she came into the room but otherwise she is able to do her ADLs able to manage  "her finances able to drive her children live close by, she is in follow-up with a urologist in surgical oncologist her appetite is good she has gained a few pounds, no other complaints.        Prior Work-up:   CAT scan of the head from 4/30/2024 was reported as no acute intracranial abnormality.  It does show moderate microangiopathic changes appearance is similar when compared to prior examination intracranial atherosclerosis       Past Medical History:    Past Medical History[1]  Past Surgical History[2]    Family History:  Family History[3]    Social History:  Social History[4]   Living situation:    Work:      Allergies:  Allergies[5]    Medications:  Current Medications[6]    Objective:  /88 (BP Location: Left arm, Patient Position: Sitting, Cuff Size: Large)   Pulse 87   Ht 4' 11\" (1.499 m)   Wt 78.5 kg (173 lb)   SpO2 95%   BMI 34.94 kg/m²      Labs  I have reviewed pertinent labs:  CBC:   Lab Results   Component Value Date    WBC 10.62 (H) 05/16/2024    RBC 4.57 05/16/2024    HGB 13.7 05/16/2024    HCT 40.1 05/16/2024    MCV 88 05/16/2024     05/16/2024    MCH 30.0 05/16/2024    MCHC 34.2 05/16/2024    RDW 15.1 05/16/2024    MPV 11.7 05/16/2024    NEUTROABS 5.61 05/15/2024     CMP:   Lab Results   Component Value Date    SODIUM 138 05/16/2024    K 4.6 05/16/2024     05/16/2024    CO2 24 05/16/2024    AGAP 8 05/16/2024    BUN 22 01/02/2025    CREATININE 0.88 01/02/2025    GLUC 155 (H) 05/16/2024    GLUF 115 (H) 05/01/2024    CALCIUM 9.5 05/16/2024    AST 22 05/12/2024    ALT 4 (L) 05/12/2024    ALKPHOS 91 05/12/2024    TP 6.6 05/12/2024    ALB 4.4 05/12/2024    TBILI 0.69 05/12/2024    EGFR 60 01/02/2025     Thyroid Studies:   Lab Results   Component Value Date    VVX9NIPGOABF 2.147 05/12/2024    T3FREE 2.10 (L) 02/06/2019    FREET4 1.12 02/06/2019    Q8UPMKF 10.8 04/26/2017     Hemoglobin A1C/EST AVG Glucose   Lab Results   Component Value Date    HGBA1C 6.4 (H) 05/13/2024     " 05/13/2024     Vitamin D Level   Lab Results   Component Value Date    HXWK46TTHOAK 39.7 07/22/2020     Vitamin B12   Lab Results   Component Value Date    HGNTQAQW26 2,876 (H) 08/11/2021     Folate   Lab Results   Component Value Date    FOLATE >20.0 (H) 07/22/2020              General Exam  GENERAL APPEARANCE:  No distress, alert, interactive and cooperative.  CARDIOVASCULAR: Warm and well perfused  LUNGS: normal work of breathing on room air  EXTREMITIES: no peripheral edema     Neurologic Exam  MENTAL STATE:  Orientation was normal to time, place and person without aphasia or apraxia. Recent and remote memory was intact.  Attention span and concentration were normal. Language testing was normal for comprehension, repetition, expression, and naming.     CRANIAL NERVES:  CN 2       visual fields full to confrontation.  CN 3, 4, 6  Pupils round, 4 mm in diameter, equally reactive to light. Lids symmetric; no ptosis. EOMs normal alignment, full range. No nystagmus.  CN 5  Facial sensation intact bilaterally.  CN 7  Normal and symmetric facial strength.   CN 8  Hearing intact to finger rub bilaterally.  CN 9  Palate elevates symmetrically.  CN 11  Normal strength of shoulder shrug and neck turning.  CN 12  Tongue midline, with normal bulk and strength.     MOTOR:  Motor exam was normal. Muscle bulk  were normal in both upper and lower extremities. Muscle strength was 5/5 in distal and proximal muscles in both upper and lower extremities. No fasciculations, tremor or other abnormal movements were present.  Cogwheeling rigidity of bilateral upper extremities and resting tremor of the left hand worse than the right hand     REFLEXES:  RIGHT UE   LEFT UE  BR:2              BR:2    Biceps:2      Biceps:2    Triceps:2     Triceps:2       RIGHT LLE   LEFT LLE    Knee:2           Knee:2    Ankle:2         Ankle:2        COORDINATION:   Coordination exam was normal. In both upper extremities, finger-nose-finger was intact  without dysmetria or overshoot.      GAIT:  Ambulates with a slightly stooped posture and decreased arm swing    ROS:  Review of Systems   Constitutional:  Negative for appetite change, fatigue and fever.   HENT: Negative.  Negative for hearing loss, tinnitus, trouble swallowing and voice change.    Eyes: Negative.  Negative for photophobia, pain and visual disturbance.   Respiratory: Negative.  Negative for shortness of breath.    Cardiovascular: Negative.  Negative for palpitations.   Gastrointestinal: Negative.  Negative for nausea and vomiting.   Endocrine: Negative.  Negative for cold intolerance.   Genitourinary: Negative.  Negative for dysuria, frequency and urgency.   Musculoskeletal:  Negative for back pain, gait problem, myalgias, neck pain and neck stiffness.   Skin: Negative.  Negative for rash.   Allergic/Immunologic: Negative.    Neurological:  Negative for dizziness, tremors, seizures, syncope, facial asymmetry, speech difficulty, weakness, light-headedness, numbness and headaches.   Hematological: Negative.  Does not bruise/bleed easily.   Psychiatric/Behavioral: Negative.  Negative for confusion, hallucinations and sleep disturbance.        I have reviewed the past medical history, surgical history, social and family history, current medications, allergies vitals, review of systems, and updated this information as appropriate today.    Administrative Statements   The total amount of time spent with the patient and on chart review and documentation was minutes. Issues addressed during this clinic visit included overall management, medication counseling or monitoring, and counseling and coordination of care.         Haley Ellis MD                [1]   Past Medical History:  Diagnosis Date    Actinic keratosis 03/11/2016    Acute midline low back pain without sciatica 11/19/2020    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     Benign neoplasm of skin     Cancer (HCC)      skin    Chest pain     Claustrophobia     Closed compression fracture of body of L1 vertebra (HCC) 2020    Diverticulitis     Diverticulitis     Fracture     L1-L2    GERD (gastroesophageal reflux disease)     H. pylori infection 2020    Muscle weakness     Nausea 2024    Nonmelanoma skin cancer     last assessed 2017    Palpitations     Pancreatic cyst     Seasonal allergies     Seborrheic keratosis 2014    Sleep apnea     no cpap    Spontaneous      without mention of complications     Squamous cell carcinoma of forehead 2014    Syncope    [2]   Past Surgical History:  Procedure Laterality Date    ABDOMINAL ADHESION SURGERY N/A 2023    Procedure: LYSIS ADHESIONS;  Surgeon: Kyle Julien MD;  [facility];  Service: Surgical Oncology    BOTOX INJECTION N/A 2017    Procedure: CYSTOSCOPY; BLADDER BOTOX 100 UNITS ;  Surgeon: Juan Edward MD;  [facility];  Service:     BOWEL RESECTION      related ot diverticulitis    CARDIAC CATHETERIZATION      CARDIAC CATHETERIZATION Left 2024    Procedure: Cardiac Left Heart Cath;  Surgeon: Carrington Kiser DO;  [facility];  Service: Cardiology    CARDIAC ELECTROPHYSIOLOGY PROCEDURE N/A 5/15/2024    Procedure: Cardiac pacer implant;  Surgeon: Brien Woods MD;  Location: [facility];  Service: Cardiology    CARPAL TUNNEL RELEASE Right     COLONOSCOPY  2019    COLOSTOMY      COLOSTOMY CLOSURE      CYSTOSCOPY  2021    DISTAL PANCREATECTOMY N/A 2023    Procedure: DISTAL PANCREATECTOMY;  Surgeon: Kyle Julien MD;  [facility]: [facility];  Service: Surgical Oncology    FOOT SURGERY Left     neuroma    HYSTERECTOMY      MYRINGOTOMY W/ TUBES Right 2023    office procedure - Dr. Grace    NASAL SINUS SURGERY  age 39    [place]    PANCREATIC CYST BIOPSY  2023    [place] CYSTOURETHROSCOPY N/A 2018    Procedure: CYSTOSCOPY WITH BOTOX;  Surgeon:  Juan Edward MD;  [facility];  Service: Urology    NE ESOPHAGOGASTRODUODENOSCOPY TRANSORAL DIAGNOSTIC N/A 04/23/2019    Procedure: ESOPHAGOGASTRODUODENOSCOPY (EGD);  Surgeon: Edu Dickerson DO;  Location: [facility];  Service: Gastroenterology    SPLENECTOMY, TOTAL N/A 07/31/2023    Procedure: SPLENECTOMY;  Surgeon: Kyle Julien MD;  [facility];  Service: Surgical Oncology    TONSILLECTOMY  age 49    UPPER GASTROINTESTINAL ENDOSCOPY  04/23/2019   [3]   Family History  Problem Relation Name Age of Onset    Alcohol abuse Mother      Heart disease Mother      Alcohol abuse Father      Alcohol abuse Brother      Cancer Brother      Tremor Neg Hx      Parkinsonism Neg Hx      Colon cancer Neg Hx     [4]   Social History  Tobacco Use    Smoking status: Never     Passive exposure: Past    Smokeless tobacco: Never   Vaping Use    Vaping status: Never Used   Substance Use Topics    Alcohol use: Not Currently     Comment: patient states rare use on holidays     Drug use: No   [5]   Allergies  Allergen Reactions    Caffeine - Food Allergy GI Intolerance    Iodine - Food Allergy Rash    Latex Rash    Medical Tape Other (See Comments) and Rash    Other Rash     Adhesive tape     [6]   Current Outpatient Medications:     apixaban (Eliquis) 5 mg, Take 1 tablet (5 mg total) by mouth 2 (two) times a day, Disp: 180 tablet, Rfl: 3    Ascorbic Acid (VITAMIN C) 1000 MG tablet, Take 1,000 mg by mouth in the morning., Disp: , Rfl:     atorvastatin (LIPITOR) 40 mg tablet, Take 1 tablet (40 mg total) by mouth daily with dinner, Disp: 90 tablet, Rfl: 3    B Complex Vitamins (B COMPLEX 100 PO), Take 1 capsule by mouth daily after lunch not taking, Disp: , Rfl:     calcium carbonate (OS-JOHN) 1250 (500 Ca) MG tablet, Take 1 tablet by mouth daily after lunch, Disp: , Rfl:     carbidopa-levodopa (SINEMET)  mg per tablet, Take 1 tablet by mouth 3 (three) times a day before meals, Disp: 270 tablet, Rfl: 3     cholecalciferol (VITAMIN D3) 1,000 units tablet, Take 5,000 Units by mouth daily after lunch, Disp: , Rfl:     Cyanocobalamin (VITAMIN B 12 PO), Take 1 capsule by mouth in the morning., Disp: , Rfl:     LORazepam (ATIVAN) 1 mg tablet, Take 1 tablet 1 hour prior to MRI.  Repeat immediately prior to MRI if necessary., Disp: 2 tablet, Rfl: 0    multivitamin (THERAGRAN) TABS, Take 1 tablet by mouth every morning, Disp: , Rfl:     pantoprazole (PROTONIX) 40 mg tablet, TAKE 1 TABLET DAILY, Disp: 90 tablet, Rfl: 3    Potassium Gluconate 595 (99 K) MG TABS, Take 1 each by mouth every other day, Disp: , Rfl:     Probiotic Product (PROBIOTIC-10) CAPS, Take 1 capsule by mouth daily after lunch, Disp: , Rfl:     trospium chloride (SANCTURA) 20 mg tablet, Take 1 tablet (20 mg total) by mouth 2 (two) times a day, Disp: 180 tablet, Rfl: 3    fluticasone (FLONASE) 50 mcg/act nasal spray, 2 sprays into each nostril daily (Patient not taking: Reported on 6/3/2025), Disp: 16 g, Rfl: 11    ofloxacin (OCUFLOX) 0.3 % ophthalmic solution, Instill 5 drops in left ear two times a day until your return visit (Patient not taking: Reported on 6/3/2025), Disp: 10 mL, Rfl: 1

## 2025-06-03 NOTE — PATIENT INSTRUCTIONS
Medicare Preventive Visit Patient Instructions  Thank you for completing your Welcome to Medicare Visit or Medicare Annual Wellness Visit today. Your next wellness visit will be due in one year (6/4/2026).  The screening/preventive services that you may require over the next 5-10 years are detailed below. Some tests may not apply to you based off risk factors and/or age. Screening tests ordered at today's visit but not completed yet may show as past due. Also, please note that scanned in results may not display below.  Preventive Screenings:  Service Recommendations Previous Testing/Comments   Colorectal Cancer Screening  * Colonoscopy    * Fecal Occult Blood Test (FOBT)/Fecal Immunochemical Test (FIT)  * Fecal DNA/Cologuard Test  * Flexible Sigmoidoscopy Age: 45-75 years old   Colonoscopy: every 10 years (may be performed more frequently if at higher risk)  OR  FOBT/FIT: every 1 year  OR  Cologuard: every 3 years  OR  Sigmoidoscopy: every 5 years  Screening may be recommended earlier than age 45 if at higher risk for colorectal cancer. Also, an individualized decision between you and your healthcare provider will decide whether screening between the ages of 76-85 would be appropriate. Colonoscopy: 07/31/2019  FOBT/FIT: Not on file  Cologuard: Not on file  Sigmoidoscopy: Not on file    Screening Not Indicated     Breast Cancer Screening Age: 40+ years old  Frequency: every 1-2 years  Not required if history of left and right mastectomy Mammogram: 11/21/2016        Cervical Cancer Screening Between the ages of 21-29, pap smear recommended once every 3 years.   Between the ages of 30-65, can perform pap smear with HPV co-testing every 5 years.   Recommendations may differ for women with a history of total hysterectomy, cervical cancer, or abnormal pap smears in past. Pap Smear: Not on file    Screening Not Indicated   Hepatitis C Screening Once for adults born between 1945 and 1965  More frequently in patients at high  risk for Hepatitis C Hep C Antibody: Not on file        Diabetes Screening 1-2 times per year if you're at risk for diabetes or have pre-diabetes Fasting glucose: 115 mg/dL (5/1/2024)  A1C: 6.4 % (5/13/2024)  Screening Not Indicated  History Diabetes   Cholesterol Screening Once every 5 years if you don't have a lipid disorder. May order more often based on risk factors. Lipid panel: 01/15/2024    Screening Not Indicated  History Lipid Disorder     Other Preventive Screenings Covered by Medicare:  Abdominal Aortic Aneurysm (AAA) Screening: covered once if your at risk. You're considered to be at risk if you have a family history of AAA.  Lung Cancer Screening: covers low dose CT scan once per year if you meet all of the following conditions: (1) Age 55-77; (2) No signs or symptoms of lung cancer; (3) Current smoker or have quit smoking within the last 15 years; (4) You have a tobacco smoking history of at least 20 pack years (packs per day multiplied by number of years you smoked); (5) You get a written order from a healthcare provider.  Glaucoma Screening: covered annually if you're considered high risk: (1) You have diabetes OR (2) Family history of glaucoma OR (3)  aged 50 and older OR (4)  American aged 65 and older  Osteoporosis Screening: covered every 2 years if you meet one of the following conditions: (1) You're estrogen deficient and at risk for osteoporosis based off medical history and other findings; (2) Have a vertebral abnormality; (3) On glucocorticoid therapy for more than 3 months; (4) Have primary hyperparathyroidism; (5) On osteoporosis medications and need to assess response to drug therapy.   Last bone density test (DXA Scan): 04/08/2021.  HIV Screening: covered annually if you're between the age of 15-65. Also covered annually if you are younger than 15 and older than 65 with risk factors for HIV infection. For pregnant patients, it is covered up to 3 times per  pregnancy.    Immunizations:  Immunization Recommendations   Influenza Vaccine Annual influenza vaccination during flu season is recommended for all persons aged >= 6 months who do not have contraindications   Pneumococcal Vaccine   * Pneumococcal conjugate vaccine = PCV13 (Prevnar 13), PCV15 (Vaxneuvance), PCV20 (Prevnar 20)  * Pneumococcal polysaccharide vaccine = PPSV23 (Pneumovax) Adults 19-65 yo with certain risk factors or if 65+ yo  If never received any pneumonia vaccine: recommend Prevnar 20 (PCV20)  Give PCV20 if previously received 1 dose of PCV13 or PPSV23   Hepatitis B Vaccine 3 dose series if at intermediate or high risk (ex: diabetes, end stage renal disease, liver disease)   Respiratory syncytial virus (RSV) Vaccine - COVERED BY MEDICARE PART D  * RSVPreF3 (Arexvy) CDC recommends that adults 60 years of age and older may receive a single dose of RSV vaccine using shared clinical decision-making (SCDM)   Tetanus (Td) Vaccine - COST NOT COVERED BY MEDICARE PART B Following completion of primary series, a booster dose should be given every 10 years to maintain immunity against tetanus. Td may also be given as tetanus wound prophylaxis.   Tdap Vaccine - COST NOT COVERED BY MEDICARE PART B Recommended at least once for all adults. For pregnant patients, recommended with each pregnancy.   Shingles Vaccine (Shingrix) - COST NOT COVERED BY MEDICARE PART B  2 shot series recommended in those 19 years and older who have or will have weakened immune systems or those 50 years and older     Health Maintenance Due:      Topic Date Due   • Breast Cancer Screening: Mammogram  11/21/2017   • Colorectal Cancer Screening  07/31/2022     Immunizations Due:      Topic Date Due   • Meningococcal ACWY Vaccine (2 - Risk 2-dose series) 09/29/2023   • COVID-19 Vaccine (4 - 2024-25 season) 09/01/2024   • Influenza Vaccine (Season Ended) 09/01/2025     Advance Directives   What are advance directives?  Advance directives are  legal documents that state your wishes and plans for medical care. These plans are made ahead of time in case you lose your ability to make decisions for yourself. Advance directives can apply to any medical decision, such as the treatments you want, and if you want to donate organs.   What are the types of advance directives?  There are many types of advance directives, and each state has rules about how to use them. You may choose a combination of any of the following:  Living will:  This is a written record of the treatment you want. You can also choose which treatments you do not want, which to limit, and which to stop at a certain time. This includes surgery, medicine, IV fluid, and tube feedings.   Durable power of  for healthcare (DPAHC):  This is a written record that states who you want to make healthcare choices for you when you are unable to make them for yourself. This person, called a proxy, is usually a family member or a friend. You may choose more than 1 proxy.  Do not resuscitate (DNR) order:  A DNR order is used in case your heart stops beating or you stop breathing. It is a request not to have certain forms of treatment, such as CPR. A DNR order may be included in other types of advance directives.  Medical directive:  This covers the care that you want if you are in a coma, near death, or unable to make decisions for yourself. You can list the treatments you want for each condition. Treatment may include pain medicine, surgery, blood transfusions, dialysis, IV or tube feedings, and a ventilator (breathing machine).  Values history:  This document has questions about your views, beliefs, and how you feel and think about life. This information can help others choose the care that you would choose.  Why are advance directives important?  An advance directive helps you control your care. Although spoken wishes may be used, it is better to have your wishes written down. Spoken wishes can be  misunderstood, or not followed. Treatments may be given even if you do not want them. An advance directive may make it easier for your family to make difficult choices about your care.   Urinary Incontinence   Urinary incontinence (UI)  is when you lose control of your bladder. UI develops because your bladder cannot store or empty urine properly. The 3 most common types of UI are stress incontinence, urge incontinence, or both.  Medicines:   May be given to help strengthen your bladder control. Report any side effects of medication to your healthcare provider.  Do pelvic muscle exercises often:  Your pelvic muscles help you stop urinating. Squeeze these muscles tight for 5 seconds, then relax for 5 seconds. Gradually work up to squeezing for 10 seconds. Do 3 sets of 15 repetitions a day, or as directed. This will help strengthen your pelvic muscles and improve bladder control.  Train your bladder:  Go to the bathroom at set times, such as every 2 hours, even if you do not feel the urge to go. You can also try to hold your urine when you feel the urge to go. For example, hold your urine for 5 minutes when you feel the urge to go. As that becomes easier, hold your urine for 10 minutes.   Self-care:   Keep a UI record.  Write down how often you leak urine and how much you leak. Make a note of what you were doing when you leaked urine.  Drink liquids as directed. You may need to limit the amount of liquid you drink to help control your urine leakage. Do not drink any liquid right before you go to bed. Limit or do not have drinks that contain caffeine or alcohol.   Prevent constipation.  Eat a variety of high-fiber foods. Good examples are high-fiber cereals, beans, vegetables, and whole-grain breads. Walking is the best way to trigger your intestines to have a bowel movement.  Exercise regularly and maintain a healthy weight.  Weight loss and exercise will decrease pressure on your bladder and help you control your  leakage.   Use a catheter as directed  to help empty your bladder. A catheter is a tiny, plastic tube that is put into your bladder to drain your urine.   Go to behavior therapy as directed.  Behavior therapy may be used to help you learn to control your urge to urinate.    Weight Management   Why it is important to manage your weight:  Being overweight increases your risk of health conditions such as heart disease, high blood pressure, type 2 diabetes, and certain types of cancer. It can also increase your risk for osteoarthritis, sleep apnea, and other respiratory problems. Aim for a slow, steady weight loss. Even a small amount of weight loss can lower your risk of health problems.  How to lose weight safely:  A safe and healthy way to lose weight is to eat fewer calories and get regular exercise. You can lose up about 1 pound a week by decreasing the number of calories you eat by 500 calories each day.   Healthy meal plan for weight management:  A healthy meal plan includes a variety of foods, contains fewer calories, and helps you stay healthy. A healthy meal plan includes the following:  Eat whole-grain foods more often.  A healthy meal plan should contain fiber. Fiber is the part of grains, fruits, and vegetables that is not broken down by your body. Whole-grain foods are healthy and provide extra fiber in your diet. Some examples of whole-grain foods are whole-wheat breads and pastas, oatmeal, brown rice, and bulgur.  Eat a variety of vegetables every day.  Include dark, leafy greens such as spinach, kale, yoanna greens, and mustard greens. Eat yellow and orange vegetables such as carrots, sweet potatoes, and winter squash.   Eat a variety of fruits every day.  Choose fresh or canned fruit (canned in its own juice or light syrup) instead of juice. Fruit juice has very little or no fiber.  Eat low-fat dairy foods.  Drink fat-free (skim) milk or 1% milk. Eat fat-free yogurt and low-fat cottage cheese. Try  low-fat cheeses such as mozzarella and other reduced-fat cheeses.  Choose meat and other protein foods that are low in fat.  Choose beans or other legumes such as split peas or lentils. Choose fish, skinless poultry (chicken or turkey), or lean cuts of red meat (beef or pork). Before you cook meat or poultry, cut off any visible fat.   Use less fat and oil.  Try baking foods instead of frying them. Add less fat, such as margarine, sour cream, regular salad dressing and mayonnaise to foods. Eat fewer high-fat foods. Some examples of high-fat foods include french fries, doughnuts, ice cream, and cakes.  Eat fewer sweets.  Limit foods and drinks that are high in sugar. This includes candy, cookies, regular soda, and sweetened drinks.  Exercise:  Exercise at least 30 minutes per day on most days of the week. Some examples of exercise include walking, biking, dancing, and swimming. You can also fit in more physical activity by taking the stairs instead of the elevator or parking farther away from stores. Ask your healthcare provider about the best exercise plan for you.    © Copyright [employer] 2018 Information is for End User's use only and may not be sold, redistributed or otherwise used for commercial purposes. All illustrations and images included in CareNotes® are the copyrighted property of [employer] or [employer]

## 2025-06-03 NOTE — ASSESSMENT & PLAN NOTE
Orders:    carbidopa-levodopa (SINEMET)  mg per tablet; Take 1 tablet by mouth 3 (three) times a day before meals

## 2025-06-05 ENCOUNTER — APPOINTMENT (OUTPATIENT)
Dept: LAB | Facility: CLINIC | Age: 85
End: 2025-06-05
Payer: COMMERCIAL

## 2025-06-05 DIAGNOSIS — R73.01 IMPAIRED FASTING GLUCOSE: ICD-10-CM

## 2025-06-05 DIAGNOSIS — Z13.220 NEED FOR LIPID SCREENING: ICD-10-CM

## 2025-06-05 DIAGNOSIS — R53.83 OTHER FATIGUE: ICD-10-CM

## 2025-06-05 LAB
ALBUMIN SERPL BCG-MCNC: 4.6 G/DL (ref 3.5–5)
ALP SERPL-CCNC: 105 U/L (ref 34–104)
ALT SERPL W P-5'-P-CCNC: 17 U/L (ref 7–52)
ANION GAP SERPL CALCULATED.3IONS-SCNC: 11 MMOL/L (ref 4–13)
AST SERPL W P-5'-P-CCNC: 27 U/L (ref 13–39)
BASOPHILS # BLD AUTO: 0.03 THOUSANDS/ÂΜL (ref 0–0.1)
BASOPHILS NFR BLD AUTO: 0 % (ref 0–1)
BILIRUB SERPL-MCNC: 0.89 MG/DL (ref 0.2–1)
BUN SERPL-MCNC: 18 MG/DL (ref 5–25)
CALCIUM SERPL-MCNC: 9.5 MG/DL (ref 8.4–10.2)
CHLORIDE SERPL-SCNC: 105 MMOL/L (ref 96–108)
CHOLEST SERPL-MCNC: 151 MG/DL (ref ?–200)
CO2 SERPL-SCNC: 24 MMOL/L (ref 21–32)
CREAT SERPL-MCNC: 0.81 MG/DL (ref 0.6–1.3)
EOSINOPHIL # BLD AUTO: 0.13 THOUSAND/ÂΜL (ref 0–0.61)
EOSINOPHIL NFR BLD AUTO: 2 % (ref 0–6)
ERYTHROCYTE [DISTWIDTH] IN BLOOD BY AUTOMATED COUNT: 13.8 % (ref 11.6–15.1)
EST. AVERAGE GLUCOSE BLD GHB EST-MCNC: 151 MG/DL
GFR SERPL CREATININE-BSD FRML MDRD: 65 ML/MIN/1.73SQ M
GLUCOSE P FAST SERPL-MCNC: 171 MG/DL (ref 65–99)
HBA1C MFR BLD: 6.9 %
HCT VFR BLD AUTO: 43.5 % (ref 34.8–46.1)
HDLC SERPL-MCNC: 86 MG/DL
HGB BLD-MCNC: 14.4 G/DL (ref 11.5–15.4)
IMM GRANULOCYTES # BLD AUTO: 0.02 THOUSAND/UL (ref 0–0.2)
IMM GRANULOCYTES NFR BLD AUTO: 0 % (ref 0–2)
LDLC SERPL CALC-MCNC: 51 MG/DL (ref 0–100)
LYMPHOCYTES # BLD AUTO: 2.87 THOUSANDS/ÂΜL (ref 0.6–4.47)
LYMPHOCYTES NFR BLD AUTO: 41 % (ref 14–44)
MCH RBC QN AUTO: 31.3 PG (ref 26.8–34.3)
MCHC RBC AUTO-ENTMCNC: 33.1 G/DL (ref 31.4–37.4)
MCV RBC AUTO: 95 FL (ref 82–98)
MONOCYTES # BLD AUTO: 0.67 THOUSAND/ÂΜL (ref 0.17–1.22)
MONOCYTES NFR BLD AUTO: 10 % (ref 4–12)
NEUTROPHILS # BLD AUTO: 3.22 THOUSANDS/ÂΜL (ref 1.85–7.62)
NEUTS SEG NFR BLD AUTO: 47 % (ref 43–75)
NONHDLC SERPL-MCNC: 65 MG/DL
NRBC BLD AUTO-RTO: 0 /100 WBCS
PLATELET # BLD AUTO: 338 THOUSANDS/UL (ref 149–390)
PMV BLD AUTO: 10.8 FL (ref 8.9–12.7)
POTASSIUM SERPL-SCNC: 4.3 MMOL/L (ref 3.5–5.3)
PROT SERPL-MCNC: 6.9 G/DL (ref 6.4–8.4)
RBC # BLD AUTO: 4.6 MILLION/UL (ref 3.81–5.12)
SODIUM SERPL-SCNC: 140 MMOL/L (ref 135–147)
T4 FREE SERPL-MCNC: 0.87 NG/DL (ref 0.61–1.12)
TRIGL SERPL-MCNC: 69 MG/DL (ref ?–150)
TSH SERPL DL<=0.05 MIU/L-ACNC: 1.4 UIU/ML (ref 0.45–4.5)
WBC # BLD AUTO: 6.94 THOUSAND/UL (ref 4.31–10.16)

## 2025-06-05 PROCEDURE — 85025 COMPLETE CBC W/AUTO DIFF WBC: CPT

## 2025-06-05 PROCEDURE — 80061 LIPID PANEL: CPT

## 2025-06-05 PROCEDURE — 80053 COMPREHEN METABOLIC PANEL: CPT

## 2025-06-05 PROCEDURE — 84439 ASSAY OF FREE THYROXINE: CPT

## 2025-06-05 PROCEDURE — 84443 ASSAY THYROID STIM HORMONE: CPT

## 2025-06-05 PROCEDURE — 83036 HEMOGLOBIN GLYCOSYLATED A1C: CPT

## 2025-06-05 PROCEDURE — 36415 COLL VENOUS BLD VENIPUNCTURE: CPT

## 2025-06-06 ENCOUNTER — RESULTS FOLLOW-UP (OUTPATIENT)
Dept: FAMILY MEDICINE CLINIC | Facility: CLINIC | Age: 85
End: 2025-06-06

## 2025-06-12 ENCOUNTER — OFFICE VISIT (OUTPATIENT)
Dept: CARDIOLOGY CLINIC | Facility: CLINIC | Age: 85
End: 2025-06-12
Payer: COMMERCIAL

## 2025-06-12 ENCOUNTER — TELEPHONE (OUTPATIENT)
Age: 85
End: 2025-06-12

## 2025-06-12 VITALS
HEIGHT: 59 IN | WEIGHT: 173 LBS | SYSTOLIC BLOOD PRESSURE: 128 MMHG | OXYGEN SATURATION: 96 % | BODY MASS INDEX: 34.88 KG/M2 | HEART RATE: 88 BPM | DIASTOLIC BLOOD PRESSURE: 72 MMHG | RESPIRATION RATE: 16 BRPM

## 2025-06-12 DIAGNOSIS — I49.5 SSS (SICK SINUS SYNDROME) (HCC): ICD-10-CM

## 2025-06-12 DIAGNOSIS — F40.240 CLAUSTROPHOBIA: ICD-10-CM

## 2025-06-12 DIAGNOSIS — E78.2 MIXED HYPERLIPIDEMIA: ICD-10-CM

## 2025-06-12 DIAGNOSIS — K86.2 PANCREATIC CYST: Primary | ICD-10-CM

## 2025-06-12 DIAGNOSIS — I48.0 PAF (PAROXYSMAL ATRIAL FIBRILLATION) (HCC): Primary | ICD-10-CM

## 2025-06-12 DIAGNOSIS — Z79.01 ANTICOAGULATED: ICD-10-CM

## 2025-06-12 PROCEDURE — 99214 OFFICE O/P EST MOD 30 MIN: CPT | Performed by: INTERNAL MEDICINE

## 2025-06-12 RX ORDER — ALPRAZOLAM 0.5 MG
0.5 TABLET ORAL SEE ADMIN INSTRUCTIONS
Qty: 2 TABLET | Refills: 0 | Status: SHIPPED | OUTPATIENT
Start: 2025-06-12

## 2025-06-12 NOTE — PROGRESS NOTES
[facility]  [address] [address]  Cardiology Follow Up    Radha Vidal  1940  [id number]      Assessment & Plan  PAF (paroxysmal atrial fibrillation) ([facility])  Continue anticoagulation with Eliquis.  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  Continue to follow-up with device clinic.  SSS (sick sinus syndrome) ([facility])  Previous pacemaker interrogation data reviewed.  Normally functioning device.  Battery status is good.  Discussed with patient.  Anticoagulated  Risks and benefits  and alternatives of anticoagulation to prevent thromboembolic risk from atrial fibrillation discussed at length.  Patient to report any bleeding issues.  Mixed hyperlipidemia  Continue atorvastatin 40 mg p.o. daily.  Continue diet and risk factor modification.    Patient also has parkinsonism and is followed by neurology.    Symptoms to watch out from cardiac standpoint which would indicate the need for further cardiac evaluation discussed.    Follow-up in 6 to 8 months or earlier as needed.  Patient is agreeable with the plan of care.       Chief Complaint   Patient presents with    Follow-up       Interval History:   Patient presents for follow-up visit.  Patient denies any history of chest pain shortness of breath.  Patient denies any history of leg edema or orthopnea PND.  No history of presyncope syncope.  Patient states compliance with the present list of medications.  Patient does have tremors related to parkinsonism.  Patient does have episodes of weakness and tiredness.  No history of bleeding issues.  Patient is regularly followed by the device clinic.  Patient was seen by neurology recently for her parkinsonism.  Patient is also regularly followed by primary care physician.    Problem List[1]  Past Medical History[2]  Social History     Socioeconomic History    Marital status:      Spouse name: Not on file    Number of children: Not on file    Years of  education: Not on file    Highest education level: Not on file   Occupational History    Occupation: RETIRED    Tobacco Use    Smoking status: Never     Passive exposure: Past    Smokeless tobacco: Never   Vaping Use    Vaping status: Never Used   Substance and Sexual Activity    Alcohol use: Not Currently     Comment: patient states rare use on holidays     Drug use: No    Sexual activity: Not Currently   Other Topics Concern    Not on file   Social History Narrative    LIVING INDEPENDENTLY ALONE         No caffeine use     Social Drivers of Health     Financial Resource Strain: Low Risk  (4/19/2023)    Overall Financial Resource Strain (CARDIA)     Difficulty of Paying Living Expenses: Not very hard   Food Insecurity: Patient Declined (6/3/2025)    Nursing - Inadequate Food Risk Classification     Worried About Running Out of Food in the Last Year: Patient declined     Ran Out of Food in the Last Year: Patient declined     Ran Out of Food in the Last Year: Not on file   Transportation Needs: No Transportation Needs (6/3/2025)    PRAPARE - Transportation     Lack of Transportation (Medical): No     Lack of Transportation (Non-Medical): No   Physical Activity: Not on file   Stress: Not on file   Social Connections: Not on file   Intimate Partner Violence: Not on file   Housing Stability: Low Risk  (6/3/2025)    Housing Stability Vital Sign     Unable to Pay for Housing in the Last Year: No     Number of Times Moved in the Last Year: 0     Homeless in the Last Year: No      Family History[3]  Past Surgical History[4]  Current Medications[5]  Allergies   Allergen Reactions    Caffeine - Food Allergy GI Intolerance    Iodine - Food Allergy Rash    Latex Rash    Medical Tape Other (See Comments) and Rash    Other Rash     Adhesive tape         Labs:  Appointment on 06/05/2025   Component Date Value    Sodium 06/05/2025 140     Potassium 06/05/2025 4.3     Chloride 06/05/2025 105     CO2 06/05/2025 24     ANION GAP  06/05/2025 11     BUN 06/05/2025 18     Creatinine 06/05/2025 0.81     Glucose, Fasting 06/05/2025 171 (H)     Calcium 06/05/2025 9.5     AST 06/05/2025 27     ALT 06/05/2025 17     Alkaline Phosphatase 06/05/2025 105 (H)     Total Protein 06/05/2025 6.9     Albumin 06/05/2025 4.6     Total Bilirubin 06/05/2025 0.89     eGFR 06/05/2025 65     Hemoglobin A1C 06/05/2025 6.9 (H)     EAG 06/05/2025 151     Cholesterol 06/05/2025 151     Triglycerides 06/05/2025 69     HDL, Direct 06/05/2025 86     LDL Calculated 06/05/2025 51     Non-HDL-Chol (CHOL-HDL) 06/05/2025 65     WBC 06/05/2025 6.94     RBC 06/05/2025 4.60     Hemoglobin 06/05/2025 14.4     Hematocrit 06/05/2025 43.5     MCV 06/05/2025 95     MCH 06/05/2025 31.3     MCHC 06/05/2025 33.1     RDW 06/05/2025 13.8     MPV 06/05/2025 10.8     Platelets 06/05/2025 338     nRBC 06/05/2025 0     Segmented % 06/05/2025 47     Immature Grans % 06/05/2025 0     Lymphocytes % 06/05/2025 41     Monocytes % 06/05/2025 10     Eosinophils Relative 06/05/2025 2     Basophils Relative 06/05/2025 0     Absolute Neutrophils 06/05/2025 3.22     Absolute Immature Grans 06/05/2025 0.02     Absolute Lymphocytes 06/05/2025 2.87     Absolute Monocytes 06/05/2025 0.67     Eosinophils Absolute 06/05/2025 0.13     Basophils Absolute 06/05/2025 0.03     TSH 3RD GENERATION 06/05/2025 1.402     Free T4 06/05/2025 0.87      Imaging: No results found.    Review of Systems:  Review of Systems  REVIEW OF SYSTEMS:  Constitutional:  Denies fever or chills   Eyes:  Denies change in visual acuity   HENT:  Denies nasal congestion or sore throat   Respiratory:  Denies cough or shortness of breath   Cardiovascular:  Denies chest pain or edema   GI:  Denies abdominal pain, nausea, vomiting, bloody stools or diarrhea   :  Denies dysuria, frequency, difficulty in micturition and nocturia  Musculoskeletal:  Denies back pain or joint pain   Neurologic: Parkinsonism  Endocrine:  Denies polyuria or polydipsia  "  Lymphatic:  Denies swollen glands   Psychiatric:  Denies depression or anxiety    Physical Exam:    /72 (BP Location: Right arm, Patient Position: Sitting, Cuff Size: Large)   Pulse 88   Resp 16   Ht 4' 11\" (1.499 m)   Wt 78.5 kg (173 lb)   SpO2 96%   BMI 34.94 kg/m²     Physical Exam  PHYSICAL EXAM:  General:  Patient is not in acute distress   Head: Normocephalic, Atraumatic.  HEENT:  Both pupils normal-size atraumatic, normocephalic, nonicteric  Neck:  JVP not raised. Trachea central. No carotid bruit  Respiratory:  normal breath sounds no crackles. no rhonchi  Cardiovascular:  Regular rate and rhythm no S3 no murmurs  GI:  Abdomen soft nontender. No organomegaly.   Lymphatic:  No cervical or inguinal lymphadenopathy  Neurologic:  Patient is awake alert, oriented . Grossly nonfocal  Extremities no edema             [1]   Patient Active Problem List  Diagnosis    OAB (overactive bladder)    Parkinson's disease (HCC)    Primary insomnia    History of skin cancer    Seasonal allergic rhinitis    Impaired fasting glucose    Obesity (BMI 30-39.9)    Claustrophobia    Mitral valve disorder    Menopause ovarian failure    Vitamin D deficiency    Dysphagia    Multiple thyroid nodules    Gastro-esophageal reflux disease without esophagitis    Chronic idiopathic constipation    History of adenomatous polyp of colon    Tremor    IPMN (intraductal papillary mucinous neoplasm)    Cherry angioma    Postablative hypothyroidism    Sleep apnea    Cerebrovascular disease    Age-related osteoporosis without current pathological fracture    Urge incontinence    s/p distal pancreatectomy/splenectomy    Paroxysmal atrial fibrillation (HCC)    Type 2 diabetes mellitus with other specified complication, unspecified whether long term insulin use (HCC)    Ambulatory dysfunction    Small bowel obstruction (HCC)    Symptomatic bradycardia    SSS (sick sinus syndrome) ([facility])    Pacemaker    Medicare annual wellness visit, " subsequent   [2]   Past Medical History:  Diagnosis Date    Actinic keratosis 2016    Acute midline low back pain without sciatica 2020    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     Benign neoplasm of skin     Cancer (HCC)     skin    Chest pain     Claustrophobia     Closed compression fracture of body of L1 vertebra (HCC) 2020    Diverticulitis     Diverticulitis     Fracture     L1-L2    GERD (gastroesophageal reflux disease)     H. pylori infection 2020    Muscle weakness     Nausea 2024    Nonmelanoma skin cancer     last assessed 2017    Palpitations     Pancreatic cyst     Seasonal allergies     Seborrheic keratosis 2014    Sleep apnea     no cpap    Spontaneous      without mention of complications     Squamous cell carcinoma of forehead 2014    Syncope    [3]   Family History  Problem Relation Name Age of Onset    Alcohol abuse Mother      Heart disease Mother      Alcohol abuse Father      Alcohol abuse Brother      Cancer Brother      Tremor Neg Hx      Parkinsonism Neg Hx      Colon cancer Neg Hx     [4]   Past Surgical History:  Procedure Laterality Date    ABDOMINAL ADHESION SURGERY N/A 2023    Procedure: LYSIS ADHESIONS;  Surgeon: Kyle Julien MD;  Location: [facility];  Service: Surgical Oncology    BOTOX INJECTION N/A 2017    Procedure: CYSTOSCOPY; BLADDER BOTOX 100 UNITS ;  Surgeon: Juan Edward MD;  Location[facility];  Service:     BOWEL RESECTION      related ot diverticulitis    CARDIAC CATHETERIZATION      CARDIAC CATHETERIZATION Left 2024    Procedure: Cardiac Left Heart Cath;  Surgeon: Carrington Kiser DO;  [facility];  Service: Cardiology    CARDIAC ELECTROPHYSIOLOGY PROCEDURE N/A 5/15/2024    Procedure: Cardiac pacer implant;  Surgeon: Brien Woods MD;  Location: [facility];  Service: Cardiology    CARPAL TUNNEL RELEASE Right     COLONOSCOPY   07/31/2019    COLOSTOMY      COLOSTOMY CLOSURE      CYSTOSCOPY  06/01/2021    DISTAL PANCREATECTOMY N/A 07/31/2023    Procedure: DISTAL PANCREATECTOMY;  Surgeon: Kyle Julien MD;  Location: [facility];  Service: Surgical Oncology    FOOT SURGERY Left     neuroma    HYSTERECTOMY      MYRINGOTOMY W/ TUBES Right 12/06/2023    office procedure - Dr. Grace    NASAL SINUS SURGERY  age 39    [place]    PANCREATIC CYST BIOPSY  07/31/2023    [place] CYSTOURETHROSCOPY N/A 04/13/2018    Procedure: CYSTOSCOPY WITH BOTOX;  Surgeon: Juan Edward MD;  [facility];  Service: Urology    [place] ESOPHAGOGASTRODUODENOSCOPY TRANSORAL DIAGNOSTIC N/A 04/23/2019    Procedure: ESOPHAGOGASTRODUODENOSCOPY (EGD);  Surgeon: Edu Dickerson DO;  Location: [facility];  Service: Gastroenterology    SPLENECTOMY, TOTAL N/A 07/31/2023    Procedure: SPLENECTOMY;  Surgeon: Kyle Julien MD;  [facility];  Service: Surgical Oncology    TONSILLECTOMY  age 49    UPPER GASTROINTESTINAL ENDOSCOPY  04/23/2019   [5]   Current Outpatient Medications:     apixaban (Eliquis) 5 mg, Take 1 tablet (5 mg total) by mouth 2 (two) times a day, Disp: 180 tablet, Rfl: 3    Ascorbic Acid (VITAMIN C) 1000 MG tablet, Take 1,000 mg by mouth in the morning., Disp: , Rfl:     atorvastatin (LIPITOR) 40 mg tablet, Take 1 tablet (40 mg total) by mouth daily with dinner, Disp: 90 tablet, Rfl: 3    B Complex Vitamins (B COMPLEX 100 PO), Take 1 capsule by mouth daily after lunch not taking, Disp: , Rfl:     calcium carbonate (OS-JOHN) 1250 (500 Ca) MG tablet, Take 1 tablet by mouth daily after lunch, Disp: , Rfl:     carbidopa-levodopa (SINEMET)  mg per tablet, Take 1 tablet by mouth 3 (three) times a day before meals, Disp: 270 tablet, Rfl: 3    cholecalciferol (VITAMIN D3) 1,000 units tablet, Take 5,000 Units by mouth daily after lunch, Disp: , Rfl:     Cyanocobalamin (VITAMIN B 12 PO), Take 1 capsule by mouth in the morning., Disp: , Rfl:     fluticasone  (FLONASE) 50 mcg/act nasal spray, 2 sprays into each nostril daily, Disp: 16 g, Rfl: 11    multivitamin (THERAGRAN) TABS, Take 1 tablet by mouth every morning, Disp: , Rfl:     pantoprazole (PROTONIX) 40 mg tablet, TAKE 1 TABLET DAILY, Disp: 90 tablet, Rfl: 3    Potassium Gluconate 595 (99 K) MG TABS, Take 1 each by mouth every other day, Disp: , Rfl:     Probiotic Product (PROBIOTIC-10) CAPS, Take 1 capsule by mouth daily after lunch, Disp: , Rfl:     trospium chloride (SANCTURA) 20 mg tablet, Take 1 tablet (20 mg total) by mouth 2 (two) times a day, Disp: 180 tablet, Rfl: 3    LORazepam (ATIVAN) 1 mg tablet, Take 1 tablet 1 hour prior to MRI.  Repeat immediately prior to MRI if necessary. (Patient not taking: Reported on 6/12/2025), Disp: 2 tablet, Rfl: 0

## 2025-06-12 NOTE — ASSESSMENT & PLAN NOTE
Previous pacemaker interrogation data reviewed.  Normally functioning device.  Battery status is good.  Discussed with patient.

## 2025-06-12 NOTE — TELEPHONE ENCOUNTER
Patient called, stating the last time she had gotten lorazepam she had double vision. She would like to know if she can have something else prescribed to her

## 2025-06-17 ENCOUNTER — HOSPITAL ENCOUNTER (OUTPATIENT)
Age: 85
Discharge: HOME | End: 2025-06-17
Attending: FAMILY MEDICINE
Payer: COMMERCIAL

## 2025-06-17 VITALS — BODY MASS INDEX: 35.24 KG/M2 | HEIGHT: 59 IN

## 2025-06-17 DIAGNOSIS — E28.39 MENOPAUSE OVARIAN FAILURE: ICD-10-CM

## 2025-06-17 PROCEDURE — 77080 DXA BONE DENSITY AXIAL: CPT

## 2025-06-24 ENCOUNTER — APPOINTMENT (OUTPATIENT)
Dept: LAB | Facility: CLINIC | Age: 85
End: 2025-06-24
Payer: COMMERCIAL

## 2025-06-24 DIAGNOSIS — D49.0 IPMN (INTRADUCTAL PAPILLARY MUCINOUS NEOPLASM): ICD-10-CM

## 2025-06-24 LAB
BUN SERPL-MCNC: 15 MG/DL (ref 5–25)
CREAT SERPL-MCNC: 0.86 MG/DL (ref 0.6–1.3)
GFR SERPL CREATININE-BSD FRML MDRD: 61 ML/MIN/1.73SQ M

## 2025-06-24 PROCEDURE — 82565 ASSAY OF CREATININE: CPT

## 2025-06-24 PROCEDURE — 36415 COLL VENOUS BLD VENIPUNCTURE: CPT

## 2025-06-24 PROCEDURE — 84520 ASSAY OF UREA NITROGEN: CPT

## 2025-06-30 ENCOUNTER — TELEPHONE (OUTPATIENT)
Age: 85
End: 2025-06-30

## 2025-06-30 NOTE — TELEPHONE ENCOUNTER
Regarding pcp note on low carb diet, pt is trying to keep track of her carb intake but had a question about certain fruits. Typical consumes peaches, pears, plums, apples, nectarines, grapes. Wondering if pcp had any idea if these are high in carbs and if she should be avoiding these or aiming to eat other fruits. Please advise and give a call back with recommendations.

## 2025-06-30 NOTE — TELEPHONE ENCOUNTER
REASON FOR CONVERSATION: Increased tiredness    SYMPTOMS: worsening/increased tiredness    Recd call from pt stating that she has been extremely tired the past few weeks and has been taking at least 2 naps a day. Stated that she is tired throughout the entire day and it is not specific to certain times a day nor around timing of Sinemet.  Pt denied any recent illness/infections or any changes in medications.     OTHER HEALTH INFORMATION: Recent Cardiology and PCP appts and was evaluated for symptoms but was informed all was good.    PROTOCOL DISPOSITION: Discuss with Provider and Call Back Patient    CARE ADVICE PROVIDED: advised pt to continue taking Carbidopa-Levodopa as prescribed and that office will contact the pt with provider's response.     PRACTICE FOLLOW-UP: Please advise. Pt requesting a call back at [phone] to discuss further.   LOV 6/3/25

## 2025-07-01 DIAGNOSIS — R13.10 DYSPHAGIA, UNSPECIFIED TYPE: ICD-10-CM

## 2025-07-01 RX ORDER — PANTOPRAZOLE SODIUM 40 MG/1
40 TABLET, DELAYED RELEASE ORAL DAILY
Qty: 90 TABLET | Refills: 1 | Status: SHIPPED | OUTPATIENT
Start: 2025-07-01

## 2025-07-01 NOTE — TELEPHONE ENCOUNTER
Reason for call:   [x] Refill   [] Prior Auth  [] Other:     Office:   [] PCP/Provider -   [x] Specialty/Provider - Payton Fuller,     Medication: pantoprazole (PROTONIX) 40 mg     Dose/Frequency: 1 daily    Quantity: 90    Pharmacy: [facility]    Local Pharmacy   Does the patient have enough for 3 days?   [] Yes   [] No - Send as HP to POD    Mail Away Pharmacy   Does the patient have enough for 10 days?   [x] Yes   [] No - Send as HP to POD

## 2025-07-02 ENCOUNTER — HOSPITAL ENCOUNTER (OUTPATIENT)
Dept: MRI IMAGING | Facility: HOSPITAL | Age: 85
Discharge: HOME | End: 2025-07-02
Payer: COMMERCIAL

## 2025-07-02 DIAGNOSIS — D49.0 IPMN (INTRADUCTAL PAPILLARY MUCINOUS NEOPLASM): ICD-10-CM

## 2025-07-02 PROCEDURE — 74183 MRI ABD W/O CNTR FLWD CNTR: CPT

## 2025-07-02 PROCEDURE — A9585 GADOBUTROL INJECTION: HCPCS

## 2025-07-02 RX ORDER — GADOBUTROL 604.72 MG/ML
7 INJECTION INTRAVENOUS
Status: COMPLETED | OUTPATIENT
Start: 2025-07-02 | End: 2025-07-02

## 2025-07-02 RX ADMIN — GADOBUTROL 7 ML: 604.72 INJECTION INTRAVENOUS at 11:09

## 2025-07-02 NOTE — NURSING NOTE
Device programmed for MRI. Patient continuously monitored by Radiology RN. Patient tolerated well. Devise reprogrammed to prior settings. Vital signs stable throughout. No complaints per patient at this time.

## 2025-07-03 PROBLEM — Z00.00 MEDICARE ANNUAL WELLNESS VISIT, SUBSEQUENT: Status: RESOLVED | Noted: 2025-06-03 | Resolved: 2025-07-03

## 2025-07-07 ENCOUNTER — REMOTE DEVICE CLINIC VISIT (OUTPATIENT)
Dept: CARDIOLOGY CLINIC | Facility: CLINIC | Age: 85
End: 2025-07-07
Payer: COMMERCIAL

## 2025-07-07 ENCOUNTER — OFFICE VISIT (OUTPATIENT)
Age: 85
End: 2025-07-07
Payer: COMMERCIAL

## 2025-07-07 VITALS
TEMPERATURE: 98 F | BODY MASS INDEX: 36.11 KG/M2 | HEART RATE: 78 BPM | SYSTOLIC BLOOD PRESSURE: 124 MMHG | OXYGEN SATURATION: 98 % | WEIGHT: 172 LBS | DIASTOLIC BLOOD PRESSURE: 78 MMHG | HEIGHT: 58 IN

## 2025-07-07 DIAGNOSIS — L82.1 SEBORRHEIC KERATOSIS: Primary | ICD-10-CM

## 2025-07-07 DIAGNOSIS — D22.9 MULTIPLE NEVI: ICD-10-CM

## 2025-07-07 DIAGNOSIS — Z85.89 HISTORY OF SQUAMOUS CELL CARCINOMA: ICD-10-CM

## 2025-07-07 DIAGNOSIS — L73.8 SEBACEOUS HYPERPLASIA: ICD-10-CM

## 2025-07-07 DIAGNOSIS — L57.0 KERATOSIS, ACTINIC: ICD-10-CM

## 2025-07-07 DIAGNOSIS — Z95.0 PRESENCE OF CARDIAC PACEMAKER: Primary | ICD-10-CM

## 2025-07-07 DIAGNOSIS — L81.4 LENTIGINES: ICD-10-CM

## 2025-07-07 DIAGNOSIS — D18.01 CHERRY ANGIOMA: ICD-10-CM

## 2025-07-07 PROCEDURE — 93294 REM INTERROG EVL PM/LDLS PM: CPT | Performed by: INTERNAL MEDICINE

## 2025-07-07 PROCEDURE — 93296 REM INTERROG EVL PM/IDS: CPT | Performed by: INTERNAL MEDICINE

## 2025-07-07 PROCEDURE — 99214 OFFICE O/P EST MOD 30 MIN: CPT

## 2025-07-07 PROCEDURE — 17000 DESTRUCT PREMALG LESION: CPT

## 2025-07-07 NOTE — PROGRESS NOTES
"[facility] Note     Patient Name: Radha Vidal  Encounter Date: July 7,2025       Have you been cared for by a [facility] Dermatologist in the last 3 years and, if so, which description applies to you? Yes. I have been here within the last 3 years, and my medical history has NOT changed since that time. I am not of child-bearing potential.     REVIEW OF SYSTEMS:  Have you recently had or currently have any of the following? No changes in my recent health.   PAST MEDICAL HISTORY:  Have you personally ever had or currently have any of the following?  If \"YES,\" then please provide more detail. No changes in my medical history.   HISTORY OF IMMUNOSUPPRESSION: Do you have a history of any of the following:  Systemic Immunosuppression such as Diabetes, Biologic or Immunotherapy, Chemotherapy, Organ Transplantation, Bone Marrow Transplantation or Prednisone?  No     Answering \"YES\" requires the addition of the dotphrase \"IMMUNOSUPPRESSED\" as the first diagnosis of the patient's visit.   FAMILY HISTORY:  Any \"first degree relatives\" (parent, brother, sister, or child) with the following?    No changes in my family's known health.   PATIENT EXPERIENCE:    Do you want the Dermatologist to perform a COMPLETE skin exam today including a clinical examination under the \"bra and underwear\" areas?  Yes  If necessary, do we have your permission to call and leave a detailed message on your Preferred Phone number that includes your specific medical information?  Yes      Allergies[1] Current Medications[2]              Whom besides the patient is providing clinical information about today's encounter?   NO ADDITIONAL HISTORIAN (patient alone provided history)    Physical Exam and Assessment/Plan by Diagnosis:    HISTORY OF SQUAMOUS CELL CARCINOMA      Physical Exam:  Anatomic Location Affected:  Left Cheek - 01/2021, Forehead - 2016  Morphological Description of Scar:  well healed  Suspected Recurrence: no   "   Additional History of Present Condition:  Both lesions treated in the past. Lesion on left cheek treated with topical therapy. No signs of recurrence.      Assessment and Plan:  Based on a thorough discussion of this condition and the management approach to it (including a comprehensive discussion of the known risks, side effects and potential benefits of treatment), the patient (family) agrees to implement the following specific plan:  Monitor for change  When outside we recommend using a wide brim hat, sunglasses, long sleeve and pants, sunscreen with SPF 30+ with reapplication every 2 hours, or SPF specific clothing    Skin exams every year.         ACTINIC KERATOSIS    Physical Exam:  Anatomic Location: right cheek   Morphologic Description:  Scaly pink macule without palpable dermal component    Additional History of Present Condition:  Present on exam      Plan:  Discussed that actinic keratosis is found on sun-damaged skin due to chronic sun exposure. These lesions are considered premalignant with the potential to evolve into a skin cancer called squamous cell carcinoma.   Discussed sun protection measures, including using sunscreen with an SPF 50+ year round, avoiding the sun, and wearing protective clothing such as long sleeves and pants when out in the sun.      Cryotherapy performed today. See procedure below. Consent signed.     Follow-up in 4-6 weeks for recheck if not resolved, at which time re-treatment or biopsy to rule out squamous cell carcinoma will be determined based on clinical findings.       PROCEDURES PERFORMED TODAY ASSOCIATED WITH THIS CONDITION:          Cryotherapy: PROCEDURE:  DESTRUCTION OF PRE-MALIGNANT LESIONS  After a thorough discussion of treatment options and risk/benefits/alternatives (including but not limited to local pain, scarring, dyspigmentation, blistering, and possible superinfection), verbal and written consent were obtained and the aforementioned lesions were treated  "on with cryotherapy using liquid nitrogen x 1 cycle for 5-10 seconds.    TOTAL NUMBER of 1 pre-malignant lesions were treated today on the ANATOMIC LOCATION: right cheek .     The patient tolerated the procedure well, and after-care instructions were provided.                        MELANOCYTIC NEVI (\"Moles\")    Physical Exam:  Anatomic Location Affected:   Mostly on sun-exposed areas of the trunk and extremities   Morphological Description:  Scattered, 1-4mm round to ovoid, symmetrical-appearing, even bordered, skin colored to dark brown macules/papules      Additional History of Present Condition:  Present on exam. Denies any pain, itch, bleeding. Present for years. Denies actively changing or growing moles.     Assessment and Plan:  Based on a thorough discussion of this condition and the management approach to it (including a comprehensive discussion of the known risks, side effects and potential benefits of treatment), the patient (family) agrees to implement the following specific plan:  Reassured benign.   Monitor for changes. ABCDE's of melanoma handout provided.   Practice sun protection. Apply broad spectrum (UVA and UVB) sunscreen, SPF 30 or higher every 2 hours. Wear sun protective clothing, hats, and sunglasses.       LENTIGO    Physical Exam:  Anatomic Location Affected:  sun exposed areas of trunk and extremities   Morphological Description:  multiple scattered tan to brown evenly pigmented macules      Additional History of Present Condition:  Present on exam.     Assessment and Plan:  Based on a thorough discussion of this condition and the management approach to it (including a comprehensive discussion of the known risks, side effects and potential benefits of treatment), the patient (family) agrees to implement the following specific plan:  Reassured benign.   Practice sun protection. Apply broad spectrum (UVA and UVB) sunscreen, SPF 30 or higher every 2 hours. Wear sun protective clothing, hats, " "and sunglasses.         SEBORRHEIC KERATOSIS; NON-INFLAMED    Physical Exam:  Anatomic Location Affected:  trunk and extremities   Morphological Description:  Flat and raised, waxy, smooth to warty textured, yellow to brownish-grey to dark brown to blackish, discrete, \"stuck-on\" appearing papules.      Additional History of Present Condition:  Present on exam. Patient denies any itching.     Assessment and Plan:  Based on a thorough discussion of this condition and the management approach to it (including a comprehensive discussion of the known risks, side effects and potential benefits of treatment), the patient (family) agrees to implement the following specific plan:  Reassured benign.       ANGIOMA (\"CHERRY ANGIOMA\")     Physical Exam:  Anatomic Location: scattered across trunk and extremities   Morphologic Description: 1-2 mm firm red to maroon to purples to blue discrete papule, on dermoscopy lacunae  by white septae seen       Additional History of Present Condition:  Present on exam.     Plan:  Reassured benign.     SEBACEOUS HYPERPLASIA    Physical Exam:  Anatomic Location Affected:  forehead  Morphological Description:  skin colored to yellow papule with central umbilication, on dermoscopy yellow-white globules and  vessels seen     Additional History of Present Condition:  Present on exam.    Assessment and Plan:  Based on a thorough discussion of this condition and the management approach to it (including a comprehensive discussion of the known risks, side effects and potential benefits of treatment), the patient (family) agrees to implement the following specific plan:  Reassured benign. No intervention required.       Follow-up: 1 year for skin exam.   Scribe Attestation      I,:  Nina Wynn MA am acting as a scribe while in the presence of the attending physician.:       I,:  Johanny Puente PA-C personally performed the services described in this documentation    as scribed in " my presence.:                  [1]   Allergies  Allergen Reactions    Caffeine - Food Allergy GI Intolerance    Iodine - Food Allergy Rash    Latex Rash    Medical Tape Other (See Comments) and Rash    Other Rash     Adhesive tape     [2]   Current Outpatient Medications:     ALPRAZolam (XANAX) 0.5 mg tablet, Take 1 tablet (0.5 mg total) by mouth see administration instructions Take 1 tablet 1 hour prior to MRI, take 2nd tablet immediately before if needed, Disp: 2 tablet, Rfl: 0    apixaban (Eliquis) 5 mg, Take 1 tablet (5 mg total) by mouth 2 (two) times a day, Disp: 180 tablet, Rfl: 3    Ascorbic Acid (VITAMIN C) 1000 MG tablet, Take 1,000 mg by mouth in the morning., Disp: , Rfl:     atorvastatin (LIPITOR) 40 mg tablet, Take 1 tablet (40 mg total) by mouth daily with dinner, Disp: 90 tablet, Rfl: 3    B Complex Vitamins (B COMPLEX 100 PO), Take 1 capsule by mouth daily after lunch not taking, Disp: , Rfl:     calcium carbonate (OS-JOHN) 1250 (500 Ca) MG tablet, Take 1 tablet by mouth daily after lunch, Disp: , Rfl:     carbidopa-levodopa (SINEMET)  mg per tablet, Take 1 tablet by mouth 3 (three) times a day before meals, Disp: 270 tablet, Rfl: 3    cholecalciferol (VITAMIN D3) 1,000 units tablet, Take 5,000 Units by mouth daily after lunch, Disp: , Rfl:     Cyanocobalamin (VITAMIN B 12 PO), Take 1 capsule by mouth in the morning., Disp: , Rfl:     fluticasone (FLONASE) 50 mcg/act nasal spray, 2 sprays into each nostril daily, Disp: 16 g, Rfl: 11    multivitamin (THERAGRAN) TABS, Take 1 tablet by mouth every morning, Disp: , Rfl:     pantoprazole (PROTONIX) 40 mg tablet, Take 1 tablet (40 mg total) by mouth daily, Disp: 90 tablet, Rfl: 1    Potassium Gluconate 595 (99 K) MG TABS, Take 1 each by mouth every other day, Disp: , Rfl:     Probiotic Product (PROBIOTIC-10) CAPS, Take 1 capsule by mouth daily after lunch, Disp: , Rfl:     trospium chloride (SANCTURA) 20 mg tablet, Take 1 tablet (20 mg total) by  mouth 2 (two) times a day, Disp: 180 tablet, Rfl: 3     Yes...

## 2025-07-07 NOTE — PROGRESS NOTES
Results for orders placed or performed in visit on 07/07/25   Cardiac EP device report    Narrative    MDT DC PM/ACTIVE SYSTEM IS MRI CONDITIONAL  CARELINK TRANSMISSION: BATTERY VOLTAGE ADEQUATE (13.3 YR). AP 20.8%.  10.5%. ALL LEAD PARAMETERS WITHIN NORMAL LIMITS. 3 DEVICE CLASSIFIED EPISODES OF NSVT W/ EGM'S SHOWING NSVT (12 @ 169, 9 @ 171) & PAT (6 @ 182). 1 AT/AF EPISODE W/ EGM SHOWING AFLUTTER FOR 50 SEC W/ BURDEN OF <0.1% SINCE 4/7/25. AVG V HR IN AFL @ 158 BPM. PT TAKES ELIQUIS, NO AV BEVERLY BLOCKER OR AAD. EF 65% (ECHO 10/16/24). COSIGN TO DR. ROQUE. NORMAL DEVICE FUNCTION. [facility]

## 2025-07-07 NOTE — PATIENT INSTRUCTIONS
"MELANOCYTIC NEVI (\"Moles\")    Melanocytic nevi (\"moles\") are tan or brown, raised or flat areas of the skin which have an increased number of melanocytes. Melanocytes are the cells in our body which make pigment and account for skin color.    Some moles are present at birth (I.e., \"congenital nevi\"), while others come up later in life (i.e., \"acquired nevi\").  The sun can stimulate the body to make more moles.  Sunburns are not the only thing that triggers more moles.  Chronic sun exposure can do it too.     Clinically distinguishing a healthy mole from melanoma may be difficult, even for experienced dermatologists. The \"ABCDE's\" of moles have been suggested as a means of helping to alert a person to a suspicious mole and the possible increased risk of melanoma.  The suggestions for raising alert are as follows:    Asymmetry: Healthy moles tend to be symmetric, while melanomas are often asymmetric.  Asymmetry means if you draw a line through the mole, the two halves do not match in color, size, shape, or surface texture. Asymmetry can be a result of rapid enlargement of a mole, the development of a raised area on a previously flat lesion, scaling, ulceration, bleeding or scabbing within the mole.  Any mole that starts to demonstrate \"asymmetry\" should be examined promptly by a board certified dermatologist.     Border: Healthy moles tend to have discrete, even borders.  The border of a melanoma often blends into the normal skin and does not sharply delineate the mole from normal skin.  Any mole that starts to demonstrate \"uneven borders\" should be examined promptly by a board certified dermatologist.     Color: Healthy moles tend to be one color throughout.  Melanomas tend to be made up of different colors ranging from dark black, blue, white, or red.  Any mole that demonstrates a color change should be examined promptly by a board certified dermatologist.     Diameter: Healthy moles tend to be smaller than 0.6 cm " "in size; an exception are \"congenital nevi\" that can be larger.  Melanomas tend to grow and can often be greater than 0.6 cm (1/4 of an inch, or the size of a pencil eraser). This is only a guideline, and many normal moles may be larger than 0.6 cm without being unhealthy.  Any mole that starts to change in size (small to bigger or bigger to smaller) should be examined promptly by a board certified dermatologist.     Evolving: Healthy moles tend to \"stay the same.\"  Melanomas may often show signs of change or evolution such as a change in size, shape, color, or elevation.  Any mole that starts to itch, bleed, crust, burn, hurt, or ulcerate or demonstrate a change or evolution should be examined promptly by a board certified dermatologist.      Dysplastic Nevi  Dysplastic moles are moles that fit the ABCDE rules of melanoma but are not identified as melanomas when examined under the microscope.  They may indicate an increased risk of melanoma in that person. If there is a family history of melanoma, most experts agree that the person may be at an increased risk for developing a melanoma.  Experts still do not agree on what dysplastic moles mean in patients without a personal or family history of melanoma.  Dysplastic moles are usually larger than common moles and have different colors within it with irregular borders. The appearance can be very similar to a melanoma. Biopsies of dysplastic moles may show abnormalities which are different from a regular mole.      Melanoma  Malignant melanoma is a type of skin cancer that can be deadly if it spreads throughout the body. The incidence of melanoma in the [place] is growing faster than any other cancer. Melanoma usually grows near the surface of the skin for a period of time, and then begins to grow deeper into the skin. Once it grows deeper into the skin, the risk of spread to other organs greatly increases. Therefore, early detection and removal of a malignant " "melanoma may result in a better chance at a complete cure; removal after the tumor has spread may not be as effective, leading to worse clinical outcomes such as death.    The true rate of nevus transformation into a melanoma is unknown. It has been estimated that the lifetime risk for any acquired melanocytic nevus on any 20-year-old individual transforming into melanoma by age 80 is 0.03% (1 in 3,164) for men and 0.009% (1 in 10,800) for women.     The appearance of a \"new mole\" remains one of the most reliable methods for identifying a malignant melanoma.  Occasionally, melanomas appear as rapidly growing, blue-black, dome-shaped bumps within a previous mole or previous area of normal skin.  Other times, melanomas are suspected when a mole suddenly appears or changes. Itching, burning, or pain in a pigmented lesion should increase suspicion, but most patients with early melanoma have no skin discomfort whatsoever.  Melanoma can occur anywhere on the skin, including areas that are difficult for self-examination. Many melanomas are first noticed by other family members.  Suspicious-looking moles may be removed for microscopic examination.       You may be able to prevent death from melanoma by doing two simple things:    Try to avoid unnecessary sun exposure and protect your skin when it is exposed to the sun.  People who live near the equator, people who have intermittent exposures to large amounts of sun, and people who have had sunburns in childhood or adolescence have an increased risk for melanoma. Sun sense and vigilant sun protection may be keys to helping to prevent melanoma.  We recommend wearing UPF-rated sun protective clothing and sunglasses whenever possible and applying a moisturizer-sunscreen combination product (SPF 50+) such as Neutrogena Daily Defense to sun exposed areas of skin at least three times a day.    Have your moles regularly examined by a board certified dermatologist AND by yourself or " "a family member/friend at home.  We recommend that you have your moles examined at least once a year by a board certified dermatologist.  Use your birthday as an annual reminder to have your \"Birthday Suit\" (I.e., your skin) examined; it is a nice birthday gift to yourself to know that your skin is healthy appearing!  Additionally, at-home self examinations may be helpful for detecting a possible melanoma.  Use the ABCDEs we discussed and check your moles once a month at home.        SEBORRHEIC KERATOSIS  A seborrheic keratosis is a harmless warty spot that appears during adult life as a common sign of skin aging.  Seborrheic keratoses can arise on any area of skin, covered or uncovered, with the usual exception of the palms and soles. They do not arise from mucous membranes. Seborrheic keratoses can have highly variable appearance.      Seborrheic keratoses are extremely common. It has been estimated that over 90% of adults over the age of 60 years have one or more of them. They occur in males and females of all races, typically beginning to erupt in the 30s or 40s. They are uncommon under the age of 20 years.  The precise cause of seborrhoeic keratoses is not known.  Seborrhoeic keratoses are considered degenerative in nature. As time goes by, seborrheic keratoses tend to become more numerous. Some people inherit a tendency to develop a very large number of them; some people may have hundreds of them.    The name \"seborrheic keratosis\" is misleading, because these lesions are not limited to a seborrhoeic distribution (scalp, mid-face, chest, upper back), nor are they formed from sebaceous glands, nor are they associated with sebum -- which is greasy.  Seborrheic keratosis may also be called \"SK,\" \"Seb K,\" \"basal cell papilloma,\" \"senile wart,\" or \"barnacle.\"      There is no easy way to remove multiple lesions on a single occasion.  Unless a specific lesion is \"inflamed\" and is causing pain or stinging/burning or " "is bleeding, most insurance companies do not authorize treatment.      ANGIOMA (\"CHERRY ANGIOMA\")  Ziegler angiomas markedly increase in number from about the age of 40, so it has been estimated that 75% of people over 75 years of age have them. Although they also called \"senile angiomas,\" they can occur in young people too - 5% of adolescents have been found to have them.     Cherry angiomas are very common in males and females of any age or race, with no difference in sexes or races affected. They are however more noticeable in white skin than in skin of colour.  There may be a family history of similar lesions. Eruptive (very large number appearing in a short period of time) cherry angiomas have been rarely reported to be associated with internal malignancy and pregnancy.    Sebaceous Hyperplasia  Sebaceous hyperplasia is a common, benign condition of enlarged oil secreting (sebaceous) glands commonly found on the forehead and cheeks of middle-aged and elderly patients. They normally appear as small yellow bumps up to 3mm in diameter that can be single or multiple. The bumps on the face often display a centrall dell. Occasionally, these bumps can occur on the chest, areola, mouth, and genitals. Rarely, they can grow to take a giant form, or be arranged linearly.     Causes of sebaceous hyperplasia  Sebaceous hyperplasic is a form of benign hair follicle tumor and can often be confused with basal cell carcinoma. It can be more prevalent in immunosuppressed patients such as those undergoing organ transplantation. In the rare Mamadou-Eladio syndrome, sebaceous hyperplasia occurs in association with internal cancers.  Lesions of sebaceous hyperplasia are benign, with no known potential for malignant transformation, but they may be associated with nonmelanoma skin cancer in transplantation patients.     How we do diagnose sebaceous hyperplasia?  Your dermatologist may take a closer look at the bumps with a device called a " dermatoscope. Common features include a central hair follicle surrounded by yellowish lobules with prominent blood vessels.     What is the treatment of sebaceous hyperplasia?   Since sebaceous hyperplasia is benign with no known potential for transformation into cancer, treatment is mostly for cosmetic reasons or if the lesions become irritated. Options include   Light electrocautery or laser vaporization  Oral isotrentinoin is effective for extensive or disfiguring spots, but do not prevent recurrence   Antiandrogens may be used in females to decrease the size and improve overall appearance of bumps     Actinic keratoses are very common on sites repeatedly exposed to the sun, especially the backs of the hands and the face.  They are considered precancers and have a low risk of turning into squamous cell carcinoma. It is rare for a solitary actinic keratosis to evolve into a squamous cell carcinoma (SCC), but the risk is 10-15% when more than 10 actinic keratoses are present. A tender, thickened, ulcerated or enlarging actinic keratosis is suspicious of SCC.    Actinic keratoses may be prevented by strict sun protection. If already present, keratoses may improve with a very high sun protection factor (50+) broad-spectrum sunscreen applied at least daily to affected areas, year-round.  We recommend that sun protective clothing and hats and sunglasses be worn whenever possible.  Note that you can make you own UPF 30 rate clothing using just your own washing machine with a product called sun guard    There are several different options for treating actinic keratoses    Topical “medications such as 5-fluorouracil or Aldara  - good for field treatment ie treats what's seen and not seen    Cryotherapy - good for single spots but treats “only what we see” versus a field treatment    Photodynamic therapy - involves application of a light sensitizing medicine and then exposure to a special light, also a good field  treatmen

## 2025-07-08 ENCOUNTER — RESULTS FOLLOW-UP (OUTPATIENT)
Dept: CARDIOLOGY CLINIC | Facility: CLINIC | Age: 85
End: 2025-07-08

## 2025-07-09 DIAGNOSIS — I48.0 PAF (PAROXYSMAL ATRIAL FIBRILLATION) (HCC): Primary | ICD-10-CM

## 2025-07-09 RX ORDER — METOPROLOL TARTRATE 25 MG/1
TABLET, FILM COATED ORAL
Qty: 90 TABLET | Refills: 3 | Status: SHIPPED | OUTPATIENT
Start: 2025-07-09

## 2025-07-09 NOTE — TELEPHONE ENCOUNTER
Called pt and LVM asking her to call office back to discuss pacemaker check results and Dr. RICHMOND's recommendation.

## 2025-07-09 NOTE — TELEPHONE ENCOUNTER
Spoke to patient  she is agreeable to starting Metoprolol  25mg -12.5mg bid       Sent script to [facility] - [phone] [[id number]]

## 2025-07-10 NOTE — TELEPHONE ENCOUNTER
Patient called requesting refill for Protonix. Patient made aware medication was refilled on 7/1/25 for 90 with 1 refills to [facility]. Patient instructed to contact the pharmacy and speak with someone directly to obtain refills of medication. Patient advised to call back for refill if their pharmacy is unable to assist them. Patient verbalized understanding.

## 2025-07-17 ENCOUNTER — OFFICE VISIT (OUTPATIENT)
Dept: SURGICAL ONCOLOGY | Facility: CLINIC | Age: 85
End: 2025-07-17
Payer: COMMERCIAL

## 2025-07-17 VITALS
HEART RATE: 90 BPM | OXYGEN SATURATION: 96 % | BODY MASS INDEX: 35.05 KG/M2 | HEIGHT: 58 IN | TEMPERATURE: 97.6 F | DIASTOLIC BLOOD PRESSURE: 72 MMHG | WEIGHT: 167 LBS | SYSTOLIC BLOOD PRESSURE: 118 MMHG

## 2025-07-17 DIAGNOSIS — D49.0 IPMN (INTRADUCTAL PAPILLARY MUCINOUS NEOPLASM): Primary | ICD-10-CM

## 2025-07-17 PROCEDURE — 99214 OFFICE O/P EST MOD 30 MIN: CPT | Performed by: SURGERY

## 2025-07-17 NOTE — PROGRESS NOTES
Surgical Oncology Follow Up       [facility]  [address]  [address]  [place] [id number] [phone]    Radha Vidal  1940  [id number]  [facility]  [facility] SURGICAL ONCOLOGY [facility]  [address]  [address]  [place] [id number] [phone]    1. IPMN (intraductal papillary mucinous neoplasm)  Assessment & Plan:  85-year-old female status post distal pancreatectomy and splenectomy for an enlarging pancreatic cyst.  Pathology revealed multifocal IPMN.  There was mucinous cyst without high-grade dysplasia at the margin.  I recommended observation.  Her imaging is essentially stable for 2 years.  We discussed with IPMN, there is a risk of developing malignancy elsewhere in the pancreas.  Given her age and the relative stability over 6 months we will plan on repeating her MRI in 18 months. I will see her again at that time for another clinical exam.  If she develops any abdominal pain, unintentional weight loss, jaundice, or hard to control blood sugars, she will contact us.  She is agreeable to this plan.  All her questions were answered.   Orders:  -     MRI abdomen w wo contrast and mrcp; Future; Expected date: 12/26/2026         Chief Complaint   Patient presents with   • Follow-up       Return in about 18 months (around 1/17/2027) for Ofice visit short, Imaging - See orders, with Heather.      Oncology History    No history exists.       Staging: Multifocal IPMN, focal high-grade dysplasia   mucinous neoplasm at resection margin, negative for high-grade dysplasia  Treatment history: Distal pancreatectomy with splenectomy, July 2023  Current treatment: Observation  Disease status:     History of Present Illness: Patient returns in follow-up.  She is doing well.  Her appetite is fair.  She does have a few pounds of weight fluctuation.  MRI from July 2,  2025 reveals slightly enlarged cyst in the pancreatic body measuring 1.8 cm.  This was 1.4 cm in 2021.  This does communicate with the main pancreatic duct.  I personally reviewed the films.    Review of Systems  Complete ROS Surg Onc:   Complete ROS Surg Onc:   Constitutional: The patient denies new or recent history of general fatigue, no recent weight loss, no change in appetite.   Eyes: No complaints of visual problems, no scleral icterus.   ENT: no complaints of ear pain, no hoarseness, no difficulty swallowing,  no tinnitus and no new masses in head, oral cavity, or neck.   Cardiovascular: No complaints of chest pain, no palpitations, no ankle edema.   Respiratory: No complaints of shortness of breath, no cough.   Gastrointestinal: No complaints of jaundice, no bloody stools, no pale stools.   Genitourinary: No complaints of dysuria, no hematuria, no nocturia, no frequent urination, no urethral discharge.   Musculoskeletal: No complaints of weakness, paralysis, joint stiffness or arthralgias.  Integumentary: No complaints of rash, no new lesions.   Neurological: No complaints of convulsions, no seizures, no dizziness.   Hematologic/Lymphatic: No complaints of easy bruising.   Endocrine:  No hot or cold intolerance.  No polydipsia, polyphagia, or polyuria.  Allergy/immunology:  No environmental allergies.  No food allergies.  Not immunocompromised.  Skin:  No pallor or rash.  No wound.        Problem List[1]  Past Medical History[1]  Past Surgical History[1]  Family History[1]  Social History     Socioeconomic History   • Marital status:      Spouse name: Not on file   • Number of children: Not on file   • Years of education: Not on file   • Highest education level: Not on file   Occupational History   • Occupation: RETIRED    Tobacco Use   • Smoking status: Never     Passive exposure: Past   • Smokeless tobacco: Never   Vaping Use   • Vaping status: Never Used   Substance and Sexual Activity   • Alcohol  use: Not Currently     Comment: patient states rare use on holidays    • Drug use: No   • Sexual activity: Not Currently   Other Topics Concern   • Not on file   Social History Narrative    LIVING INDEPENDENTLY ALONE         No caffeine use     Social Drivers of Health     Financial Resource Strain: Low Risk  (4/19/2023)    Overall Financial Resource Strain (CARDIA)    • Difficulty of Paying Living Expenses: Not very hard   Food Insecurity: Patient Declined (6/3/2025)    Nursing - Inadequate Food Risk Classification    • Worried About Running Out of Food in the Last Year: Patient declined    • Ran Out of Food in the Last Year: Patient declined    • Ran Out of Food in the Last Year: Not on file   Transportation Needs: No Transportation Needs (6/3/2025)    PRAPARE - Transportation    • Lack of Transportation (Medical): No    • Lack of Transportation (Non-Medical): No   Physical Activity: Not on file   Stress: Not on file   Social Connections: Not on file   Intimate Partner Violence: Not on file   Housing Stability: Low Risk  (6/3/2025)    Housing Stability Vital Sign    • Unable to Pay for Housing in the Last Year: No    • Number of Times Moved in the Last Year: 0    • Homeless in the Last Year: No     Current Medications[1]  Allergies   Allergen Reactions   • Caffeine - Food Allergy GI Intolerance   • Iodine - Food Allergy Rash   • Latex Rash   • Medical Tape Other (See Comments) and Rash   • Other Rash     Adhesive tape       Vitals:    07/17/25 0954   BP: 118/72   Pulse: 90   Temp: 97.6 °F (36.4 °C)   SpO2: 96%       Physical Exam  Constitutional: General appearance: The Patient is well-developed and well-nourished who appears the stated age in no acute distress. Patient is pleasant and talkative.     HEENT:  Normocephalic.  Sclerae are anicteric. Mucous membranes are moist. Neck is supple without adenopathy.   Abdomen: Abdomen is soft, non-tender, non-distended and without masses.     Extremities: There is no  clubbing or cyanosis. There is no edema.  Symmetric.  Neuro: Grossly nonfocal. Gait is normal.     Lymphatic: No evidence of cervical adenopathy bilaterally.     Skin: Warm, anicteric.    Psych:  Patient is pleasant and talkative.  Breasts:        Pathology:  [unfilled]    Labs:      Imaging  MRI abdomen w wo contrast and mrcp  Result Date: 7/10/2025  Narrative: MRI OF THE ABDOMEN WITH AND WITHOUT CONTRAST WITH MRCP INDICATION: 85 years  / Female. D49.0: Neoplasm of unspecified behavior of digestive system. COMPARISON: MR abdomen 1/8/2025 TECHNIQUE: Multiplanar/multisequence MRI of the abdomen with 3D MRCP was performed before and after administration of contrast. IV Contrast: 7 mL of Gadobutrol injection (SINGLE-DOSE) FINDINGS: LOWER CHEST: Trace bilateral pleural fluid. LIVER: Severe hepatic steatosis. No suspicious mass. Patent hepatic and portal veins. BILE DUCTS: No intrahepatic or extrahepatic bile duct dilation. Common bile duct is normal in caliber. No choledocholithiasis, biliary stricture or suspicious mass. GALLBLADDER: Cholelithiasis without findings of acute cholecystitis. PANCREAS: Prior distal pancreatectomy. Pancreas divisum. Cystic pancreatic lesion as follows: -Dominant cyst size: 1.8 cm (series 10 image 66). Stable from 1/8/2025. This has slightly enlarged since 3/17/2021 when it measured 1.4 cm. -Dominant cyst location: Pancreatic body. -Additional cysts: Multiple additional smaller cysts are stable. -Communication with main pancreatic duct: Dominant cyst communicates with main pancreatic duct. -Main duct dilation: None. -Type: Branch duct intraductal papillary mucinous neoplasm (BD-IPMN.) -High risk stigmata: None. -Worrisome features: None. (Please note the above only takes into account imaging high risk stigmata/worrisome features. Clinical/laboratory features should be assessed separately.) Management and follow up recommendations for cystic pancreatic lesions are based on Institutional  consensus and international evidence-based [place] guidelines for the management of intraductal papillary mucinous neoplasm of the pancreas. Pancreatology 24 (2024) 255-270. ADRENAL GLANDS: Unremarkable. SPLEEN: Splenectomy. KIDNEYS/PROXIMAL URETERS: No hydroureteronephrosis. No suspicious renal mass. Simple bilateral renal cysts. 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 BOWEL: No dilated loops of bowel. PERITONEUM/RETROPERITONEUM: No ascites. LYMPH NODES: No abdominal lymphadenopathy. VESSELS: No aneurysm. ABDOMINAL WALL: Postsurgical changes of the anterior abdominal wall. BONES: No suspicious osseous lesion.     Impression: 1. Multiple pancreatic cysts measuring up to 1.8 cm without worrisome or high-risk features. -Management recommendation: Follow-up 6 months once, then every 18 months. Currently, patient has demonstrated stability for since 7/3/2024, gradually slightly enlarged since 3/17/2021. Recommend next follow-up in 18 months.  Endpoint determined by clinician. Preferred imaging modality: abdomen MRI and MRCP with and without IV contrast, or triple phase abdomen CT with IV contrast, or abdomen MRI and MRCP without IV contrast. 2. Severe hepatic steatosis. Workstation performed: [id number]     Cardiac EP device report  Result Date: 7/7/2025  Narrative: MDT DC PM/ACTIVE SYSTEM IS MRI CONDITIONAL CARELINK TRANSMISSION: BATTERY VOLTAGE ADEQUATE (13.3 YR). AP 20.8%.  10.5%. ALL LEAD PARAMETERS WITHIN NORMAL LIMITS. 3 DEVICE CLASSIFIED EPISODES OF NSVT W/ EGM'S SHOWING NSVT (12 @ 169, 9 @ 171) & PAT (6 @ 182). 1 AT/AF EPISODE W/ EGM SHOWING AFLUTTER FOR 50 SEC W/ BURDEN OF <0.1% SINCE 4/7/25. AVG V HR IN AFL @ 158 BPM. PT TAKES ELIQUIS, NO AV BEVERLY BLOCKER OR AAD. EF 65% (ECHO 10/16/24). COSIGN TO DR. ROQUE. NORMAL DEVICE FUNCTION. [facility]     DXA bone density spine hip and pelvis  Result Date: 6/23/2025  Narrative: DXA SCAN CLINICAL HISTORY: 85-year-old postmenopausal female. OTHER RISK  FACTORS: Prior fracture as a result of minor injury, PPI therapy. PHARMACOLOGIC THERAPY FOR OSTEOPOROSIS:  None. TECHNIQUE: Bone densitometry was performed using a Hologic Horizon W   bone densitometer.  Regions of interest appear properly placed. COMPARISON: 4/8/2021. RESULTS: LUMBAR SPINE Level: L1-L4: BMD: 0.805   gm/cm2 T-score: -2.2 LEFT   TOTAL HIP: BMD: 0.638   gm/cm2 T-score: -2.5 LEFT   FEMORAL NECK: BMD: 0.538   gm/cm2 T score: -2.8     Impression: 1. Osteoporosis. 2.  Since a DXA study from 4/8/2021, there has been: A  STATISTICALLY SIGNIFICANT DECREASE in bone mineral density of 0.046 g/cm2 (6.7%) in the left total hip. 3.  The 10 year risk of hip fracture is 8.6% and the 10 year risk of major osteoporotic fracture is 25% as calculated by the [employer] fracture risk assessment tool (FRAX, which is based on data generated by the WHO Collaborating [place] for  Metabolic Bone Diseases). 4.  The current Bone Health and Osteoporosis Foundation (BHOF) guidelines recommend treating: -  Patients with a T-score of -2.5 or less in the lumbar spine or hips -  Post-menopausal women and men over the age of 50 with low bone mass (osteopenia; T-score between -1.0 and -2.5) and a FRAX 10 year risk score of >  3% for hip fracture and/or >  20% for major osteoporosis-related fracture (vertebral, hip, wrist, or humerus). 5. The BHOF recommends performance of baseline BMD testing for: - Women aged >  65 years and men aged >  70 years. - Postmenopausal women and men aged 50-69 years, based on risk profile. - Postmenopausal women and men aged >  50 years with history of adult-age fracture. Also: - Utilize DXA facilities that employ accepted  measures. - Utilize the same DXA facility and on the same densitometry device for each test whenever possible. 6.  The BHOF recommends follow-up DXA in 1-2 years after initiating or changing medical therapy for osteoporosis and at appropriate intervals  thereafter, according to clinical circumstances. More frequent BMD testing may be warranted in higher-risk individuals (multiple fractures, older age, very low BMD). Less frequent BMD testing is warranted as follow-up in patients with initial T-scores in the normal or slightly below normal range (osteopenia) and for patients who have remained fracture free on  treatment.  One research study (Lul et. al., see reference below) demonstrated that untreated older women with baseline T-scores > -1.5 and no secondary risk factors there was a low likelihood (10%) of transition to osteoporosis in an interval of less than 15 years. 7. BHOF universal recommendations include: - Recommend a diet with adequate total calcium intake (1000mg/day for men aged 50-70 years; 1200 mg/day for women > 51 years and men > 71 years); incorporate calcium supplements if intake is insufficient. - Monitor serum 25-hydroxyvitamin D levels and maintain serum vitamin D sufficiency (> 30 ng/ml but < 50 ng/ml; in healthy individuals > 20 ng/ml may be sufficient). - Identify and address modifiable risk factors associated with falls. - Provide guidance for smoking cessation and avoidance of excessive alcohol intake. The FRAX algorithm has certain limitations: -FRAX has not been validated in patients currently or previously treated with pharmacotherapy for osteoporosis.  In such patients, clinical judgment must be exercised in interpreting FRAX scores. -Prior hip, vertebral and humeral fragility fractures appear to confer greater risk of subsequent fracture than fractures at other sites (this is especially true for individuals with severe vertebral fractures), but quantification of this incremental risk is not possible with FRAX. -FRAX underestimates fracture risk in patients with history of multiple fragility fractures. -FRAX may underestimate fracture risk in patients with history of frequent falls. -It is not appropriate to use FRAX to monitor  treatment response. WHO CLASSIFICATION: Normal (a T-score of -1.0 or higher) Low bone mineral density (a T-score of less than -1.0 but higher than -2.5) Osteoporosis (a T-score of -2.5 or less) Severe osteoporosis (a T-score of -2.5 or less with a fragility fracture) LEAST SIGNIFICANT CHANGE (AT 95% C.I): Lumbar spine: 0.034 g/cm2; 3.5% Total hip: 0.024 g/cm2; 3.0% Forearm: 0.021 g/cm2; 3.4% SELECTED REFERENCES: Coral MS, Ericka SL, Asael KL, et al. The clinician's guide to prevention and treatment of osteoporosis. Osteoporos Int 2022; 33:6283-7656. Nicolle D, Marques SB, Kya A, et al. DXA reporting updates: 2023 Official Positions of the International Society for Clinical Densitometry. J Clin Densitom 2024; 27: 783019 (https://doi.org/10.1016/j.jocd.2023.644299). Veras PM, Petak SM, Camden N, et al. American Association of Clinical Endocrinologists/American College of Endocrinology clinical practice guidelines for the diagnosis and treatment of postmenopausal osteoporosis-2020 update. Endocr Pract 2020; 26(Suppl 1):1-46. Letitia SL and Santos AD. The utility and limitations of FRAX: A US perspective. Curr Osteoporos Rep 2010; 8:192-197. Paccou J, Jazmin L, Kolta S, et al. High bone mass in adults. Joint Bone Spine 2018; 85: 693-699. Stephen CL, Duke SA, Brandon C, Rayshawn JH. Friend or foe: high bone mineral density on routine bone density scanning, a review of causes and management. Rheumatology 2013;52:969-985. Lul ML, Chito ARNIE, Little JS, et al. Bone-density testing interval and transition to osteoporosis in older women. N Engl J Med 2012; 366:225-233. Carmine CJ, Pancho J, Rg NC. Serial bone density measurement and incident fracture risk discrimination in postmenopausal women. VIVIAN Intern Med 2020;180:4163-9971. Workstation performed: [id number]     I personally reviewed and interpreted the above laboratory and imaging data.                 [1]  Patient Active Problem  List  Diagnosis   • OAB (overactive bladder)   • Parkinson's disease (HCC)   • Primary insomnia   • History of skin cancer   • Seasonal allergic rhinitis   • Impaired fasting glucose   • Obesity (BMI 30-39.9)   • Claustrophobia   • Mitral valve disorder   • Menopause ovarian failure   • Vitamin D deficiency   • Dysphagia   • Multiple thyroid nodules   • Gastro-esophageal reflux disease without esophagitis   • Chronic idiopathic constipation   • History of adenomatous polyp of colon   • Tremor   • IPMN (intraductal papillary mucinous neoplasm)   • Cherry angioma   • Postablative hypothyroidism   • Sleep apnea   • Cerebrovascular disease   • Age-related osteoporosis without current pathological fracture   • Urge incontinence   • s/p distal pancreatectomy/splenectomy   • Paroxysmal atrial fibrillation ([facility])   • Type 2 diabetes mellitus with other specified complication, unspecified whether long term insulin use ([facility])   • Ambulatory dysfunction   • Small bowel obstruction ([facility])   • Symptomatic bradycardia   • SSS (sick sinus syndrome) ([facility])   • Pacemaker   [1]  Past Medical History:  Diagnosis Date   • Actinic keratosis 2016   • Acute midline low back pain without sciatica 2020   • 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    • Benign neoplasm of skin    • Cancer ([facility])     skin   • Chest pain    • Claustrophobia    • Closed compression fracture of body of L1 vertebra ([facility]) 2020   • Diverticulitis    • Diverticulitis    • Fracture     L1-L2   • GERD (gastroesophageal reflux disease)    • H. pylori infection 2020   • Muscle weakness    • Nausea 2024   • Nonmelanoma skin cancer     last assessed 2017   • Palpitations    • Pancreatic cyst    • Seasonal allergies    • Seborrheic keratosis 2014   • Sleep apnea     no cpap   • Spontaneous      without mention of complications    • Squamous cell carcinoma of forehead 2014   • Syncope    [1]  Past  Surgical History:  Procedure Laterality Date   • ABDOMINAL ADHESION SURGERY N/A 07/31/2023    Procedure: LYSIS ADHESIONS;  Surgeon: Kyle Julien MD;  [facility];  Service: Surgical Oncology   • BOTOX INJECTION N/A 03/06/2017    Procedure: CYSTOSCOPY; BLADDER BOTOX 100 UNITS ;  Surgeon: Juan Edward MD;  [facility];  Service:    • BOWEL RESECTION      related ot diverticulitis   • CARDIAC CATHETERIZATION     • CARDIAC CATHETERIZATION Left 2/2/2024    Procedure: Cardiac Left Heart Cath;  Surgeon: Carrington Kiser DO;  [facility];  Service: Cardiology   • CARDIAC ELECTROPHYSIOLOGY PROCEDURE N/A 5/15/2024    Procedure: Cardiac pacer implant;  Surgeon: Brien Woods MD;  [facility]: [facility];  Service: Cardiology   • CARPAL TUNNEL RELEASE Right    • COLONOSCOPY  07/31/2019   • COLOSTOMY     • COLOSTOMY CLOSURE     • CYSTOSCOPY  06/01/2021   • DISTAL PANCREATECTOMY N/A 07/31/2023    Procedure: DISTAL PANCREATECTOMY;  Surgeon: Kyle Julien MD;  [facility];  Service: Surgical Oncology   • FOOT SURGERY Left     neuroma   • HYSTERECTOMY     • MYRINGOTOMY W/ TUBES Right 12/06/2023    office procedure - Dr. Grace   • NASAL SINUS SURGERY  age 39    [place]   • PANCREATIC CYST BIOPSY  07/31/2023   • MN CYSTOURETHROSCOPY N/A 04/13/2018    Procedure: CYSTOSCOPY WITH BOTOX;  Surgeon: Juan Edward MD;  Location: [facility];  Service: Urology   • MN ESOPHAGOGASTRODUODENOSCOPY TRANSORAL DIAGNOSTIC N/A 04/23/2019    Procedure: ESOPHAGOGASTRODUODENOSCOPY (EGD);  Surgeon: Edu Dickerson DO;  Location: [facility];  Service: Gastroenterology   • SPLENECTOMY, TOTAL N/A 07/31/2023    Procedure: SPLENECTOMY;  Surgeon: Kyle Julien MD;  [facility];  Service: Surgical Oncology   • TONSILLECTOMY  age 49   • UPPER GASTROINTESTINAL ENDOSCOPY  04/23/2019   [1]  Family History  Problem Relation Name Age of Onset   • Alcohol abuse Mother     • Heart  disease Mother     • Alcohol abuse Father     • Alcohol abuse Brother     • Cancer Brother     • Tremor Neg Hx     • Parkinsonism Neg Hx     • Colon cancer Neg Hx     [1]    Current Outpatient Medications:   •  ALPRAZolam (XANAX) 0.5 mg tablet, Take 1 tablet (0.5 mg total) by mouth see administration instructions Take 1 tablet 1 hour prior to MRI, take 2nd tablet immediately before if needed, Disp: 2 tablet, Rfl: 0  •  apixaban (Eliquis) 5 mg, Take 1 tablet (5 mg total) by mouth 2 (two) times a day, Disp: 180 tablet, Rfl: 3  •  Ascorbic Acid (VITAMIN C) 1000 MG tablet, Take 1,000 mg by mouth in the morning., Disp: , Rfl:   •  atorvastatin (LIPITOR) 40 mg tablet, Take 1 tablet (40 mg total) by mouth daily with dinner, Disp: 90 tablet, Rfl: 3  •  B Complex Vitamins (B COMPLEX 100 PO), Take 1 capsule by mouth daily after lunch not taking, Disp: , Rfl:   •  calcium carbonate (OS-JOHN) 1250 (500 Ca) MG tablet, Take 1 tablet by mouth daily after lunch, Disp: , Rfl:   •  carbidopa-levodopa (SINEMET)  mg per tablet, Take 1 tablet by mouth 3 (three) times a day before meals, Disp: 270 tablet, Rfl: 3  •  cholecalciferol (VITAMIN D3) 1,000 units tablet, Take 5,000 Units by mouth daily after lunch, Disp: , Rfl:   •  Cyanocobalamin (VITAMIN B 12 PO), Take 1 capsule by mouth in the morning., Disp: , Rfl:   •  metoprolol tartrate (LOPRESSOR) 25 mg tablet, 1/2 tab twice a day, Disp: 90 tablet, Rfl: 3  •  multivitamin (THERAGRAN) TABS, Take 1 tablet by mouth every morning, Disp: , Rfl:   •  pantoprazole (PROTONIX) 40 mg tablet, Take 1 tablet (40 mg total) by mouth daily, Disp: 90 tablet, Rfl: 1  •  Potassium Gluconate 595 (99 K) MG TABS, Take 1 each by mouth every other day, Disp: , Rfl:   •  Probiotic Product (PROBIOTIC-10) CAPS, Take 1 capsule by mouth daily after lunch, Disp: , Rfl:   •  trospium chloride (SANCTURA) 20 mg tablet, Take 1 tablet (20 mg total) by mouth 2 (two) times a day, Disp: 180 tablet, Rfl: 3  •   fluticasone (FLONASE) 50 mcg/act nasal spray, 2 sprays into each nostril daily (Patient not taking: Reported on 7/17/2025), Disp: 16 g, Rfl: 11

## 2025-07-17 NOTE — LETTER
July 17, 2025     Hoang Tovar MD  [address]  [facility] [address]    Patient: Radha Vidal   YOB: 1940   Date of Visit: 7/17/2025       Dear MD Dennis Kaplan MD:    Thank you for referring Radha Vidal to me for evaluation. Below are my notes for this consultation.    If you have questions, please do not hesitate to call me. I look forward to following your patient along with you.         Sincerely,        Kyle Julien MD        CC: MD Kyle Vasquez MD  7/17/2025 10:44 AM  Sign when Signing Visit               Surgical Oncology Follow Up       [facility]  [address]  [address]  [facility] [place] [address]  [phone]    Radha Vidal  1940  [id number]  [facility] SURGICAL ONCOLOGY [facility]  [address]  [address]  [place] [address]  [phone]    1. IPMN (intraductal papillary mucinous neoplasm)  Assessment & Plan:  85-year-old female status post distal pancreatectomy and splenectomy for an enlarging pancreatic cyst.  Pathology revealed multifocal IPMN.  There was mucinous cyst without high-grade dysplasia at the margin.  I recommended observation.  Her imaging is essentially stable for 2 years.  We discussed with IPMN, there is a risk of developing malignancy elsewhere in the pancreas.  Given her age and the relative stability over 6 months we will plan on repeating her MRI in 18 months. I will see her again at that time for another clinical exam.  If she develops any abdominal pain, unintentional weight loss, jaundice, or hard to control blood sugars, she will contact us.  She is agreeable to this plan.  All her questions were answered.   Orders:  -     MRI abdomen w wo contrast and mrcp; Future; Expected date: 12/26/2026         Chief Complaint   Patient presents with   • Follow-up       Return in about  18 months (around 1/17/2027) for Ofice visit short, Imaging - See orders, with Heather.      Oncology History    No history exists.       Staging: Multifocal IPMN, focal high-grade dysplasia   mucinous neoplasm at resection margin, negative for high-grade dysplasia  Treatment history: Distal pancreatectomy with splenectomy, July 2023  Current treatment: Observation  Disease status:     History of Present Illness: Patient returns in follow-up.  She is doing well.  Her appetite is fair.  She does have a few pounds of weight fluctuation.  MRI from July 2, 2025 reveals slightly enlarged cyst in the pancreatic body measuring 1.8 cm.  This was 1.4 cm in 2021.  This does communicate with the main pancreatic duct.  I personally reviewed the films.    Review of Systems  Complete ROS Surg Onc:   Complete ROS Surg Onc:   Constitutional: The patient denies new or recent history of general fatigue, no recent weight loss, no change in appetite.   Eyes: No complaints of visual problems, no scleral icterus.   ENT: no complaints of ear pain, no hoarseness, no difficulty swallowing,  no tinnitus and no new masses in head, oral cavity, or neck.   Cardiovascular: No complaints of chest pain, no palpitations, no ankle edema.   Respiratory: No complaints of shortness of breath, no cough.   Gastrointestinal: No complaints of jaundice, no bloody stools, no pale stools.   Genitourinary: No complaints of dysuria, no hematuria, no nocturia, no frequent urination, no urethral discharge.   Musculoskeletal: No complaints of weakness, paralysis, joint stiffness or arthralgias.  Integumentary: No complaints of rash, no new lesions.   Neurological: No complaints of convulsions, no seizures, no dizziness.   Hematologic/Lymphatic: No complaints of easy bruising.   Endocrine:  No hot or cold intolerance.  No polydipsia, polyphagia, or polyuria.  Allergy/immunology:  No environmental allergies.  No food allergies.  Not immunocompromised.  Skin:  No pallor  or rash.  No wound.        Problem List[1]  Past Medical History[1]  Past Surgical History[1]  Family History[1]  Social History     Socioeconomic History   • Marital status:      Spouse name: Not on file   • Number of children: Not on file   • Years of education: Not on file   • Highest education level: Not on file   Occupational History   • Occupation: RETIRED    Tobacco Use   • Smoking status: Never     Passive exposure: Past   • Smokeless tobacco: Never   Vaping Use   • Vaping status: Never Used   Substance and Sexual Activity   • Alcohol use: Not Currently     Comment: patient states rare use on holidays    • Drug use: No   • Sexual activity: Not Currently   Other Topics Concern   • Not on file   Social History Narrative    LIVING INDEPENDENTLY ALONE         No caffeine use     Social Drivers of Health     Financial Resource Strain: Low Risk  (4/19/2023)    Overall Financial Resource Strain (CARDIA)    • Difficulty of Paying Living Expenses: Not very hard   Food Insecurity: Patient Declined (6/3/2025)    Nursing - Inadequate Food Risk Classification    • Worried About Running Out of Food in the Last Year: Patient declined    • Ran Out of Food in the Last Year: Patient declined    • Ran Out of Food in the Last Year: Not on file   Transportation Needs: No Transportation Needs (6/3/2025)    PRAPARE - Transportation    • Lack of Transportation (Medical): No    • Lack of Transportation (Non-Medical): No   Physical Activity: Not on file   Stress: Not on file   Social Connections: Not on file   Intimate Partner Violence: Not on file   Housing Stability: Low Risk  (6/3/2025)    Housing Stability Vital Sign    • Unable to Pay for Housing in the Last Year: No    • Number of Times Moved in the Last Year: 0    • Homeless in the Last Year: No     Current Medications[1]  Allergies   Allergen Reactions   • Caffeine - Food Allergy GI Intolerance   • Iodine - Food Allergy Rash   • Latex Rash   • Medical Tape Other (See  Comments) and Rash   • Other Rash     Adhesive tape       Vitals:    07/17/25 0954   BP: 118/72   Pulse: 90   Temp: 97.6 °F (36.4 °C)   SpO2: 96%       Physical Exam  Constitutional: General appearance: The Patient is well-developed and well-nourished who appears the stated age in no acute distress. Patient is pleasant and talkative.     HEENT:  Normocephalic.  Sclerae are anicteric. Mucous membranes are moist. Neck is supple without adenopathy.   Abdomen: Abdomen is soft, non-tender, non-distended and without masses.     Extremities: There is no clubbing or cyanosis. There is no edema.  Symmetric.  Neuro: Grossly nonfocal. Gait is normal.     Lymphatic: No evidence of cervical adenopathy bilaterally.     Skin: Warm, anicteric.    Psych:  Patient is pleasant and talkative.  Breasts:        Pathology:  [unfilled]    Labs:      Imaging  MRI abdomen w wo contrast and mrcp  Result Date: 7/10/2025  Narrative: MRI OF THE ABDOMEN WITH AND WITHOUT CONTRAST WITH MRCP INDICATION: 85 years  / Female. D49.0: Neoplasm of unspecified behavior of digestive system. COMPARISON: MR abdomen 1/8/2025 TECHNIQUE: Multiplanar/multisequence MRI of the abdomen with 3D MRCP was performed before and after administration of contrast. IV Contrast: 7 mL of Gadobutrol injection (SINGLE-DOSE) FINDINGS: LOWER CHEST: Trace bilateral pleural fluid. LIVER: Severe hepatic steatosis. No suspicious mass. Patent hepatic and portal veins. BILE DUCTS: No intrahepatic or extrahepatic bile duct dilation. Common bile duct is normal in caliber. No choledocholithiasis, biliary stricture or suspicious mass. GALLBLADDER: Cholelithiasis without findings of acute cholecystitis. PANCREAS: Prior distal pancreatectomy. Pancreas divisum. Cystic pancreatic lesion as follows: -Dominant cyst size: 1.8 cm (series 10 image 66). Stable from 1/8/2025. This has slightly enlarged since 3/17/2021 when it measured 1.4 cm. -Dominant cyst location: Pancreatic body. -Additional  cysts: Multiple additional smaller cysts are stable. -Communication with main pancreatic duct: Dominant cyst communicates with main pancreatic duct. -Main duct dilation: None. -Type: Branch duct intraductal papillary mucinous neoplasm (BD-IPMN.) -High risk stigmata: None. -Worrisome features: None. (Please note the above only takes into account imaging high risk stigmata/worrisome features. Clinical/laboratory features should be assessed separately.) Management and follow up recommendations for cystic pancreatic lesions are based on Institutional consensus and international evidence-based Kyoto guidelines for the management of intraductal papillary mucinous neoplasm of the pancreas. Pancreatology 24 (2024) 255-270. ADRENAL GLANDS: Unremarkable. SPLEEN: Splenectomy. KIDNEYS/PROXIMAL URETERS: No hydroureteronephrosis. No suspicious renal mass. Simple bilateral renal cysts. 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 BOWEL: No dilated loops of bowel. PERITONEUM/RETROPERITONEUM: No ascites. LYMPH NODES: No abdominal lymphadenopathy. VESSELS: No aneurysm. ABDOMINAL WALL: Postsurgical changes of the anterior abdominal wall. BONES: No suspicious osseous lesion.     Impression: 1. Multiple pancreatic cysts measuring up to 1.8 cm without worrisome or high-risk features. -Management recommendation: Follow-up 6 months once, then every 18 months. Currently, patient has demonstrated stability for since 7/3/2024, gradually slightly enlarged since 3/17/2021. Recommend next follow-up in 18 months.  Endpoint determined by clinician. Preferred imaging modality: abdomen MRI and MRCP with and without IV contrast, or triple phase abdomen CT with IV contrast, or abdomen MRI and MRCP without IV contrast. 2. Severe hepatic steatosis. Workstation performed: [id number]     Cardiac EP device report  Result Date: 7/7/2025  Narrative: LATOSHA DC PM/ACTIVE SYSTEM IS MRI CONDITIONAL CARELINK TRANSMISSION: BATTERY VOLTAGE ADEQUATE  (13.3 YR). AP 20.8%.  10.5%. ALL LEAD PARAMETERS WITHIN NORMAL LIMITS. 3 DEVICE CLASSIFIED EPISODES OF NSVT W/ EGM'S SHOWING NSVT (12 @ 169, 9 @ 171) & PAT (6 @ 182). 1 AT/AF EPISODE W/ EGM SHOWING AFLUTTER FOR 50 SEC W/ BURDEN OF <0.1% SINCE 4/7/25. AVG V HR IN AFL @ 158 BPM. PT TAKES ELIQUIS, NO AV BEVERLY BLOCKER OR AAD. EF 65% (ECHO 10/16/24). COSIGN TO DR. ROQUE. NORMAL DEVICE FUNCTION. [facility]     DXA bone density spine hip and pelvis  Result Date: 6/23/2025  Narrative: DXA SCAN CLINICAL HISTORY: 85-year-old postmenopausal female. OTHER RISK FACTORS: Prior fracture as a result of minor injury, PPI therapy. PHARMACOLOGIC THERAPY FOR OSTEOPOROSIS:  None. TECHNIQUE: Bone densitometry was performed using a Hologic Horizon W   bone densitometer.  Regions of interest appear properly placed. COMPARISON: 4/8/2021. RESULTS: LUMBAR SPINE Level: L1-L4: BMD: 0.805   gm/cm2 T-score: -2.2 LEFT   TOTAL HIP: BMD: 0.638   gm/cm2 T-score: -2.5 LEFT   FEMORAL NECK: BMD: 0.538   gm/cm2 T score: -2.8     Impression: 1. Osteoporosis. 2.  Since a DXA study from 4/8/2021, there has been: A  STATISTICALLY SIGNIFICANT DECREASE in bone mineral density of 0.046 g/cm2 (6.7%) in the left total hip. 3.  The 10 year risk of hip fracture is 8.6% and the 10 year risk of major osteoporotic fracture is 25% as calculated by the [employer] fracture risk assessment tool (FRAX, which is based on data generated by the WHO Collaborating [place] for  Metabolic Bone Diseases). 4.  The current Bone Health and Osteoporosis Foundation (BHOF) guidelines recommend treating: -  Patients with a T-score of -2.5 or less in the lumbar spine or hips -  Post-menopausal women and men over the age of 50 with low bone mass (osteopenia; T-score between -1.0 and -2.5) and a FRAX 10 year risk score of >  3% for hip fracture and/or >  20% for major osteoporosis-related fracture (vertebral, hip, wrist, or humerus). 5. The BHOF recommends performance of baseline BMD  testing for: - Women aged >  65 years and men aged >  70 years. - Postmenopausal women and men aged 50-69 years, based on risk profile. - Postmenopausal women and men aged >  50 years with history of adult-age fracture. Also: - Utilize DXA facilities that employ accepted  measures. - Utilize the same DXA facility and on the same densitometry device for each test whenever possible. 6.  The [facility] recommends follow-up DXA in 1-2 years after initiating or changing medical therapy for osteoporosis and at appropriate intervals thereafter, according to clinical circumstances. More frequent BMD testing may be warranted in higher-risk individuals (multiple fractures, older age, very low BMD). Less frequent BMD testing is warranted as follow-up in patients with initial T-scores in the normal or slightly below normal range (osteopenia) and for patients who have remained fracture free on  treatment.  One research study (Lul et. al., see reference below) demonstrated that untreated older women with baseline T-scores > -1.5 and no secondary risk factors there was a low likelihood (10%) of transition to osteoporosis in an interval of less than 15 years. 7. [facility] universal recommendations include: - Recommend a diet with adequate total calcium intake (1000mg/day for men aged 50-70 years; 1200 mg/day for women > 51 years and men > 71 years); incorporate calcium supplements if intake is insufficient. - Monitor serum 25-hydroxyvitamin D levels and maintain serum vitamin D sufficiency (> 30 ng/ml but < 50 ng/ml; in healthy individuals > 20 ng/ml may be sufficient). - Identify and address modifiable risk factors associated with falls. - Provide guidance for smoking cessation and avoidance of excessive alcohol intake. The FRAX algorithm has certain limitations: -FRAX has not been validated in patients currently or previously treated with pharmacotherapy for osteoporosis.  In such patients, clinical judgment must be  exercised in interpreting FRAX scores. -Prior hip, vertebral and humeral fragility fractures appear to confer greater risk of subsequent fracture than fractures at other sites (this is especially true for individuals with severe vertebral fractures), but quantification of this incremental risk is not possible with FRAX. -FRAX underestimates fracture risk in patients with history of multiple fragility fractures. -FRAX may underestimate fracture risk in patients with history of frequent falls. -It is not appropriate to use FRAX to monitor treatment response. WHO CLASSIFICATION: Normal (a T-score of -1.0 or higher) Low bone mineral density (a T-score of less than -1.0 but higher than -2.5) Osteoporosis (a T-score of -2.5 or less) Severe osteoporosis (a T-score of -2.5 or less with a fragility fracture) LEAST SIGNIFICANT CHANGE (AT 95% C.I): Lumbar spine: 0.034 g/cm2; 3.5% Total hip: 0.024 g/cm2; 3.0% Forearm: 0.021 g/cm2; 3.4% SELECTED REFERENCES: DulceBojuani MS, Ericka SL, Asael KL, et al. The clinician's guide to prevention and treatment of osteoporosis. Osteoporos Int 2022; 33:8222-9452. Valverde D, Marques SB, Szalat A, et al. DXA reporting updates: 2023 Official Positions of the International Society for Clinical Densitometry. J Clin Densitom 2024; 27: 323822 (https://doi.org/10.1016/j.jocd.2023.456096). Veras PM, Petak SM, Camden N, et al. American Association of Clinical Endocrinologists/American College of Endocrinology clinical practice guidelines for the diagnosis and treatment of postmenopausal osteoporosis-2020 update. Endocr Pract 2020; 26(Suppl 1):1-46. Letitia SL and Tom AD. The utility and limitations of FRAX: A US perspective. Curr Osteoporos Rep 2010; 8:192-197. Janny J, Jazmin L, Garrison S, et al. High bone mass in adults. Joint Bone Spine 2018; 85: 693-699. Stephen CL, Duke SA, Brandon C, Rayshawn JH. Friend or foe: high bone mineral density on routine bone density scanning, a review of  causes and management. Rheumatology 2013;52:969-985. Lul ML, Chito ARNIE, Presamer JS, et al. Bone-density testing interval and transition to osteoporosis in older women. N Engl J Med 2012; 366:225-233. Carmine CJ, Pancho J, Rg NC. Serial bone density measurement and incident fracture risk discrimination in postmenopausal women. VIVIAN Intern Med 2020;180:9689-1034. Workstation performed: [id number]     I personally reviewed and interpreted the above laboratory and imaging data.                 [1]  Patient Active Problem List  Diagnosis   • OAB (overactive bladder)   • Parkinson's disease (HCC)   • Primary insomnia   • History of skin cancer   • Seasonal allergic rhinitis   • Impaired fasting glucose   • Obesity (BMI 30-39.9)   • Claustrophobia   • Mitral valve disorder   • Menopause ovarian failure   • Vitamin D deficiency   • Dysphagia   • Multiple thyroid nodules   • Gastro-esophageal reflux disease without esophagitis   • Chronic idiopathic constipation   • History of adenomatous polyp of colon   • Tremor   • IPMN (intraductal papillary mucinous neoplasm)   • Cherry angioma   • Postablative hypothyroidism   • Sleep apnea   • Cerebrovascular disease   • Age-related osteoporosis without current pathological fracture   • Urge incontinence   • s/p distal pancreatectomy/splenectomy   • Paroxysmal atrial fibrillation (HCC)   • Type 2 diabetes mellitus with other specified complication, unspecified whether long term insulin use ([facility])   • Ambulatory dysfunction   • Small bowel obstruction (HCC)   • Symptomatic bradycardia   • SSS (sick sinus syndrome) ([facility])   • Pacemaker   [1]  Past Medical History:  Diagnosis Date   • Actinic keratosis 03/11/2016   • Acute midline low back pain without sciatica 11/19/2020   • 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    • Benign neoplasm of skin    • Cancer (HCC)     skin   • Chest pain    • Claustrophobia    • Closed compression fracture of body of L1  vertebra (HCC) 2020   • Diverticulitis    • Diverticulitis    • Fracture     L1-L2   • GERD (gastroesophageal reflux disease)    • H. pylori infection 2020   • Muscle weakness    • Nausea 2024   • Nonmelanoma skin cancer     last assessed 2017   • Palpitations    • Pancreatic cyst    • Seasonal allergies    • Seborrheic keratosis 2014   • Sleep apnea     no cpap   • Spontaneous      without mention of complications    • Squamous cell carcinoma of forehead 2014   • Syncope    [1]  Past Surgical History:  Procedure Laterality Date   • ABDOMINAL ADHESION SURGERY N/A 2023    Procedure: LYSIS ADHESIONS;  Surgeon: Kyle Julien MD;  [facility];  Service: Surgical Oncology   • BOTOX INJECTION N/A 2017    Procedure: CYSTOSCOPY; BLADDER BOTOX 100 UNITS ;  Surgeon: Juan Edward MD;  [facility];  Service:    • BOWEL RESECTION      related ot diverticulitis   • CARDIAC CATHETERIZATION     • CARDIAC CATHETERIZATION Left 2024    Procedure: Cardiac Left Heart Cath;  Surgeon: Carrington Kiser DO;  [facility];  Service: Cardiology   • CARDIAC ELECTROPHYSIOLOGY PROCEDURE N/A 5/15/2024    Procedure: Cardiac pacer implant;  Surgeon: Brien Woods MD;  Location: [facility];  Service: Cardiology   • CARPAL TUNNEL RELEASE Right    • COLONOSCOPY  2019   • COLOSTOMY     • COLOSTOMY CLOSURE     • CYSTOSCOPY  2021   • DISTAL PANCREATECTOMY N/A 2023    Procedure: DISTAL PANCREATECTOMY;  Surgeon: Kyle Julien MD;  [facility];  Service: Surgical Oncology   • FOOT SURGERY Left     neuroma   • HYSTERECTOMY     • MYRINGOTOMY W/ TUBES Right 2023    office procedure - Dr. Grace   • NASAL SINUS SURGERY  age 39    [place]   • PANCREATIC CYST BIOPSY  2023   • ND CYSTOURETHROSCOPY N/A 2018    Procedure: CYSTOSCOPY WITH BOTOX;  Surgeon: Juan Edward MD;  [facility];   Service: Urology   • TN ESOPHAGOGASTRODUODENOSCOPY TRANSORAL DIAGNOSTIC N/A 04/23/2019    Procedure: ESOPHAGOGASTRODUODENOSCOPY (EGD);  Surgeon: Edu Dickerson DO;  Location: [facility];  Service: Gastroenterology   • SPLENECTOMY, TOTAL N/A 07/31/2023    Procedure: SPLENECTOMY;  Surgeon: Kyle Julien MD;  [facility];  Service: Surgical Oncology   • TONSILLECTOMY  age 49   • UPPER GASTROINTESTINAL ENDOSCOPY  04/23/2019   [1]  Family History  Problem Relation Name Age of Onset   • Alcohol abuse Mother     • Heart disease Mother     • Alcohol abuse Father     • Alcohol abuse Brother     • Cancer Brother     • Tremor Neg Hx     • Parkinsonism Neg Hx     • Colon cancer Neg Hx     [1]    Current Outpatient Medications:   •  ALPRAZolam (XANAX) 0.5 mg tablet, Take 1 tablet (0.5 mg total) by mouth see administration instructions Take 1 tablet 1 hour prior to MRI, take 2nd tablet immediately before if needed, Disp: 2 tablet, Rfl: 0  •  apixaban (Eliquis) 5 mg, Take 1 tablet (5 mg total) by mouth 2 (two) times a day, Disp: 180 tablet, Rfl: 3  •  Ascorbic Acid (VITAMIN C) 1000 MG tablet, Take 1,000 mg by mouth in the morning., Disp: , Rfl:   •  atorvastatin (LIPITOR) 40 mg tablet, Take 1 tablet (40 mg total) by mouth daily with dinner, Disp: 90 tablet, Rfl: 3  •  B Complex Vitamins (B COMPLEX 100 PO), Take 1 capsule by mouth daily after lunch not taking, Disp: , Rfl:   •  calcium carbonate (OS-JOHN) 1250 (500 Ca) MG tablet, Take 1 tablet by mouth daily after lunch, Disp: , Rfl:   •  carbidopa-levodopa (SINEMET)  mg per tablet, Take 1 tablet by mouth 3 (three) times a day before meals, Disp: 270 tablet, Rfl: 3  •  cholecalciferol (VITAMIN D3) 1,000 units tablet, Take 5,000 Units by mouth daily after lunch, Disp: , Rfl:   •  Cyanocobalamin (VITAMIN B 12 PO), Take 1 capsule by mouth in the morning., Disp: , Rfl:   •  metoprolol tartrate (LOPRESSOR) 25 mg tablet, 1/2 tab twice a day, Disp: 90 tablet, Rfl: 3  •   multivitamin (THERAGRAN) TABS, Take 1 tablet by mouth every morning, Disp: , Rfl:   •  pantoprazole (PROTONIX) 40 mg tablet, Take 1 tablet (40 mg total) by mouth daily, Disp: 90 tablet, Rfl: 1  •  Potassium Gluconate 595 (99 K) MG TABS, Take 1 each by mouth every other day, Disp: , Rfl:   •  Probiotic Product (PROBIOTIC-10) CAPS, Take 1 capsule by mouth daily after lunch, Disp: , Rfl:   •  trospium chloride (SANCTURA) 20 mg tablet, Take 1 tablet (20 mg total) by mouth 2 (two) times a day, Disp: 180 tablet, Rfl: 3  •  fluticasone (FLONASE) 50 mcg/act nasal spray, 2 sprays into each nostril daily (Patient not taking: Reported on 7/17/2025), Disp: 16 g, Rfl: 11

## 2025-07-17 NOTE — ASSESSMENT & PLAN NOTE
85-year-old female status post distal pancreatectomy and splenectomy for an enlarging pancreatic cyst.  Pathology revealed multifocal IPMN.  There was mucinous cyst without high-grade dysplasia at the margin.  I recommended observation.  Her imaging is essentially stable for 2 years.  We discussed with IPMN, there is a risk of developing malignancy elsewhere in the pancreas.  Given her age and the relative stability over 6 months we will plan on repeating her MRI in 18 months. I will see her again at that time for another clinical exam.  If she develops any abdominal pain, unintentional weight loss, jaundice, or hard to control blood sugars, she will contact us.  She is agreeable to this plan.  All her questions were answered.

## 2025-08-12 ENCOUNTER — TELEPHONE (OUTPATIENT)
Age: 85
End: 2025-08-12

## (undated) DEVICE — INTENDED FOR TISSUE SEPARATION, AND OTHER PROCEDURES THAT REQUIRE A SHARP SURGICAL BLADE TO PUNCTURE OR CUT.: Brand: BARD-PARKER SAFETY BLADES SIZE 15, STERILE

## (undated) DEVICE — INVIEW CLEAR LEGGINGS: Brand: CONVERTORS

## (undated) DEVICE — CHLORHEXIDINE 4PCT 4 OZ

## (undated) DEVICE — UROCATCH BAG

## (undated) DEVICE — PLUMEPEN PRO 10FT

## (undated) DEVICE — INTENDED FOR TISSUE SEPARATION, AND OTHER PROCEDURES THAT REQUIRE A SHARP SURGICAL BLADE TO PUNCTURE OR CUT.: Brand: BARD-PARKER SAFETY BLADES SIZE 10, STERILE

## (undated) DEVICE — GAUZE SPONGES,16 PLY: Brand: CURITY

## (undated) DEVICE — SUT VICRYL 0 REEL 54 IN J287G

## (undated) DEVICE — PACK TUR

## (undated) DEVICE — ELECTRODE BLADE MOD  E-Z CLEAN 6.5IN -0014M

## (undated) DEVICE — X-RAY DETECTABLE SPONGES,16 PLY: Brand: VISTEC

## (undated) DEVICE — DGW .035 FC J3MM 150CM TEF: Brand: EMERALD

## (undated) DEVICE — GLOVE SRG BIOGEL ECLIPSE 7

## (undated) DEVICE — THE ECHELON, ECHELON ENDOPATH™ AND ECHELON FLEX™ FAMILIES OF ENDOSCOPIC LINEAR CUTTERS AND RELOADS ARE STERILE, SINGLE PATIENT USE INSTRUMENTS THAT SIMULTANEOUSLY CUT AND STAPLE TISSUE. THERE ARE SIX STAGGERED ROWS OF STAPLES, THREE ON EITHER SIDE OF THE CUT LINE. THE 45 MM INSTRUMENTS HAVE A STAPLE LINE THATIS APPROXIMATELY 45 MM LONG AND A CUT LINE THAT IS APPROXIMATELY 42 MM LONG. THE SHAFT CAN ROTATE FREELY IN BOTH DIRECTIONS AND AN ARTICULATION MECHANISM ON ARTICULATING INSTRUMENTS ENABLES BENDING THE DISTAL PORTIONOF THE SHAFT TO FACILITATE LATERAL ACCESS OF THE OPERATIVE SITE.THE INSTRUMENTS ARE SHIPPED WITHOUT A RELOAD AND MUST BE LOADED PRIOR TO USE. A STAPLE RETAINING CAP ON THE RELOAD PROTECTS THE STAPLE LEG POINTS DURING SHIPPING AND TRANSPORTATION. THE INSTRUMENTS’ LOCK-OUT FEATURE IS DESIGNED TO PREVENT A USED RELOAD FROM BEING REFIRED.: Brand: ECHELON ENDOPATH

## (undated) DEVICE — JP CHANNEL DRAIN 19FR, FULL FLUTES: Brand: JACKSON-PRATT

## (undated) DEVICE — PROXIMATE SKIN STAPLERS (35 WIDE) CONTAINS 35 STAINLESS STEEL STAPLES (FIXED HEAD): Brand: PROXIMATE

## (undated) DEVICE — JACKSON-PRATT 100CC BULB RESERVOIR: Brand: CARDINAL HEALTH

## (undated) DEVICE — GLOVE INDICATOR PI UNDERGLOVE SZ 8 BLUE

## (undated) DEVICE — LIGACLIP MCA MULTIPLE CLIP APPLIERS, 20 MEDIUM CLIPS: Brand: LIGACLIP

## (undated) DEVICE — SUT SILK 2-0 18 IN A185H

## (undated) DEVICE — CATH GUIDING FIXED SHAPE 43CM

## (undated) DEVICE — INTRO SHEATH 7 FR PEEL AWAY

## (undated) DEVICE — SUT ETHILON 3-0 FS-1 18 IN 663G

## (undated) DEVICE — ADHESIVE SKIN HIGH VISCOSITY EXOFIN 1ML

## (undated) DEVICE — LIGHT HANDLE COVER SLEEVE DISP BLUE STELLAR

## (undated) DEVICE — SUT SILK 2-0 TIES 144 IN LA55G

## (undated) DEVICE — BETHLEHEM MAJOR GENERAL PACK: Brand: CARDINAL HEALTH

## (undated) DEVICE — HEMOSTATIC MATRIX SURGIFLO 8ML W/THROMBIN

## (undated) DEVICE — MEDI-VAC YANK SUCT HNDL W/TPRD BULBOUS TIP: Brand: CARDINAL HEALTH

## (undated) DEVICE — SUT PROLENE 3-0 SH 36 IN 8522H

## (undated) DEVICE — SCD SEQUENTIAL COMPRESSION COMFORT SLEEVE MEDIUM KNEE LENGTH: Brand: KENDALL SCD

## (undated) DEVICE — Device: Brand: INJETAK ADJUSTABLE TIP NEEDLE 70CM

## (undated) DEVICE — TR BAND RADIAL ARTERY COMPRESSION DEVICE: Brand: TR BAND

## (undated) DEVICE — 3M™ TEGADERM™ TRANSPARENT FILM DRESSING FRAME STYLE, 1628, 6 IN X 8 IN (15 CM X 20 CM), 10/CT 8CT/CASE: Brand: 3M™ TEGADERM™

## (undated) DEVICE — SURGICEL 4 X 8

## (undated) DEVICE — SUT PROLENE 4-0 RB-1/RB-1 36 IN 8557H

## (undated) DEVICE — SUT VICRYL 2-0 D-SPECIAL 27 IN D8114

## (undated) DEVICE — SUT SILK 2-0 SH CR/8 18 IN C012D

## (undated) DEVICE — THE ECHELON FLEX POWERED PLUS ARTICULATING ENDOSCOPIC LINEAR CUTTERS ARE STERILE, SINGLE PATIENT USE INSTRUMENTS THAT SIMULTANEOUSLYCUT AND STAPLE TISSUE. THERE ARE SIX STAGGERED ROWS OF STAPLES, THREE ON EITHER SIDE OF THE CUT LINE. THE ECHELON FLEX 45 POWERED PLUSINSTRUMENTS HAVE A STAPLE LINE THAT IS APPROXIMATELY 45 MM LONG AND A CUT LINE THAT IS APPROXIMATELY 42 MM LONG. THE SHAFT CAN ROTATE FREELYIN BOTH DIRECTIONS AND AN ARTICULATION MECHANISM ENABLES THE DISTAL PORTION OF THE SHAFT TO PIVOT TO FACILITATE LATERAL ACCESS TO THE OPERATIVESITE.THE INSTRUMENTS ARE PACKAGED WITH A PRIMARY LITHIUM BATTERY PACK THAT MUST BE INSTALLED PRIOR TO USE. THERE ARE SPECIFIC REQUIREMENTS FORDISPOSING OF THE BATTERY PACK. REFER TO THE BATTERY PACK DISPOSAL SECTION.THE INSTRUMENTS ARE PACKAGED WITHOUT A RELOAD AND MUST BE LOADED PRIOR TO USE. A STAPLE RETAINING CAP ON THE RELOAD PROTECTS THE STAPLE LEGPOINTS DURING SHIPPING AND TRANSPORTATION. THE INSTRUMENTS’ LOCK-OUT FEATURE IS DESIGNED TO PREVENT A USED OR IMPROPERLY INSTALLED RELOADFROM BEING REFIRED OR AN INSTRUMENT FROM BEING FIRED WITHOUT A RELOAD.: Brand: ECHELON FLEX

## (undated) DEVICE — BONEE® NEEDLE FOR BLADDER INJECTION CH FR 05, 22G, 35 CM, BOX OF 1: Brand: PORGES COLOPLAST

## (undated) DEVICE — GLOVE SRG BIOGEL 7

## (undated) DEVICE — SUT PDS PLUS 1 CTX 36IN PDP371T

## (undated) DEVICE — SUT ETHILON 3-0 FSLX 30 IN 1673H

## (undated) DEVICE — RADIFOCUS OPTITORQUE ANGIOGRAPHIC CATHETER: Brand: OPTITORQUE

## (undated) DEVICE — GLIDESHEATH SLENDER STAINLESS STEEL KIT: Brand: GLIDESHEATH SLENDER

## (undated) DEVICE — HARMONIC 1100 SHEARS, 20CM SHAFT LENGTH: Brand: HARMONIC

## (undated) DEVICE — SUT PROLENE 2-0 SH 36 IN 8523H

## (undated) DEVICE — GUIDEWIRE WHOLEY HI TORQUE INTERM MOD J .035 145CM

## (undated) DEVICE — VESSEL LOOPS X-RAY DETECTABLE: Brand: DEROYAL